# Patient Record
Sex: FEMALE | Race: WHITE | NOT HISPANIC OR LATINO
[De-identification: names, ages, dates, MRNs, and addresses within clinical notes are randomized per-mention and may not be internally consistent; named-entity substitution may affect disease eponyms.]

---

## 2014-02-26 RX ORDER — ATORVASTATIN CALCIUM 80 MG/1
1 TABLET, FILM COATED ORAL
Qty: 0 | Refills: 0 | DISCHARGE
Start: 2014-02-26

## 2014-03-14 RX ORDER — DIGOXIN 250 MCG
1 TABLET ORAL
Qty: 0 | Refills: 0 | DISCHARGE
Start: 2014-03-14

## 2017-06-16 ENCOUNTER — APPOINTMENT (OUTPATIENT)
Dept: HOME HEALTH SERVICES | Facility: HOME HEALTH | Age: 82
End: 2017-06-16

## 2017-06-16 VITALS
TEMPERATURE: 98.5 F | RESPIRATION RATE: 12 BRPM | HEART RATE: 59 BPM | WEIGHT: 123.6 LBS | OXYGEN SATURATION: 97 % | BODY MASS INDEX: 23.33 KG/M2 | HEIGHT: 61 IN | DIASTOLIC BLOOD PRESSURE: 60 MMHG | SYSTOLIC BLOOD PRESSURE: 145 MMHG

## 2017-06-16 DIAGNOSIS — Z80.42 FAMILY HISTORY OF MALIGNANT NEOPLASM OF PROSTATE: ICD-10-CM

## 2017-06-16 DIAGNOSIS — Z63.4 DISAPPEARANCE AND DEATH OF FAMILY MEMBER: ICD-10-CM

## 2017-06-16 SDOH — SOCIAL STABILITY - SOCIAL INSECURITY: DISSAPEARANCE AND DEATH OF FAMILY MEMBER: Z63.4

## 2017-06-19 ENCOUNTER — LABORATORY RESULT (OUTPATIENT)
Age: 82
End: 2017-06-19

## 2017-06-26 ENCOUNTER — LABORATORY RESULT (OUTPATIENT)
Age: 82
End: 2017-06-26

## 2017-06-28 LAB
APPEARANCE: ABNORMAL
BACTERIA UR CULT: NORMAL
BILIRUBIN URINE: NEGATIVE
BLOOD URINE: ABNORMAL
COLOR: YELLOW
GLUCOSE QUALITATIVE U: 500 MG/DL
KETONES URINE: NEGATIVE
LEUKOCYTE ESTERASE URINE: ABNORMAL
NITRITE URINE: NEGATIVE
PH URINE: 5
PROTEIN URINE: ABNORMAL MG/DL
SPECIFIC GRAVITY URINE: 1.01
UROBILINOGEN URINE: NORMAL MG/DL

## 2017-08-16 ENCOUNTER — INPATIENT (INPATIENT)
Facility: HOSPITAL | Age: 82
LOS: 4 days | Discharge: HOME | DRG: 280 | End: 2017-08-21
Attending: INTERNAL MEDICINE | Admitting: EMERGENCY MEDICINE
Payer: MEDICARE

## 2017-08-16 VITALS
TEMPERATURE: 99 F | RESPIRATION RATE: 16 BRPM | HEART RATE: 66 BPM | SYSTOLIC BLOOD PRESSURE: 139 MMHG | DIASTOLIC BLOOD PRESSURE: 78 MMHG | OXYGEN SATURATION: 99 %

## 2017-08-16 DIAGNOSIS — Z90.49 ACQUIRED ABSENCE OF OTHER SPECIFIED PARTS OF DIGESTIVE TRACT: Chronic | ICD-10-CM

## 2017-08-16 DIAGNOSIS — Z98.89 OTHER SPECIFIED POSTPROCEDURAL STATES: Chronic | ICD-10-CM

## 2017-08-16 DIAGNOSIS — R07.9 CHEST PAIN, UNSPECIFIED: ICD-10-CM

## 2017-08-16 LAB
ALBUMIN SERPL ELPH-MCNC: 3.8 G/DL — SIGNIFICANT CHANGE UP (ref 3.3–5)
ALP SERPL-CCNC: 136 U/L — HIGH (ref 40–120)
ALT FLD-CCNC: 23 U/L RC — SIGNIFICANT CHANGE UP (ref 10–45)
ANION GAP SERPL CALC-SCNC: 12 MMOL/L — SIGNIFICANT CHANGE UP (ref 5–17)
APPEARANCE UR: ABNORMAL
APTT BLD: 30.6 SEC — SIGNIFICANT CHANGE UP (ref 27.5–37.4)
AST SERPL-CCNC: 19 U/L — SIGNIFICANT CHANGE UP (ref 10–40)
BACTERIA # UR AUTO: ABNORMAL /HPF
BASOPHILS # BLD AUTO: 0 K/UL — SIGNIFICANT CHANGE UP (ref 0–0.2)
BASOPHILS NFR BLD AUTO: 0.5 % — SIGNIFICANT CHANGE UP (ref 0–2)
BILIRUB SERPL-MCNC: 0.5 MG/DL — SIGNIFICANT CHANGE UP (ref 0.2–1.2)
BILIRUB UR-MCNC: NEGATIVE — SIGNIFICANT CHANGE UP
BUN SERPL-MCNC: 16 MG/DL — SIGNIFICANT CHANGE UP (ref 7–23)
CALCIUM SERPL-MCNC: 8.8 MG/DL — SIGNIFICANT CHANGE UP (ref 8.4–10.5)
CHLORIDE SERPL-SCNC: 103 MMOL/L — SIGNIFICANT CHANGE UP (ref 96–108)
CK MB BLD-MCNC: 5.4 % — HIGH (ref 0–3.5)
CK MB CFR SERPL CALC: 3.3 NG/ML — SIGNIFICANT CHANGE UP (ref 0–3.8)
CK MB CFR SERPL CALC: 3.9 NG/ML — HIGH (ref 0–3.8)
CK MB CFR SERPL CALC: 4.3 NG/ML — HIGH (ref 0–3.8)
CK MB CFR SERPL CALC: 5 NG/ML — HIGH (ref 0–3.8)
CK SERPL-CCNC: 79 U/L — SIGNIFICANT CHANGE UP (ref 25–170)
CK SERPL-CCNC: 82 U/L — SIGNIFICANT CHANGE UP (ref 25–170)
CK SERPL-CCNC: 94 U/L — SIGNIFICANT CHANGE UP (ref 25–170)
CO2 SERPL-SCNC: 23 MMOL/L — SIGNIFICANT CHANGE UP (ref 22–31)
COLOR SPEC: SIGNIFICANT CHANGE UP
CREAT SERPL-MCNC: 0.88 MG/DL — SIGNIFICANT CHANGE UP (ref 0.5–1.3)
DIFF PNL FLD: NEGATIVE — SIGNIFICANT CHANGE UP
DIGOXIN SERPL-MCNC: 0.8 NG/ML — SIGNIFICANT CHANGE UP (ref 0.8–2)
EOSINOPHIL # BLD AUTO: 0.2 K/UL — SIGNIFICANT CHANGE UP (ref 0–0.5)
EOSINOPHIL NFR BLD AUTO: 3 % — SIGNIFICANT CHANGE UP (ref 0–6)
EPI CELLS # UR: SIGNIFICANT CHANGE UP /HPF
GLUCOSE SERPL-MCNC: 353 MG/DL — HIGH (ref 70–99)
GLUCOSE UR QL: 50
HCT VFR BLD CALC: 32.7 % — LOW (ref 34.5–45)
HGB BLD-MCNC: 11.3 G/DL — LOW (ref 11.5–15.5)
HYALINE CASTS # UR AUTO: ABNORMAL
INR BLD: 1.19 RATIO — HIGH (ref 0.88–1.16)
KETONES UR-MCNC: NEGATIVE — SIGNIFICANT CHANGE UP
LEUKOCYTE ESTERASE UR-ACNC: ABNORMAL
LYMPHOCYTES # BLD AUTO: 1.3 K/UL — SIGNIFICANT CHANGE UP (ref 1–3.3)
LYMPHOCYTES # BLD AUTO: 15.9 % — SIGNIFICANT CHANGE UP (ref 13–44)
MCHC RBC-ENTMCNC: 32.6 PG — SIGNIFICANT CHANGE UP (ref 27–34)
MCHC RBC-ENTMCNC: 34.7 GM/DL — SIGNIFICANT CHANGE UP (ref 32–36)
MCV RBC AUTO: 94 FL — SIGNIFICANT CHANGE UP (ref 80–100)
MONOCYTES # BLD AUTO: 0.5 K/UL — SIGNIFICANT CHANGE UP (ref 0–0.9)
MONOCYTES NFR BLD AUTO: 6.6 % — SIGNIFICANT CHANGE UP (ref 2–14)
NEUTROPHILS # BLD AUTO: 5.9 K/UL — SIGNIFICANT CHANGE UP (ref 1.8–7.4)
NEUTROPHILS NFR BLD AUTO: 73.9 % — SIGNIFICANT CHANGE UP (ref 43–77)
NITRITE UR-MCNC: NEGATIVE — SIGNIFICANT CHANGE UP
NT-PROBNP SERPL-SCNC: 1050 PG/ML — HIGH (ref 0–300)
PH UR: 5.5 — SIGNIFICANT CHANGE UP (ref 5–8)
PLATELET # BLD AUTO: 152 K/UL — SIGNIFICANT CHANGE UP (ref 150–400)
POTASSIUM SERPL-MCNC: 4.1 MMOL/L — SIGNIFICANT CHANGE UP (ref 3.5–5.3)
POTASSIUM SERPL-SCNC: 4.1 MMOL/L — SIGNIFICANT CHANGE UP (ref 3.5–5.3)
PROT SERPL-MCNC: 6.1 G/DL — SIGNIFICANT CHANGE UP (ref 6–8.3)
PROT UR-MCNC: NEGATIVE — SIGNIFICANT CHANGE UP
PROTHROM AB SERPL-ACNC: 13 SEC — HIGH (ref 9.8–12.7)
RBC # BLD: 3.47 M/UL — LOW (ref 3.8–5.2)
RBC # FLD: 11.9 % — SIGNIFICANT CHANGE UP (ref 10.3–14.5)
RBC CASTS # UR COMP ASSIST: ABNORMAL /HPF (ref 0–2)
SODIUM SERPL-SCNC: 138 MMOL/L — SIGNIFICANT CHANGE UP (ref 135–145)
SP GR SPEC: 1.01 — SIGNIFICANT CHANGE UP (ref 1.01–1.02)
TROPONIN T SERPL-MCNC: 0.05 NG/ML — SIGNIFICANT CHANGE UP (ref 0–0.06)
TROPONIN T SERPL-MCNC: 0.07 NG/ML — HIGH (ref 0–0.06)
TROPONIN T SERPL-MCNC: 0.08 NG/ML — HIGH (ref 0–0.06)
TROPONIN T SERPL-MCNC: <0.01 NG/ML — SIGNIFICANT CHANGE UP (ref 0–0.06)
UROBILINOGEN FLD QL: NEGATIVE — SIGNIFICANT CHANGE UP
WBC # BLD: 8 K/UL — SIGNIFICANT CHANGE UP (ref 3.8–10.5)
WBC # FLD AUTO: 8 K/UL — SIGNIFICANT CHANGE UP (ref 3.8–10.5)
WBC UR QL: SIGNIFICANT CHANGE UP /HPF (ref 0–5)

## 2017-08-16 PROCEDURE — 99285 EMERGENCY DEPT VISIT HI MDM: CPT | Mod: 25

## 2017-08-16 PROCEDURE — 93308 TTE F-UP OR LMTD: CPT | Mod: 26

## 2017-08-16 PROCEDURE — 71010: CPT | Mod: 26

## 2017-08-16 RX ORDER — DEXTROSE 50 % IN WATER 50 %
1 SYRINGE (ML) INTRAVENOUS ONCE
Qty: 0 | Refills: 0 | Status: DISCONTINUED | OUTPATIENT
Start: 2017-08-16 | End: 2017-08-21

## 2017-08-16 RX ORDER — HEPARIN SODIUM 5000 [USP'U]/ML
5000 INJECTION INTRAVENOUS; SUBCUTANEOUS EVERY 8 HOURS
Qty: 0 | Refills: 0 | Status: DISCONTINUED | OUTPATIENT
Start: 2017-08-16 | End: 2017-08-17

## 2017-08-16 RX ORDER — FUROSEMIDE 40 MG
20 TABLET ORAL DAILY
Qty: 0 | Refills: 0 | Status: DISCONTINUED | OUTPATIENT
Start: 2017-08-16 | End: 2017-08-21

## 2017-08-16 RX ORDER — FUROSEMIDE 40 MG
40 TABLET ORAL ONCE
Qty: 0 | Refills: 0 | Status: COMPLETED | OUTPATIENT
Start: 2017-08-16 | End: 2017-08-16

## 2017-08-16 RX ORDER — DEXTROSE 50 % IN WATER 50 %
12.5 SYRINGE (ML) INTRAVENOUS ONCE
Qty: 0 | Refills: 0 | Status: DISCONTINUED | OUTPATIENT
Start: 2017-08-16 | End: 2017-08-21

## 2017-08-16 RX ORDER — DEXTROSE 50 % IN WATER 50 %
25 SYRINGE (ML) INTRAVENOUS ONCE
Qty: 0 | Refills: 0 | Status: DISCONTINUED | OUTPATIENT
Start: 2017-08-16 | End: 2017-08-21

## 2017-08-16 RX ORDER — ATORVASTATIN CALCIUM 80 MG/1
80 TABLET, FILM COATED ORAL AT BEDTIME
Qty: 0 | Refills: 0 | Status: DISCONTINUED | OUTPATIENT
Start: 2017-08-16 | End: 2017-08-21

## 2017-08-16 RX ORDER — FUROSEMIDE 40 MG
1 TABLET ORAL
Qty: 7 | Refills: 0 | OUTPATIENT
Start: 2017-08-16 | End: 2017-08-23

## 2017-08-16 RX ORDER — INSULIN LISPRO 100/ML
VIAL (ML) SUBCUTANEOUS AT BEDTIME
Qty: 0 | Refills: 0 | Status: DISCONTINUED | OUTPATIENT
Start: 2017-08-16 | End: 2017-08-21

## 2017-08-16 RX ORDER — DIGOXIN 250 MCG
0.12 TABLET ORAL DAILY
Qty: 0 | Refills: 0 | Status: DISCONTINUED | OUTPATIENT
Start: 2017-08-16 | End: 2017-08-21

## 2017-08-16 RX ORDER — POTASSIUM CHLORIDE 20 MEQ
20 PACKET (EA) ORAL ONCE
Qty: 0 | Refills: 0 | Status: COMPLETED | OUTPATIENT
Start: 2017-08-16 | End: 2017-08-16

## 2017-08-16 RX ORDER — HEPARIN SODIUM 5000 [USP'U]/ML
3500 INJECTION INTRAVENOUS; SUBCUTANEOUS ONCE
Qty: 0 | Refills: 0 | Status: COMPLETED | OUTPATIENT
Start: 2017-08-16 | End: 2017-08-16

## 2017-08-16 RX ORDER — LISINOPRIL 2.5 MG/1
10 TABLET ORAL DAILY
Qty: 0 | Refills: 0 | Status: DISCONTINUED | OUTPATIENT
Start: 2017-08-16 | End: 2017-08-21

## 2017-08-16 RX ORDER — SPIRONOLACTONE 25 MG/1
25 TABLET, FILM COATED ORAL DAILY
Qty: 0 | Refills: 0 | Status: DISCONTINUED | OUTPATIENT
Start: 2017-08-16 | End: 2017-08-21

## 2017-08-16 RX ORDER — CHOLECALCIFEROL (VITAMIN D3) 125 MCG
1000 CAPSULE ORAL DAILY
Qty: 0 | Refills: 0 | Status: DISCONTINUED | OUTPATIENT
Start: 2017-08-16 | End: 2017-08-21

## 2017-08-16 RX ORDER — ASPIRIN/CALCIUM CARB/MAGNESIUM 324 MG
81 TABLET ORAL DAILY
Qty: 0 | Refills: 0 | Status: DISCONTINUED | OUTPATIENT
Start: 2017-08-16 | End: 2017-08-21

## 2017-08-16 RX ORDER — INSULIN GLARGINE 100 [IU]/ML
14 INJECTION, SOLUTION SUBCUTANEOUS AT BEDTIME
Qty: 0 | Refills: 0 | Status: DISCONTINUED | OUTPATIENT
Start: 2017-08-16 | End: 2017-08-16

## 2017-08-16 RX ORDER — INSULIN LISPRO 100/ML
5 VIAL (ML) SUBCUTANEOUS ONCE
Qty: 0 | Refills: 0 | Status: COMPLETED | OUTPATIENT
Start: 2017-08-16 | End: 2017-08-16

## 2017-08-16 RX ORDER — HEPARIN SODIUM 5000 [USP'U]/ML
INJECTION INTRAVENOUS; SUBCUTANEOUS
Qty: 25000 | Refills: 0 | Status: DISCONTINUED | OUTPATIENT
Start: 2017-08-16 | End: 2017-08-18

## 2017-08-16 RX ORDER — METOPROLOL TARTRATE 50 MG
25 TABLET ORAL
Qty: 0 | Refills: 0 | Status: DISCONTINUED | OUTPATIENT
Start: 2017-08-16 | End: 2017-08-21

## 2017-08-16 RX ORDER — ACETAMINOPHEN 500 MG
650 TABLET ORAL EVERY 6 HOURS
Qty: 0 | Refills: 0 | Status: DISCONTINUED | OUTPATIENT
Start: 2017-08-16 | End: 2017-08-21

## 2017-08-16 RX ORDER — INSULIN GLARGINE 100 [IU]/ML
17 INJECTION, SOLUTION SUBCUTANEOUS AT BEDTIME
Qty: 0 | Refills: 0 | Status: DISCONTINUED | OUTPATIENT
Start: 2017-08-16 | End: 2017-08-18

## 2017-08-16 RX ORDER — HEPARIN SODIUM 5000 [USP'U]/ML
3500 INJECTION INTRAVENOUS; SUBCUTANEOUS EVERY 6 HOURS
Qty: 0 | Refills: 0 | Status: DISCONTINUED | OUTPATIENT
Start: 2017-08-16 | End: 2017-08-20

## 2017-08-16 RX ORDER — SODIUM CHLORIDE 9 MG/ML
1000 INJECTION, SOLUTION INTRAVENOUS
Qty: 0 | Refills: 0 | Status: DISCONTINUED | OUTPATIENT
Start: 2017-08-16 | End: 2017-08-21

## 2017-08-16 RX ORDER — GLUCAGON INJECTION, SOLUTION 0.5 MG/.1ML
1 INJECTION, SOLUTION SUBCUTANEOUS ONCE
Qty: 0 | Refills: 0 | Status: DISCONTINUED | OUTPATIENT
Start: 2017-08-16 | End: 2017-08-21

## 2017-08-16 RX ORDER — PANTOPRAZOLE SODIUM 20 MG/1
40 TABLET, DELAYED RELEASE ORAL
Qty: 0 | Refills: 0 | Status: DISCONTINUED | OUTPATIENT
Start: 2017-08-16 | End: 2017-08-21

## 2017-08-16 RX ORDER — CLOPIDOGREL BISULFATE 75 MG/1
75 TABLET, FILM COATED ORAL DAILY
Qty: 0 | Refills: 0 | Status: DISCONTINUED | OUTPATIENT
Start: 2017-08-16 | End: 2017-08-21

## 2017-08-16 RX ORDER — INSULIN LISPRO 100/ML
VIAL (ML) SUBCUTANEOUS
Qty: 0 | Refills: 0 | Status: DISCONTINUED | OUTPATIENT
Start: 2017-08-16 | End: 2017-08-21

## 2017-08-16 RX ADMIN — Medication 2: at 22:07

## 2017-08-16 RX ADMIN — Medication 5 UNIT(S): at 04:45

## 2017-08-16 RX ADMIN — HEPARIN SODIUM 700 UNIT(S)/HR: 5000 INJECTION INTRAVENOUS; SUBCUTANEOUS at 18:21

## 2017-08-16 RX ADMIN — Medication 81 MILLIGRAM(S): at 16:15

## 2017-08-16 RX ADMIN — ATORVASTATIN CALCIUM 80 MILLIGRAM(S): 80 TABLET, FILM COATED ORAL at 22:07

## 2017-08-16 RX ADMIN — INSULIN GLARGINE 17 UNIT(S): 100 INJECTION, SOLUTION SUBCUTANEOUS at 22:08

## 2017-08-16 RX ADMIN — Medication 40 MILLIGRAM(S): at 04:44

## 2017-08-16 RX ADMIN — Medication 1000 UNIT(S): at 16:15

## 2017-08-16 RX ADMIN — Medication 2: at 14:45

## 2017-08-16 RX ADMIN — HEPARIN SODIUM 3500 UNIT(S): 5000 INJECTION INTRAVENOUS; SUBCUTANEOUS at 18:23

## 2017-08-16 RX ADMIN — Medication 0.12 MILLIGRAM(S): at 16:17

## 2017-08-16 RX ADMIN — Medication 2: at 18:24

## 2017-08-16 RX ADMIN — CLOPIDOGREL BISULFATE 75 MILLIGRAM(S): 75 TABLET, FILM COATED ORAL at 16:15

## 2017-08-16 RX ADMIN — Medication 1 TABLET(S): at 16:15

## 2017-08-16 RX ADMIN — Medication 20 MILLIEQUIVALENT(S): at 04:45

## 2017-08-16 NOTE — ED PROVIDER NOTE - PROGRESS NOTE DETAILS
Attending Christian: pt feeling better, offered admission for further diuresis but able to ambulate without difficulty or evidence of hypoxia. d/w pt's doctors will start lasix and have pt follow up Attending Gonzales: repeat troponin slightly elevated from previous. pt feeling better however with increasing troponin will admit. pcp is dr diallo and cards is dr chavarria

## 2017-08-16 NOTE — ED PROVIDER NOTE - OBJECTIVE STATEMENT
84 year old F with PMH of IDDM, HTN, HLD, hx of STEMI w/ stents (2014) presents with CP. Started in evening when sitting on couch. Midsternal CP similar to when had MI. Associated with SOB. Lasted 2 hrs and resolved. No N/V, diaphoresis. Patient otherwise in usual state of health other than feeling fatigued. On aspirin/plavix now. Has LE edema at baseline with recent exacerbation. Denies orthopnea.

## 2017-08-16 NOTE — ED ADULT NURSE NOTE - PMH
CHF (congestive heart failure)    Diabetes mellitus    Dyslipidemia    HTN (hypertension)    Palpitations    STEMI (ST elevation myocardial infarction)

## 2017-08-16 NOTE — ED ADULT NURSE REASSESSMENT NOTE - NS ED NURSE REASSESS COMMENT FT1
Vss. Pt is aaox4. Gross neuro intact. Lungs clear throughout bilat. S1 and S2 heard. CM: NSR. Pt denies of chest pain,sob, n,v, dizziness, weakness, fever, chills. Pt awaiting lab results. Safety and comfort measures maintained. Family at bedside.

## 2017-08-16 NOTE — H&P ADULT - HISTORY OF PRESENT ILLNESS
84 year old F with PMH of IDDM, HTN, HLD, hx of STEMI w/ stents (2014) presents with CP. Started in evening when sitting on couch. Midsternal CP similar to when had MI. Associated with SOB. Lasted 2 hrs and resolved. No N/V, diaphoresis. Patient otherwise in usual state of health other than feeling fatigued. On aspirin/plavix now. Has LE edema at baseline with recent exacerbation. Denies orthopnea, but sleeps on a recliner.

## 2017-08-16 NOTE — CONSULT NOTE ADULT - SUBJECTIVE AND OBJECTIVE BOX
CHIEF COMPLAINT:Patient is a 84y old  Female who presents with a chief complaint of chest pain (16 Aug 2017 15:05)      HISTORY OF PRESENT ILLNESS:HPI:  84 year old F with PMH of sHF EF 30%, IDDM, HTN, HLD, hx of STEMI w/ stents (2014) presents with CP. Started in evening when sitting on couch. Midsternal CP similar to when had MI. Associated with SOB. Lasted 2 hrs and resolved. No N/V, diaphoresis. Patient otherwise in usual state of health other than feeling fatigued. On aspirin/plavix now. Has LE edema at baseline with recent exacerbation. Denies orthopnea, but sleeps on a recliner. (16 Aug 2017 15:05). No recent changes in siet      PAST MEDICAL & SURGICAL HISTORY:  CHF (congestive heart failure)  STEMI (ST elevation myocardial infarction)  Palpitations  Diabetes mellitus  Dyslipidemia  HTN (hypertension)  S/P cardiac cath  S/P tonsillectomy  S/P lumpectomy, left breast: premalignant  S/P appendectomy  S/P cholecystectomy          MEDICATIONS:  aspirin enteric coated 81 milliGRAM(s) Oral daily  lisinopril 10 milliGRAM(s) Oral daily  digoxin     Tablet 0.125 milliGRAM(s) Oral daily  metoprolol 25 milliGRAM(s) Oral two times a day  clopidogrel Tablet 75 milliGRAM(s) Oral daily  heparin  Injectable 5000 Unit(s) SubCutaneous every 8 hours  furosemide   Injectable 20 milliGRAM(s) IV Push daily  heparin  Infusion.  Unit(s)/Hr IV Continuous <Continuous>  heparin  Injectable 3500 Unit(s) IV Push every 6 hours PRN  spironolactone 25 milliGRAM(s) Oral daily        acetaminophen   Tablet 650 milliGRAM(s) Oral every 6 hours PRN    pantoprazole    Tablet 40 milliGRAM(s) Oral before breakfast    insulin lispro (HumaLOG) corrective regimen sliding scale   SubCutaneous three times a day before meals  insulin lispro (HumaLOG) corrective regimen sliding scale   SubCutaneous at bedtime  dextrose Gel 1 Dose(s) Oral once PRN  dextrose 50% Injectable 12.5 Gram(s) IV Push once  dextrose 50% Injectable 25 Gram(s) IV Push once  dextrose 50% Injectable 25 Gram(s) IV Push once  glucagon  Injectable 1 milliGRAM(s) IntraMuscular once PRN  atorvastatin 80 milliGRAM(s) Oral at bedtime  insulin glargine Injectable (LANTUS) 17 Unit(s) SubCutaneous at bedtime    dextrose 5%. 1000 milliLiter(s) IV Continuous <Continuous>  multivitamin 1 Tablet(s) Oral daily  cholecalciferol 1000 Unit(s) Oral daily      FAMILY HISTORY:  No pertinent family history in first degree relatives      Non-contributory    SOCIAL HISTORY:    Not an active smoker    Allergies    codeine (Unknown)  Kiwi (Anaphylaxis)  penicillins (Anaphylaxis)    Intolerances    	    REVIEW OF SYSTEMS:  CONSTITUTIONAL: No fever  EYES: No eye pain, visual disturbances, or discharge  ENMT:  No difficulty hearing, tinnitus  NECK: No pain or stiffness  RESPIRATORY: No cough, wheezing,  CARDIOVASCULAR: +chest pain, palpitations, (-) passing out, dizziness, (+) leg swelling  GASTROINTESTINAL:  No nausea, vomiting, diarrhea or constipation. No melena.  GENITOURINARY: No dysuria, hematuria  NEUROLOGICAL: No stroke like symptoms  SKIN: No burning or lesions   LYMPH Nodes: No enlarged glands  ENDOCRINE: No heat or cold intolerance  MUSCULOSKELETAL: No joint pain or swelling  PSYCHIATRIC: No  anxiety, mood swings  HEME/LYMPH: No bleeding gums  ALLERY AND IMMUNOLOGIC: No hives or eczema	    All other ROS negative    PHYSICAL EXAM:  T(C): 36.8 (08-16-17 @ 20:07), Max: 37.1 (08-16-17 @ 02:13)  HR: 69 (08-16-17 @ 20:07) (49 - 70)  BP: 144/72 (08-16-17 @ 20:07) (132/75 - 153/93)  RR: 18 (08-16-17 @ 20:07) (16 - 18)  SpO2: 96% (08-16-17 @ 20:07) (95% - 99%)  Wt(kg): --  I&O's Summary    16 Aug 2017 07:01  -  16 Aug 2017 20:25  --------------------------------------------------------  IN: 240 mL / OUT: 100 mL / NET: 140 mL        Appearance: Normal	  HEENT:   Normal oral mucosa, EOMI	  Lymphatic: No lymphadenopathy  Cardiovascular: Normal S1 S2, No JVD, No murmurs, No edema  Respiratory: Lungs clear to auscultation	  Psychiatry: Alert, Mood & affect appropriate  Gastrointestinal:  Soft, Non-tender, + BS	  Skin: No rashes, No ecchymoses, No cyanosis	  Neurologic: Non-focal  Extremities:  No clubbing, cyanosis or edema  Vascular: Peripheral pulses palpable 2+ bilaterally  	    ECG:  sr lbbb	  RADIOLOGY:  	  	  CARDIAC MARKERS:  Troponin T, Serum: 0.08 ng/mL (08-16 @ 15:48)  Troponin T, Serum: 0.05 ng/mL (08-16 @ 08:04)  Troponin T, Serum: <0.01 ng/mL (08-16 @ 03:08)                                  11.3   8.0   )-----------( 152      ( 16 Aug 2017 03:08 )             32.7     08-16    138  |  103  |  16  ----------------------------<  353<H>  4.1   |  23  |  0.88    Ca    8.8      16 Aug 2017 03:08    TPro  6.1  /  Alb  3.8  /  TBili  0.5  /  DBili  x   /  AST  19  /  ALT  23  /  AlkPhos  136<H>  08-16    proBNP: Serum Pro-Brain Natriuretic Peptide: 1050 pg/mL (08-16 @ 03:08)    Lipid Profile:   HgA1c:   TSH:       Assesment/Plan:   84 year old F with PMH of sHF EF 30%, IDDM, HTN, HLD, hx of STEMI w/ stents (2014) presents with chest pain  1. Chest pain/CAD/NSTEMI - EKG with old LBBB nondiagnostic for ischemia. Trop elevated. Pt with known CAD with recurrent symptoms similar to the pain prior to her MI  - I recommend cath given high risk ACS. Pt is reluctant and wishes to think about it.   - Continue with ASA, Plavix, and statin. Add heparin gtt for ACS    2. sHF - euvolemic at this time. Continue with OMT. Continue with ACE, bb and aldactone    3. HTN - well controlled on above regimen.     4. HLD - continue with statin    5. DM - per primary team          Srini Velasco DO [facility]  [phone]

## 2017-08-16 NOTE — ED PROVIDER NOTE - MEDICAL DECISION MAKING DETAILS
84 year old F with PMH of IDDM, HTN, HLD, hx of STEMI w/ stents (2014) presents with CP. DIMITRI. Labs, CE, pro-BNP, chest xray. 84 year old F with PMH of IDDM, HTN, HLD, hx of STEMI w/ stents (2014) presents with CP. DIMITRI. Labs, CE, pro-BNP, chest xray.  Attending Gonzales: 85 y/o female h/o CHF with last echo showing severe hypokinesis not on diuretic presenting with sob. upon arriva symptoms improving. denies chest pain. pocus shows b/l b lines, trace pleural effusions. pt given lasix and felt improved. ambulating in the ed without hypoxia. offered admission for diuresis and furthe rmonitoring however as pt feeling better will d/c with close follow up. d/w her pcp will start lasix at home with close return precautions. check 2 sets of enzymes. less likely pe based on history

## 2017-08-16 NOTE — H&P ADULT - ASSESSMENT
84 year old F with PMH of IDDM, HTN, HLD, hx of STEMI w/ stents (2014) presents with CP.     acute on chronic chf- dyspnea and le edema improved after iv Lasix in ed.  start lasix 20 iv daily, strict is and os. low salt diet, 2 d echo.  chest pain- r/o acs, admit to tele. r/o mi , cie trop q 8 x 3, may need stress test, cardiology consult, cw metoprolol and plavix.  diabetes- fs qid, check hgb a1c, insulin as per protocol  hld- check fasting lipids, cw lipitor  dvt px.

## 2017-08-16 NOTE — H&P ADULT - NSHPLABSRESULTS_GEN_ALL_CORE
LABS:                        11.3   8.0   )-----------( 152      ( 16 Aug 2017 03:08 )             32.7     08-16    138  |  103  |  16  ----------------------------<  353<H>  4.1   |  23  |  0.88    Ca    8.8      16 Aug 2017 03:08    TPro  6.1  /  Alb  3.8  /  TBili  0.5  /  DBili  x   /  AST  19  /  ALT  23  /  AlkPhos  136<H>  08-16    PT/INR - ( 16 Aug 2017 03:08 )   PT: 13.0 sec;   INR: 1.19 ratio         PTT - ( 16 Aug 2017 03:08 )  PTT:30.6 sec      Troponin T, Serum: 0.05 ng/mL (08-16-17 @ 08:04)  Troponin T, Serum: <0.01 ng/mL (08-16-17 @ 03:08)      RADIOLOGY & ADDITIONAL TESTS:

## 2017-08-16 NOTE — ED PROVIDER NOTE - CHPI ED SYMPTOMS NEG
no vomiting/no diaphoresis/no chills/no cough/no back pain/no fever/no nausea/no dizziness/no syncope

## 2017-08-16 NOTE — ED ADULT NURSE NOTE - PSH
S/P appendectomy    S/P cardiac cath    S/P cholecystectomy    S/P lumpectomy, left breast  premalignant  S/P tonsillectomy

## 2017-08-17 ENCOUNTER — APPOINTMENT (OUTPATIENT)
Dept: HOME HEALTH SERVICES | Facility: HOME HEALTH | Age: 82
End: 2017-08-17

## 2017-08-17 DIAGNOSIS — I10 ESSENTIAL (PRIMARY) HYPERTENSION: ICD-10-CM

## 2017-08-17 DIAGNOSIS — I50.23 ACUTE ON CHRONIC SYSTOLIC (CONGESTIVE) HEART FAILURE: ICD-10-CM

## 2017-08-17 DIAGNOSIS — I21.4 NON-ST ELEVATION (NSTEMI) MYOCARDIAL INFARCTION: ICD-10-CM

## 2017-08-17 DIAGNOSIS — E78.5 HYPERLIPIDEMIA, UNSPECIFIED: ICD-10-CM

## 2017-08-17 DIAGNOSIS — E11.9 TYPE 2 DIABETES MELLITUS WITHOUT COMPLICATIONS: ICD-10-CM

## 2017-08-17 LAB
ANION GAP SERPL CALC-SCNC: 14 MMOL/L — SIGNIFICANT CHANGE UP (ref 5–17)
APTT BLD: 47.2 SEC — HIGH (ref 27.5–37.4)
APTT BLD: 53.9 SEC — HIGH (ref 27.5–37.4)
APTT BLD: 60.4 SEC — HIGH (ref 27.5–37.4)
APTT BLD: 76.1 SEC — HIGH (ref 27.5–37.4)
BUN SERPL-MCNC: 14 MG/DL — SIGNIFICANT CHANGE UP (ref 7–23)
CALCIUM SERPL-MCNC: 9.2 MG/DL — SIGNIFICANT CHANGE UP (ref 8.4–10.5)
CHLORIDE SERPL-SCNC: 100 MMOL/L — SIGNIFICANT CHANGE UP (ref 96–108)
CHOLEST SERPL-MCNC: 96 MG/DL — SIGNIFICANT CHANGE UP (ref 10–199)
CK MB CFR SERPL CALC: 2.9 NG/ML — SIGNIFICANT CHANGE UP (ref 0–3.8)
CK SERPL-CCNC: 53 U/L — SIGNIFICANT CHANGE UP (ref 25–170)
CK SERPL-CCNC: 78 U/L — SIGNIFICANT CHANGE UP (ref 25–170)
CO2 SERPL-SCNC: 26 MMOL/L — SIGNIFICANT CHANGE UP (ref 22–31)
CREAT SERPL-MCNC: 0.9 MG/DL — SIGNIFICANT CHANGE UP (ref 0.5–1.3)
FOLATE SERPL-MCNC: >20 NG/ML — SIGNIFICANT CHANGE UP (ref 4.8–24.2)
GLUCOSE SERPL-MCNC: 288 MG/DL — HIGH (ref 70–99)
HBA1C BLD-MCNC: 11.6 % — HIGH (ref 4–5.6)
HCT VFR BLD CALC: 32.7 % — LOW (ref 34.5–45)
HCT VFR BLD CALC: 35.9 % — SIGNIFICANT CHANGE UP (ref 34.5–45)
HDLC SERPL-MCNC: 27 MG/DL — LOW (ref 40–125)
HGB BLD-MCNC: 11.6 G/DL — SIGNIFICANT CHANGE UP (ref 11.5–15.5)
HGB BLD-MCNC: 12 G/DL — SIGNIFICANT CHANGE UP (ref 11.5–15.5)
LIPID PNL WITH DIRECT LDL SERPL: 40 MG/DL — SIGNIFICANT CHANGE UP
MAGNESIUM SERPL-MCNC: 1.4 MG/DL — LOW (ref 1.6–2.6)
MCHC RBC-ENTMCNC: 29.8 PG — SIGNIFICANT CHANGE UP (ref 27–34)
MCHC RBC-ENTMCNC: 33.2 PG — SIGNIFICANT CHANGE UP (ref 27–34)
MCHC RBC-ENTMCNC: 33.4 GM/DL — SIGNIFICANT CHANGE UP (ref 32–36)
MCHC RBC-ENTMCNC: 35.5 GM/DL — SIGNIFICANT CHANGE UP (ref 32–36)
MCV RBC AUTO: 89.1 FL — SIGNIFICANT CHANGE UP (ref 80–100)
MCV RBC AUTO: 93.5 FL — SIGNIFICANT CHANGE UP (ref 80–100)
PHOSPHATE SERPL-MCNC: 3.3 MG/DL — SIGNIFICANT CHANGE UP (ref 2.5–4.5)
PLATELET # BLD AUTO: 134 K/UL — LOW (ref 150–400)
PLATELET # BLD AUTO: 164 K/UL — SIGNIFICANT CHANGE UP (ref 150–400)
POTASSIUM SERPL-MCNC: 4.4 MMOL/L — SIGNIFICANT CHANGE UP (ref 3.5–5.3)
POTASSIUM SERPL-SCNC: 4.4 MMOL/L — SIGNIFICANT CHANGE UP (ref 3.5–5.3)
RBC # BLD: 3.5 M/UL — LOW (ref 3.8–5.2)
RBC # BLD: 4.03 M/UL — SIGNIFICANT CHANGE UP (ref 3.8–5.2)
RBC # FLD: 11.7 % — SIGNIFICANT CHANGE UP (ref 10.3–14.5)
RBC # FLD: 13 % — SIGNIFICANT CHANGE UP (ref 10.3–14.5)
SODIUM SERPL-SCNC: 140 MMOL/L — SIGNIFICANT CHANGE UP (ref 135–145)
TOTAL CHOLESTEROL/HDL RATIO MEASUREMENT: 3.6 RATIO — SIGNIFICANT CHANGE UP (ref 3.3–7.1)
TRIGL SERPL-MCNC: 145 MG/DL — SIGNIFICANT CHANGE UP (ref 10–149)
TROPONIN T SERPL-MCNC: 0.05 NG/ML — SIGNIFICANT CHANGE UP (ref 0–0.06)
TSH SERPL-MCNC: 8.18 UIU/ML — HIGH (ref 0.27–4.2)
VIT B12 SERPL-MCNC: 477 PG/ML — SIGNIFICANT CHANGE UP (ref 243–894)
WBC # BLD: 7.24 K/UL — SIGNIFICANT CHANGE UP (ref 3.8–10.5)
WBC # BLD: 8.2 K/UL — SIGNIFICANT CHANGE UP (ref 3.8–10.5)
WBC # FLD AUTO: 7.24 K/UL — SIGNIFICANT CHANGE UP (ref 3.8–10.5)
WBC # FLD AUTO: 8.2 K/UL — SIGNIFICANT CHANGE UP (ref 3.8–10.5)

## 2017-08-17 PROCEDURE — 93306 TTE W/DOPPLER COMPLETE: CPT | Mod: 26

## 2017-08-17 RX ADMIN — Medication 25 MILLIGRAM(S): at 17:00

## 2017-08-17 RX ADMIN — Medication 0.12 MILLIGRAM(S): at 07:03

## 2017-08-17 RX ADMIN — ATORVASTATIN CALCIUM 80 MILLIGRAM(S): 80 TABLET, FILM COATED ORAL at 21:26

## 2017-08-17 RX ADMIN — HEPARIN SODIUM 750 UNIT(S)/HR: 5000 INJECTION INTRAVENOUS; SUBCUTANEOUS at 22:36

## 2017-08-17 RX ADMIN — Medication 5: at 12:11

## 2017-08-17 RX ADMIN — HEPARIN SODIUM 750 UNIT(S)/HR: 5000 INJECTION INTRAVENOUS; SUBCUTANEOUS at 15:36

## 2017-08-17 RX ADMIN — PANTOPRAZOLE SODIUM 40 MILLIGRAM(S): 20 TABLET, DELAYED RELEASE ORAL at 07:03

## 2017-08-17 RX ADMIN — Medication 4: at 21:26

## 2017-08-17 RX ADMIN — CLOPIDOGREL BISULFATE 75 MILLIGRAM(S): 75 TABLET, FILM COATED ORAL at 12:12

## 2017-08-17 RX ADMIN — Medication 81 MILLIGRAM(S): at 12:11

## 2017-08-17 RX ADMIN — SPIRONOLACTONE 25 MILLIGRAM(S): 25 TABLET, FILM COATED ORAL at 07:03

## 2017-08-17 RX ADMIN — Medication 3: at 08:02

## 2017-08-17 RX ADMIN — Medication 1000 UNIT(S): at 12:11

## 2017-08-17 RX ADMIN — Medication 20 MILLIGRAM(S): at 07:03

## 2017-08-17 RX ADMIN — HEPARIN SODIUM 650 UNIT(S)/HR: 5000 INJECTION INTRAVENOUS; SUBCUTANEOUS at 00:59

## 2017-08-17 RX ADMIN — Medication 25 MILLIGRAM(S): at 07:03

## 2017-08-17 RX ADMIN — HEPARIN SODIUM 650 UNIT(S)/HR: 5000 INJECTION INTRAVENOUS; SUBCUTANEOUS at 08:03

## 2017-08-17 RX ADMIN — Medication 1 TABLET(S): at 12:11

## 2017-08-17 RX ADMIN — INSULIN GLARGINE 17 UNIT(S): 100 INJECTION, SOLUTION SUBCUTANEOUS at 21:26

## 2017-08-17 RX ADMIN — Medication 3: at 17:00

## 2017-08-17 RX ADMIN — LISINOPRIL 10 MILLIGRAM(S): 2.5 TABLET ORAL at 07:03

## 2017-08-17 NOTE — PROVIDER CONTACT NOTE (OTHER) - ASSESSMENT
Pt A&Ox4. VS except heart rate is in the 50's. temp 97.4, hr 55, bp 125/74, respirations 18 and oxygen saturation 96% on room air. Pt was asymptomatic and was sleeping. Pt had no c/o chest pain, SOB, palpitations, and discomfort. Pt is now sinus bradycardia 50's on tele.

## 2017-08-17 NOTE — PROVIDER CONTACT NOTE (OTHER) - ACTION/TREATMENT ORDERED:
No orders were made at this present time. PA ordered to recheck the heart rate before the beta blocker is given.

## 2017-08-17 NOTE — PROGRESS NOTE ADULT - SUBJECTIVE AND OBJECTIVE BOX
Patient is a 84y old  Female who presents with a chief complaint of chest pain (16 Aug 2017 15:05)  Patient seen and examined, denies any complaints.    INTERVAL HPI/OVERNIGHT EVENTS: None    T(C): 37 (17 @ 20:10), Max: 37 (17 @ 20:10)  HR: 63 (17 @ 20:10) (50 - 63)  BP: 131/65 (17 @ 20:10) (125/67 - 134/75)  RR: 18 (17 @ 20:10) (18 - 18)  SpO2: 97% (17 @ 20:10) (96% - 99%)  Wt(kg): --  I&O's Summary    16 Aug 2017 07:  -  17 Aug 2017 07:00  --------------------------------------------------------  IN: 327.5 mL / OUT: 1150 mL / NET: -822.5 mL    17 Aug 2017 07:01  -  17 Aug 2017 23:03  --------------------------------------------------------  IN: 1045 mL / OUT: 3000 mL / NET: -1955 mL        PAST MEDICAL & SURGICAL HISTORY:  CHF (congestive heart failure)  STEMI (ST elevation myocardial infarction)  Palpitations  Diabetes mellitus  Dyslipidemia  HTN (hypertension)  S/P cardiac cath  S/P tonsillectomy  S/P lumpectomy, left breast: premalignant  S/P appendectomy  S/P cholecystectomy      REVIEW OF SYSTEMS:  CONSTITUTIONAL: No fever, weight loss, or fatigue  EYES: No eye pain, visual disturbances, or discharge  ENMT:  No difficulty hearing, tinnitus, vertigo; No sinus or throat pain  NECK: No pain or stiffness  RESPIRATORY: No cough, wheezing, chills or hemoptysis; No shortness of breath  CARDIOVASCULAR: No chest pain, palpitations, dizziness, or leg swelling  GASTROINTESTINAL: No abdominal or epigastric pain. No nausea, vomiting, or hematemesis; No diarrhea or constipation. No melena or hematochezia.  GENITOURINARY: No dysuria, frequency, hematuria, or incontinence  NEUROLOGICAL: No headaches, memory loss, loss of strength, numbness, or tremors  SKIN: No itching, burning, rashes, or lesions   LYMPH NODES: No enlarged glands  ENDOCRINE: No heat or cold intolerance; No hair loss  MUSCULOSKELETAL: No joint pain or swelling; No muscle, back, or extremity pain  PSYCHIATRIC: No depression, anxiety, mood swings, or difficulty sleeping  HEME/LYMPH: No easy bruising, or bleeding gums  ALLERY AND IMMUNOLOGIC: No hives or eczema    RADIOLOGY & ADDITIONAL TESTS:    Imaging Personally Reviewed:  [ ] YES  [ ] NO    Consultant(s) Notes Reviewed:  [+ ] YES  [ ] NO    PHYSICAL EXAM:  GENERAL: NAD, well-groomed, well-developed  HEAD:  Atraumatic, Normocephalic  EYES: EOMI, PERRLA, conjunctiva and sclera clear  ENMT: No tonsillar erythema, exudates, or enlargement; Moist mucous membranes, Good dentition, No lesions  NECK: Supple, No JVD, Normal thyroid  NERVOUS SYSTEM:  Alert & Oriented X3, Good concentration; Motor Strength 5/5 B/L upper and lower extremities; DTRs 2+ intact and symmetric  CHEST/LUNG: Clear to percussion bilaterally; No rales, rhonchi, wheezing, or rubs  HEART: Regular rate and rhythm; No murmurs, rubs, or gallops  ABDOMEN: Soft, Nontender, Nondistended; Bowel sounds present  EXTREMITIES:  2+ Peripheral Pulses, No clubbing, cyanosis, or edema  LYMPH: No lymphadenopathy noted  SKIN: No rashes or lesions    LABS:                        12.0   7.24  )-----------( 164      ( 17 Aug 2017 07:46 )             35.9     -    140  |  100  |  14  ----------------------------<  288<H>  4.4   |  26  |  0.90    Ca    9.2      17 Aug 2017 08:34  Phos  3.3     -  Mg     1.4     -    TPro  6.1  /  Alb  3.8  /  TBili  0.5  /  DBili  x   /  AST  19  /  ALT  23  /  AlkPhos  136<H>  08-16    PT/INR - ( 16 Aug 2017 03:08 )   PT: 13.0 sec;   INR: 1.19 ratio         PTT - ( 17 Aug 2017 21:41 )  PTT:60.4 sec  Urinalysis Basic - ( 16 Aug 2017 22:58 )    Color: x / Appearance: x / S.010 / pH: x  Gluc: x / Ketone: Negative  / Bili: Negative / Urobili: Negative   Blood: x / Protein: Negative / Nitrite: Negative   Leuk Esterase: Small / RBC: 2-5 /HPF / WBC 3-5 /HPF   Sq Epi: x / Non Sq Epi: x / Bacteria: Many /HPF      CAPILLARY BLOOD GLUCOSE  305 (17 Aug 2017 21:15)  298 (17 Aug 2017 16:06)  385 (17 Aug 2017 11:51)  253 (17 Aug 2017 07:52)            Urinalysis Basic - ( 16 Aug 2017 22:58 )    Color: x / Appearance: x / S.010 / pH: x  Gluc: x / Ketone: Negative  / Bili: Negative / Urobili: Negative   Blood: x / Protein: Negative / Nitrite: Negative   Leuk Esterase: Small / RBC: 2-5 /HPF / WBC 3-5 /HPF   Sq Epi: x / Non Sq Epi: x / Bacteria: Many /HPF        MEDICATIONS  (STANDING):  insulin lispro (HumaLOG) corrective regimen sliding scale   SubCutaneous three times a day before meals  insulin lispro (HumaLOG) corrective regimen sliding scale   SubCutaneous at bedtime  dextrose 5%. 1000 milliLiter(s) (50 mL/Hr) IV Continuous <Continuous>  dextrose 50% Injectable 12.5 Gram(s) IV Push once  dextrose 50% Injectable 25 Gram(s) IV Push once  dextrose 50% Injectable 25 Gram(s) IV Push once  aspirin enteric coated 81 milliGRAM(s) Oral daily  lisinopril 10 milliGRAM(s) Oral daily  digoxin     Tablet 0.125 milliGRAM(s) Oral daily  pantoprazole    Tablet 40 milliGRAM(s) Oral before breakfast  multivitamin 1 Tablet(s) Oral daily  cholecalciferol 1000 Unit(s) Oral daily  metoprolol 25 milliGRAM(s) Oral two times a day  clopidogrel Tablet 75 milliGRAM(s) Oral daily  atorvastatin 80 milliGRAM(s) Oral at bedtime  furosemide   Injectable 20 milliGRAM(s) IV Push daily  heparin  Infusion.  Unit(s)/Hr (7 mL/Hr) IV Continuous <Continuous>  insulin glargine Injectable (LANTUS) 17 Unit(s) SubCutaneous at bedtime  spironolactone 25 milliGRAM(s) Oral daily    MEDICATIONS  (PRN):  dextrose Gel 1 Dose(s) Oral once PRN Blood Glucose LESS THAN 70 milliGRAM(s)/deciliter  glucagon  Injectable 1 milliGRAM(s) IntraMuscular once PRN Glucose LESS THAN 70 milligrams/deciliter  acetaminophen   Tablet 650 milliGRAM(s) Oral every 6 hours PRN For Temp greater than 38 C (100.4 F)  heparin  Injectable 3500 Unit(s) IV Push every 6 hours PRN For aPTT less than 40      Care Discussed with Consultants/Other Providers [ ] YES  [ ] NO.

## 2017-08-17 NOTE — PROGRESS NOTE ADULT - SUBJECTIVE AND OBJECTIVE BOX
INTERVAL HISTORY: feels well with no chest pain    TELEMETRY: brief carlo  	  MEDICATIONS:  lisinopril 10 milliGRAM(s) Oral daily  digoxin     Tablet 0.125 milliGRAM(s) Oral daily  metoprolol 25 milliGRAM(s) Oral two times a day  furosemide   Injectable 20 milliGRAM(s) IV Push daily  spironolactone 25 milliGRAM(s) Oral daily        PHYSICAL EXAM:  T(C): 36.6 (08-17-17 @ 11:51), Max: 36.9 (08-16-17 @ 17:36)  HR: 52 (08-17-17 @ 11:51) (50 - 69)  BP: 125/67 (08-17-17 @ 11:51) (125/67 - 144/72)  RR: 18 (08-17-17 @ 11:51) (17 - 18)  SpO2: 99% (08-17-17 @ 11:51) (95% - 99%)  Wt(kg): --  I&O's Summary    16 Aug 2017 07:01  -  17 Aug 2017 07:00  --------------------------------------------------------  IN: 327.5 mL / OUT: 1150 mL / NET: -822.5 mL    17 Aug 2017 07:01  -  17 Aug 2017 15:32  --------------------------------------------------------  IN: 565 mL / OUT: 1700 mL / NET: -1135 mL      Height (cm): 154.94 (08-16 @ 17:36)  Weight (kg): 56.2 (08-16 @ 17:36)  BMI (kg/m2): 23.4 (08-16 @ 17:36)  BSA (m2): 1.54 (08-16 @ 17:36)    Appearance: Normal	  HEENT:    PERRL, EOMI	  Lymphatic: No lymphadenopathy  Cardiovascular: Normal S1 S2, No JVD  Respiratory: Lungs clear to auscultation	  Psychiatry: Alert, Mood & affect appropriate  Gastrointestinal:  Soft, Non-tender, + BS	  Skin: No rashes, No cyanosis  Neurologic: Non-focal  Extremities:  No  cyanosis or edema  Vascular: Peripheral pulses palpable  bilaterally      CARDIAC MARKERS:                                  12.0   7.24  )-----------( 164      ( 17 Aug 2017 07:46 )             35.9     08-17    140  |  100  |  14  ----------------------------<  288<H>  4.4   |  26  |  0.90    Ca    9.2      17 Aug 2017 08:34  Phos  3.3     08-17  Mg     1.4     08-17    TPro  6.1  /  Alb  3.8  /  TBili  0.5  /  DBili  x   /  AST  19  /  ALT  23  /  AlkPhos  136<H>  08-16    proBNP:   Lipid Profile:   HgA1c: Hemoglobin A1C, Whole Blood: 11.6 % (08-17 @ 07:46)    TSH: Thyroid Stimulating Hormone, Serum: 8.18 uIU/mL (08-17 @ 08:34)    < from: Transthoracic Echocardiogram (08.17.17 @ 15:25) >  Conclusions:  1. Mild mitral annular calcification. Tethered mitral valve  leaflets with normal opening. Mild mitral regurgitation.  2. Aortic valve not well visualized; appears to be a  calcified trileaflet valve with normal opening. No aortic  valve regurgitation seen.  3. Moderate left ventricular enlargement. Eccentric left  ventricular hypertrophy (dilated left ventricle with normal  relative wall thickness).  4. Severe segmental left ventricular systolic dysfunction.  The mid to distal anteiror, mid inferior, septal, distal  lateral and the apical cap are akinetic. 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 (The  patient did not agree to echo contrast at the time of the  study.)  5. Mild diastolic dysfunction (Stage I).  6. The right ventricle is not well visualized; grossly  normal right ventricular size and systolic function.  7. A color doppler signal is seen along the atrial septum  consistent with left-to-right flow across a patent foramen  ovale or small atrial septal defect.  *** Compared with echocardiogram of 2/13/2014, severe  segmental LV dysfunction is again seen on today's study.    < end of copied text >      ASSESSMENT/PLAN: 	  84 year old F with PMH of sHF EF 30%, IDDM, HTN, HLD, hx of STEMI w/ stents (2014) presents with chest pain  1. Chest pain/CAD/NSTEMI - EKG with old LBBB nondiagnostic for ischemia. Trop elevated. Pt with known CAD with recurrent symptoms similar to the pain prior to her MI  - I recommend cath or NM stress test given high risk ACS. Pt refuses any further work up. She understands risks including but not limited to recurrent MI, arrythmia and death   - Continue with ASA, Plavix, and statin. Heparin gtt for ACS x 48 hours    2. sHF - euvolemic at this time. Continue with OMT. Continue with ACE, bb and aldactone    3. HTN - well controlled on above regimen.     4. HLD - continue with statin    5. DM - per primary team        Srini Velasco DO [facility]  [facility]  [phone]

## 2017-08-17 NOTE — PROGRESS NOTE ADULT - PROBLEM SELECTOR PLAN 1
Heparin drip, c/w ASA, plavix.  D/w her again regarding cardiac cath, states want to think about it tonight.

## 2017-08-18 LAB
APTT BLD: 61.2 SEC — HIGH (ref 27.5–37.4)
APTT BLD: 64.2 SEC — HIGH (ref 27.5–37.4)
APTT BLD: 64.7 SEC — HIGH (ref 27.5–37.4)
APTT BLD: 76.4 SEC — HIGH (ref 27.5–37.4)
HCT VFR BLD CALC: 36.9 % — SIGNIFICANT CHANGE UP (ref 34.5–45)
HGB BLD-MCNC: 12.2 G/DL — SIGNIFICANT CHANGE UP (ref 11.5–15.5)
MCHC RBC-ENTMCNC: 30.5 PG — SIGNIFICANT CHANGE UP (ref 27–34)
MCHC RBC-ENTMCNC: 33.1 GM/DL — SIGNIFICANT CHANGE UP (ref 32–36)
MCV RBC AUTO: 92.3 FL — SIGNIFICANT CHANGE UP (ref 80–100)
PLATELET # BLD AUTO: 176 K/UL — SIGNIFICANT CHANGE UP (ref 150–400)
RBC # BLD: 4 M/UL — SIGNIFICANT CHANGE UP (ref 3.8–5.2)
RBC # FLD: 13.4 % — SIGNIFICANT CHANGE UP (ref 10.3–14.5)
WBC # BLD: 8.27 K/UL — SIGNIFICANT CHANGE UP (ref 3.8–10.5)
WBC # FLD AUTO: 8.27 K/UL — SIGNIFICANT CHANGE UP (ref 3.8–10.5)

## 2017-08-18 RX ORDER — INSULIN GLARGINE 100 [IU]/ML
25 INJECTION, SOLUTION SUBCUTANEOUS AT BEDTIME
Qty: 0 | Refills: 0 | Status: DISCONTINUED | OUTPATIENT
Start: 2017-08-18 | End: 2017-08-19

## 2017-08-18 RX ORDER — INSULIN LISPRO 100/ML
7 VIAL (ML) SUBCUTANEOUS
Qty: 0 | Refills: 0 | Status: DISCONTINUED | OUTPATIENT
Start: 2017-08-18 | End: 2017-08-21

## 2017-08-18 RX ADMIN — Medication 7 UNIT(S): at 16:57

## 2017-08-18 RX ADMIN — Medication 4: at 12:30

## 2017-08-18 RX ADMIN — INSULIN GLARGINE 25 UNIT(S): 100 INJECTION, SOLUTION SUBCUTANEOUS at 21:22

## 2017-08-18 RX ADMIN — PANTOPRAZOLE SODIUM 40 MILLIGRAM(S): 20 TABLET, DELAYED RELEASE ORAL at 05:05

## 2017-08-18 RX ADMIN — Medication 20 MILLIGRAM(S): at 05:04

## 2017-08-18 RX ADMIN — Medication 25 MILLIGRAM(S): at 17:00

## 2017-08-18 RX ADMIN — HEPARIN SODIUM 700 UNIT(S)/HR: 5000 INJECTION INTRAVENOUS; SUBCUTANEOUS at 05:09

## 2017-08-18 RX ADMIN — HEPARIN SODIUM 700 UNIT(S)/HR: 5000 INJECTION INTRAVENOUS; SUBCUTANEOUS at 11:20

## 2017-08-18 RX ADMIN — Medication 1 TABLET(S): at 11:07

## 2017-08-18 RX ADMIN — Medication 2: at 16:58

## 2017-08-18 RX ADMIN — Medication 81 MILLIGRAM(S): at 11:07

## 2017-08-18 RX ADMIN — Medication 25 MILLIGRAM(S): at 05:04

## 2017-08-18 RX ADMIN — Medication 0.12 MILLIGRAM(S): at 05:04

## 2017-08-18 RX ADMIN — Medication 3: at 08:10

## 2017-08-18 RX ADMIN — HEPARIN SODIUM 700 UNIT(S)/HR: 5000 INJECTION INTRAVENOUS; SUBCUTANEOUS at 17:41

## 2017-08-18 RX ADMIN — Medication 1000 UNIT(S): at 11:07

## 2017-08-18 RX ADMIN — CLOPIDOGREL BISULFATE 75 MILLIGRAM(S): 75 TABLET, FILM COATED ORAL at 11:07

## 2017-08-18 RX ADMIN — ATORVASTATIN CALCIUM 80 MILLIGRAM(S): 80 TABLET, FILM COATED ORAL at 21:22

## 2017-08-18 RX ADMIN — SPIRONOLACTONE 25 MILLIGRAM(S): 25 TABLET, FILM COATED ORAL at 05:04

## 2017-08-18 RX ADMIN — LISINOPRIL 10 MILLIGRAM(S): 2.5 TABLET ORAL at 05:04

## 2017-08-18 NOTE — CONSULT NOTE ADULT - PROBLEM SELECTOR RECOMMENDATION 9
Will increase Lantus to 25 units at bed time.  Will increase Humalog to 7 units before each meal in addition to Humalog correction scale coverage.  Patient counseled for compliance with consistent low carb diet.

## 2017-08-18 NOTE — CONSULT NOTE ADULT - SUBJECTIVE AND OBJECTIVE BOX
HPI:  84 year old F with PMH of IDDM, HTN, HLD, hx of STEMI w/ stents (2014) presents with CP. Started in evening when sitting on couch. Midsternal CP similar to when had MI. Associated with SOB. Lasted 2 hrs and resolved. No N/V, diaphoresis. Patient otherwise in usual state of health other than feeling fatigued. On aspirin/plavix now. Has LE edema at baseline with recent exacerbation. Denies orthopnea, but sleeps on a recliner. (16 Aug 2017 15:05)    Patient has history of diabetes, on oral meds and  on insulin at home, no recent hypoglycemic episodes, no polyuria polydipsia. Patient follows up with Endo for diabetes management.    PAST MEDICAL & SURGICAL HISTORY:  CHF (congestive heart failure)  STEMI (ST elevation myocardial infarction)  Palpitations  Diabetes mellitus  Dyslipidemia  HTN (hypertension)  S/P cardiac cath  S/P tonsillectomy  S/P lumpectomy, left breast: premalignant  S/P appendectomy  S/P cholecystectomy      FAMILY HISTORY:  No pertinent family history in first degree relatives      Social History:    Outpatient Medications:    MEDICATIONS  (STANDING):  insulin lispro (HumaLOG) corrective regimen sliding scale   SubCutaneous three times a day before meals  insulin lispro (HumaLOG) corrective regimen sliding scale   SubCutaneous at bedtime  dextrose 5%. 1000 milliLiter(s) (50 mL/Hr) IV Continuous <Continuous>  dextrose 50% Injectable 12.5 Gram(s) IV Push once  dextrose 50% Injectable 25 Gram(s) IV Push once  dextrose 50% Injectable 25 Gram(s) IV Push once  aspirin enteric coated 81 milliGRAM(s) Oral daily  lisinopril 10 milliGRAM(s) Oral daily  digoxin     Tablet 0.125 milliGRAM(s) Oral daily  pantoprazole    Tablet 40 milliGRAM(s) Oral before breakfast  multivitamin 1 Tablet(s) Oral daily  cholecalciferol 1000 Unit(s) Oral daily  metoprolol 25 milliGRAM(s) Oral two times a day  clopidogrel Tablet 75 milliGRAM(s) Oral daily  atorvastatin 80 milliGRAM(s) Oral at bedtime  furosemide   Injectable 20 milliGRAM(s) IV Push daily  heparin  Infusion.  Unit(s)/Hr (7 mL/Hr) IV Continuous <Continuous>  spironolactone 25 milliGRAM(s) Oral daily  insulin glargine Injectable (LANTUS) 25 Unit(s) SubCutaneous at bedtime  insulin lispro Injectable (HumaLOG) 7 Unit(s) SubCutaneous three times a day before meals    MEDICATIONS  (PRN):  dextrose Gel 1 Dose(s) Oral once PRN Blood Glucose LESS THAN 70 milliGRAM(s)/deciliter  glucagon  Injectable 1 milliGRAM(s) IntraMuscular once PRN Glucose LESS THAN 70 milligrams/deciliter  acetaminophen   Tablet 650 milliGRAM(s) Oral every 6 hours PRN For Temp greater than 38 C (100.4 F)  heparin  Injectable 3500 Unit(s) IV Push every 6 hours PRN For aPTT less than 40      Allergies    codeine (Unknown)  Kiwi (Anaphylaxis)  penicillins (Anaphylaxis)    Intolerances      Review of Systems:  Constitutional: No fever, no chills  Eyes: No blurry vision  Neuro: No tremors  HEENT: No pain, no neck swelling  Cardiovascular: No chest pain, no palpitations  Respiratory: Has SOB, no cough  GI: No nausea, vomiting, abdominal pain  : No dysuria  Skin: no rash  MSK: Has leg swelling, no foot ulcers.  Psych: no depression  Endocrine: no polyuria, polydipsia    ALL OTHER SYSTEMS REVIEWED AND NEGATIVE    UNABLE TO OBTAIN    PHYSICAL EXAM:  VITALS: T(C): 36.7 (08-18-17 @ 11:51)  T(F): 98.1 (08-18-17 @ 11:51), Max: 98.6 (08-17-17 @ 20:10)  HR: 56 (08-18-17 @ 11:51) (56 - 64)  BP: 123/74 (08-18-17 @ 11:51) (123/74 - 148/73)  RR:  (18 - 19)  SpO2:  (96% - 97%)  Wt(kg): --  GENERAL: NAD, well-groomed, well-developed  EYES: No proptosis, no lid lag  HEENT:  Atraumatic, Normocephalic  THYROID: Normal size, no palpable nodules  RESPIRATORY: Clear to auscultation bilaterally; No rales, rhonchi, wheezing  CARDIOVASCULAR: Si S2, No murmurs;  GI: Soft, non distended, normal bowel sounds  SKIN: Dry, intact, No rashes or lesions  MUSCULOSKELETAL: Has BL lower extremity edema.  NEURO:  no tremor, sensation decreased in feet BL,    CAPILLARY BLOOD GLUCOSE  314 (08-18 @ 12:28)  279 (08-18 @ 08:28)  305 (08-17 @ 21:15)  298 (08-17 @ 16:06)  385 (08-17 @ 11:51)  253 (08-17 @ 07:52)  297 (08-16 @ 21:01)  226 (08-16 @ 17:30)  227 (08-16 @ 13:11)  242 (08-16 @ 11:12)  327 (08-16 @ 06:29)  303 (08-16 @ 04:42)                            12.2   8.27  )-----------( 176      ( 18 Aug 2017 08:50 )             36.9       08-17    140  |  100  |  14  ----------------------------<  288<H>  4.4   |  26  |  0.90    EGFR if : 68  EGFR if non : 59<L>    Ca    9.2      08-17  Mg     1.4     08-17  Phos  3.3     08-17    TPro  6.1  /  Alb  3.8  /  TBili  0.5  /  DBili  x   /  AST  19  /  ALT  23  /  AlkPhos  136<H>  08-16      Thyroid Function Tests:  08-17 @ 08:34 TSH 8.18 FreeT4 -- T3 -- Anti TPO -- Anti Thyroglobulin Ab -- TSI --      Hemoglobin A1C, Whole Blood: 11.6 % <H> [4.0 - 5.6] (08-17-17 @ 07:46)      08-17 Chol 96 LDL 40 HDL 27<L> Trig 145    Radiology:

## 2017-08-18 NOTE — PROGRESS NOTE ADULT - SUBJECTIVE AND OBJECTIVE BOX
INTERVAL HISTORY: none    TELEMETRY: no events  	  MEDICATIONS:  lisinopril 10 milliGRAM(s) Oral daily  digoxin     Tablet 0.125 milliGRAM(s) Oral daily  metoprolol 25 milliGRAM(s) Oral two times a day  furosemide   Injectable 20 milliGRAM(s) IV Push daily  spironolactone 25 milliGRAM(s) Oral daily        PHYSICAL EXAM:  T(C): 36.7 (08-18-17 @ 11:51), Max: 37 (08-17-17 @ 20:10)  HR: 56 (08-18-17 @ 11:51) (56 - 64)  BP: 123/74 (08-18-17 @ 11:51) (123/74 - 148/73)  RR: 19 (08-18-17 @ 11:51) (18 - 19)  SpO2: 97% (08-18-17 @ 11:51) (96% - 97%)  Wt(kg): --  I&O's Summary    17 Aug 2017 07:01  -  18 Aug 2017 07:00  --------------------------------------------------------  IN: 1374 mL / OUT: 3000 mL / NET: -1626 mL    18 Aug 2017 07:01  -  18 Aug 2017 16:02  --------------------------------------------------------  IN: 500 mL / OUT: 1200 mL / NET: -700 mL          Appearance: Normal	  HEENT:    PERRL, EOMI	  Lymphatic: No lymphadenopathy  Cardiovascular: Normal S1 S2, No JVD  Respiratory: Lungs clear to auscultation	  Psychiatry: Alert, Mood & affect appropriate  Gastrointestinal:  Soft, Non-tender, + BS	  Skin: No rashes, No cyanosis  Neurologic: Non-focal  Extremities:  No  cyanosis or edema  Vascular: Peripheral pulses palpable  bilaterally      CARDIAC MARKERS:                                  12.2   8.27  )-----------( 176      ( 18 Aug 2017 08:50 )             36.9     08-17    140  |  100  |  14  ----------------------------<  288<H>  4.4   |  26  |  0.90    Ca    9.2      17 Aug 2017 08:34  Phos  3.3     08-17  Mg     1.4     08-17      proBNP:   Lipid Profile:   HgA1c:   TSH:     ASSESSMENT/PLAN: 	  84 year old F with PMH of sHF EF 30%, IDDM, HTN, HLD, hx of STEMI w/ stents (2014) presents with chest pain  1. Chest pain/CAD/NSTEMI - EKG with old LBBB nondiagnostic for ischemia. Trop elevated. Pt with known CAD with recurrent symptoms similar to the pain prior to her MI  - I recommend cath or NM stress test given high risk ACS. Pt agrees to pharmacological NM stress test.   - Continue with ASA, Plavix, and statin. Heparin gtt for ACS x 48 hours - d/c hep gtt tonight    2. sHF - euvolemic at this time. Continue with OMT. Continue with ACE, bb and aldactone    3. HTN - well controlled on above regimen.     4. HLD - continue with statin    5. DM - per primary team          Srini Velasco DO [facility]  Cardiovascular Medicine  [phone]

## 2017-08-18 NOTE — PROGRESS NOTE ADULT - SUBJECTIVE AND OBJECTIVE BOX
Patient is a 84y old  Female who presents with a chief complaint of chest pain (16 Aug 2017 15:05)  Patient seen and examined, denies any complaints.    INTERVAL HPI/OVERNIGHT EVENTS: None    Vital Signs Last 24 Hrs  T(C): 36.9 (18 Aug 2017 20:15), Max: 36.9 (18 Aug 2017 20:15)  T(F): 98.5 (18 Aug 2017 20:15), Max: 98.5 (18 Aug 2017 20:15)  HR: 65 (18 Aug 2017 20:15) (56 - 65)  BP: 144/74 (18 Aug 2017 20:15) (123/74 - 148/73)  BP(mean): --  RR: 18 (18 Aug 2017 20:15) (18 - 19)  SpO2: 96% (18 Aug 2017 20:15) (96% - 97%)        PAST MEDICAL & SURGICAL HISTORY:  CHF (congestive heart failure)  STEMI (ST elevation myocardial infarction)  Palpitations  Diabetes mellitus  Dyslipidemia  HTN (hypertension)  S/P cardiac cath  S/P tonsillectomy  S/P lumpectomy, left breast: premalignant  S/P appendectomy  S/P cholecystectomy      REVIEW OF SYSTEMS:  CONSTITUTIONAL: No fever, weight loss, or fatigue  EYES: No eye pain, visual disturbances, or discharge  ENMT:  No difficulty hearing, tinnitus, vertigo; No sinus or throat pain  NECK: No pain or stiffness  RESPIRATORY: No cough, wheezing, chills or hemoptysis; No shortness of breath  CARDIOVASCULAR: No chest pain, palpitations, dizziness, or leg swelling  GASTROINTESTINAL: No abdominal or epigastric pain. No nausea, vomiting, or hematemesis; No diarrhea or constipation. No melena or hematochezia.  GENITOURINARY: No dysuria, frequency, hematuria, or incontinence  NEUROLOGICAL: No headaches, memory loss, loss of strength, numbness, or tremors  SKIN: No itching, burning, rashes, or lesions   LYMPH NODES: No enlarged glands  ENDOCRINE: No heat or cold intolerance; No hair loss  MUSCULOSKELETAL: No joint pain or swelling; No muscle, back, or extremity pain  PSYCHIATRIC: No depression, anxiety, mood swings, or difficulty sleeping  HEME/LYMPH: No easy bruising, or bleeding gums  ALLERY AND IMMUNOLOGIC: No hives or eczema    RADIOLOGY & ADDITIONAL TESTS:    Imaging Personally Reviewed:  [ ] YES  [ ] NO    Consultant(s) Notes Reviewed:  [+ ] YES  [ ] NO    PHYSICAL EXAM:  GENERAL: NAD, well-groomed, well-developed  HEAD:  Atraumatic, Normocephalic  EYES: EOMI, PERRLA, conjunctiva and sclera clear  ENMT: No tonsillar erythema, exudates, or enlargement; Moist mucous membranes, Good dentition, No lesions  NECK: Supple, No JVD, Normal thyroid  NERVOUS SYSTEM:  Alert & Oriented X3, Good concentration; Motor Strength 5/5 B/L upper and lower extremities; DTRs 2+ intact and symmetric  CHEST/LUNG: Clear to percussion bilaterally; No rales, rhonchi, wheezing, or rubs  HEART: Regular rate and rhythm; No murmurs, rubs, or gallops  ABDOMEN: Soft, Nontender, Nondistended; Bowel sounds present  EXTREMITIES:  2+ Peripheral Pulses, No clubbing, cyanosis, or edema  LYMPH: No lymphadenopathy noted  SKIN: No rashes or lesions    LABS:                                   12.2   8.27  )-----------( 176      ( 18 Aug 2017 08:50 )             36.9   08-17    140  |  100  |  14  ----------------------------<  288<H>  4.4   |  26  |  0.90    Ca    9.2      17 Aug 2017 08:34  Phos  3.3     08-17  Mg     1.4     08-17          MEDICATIONS  (STANDING):  insulin lispro (HumaLOG) corrective regimen sliding scale   SubCutaneous three times a day before meals  insulin lispro (HumaLOG) corrective regimen sliding scale   SubCutaneous at bedtime  dextrose 5%. 1000 milliLiter(s) (50 mL/Hr) IV Continuous <Continuous>  dextrose 50% Injectable 12.5 Gram(s) IV Push once  dextrose 50% Injectable 25 Gram(s) IV Push once  dextrose 50% Injectable 25 Gram(s) IV Push once  aspirin enteric coated 81 milliGRAM(s) Oral daily  lisinopril 10 milliGRAM(s) Oral daily  digoxin     Tablet 0.125 milliGRAM(s) Oral daily  pantoprazole    Tablet 40 milliGRAM(s) Oral before breakfast  multivitamin 1 Tablet(s) Oral daily  cholecalciferol 1000 Unit(s) Oral daily  metoprolol 25 milliGRAM(s) Oral two times a day  clopidogrel Tablet 75 milliGRAM(s) Oral daily  atorvastatin 80 milliGRAM(s) Oral at bedtime  furosemide   Injectable 20 milliGRAM(s) IV Push daily  heparin  Infusion.  Unit(s)/Hr (7 mL/Hr) IV Continuous <Continuous>  insulin glargine Injectable (LANTUS) 17 Unit(s) SubCutaneous at bedtime  spironolactone 25 milliGRAM(s) Oral daily    MEDICATIONS  (PRN):  dextrose Gel 1 Dose(s) Oral once PRN Blood Glucose LESS THAN 70 milliGRAM(s)/deciliter  glucagon  Injectable 1 milliGRAM(s) IntraMuscular once PRN Glucose LESS THAN 70 milligrams/deciliter  acetaminophen   Tablet 650 milliGRAM(s) Oral every 6 hours PRN For Temp greater than 38 C (100.4 F)  heparin  Injectable 3500 Unit(s) IV Push every 6 hours PRN For aPTT less than 40      Care Discussed with Consultants/Other Providers [ ] YES  [ ] NO.

## 2017-08-19 LAB
ANION GAP SERPL CALC-SCNC: 16 MMOL/L — SIGNIFICANT CHANGE UP (ref 5–17)
APTT BLD: 29 SEC — SIGNIFICANT CHANGE UP (ref 27.5–37.4)
BUN SERPL-MCNC: 26 MG/DL — HIGH (ref 7–23)
CALCIUM SERPL-MCNC: 9.9 MG/DL — SIGNIFICANT CHANGE UP (ref 8.4–10.5)
CHLORIDE SERPL-SCNC: 96 MMOL/L — SIGNIFICANT CHANGE UP (ref 96–108)
CO2 SERPL-SCNC: 26 MMOL/L — SIGNIFICANT CHANGE UP (ref 22–31)
CREAT SERPL-MCNC: 1.06 MG/DL — SIGNIFICANT CHANGE UP (ref 0.5–1.3)
GLUCOSE SERPL-MCNC: 193 MG/DL — HIGH (ref 70–99)
HCT VFR BLD CALC: 37.5 % — SIGNIFICANT CHANGE UP (ref 34.5–45)
HGB BLD-MCNC: 12.6 G/DL — SIGNIFICANT CHANGE UP (ref 11.5–15.5)
MCHC RBC-ENTMCNC: 29.9 PG — SIGNIFICANT CHANGE UP (ref 27–34)
MCHC RBC-ENTMCNC: 33.6 GM/DL — SIGNIFICANT CHANGE UP (ref 32–36)
MCV RBC AUTO: 88.9 FL — SIGNIFICANT CHANGE UP (ref 80–100)
PLATELET # BLD AUTO: 194 K/UL — SIGNIFICANT CHANGE UP (ref 150–400)
POTASSIUM SERPL-MCNC: 4.3 MMOL/L — SIGNIFICANT CHANGE UP (ref 3.5–5.3)
POTASSIUM SERPL-SCNC: 4.3 MMOL/L — SIGNIFICANT CHANGE UP (ref 3.5–5.3)
RBC # BLD: 4.22 M/UL — SIGNIFICANT CHANGE UP (ref 3.8–5.2)
RBC # FLD: 12.9 % — SIGNIFICANT CHANGE UP (ref 10.3–14.5)
SODIUM SERPL-SCNC: 138 MMOL/L — SIGNIFICANT CHANGE UP (ref 135–145)
WBC # BLD: 8.36 K/UL — SIGNIFICANT CHANGE UP (ref 3.8–10.5)
WBC # FLD AUTO: 8.36 K/UL — SIGNIFICANT CHANGE UP (ref 3.8–10.5)

## 2017-08-19 RX ORDER — INSULIN GLARGINE 100 [IU]/ML
28 INJECTION, SOLUTION SUBCUTANEOUS AT BEDTIME
Qty: 0 | Refills: 0 | Status: DISCONTINUED | OUTPATIENT
Start: 2017-08-19 | End: 2017-08-20

## 2017-08-19 RX ADMIN — Medication 7 UNIT(S): at 07:54

## 2017-08-19 RX ADMIN — Medication 1 TABLET(S): at 11:08

## 2017-08-19 RX ADMIN — Medication 2: at 07:55

## 2017-08-19 RX ADMIN — Medication 7 UNIT(S): at 12:33

## 2017-08-19 RX ADMIN — Medication 4: at 12:33

## 2017-08-19 RX ADMIN — PANTOPRAZOLE SODIUM 40 MILLIGRAM(S): 20 TABLET, DELAYED RELEASE ORAL at 05:00

## 2017-08-19 RX ADMIN — SPIRONOLACTONE 25 MILLIGRAM(S): 25 TABLET, FILM COATED ORAL at 05:00

## 2017-08-19 RX ADMIN — Medication 2: at 17:06

## 2017-08-19 RX ADMIN — Medication 7 UNIT(S): at 17:06

## 2017-08-19 RX ADMIN — Medication 25 MILLIGRAM(S): at 17:06

## 2017-08-19 RX ADMIN — INSULIN GLARGINE 28 UNIT(S): 100 INJECTION, SOLUTION SUBCUTANEOUS at 21:14

## 2017-08-19 RX ADMIN — ATORVASTATIN CALCIUM 80 MILLIGRAM(S): 80 TABLET, FILM COATED ORAL at 21:14

## 2017-08-19 RX ADMIN — Medication 25 MILLIGRAM(S): at 05:00

## 2017-08-19 RX ADMIN — LISINOPRIL 10 MILLIGRAM(S): 2.5 TABLET ORAL at 05:00

## 2017-08-19 RX ADMIN — Medication 81 MILLIGRAM(S): at 11:08

## 2017-08-19 RX ADMIN — Medication 20 MILLIGRAM(S): at 05:00

## 2017-08-19 RX ADMIN — Medication 1000 UNIT(S): at 11:08

## 2017-08-19 RX ADMIN — Medication 0.12 MILLIGRAM(S): at 05:00

## 2017-08-19 RX ADMIN — CLOPIDOGREL BISULFATE 75 MILLIGRAM(S): 75 TABLET, FILM COATED ORAL at 11:08

## 2017-08-19 NOTE — PROVIDER CONTACT NOTE (OTHER) - ACTION/TREATMENT ORDERED:
Nursing education (Ira) consulted, advised to not scan the ptt as it is therapeutic and there is no ptt scheduled for this time.  PA updated.  Will await 1040 ptt and draw and check Heparin gtt then.

## 2017-08-19 NOTE — PROGRESS NOTE ADULT - ASSESSMENT
Assessment  DMT2: 84y Female with DM T2 with hyperglycemia on insulin, blood sugars running high, no hypoglycemia,  eating meals,  non compliant with low carb diet.  HTN: On meds, controlled  HLD:  on statin, tolerating.  CHF: On treatment, stable

## 2017-08-19 NOTE — PROVIDER CONTACT NOTE (OTHER) - ASSESSMENT
Upon assessment, patient is resting comfortably in bed, NAD, with no complaints at this time.  No active signs of bleeding noted.

## 2017-08-19 NOTE — PROGRESS NOTE ADULT - PROBLEM SELECTOR PLAN 1
Will increase Lantus to 28 units at bed time.  Will increase Humalog to 7 units before each meal in addition to Humalog correction scale coverage.  Patient counseled for compliance with consistent low carb diet.

## 2017-08-19 NOTE — PROGRESS NOTE ADULT - SUBJECTIVE AND OBJECTIVE BOX
Chief complaint  Patient is a 84y old  Female who presents with a chief complaint of chest pain (16 Aug 2017 15:05)   Review of systems  Patient in bed, comfortable, no fever, no hypoglycemia.    Labs and Fingersticks    CAPILLARY BLOOD GLUCOSE  221 (19 Aug 2017 07:40)  132 (18 Aug 2017 21:11)  239 (18 Aug 2017 16:18)  314 (18 Aug 2017 12:28)  279 (18 Aug 2017 08:28)  305 (17 Aug 2017 21:15)  298 (17 Aug 2017 16:06)  385 (17 Aug 2017 11:51)  253 (17 Aug 2017 07:52)  297 (16 Aug 2017 21:01)  226 (16 Aug 2017 17:30)  227 (16 Aug 2017 13:11)  242 (16 Aug 2017 11:12)    Anion Gap, Serum: 16 (08-19 @ 07:17)      Calcium, Total Serum: 9.9 (08-19 @ 07:17)          08-19    138  |  96  |  26<H>  ----------------------------<  193<H>  4.3   |  26  |  1.06    Ca    9.9      19 Aug 2017 07:17                          12.6   8.36  )-----------( 194      ( 19 Aug 2017 07:17 )             37.5     Medications  MEDICATIONS  (STANDING):  insulin lispro (HumaLOG) corrective regimen sliding scale   SubCutaneous three times a day before meals  insulin lispro (HumaLOG) corrective regimen sliding scale   SubCutaneous at bedtime  dextrose 5%. 1000 milliLiter(s) (50 mL/Hr) IV Continuous <Continuous>  dextrose 50% Injectable 12.5 Gram(s) IV Push once  dextrose 50% Injectable 25 Gram(s) IV Push once  dextrose 50% Injectable 25 Gram(s) IV Push once  aspirin enteric coated 81 milliGRAM(s) Oral daily  lisinopril 10 milliGRAM(s) Oral daily  digoxin     Tablet 0.125 milliGRAM(s) Oral daily  pantoprazole    Tablet 40 milliGRAM(s) Oral before breakfast  multivitamin 1 Tablet(s) Oral daily  cholecalciferol 1000 Unit(s) Oral daily  metoprolol 25 milliGRAM(s) Oral two times a day  clopidogrel Tablet 75 milliGRAM(s) Oral daily  atorvastatin 80 milliGRAM(s) Oral at bedtime  furosemide   Injectable 20 milliGRAM(s) IV Push daily  spironolactone 25 milliGRAM(s) Oral daily  insulin lispro Injectable (HumaLOG) 7 Unit(s) SubCutaneous three times a day before meals  insulin glargine Injectable (LANTUS) 28 Unit(s) SubCutaneous at bedtime      Physical Exam  General: Patient comfortable in bed  Vital Signs Last 12 Hrs  T(F): 98.2 (08-19-17 @ 04:11), Max: 98.2 (08-19-17 @ 04:11)  HR: 77 (08-19-17 @ 04:11) (77 - 77)  BP: 123/74 (08-19-17 @ 04:11) (123/74 - 123/74)  BP(mean): --  RR: 18 (08-19-17 @ 04:11) (18 - 18)  SpO2: 95% (08-19-17 @ 04:11) (95% - 95%)  Neck: No palpable thyroid nodules.  CVS: S1S2, No murmurs  Respiratory: No wheezing, no crepitations  GI: Abdomen soft, bowel sounds positive  Musculoskeletal: Positive edema lower extremities bilaterally  Skin: No skin rashes, no ecchimosis    Diagnostics

## 2017-08-19 NOTE — PROGRESS NOTE ADULT - SUBJECTIVE AND OBJECTIVE BOX
Patient is a 84y old  Female who presents with a chief complaint of chest pain (16 Aug 2017 15:05)  Patient seen and examined, denies any complaints.    INTERVAL HPI/OVERNIGHT EVENTS: None    Vital Signs Last 24 Hrs  T(C): 37.1 (19 Aug 2017 12:13), Max: 37.1 (19 Aug 2017 12:13)  T(F): 98.7 (19 Aug 2017 12:13), Max: 98.7 (19 Aug 2017 12:13)  HR: 67 (19 Aug 2017 12:13) (65 - 77)  BP: 128/71 (19 Aug 2017 12:13) (123/74 - 144/74)  BP(mean): --  RR: 18 (19 Aug 2017 12:13) (18 - 18)  SpO2: 96% (19 Aug 2017 12:13) (95% - 96%)        PAST MEDICAL & SURGICAL HISTORY:  CHF (congestive heart failure)  STEMI (ST elevation myocardial infarction)  Palpitations  Diabetes mellitus  Dyslipidemia  HTN (hypertension)  S/P cardiac cath  S/P tonsillectomy  S/P lumpectomy, left breast: premalignant  S/P appendectomy  S/P cholecystectomy      REVIEW OF SYSTEMS:  CONSTITUTIONAL: No fever, weight loss, or fatigue  EYES: No eye pain, visual disturbances, or discharge  ENMT:  No difficulty hearing, tinnitus, vertigo; No sinus or throat pain  NECK: No pain or stiffness  RESPIRATORY: No cough, wheezing, chills or hemoptysis; No shortness of breath  CARDIOVASCULAR: No chest pain, palpitations, dizziness, or leg swelling  GASTROINTESTINAL: No abdominal or epigastric pain. No nausea, vomiting, or hematemesis; No diarrhea or constipation. No melena or hematochezia.  GENITOURINARY: No dysuria, frequency, hematuria, or incontinence  NEUROLOGICAL: No headaches, memory loss, loss of strength, numbness, or tremors  SKIN: No itching, burning, rashes, or lesions   LYMPH NODES: No enlarged glands  ENDOCRINE: No heat or cold intolerance; No hair loss  MUSCULOSKELETAL: No joint pain or swelling; No muscle, back, or extremity pain  PSYCHIATRIC: No depression, anxiety, mood swings, or difficulty sleeping  HEME/LYMPH: No easy bruising, or bleeding gums  ALLERY AND IMMUNOLOGIC: No hives or eczema    RADIOLOGY & ADDITIONAL TESTS:    Imaging Personally Reviewed:  [ ] YES  [ ] NO    Consultant(s) Notes Reviewed:  [+ ] YES  [ ] NO    PHYSICAL EXAM:  GENERAL: NAD, well-groomed, well-developed  HEAD:  Atraumatic, Normocephalic  EYES: EOMI, PERRLA, conjunctiva and sclera clear  ENMT: No tonsillar erythema, exudates, or enlargement; Moist mucous membranes, Good dentition, No lesions  NECK: Supple, No JVD, Normal thyroid  NERVOUS SYSTEM:  Alert & Oriented X3, Good concentration; Motor Strength 5/5 B/L upper and lower extremities; DTRs 2+ intact and symmetric  CHEST/LUNG: Clear to percussion bilaterally; No rales, rhonchi, wheezing, or rubs  HEART: Regular rate and rhythm; No murmurs, rubs, or gallops  ABDOMEN: Soft, Nontender, Nondistended; Bowel sounds present  EXTREMITIES:  2+ Peripheral Pulses, No clubbing, cyanosis, or edema  LYMPH: No lymphadenopathy noted  SKIN: No rashes or lesions    LABS:                                   12.6   8.36  )-----------( 194      ( 19 Aug 2017 07:17 )             37.5   08-19    138  |  96  |  26<H>  ----------------------------<  193<H>  4.3   |  26  |  1.06    Ca    9.9      19 Aug 2017 07:17                   MEDICATIONS  (STANDING):  insulin lispro (HumaLOG) corrective regimen sliding scale   SubCutaneous three times a day before meals  insulin lispro (HumaLOG) corrective regimen sliding scale   SubCutaneous at bedtime  dextrose 5%. 1000 milliLiter(s) (50 mL/Hr) IV Continuous <Continuous>  dextrose 50% Injectable 12.5 Gram(s) IV Push once  dextrose 50% Injectable 25 Gram(s) IV Push once  dextrose 50% Injectable 25 Gram(s) IV Push once  aspirin enteric coated 81 milliGRAM(s) Oral daily  lisinopril 10 milliGRAM(s) Oral daily  digoxin     Tablet 0.125 milliGRAM(s) Oral daily  pantoprazole    Tablet 40 milliGRAM(s) Oral before breakfast  multivitamin 1 Tablet(s) Oral daily  cholecalciferol 1000 Unit(s) Oral daily  metoprolol 25 milliGRAM(s) Oral two times a day  clopidogrel Tablet 75 milliGRAM(s) Oral daily  atorvastatin 80 milliGRAM(s) Oral at bedtime  furosemide   Injectable 20 milliGRAM(s) IV Push daily  heparin  Infusion.  Unit(s)/Hr (7 mL/Hr) IV Continuous <Continuous>  insulin glargine Injectable (LANTUS) 17 Unit(s) SubCutaneous at bedtime  spironolactone 25 milliGRAM(s) Oral daily    MEDICATIONS  (PRN):  dextrose Gel 1 Dose(s) Oral once PRN Blood Glucose LESS THAN 70 milliGRAM(s)/deciliter  glucagon  Injectable 1 milliGRAM(s) IntraMuscular once PRN Glucose LESS THAN 70 milligrams/deciliter  acetaminophen   Tablet 650 milliGRAM(s) Oral every 6 hours PRN For Temp greater than 38 C (100.4 F)  heparin  Injectable 3500 Unit(s) IV Push every 6 hours PRN For aPTT less than 40      Care Discussed with Consultants/Other Providers [ ] YES  [ ] NO.

## 2017-08-19 NOTE — PROVIDER CONTACT NOTE (OTHER) - BACKGROUND
Patient admitted for chest pain.  PMH of CHF, STEMI, palpitations, diabetes mellitus, dyslipidemia, HTN.

## 2017-08-20 LAB
ANION GAP SERPL CALC-SCNC: 17 MMOL/L — SIGNIFICANT CHANGE UP (ref 5–17)
BUN SERPL-MCNC: 35 MG/DL — HIGH (ref 7–23)
CALCIUM SERPL-MCNC: 9.5 MG/DL — SIGNIFICANT CHANGE UP (ref 8.4–10.5)
CHLORIDE SERPL-SCNC: 94 MMOL/L — LOW (ref 96–108)
CO2 SERPL-SCNC: 26 MMOL/L — SIGNIFICANT CHANGE UP (ref 22–31)
CREAT SERPL-MCNC: 1.14 MG/DL — SIGNIFICANT CHANGE UP (ref 0.5–1.3)
GLUCOSE SERPL-MCNC: 328 MG/DL — HIGH (ref 70–99)
HCT VFR BLD CALC: 35.7 % — SIGNIFICANT CHANGE UP (ref 34.5–45)
HGB BLD-MCNC: 12.1 G/DL — SIGNIFICANT CHANGE UP (ref 11.5–15.5)
MCHC RBC-ENTMCNC: 30.5 PG — SIGNIFICANT CHANGE UP (ref 27–34)
MCHC RBC-ENTMCNC: 33.9 GM/DL — SIGNIFICANT CHANGE UP (ref 32–36)
MCV RBC AUTO: 89.9 FL — SIGNIFICANT CHANGE UP (ref 80–100)
PLATELET # BLD AUTO: 172 K/UL — SIGNIFICANT CHANGE UP (ref 150–400)
POTASSIUM SERPL-MCNC: 4.6 MMOL/L — SIGNIFICANT CHANGE UP (ref 3.5–5.3)
POTASSIUM SERPL-SCNC: 4.6 MMOL/L — SIGNIFICANT CHANGE UP (ref 3.5–5.3)
RBC # BLD: 3.97 M/UL — SIGNIFICANT CHANGE UP (ref 3.8–5.2)
RBC # FLD: 13 % — SIGNIFICANT CHANGE UP (ref 10.3–14.5)
SODIUM SERPL-SCNC: 137 MMOL/L — SIGNIFICANT CHANGE UP (ref 135–145)
WBC # BLD: 8.55 K/UL — SIGNIFICANT CHANGE UP (ref 3.8–10.5)
WBC # FLD AUTO: 8.55 K/UL — SIGNIFICANT CHANGE UP (ref 3.8–10.5)

## 2017-08-20 PROCEDURE — 93016 CV STRESS TEST SUPVJ ONLY: CPT

## 2017-08-20 PROCEDURE — 93018 CV STRESS TEST I&R ONLY: CPT

## 2017-08-20 PROCEDURE — 78452 HT MUSCLE IMAGE SPECT MULT: CPT | Mod: 26

## 2017-08-20 RX ORDER — INSULIN GLARGINE 100 [IU]/ML
30 INJECTION, SOLUTION SUBCUTANEOUS AT BEDTIME
Qty: 0 | Refills: 0 | Status: DISCONTINUED | OUTPATIENT
Start: 2017-08-20 | End: 2017-08-21

## 2017-08-20 RX ADMIN — Medication 4: at 16:44

## 2017-08-20 RX ADMIN — PANTOPRAZOLE SODIUM 40 MILLIGRAM(S): 20 TABLET, DELAYED RELEASE ORAL at 05:11

## 2017-08-20 RX ADMIN — Medication 4: at 09:34

## 2017-08-20 RX ADMIN — Medication 0.12 MILLIGRAM(S): at 05:11

## 2017-08-20 RX ADMIN — Medication 1000 UNIT(S): at 11:51

## 2017-08-20 RX ADMIN — Medication 7 UNIT(S): at 11:51

## 2017-08-20 RX ADMIN — Medication 1 TABLET(S): at 11:51

## 2017-08-20 RX ADMIN — Medication 25 MILLIGRAM(S): at 19:13

## 2017-08-20 RX ADMIN — ATORVASTATIN CALCIUM 80 MILLIGRAM(S): 80 TABLET, FILM COATED ORAL at 21:49

## 2017-08-20 RX ADMIN — SPIRONOLACTONE 25 MILLIGRAM(S): 25 TABLET, FILM COATED ORAL at 05:11

## 2017-08-20 RX ADMIN — Medication 20 MILLIGRAM(S): at 05:11

## 2017-08-20 RX ADMIN — LISINOPRIL 10 MILLIGRAM(S): 2.5 TABLET ORAL at 05:11

## 2017-08-20 RX ADMIN — Medication 7 UNIT(S): at 09:34

## 2017-08-20 RX ADMIN — Medication 81 MILLIGRAM(S): at 11:51

## 2017-08-20 RX ADMIN — Medication 25 MILLIGRAM(S): at 05:11

## 2017-08-20 RX ADMIN — Medication 7 UNIT(S): at 16:44

## 2017-08-20 RX ADMIN — CLOPIDOGREL BISULFATE 75 MILLIGRAM(S): 75 TABLET, FILM COATED ORAL at 11:51

## 2017-08-20 RX ADMIN — Medication 6: at 11:52

## 2017-08-20 NOTE — PROGRESS NOTE ADULT - ASSESSMENT
84y Female with type 2 DM wide fluctuation in FS, non compliant with low carb diet.  HTN: On meds, controlled  HLD:  on statin, tolerating.  CHF: On treatment, stable

## 2017-08-20 NOTE — PROGRESS NOTE ADULT - PROBLEM SELECTOR PROBLEM 1
NSTEMI (non-ST elevated myocardial infarction)
Diabetes mellitus
Diabetes mellitus
NSTEMI (non-ST elevated myocardial infarction)

## 2017-08-20 NOTE — PROVIDER CONTACT NOTE (OTHER) - SITUATION
Pt's heart rate went down to 43 briefly
pt's pre lunch finger stick was 441
ptt value resulted for 0850 = 64.7.  Heparin gtt was last scanned at 0510, was not therapeutic.  Next ptt was scheduled to be drawn at 1040.  No ptt due now.

## 2017-08-20 NOTE — PROGRESS NOTE ADULT - PROBLEM SELECTOR PLAN 1
will increase Lantus to 28 units at bed time  Continue Humalog to 7 units before each meal in addition to Humalog correction scale coverage.  Patient counseled for compliance with consistent low carb diet. will increase Lantus to 30 units at bed time  Continue Humalog to 7 units before each meal in addition to Humalog correction scale coverage.  Patient counseled for compliance with consistent low carb diet.

## 2017-08-20 NOTE — PROGRESS NOTE ADULT - PROBLEM SELECTOR PROBLEM 2
Acute on chronic systolic congestive heart failure

## 2017-08-20 NOTE — PROGRESS NOTE ADULT - SUBJECTIVE AND OBJECTIVE BOX
INTERVAL HISTORY: s/p NM stress    TELEMETRY: no events  	  MEDICATIONS:  lisinopril 10 milliGRAM(s) Oral daily  digoxin     Tablet 0.125 milliGRAM(s) Oral daily  metoprolol 25 milliGRAM(s) Oral two times a day  furosemide   Injectable 20 milliGRAM(s) IV Push daily  spironolactone 25 milliGRAM(s) Oral daily        PHYSICAL EXAM:  T(C): 36.9 (08-20-17 @ 20:10), Max: 37.1 (08-20-17 @ 11:16)  HR: 61 (08-20-17 @ 20:10) (61 - 70)  BP: 101/62 (08-20-17 @ 20:10) (101/62 - 122/72)  RR: 18 (08-20-17 @ 20:10) (18 - 18)  SpO2: 96% (08-20-17 @ 20:10) (96% - 98%)  Wt(kg): --  I&O's Summary    19 Aug 2017 07:01  -  20 Aug 2017 07:00  --------------------------------------------------------  IN: 240 mL / OUT: 800 mL / NET: -560 mL    20 Aug 2017 07:01  -  20 Aug 2017 23:48  --------------------------------------------------------  IN: 240 mL / OUT: 1600 mL / NET: -1360 mL          Appearance: Normal	  HEENT:    PERRL, EOMI	  Lymphatic: No lymphadenopathy  Cardiovascular: Normal S1 S2, No JVD  Respiratory: Lungs clear to auscultation	  Psychiatry: Alert, Mood & affect appropriate  Gastrointestinal:  Soft, Non-tender, + BS	  Skin: No rashes, No cyanosis  Neurologic: Non-focal  Extremities:  No  cyanosis or edema  Vascular: Peripheral pulses palpable  bilaterally      CARDIAC MARKERS:                                  12.1   8.55  )-----------( 172      ( 20 Aug 2017 09:06 )             35.7     08-20    137  |  94<L>  |  35<H>  ----------------------------<  328<H>  4.6   |  26  |  1.14    Ca    9.5      20 Aug 2017 09:18        ASSESSMENT/PLAN: 	  84 year old F with PMH of sHF EF 30%, IDDM, HTN, HLD, hx of STEMI w/ stents (2014) presents with chest pain  1. Chest pain/CAD/NSTEMI - EKG with old LBBB nondiagnostic for ischemia. Trop elevated. Pt with known CAD with recurrent symptoms similar to the pain prior to her MI  - Pt agreed to pharmacological NM stress test - large territory infarct with no significant ischemia.   - Continue with ASA, Plavix, and statin.     2. sHF - euvolemic at this time. Continue with OMT. Continue with ACE, bb and aldactone    3. HTN - well controlled on above regimen.     4. HLD - continue with statin    5. DM - per primary team - variable levels    6. Discharge home in AM with outpt follow up    Srini Velasco DO [facility]  Cardiovascular Medicine  [phone]

## 2017-08-20 NOTE — PROGRESS NOTE ADULT - PROBLEM SELECTOR PLAN 4
Statins
continue statin, primary team FU
Will continue statin, primary team FU

## 2017-08-20 NOTE — PROGRESS NOTE ADULT - SUBJECTIVE AND OBJECTIVE BOX
Patient is a 84y old  Female who presents with a chief complaint of chest pain (16 Aug 2017 15:05)  Patient seen and examined, denies any complaints.    INTERVAL HPI/OVERNIGHT EVENTS: None    Vital Signs Last 24 Hrs  T(C): 36.9 (20 Aug 2017 20:10), Max: 37.1 (20 Aug 2017 11:16)  T(F): 98.5 (20 Aug 2017 20:10), Max: 98.8 (20 Aug 2017 11:16)  HR: 61 (20 Aug 2017 20:10) (61 - 70)  BP: 101/62 (20 Aug 2017 20:10) (101/62 - 122/72)  BP(mean): --  RR: 18 (20 Aug 2017 20:10) (18 - 18)  SpO2: 96% (20 Aug 2017 20:10) (96% - 98%)    PAST MEDICAL & SURGICAL HISTORY:  CHF (congestive heart failure)  STEMI (ST elevation myocardial infarction)  Palpitations  Diabetes mellitus  Dyslipidemia  HTN (hypertension)  S/P cardiac cath  S/P tonsillectomy  S/P lumpectomy, left breast: premalignant  S/P appendectomy  S/P cholecystectomy      REVIEW OF SYSTEMS:  CONSTITUTIONAL: No fever, weight loss, or fatigue  EYES: No eye pain, visual disturbances, or discharge  ENMT:  No difficulty hearing, tinnitus, vertigo; No sinus or throat pain  NECK: No pain or stiffness  RESPIRATORY: No cough, wheezing, chills or hemoptysis; No shortness of breath  CARDIOVASCULAR: No chest pain, palpitations, dizziness, or leg swelling  GASTROINTESTINAL: No abdominal or epigastric pain. No nausea, vomiting, or hematemesis; No diarrhea or constipation. No melena or hematochezia.  GENITOURINARY: No dysuria, frequency, hematuria, or incontinence  NEUROLOGICAL: No headaches, memory loss, loss of strength, numbness, or tremors  SKIN: No itching, burning, rashes, or lesions   LYMPH NODES: No enlarged glands  ENDOCRINE: No heat or cold intolerance; No hair loss  MUSCULOSKELETAL: No joint pain or swelling; No muscle, back, or extremity pain  PSYCHIATRIC: No depression, anxiety, mood swings, or difficulty sleeping  HEME/LYMPH: No easy bruising, or bleeding gums  ALLERY AND IMMUNOLOGIC: No hives or eczema    RADIOLOGY & ADDITIONAL TESTS:    Imaging Personally Reviewed:  [ ] YES  [ ] NO    Consultant(s) Notes Reviewed:  [+ ] YES  [ ] NO    PHYSICAL EXAM:  GENERAL: NAD, well-groomed, well-developed  HEAD:  Atraumatic, Normocephalic  EYES: EOMI, PERRLA, conjunctiva and sclera clear  ENMT: No tonsillar erythema, exudates, or enlargement; Moist mucous membranes, Good dentition, No lesions  NECK: Supple, No JVD, Normal thyroid  NERVOUS SYSTEM:  Alert & Oriented X3, Good concentration; Motor Strength 5/5 B/L upper and lower extremities; DTRs 2+ intact and symmetric  CHEST/LUNG: Clear to percussion bilaterally; No rales, rhonchi, wheezing, or rubs  HEART: Regular rate and rhythm; No murmurs, rubs, or gallops  ABDOMEN: Soft, Nontender, Nondistended; Bowel sounds present  EXTREMITIES:  2+ Peripheral Pulses, No clubbing, cyanosis, or edema  LYMPH: No lymphadenopathy noted  SKIN: No rashes or lesions    LABS:                                   12.1   8.55  )-----------( 172      ( 20 Aug 2017 09:06 )             35.7   08-20    137  |  94<L>  |  35<H>  ----------------------------<  328<H>  4.6   |  26  |  1.14    Ca    9.5      20 Aug 2017 09:18          MEDICATIONS  (STANDING):  insulin lispro (HumaLOG) corrective regimen sliding scale   SubCutaneous three times a day before meals  insulin lispro (HumaLOG) corrective regimen sliding scale   SubCutaneous at bedtime  dextrose 5%. 1000 milliLiter(s) (50 mL/Hr) IV Continuous <Continuous>  dextrose 50% Injectable 12.5 Gram(s) IV Push once  dextrose 50% Injectable 25 Gram(s) IV Push once  dextrose 50% Injectable 25 Gram(s) IV Push once  aspirin enteric coated 81 milliGRAM(s) Oral daily  lisinopril 10 milliGRAM(s) Oral daily  digoxin     Tablet 0.125 milliGRAM(s) Oral daily  pantoprazole    Tablet 40 milliGRAM(s) Oral before breakfast  multivitamin 1 Tablet(s) Oral daily  cholecalciferol 1000 Unit(s) Oral daily  metoprolol 25 milliGRAM(s) Oral two times a day  clopidogrel Tablet 75 milliGRAM(s) Oral daily  atorvastatin 80 milliGRAM(s) Oral at bedtime  furosemide   Injectable 20 milliGRAM(s) IV Push daily  heparin  Infusion.  Unit(s)/Hr (7 mL/Hr) IV Continuous <Continuous>  insulin glargine Injectable (LANTUS) 17 Unit(s) SubCutaneous at bedtime  spironolactone 25 milliGRAM(s) Oral daily    MEDICATIONS  (PRN):  dextrose Gel 1 Dose(s) Oral once PRN Blood Glucose LESS THAN 70 milliGRAM(s)/deciliter  glucagon  Injectable 1 milliGRAM(s) IntraMuscular once PRN Glucose LESS THAN 70 milligrams/deciliter  acetaminophen   Tablet 650 milliGRAM(s) Oral every 6 hours PRN For Temp greater than 38 C (100.4 F)  heparin  Injectable 3500 Unit(s) IV Push every 6 hours PRN For aPTT less than 40      Care Discussed with Consultants/Other Providers [ ] YES  [ ] NO.

## 2017-08-20 NOTE — PROGRESS NOTE ADULT - PROBLEM SELECTOR PROBLEM 3
HTN (hypertension)
Diabetes mellitus
HTN (hypertension)
Diabetes mellitus

## 2017-08-20 NOTE — PROGRESS NOTE ADULT - SUBJECTIVE AND OBJECTIVE BOX
Patient in bed, comfortable, s/p Stress test this AM. Pt states "does not have great appetite" had hypoglycemia last evening (FS 66 mg/dl 7 pm), hyperglycemic today   Labs and Fingersticks    CAPILLARY BLOOD GLUCOSE  221 (19 Aug 2017 07:40)  132 (18 Aug 2017 21:11)  239 (18 Aug 2017 16:18)  314 (18 Aug 2017 12:28)  279 (18 Aug 2017 08:28)  305 (17 Aug 2017 21:15)  298 (17 Aug 2017 16:06)  385 (17 Aug 2017 11:51)  253 (17 Aug 2017 07:52)  297 (16 Aug 2017 21:01)  226 (16 Aug 2017 17:30)  227 (16 Aug 2017 13:11)  242 (16 Aug 2017 11:12)    Anion Gap, Serum: 16 (08-19 @ 07:17)      Calcium, Total Serum: 9.9 (08-19 @ 07:17)          08-19    138  |  96  |  26<H>  ----------------------------<  193<H>  4.3   |  26  |  1.06    Ca    9.9      19 Aug 2017 07:17                          12.6   8.36  )-----------( 194      ( 19 Aug 2017 07:17 )             37.5     Medications  MEDICATIONS  (STANDING):  insulin lispro (HumaLOG) corrective regimen sliding scale   SubCutaneous three times a day before meals  insulin lispro (HumaLOG) corrective regimen sliding scale   SubCutaneous at bedtime  dextrose 5%. 1000 milliLiter(s) (50 mL/Hr) IV Continuous <Continuous>  dextrose 50% Injectable 12.5 Gram(s) IV Push once  dextrose 50% Injectable 25 Gram(s) IV Push once  dextrose 50% Injectable 25 Gram(s) IV Push once  aspirin enteric coated 81 milliGRAM(s) Oral daily  lisinopril 10 milliGRAM(s) Oral daily  digoxin     Tablet 0.125 milliGRAM(s) Oral daily  pantoprazole    Tablet 40 milliGRAM(s) Oral before breakfast  multivitamin 1 Tablet(s) Oral daily  cholecalciferol 1000 Unit(s) Oral daily  metoprolol 25 milliGRAM(s) Oral two times a day  clopidogrel Tablet 75 milliGRAM(s) Oral daily  atorvastatin 80 milliGRAM(s) Oral at bedtime  furosemide   Injectable 20 milliGRAM(s) IV Push daily  spironolactone 25 milliGRAM(s) Oral daily  insulin lispro Injectable (HumaLOG) 7 Unit(s) SubCutaneous three times a day before meals  insulin glargine Injectable (LANTUS) 28 Unit(s) SubCutaneous at bedtime      Physical Exam  General: Patient comfortable in bed  Vital Signs Last 12 Hrs  T(F): 98.2 (08-19-17 @ 04:11), Max: 98.2 (08-19-17 @ 04:11)  HR: 77 (08-19-17 @ 04:11) (77 - 77)  BP: 123/74 (08-19-17 @ 04:11) (123/74 - 123/74)  BP(mean): --  RR: 18 (08-19-17 @ 04:11) (18 - 18)  SpO2: 95% (08-19-17 @ 04:11) (95% - 95%)  Neck: No palpable thyroid nodules.  CVS: S1S2, No murmurs  Respiratory: No wheezing, no crepitations  GI: Abdomen soft, bowel sounds positive  Musculoskeletal: Positive edema lower extremities bilaterally  Skin: No skin rashes, no ecchymosis Patient in bed, comfortable, s/p Stress test this AM. Pt states "does not have great appetite" had hypoglycemia last evening (FS 66 mg/dl 7 pm), hyperglycemic today   Labs and Fingersticks    CAPILLARY BLOOD GLUCOSE  221 (19 Aug 2017 07:40)  132 (18 Aug 2017 21:11)  239 (18 Aug 2017 16:18)  314 (18 Aug 2017 12:28)  279 (18 Aug 2017 08:28)  305 (17 Aug 2017 21:15)  298 (17 Aug 2017 16:06)  385 (17 Aug 2017 11:51)  253 (17 Aug 2017 07:52)  297 (16 Aug 2017 21:01)  226 (16 Aug 2017 17:30)  227 (16 Aug 2017 13:11)  242 (16 Aug 2017 11:12)    Anion Gap, Serum: 16 (08-19 @ 07:17)      Calcium, Total Serum: 9.9 (08-19 @ 07:17)          08-19    138  |  96  |  26<H>  ----------------------------<  193<H>  4.3   |  26  |  1.06    Ca    9.9      19 Aug 2017 07:17                          12.6   8.36  )-----------( 194      ( 19 Aug 2017 07:17 )             37.5     Medications  MEDICATIONS  (STANDING):  insulin lispro (HumaLOG) corrective regimen sliding scale   SubCutaneous three times a day before meals  insulin lispro (HumaLOG) corrective regimen sliding scale   SubCutaneous at bedtime  dextrose 5%. 1000 milliLiter(s) (50 mL/Hr) IV Continuous <Continuous>  dextrose 50% Injectable 12.5 Gram(s) IV Push once  dextrose 50% Injectable 25 Gram(s) IV Push once  dextrose 50% Injectable 25 Gram(s) IV Push once  aspirin enteric coated 81 milliGRAM(s) Oral daily  lisinopril 10 milliGRAM(s) Oral daily  digoxin     Tablet 0.125 milliGRAM(s) Oral daily  pantoprazole    Tablet 40 milliGRAM(s) Oral before breakfast  multivitamin 1 Tablet(s) Oral daily  cholecalciferol 1000 Unit(s) Oral daily  metoprolol 25 milliGRAM(s) Oral two times a day  clopidogrel Tablet 75 milliGRAM(s) Oral daily  atorvastatin 80 milliGRAM(s) Oral at bedtime  furosemide   Injectable 20 milliGRAM(s) IV Push daily  spironolactone 25 milliGRAM(s) Oral daily  insulin lispro Injectable (HumaLOG) 7 Unit(s) SubCutaneous three times a day before meals  insulin glargine Injectable (LANTUS) 28 Unit(s) SubCutaneous at bedtime      Physical Exam  General: Patient comfortable in bed  Vital Signs Last 12 Hrs  T(F): 98.2 (08-19-17 @ 04:11), Max: 98.2 (08-19-17 @ 04:11)  HR: 77 (08-19-17 @ 04:11) (77 - 77)  BP: 123/74 (08-19-17 @ 04:11) (123/74 - 123/74)  BP(mean): --  RR: 18 (08-19-17 @ 04:11) (18 - 18)  SpO2: 95% (08-19-17 @ 04:11) (95% - 95%)  Neck: No palpable thyroid nodules.  CVS: S1S2, No murmurs  Respiratory: No wheezing, no crepitations  GI: Abdomen soft, bowel sounds positive  Musculoskeletal: no Lower extremity edema   Skin: No skin rashes, no ecchymosis

## 2017-08-21 ENCOUNTER — TRANSCRIPTION ENCOUNTER (OUTPATIENT)
Age: 82
End: 2017-08-21

## 2017-08-21 VITALS
DIASTOLIC BLOOD PRESSURE: 74 MMHG | OXYGEN SATURATION: 96 % | TEMPERATURE: 98 F | SYSTOLIC BLOOD PRESSURE: 116 MMHG | RESPIRATION RATE: 18 BRPM | HEART RATE: 70 BPM

## 2017-08-21 LAB
ANION GAP SERPL CALC-SCNC: 17 MMOL/L — SIGNIFICANT CHANGE UP (ref 5–17)
BUN SERPL-MCNC: 41 MG/DL — HIGH (ref 7–23)
CALCIUM SERPL-MCNC: 9.4 MG/DL — SIGNIFICANT CHANGE UP (ref 8.4–10.5)
CHLORIDE SERPL-SCNC: 95 MMOL/L — LOW (ref 96–108)
CO2 SERPL-SCNC: 26 MMOL/L — SIGNIFICANT CHANGE UP (ref 22–31)
CREAT SERPL-MCNC: 1.16 MG/DL — SIGNIFICANT CHANGE UP (ref 0.5–1.3)
GLUCOSE SERPL-MCNC: 278 MG/DL — HIGH (ref 70–99)
HCT VFR BLD CALC: 36.2 % — SIGNIFICANT CHANGE UP (ref 34.5–45)
HGB BLD-MCNC: 12.3 G/DL — SIGNIFICANT CHANGE UP (ref 11.5–15.5)
MCHC RBC-ENTMCNC: 30.8 PG — SIGNIFICANT CHANGE UP (ref 27–34)
MCHC RBC-ENTMCNC: 34 GM/DL — SIGNIFICANT CHANGE UP (ref 32–36)
MCV RBC AUTO: 90.7 FL — SIGNIFICANT CHANGE UP (ref 80–100)
PLATELET # BLD AUTO: 164 K/UL — SIGNIFICANT CHANGE UP (ref 150–400)
POTASSIUM SERPL-MCNC: 4.2 MMOL/L — SIGNIFICANT CHANGE UP (ref 3.5–5.3)
POTASSIUM SERPL-SCNC: 4.2 MMOL/L — SIGNIFICANT CHANGE UP (ref 3.5–5.3)
RBC # BLD: 3.99 M/UL — SIGNIFICANT CHANGE UP (ref 3.8–5.2)
RBC # FLD: 13.1 % — SIGNIFICANT CHANGE UP (ref 10.3–14.5)
SODIUM SERPL-SCNC: 138 MMOL/L — SIGNIFICANT CHANGE UP (ref 135–145)
WBC # BLD: 9 K/UL — SIGNIFICANT CHANGE UP (ref 3.8–10.5)
WBC # FLD AUTO: 9 K/UL — SIGNIFICANT CHANGE UP (ref 3.8–10.5)

## 2017-08-21 PROCEDURE — 96374 THER/PROPH/DIAG INJ IV PUSH: CPT

## 2017-08-21 PROCEDURE — A9505: CPT

## 2017-08-21 PROCEDURE — A9500: CPT

## 2017-08-21 PROCEDURE — 80162 ASSAY OF DIGOXIN TOTAL: CPT

## 2017-08-21 PROCEDURE — 99285 EMERGENCY DEPT VISIT HI MDM: CPT | Mod: 25

## 2017-08-21 PROCEDURE — 82553 CREATINE MB FRACTION: CPT

## 2017-08-21 PROCEDURE — 71045 X-RAY EXAM CHEST 1 VIEW: CPT

## 2017-08-21 PROCEDURE — 83036 HEMOGLOBIN GLYCOSYLATED A1C: CPT

## 2017-08-21 PROCEDURE — 85730 THROMBOPLASTIN TIME PARTIAL: CPT

## 2017-08-21 PROCEDURE — 84443 ASSAY THYROID STIM HORMONE: CPT

## 2017-08-21 PROCEDURE — 78452 HT MUSCLE IMAGE SPECT MULT: CPT

## 2017-08-21 PROCEDURE — 84484 ASSAY OF TROPONIN QUANT: CPT

## 2017-08-21 PROCEDURE — 80061 LIPID PANEL: CPT

## 2017-08-21 PROCEDURE — 83735 ASSAY OF MAGNESIUM: CPT

## 2017-08-21 PROCEDURE — 96372 THER/PROPH/DIAG INJ SC/IM: CPT | Mod: XU

## 2017-08-21 PROCEDURE — 93017 CV STRESS TEST TRACING ONLY: CPT

## 2017-08-21 PROCEDURE — 80053 COMPREHEN METABOLIC PANEL: CPT

## 2017-08-21 PROCEDURE — 93005 ELECTROCARDIOGRAM TRACING: CPT

## 2017-08-21 PROCEDURE — 82607 VITAMIN B-12: CPT

## 2017-08-21 PROCEDURE — 85027 COMPLETE CBC AUTOMATED: CPT

## 2017-08-21 PROCEDURE — 93306 TTE W/DOPPLER COMPLETE: CPT

## 2017-08-21 PROCEDURE — 82550 ASSAY OF CK (CPK): CPT

## 2017-08-21 PROCEDURE — 85610 PROTHROMBIN TIME: CPT

## 2017-08-21 PROCEDURE — 80048 BASIC METABOLIC PNL TOTAL CA: CPT

## 2017-08-21 PROCEDURE — 81001 URINALYSIS AUTO W/SCOPE: CPT

## 2017-08-21 PROCEDURE — 84100 ASSAY OF PHOSPHORUS: CPT

## 2017-08-21 PROCEDURE — 83880 ASSAY OF NATRIURETIC PEPTIDE: CPT

## 2017-08-21 PROCEDURE — 93308 TTE F-UP OR LMTD: CPT

## 2017-08-21 PROCEDURE — 82746 ASSAY OF FOLIC ACID SERUM: CPT

## 2017-08-21 RX ORDER — FUROSEMIDE 40 MG
1 TABLET ORAL
Qty: 30 | Refills: 0
Start: 2017-08-21 | End: 2017-09-20

## 2017-08-21 RX ORDER — INSULIN GLARGINE 100 [IU]/ML
30 INJECTION, SOLUTION SUBCUTANEOUS
Qty: 1 | Refills: 0
Start: 2017-08-21

## 2017-08-21 RX ORDER — ENOXAPARIN SODIUM 100 MG/ML
17 INJECTION SUBCUTANEOUS
Qty: 0 | Refills: 0 | COMMUNITY

## 2017-08-21 RX ADMIN — Medication 2: at 13:08

## 2017-08-21 RX ADMIN — Medication 81 MILLIGRAM(S): at 13:09

## 2017-08-21 RX ADMIN — Medication 20 MILLIGRAM(S): at 05:31

## 2017-08-21 RX ADMIN — Medication 3: at 08:22

## 2017-08-21 RX ADMIN — CLOPIDOGREL BISULFATE 75 MILLIGRAM(S): 75 TABLET, FILM COATED ORAL at 13:09

## 2017-08-21 RX ADMIN — PANTOPRAZOLE SODIUM 40 MILLIGRAM(S): 20 TABLET, DELAYED RELEASE ORAL at 05:31

## 2017-08-21 RX ADMIN — Medication 7 UNIT(S): at 17:13

## 2017-08-21 RX ADMIN — Medication 7 UNIT(S): at 13:08

## 2017-08-21 RX ADMIN — LISINOPRIL 10 MILLIGRAM(S): 2.5 TABLET ORAL at 05:31

## 2017-08-21 RX ADMIN — Medication 0.12 MILLIGRAM(S): at 05:31

## 2017-08-21 RX ADMIN — SPIRONOLACTONE 25 MILLIGRAM(S): 25 TABLET, FILM COATED ORAL at 05:31

## 2017-08-21 RX ADMIN — Medication 7 UNIT(S): at 08:22

## 2017-08-21 RX ADMIN — Medication 4: at 17:13

## 2017-08-21 RX ADMIN — Medication 1000 UNIT(S): at 13:09

## 2017-08-21 RX ADMIN — Medication 1 TABLET(S): at 13:09

## 2017-08-21 RX ADMIN — Medication 25 MILLIGRAM(S): at 05:31

## 2017-08-21 RX ADMIN — Medication 25 MILLIGRAM(S): at 17:13

## 2017-08-21 NOTE — PROGRESS NOTE ADULT - SUBJECTIVE AND OBJECTIVE BOX
INTERVAL HISTORY: No chest pain, SOB, palps, LE edema     TELEMETRY: no events  	  MEDICATIONS:  lisinopril 10 milliGRAM(s) Oral daily  digoxin     Tablet 0.125 milliGRAM(s) Oral daily  metoprolol 25 milliGRAM(s) Oral two times a day  furosemide   Injectable 20 milliGRAM(s) IV Push daily  spironolactone 25 milliGRAM(s) Oral daily        PHYSICAL EXAM:  T(C): 36.4 (08-21-17 @ 10:32), Max: 36.9 (08-20-17 @ 20:10)  HR: 59 (08-21-17 @ 10:32) (59 - 72)  BP: 117/61 (08-21-17 @ 10:32) (101/62 - 119/64)  RR: 18 (08-21-17 @ 10:32) (18 - 18)  SpO2: 95% (08-21-17 @ 10:32) (95% - 96%)  Wt(kg): --  I&O's Summary    20 Aug 2017 07:01  -  21 Aug 2017 07:00  --------------------------------------------------------  IN: 240 mL / OUT: 1600 mL / NET: -1360 mL          Appearance: Normal	  HEENT:    PERRL, EOMI	  Lymphatic: No lymphadenopathy  Cardiovascular: Normal S1 S2, No JVD  Respiratory: Lungs clear to auscultation	  Psychiatry: Alert, Mood & affect appropriate  Gastrointestinal:  Soft, Non-tender, + BS	  Skin: No rashes, No cyanosis  Neurologic: Non-focal  Extremities:  No  cyanosis or edema  Vascular: Peripheral pulses palpable  bilaterally      CARDIAC MARKERS:                          12.3   9.00  )-----------( 164      ( 21 Aug 2017 07:20 )             36.2     08-21    138  |  95<L>  |  41<H>  ----------------------------<  278<H>  4.2   |  26  |  1.16    Ca    9.4      21 Aug 2017 07:19          ASSESSMENT/PLAN: 	  84 year old F with PMH of sHF EF 30%, IDDM, HTN, HLD, hx of STEMI w/ stents (2014) presents with chest pain  1. Chest pain/CAD/NSTEMI - EKG with old LBBB nondiagnostic for ischemia. Trop elevated. Pt with known CAD with recurrent symptoms similar to the pain prior to her MI  - Pt agreed to pharmacological NM stress test - large territory infarct with no significant ischemia.   - Continue with ASA, Plavix, and statin.     2. sHF - euvolemic at this time. Continue with OMT. Continue with ACE, bb and aldactone    3. HTN - well controlled on above regimen.     4. HLD - continue with statin    5. DM - per primary team - variable levels    6. Discharge home with outpt follow up within 2 weeks to switch to Entresto from Ramipril        Srini Velasco DO [facility]  Cardiovascular Medicine  [phone]

## 2017-08-21 NOTE — DISCHARGE NOTE ADULT - CARE PLAN
Principal Discharge DX:	Chest pain  Goal:	Remain without chest pain  Instructions for follow-up, activity and diet:	HOME CARE INSTRUCTIONS  For the next few days, avoid physical activities that bring on chest pain. Continue physical activities as directed.  Do not smoke.  Avoid drinking alcohol.   Only take over-the-counter or prescription medicine for pain, discomfort, or fever as directed by your caregiver.  Follow your caregiver's suggestions for further testing if your chest pain does not go away.  Keep any follow-up appointments you made. If you do not go to an appointment, you could develop lasting (chronic) problems with pain. If there is any problem keeping an appointment, you must call to reschedule.   SEEK MEDICAL CARE IF:  You think you are having problems from the medicine you are taking. Read your medicine instructions carefully.  Your chest pain does not go away, even after treatment.  You develop a rash with blisters on your chest.  SEEK IMMEDIATE MEDICAL CARE IF:  You have increased chest pain or pain that spreads to your arm, neck, jaw, back, or abdomen.   You develop shortness of breath, an increasing cough, or you are coughing up blood.  You have severe back or abdominal pain, feel nauseous, or vomit.  You develop severe weakness, fainting, or chills.  You have a fever.  THIS IS AN EMERGENCY. Do not wait to see if the pain will go away. Get medical help at once. Call your local emergency services. Do not drive yourself to the hospital.  Secondary Diagnosis:	CHF (congestive heart failure)  Instructions for follow-up, activity and diet:	Weigh yourself daily.  If you gain 3lbs in 3 days, or 5lbs in a week call your Health Care Provider.  Do not eat or drink foods containing more than 2000mg of salt (sodium) in your diet every day.  Call your Health Care Provider if you have any swelling or increased swelling in your feet, ankles, and/or stomach.  The Pt was provided with CHF diet instruction (low sodium diet, daily weights, label reading, Heart Healthy Cooking Tips & Heart Healthy shopping Tips).  Take all of your medication as directed.  If you become dizzy call your Health Care Provider.  Secondary Diagnosis:	Diabetes mellitus  Instructions for follow-up, activity and diet:	HgA1C this admission.  Make sure you get your HgA1c checked every three months.  If you take oral diabetes medications, check your blood glucose two times a day.  If you take insulin, check your blood glucose before meals and at bedtime.  It's important not to skip any meals.  Keep a log of your blood glucose results and always take it with you to your doctor appointments.  Keep a list of your current medications including injectables and over the counter medications and bring this medication list with you to all your doctor appointments.  If you have not seen your ophthalmologist this year call for appointment.  Check your feet daily for redness, sores, or openings. Do not self treat. If no improvement in two days call your primary care physician for an appointment.  Low blood sugar (hypoglycemia) is a blood sugar below 70mg/dl. Check your blood sugar if you feel signs/symptoms of hypoglycemia. If your blood sugar is below 70 take 15 grams of carbohydrates (ex 4 oz of apple juice, 3-4 glucose tablets, or 4-6 oz of regular soda) wait 15 minutes and repeat blood sugar to make sure it comes up above 70.  If your blood sugar is above 70 and you are due for a meal, have a meal.  If you are not due for a meal have a snack.  This snack helps keeps your blood sugar at a safe range.

## 2017-08-21 NOTE — DISCHARGE NOTE ADULT - PATIENT PORTAL LINK FT
“You can access the Follow[employer] Patient Portal, offered by [facility], by registering with the following website: [email or web address]”

## 2017-08-21 NOTE — DISCHARGE NOTE ADULT - MEDICATION SUMMARY - MEDICATIONS TO TAKE
I will START or STAY ON the medications listed below when I get home from the hospital:    spironolactone 25 mg oral tablet  -- 1 tab(s) by mouth once a day  -- Indication: For Acute on chronic systolic congestive heart failure    Tylenol 325 mg oral tablet  -- 2 tab(s) by mouth every 4 hours, As Needed   -- Indication: For pain    Ecotrin Adult Low Strength 81 mg oral delayed release tablet  -- 1 tab(s) by mouth once a day  -- Indication: For Dyslipidemia    ramipril 2.5 mg oral tablet  -- 1 tab(s) by mouth once a day  -- Indication: For CHF (congestive heart failure)    digoxin 125 mcg (0.125 mg) oral tablet  -- 1 tab(s) by mouth once a day  -- Indication: For Acute on chronic systolic congestive heart failure    HumaLOG KwikPen 100 units/mL subcutaneous solution  -- 11-12 units  subcutaneous 3 times a day before meals  -- Indication: For Diabetes mellitus    insulin glargine 100 units/mL subcutaneous solution  -- 30 unit(s) subcutaneous once a day (at bedtime)  -- Do not drink alcoholic beverages when taking this medication.  It is very important that you take or use this exactly as directed.  Do not skip doses or discontinue unless directed by your doctor.  Keep in refrigerator.  Do not freeze.    -- Indication: For Diabetes mellitus    atorvastatin 80 mg oral tablet  -- 1 tab(s) by mouth once a day (at bedtime)  -- Indication: For Dyslipidemia    clopidogrel 75 mg oral tablet  -- 1 tab(s) by mouth once a day  -- Indication: For Dyslipidemia    metoprolol tartrate 25 mg oral tablet  -- 1 tab(s) by mouth 2 times a day  -- Indication: For Acute on chronic systolic congestive heart failure    furosemide 20 mg oral tablet  -- 1 tab(s) by mouth once a day  -- Avoid prolonged or excessive exposure to direct and/or artificial sunlight while taking this medication.  It is very important that you take or use this exactly as directed.  Do not skip doses or discontinue unless directed by your doctor.  It may be advisable to drink a full glass orange juice or eat a banana daily while taking this medication.    -- Indication: For Acute on chronic systolic congestive heart failure    pantoprazole 40 mg oral delayed release tablet  -- 1 tab(s) by mouth once a day (in the morning before a meal)  -- Indication: For Dyspepsia    Daily Coy oral tablet  -- 1 tab(s) by mouth once a day  -- Indication: For supplement    Vitamin D3 1000 intl units oral tablet  -- 1 tab(s) by mouth once a day  -- Indication: For supplement

## 2017-08-21 NOTE — DISCHARGE NOTE ADULT - CARE PROVIDER_API CALL
Michelle Hines), Cardiovascular Disease; Internal Medicine; Nuclear Cardiology  [address]  [place] [address]  Phone: [phone]  Fax: [phone]    [place],   Phone: (   )    -  Fax: (   )    -

## 2017-08-21 NOTE — DISCHARGE NOTE ADULT - ADDITIONAL INSTRUCTIONS
Make appointments to follow up with your out patient physicians.  Bring all discharge paperwork including discharge medication list to your follow up appointments.   Follow up with Dr. Hines, cardiology, in 1 week.   Follow up with your endocrinologist as scheduled in Sept.

## 2017-08-21 NOTE — DISCHARGE NOTE ADULT - PLAN OF CARE
Remain without chest pain HOME CARE INSTRUCTIONS  For the next few days, avoid physical activities that bring on chest pain. Continue physical activities as directed.  Do not smoke.  Avoid drinking alcohol.   Only take over-the-counter or prescription medicine for pain, discomfort, or fever as directed by your caregiver.  Follow your caregiver's suggestions for further testing if your chest pain does not go away.  Keep any follow-up appointments you made. If you do not go to an appointment, you could develop lasting (chronic) problems with pain. If there is any problem keeping an appointment, you must call to reschedule.   SEEK MEDICAL CARE IF:  You think you are having problems from the medicine you are taking. Read your medicine instructions carefully.  Your chest pain does not go away, even after treatment.  You develop a rash with blisters on your chest.  SEEK IMMEDIATE MEDICAL CARE IF:  You have increased chest pain or pain that spreads to your arm, neck, jaw, back, or abdomen.   You develop shortness of breath, an increasing cough, or you are coughing up blood.  You have severe back or abdominal pain, feel nauseous, or vomit.  You develop severe weakness, fainting, or chills.  You have a fever.  THIS IS AN EMERGENCY. Do not wait to see if the pain will go away. Get medical help at once. Call your local emergency services. Do not drive yourself to the hospital. Weigh yourself daily.  If you gain 3lbs in 3 days, or 5lbs in a week call your Health Care Provider.  Do not eat or drink foods containing more than 2000mg of salt (sodium) in your diet every day.  Call your Health Care Provider if you have any swelling or increased swelling in your feet, ankles, and/or stomach.  The Pt was provided with CHF diet instruction (low sodium diet, daily weights, label reading, Heart Healthy Cooking Tips & Heart Healthy shopping Tips).  Take all of your medication as directed.  If you become dizzy call your Health Care Provider. HgA1C this admission.  Make sure you get your HgA1c checked every three months.  If you take oral diabetes medications, check your blood glucose two times a day.  If you take insulin, check your blood glucose before meals and at bedtime.  It's important not to skip any meals.  Keep a log of your blood glucose results and always take it with you to your doctor appointments.  Keep a list of your current medications including injectables and over the counter medications and bring this medication list with you to all your doctor appointments.  If you have not seen your ophthalmologist this year call for appointment.  Check your feet daily for redness, sores, or openings. Do not self treat. If no improvement in two days call your primary care physician for an appointment.  Low blood sugar (hypoglycemia) is a blood sugar below 70mg/dl. Check your blood sugar if you feel signs/symptoms of hypoglycemia. If your blood sugar is below 70 take 15 grams of carbohydrates (ex 4 oz of apple juice, 3-4 glucose tablets, or 4-6 oz of regular soda) wait 15 minutes and repeat blood sugar to make sure it comes up above 70.  If your blood sugar is above 70 and you are due for a meal, have a meal.  If you are not due for a meal have a snack.  This snack helps keeps your blood sugar at a safe range.

## 2017-08-21 NOTE — DISCHARGE NOTE ADULT - MEDICATION SUMMARY - MEDICATIONS TO CHANGE
I will SWITCH the dose or number of times a day I take the medications listed below when I get home from the hospital:    Lantus Solostar Pen 100 units/mL subcutaneous solution  -- 17 unit(s) subcutaneous 2 times a day

## 2017-08-23 ENCOUNTER — APPOINTMENT (OUTPATIENT)
Dept: HOME HEALTH SERVICES | Facility: HOME HEALTH | Age: 82
End: 2017-08-23

## 2017-08-23 VITALS
TEMPERATURE: 97.8 F | OXYGEN SATURATION: 98 % | DIASTOLIC BLOOD PRESSURE: 64 MMHG | HEART RATE: 64 BPM | RESPIRATION RATE: 16 BRPM | SYSTOLIC BLOOD PRESSURE: 126 MMHG

## 2017-08-25 ENCOUNTER — APPOINTMENT (OUTPATIENT)
Dept: HOME HEALTH SERVICES | Facility: HOME HEALTH | Age: 82
End: 2017-08-25
Payer: MEDICARE

## 2017-08-25 VITALS
HEART RATE: 67 BPM | RESPIRATION RATE: 12 BRPM | SYSTOLIC BLOOD PRESSURE: 110 MMHG | OXYGEN SATURATION: 98 % | DIASTOLIC BLOOD PRESSURE: 55 MMHG | TEMPERATURE: 97.7 F

## 2017-08-25 PROCEDURE — 99496 TRANSJ CARE MGMT HIGH F2F 7D: CPT

## 2017-09-01 LAB
ALBUMIN SERPL ELPH-MCNC: 4.3 G/DL
ALP BLD-CCNC: 163 U/L
ALT SERPL-CCNC: 33 U/L
ANION GAP SERPL CALC-SCNC: 20 MMOL/L
AST SERPL-CCNC: 27 U/L
BASOPHILS # BLD AUTO: 0.03 K/UL
BASOPHILS NFR BLD AUTO: 0.2 %
BILIRUB SERPL-MCNC: 0.8 MG/DL
BUN SERPL-MCNC: 27 MG/DL
CALCIUM SERPL-MCNC: 9 MG/DL
CHLORIDE SERPL-SCNC: 93 MMOL/L
CO2 SERPL-SCNC: 24 MMOL/L
CREAT SERPL-MCNC: 1.02 MG/DL
EOSINOPHIL # BLD AUTO: 0.36 K/UL
EOSINOPHIL NFR BLD AUTO: 2.8 %
HBA1C MFR BLD HPLC: 11.4 %
HCT VFR BLD CALC: 37.2 %
HGB BLD-MCNC: 12.3 G/DL
IMM GRANULOCYTES NFR BLD AUTO: 0.4 %
LYMPHOCYTES # BLD AUTO: 2.35 K/UL
LYMPHOCYTES NFR BLD AUTO: 18.5 %
MAN DIFF?: NORMAL
MCHC RBC-ENTMCNC: 30.7 PG
MCHC RBC-ENTMCNC: 33.1 GM/DL
MCV RBC AUTO: 92.8 FL
MONOCYTES # BLD AUTO: 0.77 K/UL
MONOCYTES NFR BLD AUTO: 6.1 %
NEUTROPHILS # BLD AUTO: 9.11 K/UL
NEUTROPHILS NFR BLD AUTO: 72 %
PLATELET # BLD AUTO: 244 K/UL
POTASSIUM SERPL-SCNC: 4.2 MMOL/L
PROT SERPL-MCNC: 7 G/DL
RBC # BLD: 4.01 M/UL
RBC # FLD: 13.5 %
SODIUM SERPL-SCNC: 137 MMOL/L
WBC # FLD AUTO: 12.67 K/UL

## 2017-09-12 ENCOUNTER — APPOINTMENT (OUTPATIENT)
Dept: ORTHOPEDIC SURGERY | Facility: CLINIC | Age: 82
End: 2017-09-12
Payer: MEDICARE

## 2017-09-12 VITALS
BODY MASS INDEX: 22.95 KG/M2 | HEIGHT: 60.5 IN | SYSTOLIC BLOOD PRESSURE: 148 MMHG | DIASTOLIC BLOOD PRESSURE: 75 MMHG | HEART RATE: 71 BPM | WEIGHT: 120 LBS

## 2017-09-12 DIAGNOSIS — I25.2 OLD MYOCARDIAL INFARCTION: ICD-10-CM

## 2017-09-12 DIAGNOSIS — Z86.79 PERSONAL HISTORY OF OTHER DISEASES OF THE CIRCULATORY SYSTEM: ICD-10-CM

## 2017-09-12 DIAGNOSIS — Z87.19 PERSONAL HISTORY OF OTHER DISEASES OF THE DIGESTIVE SYSTEM: ICD-10-CM

## 2017-09-12 DIAGNOSIS — Z86.39 PERSONAL HISTORY OF OTHER ENDOCRINE, NUTRITIONAL AND METABOLIC DISEASE: ICD-10-CM

## 2017-09-12 DIAGNOSIS — M25.562 PAIN IN LEFT KNEE: ICD-10-CM

## 2017-09-12 PROCEDURE — 99203 OFFICE O/P NEW LOW 30 MIN: CPT

## 2017-09-12 PROCEDURE — 73564 X-RAY EXAM KNEE 4 OR MORE: CPT | Mod: LT

## 2017-11-15 ENCOUNTER — APPOINTMENT (OUTPATIENT)
Dept: HOME HEALTH SERVICES | Facility: HOME HEALTH | Age: 82
End: 2017-11-15
Payer: MEDICARE

## 2017-11-15 VITALS
RESPIRATION RATE: 14 BRPM | HEART RATE: 73 BPM | SYSTOLIC BLOOD PRESSURE: 130 MMHG | TEMPERATURE: 98.9 F | DIASTOLIC BLOOD PRESSURE: 65 MMHG | OXYGEN SATURATION: 98 %

## 2017-11-15 PROCEDURE — 99349 HOME/RES VST EST MOD MDM 40: CPT

## 2017-11-17 LAB
BASOPHILS # BLD AUTO: 0.04 K/UL
BASOPHILS NFR BLD AUTO: 0.4 %
EOSINOPHIL # BLD AUTO: 0.38 K/UL
EOSINOPHIL NFR BLD AUTO: 3.6
HCT VFR BLD CALC: 35.4 %
HGB BLD-MCNC: 12 G/DL
IMM GRANULOCYTES NFR BLD AUTO: 0.3 %
LYMPHOCYTES # BLD AUTO: 2.35 K/UL
LYMPHOCYTES NFR BLD AUTO: 22.5 %
MAN DIFF?: NORMAL
MCHC RBC-ENTMCNC: 30.7 PG
MCHC RBC-ENTMCNC: 33.9 GM/DL
MCV RBC AUTO: 90.5 FL
MONOCYTES # BLD AUTO: 0.79 K/UL
MONOCYTES NFR BLD AUTO: 7.6 %
NEUTROPHILS # BLD AUTO: 6.85 K/UL
NEUTROPHILS NFR BLD AUTO: 65.6 %
PLATELET # BLD AUTO: 201 K/UL
RBC # BLD: 3.91 M/UL
RBC # FLD: 13.2 %
WBC # FLD AUTO: 10.44 K/UL

## 2017-11-20 LAB
ALBUMIN SERPL ELPH-MCNC: 4.3 G/DL
ALP BLD-CCNC: 166 U/L
ALT SERPL-CCNC: 28 U/L
ANION GAP SERPL CALC-SCNC: 16 MMOL/L
AST SERPL-CCNC: 36 U/L
BACTERIA STL CULT: NORMAL
BILIRUB SERPL-MCNC: 0.9 MG/DL
BUN SERPL-MCNC: 30 MG/DL
C DIFF TOX GENS STL QL NAA+PROBE: NORMAL
CALCIUM SERPL-MCNC: 9.8 MG/DL
CDIFF BY PCR: NOT DETECTED
CHLORIDE SERPL-SCNC: 104 MMOL/L
CO2 SERPL-SCNC: 26 MMOL/L
CREAT SERPL-MCNC: 1.16 MG/DL
POTASSIUM SERPL-SCNC: 3.8 MMOL/L
PROT SERPL-MCNC: 7.1 G/DL
SODIUM SERPL-SCNC: 146 MMOL/L

## 2017-12-26 LAB
ANION GAP SERPL CALC-SCNC: 16 MMOL/L
BUN SERPL-MCNC: 29 MG/DL
CALCIUM SERPL-MCNC: 9.7 MG/DL
CHLORIDE SERPL-SCNC: 100 MMOL/L
CO2 SERPL-SCNC: 24 MMOL/L
CREAT SERPL-MCNC: 1.18 MG/DL
GLUCOSE SERPL-MCNC: 315 MG/DL
HBA1C MFR BLD HPLC: 9.8 %
POTASSIUM SERPL-SCNC: 4.4 MMOL/L
SODIUM SERPL-SCNC: 140 MMOL/L

## 2018-03-09 ENCOUNTER — APPOINTMENT (OUTPATIENT)
Dept: HOME HEALTH SERVICES | Facility: HOME HEALTH | Age: 83
End: 2018-03-09

## 2018-03-09 VITALS
SYSTOLIC BLOOD PRESSURE: 126 MMHG | DIASTOLIC BLOOD PRESSURE: 72 MMHG | TEMPERATURE: 97.8 F | OXYGEN SATURATION: 98 % | HEART RATE: 78 BPM | RESPIRATION RATE: 16 BRPM

## 2018-04-09 ENCOUNTER — CLINICAL ADVICE (OUTPATIENT)
Age: 83
End: 2018-04-09

## 2018-04-10 ENCOUNTER — CLINICAL ADVICE (OUTPATIENT)
Age: 83
End: 2018-04-10

## 2018-04-19 ENCOUNTER — APPOINTMENT (OUTPATIENT)
Dept: HOME HEALTH SERVICES | Facility: HOME HEALTH | Age: 83
End: 2018-04-19

## 2018-04-27 ENCOUNTER — APPOINTMENT (OUTPATIENT)
Dept: HOME HEALTH SERVICES | Facility: HOME HEALTH | Age: 83
End: 2018-04-27
Payer: MEDICARE

## 2018-04-27 VITALS
BODY MASS INDEX: 25.16 KG/M2 | WEIGHT: 131 LBS | DIASTOLIC BLOOD PRESSURE: 70 MMHG | OXYGEN SATURATION: 99 % | RESPIRATION RATE: 16 BRPM | HEART RATE: 57 BPM | SYSTOLIC BLOOD PRESSURE: 105 MMHG | TEMPERATURE: 97.1 F

## 2018-04-27 DIAGNOSIS — R19.7 DIARRHEA, UNSPECIFIED: ICD-10-CM

## 2018-04-27 DIAGNOSIS — I25.10 ATHEROSCLEROTIC HEART DISEASE OF NATIVE CORONARY ARTERY W/OUT ANGINA PECTORIS: ICD-10-CM

## 2018-04-27 DIAGNOSIS — B30.9 VIRAL CONJUNCTIVITIS, UNSPECIFIED: ICD-10-CM

## 2018-04-27 DIAGNOSIS — Z87.898 PERSONAL HISTORY OF OTHER SPECIFIED CONDITIONS: ICD-10-CM

## 2018-04-27 DIAGNOSIS — J01.01 ACUTE RECURRENT MAXILLARY SINUSITIS: ICD-10-CM

## 2018-04-27 PROCEDURE — 99349 HOME/RES VST EST MOD MDM 40: CPT | Mod: 25

## 2018-04-27 PROCEDURE — 99497 ADVNCD CARE PLAN 30 MIN: CPT

## 2018-04-27 RX ORDER — NEOMYCIN AND POLYMYXIN B SULFATES AND DEXAMETHASONE 3.5; 10000; 1 MG/G; [IU]/G; MG/G
3.5-10000-0.1 OINTMENT OPHTHALMIC 3 TIMES DAILY
Qty: 1 | Refills: 0 | Status: COMPLETED | COMMUNITY
Start: 2018-04-10 | End: 2018-04-27

## 2018-04-27 RX ORDER — DIGOXIN 125 UG/1
125 TABLET ORAL DAILY
Qty: 90 | Refills: 3 | Status: COMPLETED | COMMUNITY
Start: 2017-06-16 | End: 2018-04-27

## 2018-04-27 RX ORDER — DOXYCYCLINE HYCLATE 100 MG/1
100 TABLET ORAL
Qty: 10 | Refills: 1 | Status: COMPLETED | COMMUNITY
Start: 2018-03-01 | End: 2018-04-27

## 2018-05-03 LAB
25(OH)D3 SERPL-MCNC: 31.6 NG/ML
ALBUMIN SERPL ELPH-MCNC: 4.3 G/DL
ALP BLD-CCNC: 180 U/L
ALT SERPL-CCNC: 18 U/L
ANION GAP SERPL CALC-SCNC: 13 MMOL/L
AST SERPL-CCNC: 19 U/L
BASOPHILS # BLD AUTO: 0.03 K/UL
BASOPHILS NFR BLD AUTO: 0.4 %
BILIRUB SERPL-MCNC: 0.5 MG/DL
BUN SERPL-MCNC: 56 MG/DL
CALCIUM SERPL-MCNC: 9 MG/DL
CHLORIDE SERPL-SCNC: 97 MMOL/L
CHOLEST SERPL-MCNC: 98 MG/DL
CHOLEST/HDLC SERPL: 2.7 RATIO
CO2 SERPL-SCNC: 23 MMOL/L
CREAT SERPL-MCNC: 1.34 MG/DL
EOSINOPHIL # BLD AUTO: 0.29 K/UL
EOSINOPHIL NFR BLD AUTO: 3.6 %
FOLATE SERPL-MCNC: >20 NG/ML
HBA1C MFR BLD HPLC: 10 %
HCT VFR BLD CALC: 33.2 %
HDLC SERPL-MCNC: 36 MG/DL
HGB BLD-MCNC: 11.2 G/DL
IMM GRANULOCYTES NFR BLD AUTO: 0.1 %
LDLC SERPL CALC-MCNC: 34 MG/DL
LYMPHOCYTES # BLD AUTO: 2.1 K/UL
LYMPHOCYTES NFR BLD AUTO: 26.3 %
MAN DIFF?: NORMAL
MCHC RBC-ENTMCNC: 30.3 PG
MCHC RBC-ENTMCNC: 33.7 GM/DL
MCV RBC AUTO: 89.7 FL
MONOCYTES # BLD AUTO: 0.67 K/UL
MONOCYTES NFR BLD AUTO: 8.4 %
NEUTROPHILS # BLD AUTO: 4.88 K/UL
NEUTROPHILS NFR BLD AUTO: 61.2 %
PLATELET # BLD AUTO: 191 K/UL
POTASSIUM SERPL-SCNC: 4.4 MMOL/L
PROT SERPL-MCNC: 6.8 G/DL
RBC # BLD: 3.7 M/UL
RBC # FLD: 13.2 %
SODIUM SERPL-SCNC: 133 MMOL/L
TRIGL SERPL-MCNC: 138 MG/DL
TSH SERPL-ACNC: 3.18 UIU/ML
VIT B12 SERPL-MCNC: 626 PG/ML
WBC # FLD AUTO: 7.98 K/UL

## 2018-05-04 ENCOUNTER — MOBILE ON CALL (OUTPATIENT)
Age: 83
End: 2018-05-04

## 2018-05-30 ENCOUNTER — APPOINTMENT (OUTPATIENT)
Age: 83
End: 2018-05-30

## 2018-05-30 VITALS
SYSTOLIC BLOOD PRESSURE: 110 MMHG | BODY MASS INDEX: 25.93 KG/M2 | HEART RATE: 71 BPM | OXYGEN SATURATION: 97 % | RESPIRATION RATE: 15 BRPM | WEIGHT: 135 LBS | TEMPERATURE: 96.2 F | DIASTOLIC BLOOD PRESSURE: 60 MMHG

## 2018-06-24 ENCOUNTER — APPOINTMENT (OUTPATIENT)
Dept: HOME HEALTH SERVICES | Facility: HOME HEALTH | Age: 83
End: 2018-06-24
Payer: MEDICARE

## 2018-06-24 VITALS
DIASTOLIC BLOOD PRESSURE: 60 MMHG | SYSTOLIC BLOOD PRESSURE: 125 MMHG | RESPIRATION RATE: 14 BRPM | OXYGEN SATURATION: 96 % | HEART RATE: 59 BPM | TEMPERATURE: 98.2 F

## 2018-06-24 PROCEDURE — 99350 HOME/RES VST EST HIGH MDM 60: CPT

## 2018-07-11 LAB
ALBUMIN SERPL ELPH-MCNC: 4.2 G/DL
ALP BLD-CCNC: 166 U/L
ALT SERPL-CCNC: 20 U/L
ANION GAP SERPL CALC-SCNC: 21 MMOL/L
AST SERPL-CCNC: 23 U/L
BILIRUB SERPL-MCNC: 0.4 MG/DL
BUN SERPL-MCNC: 28 MG/DL
CALCIUM SERPL-MCNC: 9.2 MG/DL
CHLORIDE SERPL-SCNC: 99 MMOL/L
CO2 SERPL-SCNC: 20 MMOL/L
CREAT SERPL-MCNC: 1.12 MG/DL
POTASSIUM SERPL-SCNC: 4.4 MMOL/L
PROT SERPL-MCNC: 6.8 G/DL
SODIUM SERPL-SCNC: 140 MMOL/L
TSH SERPL-ACNC: 5.8 UIU/ML

## 2018-07-23 ENCOUNTER — OTHER (OUTPATIENT)
Age: 83
End: 2018-07-23

## 2018-10-02 ENCOUNTER — RX RENEWAL (OUTPATIENT)
Age: 83
End: 2018-10-02

## 2018-10-05 ENCOUNTER — APPOINTMENT (OUTPATIENT)
Dept: HOME HEALTH SERVICES | Facility: HOME HEALTH | Age: 83
End: 2018-10-05
Payer: MEDICARE

## 2018-10-05 VITALS
HEART RATE: 66 BPM | BODY MASS INDEX: 25.82 KG/M2 | RESPIRATION RATE: 16 BRPM | WEIGHT: 134.4 LBS | DIASTOLIC BLOOD PRESSURE: 72 MMHG | SYSTOLIC BLOOD PRESSURE: 118 MMHG | OXYGEN SATURATION: 97 % | TEMPERATURE: 99.3 F

## 2018-10-05 DIAGNOSIS — Z86.79 PERSONAL HISTORY OF OTHER DISEASES OF THE CIRCULATORY SYSTEM: ICD-10-CM

## 2018-10-05 DIAGNOSIS — Z86.39 PERSONAL HISTORY OF OTHER ENDOCRINE, NUTRITIONAL AND METABOLIC DISEASE: ICD-10-CM

## 2018-10-05 PROCEDURE — 99349 HOME/RES VST EST MOD MDM 40: CPT

## 2018-10-08 LAB
ALBUMIN SERPL ELPH-MCNC: 4.6 G/DL
ALP BLD-CCNC: 188 U/L
ALT SERPL-CCNC: 20 U/L
ANION GAP SERPL CALC-SCNC: 17 MMOL/L
AST SERPL-CCNC: 22 U/L
BASOPHILS # BLD AUTO: 0.02 K/UL
BASOPHILS NFR BLD AUTO: 0.2 %
BILIRUB SERPL-MCNC: 0.5 MG/DL
BUN SERPL-MCNC: 33 MG/DL
CALCIUM SERPL-MCNC: 9.7 MG/DL
CHLORIDE SERPL-SCNC: 103 MMOL/L
CO2 SERPL-SCNC: 21 MMOL/L
CREAT SERPL-MCNC: 1.28 MG/DL
EOSINOPHIL # BLD AUTO: 0.26 K/UL
EOSINOPHIL NFR BLD AUTO: 3 %
FOLATE SERPL-MCNC: >20 NG/ML
HBA1C MFR BLD HPLC: 10 %
HCT VFR BLD CALC: 34.9 %
HGB BLD-MCNC: 11.1 G/DL
IMM GRANULOCYTES NFR BLD AUTO: 0.2 %
LYMPHOCYTES # BLD AUTO: 2.29 K/UL
LYMPHOCYTES NFR BLD AUTO: 26.5 %
MAN DIFF?: NORMAL
MCHC RBC-ENTMCNC: 29.2 PG
MCHC RBC-ENTMCNC: 31.8 GM/DL
MCV RBC AUTO: 91.8 FL
MONOCYTES # BLD AUTO: 0.62 K/UL
MONOCYTES NFR BLD AUTO: 7.2 %
NEUTROPHILS # BLD AUTO: 5.44 K/UL
NEUTROPHILS NFR BLD AUTO: 62.9 %
PLATELET # BLD AUTO: 228 K/UL
POTASSIUM SERPL-SCNC: 4.1 MMOL/L
PROT SERPL-MCNC: 7.1 G/DL
RBC # BLD: 3.8 M/UL
RBC # FLD: 13.7 %
SODIUM SERPL-SCNC: 141 MMOL/L
TSH SERPL-ACNC: 4.86 UIU/ML
VIT B12 SERPL-MCNC: 584 PG/ML
WBC # FLD AUTO: 8.65 K/UL

## 2018-10-09 LAB — 25(OH)D3 SERPL-MCNC: 32.3 NG/ML

## 2018-11-03 ENCOUNTER — TRANSCRIPTION ENCOUNTER (OUTPATIENT)
Age: 83
End: 2018-11-03

## 2018-11-05 ENCOUNTER — APPOINTMENT (OUTPATIENT)
Dept: HOME HEALTH SERVICES | Facility: HOME HEALTH | Age: 83
End: 2018-11-05
Payer: MEDICARE

## 2018-11-05 VITALS
DIASTOLIC BLOOD PRESSURE: 70 MMHG | OXYGEN SATURATION: 97 % | TEMPERATURE: 97.4 F | RESPIRATION RATE: 15 BRPM | HEART RATE: 61 BPM | SYSTOLIC BLOOD PRESSURE: 115 MMHG

## 2018-11-05 DIAGNOSIS — H92.01 OTALGIA, RIGHT EAR: ICD-10-CM

## 2018-11-05 DIAGNOSIS — Z86.69 PERSONAL HISTORY OF OTHER DISEASES OF THE NERVOUS SYSTEM AND SENSE ORGANS: ICD-10-CM

## 2018-11-05 PROCEDURE — 99350 HOME/RES VST EST HIGH MDM 60: CPT

## 2018-11-05 RX ORDER — CIPROFLOXACIN AND DEXAMETHASONE 3; 1 MG/ML; MG/ML
0.3-0.1 SUSPENSION/ DROPS AURICULAR (OTIC) TWICE DAILY
Qty: 1 | Refills: 0 | Status: COMPLETED | COMMUNITY
Start: 2018-11-03 | End: 2018-11-05

## 2018-12-10 ENCOUNTER — APPOINTMENT (OUTPATIENT)
Dept: HOME HEALTH SERVICES | Facility: HOME HEALTH | Age: 83
End: 2018-12-10

## 2018-12-10 ENCOUNTER — APPOINTMENT (OUTPATIENT)
Dept: HOME HEALTH SERVICES | Facility: HOME HEALTH | Age: 83
End: 2018-12-10
Payer: MEDICARE

## 2018-12-10 VITALS
OXYGEN SATURATION: 98 % | TEMPERATURE: 97.2 F | BODY MASS INDEX: 25.74 KG/M2 | DIASTOLIC BLOOD PRESSURE: 70 MMHG | RESPIRATION RATE: 16 BRPM | SYSTOLIC BLOOD PRESSURE: 120 MMHG | HEART RATE: 60 BPM | WEIGHT: 134 LBS

## 2018-12-10 DIAGNOSIS — M17.12 UNILATERAL PRIMARY OSTEOARTHRITIS, LEFT KNEE: ICD-10-CM

## 2018-12-10 PROCEDURE — 99497 ADVNCD CARE PLAN 30 MIN: CPT

## 2018-12-10 PROCEDURE — 99350 HOME/RES VST EST HIGH MDM 60: CPT | Mod: 25

## 2018-12-18 ENCOUNTER — TRANSCRIPTION ENCOUNTER (OUTPATIENT)
Age: 83
End: 2018-12-18

## 2019-01-07 ENCOUNTER — TRANSCRIPTION ENCOUNTER (OUTPATIENT)
Age: 84
End: 2019-01-07

## 2019-01-08 ENCOUNTER — APPOINTMENT (OUTPATIENT)
Age: 84
End: 2019-01-08

## 2019-01-08 VITALS
OXYGEN SATURATION: 95 % | HEART RATE: 60 BPM | DIASTOLIC BLOOD PRESSURE: 60 MMHG | RESPIRATION RATE: 16 BRPM | SYSTOLIC BLOOD PRESSURE: 110 MMHG | TEMPERATURE: 97.3 F

## 2019-01-16 ENCOUNTER — APPOINTMENT (OUTPATIENT)
Dept: HOME HEALTH SERVICES | Facility: HOME HEALTH | Age: 84
End: 2019-01-16

## 2019-02-11 ENCOUNTER — APPOINTMENT (OUTPATIENT)
Dept: HOME HEALTH SERVICES | Facility: HOME HEALTH | Age: 84
End: 2019-02-11
Payer: MEDICARE

## 2019-02-11 VITALS
HEART RATE: 65 BPM | OXYGEN SATURATION: 95 % | TEMPERATURE: 97.2 F | RESPIRATION RATE: 16 BRPM | DIASTOLIC BLOOD PRESSURE: 70 MMHG | SYSTOLIC BLOOD PRESSURE: 115 MMHG

## 2019-02-11 DIAGNOSIS — Z86.79 PERSONAL HISTORY OF OTHER DISEASES OF THE CIRCULATORY SYSTEM: ICD-10-CM

## 2019-02-11 DIAGNOSIS — Z87.898 PERSONAL HISTORY OF OTHER SPECIFIED CONDITIONS: ICD-10-CM

## 2019-02-11 PROCEDURE — 99350 HOME/RES VST EST HIGH MDM 60: CPT

## 2019-02-11 NOTE — PHYSICAL EXAM
[No Acute Distress] : no acute distress [Well Nourished] : well nourished [Well Developed] : well developed [PERRL] : pupils equal, round and reactive to light [EOMI] : extra ocular movement intact [No JVD] : no jugular venous distention [Supple] : the neck was supple [No LAD] : no lymphadenopathy [No Respiratory Distress] : no respiratory distress [Clear to Auscultation] : lungs were clear to auscultation bilaterally [No Accessory Muscle Use] : no accessory muscle use [Normal Rate] : heart rate was normal  [Regular Rhythm] : with a regular rhythm [Normal S1, S2] : normal S1 and S2 [No Edema] : there was no peripheral edema [Normal Bowel Sounds] : normal bowel sounds [Non Tender] : non-tender [Soft] : abdomen soft [Not Distended] : not distended [No Masses] : no abdominal mass palpated [Normal Post Cervical Nodes] : no posterior cervical lymphadenopathy [No CVA Tenderness] : no ~M costovertebral angle tenderness [No Spinal Tenderness] : no spinal tenderness [Normal Gait] : normal gait [No Joint Swelling] : no joint swelling seen [No Clubbing, Cyanosis] : no clubbing  or cyanosis of the fingernails [No Rash] : no rash [No Skin Lesions] : no skin lesions [Cranial Nerves Intact] : cranial nerves 2-12 were intact [No Motor Deficits] : the motor exam was normal [Oriented x3] : oriented to person, place, and time [Normal Affect] : the affect was normal [Normal Mood] : the mood was normal [Normal Insight/Judgement] : insight and judgment were intact [de-identified] : skin color good, awake, interactive, pleasant [de-identified] : wears glasses [de-identified] : systolic murmur 3/6,  [de-identified] : neuropathy B/L LE

## 2019-02-11 NOTE — COUNSELING
[Normal Weight (BMI <25)] : normal weight - BMI <25 [DASH diet given] : DASH diet given [Non - Smoker] : non-smoker [Medical alert] : medical alert [Use assistive device to avoid falls] : use assistive device to avoid falls [Remove clutter and unsafe carpeting to avoid falls] : remove clutter and unsafe carpeting to avoid falls [Use grab bars] : use grab bars [Smoke/CO Detectors] : smoke/CO detectors [Not Indicated] : not indicated [Improve exercise tolerance] : improve exercise tolerance [Improve mobility] : improve mobility [Improve weight] : improve weight [Decrease stress] : decrease stress [Decrease hospital use] : decrease hospital use [Minimize unnecessary interventions] : minimize unnecessary interventions [Comfort Care] : comfort care [Maintain functional ability] : maintain functional ability [Discussed disease trajectory with patient/caregiver] : discussed disease trajectory with patient/caregiver [Likely to achieve goals/desired outcomes] : likely to achieve goals/desired outcomes [Patient/Caregiver has ___ understanding of disease process] : patient/caregiver has [unfilled] understanding of disease process [Advanced Directives discussed: ____] : Advanced directives discussed: [unfilled] [DNR] : Code Status: DNR [Limited] : Treatment Guidelines: Limited [DNI] : Intubation: DNI [[facility] Date: _____] [facility] Com[facility]/last verification date: [unfilled] [FreeTextEntry5] : Mammogram 5/2017

## 2019-02-11 NOTE — HISTORY OF PRESENT ILLNESS
[Patient] : patient [Family Member] : family member [FreeTextEntry1] : Gait instability [FreeTextEntry2] : 86 yo female with h/o diabetes type 2 x 30 years (uncontrolled) c/b neuropathy and retinopathy, CAD s/p MI w PCI/MARTA stent 2014, systolic CHF (EF ~23% 8/2017), Angina pectoris, HTN, HL, recurrent UTIs, gait instability, lung nodule, recurrent UTIs being seen for follow- up visit.\par \par interval events- none.\par \par Patient has been feeling weak with very high FS as per herself. 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 FS last month were in 400's, now range 140-380. last endo visit before new years and HBa1c was 9.0 with no changes in insulin. also c/o shooting pain in legs b/l associated with cramping at night. no fever, chills, nausea, vomiting, SOB. \par appetite well, eats regular food no swallowing problems\par BM regular- sometimes have diarrhoea alternating with constipation, been like this for years. last BM today\par sleeps well except when wakes up to pee 2-3 times/night\par no skin problems, no memory problems\par mood stable\par no recent falls, last fall 2 years ago, was recommended to use cane or walker due to neuropathy however does not like to use it.\par \par DM type 2 w/ complications- neuropathy/retinopathy/nephropathy- x 30 years, not controlled, patient states her baseline FS is around 200 range. patient does not have regular eating schedule. last hba1c was 9.0. follows ophthalmology and podiatry. cannot feel legs due to neuropathy.\par \par CAD s/p stent, CHF, Angina- EF -23%, recent echo by cardiologist. baseline weight is 134lbs. gets occasional chest pains yang when stressed or when walks outside or with cold air. uses nitro when needed.\par \par recurrent UTIs- last episode last year.\par ----------\par lives with daughter and grand son, independent with ADLs

## 2019-03-27 ENCOUNTER — APPOINTMENT (OUTPATIENT)
Dept: HOME HEALTH SERVICES | Facility: HOME HEALTH | Age: 84
End: 2019-03-27

## 2019-04-05 NOTE — DISCHARGE NOTE ADULT - NS AS DC FU [facility] LV FUNCTION ASSESSMENT
Phone Number Called: [phone]    Message: Called her son Say and pt is scheduled today with jono at 4 pm.     Left Message for patient to call back: N\A         yes

## 2019-04-06 ENCOUNTER — EMERGENCY (EMERGENCY)
Facility: HOSPITAL | Age: 84
LOS: 1 days | Discharge: HOME | End: 2019-04-06
Attending: EMERGENCY MEDICINE
Payer: MEDICARE

## 2019-04-06 VITALS
SYSTOLIC BLOOD PRESSURE: 156 MMHG | WEIGHT: 138.01 LBS | RESPIRATION RATE: 16 BRPM | HEART RATE: 68 BPM | DIASTOLIC BLOOD PRESSURE: 82 MMHG | HEIGHT: 61 IN | OXYGEN SATURATION: 98 % | TEMPERATURE: 98 F

## 2019-04-06 VITALS
HEART RATE: 58 BPM | SYSTOLIC BLOOD PRESSURE: 112 MMHG | RESPIRATION RATE: 18 BRPM | TEMPERATURE: 98 F | DIASTOLIC BLOOD PRESSURE: 63 MMHG | OXYGEN SATURATION: 96 %

## 2019-04-06 DIAGNOSIS — Z98.89 OTHER SPECIFIED POSTPROCEDURAL STATES: Chronic | ICD-10-CM

## 2019-04-06 DIAGNOSIS — Z90.49 ACQUIRED ABSENCE OF OTHER SPECIFIED PARTS OF DIGESTIVE TRACT: Chronic | ICD-10-CM

## 2019-04-06 LAB
ALBUMIN SERPL ELPH-MCNC: 4.2 G/DL — SIGNIFICANT CHANGE UP (ref 3.3–5)
ALP SERPL-CCNC: 121 U/L — HIGH (ref 40–120)
ALT FLD-CCNC: 16 U/L — SIGNIFICANT CHANGE UP (ref 10–45)
ANION GAP SERPL CALC-SCNC: 13 MMOL/L — SIGNIFICANT CHANGE UP (ref 5–17)
AST SERPL-CCNC: 13 U/L — SIGNIFICANT CHANGE UP (ref 10–40)
BASOPHILS # BLD AUTO: 0 K/UL — SIGNIFICANT CHANGE UP (ref 0–0.2)
BASOPHILS NFR BLD AUTO: 0.3 % — SIGNIFICANT CHANGE UP (ref 0–2)
BILIRUB SERPL-MCNC: 0.8 MG/DL — SIGNIFICANT CHANGE UP (ref 0.2–1.2)
BUN SERPL-MCNC: 41 MG/DL — HIGH (ref 7–23)
CALCIUM SERPL-MCNC: 10 MG/DL — SIGNIFICANT CHANGE UP (ref 8.4–10.5)
CHLORIDE SERPL-SCNC: 102 MMOL/L — SIGNIFICANT CHANGE UP (ref 96–108)
CO2 SERPL-SCNC: 23 MMOL/L — SIGNIFICANT CHANGE UP (ref 22–31)
CREAT SERPL-MCNC: 1.27 MG/DL — SIGNIFICANT CHANGE UP (ref 0.5–1.3)
EOSINOPHIL # BLD AUTO: 0.1 K/UL — SIGNIFICANT CHANGE UP (ref 0–0.5)
EOSINOPHIL NFR BLD AUTO: 2.3 % — SIGNIFICANT CHANGE UP (ref 0–6)
GLUCOSE SERPL-MCNC: 381 MG/DL — HIGH (ref 70–99)
HCT VFR BLD CALC: 35.6 % — SIGNIFICANT CHANGE UP (ref 34.5–45)
HGB BLD-MCNC: 11.8 G/DL — SIGNIFICANT CHANGE UP (ref 11.5–15.5)
LYMPHOCYTES # BLD AUTO: 1.3 K/UL — SIGNIFICANT CHANGE UP (ref 1–3.3)
LYMPHOCYTES # BLD AUTO: 21.1 % — SIGNIFICANT CHANGE UP (ref 13–44)
MCHC RBC-ENTMCNC: 30.7 PG — SIGNIFICANT CHANGE UP (ref 27–34)
MCHC RBC-ENTMCNC: 33.1 GM/DL — SIGNIFICANT CHANGE UP (ref 32–36)
MCV RBC AUTO: 93 FL — SIGNIFICANT CHANGE UP (ref 80–100)
MONOCYTES # BLD AUTO: 0.4 K/UL — SIGNIFICANT CHANGE UP (ref 0–0.9)
MONOCYTES NFR BLD AUTO: 7.2 % — SIGNIFICANT CHANGE UP (ref 2–14)
NEUTROPHILS # BLD AUTO: 4.3 K/UL — SIGNIFICANT CHANGE UP (ref 1.8–7.4)
NEUTROPHILS NFR BLD AUTO: 69.1 % — SIGNIFICANT CHANGE UP (ref 43–77)
PLATELET # BLD AUTO: 146 K/UL — LOW (ref 150–400)
POTASSIUM SERPL-MCNC: 5.3 MMOL/L — SIGNIFICANT CHANGE UP (ref 3.5–5.3)
POTASSIUM SERPL-SCNC: 5.3 MMOL/L — SIGNIFICANT CHANGE UP (ref 3.5–5.3)
PROT SERPL-MCNC: 6.9 G/DL — SIGNIFICANT CHANGE UP (ref 6–8.3)
RBC # BLD: 3.83 M/UL — SIGNIFICANT CHANGE UP (ref 3.8–5.2)
RBC # FLD: 12.5 % — SIGNIFICANT CHANGE UP (ref 10.3–14.5)
SODIUM SERPL-SCNC: 138 MMOL/L — SIGNIFICANT CHANGE UP (ref 135–145)
WBC # BLD: 6.2 K/UL — SIGNIFICANT CHANGE UP (ref 3.8–10.5)
WBC # FLD AUTO: 6.2 K/UL — SIGNIFICANT CHANGE UP (ref 3.8–10.5)

## 2019-04-06 PROCEDURE — 99284 EMERGENCY DEPT VISIT MOD MDM: CPT | Mod: 25

## 2019-04-06 PROCEDURE — 80053 COMPREHEN METABOLIC PANEL: CPT

## 2019-04-06 PROCEDURE — 85027 COMPLETE CBC AUTOMATED: CPT

## 2019-04-06 PROCEDURE — 93005 ELECTROCARDIOGRAM TRACING: CPT

## 2019-04-06 PROCEDURE — 82962 GLUCOSE BLOOD TEST: CPT

## 2019-04-06 PROCEDURE — 93010 ELECTROCARDIOGRAM REPORT: CPT

## 2019-04-06 RX ORDER — INSULIN LISPRO 100/ML
10 VIAL (ML) SUBCUTANEOUS ONCE
Qty: 0 | Refills: 0 | Status: COMPLETED | OUTPATIENT
Start: 2019-04-06 | End: 2019-04-06

## 2019-04-06 NOTE — ED PROVIDER NOTE - PHYSICAL EXAMINATION
Gen: AAOx3, non-toxic  Head: NCAT  HEENT: EOMI, PERRLA, oral mucosa moist, normal conjunctiva  Lung: CTAB, no respiratory distress, no wheezes/rhonchi/rales B/L, speaking in full sentences  CV: RRR, no murmurs, rubs or gallops  Abd: soft, NTND, no guarding, no CVA tenderness, no rebound tenderness  MSK: no visible deformities, full range of motion of all 4 exts  Neuro: No focal sensory or motor deficits  Skin: Warm, well perfused, no rash  Psych: normal affect.   ~Nic Ibarra MD (PGY1)

## 2019-04-06 NOTE — ED PROVIDER NOTE - CLINICAL SUMMARY MEDICAL DECISION MAKING FREE TEXT BOX
Nic Ibarra MD PGY1: DM2, MI 2014 with stents, sent in by her PMD after routine check up for elevated K of 6.1.glucose finger stick, vbg, cbc, cmp, ekg, If K+ elevated albuterol, D5 ns with insulin, calcium glutanate

## 2019-04-06 NOTE — ED PROVIDER NOTE - PROGRESS NOTE DETAILS
prob lab error, pt did not take her humalog for breakfast or lunch today, will give 10 (usually 9 tid plus lantus 14 bid)

## 2019-04-06 NOTE — ED ADULT NURSE NOTE - OBJECTIVE STATEMENT
87 y/o Female presenting to the ED ambulatory, A&Ox3, complaining of a high potassium result. Pt reports going to her cardiologist yesterday for a check up, today he called her and told her to report to the emergency department due to a potassium of 6.1. Pt denies chest pain, shortness of breath, numbness, tingling, weakness, fever, chills, N/V/D. Pt reports no pain at this time. 85 y/o Female presenting to the ED ambulatory, A&Ox3, complaining of a high potassium result. Pt reports going to her cardiologist yesterday for a check up, today he called her and told her to report to the emergency department due to a potassium of 6.1. Pt denies chest pain, palpitations, shortness of breath, abdominal pain, back pain, numbness, tingling, weakness, fever, chills, nausea, vomiting, cramping, calf pain. Pt reports no pain at this time. Pt reports diarrhea for the past few weeks. Lung sounds clear. Heart sounds WNL. Pt placed on cardiac monitor showing NSR to the 70's. EKG preformed and given to MD. Abdomen soft, nontender, non-distended. Pt aware of plan of care. Safety and comfort measures provided. Awaiting blood results. Pt hx of diabetes, blood glucose testing preformed and documented in [facility]rise. Family at bedside.

## 2019-04-06 NOTE — ED PROVIDER NOTE - OBJECTIVE STATEMENT
Nic Ibarra MD PGY1: DM2, MI 2014 with stents, sent in by her PMD after routine check up for elevated K of 6.1. Dr. Powers is cardiologist schedule for echo may 11.  Reports chronic episodical watery diarrhea x 6 months since being on lasix.  Denies fever, chill, cp, sob, palpitation, abd pain, no blood in stool or urine, dysuria

## 2019-04-06 NOTE — ED PROVIDER NOTE - CARE PLAN
Principal Discharge DX:	Abnormal laboratory test result  Goal:	due to lab error  Assessment and plan of treatment:	HYPERGLYCEMIA

## 2019-04-08 ENCOUNTER — APPOINTMENT (OUTPATIENT)
Age: 84
End: 2019-04-08

## 2019-04-08 VITALS
OXYGEN SATURATION: 98 % | RESPIRATION RATE: 16 BRPM | SYSTOLIC BLOOD PRESSURE: 120 MMHG | TEMPERATURE: 98 F | HEART RATE: 64 BPM | DIASTOLIC BLOOD PRESSURE: 70 MMHG

## 2019-04-08 RX ORDER — BENZONATATE 100 MG/1
100 CAPSULE ORAL EVERY 8 HOURS
Qty: 90 | Refills: 0 | Status: DISCONTINUED | COMMUNITY
Start: 2018-11-05 | End: 2019-04-08

## 2019-04-08 RX ORDER — GUAIFENESIN 200 MG/10ML
100 SOLUTION ORAL
Qty: 1 | Refills: 3 | Status: DISCONTINUED | COMMUNITY
Start: 2018-12-18 | End: 2019-04-08

## 2019-04-12 ENCOUNTER — RX RENEWAL (OUTPATIENT)
Age: 84
End: 2019-04-12

## 2019-04-30 ENCOUNTER — APPOINTMENT (OUTPATIENT)
Dept: HOME HEALTH SERVICES | Facility: HOME HEALTH | Age: 84
End: 2019-04-30
Payer: MEDICARE

## 2019-04-30 VITALS
HEART RATE: 56 BPM | TEMPERATURE: 97.2 F | DIASTOLIC BLOOD PRESSURE: 70 MMHG | OXYGEN SATURATION: 97 % | RESPIRATION RATE: 16 BRPM | SYSTOLIC BLOOD PRESSURE: 115 MMHG

## 2019-04-30 PROCEDURE — 99350 HOME/RES VST EST HIGH MDM 60: CPT

## 2019-04-30 RX ORDER — ASPIRIN ENTERIC COATED TABLETS 81 MG 81 MG/1
81 TABLET, DELAYED RELEASE ORAL DAILY
Qty: 90 | Refills: 0 | Status: COMPLETED | COMMUNITY
Start: 2017-06-16 | End: 2019-04-30

## 2019-04-30 NOTE — HISTORY OF PRESENT ILLNESS
[Patient] : patient [Family Member] : family member [FreeTextEntry1] : Gait instability [FreeTextEntry2] : 87 yo female with h/o diabetes type 2 x 30 years (uncontrolled) c/b neuropathy and retinopathy, CAD s/p MI w PCI/MARTA stent 2014, systolic CHF (EF ~23% 8/2017), Angina pectoris, HTN, HL, recurrent UTIs, gait instability, lung nodule, recurrent UTIs being seen for follow- up visit.\par \par interval events- went to see cardiologist, who reduced lasix pill to 20 mg daily\par \par Patient has been doing well with no new complaints. she has chronic persistent weakness and neuropathic leg pain yang ai night, both are at baseline with no new changes.\par no fever, chills, nausea, vomiting, SOB. \par appetite well, eats regular food no swallowing problems\par BM regular- sometimes have diarrhoea alternating with constipation, been like this for years. last BM today\par continent with bowels/bladder\par sleeps well except when wakes up to pee 2-3 times/night\par no skin problems, no memory problems\par mood stable\par no recent falls, last fall 2 years ago, was recommended to use cane or walker due to neuropathy however does not like to use it.\par \par DM type 2 w/ complications- neuropathy/retinopathy/nephropathy- x 30 years, not controlled, patient states her baseline FS is around 63- 200 range. patient does not have regular eating schedule. last hba1c was 11.0 when lantus was made twice daily by endocrinologist. follows ophthalmology and podiatry. cannot feel legs due to neuropathy. due with endo appt next month.\par \par CAD s/p stent, CHF, Angina- EF -23%, recent echo by cardiologist. baseline weight is 137lbs. gets occasional chest pains yang when stressed or when walks outside or with cold air. uses nitro when needed.\par \par recurrent UTIs- last episode last year.\par ----------\par lives with daughter and grand son, independent with ADLs

## 2019-04-30 NOTE — COUNSELING
[Normal Weight (BMI <25)] : normal weight - BMI <25 [DASH diet given] : DASH diet given [Non - Smoker] : non-smoker [Medical alert] : medical alert [Use assistive device to avoid falls] : use assistive device to avoid falls [Remove clutter and unsafe carpeting to avoid falls] : remove clutter and unsafe carpeting to avoid falls [Use grab bars] : use grab bars [Smoke/CO Detectors] : smoke/CO detectors [Not Indicated] : not indicated [Improve exercise tolerance] : improve exercise tolerance [Improve mobility] : improve mobility [Improve weight] : improve weight [Decrease stress] : decrease stress [Decrease hospital use] : decrease hospital use [Minimize unnecessary interventions] : minimize unnecessary interventions [Comfort Care] : comfort care [Maintain functional ability] : maintain functional ability [Discussed disease trajectory with patient/caregiver] : discussed disease trajectory with patient/caregiver [Likely to achieve goals/desired outcomes] : likely to achieve goals/desired outcomes [Patient/Caregiver has ___ understanding of disease process] : patient/caregiver has [unfilled] understanding of disease process [Advanced Directives discussed: ____] : Advanced directives discussed: [unfilled] [DNR] : Code Status: DNR [Limited] : Treatment Guidelines: Limited [DNI] : Intubation: DNI [Last Ver[facility] Date: _____] : [facility]/last verification date: [unfilled] [FreeTextEntry5] : Mammogram 5/2017

## 2019-04-30 NOTE — PHYSICAL EXAM
[No Acute Distress] : no acute distress [Well Nourished] : well nourished [Well Developed] : well developed [PERRL] : pupils equal, round and reactive to light [EOMI] : extra ocular movement intact [No JVD] : no jugular venous distention [Supple] : the neck was supple [No LAD] : no lymphadenopathy [No Respiratory Distress] : no respiratory distress [Clear to Auscultation] : lungs were clear to auscultation bilaterally [No Accessory Muscle Use] : no accessory muscle use [Normal Rate] : heart rate was normal  [Regular Rhythm] : with a regular rhythm [Normal S1, S2] : normal S1 and S2 [No Edema] : there was no peripheral edema [Normal Bowel Sounds] : normal bowel sounds [Non Tender] : non-tender [Soft] : abdomen soft [Not Distended] : not distended [No Masses] : no abdominal mass palpated [Normal Post Cervical Nodes] : no posterior cervical lymphadenopathy [No CVA Tenderness] : no ~M costovertebral angle tenderness [No Spinal Tenderness] : no spinal tenderness [Normal Gait] : normal gait [No Joint Swelling] : no joint swelling seen [No Clubbing, Cyanosis] : no clubbing  or cyanosis of the fingernails [No Rash] : no rash [No Skin Lesions] : no skin lesions [Cranial Nerves Intact] : cranial nerves 2-12 were intact [No Motor Deficits] : the motor exam was normal [Oriented x3] : oriented to person, place, and time [Normal Affect] : the affect was normal [Normal Mood] : the mood was normal [Normal Insight/Judgement] : insight and judgment were intact [de-identified] : skin color good, awake, interactive, pleasant [de-identified] : wears glasses [de-identified] : systolic murmur 3/6,  [de-identified] : neuropathy B/L LE

## 2019-06-17 ENCOUNTER — APPOINTMENT (OUTPATIENT)
Dept: HOME HEALTH SERVICES | Facility: HOME HEALTH | Age: 84
End: 2019-06-17

## 2019-07-24 ENCOUNTER — APPOINTMENT (OUTPATIENT)
Dept: HOME HEALTH SERVICES | Facility: HOME HEALTH | Age: 84
End: 2019-07-24
Payer: MEDICARE

## 2019-07-24 VITALS
SYSTOLIC BLOOD PRESSURE: 102 MMHG | DIASTOLIC BLOOD PRESSURE: 60 MMHG | HEART RATE: 57 BPM | RESPIRATION RATE: 17 BRPM | OXYGEN SATURATION: 97 % | TEMPERATURE: 97.6 F

## 2019-07-24 PROCEDURE — 99350 HOME/RES VST EST HIGH MDM 60: CPT

## 2019-07-24 NOTE — HISTORY OF PRESENT ILLNESS
[Patient] : patient [FreeTextEntry2] : 85 yo female with h/o diabetes type 2 x 30 years (uncontrolled) c/b neuropathy and retinopathy, CAD s/p MI w PCI/MARTA stent 2014, systolic CHF (EF ~23% 8/2017), Angina pectoris, HTN, HL, recurrent UTIs, gait instability, lung nodule, recurrent UTIs being seen for follow- up visit.\par \par interval events- went to see cardiologist, and endocrinologist with insulin dose adjustments due to hypoglycemia.\par \par Patient has been feeling weak as her blood sugars has been running low, was 126 this morning when she took her usual dose of lantus and humulog, then she felt dizzy an hr ago, sugar was 90, just now its 70 despite drinking orange juice. it has been happening to her more often, with sugar as low as 40 last week, when endocrinologist adjusted her insulin. her last hba1c was 10.0. no other complaints.\par no fever, chills, nausea, vomiting, SOB or urinary problems\par appetite well, eats regular food no swallowing problems\par BM regular- sometimes have diarrhoea alternating with constipation, been like this for years. last BM today\par continent with bowels/bladder\par sleeps well except when wakes up to pee 2-3 times/night\par no skin problems, no memory problems\par mood stable\par no recent falls, last fall 2 years ago, was recommended to use cane or walker due to neuropathy however does not like to use it.\par \par DM type 2 w/ complications- neuropathy/retinopathy/nephropathy- x 30 years, not controlled, patient states her baseline FS is around 63- 200 range. patient does not have regular eating schedule. last hba1c was 10.0, lantus was increased that led to frequent episodes of hypoglycemia. follows ophthalmology and podiatry. cannot feel legs due to neuropathy. follows endocrinology.\par \par CAD s/p stent, CHF, Angina- EF -23%, recent echo by cardiologist. baseline weight is 135-137lbs. gets occasional chest pains yang when stressed or when walks outside or with cold air. uses nitro when needed.\par \par recurrent UTIs- last episode last year.\par ----------\par lives with daughter and grand son, independent with ADLs [FreeTextEntry1] : Gait instability

## 2019-07-24 NOTE — PHYSICAL EXAM
[No Acute Distress] : no acute distress [Well Nourished] : well nourished [Well Developed] : well developed [PERRL] : pupils equal, round and reactive to light [EOMI] : extra ocular movement intact [No JVD] : no jugular venous distention [Supple] : the neck was supple [No LAD] : no lymphadenopathy [No Respiratory Distress] : no respiratory distress [Clear to Auscultation] : lungs were clear to auscultation bilaterally [No Accessory Muscle Use] : no accessory muscle use [Normal Rate] : heart rate was normal  [Regular Rhythm] : with a regular rhythm [Normal S1, S2] : normal S1 and S2 [No Edema] : there was no peripheral edema [Normal Bowel Sounds] : normal bowel sounds [Soft] : abdomen soft [Non Tender] : non-tender [Not Distended] : not distended [No Masses] : no abdominal mass palpated [Normal Post Cervical Nodes] : no posterior cervical lymphadenopathy [No Spinal Tenderness] : no spinal tenderness [No CVA Tenderness] : no ~M costovertebral angle tenderness [Normal Gait] : normal gait [No Joint Swelling] : no joint swelling seen [No Clubbing, Cyanosis] : no clubbing  or cyanosis of the fingernails [No Rash] : no rash [No Skin Lesions] : no skin lesions [Cranial Nerves Intact] : cranial nerves 2-12 were intact [No Motor Deficits] : the motor exam was normal [Oriented x3] : oriented to person, place, and time [Normal Affect] : the affect was normal [Normal Mood] : the mood was normal [Normal Insight/Judgement] : insight and judgment were intact [de-identified] : skin color good, awake, interactive, pleasant [de-identified] : systolic murmur 3/6,  [de-identified] : wears glasses [de-identified] : neuropathy B/L LE

## 2019-07-24 NOTE — COUNSELING
[Improve mobility] : improve mobility [Improve exercise tolerance] : improve exercise tolerance [Improve weight] : improve weight [Decrease stress] : decrease stress [Decrease hospital use] : decrease hospital use [Minimize unnecessary interventions] : minimize unnecessary interventions [Comfort Care] : comfort care [Maintain functional ability] : maintain functional ability [Discussed disease trajectory with patient/caregiver] : discussed disease trajectory with patient/caregiver [Likely to achieve goals/desired outcomes] : likely to achieve goals/desired outcomes [Patient/Caregiver has ___ understanding of disease process] : patient/caregiver has [unfilled] understanding of disease process [Advanced Directives discussed: ____] : Advanced directives discussed: [unfilled] [DNR] : Code Status: DNR [Limited] : Treatment Guidelines: Limited [Last Verification Date: _____] : [facility] Completion/last verification date: [unfilled] [DNI] : Intubation: DNI [Normal Weight (BMI <25)] : normal weight - BMI <25 [DASH diet given] : DASH diet given [Non - Smoker] : non-smoker [Use assistive device to avoid falls] : use assistive device to avoid falls [Medical alert] : medical alert [Remove clutter and unsafe carpeting to avoid falls] : remove clutter and unsafe carpeting to avoid falls [Use grab bars] : use grab bars [Smoke/CO Detectors] : smoke/CO detectors [Not Indicated] : not indicated [FreeTextEntry5] : Mammogram 5/2017

## 2019-08-05 ENCOUNTER — APPOINTMENT (OUTPATIENT)
Dept: HOME HEALTH SERVICES | Facility: HOME HEALTH | Age: 84
End: 2019-08-05

## 2019-09-04 ENCOUNTER — INPATIENT (INPATIENT)
Facility: HOSPITAL | Age: 84
LOS: 29 days | Discharge: HOME | DRG: 853 | End: 2019-10-04
Attending: INTERNAL MEDICINE | Admitting: INTERNAL MEDICINE
Payer: MEDICARE

## 2019-09-04 ENCOUNTER — TRANSCRIPTION ENCOUNTER (OUTPATIENT)
Age: 84
End: 2019-09-04

## 2019-09-04 VITALS
HEART RATE: 75 BPM | HEIGHT: 61 IN | WEIGHT: 125 LBS | OXYGEN SATURATION: 94 % | RESPIRATION RATE: 94 BRPM | SYSTOLIC BLOOD PRESSURE: 128 MMHG | DIASTOLIC BLOOD PRESSURE: 77 MMHG | TEMPERATURE: 102 F

## 2019-09-04 DIAGNOSIS — I10 ESSENTIAL (PRIMARY) HYPERTENSION: ICD-10-CM

## 2019-09-04 DIAGNOSIS — R50.9 FEVER, UNSPECIFIED: ICD-10-CM

## 2019-09-04 DIAGNOSIS — E78.5 HYPERLIPIDEMIA, UNSPECIFIED: ICD-10-CM

## 2019-09-04 DIAGNOSIS — N39.0 URINARY TRACT INFECTION, SITE NOT SPECIFIED: ICD-10-CM

## 2019-09-04 DIAGNOSIS — L02.91 CUTANEOUS ABSCESS, UNSPECIFIED: ICD-10-CM

## 2019-09-04 DIAGNOSIS — I50.9 HEART FAILURE, UNSPECIFIED: ICD-10-CM

## 2019-09-04 DIAGNOSIS — N17.9 ACUTE KIDNEY FAILURE, UNSPECIFIED: ICD-10-CM

## 2019-09-04 DIAGNOSIS — I21.3 ST ELEVATION (STEMI) MYOCARDIAL INFARCTION OF UNSPECIFIED SITE: ICD-10-CM

## 2019-09-04 DIAGNOSIS — Z90.49 ACQUIRED ABSENCE OF OTHER SPECIFIED PARTS OF DIGESTIVE TRACT: Chronic | ICD-10-CM

## 2019-09-04 DIAGNOSIS — Z98.89 OTHER SPECIFIED POSTPROCEDURAL STATES: Chronic | ICD-10-CM

## 2019-09-04 DIAGNOSIS — Z29.9 ENCOUNTER FOR PROPHYLACTIC MEASURES, UNSPECIFIED: ICD-10-CM

## 2019-09-04 DIAGNOSIS — E11.9 TYPE 2 DIABETES MELLITUS WITHOUT COMPLICATIONS: ICD-10-CM

## 2019-09-04 LAB
ALBUMIN SERPL ELPH-MCNC: 3.6 G/DL — SIGNIFICANT CHANGE UP (ref 3.3–5)
ALP SERPL-CCNC: 112 U/L — SIGNIFICANT CHANGE UP (ref 40–120)
ALT FLD-CCNC: 13 U/L — SIGNIFICANT CHANGE UP (ref 10–45)
ANION GAP SERPL CALC-SCNC: 12 MMOL/L — SIGNIFICANT CHANGE UP (ref 5–17)
APPEARANCE UR: ABNORMAL
APTT BLD: 28.4 SEC — SIGNIFICANT CHANGE UP (ref 27.5–36.3)
AST SERPL-CCNC: 12 U/L — SIGNIFICANT CHANGE UP (ref 10–40)
BACTERIA # UR AUTO: ABNORMAL
BASOPHILS # BLD AUTO: 0 K/UL — SIGNIFICANT CHANGE UP (ref 0–0.2)
BASOPHILS NFR BLD AUTO: 0.2 % — SIGNIFICANT CHANGE UP (ref 0–2)
BILIRUB SERPL-MCNC: 1.4 MG/DL — HIGH (ref 0.2–1.2)
BILIRUB UR-MCNC: NEGATIVE — SIGNIFICANT CHANGE UP
BUN SERPL-MCNC: 34 MG/DL — HIGH (ref 7–23)
CALCIUM SERPL-MCNC: 9.5 MG/DL — SIGNIFICANT CHANGE UP (ref 8.4–10.5)
CHLORIDE SERPL-SCNC: 95 MMOL/L — LOW (ref 96–108)
CO2 SERPL-SCNC: 23 MMOL/L — SIGNIFICANT CHANGE UP (ref 22–31)
COLOR SPEC: YELLOW — SIGNIFICANT CHANGE UP
CREAT SERPL-MCNC: 1.39 MG/DL — HIGH (ref 0.5–1.3)
DIFF PNL FLD: ABNORMAL
EOSINOPHIL # BLD AUTO: 0 K/UL — SIGNIFICANT CHANGE UP (ref 0–0.5)
EOSINOPHIL NFR BLD AUTO: 0.2 % — SIGNIFICANT CHANGE UP (ref 0–6)
EPI CELLS # UR: 8 /HPF — HIGH
GAS PNL BLDV: SIGNIFICANT CHANGE UP
GLUCOSE SERPL-MCNC: 338 MG/DL — HIGH (ref 70–99)
GLUCOSE UR QL: NEGATIVE — SIGNIFICANT CHANGE UP
HCT VFR BLD CALC: 30.6 % — LOW (ref 34.5–45)
HGB BLD-MCNC: 10.3 G/DL — LOW (ref 11.5–15.5)
HYALINE CASTS # UR AUTO: 7 /LPF — HIGH (ref 0–2)
INR BLD: 1.29 RATIO — HIGH (ref 0.88–1.16)
KETONES UR-MCNC: NEGATIVE — SIGNIFICANT CHANGE UP
LEUKOCYTE ESTERASE UR-ACNC: ABNORMAL
LYMPHOCYTES # BLD AUTO: 0.8 K/UL — LOW (ref 1–3.3)
LYMPHOCYTES # BLD AUTO: 5.5 % — LOW (ref 13–44)
MCHC RBC-ENTMCNC: 31.6 PG — SIGNIFICANT CHANGE UP (ref 27–34)
MCHC RBC-ENTMCNC: 33.9 GM/DL — SIGNIFICANT CHANGE UP (ref 32–36)
MCV RBC AUTO: 93.4 FL — SIGNIFICANT CHANGE UP (ref 80–100)
MONOCYTES # BLD AUTO: 1.2 K/UL — HIGH (ref 0–0.9)
MONOCYTES NFR BLD AUTO: 8.1 % — SIGNIFICANT CHANGE UP (ref 2–14)
NEUTROPHILS # BLD AUTO: 12.7 K/UL — HIGH (ref 1.8–7.4)
NEUTROPHILS NFR BLD AUTO: 86.1 % — HIGH (ref 43–77)
NITRITE UR-MCNC: NEGATIVE — SIGNIFICANT CHANGE UP
PH UR: 5.5 — SIGNIFICANT CHANGE UP (ref 5–8)
PLATELET # BLD AUTO: 128 K/UL — LOW (ref 150–400)
POTASSIUM SERPL-MCNC: 4.1 MMOL/L — SIGNIFICANT CHANGE UP (ref 3.5–5.3)
POTASSIUM SERPL-SCNC: 4.1 MMOL/L — SIGNIFICANT CHANGE UP (ref 3.5–5.3)
PROT SERPL-MCNC: 6.5 G/DL — SIGNIFICANT CHANGE UP (ref 6–8.3)
PROT UR-MCNC: ABNORMAL
PROTHROM AB SERPL-ACNC: 14.9 SEC — HIGH (ref 10–12.9)
RAPID RVP RESULT: SIGNIFICANT CHANGE UP
RBC # BLD: 3.27 M/UL — LOW (ref 3.8–5.2)
RBC # FLD: 11.4 % — SIGNIFICANT CHANGE UP (ref 10.3–14.5)
RBC CASTS # UR COMP ASSIST: 8 /HPF — HIGH (ref 0–4)
SODIUM SERPL-SCNC: 130 MMOL/L — LOW (ref 135–145)
SP GR SPEC: 1.01 — SIGNIFICANT CHANGE UP (ref 1.01–1.02)
UROBILINOGEN FLD QL: NEGATIVE — SIGNIFICANT CHANGE UP
WBC # BLD: 14.8 K/UL — HIGH (ref 3.8–10.5)
WBC # FLD AUTO: 14.8 K/UL — HIGH (ref 3.8–10.5)
WBC UR QL: 614 /HPF — HIGH (ref 0–5)

## 2019-09-04 PROCEDURE — 93010 ELECTROCARDIOGRAM REPORT: CPT

## 2019-09-04 PROCEDURE — 71045 X-RAY EXAM CHEST 1 VIEW: CPT | Mod: 26

## 2019-09-04 PROCEDURE — 99285 EMERGENCY DEPT VISIT HI MDM: CPT

## 2019-09-04 RX ORDER — DEXTROSE 50 % IN WATER 50 %
25 SYRINGE (ML) INTRAVENOUS ONCE
Refills: 0 | Status: DISCONTINUED | OUTPATIENT
Start: 2019-09-04 | End: 2019-09-15

## 2019-09-04 RX ORDER — DEXTROSE 50 % IN WATER 50 %
12.5 SYRINGE (ML) INTRAVENOUS ONCE
Refills: 0 | Status: DISCONTINUED | OUTPATIENT
Start: 2019-09-04 | End: 2019-09-15

## 2019-09-04 RX ORDER — ASPIRIN/CALCIUM CARB/MAGNESIUM 324 MG
81 TABLET ORAL DAILY
Refills: 0 | Status: DISCONTINUED | OUTPATIENT
Start: 2019-09-04 | End: 2019-09-15

## 2019-09-04 RX ORDER — SODIUM CHLORIDE 9 MG/ML
1000 INJECTION, SOLUTION INTRAVENOUS
Refills: 0 | Status: DISCONTINUED | OUTPATIENT
Start: 2019-09-04 | End: 2019-09-15

## 2019-09-04 RX ORDER — ATORVASTATIN CALCIUM 80 MG/1
80 TABLET, FILM COATED ORAL AT BEDTIME
Refills: 0 | Status: DISCONTINUED | OUTPATIENT
Start: 2019-09-04 | End: 2019-09-15

## 2019-09-04 RX ORDER — METOPROLOL TARTRATE 50 MG
25 TABLET ORAL
Refills: 0 | Status: DISCONTINUED | OUTPATIENT
Start: 2019-09-04 | End: 2019-09-12

## 2019-09-04 RX ORDER — HEPARIN SODIUM 5000 [USP'U]/ML
5000 INJECTION INTRAVENOUS; SUBCUTANEOUS EVERY 8 HOURS
Refills: 0 | Status: DISCONTINUED | OUTPATIENT
Start: 2019-09-04 | End: 2019-09-15

## 2019-09-04 RX ORDER — CIPROFLOXACIN LACTATE 400MG/40ML
400 VIAL (ML) INTRAVENOUS ONCE
Refills: 0 | Status: COMPLETED | OUTPATIENT
Start: 2019-09-04 | End: 2019-09-04

## 2019-09-04 RX ORDER — INSULIN LISPRO 100/ML
VIAL (ML) SUBCUTANEOUS
Refills: 0 | Status: DISCONTINUED | OUTPATIENT
Start: 2019-09-04 | End: 2019-09-15

## 2019-09-04 RX ORDER — CIPROFLOXACIN LACTATE 400MG/40ML
400 VIAL (ML) INTRAVENOUS EVERY 24 HOURS
Refills: 0 | Status: DISCONTINUED | OUTPATIENT
Start: 2019-09-04 | End: 2019-09-07

## 2019-09-04 RX ORDER — PANTOPRAZOLE SODIUM 20 MG/1
40 TABLET, DELAYED RELEASE ORAL
Refills: 0 | Status: DISCONTINUED | OUTPATIENT
Start: 2019-09-04 | End: 2019-09-15

## 2019-09-04 RX ORDER — SODIUM CHLORIDE 9 MG/ML
500 INJECTION INTRAMUSCULAR; INTRAVENOUS; SUBCUTANEOUS ONCE
Refills: 0 | Status: COMPLETED | OUTPATIENT
Start: 2019-09-04 | End: 2019-09-04

## 2019-09-04 RX ORDER — INSULIN GLARGINE 100 [IU]/ML
10 INJECTION, SOLUTION SUBCUTANEOUS
Refills: 0 | Status: DISCONTINUED | OUTPATIENT
Start: 2019-09-04 | End: 2019-09-05

## 2019-09-04 RX ORDER — CLOPIDOGREL BISULFATE 75 MG/1
75 TABLET, FILM COATED ORAL DAILY
Refills: 0 | Status: DISCONTINUED | OUTPATIENT
Start: 2019-09-05 | End: 2019-09-15

## 2019-09-04 RX ORDER — SPIRONOLACTONE 25 MG/1
25 TABLET, FILM COATED ORAL DAILY
Refills: 0 | Status: DISCONTINUED | OUTPATIENT
Start: 2019-09-04 | End: 2019-09-06

## 2019-09-04 RX ORDER — GLUCAGON INJECTION, SOLUTION 0.5 MG/.1ML
1 INJECTION, SOLUTION SUBCUTANEOUS ONCE
Refills: 0 | Status: DISCONTINUED | OUTPATIENT
Start: 2019-09-04 | End: 2019-09-15

## 2019-09-04 RX ORDER — ACETAMINOPHEN 500 MG
650 TABLET ORAL ONCE
Refills: 0 | Status: COMPLETED | OUTPATIENT
Start: 2019-09-04 | End: 2019-09-04

## 2019-09-04 RX ORDER — DEXTROSE 50 % IN WATER 50 %
15 SYRINGE (ML) INTRAVENOUS ONCE
Refills: 0 | Status: DISCONTINUED | OUTPATIENT
Start: 2019-09-04 | End: 2019-09-15

## 2019-09-04 RX ADMIN — Medication 200 MILLIGRAM(S): at 21:50

## 2019-09-04 RX ADMIN — Medication 650 MILLIGRAM(S): at 18:32

## 2019-09-04 RX ADMIN — SODIUM CHLORIDE 500 MILLILITER(S): 9 INJECTION INTRAMUSCULAR; INTRAVENOUS; SUBCUTANEOUS at 21:51

## 2019-09-04 RX ADMIN — SODIUM CHLORIDE 500 MILLILITER(S): 9 INJECTION INTRAMUSCULAR; INTRAVENOUS; SUBCUTANEOUS at 18:33

## 2019-09-04 NOTE — H&P ADULT - NSHPREVIEWOFSYSTEMS_GEN_ALL_CORE
REVIEW OF SYSTEMS:    CONSTITUTIONAL: + weakness, fatigued, chills   EYES/ENT: No visual changes;  No vertigo or throat pain   NECK: No pain or stiffness  RESPIRATORY: chronic cough   CARDIOVASCULAR: No chest pain or palpitations  GASTROINTESTINAL: No abdominal or epigastric pain. No nausea, vomiting, or hematemesis; No diarrhea or constipation. No melena or hematochezia.  GENITOURINARY: No dysuria, frequency or hematuria  NEUROLOGICAL: No numbness or weakness  SKIN: No itching, burning, rashes, or lesions   All other review of systems is negative unless indicated above.

## 2019-09-04 NOTE — ED ADULT NURSE REASSESSMENT NOTE - NS ED NURSE REASSESS COMMENT FT1
Received report from Claudine LEMON. Pt. A&Ox3, sitting up in stretcher, skin pink, warm and dry. Pt. does not appear to be in any acute distress at this time - nonlabored breathing noted and pt. is speaking in full coherent sentences. Pt. BP 90s/60s - MD made aware, fluids infusing at this time as per MD order. Pt. does not have any complaints at this time. Denies pain, dizziness, HA, n/v/d, urinary symptoms, and CP at this time. Safety and comfort provided. Family at bedside.

## 2019-09-04 NOTE — H&P ADULT - PROBLEM SELECTOR PLAN 3
S/p IV hydration  hold lasix and ACEi for now, can be restarted after hydration and imroivement of BUN/Cr  cont spironolactone  avoid nephrotoxic medications

## 2019-09-04 NOTE — ED PROVIDER NOTE - ATTENDING CONTRIBUTION TO CARE
87y/o F with h/o CHF, DM, HLD, HTN, CAD prior MI, c/o fatigue, "feeling off" and unable to get out of bed; Tmax 104F toay; part of [facility] program [facility].  Rash/redness on buttocks noted by home health aid.  +chronic cough, nonproductive.  No abdominal pain, no n/v/d. No chest pain.  States she has been feeling hot and cold over past few days, and took her temperature today, found fever of 104, and came to ED for evaluation.  No dysuria, no diarrhea. +constipation (chronic).  Increased diffuse leg swelling for past few days.    On Physical Exam:  General: elderly, in NAD  HEENT: PERRL, MMM  Neck: no neck tenderness, no nuchal rigidity  Cardiac: normal s1, s2; RRR; no MGR  Lungs: CTABL  Abdomen: soft nontender/nondistended  Back: no CVA tenderness  : no bladder tenderness or distension  Skin: intact, no rash  Extremities: no peripheral edema, no gross deformities    AP 87y/o F with multiple medical comorbidities presenting with generalized weakness/fatigue and found to be febrile, but no clinically appearing septic; lungs CTABL; no nuchal rigidity or significant clinical encephalopathy on exam; no rash; no abdominal tenderness; favor likely viral infection vs UTI; will check labs, send blood/urine cultures and obtain CXR.  Given age, comorbidities, likely to require admission.    ED Course:  patient well appearing, but febrile, initially; WBC 15; possible sepsis considered initially however with the h/o CHF, gentle hydration initiated (no rapid fluid bolus) as is not hypotensive or tachycardic and concerned for possible pulm edema if too rapid hydration initiated.  Given well appearing at this point, overall presentation is not c/w with sepsis.  Cipro given for UTI, and patient to be admitted to medicine service for further management; case d/w Dr Kern.

## 2019-09-04 NOTE — ED PROVIDER NOTE - SKIN WOUND TYPE
+small 1cm x 1 cm area of induration/erythema noted to cleft of R buttock with minimal overlying ttp. No significant surrounding erythema/extension. No fluctuance, no discharge. No involvement with perirectal/rectal area.

## 2019-09-04 NOTE — H&P ADULT - NSHPPHYSICALEXAM_GEN_ALL_CORE
Vital Signs Last 24 Hrs  T(C): 37.2 (04 Sep 2019 20:45), Max: 39 (04 Sep 2019 17:49)  T(F): 98.9 (04 Sep 2019 20:45), Max: 102.2 (04 Sep 2019 17:49)  HR: 62 (04 Sep 2019 21:55) (62 - 75)  BP: 101/50 (04 Sep 2019 21:55) (95/60 - 144/66)  BP(mean): --  RR: 13 (04 Sep 2019 21:55) (13 - 94)  SpO2: 97% (04 Sep 2019 21:55) (94% - 97%)    Appearance: Normal	  HEENT:   Normal oral mucosa, PERRL, EOMI	  Lymphatic: No lymphadenopathy , no edema  Cardiovascular: Normal S1 S2, No JVD, No murmurs , Peripheral pulses palpable 2+ bilaterally  Respiratory: Lungs clear to auscultation, normal effort 	  Gastrointestinal:  Soft, Non-tender, + BS	  Skin: R inner buttock swelling, abscess, tenderness on palpation  Musculoskeletal: Normal range of motion, normal strength  Psychiatry:  Mood & affect appropriate  Ext: No edema

## 2019-09-04 NOTE — ED PROVIDER NOTE - OBJECTIVE STATEMENT
Attending note (Lisandro): 87y/o F with h/o CHF, DM, HLD, HTN, CAD prior MI, c/o fatigue, "feeling off" and unable to get out of bed; Tmax 104F toay; part of [facility] program through [facility].  Rash/redness on buttocks noted by home health aid.  +chronic cough, nonproductive.  No abdominal pain, no n/v/d. No chest pain.  States she has been feeling hot and cold over past few days, and took her temperature today, found fever of 104, and came to ED for evaluation.  No dysuria, no diarrhea. +constipation (chronic).  Increased diffuse leg swelling for past few days.

## 2019-09-04 NOTE — H&P ADULT - NSICDXPASTSURGICALHX_GEN_ALL_CORE_FT
PAST SURGICAL HISTORY:  S/P appendectomy     S/P cardiac cath     S/P cholecystectomy     S/P lumpectomy, left breast premalignant    S/P tonsillectomy

## 2019-09-04 NOTE — ED PROVIDER NOTE - PROGRESS NOTE DETAILS
Attending note (Lisandro): patient well appearing, but febrile, initially; WBC 15; possible sepsis however h/o CHF, gentle hydration initiated (no rapid fluid bolus) as is not hypotensive or tachycardic and concerned for possible pulm edema if too rapid hydration initiated.  Cipro given for UTI, and patientto be admited to medicine service for further management; case d/w Dr Kern. Attending note (Lisandro): patient remains well appearing, not consistent clinically with sepsis.

## 2019-09-04 NOTE — H&P ADULT - ASSESSMENT
Pt is a 87 y/o Female with PMHx of CHF, DM, HLD, HTN, CAD prior MI, c/o fatigue, "feeling off" and unable to get out of bed; Tmax 104F . found t have UTI

## 2019-09-04 NOTE — H&P ADULT - PROBLEM SELECTOR PLAN 1
T max 104  Pan Cx  Tylenol for fever  S/P IV fluid in the ER   UA positive, pending Cx   CXR noted  check procalcitonin  check RVP

## 2019-09-04 NOTE — ED CLERICAL - NS ED CLERK NOTE PRE-ARRIVAL INFORMATION; ADDITIONAL PRE-ARRIVAL INFORMATION

## 2019-09-04 NOTE — H&P ADULT - NSHPLABSRESULTS_GEN_ALL_CORE
10.3   14.8  )-----------( 128      ( 04 Sep 2019 18:43 )             30.6                   130<L>  |  95<L>  |  34<H>  ----------------------------<  338<H>  4.1   |  23  |  1.39<H>    Ca    9.5      04 Sep 2019 18:43    TPro  6.5  /  Alb  3.6  /  TBili  1.4<H>  /  DBili  x   /  AST  12  /  ALT  13  /  AlkPhos  112      PT/INR - ( 04 Sep 2019 18:43 )   PT: 14.9 sec;   INR: 1.29 ratio         PTT - ( 04 Sep 2019 18:43 )  PTT:28.4 sec                   Urinalysis Basic - ( 04 Sep 2019 20:50 )    Color: Yellow / Appearance: Slightly Turbid / S.014 / pH: x  Gluc: x / Ketone: Negative  / Bili: Negative / Urobili: Negative   Blood: x / Protein: 30 mg/dL / Nitrite: Negative   Leuk Esterase: Large / RBC: 8 /hpf /  /HPF   Sq Epi: x / Non Sq Epi: 8 /hpf / Bacteria: Many    < from: Xray Chest 1 View AP/PA (19 @ 18:27) >    INTERPRETATION:  Clear lungs.

## 2019-09-04 NOTE — H&P ADULT - NSICDXPASTMEDICALHX_GEN_ALL_CORE_FT
PAST MEDICAL HISTORY:  CHF (congestive heart failure)     Diabetes mellitus     Dyslipidemia     HTN (hypertension)     Palpitations     STEMI (ST elevation myocardial infarction)

## 2019-09-04 NOTE — H&P ADULT - HISTORY OF PRESENT ILLNESS
Pt is a 85 y/o Female with PMHx of CHF, DM, HLD, HTN, CAD prior MI, c/o fatigue, "feeling off" and unable to get out of bed; Tmax 104F toay. Rash/redness on buttocks noted by home health aid. patient also complaining of +chronic cough, nonproductive.  No abdominal pain, no n/v/d. No chest pain.  States she has been feeling hot and cold over past few days, and took her temperature today, found fever of 104, and came to ED for evaluation.  No dysuria, no diarrhea. +constipation (chronic).  Increased diffuse leg swelling for past few days. patient lives at home with family. walks by herself with no use of walker. denies of any MEAD< chest pain on exertion. compliant with all her home medications

## 2019-09-04 NOTE — ED ADULT NURSE NOTE - OBJECTIVE STATEMENT
85 y/o F, reported to ED from home via EMS. A&ox3, c/o fever and lethargy. Pt reports that for the past 2-3 days she has been feeling more weak. Pt reports that she has also been having decreased PO intake. Pt reports feeling "hot and cold" today. Pt took her temperature and had a fever of 104. Pt reports that she then called her visiting MD and had a house call. Pt reports that she took her long term insulin this morning but nothing since because she hasn't eaten. Pt denies any abd pain. Abd soft, non-tender to palpation. Pt denies N/V/D. Pt also reports that she is having pain on her right buttocks. Pt has a red indurated mass on her buttocks. Pt reports pain in her buttocks with palpation. No drainage noted at the site. Pt denies urinary changes or dysuria. Pt denies LOC, SOB, C/P. Pt placed on cardiac monitor. Family at pt's bedside. Will continue to monitor pt. 87 y/o F, reported to ED from home via EMS. A&ox3, c/o fever and lethargy. Pt reports that for the past 2-3 days she has been feeling more weak. Pt reports that she has also been having decreased PO intake. Pt reports feeling "hot and cold" today. Pt took her temperature and had a fever of 104. Pt reports that she then called her visiting MD and had a house call. Pt reports that she took her long term insulin this morning but nothing since because she hasn't eaten. Pt denies any abd pain. Abd soft, non-tender to palpation. Pt denies N/V/D. Pt also reports that she is having pain on her right buttocks. Pt has a red indurated mass on her buttocks. Pt reports pain in her buttocks with palpation. No drainage noted at the site. Pt denies urinary changes or dysuria. Pt denies LOC, SOB, C/P. Pt placed on cardiac monitor. Pt has a hx of DM, HTN, HLD, and MI in 2014. Family at pt's bedside. Will continue to monitor pt.

## 2019-09-05 ENCOUNTER — APPOINTMENT (OUTPATIENT)
Dept: HOME HEALTH SERVICES | Facility: HOME HEALTH | Age: 84
End: 2019-09-05

## 2019-09-05 LAB
ALBUMIN SERPL ELPH-MCNC: 3 G/DL — LOW (ref 3.3–5)
ALP SERPL-CCNC: 92 U/L — SIGNIFICANT CHANGE UP (ref 40–120)
ALT FLD-CCNC: 11 U/L — SIGNIFICANT CHANGE UP (ref 10–45)
ANION GAP SERPL CALC-SCNC: 10 MMOL/L — SIGNIFICANT CHANGE UP (ref 5–17)
AST SERPL-CCNC: 14 U/L — SIGNIFICANT CHANGE UP (ref 10–40)
BILIRUB SERPL-MCNC: 1.1 MG/DL — SIGNIFICANT CHANGE UP (ref 0.2–1.2)
BUN SERPL-MCNC: 33 MG/DL — HIGH (ref 7–23)
CALCIUM SERPL-MCNC: 8.7 MG/DL — SIGNIFICANT CHANGE UP (ref 8.4–10.5)
CHLORIDE SERPL-SCNC: 101 MMOL/L — SIGNIFICANT CHANGE UP (ref 96–108)
CHOLEST SERPL-MCNC: 66 MG/DL — SIGNIFICANT CHANGE UP (ref 10–199)
CO2 SERPL-SCNC: 21 MMOL/L — LOW (ref 22–31)
CREAT SERPL-MCNC: 1.39 MG/DL — HIGH (ref 0.5–1.3)
FERRITIN SERPL-MCNC: 408 NG/ML — HIGH (ref 15–150)
FOLATE SERPL-MCNC: >20 NG/ML — SIGNIFICANT CHANGE UP
GLUCOSE SERPL-MCNC: 259 MG/DL — HIGH (ref 70–99)
HBA1C BLD-MCNC: 10.2 % — HIGH (ref 4–5.6)
HCT VFR BLD CALC: 27.7 % — LOW (ref 34.5–45)
HDLC SERPL-MCNC: 28 MG/DL — LOW
HGB BLD-MCNC: 9 G/DL — LOW (ref 11.5–15.5)
IRON SATN MFR SERPL: 13 UG/DL — LOW (ref 30–160)
IRON SATN MFR SERPL: 6 % — LOW (ref 14–50)
LIPID PNL WITH DIRECT LDL SERPL: 24 MG/DL — SIGNIFICANT CHANGE UP
MAGNESIUM SERPL-MCNC: 1.1 MG/DL — LOW (ref 1.6–2.6)
MCHC RBC-ENTMCNC: 30.2 PG — SIGNIFICANT CHANGE UP (ref 27–34)
MCHC RBC-ENTMCNC: 32.5 GM/DL — SIGNIFICANT CHANGE UP (ref 32–36)
MCV RBC AUTO: 93 FL — SIGNIFICANT CHANGE UP (ref 80–100)
PLATELET # BLD AUTO: 118 K/UL — LOW (ref 150–400)
POTASSIUM SERPL-MCNC: 3.8 MMOL/L — SIGNIFICANT CHANGE UP (ref 3.5–5.3)
POTASSIUM SERPL-SCNC: 3.8 MMOL/L — SIGNIFICANT CHANGE UP (ref 3.5–5.3)
PROCALCITONIN SERPL-MCNC: 0.49 NG/ML — HIGH (ref 0.02–0.1)
PROT SERPL-MCNC: 5.8 G/DL — LOW (ref 6–8.3)
RBC # BLD: 2.98 M/UL — LOW (ref 3.8–5.2)
RBC # FLD: 12.1 % — SIGNIFICANT CHANGE UP (ref 10.3–14.5)
SODIUM SERPL-SCNC: 132 MMOL/L — LOW (ref 135–145)
TIBC SERPL-MCNC: 203 UG/DL — LOW (ref 220–430)
TOTAL CHOLESTEROL/HDL RATIO MEASUREMENT: 2.4 RATIO — LOW (ref 3.3–7.1)
TRIGL SERPL-MCNC: 69 MG/DL — SIGNIFICANT CHANGE UP (ref 10–149)
UIBC SERPL-MCNC: 190 UG/DL — SIGNIFICANT CHANGE UP (ref 110–370)
VIT B12 SERPL-MCNC: 420 PG/ML — SIGNIFICANT CHANGE UP (ref 232–1245)
WBC # BLD: 12.83 K/UL — HIGH (ref 3.8–10.5)
WBC # FLD AUTO: 12.83 K/UL — HIGH (ref 3.8–10.5)

## 2019-09-05 PROCEDURE — 71250 CT THORAX DX C-: CPT | Mod: 26

## 2019-09-05 RX ORDER — MAGNESIUM SULFATE 500 MG/ML
2 VIAL (ML) INJECTION ONCE
Refills: 0 | Status: DISCONTINUED | OUTPATIENT
Start: 2019-09-05 | End: 2019-09-05

## 2019-09-05 RX ORDER — MAGNESIUM SULFATE 500 MG/ML
2 VIAL (ML) INJECTION
Refills: 0 | Status: COMPLETED | OUTPATIENT
Start: 2019-09-05 | End: 2019-09-05

## 2019-09-05 RX ORDER — INFLUENZA VIRUS VACCINE 15; 15; 15; 15 UG/.5ML; UG/.5ML; UG/.5ML; UG/.5ML
0.5 SUSPENSION INTRAMUSCULAR ONCE
Refills: 0 | Status: DISCONTINUED | OUTPATIENT
Start: 2019-09-05 | End: 2019-10-04

## 2019-09-05 RX ORDER — ONDANSETRON 8 MG/1
4 TABLET, FILM COATED ORAL EVERY 8 HOURS
Refills: 0 | Status: DISCONTINUED | OUTPATIENT
Start: 2019-09-05 | End: 2019-09-15

## 2019-09-05 RX ORDER — INSULIN GLARGINE 100 [IU]/ML
15 INJECTION, SOLUTION SUBCUTANEOUS
Refills: 0 | Status: DISCONTINUED | OUTPATIENT
Start: 2019-09-05 | End: 2019-09-15

## 2019-09-05 RX ADMIN — CLOPIDOGREL BISULFATE 75 MILLIGRAM(S): 75 TABLET, FILM COATED ORAL at 13:07

## 2019-09-05 RX ADMIN — INSULIN GLARGINE 10 UNIT(S): 100 INJECTION, SOLUTION SUBCUTANEOUS at 00:10

## 2019-09-05 RX ADMIN — ONDANSETRON 4 MILLIGRAM(S): 8 TABLET, FILM COATED ORAL at 13:06

## 2019-09-05 RX ADMIN — Medication 200 MILLIGRAM(S): at 22:02

## 2019-09-05 RX ADMIN — Medication 25 MILLIGRAM(S): at 06:57

## 2019-09-05 RX ADMIN — Medication 81 MILLIGRAM(S): at 13:10

## 2019-09-05 RX ADMIN — HEPARIN SODIUM 5000 UNIT(S): 5000 INJECTION INTRAVENOUS; SUBCUTANEOUS at 00:13

## 2019-09-05 RX ADMIN — Medication 4: at 13:10

## 2019-09-05 RX ADMIN — SPIRONOLACTONE 25 MILLIGRAM(S): 25 TABLET, FILM COATED ORAL at 06:57

## 2019-09-05 RX ADMIN — Medication 25 MILLIGRAM(S): at 18:08

## 2019-09-05 RX ADMIN — PANTOPRAZOLE SODIUM 40 MILLIGRAM(S): 20 TABLET, DELAYED RELEASE ORAL at 06:57

## 2019-09-05 RX ADMIN — ATORVASTATIN CALCIUM 80 MILLIGRAM(S): 80 TABLET, FILM COATED ORAL at 22:02

## 2019-09-05 RX ADMIN — Medication 4: at 08:43

## 2019-09-05 RX ADMIN — HEPARIN SODIUM 5000 UNIT(S): 5000 INJECTION INTRAVENOUS; SUBCUTANEOUS at 06:56

## 2019-09-05 RX ADMIN — Medication 50 GRAM(S): at 10:24

## 2019-09-05 RX ADMIN — INSULIN GLARGINE 10 UNIT(S): 100 INJECTION, SOLUTION SUBCUTANEOUS at 08:43

## 2019-09-05 RX ADMIN — Medication 4: at 18:08

## 2019-09-05 RX ADMIN — Medication 50 GRAM(S): at 11:17

## 2019-09-05 RX ADMIN — Medication 1 TABLET(S): at 13:07

## 2019-09-05 RX ADMIN — HEPARIN SODIUM 5000 UNIT(S): 5000 INJECTION INTRAVENOUS; SUBCUTANEOUS at 13:07

## 2019-09-05 RX ADMIN — INSULIN GLARGINE 15 UNIT(S): 100 INJECTION, SOLUTION SUBCUTANEOUS at 22:19

## 2019-09-05 RX ADMIN — HEPARIN SODIUM 5000 UNIT(S): 5000 INJECTION INTRAVENOUS; SUBCUTANEOUS at 22:03

## 2019-09-05 NOTE — PHYSICAL THERAPY INITIAL EVALUATION ADULT - GAIT DEVIATIONS NOTED, PT EVAL
shuffling, crouch/decreased step length/decreased kevin/decreased weight-shifting ability/decreased stride length

## 2019-09-05 NOTE — PHYSICAL THERAPY INITIAL EVALUATION ADULT - PERTINENT HX OF CURRENT PROBLEM, REHAB EVAL
Pt is an 86F PMHx of CHF, DM, HLD, HTN, CAD prior MI, c/o fatigue, "feeling off" and unable to get out of bed; Tmax 104F toay. Rash/redness on buttocks noted by home health aid. patient also complaining of +chronic cough, nonproductive. +UA, started on IV Cipro. Cxray neg, pending CT chest.

## 2019-09-05 NOTE — PROGRESS NOTE ADULT - PROBLEM SELECTOR PLAN 1
T max 104 prior to ER visit   currently afebrile   Pan Cx, pending   Tylenol for fever  S/P IV fluid in the ER   UA positive, pending Cx   CXR noted  elevated procalcitonin  CT chest noted, negative for PNA   RVP ordered, negative

## 2019-09-05 NOTE — CONSULT NOTE ADULT - SUBJECTIVE AND OBJECTIVE BOX
GENERAL SURGERY CONSULT NOTE    HPI:    PMHx: CHF (congestive heart failure)  STEMI (ST elevation myocardial infarction)  Palpitations  Diabetes mellitus  Dyslipidemia  HTN (hypertension)    PSHx: S/P cardiac cath  S/P tonsillectomy  S/P lumpectomy, left breast  S/P appendectomy  S/P cholecystectomy      Allergies: codeine (Unknown)  Kiwi (Anaphylaxis)  penicillins (Anaphylaxis)  (Intolerances: )  Social Hx:   Family Hx: No pertinent family history in first degree relatives      Physical Exam  T(C): 36.7  HR: 77 (62 - 77)  BP: 103/49 (95/60 - 144/66)  RR: 18 (13 - 94)  SpO2: 94% (94% - 98%)  Tmax: T(C): , Max: 39 (19 @ 17:49)    19  -  19  --------------------------------------------------------  IN:    Oral Fluid: 240 mL  Total IN: 240 mL    OUT:  Total OUT: 0 mL    Total NET: 240 mL    General: well developed, well nourished, NAD  Neuro: alert and oriented x3  Respiratory: nonlabored on RA  CVS: regular rate and rhythm  Abdomen: soft, nontender, nondistended  Perianal area: 2-3cm area of perianal induration and erythema, no pocket of fluctuance appreciated, no pus appreciated, skin breakdown, soft stool in rectal vault  Extremities: no edema, sensation and movement grossly intact    Labs:                        9.0    12.83 )-----------( 118      ( 05 Sep 2019 09:39 )             27.7     PT/INR - ( 04 Sep 2019 18:43 )   PT: 14.9 sec;   INR: 1.29 ratio         PTT - ( 04 Sep 2019 18:43 )  PTT:28.4 sec      132<L>  |  101  |  33<H>  ----------------------------<  259<H>  3.8   |  21<L>  |  1.39<H>    Ca    8.7      05 Sep 2019 07:29  Mg     1.1         TPro  5.8<L>  /  Alb  3.0<L>  /  TBili  1.1  /  DBili  x   /  AST  14  /  ALT  11  /  AlkPhos  92  -    Urinalysis Basic - ( 04 Sep 2019 20:50 )    Color: Yellow / Appearance: Slightly Turbid / S.014 / pH: x  Gluc: x / Ketone: Negative  / Bili: Negative / Urobili: Negative   Blood: x / Protein: 30 mg/dL / Nitrite: Negative   Leuk Esterase: Large / RBC: 8 /hpf /  /HPF   Sq Epi: x / Non Sq Epi: 8 /hpf / Bacteria: Many            Imaging and other studies: GENERAL SURGERY CONSULT NOTE    HPI:  86F PMHx of CHF, DM, HLD, HTN, CAD prior MI, who presented to the ED with fevers and a tender area on her buttocks. The pain started 3 days ago but did not hinder her ability to pass bowel movements or gas. She had not had fevers until yesterday when she presented to the ED. She was febrile in the ED to 102 but has not had any more fevers since. Denies denies ever having such a pain before. No abdominal pain, no n/v/d. Last bowel movement was today. She denies dysuria but was found to have a a positive UA on lab work in the ED. Labs also significant for leukocytosis. Urine culture showing GNR. Blood culture pending.    PMHx: CHF (congestive heart failure)  STEMI (ST elevation myocardial infarction)  Palpitations  Diabetes mellitus  Dyslipidemia  HTN (hypertension)    PSHx: S/P cardiac cath  S/P tonsillectomy  S/P lumpectomy, left breast  S/P appendectomy  S/P cholecystectomy      Allergies: codeine (Unknown)  Kiwi (Anaphylaxis)  penicillins (Anaphylaxis)  (Intolerances: )  Social Hx: lives at home with family. walks by herself   Family Hx: No pertinent family history in first degree relatives      Physical Exam  T(C): 36.7  HR: 77 (62 - 77)  BP: 103/49 (95/60 - 144/66)  RR: 18 (13 - 94)  SpO2: 94% (94% - 98%)  Tmax: T(C): , Max: 39 (19 @ 17:49)    19  -  19  --------------------------------------------------------  IN:    Oral Fluid: 240 mL  Total IN: 240 mL    OUT:  Total OUT: 0 mL    Total NET: 240 mL    General: well developed, well nourished, NAD  Neuro: alert and oriented x3  Respiratory: nonlabored on RA  CVS: regular rate and rhythm  Abdomen: soft, nontender, nondistended  Perianal area: 2-3cm area of perianal induration and erythema, no pocket of fluctuance appreciated, no pus appreciated, skin breakdown, soft stool in rectal vault  Extremities: no edema, sensation and movement grossly intact    Labs:                        9.0    12.83 )-----------( 118      ( 05 Sep 2019 09:39 )             27.7     PT/INR - ( 04 Sep 2019 18:43 )   PT: 14.9 sec;   INR: 1.29 ratio         PTT - ( 04 Sep 2019 18:43 )  PTT:28.4 sec      132<L>  |  101  |  33<H>  ----------------------------<  259<H>  3.8   |  21<L>  |  1.39<H>    Ca    8.7      05 Sep 2019 07:29  Mg     1.1         TPro  5.8<L>  /  Alb  3.0<L>  /  TBili  1.1  /  DBili  x   /  AST  14  /  ALT  11  /  AlkPhos  92  -    Urinalysis Basic - ( 04 Sep 2019 20:50 )    Color: Yellow / Appearance: Slightly Turbid / S.014 / pH: x  Gluc: x / Ketone: Negative  / Bili: Negative / Urobili: Negative   Blood: x / Protein: 30 mg/dL / Nitrite: Negative   Leuk Esterase: Large / RBC: 8 /hpf /  /HPF   Sq Epi: x / Non Sq Epi: 8 /hpf / Bacteria: Many            Imaging and other studies:

## 2019-09-05 NOTE — PHYSICAL THERAPY INITIAL EVALUATION ADULT - CRITERIA FOR SKILLED THERAPEUTIC INTERVENTIONS
impairments found/functional limitations in following categories/risk reduction/prevention/therapy frequency/anticipated discharge recommendation/predicted duration of therapy intervention/rehab potential

## 2019-09-05 NOTE — PHYSICAL THERAPY INITIAL EVALUATION ADULT - PLANNED THERAPY INTERVENTIONS, PT EVAL
bed mobility training/transfer training/gait training/Stair Training: Goal: Improve to 10 steps supervision with single rail, step to pattern by 4 weeks.

## 2019-09-05 NOTE — PATIENT PROFILE ADULT - VISION (WITH CORRECTIVE LENSES IF THE PATIENT USUALLY WEARS THEM):
Partially impaired: cannot see medication labels or newsprint, but can see obstacles in path, and the surrounding layout; can count fingers at arm's length/Pt wears glasses

## 2019-09-05 NOTE — PHYSICAL THERAPY INITIAL EVALUATION ADULT - ADDITIONAL COMMENTS
PTA pt lived in pvt home with daughter, 5 steps to enter +HR, flight to bedroom, independent with ADLs, sometimes ambulates with SC, owns RW and W/C. PTA pt lived in pvt home with daughter (works during the day), 5 steps to enter +HR, flight to bedroom, independent with ADLs, sometimes ambulates with SC, owns RW and W/C.

## 2019-09-05 NOTE — CONSULT NOTE ADULT - ASSESSMENT
ASSESSMENT  - Discussed with Dr. Please follow-up with Dr. Wan in the office in two week after surgery. Please call the office at [phone] to make an appointment. CHF, DM, HLD, HTN, CAD prior MI, c/o fatigue and fever of 104 at home, found to have UTI, also complaints of perianal pain, localized induration of soft tissue of the left perianal area    PLAN  - Symptoms improving with antibiotics  - Cannot appreciate any area of fluctuance that would be amenable to percutaneous drainage, would benefit from further imaging  - Recommend ultrasound or pelvic CT of the local area to assess for fluid collection or soft tissue edema  - Care per medical team  - Discussed with Dr. Wan    Red Surgery  [id number] ASSESSMENT  - Discussed with Dr. Please follow-up with Dr. Wan in the office in two week after surgery. Please call the office at [phone] to make an appointment. CHF, DM, HLD, HTN, CAD prior MI, c/o fatigue and fever of 104 at home, found to have UTI, also complaints of perianal pain, localized induration of soft tissue of the left perianal area    PLAN  - Symptoms improving with antibiotics  - Cannot appreciate any area of fluctuance that would be amenable to percutaneous drainage, would benefit from further imaging  - Recommend ultrasound or pelvic CT of the local area to assess for fluid collection or soft tissue edema  - F/u urine and blood cultures  - Care per medical team  - Discussed with Dr. Wan    Red Surgery  [id number] ASSESSMENT    86F CHF, DM, HLD, HTN, CAD prior MI, c/o fatigue and fever of 104 at home, found to have UTI, also complaints of perianal pain, localized induration of soft tissue of the left perianal area    PLAN  - Symptoms improving with antibiotics  - Cannot appreciate any area of fluctuance that would be amenable to percutaneous drainage, would benefit from further imaging  - Recommend ultrasound or pelvic CT of the local area to assess for fluid collection or soft tissue edema  - F/u urine and blood cultures  - Care per medical team  - Discussed with Dr. Wan    Red Surgery  [id number]

## 2019-09-05 NOTE — CONSULT NOTE ADULT - SUBJECTIVE AND OBJECTIVE BOX
CHIEF COMPLAINT:Patient is a 86y old  Female who presents with a chief complaint of Fever, UTI (04 Sep 2019 22:22)      HISTORY OF PRESENT ILLNESS:HPI:  Pt is a 85 y/o Female with PMHx of CHF, DM, HLD, HTN, CAD prior MI, c/o fatigue, "feeling off" and unable to get out of bed; Tmax 104F toay. Rash/redness on buttocks noted by home health aid. patient also complaining of +chronic cough, nonproductive.  No abdominal pain, no n/v/d. No chest pain.  States she has been feeling hot and cold over past few days, and took her temperature today, found fever of 104, and came to ED for evaluation.  No dysuria, no diarrhea. +constipation (chronic).  Increased diffuse leg swelling for past few days. patient lives at home with family. walks by herself with no use of walker. denies of any MEAD< chest pain on exertion. compliant with all her home medications (04 Sep 2019 22:22)      PAST MEDICAL & SURGICAL HISTORY:  CHF (congestive heart failure)  STEMI (ST elevation myocardial infarction)  Palpitations  Diabetes mellitus  Dyslipidemia  HTN (hypertension)  S/P cardiac cath  S/P tonsillectomy  S/P lumpectomy, left breast: premalignant  S/P appendectomy  S/P cholecystectomy          MEDICATIONS:  aspirin enteric coated 81 milliGRAM(s) Oral daily  clopidogrel Tablet 75 milliGRAM(s) Oral daily  heparin  Injectable 5000 Unit(s) SubCutaneous every 8 hours  metoprolol tartrate 25 milliGRAM(s) Oral two times a day  spironolactone 25 milliGRAM(s) Oral daily    ciprofloxacin   IVPB 400 milliGRAM(s) IV Intermittent every 24 hours      ondansetron Injectable 4 milliGRAM(s) IV Push every 8 hours PRN    pantoprazole    Tablet 40 milliGRAM(s) Oral before breakfast    atorvastatin 80 milliGRAM(s) Oral at bedtime  dextrose 40% Gel 15 Gram(s) Oral once PRN  dextrose 50% Injectable 12.5 Gram(s) IV Push once  dextrose 50% Injectable 25 Gram(s) IV Push once  dextrose 50% Injectable 25 Gram(s) IV Push once  glucagon  Injectable 1 milliGRAM(s) IntraMuscular once PRN  insulin glargine Injectable (LANTUS) 10 Unit(s) SubCutaneous two times a day  insulin lispro (HumaLOG) corrective regimen sliding scale   SubCutaneous three times a day before meals    dextrose 5%. 1000 milliLiter(s) IV Continuous <Continuous>  influenza   Vaccine 0.5 milliLiter(s) IntraMuscular once  multivitamin 1 Tablet(s) Oral daily      FAMILY HISTORY:  No pertinent family history in first degree relatives      Non-contributory    SOCIAL HISTORY:    not a smoker    Allergies    codeine (Unknown)  Kiwi (Anaphylaxis)  penicillins (Anaphylaxis)    Intolerances    	    REVIEW OF SYSTEMS:  CONSTITUTIONAL: + fever  EYES: No eye pain, visual disturbances, or discharge  ENMT:  No difficulty hearing, tinnitus  NECK: No pain or stiffness  RESPIRATORY: No cough, +SOB  CARDIOVASCULAR: No chest pain, palpitations, passing out, dizziness, or leg swelling  GASTROINTESTINAL:  No nausea, vomiting, diarrhea or constipation. No melena.  GENITOURINARY: No dysuria, hematuria  NEUROLOGICAL: No stroke like symptoms  SKIN: No burning or lesions   ENDOCRINE: No heat or cold intolerance  MUSCULOSKELETAL: No joint pain or swelling  PSYCHIATRIC: No  anxiety, mood swings  HEME/LYMPH: No bleeding gums  ALLERY AND IMMUNOLOGIC: No hives or eczema	    All other ROS negative    PHYSICAL EXAM:  T(C): 36.7 (09-05-19 @ 12:20), Max: 39 (09-04-19 @ 17:49)  HR: 77 (09-05-19 @ 12:20) (62 - 77)  BP: 103/49 (09-05-19 @ 12:20) (95/60 - 144/66)  RR: 18 (09-05-19 @ 12:20) (13 - 94)  SpO2: 94% (09-05-19 @ 12:20) (94% - 98%)  Wt(kg): --  I&O's Summary    05 Sep 2019 07:01  -  05 Sep 2019 14:52  --------------------------------------------------------  IN: 240 mL / OUT: 0 mL / NET: 240 mL        Appearance: Normal	  HEENT:   Normal oral mucosa, EOMI	  Cardiovascular: Normal S1 S2, No JVD, No murmurs  Respiratory: Lungs clear to auscultation	  Psychiatry: Alert  Gastrointestinal:  Soft, Non-tender, + BS	  Skin: No rashes   Neurologic: Non-focal  Extremities:  No edema  Vascular: Peripheral pulses palpable    	    	  	  CARDIAC MARKERS:  Labs personally reviewed by me                                  9.0    12.83 )-----------( 118      ( 05 Sep 2019 09:39 )             27.7     09-05    132<L>  |  101  |  33<H>  ----------------------------<  259<H>  3.8   |  21<L>  |  1.39<H>    Ca    8.7      05 Sep 2019 07:29  Mg     1.1     09-05    TPro  5.8<L>  /  Alb  3.0<L>  /  TBili  1.1  /  DBili  x   /  AST  14  /  ALT  11  /  AlkPhos  92  09-05          EKG: Personally reviewed by me - SR LBBB  Radiology: Personally reviewed by me - cxr clear lungs      ASSESSMENT/PLAN: 	  84 year old F with PMH of sHF EF 30%, IDDM, HTN, HLD, hx of STEMI w/ stents (2014) presents with fevers with sepsus  1. CAD-  EKG with old LBBB  asymptomatic- Continue with ASA, Plavix, and statin.     2. Chronic sHF - euvolemic at this time. Hold Lasix in setting of sepsis. Continue with OMT- Metoprolol, Aldactone, statin,     3. HTN - well controlled on above regimen.     4. HLD - continue with statin    5. DM - per primary team           Srini Velasco DO [facility]  Cardiovascular Medicine  [phone] CHIEF COMPLAINT:Patient is a 86y old  Female who presents with a chief complaint of Fever, UTI (04 Sep 2019 22:22)      HISTORY OF PRESENT ILLNESS:HPI:  Pt is a 87 y/o Female with PMHx of CHF, DM, HLD, HTN, CAD prior MI, c/o fatigue, "feeling off" and unable to get out of bed; Tmax 104F toay. Rash/redness on buttocks noted by home health aid. patient also complaining of +chronic cough, nonproductive.  No abdominal pain, no n/v/d. No chest pain.  States she has been feeling hot and cold over past few days, and took her temperature today, found fever of 104, and came to ED for evaluation.  No dysuria, no diarrhea. +constipation (chronic).  Increased diffuse leg swelling for past few days. patient lives at home with family. walks by herself with no use of walker. denies of any MEAD< chest pain on exertion. compliant with all her home medications (04 Sep 2019 22:22)      PAST MEDICAL & SURGICAL HISTORY:  CHF (congestive heart failure)  STEMI (ST elevation myocardial infarction)  Palpitations  Diabetes mellitus  Dyslipidemia  HTN (hypertension)  S/P cardiac cath  S/P tonsillectomy  S/P lumpectomy, left breast: premalignant  S/P appendectomy  S/P cholecystectomy          MEDICATIONS:  aspirin enteric coated 81 milliGRAM(s) Oral daily  clopidogrel Tablet 75 milliGRAM(s) Oral daily  heparin  Injectable 5000 Unit(s) SubCutaneous every 8 hours  metoprolol tartrate 25 milliGRAM(s) Oral two times a day  spironolactone 25 milliGRAM(s) Oral daily    ciprofloxacin   IVPB 400 milliGRAM(s) IV Intermittent every 24 hours      ondansetron Injectable 4 milliGRAM(s) IV Push every 8 hours PRN    pantoprazole    Tablet 40 milliGRAM(s) Oral before breakfast    atorvastatin 80 milliGRAM(s) Oral at bedtime  dextrose 40% Gel 15 Gram(s) Oral once PRN  dextrose 50% Injectable 12.5 Gram(s) IV Push once  dextrose 50% Injectable 25 Gram(s) IV Push once  dextrose 50% Injectable 25 Gram(s) IV Push once  glucagon  Injectable 1 milliGRAM(s) IntraMuscular once PRN  insulin glargine Injectable (LANTUS) 10 Unit(s) SubCutaneous two times a day  insulin lispro (HumaLOG) corrective regimen sliding scale   SubCutaneous three times a day before meals    dextrose 5%. 1000 milliLiter(s) IV Continuous <Continuous>  influenza   Vaccine 0.5 milliLiter(s) IntraMuscular once  multivitamin 1 Tablet(s) Oral daily      FAMILY HISTORY:  No pertinent family history in first degree relatives      Non-contributory    SOCIAL HISTORY:    not a smoker    Allergies    codeine (Unknown)  Kiwi (Anaphylaxis)  penicillins (Anaphylaxis)    Intolerances    	    REVIEW OF SYSTEMS:  CONSTITUTIONAL: + fever  EYES: No eye pain, visual disturbances, or discharge  ENMT:  No difficulty hearing, tinnitus  NECK: No pain or stiffness  RESPIRATORY: No cough, +SOB  CARDIOVASCULAR: No chest pain, palpitations, passing out, dizziness, or leg swelling  GASTROINTESTINAL:  No nausea, vomiting, diarrhea or constipation. No melena.  GENITOURINARY: No dysuria, hematuria  NEUROLOGICAL: No stroke like symptoms  SKIN: No burning or lesions   ENDOCRINE: No heat or cold intolerance  MUSCULOSKELETAL: No joint pain or swelling  PSYCHIATRIC: No  anxiety, mood swings  HEME/LYMPH: No bleeding gums  ALLERY AND IMMUNOLOGIC: No hives or eczema	    All other ROS negative    PHYSICAL EXAM:  T(C): 36.7 (09-05-19 @ 12:20), Max: 39 (09-04-19 @ 17:49)  HR: 77 (09-05-19 @ 12:20) (62 - 77)  BP: 103/49 (09-05-19 @ 12:20) (95/60 - 144/66)  RR: 18 (09-05-19 @ 12:20) (13 - 94)  SpO2: 94% (09-05-19 @ 12:20) (94% - 98%)  Wt(kg): --  I&O's Summary    05 Sep 2019 07:01  -  05 Sep 2019 14:52  --------------------------------------------------------  IN: 240 mL / OUT: 0 mL / NET: 240 mL        Appearance: Normal	  HEENT:   Normal oral mucosa, EOMI	  Cardiovascular: Normal S1 S2, No JVD, No murmurs  Respiratory: Lungs clear to auscultation	  Psychiatry: Alert  Gastrointestinal:  Soft, Non-tender, + BS	  Skin: No rashes   Neurologic: Non-focal  Extremities:  No edema  Vascular: Peripheral pulses palpable    	    	  	  CARDIAC MARKERS:  Labs personally reviewed by me                                  9.0    12.83 )-----------( 118      ( 05 Sep 2019 09:39 )             27.7     09-05    132<L>  |  101  |  33<H>  ----------------------------<  259<H>  3.8   |  21<L>  |  1.39<H>    Ca    8.7      05 Sep 2019 07:29  Mg     1.1     09-05    TPro  5.8<L>  /  Alb  3.0<L>  /  TBili  1.1  /  DBili  x   /  AST  14  /  ALT  11  /  AlkPhos  92  09-05          EKG: Personally reviewed by me - SR LBBB  Radiology: Personally reviewed by me - cxr clear lungs      ASSESSMENT/PLAN: 	  84 year old F with PMH of sHF EF 30%, IDDM, HTN, HLD, hx of STEMI w/ stents (2014) presents with fevers with sepsus  1. CAD-  EKG with old LBBB  asymptomatic- Continue with ASA, Plavix, and statin.     2. Chronic sHF - euvolemic at this time. Hold Lasix in setting of sepsis. Continue with OMT- Metoprolol, Aldactone, statin. Resume ACE as ROB resolves    3. HTN - well controlled on above regimen.  Resume ACE as ROB resolves    4. HLD - continue with statin    5. DM - per primary team     6. Sepsis 2/2 UTI - abx per primary team          Srini Velasco DO [facility]  Cardiovascular Medicine  [phone]

## 2019-09-05 NOTE — PROGRESS NOTE ADULT - SUBJECTIVE AND OBJECTIVE BOX
Subjective: Patient seen and examined. No new events except as noted.  had an episode of nausea and vomiting in the morning which resolved   afebrile     REVIEW OF SYSTEMS:    CONSTITUTIONAL: No weakness, fevers or chills  EYES/ENT: No visual changes;  No vertigo or throat pain   NECK: No pain or stiffness  RESPIRATORY: No cough, wheezing, hemoptysis; No shortness of breath  CARDIOVASCULAR: No chest pain or palpitations  GASTROINTESTINAL: + nausea and vomiting   GENITOURINARY: No dysuria, frequency or hematuria  NEUROLOGICAL: No numbness or weakness  SKIN: No itching, burning, rashes, or lesions   All other review of systems is negative unless indicated above.    MEDICATIONS:  MEDICATIONS  (STANDING):  aspirin enteric coated 81 milliGRAM(s) Oral daily  atorvastatin 80 milliGRAM(s) Oral at bedtime  ciprofloxacin   IVPB 400 milliGRAM(s) IV Intermittent every 24 hours  clopidogrel Tablet 75 milliGRAM(s) Oral daily  dextrose 5%. 1000 milliLiter(s) (50 mL/Hr) IV Continuous <Continuous>  dextrose 50% Injectable 12.5 Gram(s) IV Push once  dextrose 50% Injectable 25 Gram(s) IV Push once  dextrose 50% Injectable 25 Gram(s) IV Push once  heparin  Injectable 5000 Unit(s) SubCutaneous every 8 hours  influenza   Vaccine 0.5 milliLiter(s) IntraMuscular once  insulin glargine Injectable (LANTUS) 10 Unit(s) SubCutaneous two times a day  insulin lispro (HumaLOG) corrective regimen sliding scale   SubCutaneous three times a day before meals  metoprolol tartrate 25 milliGRAM(s) Oral two times a day  multivitamin 1 Tablet(s) Oral daily  pantoprazole    Tablet 40 milliGRAM(s) Oral before breakfast  spironolactone 25 milliGRAM(s) Oral daily      PHYSICAL EXAM:  T(C): 36.7 (19 @ 12:20), Max: 39 (19 @ 17:49)  HR: 77 (19 @ 12:20) (62 - 77)  BP: 103/49 (19 @ 12:20) (95/60 - 144/66)  RR: 18 (19 @ 12:20) (13 - 94)  SpO2: 94% (19 @ 12:20) (94% - 98%)  Wt(kg): --  I&O's Summary    05 Sep 2019 07:01  -  05 Sep 2019 17:24  --------------------------------------------------------  IN: 240 mL / OUT: 0 mL / NET: 240 mL      Height (cm): 154.94 ( @ 17:49)  Weight (kg): 56.7 ( @ 17:49)  BMI (kg/m2): 23.6 ( @ 17:49)  BSA (m2): 1.55 ( @ 17:49)    Appearance: Normal	  HEENT:   Normal oral mucosa, PERRL, EOMI	  Lymphatic: No lymphadenopathy , no edema  Cardiovascular: Normal S1 S2, No JVD, No murmurs , Peripheral pulses palpable 2+ bilaterally  Respiratory: Lungs clear to auscultation, normal effort 	  Gastrointestinal:  Soft, Non-tender, + BS	  Skin: No rashes, No ecchymoses, No cyanosis, warm to touch  Musculoskeletal: Normal range of motion, normal strength  Psychiatry:  Mood & affect appropriate  Ext: No edema      All labs, Imaging and EKGs personally reviewed                           9.0    12.83 )-----------( 118      ( 05 Sep 2019 09:39 )             27.7                   132<L>  |  101  |  33<H>  ----------------------------<  259<H>  3.8   |  21<L>  |  1.39<H>    Ca    8.7      05 Sep 2019 07:29  Mg     1.1         TPro  5.8<L>  /  Alb  3.0<L>  /  TBili  1.1  /  DBili  x   /  AST  14  /  ALT  11  /  AlkPhos  92      PT/INR - ( 04 Sep 2019 18:43 )   PT: 14.9 sec;   INR: 1.29 ratio         PTT - ( 04 Sep 2019 18:43 )  PTT:28.4 sec                   Urinalysis Basic - ( 04 Sep 2019 20:50 )    Color: Yellow / Appearance: Slightly Turbid / S.014 / pH: x  Gluc: x / Ketone: Negative  / Bili: Negative / Urobili: Negative   Blood: x / Protein: 30 mg/dL / Nitrite: Negative   Leuk Esterase: Large / RBC: 8 /hpf /  /HPF   Sq Epi: x / Non Sq Epi: 8 /hpf / Bacteria: Many    < from: CT Chest No Cont (19 @ 14:41) >  FINDINGS:    LUNGS AND AIRWAYS: Patent central airways.  Lungs are clear. Minimal   atelectasis in the bilateral lower lobes.    PLEURA: Trace bilateral pleural effusions.    MEDIASTINUM AND DIONISIO: Pretracheal and subcarinal lymph nodes.    VESSELS: Coronary artery atherosclerosis.    HEART: Heart size is normal. No pericardial effusion.    CHEST WALL AND LOWER NECK: Within normal limits.    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    BONES: Degenerative spinal changes.    IMPRESSION:     No pneumonia.    < from: Xray Chest 1 View AP/PA (19 @ 18:27) >    IMPRESSION:     Clear lungs.

## 2019-09-05 NOTE — PATIENT PROFILE ADULT - INFORMATION PROVIDED TO:
patient V-Y Plasty Text: The defect edges were debeveled with a #15 scalpel blade.  Given the location of the defect, shape of the defect and the proximity to free margins an V-Y advancement flap was deemed most appropriate.  Using a sterile surgical marker, an appropriate advancement flap was drawn incorporating the defect and placing the expected incisions within the relaxed skin tension lines where possible.    The area thus outlined was incised deep to adipose tissue with a #15 scalpel blade.  The skin margins were undermined to an appropriate distance in all directions utilizing iris scissors.

## 2019-09-06 DIAGNOSIS — E87.1 HYPO-OSMOLALITY AND HYPONATREMIA: ICD-10-CM

## 2019-09-06 DIAGNOSIS — N17.9 ACUTE KIDNEY FAILURE, UNSPECIFIED: ICD-10-CM

## 2019-09-06 DIAGNOSIS — N18.3 CHRONIC KIDNEY DISEASE, STAGE 3 (MODERATE): ICD-10-CM

## 2019-09-06 LAB
-  AMIKACIN: SIGNIFICANT CHANGE UP
-  AMPICILLIN/SULBACTAM: SIGNIFICANT CHANGE UP
-  AMPICILLIN: SIGNIFICANT CHANGE UP
-  AZTREONAM: SIGNIFICANT CHANGE UP
-  CEFAZOLIN: SIGNIFICANT CHANGE UP
-  CEFEPIME: SIGNIFICANT CHANGE UP
-  CEFOXITIN: SIGNIFICANT CHANGE UP
-  CEFTRIAXONE: SIGNIFICANT CHANGE UP
-  CIPROFLOXACIN: SIGNIFICANT CHANGE UP
-  GENTAMICIN: SIGNIFICANT CHANGE UP
-  IMIPENEM: SIGNIFICANT CHANGE UP
-  LEVOFLOXACIN: SIGNIFICANT CHANGE UP
-  MEROPENEM: SIGNIFICANT CHANGE UP
-  NITROFURANTOIN: SIGNIFICANT CHANGE UP
-  PIPERACILLIN/TAZOBACTAM: SIGNIFICANT CHANGE UP
-  TIGECYCLINE: SIGNIFICANT CHANGE UP
-  TOBRAMYCIN: SIGNIFICANT CHANGE UP
-  TRIMETHOPRIM/SULFAMETHOXAZOLE: SIGNIFICANT CHANGE UP
ANION GAP SERPL CALC-SCNC: 13 MMOL/L — SIGNIFICANT CHANGE UP (ref 5–17)
BUN SERPL-MCNC: 41 MG/DL — HIGH (ref 7–23)
CALCIUM SERPL-MCNC: 9.1 MG/DL — SIGNIFICANT CHANGE UP (ref 8.4–10.5)
CHLORIDE SERPL-SCNC: 97 MMOL/L — SIGNIFICANT CHANGE UP (ref 96–108)
CHLORIDE UR-SCNC: <35 MMOL/L — SIGNIFICANT CHANGE UP
CO2 SERPL-SCNC: 19 MMOL/L — LOW (ref 22–31)
CREAT ?TM UR-MCNC: 126 MG/DL — SIGNIFICANT CHANGE UP
CREAT SERPL-MCNC: 1.93 MG/DL — HIGH (ref 0.5–1.3)
CULTURE RESULTS: SIGNIFICANT CHANGE UP
GLUCOSE SERPL-MCNC: 198 MG/DL — HIGH (ref 70–99)
HCT VFR BLD CALC: 27.6 % — LOW (ref 34.5–45)
HGB BLD-MCNC: 8.9 G/DL — LOW (ref 11.5–15.5)
MAGNESIUM SERPL-MCNC: 2.4 MG/DL — SIGNIFICANT CHANGE UP (ref 1.6–2.6)
MCHC RBC-ENTMCNC: 31.2 PG — SIGNIFICANT CHANGE UP (ref 27–34)
MCHC RBC-ENTMCNC: 32.2 GM/DL — SIGNIFICANT CHANGE UP (ref 32–36)
MCV RBC AUTO: 96.8 FL — SIGNIFICANT CHANGE UP (ref 80–100)
METHOD TYPE: SIGNIFICANT CHANGE UP
ORGANISM # SPEC MICROSCOPIC CNT: SIGNIFICANT CHANGE UP
ORGANISM # SPEC MICROSCOPIC CNT: SIGNIFICANT CHANGE UP
PHOSPHATE SERPL-MCNC: 2.7 MG/DL — SIGNIFICANT CHANGE UP (ref 2.5–4.5)
PLATELET # BLD AUTO: 129 K/UL — LOW (ref 150–400)
POTASSIUM SERPL-MCNC: 4.2 MMOL/L — SIGNIFICANT CHANGE UP (ref 3.5–5.3)
POTASSIUM SERPL-SCNC: 4.2 MMOL/L — SIGNIFICANT CHANGE UP (ref 3.5–5.3)
POTASSIUM UR-SCNC: 36 MMOL/L — SIGNIFICANT CHANGE UP
PROT ?TM UR-MCNC: 20 MG/DL — HIGH (ref 0–12)
PROT/CREAT UR-RTO: 0.2 RATIO — SIGNIFICANT CHANGE UP (ref 0–0.2)
RBC # BLD: 2.85 M/UL — LOW (ref 3.8–5.2)
RBC # FLD: 12.2 % — SIGNIFICANT CHANGE UP (ref 10.3–14.5)
SODIUM SERPL-SCNC: 129 MMOL/L — LOW (ref 135–145)
SODIUM UR-SCNC: 30 MMOL/L — SIGNIFICANT CHANGE UP
SPECIMEN SOURCE: SIGNIFICANT CHANGE UP
WBC # BLD: 12.2 K/UL — HIGH (ref 3.8–10.5)
WBC # FLD AUTO: 12.2 K/UL — HIGH (ref 3.8–10.5)

## 2019-09-06 RX ORDER — SODIUM CHLORIDE 9 MG/ML
1000 INJECTION INTRAMUSCULAR; INTRAVENOUS; SUBCUTANEOUS
Refills: 0 | Status: DISCONTINUED | OUTPATIENT
Start: 2019-09-06 | End: 2019-09-07

## 2019-09-06 RX ORDER — ACETAMINOPHEN 500 MG
650 TABLET ORAL EVERY 6 HOURS
Refills: 0 | Status: DISCONTINUED | OUTPATIENT
Start: 2019-09-06 | End: 2019-09-07

## 2019-09-06 RX ADMIN — HEPARIN SODIUM 5000 UNIT(S): 5000 INJECTION INTRAVENOUS; SUBCUTANEOUS at 21:57

## 2019-09-06 RX ADMIN — SODIUM CHLORIDE 50 MILLILITER(S): 9 INJECTION INTRAMUSCULAR; INTRAVENOUS; SUBCUTANEOUS at 12:34

## 2019-09-06 RX ADMIN — PANTOPRAZOLE SODIUM 40 MILLIGRAM(S): 20 TABLET, DELAYED RELEASE ORAL at 06:12

## 2019-09-06 RX ADMIN — Medication 1 TABLET(S): at 12:28

## 2019-09-06 RX ADMIN — CLOPIDOGREL BISULFATE 75 MILLIGRAM(S): 75 TABLET, FILM COATED ORAL at 12:28

## 2019-09-06 RX ADMIN — SPIRONOLACTONE 25 MILLIGRAM(S): 25 TABLET, FILM COATED ORAL at 06:12

## 2019-09-06 RX ADMIN — INSULIN GLARGINE 15 UNIT(S): 100 INJECTION, SOLUTION SUBCUTANEOUS at 23:21

## 2019-09-06 RX ADMIN — Medication 650 MILLIGRAM(S): at 12:28

## 2019-09-06 RX ADMIN — HEPARIN SODIUM 5000 UNIT(S): 5000 INJECTION INTRAVENOUS; SUBCUTANEOUS at 06:11

## 2019-09-06 RX ADMIN — Medication 200 MILLIGRAM(S): at 21:54

## 2019-09-06 RX ADMIN — Medication 2: at 08:47

## 2019-09-06 RX ADMIN — INSULIN GLARGINE 15 UNIT(S): 100 INJECTION, SOLUTION SUBCUTANEOUS at 09:24

## 2019-09-06 RX ADMIN — HEPARIN SODIUM 5000 UNIT(S): 5000 INJECTION INTRAVENOUS; SUBCUTANEOUS at 15:08

## 2019-09-06 RX ADMIN — Medication 2: at 16:57

## 2019-09-06 RX ADMIN — Medication 650 MILLIGRAM(S): at 13:10

## 2019-09-06 RX ADMIN — Medication 4: at 12:34

## 2019-09-06 RX ADMIN — ATORVASTATIN CALCIUM 80 MILLIGRAM(S): 80 TABLET, FILM COATED ORAL at 21:57

## 2019-09-06 RX ADMIN — Medication 81 MILLIGRAM(S): at 12:28

## 2019-09-06 NOTE — DIETITIAN INITIAL EVALUATION ADULT. - FACTORS AFF FOOD INTAKE
Denies chewing/swallowing issues, nausea/vomiting, diarrhea/constipation or other signs of acute GI distress at this time. Per report, last BM PTA ~2 days ago./none

## 2019-09-06 NOTE — CONSULT NOTE ADULT - SUBJECTIVE AND OBJECTIVE BOX
[facility] Consult Note Nephrology - CONSULTATION NOTE    86F PMHx of CHF, DM, HLD, HTN, CAD prior MI, who presented to the ED with fevers and a tender area on her buttocks. The pain started 3 days ago but did not hinder her ability to pass bowel movements or gas. She had not had fevers until yesterday when she presented to the ED. She was febrile in the ED to 102 but has not had any more fevers since. Denies denies ever having such a pain before. No abdominal pain, no n/v/d. Last bowel movement was today. She denies dysuria but was found to have a a positive UA on lab work in the ED. Labs also significant for leukocytosis. Urine culture showing GNR. Blood culture pending.    renal consult called for hasrhil on ckd3- cr has risen from 1.3 to 1.9 in seting of low grade fevers, hypotension and dec po intake; Pt currently is not on ace/or arb however she is on spirinolactone- does not have much appetite as well; pt is unaware of kidney disease in past and has not seen a nephrologist  currently denies any /f/c/n/d/c/sob/cp      PAST MEDICAL & SURGICAL HISTORY:  CHF (congestive heart failure)  STEMI (ST elevation myocardial infarction)  Palpitations  Diabetes mellitus  Dyslipidemia  HTN (hypertension)  S/P cardiac cath  S/P tonsillectomy  S/P lumpectomy, left breast: premalignant  S/P appendectomy  S/P cholecystectomy    codeine (Unknown)  Kiwi (Anaphylaxis)  penicillins (Anaphylaxis)    Home Medications Reviewed  Hospital Medications:   MEDICATIONS  (STANDING):  aspirin enteric coated 81 milliGRAM(s) Oral daily  atorvastatin 80 milliGRAM(s) Oral at bedtime  ciprofloxacin   IVPB 400 milliGRAM(s) IV Intermittent every 24 hours  clopidogrel Tablet 75 milliGRAM(s) Oral daily  dextrose 5%. 1000 milliLiter(s) (50 mL/Hr) IV Continuous <Continuous>  dextrose 50% Injectable 12.5 Gram(s) IV Push once  dextrose 50% Injectable 25 Gram(s) IV Push once  dextrose 50% Injectable 25 Gram(s) IV Push once  heparin  Injectable 5000 Unit(s) SubCutaneous every 8 hours  influenza   Vaccine 0.5 milliLiter(s) IntraMuscular once  insulin glargine Injectable (LANTUS) 15 Unit(s) SubCutaneous two times a day  insulin lispro (HumaLOG) corrective regimen sliding scale   SubCutaneous three times a day before meals  metoprolol tartrate 25 milliGRAM(s) Oral two times a day  multivitamin 1 Tablet(s) Oral daily  pantoprazole    Tablet 40 milliGRAM(s) Oral before breakfast  spironolactone 25 milliGRAM(s) Oral daily    SOCIAL HISTORY:  Denies ETOh,Smoking,   FAMILY HISTORY:  No pertinent family history in first degree relatives    REVIEW OF SYSTEMS:  CONSTITUTIONAL: No weakness, fevers or chills  EYES/ENT: No visual changes;  No vertigo or throat pain   NECK: No pain or stiffness  RESPIRATORY: No cough, wheezing, hemoptysis; No shortness of breath  CARDIOVASCULAR: No chest pain or palpitations.  GASTROINTESTINAL: No abdominal or epigastric pain. No nausea, vomiting, or hematemesis; No diarrhea or constipation. No melena or hematochezia.  GENITOURINARY: No dysuria, frequency, foamy urine, urinary urgency, incontinence or hematuria  NEUROLOGICAL: No numbness or weakness  SKIN: No itching, burning, rashes, or lesions   VASCULAR: No bilateral lower extremity edema.   All other review of systems is negative unless indicated above.    VITALS:  T(F): 97.9 (19 @ 09:23), Max: 100.1 (19 @ 00:15)  HR: 73 (19 @ 09:23)  BP: 99/55 (19 @ 09:23)  RR: 18 (19 @ 09:23)  SpO2: 94% (19 @ 09:23)  Wt(kg): --     @ 07:  -   @ 07:00  --------------------------------------------------------  IN: 440 mL / OUT: 0 mL / NET: 440 mL     @ 07:01  -   @ 12:10  --------------------------------------------------------  IN: 240 mL / OUT: 0 mL / NET: 240 mL        PHYSICAL EXAM:  Constitutional: NAD  HEENT: anicteric sclera, oropharynx clear, MMM  Neck: No JVD  Respiratory: CTAB, no wheezes, rales or rhonchi  Cardiovascular: S1, S2, RRR  Gastrointestinal: BS+, soft, NT/ND  Extremities: No cyanosis or clubbing. No peripheral edema  Neurological: A/O x 3, no focal deficits  Psychiatric: Normal mood, normal affect      LABS:      129<L>  |  97  |  41<H>  ----------------------------<  198<H>  4.2   |  19<L>  |  1.93<H>    Ca    9.1      06 Sep 2019 07:26  Phos  2.7       Mg     2.4         TPro  5.8<L>  /  Alb  3.0<L>  /  TBili  1.1  /  DBili      /  AST  14  /  ALT  11  /  AlkPhos  92      Creatinine Trend: 1.93 <--, 1.39 <--, 1.39 <--                        8.9    12.20 )-----------( 129      ( 06 Sep 2019 10:21 )             27.6     Urine Studies:  Urinalysis Basic - ( 04 Sep 2019 20:50 )    Color: Yellow / Appearance: Slightly Turbid / S.014 / pH:   Gluc:  / Ketone: Negative  / Bili: Negative / Urobili: Negative   Blood:  / Protein: 30 mg/dL / Nitrite: Negative   Leuk Esterase: Large / RBC: 8 /hpf /  /HPF   Sq Epi:  / Non Sq Epi: 8 /hpf / Bacteria: Many        RADIOLOGY & ADDITIONAL STUDIES:

## 2019-09-06 NOTE — PROGRESS NOTE ADULT - SUBJECTIVE AND OBJECTIVE BOX
GENERAL SURGERY DAILY PROGRESS NOTE:     Subjective:  Pt seen and examined. No acute events overnight. Tmax 100.1. Pt reports shes feeling well.     Objective:    MEDICATIONS  (STANDING):  aspirin enteric coated 81 milliGRAM(s) Oral daily  atorvastatin 80 milliGRAM(s) Oral at bedtime  ciprofloxacin   IVPB 400 milliGRAM(s) IV Intermittent every 24 hours  clopidogrel Tablet 75 milliGRAM(s) Oral daily  dextrose 5%. 1000 milliLiter(s) (50 mL/Hr) IV Continuous <Continuous>  dextrose 50% Injectable 12.5 Gram(s) IV Push once  dextrose 50% Injectable 25 Gram(s) IV Push once  dextrose 50% Injectable 25 Gram(s) IV Push once  heparin  Injectable 5000 Unit(s) SubCutaneous every 8 hours  influenza   Vaccine 0.5 milliLiter(s) IntraMuscular once  insulin glargine Injectable (LANTUS) 15 Unit(s) SubCutaneous two times a day  insulin lispro (HumaLOG) corrective regimen sliding scale   SubCutaneous three times a day before meals  metoprolol tartrate 25 milliGRAM(s) Oral two times a day  multivitamin 1 Tablet(s) Oral daily  pantoprazole    Tablet 40 milliGRAM(s) Oral before breakfast  spironolactone 25 milliGRAM(s) Oral daily    MEDICATIONS  (PRN):  dextrose 40% Gel 15 Gram(s) Oral once PRN Blood Glucose LESS THAN 70 milliGRAM(s)/deciliter  glucagon  Injectable 1 milliGRAM(s) IntraMuscular once PRN Glucose LESS THAN 70 milligrams/deciliter  ondansetron Injectable 4 milliGRAM(s) IV Push every 8 hours PRN Nausea and/or Vomiting      Vital Signs Last 24 Hrs  T(C): 37.7 (06 Sep 2019 04:13), Max: 37.8 (06 Sep 2019 00:15)  T(F): 99.9 (06 Sep 2019 04:13), Max: 100.1 (06 Sep 2019 00:15)  HR: 78 (06 Sep 2019 04:13) (70 - 78)  BP: 98/61 (06 Sep 2019 04:13) (97/61 - 107/61)  BP(mean): --  RR: 18 (06 Sep 2019 04:13) (18 - 18)  SpO2: 95% (06 Sep 2019 04:13) (94% - 95%)    I&O's Detail    05 Sep 2019 07:01  -  06 Sep 2019 07:00  --------------------------------------------------------  IN:    IV PiggyBack: 200 mL    Oral Fluid: 240 mL  Total IN: 440 mL    OUT:  Total OUT: 0 mL    Total NET: 440 mL    PHYSICAL EXAM  General: well developed, well nourished, NAD  Respiratory: nonlabored on RA  CVS: regular rate and rhythm  Abdomen: soft, nontender, nondistended  Perianal area: 2-3cm area of perianal induration and erythema, no pocket of fluctuance appreciated, no pus appreciated, skin breakdown, soft stool in rectal vault      Daily     Daily Weight in k.4 (06 Sep 2019 04:13)    LABS:                        9.0    12.83 )-----------( 118      ( 05 Sep 2019 09:39 )             27.7     -    132<L>  |  101  |  33<H>  ----------------------------<  259<H>  3.8   |  21<L>  |  1.39<H>    Ca    8.7      05 Sep 2019 07:29  Mg     1.1         TPro  5.8<L>  /  Alb  3.0<L>  /  TBili  1.1  /  DBili  x   /  AST  14  /  ALT  11  /  AlkPhos  92  09-05    PT/INR - ( 04 Sep 2019 18:43 )   PT: 14.9 sec;   INR: 1.29 ratio         PTT - ( 04 Sep 2019 18:43 )  PTT:28.4 sec  Urinalysis Basic - ( 04 Sep 2019 20:50 )    Color: Yellow / Appearance: Slightly Turbid / S.014 / pH: x  Gluc: x / Ketone: Negative  / Bili: Negative / Urobili: Negative   Blood: x / Protein: 30 mg/dL / Nitrite: Negative   Leuk Esterase: Large / RBC: 8 /hpf /  /HPF   Sq Epi: x / Non Sq Epi: 8 /hpf / Bacteria: Many        RADIOLOGY & ADDITIONAL STUDIES:

## 2019-09-06 NOTE — DIETITIAN INITIAL EVALUATION ADULT. - REASON INDICATOR FOR ASSESSMENT
Nutrition consult received for "HgbA1c 10.2". Information obtained from patient, EMR.     Per chart, pt is a 86 year old female with PMH of CHF, T2DM (on insulin), HLD, HTN, CAD prior MI who presents with fatigue and fever. Found to have UTI, started on cipro. Also with ROB; pt being hydrated.

## 2019-09-06 NOTE — PROGRESS NOTE ADULT - ASSESSMENT
86F CHF, DM, HLD, HTN, CAD prior MI, c/o fatigue and fever of 104 at home, found to have UTI, also complaints of perianal pain, localized induration of soft tissue of the left perianal area    PLAN  - Symptoms improving with antibiotics  - Cannot appreciate any area of fluctuance that would be amenable to percutaneous drainage, would benefit from further imaging  - Recommend ultrasound of soft tissue  - F/u urine and blood cultures  - Care per medical team  - Discussed with Dr. Wan    [facility]  [id number] 86F CHF, DM, HLD, HTN, CAD prior MI, c/o fatigue and fever of 104 at home, found to have UTI, also complaints of perianal pain, localized induration of soft tissue of the right perianal area    PLAN  - Symptoms improving with antibiotics  - Cannot appreciate any area of fluctuance that would be amenable to percutaneous drainage, would benefit from further imaging  - Recommend ultrasound of soft tissue  - F/u urine and blood cultures  - Care per medical team  - Discussed with Dr. Wan    [facility]  [id number]

## 2019-09-06 NOTE — DIETITIAN INITIAL EVALUATION ADULT. - OTHER INFO
DIET HISTORY PTA: Pt reports she resides with her daughter who assists with food shopping and cooking. Reports good appetite and intake PTA. Typically eats 2-3 meals per day. Pt noted with PMH of CHF and T2DM. Reports diet is very high in vegetables (fresh green beans, cauliflower), hearty soups, fruit, sometimes fish and chicken. Pt reports she does not consume bread, croissants, juices or soda, or fruits; however admits to cooking with salt often. Reports PTA checks finger sticks 7-8 times per day, typically range 194 to >400 mg/dL. Takes Humalog and Lantus PTA; reports she takes 5 standing dose injections per day. Recent HgbA1c of 10.2% (9/5) suggests poor glycemic maintenance PTA. Pt reports she has received nutrition education in the past, however is amendable to review/reinforcement. Pt notes allergy to kiwi (reaction: "throat closes"). PTA micronutrient coverage includes multivitamin and vitamin D3.      WEIGHT HISTORY: Pt reports UBW of 135 pounds, denying recent weight changes. Reported UBW is consistent with current weight of 137.5 pounds (9/6). Of note, dosing weight noted as 125 pounds, however question accuracy of this weight as pt denies recent weight changes and difference may be related to scale discrepancy.     INTERVENTION: Discussed low sodium and type 2 diabetes nutrition therapy. Reviewed sources of carbohydrates (emphasizing that vegetables are a source as well), effect of carbohydrates on blood sugar, pairing carbohydrates with protein for better glycemic maintenance, continuing to avoid concentrated sweets. Discussed sources of sodium (processed, canned foods), effect of sodium on heart health, avoiding use of salt shaker while cooking/eating, and alternatives to seasoning foods. Also reviewed importance of daily weights and weight gain parameters on when to contact MD. Reinforced necessity for fluid restriction. RD provided and reviewed the following written handouts: Heart Healthy Consistent Carbohydrate Nutrition Therapy, MyPlate Planner.

## 2019-09-06 NOTE — DIETITIAN INITIAL EVALUATION ADULT. - PHYSICAL APPEARANCE
well nourished/Pt visually appears well developed with no overt signs of muscle wasting or fat loss. Pt visually appears well developed with no overt signs of muscle wasting or fat loss./other (specify)/well nourished Ht: 61 inches, Wt: 137.5 pounds (9/6), BMI: 25.9 kg/m2, IBW: 105 pounds (+/-10%), %IBW: 130%  Skin per nursing documentation: intact  Edema per nursing documentation: none noted at this time

## 2019-09-06 NOTE — PROGRESS NOTE ADULT - SUBJECTIVE AND OBJECTIVE BOX
Subjective: Patient seen and examined. No new events except as noted.   no further nausea or vomiting  no cough     REVIEW OF SYSTEMS:    CONSTITUTIONAL: No weakness, fevers or chills  EYES/ENT: No visual changes;  No vertigo or throat pain   NECK: No pain or stiffness  RESPIRATORY: No cough, wheezing, hemoptysis; No shortness of breath  CARDIOVASCULAR: No chest pain or palpitations  GASTROINTESTINAL: No abdominal or epigastric pain. No nausea, vomiting, or hematemesis; No diarrhea or constipation. No melena or hematochezia.  GENITOURINARY: No dysuria, frequency or hematuria  NEUROLOGICAL: No numbness or weakness  SKIN: No itching, burning, rashes, or lesions   All other review of systems is negative unless indicated above.    MEDICATIONS:  MEDICATIONS  (STANDING):  aspirin enteric coated 81 milliGRAM(s) Oral daily  atorvastatin 80 milliGRAM(s) Oral at bedtime  ciprofloxacin   IVPB 400 milliGRAM(s) IV Intermittent every 24 hours  clopidogrel Tablet 75 milliGRAM(s) Oral daily  dextrose 5%. 1000 milliLiter(s) (50 mL/Hr) IV Continuous <Continuous>  dextrose 50% Injectable 12.5 Gram(s) IV Push once  dextrose 50% Injectable 25 Gram(s) IV Push once  dextrose 50% Injectable 25 Gram(s) IV Push once  heparin  Injectable 5000 Unit(s) SubCutaneous every 8 hours  influenza   Vaccine 0.5 milliLiter(s) IntraMuscular once  insulin glargine Injectable (LANTUS) 15 Unit(s) SubCutaneous two times a day  insulin lispro (HumaLOG) corrective regimen sliding scale   SubCutaneous three times a day before meals  metoprolol tartrate 25 milliGRAM(s) Oral two times a day  multivitamin 1 Tablet(s) Oral daily  pantoprazole    Tablet 40 milliGRAM(s) Oral before breakfast  sodium chloride 0.9%. 1000 milliLiter(s) (50 mL/Hr) IV Continuous <Continuous>      PHYSICAL EXAM:  T(C): 36.6 (19 @ 09:23), Max: 37.8 (19 @ 00:15)  HR: 73 (19 @ 09:23) (70 - 78)  BP: 99/55 (19 @ 09:23) (97/61 - 107/61)  RR: 18 (19 @ 09:23) (18 - 18)  SpO2: 94% (19 @ 09:23) (94% - 95%)  Wt(kg): --  I&O's Summary    05 Sep 2019 07:01  -  06 Sep 2019 07:00  --------------------------------------------------------  IN: 440 mL / OUT: 0 mL / NET: 440 mL    06 Sep 2019 07:  -  06 Sep 2019 13:12  --------------------------------------------------------  IN: 240 mL / OUT: 0 mL / NET: 240 mL          Appearance: Normal	  HEENT:   Normal oral mucosa, PERRL, EOMI	  Lymphatic: No lymphadenopathy , no edema  Cardiovascular: Normal S1 S2, No JVD, No murmurs , Peripheral pulses palpable 2+ bilaterally  Respiratory: Lungs clear to auscultation, normal effort 	  Gastrointestinal:  Soft, Non-tender, + BS	  Skin: R Buttock swelling, soft, pain on palpation   Musculoskeletal: Normal range of motion, normal strength  Psychiatry:  Mood & affect appropriate  Ext: No edema      All labs, Imaging and EKGs personally reviewed                           8 )-----------( 129      ( 06 Sep 2019 10:21 )             27.6                   129<L>  |  97  |  41<H>  ----------------------------<  198<H>  4.2   |  19<L>  |  1.93<H>    Ca    9.1      06 Sep 2019 07:26  Phos  2.7       Mg     2.4         TPro  5.8<L>  /  Alb  3.0<L>  /  TBili  1.1  /  DBili  x   /  AST  14  /  ALT  11  /  AlkPhos  92      PT/INR - ( 04 Sep 2019 18:43 )   PT: 14.9 sec;   INR: 1.29 ratio         PTT - ( 04 Sep 2019 18:43 )  PTT:28.4 sec                   Urinalysis Basic - ( 04 Sep 2019 20:50 )    Color: Yellow / Appearance: Slightly Turbid / S.014 / pH: x  Gluc: x / Ketone: Negative  / Bili: Negative / Urobili: Negative   Blood: x / Protein: 30 mg/dL / Nitrite: Negative   Leuk Esterase: Large / RBC: 8 /hpf /  /HPF   Sq Epi: x / Non Sq Epi: 8 /hpf / Bacteria: Many      < from: CT Chest No Cont (19 @ 14:41) >  IMPRESSION:     No pneumonia.

## 2019-09-06 NOTE — PROGRESS NOTE ADULT - ASSESSMENT
Pt is a 85 y/o Female with PMHx of CHF, DM, HLD, HTN, CAD prior MI, c/o fatigue, "feeling off" and unable to get out of bed; Tmax 104F . found t have UTI

## 2019-09-06 NOTE — CONSULT NOTE ADULT - PROBLEM SELECTOR RECOMMENDATION 9
ddx includes - pre renal vs atn vs rule out obstruction  check ua  check urine na/cr/cl/osm/k  check urine pro/cr  check renal us  would d/c spirinolactone  keep off ace/arb/nsaids for now  will provide gentle hydrattion- iv nacl @ 50cc x 24 hours given low bp, and dec po intake  avoid nephrotoxins  trend bmp

## 2019-09-06 NOTE — CONSULT NOTE ADULT - ASSESSMENT
86F PMHx of CHF, DM, HLD, HTN, CAD prior MI, who presented to the ED with fevers and a tender area on her buttocks being tx for uti now with harshil on ckd 3 and hyponatremia

## 2019-09-06 NOTE — PROGRESS NOTE ADULT - SUBJECTIVE AND OBJECTIVE BOX
Patient is a 86y old  Female who presents with a chief complaint of Fever, UTI (06 Sep 2019 07:49)      INTERVAL HISTORY: feels well     	  MEDICATIONS:  metoprolol tartrate 25 milliGRAM(s) Oral two times a day  spironolactone 25 milliGRAM(s) Oral daily        PHYSICAL EXAM:  T(C): 36.6 (09-06-19 @ 09:23), Max: 37.8 (09-06-19 @ 00:15)  HR: 73 (09-06-19 @ 09:23) (70 - 78)  BP: 99/55 (09-06-19 @ 09:23) (97/61 - 107/61)  RR: 18 (09-06-19 @ 09:23) (18 - 18)  SpO2: 94% (09-06-19 @ 09:23) (94% - 95%)  Wt(kg): --  I&O's Summary    05 Sep 2019 07:01  -  06 Sep 2019 07:00  --------------------------------------------------------  IN: 440 mL / OUT: 0 mL / NET: 440 mL          Appearance: In no distress	  HEENT:    PERRL, EOMI	  Cardiovascular:  S1 S2, No JVD  Respiratory: Lungs clear to auscultation	  Gastrointestinal:  Soft, Non-tender, + BS	  Extremities:  No edema of LE                                9.0    12.83 )-----------( 118      ( 05 Sep 2019 09:39 )             27.7     09-06    129<L>  |  97  |  41<H>  ----------------------------<  198<H>  4.2   |  19<L>  |  1.93<H>    Ca    9.1      06 Sep 2019 07:26  Phos  2.7     09-06  Mg     2.4     09-06    TPro  5.8<L>  /  Alb  3.0<L>  /  TBili  1.1  /  DBili  x   /  AST  14  /  ALT  11  /  AlkPhos  92  09-05        Labs personally reviewed      ASSESSMENT/PLAN: 	  84 year old F with PMH of sHF EF 30%, IDDM, HTN, HLD, hx of STEMI w/ stents (2014) presents with fevers with sepsus  1. CAD-  EKG with old LBBB  asymptomatic- Continue with ASA, Plavix, and statin.     2. Chronic sHF - euvolemic at this time. Hold Lasix in setting of sepsis. Continue with OMT- Metoprolol, Aldactone, statin. Resume ACE as ROB resolves    3. HTN - well controlled on above regimen.  Resume ACE as ROB resolves    4. HLD - continue with statin    5. ROB/CKD - nephrology consulted     6. Sepsis 2/2 UTI - abx per primary team      Srini Velasco DO [facility]  Cardiovascular Medicine  [phone]

## 2019-09-06 NOTE — DIETITIAN INITIAL EVALUATION ADULT. - PERTINENT LABORATORY DATA
(9/6) POCT blood glucose 194, Na 129, BUN 41, Cr 1.93, Glucose 198, GFR if Non-African American 23, HDL 28, Total Cholesterol/HDL ratio 2.4, (9/5) Albumin 3.0, Protein total 5.8, HgbA1c 10.2%

## 2019-09-06 NOTE — PROGRESS NOTE ADULT - PROBLEM SELECTOR PLAN 1
T max 104 prior to ER visit   mild fever today   Pan Cx, pending   Tylenol for fever  S/P IV fluid in the ER   UA positive, pending Cx   CXR noted  elevated procalcitonin  CT chest noted, negative for PNA   RVP ordered, negative  ID eval called for further recs

## 2019-09-07 LAB
ANION GAP SERPL CALC-SCNC: 13 MMOL/L — SIGNIFICANT CHANGE UP (ref 5–17)
APPEARANCE UR: ABNORMAL
BACTERIA # UR AUTO: ABNORMAL
BILIRUB UR-MCNC: NEGATIVE — SIGNIFICANT CHANGE UP
BUN SERPL-MCNC: 48 MG/DL — HIGH (ref 7–23)
CALCIUM SERPL-MCNC: 8.5 MG/DL — SIGNIFICANT CHANGE UP (ref 8.4–10.5)
CHLORIDE SERPL-SCNC: 99 MMOL/L — SIGNIFICANT CHANGE UP (ref 96–108)
CO2 SERPL-SCNC: 20 MMOL/L — LOW (ref 22–31)
COLOR SPEC: YELLOW — SIGNIFICANT CHANGE UP
CREAT SERPL-MCNC: 2.09 MG/DL — HIGH (ref 0.5–1.3)
DIFF PNL FLD: SIGNIFICANT CHANGE UP
EPI CELLS # UR: 2 /HPF — SIGNIFICANT CHANGE UP (ref 0–5)
GLUCOSE SERPL-MCNC: 196 MG/DL — HIGH (ref 70–99)
GLUCOSE UR QL: NEGATIVE — SIGNIFICANT CHANGE UP
HCT VFR BLD CALC: 25.5 % — LOW (ref 34.5–45)
HGB BLD-MCNC: 8.7 G/DL — LOW (ref 11.5–15.5)
HYALINE CASTS # UR AUTO: 0 /LPF — SIGNIFICANT CHANGE UP (ref 0–7)
KETONES UR-MCNC: NEGATIVE — SIGNIFICANT CHANGE UP
LEUKOCYTE ESTERASE UR-ACNC: ABNORMAL
MCHC RBC-ENTMCNC: 31.4 PG — SIGNIFICANT CHANGE UP (ref 27–34)
MCHC RBC-ENTMCNC: 34.1 GM/DL — SIGNIFICANT CHANGE UP (ref 32–36)
MCV RBC AUTO: 92.1 FL — SIGNIFICANT CHANGE UP (ref 80–100)
NITRITE UR-MCNC: NEGATIVE — SIGNIFICANT CHANGE UP
OSMOLALITY UR: 380 MOSM/KG — SIGNIFICANT CHANGE UP (ref 50–1200)
PH UR: 5.5 — SIGNIFICANT CHANGE UP (ref 5–8)
PLATELET # BLD AUTO: 136 K/UL — LOW (ref 150–400)
POTASSIUM SERPL-MCNC: 3.8 MMOL/L — SIGNIFICANT CHANGE UP (ref 3.5–5.3)
POTASSIUM SERPL-SCNC: 3.8 MMOL/L — SIGNIFICANT CHANGE UP (ref 3.5–5.3)
PROT UR-MCNC: SIGNIFICANT CHANGE UP
RBC # BLD: 2.77 M/UL — LOW (ref 3.8–5.2)
RBC # FLD: 12.4 % — SIGNIFICANT CHANGE UP (ref 10.3–14.5)
RBC CASTS # UR COMP ASSIST: 4 /HPF — SIGNIFICANT CHANGE UP (ref 0–4)
SODIUM SERPL-SCNC: 132 MMOL/L — LOW (ref 135–145)
SP GR SPEC: 1.02 — SIGNIFICANT CHANGE UP (ref 1.01–1.02)
UROBILINOGEN FLD QL: SIGNIFICANT CHANGE UP
UUN UR-MCNC: 565 MG/DL — SIGNIFICANT CHANGE UP
WBC # BLD: 12.29 K/UL — HIGH (ref 3.8–10.5)
WBC # FLD AUTO: 12.29 K/UL — HIGH (ref 3.8–10.5)
WBC UR QL: 244 /HPF — HIGH (ref 0–5)

## 2019-09-07 PROCEDURE — 74176 CT ABD & PELVIS W/O CONTRAST: CPT | Mod: 26

## 2019-09-07 PROCEDURE — 71250 CT THORAX DX C-: CPT | Mod: 26

## 2019-09-07 RX ORDER — VANCOMYCIN HCL 1 G
VIAL (EA) INTRAVENOUS
Refills: 0 | Status: DISCONTINUED | OUTPATIENT
Start: 2019-09-07 | End: 2019-09-08

## 2019-09-07 RX ORDER — AZTREONAM 2 G
500 VIAL (EA) INJECTION ONCE
Refills: 0 | Status: COMPLETED | OUTPATIENT
Start: 2019-09-07 | End: 2019-09-07

## 2019-09-07 RX ORDER — LIDOCAINE 4 G/100G
1 CREAM TOPICAL EVERY 8 HOURS
Refills: 0 | Status: DISCONTINUED | OUTPATIENT
Start: 2019-09-07 | End: 2019-09-08

## 2019-09-07 RX ORDER — ACETAMINOPHEN 500 MG
1000 TABLET ORAL ONCE
Refills: 0 | Status: COMPLETED | OUTPATIENT
Start: 2019-09-07 | End: 2019-09-07

## 2019-09-07 RX ORDER — VANCOMYCIN HCL 1 G
750 VIAL (EA) INTRAVENOUS ONCE
Refills: 0 | Status: COMPLETED | OUTPATIENT
Start: 2019-09-07 | End: 2019-09-07

## 2019-09-07 RX ORDER — AZTREONAM 2 G
500 VIAL (EA) INJECTION EVERY 8 HOURS
Refills: 0 | Status: DISCONTINUED | OUTPATIENT
Start: 2019-09-08 | End: 2019-09-15

## 2019-09-07 RX ORDER — SODIUM CHLORIDE 9 MG/ML
1000 INJECTION INTRAMUSCULAR; INTRAVENOUS; SUBCUTANEOUS
Refills: 0 | Status: DISCONTINUED | OUTPATIENT
Start: 2019-09-07 | End: 2019-09-09

## 2019-09-07 RX ORDER — AZTREONAM 2 G
VIAL (EA) INJECTION
Refills: 0 | Status: DISCONTINUED | OUTPATIENT
Start: 2019-09-07 | End: 2019-09-15

## 2019-09-07 RX ORDER — ACETAMINOPHEN 500 MG
750 TABLET ORAL EVERY 6 HOURS
Refills: 0 | Status: DISCONTINUED | OUTPATIENT
Start: 2019-09-07 | End: 2019-09-07

## 2019-09-07 RX ORDER — VANCOMYCIN HCL 1 G
750 VIAL (EA) INTRAVENOUS EVERY 24 HOURS
Refills: 0 | Status: DISCONTINUED | OUTPATIENT
Start: 2019-09-08 | End: 2019-09-08

## 2019-09-07 RX ADMIN — Medication 2: at 08:21

## 2019-09-07 RX ADMIN — Medication 25 MILLIGRAM(S): at 08:17

## 2019-09-07 RX ADMIN — Medication 650 MILLIGRAM(S): at 11:09

## 2019-09-07 RX ADMIN — HEPARIN SODIUM 5000 UNIT(S): 5000 INJECTION INTRAVENOUS; SUBCUTANEOUS at 05:51

## 2019-09-07 RX ADMIN — Medication 50 MILLIGRAM(S): at 19:58

## 2019-09-07 RX ADMIN — Medication 81 MILLIGRAM(S): at 11:58

## 2019-09-07 RX ADMIN — PANTOPRAZOLE SODIUM 40 MILLIGRAM(S): 20 TABLET, DELAYED RELEASE ORAL at 05:52

## 2019-09-07 RX ADMIN — Medication 4: at 12:39

## 2019-09-07 RX ADMIN — HEPARIN SODIUM 5000 UNIT(S): 5000 INJECTION INTRAVENOUS; SUBCUTANEOUS at 21:41

## 2019-09-07 RX ADMIN — Medication 650 MILLIGRAM(S): at 03:30

## 2019-09-07 RX ADMIN — Medication 650 MILLIGRAM(S): at 17:37

## 2019-09-07 RX ADMIN — CLOPIDOGREL BISULFATE 75 MILLIGRAM(S): 75 TABLET, FILM COATED ORAL at 11:58

## 2019-09-07 RX ADMIN — INSULIN GLARGINE 15 UNIT(S): 100 INJECTION, SOLUTION SUBCUTANEOUS at 08:21

## 2019-09-07 RX ADMIN — LIDOCAINE 1 APPLICATION(S): 4 CREAM TOPICAL at 17:48

## 2019-09-07 RX ADMIN — Medication 1 TABLET(S): at 11:58

## 2019-09-07 RX ADMIN — Medication 650 MILLIGRAM(S): at 10:39

## 2019-09-07 RX ADMIN — Medication 400 MILLIGRAM(S): at 19:57

## 2019-09-07 RX ADMIN — Medication 250 MILLIGRAM(S): at 21:38

## 2019-09-07 RX ADMIN — Medication 650 MILLIGRAM(S): at 17:07

## 2019-09-07 RX ADMIN — HEPARIN SODIUM 5000 UNIT(S): 5000 INJECTION INTRAVENOUS; SUBCUTANEOUS at 14:18

## 2019-09-07 RX ADMIN — ATORVASTATIN CALCIUM 80 MILLIGRAM(S): 80 TABLET, FILM COATED ORAL at 21:41

## 2019-09-07 RX ADMIN — SODIUM CHLORIDE 50 MILLILITER(S): 9 INJECTION INTRAMUSCULAR; INTRAVENOUS; SUBCUTANEOUS at 05:53

## 2019-09-07 RX ADMIN — LIDOCAINE 1 APPLICATION(S): 4 CREAM TOPICAL at 21:41

## 2019-09-07 RX ADMIN — Medication 650 MILLIGRAM(S): at 02:51

## 2019-09-07 RX ADMIN — SODIUM CHLORIDE 50 MILLILITER(S): 9 INJECTION INTRAMUSCULAR; INTRAVENOUS; SUBCUTANEOUS at 20:24

## 2019-09-07 RX ADMIN — Medication 1000 MILLIGRAM(S): at 20:27

## 2019-09-07 NOTE — PROGRESS NOTE ADULT - PROBLEM SELECTOR PLAN 1
Serum Creatinine higher,  Volume Status : stable  on iv fluid- hold after this evening, Fena 0.43- less than 1%  basic metabolic panel followup  renal us pending  monitor urine output

## 2019-09-07 NOTE — PROGRESS NOTE ADULT - SUBJECTIVE AND OBJECTIVE BOX
[facility] : Ans Serv [phone], Office [phone]  Dr Barrett/Dr Lagos / Dr Arthur SANTIAGO /Dr George arizmendi /Dr JACK Chin/Dr Kirill Kumari/Dr Dimas Bone /Dr LAURA Geronimo  _______________________________________________________________________________________________    seen and examined today for acute kidney injury   Interval : Serum Creatinine higher, serum na improved  VITALS:  T(F): 98.1 (09-07-19 @ 08:12), Max: 98.9 (09-07-19 @ 05:42)  HR: 73 (09-07-19 @ 08:12)  BP: 101/58 (09-07-19 @ 08:12)  RR: 18 (09-07-19 @ 08:12)  SpO2: 95% (09-07-19 @ 08:12)    09-06 @ 07:01  -  09-07 @ 07:00  --------------------------------------------------------  IN: 1750 mL / OUT: 0 mL / NET: 1750 mL    Physical Exam :-  Constitutional: NAD  Neck: Supple.  Respiratory: Bilateral equal breath sounds, few basal Crackles present.  Cardiovascular: S1, S2 normal, positive Murmur  Gastrointestinal: Bowel Sounds present, soft, non tender.  Extremities: no Edema Feet  Neurological: Alert and Oriented x 3  Psychiatric: Normal mood, normal affect  Data:-  Allergies :   codeine (Unknown)  Kiwi (Anaphylaxis)  penicillins (Anaphylaxis)    Hospital Medications:   MEDICATIONS  (STANDING):  aspirin enteric coated 81 milliGRAM(s) Oral daily  atorvastatin 80 milliGRAM(s) Oral at bedtime  ciprofloxacin   IVPB 400 milliGRAM(s) IV Intermittent every 24 hours  clopidogrel Tablet 75 milliGRAM(s) Oral daily  dextrose 5%. 1000 milliLiter(s) (50 mL/Hr) IV Continuous <Continuous>  dextrose 50% Injectable 12.5 Gram(s) IV Push once  dextrose 50% Injectable 25 Gram(s) IV Push once  dextrose 50% Injectable 25 Gram(s) IV Push once  heparin  Injectable 5000 Unit(s) SubCutaneous every 8 hours  influenza   Vaccine 0.5 milliLiter(s) IntraMuscular once  insulin glargine Injectable (LANTUS) 15 Unit(s) SubCutaneous two times a day  insulin lispro (HumaLOG) corrective regimen sliding scale   SubCutaneous three times a day before meals  metoprolol tartrate 25 milliGRAM(s) Oral two times a day  multivitamin 1 Tablet(s) Oral daily  pantoprazole    Tablet 40 milliGRAM(s) Oral before breakfast  sodium chloride 0.9%. 1000 milliLiter(s) (50 mL/Hr) IV Continuous <Continuous>    09-07    132<L>  |  99  |  48<H>  ----------------------------<  196<H>  3.8   |  20<L>  |  2.09<H>    Ca    8.5      07 Sep 2019 07:49  Phos  2.7     09-06  Mg     2.4     09-06      Creatinine Trend: 2.09 <--, 1.93 <--, 1.39 <--, 1.39 <--                        8.9    12.20 )-----------( 129      ( 06 Sep 2019 10:21 )             27.6

## 2019-09-07 NOTE — PROGRESS NOTE ADULT - SUBJECTIVE AND OBJECTIVE BOX
COLORECTAL SURGERY PROGRESS NOTE    86yFemale    SUBJECTIVE:  Patient seen and examined at bedside. No acute events overnight. States the pain she has in her right buttocks has improved somewhat.. Denies any drainage in the area Denies fevers, chills, nausea, vomiting, chest pain, and shortness of breath.     --------------------------------------------------------------------------------------------------  OBJECTIVE:     Physical Exam:  General: AAOx3, NAD, resting comfortably   HEENT: NC/AT  Respiratory: no increased work of breathing   Abdomen: soft, nontender, nondistended, right buttock with locolized area of erianal erythema and induration, but no drainage and no obvious area of fluctuance appreciable   Extremities: warm and well perfused  --------------------------------------------------------------------------------------------------  Vital Signs:  Vital Signs Last 24 Hrs  T(C): 36.7 (07 Sep 2019 08:12), Max: 37.2 (07 Sep 2019 05:42)  T(F): 98.1 (07 Sep 2019 08:12), Max: 98.9 (07 Sep 2019 05:42)  HR: 73 (07 Sep 2019 08:12) (62 - 76)  BP: 101/58 (07 Sep 2019 08:12) (94/51 - 119/62)  BP(mean): --  RR: 18 (07 Sep 2019 08:12) (18 - 18)  SpO2: 95% (07 Sep 2019 08:12) (94% - 96%)    --------------------------------------------------------------------------------------------------  Inputs/Outputs:    06 Sep 2019 07:01  -  07 Sep 2019 07:00  --------------------------------------------------------  IN:    IV PiggyBack: 200 mL    Oral Fluid: 600 mL    sodium chloride 0.9%.: 950 mL  Total IN: 1750 mL    OUT:  Total OUT: 0 mL    Total NET: 1750 mL    --------------------------------------------------------------------------------------------------  Laboratories:                        8.7    12.29 )-----------( 136      ( 07 Sep 2019 10:34 )             25.5     Culture - Blood (collected 04 Sep 2019 22:05)  Source: .Blood  Preliminary Report (05 Sep 2019 23:01):    No growth to date.    Culture - Urine (collected 04 Sep 2019 22:03)  Source: .Urine  Final Report (06 Sep 2019 17:24):    >100,000 CFU/ml Escherichia coli  Organism: Escherichia coli (06 Sep 2019 17:24)  Organism: Escherichia coli (06 Sep 2019 17:24)    Culture - Blood (collected 04 Sep 2019 21:57)  Source: .Blood  Preliminary Report (05 Sep 2019 22:01):    No growth to date.      07 Sep 2019 07:49    132    |  99     |  48     ----------------------------<  196    3.8     |  20     |  2.09     Ca    8.5        07 Sep 2019 07:49  Phos  2.7       06 Sep 2019 07:26  Mg     2.4       06 Sep 2019 07:26    CAPILLARY BLOOD GLUCOSE  POCT Blood Glucose.: 188 mg/dL (07 Sep 2019 08:18)  POCT Blood Glucose.: 227 mg/dL (06 Sep 2019 22:27)  POCT Blood Glucose.: 198 mg/dL (06 Sep 2019 16:44)  POCT Blood Glucose.: 211 mg/dL (06 Sep 2019 12:20)    Urinalysis Basic - ( 06 Sep 2019 23:28 )    Color: Yellow / Appearance: Slightly Turbid / S.016 / pH: x  Gluc: x / Ketone: Negative  / Bili: Negative / Urobili: <2 mg/dL   Blood: x / Protein: Trace / Nitrite: Negative   Leuk Esterase: Large / RBC: 4 /HPF /  /HPF   Sq Epi: x / Non Sq Epi: 2 /HPF / Bacteria: Few    --------------------------------------------------------------------------------------------------  Medications:  MEDICATIONS  (STANDING):  aspirin enteric coated 81 milliGRAM(s) Oral daily  atorvastatin 80 milliGRAM(s) Oral at bedtime  ciprofloxacin   IVPB 400 milliGRAM(s) IV Intermittent every 24 hours  clopidogrel Tablet 75 milliGRAM(s) Oral daily  dextrose 5%. 1000 milliLiter(s) (50 mL/Hr) IV Continuous <Continuous>  dextrose 50% Injectable 12.5 Gram(s) IV Push once  dextrose 50% Injectable 25 Gram(s) IV Push once  dextrose 50% Injectable 25 Gram(s) IV Push once  heparin  Injectable 5000 Unit(s) SubCutaneous every 8 hours  influenza   Vaccine 0.5 milliLiter(s) IntraMuscular once  insulin glargine Injectable (LANTUS) 15 Unit(s) SubCutaneous two times a day  insulin lispro (HumaLOG) corrective regimen sliding scale   SubCutaneous three times a day before meals  metoprolol tartrate 25 milliGRAM(s) Oral two times a day  multivitamin 1 Tablet(s) Oral daily  pantoprazole    Tablet 40 milliGRAM(s) Oral before breakfast  sodium chloride 0.9%. 1000 milliLiter(s) (50 mL/Hr) IV Continuous <Continuous>    MEDICATIONS  (PRN):  acetaminophen    Suspension .. 650 milliGRAM(s) Oral every 6 hours PRN Moderate Pain (4 - 6)  dextrose 40% Gel 15 Gram(s) Oral once PRN Blood Glucose LESS THAN 70 milliGRAM(s)/deciliter  glucagon  Injectable 1 milliGRAM(s) IntraMuscular once PRN Glucose LESS THAN 70 milligrams/deciliter  ondansetron Injectable 4 milliGRAM(s) IV Push every 8 hours PRN Nausea and/or Vomiting

## 2019-09-07 NOTE — CONSULT NOTE ADULT - ASSESSMENT
Pt is a 85 y/o Female with PMHx of CHF, DM, HLD, HTN, CAD prior MI, c/o fatigue, "feeling off" and unable to get out of bed; Tmax 104F toay. Rash/redness on buttocks noted by home health aid. patient also complaining of +chronic cough, nonproductive.  No abdominal pain, no n/v/d. No chest pain.  States she has been feeling hot and cold over past few days, and took her temperature today, found fever of 104, and came to ED for evaluation.  No dysuria, no diarrhea. +constipation (chronic).  Increased diffuse leg swelling for past few days. patient lives at home with family. walks by herself with no use of walker. denies of any MEAD< chest pain on exertion. compliant with all her home medications (04 Sep 2019 22:22)    ER vitals: Tm 102.2, P 75, /77.  WBC 14.8 --> 12.2.  Cr 2.0 <-- 1.3.  PCT 0.49.  Ucx >100K E.coli.  Pt given dose of ceftriaxone, now on cipro for UTI.  ID consult called for further abx managment.      Sepsis:    - fever, leukocytosis.  Source UTI.  Cont cipro.    - Check lactate.  Pct elevated 0.49.  Monitor WBC and fever curve.    - Monitor hemodynamic status and BPs.  s/p IV fluid bolus, cont maintanence fluid.      - blood cu, urine cx.  RVP (-).  No pna on cxr      UTI:    - UA (+).  Ucx E.coli, sensitive to cipro.      - Cr elevated.  Check bladder scan/PVR, renal/bladder US      Will follow,    Sandrita Zaman  [phone] Pt is a 87 y/o Female with PMHx of CHF, DM, HLD, HTN, CAD prior MI, c/o fatigue, "feeling off" and unable to get out of bed; Tmax 104F toay. Rash/redness on buttocks noted by home health aid. patient also complaining of +chronic cough, nonproductive.  No abdominal pain, no n/v/d. No chest pain.  States she has been feeling hot and cold over past few days, and took her temperature today, found fever of 104, and came to ED for evaluation.  No dysuria, no diarrhea. +constipation (chronic).  Increased diffuse leg swelling for past few days. patient lives at home with family. walks by herself with no use of walker. denies of any MEAD< chest pain on exertion. compliant with all her home medications (04 Sep 2019 22:22)    ER vitals: Tm 102.2, P 75, /77.  WBC 14.8 --> 12.2.  Cr 2.0 <-- 1.3.  PCT 0.49.  Ucx >100K E.coli.  Pt given dose of ceftriaxone, now on cipro for UTI.  ID consult called for further abx managment.      Sepsis:    - fever, leukocytosis.  Source UTI.  Cont cipro for now.      - Check lactate.  Pct elevated 0.49.  Monitor WBC and fever curve.    - Monitor hemodynamic status and BPs.  s/p IV fluid bolus, cont maintanence fluid.      - blood cx, urine cx.  RVP (-).  No pna on cxr      UTI:    - UA (+).  Ucx E.coli, sensitive to cipro.      - Cr elevated.  Check bladder scan/PVR, renal/bladder US    - Renally dose abx.    - Renal eval called by primary team      r/o Rt buttock abscess:    - Pending US.  Seen by Surgery, area not amenable to I&D.  r/o abscess on ultrasound    - Warm soaks.  Monitor for development of abscess.  If pt with new fever or worsening symptoms will consider broadening abx.  Pt currently afebrile.            Will follow,    Sandrita Zaman  [phone]

## 2019-09-07 NOTE — PROGRESS NOTE ADULT - SUBJECTIVE AND OBJECTIVE BOX
Subjective: Patient seen and examined. No new events except as noted.   febrile today  Cont to have R Buttock pain       REVIEW OF SYSTEMS:    CONSTITUTIONAL: + fever   EYES/ENT: No visual changes;  No vertigo or throat pain   NECK: No pain or stiffness  RESPIRATORY: No cough, wheezing, hemoptysis; No shortness of breath  CARDIOVASCULAR: No chest pain or palpitations  GASTROINTESTINAL: No abdominal or epigastric pain. No nausea, vomiting, or hematemesis; No diarrhea or constipation. No melena or hematochezia.  GENITOURINARY: No dysuria, frequency or hematuria  NEUROLOGICAL: No numbness or weakness  SKIN: buttock pain   All other review of systems is negative unless indicated above.    MEDICATIONS:  MEDICATIONS  (STANDING):  acetaminophen  IVPB .. 1000 milliGRAM(s) IV Intermittent once  aspirin enteric coated 81 milliGRAM(s) Oral daily  atorvastatin 80 milliGRAM(s) Oral at bedtime  aztreonam  IVPB 500 milliGRAM(s) IV Intermittent once  aztreonam  IVPB      clopidogrel Tablet 75 milliGRAM(s) Oral daily  dextrose 5%. 1000 milliLiter(s) (50 mL/Hr) IV Continuous <Continuous>  dextrose 50% Injectable 12.5 Gram(s) IV Push once  dextrose 50% Injectable 25 Gram(s) IV Push once  dextrose 50% Injectable 25 Gram(s) IV Push once  heparin  Injectable 5000 Unit(s) SubCutaneous every 8 hours  influenza   Vaccine 0.5 milliLiter(s) IntraMuscular once  insulin glargine Injectable (LANTUS) 15 Unit(s) SubCutaneous two times a day  insulin lispro (HumaLOG) corrective regimen sliding scale   SubCutaneous three times a day before meals  lidocaine 5% Ointment 1 Application(s) Topical every 8 hours  metoprolol tartrate 25 milliGRAM(s) Oral two times a day  multivitamin 1 Tablet(s) Oral daily  pantoprazole    Tablet 40 milliGRAM(s) Oral before breakfast  sodium chloride 0.9%. 1000 milliLiter(s) (50 mL/Hr) IV Continuous <Continuous>  vancomycin  IVPB 750 milliGRAM(s) IV Intermittent once  vancomycin  IVPB          PHYSICAL EXAM:  T(C): 38.6 (19 @ 18:35), Max: 39.1 (19 @ 17:45)  HR: 90 (19 @ 17:45) (68 - 90)  BP: 114/63 (19 @ 17:45) (94/51 - 114/63)  RR: 18 (19 @ 17:45) (18 - 18)  SpO2: 93% (19 @ 17:45) (93% - 96%)  Wt(kg): --  I&O's Summary    06 Sep 2019 07:  -  07 Sep 2019 07:00  --------------------------------------------------------  IN: 1750 mL / OUT: 0 mL / NET: 1750 mL    07 Sep 2019 07:  -  07 Sep 2019 18:53  --------------------------------------------------------  IN: 465 mL / OUT: 800 mL / NET: -335 mL          Appearance: awake, in pain and discomfort for buttock pain   HEENT:   Normal oral mucosa, PERRL, EOMI	  Lymphatic: No lymphadenopathy , no edema  Cardiovascular: Normal S1 S2, No JVD, No murmurs , Peripheral pulses palpable 2+ bilaterally  Respiratory: Lungs clear to auscultation, normal effort 	  Gastrointestinal:  Soft, Non-tender, + BS	  Skin: R buttock pain with skin induration   Musculoskeletal: Normal range of motion, normal strength  Psychiatry:  Mood & affect appropriate  Ext: No edema      All labs, Imaging and EKGs personally reviewed                           8.7    12 )-----------( 136      ( 07 Sep 2019 10:34 )             25.5                   132<L>  |  99  |  48<H>  ----------------------------<  196<H>  3.8   |  20<L>  |  2.09<H>    Ca    8.5      07 Sep 2019 07:49  Phos  2.7     09-  Mg     2.4     09-06                         Urinalysis Basic - ( 06 Sep 2019 23:28 )    Color: Yellow / Appearance: Slightly Turbid / S.016 / pH: x  Gluc: x / Ketone: Negative  / Bili: Negative / Urobili: <2 mg/dL   Blood: x / Protein: Trace / Nitrite: Negative   Leuk Esterase: Large / RBC: 4 /HPF /  /HPF   Sq Epi: x / Non Sq Epi: 2 /HPF / Bacteria: Few

## 2019-09-07 NOTE — PROGRESS NOTE ADULT - ASSESSMENT
86 year old woman with fevers, UTI, and area of erythema over right buttock  1. Continue supportive care per primary team  2. Appreciate ID recommendations  3. No obvious area for incision and drainage at this time, follow up ultrasound

## 2019-09-07 NOTE — PROGRESS NOTE ADULT - PROBLEM SELECTOR PLAN 1
fever today   Urine Cx noted  change ABx to vanc and aztreonam for broader coverage   Tylenol for fever and pain, can give IV morphin PRN   gentle hydration   CXR noted  elevated procalcitonin  CT chest noted, negative for PNA   RVP ordered, negative  ID eval called for further recs appreciated, discussed in detail with team   CT C/A/P for further eval  Buttock soft tissue US

## 2019-09-07 NOTE — PROGRESS NOTE ADULT - SUBJECTIVE AND OBJECTIVE BOX
Ms. Merlini is comfortable in bed  She denies any nausea or vomiting  She denies any fever or chills  She complains of some pain near her right buttock    Vital Signs Last 24 Hrs  T(C): 36.7 (07 Sep 2019 08:12), Max: 37.2 (07 Sep 2019 05:42)  T(F): 98.1 (07 Sep 2019 08:12), Max: 98.9 (07 Sep 2019 05:42)  HR: 73 (07 Sep 2019 08:12) (62 - 76)  BP: 101/58 (07 Sep 2019 08:12) (94/51 - 119/62)  BP(mean): --  RR: 18 (07 Sep 2019 08:12) (18 - 18)  SpO2: 95% (07 Sep 2019 08:12) (94% - 96%)      On exam: she is awake, alert and oriented  She is breathing comfortably  She is moving all extremities  There is no scleral icterus  Her abdomen is soft, not tender, not distended  Over the right buttock, there is some erythema, and induration, but no drainage and no obvious area of fluctuance    CBC Full  -  ( 06 Sep 2019 10:21 )  WBC Count : 12.20 K/uL  RBC Count : 2.85 M/uL  Hemoglobin : 8.9 g/dL  Hematocrit : 27.6 %  Platelet Count - Automated : 129 K/uL  Mean Cell Volume : 96.8 fl  Mean Cell Hemoglobin : 31.2 pg  Mean Cell Hemoglobin Concentration : 32.2 gm/dL  Auto Neutrophil # : x  Auto Lymphocyte # : x  Auto Monocyte # : x  Auto Eosinophil # : x  Auto Basophil # : x  Auto Neutrophil % : x  Auto Lymphocyte % : x  Auto Monocyte % : x  Auto Eosinophil % : x  Auto Basophil % : x

## 2019-09-07 NOTE — CHART NOTE - NSCHARTNOTEFT_GEN_A_CORE
Pt with Temp 101.9 at 5:30 pm, and 102.3 at 6 pm. Urine C/S with E-Coli. D/w Dr Boo.  Blood C/S x 2 and CT Chest/Abd/Pelvis ordered. Cipro d/cd. Vancomycin and Azactam started.  Pt for US Rt buttock - to evaluate for abscess, and kidneys - ALI on CKD. Discussed with US Tech and Radiologist Dr Delarosa. Dr Delarosa stated that the  CT Scan C/A/P will yield more information than an US, so the CT will be done first, and if more information is needed, then US will be done.   Pt voided 200 cc urine today and c/o feeling of bladder fullness. Bladder Scan done and pt straight catheterized for 660 cc urine.   Po Tylenol given for fever, and IV Tylenol later given for buttock pain with good effect.  Discussed all above events with Dr Boo who spoke with pt's daughter Bonnie. I also spoke with   Bonnie and gave  her detailed explanation of pt's status and Plan. She verbalized understanding. Endorsed to Night NP for follow up and further care. p and .

## 2019-09-07 NOTE — PROGRESS NOTE ADULT - ASSESSMENT
ASSESSMENT:   86 year old female with fevers, UTI, and area of erythema over right buttock    PLAN:  - No obvious area for incision and drainage at this time,   - Would recommend ultrasound to further evaluate   - Appreciate ID recommendations  - Continue care per primary team     Red Surgery   [phone]

## 2019-09-07 NOTE — CONSULT NOTE ADULT - SUBJECTIVE AND OBJECTIVE BOX
Patient is a 86y old  Female who presents with a chief complaint of Fever, UTI (06 Sep 2019 13:11)      HPI:  Pt is a 87 y/o Female with PMHx of CHF, DM, HLD, HTN, CAD prior MI, c/o fatigue, "feeling off" and unable to get out of bed; Tmax 104F toay. Rash/redness on buttocks noted by home health aid. patient also complaining of +chronic cough, nonproductive.  No abdominal pain, no n/v/d. No chest pain.  States she has been feeling hot and cold over past few days, and took her temperature today, found fever of 104, and came to ED for evaluation.  No dysuria, no diarrhea. +constipation (chronic).  Increased diffuse leg swelling for past few days. patient lives at home with family. walks by herself with no use of walker. denies of any MEAD< chest pain on exertion. compliant with all her home medications (04 Sep 2019 22:22)    ER vitals: Tm 102.2, P 75, /77.  WBC 14.8 --> 12.2.  Cr 2.0 <-- 1.3.  PCT 0.49.  Ucx >100K E.coli.  Pt given dose of ceftriaxone, now on cipro for UTI.  ID consult called for further abx managment.      REVIEW OF SYSTEMS:    CONSTITUTIONAL: No fever, weight loss, or fatigue  EYES: No eye pain, visual disturbances, or discharge  ENMT:  No sore throat  NECK: No pain or stiffness  RESPIRATORY: No cough, wheezing, chills or hemoptysis; No shortness of breath  CARDIOVASCULAR: No chest pain, palpitations, dizziness, or leg swelling  GASTROINTESTINAL: No abdominal or epigastric pain. No nausea, vomiting, or hematemesis; No diarrhea or constipation. No melena or hematochezia.  GENITOURINARY: No dysuria, frequency, hematuria, or incontinence  NEUROLOGICAL: No headaches, memory loss, loss of strength, numbness, or tremors  SKIN: No itching, burning, rashes, or lesions   LYMPH NODES: No enlarged glands  MUSCULOSKELETAL: No joint pain or swelling; No muscle, back, or extremity pain      PAST MEDICAL & SURGICAL HISTORY:  CHF (congestive heart failure)  STEMI (ST elevation myocardial infarction)  Palpitations  Diabetes mellitus  Dyslipidemia  HTN (hypertension)  S/P cardiac cath  S/P tonsillectomy  S/P lumpectomy, left breast: premalignant  S/P appendectomy  S/P cholecystectomy      Allergies    codeine (Unknown)  Kiwi (Anaphylaxis)  penicillins (Anaphylaxis)    Intolerances        FAMILY HISTORY:  No pertinent family history in first degree relatives      SOCIAL HISTORY:        MEDICATIONS  (STANDING):  aspirin enteric coated 81 milliGRAM(s) Oral daily  atorvastatin 80 milliGRAM(s) Oral at bedtime  ciprofloxacin   IVPB 400 milliGRAM(s) IV Intermittent every 24 hours  clopidogrel Tablet 75 milliGRAM(s) Oral daily  dextrose 5%. 1000 milliLiter(s) (50 mL/Hr) IV Continuous <Continuous>  dextrose 50% Injectable 12.5 Gram(s) IV Push once  dextrose 50% Injectable 25 Gram(s) IV Push once  dextrose 50% Injectable 25 Gram(s) IV Push once  heparin  Injectable 5000 Unit(s) SubCutaneous every 8 hours  influenza   Vaccine 0.5 milliLiter(s) IntraMuscular once  insulin glargine Injectable (LANTUS) 15 Unit(s) SubCutaneous two times a day  insulin lispro (HumaLOG) corrective regimen sliding scale   SubCutaneous three times a day before meals  metoprolol tartrate 25 milliGRAM(s) Oral two times a day  multivitamin 1 Tablet(s) Oral daily  pantoprazole    Tablet 40 milliGRAM(s) Oral before breakfast  sodium chloride 0.9%. 1000 milliLiter(s) (50 mL/Hr) IV Continuous <Continuous>    MEDICATIONS  (PRN):  acetaminophen    Suspension .. 650 milliGRAM(s) Oral every 6 hours PRN Moderate Pain (4 - 6)  dextrose 40% Gel 15 Gram(s) Oral once PRN Blood Glucose LESS THAN 70 milliGRAM(s)/deciliter  glucagon  Injectable 1 milliGRAM(s) IntraMuscular once PRN Glucose LESS THAN 70 milligrams/deciliter  ondansetron Injectable 4 milliGRAM(s) IV Push every 8 hours PRN Nausea and/or Vomiting      Vital Signs Last 24 Hrs  T(C): 36.7 (07 Sep 2019 08:12), Max: 37.2 (07 Sep 2019 05:42)  T(F): 98.1 (07 Sep 2019 08:12), Max: 98.9 (07 Sep 2019 05:42)  HR: 73 (07 Sep 2019 08:12) (62 - 76)  BP: 101/58 (07 Sep 2019 08:12) (94/51 - 119/62)  BP(mean): --  RR: 18 (07 Sep 2019 08:12) (18 - 18)  SpO2: 95% (07 Sep 2019 08:12) (94% - 96%)    PHYSICAL EXAM:    GENERAL: NAD, well-groomed  HEAD:  Atraumatic, Normocephalic  EYES: EOMI, PERRLA, conjunctiva and sclera clear  ENMT: No tonsillar erythema, exudates, or enlargement; Moist mucous membranes  NECK: Supple, No JVD  CHEST/LUNG: Clear to percussion bilaterally; No rales, rhonchi, wheezing, or rubs  HEART: Regular rate and rhythm; No murmurs, rubs, or gallops  ABDOMEN: Soft, Nontender, Nondistended; Bowel sounds present  EXTREMITIES:  2+ Peripheral Pulses, No clubbing, cyanosis, or edema  LYMPH: No lymphadenopathy noted  SKIN: No rashes or lesions    LABS:  CBC Full  -  ( 06 Sep 2019 10:21 )  WBC Count : 12.20 K/uL  RBC Count : 2.85 M/uL  Hemoglobin : 8.9 g/dL  Hematocrit : 27.6 %  Platelet Count - Automated : 129 K/uL  Mean Cell Volume : 96.8 fl  Mean Cell Hemoglobin : 31.2 pg  Mean Cell Hemoglobin Concentration : 32.2 gm/dL  Auto Neutrophil # : x  Auto Lymphocyte # : x  Auto Monocyte # : x  Auto Eosinophil # : x  Auto Basophil # : x  Auto Neutrophil % : x  Auto Lymphocyte % : x  Auto Monocyte % : x  Auto Eosinophil % : x  Auto Basophil % : x          132<L>  |  99  |  48<H>  ----------------------------<  196<H>  3.8   |  20<L>  |  2.09<H>    Ca    8.5      07 Sep 2019 07:49  Phos  2.7     -  Mg     2.4     -                MICROBIOLOGY:        Urinalysis Basic - ( 06 Sep 2019 23:28 )    Color: Yellow / Appearance: Slightly Turbid / S.016 / pH: x  Gluc: x / Ketone: Negative  / Bili: Negative / Urobili: <2 mg/dL   Blood: x / Protein: Trace / Nitrite: Negative   Leuk Esterase: Large / RBC: 4 /HPF /  /HPF   Sq Epi: x / Non Sq Epi: 2 /HPF / Bacteria: Few      Culture - Blood (19 @ 22:05)    Specimen Source: .Blood    Culture Results:   No growth to date.    Culture - Urine (19 @ 22:03)    -  Amikacin: S <=16    -  Ampicillin: S <=8 These ampicillin results predict results for amoxicillin    -  Ampicillin/Sulbactam: S <=8/4 Enterobacter, Citrobacter, and Serratia may develop resistance during prolonged therapy (3-4 days)    -  Aztreonam: S <=4    -  Cefazolin: S <=8 (MIC_CL_COM_ENTERIC_CEFAZU) For uncomplicated UTI with K. pneumoniae, E. coli, or P. mirablis: KAREN <=16 is sensitive and KAREN >=32 is resistant. This also predicts results for oral agents cefaclor, cefdinir, cefpodoxime, cefprozil, cefuroxime axetil, cephalexin and locarbef for uncomplicated UTI. Note that some isolates may be susceptible to these agents while testing resistant to cefazolin.    -  Cefepime: S <=4    -  Cefoxitin: S <=8    -  Ceftriaxone: S <=1 Enterobacter, Citrobacter, and Serratia may develop resistance during prolonged therapy    -  Ciprofloxacin: S <=1    -  Gentamicin: S <=4    -  Imipenem: S <=1    -  Levofloxacin: S <=2    -  Meropenem: S <=1    -  Nitrofurantoin: S <=32 Should not be used to treat pyelonephritis    -  Piperacillin/Tazobactam: S <=16    -  Tigecycline: S <=2    -  Tobramycin: S <=4    -  Trimethoprim/Sulfamethoxazole: S <=2/38    Specimen Source: .Urine    Culture Results:   >100,000 CFU/ml Escherichia coli    Organism Identification: Escherichia coli    Organism: Escherichia coli    Method Type: KAREN    Culture - Blood (19 @ 21:57)    Specimen Source: .Blood    Culture Results:   No growth to date.              RADIOLOGY:    < from: CT Chest No Cont (19 @ 14:41) >  FINDINGS:    LUNGS AND AIRWAYS: Patent central airways.  Lungs are clear. Minimal   atelectasis in the bilateral lower lobes.    PLEURA: Trace bilateral pleural effusions.    MEDIASTINUM AND DIONISIO: Pretracheal and subcarinal lymph nodes.    VESSELS: Coronary artery atherosclerosis.    HEART: Heart size is normal. No pericardial effusion.    CHEST WALL AND LOWER NECK: Within normal limits.    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    BONES: Degenerative spinal changes.    IMPRESSION:     No pneumonia.    < end of copied text >        < from: Xray Chest 1 View AP/PA (19 @ 18:27) >  FINDINGS:    LUNGS: The lungs are clear.    PLEURA: No pleural effusion or pneumothorax.    HEART AND MEDIASTINUM: Heart size is within normal limits accounting for   projection.    SKELETON: Unremarkable skeletal structures.      IMPRESSION:     Clear lungs.    < end of copied text > Patient is a 86y old  Female who presents with a chief complaint of Fever, UTI (06 Sep 2019 13:11)      HPI:    Pt is a 87 y/o Female with PMHx of CHF, DM, HLD, HTN, CAD prior MI, c/o fatigue, "feeling off" and unable to get out of bed; Tmax 104F today. Rash/redness on buttocks noted by home health aid. patient also complaining of +chronic cough, nonproductive.  No abdominal pain, no n/v/d. No chest pain.  States she has been feeling hot and cold over past few days, and took her temperature today, found fever of 104, and came to ED for evaluation.  No dysuria, no diarrhea. +constipation (chronic).  Increased diffuse leg swelling for past few days. patient lives at home with family. walks by herself with no use of walker. denies of any MEAD< chest pain on exertion. compliant with all her home medications (04 Sep 2019 22:22)    ER vitals: Tm 102.2, P 75, /77.  WBC 14.8 --> 12.2.  Cr 2.0 <-- 1.3.  PCT 0.49.  UA large LE and pyuria.  Ucx >100K E.coli.  Pt given dose of ceftriaxone, now on cipro for UTI.  Denies dysuria, urinary retention, flank pain.  c/o pain in rt buttocks, swelling/hardness along inner fold.  Pending US, pt seen by surgery.  No fluctuance or drainage, area not amenable to I&D.      ID consult called for further abx managment.      REVIEW OF SYSTEMS:    CONSTITUTIONAL: No fever, weight loss, or fatigue  EYES: No eye pain, visual disturbances, or discharge  ENMT:  No sore throat  NECK: No pain or stiffness  RESPIRATORY: No cough, wheezing, chills or hemoptysis; No shortness of breath  CARDIOVASCULAR: No chest pain, palpitations, dizziness, or leg swelling  GASTROINTESTINAL: No abdominal or epigastric pain. No nausea, vomiting, or hematemesis; No diarrhea or constipation. No melena or hematochezia.  GENITOURINARY: No dysuria, frequency, hematuria, or incontinence  NEUROLOGICAL: No headaches, memory loss, loss of strength, numbness, or tremors  SKIN: No itching, burning, rashes, or lesions   LYMPH NODES: No enlarged glands  MUSCULOSKELETAL: No joint pain or swelling; No muscle, back, or extremity pain      PAST MEDICAL & SURGICAL HISTORY:  CHF (congestive heart failure)  STEMI (ST elevation myocardial infarction)  Palpitations  Diabetes mellitus  Dyslipidemia  HTN (hypertension)  S/P cardiac cath  S/P tonsillectomy  S/P lumpectomy, left breast: premalignant  S/P appendectomy  S/P cholecystectomy      Allergies    codeine (Unknown)  Kiwi (Anaphylaxis)  penicillins (Anaphylaxis)    Intolerances        FAMILY HISTORY:  No pertinent family history in first degree relatives      SOCIAL HISTORY:  No smoking, ivdu, etoh      MEDICATIONS  (STANDING):  aspirin enteric coated 81 milliGRAM(s) Oral daily  atorvastatin 80 milliGRAM(s) Oral at bedtime  ciprofloxacin   IVPB 400 milliGRAM(s) IV Intermittent every 24 hours  clopidogrel Tablet 75 milliGRAM(s) Oral daily  dextrose 5%. 1000 milliLiter(s) (50 mL/Hr) IV Continuous <Continuous>  dextrose 50% Injectable 12.5 Gram(s) IV Push once  dextrose 50% Injectable 25 Gram(s) IV Push once  dextrose 50% Injectable 25 Gram(s) IV Push once  heparin  Injectable 5000 Unit(s) SubCutaneous every 8 hours  influenza   Vaccine 0.5 milliLiter(s) IntraMuscular once  insulin glargine Injectable (LANTUS) 15 Unit(s) SubCutaneous two times a day  insulin lispro (HumaLOG) corrective regimen sliding scale   SubCutaneous three times a day before meals  metoprolol tartrate 25 milliGRAM(s) Oral two times a day  multivitamin 1 Tablet(s) Oral daily  pantoprazole    Tablet 40 milliGRAM(s) Oral before breakfast  sodium chloride 0.9%. 1000 milliLiter(s) (50 mL/Hr) IV Continuous <Continuous>    MEDICATIONS  (PRN):  acetaminophen    Suspension .. 650 milliGRAM(s) Oral every 6 hours PRN Moderate Pain (4 - 6)  dextrose 40% Gel 15 Gram(s) Oral once PRN Blood Glucose LESS THAN 70 milliGRAM(s)/deciliter  glucagon  Injectable 1 milliGRAM(s) IntraMuscular once PRN Glucose LESS THAN 70 milligrams/deciliter  ondansetron Injectable 4 milliGRAM(s) IV Push every 8 hours PRN Nausea and/or Vomiting      Vital Signs Last 24 Hrs  T(C): 36.7 (07 Sep 2019 08:12), Max: 37.2 (07 Sep 2019 05:42)  T(F): 98.1 (07 Sep 2019 08:12), Max: 98.9 (07 Sep 2019 05:42)  HR: 73 (07 Sep 2019 08:12) (62 - 76)  BP: 101/58 (07 Sep 2019 08:12) (94/51 - 119/62)  BP(mean): --  RR: 18 (07 Sep 2019 08:12) (18 - 18)  SpO2: 95% (07 Sep 2019 08:12) (94% - 96%)    PHYSICAL EXAM:    GENERAL: NAD, well-groomed  HEAD:  Atraumatic, Normocephalic  EYES: EOMI, PERRLA, conjunctiva and sclera clear  ENMT: No tonsillar erythema, exudates, or enlargement; Moist mucous membranes  NECK: Supple, No JVD  CHEST/LUNG: Clear to percussion bilaterally; No rales, rhonchi, wheezing, or rubs  HEART: Regular rate and rhythm; No murmurs, rubs, or gallops  ABDOMEN: Soft, Nontender, Nondistended; Bowel sounds present  EXTREMITIES:  2+ Peripheral Pulses, No clubbing, cyanosis, or edema  LYMPH: No lymphadenopathy noted  SKIN: rt inner buttock area indurated,hard, with mild erythema.  No flucutance, no drainage.      LABS:  CBC Full  -  ( 06 Sep 2019 10:21 )  WBC Count : 12.20 K/uL  RBC Count : 2.85 M/uL  Hemoglobin : 8.9 g/dL  Hematocrit : 27.6 %  Platelet Count - Automated : 129 K/uL  Mean Cell Volume : 96.8 fl  Mean Cell Hemoglobin : 31.2 pg  Mean Cell Hemoglobin Concentration : 32.2 gm/dL  Auto Neutrophil # : x  Auto Lymphocyte # : x  Auto Monocyte # : x  Auto Eosinophil # : x  Auto Basophil # : x  Auto Neutrophil % : x  Auto Lymphocyte % : x  Auto Monocyte % : x  Auto Eosinophil % : x  Auto Basophil % : x          132<L>  |  99  |  48<H>  ----------------------------<  196<H>  3.8   |  20<L>  |  2.09<H>    Ca    8.5      07 Sep 2019 07:49  Phos  2.7       Mg     2.4                     MICROBIOLOGY:        Urinalysis Basic - ( 06 Sep 2019 23:28 )    Color: Yellow / Appearance: Slightly Turbid / S.016 / pH: x  Gluc: x / Ketone: Negative  / Bili: Negative / Urobili: <2 mg/dL   Blood: x / Protein: Trace / Nitrite: Negative   Leuk Esterase: Large / RBC: 4 /HPF /  /HPF   Sq Epi: x / Non Sq Epi: 2 /HPF / Bacteria: Few      Culture - Blood (19 @ 22:05)    Specimen Source: .Blood    Culture Results:   No growth to date.    Culture - Urine (19 @ 22:03)    -  Amikacin: S <=16    -  Ampicillin: S <=8 These ampicillin results predict results for amoxicillin    -  Ampicillin/Sulbactam: S <=8/4 Enterobacter, Citrobacter, and Serratia may develop resistance during prolonged therapy (3-4 days)    -  Aztreonam: S <=4    -  Cefazolin: S <=8 (MIC_CL_COM_ENTERIC_CEFAZU) For uncomplicated UTI with K. pneumoniae, E. coli, or P. mirablis: KAREN <=16 is sensitive and KAREN >=32 is resistant. This also predicts results for oral agents cefaclor, cefdinir, cefpodoxime, cefprozil, cefuroxime axetil, cephalexin and locarbef for uncomplicated UTI. Note that some isolates may be susceptible to these agents while testing resistant to cefazolin.    -  Cefepime: S <=4    -  Cefoxitin: S <=8    -  Ceftriaxone: S <=1 Enterobacter, Citrobacter, and Serratia may develop resistance during prolonged therapy    -  Ciprofloxacin: S <=1    -  Gentamicin: S <=4    -  Imipenem: S <=1    -  Levofloxacin: S <=2    -  Meropenem: S <=1    -  Nitrofurantoin: S <=32 Should not be used to treat pyelonephritis    -  Piperacillin/Tazobactam: S <=16    -  Tigecycline: S <=2    -  Tobramycin: S <=4    -  Trimethoprim/Sulfamethoxazole: S <=2/38    Specimen Source: .Urine    Culture Results:   >100,000 CFU/ml Escherichia coli    Organism Identification: Escherichia coli    Organism: Escherichia coli    Method Type: KAREN    Culture - Blood (19 @ 21:57)    Specimen Source: .Blood    Culture Results:   No growth to date.              RADIOLOGY:    < from: CT Chest No Cont (19 @ 14:41) >  FINDINGS:    LUNGS AND AIRWAYS: Patent central airways.  Lungs are clear. Minimal   atelectasis in the bilateral lower lobes.    PLEURA: Trace bilateral pleural effusions.    MEDIASTINUM AND DIONISIO: Pretracheal and subcarinal lymph nodes.    VESSELS: Coronary artery atherosclerosis.    HEART: Heart size is normal. No pericardial effusion.    CHEST WALL AND LOWER NECK: Within normal limits.    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    BONES: Degenerative spinal changes.    IMPRESSION:     No pneumonia.    < end of copied text >        < from: Xray Chest 1 View AP/PA (19 @ 18:27) >  FINDINGS:    LUNGS: The lungs are clear.    PLEURA: No pleural effusion or pneumothorax.    HEART AND MEDIASTINUM: Heart size is within normal limits accounting for   projection.    SKELETON: Unremarkable skeletal structures.      IMPRESSION:     Clear lungs.    < end of copied text >

## 2019-09-08 LAB
ANION GAP SERPL CALC-SCNC: 15 MMOL/L — SIGNIFICANT CHANGE UP (ref 5–17)
BUN SERPL-MCNC: 48 MG/DL — HIGH (ref 7–23)
CALCIUM SERPL-MCNC: 8.9 MG/DL — SIGNIFICANT CHANGE UP (ref 8.4–10.5)
CHLORIDE SERPL-SCNC: 97 MMOL/L — SIGNIFICANT CHANGE UP (ref 96–108)
CO2 SERPL-SCNC: 17 MMOL/L — LOW (ref 22–31)
CREAT SERPL-MCNC: 1.98 MG/DL — HIGH (ref 0.5–1.3)
GLUCOSE SERPL-MCNC: 162 MG/DL — HIGH (ref 70–99)
HCT VFR BLD CALC: 25 % — LOW (ref 34.5–45)
HGB BLD-MCNC: 8.7 G/DL — LOW (ref 11.5–15.5)
MCHC RBC-ENTMCNC: 31.3 PG — SIGNIFICANT CHANGE UP (ref 27–34)
MCHC RBC-ENTMCNC: 34.8 GM/DL — SIGNIFICANT CHANGE UP (ref 32–36)
MCV RBC AUTO: 89.9 FL — SIGNIFICANT CHANGE UP (ref 80–100)
PLATELET # BLD AUTO: 178 K/UL — SIGNIFICANT CHANGE UP (ref 150–400)
POTASSIUM SERPL-MCNC: 4.1 MMOL/L — SIGNIFICANT CHANGE UP (ref 3.5–5.3)
POTASSIUM SERPL-SCNC: 4.1 MMOL/L — SIGNIFICANT CHANGE UP (ref 3.5–5.3)
RBC # BLD: 2.78 M/UL — LOW (ref 3.8–5.2)
RBC # FLD: 12.8 % — SIGNIFICANT CHANGE UP (ref 10.3–14.5)
SODIUM SERPL-SCNC: 129 MMOL/L — LOW (ref 135–145)
WBC # BLD: 18.72 K/UL — HIGH (ref 3.8–10.5)
WBC # FLD AUTO: 18.72 K/UL — HIGH (ref 3.8–10.5)

## 2019-09-08 PROCEDURE — 93010 ELECTROCARDIOGRAM REPORT: CPT

## 2019-09-08 RX ORDER — ACETAMINOPHEN 500 MG
650 TABLET ORAL EVERY 6 HOURS
Refills: 0 | Status: DISCONTINUED | OUTPATIENT
Start: 2019-09-08 | End: 2019-09-11

## 2019-09-08 RX ORDER — LIDOCAINE 4 G/100G
1 CREAM TOPICAL EVERY 6 HOURS
Refills: 0 | Status: DISCONTINUED | OUTPATIENT
Start: 2019-09-08 | End: 2019-09-09

## 2019-09-08 RX ADMIN — Medication 650 MILLIGRAM(S): at 22:28

## 2019-09-08 RX ADMIN — ONDANSETRON 4 MILLIGRAM(S): 8 TABLET, FILM COATED ORAL at 08:51

## 2019-09-08 RX ADMIN — CLOPIDOGREL BISULFATE 75 MILLIGRAM(S): 75 TABLET, FILM COATED ORAL at 13:19

## 2019-09-08 RX ADMIN — LIDOCAINE 1 APPLICATION(S): 4 CREAM TOPICAL at 13:21

## 2019-09-08 RX ADMIN — PANTOPRAZOLE SODIUM 40 MILLIGRAM(S): 20 TABLET, DELAYED RELEASE ORAL at 05:21

## 2019-09-08 RX ADMIN — HEPARIN SODIUM 5000 UNIT(S): 5000 INJECTION INTRAVENOUS; SUBCUTANEOUS at 05:21

## 2019-09-08 RX ADMIN — Medication 50 MILLIGRAM(S): at 13:20

## 2019-09-08 RX ADMIN — INSULIN GLARGINE 15 UNIT(S): 100 INJECTION, SOLUTION SUBCUTANEOUS at 22:29

## 2019-09-08 RX ADMIN — SODIUM CHLORIDE 50 MILLILITER(S): 9 INJECTION INTRAMUSCULAR; INTRAVENOUS; SUBCUTANEOUS at 17:39

## 2019-09-08 RX ADMIN — Medication 6: at 18:23

## 2019-09-08 RX ADMIN — Medication 1 TABLET(S): at 13:20

## 2019-09-08 RX ADMIN — INSULIN GLARGINE 15 UNIT(S): 100 INJECTION, SOLUTION SUBCUTANEOUS at 08:49

## 2019-09-08 RX ADMIN — ONDANSETRON 4 MILLIGRAM(S): 8 TABLET, FILM COATED ORAL at 20:41

## 2019-09-08 RX ADMIN — LIDOCAINE 1 APPLICATION(S): 4 CREAM TOPICAL at 05:22

## 2019-09-08 RX ADMIN — Medication 81 MILLIGRAM(S): at 13:19

## 2019-09-08 RX ADMIN — HEPARIN SODIUM 5000 UNIT(S): 5000 INJECTION INTRAVENOUS; SUBCUTANEOUS at 13:21

## 2019-09-08 RX ADMIN — HEPARIN SODIUM 5000 UNIT(S): 5000 INJECTION INTRAVENOUS; SUBCUTANEOUS at 22:29

## 2019-09-08 RX ADMIN — Medication 2: at 08:51

## 2019-09-08 RX ADMIN — Medication 50 MILLIGRAM(S): at 05:20

## 2019-09-08 RX ADMIN — Medication 110 MILLIGRAM(S): at 17:27

## 2019-09-08 RX ADMIN — Medication 25 MILLIGRAM(S): at 05:22

## 2019-09-08 RX ADMIN — ATORVASTATIN CALCIUM 80 MILLIGRAM(S): 80 TABLET, FILM COATED ORAL at 22:29

## 2019-09-08 RX ADMIN — Medication 25 MILLIGRAM(S): at 17:35

## 2019-09-08 NOTE — PROGRESS NOTE ADULT - ASSESSMENT
ASSESSMENT:   86 year old female with fevers, UTI, and area of erythema over right buttock    PLAN:  - Recommending ultrasound to further evaluate   - Appreciate ID recommendations  - Continue care per primary team     Red Surgery   [id number] ASSESSMENT:   86 year old female with fevers, UTI, and area of erythema over right buttock    PLAN:  - f/u CT, if no collection likely will sign off  - Appreciate ID recommendations  - Continue care per primary team     Red Surgery   [id number] ASSESSMENT:   86 year old female with fevers, UTI, and area of erythema over right buttock    PLAN:  - Final CT w/o collection, no surgical intervention at this time. Will benefit from abx  - Appreciate ID recommendations  - Continue care per primary team     Red Surgery   [id number]

## 2019-09-08 NOTE — PROGRESS NOTE ADULT - SUBJECTIVE AND OBJECTIVE BOX
Subjective: Patient seen and examined. No new events except as noted.   feeling better  afebrile since last night   had episode of ? chest discomfort SOB and feeling "funny" after receiving vanco   resolved this morning     REVIEW OF SYSTEMS:    CONSTITUTIONAL: No weakness, fevers or chills  EYES/ENT: No visual changes;  No vertigo or throat pain   NECK: No pain or stiffness  RESPIRATORY: No cough, wheezing, hemoptysis; No shortness of breath  CARDIOVASCULAR: No chest pain or palpitations  GASTROINTESTINAL: No abdominal or epigastric pain. No nausea, vomiting, or hematemesis; No diarrhea or constipation. No melena or hematochezia.  GENITOURINARY: No dysuria, frequency or hematuria  NEUROLOGICAL: No numbness or weakness  SKIN: buttock pain improved   All other review of systems is negative unless indicated above.    MEDICATIONS:  MEDICATIONS  (STANDING):  aspirin enteric coated 81 milliGRAM(s) Oral daily  atorvastatin 80 milliGRAM(s) Oral at bedtime  aztreonam  IVPB 500 milliGRAM(s) IV Intermittent every 8 hours  aztreonam  IVPB      clopidogrel Tablet 75 milliGRAM(s) Oral daily  dextrose 5%. 1000 milliLiter(s) (50 mL/Hr) IV Continuous <Continuous>  dextrose 50% Injectable 12.5 Gram(s) IV Push once  dextrose 50% Injectable 25 Gram(s) IV Push once  dextrose 50% Injectable 25 Gram(s) IV Push once  doxycycline IVPB      heparin  Injectable 5000 Unit(s) SubCutaneous every 8 hours  influenza   Vaccine 0.5 milliLiter(s) IntraMuscular once  insulin glargine Injectable (LANTUS) 15 Unit(s) SubCutaneous two times a day  insulin lispro (HumaLOG) corrective regimen sliding scale   SubCutaneous three times a day before meals  lidocaine 5% Ointment 1 Application(s) Topical every 8 hours  metoprolol tartrate 25 milliGRAM(s) Oral two times a day  multivitamin 1 Tablet(s) Oral daily  pantoprazole    Tablet 40 milliGRAM(s) Oral before breakfast  sodium chloride 0.9%. 1000 milliLiter(s) (50 mL/Hr) IV Continuous <Continuous>      PHYSICAL EXAM:  T(C): 37.7 (09-08-19 @ 10:32), Max: 39.1 (09-07-19 @ 17:45)  HR: 79 (09-08-19 @ 10:32) (68 - 90)  BP: 92/62 (09-08-19 @ 10:32) (84/50 - 123/62)  RR: 18 (09-08-19 @ 10:32) (18 - 18)  SpO2: 92% (09-08-19 @ 10:32) (92% - 96%)  Wt(kg): --  I&O's Summary    07 Sep 2019 07:01  -  08 Sep 2019 07:00  --------------------------------------------------------  IN: 1425 mL / OUT: 1200 mL / NET: 225 mL    08 Sep 2019 07:01  -  08 Sep 2019 13:47  --------------------------------------------------------  IN: 240 mL / OUT: 0 mL / NET: 240 mL          Appearance: Normal	  HEENT:   Normal oral mucosa, PERRL, EOMI	  Lymphatic: No lymphadenopathy , no edema  Cardiovascular: Normal S1 S2, No JVD  Respiratory: Lungs clear to auscultation, normal effort 	  Gastrointestinal:  Soft, Non-tender, + BS	  Skin: R buttock induration and tenderness on palaption   Musculoskeletal: Normal range of motion, normal strength  Psychiatry:  Mood & affect appropriate  Ext: No edema      All labs, Imaging and EKGs personally reviewed     < from: CT Abdomen and Pelvis No Cont (09.07.19 @ 22:29) >    IMPRESSION:     Infiltration of the posterior perineal soft tissues, right greater than   left. Correlation for cellulitis and tenderness recommended.    Small bilateral pleural effusions.

## 2019-09-08 NOTE — PROGRESS NOTE ADULT - PROBLEM SELECTOR PLAN 1
afebrile today however leukocytosis   Urine Cx noted  reaction to vanco?  cont aztreonam and added doxy for MRSA coverage   Tylenol for fever and pain, can give IV morphin PRN   gentle hydration   CXR noted  elevated procalcitonin  CT chest noted, negative for PNA   RVP ordered, negative  ID eval called for further recs appreciated, discussed in detail with team   CT C/A/P noted. R Buttock infiltrate   Buttock soft tissue US pending

## 2019-09-08 NOTE — CHART NOTE - NSCHARTNOTEFT_GEN_A_CORE
MEDICINE PA    Notified by RN patient with temperature 101.4. Patient seen and examined patient at bedside, she reports she was feeling hot which prompted her to call the nurse to check her temperature. Patient is alert, NAD. Denies HA, CP, SOB, cough, N/V, or abd pain. As per RN patient has not yet received dose of PM Doxycycline as patient screaming with pain after flushing of IV, IV nurse called who placed IV yesterday evening.     VITAL SIGNS:  T(C): 37.7 (09-08-19 @ 10:32), Max: 37.7 (09-08-19 @ 10:32)  HR: 79 (09-08-19 @ 10:32) (79 - 85)  BP: 92/62 (09-08-19 @ 10:32) (92/62 - 123/62)  RR: 18 (09-08-19 @ 10:32) (18 - 18)  SpO2: 92% (09-08-19 @ 10:32) (92% - 95%)      LABORATORY:                          8.7    18.72 )-----------( 178      ( 08 Sep 2019 13:17 )             25.0       09-08    129<L>  |  97  |  48<H>  ----------------------------<  162<H>  4.1   |  17<L>  |  1.98<H>    Ca    8.9      08 Sep 2019 10:53          PHYSICAL EXAM:    Constitutional: AOx3. NAD.    Respiratory: clear lungs bilaterally. No wheezing, rhonchi, or crackles.    Cardiovascular: S1 S2. No murmurs.    Gastrointestinal: BS X4 active. soft. nontender.    Extremities/Vascular: +2 pulses bilaterally. No BLE edema.  Right hand IV appears inplace, surrounding bruising noted. No evidence of edema or erythema. Nurse unable to draw blood back.     ASSESSMENT/PLAN:   HPI:  Pt is a 85 y/o Female with PMHx of CHF, DM, HLD, HTN, CAD prior MI, c/o fatigue, "feeling off" and unable to get out of bed. Patient admitted with UTI, b/l buttock rash.       1) Fever  -tylenol and cooling measures prn for pyrexia  -BC x2 sent last night when febrile   - c/w current antibiotics: doxycycline and aztreonam. IV nurse called to asses IV and place another one.   - f/u Urine culture   - ID evaluation called by day team, pending, will f/u in AM   -F/U primary team in AM    Omaira Duron PA-C  [id number]

## 2019-09-08 NOTE — PROGRESS NOTE ADULT - SUBJECTIVE AND OBJECTIVE BOX
COLORECTAL SURGERY PROGRESS NOTE    86yFemale    SUBJECTIVE:  Patient seen and examined at bedside. No acute events overnight. States the pain she has in her right buttocks has improved from yesterday and she states she is feeling better. Denies any drainage in the area. Denies fevers, chills, nausea, vomiting, chest pain, and shortness of breath.   --------------------------------------------------------------------------------------------------  OBJECTIVE:     Physical Exam:  General: AAOx3, NAD, resting comfortably   HEENT: NC/AT  Respiratory: no increased work of breathing   Abdomen: soft, nontender, nondistended, right buttock with localized area of perianal erythema and induration, but no drainage and no obvious area of fluctuance appreciable   Extremities: warm and well perfused    --------------------------------------------------------------------------------------------------  Vital Signs:  Vital Signs Last 24 Hrs  T(C): 37.3 (08 Sep 2019 04:30), Max: 39.1 (07 Sep 2019 17:45)  T(F): 99.1 (08 Sep 2019 04:30), Max: 102.3 (07 Sep 2019 17:45)  HR: 85 (08 Sep 2019 04:30) (68 - 90)  BP: 123/62 (08 Sep 2019 04:30) (84/50 - 123/62)  BP(mean): --  RR: 18 (08 Sep 2019 04:30) (18 - 18)  SpO2: 95% (08 Sep 2019 04:30) (93% - 96%)    --------------------------------------------------------------------------------------------------  Inputs/Outputs:    06 Sep 2019 07:  -  07 Sep 2019 07:00  --------------------------------------------------------  IN:    IV PiggyBack: 200 mL    Oral Fluid: 600 mL    sodium chloride 0.9%: 950 mL  Total IN: 1750 mL    OUT:  Total OUT: 0 mL    Total NET: 1750 mL      07 Sep 2019 07:  -  08 Sep 2019 06:48  --------------------------------------------------------  IN:    IV PiggyBack: 150 mL    Oral Fluid: 600 mL    sodium chloride 0.9%: 225 mL    sodium chloride 0.9%.: 450 mL  Total IN: 1425 mL    OUT:    Post-Void Residual per Intermittent Catheterization: 1000 mL    Voided: 200 mL  Total OUT: 1200 mL    Total NET: 225 mL        --------------------------------------------------------------------------------------------------  Laboratories:                        8.7    12.29 )-----------( 136      ( 07 Sep 2019 10:34 )             25.5         07 Sep 2019 07:49    132    |  99     |  48     ----------------------------<  196    3.8     |  20     |  2.09     Ca    8.5        07 Sep 2019 07:49  Phos  2.7       06 Sep 2019 07:26  Mg     2.4       06 Sep 2019 07:26        CAPILLARY BLOOD GLUCOSE      POCT Blood Glucose.: 131 mg/dL (07 Sep 2019 23:33)  POCT Blood Glucose.: 137 mg/dL (07 Sep 2019 22:22)  POCT Blood Glucose.: 138 mg/dL (07 Sep 2019 17:01)  POCT Blood Glucose.: 212 mg/dL (07 Sep 2019 12:32)  POCT Blood Glucose.: 188 mg/dL (07 Sep 2019 08:18)        Urinalysis Basic - ( 06 Sep 2019 23:28 )    Color: Yellow / Appearance: Slightly Turbid / S.016 / pH: x  Gluc: x / Ketone: Negative  / Bili: Negative / Urobili: <2 mg/dL   Blood: x / Protein: Trace / Nitrite: Negative   Leuk Esterase: Large / RBC: 4 /HPF /  /HPF   Sq Epi: x / Non Sq Epi: 2 /HPF / Bacteria: Few      --------------------------------------------------------------------------------------------------  Medications:  MEDICATIONS  (STANDING):  aspirin enteric coated 81 milliGRAM(s) Oral daily  atorvastatin 80 milliGRAM(s) Oral at bedtime  aztreonam  IVPB 500 milliGRAM(s) IV Intermittent every 8 hours  aztreonam  IVPB      clopidogrel Tablet 75 milliGRAM(s) Oral daily  dextrose 5%. 1000 milliLiter(s) (50 mL/Hr) IV Continuous <Continuous>  dextrose 50% Injectable 12.5 Gram(s) IV Push once  dextrose 50% Injectable 25 Gram(s) IV Push once  dextrose 50% Injectable 25 Gram(s) IV Push once  heparin  Injectable 5000 Unit(s) SubCutaneous every 8 hours  influenza   Vaccine 0.5 milliLiter(s) IntraMuscular once  insulin glargine Injectable (LANTUS) 15 Unit(s) SubCutaneous two times a day  insulin lispro (HumaLOG) corrective regimen sliding scale   SubCutaneous three times a day before meals  lidocaine 5% Ointment 1 Application(s) Topical every 8 hours  metoprolol tartrate 25 milliGRAM(s) Oral two times a day  multivitamin 1 Tablet(s) Oral daily  pantoprazole    Tablet 40 milliGRAM(s) Oral before breakfast  sodium chloride 0.9%. 1000 milliLiter(s) (50 mL/Hr) IV Continuous <Continuous>  vancomycin  IVPB 750 milliGRAM(s) IV Intermittent every 24 hours  vancomycin  IVPB        MEDICATIONS  (PRN):  dextrose 40% Gel 15 Gram(s) Oral once PRN Blood Glucose LESS THAN 70 milliGRAM(s)/deciliter  glucagon  Injectable 1 milliGRAM(s) IntraMuscular once PRN Glucose LESS THAN 70 milligrams/deciliter  ondansetron Injectable 4 milliGRAM(s) IV Push every 8 hours PRN Nausea and/or Vomiting

## 2019-09-08 NOTE — PROGRESS NOTE ADULT - PROBLEM SELECTOR PLAN 1
Serum Creatinine higher,  Volume Status : stable  on iv fluid- hold after this evening, Fena 0.43- less than 1%  basic metabolic panel followup  renal us pending- ct chest - neg for hydro - prelim report  monitor urine output

## 2019-09-08 NOTE — PROGRESS NOTE ADULT - SUBJECTIVE AND OBJECTIVE BOX
Patient is a 86y old  Female who presents with a chief complaint of Fever, UTI (08 Sep 2019 13:47)      INTERVAL HISTORY:     TELEMETRY:  	  MEDICATIONS:  metoprolol tartrate 25 milliGRAM(s) Oral two times a day        PHYSICAL EXAM:  T(C): 37.7 (09-08-19 @ 10:32), Max: 38.6 (09-07-19 @ 18:35)  HR: 79 (09-08-19 @ 10:32) (68 - 85)  BP: 92/62 (09-08-19 @ 10:32) (84/50 - 123/62)  RR: 18 (09-08-19 @ 10:32) (18 - 18)  SpO2: 92% (09-08-19 @ 10:32) (92% - 96%)  Wt(kg): --  I&O's Summary    07 Sep 2019 07:01  -  08 Sep 2019 07:00  --------------------------------------------------------  IN: 1425 mL / OUT: 1200 mL / NET: 225 mL    08 Sep 2019 07:01  -  08 Sep 2019 17:55  --------------------------------------------------------  IN: 240 mL / OUT: 0 mL / NET: 240 mL          Appearance: In no distress	  HEENT:    PERRL, EOMI	  Cardiovascular:  S1 S2, No JVD  Respiratory: Lungs clear to auscultation	  Gastrointestinal:  Soft, Non-tender, + BS	  Extremities:  No edema of LE                                8.7    18.72 )-----------( 178      ( 08 Sep 2019 13:17 )             25.0     09-08    129<L>  |  97  |  48<H>  ----------------------------<  162<H>  4.1   |  17<L>  |  1.98<H>    Ca    8.9      08 Sep 2019 10:53          Labs personally reviewed      ASSESSMENT/PLAN: 	  84 year old F with PMH of sHF EF 30%, IDDM, HTN, HLD, hx of STEMI w/ stents (2014) presents with fevers with sepsus  1. CAD-  EKG with old LBBB  asymptomatic- Continue with ASA, Plavix, and statin.     2. Chronic sHF - euvolemic at this time. Hold Lasix in setting of sepsis. Continue with OMT- Metoprolol, Aldactone, statin. Resume ACE as ROB resolves    3. HTN - well controlled on above regimen.  Resume ACE as ROB resolves    4. HLD - continue with statin    5. ROB/CKD - nephrology consult appreciated     6. Sepsis 2/2 UTI - abx per primary team        Srini Velasco DO [facility]  Cardiovascular Medicine  [phone]

## 2019-09-08 NOTE — PROVIDER CONTACT NOTE (MEDICATION) - SITUATION
Pt stated" I feel funny with the antibiotics im receiving now". refusing to continue with the vancomycin infusion

## 2019-09-08 NOTE — PROVIDER CONTACT NOTE (MEDICATION) - ACTION/TREATMENT ORDERED:
NP was at pt's bedside. EKG done. NP aware of  incomplete administration of iv vanco due to patient's refusal to continue

## 2019-09-08 NOTE — PROGRESS NOTE ADULT - SUBJECTIVE AND OBJECTIVE BOX
[facility] : Ans Serv [phone], Office [phone]  Dr Barrett/Dr Lagos / Dr Arthur SANTIAGO /Dr George arizmendi /Dr JACK Chin/Dr Kirill Kumari/Dr Dimas Bone /Dr LAURA Geronimo  _______________________________________________________________________________________________    seen and examined today for acute kidney injury   Interval : Serum Creatinine stable, not avilable today Serum Creatinine   VITALS:  T(F): 99.9 (09-08-19 @ 10:32), Max: 102.3 (09-07-19 @ 17:45)  HR: 79 (09-08-19 @ 10:32)  BP: 92/62 (09-08-19 @ 10:32)  RR: 18 (09-08-19 @ 10:32)  SpO2: 92% (09-08-19 @ 10:32)      09-07 @ 07:01  -  09-08 @ 07:00  --------------------------------------------------------  IN: 1425 mL / OUT: 1200 mL / NET: 225 mL    Physical Exam :-  Constitutional: NAD  Neck: Supple.  Respiratory: Bilateral equal breath sounds, few Crackles present.  Cardiovascular: S1, S2 normal, positive Murmur  Gastrointestinal: Bowel Sounds present, soft, non tender.  Extremities: +1 Edema Feet  Neurological: Alert and Oriented x 3,   Psychiatric: Normal mood, normal affect  Data:-  Allergies :   codeine (Unknown)  Kiwi (Anaphylaxis)  penicillins (Anaphylaxis)    Hospital Medications:   MEDICATIONS  (STANDING):  aspirin enteric coated 81 milliGRAM(s) Oral daily  atorvastatin 80 milliGRAM(s) Oral at bedtime  aztreonam  IVPB 500 milliGRAM(s) IV Intermittent every 8 hours  aztreonam  IVPB      clopidogrel Tablet 75 milliGRAM(s) Oral daily  dextrose 5%. 1000 milliLiter(s) (50 mL/Hr) IV Continuous <Continuous>  dextrose 50% Injectable 12.5 Gram(s) IV Push once  dextrose 50% Injectable 25 Gram(s) IV Push once  dextrose 50% Injectable 25 Gram(s) IV Push once  heparin  Injectable 5000 Unit(s) SubCutaneous every 8 hours  influenza   Vaccine 0.5 milliLiter(s) IntraMuscular once  insulin glargine Injectable (LANTUS) 15 Unit(s) SubCutaneous two times a day  insulin lispro (HumaLOG) corrective regimen sliding scale   SubCutaneous three times a day before meals  lidocaine 5% Ointment 1 Application(s) Topical every 8 hours  metoprolol tartrate 25 milliGRAM(s) Oral two times a day  multivitamin 1 Tablet(s) Oral daily  pantoprazole    Tablet 40 milliGRAM(s) Oral before breakfast  sodium chloride 0.9%. 1000 milliLiter(s) (50 mL/Hr) IV Continuous <Continuous>  vancomycin  IVPB 750 milliGRAM(s) IV Intermittent every 24 hours  vancomycin  IVPB        09-07    132<L>  |  99  |  48<H>  ----------------------------<  196<H>  3.8   |  20<L>  |  2.09<H>    Ca    8.5      07 Sep 2019 07:49      Creatinine Trend: 2.09 <--, 1.93 <--, 1.39 <--, 1.39 <--                        8.7    12.29 )-----------( 136      ( 07 Sep 2019 10:34 )             25.5

## 2019-09-08 NOTE — CHART NOTE - NSCHARTNOTEFT_GEN_A_CORE
Notified by RN about pt feeling "funny". Pt seen at the bedside, alert oriented x 3. Reports feeling tired, weak, sleepy and nauseas. The symptoms started just a few minutes after Vancomycin started to infuse. Vancomycin was stopped and pt refused to continue the medication. Pt said that she had similar feeling previous night just when she was getting "the antibiotic", but she didn't say anything about her symptoms to anybody, "I felt better later ". It was Cipro,  which was discontinued and pt Azactam and vancomycin was ordered. Pt denied feeling difficulty with swallowing,  palpitations, diaphoresis, chest pain, dyspnea, pruritus, rash, abdominal or back pain. Symptoms resolved, but pt requested not to give her vancomycin, also refused lantus insulin ().   BP 99/63, HR 72, T 97.7, pulse ox 96%,   Gen: A&oX4  CVS: S1, S2  Pulm: lungs CTA b/l, no wheezing, no rhonchi  Abd: NT, ND, positive BS [id number]  Extr: +2 b/l peripheral pulses, no edema  Neuro: speech clear, facial symmetry, b/l upper and lower extremities strength equal, no drift    A/P: 85 y/o Female with PMHx of CHF, DM, HLD, HTN, CAD prior MI,  admitted with UTI, fever,  on Vancomycin and Azactam, Feeling 'off", sleepy, tired, weak, with no fever,     Weakness  ? antibiotic adverse reaction vs lower then base blood glucose  Vital signs within pt's norm in hospital  c/w IVF and oral hydration  monitor vital signs Notified by RN about pt feeling "funny". Pt seen at the bedside, alert oriented x 3. Reports feeling tired, weak, sleepy and nauseas. The symptoms started just a few minutes after Vancomycin started to infuse. Vancomycin was stopped and pt refused to continue the medication. Pt said that she had similar feeling previous night just when she was getting "the antibiotic", but she didn't say anything about her symptoms to anybody, "I felt better later ". It was Cipro,  which was discontinued and pt Azactam and vancomycin was ordered. Pt denied feeling difficulty with swallowing,  palpitations, diaphoresis, chest pain, dyspnea, pruritus, rash, abdominal or back pain. Symptoms resolved, but pt requested not to give her vancomycin, also refused lantus insulin ().   BP 99/63, HR 72, T 97.7, pulse ox 96%,   Gen: A&oX4  CVS: S1, S2  Pulm: lungs CTA b/l, no wheezing, no rhonchi  Abd: NT, ND, positive BS [id number]  Extr: +2 b/l peripheral pulses, no edema  Neuro: speech clear, facial symmetry, b/l upper and lower extremities strength equal, no drift    A/P: 85 y/o Female with PMHx of CHF, DM, HLD, HTN, CAD prior MI,  admitted with UTI, fever,  on Vancomycin and Azactam, Feeling 'off", sleepy, tired, weak, with no fever,     Weakness  ? antibiotic adverse reaction vs lower then baseline  blood glucose (usual blood glocose trends in 200's, it was 131 when pt felt weak).   Vital signs within pt's norm in hospital  c/w IVF and oral hydration  closely monitor vital signs    will d/w with day team      Elisha Muro NP, # [id number]

## 2019-09-09 LAB
ANION GAP SERPL CALC-SCNC: 14 MMOL/L — SIGNIFICANT CHANGE UP (ref 5–17)
APPEARANCE UR: ABNORMAL
BACTERIA # UR AUTO: ABNORMAL
BILIRUB UR-MCNC: NEGATIVE — SIGNIFICANT CHANGE UP
BUN SERPL-MCNC: 60 MG/DL — HIGH (ref 7–23)
CALCIUM SERPL-MCNC: 8.8 MG/DL — SIGNIFICANT CHANGE UP (ref 8.4–10.5)
CHLORIDE SERPL-SCNC: 96 MMOL/L — SIGNIFICANT CHANGE UP (ref 96–108)
CHLORIDE UR-SCNC: <35 MMOL/L — SIGNIFICANT CHANGE UP
CO2 SERPL-SCNC: 17 MMOL/L — LOW (ref 22–31)
COLOR SPEC: YELLOW — SIGNIFICANT CHANGE UP
COMMENT - URINE: SIGNIFICANT CHANGE UP
COMMENT - URINE: SIGNIFICANT CHANGE UP
CREAT ?TM UR-MCNC: 132 MG/DL — SIGNIFICANT CHANGE UP
CREAT SERPL-MCNC: 2.67 MG/DL — HIGH (ref 0.5–1.3)
CULTURE RESULTS: SIGNIFICANT CHANGE UP
CULTURE RESULTS: SIGNIFICANT CHANGE UP
DIFF PNL FLD: NEGATIVE — SIGNIFICANT CHANGE UP
EPI CELLS # UR: NEGATIVE — SIGNIFICANT CHANGE UP
GLUCOSE SERPL-MCNC: 223 MG/DL — HIGH (ref 70–99)
GLUCOSE UR QL: NEGATIVE — SIGNIFICANT CHANGE UP
GRAN CASTS # UR COMP ASSIST: 2 /LPF — HIGH
HCT VFR BLD CALC: 25.5 % — LOW (ref 34.5–45)
HGB BLD-MCNC: 8.3 G/DL — LOW (ref 11.5–15.5)
HYALINE CASTS # UR AUTO: 0 /LPF — SIGNIFICANT CHANGE UP (ref 0–7)
KETONES UR-MCNC: NEGATIVE — SIGNIFICANT CHANGE UP
LEUKOCYTE ESTERASE UR-ACNC: NEGATIVE — SIGNIFICANT CHANGE UP
MCHC RBC-ENTMCNC: 29.7 PG — SIGNIFICANT CHANGE UP (ref 27–34)
MCHC RBC-ENTMCNC: 32.5 GM/DL — SIGNIFICANT CHANGE UP (ref 32–36)
MCV RBC AUTO: 91.4 FL — SIGNIFICANT CHANGE UP (ref 80–100)
NITRITE UR-MCNC: NEGATIVE — SIGNIFICANT CHANGE UP
OSMOLALITY UR: 423 MOSM/KG — SIGNIFICANT CHANGE UP (ref 50–1200)
PH UR: 5.5 — SIGNIFICANT CHANGE UP (ref 5–8)
PLATELET # BLD AUTO: 162 K/UL — SIGNIFICANT CHANGE UP (ref 150–400)
POTASSIUM SERPL-MCNC: 4.2 MMOL/L — SIGNIFICANT CHANGE UP (ref 3.5–5.3)
POTASSIUM SERPL-SCNC: 4.2 MMOL/L — SIGNIFICANT CHANGE UP (ref 3.5–5.3)
POTASSIUM UR-SCNC: 40 MMOL/L — SIGNIFICANT CHANGE UP
PROT ?TM UR-MCNC: 112 MG/DL — HIGH (ref 0–12)
PROT UR-MCNC: ABNORMAL
PROT/CREAT UR-RTO: 0.8 RATIO — HIGH (ref 0–0.2)
RBC # BLD: 2.79 M/UL — LOW (ref 3.8–5.2)
RBC # FLD: 12.8 % — SIGNIFICANT CHANGE UP (ref 10.3–14.5)
RBC CASTS # UR COMP ASSIST: 2 /HPF — SIGNIFICANT CHANGE UP (ref 0–4)
SODIUM SERPL-SCNC: 127 MMOL/L — LOW (ref 135–145)
SODIUM UR-SCNC: <20 MMOL/L — SIGNIFICANT CHANGE UP
SP GR SPEC: 1.02 — SIGNIFICANT CHANGE UP (ref 1.01–1.02)
SPECIMEN SOURCE: SIGNIFICANT CHANGE UP
SPECIMEN SOURCE: SIGNIFICANT CHANGE UP
UROBILINOGEN FLD QL: SIGNIFICANT CHANGE UP
UUN UR-MCNC: 583 MG/DL — SIGNIFICANT CHANGE UP
WBC # BLD: 13.79 K/UL — HIGH (ref 3.8–10.5)
WBC # FLD AUTO: 13.79 K/UL — HIGH (ref 3.8–10.5)
WBC UR QL: 2 /HPF — SIGNIFICANT CHANGE UP (ref 0–5)

## 2019-09-09 RX ORDER — SODIUM CHLORIDE 9 MG/ML
1000 INJECTION INTRAMUSCULAR; INTRAVENOUS; SUBCUTANEOUS
Refills: 0 | Status: DISCONTINUED | OUTPATIENT
Start: 2019-09-09 | End: 2019-09-10

## 2019-09-09 RX ORDER — LIDOCAINE 4 G/100G
1 CREAM TOPICAL EVERY 4 HOURS
Refills: 0 | Status: COMPLETED | OUTPATIENT
Start: 2019-09-09 | End: 2019-09-14

## 2019-09-09 RX ADMIN — ATORVASTATIN CALCIUM 80 MILLIGRAM(S): 80 TABLET, FILM COATED ORAL at 22:12

## 2019-09-09 RX ADMIN — Medication 2: at 17:44

## 2019-09-09 RX ADMIN — CLOPIDOGREL BISULFATE 75 MILLIGRAM(S): 75 TABLET, FILM COATED ORAL at 13:08

## 2019-09-09 RX ADMIN — HEPARIN SODIUM 5000 UNIT(S): 5000 INJECTION INTRAVENOUS; SUBCUTANEOUS at 06:16

## 2019-09-09 RX ADMIN — Medication 650 MILLIGRAM(S): at 09:13

## 2019-09-09 RX ADMIN — INSULIN GLARGINE 15 UNIT(S): 100 INJECTION, SOLUTION SUBCUTANEOUS at 08:42

## 2019-09-09 RX ADMIN — Medication 110 MILLIGRAM(S): at 18:53

## 2019-09-09 RX ADMIN — Medication 650 MILLIGRAM(S): at 18:37

## 2019-09-09 RX ADMIN — Medication 650 MILLIGRAM(S): at 00:19

## 2019-09-09 RX ADMIN — Medication 81 MILLIGRAM(S): at 13:21

## 2019-09-09 RX ADMIN — Medication 25 MILLIGRAM(S): at 06:16

## 2019-09-09 RX ADMIN — LIDOCAINE 1 APPLICATION(S): 4 CREAM TOPICAL at 22:13

## 2019-09-09 RX ADMIN — PANTOPRAZOLE SODIUM 40 MILLIGRAM(S): 20 TABLET, DELAYED RELEASE ORAL at 06:16

## 2019-09-09 RX ADMIN — Medication 110 MILLIGRAM(S): at 06:16

## 2019-09-09 RX ADMIN — HEPARIN SODIUM 5000 UNIT(S): 5000 INJECTION INTRAVENOUS; SUBCUTANEOUS at 13:08

## 2019-09-09 RX ADMIN — ONDANSETRON 4 MILLIGRAM(S): 8 TABLET, FILM COATED ORAL at 22:12

## 2019-09-09 RX ADMIN — SODIUM CHLORIDE 75 MILLILITER(S): 9 INJECTION INTRAMUSCULAR; INTRAVENOUS; SUBCUTANEOUS at 13:23

## 2019-09-09 RX ADMIN — Medication 50 MILLIGRAM(S): at 00:07

## 2019-09-09 RX ADMIN — LIDOCAINE 1 APPLICATION(S): 4 CREAM TOPICAL at 00:18

## 2019-09-09 RX ADMIN — Medication 50 MILLIGRAM(S): at 09:22

## 2019-09-09 RX ADMIN — LIDOCAINE 1 APPLICATION(S): 4 CREAM TOPICAL at 06:23

## 2019-09-09 RX ADMIN — LIDOCAINE 1 APPLICATION(S): 4 CREAM TOPICAL at 13:08

## 2019-09-09 RX ADMIN — Medication 1 TABLET(S): at 13:08

## 2019-09-09 RX ADMIN — SODIUM CHLORIDE 75 MILLILITER(S): 9 INJECTION INTRAMUSCULAR; INTRAVENOUS; SUBCUTANEOUS at 21:08

## 2019-09-09 RX ADMIN — Medication 25 MILLIGRAM(S): at 17:54

## 2019-09-09 RX ADMIN — Medication 4: at 08:42

## 2019-09-09 RX ADMIN — INSULIN GLARGINE 15 UNIT(S): 100 INJECTION, SOLUTION SUBCUTANEOUS at 22:13

## 2019-09-09 RX ADMIN — Medication 50 MILLIGRAM(S): at 17:45

## 2019-09-09 RX ADMIN — HEPARIN SODIUM 5000 UNIT(S): 5000 INJECTION INTRAVENOUS; SUBCUTANEOUS at 22:13

## 2019-09-09 RX ADMIN — LIDOCAINE 1 APPLICATION(S): 4 CREAM TOPICAL at 17:54

## 2019-09-09 RX ADMIN — Medication 6: at 13:07

## 2019-09-09 RX ADMIN — Medication 650 MILLIGRAM(S): at 17:53

## 2019-09-09 NOTE — PROGRESS NOTE ADULT - SUBJECTIVE AND OBJECTIVE BOX
Patient seen and examined  feels tired    codeine (Unknown)  Kiwi (Anaphylaxis)  penicillins (Anaphylaxis)    Hospital Medications:   MEDICATIONS  (STANDING):  aspirin enteric coated 81 milliGRAM(s) Oral daily  atorvastatin 80 milliGRAM(s) Oral at bedtime  aztreonam  IVPB 500 milliGRAM(s) IV Intermittent every 8 hours  aztreonam  IVPB      clopidogrel Tablet 75 milliGRAM(s) Oral daily  dextrose 5%. 1000 milliLiter(s) (50 mL/Hr) IV Continuous <Continuous>  dextrose 50% Injectable 12.5 Gram(s) IV Push once  dextrose 50% Injectable 25 Gram(s) IV Push once  dextrose 50% Injectable 25 Gram(s) IV Push once  doxycycline IVPB      doxycycline IVPB 100 milliGRAM(s) IV Intermittent every 12 hours  heparin  Injectable 5000 Unit(s) SubCutaneous every 8 hours  influenza   Vaccine 0.5 milliLiter(s) IntraMuscular once  insulin glargine Injectable (LANTUS) 15 Unit(s) SubCutaneous two times a day  insulin lispro (HumaLOG) corrective regimen sliding scale   SubCutaneous three times a day before meals  lidocaine 5% Ointment 1 Application(s) Topical every 4 hours  metoprolol tartrate 25 milliGRAM(s) Oral two times a day  multivitamin 1 Tablet(s) Oral daily  pantoprazole    Tablet 40 milliGRAM(s) Oral before breakfast  sodium chloride 0.9%. 1000 milliLiter(s) (50 mL/Hr) IV Continuous <Continuous>    VITALS:  T(F): 97.7 (19 @ 12:31), Max: 101.5 (19 @ 22:28)  HR: 68 (19 @ 12:31)  BP: 102/61 (19 @ 08:10)  RR: 18 (19 @ 12:31)  SpO2: 94% (19 @ 12:31)  Wt(kg): --     @ 07:01  -   @ 07:00  --------------------------------------------------------  IN: 1070 mL / OUT: 650 mL / NET: 420 mL      Physical Exam :-  Constitutional: NAD  Neck: Supple.  Respiratory: Bilateral equal breath sounds, few Crackles present.  Cardiovascular: S1, S2 normal, positive Murmur  Gastrointestinal: Bowel Sounds present, soft, non tender.  Extremities: +1 Edema Feet  Neurological: Alert and Oriented x 3,   Psychiatric: Normal mood, normal affect  + indwelling infante  LABS:      127<L>  |  96  |  60<H>  ----------------------------<  223<H>  4.2   |  17<L>  |  2.67<H>    Ca    8.8      09 Sep 2019 06:57      Creatinine Trend: 2.67 <--, 1.98 <--, 2.09 <--, 1.93 <--, 1.39 <--, 1.39 <--                        8.3    13.79 )-----------( 162      ( 09 Sep 2019 10:13 )             25.5     Urine Studies:  Urinalysis Basic - ( 06 Sep 2019 23:28 )    Color: Yellow / Appearance: Slightly Turbid / S.016 / pH:   Gluc:  / Ketone: Negative  / Bili: Negative / Urobili: <2 mg/dL   Blood:  / Protein: Trace / Nitrite: Negative   Leuk Esterase: Large / RBC: 4 /HPF /  /HPF   Sq Epi:  / Non Sq Epi: 2 /HPF / Bacteria: Few      Osmolality, Random Urine: 380 mosm/Kg ( @ 23:28)  Potassium, Random Urine: 36 mmol/L ( @ 17:24)  Sodium, Random Urine: 30 mmol/L ( @ 17:24)  Chloride, Random Urine: <35 mmol/L ( @ 17:24)  Creatinine, Random Urine: 126 mg/dL ( @ 17:24)  Protein/Creatinine Ratio Calculation: 0.2 Ratio ( @ 17:24)    RADIOLOGY & ADDITIONAL STUDIES:

## 2019-09-09 NOTE — PROGRESS NOTE ADULT - SUBJECTIVE AND OBJECTIVE BOX
SURGERY PROGRESS NOTE    86yFemale    SUBJECTIVE:  Patient seen and examined at bedside. No acute events overnight. States the pain she has in her right buttocks has improved from yesterday and she states she is feeling better. CT scan showed no abscess.   --------------------------------------------------------------------------------------------------  OBJECTIVE:     Physical Exam:  General: AAOx3, NAD, resting comfortably   HEENT: NC/AT  Respiratory: no increased work of breathing   Abdomen: soft, nontender, nondistended, right buttock with localized area of perianal erythema and induration, but no drainage and no obvious area of fluctuance appreciable   Extremities: warm and well perfused    --------------------------------------------------------------------------------------------------  Vital Signs Last 24 Hrs  T(C): 37.8 (09 Sep 2019 00:20), Max: 38.6 (08 Sep 2019 22:28)  T(F): 100 (09 Sep 2019 00:20), Max: 101.5 (08 Sep 2019 22:28)  HR: 70 (09 Sep 2019 00:20) (70 - 79)  BP: 92/62 (09 Sep 2019 00:29) (88/52 - 92/62)  BP(mean): --  RR: 18 (09 Sep 2019 00:20) (18 - 18)  SpO2: 94% (09 Sep 2019 00:20) (92% - 94%)    --------------------------------------------------------------------------------------------------    08 Sep 2019 07:01  -  09 Sep 2019 07:00  --------------------------------------------------------  IN:    IV PiggyBack: 50 mL    Oral Fluid: 240 mL    sodium chloride 0.9%.: 600 mL  Total IN: 890 mL    OUT:    Indwelling Catheter - Urethral: 250 mL  Total OUT: 250 mL    Total NET: 640 mL      --------------------------------------------------------------------------------------------------  Laboratories:               LABS:      CBC Full  -  ( 08 Sep 2019 13:17 )  WBC Count : 18.72 K/uL  RBC Count : 2.78 M/uL  Hemoglobin : 8.7 g/dL  Hematocrit : 25.0 %  Platelet Count - Automated : 178 K/uL  Mean Cell Volume : 89.9 fl  Mean Cell Hemoglobin : 31.3 pg  Mean Cell Hemoglobin Concentration : 34.8 gm/dL  Auto Neutrophil # : x  Auto Lymphocyte # : x  Auto Monocyte # : x  Auto Eosinophil # : x  Auto Basophil # : x  Auto Neutrophil % : x  Auto Lymphocyte % : x  Auto Monocyte % : x  Auto Eosinophil % : x  Auto Basophil % : x    09-08    129<L>  |  97  |  48<H>  ----------------------------<  162<H>  4.1   |  17<L>  |  1.98<H>    Ca    8.9      08 Sep 2019 10:53        Medications:  MEDICATIONS  (STANDING):  aspirin enteric coated 81 milliGRAM(s) Oral daily  atorvastatin 80 milliGRAM(s) Oral at bedtime  aztreonam  IVPB 500 milliGRAM(s) IV Intermittent every 8 hours  aztreonam  IVPB      clopidogrel Tablet 75 milliGRAM(s) Oral daily  dextrose 5%. 1000 milliLiter(s) (50 mL/Hr) IV Continuous <Continuous>  dextrose 50% Injectable 12.5 Gram(s) IV Push once  dextrose 50% Injectable 25 Gram(s) IV Push once  dextrose 50% Injectable 25 Gram(s) IV Push once  doxycycline IVPB      doxycycline IVPB 100 milliGRAM(s) IV Intermittent every 12 hours  heparin  Injectable 5000 Unit(s) SubCutaneous every 8 hours  influenza   Vaccine 0.5 milliLiter(s) IntraMuscular once  insulin glargine Injectable (LANTUS) 15 Unit(s) SubCutaneous two times a day  insulin lispro (HumaLOG) corrective regimen sliding scale   SubCutaneous three times a day before meals  lidocaine 5% Ointment 1 Application(s) Topical every 6 hours  metoprolol tartrate 25 milliGRAM(s) Oral two times a day  multivitamin 1 Tablet(s) Oral daily  pantoprazole    Tablet 40 milliGRAM(s) Oral before breakfast  sodium chloride 0.9%. 1000 milliLiter(s) (50 mL/Hr) IV Continuous <Continuous>

## 2019-09-09 NOTE — PROGRESS NOTE ADULT - ASSESSMENT
Pt is a 85 y/o Female with PMHx of CHF, DM, HLD, HTN, CAD prior MI, c/o fatigue, "feeling off" and unable to get out of bed; Tmax 104F toay. Rash/redness on buttocks noted by home health aid. patient also complaining of +chronic cough, nonproductive.  No abdominal pain, no n/v/d. No chest pain.  States she has been feeling hot and cold over past few days, and took her temperature today, found fever of 104, and came to ED for evaluation.  No dysuria, no diarrhea. +constipation (chronic).  Increased diffuse leg swelling for past few days. patient lives at home with family. walks by herself with no use of walker. denies of any MEAD< chest pain on exertion. compliant with all her home medications (04 Sep 2019 22:22)    ER vitals: Tm 102.2, P 75, /77.  WBC 14.8 --> 12.2.  Cr 2.0 <-- 1.3.  PCT 0.49.  Ucx >100K E.coli.  Pt given dose of ceftriaxone, now on cipro for UTI.  ID consult called for further abx managment.      Sepsis:    - fever, leukocytosis. Source less likely UTI given persistent fevers, pt has infiltration of perirectal tissues, likely cellulitis.  Abx broadened     - Check lactate.  Pct elevated 0.49.  Monitor WBC and fever curve.    - Monitor hemodynamic status and BPs.  s/p IV fluid bolus, cont maintanence fluid.      - blood cx, urine cx.  RVP (-).  No pna on cxr      UTI:    - UA (+).  Ucx E.coli, sensitive to cipro, now on aztreonam    - Cr elevated.  Check bladder scan/PVR, renal/bladder US,  Ctap no hydro or obstruction    - Renally dose abx.          Perirectal cellulitis, r/o abscess:    - Seen by Surgery, area not amenable to I&D.  Perirectal infiltration seen on CTap, no abscess noted.  Agree with broadening abx to include MRSA and gram negative/anerobic coverage.  Cont doxy/aztreonam.  Pt with h/o PCN allergy.  Possibly drug reaction to vanco.       - Warm soaks.  Monitor for development of abscess.  f/u repeat cbc/wbc, monitor temp curve.             Will follow,    Sandrita Zaman  [phone] Pt is a 85 y/o Female with PMHx of CHF, DM, HLD, HTN, CAD prior MI, c/o fatigue, "feeling off" and unable to get out of bed; Tmax 104F toay. Rash/redness on buttocks noted by home health aid. patient also complaining of +chronic cough, nonproductive.  No abdominal pain, no n/v/d. No chest pain.  States she has been feeling hot and cold over past few days, and took her temperature today, found fever of 104, and came to ED for evaluation.  No dysuria, no diarrhea. +constipation (chronic).  Increased diffuse leg swelling for past few days. patient lives at home with family. walks by herself with no use of walker. denies of any MEAD< chest pain on exertion. compliant with all her home medications (04 Sep 2019 22:22)    ER vitals: Tm 102.2, P 75, /77.  WBC 14.8 --> 12.2.  Cr 2.0 <-- 1.3.  PCT 0.49.  Ucx >100K E.coli.  Pt given dose of ceftriaxone, now on cipro for UTI.  ID consult called for further abx managment.      Sepsis:    - fever, leukocytosis. Source less likely UTI given persistent fevers, pt has infiltration of perirectal tissues, likely cellulitis.  Abx broadened     - Check lactate.  Pct elevated 0.49.  Monitor WBC and fever curve.    - Monitor hemodynamic status and BPs.  s/p IV fluid bolus, cont maintanence fluid.      - blood cx, urine cx.  RVP (-).  No pna on cxr      UTI:    - UA (+).  Ucx E.coli, sensitive to cipro, now on aztreonam    - Cr elevated.  Check bladder scan/PVR, renal/bladder US,  Ctap no hydro or obstruction    - Renally dose abx.          Perirectal cellulitis, r/o abscess:    - Seen by Surgery, area not amenable to I&D.  Perirectal infiltration seen on CTap, no abscess noted.  Agree with broadening abx to include MRSA and gram negative/anerobic coverage.  Cont doxy/aztreonam.  Pt with h/o PCN allergy.  Possibly drug reaction to vanco ?, no rash or hives reported.       - Warm soaks.  Monitor for development of abscess.  f/u repeat cbc/wbc, monitor temp curve.     - keep area clean from stool/urine    - f/u ultrasound            Will follow,    Sandrita Zaman  [phone]

## 2019-09-09 NOTE — PROGRESS NOTE ADULT - PROBLEM SELECTOR PLAN 1
afebrile today however leukocytosis   Urine Cx noted  reaction to vanco?  cont aztreonam and doxy for MRSA coverage   Tylenol for fever and pain, can give IV morphin PRN   gentle hydration   CXR noted  elevated procalcitonin  CT chest noted, negative for PNA   RVP ordered, negative  ID eval called for further recs appreciated, discussed in detail with team   CT C/A/P noted. R Buttock infiltrate   Buttock soft tissue US pending

## 2019-09-09 NOTE — PROGRESS NOTE ADULT - SUBJECTIVE AND OBJECTIVE BOX
Subjective: Patient seen and examined. No new events except as noted.   febrile overnight   however feeling better today     REVIEW OF SYSTEMS:    CONSTITUTIONAL: No weakness, fevers or chills  EYES/ENT: No visual changes;  No vertigo or throat pain   NECK: No pain or stiffness  RESPIRATORY: No cough, wheezing, hemoptysis; No shortness of breath  CARDIOVASCULAR: No chest pain or palpitations  GASTROINTESTINAL: No abdominal or epigastric pain.   GENITOURINARY: No dysuria, frequency or hematuria  NEUROLOGICAL: No numbness or weakness  SKIN: R buttock pain   All other review of systems is negative unless indicated above.    MEDICATIONS:  MEDICATIONS  (STANDING):  aspirin enteric coated 81 milliGRAM(s) Oral daily  atorvastatin 80 milliGRAM(s) Oral at bedtime  aztreonam  IVPB 500 milliGRAM(s) IV Intermittent every 8 hours  aztreonam  IVPB      clopidogrel Tablet 75 milliGRAM(s) Oral daily  dextrose 5%. 1000 milliLiter(s) (50 mL/Hr) IV Continuous <Continuous>  dextrose 50% Injectable 12.5 Gram(s) IV Push once  dextrose 50% Injectable 25 Gram(s) IV Push once  dextrose 50% Injectable 25 Gram(s) IV Push once  doxycycline IVPB      doxycycline IVPB 100 milliGRAM(s) IV Intermittent every 12 hours  heparin  Injectable 5000 Unit(s) SubCutaneous every 8 hours  influenza   Vaccine 0.5 milliLiter(s) IntraMuscular once  insulin glargine Injectable (LANTUS) 15 Unit(s) SubCutaneous two times a day  insulin lispro (HumaLOG) corrective regimen sliding scale   SubCutaneous three times a day before meals  lidocaine 5% Ointment 1 Application(s) Topical every 4 hours  metoprolol tartrate 25 milliGRAM(s) Oral two times a day  multivitamin 1 Tablet(s) Oral daily  pantoprazole    Tablet 40 milliGRAM(s) Oral before breakfast  sodium chloride 0.9%. 1000 milliLiter(s) (75 mL/Hr) IV Continuous <Continuous>      PHYSICAL EXAM:  T(C): 36.5 (09-09-19 @ 12:31), Max: 38.6 (09-08-19 @ 22:28)  HR: 68 (09-09-19 @ 12:31) (62 - 74)  BP: 102/61 (09-09-19 @ 08:10) (88/52 - 110/64)  RR: 18 (09-09-19 @ 12:31) (18 - 18)  SpO2: 94% (09-09-19 @ 12:31) (94% - 99%)  Wt(kg): --  I&O's Summary    08 Sep 2019 07:01  -  09 Sep 2019 07:00  --------------------------------------------------------  IN: 1070 mL / OUT: 650 mL / NET: 420 mL    09 Sep 2019 07:01  -  09 Sep 2019 15:37  --------------------------------------------------------  IN: 480 mL / OUT: 100 mL / NET: 380 mL          Appearance: Normal	  HEENT:   Normal oral mucosa, PERRL, EOMI	  Lymphatic: No lymphadenopathy , no edema  Cardiovascular: Normal S1 S2, No JVD, No murmurs , Peripheral pulses palpable 2+ bilaterally  Respiratory: Lungs clear to auscultation, normal effort 	  Gastrointestinal:  Soft, Non-tender, + BS	  Skin: R buttock skin redness and erythema, pain on palpation   Musculoskeletal: Normal range of motion, normal strength  Psychiatry:  Mood & affect appropriate  Ext: No edema      All labs, Imaging and EKGs personally reviewed                           8.3    13.79 )-----------( 162      ( 09 Sep 2019 10:13 )             25.5               09-09    127<L>  |  96  |  60<H>  ----------------------------<  223<H>  4.2   |  17<L>  |  2.67<H>    Ca    8.8      09 Sep 2019 06:57    < from: CT Abdomen and Pelvis No Cont (09.07.19 @ 22:29) >    IMPRESSION:     Infiltration of the posterior perineal soft tissues, right greater than   left. Correlation for cellulitis and tenderness recommended.    Small bilateral pleural effusions.

## 2019-09-09 NOTE — PROGRESS NOTE ADULT - ASSESSMENT
ASSESSMENT:   86 year old female with fevers, UTI, and area of erythema over right buttock, no abscess on CT scan.     PLAN:  - Final CT w/o collection, no surgical intervention at this time. continue antibiotics   - Appreciate ID recommendations  - Continue care per primary team     [facility] Surgery   [phone]

## 2019-09-09 NOTE — PROGRESS NOTE ADULT - PROBLEM SELECTOR PLAN 1
cr rising in setting of fevers, rising wbc and hypotension  etiology pre renal vs atn  currently on ivf nacl @ 50cc- would inc to 75cc  check ua  check urine na/cr/cl/osm/k  monitor urineoutput  dec metoprolol to 12.5mg po bid from 25mg po bid ( hold for sbp < 100mmhg)  monitor closely  IV abx per ID ( renal adjusty to egfr < 30) cr rising in setting of fevers, high wbc and hypotension  etiology pre renal vs atn  currently on ivf nacl @ 50cc- would inc to 75cc  check ua  check urine na/cr/cl/osm/k  monitor urineoutput  dec metoprolol to 12.5mg po bid from 25mg po bid ( hold for sbp < 100mmhg)  monitor closely  IV abx per ID ( renal adjusty to egfr < 30)

## 2019-09-09 NOTE — PROGRESS NOTE ADULT - SUBJECTIVE AND OBJECTIVE BOX
Infectious Diseases progress note:    Subjective:  Events noted.  Pt s/p CTap with infiltration of posterior perinanal tissues.  Abx broadened to vanco/aztreonam and switched to doxy/aztreonam due to possible allergic reaction to vanco.  Tmax 101.5 (9/8).  WBC 18.      ROS:  CONSTITUTIONAL:  No fever, chills, rigors  CARDIOVASCULAR:  No chest pain or palpitations  RESPIRATORY:   No SOB, cough, dyspnea on exertion.  No wheezing  GASTROINTESTINAL:  No abd pain, N/V, diarrhea/constipation  EXTREMITIES:  No swelling or joint pain  GENITOURINARY:  No burning on urination, increased frequency or urgency.  No flank pain  NEUROLOGIC:  No HA, visual disturbances  SKIN: No rashes    Allergies    codeine (Unknown)  Kiwi (Anaphylaxis)  penicillins (Anaphylaxis)    Intolerances        ANTIBIOTICS/RELEVANT:  antimicrobials  aztreonam  IVPB 500 milliGRAM(s) IV Intermittent every 8 hours  aztreonam  IVPB      doxycycline IVPB      doxycycline IVPB 100 milliGRAM(s) IV Intermittent every 12 hours    immunologic:  influenza   Vaccine 0.5 milliLiter(s) IntraMuscular once    OTHER:  acetaminophen   Tablet .. 650 milliGRAM(s) Oral every 6 hours PRN  aspirin enteric coated 81 milliGRAM(s) Oral daily  atorvastatin 80 milliGRAM(s) Oral at bedtime  clopidogrel Tablet 75 milliGRAM(s) Oral daily  dextrose 40% Gel 15 Gram(s) Oral once PRN  dextrose 5%. 1000 milliLiter(s) IV Continuous <Continuous>  dextrose 50% Injectable 12.5 Gram(s) IV Push once  dextrose 50% Injectable 25 Gram(s) IV Push once  dextrose 50% Injectable 25 Gram(s) IV Push once  glucagon  Injectable 1 milliGRAM(s) IntraMuscular once PRN  heparin  Injectable 5000 Unit(s) SubCutaneous every 8 hours  insulin glargine Injectable (LANTUS) 15 Unit(s) SubCutaneous two times a day  insulin lispro (HumaLOG) corrective regimen sliding scale   SubCutaneous three times a day before meals  lidocaine 5% Ointment 1 Application(s) Topical every 6 hours  metoprolol tartrate 25 milliGRAM(s) Oral two times a day  multivitamin 1 Tablet(s) Oral daily  ondansetron Injectable 4 milliGRAM(s) IV Push every 8 hours PRN  pantoprazole    Tablet 40 milliGRAM(s) Oral before breakfast  sodium chloride 0.9%. 1000 milliLiter(s) IV Continuous <Continuous>      Objective:  Vital Signs Last 24 Hrs  T(C): 36.3 (09 Sep 2019 08:10), Max: 38.6 (08 Sep 2019 22:28)  T(F): 97.4 (09 Sep 2019 08:10), Max: 101.5 (08 Sep 2019 22:28)  HR: 62 (09 Sep 2019 08:10) (62 - 79)  BP: 102/61 (09 Sep 2019 08:10) (88/52 - 110/64)  BP(mean): --  RR: 18 (09 Sep 2019 08:10) (18 - 18)  SpO2: 99% (09 Sep 2019 08:10) (92% - 99%)    PHYSICAL EXAM:  Constitutional:NAD  Eyes:NIDIA, EOMI  Ear/Nose/Throat: no thrush, mucositis.  Moist mucous membranes	  Neck:no JVD, no lymphadenopathy, supple  Respiratory: CTA lucian  Cardiovascular: S1S2 RRR, no murmurs  Gastrointestinal:soft, nontender,  nondistended (+) BS  Extremities:no e/e/c  Skin:  no rashes, open wounds or ulcerations        LABS:                        8.7    18.72 )-----------( 178      ( 08 Sep 2019 13:17 )             25.0                 Procalcitonin, Serum: 0.49 (09-05 @ 07:29)            Rapid RVP Result: [facility]          MICROBIOLOGY:    Culture - Blood (09.08.19 @ 01:32)    Specimen Source: .Blood    Culture Results:   No growth to date.      Culture - Blood (09.08.19 @ 01:32)    Specimen Source: .Blood    Culture Results:   No growth to date.    Culture - Blood (09.04.19 @ 22:05)    Specimen Source: .Blood    Culture Results:   No growth to date.          RADIOLOGY & ADDITIONAL STUDIES:    < from: CT Abdomen and Pelvis No Cont (09.07.19 @ 22:29) >    EXAM:  CT CHEST                            PROCEDURE DATE:  09/07/2019            INTERPRETATION:  CLINICAL INFORMATION: Fever. Evaluate for source of   infection.     COMPARISON: Chest CT from 9/5/2019. CT abdomen pelvis from 7/27/2016.    PROCEDURE:   CT of the Chest, Abdomen and Pelvis was performed without intravenous   contrast.   Intravenous contrast: None.  Oral contrast: None.  Sagittal and coronal reformats were performed.    FINDINGS:    CHEST:     LUNGS AND LARGE AIRWAYS: Patent central airways. Dependent passive   atelectasis.  PLEURA: Small bilateral pleural effusions.  VESSELS: Coronary atherosclerotic calcifications. A small amount of   venous air is noted.  HEART: Heart size is enlarged. No pericardial effusion.  MEDIASTINUM AND DIONISIO: Mild mediastinal and hilar lymphadenopathy. A   reference right lower paratracheal node measures 1.7 x 1.2 cm. A   reference left hilar lymph node measures 1.8 x 1.7 cm.  CHEST WALL AND LOWER NECK: Within normal limits.    ABDOMEN AND PELVIS:    Limited evaluation of the solid organs in the absence of intravenous   contrast.    LIVER: Within normal limits.  BILE DUCTS: Normal caliber.  GALLBLADDER: Within normal limits.  SPLEEN: Within normal limits.  PANCREAS: Within normal limits.  ADRENALS: Within normal limits.  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    BLADDER: Within normal limits.  REPRODUCTIVE ORGANS: Calcified uterine leiomyomas. No adnexal masses    BOWEL: Tiny hiatal hernia. No bowel obstruction. Mild colonic   diverticulosis without diverticulitis. Appendix is not well visualized.  PERITONEUM: No ascites.  VESSELS: Atherosclerotic calcifications.  RETROPERITONEUM/LYMPH NODES: No lymphadenopathy.    ABDOMINAL WALL: Infiltration of the posterior perineal soft tissues,   right greater than left. Correlation for cellulitis and tenderness   recommended.  BONES: Degenerative changes. Mild degenerative anterolisthesis of L5 on   S1. Multiple vertebral body hemangiomas.    IMPRESSION:     Infiltration of the posterior perineal soft tissues, right greater than   left. Correlation for cellulitis and tenderness recommended.    Small bilateral pleural effusions.    < end of copied text > Infectious Diseases progress note:    Subjective:  Events noted.  Pt s/p CTap with infiltration of posterior perinanal tissues.  Abx broadened to vanco/aztreonam and switched to doxy/aztreonam due to possible allergic reaction to vanco.  Tmax 101.5 (9/8).  WBC 18.  Pt still c/o rectal pain    ROS:  CONSTITUTIONAL:  No fever, chills, rigors  CARDIOVASCULAR:  No chest pain or palpitations  RESPIRATORY:   No SOB, cough, dyspnea on exertion.  No wheezing  GASTROINTESTINAL:  No abd pain, N/V, diarrhea/constipation  EXTREMITIES:  No swelling or joint pain  GENITOURINARY:  No burning on urination, increased frequency or urgency.  No flank pain  NEUROLOGIC:  No HA, visual disturbances  SKIN: No rashes    Allergies    codeine (Unknown)  Kiwi (Anaphylaxis)  penicillins (Anaphylaxis)    Intolerances        ANTIBIOTICS/RELEVANT:  antimicrobials  aztreonam  IVPB 500 milliGRAM(s) IV Intermittent every 8 hours  aztreonam  IVPB      doxycycline IVPB      doxycycline IVPB 100 milliGRAM(s) IV Intermittent every 12 hours    immunologic:  influenza   Vaccine 0.5 milliLiter(s) IntraMuscular once    OTHER:  acetaminophen   Tablet .. 650 milliGRAM(s) Oral every 6 hours PRN  aspirin enteric coated 81 milliGRAM(s) Oral daily  atorvastatin 80 milliGRAM(s) Oral at bedtime  clopidogrel Tablet 75 milliGRAM(s) Oral daily  dextrose 40% Gel 15 Gram(s) Oral once PRN  dextrose 5%. 1000 milliLiter(s) IV Continuous <Continuous>  dextrose 50% Injectable 12.5 Gram(s) IV Push once  dextrose 50% Injectable 25 Gram(s) IV Push once  dextrose 50% Injectable 25 Gram(s) IV Push once  glucagon  Injectable 1 milliGRAM(s) IntraMuscular once PRN  heparin  Injectable 5000 Unit(s) SubCutaneous every 8 hours  insulin glargine Injectable (LANTUS) 15 Unit(s) SubCutaneous two times a day  insulin lispro (HumaLOG) corrective regimen sliding scale   SubCutaneous three times a day before meals  lidocaine 5% Ointment 1 Application(s) Topical every 6 hours  metoprolol tartrate 25 milliGRAM(s) Oral two times a day  multivitamin 1 Tablet(s) Oral daily  ondansetron Injectable 4 milliGRAM(s) IV Push every 8 hours PRN  pantoprazole    Tablet 40 milliGRAM(s) Oral before breakfast  sodium chloride 0.9%. 1000 milliLiter(s) IV Continuous <Continuous>      Objective:  Vital Signs Last 24 Hrs  T(C): 36.3 (09 Sep 2019 08:10), Max: 38.6 (08 Sep 2019 22:28)  T(F): 97.4 (09 Sep 2019 08:10), Max: 101.5 (08 Sep 2019 22:28)  HR: 62 (09 Sep 2019 08:10) (62 - 79)  BP: 102/61 (09 Sep 2019 08:10) (88/52 - 110/64)  BP(mean): --  RR: 18 (09 Sep 2019 08:10) (18 - 18)  SpO2: 99% (09 Sep 2019 08:10) (92% - 99%)    PHYSICAL EXAM:  Constitutional:NAD  Eyes:NIDIA, EOMI  Ear/Nose/Throat: no thrush, mucositis.  Moist mucous membranes	  Neck:no JVD, no lymphadenopathy, supple  Respiratory: CTA lucian  Cardiovascular: S1S2 RRR, no murmurs  Gastrointestinal:soft, nontender,  nondistended (+) BS  Extremities:no e/e/c  Skin:  perirectal erythema and localized swelling        LABS:                        8.7    18.72 )-----------( 178      ( 08 Sep 2019 13:17 )             25.0                 Procalcitonin, Serum: 0.49 (09-05 @ 07:29)            Rapid RVP Result: [facility]          MICROBIOLOGY:    Culture - Blood (09.08.19 @ 01:32)    Specimen Source: .Blood    Culture Results:   No growth to date.      Culture - Blood (09.08.19 @ 01:32)    Specimen Source: .Blood    Culture Results:   No growth to date.    Culture - Blood (09.04.19 @ 22:05)    Specimen Source: .Blood    Culture Results:   No growth to date.          RADIOLOGY & ADDITIONAL STUDIES:    < from: CT Abdomen and Pelvis No Cont (09.07.19 @ 22:29) >    EXAM:  CT CHEST                            PROCEDURE DATE:  09/07/2019            INTERPRETATION:  CLINICAL INFORMATION: Fever. Evaluate for source of   infection.     COMPARISON: Chest CT from 9/5/2019. CT abdomen pelvis from 7/27/2016.    PROCEDURE:   CT of the Chest, Abdomen and Pelvis was performed without intravenous   contrast.   Intravenous contrast: None.  Oral contrast: None.  Sagittal and coronal reformats were performed.    FINDINGS:    CHEST:     LUNGS AND LARGE AIRWAYS: Patent central airways. Dependent passive   atelectasis.  PLEURA: Small bilateral pleural effusions.  VESSELS: Coronary atherosclerotic calcifications. A small amount of   venous air is noted.  HEART: Heart size is enlarged. No pericardial effusion.  MEDIASTINUM AND DIONISIO: Mild mediastinal and hilar lymphadenopathy. A   reference right lower paratracheal node measures 1.7 x 1.2 cm. A   reference left hilar lymph node measures 1.8 x 1.7 cm.  CHEST WALL AND LOWER NECK: Within normal limits.    ABDOMEN AND PELVIS:    Limited evaluation of the solid organs in the absence of intravenous   contrast.    LIVER: Within normal limits.  BILE DUCTS: Normal caliber.  GALLBLADDER: Within normal limits.  SPLEEN: Within normal limits.  PANCREAS: Within normal limits.  ADRENALS: Within normal limits.  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    BLADDER: Within normal limits.  REPRODUCTIVE ORGANS: Calcified uterine leiomyomas. No adnexal masses    BOWEL: Tiny hiatal hernia. No bowel obstruction. Mild colonic   diverticulosis without diverticulitis. Appendix is not well visualized.  PERITONEUM: No ascites.  VESSELS: Atherosclerotic calcifications.  RETROPERITONEUM/LYMPH NODES: No lymphadenopathy.    ABDOMINAL WALL: Infiltration of the posterior perineal soft tissues,   right greater than left. Correlation for cellulitis and tenderness   recommended.  BONES: Degenerative changes. Mild degenerative anterolisthesis of L5 on   S1. Multiple vertebral body hemangiomas.    IMPRESSION:     Infiltration of the posterior perineal soft tissues, right greater than   left. Correlation for cellulitis and tenderness recommended.    Small bilateral pleural effusions.    < end of copied text >

## 2019-09-10 LAB
ANION GAP SERPL CALC-SCNC: 12 MMOL/L — SIGNIFICANT CHANGE UP (ref 5–17)
BUN SERPL-MCNC: 69 MG/DL — HIGH (ref 7–23)
CALCIUM SERPL-MCNC: 8.9 MG/DL — SIGNIFICANT CHANGE UP (ref 8.4–10.5)
CHLORIDE SERPL-SCNC: 98 MMOL/L — SIGNIFICANT CHANGE UP (ref 96–108)
CO2 SERPL-SCNC: 16 MMOL/L — LOW (ref 22–31)
CREAT SERPL-MCNC: 2.97 MG/DL — HIGH (ref 0.5–1.3)
GLUCOSE SERPL-MCNC: 151 MG/DL — HIGH (ref 70–99)
HCT VFR BLD CALC: 25.8 % — LOW (ref 34.5–45)
HGB BLD-MCNC: 8.6 G/DL — LOW (ref 11.5–15.5)
MCHC RBC-ENTMCNC: 30.5 PG — SIGNIFICANT CHANGE UP (ref 27–34)
MCHC RBC-ENTMCNC: 33.3 GM/DL — SIGNIFICANT CHANGE UP (ref 32–36)
MCV RBC AUTO: 91.5 FL — SIGNIFICANT CHANGE UP (ref 80–100)
PLATELET # BLD AUTO: 175 K/UL — SIGNIFICANT CHANGE UP (ref 150–400)
POTASSIUM SERPL-MCNC: 3.8 MMOL/L — SIGNIFICANT CHANGE UP (ref 3.5–5.3)
POTASSIUM SERPL-SCNC: 3.8 MMOL/L — SIGNIFICANT CHANGE UP (ref 3.5–5.3)
RBC # BLD: 2.82 M/UL — LOW (ref 3.8–5.2)
RBC # FLD: 13 % — SIGNIFICANT CHANGE UP (ref 10.3–14.5)
SODIUM SERPL-SCNC: 126 MMOL/L — LOW (ref 135–145)
WBC # BLD: 15.44 K/UL — HIGH (ref 3.8–10.5)
WBC # FLD AUTO: 15.44 K/UL — HIGH (ref 3.8–10.5)

## 2019-09-10 PROCEDURE — 76770 US EXAM ABDO BACK WALL COMP: CPT | Mod: 26

## 2019-09-10 RX ORDER — LIDOCAINE 4 G/100G
2 CREAM TOPICAL DAILY
Refills: 0 | Status: DISCONTINUED | OUTPATIENT
Start: 2019-09-10 | End: 2019-09-15

## 2019-09-10 RX ORDER — SODIUM CHLORIDE 9 MG/ML
1000 INJECTION INTRAMUSCULAR; INTRAVENOUS; SUBCUTANEOUS
Refills: 0 | Status: DISCONTINUED | OUTPATIENT
Start: 2019-09-10 | End: 2019-09-11

## 2019-09-10 RX ADMIN — HEPARIN SODIUM 5000 UNIT(S): 5000 INJECTION INTRAVENOUS; SUBCUTANEOUS at 13:46

## 2019-09-10 RX ADMIN — ATORVASTATIN CALCIUM 80 MILLIGRAM(S): 80 TABLET, FILM COATED ORAL at 22:30

## 2019-09-10 RX ADMIN — Medication 1 TABLET(S): at 13:49

## 2019-09-10 RX ADMIN — Medication 110 MILLIGRAM(S): at 18:31

## 2019-09-10 RX ADMIN — LIDOCAINE 1 APPLICATION(S): 4 CREAM TOPICAL at 22:31

## 2019-09-10 RX ADMIN — PANTOPRAZOLE SODIUM 40 MILLIGRAM(S): 20 TABLET, DELAYED RELEASE ORAL at 05:49

## 2019-09-10 RX ADMIN — HEPARIN SODIUM 5000 UNIT(S): 5000 INJECTION INTRAVENOUS; SUBCUTANEOUS at 22:31

## 2019-09-10 RX ADMIN — LIDOCAINE 1 APPLICATION(S): 4 CREAM TOPICAL at 10:17

## 2019-09-10 RX ADMIN — Medication 25 MILLIGRAM(S): at 05:49

## 2019-09-10 RX ADMIN — Medication 650 MILLIGRAM(S): at 18:29

## 2019-09-10 RX ADMIN — Medication 2: at 08:49

## 2019-09-10 RX ADMIN — INSULIN GLARGINE 15 UNIT(S): 100 INJECTION, SOLUTION SUBCUTANEOUS at 08:53

## 2019-09-10 RX ADMIN — INSULIN GLARGINE 15 UNIT(S): 100 INJECTION, SOLUTION SUBCUTANEOUS at 22:31

## 2019-09-10 RX ADMIN — Medication 650 MILLIGRAM(S): at 03:14

## 2019-09-10 RX ADMIN — HEPARIN SODIUM 5000 UNIT(S): 5000 INJECTION INTRAVENOUS; SUBCUTANEOUS at 05:45

## 2019-09-10 RX ADMIN — Medication 25 MILLIGRAM(S): at 18:31

## 2019-09-10 RX ADMIN — Medication 650 MILLIGRAM(S): at 19:00

## 2019-09-10 RX ADMIN — LIDOCAINE 1 APPLICATION(S): 4 CREAM TOPICAL at 05:45

## 2019-09-10 RX ADMIN — LIDOCAINE 1 APPLICATION(S): 4 CREAM TOPICAL at 13:46

## 2019-09-10 RX ADMIN — LIDOCAINE 2 PATCH: 4 CREAM TOPICAL at 13:47

## 2019-09-10 RX ADMIN — LIDOCAINE 2 PATCH: 4 CREAM TOPICAL at 20:00

## 2019-09-10 RX ADMIN — Medication 650 MILLIGRAM(S): at 03:45

## 2019-09-10 RX ADMIN — Medication 50 MILLIGRAM(S): at 17:00

## 2019-09-10 RX ADMIN — CLOPIDOGREL BISULFATE 75 MILLIGRAM(S): 75 TABLET, FILM COATED ORAL at 13:50

## 2019-09-10 RX ADMIN — Medication 110 MILLIGRAM(S): at 05:44

## 2019-09-10 RX ADMIN — Medication 50 MILLIGRAM(S): at 00:21

## 2019-09-10 RX ADMIN — Medication 50 MILLIGRAM(S): at 08:53

## 2019-09-10 RX ADMIN — Medication 81 MILLIGRAM(S): at 13:49

## 2019-09-10 RX ADMIN — LIDOCAINE 1 APPLICATION(S): 4 CREAM TOPICAL at 02:00

## 2019-09-10 RX ADMIN — LIDOCAINE 1 APPLICATION(S): 4 CREAM TOPICAL at 18:29

## 2019-09-10 NOTE — PROGRESS NOTE ADULT - SUBJECTIVE AND OBJECTIVE BOX
Patient seen and examined  no complaints    codeine (Unknown)  Kiwi (Anaphylaxis)  penicillins (Anaphylaxis)    Hospital Medications:   MEDICATIONS  (STANDING):  aspirin enteric coated 81 milliGRAM(s) Oral daily  atorvastatin 80 milliGRAM(s) Oral at bedtime  aztreonam  IVPB 500 milliGRAM(s) IV Intermittent every 8 hours  aztreonam  IVPB      clopidogrel Tablet 75 milliGRAM(s) Oral daily  dextrose 5%. 1000 milliLiter(s) (50 mL/Hr) IV Continuous <Continuous>  dextrose 50% Injectable 12.5 Gram(s) IV Push once  dextrose 50% Injectable 25 Gram(s) IV Push once  dextrose 50% Injectable 25 Gram(s) IV Push once  doxycycline IVPB      doxycycline IVPB 100 milliGRAM(s) IV Intermittent every 12 hours  heparin  Injectable 5000 Unit(s) SubCutaneous every 8 hours  influenza   Vaccine 0.5 milliLiter(s) IntraMuscular once  insulin glargine Injectable (LANTUS) 15 Unit(s) SubCutaneous two times a day  insulin lispro (HumaLOG) corrective regimen sliding scale   SubCutaneous three times a day before meals  lidocaine   Patch 2 Patch Transdermal daily  lidocaine 5% Ointment 1 Application(s) Topical every 4 hours  metoprolol tartrate 25 milliGRAM(s) Oral two times a day  multivitamin 1 Tablet(s) Oral daily  pantoprazole    Tablet 40 milliGRAM(s) Oral before breakfast  sodium chloride 0.9%. 1000 milliLiter(s) (100 mL/Hr) IV Continuous <Continuous>        VITALS:  T(F): 97.4 (09-10-19 @ 06:03), Max: 98.7 (19 @ 19:43)  HR: 80 (09-10-19 @ 06:03)  BP: 104/68 (09-10-19 @ 06:03)  RR: 18 (09-10-19 @ 06:03)  SpO2: 96% (09-10-19 @ 06:03)  Wt(kg): --     @ 07:01  -  09-10 @ 07:00  --------------------------------------------------------  IN: 2445 mL / OUT: 545 mL / NET: 1900 mL      Physical Exam :-  Constitutional: NAD  Neck: Supple.  Respiratory: Bilateral equal breath sounds, few Crackles present.  Cardiovascular: S1, S2 normal, positive Murmur  Gastrointestinal: Bowel Sounds present, soft, non tender.  Extremities: +1 Edema Feet  Neurological: Alert and Oriented x 3,   Psychiatric: Normal mood, normal affect  + indwelling infante    LABS:  09-10    126<L>  |  98  |  69<H>  ----------------------------<  151<H>  3.8   |  16<L>  |  2.97<H>    Ca    8.9      10 Sep 2019 07:14      Creatinine Trend: 2.97 <--, 2.67 <--, 1.98 <--, 2.09 <--, 1.93 <--, 1.39 <--, 1.39 <--                        8.6    15.44 )-----------( 175      ( 10 Sep 2019 09:33 )             25.8     Urine Studies:  Urinalysis Basic - ( 09 Sep 2019 15:48 )    Color: Yellow / Appearance: Slightly Turbid / S.025 / pH:   Gluc:  / Ketone: Negative  / Bili: Negative / Urobili: <2 mg/dL   Blood:  / Protein: 100 mg/dL / Nitrite: Negative   Leuk Esterase: Negative / RBC: 2 /HPF / WBC 2 /HPF   Sq Epi:  / Non Sq Epi: Negative / Bacteria: Few      Osmolality, Random Urine: 423 mosm/Kg ( @ 15:48)  Potassium, Random Urine: 40 mmol/L ( @ 13:18)  Sodium, Random Urine: <20 mmol/L ( @ 13:18)  Chloride, Random Urine: <35 mmol/L ( @ 13:18)  Creatinine, Random Urine: 132 mg/dL ( @ 13:18)  Protein/Creatinine Ratio Calculation: 0.8 Ratio (:18)  Osmolality, Random Urine: 380 mosm/Kg ( @ 23:28)  Potassium, Random Urine: 36 mmol/L ( @ 17:24)  Sodium, Random Urine: 30 mmol/L ( @ 17:24)  Chloride, Random Urine: <35 mmol/L ( @ 17:24)  Creatinine, Random Urine: 126 mg/dL ( @ :24)  Protein/Creatinine Ratio Calculation: 0.2 Ratio ( @ :)    RADIOLOGY & ADDITIONAL STUDIES:

## 2019-09-10 NOTE — PROGRESS NOTE ADULT - PROBLEM SELECTOR PLAN 1
Cont to be afebrile  Urine Cx noted  reaction to vanco?  cont aztreonam and doxy for MRSA coverage   Tylenol for fever and pain, can give IV morphin PRN   gentle hydration   CXR noted  elevated procalcitonin  CT chest noted, negative for PNA   RVP ordered, negative  ID eval called for further recs appreciated, discussed in detail with team   CT C/A/P noted. R Buttock infiltrate   Buttock soft tissue US pending

## 2019-09-10 NOTE — PROGRESS NOTE ADULT - ASSESSMENT
ASSESSMENT:   86 year old female with fevers, UTI, and area of erythema over right buttock, no abscess on CT scan.     PLAN:  - Final CT w/o collection, no surgical intervention at this time. continue antibiotics   - Appreciate ID recommendations  - Continue care per primary team     Red Surgery   [phone]

## 2019-09-10 NOTE — PROGRESS NOTE ADULT - ASSESSMENT
Pt is a 85 y/o Female with PMHx of CHF, DM, HLD, HTN, CAD prior MI, c/o fatigue, "feeling off" and unable to get out of bed; Tmax 104F toay. Rash/redness on buttocks noted by home health aid. patient also complaining of +chronic cough, nonproductive.  No abdominal pain, no n/v/d. No chest pain.  States she has been feeling hot and cold over past few days, and took her temperature today, found fever of 104, and came to ED for evaluation.  No dysuria, no diarrhea. +constipation (chronic).  Increased diffuse leg swelling for past few days. patient lives at home with family. walks by herself with no use of walker. denies of any MEAD< chest pain on exertion. compliant with all her home medications (04 Sep 2019 22:22)    ER vitals: Tm 102.2, P 75, /77.  WBC 14.8 --> 12.2.  Cr 2.0 <-- 1.3.  PCT 0.49.  Ucx >100K E.coli.  Pt given dose of ceftriaxone, now on cipro for UTI.  ID consult called for further abx managment.      Sepsis:    - fever, leukocytosis. Source less likely UTI given persistent fevers, pt has infiltration of perirectal tissues, likely cellulitis.  Abx broadened     - Check lactate.  Pct elevated 0.49.  Monitor WBC and fever curve.    - Monitor hemodynamic status and BPs.  s/p IV fluid bolus, cont maintanence fluid.      - blood cx, urine cx.  RVP (-).  No pna on cxr      UTI:    - UA (+).  Ucx E.coli, sensitive to cipro, now on aztreonam    - Cr rising.  Check bladder scan/PVR, renal/bladder US,  Ctap no hydro or obstruction.  Renal US no obstruction - medical renal disease.     - Renally dose abx.          Perirectal cellulitis, r/o abscess:    - Seen by Surgery, area not amenable to I&D.  Perirectal infiltration seen on CTap, no abscess noted.  Agree with broadening abx to include MRSA and gram negative/anerobic coverage.  Cont doxy/aztreonam.  Pt with h/o PCN allergy.  Possibly drug reaction to vanco ?, no rash or hives reported.       - Warm soaks.  Monitor for development of abscess.  f/u repeat cbc/wbc, monitor temp curve.     - keep area clean from stool/urine    - Cont IV abx for now.              Will follow,    Sandrita Zaman  [phone]

## 2019-09-10 NOTE — PROGRESS NOTE ADULT - PROBLEM SELECTOR PLAN 1
No protocol for med or med protocol failed  Fwrd to PCP    Last office visit: 07/26/18   Next appt: NONE   Last refilled: fenofibrate, venlafaxine     Dr Pal please advise      cr rising in setting of fevers, high wbc and hypotension- hopefully plateuing  u lytes showing intravascular depletion  inc ivf from 75cc to 100cc hour ( nacl)  monitor urineoutput  dec metoprolol to 12.5mg po bid from 25mg po bid ( hold for sbp < 100mmhg)  monitor closely  IV abx per ID ( renal adjusty to egfr < 30) cr rising in setting of fevers, high wbc and hypotension- hopefully plateuing  u lytes showing intravascular depletion  inc ivf from 75cc to 100cc hour ( nacl)  monitor urineoutput  dec metoprolol to 12.5mg po bid from 25mg po bid ( hold for sbp < 100mmhg)  monitor closely  check cbc with diff to trend peripheral eosinophilia if present  IV abx per ID ( renal adjusty to egfr < 30)

## 2019-09-10 NOTE — PROGRESS NOTE ADULT - SUBJECTIVE AND OBJECTIVE BOX
Infectious Diseases progress note:    Subjective: State pain is slightly better today.  No new fevers.  WBC 15.  Pt's aide at bedside.     ROS:  CONSTITUTIONAL:  No fever, chills, rigors  CARDIOVASCULAR:  No chest pain or palpitations  RESPIRATORY:   No SOB, cough, dyspnea on exertion.  No wheezing  GASTROINTESTINAL:  No abd pain, N/V, diarrhea/constipation  EXTREMITIES:  No swelling or joint pain  GENITOURINARY:  No burning on urination, increased frequency or urgency.  No flank pain  NEUROLOGIC:  No HA, visual disturbances  SKIN: No rashes    Allergies    codeine (Unknown)  Kiwi (Anaphylaxis)  penicillins (Anaphylaxis)    Intolerances        ANTIBIOTICS/RELEVANT:  antimicrobials  aztreonam  IVPB 500 milliGRAM(s) IV Intermittent every 8 hours  aztreonam  IVPB      doxycycline IVPB      doxycycline IVPB 100 milliGRAM(s) IV Intermittent every 12 hours    immunologic:  influenza   Vaccine 0.5 milliLiter(s) IntraMuscular once    OTHER:  acetaminophen   Tablet .. 650 milliGRAM(s) Oral every 6 hours PRN  aspirin enteric coated 81 milliGRAM(s) Oral daily  atorvastatin 80 milliGRAM(s) Oral at bedtime  clopidogrel Tablet 75 milliGRAM(s) Oral daily  dextrose 40% Gel 15 Gram(s) Oral once PRN  dextrose 5%. 1000 milliLiter(s) IV Continuous <Continuous>  dextrose 50% Injectable 12.5 Gram(s) IV Push once  dextrose 50% Injectable 25 Gram(s) IV Push once  dextrose 50% Injectable 25 Gram(s) IV Push once  glucagon  Injectable 1 milliGRAM(s) IntraMuscular once PRN  heparin  Injectable 5000 Unit(s) SubCutaneous every 8 hours  insulin glargine Injectable (LANTUS) 15 Unit(s) SubCutaneous two times a day  insulin lispro (HumaLOG) corrective regimen sliding scale   SubCutaneous three times a day before meals  lidocaine   Patch 2 Patch Transdermal daily  lidocaine 5% Ointment 1 Application(s) Topical every 4 hours  metoprolol tartrate 25 milliGRAM(s) Oral two times a day  multivitamin 1 Tablet(s) Oral daily  ondansetron Injectable 4 milliGRAM(s) IV Push every 8 hours PRN  pantoprazole    Tablet 40 milliGRAM(s) Oral before breakfast  sodium chloride 0.9%. 1000 milliLiter(s) IV Continuous <Continuous>      Objective:  Vital Signs Last 24 Hrs  T(C): 36.3 (10 Sep 2019 06:03), Max: 37.1 (09 Sep 2019 19:43)  T(F): 97.4 (10 Sep 2019 06:03), Max: 98.7 (09 Sep 2019 19:43)  HR: 80 (10 Sep 2019 06:03) (80 - 92)  BP: 104/68 (10 Sep 2019 06:03) (91/64 - 117/74)  BP(mean): --  RR: 18 (10 Sep 2019 06:03) (18 - 18)  SpO2: 96% (10 Sep 2019 06:03) (94% - 96%)    PHYSICAL EXAM:  Constitutional:NAD  Eyes:NIDIA, EOMI  Ear/Nose/Throat: no thrush, mucositis.  Moist mucous membranes	  Neck:no JVD, no lymphadenopathy, supple  Respiratory: CTA lucian  Cardiovascular: S1S2 RRR, no murmurs  Gastrointestinal:soft, nontender,  nondistended (+) BS  Extremities:no e/e/c  Skin:  Perirectal soft tissue swelling/erythema, somewhat improved from yesterday.  No open wounds or discharge.         LABS:                        8.6    15.44 )-----------( 175      ( 10 Sep 2019 09:33 )             25.8     0910    126<L>  |  98  |  69<H>  ----------------------------<  151<H>  3.8   |  16<L>  |  2.97<H>    Ca    8.9      10 Sep 2019 07:14        Urinalysis Basic - ( 09 Sep 2019 15:48 )    Color: Yellow / Appearance: Slightly Turbid / S.025 / pH: x  Gluc: x / Ketone: Negative  / Bili: Negative / Urobili: <2 mg/dL   Blood: x / Protein: 100 mg/dL / Nitrite: Negative   Leuk Esterase: Negative / RBC: 2 /HPF / WBC 2 /HPF   Sq Epi: x / Non Sq Epi: Negative / Bacteria: Few        Procalcitonin, Serum: 0.49 ( @ 07:29)            Rapid RVP Result: [facility]          MICROBIOLOGY:      Culture - Blood (19 @ 01:32)    Specimen Source: .Blood    Culture Results:   No growth to date.    Culture - Blood (19 @ 01:32)    Specimen Source: .Blood    Culture Results:   No growth to date.    Culture - Blood (19 @ 22:05)    Specimen Source: .Blood    Culture Results:   No growth at 5 days.    Culture - Urine (19 @ 22:03)    -  Gentamicin: S <=4    -  Imipenem: S <=1    -  Levofloxacin: S <=2    -  Meropenem: S <=1    -  Nitrofurantoin: S <=32 Should not be used to treat pyelonephritis    -  Piperacillin/Tazobactam: S <=16    -  Tigecycline: S <=2    -  Tobramycin: S <=4    -  Trimethoprim/Sulfamethoxazole: S <=2/38    -  Amikacin: S <=16    -  Ampicillin: S <=8 These ampicillin results predict results for amoxicillin    -  Ampicillin/Sulbactam: S <=8/4 Enterobacter, Citrobacter, and Serratia may develop resistance during prolonged therapy (3-4 days)    -  Aztreonam: S <=4    -  Cefazolin: S <=8 (MIC_CL_[id number]) For uncomplicated UTI with K. pneumoniae, E. coli, or P. mirablis: KAREN <=16 is sensitive and KAREN >=32 is resistant. This also predicts results for oral agents cefaclor, cefdinir, cefpodoxime, cefprozil, cefuroxime axetil, cephalexin and locarbef for uncomplicated UTI. Note that some isolates may be susceptible to these agents while testing resistant to cefazolin.    -  Cefepime: S <=4    -  Cefoxitin: S <=8    -  Ceftriaxone: S <=1 Enterobacter, Citrobacter, and Serratia may develop resistance during prolonged therapy    -  Ciprofloxacin: S <=1    Specimen Source: .Urine    Culture Results:   >100,000 CFU/ml Escherichia coli    Organism Identification: Escherichia coli    Organism: Escherichia coli    Method Type: KAREN        RADIOLOGY & ADDITIONAL STUDIES:    < from: US Kidney and Bladder (09.10.19 @ 13:35) >  IMPRESSION:     No hydronephrosis.    Bilateral echogenic kidneys, suggesting medical renal disease.    Small left pleural effusion.    < end of copied text >

## 2019-09-10 NOTE — PROGRESS NOTE ADULT - SUBJECTIVE AND OBJECTIVE BOX
Subjective: Patient seen and examined. No new events except as noted.   feeling better  pain improved     REVIEW OF SYSTEMS:    CONSTITUTIONAL: No weakness, fevers or chills  EYES/ENT: No visual changes;  No vertigo or throat pain   NECK: No pain or stiffness  RESPIRATORY: No cough, wheezing, hemoptysis; No shortness of breath  CARDIOVASCULAR: No chest pain or palpitations  GASTROINTESTINAL: No abdominal or epigastric pain. No nausea, vomiting, or hematemesis; No diarrhea or constipation. No melena or hematochezia.  GENITOURINARY: No dysuria, frequency or hematuria  NEUROLOGICAL: No numbness or weakness  SKIN: R buttock pain   All other review of systems is negative unless indicated above.    MEDICATIONS:  MEDICATIONS  (STANDING):  aspirin enteric coated 81 milliGRAM(s) Oral daily  atorvastatin 80 milliGRAM(s) Oral at bedtime  aztreonam  IVPB 500 milliGRAM(s) IV Intermittent every 8 hours  aztreonam  IVPB      clopidogrel Tablet 75 milliGRAM(s) Oral daily  dextrose 5%. 1000 milliLiter(s) (50 mL/Hr) IV Continuous <Continuous>  dextrose 50% Injectable 12.5 Gram(s) IV Push once  dextrose 50% Injectable 25 Gram(s) IV Push once  dextrose 50% Injectable 25 Gram(s) IV Push once  doxycycline IVPB      doxycycline IVPB 100 milliGRAM(s) IV Intermittent every 12 hours  heparin  Injectable 5000 Unit(s) SubCutaneous every 8 hours  influenza   Vaccine 0.5 milliLiter(s) IntraMuscular once  insulin glargine Injectable (LANTUS) 15 Unit(s) SubCutaneous two times a day  insulin lispro (HumaLOG) corrective regimen sliding scale   SubCutaneous three times a day before meals  lidocaine   Patch 2 Patch Transdermal daily  lidocaine 5% Ointment 1 Application(s) Topical every 4 hours  metoprolol tartrate 25 milliGRAM(s) Oral two times a day  multivitamin 1 Tablet(s) Oral daily  pantoprazole    Tablet 40 milliGRAM(s) Oral before breakfast  sodium chloride 0.9%. 1000 milliLiter(s) (100 mL/Hr) IV Continuous <Continuous>      PHYSICAL EXAM:  T(C): 36.3 (09-10-19 @ 06:03), Max: 37.1 (19 @ 19:43)  HR: 80 (09-10-19 @ 06:03) (80 - 92)  BP: 104/68 (09-10-19 @ 06:03) (91/64 - 117/74)  RR: 18 (09-10-19 @ 06:03) (18 - 18)  SpO2: 96% (09-10-19 @ 06:03) (94% - 96%)  Wt(kg): --  I&O's Summary    09 Sep 2019 07:01  -  10 Sep 2019 07:00  --------------------------------------------------------  IN: 2445 mL / OUT: 545 mL / NET: 1900 mL          Appearance: Normal	  HEENT:   Normal oral mucosa, PERRL, EOMI	  Lymphatic: No lymphadenopathy , no edema  Cardiovascular: Normal S1 S2,  Respiratory: Lungs clear to auscultation, normal effort 	  Gastrointestinal:  Soft, Non-tender, + BS	  Skin: No rashes, No ecchymoses, No cyanosis, warm to touch  Musculoskeletal: Normal range of motion, normal strength  Psychiatry:  Mood & affect appropriate  Ext: No edema      All labs, Imaging and EKGs personally reviewed                           8.6    15.44 )-----------( 175      ( 10 Sep 2019 09:33 )             25.8               09-10    126<L>  |  98  |  69<H>  ----------------------------<  151<H>  3.8   |  16<L>  |  2.97<H>    Ca    8.9      10 Sep 2019 07:14                         Urinalysis Basic - ( 09 Sep 2019 15:48 )    Color: Yellow / Appearance: Slightly Turbid / S.025 / pH: x  Gluc: x / Ketone: Negative  / Bili: Negative / Urobili: <2 mg/dL   Blood: x / Protein: 100 mg/dL / Nitrite: Negative   Leuk Esterase: Negative / RBC: 2 /HPF / WBC 2 /HPF   Sq Epi: x / Non Sq Epi: Negative / Bacteria: Few        < from: US Kidney and Bladder (09.10.19 @ 13:35) >  IMPRESSION:     No hydronephrosis.    Bilateral echogenic kidneys, suggesting medical renal disease.    Small left pleural effusion.

## 2019-09-11 LAB
ANION GAP SERPL CALC-SCNC: 15 MMOL/L — SIGNIFICANT CHANGE UP (ref 5–17)
BUN SERPL-MCNC: 73 MG/DL — HIGH (ref 7–23)
CALCIUM SERPL-MCNC: 8.4 MG/DL — SIGNIFICANT CHANGE UP (ref 8.4–10.5)
CHLORIDE SERPL-SCNC: 100 MMOL/L — SIGNIFICANT CHANGE UP (ref 96–108)
CO2 SERPL-SCNC: 15 MMOL/L — LOW (ref 22–31)
CREAT SERPL-MCNC: 2.45 MG/DL — HIGH (ref 0.5–1.3)
GLUCOSE SERPL-MCNC: 109 MG/DL — HIGH (ref 70–99)
HCT VFR BLD CALC: 26.6 % — LOW (ref 34.5–45)
HGB BLD-MCNC: 9.1 G/DL — LOW (ref 11.5–15.5)
MCHC RBC-ENTMCNC: 30.8 PG — SIGNIFICANT CHANGE UP (ref 27–34)
MCHC RBC-ENTMCNC: 34.2 GM/DL — SIGNIFICANT CHANGE UP (ref 32–36)
MCV RBC AUTO: 90.2 FL — SIGNIFICANT CHANGE UP (ref 80–100)
PLATELET # BLD AUTO: 201 K/UL — SIGNIFICANT CHANGE UP (ref 150–400)
POTASSIUM SERPL-MCNC: 3.9 MMOL/L — SIGNIFICANT CHANGE UP (ref 3.5–5.3)
POTASSIUM SERPL-SCNC: 3.9 MMOL/L — SIGNIFICANT CHANGE UP (ref 3.5–5.3)
RBC # BLD: 2.95 M/UL — LOW (ref 3.8–5.2)
RBC # FLD: 13.2 % — SIGNIFICANT CHANGE UP (ref 10.3–14.5)
SODIUM SERPL-SCNC: 130 MMOL/L — LOW (ref 135–145)
WBC # BLD: 24.39 K/UL — HIGH (ref 3.8–10.5)
WBC # FLD AUTO: 24.39 K/UL — HIGH (ref 3.8–10.5)

## 2019-09-11 PROCEDURE — 99232 SBSQ HOSP IP/OBS MODERATE 35: CPT

## 2019-09-11 RX ORDER — METRONIDAZOLE 500 MG
TABLET ORAL
Refills: 0 | Status: DISCONTINUED | OUTPATIENT
Start: 2019-09-11 | End: 2019-09-14

## 2019-09-11 RX ORDER — MORPHINE SULFATE 50 MG/1
15 CAPSULE, EXTENDED RELEASE ORAL EVERY 12 HOURS
Refills: 0 | Status: DISCONTINUED | OUTPATIENT
Start: 2019-09-11 | End: 2019-09-12

## 2019-09-11 RX ORDER — SODIUM CHLORIDE 9 MG/ML
1000 INJECTION, SOLUTION INTRAVENOUS
Refills: 0 | Status: DISCONTINUED | OUTPATIENT
Start: 2019-09-11 | End: 2019-09-12

## 2019-09-11 RX ORDER — METRONIDAZOLE 500 MG
500 TABLET ORAL ONCE
Refills: 0 | Status: COMPLETED | OUTPATIENT
Start: 2019-09-11 | End: 2019-09-11

## 2019-09-11 RX ORDER — MORPHINE SULFATE 50 MG/1
7.5 CAPSULE, EXTENDED RELEASE ORAL EVERY 6 HOURS
Refills: 0 | Status: DISCONTINUED | OUTPATIENT
Start: 2019-09-11 | End: 2019-09-15

## 2019-09-11 RX ORDER — MORPHINE SULFATE 50 MG/1
10 CAPSULE, EXTENDED RELEASE ORAL EVERY 12 HOURS
Refills: 0 | Status: DISCONTINUED | OUTPATIENT
Start: 2019-09-11 | End: 2019-09-11

## 2019-09-11 RX ORDER — METRONIDAZOLE 500 MG
500 TABLET ORAL EVERY 8 HOURS
Refills: 0 | Status: DISCONTINUED | OUTPATIENT
Start: 2019-09-11 | End: 2019-09-14

## 2019-09-11 RX ORDER — DAPTOMYCIN 500 MG/10ML
350 INJECTION, POWDER, LYOPHILIZED, FOR SOLUTION INTRAVENOUS
Refills: 0 | Status: DISCONTINUED | OUTPATIENT
Start: 2019-09-13 | End: 2019-09-15

## 2019-09-11 RX ORDER — DAPTOMYCIN 500 MG/10ML
INJECTION, POWDER, LYOPHILIZED, FOR SOLUTION INTRAVENOUS
Refills: 0 | Status: DISCONTINUED | OUTPATIENT
Start: 2019-09-11 | End: 2019-09-15

## 2019-09-11 RX ORDER — DAPTOMYCIN 500 MG/10ML
350 INJECTION, POWDER, LYOPHILIZED, FOR SOLUTION INTRAVENOUS ONCE
Refills: 0 | Status: COMPLETED | OUTPATIENT
Start: 2019-09-11 | End: 2019-09-11

## 2019-09-11 RX ORDER — MORPHINE SULFATE 50 MG/1
5 CAPSULE, EXTENDED RELEASE ORAL EVERY 6 HOURS
Refills: 0 | Status: DISCONTINUED | OUTPATIENT
Start: 2019-09-11 | End: 2019-09-11

## 2019-09-11 RX ADMIN — LIDOCAINE 1 APPLICATION(S): 4 CREAM TOPICAL at 13:26

## 2019-09-11 RX ADMIN — Medication 110 MILLIGRAM(S): at 05:03

## 2019-09-11 RX ADMIN — MORPHINE SULFATE 15 MILLIGRAM(S): 50 CAPSULE, EXTENDED RELEASE ORAL at 17:37

## 2019-09-11 RX ADMIN — LIDOCAINE 1 APPLICATION(S): 4 CREAM TOPICAL at 11:20

## 2019-09-11 RX ADMIN — PANTOPRAZOLE SODIUM 40 MILLIGRAM(S): 20 TABLET, DELAYED RELEASE ORAL at 05:00

## 2019-09-11 RX ADMIN — LIDOCAINE 1 APPLICATION(S): 4 CREAM TOPICAL at 05:01

## 2019-09-11 RX ADMIN — Medication 2: at 12:48

## 2019-09-11 RX ADMIN — LIDOCAINE 2 PATCH: 4 CREAM TOPICAL at 01:59

## 2019-09-11 RX ADMIN — SODIUM CHLORIDE 100 MILLILITER(S): 9 INJECTION INTRAMUSCULAR; INTRAVENOUS; SUBCUTANEOUS at 00:35

## 2019-09-11 RX ADMIN — ONDANSETRON 4 MILLIGRAM(S): 8 TABLET, FILM COATED ORAL at 17:48

## 2019-09-11 RX ADMIN — LIDOCAINE 1 APPLICATION(S): 4 CREAM TOPICAL at 22:46

## 2019-09-11 RX ADMIN — INSULIN GLARGINE 15 UNIT(S): 100 INJECTION, SOLUTION SUBCUTANEOUS at 22:41

## 2019-09-11 RX ADMIN — MORPHINE SULFATE 7.5 MILLIGRAM(S): 50 CAPSULE, EXTENDED RELEASE ORAL at 15:04

## 2019-09-11 RX ADMIN — CLOPIDOGREL BISULFATE 75 MILLIGRAM(S): 75 TABLET, FILM COATED ORAL at 12:42

## 2019-09-11 RX ADMIN — HEPARIN SODIUM 5000 UNIT(S): 5000 INJECTION INTRAVENOUS; SUBCUTANEOUS at 05:01

## 2019-09-11 RX ADMIN — Medication 100 MILLIGRAM(S): at 12:40

## 2019-09-11 RX ADMIN — Medication 50 MILLIGRAM(S): at 00:31

## 2019-09-11 RX ADMIN — Medication 50 MILLIGRAM(S): at 17:35

## 2019-09-11 RX ADMIN — MORPHINE SULFATE 7.5 MILLIGRAM(S): 50 CAPSULE, EXTENDED RELEASE ORAL at 16:00

## 2019-09-11 RX ADMIN — Medication 81 MILLIGRAM(S): at 12:42

## 2019-09-11 RX ADMIN — Medication 100 MILLIGRAM(S): at 22:46

## 2019-09-11 RX ADMIN — Medication 2: at 17:36

## 2019-09-11 RX ADMIN — Medication 25 MILLIGRAM(S): at 05:00

## 2019-09-11 RX ADMIN — HEPARIN SODIUM 5000 UNIT(S): 5000 INJECTION INTRAVENOUS; SUBCUTANEOUS at 13:26

## 2019-09-11 RX ADMIN — SODIUM CHLORIDE 75 MILLILITER(S): 9 INJECTION, SOLUTION INTRAVENOUS at 14:18

## 2019-09-11 RX ADMIN — DAPTOMYCIN 114 MILLIGRAM(S): 500 INJECTION, POWDER, LYOPHILIZED, FOR SOLUTION INTRAVENOUS at 13:23

## 2019-09-11 RX ADMIN — Medication 1 TABLET(S): at 12:42

## 2019-09-11 RX ADMIN — LIDOCAINE 1 APPLICATION(S): 4 CREAM TOPICAL at 01:59

## 2019-09-11 RX ADMIN — Medication 650 MILLIGRAM(S): at 06:27

## 2019-09-11 RX ADMIN — Medication 50 MILLIGRAM(S): at 08:37

## 2019-09-11 RX ADMIN — Medication 650 MILLIGRAM(S): at 00:01

## 2019-09-11 RX ADMIN — Medication 650 MILLIGRAM(S): at 06:57

## 2019-09-11 RX ADMIN — HEPARIN SODIUM 5000 UNIT(S): 5000 INJECTION INTRAVENOUS; SUBCUTANEOUS at 22:47

## 2019-09-11 RX ADMIN — Medication 650 MILLIGRAM(S): at 00:32

## 2019-09-11 RX ADMIN — MORPHINE SULFATE 15 MILLIGRAM(S): 50 CAPSULE, EXTENDED RELEASE ORAL at 18:30

## 2019-09-11 RX ADMIN — ATORVASTATIN CALCIUM 80 MILLIGRAM(S): 80 TABLET, FILM COATED ORAL at 22:47

## 2019-09-11 RX ADMIN — INSULIN GLARGINE 15 UNIT(S): 100 INJECTION, SOLUTION SUBCUTANEOUS at 08:37

## 2019-09-11 RX ADMIN — LIDOCAINE 1 APPLICATION(S): 4 CREAM TOPICAL at 17:35

## 2019-09-11 NOTE — CONSULT NOTE ADULT - ASSESSMENT
Impression:    buttocks cellulitis without wound    Recommend:  1.) Abx per Medicine/ ID    Care as per medicine. Will not follow. Please recall for new issues  Upon [facility] [address] [place] [address] [phone]  Seen with Dr. Greene and discussed with clinical nurse  Thank you for this consult  Lacey Sparrow, NP-C, CWOCN [id number]

## 2019-09-11 NOTE — PROGRESS NOTE ADULT - PROBLEM SELECTOR PLAN 1
cr appears to be plateauing and improving   u lytes showing intravascular depletion  change ivf to 1/2nacl +75meq hco3 @ 75cc x 24  then will observe off ivf if cr continues to improve  monitor urineoutput  dec metoprolol to 12.5mg po bid from 25mg po bid ( hold for sbp < 100mmhg)  monitor closely  check cbc with diff to trend peripheral eosinophilia if present  IV abx per ID ( renal adjusty to egfr < 30)

## 2019-09-11 NOTE — PROGRESS NOTE ADULT - SUBJECTIVE AND OBJECTIVE BOX
Subjective: Patient seen and examined. No new events except as noted.   cont to have pain in R buttock   daughter at the bedside     REVIEW OF SYSTEMS:    CONSTITUTIONAL: No weakness, fevers or chills  EYES/ENT: No visual changes;  No vertigo or throat pain   NECK: No pain or stiffness  RESPIRATORY: No cough, wheezing, hemoptysis; No shortness of breath  CARDIOVASCULAR: No chest pain or palpitations  GASTROINTESTINAL: No abdominal or epigastric pain. No nausea, vomiting, or hematemesis; No diarrhea or constipation. No melena or hematochezia.  GENITOURINARY: No dysuria, frequency or hematuria  NEUROLOGICAL: No numbness or weakness  SKIN: R Buttock pain   All other review of systems is negative unless indicated above.    MEDICATIONS:  MEDICATIONS  (STANDING):  aspirin enteric coated 81 milliGRAM(s) Oral daily  atorvastatin 80 milliGRAM(s) Oral at bedtime  aztreonam  IVPB 500 milliGRAM(s) IV Intermittent every 8 hours  aztreonam  IVPB      clopidogrel Tablet 75 milliGRAM(s) Oral daily  DAPTOmycin IVPB      dextrose 5%. 1000 milliLiter(s) (50 mL/Hr) IV Continuous <Continuous>  dextrose 50% Injectable 12.5 Gram(s) IV Push once  dextrose 50% Injectable 25 Gram(s) IV Push once  dextrose 50% Injectable 25 Gram(s) IV Push once  heparin  Injectable 5000 Unit(s) SubCutaneous every 8 hours  influenza   Vaccine 0.5 milliLiter(s) IntraMuscular once  insulin glargine Injectable (LANTUS) 15 Unit(s) SubCutaneous two times a day  insulin lispro (HumaLOG) corrective regimen sliding scale   SubCutaneous three times a day before meals  lidocaine   Patch 2 Patch Transdermal daily  lidocaine 5% Ointment 1 Application(s) Topical every 4 hours  metoprolol tartrate 25 milliGRAM(s) Oral two times a day  metroNIDAZOLE  IVPB      metroNIDAZOLE  IVPB 500 milliGRAM(s) IV Intermittent every 8 hours  morphine ER Tablet 15 milliGRAM(s) Oral every 12 hours  multivitamin 1 Tablet(s) Oral daily  pantoprazole    Tablet 40 milliGRAM(s) Oral before breakfast  sodium chloride 0.45% 1000 milliLiter(s) (75 mL/Hr) IV Continuous <Continuous>      PHYSICAL EXAM:  T(C): 36.8 (19 @ 14:05), Max: 36.8 (19 @ 14:05)  HR: 76 (19 @ 14:05) (57 - 76)  BP: 107/67 (19 @ 14:05) (90/57 - 138/71)  RR: 18 (19 @ 14:05) (18 - 18)  SpO2: 98% (19 @ 14:05) (95% - 98%)  Wt(kg): --  I&O's Summary    10 Sep 2019 07:01  -  11 Sep 2019 07:00  --------------------------------------------------------  IN: 1320 mL / OUT: 750 mL / NET: 570 mL          Appearance: Normal	  HEENT:   Normal oral mucosa, PERRL, EOMI	  Lymphatic: No lymphadenopathy , no edema  Cardiovascular: Normal S1 S2, No JVD, No murmurs , Peripheral pulses palpable 2+ bilaterally  Respiratory: Lungs clear to auscultation, normal effort 	  Gastrointestinal:  Soft, Non-tender, + BS	  Skin: R inner buttock erythema worsening with palpable swelling, no discharge   Musculoskeletal: Normal range of motion, normal strength  Psychiatry:  Mood & affect appropriate  Ext: No edema      All labs, Imaging and EKGs personally reviewed                           9.1    24.39 )-----------( 201      ( 11 Sep 2019 08:39 )             26.6                   130<L>  |  100  |  73<H>  ----------------------------<  109<H>  3.9   |  15<L>  |  2.45<H>    Ca    8.4      11 Sep 2019 06:18                         Urinalysis Basic - ( 09 Sep 2019 15:48 )    Color: Yellow / Appearance: Slightly Turbid / S.025 / pH: x  Gluc: x / Ketone: Negative  / Bili: Negative / Urobili: <2 mg/dL   Blood: x / Protein: 100 mg/dL / Nitrite: Negative   Leuk Esterase: Negative / RBC: 2 /HPF / WBC 2 /HPF   Sq Epi: x / Non Sq Epi: Negative / Bacteria: Few

## 2019-09-11 NOTE — PROGRESS NOTE ADULT - ASSESSMENT
Pt is a 87 y/o Female with PMHx of CHF, DM, HLD, HTN, CAD prior MI, c/o fatigue, "feeling off" and unable to get out of bed; Tmax 104F toay. Rash/redness on buttocks noted by home health aid. patient also complaining of +chronic cough, nonproductive.  No abdominal pain, no n/v/d. No chest pain.  States she has been feeling hot and cold over past few days, and took her temperature today, found fever of 104, and came to ED for evaluation.  No dysuria, no diarrhea. +constipation (chronic).  Increased diffuse leg swelling for past few days. patient lives at home with family. walks by herself with no use of walker. denies of any MEAD< chest pain on exertion. compliant with all her home medications (04 Sep 2019 22:22)    ER vitals: Tm 102.2, P 75, /77.  WBC 14.8 --> 12.2.  Cr 2.0 <-- 1.3.  PCT 0.49.  Ucx >100K E.coli.  Pt given dose of ceftriaxone, now on cipro for UTI.  ID consult called for further abx managment.      Sepsis:    - fever, leukocytosis. Source less likely UTI given persistent fevers, pt has infiltration of perirectal tissues, likely cellulitis.  Abx broadened     - Check lactate.  Pct elevated 0.49.  Pt with rising WBC, abx broadened on 9/11 (dapto/aztreanam/flagyl).    - Monitor hemodynamic status and BPs.  s/p IV fluid bolus, cont maintanence fluid.      - blood cx, urine cx.  RVP (-).  No pna on cxr      UTI:    - UA (+).  Ucx E.coli, sensitive to cipro, now on aztreonam    - Cr rising.  Check bladder scan/PVR, renal/bladder US,  Ctap no hydro or obstruction.  Renal US no obstruction - medical renal disease.     - Renally dose abx.          Perirectal cellulitis, r/o abscess:    - Seen by Surgery, area not amenable to I&D.  Perirectal infiltration seen on CTap, no abscess noted.  Agree with broadening abx to include MRSA and gram negative/anerobic coverage.  Broadened again on 9/11 due to rising wbc and persistent inflammation. (Dapto/azactam/flagyl) Pt with h/o PCN allergy.  Possibly drug reaction to vanco ?, no rash or hives reported.       - Warm soaks.  Monitor for development of abscess.  f/u repeat cbc/wbc, monitor temp curve.     - keep area clean from stool/urine    - Cont IV abx for now.              Will follow,    Sandrita Zaman  [phone]

## 2019-09-11 NOTE — PROGRESS NOTE ADULT - SUBJECTIVE AND OBJECTIVE BOX
Patient seen and examined  no complaints    codeine (Unknown)  Kiwi (Anaphylaxis)  penicillins (Anaphylaxis)    Hospital Medications:   MEDICATIONS  (STANDING):  aspirin enteric coated 81 milliGRAM(s) Oral daily  atorvastatin 80 milliGRAM(s) Oral at bedtime  aztreonam  IVPB 500 milliGRAM(s) IV Intermittent every 8 hours  aztreonam  IVPB      clopidogrel Tablet 75 milliGRAM(s) Oral daily  dextrose 5%. 1000 milliLiter(s) (50 mL/Hr) IV Continuous <Continuous>  dextrose 50% Injectable 12.5 Gram(s) IV Push once  dextrose 50% Injectable 25 Gram(s) IV Push once  dextrose 50% Injectable 25 Gram(s) IV Push once  doxycycline IVPB      doxycycline IVPB 100 milliGRAM(s) IV Intermittent every 12 hours  heparin  Injectable 5000 Unit(s) SubCutaneous every 8 hours  influenza   Vaccine 0.5 milliLiter(s) IntraMuscular once  insulin glargine Injectable (LANTUS) 15 Unit(s) SubCutaneous two times a day  insulin lispro (HumaLOG) corrective regimen sliding scale   SubCutaneous three times a day before meals  lidocaine   Patch 2 Patch Transdermal daily  lidocaine 5% Ointment 1 Application(s) Topical every 4 hours  metoprolol tartrate 25 milliGRAM(s) Oral two times a day  multivitamin 1 Tablet(s) Oral daily  pantoprazole    Tablet 40 milliGRAM(s) Oral before breakfast  sodium chloride 0.9%. 1000 milliLiter(s) (100 mL/Hr) IV Continuous <Continuous>      VITALS:  T(F): 97.9 (19 @ 04:53), Max: 97.9 (19 @ 04:53)  HR: 64 (19 @ 04:53)  BP: 138/71 (19 @ 04:53)  RR: 18 (19 @ 04:53)  SpO2: 95% (19 @ 04:53)  Wt(kg): --    09-10 @ 07:01  -   @ 07:00  --------------------------------------------------------  IN: 1320 mL / OUT: 750 mL / NET: 570 mL        Physical Exam :-  Constitutional: NAD  Neck: Supple.  Respiratory: Bilateral equal breath sounds, few Crackles present.  Cardiovascular: S1, S2 normal, positive Murmur  Gastrointestinal: Bowel Sounds present, soft, non tender.  Extremities: +1 Edema Feet  Neurological: Alert and Oriented x 3,   Psychiatric: Normal mood, normal affect  + indwelling infante    LABS:      130<L>  |  100  |  73<H>  ----------------------------<  109<H>  3.9   |  15<L>  |  2.45<H>    Ca    8.4      11 Sep 2019 06:18      Creatinine Trend: 2.45 <--, 2.97 <--, 2.67 <--, 1.98 <--, 2.09 <--, 1.93 <--, 1.39 <--, 1.39 <--                        9.1    24.39 )-----------( 201      ( 11 Sep 2019 08:39 )             26.6     Urine Studies:  Urinalysis Basic - ( 09 Sep 2019 15:48 )    Color: Yellow / Appearance: Slightly Turbid / S.025 / pH:   Gluc:  / Ketone: Negative  / Bili: Negative / Urobili: <2 mg/dL   Blood:  / Protein: 100 mg/dL / Nitrite: Negative   Leuk Esterase: Negative / RBC: 2 /HPF / WBC 2 /HPF   Sq Epi:  / Non Sq Epi: Negative / Bacteria: Few      Osmolality, Random Urine: 423 mosm/Kg ( @ 15:48)  Potassium, Random Urine: 40 mmol/L ( @ 13:18)  Sodium, Random Urine: <20 mmol/L ( @ 13:18)  Chloride, Random Urine: <35 mmol/L ( @ 13:18)  Creatinine, Random Urine: 132 mg/dL ( @ 13:18)  Protein/Creatinine Ratio Calculation: 0.8 Ratio ( @ :18)  Osmolality, Random Urine: 380 mosm/Kg ( @ 23:28)  Potassium, Random Urine: 36 mmol/L ( @ 17:24)  Sodium, Random Urine: 30 mmol/L ( @ 17:24)  Chloride, Random Urine: <35 mmol/L ( @ 17:24)  Creatinine, Random Urine: 126 mg/dL ( @ 17:24)  Protein/Creatinine Ratio Calculation: 0.2 Ratio ( @ :24)    RADIOLOGY & ADDITIONAL STUDIES:

## 2019-09-11 NOTE — PROGRESS NOTE ADULT - SUBJECTIVE AND OBJECTIVE BOX
SURGERY PROGRESS NOTE    86yFemale    SUBJECTIVE:  Patient seen and examined at bedside. No acute events overnight. States the pain she has in her right buttocks has improved from yesterday and she states she is feeling better. CT scan showed no abscess.   --------------------------------------------------------------------------------------------------  OBJECTIVE:     Physical Exam:  General: AAOx3, NAD, resting comfortably   HEENT: NC/AT  Respiratory: no increased work of breathing   Abdomen: soft, nontender, nondistended, right buttock with localized area of perianal erythema and induration, but no drainage and no obvious area of fluctuance appreciable   Extremities: warm and well perfused    --------------------------------------------------------------------------------------------------  Vital Signs Last 24 Hrs  T(C): 36.6 (11 Sep 2019 04:53), Max: 36.6 (11 Sep 2019 00:00)  T(F): 97.9 (11 Sep 2019 04:53), Max: 97.9 (11 Sep 2019 04:53)  HR: 64 (11 Sep 2019 04:53) (57 - 67)  BP: 138/71 (11 Sep 2019 04:53) (90/57 - 138/71)  BP(mean): --  RR: 18 (11 Sep 2019 04:53) (18 - 18)  SpO2: 95% (11 Sep 2019 04:53) (95% - 97%)  --------------------------------------------------------------------------------------------------      10 Sep 2019 07:01  -  11 Sep 2019 07:00  --------------------------------------------------------  IN:    IV PiggyBack: 150 mL    Oral Fluid: 720 mL    sodium chloride 0.9%.: 450 mL  Total IN: 1320 mL    OUT:    Indwelling Catheter - Urethral: 750 mL  Total OUT: 750 mL    Total NET: 570 mL          --------------------------------------------------------------------------------------------------  Laboratories:              LABS:      CBC Full  -  ( 10 Sep 2019 09:33 )  WBC Count : 15.44 K/uL  RBC Count : 2.82 M/uL  Hemoglobin : 8.6 g/dL  Hematocrit : 25.8 %  Platelet Count - Automated : 175 K/uL  Mean Cell Volume : 91.5 fl  Mean Cell Hemoglobin : 30.5 pg  Mean Cell Hemoglobin Concentration : 33.3 gm/dL  Auto Neutrophil # : x  Auto Lymphocyte # : x  Auto Monocyte # : x  Auto Eosinophil # : x  Auto Basophil # : x  Auto Neutrophil % : x  Auto Lymphocyte % : x  Auto Monocyte % : x  Auto Eosinophil % : x  Auto Basophil % : x        130<L>  |  100  |  73<H>  ----------------------------<  109<H>  3.9   |  15<L>  |  2.45<H>    Ca    8.4      11 Sep 2019 06:18            Urinalysis Basic - ( 09 Sep 2019 15:48 )    Color: Yellow / Appearance: Slightly Turbid / S.025 / pH: x  Gluc: x / Ketone: Negative  / Bili: Negative / Urobili: <2 mg/dL   Blood: x / Protein: 100 mg/dL / Nitrite: Negative   Leuk Esterase: Negative / RBC: 2 /HPF / WBC 2 /HPF   Sq Epi: x / Non Sq Epi: Negative / Bacteria: Few          Medications:  MEDICATIONS  (STANDING):  aspirin enteric coated 81 milliGRAM(s) Oral daily  atorvastatin 80 milliGRAM(s) Oral at bedtime  aztreonam  IVPB 500 milliGRAM(s) IV Intermittent every 8 hours  aztreonam  IVPB      clopidogrel Tablet 75 milliGRAM(s) Oral daily  dextrose 5%. 1000 milliLiter(s) (50 mL/Hr) IV Continuous <Continuous>  dextrose 50% Injectable 12.5 Gram(s) IV Push once  dextrose 50% Injectable 25 Gram(s) IV Push once  dextrose 50% Injectable 25 Gram(s) IV Push once  doxycycline IVPB      doxycycline IVPB 100 milliGRAM(s) IV Intermittent every 12 hours  heparin  Injectable 5000 Unit(s) SubCutaneous every 8 hours  influenza   Vaccine 0.5 milliLiter(s) IntraMuscular once  insulin glargine Injectable (LANTUS) 15 Unit(s) SubCutaneous two times a day  insulin lispro (HumaLOG) corrective regimen sliding scale   SubCutaneous three times a day before meals  lidocaine 5% Ointment 1 Application(s) Topical every 6 hours  metoprolol tartrate 25 milliGRAM(s) Oral two times a day  multivitamin 1 Tablet(s) Oral daily  pantoprazole    Tablet 40 milliGRAM(s) Oral before breakfast  sodium chloride 0.9%. 1000 milliLiter(s) (50 mL/Hr) IV Continuous <Continuous> SURGERY PROGRESS NOTE    86yFemale    SUBJECTIVE:  Patient seen and examined at bedside. No acute events overnight. States the pain she has in her right buttocks has improved from yesterday and she states she is feeling better. CT scan showed no abscess.   --------------------------------------------------------------------------------------------------  OBJECTIVE:     Physical Exam:  General: AAOx3, NAD, resting comfortably   HEENT: NC/AT  Respiratory: no increased work of breathing   Abdomen: soft, nontender, nondistended, right buttock with localized area of perianal erythema and induration, but no drainage and no obvious area of fluctuance appreciable   Extremities: warm and well perfused    --------------------------------------------------------------------------------------------------  Vital Signs Last 24 Hrs  T(C): 36.6 (11 Sep 2019 04:53), Max: 36.6 (11 Sep 2019 00:00)  T(F): 97.9 (11 Sep 2019 04:53), Max: 97.9 (11 Sep 2019 04:53)  HR: 64 (11 Sep 2019 04:53) (57 - 67)  BP: 138/71 (11 Sep 2019 04:53) (90/57 - 138/71)  BP(mean): --  RR: 18 (11 Sep 2019 04:53) (18 - 18)  SpO2: 95% (11 Sep 2019 04:53) (95% - 97%)  --------------------------------------------------------------------------------------------------      10 Sep 2019 07:01  -  11 Sep 2019 07:00  --------------------------------------------------------  IN:    IV PiggyBack: 150 mL    Oral Fluid: 720 mL    sodium chloride 0.9%.: 450 mL  Total IN: 1320 mL    OUT:    Indwelling Catheter - Urethral: 750 mL  Total OUT: 750 mL    Total NET: 570 mL          --------------------------------------------------------------------------------------------------  Laboratories:              LABS:      CBC Full  -  ( 10 Sep 2019 09:33 )  WBC Count : 15.44 K/uL  RBC Count : 2.82 M/uL  Hemoglobin : 8.6 g/dL  Hematocrit : 25.8 %  Platelet Count - Automated : 175 K/uL  Mean Cell Volume : 91.5 fl  Mean Cell Hemoglobin : 30.5 pg  Mean Cell Hemoglobin Concentration : 33.3 gm/dL  Auto Neutrophil # : x  Auto Lymphocyte # : x  Auto Monocyte # : x  Auto Eosinophil # : x  Auto Basophil # : x  Auto Neutrophil % : x  Auto Lymphocyte % : x  Auto Monocyte % : x  Auto Eosinophil % : x  Auto Basophil % : x        130<L>  |  100  |  73<H>  ----------------------------<  109<H>  3.9   |  15<L>  |  2.45<H>    Ca    8.4      11 Sep 2019 06:18                          9.1    24.39 )-----------( 201      ( 11 Sep 2019 08:39 )             26.6           Urinalysis Basic - ( 09 Sep 2019 15:48 )    Color: Yellow / Appearance: Slightly Turbid / S.025 / pH: x  Gluc: x / Ketone: Negative  / Bili: Negative / Urobili: <2 mg/dL   Blood: x / Protein: 100 mg/dL / Nitrite: Negative   Leuk Esterase: Negative / RBC: 2 /HPF / WBC 2 /HPF   Sq Epi: x / Non Sq Epi: Negative / Bacteria: Few          Medications:  MEDICATIONS  (STANDING):  aspirin enteric coated 81 milliGRAM(s) Oral daily  atorvastatin 80 milliGRAM(s) Oral at bedtime  aztreonam  IVPB 500 milliGRAM(s) IV Intermittent every 8 hours  aztreonam  IVPB      clopidogrel Tablet 75 milliGRAM(s) Oral daily  dextrose 5%. 1000 milliLiter(s) (50 mL/Hr) IV Continuous <Continuous>  dextrose 50% Injectable 12.5 Gram(s) IV Push once  dextrose 50% Injectable 25 Gram(s) IV Push once  dextrose 50% Injectable 25 Gram(s) IV Push once  doxycycline IVPB      doxycycline IVPB 100 milliGRAM(s) IV Intermittent every 12 hours  heparin  Injectable 5000 Unit(s) SubCutaneous every 8 hours  influenza   Vaccine 0.5 milliLiter(s) IntraMuscular once  insulin glargine Injectable (LANTUS) 15 Unit(s) SubCutaneous two times a day  insulin lispro (HumaLOG) corrective regimen sliding scale   SubCutaneous three times a day before meals  lidocaine 5% Ointment 1 Application(s) Topical every 6 hours  metoprolol tartrate 25 milliGRAM(s) Oral two times a day  multivitamin 1 Tablet(s) Oral daily  pantoprazole    Tablet 40 milliGRAM(s) Oral before breakfast  sodium chloride 0.9%. 1000 milliLiter(s) (50 mL/Hr) IV Continuous <Continuous>

## 2019-09-11 NOTE — CONSULT NOTE ADULT - ATTENDING COMMENTS
87 yo Diabetic female a/w temp to 104 on 9/4/19  Unclear findings bilat buttocks  Interviewed in [place] and English  Leukocytosis persists despite Abx  patient is mildly symptomatic , re - buttock discomfort  'Denies loose stool  + UTI with Lieberman in place  Non descript induration present bilat medial buttocks , with no wounds or fluctuance  No obvious chetna anal findings  Introitus is without lesions  CT A&P findings noted  Need for OP f/u discussed and info provided 85 yo Diabetic female a/w temp to 104 on 9/4/19  Unclear findings bilat buttocks  Interviewed in [place] and English  Leukocytosis persists despite Abx  Glucose is controlled  patient is mildly symptomatic , re - buttock discomfort  'Denies loose stool  + UTI with Lieberman in place  Non descript induration present bilat medial buttocks , with no wounds or fluctuance  No obvious chetna anal findings  Introitus is without lesions  CT A&P findings noted  Need for OP f/u discussed and info provided

## 2019-09-11 NOTE — PROGRESS NOTE ADULT - PROBLEM SELECTOR PLAN 1
Cont to be afebrile however patient has been on round the clock tylenol for pain which has been effective   worsening leukocytosis   Urine Cx noted  reaction to vanco?  cont aztreonam   changed doxy to dapto and added flagyl for anaerobic coverage   D/C tylenol, will give oral morphine for pain control. avoid NSAIDs as well. will mask fever   gentle hydration   CXR noted  elevated procalcitonin  CT chest noted, negative for PNA   RVP ordered, negative  ID eval called for further recs appreciated, discussed in detail with team   CT C/A/P noted. R Buttock infiltrate   Buttock soft tissue US pending  Surg F/U for worsening erythema and buttock lesion

## 2019-09-11 NOTE — PROGRESS NOTE ADULT - SUBJECTIVE AND OBJECTIVE BOX
Infectious Diseases progress note:    Subjective:  Pt with worsening leukocytosis, persistent perirectal inflammation.  Abx broadened to dapto/azactam/flagyl    ROS:  CONSTITUTIONAL:  No fever, chills, rigors  CARDIOVASCULAR:  No chest pain or palpitations  RESPIRATORY:   No SOB, cough, dyspnea on exertion.  No wheezing  GASTROINTESTINAL:  No abd pain, N/V, diarrhea/constipation  EXTREMITIES:  No swelling or joint pain  GENITOURINARY:  No burning on urination, increased frequency or urgency.  No flank pain  NEUROLOGIC:  No HA, visual disturbances  SKIN: No rashes    Allergies    codeine (Unknown)  Kiwi (Anaphylaxis)  penicillins (Anaphylaxis)    Intolerances        ANTIBIOTICS/RELEVANT:  antimicrobials  aztreonam  IVPB 500 milliGRAM(s) IV Intermittent every 8 hours  aztreonam  IVPB      DAPTOmycin IVPB      metroNIDAZOLE  IVPB      metroNIDAZOLE  IVPB 500 milliGRAM(s) IV Intermittent every 8 hours    immunologic:  influenza   Vaccine 0.5 milliLiter(s) IntraMuscular once    OTHER:  aspirin enteric coated 81 milliGRAM(s) Oral daily  atorvastatin 80 milliGRAM(s) Oral at bedtime  clopidogrel Tablet 75 milliGRAM(s) Oral daily  dextrose 40% Gel 15 Gram(s) Oral once PRN  dextrose 5%. 1000 milliLiter(s) IV Continuous <Continuous>  dextrose 50% Injectable 12.5 Gram(s) IV Push once  dextrose 50% Injectable 25 Gram(s) IV Push once  dextrose 50% Injectable 25 Gram(s) IV Push once  glucagon  Injectable 1 milliGRAM(s) IntraMuscular once PRN  heparin  Injectable 5000 Unit(s) SubCutaneous every 8 hours  insulin glargine Injectable (LANTUS) 15 Unit(s) SubCutaneous two times a day  insulin lispro (HumaLOG) corrective regimen sliding scale   SubCutaneous three times a day before meals  lidocaine   Patch 2 Patch Transdermal daily  lidocaine 5% Ointment 1 Application(s) Topical every 4 hours  metoprolol tartrate 25 milliGRAM(s) Oral two times a day  morphine  IR 7.5 milliGRAM(s) Oral every 6 hours PRN  morphine ER Tablet 15 milliGRAM(s) Oral every 12 hours  multivitamin 1 Tablet(s) Oral daily  ondansetron Injectable 4 milliGRAM(s) IV Push every 8 hours PRN  pantoprazole    Tablet 40 milliGRAM(s) Oral before breakfast  sodium chloride 0.45% 1000 milliLiter(s) IV Continuous <Continuous>      Objective:  Vital Signs Last 24 Hrs  T(C): 36.8 (12 Sep 2019 05:49), Max: 37 (12 Sep 2019 00:26)  T(F): 98.2 (12 Sep 2019 05:49), Max: 98.6 (12 Sep 2019 00:26)  HR: 73 (12 Sep 2019 05:49) (73 - 81)  BP: 105/64 (12 Sep 2019 05:49) (101/57 - 108/62)  BP(mean): --  RR: 18 (12 Sep 2019 05:49) (18 - 18)  SpO2: 95% (12 Sep 2019 05:49) (95% - 98%)    PHYSICAL EXAM:  Constitutional:NAD  Eyes:NIDIA, EOMI  Ear/Nose/Throat: no thrush, mucositis.  Moist mucous membranes	  Neck:no JVD, no lymphadenopathy, supple  Respiratory: CTA lucian  Cardiovascular: S1S2 RRR, no murmurs  Gastrointestinal:soft, nontender,  nondistended (+) BS  Extremities:no e/e/c  Skin:  perirectal soft tissue inflammation/erythema        LABS:                        9.1    24.39 )-----------( 201      ( 11 Sep 2019 08:39 )             26.6     09-11    130<L>  |  100  |  73<H>  ----------------------------<  109<H>  3.9   |  15<L>  |  2.45<H>    Ca    8.4      11 Sep 2019 06:18            Procalcitonin, Serum: 0.49 (09-05 @ 07:29)            Rapid RVP Result: [facility]          MICROBIOLOGY:    Culture - Blood (09.08.19 @ 01:32)    Specimen Source: .Blood    Culture Results:   No growth to date.    Culture - Blood (09.08.19 @ 01:32)    Specimen Source: .Blood    Culture Results:   No growth to date.    Culture - Blood (09.04.19 @ 22:05)    Specimen Source: .Blood    Culture Results:   No growth at 5 days.    Culture - Urine (09.04.19 @ 22:03)    -  Amikacin: S <=16    -  Ampicillin: S <=8 These ampicillin results predict results for amoxicillin    -  Ampicillin/Sulbactam: S <=8/4 Enterobacter, Citrobacter, and Serratia may develop resistance during prolonged therapy (3-4 days)    -  Aztreonam: S <=4    -  Cefazolin: S <=8 ([id number]) For uncomplicated UTI with K. pneumoniae, E. coli, or P. mirablis: KAREN <=16 is sensitive and KAREN >=32 is resistant. This also predicts results for oral agents cefaclor, cefdinir, cefpodoxime, cefprozil, cefuroxime axetil, cephalexin and locarbef for uncomplicated UTI. Note that some isolates may be susceptible to these agents while testing resistant to cefazolin.    -  Cefepime: S <=4    -  Cefoxitin: S <=8    -  Ceftriaxone: S <=1 Enterobacter, Citrobacter, and Serratia may develop resistance during prolonged therapy    -  Ciprofloxacin: S <=1    -  Gentamicin: S <=4    -  Imipenem: S <=1    -  Levofloxacin: S <=2    -  Meropenem: S <=1    -  Nitrofurantoin: S <=32 Should not be used to treat pyelonephritis    -  Piperacillin/Tazobactam: S <=16    -  Tigecycline: S <=2    -  Tobramycin: S <=4    -  Trimethoprim/Sulfamethoxazole: S <=2/38    Specimen Source: .Urine    Culture Results:   >100,000 CFU/ml Escherichia coli    Organism Identification: Escherichia coli    Organism: Escherichia coli    Method Type: KAREN      Culture - Blood (09.04.19 @ 21:57)    Specimen Source: .Blood    Culture Results:   No growth at 5 days.          RADIOLOGY & ADDITIONAL STUDIES:    < from: US Kidney and Bladder (09.10.19 @ 13:35) >  FINDINGS:    Right kidney:  9.0 cm. Increased renal cortical echogenicity. No   hydronephrosis or calculi. A 1.5 cm lower pole simple cyst.    Left kidney:  9.9 cm. Increased renal cortical echogenicity. No   hydronephrosis or calculi. 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    Urinary bladder: Collapsed with a Lieberman catheter.    Miscellaneous: Small left pleural effusion.     IMPRESSION:     No hydronephrosis.    Bilateral echogenic kidneys, suggesting medical renal disease.    Small left pleural effusion.    < end of copied text >        < from: CT Abdomen and Pelvis No Cont (09.07.19 @ 22:29) >  IMPRESSION:     Infiltration of the posterior perineal soft tissues, right greater than   left. Correlation for cellulitis and tenderness recommended.    Small bilateral pleural effusions.    < end of copied text >

## 2019-09-11 NOTE — CONSULT NOTE ADULT - SUBJECTIVE AND OBJECTIVE BOX
Wound SURGERY CONSULT NOTE    HPI:  Pt is a 85 y/o Female with PMHx of CHF, DM, HLD, HTN, CAD prior MI, c/o fatigue, "feeling off" and unable to get out of bed; Tmax 104F toay. Rash/redness on buttocks noted by home health aid. patient also complaining of +chronic cough, nonproductive.  No abdominal pain, no n/v/d. No chest pain.  States she has been feeling hot and cold over past few days, and took her temperature today, found fever of 104, and came to ED for evaluation.  No dysuria, no diarrhea. +constipation (chronic).  Increased diffuse leg swelling for past few days. patient lives at home with family. walks by herself with no use of walker. denies of any MEAD< chest pain on exertion. compliant with all her home medications       PAST MEDICAL & SURGICAL HISTORY:  CHF (congestive heart failure)  STEMI (ST elevation myocardial infarction)  Palpitations  Diabetes mellitus  Dyslipidemia  HTN (hypertension)  S/P cardiac cath  S/P tonsillectomy  S/P lumpectomy, left breast: premalignant  S/P appendectomy  S/P cholecystectomy    REVIEW OF SYSTEMS  General: fever on admission  Respiratory and Thorax: no SOB, chronic non productive cough  Cardiovascular:	no CP  Gastrointestinal:	no n/v, chronic constipation  Genitourinary:	no dysuria  Musculoskeletal:	 ambulates independently  Neurological: no LOC	  Endocrine: DM  Skin; no wounds	    MEDICATIONS  (STANDING):  aspirin enteric coated 81 milliGRAM(s) Oral daily  atorvastatin 80 milliGRAM(s) Oral at bedtime  aztreonam  IVPB 500 milliGRAM(s) IV Intermittent every 8 hours  aztreonam  IVPB      clopidogrel Tablet 75 milliGRAM(s) Oral daily  DAPTOmycin IVPB 350 milliGRAM(s) IV Intermittent once  DAPTOmycin IVPB      dextrose 5%. 1000 milliLiter(s) (50 mL/Hr) IV Continuous <Continuous>  dextrose 50% Injectable 12.5 Gram(s) IV Push once  dextrose 50% Injectable 25 Gram(s) IV Push once  dextrose 50% Injectable 25 Gram(s) IV Push once  heparin  Injectable 5000 Unit(s) SubCutaneous every 8 hours  influenza   Vaccine 0.5 milliLiter(s) IntraMuscular once  insulin glargine Injectable (LANTUS) 15 Unit(s) SubCutaneous two times a day  insulin lispro (HumaLOG) corrective regimen sliding scale   SubCutaneous three times a day before meals  lidocaine   Patch 2 Patch Transdermal daily  lidocaine 5% Ointment 1 Application(s) Topical every 4 hours  metoprolol tartrate 25 milliGRAM(s) Oral two times a day  metroNIDAZOLE  IVPB      metroNIDAZOLE  IVPB 500 milliGRAM(s) IV Intermittent every 8 hours  metroNIDAZOLE  IVPB 500 milliGRAM(s) IV Intermittent once  morphine ER Tablet 10 milliGRAM(s) Oral every 12 hours  multivitamin 1 Tablet(s) Oral daily  pantoprazole    Tablet 40 milliGRAM(s) Oral before breakfast  sodium chloride 0.45% 1000 milliLiter(s) (75 mL/Hr) IV Continuous <Continuous>    MEDICATIONS  (PRN):  dextrose 40% Gel 15 Gram(s) Oral once PRN Blood Glucose LESS THAN 70 milliGRAM(s)/deciliter  glucagon  Injectable 1 milliGRAM(s) IntraMuscular once PRN Glucose LESS THAN 70 milligrams/deciliter  morphine  IR 5 milliGRAM(s) Oral every 6 hours PRN Severe Pain (7 - 10)  ondansetron Injectable 4 milliGRAM(s) IV Push every 8 hours PRN Nausea and/or Vomiting    Allergies    codeine (Unknown)  Kiwi (Anaphylaxis)  penicillins (Anaphylaxis)    Intolerances    SOCIAL HISTORY:  lives with adult children Denies smoking, ETOH, drugs    FAMILY HISTORY:  No pertinent family history in first degree relatives    Vital Signs Last 24 Hrs  T(C): 36.6 (11 Sep 2019 04:53), Max: 36.6 (11 Sep 2019 00:00)  T(F): 97.9 (11 Sep 2019 04:53), Max: 97.9 (11 Sep 2019 04:53)  HR: 64 (11 Sep 2019 04:53) (57 - 67)  BP: 138/71 (11 Sep 2019 04:53) (90/57 - 138/71)  BP(mean): --  RR: 18 (11 Sep 2019 04:53) (18 - 18)  SpO2: 95% (11 Sep 2019 04:53) (95% - 97%)    Physical Exam:  General: NAD  Respiratory: no SOB on room air  Gastrointestinal: soft NT/ND  Neurology: sensation grossly intact  Musculoskeletal: no contractures or deformities  Vascular: no BLE edema, no acute ischemia noted  Skin:  bilateral buttocks with medial induration and erythema, no drainage, fluctuance   odor, increased warmth, tenderness, fluctuance    LABS:  09-11    130<L>  |  100  |  73<H>  ----------------------------<  109<H>  3.9   |  15<L>  |  2.45<H>    Ca    8.4      11 Sep 2019 06:18                            9.1    24.39 )-----------( 201      ( 11 Sep 2019 08:39 )             26.6       Urinalysis Basic - ( 09 Sep 2019 15:48 )    Color: Yellow / Appearance: Slightly Turbid / S.025 / pH: x  Gluc: x / Ketone: Negative  / Bili: Negative / Urobili: <2 mg/dL   Blood: x / Protein: 100 mg/dL / Nitrite: Negative   Leuk Esterase: Negative / RBC: 2 /HPF / WBC 2 /HPF   Sq Epi: x / Non Sq Epi: Negative / Bacteria: Few        RADIOLOGY & ADDITIONAL STUDIES:  EXAM:  CT ABDOMEN AND PELVIS                          EXAM:  CT CHEST                            PROCEDURE DATE:  2019            INTERPRETATION:  CLINICAL INFORMATION: Fever. Evaluate for source of   infection.     COMPARISON: Chest CT from 2019. CT abdomen pelvis from 2016.    PROCEDURE:   CT of the Chest, Abdomen and Pelvis was performed without intravenous   contrast.   Intravenous contrast: None.  Oral contrast: None.  Sagittal and coronal reformats were performed.    FINDINGS:    CHEST:     LUNGS AND LARGE AIRWAYS: Patent central airways. Dependent passive   atelectasis.  PLEURA: Small bilateral pleural effusions.  VESSELS: Coronary atherosclerotic calcifications. A small amount of   venous air is noted.  HEART: Heart size is enlarged. No pericardial effusion.  MEDIASTINUM AND DIONISIO: Mild mediastinal and hilar lymphadenopathy. A   reference right lower paratracheal node measures 1.7 x 1.2 cm. A   reference left hilar lymph node measures 1.8 x 1.7 cm.  CHEST WALL AND LOWER NECK: Within normal limits.    ABDOMEN AND PELVIS:    Limited evaluation of the solid organs in the absence of intravenous   contrast.    LIVER: Within normal limits.  BILE DUCTS: Normal caliber.  GALLBLADDER: Within normal limits.  SPLEEN: Within normal limits.  PANCREAS: Within normal limits.  ADRENALS: Within normal limits.  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    BLADDER: Within normal limits.  REPRODUCTIVE ORGANS: Calcified uterine leiomyomas. No adnexal masses    BOWEL: Tiny hiatal hernia. No bowel obstruction. Mild colonic   diverticulosis without diverticulitis. Appendix is not well visualized.  PERITONEUM: No ascites.  VESSELS: Atherosclerotic calcifications.  RETROPERITONEUM/LYMPH NODES: No lymphadenopathy.    ABDOMINAL WALL: Infiltration of the posterior perineal soft tissues,   right greater than left. Correlation for cellulitis and tenderness   recommended.  BONES: Degenerative changes. Mild degenerative anterolisthesis of L5 on   S1. Multiple vertebral body hemangiomas.    IMPRESSION:     Infiltration of the posterior perineal soft tissues, right greater than   left. Correlation for cellulitis and tenderness recommended.    Small bilateral pleural effusions.      Cultures:  2019 blood cx x2, no growth to date

## 2019-09-12 LAB
ANION GAP SERPL CALC-SCNC: 14 MMOL/L — SIGNIFICANT CHANGE UP (ref 5–17)
BASOPHILS # BLD AUTO: 0.06 K/UL — SIGNIFICANT CHANGE UP (ref 0–0.2)
BASOPHILS NFR BLD AUTO: 0.2 % — SIGNIFICANT CHANGE UP (ref 0–2)
BUN SERPL-MCNC: 68 MG/DL — HIGH (ref 7–23)
CALCIUM SERPL-MCNC: 8.5 MG/DL — SIGNIFICANT CHANGE UP (ref 8.4–10.5)
CHLORIDE SERPL-SCNC: 99 MMOL/L — SIGNIFICANT CHANGE UP (ref 96–108)
CO2 SERPL-SCNC: 17 MMOL/L — LOW (ref 22–31)
CREAT SERPL-MCNC: 1.88 MG/DL — HIGH (ref 0.5–1.3)
EOSINOPHIL # BLD AUTO: 0.13 K/UL — SIGNIFICANT CHANGE UP (ref 0–0.5)
EOSINOPHIL NFR BLD AUTO: 0.5 % — SIGNIFICANT CHANGE UP (ref 0–6)
GLUCOSE SERPL-MCNC: 169 MG/DL — HIGH (ref 70–99)
HCT VFR BLD CALC: 26.8 % — LOW (ref 34.5–45)
HGB BLD-MCNC: 9 G/DL — LOW (ref 11.5–15.5)
IMM GRANULOCYTES NFR BLD AUTO: 1.9 % — HIGH (ref 0–1.5)
LYMPHOCYTES # BLD AUTO: 0.91 K/UL — LOW (ref 1–3.3)
LYMPHOCYTES # BLD AUTO: 3.5 % — LOW (ref 13–44)
MAGNESIUM SERPL-MCNC: 2 MG/DL — SIGNIFICANT CHANGE UP (ref 1.6–2.6)
MCHC RBC-ENTMCNC: 30.1 PG — SIGNIFICANT CHANGE UP (ref 27–34)
MCHC RBC-ENTMCNC: 33.6 GM/DL — SIGNIFICANT CHANGE UP (ref 32–36)
MCV RBC AUTO: 89.6 FL — SIGNIFICANT CHANGE UP (ref 80–100)
MONOCYTES # BLD AUTO: 1.51 K/UL — HIGH (ref 0–0.9)
MONOCYTES NFR BLD AUTO: 5.8 % — SIGNIFICANT CHANGE UP (ref 2–14)
NEUTROPHILS # BLD AUTO: 22.93 K/UL — HIGH (ref 1.8–7.4)
NEUTROPHILS NFR BLD AUTO: 88.1 % — HIGH (ref 43–77)
PHOSPHATE SERPL-MCNC: 3.9 MG/DL — SIGNIFICANT CHANGE UP (ref 2.5–4.5)
PLATELET # BLD AUTO: 191 K/UL — SIGNIFICANT CHANGE UP (ref 150–400)
POTASSIUM SERPL-MCNC: 4.2 MMOL/L — SIGNIFICANT CHANGE UP (ref 3.5–5.3)
POTASSIUM SERPL-SCNC: 4.2 MMOL/L — SIGNIFICANT CHANGE UP (ref 3.5–5.3)
RBC # BLD: 2.99 M/UL — LOW (ref 3.8–5.2)
RBC # FLD: 13.1 % — SIGNIFICANT CHANGE UP (ref 10.3–14.5)
SODIUM SERPL-SCNC: 130 MMOL/L — LOW (ref 135–145)
WBC # BLD: 26.04 K/UL — HIGH (ref 3.8–10.5)
WBC # FLD AUTO: 26.04 K/UL — HIGH (ref 3.8–10.5)

## 2019-09-12 PROCEDURE — 76882 US LMTD JT/FCL EVL NVASC XTR: CPT | Mod: 26,RT

## 2019-09-12 RX ORDER — METOPROLOL TARTRATE 50 MG
12.5 TABLET ORAL
Refills: 0 | Status: DISCONTINUED | OUTPATIENT
Start: 2019-09-12 | End: 2019-09-15

## 2019-09-12 RX ORDER — SODIUM CHLORIDE 9 MG/ML
1000 INJECTION, SOLUTION INTRAVENOUS
Refills: 0 | Status: DISCONTINUED | OUTPATIENT
Start: 2019-09-12 | End: 2019-09-12

## 2019-09-12 RX ORDER — ONDANSETRON 8 MG/1
4 TABLET, FILM COATED ORAL EVERY 8 HOURS
Refills: 0 | Status: DISCONTINUED | OUTPATIENT
Start: 2019-09-12 | End: 2019-09-12

## 2019-09-12 RX ORDER — ACETAMINOPHEN 500 MG
650 TABLET ORAL EVERY 6 HOURS
Refills: 0 | Status: DISCONTINUED | OUTPATIENT
Start: 2019-09-12 | End: 2019-09-15

## 2019-09-12 RX ADMIN — Medication 100 MILLIGRAM(S): at 06:01

## 2019-09-12 RX ADMIN — Medication 25 MILLIGRAM(S): at 06:01

## 2019-09-12 RX ADMIN — HEPARIN SODIUM 5000 UNIT(S): 5000 INJECTION INTRAVENOUS; SUBCUTANEOUS at 14:42

## 2019-09-12 RX ADMIN — Medication 100 MILLIGRAM(S): at 14:41

## 2019-09-12 RX ADMIN — ONDANSETRON 4 MILLIGRAM(S): 8 TABLET, FILM COATED ORAL at 19:45

## 2019-09-12 RX ADMIN — Medication 2: at 09:10

## 2019-09-12 RX ADMIN — HEPARIN SODIUM 5000 UNIT(S): 5000 INJECTION INTRAVENOUS; SUBCUTANEOUS at 22:43

## 2019-09-12 RX ADMIN — Medication 2: at 17:24

## 2019-09-12 RX ADMIN — ONDANSETRON 4 MILLIGRAM(S): 8 TABLET, FILM COATED ORAL at 10:43

## 2019-09-12 RX ADMIN — PANTOPRAZOLE SODIUM 40 MILLIGRAM(S): 20 TABLET, DELAYED RELEASE ORAL at 06:01

## 2019-09-12 RX ADMIN — LIDOCAINE 1 APPLICATION(S): 4 CREAM TOPICAL at 22:42

## 2019-09-12 RX ADMIN — Medication 50 MILLIGRAM(S): at 17:09

## 2019-09-12 RX ADMIN — ATORVASTATIN CALCIUM 80 MILLIGRAM(S): 80 TABLET, FILM COATED ORAL at 22:43

## 2019-09-12 RX ADMIN — CLOPIDOGREL BISULFATE 75 MILLIGRAM(S): 75 TABLET, FILM COATED ORAL at 17:09

## 2019-09-12 RX ADMIN — LIDOCAINE 1 APPLICATION(S): 4 CREAM TOPICAL at 06:01

## 2019-09-12 RX ADMIN — INSULIN GLARGINE 15 UNIT(S): 100 INJECTION, SOLUTION SUBCUTANEOUS at 22:41

## 2019-09-12 RX ADMIN — LIDOCAINE 1 APPLICATION(S): 4 CREAM TOPICAL at 09:11

## 2019-09-12 RX ADMIN — MORPHINE SULFATE 15 MILLIGRAM(S): 50 CAPSULE, EXTENDED RELEASE ORAL at 06:01

## 2019-09-12 RX ADMIN — Medication 100 MILLIGRAM(S): at 22:42

## 2019-09-12 RX ADMIN — SODIUM CHLORIDE 75 MILLILITER(S): 9 INJECTION, SOLUTION INTRAVENOUS at 09:10

## 2019-09-12 RX ADMIN — LIDOCAINE 1 APPLICATION(S): 4 CREAM TOPICAL at 17:09

## 2019-09-12 RX ADMIN — INSULIN GLARGINE 15 UNIT(S): 100 INJECTION, SOLUTION SUBCUTANEOUS at 09:09

## 2019-09-12 RX ADMIN — HEPARIN SODIUM 5000 UNIT(S): 5000 INJECTION INTRAVENOUS; SUBCUTANEOUS at 06:01

## 2019-09-12 RX ADMIN — Medication 81 MILLIGRAM(S): at 17:11

## 2019-09-12 RX ADMIN — Medication 4: at 13:03

## 2019-09-12 RX ADMIN — Medication 50 MILLIGRAM(S): at 00:09

## 2019-09-12 RX ADMIN — Medication 50 MILLIGRAM(S): at 09:11

## 2019-09-12 RX ADMIN — LIDOCAINE 1 APPLICATION(S): 4 CREAM TOPICAL at 14:42

## 2019-09-12 RX ADMIN — LIDOCAINE 1 APPLICATION(S): 4 CREAM TOPICAL at 02:00

## 2019-09-12 NOTE — PROGRESS NOTE ADULT - SUBJECTIVE AND OBJECTIVE BOX
Infectious Diseases progress note:    Subjective: Pt for US of perirectum.  WBC rising.  Afebrile.      ROS:  CONSTITUTIONAL:  No fever, chills, rigors  CARDIOVASCULAR:  No chest pain or palpitations  RESPIRATORY:   No SOB, cough, dyspnea on exertion.  No wheezing  GASTROINTESTINAL:  No abd pain, N/V, diarrhea/constipation  EXTREMITIES:  No swelling or joint pain  GENITOURINARY:  No burning on urination, increased frequency or urgency.  No flank pain  NEUROLOGIC:  No HA, visual disturbances  SKIN: No rashes    Allergies    codeine (Unknown)  Kiwi (Anaphylaxis)  penicillins (Anaphylaxis)    Intolerances        ANTIBIOTICS/RELEVANT:  antimicrobials  aztreonam  IVPB 500 milliGRAM(s) IV Intermittent every 8 hours  aztreonam  IVPB      DAPTOmycin IVPB      metroNIDAZOLE  IVPB 500 milliGRAM(s) IV Intermittent every 8 hours  metroNIDAZOLE  IVPB        immunologic:  influenza   Vaccine 0.5 milliLiter(s) IntraMuscular once    OTHER:  acetaminophen    Suspension .. 650 milliGRAM(s) Oral every 6 hours PRN  aspirin enteric coated 81 milliGRAM(s) Oral daily  atorvastatin 80 milliGRAM(s) Oral at bedtime  clopidogrel Tablet 75 milliGRAM(s) Oral daily  dextrose 40% Gel 15 Gram(s) Oral once PRN  dextrose 5%. 1000 milliLiter(s) IV Continuous <Continuous>  dextrose 50% Injectable 12.5 Gram(s) IV Push once  dextrose 50% Injectable 25 Gram(s) IV Push once  dextrose 50% Injectable 25 Gram(s) IV Push once  glucagon  Injectable 1 milliGRAM(s) IntraMuscular once PRN  heparin  Injectable 5000 Unit(s) SubCutaneous every 8 hours  insulin glargine Injectable (LANTUS) 15 Unit(s) SubCutaneous two times a day  insulin lispro (HumaLOG) corrective regimen sliding scale   SubCutaneous three times a day before meals  lidocaine   Patch 2 Patch Transdermal daily  lidocaine 5% Ointment 1 Application(s) Topical every 4 hours  metoprolol tartrate 25 milliGRAM(s) Oral two times a day  morphine  IR 7.5 milliGRAM(s) Oral every 6 hours PRN  multivitamin 1 Tablet(s) Oral daily  ondansetron Injectable 4 milliGRAM(s) IV Push every 8 hours PRN  pantoprazole    Tablet 40 milliGRAM(s) Oral before breakfast  sodium chloride 0.45% 1000 milliLiter(s) IV Continuous <Continuous>      Objective:  Vital Signs Last 24 Hrs  T(C): 36.6 (12 Sep 2019 10:00), Max: 37 (12 Sep 2019 00:26)  T(F): 97.8 (12 Sep 2019 10:00), Max: 98.6 (12 Sep 2019 00:26)  HR: 68 (12 Sep 2019 10:00) (68 - 81)  BP: 99/55 (12 Sep 2019 10:00) (99/55 - 108/62)  BP(mean): --  RR: 18 (12 Sep 2019 10:00) (18 - 18)  SpO2: 95% (12 Sep 2019 10:00) (95% - 98%)    PHYSICAL EXAM:  At         LABS:                        9.0    26.04 )-----------( 191      ( 12 Sep 2019 10:07 )             26.8     09-12    130<L>  |  99  |  68<H>  ----------------------------<  169<H>  4.2   |  17<L>  |  1.88<H>    Ca    8.5      12 Sep 2019 07:37  Phos  3.9     09-12  Mg     2.0     09-12            Procalcitonin, Serum: 0.49 (09-05 @ 07:29)            Rapid RVP Result: [facility]          MICROBIOLOGY:    Culture - Blood (09.08.19 @ 01:32)    Specimen Source: .Blood    Culture Results:   No growth to date.    Culture - Blood (09.08.19 @ 01:32)    Specimen Source: .Blood    Culture Results:   No growth to date.    Culture - Blood (09.04.19 @ 22:05)    Specimen Source: .Blood    Culture Results:   No growth at 5 days.    Culture - Urine (09.04.19 @ 22:03)    -  Amikacin: S <=16    -  Ampicillin: S <=8 These ampicillin results predict results for amoxicillin    -  Ampicillin/Sulbactam: S <=8/4 Enterobacter, Citrobacter, and Serratia may develop resistance during prolonged therapy (3-4 days)    -  Aztreonam: S <=4    -  Cefazolin: S <=8 ([id number]_[id number]) For uncomplicated UTI with K. pneumoniae, E. coli, or P. mirablis: KAREN <=16 is sensitive and KAREN >=32 is resistant. This also predicts results for oral agents cefaclor, cefdinir, cefpodoxime, cefprozil, cefuroxime axetil, cephalexin and locarbef for uncomplicated UTI. Note that some isolates may be susceptible to these agents while testing resistant to cefazolin.    -  Cefepime: S <=4    -  Cefoxitin: S <=8    -  Ceftriaxone: S <=1 Enterobacter, Citrobacter, and Serratia may develop resistance during prolonged therapy    -  Ciprofloxacin: S <=1    -  Gentamicin: S <=4    -  Imipenem: S <=1    -  Levofloxacin: S <=2    -  Meropenem: S <=1    -  Nitrofurantoin: S <=32 Should not be used to treat pyelonephritis    -  Piperacillin/Tazobactam: S <=16    -  Tigecycline: S <=2    -  Tobramycin: S <=4    -  Trimethoprim/Sulfamethoxazole: S <=2/38    Specimen Source: .Urine    Culture Results:   >100,000 CFU/ml Escherichia coli    Organism Identification: Escherichia coli    Organism: Escherichia coli    Method Type: KAREN          RADIOLOGY & ADDITIONAL STUDIES:    < from: US Kidney and Bladder (09.10.19 @ 13:35) >  FINDINGS:    Right kidney:  9.0 cm. Increased renal cortical echogenicity. No   hydronephrosis or calculi. A 1.5 cm lower pole simple cyst.    Left kidney:  9.9 cm. Increased renal cortical echogenicity. No   hydronephrosis or calculi. 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    Urinary bladder: Collapsed with a Lieberman catheter.    Miscellaneous: Small left pleural effusion.     IMPRESSION:     No hydronephrosis.    Bilateral echogenic kidneys, suggesting medical renal disease.    Small left pleural effusion.    < end of copied text >        < from: CT Abdomen and Pelvis No Cont (09.07.19 @ 22:29) >  IMPRESSION:     Infiltration of the posterior perineal soft tissues, right greater than   left. Correlation for cellulitis and tenderness recommended.    Small bilateral pleural effusions.    < end of copied text >

## 2019-09-12 NOTE — PROGRESS NOTE ADULT - PROBLEM SELECTOR PLAN 1
cr improving- likely pre renal  u lytes showing intravascular depletion  d/c ivf to 1/2nacl +75meq hco3 @ 75cc  d/c infante- tov  monitor urineoutput  dec metoprolol to 12.5mg po bid from 25mg po bid ( hold for sbp < 100mmhg)  monitor closely

## 2019-09-12 NOTE — PROGRESS NOTE ADULT - SUBJECTIVE AND OBJECTIVE BOX
Patient is a 86y old  Female who presents with a chief complaint of Fever, UTI (12 Sep 2019 16:17)      INTERVAL HISTORY: feels ok    	  MEDICATIONS:  metoprolol tartrate 25 milliGRAM(s) Oral two times a day        PHYSICAL EXAM:  T(C): 36.3 (09-12-19 @ 17:15), Max: 37 (09-12-19 @ 00:26)  HR: 73 (09-12-19 @ 17:15) (68 - 81)  BP: 116/66 (09-12-19 @ 17:15) (99/55 - 116/66)  RR: 18 (09-12-19 @ 17:15) (18 - 18)  SpO2: 98% (09-12-19 @ 17:15) (95% - 98%)  Wt(kg): --  I&O's Summary    11 Sep 2019 07:01  -  12 Sep 2019 07:00  --------------------------------------------------------  IN: 2380 mL / OUT: 900 mL / NET: 1480 mL    12 Sep 2019 07:01  -  12 Sep 2019 17:37  --------------------------------------------------------  IN: 0 mL / OUT: 670 mL / NET: -670 mL          Appearance: In no distress	  HEENT:    PERRL, EOMI	  Cardiovascular:  S1 S2, No JVD  Respiratory: Lungs clear to auscultation	  Gastrointestinal:  Soft, Non-tender, + BS	  Extremities:  No edema of LE                                9.0    26.04 )-----------( 191      ( 12 Sep 2019 10:07 )             26.8     09-12    130<L>  |  99  |  68<H>  ----------------------------<  169<H>  4.2   |  17<L>  |  1.88<H>    Ca    8.5      12 Sep 2019 07:37  Phos  3.9     09-12  Mg     2.0     09-12          Labs personally reviewed      ASSESSMENT/PLAN: 	  84 year old F with PMH of sHF EF 30%, IDDM, HTN, HLD, hx of STEMI w/ stents (2014) presents with fevers with sepsus  1. CAD-  EKG with old LBBB  asymptomatic- Continue with ASA, Plavix, and statin.     2. Chronic sHF - euvolemic at this time. Hold Lasix in setting of sepsis. Continue with OMT- Metoprolol, Aldactone, statin. Resume ACE as ROB resolves    3. HTN - well controlled on above regimen.  Resume ACE as ROB resolves    4. HLD - continue with statin    5. ROB/CKD - nephrology consult appreciated     6. Sepsis 2/2 UTI - abx per primary team        Srini Velasco DO [facility]  Cardiovascular Medicine  [phone]

## 2019-09-12 NOTE — PROGRESS NOTE ADULT - ASSESSMENT
ASSESSMENT:   86 year old female with fevers, UTI, and area of erythema over right buttock, no abscess on CT scan.     PLAN:  - US showing bilateral gluteal cleft cellulitis. No abscess  - Appreciate ID recommendations  - Continue care per primary team, call back with any questions [phone]    [facility]   [phone] ASSESSMENT:   86 year old female with fevers, UTI, and area of erythema over right buttock, no abscess on CT scan.     PLAN:  - US showing bilateral gluteal cleft cellulitis. No abscess  - f/u wbc in am  - Appreciate ID recommendations re abx  - consider rpt CT to assess for source of leukocytosis  - Continue care per primary team, call back with any questions [phone]  - d/w med attg in detail    [facility] Surgery   [phone]

## 2019-09-12 NOTE — PROGRESS NOTE ADULT - SUBJECTIVE AND OBJECTIVE BOX
Subjective: Patient seen and examined. No new events except as noted.   afebrile overnigth however sleepy die to morphin   improved back pain     REVIEW OF SYSTEMS:    CONSTITUTIONAL: No weakness, fevers or chills  EYES/ENT: No visual changes;  No vertigo or throat pain   NECK: No pain or stiffness  RESPIRATORY: No cough, wheezing, hemoptysis; No shortness of breath  CARDIOVASCULAR: No chest pain or palpitations  GASTROINTESTINAL: No abdominal or epigastric pain. No nausea, vomiting, or hematemesis; No diarrhea or constipation. No melena or hematochezia.  GENITOURINARY: No dysuria, frequency or hematuria  NEUROLOGICAL: No numbness or weakness  SKIN: No itching, burning, rashes, or lesions   All other review of systems is negative unless indicated above.    MEDICATIONS:  MEDICATIONS  (STANDING):  aspirin enteric coated 81 milliGRAM(s) Oral daily  atorvastatin 80 milliGRAM(s) Oral at bedtime  aztreonam  IVPB 500 milliGRAM(s) IV Intermittent every 8 hours  aztreonam  IVPB      clopidogrel Tablet 75 milliGRAM(s) Oral daily  DAPTOmycin IVPB      dextrose 5%. 1000 milliLiter(s) (50 mL/Hr) IV Continuous <Continuous>  dextrose 50% Injectable 12.5 Gram(s) IV Push once  dextrose 50% Injectable 25 Gram(s) IV Push once  dextrose 50% Injectable 25 Gram(s) IV Push once  heparin  Injectable 5000 Unit(s) SubCutaneous every 8 hours  influenza   Vaccine 0.5 milliLiter(s) IntraMuscular once  insulin glargine Injectable (LANTUS) 15 Unit(s) SubCutaneous two times a day  insulin lispro (HumaLOG) corrective regimen sliding scale   SubCutaneous three times a day before meals  lidocaine   Patch 2 Patch Transdermal daily  lidocaine 5% Ointment 1 Application(s) Topical every 4 hours  metoprolol tartrate 25 milliGRAM(s) Oral two times a day  metroNIDAZOLE  IVPB 500 milliGRAM(s) IV Intermittent every 8 hours  metroNIDAZOLE  IVPB      multivitamin 1 Tablet(s) Oral daily  pantoprazole    Tablet 40 milliGRAM(s) Oral before breakfast  sodium chloride 0.45% 1000 milliLiter(s) (75 mL/Hr) IV Continuous <Continuous>      PHYSICAL EXAM:  T(C): 36.6 (09-12-19 @ 10:00), Max: 37 (09-12-19 @ 00:26)  HR: 68 (09-12-19 @ 10:00) (68 - 81)  BP: 99/55 (09-12-19 @ 10:00) (99/55 - 108/62)  RR: 18 (09-12-19 @ 10:00) (18 - 18)  SpO2: 95% (09-12-19 @ 10:00) (95% - 98%)  Wt(kg): --  I&O's Summary    11 Sep 2019 07:01  -  12 Sep 2019 07:00  --------------------------------------------------------  IN: 2380 mL / OUT: 900 mL / NET: 1480 mL          Appearance: calm, sleepy and drowsy however responds to all questions 	  HEENT:   Normal oral mucosa, PERRL, EOMI	  Lymphatic: No lymphadenopathy , no edema  Cardiovascular: Normal S1 S2, No JVD, No murmurs , Peripheral pulses palpable 2+ bilaterally  Respiratory: Lungs clear to auscultation, normal effort 	  Gastrointestinal:  Soft, Non-tender, + BS	  Skin: R inner buttock swelling and erythema  Musculoskeletal: Normal range of motion, normal strength  Psychiatry:  Mood & affect appropriate  Ext: No edema      All labs, Imaging and EKGs personally reviewed                           9.0    26.04 )-----------( 191      ( 12 Sep 2019 10:07 )             26.8               09-12    130<L>  |  99  |  68<H>  ----------------------------<  169<H>  4.2   |  17<L>  |  1.88<H>    Ca    8.5      12 Sep 2019 07:37  Phos  3.9     09-12  Mg     2.0     09-12

## 2019-09-12 NOTE — PROGRESS NOTE ADULT - SUBJECTIVE AND OBJECTIVE BOX
Patient seen and examined   no complaints    codeine (Unknown)  Kiwi (Anaphylaxis)  penicillins (Anaphylaxis)    Hospital Medications:   MEDICATIONS  (STANDING):  aspirin enteric coated 81 milliGRAM(s) Oral daily  atorvastatin 80 milliGRAM(s) Oral at bedtime  aztreonam  IVPB 500 milliGRAM(s) IV Intermittent every 8 hours  aztreonam  IVPB      clopidogrel Tablet 75 milliGRAM(s) Oral daily  DAPTOmycin IVPB      dextrose 5%. 1000 milliLiter(s) (50 mL/Hr) IV Continuous <Continuous>  dextrose 50% Injectable 12.5 Gram(s) IV Push once  dextrose 50% Injectable 25 Gram(s) IV Push once  dextrose 50% Injectable 25 Gram(s) IV Push once  heparin  Injectable 5000 Unit(s) SubCutaneous every 8 hours  influenza   Vaccine 0.5 milliLiter(s) IntraMuscular once  insulin glargine Injectable (LANTUS) 15 Unit(s) SubCutaneous two times a day  insulin lispro (HumaLOG) corrective regimen sliding scale   SubCutaneous three times a day before meals  lidocaine   Patch 2 Patch Transdermal daily  lidocaine 5% Ointment 1 Application(s) Topical every 4 hours  metoprolol tartrate 25 milliGRAM(s) Oral two times a day  metroNIDAZOLE  IVPB 500 milliGRAM(s) IV Intermittent every 8 hours  metroNIDAZOLE  IVPB      multivitamin 1 Tablet(s) Oral daily  pantoprazole    Tablet 40 milliGRAM(s) Oral before breakfast  sodium chloride 0.45% 1000 milliLiter(s) (75 mL/Hr) IV Continuous <Continuous>      VITALS:  T(F): 97.8 (19 @ 10:00), Max: 98.6 (19 @ 00:26)  HR: 68 (19 @ 10:00)  BP: 99/55 (19 @ 10:00)  RR: 18 (19 @ 10:00)  SpO2: 95% (19 @ 10:00)  Wt(kg): --     @ 07:01  -   @ 07:00  --------------------------------------------------------  IN: 2380 mL / OUT: 900 mL / NET: 1480 mL     @ 07:01  -   @ 15:28  --------------------------------------------------------  IN: 0 mL / OUT: 500 mL / NET: -500 mL        Physical Exam :-  Constitutional: NAD  Neck: Supple.  Respiratory: Bilateral equal breath sounds, few Crackles present.  Cardiovascular: S1, S2 normal, positive Murmur  Gastrointestinal: Bowel Sounds present, soft, non tender.  Extremities: +1 Edema Feet  Neurological: Alert and Oriented x 3,   Psychiatric: Normal mood, normal affect  + indwelling infante    LABS:      130<L>  |  99  |  68<H>  ----------------------------<  169<H>  4.2   |  17<L>  |  1.88<H>    Ca    8.5      12 Sep 2019 07:37  Phos  3.9       Mg     2.0           Creatinine Trend: 1.88 <--, 2.45 <--, 2.97 <--, 2.67 <--, 1.98 <--, 2.09 <--, 1.93 <--                        9.0    26.04 )-----------( 191      ( 12 Sep 2019 10:07 )             26.8     Urine Studies:  Urinalysis Basic - ( 09 Sep 2019 15:48 )    Color: Yellow / Appearance: Slightly Turbid / S.025 / pH:   Gluc:  / Ketone: Negative  / Bili: Negative / Urobili: <2 mg/dL   Blood:  / Protein: 100 mg/dL / Nitrite: Negative   Leuk Esterase: Negative / RBC: 2 /HPF / WBC 2 /HPF   Sq Epi:  / Non Sq Epi: Negative / Bacteria: Few      Osmolality, Random Urine: 423 mosm/Kg ( @ 15:48)  Potassium, Random Urine: 40 mmol/L ( @ 13:18)  Sodium, Random Urine: <20 mmol/L ( @ 13:18)  Chloride, Random Urine: <35 mmol/L ( @ 13:18)  Creatinine, Random Urine: 132 mg/dL ( @ 13:18)  Protein/Creatinine Ratio Calculation: 0.8 Ratio ( @ :18)  Osmolality, Random Urine: 380 mosm/Kg ( @ 23:28)  Potassium, Random Urine: 36 mmol/L ( @ 17:24)  Sodium, Random Urine: 30 mmol/L ( @ 17:24)  Chloride, Random Urine: <35 mmol/L ( @ 17:24)  Creatinine, Random Urine: 126 mg/dL ( @ 17:24)  Protein/Creatinine Ratio Calculation: 0.2 Ratio ( @ 17:24)    RADIOLOGY & ADDITIONAL STUDIES:

## 2019-09-12 NOTE — PROGRESS NOTE ADULT - SUBJECTIVE AND OBJECTIVE BOX
SURGERY PROGRESS NOTE    86yFemale    SUBJECTIVE:  Patient seen and examined at bedside. WBC elevated to 26. Has been afebrile however has been on tylenol. US showed bilateral gluteal cleft cellulitis. No abscess.     OBJECTIVE:     Physical Exam:  General: AAOx3, NAD, resting comfortably   HEENT: NC/AT  Respiratory: no increased work of breathing   Abdomen: soft, nontender, nondistended, right buttock with localized area of perianal erythema and induration, but no drainage and no obvious area of fluctuance appreciable   Extremities: warm and well perfused    --------------------------------------------------------------------------------------------------  Vital Signs Last 24 Hrs  T(C): 36.6 (12 Sep 2019 10:00), Max: 37 (12 Sep 2019 00:26)  T(F): 97.8 (12 Sep 2019 10:00), Max: 98.6 (12 Sep 2019 00:26)  HR: 68 (12 Sep 2019 10:00) (68 - 81)  BP: 99/55 (12 Sep 2019 10:00) (99/55 - 108/62)  BP(mean): --  RR: 18 (12 Sep 2019 10:00) (18 - 18)  SpO2: 95% (12 Sep 2019 10:00) (95% - 96%)  --------------------------------------------------------------------------------------------------      I&O's Detail    11 Sep 2019 07:01  -  12 Sep 2019 07:00  --------------------------------------------------------  IN:    IV PiggyBack: 400 mL    Oral Fluid: 480 mL    sodium chloride 0.45%: 1500 mL  Total IN: 2380 mL    OUT:    Indwelling Catheter - Urethral: 900 mL  Total OUT: 900 mL    Total NET: 1480 mL      12 Sep 2019 07:01  -  12 Sep 2019 16:20  --------------------------------------------------------  IN:  Total IN: 0 mL    OUT:    Indwelling Catheter - Urethral: 500 mL  Total OUT: 500 mL    Total NET: -500 mL            --------------------------------------------------------------------------------------------------  Laboratories:              LABS:      09-12    130<L>  |  99  |  68<H>  ----------------------------<  169<H>  4.2   |  17<L>  |  1.88<H>    Ca    8.5      12 Sep 2019 07:37  Phos  3.9     09-12  Mg     2.0     09-12                          9.0    26.04 )-----------( 191      ( 12 Sep 2019 10:07 )             26.8             Medications:  MEDICATIONS  (STANDING):  aspirin enteric coated 81 milliGRAM(s) Oral daily  atorvastatin 80 milliGRAM(s) Oral at bedtime  aztreonam  IVPB 500 milliGRAM(s) IV Intermittent every 8 hours  aztreonam  IVPB      clopidogrel Tablet 75 milliGRAM(s) Oral daily  DAPTOmycin IVPB      dextrose 5%. 1000 milliLiter(s) (50 mL/Hr) IV Continuous <Continuous>  dextrose 50% Injectable 12.5 Gram(s) IV Push once  dextrose 50% Injectable 25 Gram(s) IV Push once  dextrose 50% Injectable 25 Gram(s) IV Push once  heparin  Injectable 5000 Unit(s) SubCutaneous every 8 hours  influenza   Vaccine 0.5 milliLiter(s) IntraMuscular once  insulin glargine Injectable (LANTUS) 15 Unit(s) SubCutaneous two times a day  insulin lispro (HumaLOG) corrective regimen sliding scale   SubCutaneous three times a day before meals  lidocaine   Patch 2 Patch Transdermal daily  lidocaine 5% Ointment 1 Application(s) Topical every 4 hours  metoprolol tartrate 25 milliGRAM(s) Oral two times a day  metroNIDAZOLE  IVPB 500 milliGRAM(s) IV Intermittent every 8 hours  metroNIDAZOLE  IVPB      multivitamin 1 Tablet(s) Oral daily  pantoprazole    Tablet 40 milliGRAM(s) Oral before breakfast    MEDICATIONS  (PRN):  acetaminophen    Suspension .. 650 milliGRAM(s) Oral every 6 hours PRN Moderate Pain (4 - 6)  dextrose 40% Gel 15 Gram(s) Oral once PRN Blood Glucose LESS THAN 70 milliGRAM(s)/deciliter  glucagon  Injectable 1 milliGRAM(s) IntraMuscular once PRN Glucose LESS THAN 70 milligrams/deciliter  morphine  IR 7.5 milliGRAM(s) Oral every 6 hours PRN Severe Pain (7 - 10)  ondansetron Injectable 4 milliGRAM(s) IV Push every 8 hours PRN Nausea and/or Vomiting    US Extremity Nonvasc Limited, Bilateral (09.12.19 @ 13:53) >  Bilateral gluteal cleft cellulitis. No abscess.

## 2019-09-12 NOTE — PROGRESS NOTE ADULT - PROBLEM SELECTOR PLAN 1
Cont to be afebrile however patient has been on round the clock tylenol for pain which has been effective. will restart tylenol for now PRN, can not give to much morphin due to side affect  and drowsiness   worsening leukocytosis   Urine Cx noted  reaction to vanco?  cont aztreonam   Cont dapto and flagyl for anaerobic coverage   Cont gentle hydration   CXR noted  elevated procalcitonin  CT chest noted, negative for PNA   RVP ordered, negative  ID eval called for further recs appreciated, discussed in detail with team   CT C/A/P noted. R Buttock infiltrate   Buttock soft tissue US pending, today   Surg F/U for worsening erythema and buttock lesion

## 2019-09-12 NOTE — PROGRESS NOTE ADULT - ASSESSMENT
Pt is a 85 y/o Female with PMHx of CHF, DM, HLD, HTN, CAD prior MI, c/o fatigue, "feeling off" and unable to get out of bed; Tmax 104F toay. Rash/redness on buttocks noted by home health aid. patient also complaining of +chronic cough, nonproductive.  No abdominal pain, no n/v/d. No chest pain.  States she has been feeling hot and cold over past few days, and took her temperature today, found fever of 104, and came to ED for evaluation.  No dysuria, no diarrhea. +constipation (chronic).  Increased diffuse leg swelling for past few days. patient lives at home with family. walks by herself with no use of walker. denies of any MEAD< chest pain on exertion. compliant with all her home medications (04 Sep 2019 22:22)    ER vitals: Tm 102.2, P 75, /77.  WBC 14.8 --> 12.2.  Cr 2.0 <-- 1.3.  PCT 0.49.  Ucx >100K E.coli.  Pt given dose of ceftriaxone, now on cipro for UTI.  ID consult called for further abx managment.      Sepsis:    - fever, leukocytosis. Source less likely UTI given persistent fevers, pt has infiltration of perirectal tissues, likely cellulitis.  Abx broadened     - Check lactate.  Pct elevated 0.49.  Pt with rising WBC, abx broadened on 9/11 (dapto/aztreanam/flagyl).    - Monitor hemodynamic status and BPs.  s/p IV fluid bolus, cont maintanence fluid.      - blood cx, urine cx.  RVP (-).  No pna on cxr      UTI:    - UA (+).  Ucx E.coli, sensitive to cipro, now on aztreonam    - Cr rising.  Check bladder scan/PVR, renal/bladder US,  Ctap no hydro or obstruction.  Renal US no obstruction - medical renal disease.     - Renally dose abx.          Perirectal cellulitis, r/o abscess:    - Seen by Surgery, area not amenable to I&D.  Perirectal infiltration seen on CTap, no abscess noted.  Abx broadened again on 9/11 due to rising wbc and persistent inflammation. (Dapto/azactam/flagyl) Pt with h/o PCN allergy.  Possibly drug reaction to vanco ?, no rash or hives reported.       - Dapto renally dosed at 6mg/kg q48 hrs.     - f/u Ultrasound of perirectal tissues today, evaluate for fluid collection, abscess.  Recommend surgery f/u.                 Will follow,    Sandrita Zaman  [phone]

## 2019-09-13 ENCOUNTER — APPOINTMENT (OUTPATIENT)
Dept: HOME HEALTH SERVICES | Facility: HOME HEALTH | Age: 84
End: 2019-09-13

## 2019-09-13 LAB
ANION GAP SERPL CALC-SCNC: 18 MMOL/L — HIGH (ref 5–17)
BASOPHILS # BLD AUTO: 0.09 K/UL — SIGNIFICANT CHANGE UP (ref 0–0.2)
BASOPHILS NFR BLD AUTO: 0.3 % — SIGNIFICANT CHANGE UP (ref 0–2)
BUN SERPL-MCNC: 64 MG/DL — HIGH (ref 7–23)
CALCIUM SERPL-MCNC: 9.1 MG/DL — SIGNIFICANT CHANGE UP (ref 8.4–10.5)
CHLORIDE SERPL-SCNC: 98 MMOL/L — SIGNIFICANT CHANGE UP (ref 96–108)
CO2 SERPL-SCNC: 16 MMOL/L — LOW (ref 22–31)
CREAT SERPL-MCNC: 1.42 MG/DL — HIGH (ref 0.5–1.3)
CULTURE RESULTS: SIGNIFICANT CHANGE UP
CULTURE RESULTS: SIGNIFICANT CHANGE UP
EOSINOPHIL # BLD AUTO: 0.05 K/UL — SIGNIFICANT CHANGE UP (ref 0–0.5)
EOSINOPHIL NFR BLD AUTO: 0.2 % — SIGNIFICANT CHANGE UP (ref 0–6)
GLUCOSE SERPL-MCNC: 156 MG/DL — HIGH (ref 70–99)
HCT VFR BLD CALC: 29.9 % — LOW (ref 34.5–45)
HGB BLD-MCNC: 9.8 G/DL — LOW (ref 11.5–15.5)
IMM GRANULOCYTES NFR BLD AUTO: 1.5 % — SIGNIFICANT CHANGE UP (ref 0–1.5)
LYMPHOCYTES # BLD AUTO: 0.87 K/UL — LOW (ref 1–3.3)
LYMPHOCYTES # BLD AUTO: 3.1 % — LOW (ref 13–44)
MCHC RBC-ENTMCNC: 30 PG — SIGNIFICANT CHANGE UP (ref 27–34)
MCHC RBC-ENTMCNC: 32.8 GM/DL — SIGNIFICANT CHANGE UP (ref 32–36)
MCV RBC AUTO: 91.4 FL — SIGNIFICANT CHANGE UP (ref 80–100)
MONOCYTES # BLD AUTO: 1.3 K/UL — HIGH (ref 0–0.9)
MONOCYTES NFR BLD AUTO: 4.6 % — SIGNIFICANT CHANGE UP (ref 2–14)
NEUTROPHILS # BLD AUTO: 25.45 K/UL — HIGH (ref 1.8–7.4)
NEUTROPHILS NFR BLD AUTO: 90.3 % — HIGH (ref 43–77)
PLATELET # BLD AUTO: 241 K/UL — SIGNIFICANT CHANGE UP (ref 150–400)
POTASSIUM SERPL-MCNC: 4.3 MMOL/L — SIGNIFICANT CHANGE UP (ref 3.5–5.3)
POTASSIUM SERPL-SCNC: 4.3 MMOL/L — SIGNIFICANT CHANGE UP (ref 3.5–5.3)
RBC # BLD: 3.27 M/UL — LOW (ref 3.8–5.2)
RBC # FLD: 13.6 % — SIGNIFICANT CHANGE UP (ref 10.3–14.5)
SODIUM SERPL-SCNC: 132 MMOL/L — LOW (ref 135–145)
SPECIMEN SOURCE: SIGNIFICANT CHANGE UP
SPECIMEN SOURCE: SIGNIFICANT CHANGE UP
WBC # BLD: 28.18 K/UL — HIGH (ref 3.8–10.5)
WBC # FLD AUTO: 28.18 K/UL — HIGH (ref 3.8–10.5)

## 2019-09-13 PROCEDURE — 74176 CT ABD & PELVIS W/O CONTRAST: CPT | Mod: 26

## 2019-09-13 PROCEDURE — 71250 CT THORAX DX C-: CPT | Mod: 26

## 2019-09-13 RX ORDER — SENNA PLUS 8.6 MG/1
2 TABLET ORAL AT BEDTIME
Refills: 0 | Status: DISCONTINUED | OUTPATIENT
Start: 2019-09-13 | End: 2019-09-15

## 2019-09-13 RX ORDER — DOCUSATE SODIUM 100 MG
100 CAPSULE ORAL THREE TIMES A DAY
Refills: 0 | Status: DISCONTINUED | OUTPATIENT
Start: 2019-09-13 | End: 2019-09-15

## 2019-09-13 RX ORDER — SODIUM CHLORIDE 9 MG/ML
1000 INJECTION, SOLUTION INTRAVENOUS
Refills: 0 | Status: DISCONTINUED | OUTPATIENT
Start: 2019-09-13 | End: 2019-09-15

## 2019-09-13 RX ADMIN — Medication 650 MILLIGRAM(S): at 21:22

## 2019-09-13 RX ADMIN — Medication 100 MILLIGRAM(S): at 14:32

## 2019-09-13 RX ADMIN — Medication 2: at 08:42

## 2019-09-13 RX ADMIN — Medication 12.5 MILLIGRAM(S): at 18:44

## 2019-09-13 RX ADMIN — Medication 2: at 13:05

## 2019-09-13 RX ADMIN — PANTOPRAZOLE SODIUM 40 MILLIGRAM(S): 20 TABLET, DELAYED RELEASE ORAL at 06:34

## 2019-09-13 RX ADMIN — HEPARIN SODIUM 5000 UNIT(S): 5000 INJECTION INTRAVENOUS; SUBCUTANEOUS at 06:35

## 2019-09-13 RX ADMIN — INSULIN GLARGINE 15 UNIT(S): 100 INJECTION, SOLUTION SUBCUTANEOUS at 22:06

## 2019-09-13 RX ADMIN — CLOPIDOGREL BISULFATE 75 MILLIGRAM(S): 75 TABLET, FILM COATED ORAL at 11:42

## 2019-09-13 RX ADMIN — INSULIN GLARGINE 15 UNIT(S): 100 INJECTION, SOLUTION SUBCUTANEOUS at 08:43

## 2019-09-13 RX ADMIN — LIDOCAINE 1 APPLICATION(S): 4 CREAM TOPICAL at 01:50

## 2019-09-13 RX ADMIN — Medication 1 TABLET(S): at 11:42

## 2019-09-13 RX ADMIN — HEPARIN SODIUM 5000 UNIT(S): 5000 INJECTION INTRAVENOUS; SUBCUTANEOUS at 22:06

## 2019-09-13 RX ADMIN — Medication 12.5 MILLIGRAM(S): at 06:34

## 2019-09-13 RX ADMIN — LIDOCAINE 2 PATCH: 4 CREAM TOPICAL at 11:45

## 2019-09-13 RX ADMIN — LIDOCAINE 1 APPLICATION(S): 4 CREAM TOPICAL at 18:45

## 2019-09-13 RX ADMIN — Medication 100 MILLIGRAM(S): at 06:33

## 2019-09-13 RX ADMIN — SODIUM CHLORIDE 75 MILLILITER(S): 9 INJECTION, SOLUTION INTRAVENOUS at 15:51

## 2019-09-13 RX ADMIN — Medication 650 MILLIGRAM(S): at 20:22

## 2019-09-13 RX ADMIN — ATORVASTATIN CALCIUM 80 MILLIGRAM(S): 80 TABLET, FILM COATED ORAL at 22:06

## 2019-09-13 RX ADMIN — Medication 50 MILLIGRAM(S): at 18:44

## 2019-09-13 RX ADMIN — Medication 100 MILLIGRAM(S): at 22:07

## 2019-09-13 RX ADMIN — Medication 100 MILLIGRAM(S): at 22:06

## 2019-09-13 RX ADMIN — DAPTOMYCIN 114 MILLIGRAM(S): 500 INJECTION, POWDER, LYOPHILIZED, FOR SOLUTION INTRAVENOUS at 12:41

## 2019-09-13 RX ADMIN — Medication 10 MILLIGRAM(S): at 14:49

## 2019-09-13 RX ADMIN — Medication 50 MILLIGRAM(S): at 11:00

## 2019-09-13 RX ADMIN — LIDOCAINE 1 APPLICATION(S): 4 CREAM TOPICAL at 14:49

## 2019-09-13 RX ADMIN — HEPARIN SODIUM 5000 UNIT(S): 5000 INJECTION INTRAVENOUS; SUBCUTANEOUS at 14:32

## 2019-09-13 RX ADMIN — Medication 81 MILLIGRAM(S): at 11:42

## 2019-09-13 RX ADMIN — SENNA PLUS 2 TABLET(S): 8.6 TABLET ORAL at 22:06

## 2019-09-13 RX ADMIN — Medication 100 MILLIGRAM(S): at 14:36

## 2019-09-13 RX ADMIN — LIDOCAINE 1 APPLICATION(S): 4 CREAM TOPICAL at 22:07

## 2019-09-13 RX ADMIN — Medication 50 MILLIGRAM(S): at 01:50

## 2019-09-13 RX ADMIN — LIDOCAINE 1 APPLICATION(S): 4 CREAM TOPICAL at 06:35

## 2019-09-13 RX ADMIN — LIDOCAINE 1 APPLICATION(S): 4 CREAM TOPICAL at 11:07

## 2019-09-13 NOTE — PROGRESS NOTE ADULT - PROBLEM SELECTOR PLAN 1
cr improving- likely pre renal  u lytes showing intravascular depletion  restart ivf to 1/2nacl +75meq hco3 @ 75cc  s/p infante- however had >400cc urine in straight cath- would recheck bladder scan- if >200 then would place infante back and consider urology consult  monitor urineoutput  monitor closely

## 2019-09-13 NOTE — PROGRESS NOTE ADULT - SUBJECTIVE AND OBJECTIVE BOX
Infectious Diseases progress note:    Subjective: WBC continues to rise.  Afebrile.  Pt with constipation x 2 days.      ROS:  CONSTITUTIONAL:  No fever, chills, rigors  CARDIOVASCULAR:  No chest pain or palpitations  RESPIRATORY:   No SOB, cough, dyspnea on exertion.  No wheezing  GASTROINTESTINAL:  No abd pain, N/V, diarrhea/constipation  EXTREMITIES:  No swelling or joint pain  GENITOURINARY:  No burning on urination, increased frequency or urgency.  No flank pain  NEUROLOGIC:  No HA, visual disturbances  SKIN: No rashes    Allergies    codeine (Unknown)  Kiwi (Anaphylaxis)  penicillins (Anaphylaxis)    Intolerances        ANTIBIOTICS/RELEVANT:  antimicrobials  aztreonam  IVPB 500 milliGRAM(s) IV Intermittent every 8 hours  aztreonam  IVPB      DAPTOmycin IVPB      DAPTOmycin IVPB 350 milliGRAM(s) IV Intermittent every 48 hours  metroNIDAZOLE  IVPB      metroNIDAZOLE  IVPB 500 milliGRAM(s) IV Intermittent every 8 hours    immunologic:  influenza   Vaccine 0.5 milliLiter(s) IntraMuscular once    OTHER:  acetaminophen    Suspension .. 650 milliGRAM(s) Oral every 6 hours PRN  aspirin enteric coated 81 milliGRAM(s) Oral daily  atorvastatin 80 milliGRAM(s) Oral at bedtime  bisacodyl Suppository 10 milliGRAM(s) Rectal daily PRN  clopidogrel Tablet 75 milliGRAM(s) Oral daily  dextrose 40% Gel 15 Gram(s) Oral once PRN  dextrose 5%. 1000 milliLiter(s) IV Continuous <Continuous>  dextrose 50% Injectable 12.5 Gram(s) IV Push once  dextrose 50% Injectable 25 Gram(s) IV Push once  dextrose 50% Injectable 25 Gram(s) IV Push once  docusate sodium 100 milliGRAM(s) Oral three times a day  glucagon  Injectable 1 milliGRAM(s) IntraMuscular once PRN  heparin  Injectable 5000 Unit(s) SubCutaneous every 8 hours  insulin glargine Injectable (LANTUS) 15 Unit(s) SubCutaneous two times a day  insulin lispro (HumaLOG) corrective regimen sliding scale   SubCutaneous three times a day before meals  lidocaine   Patch 2 Patch Transdermal daily  lidocaine 5% Ointment 1 Application(s) Topical every 4 hours  metoprolol tartrate 12.5 milliGRAM(s) Oral two times a day  morphine  IR 7.5 milliGRAM(s) Oral every 6 hours PRN  multivitamin 1 Tablet(s) Oral daily  ondansetron Injectable 4 milliGRAM(s) IV Push every 8 hours PRN  pantoprazole    Tablet 40 milliGRAM(s) Oral before breakfast  senna 2 Tablet(s) Oral at bedtime  sodium chloride 0.45% 1000 milliLiter(s) IV Continuous <Continuous>      Objective:  Vital Signs Last 24 Hrs  T(C): 36.9 (13 Sep 2019 15:55), Max: 36.9 (13 Sep 2019 15:55)  T(F): 98.4 (13 Sep 2019 15:55), Max: 98.4 (13 Sep 2019 15:55)  HR: 73 (13 Sep 2019 15:55) (66 - 79)  BP: 117/69 (13 Sep 2019 15:55) (100/63 - 131/85)  BP(mean): --  RR: 18 (13 Sep 2019 15:55) (18 - 18)  SpO2: 95% (13 Sep 2019 15:55) (95% - 99%)    PHYSICAL EXAM:  Constitutional:NAD  Eyes:NIDIA, EOMI  Ear/Nose/Throat: no thrush, mucositis.  Moist mucous membranes	  Neck:no JVD, no lymphadenopathy, supple  Respiratory: CTA lucian  Cardiovascular: S1S2 RRR, no murmurs  Gastrointestinal:soft, nontender,  nondistended (+) BS  Extremities:no e/e/c  Skin:  perirectal tissues inflamed, erythematous.          LABS:                        9.8    28.18 )-----------( 241      ( 13 Sep 2019 10:12 )             29.9     09-13    132<L>  |  98  |  64<H>  ----------------------------<  156<H>  4.3   |  16<L>  |  1.42<H>    Ca    9.1      13 Sep 2019 07:07  Phos  3.9     09-12  Mg     2.0     09-12            Procalcitonin, Serum: 0.49 (09-05 @ 07:29)            Rapid RVP Result: [facility]          MICROBIOLOGY:    Culture - Blood (09.08.19 @ 01:32)    Specimen Source: .Blood    Culture Results:   No growth at 5 days.    Culture - Blood (09.08.19 @ 01:32)    Specimen Source: .Blood    Culture Results:   No growth at 5 days.          RADIOLOGY & ADDITIONAL STUDIES:    < from: US Extremity Nonvasc Limited, Bilateral (09.12.19 @ 13:53) >    FINDINGS: Within the area of concern in the soft tissues of the gluteal   cleft bilaterally, there is "cobble stoning" reflecting edema without   discrete underlying collection to suggest abscess. On physical   evaluation, the left buttock is more indurated than the right.    IMPRESSION: Bilateral gluteal cleft cellulitis. No abscess.    < end of copied text >

## 2019-09-13 NOTE — PROGRESS NOTE ADULT - SUBJECTIVE AND OBJECTIVE BOX
Subjective: Patient seen and examined. No new events except as noted.   improved mental stat   pain buttock     REVIEW OF SYSTEMS:    CONSTITUTIONAL: No weakness, fevers or chills  EYES/ENT: No visual changes;  No vertigo or throat pain   NECK: No pain or stiffness  RESPIRATORY: No cough, wheezing, hemoptysis; No shortness of breath  CARDIOVASCULAR: No chest pain or palpitations  GASTROINTESTINAL: No abdominal or epigastric pain  GENITOURINARY: No dysuria, frequency or hematuria  NEUROLOGICAL: No numbness or weakness  SKIN: back pain   All other review of systems is negative unless indicated above.    MEDICATIONS:  MEDICATIONS  (STANDING):  aspirin enteric coated 81 milliGRAM(s) Oral daily  atorvastatin 80 milliGRAM(s) Oral at bedtime  aztreonam  IVPB 500 milliGRAM(s) IV Intermittent every 8 hours  aztreonam  IVPB      clopidogrel Tablet 75 milliGRAM(s) Oral daily  DAPTOmycin IVPB      DAPTOmycin IVPB 350 milliGRAM(s) IV Intermittent every 48 hours  dextrose 5%. 1000 milliLiter(s) (50 mL/Hr) IV Continuous <Continuous>  dextrose 50% Injectable 12.5 Gram(s) IV Push once  dextrose 50% Injectable 25 Gram(s) IV Push once  dextrose 50% Injectable 25 Gram(s) IV Push once  docusate sodium 100 milliGRAM(s) Oral three times a day  heparin  Injectable 5000 Unit(s) SubCutaneous every 8 hours  influenza   Vaccine 0.5 milliLiter(s) IntraMuscular once  insulin glargine Injectable (LANTUS) 15 Unit(s) SubCutaneous two times a day  insulin lispro (HumaLOG) corrective regimen sliding scale   SubCutaneous three times a day before meals  lidocaine   Patch 2 Patch Transdermal daily  lidocaine 5% Ointment 1 Application(s) Topical every 4 hours  metoprolol tartrate 12.5 milliGRAM(s) Oral two times a day  metroNIDAZOLE  IVPB      metroNIDAZOLE  IVPB 500 milliGRAM(s) IV Intermittent every 8 hours  multivitamin 1 Tablet(s) Oral daily  pantoprazole    Tablet 40 milliGRAM(s) Oral before breakfast  senna 2 Tablet(s) Oral at bedtime  sodium chloride 0.45% 1000 milliLiter(s) (75 mL/Hr) IV Continuous <Continuous>      PHYSICAL EXAM:  T(C): 36.9 (09-13-19 @ 15:55), Max: 36.9 (09-13-19 @ 15:55)  HR: 73 (09-13-19 @ 15:55) (66 - 79)  BP: 117/69 (09-13-19 @ 15:55) (100/63 - 131/85)  RR: 18 (09-13-19 @ 15:55) (18 - 18)  SpO2: 95% (09-13-19 @ 15:55) (95% - 99%)  Wt(kg): --  I&O's Summary    12 Sep 2019 07:01  -  13 Sep 2019 07:00  --------------------------------------------------------  IN: 1190 mL / OUT: 1070 mL / NET: 120 mL    13 Sep 2019 07:01  -  13 Sep 2019 16:56  --------------------------------------------------------  IN: 360 mL / OUT: 0 mL / NET: 360 mL          Appearance: Normal	  HEENT:   Normal oral mucosa, PERRL, EOMI	  Lymphatic: No lymphadenopathy , no edema  Cardiovascular: Normal S1 S2, No JVD, No murmurs , Peripheral pulses palpable 2+ bilaterally  Respiratory: Lungs clear to auscultation, normal effort 	  Gastrointestinal:  Soft, Non-tender, + BS	  Skin: redness inner buttock   Musculoskeletal: Normal range of motion, normal strength  Psychiatry:  Mood & affect appropriate  Ext: No edema      All labs, Imaging and EKGs personally reviewed                           9.8    28.18 )-----------( 241      ( 13 Sep 2019 10:12 )             29.9               09-13    132<L>  |  98  |  64<H>  ----------------------------<  156<H>  4.3   |  16<L>  |  1.42<H>    Ca    9.1      13 Sep 2019 07:07  Phos  3.9     09-12  Mg     2.0     09-12    < from: US Extremity Nonvasc Limited, Bilateral (09.12.19 @ 13:53) >  IMPRESSION: Bilateral gluteal cleft cellulitis. No abscess.

## 2019-09-13 NOTE — CHART NOTE - NSCHARTNOTEFT_GEN_A_CORE
86 year old female with fevers, UTI, and area of erythema over left and right buttock, no abscess on CT scan last week.     PLAN:  - US showing bilateral gluteal cleft cellulitis. No abscess  - wbc 28K   - Appreciate ID recommendations re abx  - consider rpt CT to assess for source of leukocytosis  - Continue care per primary team, call back with any questions [phone]  - d/w med attg and pt family in detail    Red Surgery   [phone]

## 2019-09-13 NOTE — PROGRESS NOTE ADULT - SUBJECTIVE AND OBJECTIVE BOX
Patient seen and examined  no complaints    codeine (Unknown)  Kiwi (Anaphylaxis)  penicillins (Anaphylaxis)    Hospital Medications:   MEDICATIONS  (STANDING):  aspirin enteric coated 81 milliGRAM(s) Oral daily  atorvastatin 80 milliGRAM(s) Oral at bedtime  aztreonam  IVPB 500 milliGRAM(s) IV Intermittent every 8 hours  aztreonam  IVPB      clopidogrel Tablet 75 milliGRAM(s) Oral daily  DAPTOmycin IVPB 350 milliGRAM(s) IV Intermittent every 48 hours  DAPTOmycin IVPB      dextrose 5%. 1000 milliLiter(s) (50 mL/Hr) IV Continuous <Continuous>  dextrose 50% Injectable 12.5 Gram(s) IV Push once  dextrose 50% Injectable 25 Gram(s) IV Push once  dextrose 50% Injectable 25 Gram(s) IV Push once  heparin  Injectable 5000 Unit(s) SubCutaneous every 8 hours  influenza   Vaccine 0.5 milliLiter(s) IntraMuscular once  insulin glargine Injectable (LANTUS) 15 Unit(s) SubCutaneous two times a day  insulin lispro (HumaLOG) corrective regimen sliding scale   SubCutaneous three times a day before meals  lidocaine   Patch 2 Patch Transdermal daily  lidocaine 5% Ointment 1 Application(s) Topical every 4 hours  metoprolol tartrate 12.5 milliGRAM(s) Oral two times a day  metroNIDAZOLE  IVPB      metroNIDAZOLE  IVPB 500 milliGRAM(s) IV Intermittent every 8 hours  multivitamin 1 Tablet(s) Oral daily  pantoprazole    Tablet 40 milliGRAM(s) Oral before breakfast        VITALS:  T(F): 98 (19 @ 06:28), Max: 98 (19 @ 06:28)  HR: 74 (19 @ 06:28)  BP: 111/73 (19 @ 06:28)  RR: 18 (19 @ 06:28)  SpO2: 97% (19 @ 06:28)  Wt(kg): --     @ 07:01  -   @ 07:00  --------------------------------------------------------  IN: 1190 mL / OUT: 1070 mL / NET: 120 mL      Physical Exam :-  Constitutional: NAD  Neck: Supple.  Respiratory: Bilateral equal breath sounds, few Crackles present.  Cardiovascular: S1, S2 normal, positive Murmur  Gastrointestinal: Bowel Sounds present, soft, non tender.  Extremities: +1 Edema Feet  Neurological: Alert and Oriented x 3,   Psychiatric: Normal mood, normal affect    LABS:      132<L>  |  98  |  64<H>  ----------------------------<  156<H>  4.3   |  16<L>  |  1.42<H>    Ca    9.1      13 Sep 2019 07:07  Phos  3.9       Mg     2.0           Creatinine Trend: 1.42 <--, 1.88 <--, 2.45 <--, 2.97 <--, 2.67 <--, 1.98 <--, 2.09 <--                        9.8    28.18 )-----------( 241      ( 13 Sep 2019 10:12 )             29.9     Urine Studies:  Urinalysis Basic - ( 09 Sep 2019 15:48 )    Color: Yellow / Appearance: Slightly Turbid / S.025 / pH:   Gluc:  / Ketone: Negative  / Bili: Negative / Urobili: <2 mg/dL   Blood:  / Protein: 100 mg/dL / Nitrite: Negative   Leuk Esterase: Negative / RBC: 2 /HPF / WBC 2 /HPF   Sq Epi:  / Non Sq Epi: Negative / Bacteria: Few      Osmolality, Random Urine: 423 mosm/Kg ( @ 15:48)  Potassium, Random Urine: 40 mmol/L ( @ 13:18)  Sodium, Random Urine: <20 mmol/L ( @ 13:18)  Chloride, Random Urine: <35 mmol/L ( @ 13:18)  Creatinine, Random Urine: 132 mg/dL ( @ 13:18)  Protein/Creatinine Ratio Calculation: 0.8 Ratio ( @ :18)  Osmolality, Random Urine: 380 mosm/Kg ( @ 23:28)  Potassium, Random Urine: 36 mmol/L ( @ 17:24)  Sodium, Random Urine: 30 mmol/L ( @ 17:24)  Chloride, Random Urine: <35 mmol/L ( @ 17:24)  Creatinine, Random Urine: 126 mg/dL ( @ 17:24)  Protein/Creatinine Ratio Calculation: 0.2 Ratio ( @ :24)    RADIOLOGY & ADDITIONAL STUDIES:

## 2019-09-13 NOTE — PROGRESS NOTE ADULT - PROBLEM SELECTOR PLAN 1
Cont to be afebrile however patient has been on round the clock tylenol for pain which has been effective. will restart tylenol for now PRN, can not give to much morphin due to side affect  and drowsiness   worsening leukocytosis   Urine Cx noted  reaction to vanco?  cont aztreonam   Cont dapto and flagyl for anaerobic coverage   Cont gentle hydration   CXR noted  elevated procalcitonin  CT chest noted, negative for PNA   RVP ordered, negative  ID eval called for further recs appreciated, discussed in detail with team   CT C/A/P noted. R Buttock infiltrate   Buttock soft tissue US noted   Surg F/U for worsening erythema and buttock lesion  will do WBC scan   Check C diff, on flagyl for now for coverage

## 2019-09-14 ENCOUNTER — TRANSCRIPTION ENCOUNTER (OUTPATIENT)
Age: 84
End: 2019-09-14

## 2019-09-14 LAB
ANION GAP SERPL CALC-SCNC: 11 MMOL/L — SIGNIFICANT CHANGE UP (ref 5–17)
BASOPHILS # BLD AUTO: 0.07 K/UL — SIGNIFICANT CHANGE UP (ref 0–0.2)
BASOPHILS NFR BLD AUTO: 0.3 % — SIGNIFICANT CHANGE UP (ref 0–2)
BUN SERPL-MCNC: 53 MG/DL — HIGH (ref 7–23)
CALCIUM SERPL-MCNC: 8.3 MG/DL — LOW (ref 8.4–10.5)
CHLORIDE SERPL-SCNC: 102 MMOL/L — SIGNIFICANT CHANGE UP (ref 96–108)
CK SERPL-CCNC: 31 U/L — SIGNIFICANT CHANGE UP (ref 25–170)
CO2 SERPL-SCNC: 20 MMOL/L — LOW (ref 22–31)
CREAT SERPL-MCNC: 1.22 MG/DL — SIGNIFICANT CHANGE UP (ref 0.5–1.3)
EOSINOPHIL # BLD AUTO: 0.1 K/UL — SIGNIFICANT CHANGE UP (ref 0–0.5)
EOSINOPHIL NFR BLD AUTO: 0.4 % — SIGNIFICANT CHANGE UP (ref 0–6)
GLUCOSE SERPL-MCNC: 95 MG/DL — SIGNIFICANT CHANGE UP (ref 70–99)
HCT VFR BLD CALC: 24.4 % — LOW (ref 34.5–45)
HGB BLD-MCNC: 8.2 G/DL — LOW (ref 11.5–15.5)
IMM GRANULOCYTES NFR BLD AUTO: 1.3 % — SIGNIFICANT CHANGE UP (ref 0–1.5)
LYMPHOCYTES # BLD AUTO: 0.86 K/UL — LOW (ref 1–3.3)
LYMPHOCYTES # BLD AUTO: 3.3 % — LOW (ref 13–44)
MAGNESIUM SERPL-MCNC: 2 MG/DL — SIGNIFICANT CHANGE UP (ref 1.6–2.6)
MCHC RBC-ENTMCNC: 30.1 PG — SIGNIFICANT CHANGE UP (ref 27–34)
MCHC RBC-ENTMCNC: 33.6 GM/DL — SIGNIFICANT CHANGE UP (ref 32–36)
MCV RBC AUTO: 89.7 FL — SIGNIFICANT CHANGE UP (ref 80–100)
MONOCYTES # BLD AUTO: 1.49 K/UL — HIGH (ref 0–0.9)
MONOCYTES NFR BLD AUTO: 5.7 % — SIGNIFICANT CHANGE UP (ref 2–14)
NEUTROPHILS # BLD AUTO: 23.38 K/UL — HIGH (ref 1.8–7.4)
NEUTROPHILS NFR BLD AUTO: 89 % — HIGH (ref 43–77)
PHOSPHATE SERPL-MCNC: 3.2 MG/DL — SIGNIFICANT CHANGE UP (ref 2.5–4.5)
PLATELET # BLD AUTO: 190 K/UL — SIGNIFICANT CHANGE UP (ref 150–400)
POTASSIUM SERPL-MCNC: 4.1 MMOL/L — SIGNIFICANT CHANGE UP (ref 3.5–5.3)
POTASSIUM SERPL-SCNC: 4.1 MMOL/L — SIGNIFICANT CHANGE UP (ref 3.5–5.3)
RBC # BLD: 2.72 M/UL — LOW (ref 3.8–5.2)
RBC # FLD: 13.5 % — SIGNIFICANT CHANGE UP (ref 10.3–14.5)
SODIUM SERPL-SCNC: 133 MMOL/L — LOW (ref 135–145)
WBC # BLD: 26.24 K/UL — HIGH (ref 3.8–10.5)
WBC # FLD AUTO: 26.24 K/UL — HIGH (ref 3.8–10.5)

## 2019-09-14 RX ADMIN — Medication 12.5 MILLIGRAM(S): at 18:13

## 2019-09-14 RX ADMIN — Medication 12.5 MILLIGRAM(S): at 06:40

## 2019-09-14 RX ADMIN — Medication 50 MILLIGRAM(S): at 02:00

## 2019-09-14 RX ADMIN — ATORVASTATIN CALCIUM 80 MILLIGRAM(S): 80 TABLET, FILM COATED ORAL at 22:03

## 2019-09-14 RX ADMIN — Medication 81 MILLIGRAM(S): at 11:30

## 2019-09-14 RX ADMIN — LIDOCAINE 1 APPLICATION(S): 4 CREAM TOPICAL at 06:39

## 2019-09-14 RX ADMIN — Medication 100 MILLIGRAM(S): at 06:39

## 2019-09-14 RX ADMIN — SENNA PLUS 2 TABLET(S): 8.6 TABLET ORAL at 22:03

## 2019-09-14 RX ADMIN — INSULIN GLARGINE 15 UNIT(S): 100 INJECTION, SOLUTION SUBCUTANEOUS at 09:06

## 2019-09-14 RX ADMIN — Medication 1 TABLET(S): at 11:30

## 2019-09-14 RX ADMIN — MORPHINE SULFATE 7.5 MILLIGRAM(S): 50 CAPSULE, EXTENDED RELEASE ORAL at 11:29

## 2019-09-14 RX ADMIN — LIDOCAINE 1 APPLICATION(S): 4 CREAM TOPICAL at 09:08

## 2019-09-14 RX ADMIN — Medication 50 MILLIGRAM(S): at 09:07

## 2019-09-14 RX ADMIN — ONDANSETRON 4 MILLIGRAM(S): 8 TABLET, FILM COATED ORAL at 04:07

## 2019-09-14 RX ADMIN — Medication 100 MILLIGRAM(S): at 20:42

## 2019-09-14 RX ADMIN — LIDOCAINE 1 APPLICATION(S): 4 CREAM TOPICAL at 02:11

## 2019-09-14 RX ADMIN — INSULIN GLARGINE 15 UNIT(S): 100 INJECTION, SOLUTION SUBCUTANEOUS at 22:58

## 2019-09-14 RX ADMIN — HEPARIN SODIUM 5000 UNIT(S): 5000 INJECTION INTRAVENOUS; SUBCUTANEOUS at 15:27

## 2019-09-14 RX ADMIN — HEPARIN SODIUM 5000 UNIT(S): 5000 INJECTION INTRAVENOUS; SUBCUTANEOUS at 06:40

## 2019-09-14 RX ADMIN — Medication 100 MILLIGRAM(S): at 15:27

## 2019-09-14 RX ADMIN — Medication 100 MILLIGRAM(S): at 11:30

## 2019-09-14 RX ADMIN — Medication 100 MILLIGRAM(S): at 22:03

## 2019-09-14 RX ADMIN — Medication 50 MILLIGRAM(S): at 18:13

## 2019-09-14 RX ADMIN — CLOPIDOGREL BISULFATE 75 MILLIGRAM(S): 75 TABLET, FILM COATED ORAL at 11:32

## 2019-09-14 RX ADMIN — Medication 100 MILLIGRAM(S): at 06:40

## 2019-09-14 RX ADMIN — PANTOPRAZOLE SODIUM 40 MILLIGRAM(S): 20 TABLET, DELAYED RELEASE ORAL at 06:40

## 2019-09-14 RX ADMIN — MORPHINE SULFATE 7.5 MILLIGRAM(S): 50 CAPSULE, EXTENDED RELEASE ORAL at 12:01

## 2019-09-14 RX ADMIN — HEPARIN SODIUM 5000 UNIT(S): 5000 INJECTION INTRAVENOUS; SUBCUTANEOUS at 22:03

## 2019-09-14 NOTE — PROGRESS NOTE ADULT - SUBJECTIVE AND OBJECTIVE BOX
SURGERY PROGRESS NOTE    86yFemale    SUBJECTIVE:  Patient seen and examined at bedside. WBC elevated to 28. repeat CT scan noted, showed increased inflammatory changes of the posterior perineal   soft tissues compared to 9/7/19 but no defined collection is visualized. US two days ago showed bilateral gluteal cleft cellulitis. No abscess. She has diarrhea.      OBJECTIVE:     Physical Exam:  General: AAOx3, NAD, resting comfortably   HEENT: NC/AT  Respiratory: no increased work of breathing   Abdomen: soft, nontender, nondistended, right buttock with localized area of perianal erythema and induration, but no drainage and no obvious area of fluctuance appreciable   Extremities: warm and well perfused    --------------------------------------------------------------------------------------------------  Vital Signs Last 24 Hrs  T(C): 36.8 (14 Sep 2019 04:38), Max: 36.9 (13 Sep 2019 15:55)  T(F): 98.2 (14 Sep 2019 04:38), Max: 98.4 (13 Sep 2019 15:55)  HR: 75 (14 Sep 2019 04:38) (66 - 75)  BP: 115/69 (14 Sep 2019 04:38) (100/63 - 117/69)  BP(mean): --  RR: 18 (14 Sep 2019 04:38) (18 - 18)  SpO2: 95% (14 Sep 2019 04:38) (95% - 96%)  --------------------------------------------------------------------------------------------------        13 Sep 2019 07:01  -  14 Sep 2019 07:00  --------------------------------------------------------  IN:    IV PiggyBack: 300 mL    Oral Fluid: 600 mL    sodium chloride 0.45%: 425 mL  Total IN: 1325 mL    OUT:    Indwelling Catheter - Urethral: 1025 mL  Total OUT: 1025 mL    Total NET: 300 mL              --------------------------------------------------------------------------------------------------  Laboratories:              LABS:      LABS:      CBC Full  -  ( 13 Sep 2019 10:12 )  WBC Count : 28.18 K/uL  RBC Count : 3.27 M/uL  Hemoglobin : 9.8 g/dL  Hematocrit : 29.9 %  Platelet Count - Automated : 241 K/uL  Mean Cell Volume : 91.4 fl  Mean Cell Hemoglobin : 30.0 pg  Mean Cell Hemoglobin Concentration : 32.8 gm/dL  Auto Neutrophil # : 25.45 K/uL  Auto Lymphocyte # : 0.87 K/uL  Auto Monocyte # : 1.30 K/uL  Auto Eosinophil # : 0.05 K/uL  Auto Basophil # : 0.09 K/uL  Auto Neutrophil % : 90.3 %  Auto Lymphocyte % : 3.1 %  Auto Monocyte % : 4.6 %  Auto Eosinophil % : 0.2 %  Auto Basophil % : 0.3 %    09-14    133<L>  |  102  |  53<H>  ----------------------------<  95  4.1   |  20<L>  |  1.22    Ca    8.3<L>      14 Sep 2019 07:19  Phos  3.2     09-14  Mg     2.0     09-14                        RADIOLOGY & ADDITIONAL STUDIES (The following images were personally reviewed):          CT abd/pelvis 09/13: Increased inflammatory changes of the posterior perineal   soft tissues compared to 9/7/19. No defined collection is visualized. A   few foci of air are noted within the region of inflammatory changes. This   may represent gas-forming bacteria, an abscess, or post-procedural.   Correlate clinically.      ASSESSMENT:   86 year old female with fevers, UTI, and area of erythema over right buttock, no abscess on CT scan on 9/7, the CT from yesterday showed increase inflammatory changes of soft tissue but no defined collection.     PLAN:  - f/u wbc in am, please check stool c.diff as pt has diarrhea and has been on ABX.   - Appreciate ID recommendations re abx  - will continue to follow     Red Surgery   [phone] SURGERY PROGRESS NOTE    86yFemale    SUBJECTIVE:  Patient seen and examined at bedside. WBC elevated to 28. prelim repeat CT scan noted, showed increased inflammatory changes of the posterior perineal   soft tissues compared to 9/7/19 but no defined collection is visualized. US two days ago showed bilateral gluteal cleft cellulitis. No abscess. She has diarrhea.      OBJECTIVE:     Physical Exam:  General: AAOx3, NAD, resting comfortably   HEENT: NC/AT  Respiratory: no increased work of breathing   Abdomen: soft, nontender, nondistended, right buttock with localized area of perianal erythema and induration, but no drainage and no obvious area of fluctuance appreciable   Extremities: warm and well perfused    --------------------------------------------------------------------------------------------------  Vital Signs Last 24 Hrs  T(C): 36.8 (14 Sep 2019 04:38), Max: 36.9 (13 Sep 2019 15:55)  T(F): 98.2 (14 Sep 2019 04:38), Max: 98.4 (13 Sep 2019 15:55)  HR: 75 (14 Sep 2019 04:38) (66 - 75)  BP: 115/69 (14 Sep 2019 04:38) (100/63 - 117/69)  BP(mean): --  RR: 18 (14 Sep 2019 04:38) (18 - 18)  SpO2: 95% (14 Sep 2019 04:38) (95% - 96%)  --------------------------------------------------------------------------------------------------        13 Sep 2019 07:01  -  14 Sep 2019 07:00  --------------------------------------------------------  IN:    IV PiggyBack: 300 mL    Oral Fluid: 600 mL    sodium chloride 0.45%: 425 mL  Total IN: 1325 mL    OUT:    Indwelling Catheter - Urethral: 1025 mL  Total OUT: 1025 mL    Total NET: 300 mL              --------------------------------------------------------------------------------------------------  Laboratories:              LABS:      LABS:      CBC Full  -  ( 13 Sep 2019 10:12 )  WBC Count : 28.18 K/uL  RBC Count : 3.27 M/uL  Hemoglobin : 9.8 g/dL  Hematocrit : 29.9 %  Platelet Count - Automated : 241 K/uL  Mean Cell Volume : 91.4 fl  Mean Cell Hemoglobin : 30.0 pg  Mean Cell Hemoglobin Concentration : 32.8 gm/dL  Auto Neutrophil # : 25.45 K/uL  Auto Lymphocyte # : 0.87 K/uL  Auto Monocyte # : 1.30 K/uL  Auto Eosinophil # : 0.05 K/uL  Auto Basophil # : 0.09 K/uL  Auto Neutrophil % : 90.3 %  Auto Lymphocyte % : 3.1 %  Auto Monocyte % : 4.6 %  Auto Eosinophil % : 0.2 %  Auto Basophil % : 0.3 %    09-14    133<L>  |  102  |  53<H>  ----------------------------<  95  4.1   |  20<L>  |  1.22    Ca    8.3<L>      14 Sep 2019 07:19  Phos  3.2     09-14  Mg     2.0     09-14                        RADIOLOGY & ADDITIONAL STUDIES (The following images were personally reviewed):          CT abd/pelvis 09/13: Increased inflammatory changes of the posterior perineal   soft tissues compared to 9/7/19. No defined collection is visualized. A   few foci of air are noted within the region of inflammatory changes. This   may represent gas-forming bacteria, an abscess, or post-procedural.   Correlate clinically.      ASSESSMENT:   86 year old female with fevers, UTI, and area of erythema over right buttock, no abscess on CT scan on 9/7, the prelim CT from yesterday showed increase inflammatory changes of soft tissue but no defined collection.     PLAN:  - f/u wbc in am, please check stool c.diff as pt has diarrhea and has been on ABX.   - f/u official CT  - Appreciate ID recommendations re abx  - will continue to follow     Red Surgery   [phone]

## 2019-09-14 NOTE — PROGRESS NOTE ADULT - ASSESSMENT
Pt is a 85 y/o Female with PMHx of CHF, DM, HLD, HTN, CAD prior MI, c/o fatigue, "feeling off" and unable to get out of bed; Tmax 104F . found t have UTI, case was copliicated with buttock lesion with cellulitis.

## 2019-09-14 NOTE — PROGRESS NOTE ADULT - SUBJECTIVE AND OBJECTIVE BOX
Subjective: Patient seen and examined in the morning with house staff No new events except as noted.   state sthat she cont ot have back buttock pain and weakness and sleepiness     REVIEW OF SYSTEMS:    CONSTITUTIONAL: + weakness   EYES/ENT: No visual changes;  No vertigo or throat pain   NECK: No pain or stiffness  RESPIRATORY: No cough, wheezing, hemoptysis; No shortness of breath  CARDIOVASCULAR: No chest pain or palpitations  GASTROINTESTINAL: No abdominal or epigastric pain. No nausea, vomiting, or hematemesis; No diarrhea or constipation. No melena or hematochezia.  GENITOURINARY: No dysuria, frequency or hematuria  NEUROLOGICAL: No numbness or weakness  SKIN: buttock pain    All other review of systems is negative unless indicated above.    MEDICATIONS:  MEDICATIONS  (STANDING):  aspirin enteric coated 81 milliGRAM(s) Oral daily  atorvastatin 80 milliGRAM(s) Oral at bedtime  aztreonam  IVPB 500 milliGRAM(s) IV Intermittent every 8 hours  aztreonam  IVPB      clindamycin IVPB 900 milliGRAM(s) IV Intermittent every 8 hours  clindamycin IVPB      clopidogrel Tablet 75 milliGRAM(s) Oral daily  DAPTOmycin IVPB      DAPTOmycin IVPB 350 milliGRAM(s) IV Intermittent every 48 hours  dextrose 5%. 1000 milliLiter(s) (50 mL/Hr) IV Continuous <Continuous>  dextrose 50% Injectable 12.5 Gram(s) IV Push once  dextrose 50% Injectable 25 Gram(s) IV Push once  dextrose 50% Injectable 25 Gram(s) IV Push once  docusate sodium 100 milliGRAM(s) Oral three times a day  heparin  Injectable 5000 Unit(s) SubCutaneous every 8 hours  influenza   Vaccine 0.5 milliLiter(s) IntraMuscular once  insulin glargine Injectable (LANTUS) 15 Unit(s) SubCutaneous two times a day  insulin lispro (HumaLOG) corrective regimen sliding scale   SubCutaneous three times a day before meals  lidocaine   Patch 2 Patch Transdermal daily  metoprolol tartrate 12.5 milliGRAM(s) Oral two times a day  multivitamin 1 Tablet(s) Oral daily  pantoprazole    Tablet 40 milliGRAM(s) Oral before breakfast  senna 2 Tablet(s) Oral at bedtime  sodium chloride 0.45% 1000 milliLiter(s) (75 mL/Hr) IV Continuous <Continuous>      PHYSICAL EXAM:  T(C): 36.9 (09-14-19 @ 14:06), Max: 36.9 (09-14-19 @ 14:06)  HR: 73 (09-14-19 @ 14:06) (70 - 75)  BP: 100/60 (09-14-19 @ 14:06) (100/60 - 115/69)  RR: 18 (09-14-19 @ 14:06) (18 - 18)  SpO2: 96% (09-14-19 @ 14:06) (95% - 96%)  Wt(kg): --  I&O's Summary    13 Sep 2019 07:01  -  14 Sep 2019 07:00  --------------------------------------------------------  IN: 1325 mL / OUT: 1025 mL / NET: 300 mL          Appearance: awake, weak looking , following all commands 	  HEENT:   Normal oral mucosa, PERRL, EOMI	  Lymphatic: No lymphadenopathy , no edema  Cardiovascular: Normal S1 S2, No JVD, No murmurs , Peripheral pulses palpable 2+ bilaterally  Respiratory: Lungs clear to auscultation, normal effort 	  Gastrointestinal:  Soft, Non-tender, + BS	  Skin: buttock swelling and redness and tenderness on palaption   Musculoskeletal: Normal range of motion, normal strength  Psychiatry:  Mood & affect appropriate  Ext: No edema      All labs, Imaging and EKGs personally reviewed                             8.2    26.24 )-----------( 190      ( 14 Sep 2019 10:38 )             24.4               09-14    133<L>  |  102  |  53<H>  ----------------------------<  95  4.1   |  20<L>  |  1.22    Ca    8.3<L>      14 Sep 2019 07:19  Phos  3.2     09-14  Mg     2.0     09-14        < from: CT Abdomen and Pelvis No Cont (09.13.19 @ 21:56) >  IMPRESSION:     Partially imaged subcutaneous soft tissue inflammatory changes, fluid and   incompletely imaged small foci of gas along the medial aspect of the   right buttock, with significantly increased infiltration of the soft   tissues in the perineum.. The  differential diagnosis for the gas   includes infection such as necrotizing fasciitis, as well as recent   instrumentation..     Small bilateral pleural effusions.

## 2019-09-14 NOTE — PROGRESS NOTE ADULT - PROBLEM SELECTOR PLAN 1
cr improving- likely pre renal  on 1/2nacl +75meq hco3 @ 75cc  with  infante- after found retianing urine  monitor urineoutput  cont to hold diuretics and ACE-I  on rx for UTI

## 2019-09-14 NOTE — PROGRESS NOTE ADULT - SUBJECTIVE AND OBJECTIVE BOX
[facility] : Progress Note :: [phone], (office [phone]),   Dr Geronimo / Dr Bone / Dr Kumari / Dr Gibson / Dr Elsy SANTIAGO / Dr Barrett / Dr Lagos / Dr Joseph arizmendi  _____________________________________________________________________________________________  complains of generalized  body aches.    codeine (Unknown)  Kiwi (Anaphylaxis)  penicillins (Anaphylaxis)    Hospital Medications:   MEDICATIONS  (STANDING):  aspirin enteric coated 81 milliGRAM(s) Oral daily  atorvastatin 80 milliGRAM(s) Oral at bedtime  aztreonam  IVPB 500 milliGRAM(s) IV Intermittent every 8 hours  aztreonam  IVPB      clindamycin IVPB 900 milliGRAM(s) IV Intermittent every 8 hours  clindamycin IVPB      clopidogrel Tablet 75 milliGRAM(s) Oral daily  DAPTOmycin IVPB      DAPTOmycin IVPB 350 milliGRAM(s) IV Intermittent every 48 hours  dextrose 5%. 1000 milliLiter(s) (50 mL/Hr) IV Continuous <Continuous>  dextrose 50% Injectable 12.5 Gram(s) IV Push once  dextrose 50% Injectable 25 Gram(s) IV Push once  dextrose 50% Injectable 25 Gram(s) IV Push once  docusate sodium 100 milliGRAM(s) Oral three times a day  heparin  Injectable 5000 Unit(s) SubCutaneous every 8 hours  influenza   Vaccine 0.5 milliLiter(s) IntraMuscular once  insulin glargine Injectable (LANTUS) 15 Unit(s) SubCutaneous two times a day  insulin lispro (HumaLOG) corrective regimen sliding scale   SubCutaneous three times a day before meals  lidocaine   Patch 2 Patch Transdermal daily  metoprolol tartrate 12.5 milliGRAM(s) Oral two times a day  multivitamin 1 Tablet(s) Oral daily  pantoprazole    Tablet 40 milliGRAM(s) Oral before breakfast  senna 2 Tablet(s) Oral at bedtime  sodium chloride 0.45% 1000 milliLiter(s) (75 mL/Hr) IV Continuous <Continuous>        VITALS:  T(F): 98.5 (19 @ 14:06), Max: 98.5 (19 @ 14:06)  HR: 73 (19 @ 14:06)  BP: 100/60 (19 @ 14:06)  RR: 18 (19 @ 14:06)  SpO2: 96% (19 @ 14:06)  Wt(kg): --     @ 07:01  -   @ 07:00  --------------------------------------------------------  IN: 1325 mL / OUT: 1025 mL / NET: 300 mL        PHYSICAL EXAM:  Constitutional: NAD  HEENT: anicteric sclera, oropharynx clear.  Neck: No JVD  Respiratory: CTAB, no wheezes, rales or rhonchi  Cardiovascular: S1, S2, RRR  Gastrointestinal: BS+, soft, NT/ND  Extremities: peripheral edema+  : No CVA tenderness. No infante.       LABS:      133<L>  |  102  |  53<H>  ----------------------------<  95  4.1   |  20<L>  |  1.22    Ca    8.3<L>      14 Sep 2019 07:19  Phos  3.2       Mg     2.0           Creatinine Trend: 1.22 <--, 1.42 <--, 1.88 <--, 2.45 <--, 2.97 <--, 2.67 <--, 1.98 <--                        8.2    26.24 )-----------( 190      ( 14 Sep 2019 10:38 )             24.4     Urine Studies:  Urinalysis Basic - ( 09 Sep 2019 15:48 )    Color: Yellow / Appearance: Slightly Turbid / S.025 / pH:   Gluc:  / Ketone: Negative  / Bili: Negative / Urobili: <2 mg/dL   Blood:  / Protein: 100 mg/dL / Nitrite: Negative   Leuk Esterase: Negative / RBC: 2 /HPF / WBC 2 /HPF   Sq Epi:  / Non Sq Epi: Negative / Bacteria: Few      Osmolality, Random Urine: 423 mosm/Kg ( @ 15:48)  Potassium, Random Urine: 40 mmol/L ( @ 13:18)  Sodium, Random Urine: <20 mmol/L ( @ 13:18)  Chloride, Random Urine: <35 mmol/L ( @ 13:18)  Creatinine, Random Urine: 132 mg/dL ( @ 13:18)  Protein/Creatinine Ratio Calculation: 0.8 Ratio ( @ 13:18)    RADIOLOGY & ADDITIONAL STUDIES:

## 2019-09-14 NOTE — PROGRESS NOTE ADULT - ASSESSMENT
Pt is a 87 y/o Female with PMHx of CHF, DM, HLD, HTN, CAD prior MI, c/o fatigue, "feeling off" and unable to get out of bed; Tmax 104F toay. Rash/redness on buttocks noted by home health aid. patient also complaining of +chronic cough, nonproductive.  No abdominal pain, no n/v/d. No chest pain.  States she has been feeling hot and cold over past few days, and took her temperature today, found fever of 104, and came to ED for evaluation.  No dysuria, no diarrhea. +constipation (chronic).  Increased diffuse leg swelling for past few days. patient lives at home with family. walks by herself with no use of walker. denies of any MEAD< chest pain on exertion. compliant with all her home medications (04 Sep 2019 22:22)    ER vitals: Tm 102.2, P 75, /77.  WBC 14.8 --> 12.2.  Cr 2.0 <-- 1.3.  PCT 0.49.  Ucx >100K E.coli.  Pt given dose of ceftriaxone, now on cipro for UTI.  ID consult called for further abx managment.      Sepsis:    - fever, leukocytosis. Source less likely UTI given persistent fevers, pt has infiltration of perirectal tissues, likely cellulitis.  Abx broadened     - Check lactate.  Pct elevated 0.49.  Pt with rising WBC, abx broadened on 9/11 (dapto/aztreanam/flagyl).    - Monitor hemodynamic status and BPs.  s/p IV fluid bolus, cont maintanence fluid.      - blood cx, urine cx.  RVP (-).  No pna on cxr      Perirectal cellulitis, r/o abscess:    - Repeat CTap on 9/13 shows worsening inflammation with fluid and foci of air.  Pt seen by surgery, planned for OR in AM for I&D and/or debridement.  Pt not responding to antibiotics.  d/w surgery resident, will go over consent with patient.       - Cont dapto/azactam.  Changed from flagyl to clindamycin for continued anerobic coverage and anti-toxin effect.  Cover for gas forming bacteria.        - WBC elevated.  Monitor stool output.  Had loose stools today, hold stool softeners.          d/w pt and family at bedside.           Will follow,    Sandrita Zaman  [phone]

## 2019-09-14 NOTE — PROGRESS NOTE ADULT - PROBLEM SELECTOR PLAN 1
Cont to be afebrile however patient has been on round the clock tylenol for pain which has been effective. will restart tylenol for now PRN, can not give to much morphin due to side affect  and drowsiness   worsening leukocytosis   Urine Cx noted  reaction to vanco?  cont aztreonam   Cont dapto and flagyl for anaerobic coverage   Cont gentle hydration   CXR noted  elevated procalcitonin  CT chest noted, negative for PNA   RVP ordered, negative  ID eval called for further recs appreciated, discussed in detail with team   CT C/A/P noted. R Buttock infiltrate   Buttock soft tissue US noted   CT noted   flagyl changed to clinda 900  Surg F/U for possible necrotizing fascitis  OR possible tonight or tomorrow   cont monitor closely

## 2019-09-14 NOTE — PROGRESS NOTE ADULT - SUBJECTIVE AND OBJECTIVE BOX
Infectious Diseases progress note:    Subjective:  Events noted.  Pt seen by surgery, s/p CT ap showing inflammatory changes, fluid and small foci of gas.  Abx changed from flagyl to clindamycin.  (continued on dapto/aztreonam) Pt planned for OR tomorrow.  Had loose stools, on stool softeners.  Family at bedside.     ROS:  CONSTITUTIONAL:  No fever, chills, rigors  CARDIOVASCULAR:  No chest pain or palpitations  RESPIRATORY:   No SOB, cough, dyspnea on exertion.  No wheezing  GASTROINTESTINAL:  No abd pain, N/V, diarrhea/constipation  EXTREMITIES:  No swelling or joint pain  GENITOURINARY:  No burning on urination, increased frequency or urgency.  No flank pain  NEUROLOGIC:  No HA, visual disturbances  SKIN: No rashes    Allergies    codeine (Unknown)  Kiwi (Anaphylaxis)  penicillins (Anaphylaxis)    Intolerances        ANTIBIOTICS/RELEVANT:  antimicrobials  aztreonam  IVPB 500 milliGRAM(s) IV Intermittent every 8 hours  aztreonam  IVPB      clindamycin IVPB 900 milliGRAM(s) IV Intermittent every 8 hours  clindamycin IVPB      DAPTOmycin IVPB      DAPTOmycin IVPB 350 milliGRAM(s) IV Intermittent every 48 hours    immunologic:  influenza   Vaccine 0.5 milliLiter(s) IntraMuscular once    OTHER:  acetaminophen    Suspension .. 650 milliGRAM(s) Oral every 6 hours PRN  aspirin enteric coated 81 milliGRAM(s) Oral daily  atorvastatin 80 milliGRAM(s) Oral at bedtime  bisacodyl Suppository 10 milliGRAM(s) Rectal daily PRN  clopidogrel Tablet 75 milliGRAM(s) Oral daily  dextrose 40% Gel 15 Gram(s) Oral once PRN  dextrose 5%. 1000 milliLiter(s) IV Continuous <Continuous>  dextrose 50% Injectable 12.5 Gram(s) IV Push once  dextrose 50% Injectable 25 Gram(s) IV Push once  dextrose 50% Injectable 25 Gram(s) IV Push once  docusate sodium 100 milliGRAM(s) Oral three times a day  glucagon  Injectable 1 milliGRAM(s) IntraMuscular once PRN  heparin  Injectable 5000 Unit(s) SubCutaneous every 8 hours  insulin glargine Injectable (LANTUS) 15 Unit(s) SubCutaneous two times a day  insulin lispro (HumaLOG) corrective regimen sliding scale   SubCutaneous three times a day before meals  lidocaine   Patch 2 Patch Transdermal daily  metoprolol tartrate 12.5 milliGRAM(s) Oral two times a day  morphine  IR 7.5 milliGRAM(s) Oral every 6 hours PRN  multivitamin 1 Tablet(s) Oral daily  ondansetron Injectable 4 milliGRAM(s) IV Push every 8 hours PRN  pantoprazole    Tablet 40 milliGRAM(s) Oral before breakfast  senna 2 Tablet(s) Oral at bedtime  sodium chloride 0.45% 1000 milliLiter(s) IV Continuous <Continuous>      Objective:  Vital Signs Last 24 Hrs  T(C): 36.5 (14 Sep 2019 21:03), Max: 36.9 (14 Sep 2019 14:06)  T(F): 97.7 (14 Sep 2019 21:03), Max: 98.5 (14 Sep 2019 14:06)  HR: 78 (14 Sep 2019 21:03) (70 - 78)  BP: 104/62 (14 Sep 2019 21:03) (100/60 - 115/69)  BP(mean): --  RR: 18 (14 Sep 2019 21:03) (18 - 18)  SpO2: 96% (14 Sep 2019 21:03) (95% - 96%)    PHYSICAL EXAM:  Constitutional:NAD  Eyes:NIDIA, EOMI  Ear/Nose/Throat: no thrush, mucositis.  Moist mucous membranes	  Neck:no JVD, no lymphadenopathy, supple  Respiratory: CTA lucian  Cardiovascular: S1S2 RRR, no murmurs  Gastrointestinal:soft, nontender,  nondistended (+) BS  Extremities:no e/e/c  Skin:  perirectal tissues swollen, worsening induration/hardness of b/l perianal tissues.  No purulent drainage.         LABS:                        8.2    26.24 )-----------( 190      ( 14 Sep 2019 10:38 )             24.4     09-14    133<L>  |  102  |  53<H>  ----------------------------<  95  4.1   |  20<L>  |  1.22    Ca    8.3<L>      14 Sep 2019 07:19  Phos  3.2     09-14  Mg     2.0     09-14            Procalcitonin, Serum: 0.49 (09-05 @ 07:29)            Rapid RVP Result: [facility]          MICROBIOLOGY:      Culture - Blood (09.08.19 @ 01:32)    Specimen Source: .Blood    Culture Results:   No growth at 5 days.      Culture - Blood (09.08.19 @ 01:32)    Specimen Source: .Blood    Culture Results:   No growth at 5 days.    Culture - Blood (09.04.19 @ 22:05)    Specimen Source: .Blood    Culture Results:   No growth at 5 days.      RADIOLOGY & ADDITIONAL STUDIES:    < from: CT Abdomen and Pelvis No Cont (09.13.19 @ 21:56) >  IMPRESSION:     Partially imaged subcutaneous soft tissue inflammatory changes, fluid and   incompletely imaged small foci of gas along the medial aspect of the   right buttock, with significantly increased infiltration of the soft   tissues in the perineum.. The  differential diagnosis for the gas   includes infection such as necrotizing fasciitis, as well as recent   instrumentation..     Small bilateral pleural effusions.    < end of copied text >        < from: CT Chest No Cont (09.13.19 @ 21:32) >    IMPRESSION:     Partially imaged subcutaneous soft tissue inflammatory changes, fluid and   incompletely imaged small foci of gas along the medial aspect of the   right buttock, with significantly increased infiltration of the soft   tissues in the perineum.. The  differential diagnosis for the gas   includes infection such as necrotizing fasciitis, as well as recent   instrumentation..     Small bilateral pleural effusions.    < end of copied text >

## 2019-09-15 LAB
ANION GAP SERPL CALC-SCNC: 11 MMOL/L — SIGNIFICANT CHANGE UP (ref 5–17)
ANION GAP SERPL CALC-SCNC: 11 MMOL/L — SIGNIFICANT CHANGE UP (ref 5–17)
APTT BLD: 41.1 SEC — HIGH (ref 27.5–36.3)
BLD GP AB SCN SERPL QL: NEGATIVE — SIGNIFICANT CHANGE UP
BUN SERPL-MCNC: 41 MG/DL — HIGH (ref 7–23)
BUN SERPL-MCNC: 44 MG/DL — HIGH (ref 7–23)
CALCIUM SERPL-MCNC: 8.5 MG/DL — SIGNIFICANT CHANGE UP (ref 8.4–10.5)
CALCIUM SERPL-MCNC: 8.8 MG/DL — SIGNIFICANT CHANGE UP (ref 8.4–10.5)
CHLORIDE SERPL-SCNC: 101 MMOL/L — SIGNIFICANT CHANGE UP (ref 96–108)
CHLORIDE SERPL-SCNC: 99 MMOL/L — SIGNIFICANT CHANGE UP (ref 96–108)
CO2 SERPL-SCNC: 21 MMOL/L — LOW (ref 22–31)
CO2 SERPL-SCNC: 22 MMOL/L — SIGNIFICANT CHANGE UP (ref 22–31)
CREAT SERPL-MCNC: 1.25 MG/DL — SIGNIFICANT CHANGE UP (ref 0.5–1.3)
CREAT SERPL-MCNC: 1.27 MG/DL — SIGNIFICANT CHANGE UP (ref 0.5–1.3)
GLUCOSE SERPL-MCNC: 63 MG/DL — LOW (ref 70–99)
GLUCOSE SERPL-MCNC: 83 MG/DL — SIGNIFICANT CHANGE UP (ref 70–99)
HCT VFR BLD CALC: 26.5 % — LOW (ref 34.5–45)
HCT VFR BLD CALC: 27 % — LOW (ref 34.5–45)
HGB BLD-MCNC: 8.8 G/DL — LOW (ref 11.5–15.5)
HGB BLD-MCNC: 8.9 G/DL — LOW (ref 11.5–15.5)
INR BLD: 1.46 RATIO — HIGH (ref 0.88–1.16)
MAGNESIUM SERPL-MCNC: 2.1 MG/DL — SIGNIFICANT CHANGE UP (ref 1.6–2.6)
MCHC RBC-ENTMCNC: 29.1 PG — SIGNIFICANT CHANGE UP (ref 27–34)
MCHC RBC-ENTMCNC: 31.5 PG — SIGNIFICANT CHANGE UP (ref 27–34)
MCHC RBC-ENTMCNC: 32.6 GM/DL — SIGNIFICANT CHANGE UP (ref 32–36)
MCHC RBC-ENTMCNC: 33.7 GM/DL — SIGNIFICANT CHANGE UP (ref 32–36)
MCV RBC AUTO: 89.4 FL — SIGNIFICANT CHANGE UP (ref 80–100)
MCV RBC AUTO: 93.3 FL — SIGNIFICANT CHANGE UP (ref 80–100)
PHOSPHATE SERPL-MCNC: 3.6 MG/DL — SIGNIFICANT CHANGE UP (ref 2.5–4.5)
PLATELET # BLD AUTO: 182 K/UL — SIGNIFICANT CHANGE UP (ref 150–400)
PLATELET # BLD AUTO: 223 K/UL — SIGNIFICANT CHANGE UP (ref 150–400)
POTASSIUM SERPL-MCNC: 4.1 MMOL/L — SIGNIFICANT CHANGE UP (ref 3.5–5.3)
POTASSIUM SERPL-MCNC: 4.7 MMOL/L — SIGNIFICANT CHANGE UP (ref 3.5–5.3)
POTASSIUM SERPL-SCNC: 4.1 MMOL/L — SIGNIFICANT CHANGE UP (ref 3.5–5.3)
POTASSIUM SERPL-SCNC: 4.7 MMOL/L — SIGNIFICANT CHANGE UP (ref 3.5–5.3)
PROTHROM AB SERPL-ACNC: 17 SEC — HIGH (ref 10–13.1)
RBC # BLD: 2.84 M/UL — LOW (ref 3.8–5.2)
RBC # BLD: 3.02 M/UL — LOW (ref 3.8–5.2)
RBC # FLD: 13 % — SIGNIFICANT CHANGE UP (ref 10.3–14.5)
RBC # FLD: 13.7 % — SIGNIFICANT CHANGE UP (ref 10.3–14.5)
RH IG SCN BLD-IMP: POSITIVE — SIGNIFICANT CHANGE UP
SODIUM SERPL-SCNC: 131 MMOL/L — LOW (ref 135–145)
SODIUM SERPL-SCNC: 134 MMOL/L — LOW (ref 135–145)
WBC # BLD: 30.6 K/UL — HIGH (ref 3.8–10.5)
WBC # BLD: 31.3 K/UL — HIGH (ref 3.8–10.5)
WBC # FLD AUTO: 30.6 K/UL — HIGH (ref 3.8–10.5)
WBC # FLD AUTO: 31.3 K/UL — HIGH (ref 3.8–10.5)

## 2019-09-15 RX ORDER — ASPIRIN/CALCIUM CARB/MAGNESIUM 324 MG
81 TABLET ORAL DAILY
Refills: 0 | Status: DISCONTINUED | OUTPATIENT
Start: 2019-09-15 | End: 2019-09-27

## 2019-09-15 RX ORDER — DEXTROSE 50 % IN WATER 50 %
25 SYRINGE (ML) INTRAVENOUS ONCE
Refills: 0 | Status: DISCONTINUED | OUTPATIENT
Start: 2019-09-15 | End: 2019-09-27

## 2019-09-15 RX ORDER — FENTANYL CITRATE 50 UG/ML
25 INJECTION INTRAVENOUS
Refills: 0 | Status: DISCONTINUED | OUTPATIENT
Start: 2019-09-15 | End: 2019-09-15

## 2019-09-15 RX ORDER — SODIUM CHLORIDE 9 MG/ML
1000 INJECTION, SOLUTION INTRAVENOUS
Refills: 0 | Status: DISCONTINUED | OUTPATIENT
Start: 2019-09-15 | End: 2019-09-17

## 2019-09-15 RX ORDER — PANTOPRAZOLE SODIUM 20 MG/1
40 TABLET, DELAYED RELEASE ORAL
Refills: 0 | Status: DISCONTINUED | OUTPATIENT
Start: 2019-09-15 | End: 2019-09-27

## 2019-09-15 RX ORDER — SODIUM CHLORIDE 9 MG/ML
1000 INJECTION, SOLUTION INTRAVENOUS
Refills: 0 | Status: DISCONTINUED | OUTPATIENT
Start: 2019-09-15 | End: 2019-09-15

## 2019-09-15 RX ORDER — HYDROMORPHONE HYDROCHLORIDE 2 MG/ML
0.2 INJECTION INTRAMUSCULAR; INTRAVENOUS; SUBCUTANEOUS
Refills: 0 | Status: DISCONTINUED | OUTPATIENT
Start: 2019-09-15 | End: 2019-09-15

## 2019-09-15 RX ORDER — SENNA PLUS 8.6 MG/1
2 TABLET ORAL AT BEDTIME
Refills: 0 | Status: DISCONTINUED | OUTPATIENT
Start: 2019-09-15 | End: 2019-09-27

## 2019-09-15 RX ORDER — DEXTROSE 50 % IN WATER 50 %
12.5 SYRINGE (ML) INTRAVENOUS ONCE
Refills: 0 | Status: DISCONTINUED | OUTPATIENT
Start: 2019-09-15 | End: 2019-09-27

## 2019-09-15 RX ORDER — OXYCODONE HYDROCHLORIDE 5 MG/1
10 TABLET ORAL EVERY 4 HOURS
Refills: 0 | Status: DISCONTINUED | OUTPATIENT
Start: 2019-09-15 | End: 2019-09-22

## 2019-09-15 RX ORDER — METOPROLOL TARTRATE 50 MG
12.5 TABLET ORAL
Refills: 0 | Status: DISCONTINUED | OUTPATIENT
Start: 2019-09-15 | End: 2019-09-27

## 2019-09-15 RX ORDER — AZTREONAM 2 G
500 VIAL (EA) INJECTION EVERY 8 HOURS
Refills: 0 | Status: DISCONTINUED | OUTPATIENT
Start: 2019-09-15 | End: 2019-09-17

## 2019-09-15 RX ORDER — ONDANSETRON 8 MG/1
4 TABLET, FILM COATED ORAL EVERY 8 HOURS
Refills: 0 | Status: DISCONTINUED | OUTPATIENT
Start: 2019-09-15 | End: 2019-09-27

## 2019-09-15 RX ORDER — DAPTOMYCIN 500 MG/10ML
350 INJECTION, POWDER, LYOPHILIZED, FOR SOLUTION INTRAVENOUS
Refills: 0 | Status: DISCONTINUED | OUTPATIENT
Start: 2019-09-15 | End: 2019-09-17

## 2019-09-15 RX ORDER — OXYCODONE HYDROCHLORIDE 5 MG/1
5 TABLET ORAL EVERY 4 HOURS
Refills: 0 | Status: DISCONTINUED | OUTPATIENT
Start: 2019-09-15 | End: 2019-09-18

## 2019-09-15 RX ORDER — DEXTROSE 50 % IN WATER 50 %
15 SYRINGE (ML) INTRAVENOUS ONCE
Refills: 0 | Status: COMPLETED | OUTPATIENT
Start: 2019-09-15 | End: 2019-09-15

## 2019-09-15 RX ORDER — ACETAMINOPHEN 500 MG
650 TABLET ORAL EVERY 6 HOURS
Refills: 0 | Status: DISCONTINUED | OUTPATIENT
Start: 2019-09-15 | End: 2019-09-27

## 2019-09-15 RX ORDER — CLOPIDOGREL BISULFATE 75 MG/1
75 TABLET, FILM COATED ORAL DAILY
Refills: 0 | Status: DISCONTINUED | OUTPATIENT
Start: 2019-09-15 | End: 2019-09-16

## 2019-09-15 RX ORDER — DOCUSATE SODIUM 100 MG
100 CAPSULE ORAL THREE TIMES A DAY
Refills: 0 | Status: DISCONTINUED | OUTPATIENT
Start: 2019-09-15 | End: 2019-09-27

## 2019-09-15 RX ORDER — DEXTROSE 50 % IN WATER 50 %
15 SYRINGE (ML) INTRAVENOUS ONCE
Refills: 0 | Status: DISCONTINUED | OUTPATIENT
Start: 2019-09-15 | End: 2019-09-27

## 2019-09-15 RX ORDER — INSULIN GLARGINE 100 [IU]/ML
15 INJECTION, SOLUTION SUBCUTANEOUS
Refills: 0 | Status: DISCONTINUED | OUTPATIENT
Start: 2019-09-15 | End: 2019-09-19

## 2019-09-15 RX ORDER — HEPARIN SODIUM 5000 [USP'U]/ML
5000 INJECTION INTRAVENOUS; SUBCUTANEOUS EVERY 8 HOURS
Refills: 0 | Status: DISCONTINUED | OUTPATIENT
Start: 2019-09-15 | End: 2019-09-16

## 2019-09-15 RX ORDER — ONDANSETRON 8 MG/1
4 TABLET, FILM COATED ORAL ONCE
Refills: 0 | Status: DISCONTINUED | OUTPATIENT
Start: 2019-09-15 | End: 2019-09-15

## 2019-09-15 RX ORDER — GLUCAGON INJECTION, SOLUTION 0.5 MG/.1ML
1 INJECTION, SOLUTION SUBCUTANEOUS ONCE
Refills: 0 | Status: DISCONTINUED | OUTPATIENT
Start: 2019-09-15 | End: 2019-09-27

## 2019-09-15 RX ORDER — INSULIN LISPRO 100/ML
VIAL (ML) SUBCUTANEOUS
Refills: 0 | Status: DISCONTINUED | OUTPATIENT
Start: 2019-09-15 | End: 2019-09-27

## 2019-09-15 RX ORDER — ATORVASTATIN CALCIUM 80 MG/1
80 TABLET, FILM COATED ORAL AT BEDTIME
Refills: 0 | Status: DISCONTINUED | OUTPATIENT
Start: 2019-09-15 | End: 2019-09-27

## 2019-09-15 RX ORDER — LIDOCAINE 4 G/100G
2 CREAM TOPICAL DAILY
Refills: 0 | Status: DISCONTINUED | OUTPATIENT
Start: 2019-09-15 | End: 2019-09-27

## 2019-09-15 RX ADMIN — MORPHINE SULFATE 7.5 MILLIGRAM(S): 50 CAPSULE, EXTENDED RELEASE ORAL at 00:12

## 2019-09-15 RX ADMIN — HEPARIN SODIUM 5000 UNIT(S): 5000 INJECTION INTRAVENOUS; SUBCUTANEOUS at 21:21

## 2019-09-15 RX ADMIN — Medication 50 MILLIGRAM(S): at 22:01

## 2019-09-15 RX ADMIN — Medication 100 MILLIGRAM(S): at 21:21

## 2019-09-15 RX ADMIN — DAPTOMYCIN 114 MILLIGRAM(S): 500 INJECTION, POWDER, LYOPHILIZED, FOR SOLUTION INTRAVENOUS at 19:24

## 2019-09-15 RX ADMIN — Medication 12.5 MILLIGRAM(S): at 06:37

## 2019-09-15 RX ADMIN — Medication 100 MILLIGRAM(S): at 16:28

## 2019-09-15 RX ADMIN — Medication 100 MILLIGRAM(S): at 06:37

## 2019-09-15 RX ADMIN — Medication 12.5 MILLIGRAM(S): at 16:28

## 2019-09-15 RX ADMIN — SENNA PLUS 2 TABLET(S): 8.6 TABLET ORAL at 21:21

## 2019-09-15 RX ADMIN — HEPARIN SODIUM 5000 UNIT(S): 5000 INJECTION INTRAVENOUS; SUBCUTANEOUS at 06:37

## 2019-09-15 RX ADMIN — Medication 15 GRAM(S): at 15:12

## 2019-09-15 RX ADMIN — MORPHINE SULFATE 7.5 MILLIGRAM(S): 50 CAPSULE, EXTENDED RELEASE ORAL at 10:10

## 2019-09-15 RX ADMIN — PANTOPRAZOLE SODIUM 40 MILLIGRAM(S): 20 TABLET, DELAYED RELEASE ORAL at 06:37

## 2019-09-15 RX ADMIN — MORPHINE SULFATE 7.5 MILLIGRAM(S): 50 CAPSULE, EXTENDED RELEASE ORAL at 09:33

## 2019-09-15 RX ADMIN — Medication 50 MILLIGRAM(S): at 00:44

## 2019-09-15 RX ADMIN — Medication 81 MILLIGRAM(S): at 16:28

## 2019-09-15 RX ADMIN — INSULIN GLARGINE 15 UNIT(S): 100 INJECTION, SOLUTION SUBCUTANEOUS at 22:31

## 2019-09-15 RX ADMIN — MORPHINE SULFATE 7.5 MILLIGRAM(S): 50 CAPSULE, EXTENDED RELEASE ORAL at 00:55

## 2019-09-15 RX ADMIN — Medication 50 MILLIGRAM(S): at 09:21

## 2019-09-15 RX ADMIN — Medication 1 TABLET(S): at 16:28

## 2019-09-15 RX ADMIN — CLOPIDOGREL BISULFATE 75 MILLIGRAM(S): 75 TABLET, FILM COATED ORAL at 16:28

## 2019-09-15 RX ADMIN — Medication 50 MILLIGRAM(S): at 18:21

## 2019-09-15 RX ADMIN — ONDANSETRON 4 MILLIGRAM(S): 8 TABLET, FILM COATED ORAL at 00:44

## 2019-09-15 RX ADMIN — ATORVASTATIN CALCIUM 80 MILLIGRAM(S): 80 TABLET, FILM COATED ORAL at 21:21

## 2019-09-15 NOTE — PROGRESS NOTE ADULT - SUBJECTIVE AND OBJECTIVE BOX
Patient is a 86y old  Female who presents with a chief complaint of Fever, UTI (15 Sep 2019 15:50)      INTERVAL HISTORY:     TELEMETRY:  	  MEDICATIONS:  metoprolol tartrate 12.5 milliGRAM(s) Oral two times a day        PHYSICAL EXAM:  T(C): 37.1 (09-15-19 @ 20:25), Max: 37.1 (09-15-19 @ 06:00)  HR: 78 (09-15-19 @ 20:25) (64 - 78)  BP: 112/69 (09-15-19 @ 20:25) (100/51 - 134/61)  RR: 18 (09-15-19 @ 20:25) (16 - 18)  SpO2: 96% (09-15-19 @ 20:25) (94% - 100%)  Wt(kg): --  I&O's Summary    14 Sep 2019 07:01  -  15 Sep 2019 07:00  --------------------------------------------------------  IN: 1480 mL / OUT: 300 mL / NET: 1180 mL    15 Sep 2019 07:01  -  15 Sep 2019 23:39  --------------------------------------------------------  IN: 870 mL / OUT: 700 mL / NET: 170 mL          Appearance: In no distress	  HEENT:    PERRL, EOMI	  Cardiovascular:  S1 S2, No JVD  Respiratory: Lungs clear to auscultation	  Gastrointestinal:  Soft, Non-tender, + BS	  Extremities:  No edema of LE                                8.9    30.6  )-----------( 182      ( 15 Sep 2019 14:22 )             26.5     09-15    131<L>  |  99  |  41<H>  ----------------------------<  63<L>  4.1   |  21<L>  |  1.25    Ca    8.5      15 Sep 2019 14:22  Phos  3.6     09-15  Mg     2.1     09-15          Labs personally reviewed      ASSESSMENT/PLAN: 	  tolerated surgery well  BP well controlled         Srini Velasco DO [facility]  Cardiovascular Medicine  [phone]

## 2019-09-15 NOTE — PROGRESS NOTE ADULT - SUBJECTIVE AND OBJECTIVE BOX
Subjective: Patient seen and examined. No new events except as noted.   OR today     REVIEW OF SYSTEMS:    CONSTITUTIONAL: + weakness   EYES/ENT: No visual changes;  No vertigo or throat pain   NECK: No pain or stiffness  RESPIRATORY: No cough, wheezing, hemoptysis; No shortness of breath  CARDIOVASCULAR: No chest pain or palpitations  GASTROINTESTINAL: No abdominal or epigastric pain. No nausea, vomiting, or hematemesis; No diarrhea or constipation. No melena or hematochezia.  GENITOURINARY: No dysuria, frequency or hematuria  NEUROLOGICAL: No numbness or weakness  SKIN: Lower abck pain   All other review of systems is negative unless indicated above.    MEDICATIONS:  MEDICATIONS  (STANDING):  influenza   Vaccine 0.5 milliLiter(s) IntraMuscular once      PHYSICAL EXAM:  T(C): 37.1 (09-15-19 @ 06:00), Max: 37.1 (09-15-19 @ 06:00)  HR: 72 (09-15-19 @ 06:00) (72 - 78)  BP: 110/67 (09-15-19 @ 06:00) (100/60 - 110/67)  RR: 18 (09-15-19 @ 06:00) (18 - 18)  SpO2: 95% (09-15-19 @ 06:00) (95% - 96%)  Wt(kg): --  I&O's Summary    14 Sep 2019 07:01  -  15 Sep 2019 07:00  --------------------------------------------------------  IN: 1480 mL / OUT: 300 mL / NET: 1180 mL          Appearance: Normal	  HEENT:   Normal oral mucosa, PERRL, EOMI	  Lymphatic: No lymphadenopathy , no edema  Cardiovascular: Normal S1 S2, No JVD, No murmurs , Peripheral pulses palpable 2+ bilaterally  Respiratory: Lungs clear to auscultation, normal effort 	  Gastrointestinal:  Soft, Non-tender, + BS	  Skin: Buttock erythema and swellign and tenderness on palpation   Musculoskeletal: Normal range of motion, normal strength  Psychiatry:  Mood & affect appropriate  Ext: No edema      All labs, Imaging and EKGs personally reviewed                           8.2 26.24 )-----------( 190      ( 14 Sep 2019 10:38 )             24.4               09-15    134<L>  |  101  |  44<H>  ----------------------------<  83  4.7   |  22  |  1.27    Ca    8.8      15 Sep 2019 10:51  Phos  3.2     09-14  Mg     2.0     09-14             CARDIAC MARKERS ( 14 Sep 2019 20:21 )  x     / x     / 31 U/L / x     / x

## 2019-09-15 NOTE — BRIEF OPERATIVE NOTE - NSICDXBRIEFPOSTOP_GEN_ALL_CORE_FT
POST-OP DIAGNOSIS:  Necrotizing soft tissue infection 15-Sep-2019 13:36:01  Emili Bower  Abscess, gluteal, right 15-Sep-2019 13:35:50  Emili Bower

## 2019-09-15 NOTE — PROVIDER CONTACT NOTE (CHANGE IN STATUS NOTIFICATION) - ACTION/TREATMENT ORDERED:
NP to speak to daughter, blood cultures x2 to be drawn and CT of abd ordered. Pt had tylenol half hour earlier for pain.
Follow protocol

## 2019-09-15 NOTE — CHART NOTE - NSCHARTNOTEFT_GEN_A_CORE
POST-OPERATIVE NOTE    Subjective:  Patient is s/p Debridement and I&D of gluteal skin and tissue . Recovering appropriately. Pain well controlled. Denies n/v. Dressing saturated but intact.     Vital Signs Last 24 Hrs  T(C): 36.4 (15 Sep 2019 16:18), Max: 37.1 (15 Sep 2019 06:00)  T(F): 97.6 (15 Sep 2019 16:18), Max: 98.8 (15 Sep 2019 06:00)  HR: 76 (15 Sep 2019 16:18) (64 - 78)  BP: 118/77 (15 Sep 2019 16:18) (100/51 - 134/61)  BP(mean): 76 (15 Sep 2019 16:00) (72 - 88)  RR: 18 (15 Sep 2019 16:18) (16 - 18)  SpO2: 95% (15 Sep 2019 16:18) (94% - 100%)  I&O's Detail    14 Sep 2019 07:01  -  15 Sep 2019 07:00  --------------------------------------------------------  IN:    IV PiggyBack: 100 mL    Oral Fluid: 480 mL    sodium chloride 0.45%: 900 mL  Total IN: 1480 mL    OUT:    Indwelling Catheter - Urethral: 300 mL  Total OUT: 300 mL    Total NET: 1180 mL      15 Sep 2019 07:01  -  15 Sep 2019 17:24  --------------------------------------------------------  IN:    Oral Fluid: 240 mL  Total IN: 240 mL    OUT:    Indwelling Catheter - Urethral: 100 mL  Total OUT: 100 mL    Total NET: 140 mL        aztreonam  IVPB 500  clindamycin IVPB 900  DAPTOmycin IVPB 350  aspirin enteric coated 81  aztreonam  IVPB 500  clindamycin IVPB 900  clopidogrel Tablet 75  DAPTOmycin IVPB 350  heparin  Injectable 5000  metoprolol tartrate 12.5    PAST MEDICAL & SURGICAL HISTORY:  CHF (congestive heart failure)  STEMI (ST elevation myocardial infarction)  Palpitations  Diabetes mellitus  Dyslipidemia  HTN (hypertension)  S/P cardiac cath  S/P tonsillectomy  S/P lumpectomy, left breast: premalignant  S/P appendectomy  S/P cholecystectomy        Physical Exam:  General: NAD, resting comfortably in bed  Pulmonary: Nonlabored breathing, no respiratory distress  Cardiovascular: NSR  Abdominal: soft, NT/ND  Extremities: WWP      LABS:                        8.9    30.6  )-----------( 182      ( 15 Sep 2019 14:22 )             26.5     09-15    131<L>  |  99  |  41<H>  ----------------------------<  63<L>  4.1   |  21<L>  |  1.25    Ca    8.5      15 Sep 2019 14:22  Phos  3.6     09-15  Mg     2.1     09-15      PT/INR - ( 15 Sep 2019 13:36 )   PT: 17.0 sec;   INR: 1.46 ratio         PTT - ( 15 Sep 2019 13:36 )  PTT:41.1 sec  CAPILLARY BLOOD GLUCOSE      POCT Blood Glucose.: 128 mg/dL (15 Sep 2019 16:43)  POCT Blood Glucose.: 86 mg/dL (15 Sep 2019 16:19)  POCT Blood Glucose.: 82 mg/dL (15 Sep 2019 15:58)  POCT Blood Glucose.: 74 mg/dL (15 Sep 2019 15:44)  POCT Blood Glucose.: 81 mg/dL (15 Sep 2019 15:28)  POCT Blood Glucose.: 56 mg/dL (15 Sep 2019 15:08)  POCT Blood Glucose.: 53 mg/dL (15 Sep 2019 14:55)  POCT Blood Glucose.: 88 mg/dL (15 Sep 2019 08:11)  POCT Blood Glucose.: 178 mg/dL (14 Sep 2019 22:12)  POCT Blood Glucose.: 136 mg/dL (14 Sep 2019 19:03)      Radiology and Additional Studies:    Assessment:  The patient is a 86y Female who is now several hours post-op from a Debridement and I&D of gluteal skin and tissue for a right gluteal cleft abscess with necrotic tissue.    Plan:  - Pain control as needed  - Reg diet  - DVT ppx  - OOB and ambulating as tolerated  - F/u AM labs  - Care per primary team

## 2019-09-15 NOTE — BRIEF OPERATIVE NOTE - NSICDXBRIEFPROCEDURE_GEN_ALL_CORE_FT
PROCEDURES:  Debridement of necrotic or infected skin and tissue 15-Sep-2019 13:36:38  Emili Bower  Incision and drainage of fluid collection 15-Sep-2019 13:36:17  Emili Bower

## 2019-09-15 NOTE — PROGRESS NOTE ADULT - SUBJECTIVE AND OBJECTIVE BOX
[facility] : Progress Note :: [phone], (office [phone]),   Dr Geronimo / Dr Bone / Dr Kumari / Dr Gibson / Dr Elsy SANTIAGO / Dr Barrett / Dr Lagos / Dr Joseph arizmendi  _____________________________________________________________________________________________    possible buttock necrotizing fascitis. worsening leucocytosis.    codeine (Unknown)  Kiwi (Anaphylaxis)  penicillins (Anaphylaxis)    Hospital Medications:   MEDICATIONS  (STANDING):  aspirin enteric coated 81 milliGRAM(s) Oral daily  atorvastatin 80 milliGRAM(s) Oral at bedtime  aztreonam  IVPB 500 milliGRAM(s) IV Intermittent every 8 hours  clindamycin IVPB 900 milliGRAM(s) IV Intermittent every 8 hours  clopidogrel Tablet 75 milliGRAM(s) Oral daily  DAPTOmycin IVPB 350 milliGRAM(s) IV Intermittent every 48 hours  dextrose 50% Injectable 12.5 Gram(s) IV Push once  dextrose 50% Injectable 25 Gram(s) IV Push once  dextrose 50% Injectable 25 Gram(s) IV Push once  docusate sodium 100 milliGRAM(s) Oral three times a day  heparin  Injectable 5000 Unit(s) SubCutaneous every 8 hours  influenza   Vaccine 0.5 milliLiter(s) IntraMuscular once  insulin glargine Injectable (LANTUS) 15 Unit(s) SubCutaneous two times a day  insulin lispro (HumaLOG) corrective regimen sliding scale   SubCutaneous three times a day before meals  lactated ringers. 1000 milliLiter(s) (75 mL/Hr) IV Continuous <Continuous>  lidocaine   Patch 2 Patch Transdermal daily  metoprolol tartrate 12.5 milliGRAM(s) Oral two times a day  multivitamin 1 Tablet(s) Oral daily  pantoprazole    Tablet 40 milliGRAM(s) Oral before breakfast  senna 2 Tablet(s) Oral at bedtime        VITALS:  T(F): 97.5 (09-15-19 @ 14:30), Max: 98.8 (09-15-19 @ 06:00)  HR: 75 (09-15-19 @ 15:45)  BP: 111/59 (09-15-19 @ 15:45)  RR: 18 (09-15-19 @ 15:45)  SpO2: 98% (09-15-19 @ 15:45)  Wt(kg): --     @ 07:01  -  09-15 @ 07:00  --------------------------------------------------------  IN: 1480 mL / OUT: 300 mL / NET: 1180 mL    09-15 @ 07:01  -  09-15 @ 15:51  --------------------------------------------------------  IN: 240 mL / OUT: 100 mL / NET: 140 mL        PHYSICAL EXAM:  Constitutional: NAD  HEENT: anicteric sclera, oropharynx clear.  Neck: No JVD  Respiratory: CTAB, no wheezes, rales or rhonchi  Cardiovascular: S1, S2, RRR  Gastrointestinal: BS+, soft, NT/ND  Extremities:  peripheral edema +  Neurological: A/O x 3, no focal deficits  Psychiatric: Normal mood, normal affect  :  infante+       LABS:  09-15    131<L>  |  99  |  41<H>  ----------------------------<  63<L>  4.1   |  21<L>  |  1.25    Ca    8.5      15 Sep 2019 14:22  Phos  3.6     09-15  Mg     2.1     09-15      Creatinine Trend: 1.25 <--, 1.27 <--, 1.22 <--, 1.42 <--, 1.88 <--, 2.45 <--, 2.97 <--, 2.67 <--                        8.9    30.6  )-----------( 182      ( 15 Sep 2019 14:22 )             26.5     Urine Studies:  Urinalysis Basic - ( 09 Sep 2019 15:48 )    Color: Yellow / Appearance: Slightly Turbid / S.025 / pH:   Gluc:  / Ketone: Negative  / Bili: Negative / Urobili: <2 mg/dL   Blood:  / Protein: 100 mg/dL / Nitrite: Negative   Leuk Esterase: Negative / RBC: 2 /HPF / WBC 2 /HPF   Sq Epi:  / Non Sq Epi: Negative / Bacteria: Few      Osmolality, Random Urine: 423 mosm/Kg ( @ 15:48)  Potassium, Random Urine: 40 mmol/L ( @ 13:18)  Sodium, Random Urine: <20 mmol/L ( @ 13:18)  Chloride, Random Urine: <35 mmol/L ( @ 13:18)  Creatinine, Random Urine: 132 mg/dL ( @ 13:18)  Protein/Creatinine Ratio Calculation: 0.8 Ratio ( @ 13:18)    RADIOLOGY & ADDITIONAL STUDIES:

## 2019-09-15 NOTE — PROGRESS NOTE ADULT - PROBLEM SELECTOR PLAN 1
Cont ot have leukocytosis   Urine Cx noted  reaction to vanco?  cont aztreonam   Cont dapto and flagyl for anaerobic coverage   Cont gentle hydration   CXR noted  elevated procalcitonin  CT chest noted, negative for PNA   RVP ordered, negative  ID eval called for further recs appreciated, discussed in detail with team   CT C/A/P noted. R Buttock infiltrate   Buttock soft tissue US noted   CT noted   flagyl changed to clinda 900  Surg F/U for possible necrotizing fascitis  OR today for I and D  cont monitor closely

## 2019-09-15 NOTE — PROGRESS NOTE ADULT - PROBLEM SELECTOR PLAN 1
pre-renal ROB . scr improving  cont Lactate ringers.  cont infante  monitor urine output  cont to hold diuretics and ACE-I  on rx for UTI  plans for OR for buttock nectrozing fascitis.

## 2019-09-16 ENCOUNTER — RX RENEWAL (OUTPATIENT)
Age: 84
End: 2019-09-16

## 2019-09-16 DIAGNOSIS — D64.9 ANEMIA, UNSPECIFIED: ICD-10-CM

## 2019-09-16 LAB
ANION GAP SERPL CALC-SCNC: 12 MMOL/L — SIGNIFICANT CHANGE UP (ref 5–17)
BUN SERPL-MCNC: 41 MG/DL — HIGH (ref 7–23)
CALCIUM SERPL-MCNC: 8.5 MG/DL — SIGNIFICANT CHANGE UP (ref 8.4–10.5)
CHLORIDE SERPL-SCNC: 100 MMOL/L — SIGNIFICANT CHANGE UP (ref 96–108)
CO2 SERPL-SCNC: 21 MMOL/L — LOW (ref 22–31)
CREAT SERPL-MCNC: 1.2 MG/DL — SIGNIFICANT CHANGE UP (ref 0.5–1.3)
GLUCOSE SERPL-MCNC: 164 MG/DL — HIGH (ref 70–99)
HCT VFR BLD CALC: 23.1 % — LOW (ref 34.5–45)
HCT VFR BLD CALC: 25.3 % — LOW (ref 34.5–45)
HGB BLD-MCNC: 8 G/DL — LOW (ref 11.5–15.5)
HGB BLD-MCNC: 8.5 G/DL — LOW (ref 11.5–15.5)
MAGNESIUM SERPL-MCNC: 2 MG/DL — SIGNIFICANT CHANGE UP (ref 1.6–2.6)
MCHC RBC-ENTMCNC: 30.4 PG — SIGNIFICANT CHANGE UP (ref 27–34)
MCHC RBC-ENTMCNC: 32.3 PG — SIGNIFICANT CHANGE UP (ref 27–34)
MCHC RBC-ENTMCNC: 33.6 GM/DL — SIGNIFICANT CHANGE UP (ref 32–36)
MCHC RBC-ENTMCNC: 34.5 GM/DL — SIGNIFICANT CHANGE UP (ref 32–36)
MCV RBC AUTO: 90.4 FL — SIGNIFICANT CHANGE UP (ref 80–100)
MCV RBC AUTO: 93.5 FL — SIGNIFICANT CHANGE UP (ref 80–100)
PHOSPHATE SERPL-MCNC: 4.5 MG/DL — SIGNIFICANT CHANGE UP (ref 2.5–4.5)
PLATELET # BLD AUTO: 196 K/UL — SIGNIFICANT CHANGE UP (ref 150–400)
PLATELET # BLD AUTO: 205 K/UL — SIGNIFICANT CHANGE UP (ref 150–400)
POTASSIUM SERPL-MCNC: 4.4 MMOL/L — SIGNIFICANT CHANGE UP (ref 3.5–5.3)
POTASSIUM SERPL-SCNC: 4.4 MMOL/L — SIGNIFICANT CHANGE UP (ref 3.5–5.3)
RBC # BLD: 2.47 M/UL — LOW (ref 3.8–5.2)
RBC # BLD: 2.8 M/UL — LOW (ref 3.8–5.2)
RBC # FLD: 13.5 % — SIGNIFICANT CHANGE UP (ref 10.3–14.5)
RBC # FLD: 14 % — SIGNIFICANT CHANGE UP (ref 10.3–14.5)
SODIUM SERPL-SCNC: 133 MMOL/L — LOW (ref 135–145)
WBC # BLD: 25.3 K/UL — HIGH (ref 3.8–10.5)
WBC # BLD: 28.23 K/UL — HIGH (ref 3.8–10.5)
WBC # FLD AUTO: 25.3 K/UL — HIGH (ref 3.8–10.5)
WBC # FLD AUTO: 28.23 K/UL — HIGH (ref 3.8–10.5)

## 2019-09-16 RX ADMIN — Medication 50 MILLIGRAM(S): at 21:24

## 2019-09-16 RX ADMIN — Medication 2: at 12:37

## 2019-09-16 RX ADMIN — SODIUM CHLORIDE 50 MILLILITER(S): 9 INJECTION, SOLUTION INTRAVENOUS at 02:31

## 2019-09-16 RX ADMIN — Medication 50 MILLIGRAM(S): at 13:24

## 2019-09-16 RX ADMIN — LIDOCAINE 2 PATCH: 4 CREAM TOPICAL at 21:22

## 2019-09-16 RX ADMIN — OXYCODONE HYDROCHLORIDE 5 MILLIGRAM(S): 5 TABLET ORAL at 13:20

## 2019-09-16 RX ADMIN — ATORVASTATIN CALCIUM 80 MILLIGRAM(S): 80 TABLET, FILM COATED ORAL at 21:23

## 2019-09-16 RX ADMIN — PANTOPRAZOLE SODIUM 40 MILLIGRAM(S): 20 TABLET, DELAYED RELEASE ORAL at 05:36

## 2019-09-16 RX ADMIN — OXYCODONE HYDROCHLORIDE 5 MILLIGRAM(S): 5 TABLET ORAL at 17:43

## 2019-09-16 RX ADMIN — CLOPIDOGREL BISULFATE 75 MILLIGRAM(S): 75 TABLET, FILM COATED ORAL at 10:59

## 2019-09-16 RX ADMIN — SENNA PLUS 2 TABLET(S): 8.6 TABLET ORAL at 21:23

## 2019-09-16 RX ADMIN — Medication 12.5 MILLIGRAM(S): at 05:35

## 2019-09-16 RX ADMIN — Medication 50 MILLIGRAM(S): at 06:18

## 2019-09-16 RX ADMIN — HEPARIN SODIUM 5000 UNIT(S): 5000 INJECTION INTRAVENOUS; SUBCUTANEOUS at 05:35

## 2019-09-16 RX ADMIN — Medication 100 MILLIGRAM(S): at 22:56

## 2019-09-16 RX ADMIN — INSULIN GLARGINE 15 UNIT(S): 100 INJECTION, SOLUTION SUBCUTANEOUS at 23:00

## 2019-09-16 RX ADMIN — HEPARIN SODIUM 5000 UNIT(S): 5000 INJECTION INTRAVENOUS; SUBCUTANEOUS at 13:25

## 2019-09-16 RX ADMIN — LIDOCAINE 2 PATCH: 4 CREAM TOPICAL at 12:48

## 2019-09-16 RX ADMIN — OXYCODONE HYDROCHLORIDE 5 MILLIGRAM(S): 5 TABLET ORAL at 15:53

## 2019-09-16 RX ADMIN — Medication 4: at 17:42

## 2019-09-16 RX ADMIN — OXYCODONE HYDROCHLORIDE 5 MILLIGRAM(S): 5 TABLET ORAL at 06:05

## 2019-09-16 RX ADMIN — Medication 81 MILLIGRAM(S): at 11:00

## 2019-09-16 RX ADMIN — Medication 100 MILLIGRAM(S): at 05:34

## 2019-09-16 RX ADMIN — OXYCODONE HYDROCHLORIDE 5 MILLIGRAM(S): 5 TABLET ORAL at 10:57

## 2019-09-16 RX ADMIN — Medication 100 MILLIGRAM(S): at 21:23

## 2019-09-16 RX ADMIN — Medication 100 MILLIGRAM(S): at 12:47

## 2019-09-16 RX ADMIN — Medication 1 TABLET(S): at 11:00

## 2019-09-16 RX ADMIN — OXYCODONE HYDROCHLORIDE 10 MILLIGRAM(S): 5 TABLET ORAL at 20:44

## 2019-09-16 RX ADMIN — SODIUM CHLORIDE 50 MILLILITER(S): 9 INJECTION, SOLUTION INTRAVENOUS at 22:56

## 2019-09-16 RX ADMIN — OXYCODONE HYDROCHLORIDE 5 MILLIGRAM(S): 5 TABLET ORAL at 05:28

## 2019-09-16 RX ADMIN — Medication 12.5 MILLIGRAM(S): at 17:42

## 2019-09-16 RX ADMIN — INSULIN GLARGINE 15 UNIT(S): 100 INJECTION, SOLUTION SUBCUTANEOUS at 08:15

## 2019-09-16 RX ADMIN — OXYCODONE HYDROCHLORIDE 10 MILLIGRAM(S): 5 TABLET ORAL at 20:14

## 2019-09-16 NOTE — PROGRESS NOTE ADULT - SUBJECTIVE AND OBJECTIVE BOX
Infectious Diseases progress note:    Subjective:  s/p OR debridement of R gluteal wound.  Surg cx testing.  Pt with improving WBC.  No fevers.  Family members at  bedside.     ROS:  CONSTITUTIONAL:  No fever, chills, rigors  CARDIOVASCULAR:  No chest pain or palpitations  RESPIRATORY:   No SOB, cough, dyspnea on exertion.  No wheezing  GASTROINTESTINAL:  No abd pain, N/V, diarrhea/constipation  EXTREMITIES:  No swelling or joint pain  GENITOURINARY:  No burning on urination, increased frequency or urgency.  No flank pain  NEUROLOGIC:  No HA, visual disturbances  SKIN: No rashes    Allergies    codeine (Unknown)  Kiwi (Anaphylaxis)  penicillins (Anaphylaxis)    Intolerances        ANTIBIOTICS/RELEVANT:  antimicrobials  aztreonam  IVPB 500 milliGRAM(s) IV Intermittent every 8 hours  clindamycin IVPB 900 milliGRAM(s) IV Intermittent every 8 hours  DAPTOmycin IVPB 350 milliGRAM(s) IV Intermittent every 48 hours    immunologic:  influenza   Vaccine 0.5 milliLiter(s) IntraMuscular once    OTHER:  acetaminophen   Tablet .. 650 milliGRAM(s) Oral every 6 hours PRN  aspirin enteric coated 81 milliGRAM(s) Oral daily  atorvastatin 80 milliGRAM(s) Oral at bedtime  bisacodyl Suppository 10 milliGRAM(s) Rectal daily PRN  dextrose 40% Gel 15 Gram(s) Oral once PRN  dextrose 50% Injectable 12.5 Gram(s) IV Push once  dextrose 50% Injectable 25 Gram(s) IV Push once  dextrose 50% Injectable 25 Gram(s) IV Push once  docusate sodium 100 milliGRAM(s) Oral three times a day  glucagon  Injectable 1 milliGRAM(s) IntraMuscular once PRN  insulin glargine Injectable (LANTUS) 15 Unit(s) SubCutaneous two times a day  insulin lispro (HumaLOG) corrective regimen sliding scale   SubCutaneous three times a day before meals  lactated ringers. 1000 milliLiter(s) IV Continuous <Continuous>  lidocaine   Patch 2 Patch Transdermal daily  metoprolol tartrate 12.5 milliGRAM(s) Oral two times a day  multivitamin 1 Tablet(s) Oral daily  ondansetron Injectable 4 milliGRAM(s) IV Push every 8 hours PRN  oxyCODONE    IR 5 milliGRAM(s) Oral every 4 hours PRN  oxyCODONE    IR 10 milliGRAM(s) Oral every 4 hours PRN  pantoprazole    Tablet 40 milliGRAM(s) Oral before breakfast  senna 2 Tablet(s) Oral at bedtime      Objective:  Vital Signs Last 24 Hrs  T(C): 36.6 (16 Sep 2019 20:00), Max: 36.6 (16 Sep 2019 20:00)  T(F): 97.8 (16 Sep 2019 20:00), Max: 97.8 (16 Sep 2019 20:00)  HR: 89 (16 Sep 2019 20:00) (62 - 100)  BP: 114/75 (16 Sep 2019 20:00) (105/64 - 133/82)  BP(mean): --  RR: 18 (16 Sep 2019 20:00) (16 - 18)  SpO2: 95% (16 Sep 2019 20:00) (95% - 98%)    PHYSICAL EXAM:  Constitutional:NAD  Eyes:NIDIA, EOMI  Ear/Nose/Throat: no thrush, mucositis.  Moist mucous membranes	  Neck:no JVD, no lymphadenopathy, supple  Respiratory: CTA lucian  Cardiovascular: S1S2 RRR, no murmurs  Gastrointestinal:soft, nontender,  nondistended (+) BS  Extremities:no e/e/c  Skin:  Rt gluteal abd pad dsg intact, (+) bleeding through dsg.         LABS:                        8.0    25.3  )-----------( 196      ( 16 Sep 2019 16:15 )             23.1     09-16    133<L>  |  100  |  41<H>  ----------------------------<  164<H>  4.4   |  21<L>  |  1.20    Ca    8.5      16 Sep 2019 07:10  Phos  4.5     09-16  Mg     2.0     09-16      PT/INR - ( 15 Sep 2019 13:36 )   PT: 17.0 sec;   INR: 1.46 ratio         PTT - ( 15 Sep 2019 13:36 )  PTT:41.1 sec      Procalcitonin, Serum: 0.49 (09-05 @ 07:29)            Rapid RVP Result: [facility]          MICROBIOLOGY:    Culture - Surgical Swab (09.15.19 @ 18:03)    Specimen Source: .Surgical Swab    Culture Results:   Rare Enterococcus species "Susceptibilities not performed"          RADIOLOGY & ADDITIONAL STUDIES:    < from: CT Abdomen and Pelvis No Cont (09.13.19 @ 21:56) >    IMPRESSION:     Partially imaged subcutaneous soft tissue inflammatory changes, fluid and   incompletely imaged small foci of gas along the medial aspect of the   right buttock, with significantly increased infiltration of the soft   tissues in the perineum.. The  differential diagnosis for the gas   includes infection such as necrotizing fasciitis, as well as recent   instrumentation..     Small bilateral pleural effusions.    < end of copied text >

## 2019-09-16 NOTE — PROGRESS NOTE ADULT - SUBJECTIVE AND OBJECTIVE BOX
Patient seen and examined  no complaints    codeine (Unknown)  Kiwi (Anaphylaxis)  penicillins (Anaphylaxis)    Hospital Medications:   MEDICATIONS  (STANDING):  aspirin enteric coated 81 milliGRAM(s) Oral daily  atorvastatin 80 milliGRAM(s) Oral at bedtime  aztreonam  IVPB 500 milliGRAM(s) IV Intermittent every 8 hours  clindamycin IVPB 900 milliGRAM(s) IV Intermittent every 8 hours  clopidogrel Tablet 75 milliGRAM(s) Oral daily  DAPTOmycin IVPB 350 milliGRAM(s) IV Intermittent every 48 hours  dextrose 50% Injectable 12.5 Gram(s) IV Push once  dextrose 50% Injectable 25 Gram(s) IV Push once  dextrose 50% Injectable 25 Gram(s) IV Push once  docusate sodium 100 milliGRAM(s) Oral three times a day  heparin  Injectable 5000 Unit(s) SubCutaneous every 8 hours  influenza   Vaccine 0.5 milliLiter(s) IntraMuscular once  insulin glargine Injectable (LANTUS) 15 Unit(s) SubCutaneous two times a day  insulin lispro (HumaLOG) corrective regimen sliding scale   SubCutaneous three times a day before meals  lactated ringers. 1000 milliLiter(s) (50 mL/Hr) IV Continuous <Continuous>  lidocaine   Patch 2 Patch Transdermal daily  metoprolol tartrate 12.5 milliGRAM(s) Oral two times a day  multivitamin 1 Tablet(s) Oral daily  pantoprazole    Tablet 40 milliGRAM(s) Oral before breakfast  senna 2 Tablet(s) Oral at bedtime      VITALS:  T(F): 97.7 (19 @ 09:30), Max: 98.7 (09-15-19 @ 20:25)  HR: 62 (19 @ 09:30)  BP: 105/64 (19 @ 09:30)  RR: 16 (19 @ 09:30)  SpO2: 96% (19 @ 09:30)  Wt(kg): --    09-15 @ 07:01  -   @ 07:00  --------------------------------------------------------  IN: 2090 mL / OUT: 1050 mL / NET: 1040 mL      PHYSICAL EXAM:  Constitutional: NAD  HEENT: anicteric sclera, oropharynx clear.  Neck: No JVD  Respiratory: CTAB, no wheezes, rales or rhonchi  Cardiovascular: S1, S2, RRR  Gastrointestinal: BS+, soft, NT/ND  Extremities:  peripheral edema +  Neurological: A/O x 3, no focal deficits  Psychiatric: Normal mood, normal affect  :  infante+     LABS:      133<L>  |  100  |  41<H>  ----------------------------<  164<H>  4.4   |  21<L>  |  1.20    Ca    8.5      16 Sep 2019 07:10  Phos  4.5       Mg     2.0           Creatinine Trend: 1.20 <--, 1.25 <--, 1.27 <--, 1.22 <--, 1.42 <--, 1.88 <--, 2.45 <--, 2.97 <--                        8.5    28.23 )-----------( 205      ( 16 Sep 2019 09:39 )             25.3     Urine Studies:  Urinalysis Basic - ( 09 Sep 2019 15:48 )    Color: Yellow / Appearance: Slightly Turbid / S.025 / pH:   Gluc:  / Ketone: Negative  / Bili: Negative / Urobili: <2 mg/dL   Blood:  / Protein: 100 mg/dL / Nitrite: Negative   Leuk Esterase: Negative / RBC: 2 /HPF / WBC 2 /HPF   Sq Epi:  / Non Sq Epi: Negative / Bacteria: Few      Osmolality, Random Urine: 423 mosm/Kg ( @ 15:48)  Potassium, Random Urine: 40 mmol/L ( @ 13:18)  Sodium, Random Urine: <20 mmol/L ( @ 13:18)  Chloride, Random Urine: <35 mmol/L ( @ 13:18)  Creatinine, Random Urine: 132 mg/dL ( @ 13:18)  Protein/Creatinine Ratio Calculation: 0.8 Ratio ( @ 13:18)    RADIOLOGY & ADDITIONAL STUDIES:

## 2019-09-16 NOTE — CHART NOTE - NSCHARTNOTEFT_GEN_A_CORE
Pt repeat CBC H/H 8/23.1 from 8.5/25.3. Unlikely that pt is actively bleeding from gluteal wound. We recommend that a rectal tube be placed to keep the area and wound clean.        Vital Signs:  Vital Signs Last 24 Hrs  T(C): 36.5 (16 Sep 2019 09:30), Max: 37.1 (15 Sep 2019 20:25)  T(F): 97.7 (16 Sep 2019 09:30), Max: 98.7 (15 Sep 2019 20:25)  HR: 74 (16 Sep 2019 17:41) (62 - 100)  BP: 122/80 (16 Sep 2019 17:41) (105/64 - 133/82)  BP(mean): --  RR: 16 (16 Sep 2019 09:30) (16 - 18)  SpO2: 97% (16 Sep 2019 17:41) (95% - 98%)    CAPILLARY BLOOD GLUCOSE      POCT Blood Glucose.: 218 mg/dL (16 Sep 2019 17:20)  POCT Blood Glucose.: 190 mg/dL (16 Sep 2019 12:06)  POCT Blood Glucose.: 149 mg/dL (16 Sep 2019 08:14)  POCT Blood Glucose.: 202 mg/dL (15 Sep 2019 22:03)      I&O's Detail    15 Sep 2019 07:01  -  16 Sep 2019 07:00  --------------------------------------------------------  IN:    IV PiggyBack: 350 mL    lactated ringers.: 300 mL    Oral Fluid: 1440 mL  Total IN: 2090 mL    OUT:    Indwelling Catheter - Urethral: 1050 mL  Total OUT: 1050 mL    Total NET: 1040 mL      16 Sep 2019 07:01  -  16 Sep 2019 18:42  --------------------------------------------------------  IN:    IV PiggyBack: 50 mL  Total IN: 50 mL    OUT:  Total OUT: 0 mL    Total NET: 50 mL          MEDICATIONS  (STANDING):  aspirin enteric coated 81 milliGRAM(s) Oral daily  atorvastatin 80 milliGRAM(s) Oral at bedtime  aztreonam  IVPB 500 milliGRAM(s) IV Intermittent every 8 hours  clindamycin IVPB 900 milliGRAM(s) IV Intermittent every 8 hours  DAPTOmycin IVPB 350 milliGRAM(s) IV Intermittent every 48 hours  dextrose 50% Injectable 12.5 Gram(s) IV Push once  dextrose 50% Injectable 25 Gram(s) IV Push once  dextrose 50% Injectable 25 Gram(s) IV Push once  docusate sodium 100 milliGRAM(s) Oral three times a day  heparin  Injectable 5000 Unit(s) SubCutaneous every 8 hours  influenza   Vaccine 0.5 milliLiter(s) IntraMuscular once  insulin glargine Injectable (LANTUS) 15 Unit(s) SubCutaneous two times a day  insulin lispro (HumaLOG) corrective regimen sliding scale   SubCutaneous three times a day before meals  lactated ringers. 1000 milliLiter(s) (50 mL/Hr) IV Continuous <Continuous>  lidocaine   Patch 2 Patch Transdermal daily  metoprolol tartrate 12.5 milliGRAM(s) Oral two times a day  multivitamin 1 Tablet(s) Oral daily  pantoprazole    Tablet 40 milliGRAM(s) Oral before breakfast  senna 2 Tablet(s) Oral at bedtime    MEDICATIONS  (PRN):  acetaminophen   Tablet .. 650 milliGRAM(s) Oral every 6 hours PRN Mild Pain (1 - 3)  bisacodyl Suppository 10 milliGRAM(s) Rectal daily PRN Constipation  dextrose 40% Gel 15 Gram(s) Oral once PRN Blood Glucose LESS THAN 70 milliGRAM(s)/deciliter  glucagon  Injectable 1 milliGRAM(s) IntraMuscular once PRN Glucose LESS THAN 70 milligrams/deciliter  ondansetron Injectable 4 milliGRAM(s) IV Push every 8 hours PRN Nausea and/or Vomiting  oxyCODONE    IR 5 milliGRAM(s) Oral every 4 hours PRN Moderate Pain (4 - 6)  oxyCODONE    IR 10 milliGRAM(s) Oral every 4 hours PRN Severe Pain (7 - 10)          Labs:    09-16    133<L>  |  100  |  41<H>  ----------------------------<  164<H>  4.4   |  21<L>  |  1.20    Ca    8.5      16 Sep 2019 07:10  Phos  4.5     09-16  Mg     2.0     09-16                                            8.0    25.3  )-----------( 196      ( 16 Sep 2019 16:15 )             23.1       A/P:   - Surgical site with no active bleed  - Recommend rectal tube  - Possible dc Plavix per hospitalist   - Daily packing changes  - Will continue to follow     Red Surgery   [id number].

## 2019-09-16 NOTE — PROGRESS NOTE ADULT - SUBJECTIVE AND OBJECTIVE BOX
COLORECTAL SURGERY PROGRESS NOTE    86yFemale    SUBJECTIVE:  Patient seen and examined at bedside.  Denies fevers, chills, nausea, vomiting, chest pain, and shortness of breath.     --------------------------------------------------------------------------------------------------  OBJECTIVE:     Physical Exam:  General: AAOx3, NAD, resting comfortably   HEENT: NC/AT  Respiratory: no increased work of breathing   Abdomen: soft, nontender, nondistended  Extremities: warm and well perfused  --------------------------------------------------------------------------------------------------  Vital Signs:  Vital Signs Last 24 Hrs  T(C): 36.3 (16 Sep 2019 05:33), Max: 37.1 (15 Sep 2019 20:25)  T(F): 97.4 (16 Sep 2019 05:33), Max: 98.7 (15 Sep 2019 20:25)  HR: 75 (16 Sep 2019 05:33) (64 - 100)  BP: 110/72 (16 Sep 2019 05:33) (100/51 - 134/61)  BP(mean): 76 (15 Sep 2019 16:00) (72 - 88)  RR: 18 (16 Sep 2019 05:33) (16 - 18)  SpO2: 95% (16 Sep 2019 05:33) (94% - 100%)    --------------------------------------------------------------------------------------------------  Inputs/Outputs:    15 Sep 2019 07:01  -  16 Sep 2019 07:00  --------------------------------------------------------  IN:    IV PiggyBack: 350 mL    lactated ringers.: 300 mL    Oral Fluid: 1440 mL  Total IN: 2090 mL    OUT:    Indwelling Catheter - Urethral: 1050 mL  Total OUT: 1050 mL    Total NET: 1040 mL        --------------------------------------------------------------------------------------------------  Laboratories:                        8.9    30.6  )-----------( 182      ( 15 Sep 2019 14:22 )             26.5         16 Sep 2019 07:10    133    |  100    |  41     ----------------------------<  164    4.4     |  21     |  1.20     Ca    8.5        16 Sep 2019 07:10  Phos  4.5       16 Sep 2019 07:10  Mg     2.0       16 Sep 2019 07:10      PT/INR - ( 15 Sep 2019 13:36 )   PT: 17.0 sec;   INR: 1.46 ratio         PTT - ( 15 Sep 2019 13:36 )  PTT:41.1 sec  CAPILLARY BLOOD GLUCOSE      POCT Blood Glucose.: 202 mg/dL (15 Sep 2019 22:03)  POCT Blood Glucose.: 128 mg/dL (15 Sep 2019 16:43)  POCT Blood Glucose.: 86 mg/dL (15 Sep 2019 16:19)  POCT Blood Glucose.: 82 mg/dL (15 Sep 2019 15:58)  POCT Blood Glucose.: 74 mg/dL (15 Sep 2019 15:44)  POCT Blood Glucose.: 81 mg/dL (15 Sep 2019 15:28)  POCT Blood Glucose.: 56 mg/dL (15 Sep 2019 15:08)  POCT Blood Glucose.: 53 mg/dL (15 Sep 2019 14:55)  POCT Blood Glucose.: 88 mg/dL (15 Sep 2019 08:11)    CARDIAC MARKERS ( 14 Sep 2019 20:21 )  x     / x     / 31 U/L / x     / x            --------------------------------------------------------------------------------------------------  Medications:  MEDICATIONS  (STANDING):  aspirin enteric coated 81 milliGRAM(s) Oral daily  atorvastatin 80 milliGRAM(s) Oral at bedtime  aztreonam  IVPB 500 milliGRAM(s) IV Intermittent every 8 hours  clindamycin IVPB 900 milliGRAM(s) IV Intermittent every 8 hours  clopidogrel Tablet 75 milliGRAM(s) Oral daily  DAPTOmycin IVPB 350 milliGRAM(s) IV Intermittent every 48 hours  dextrose 50% Injectable 12.5 Gram(s) IV Push once  dextrose 50% Injectable 25 Gram(s) IV Push once  dextrose 50% Injectable 25 Gram(s) IV Push once  docusate sodium 100 milliGRAM(s) Oral three times a day  heparin  Injectable 5000 Unit(s) SubCutaneous every 8 hours  influenza   Vaccine 0.5 milliLiter(s) IntraMuscular once  insulin glargine Injectable (LANTUS) 15 Unit(s) SubCutaneous two times a day  insulin lispro (HumaLOG) corrective regimen sliding scale   SubCutaneous three times a day before meals  lactated ringers. 1000 milliLiter(s) (50 mL/Hr) IV Continuous <Continuous>  lidocaine   Patch 2 Patch Transdermal daily  metoprolol tartrate 12.5 milliGRAM(s) Oral two times a day  multivitamin 1 Tablet(s) Oral daily  pantoprazole    Tablet 40 milliGRAM(s) Oral before breakfast  senna 2 Tablet(s) Oral at bedtime    MEDICATIONS  (PRN):  acetaminophen   Tablet .. 650 milliGRAM(s) Oral every 6 hours PRN Mild Pain (1 - 3)  bisacodyl Suppository 10 milliGRAM(s) Rectal daily PRN Constipation  dextrose 40% Gel 15 Gram(s) Oral once PRN Blood Glucose LESS THAN 70 milliGRAM(s)/deciliter  glucagon  Injectable 1 milliGRAM(s) IntraMuscular once PRN Glucose LESS THAN 70 milligrams/deciliter  ondansetron Injectable 4 milliGRAM(s) IV Push every 8 hours PRN Nausea and/or Vomiting  oxyCODONE    IR 5 milliGRAM(s) Oral every 4 hours PRN Moderate Pain (4 - 6)  oxyCODONE    IR 10 milliGRAM(s) Oral every 4 hours PRN Severe Pain (7 - 10) COLORECTAL SURGERY PROGRESS NOTE    86yFemale    SUBJECTIVE:  Patient seen and examined at bedside. Complaining of pain in right buttock. Denies fevers, chills, nausea, vomiting, chest pain, and shortness of breath.     --------------------------------------------------------------------------------------------------  OBJECTIVE:     Physical Exam:  General: AAOx3, NAD, resting comfortably   HEENT: NC/AT  Respiratory: no increased work of breathing   Abdomen: soft, nontender, nondistended; right perianal surgical dressings taken down and packings removed, re-packed with wet-to-dry kerlix and abd.   Extremities: warm and well perfused  --------------------------------------------------------------------------------------------------  Vital Signs:  Vital Signs Last 24 Hrs  T(C): 36.3 (16 Sep 2019 05:33), Max: 37.1 (15 Sep 2019 20:25)  T(F): 97.4 (16 Sep 2019 05:33), Max: 98.7 (15 Sep 2019 20:25)  HR: 75 (16 Sep 2019 05:33) (64 - 100)  BP: 110/72 (16 Sep 2019 05:33) (100/51 - 134/61)  BP(mean): 76 (15 Sep 2019 16:00) (72 - 88)  RR: 18 (16 Sep 2019 05:33) (16 - 18)  SpO2: 95% (16 Sep 2019 05:33) (94% - 100%)    --------------------------------------------------------------------------------------------------  Inputs/Outputs:    15 Sep 2019 07:01  -  16 Sep 2019 07:00  --------------------------------------------------------  IN:    IV PiggyBack: 350 mL    lactated ringers.: 300 mL    Oral Fluid: 1440 mL  Total IN: 2090 mL    OUT:    Indwelling Catheter - Urethral: 1050 mL  Total OUT: 1050 mL    Total NET: 1040 mL        --------------------------------------------------------------------------------------------------  Laboratories:                        8.9    30.6  )-----------( 182      ( 15 Sep 2019 14:22 )             26.5         16 Sep 2019 07:10    133    |  100    |  41     ----------------------------<  164    4.4     |  21     |  1.20     Ca    8.5        16 Sep 2019 07:10  Phos  4.5       16 Sep 2019 07:10  Mg     2.0       16 Sep 2019 07:10      PT/INR - ( 15 Sep 2019 13:36 )   PT: 17.0 sec;   INR: 1.46 ratio         PTT - ( 15 Sep 2019 13:36 )  PTT:41.1 sec  CAPILLARY BLOOD GLUCOSE      POCT Blood Glucose.: 202 mg/dL (15 Sep 2019 22:03)  POCT Blood Glucose.: 128 mg/dL (15 Sep 2019 16:43)  POCT Blood Glucose.: 86 mg/dL (15 Sep 2019 16:19)  POCT Blood Glucose.: 82 mg/dL (15 Sep 2019 15:58)  POCT Blood Glucose.: 74 mg/dL (15 Sep 2019 15:44)  POCT Blood Glucose.: 81 mg/dL (15 Sep 2019 15:28)  POCT Blood Glucose.: 56 mg/dL (15 Sep 2019 15:08)  POCT Blood Glucose.: 53 mg/dL (15 Sep 2019 14:55)  POCT Blood Glucose.: 88 mg/dL (15 Sep 2019 08:11)    CARDIAC MARKERS ( 14 Sep 2019 20:21 )  x     / x     / 31 U/L / x     / x            --------------------------------------------------------------------------------------------------  Medications:  MEDICATIONS  (STANDING):  aspirin enteric coated 81 milliGRAM(s) Oral daily  atorvastatin 80 milliGRAM(s) Oral at bedtime  aztreonam  IVPB 500 milliGRAM(s) IV Intermittent every 8 hours  clindamycin IVPB 900 milliGRAM(s) IV Intermittent every 8 hours  clopidogrel Tablet 75 milliGRAM(s) Oral daily  DAPTOmycin IVPB 350 milliGRAM(s) IV Intermittent every 48 hours  dextrose 50% Injectable 12.5 Gram(s) IV Push once  dextrose 50% Injectable 25 Gram(s) IV Push once  dextrose 50% Injectable 25 Gram(s) IV Push once  docusate sodium 100 milliGRAM(s) Oral three times a day  heparin  Injectable 5000 Unit(s) SubCutaneous every 8 hours  influenza   Vaccine 0.5 milliLiter(s) IntraMuscular once  insulin glargine Injectable (LANTUS) 15 Unit(s) SubCutaneous two times a day  insulin lispro (HumaLOG) corrective regimen sliding scale   SubCutaneous three times a day before meals  lactated ringers. 1000 milliLiter(s) (50 mL/Hr) IV Continuous <Continuous>  lidocaine   Patch 2 Patch Transdermal daily  metoprolol tartrate 12.5 milliGRAM(s) Oral two times a day  multivitamin 1 Tablet(s) Oral daily  pantoprazole    Tablet 40 milliGRAM(s) Oral before breakfast  senna 2 Tablet(s) Oral at bedtime    MEDICATIONS  (PRN):  acetaminophen   Tablet .. 650 milliGRAM(s) Oral every 6 hours PRN Mild Pain (1 - 3)  bisacodyl Suppository 10 milliGRAM(s) Rectal daily PRN Constipation  dextrose 40% Gel 15 Gram(s) Oral once PRN Blood Glucose LESS THAN 70 milliGRAM(s)/deciliter  glucagon  Injectable 1 milliGRAM(s) IntraMuscular once PRN Glucose LESS THAN 70 milligrams/deciliter  ondansetron Injectable 4 milliGRAM(s) IV Push every 8 hours PRN Nausea and/or Vomiting  oxyCODONE    IR 5 milliGRAM(s) Oral every 4 hours PRN Moderate Pain (4 - 6)  oxyCODONE    IR 10 milliGRAM(s) Oral every 4 hours PRN Severe Pain (7 - 10)

## 2019-09-16 NOTE — PROGRESS NOTE ADULT - ASSESSMENT
Pt is a 87 y/o Female with PMHx of CHF, DM, HLD, HTN, CAD prior MI, c/o fatigue, "feeling off" and unable to get out of bed; Tmax 104F . found t have UTI, case was copliicated with buttock lesion with cellulitis.

## 2019-09-16 NOTE — CHART NOTE - NSCHARTNOTEFT_GEN_A_CORE
Responded to page that patient was bleeding through surgical dressing.     S: Patient seen and examined at bedside. In no acute distress. She has been resting comfortably in bed. Nurse at bedside stated she had been changing the right buttock dressing 1x per hour.     O: Vitals   Vital Signs Last 24 Hrs  T(C): 36.5 (16 Sep 2019 09:30)  T(F): 97.7 (16 Sep 2019 09:30)  HR: 72   BP: 133/82   RR: 16 (16 Sep 2019 09:30)   SpO2: 96% (16 Sep 2019 12:51) (    Exam:  Examined right buttock dressing which was intact with minimal strikethrough. Dressing taken down and packing noted to be in place. Packing was saturated with clotted blood. No active bleed from wound around packing. Packing moistened with sterile saline and removed. Abscess cavity examined. No active bleed noted. Surgicel placed in lining of cavity for improved hemostasis. Cavity re-packed with moist-to-dry dressing, and covered with sterile 4x4 and abd pad.     A/P:   - Surgical site with no active bleed  - CBC stat, trend H/H   - Possible dc Plavix per hospitalist   - Daily packing changes  - Will continue to follow     Red Surgery   [id number]

## 2019-09-16 NOTE — PROGRESS NOTE ADULT - PROBLEM SELECTOR PLAN 1
R buttock infiltrate with necrotic tissue  SUrg eval appreciated   SOft Tis US noted   pain control  CT noted  Abx as per ID

## 2019-09-16 NOTE — PROGRESS NOTE ADULT - ASSESSMENT
ASSESSMENT:  86F PMH CHF, HTN, DM, CAD prior MI, admitted for UTI and ROB on CKD3, now POD1 from debridement and I&D of gluteal skin and tissue for a right gluteal cleft abscess with necrotic tissue.     PLAN:  - Dressing/packing changes daily   - Recommend rectal tube  - Pain control as needed  - Regular diet  - DVT ppx  - OOB and ambulating as tolerated  - F/u AM labs  - Care per primary team    Red Surgery   [id number]

## 2019-09-16 NOTE — PROGRESS NOTE ADULT - ASSESSMENT
Pt is a 87 y/o Female with PMHx of CHF, DM, HLD, HTN, CAD prior MI, c/o fatigue, "feeling off" and unable to get out of bed; Tmax 104F toay. Rash/redness on buttocks noted by home health aid. patient also complaining of +chronic cough, nonproductive.  No abdominal pain, no n/v/d. No chest pain.  States she has been feeling hot and cold over past few days, and took her temperature today, found fever of 104, and came to ED for evaluation.  No dysuria, no diarrhea. +constipation (chronic).  Increased diffuse leg swelling for past few days. patient lives at home with family. walks by herself with no use of walker. denies of any MEAD< chest pain on exertion. compliant with all her home medications (04 Sep 2019 22:22)    ER vitals: Tm 102.2, P 75, /77.  WBC 14.8 --> 12.2.  Cr 2.0 <-- 1.3.  PCT 0.49.  Ucx >100K E.coli.  Pt given dose of ceftriaxone, now on cipro for UTI.  ID consult called for further abx managment.      Sepsis:    - fever, leukocytosis. Source less likely UTI given persistent fevers, pt has infiltration of perirectal tissues, likely cellulitis.  Abx broadened     - Check lactate.  Pct elevated 0.49.  Pt with rising WBC, abx broadened on 9/11 (dapto/aztreanam/flagyl).    - Monitor hemodynamic status and BPs.  s/p IV fluid bolus, cont maintanence fluid.      - blood cx, urine cx.  RVP (-).  No pna on cxr      Perirectal cellulitis, r/o abscess:    - Repeat CTap on 9/13 shows worsening inflammation with fluid and foci of air.   s/p OR on 9/15 for  debridement.  Pt not responding to antibiotics.  f/u surgical wound cultures.    - Cont dapto/azactam/clindamycin.   Changed from flagyl to clindamycin for continued anerobic coverage and anti-toxin effect.  Cover for gas forming bacteria.   f/u cx data      - WBC improving.    - Surgical f/u appreciated.  Rectal device recommended to keep area free from soilage.  Monitor bleeding.   Trend serial Cbc's, cont local wound care.         d/w pt and family at bedside.           Will follow,    Sandrita Zaman  [phone]

## 2019-09-16 NOTE — PROGRESS NOTE ADULT - SUBJECTIVE AND OBJECTIVE BOX
Subjective: Patient seen and examined. No new events except as noted.   feeling better   afebrile   POD#1    REVIEW OF SYSTEMS:    CONSTITUTIONAL: No weakness, fevers or chills  EYES/ENT: No visual changes;  No vertigo or throat pain   NECK: No pain or stiffness  RESPIRATORY: No cough, wheezing, hemoptysis; No shortness of breath  CARDIOVASCULAR: No chest pain or palpitations  GASTROINTESTINAL: No abdominal or epigastric pain. No nausea, vomiting, or hematemesis; No diarrhea or constipation. No melena or hematochezia.  GENITOURINARY: No dysuria, frequency or hematuria  NEUROLOGICAL: No numbness or weakness  SKIN:buttock site packed  All other review of systems is negative unless indicated above.    MEDICATIONS:  MEDICATIONS  (STANDING):  aspirin enteric coated 81 milliGRAM(s) Oral daily  atorvastatin 80 milliGRAM(s) Oral at bedtime  aztreonam  IVPB 500 milliGRAM(s) IV Intermittent every 8 hours  clindamycin IVPB 900 milliGRAM(s) IV Intermittent every 8 hours  DAPTOmycin IVPB 350 milliGRAM(s) IV Intermittent every 48 hours  dextrose 50% Injectable 12.5 Gram(s) IV Push once  dextrose 50% Injectable 25 Gram(s) IV Push once  dextrose 50% Injectable 25 Gram(s) IV Push once  docusate sodium 100 milliGRAM(s) Oral three times a day  influenza   Vaccine 0.5 milliLiter(s) IntraMuscular once  insulin glargine Injectable (LANTUS) 15 Unit(s) SubCutaneous two times a day  insulin lispro (HumaLOG) corrective regimen sliding scale   SubCutaneous three times a day before meals  lactated ringers. 1000 milliLiter(s) (50 mL/Hr) IV Continuous <Continuous>  lidocaine   Patch 2 Patch Transdermal daily  metoprolol tartrate 12.5 milliGRAM(s) Oral two times a day  multivitamin 1 Tablet(s) Oral daily  pantoprazole    Tablet 40 milliGRAM(s) Oral before breakfast  senna 2 Tablet(s) Oral at bedtime      PHYSICAL EXAM:  T(C): 36.5 (09-16-19 @ 09:30), Max: 37.1 (09-15-19 @ 20:25)  HR: 74 (09-16-19 @ 17:41) (62 - 100)  BP: 122/80 (09-16-19 @ 17:41) (105/64 - 133/82)  RR: 16 (09-16-19 @ 09:30) (16 - 18)  SpO2: 97% (09-16-19 @ 17:41) (95% - 98%)  Wt(kg): --  I&O's Summary    15 Sep 2019 07:01  -  16 Sep 2019 07:00  --------------------------------------------------------  IN: 2090 mL / OUT: 1050 mL / NET: 1040 mL    16 Sep 2019 07:01  -  16 Sep 2019 19:37  --------------------------------------------------------  IN: 850 mL / OUT: 0 mL / NET: 850 mL          Appearance: Normal	  HEENT:   Normal oral mucosa, PERRL, EOMI	  Lymphatic: No lymphadenopathy , no edema  Cardiovascular: Normal S1 S2, No JVD, No murmurs , Peripheral pulses palpable 2+ bilaterally  Respiratory: Lungs clear to auscultation, normal effort 	  Gastrointestinal:  Soft, Non-tender, + BS	  Skin: R buttock dressing intact, mild bleeding  Musculoskeletal: Normal range of motion, normal strength  Psychiatry:  Mood & affect appropriate  Ext: No edema      All labs, Imaging and EKGs personally reviewed                           8.0    25.3  )-----------( 196      ( 16 Sep 2019 16:15 )             23.1               09-16    133<L>  |  100  |  41<H>  ----------------------------<  164<H>  4.4   |  21<L>  |  1.20    Ca    8.5      16 Sep 2019 07:10  Phos  4.5     09-16  Mg     2.0     09-16      PT/INR - ( 15 Sep 2019 13:36 )   PT: 17.0 sec;   INR: 1.46 ratio         PTT - ( 15 Sep 2019 13:36 )  PTT:41.1 sec       CARDIAC MARKERS ( 14 Sep 2019 20:21 )  x     / x     / 31 U/L / x     / x

## 2019-09-16 NOTE — PROGRESS NOTE ADULT - PROBLEM SELECTOR PLAN 1
pre-renal ROB . scr improving  cont Lactate ringers.  cont infante  monitor urine output  cont to hold diuretics and ACE-I  trend bmp

## 2019-09-17 LAB
ALBUMIN SERPL ELPH-MCNC: 1.9 G/DL — LOW (ref 3.3–5)
ALP SERPL-CCNC: 215 U/L — HIGH (ref 40–120)
ALT FLD-CCNC: 20 U/L — SIGNIFICANT CHANGE UP (ref 10–45)
ANION GAP SERPL CALC-SCNC: 11 MMOL/L — SIGNIFICANT CHANGE UP (ref 5–17)
AST SERPL-CCNC: 21 U/L — SIGNIFICANT CHANGE UP (ref 10–40)
BILIRUB SERPL-MCNC: 0.5 MG/DL — SIGNIFICANT CHANGE UP (ref 0.2–1.2)
BLD GP AB SCN SERPL QL: NEGATIVE — SIGNIFICANT CHANGE UP
BUN SERPL-MCNC: 41 MG/DL — HIGH (ref 7–23)
CALCIUM SERPL-MCNC: 8.2 MG/DL — LOW (ref 8.4–10.5)
CHLORIDE SERPL-SCNC: 98 MMOL/L — SIGNIFICANT CHANGE UP (ref 96–108)
CO2 SERPL-SCNC: 21 MMOL/L — LOW (ref 22–31)
CREAT SERPL-MCNC: 1.16 MG/DL — SIGNIFICANT CHANGE UP (ref 0.5–1.3)
CULTURE RESULTS: SIGNIFICANT CHANGE UP
GLUCOSE SERPL-MCNC: 206 MG/DL — HIGH (ref 70–99)
HCT VFR BLD CALC: 20.3 % — CRITICAL LOW (ref 34.5–45)
HCT VFR BLD CALC: 20.7 % — CRITICAL LOW (ref 34.5–45)
HCT VFR BLD CALC: 26.4 % — LOW (ref 34.5–45)
HGB BLD-MCNC: 6.7 G/DL — CRITICAL LOW (ref 11.5–15.5)
HGB BLD-MCNC: 6.9 G/DL — CRITICAL LOW (ref 11.5–15.5)
HGB BLD-MCNC: 8.8 G/DL — LOW (ref 11.5–15.5)
MCHC RBC-ENTMCNC: 30.2 PG — SIGNIFICANT CHANGE UP (ref 27–34)
MCHC RBC-ENTMCNC: 30.4 PG — SIGNIFICANT CHANGE UP (ref 27–34)
MCHC RBC-ENTMCNC: 31.9 PG — SIGNIFICANT CHANGE UP (ref 27–34)
MCHC RBC-ENTMCNC: 32.4 GM/DL — SIGNIFICANT CHANGE UP (ref 32–36)
MCHC RBC-ENTMCNC: 33.4 GM/DL — SIGNIFICANT CHANGE UP (ref 32–36)
MCHC RBC-ENTMCNC: 34.1 GM/DL — SIGNIFICANT CHANGE UP (ref 32–36)
MCV RBC AUTO: 91 FL — SIGNIFICANT CHANGE UP (ref 80–100)
MCV RBC AUTO: 93.2 FL — SIGNIFICANT CHANGE UP (ref 80–100)
MCV RBC AUTO: 93.5 FL — SIGNIFICANT CHANGE UP (ref 80–100)
PLATELET # BLD AUTO: 211 K/UL — SIGNIFICANT CHANGE UP (ref 150–400)
PLATELET # BLD AUTO: 235 K/UL — SIGNIFICANT CHANGE UP (ref 150–400)
PLATELET # BLD AUTO: 239 K/UL — SIGNIFICANT CHANGE UP (ref 150–400)
POTASSIUM SERPL-MCNC: 4.8 MMOL/L — SIGNIFICANT CHANGE UP (ref 3.5–5.3)
POTASSIUM SERPL-SCNC: 4.8 MMOL/L — SIGNIFICANT CHANGE UP (ref 3.5–5.3)
PROT SERPL-MCNC: 5 G/DL — LOW (ref 6–8.3)
RBC # BLD: 2.17 M/UL — LOW (ref 3.8–5.2)
RBC # BLD: 2.22 M/UL — LOW (ref 3.8–5.2)
RBC # BLD: 2.9 M/UL — LOW (ref 3.8–5.2)
RBC # FLD: 13.8 % — SIGNIFICANT CHANGE UP (ref 10.3–14.5)
RBC # FLD: 14 % — SIGNIFICANT CHANGE UP (ref 10.3–14.5)
RBC # FLD: 14 % — SIGNIFICANT CHANGE UP (ref 10.3–14.5)
RH IG SCN BLD-IMP: POSITIVE — SIGNIFICANT CHANGE UP
SODIUM SERPL-SCNC: 130 MMOL/L — LOW (ref 135–145)
SPECIMEN SOURCE: SIGNIFICANT CHANGE UP
WBC # BLD: 21.4 K/UL — HIGH (ref 3.8–10.5)
WBC # BLD: 25.33 K/UL — HIGH (ref 3.8–10.5)
WBC # BLD: 27 K/UL — HIGH (ref 3.8–10.5)
WBC # FLD AUTO: 21.4 K/UL — HIGH (ref 3.8–10.5)
WBC # FLD AUTO: 25.33 K/UL — HIGH (ref 3.8–10.5)
WBC # FLD AUTO: 27 K/UL — HIGH (ref 3.8–10.5)

## 2019-09-17 RX ORDER — HYDROMORPHONE HYDROCHLORIDE 2 MG/ML
0.5 INJECTION INTRAMUSCULAR; INTRAVENOUS; SUBCUTANEOUS ONCE
Refills: 0 | Status: DISCONTINUED | OUTPATIENT
Start: 2019-09-17 | End: 2019-09-17

## 2019-09-17 RX ORDER — LACTOBACILLUS ACIDOPHILUS 100MM CELL
1 CAPSULE ORAL DAILY
Refills: 0 | Status: DISCONTINUED | OUTPATIENT
Start: 2019-09-17 | End: 2019-09-27

## 2019-09-17 RX ORDER — DAPTOMYCIN 500 MG/10ML
350 INJECTION, POWDER, LYOPHILIZED, FOR SOLUTION INTRAVENOUS EVERY 24 HOURS
Refills: 0 | Status: COMPLETED | OUTPATIENT
Start: 2019-09-17 | End: 2019-09-23

## 2019-09-17 RX ORDER — AZTREONAM 2 G
1000 VIAL (EA) INJECTION EVERY 8 HOURS
Refills: 0 | Status: COMPLETED | OUTPATIENT
Start: 2019-09-17 | End: 2019-09-24

## 2019-09-17 RX ADMIN — PANTOPRAZOLE SODIUM 40 MILLIGRAM(S): 20 TABLET, DELAYED RELEASE ORAL at 06:26

## 2019-09-17 RX ADMIN — Medication 100 MILLIGRAM(S): at 21:21

## 2019-09-17 RX ADMIN — INSULIN GLARGINE 15 UNIT(S): 100 INJECTION, SOLUTION SUBCUTANEOUS at 22:25

## 2019-09-17 RX ADMIN — Medication 50 MILLIGRAM(S): at 05:07

## 2019-09-17 RX ADMIN — SENNA PLUS 2 TABLET(S): 8.6 TABLET ORAL at 21:05

## 2019-09-17 RX ADMIN — Medication 2: at 08:32

## 2019-09-17 RX ADMIN — Medication 1 TABLET(S): at 12:40

## 2019-09-17 RX ADMIN — LIDOCAINE 2 PATCH: 4 CREAM TOPICAL at 00:29

## 2019-09-17 RX ADMIN — Medication 100 MILLIGRAM(S): at 12:41

## 2019-09-17 RX ADMIN — Medication 81 MILLIGRAM(S): at 17:18

## 2019-09-17 RX ADMIN — Medication 100 MILLIGRAM(S): at 21:05

## 2019-09-17 RX ADMIN — Medication 50 MILLIGRAM(S): at 14:28

## 2019-09-17 RX ADMIN — Medication 4: at 17:28

## 2019-09-17 RX ADMIN — HYDROMORPHONE HYDROCHLORIDE 0.5 MILLIGRAM(S): 2 INJECTION INTRAMUSCULAR; INTRAVENOUS; SUBCUTANEOUS at 06:44

## 2019-09-17 RX ADMIN — INSULIN GLARGINE 15 UNIT(S): 100 INJECTION, SOLUTION SUBCUTANEOUS at 08:31

## 2019-09-17 RX ADMIN — Medication 100 MILLIGRAM(S): at 12:42

## 2019-09-17 RX ADMIN — ONDANSETRON 4 MILLIGRAM(S): 8 TABLET, FILM COATED ORAL at 04:25

## 2019-09-17 RX ADMIN — Medication 12.5 MILLIGRAM(S): at 17:18

## 2019-09-17 RX ADMIN — Medication 100 MILLIGRAM(S): at 06:25

## 2019-09-17 RX ADMIN — DAPTOMYCIN 114 MILLIGRAM(S): 500 INJECTION, POWDER, LYOPHILIZED, FOR SOLUTION INTRAVENOUS at 16:08

## 2019-09-17 RX ADMIN — Medication 50 MILLIGRAM(S): at 22:29

## 2019-09-17 RX ADMIN — Medication 12.5 MILLIGRAM(S): at 06:26

## 2019-09-17 RX ADMIN — Medication 2: at 12:40

## 2019-09-17 RX ADMIN — ATORVASTATIN CALCIUM 80 MILLIGRAM(S): 80 TABLET, FILM COATED ORAL at 21:05

## 2019-09-17 RX ADMIN — HYDROMORPHONE HYDROCHLORIDE 0.5 MILLIGRAM(S): 2 INJECTION INTRAMUSCULAR; INTRAVENOUS; SUBCUTANEOUS at 06:14

## 2019-09-17 RX ADMIN — OXYCODONE HYDROCHLORIDE 5 MILLIGRAM(S): 5 TABLET ORAL at 17:27

## 2019-09-17 RX ADMIN — LIDOCAINE 2 PATCH: 4 CREAM TOPICAL at 12:40

## 2019-09-17 RX ADMIN — Medication 100 MILLIGRAM(S): at 06:13

## 2019-09-17 RX ADMIN — OXYCODONE HYDROCHLORIDE 5 MILLIGRAM(S): 5 TABLET ORAL at 23:35

## 2019-09-17 RX ADMIN — OXYCODONE HYDROCHLORIDE 5 MILLIGRAM(S): 5 TABLET ORAL at 17:19

## 2019-09-17 NOTE — PROGRESS NOTE ADULT - ASSESSMENT
ASSESSMENT:  86F PMH CHF, HTN, DM, CAD prior MI, admitted for UTI and ROB on CKD3, now POD2 from debridement and I&D of gluteal skin and tissue for a right gluteal cleft abscess with necrotic tissue.     PLAN:  - Dressing/packing changes daily   - Recommend rectal tube to prevent wound contamination   - Continue to trend H/H   - Pain control as needed  - Regular diet  - DVT ppx  - OOB and ambulating as tolerated  - Care per primary team    [facility] Surgery   [id number] ASSESSMENT:  86F PMH CHF, HTN, DM, CAD prior MI, admitted for UTI and ROB on CKD3, now POD2 from debridement and I&D of gluteal skin and tissue for a right gluteal cleft abscess with necrotic tissue.     PLAN:  - Dressing/packing changes daily   - Recommend rectal tube to prevent wound contamination   - Continue to trend H/H, tx prn  - Pain control as needed  - Regular diet  - DVT ppx  - OOB and ambulating as tolerated  - Care per primary team    Red Surgery   [phone]

## 2019-09-17 NOTE — PROGRESS NOTE ADULT - SUBJECTIVE AND OBJECTIVE BOX
Patient seen and examined  recieving prbc -  denies any sob or cp      codeine (Unknown)  Kiwi (Anaphylaxis)  penicillins (Anaphylaxis)    Hospital Medications:   MEDICATIONS  (STANDING):  aspirin enteric coated 81 milliGRAM(s) Oral daily  atorvastatin 80 milliGRAM(s) Oral at bedtime  aztreonam  IVPB 1000 milliGRAM(s) IV Intermittent every 8 hours  clindamycin IVPB 900 milliGRAM(s) IV Intermittent every 8 hours  DAPTOmycin IVPB 350 milliGRAM(s) IV Intermittent every 24 hours  dextrose 50% Injectable 12.5 Gram(s) IV Push once  dextrose 50% Injectable 25 Gram(s) IV Push once  dextrose 50% Injectable 25 Gram(s) IV Push once  docusate sodium 100 milliGRAM(s) Oral three times a day  influenza   Vaccine 0.5 milliLiter(s) IntraMuscular once  insulin glargine Injectable (LANTUS) 15 Unit(s) SubCutaneous two times a day  insulin lispro (HumaLOG) corrective regimen sliding scale   SubCutaneous three times a day before meals  lactated ringers. 1000 milliLiter(s) (50 mL/Hr) IV Continuous <Continuous>  lidocaine   Patch 2 Patch Transdermal daily  metoprolol tartrate 12.5 milliGRAM(s) Oral two times a day  multivitamin 1 Tablet(s) Oral daily  pantoprazole    Tablet 40 milliGRAM(s) Oral before breakfast  senna 2 Tablet(s) Oral at bedtime        VITALS:  T(F): 97.4 (09-17-19 @ 04:41), Max: 97.8 (09-16-19 @ 20:00)  HR: 71 (09-17-19 @ 08:20)  BP: 109/64 (09-17-19 @ 08:20)  RR: 16 (09-17-19 @ 08:20)  SpO2: 98% (09-17-19 @ 08:20)  Wt(kg): --    09-16 @ 07:01  -  09-17 @ 07:00  --------------------------------------------------------  IN: 2130 mL / OUT: 550 mL / NET: 1580 mL    09-17 @ 07:01  -  09-17 @ 15:05  --------------------------------------------------------  IN: 100 mL / OUT: 0 mL / NET: 100 mL        PHYSICAL EXAM:  Constitutional: NAD  HEENT: anicteric sclera, oropharynx clear.  Neck: No JVD  Respiratory: CTAB, no wheezes, rales or rhonchi  Cardiovascular: S1, S2, RRR  Gastrointestinal: BS+, soft, NT/ND  Extremities:  peripheral edema +  Neurological: A/O x 3, no focal deficits  Psychiatric: Normal mood, normal affect  :  infante+   LABS:  09-17    130<L>  |  98  |  41<H>  ----------------------------<  206<H>  4.8   |  21<L>  |  1.16    Ca    8.2<L>      17 Sep 2019 07:13  Phos  4.5     09-16  Mg     2.0     09-16    TPro  5.0<L>  /  Alb  1.9<L>  /  TBili  0.5  /  DBili      /  AST  21  /  ALT  20  /  AlkPhos  215<H>  09-17    Creatinine Trend: 1.16 <--, 1.20 <--, 1.25 <--, 1.27 <--, 1.22 <--, 1.42 <--, 1.88 <--, 2.45 <--                        6.9    27.0  )-----------( 235      ( 17 Sep 2019 10:50 )             20.3     Urine Studies:      RADIOLOGY & ADDITIONAL STUDIES:

## 2019-09-17 NOTE — PROGRESS NOTE ADULT - ASSESSMENT
Pt is a 85 y/o Female with PMHx of CHF, DM, HLD, HTN, CAD prior MI, c/o fatigue, "feeling off" and unable to get out of bed; Tmax 104F toay. Rash/redness on buttocks noted by home health aid. patient also complaining of +chronic cough, nonproductive.  No abdominal pain, no n/v/d. No chest pain.  States she has been feeling hot and cold over past few days, and took her temperature today, found fever of 104, and came to ED for evaluation.  No dysuria, no diarrhea. +constipation (chronic).  Increased diffuse leg swelling for past few days. patient lives at home with family. walks by herself with no use of walker. denies of any MEAD< chest pain on exertion. compliant with all her home medications (04 Sep 2019 22:22)    ER vitals: Tm 102.2, P 75, /77.  WBC 14.8 --> 12.2.  Cr 2.0 <-- 1.3.  PCT 0.49.  Ucx >100K E.coli.  Pt given dose of ceftriaxone, now on cipro for UTI.  ID consult called for further abx managment.      Sepsis:    - fever, leukocytosis. Source less likely UTI given persistent fevers, pt has infiltration of perirectal tissues, likely cellulitis.  Abx broadened     - Monitor.  lactate.  Pct elevated 0.49.  Pt with rising WBC, abx broadened on 9/11 (dapto/aztreanam/flagyl).    - Monitor hemodynamic status and BPs.  s/p IV fluid bolus, cont maintanence fluid.      - blood cx, urine cx.  RVP (-).  No pna on cxr      Perirectal cellulitis, r/o abscess:    - Repeat CTap on 9/13 shows worsening inflammation with fluid and foci of air.   s/p OR on 9/15 for  debridement.  Pt not responding to antibiotics.  f/u surgical wound cultures - Enterococcus and Bacteroides    - Cont dapto/azactam/clindamycin.   Changed from flagyl to clindamycin for continued anerobic coverage and anti-toxin effect.  Cover for gas forming bacteria.         - WBC improving.    - Surgical f/u appreciated.    Monitor bleeding.   Trend serial Cbc's, s/p pRBC transfusion, cont local wound care.  f/u repeat CT scan        d/w pt and family at bedside.           Will follow,    Sandrita Zaman  [phone]

## 2019-09-17 NOTE — CHART NOTE - NSCHARTNOTEFT_GEN_A_CORE
Informed of critical value by RN as follow:                        6.7    25.33 )-----------( 211      ( 17 Sep 2019 08:33 )             20.7     Admit with dx UTI & right buttock cellulitis; Rpt CT right buttock 9/13 showed gas in the soft tissues indicating an abscess.    9/15 S/p I&D Right gluteal abscess & necrotizing soft tissue infection    Post-op bleeding resulting in above H/H today    O/E:  Remains AA&O, fully conversant & appropriate. Generalized weakness  No dyspnea      A/P:  Right gluteal cellulitis with abscess  S/p I&D  C/w Abx per ID  Anemia  1u prbc ordered    Above d/w Dr Boo (Med Attdg)      NP Colin [id number]

## 2019-09-17 NOTE — PROGRESS NOTE ADULT - SUBJECTIVE AND OBJECTIVE BOX
COLORECTAL SURGERY PROGRESS NOTE    86yFemale    SUBJECTIVE:  Patient seen and examined at bedside. Having some pain in right buttock. Denies fevers, chills, nausea, vomiting, chest pain, and shortness of breath.     --------------------------------------------------------------------------------------------------  OBJECTIVE:     Physical Exam:  General: AAOx3, NAD, resting comfortably   HEENT: NC/AT  Respiratory: no increased work of breathing   Abdomen: soft, nontender, nondistended; right perianal surgical dressings taken down and packings removed. Abscess cavity with some granulation tissue, no active bleed visualized. Re-packed with wet-to-dry kerlix and abd.   Extremities: warm and well perfused    --------------------------------------------------------------------------------------------------  Vital Signs:  Vital Signs Last 24 Hrs  T(C): 36.3 (17 Sep 2019 04:41), Max: 36.6 (16 Sep 2019 20:00)  T(F): 97.4 (17 Sep 2019 04:41), Max: 97.8 (16 Sep 2019 20:00)  HR: 71 (17 Sep 2019 08:20) (71 - 96)  BP: 109/64 (17 Sep 2019 08:20) (108/73 - 122/80)  BP(mean): --  RR: 16 (17 Sep 2019 08:20) (16 - 18)  SpO2: 98% (17 Sep 2019 08:20) (95% - 99%)    --------------------------------------------------------------------------------------------------  Inputs/Outputs:    16 Sep 2019 07:01  -  17 Sep 2019 07:00  --------------------------------------------------------  IN:    IV PiggyBack: 250 mL    lactated ringers.: 1160 mL    Oral Fluid: 720 mL  Total IN: 2130 mL    OUT:    Indwelling Catheter - Urethral: 550 mL  Total OUT: 550 mL    Total NET: 1580 mL        --------------------------------------------------------------------------------------------------  Laboratories:                        6.9    27.0  )-----------( 235      ( 17 Sep 2019 10:50 )             20.3     LIVER FUNCTIONS - ( 17 Sep 2019 07:13 )  Alb: 1.9 g/dL / Pro: 5.0 g/dL / ALK PHOS: 215 U/L / ALT: 20 U/L / AST: 21 U/L / GGT: x             Culture - Surgical Swab (collected 15 Sep 2019 18:03)  Source: .Surgical Swab  Preliminary Report (16 Sep 2019 18:00):    Rare Enterococcus species "Susceptibilities not performed"      17 Sep 2019 07:13    130    |  98     |  41     ----------------------------<  206    4.8     |  21     |  1.16     Ca    8.2        17 Sep 2019 07:13  Phos  4.5       16 Sep 2019 07:10  Mg     2.0       16 Sep 2019 07:10    TPro  5.0    /  Alb  1.9    /  TBili  0.5    /  DBili  x      /  AST  21     /  ALT  20     /  AlkPhos  215    17 Sep 2019 07:13      CAPILLARY BLOOD GLUCOSE      POCT Blood Glucose.: 192 mg/dL (17 Sep 2019 12:23)  POCT Blood Glucose.: 199 mg/dL (17 Sep 2019 08:25)  POCT Blood Glucose.: 230 mg/dL (16 Sep 2019 22:40)  POCT Blood Glucose.: 218 mg/dL (16 Sep 2019 17:20)          --------------------------------------------------------------------------------------------------  Medications:  MEDICATIONS  (STANDING):  aspirin enteric coated 81 milliGRAM(s) Oral daily  atorvastatin 80 milliGRAM(s) Oral at bedtime  aztreonam  IVPB 1000 milliGRAM(s) IV Intermittent every 8 hours  clindamycin IVPB 900 milliGRAM(s) IV Intermittent every 8 hours  DAPTOmycin IVPB 350 milliGRAM(s) IV Intermittent every 24 hours  dextrose 50% Injectable 12.5 Gram(s) IV Push once  dextrose 50% Injectable 25 Gram(s) IV Push once  dextrose 50% Injectable 25 Gram(s) IV Push once  docusate sodium 100 milliGRAM(s) Oral three times a day  influenza   Vaccine 0.5 milliLiter(s) IntraMuscular once  insulin glargine Injectable (LANTUS) 15 Unit(s) SubCutaneous two times a day  insulin lispro (HumaLOG) corrective regimen sliding scale   SubCutaneous three times a day before meals  lactated ringers. 1000 milliLiter(s) (50 mL/Hr) IV Continuous <Continuous>  lidocaine   Patch 2 Patch Transdermal daily  metoprolol tartrate 12.5 milliGRAM(s) Oral two times a day  multivitamin 1 Tablet(s) Oral daily  pantoprazole    Tablet 40 milliGRAM(s) Oral before breakfast  senna 2 Tablet(s) Oral at bedtime    MEDICATIONS  (PRN):  acetaminophen   Tablet .. 650 milliGRAM(s) Oral every 6 hours PRN Mild Pain (1 - 3)  bisacodyl Suppository 10 milliGRAM(s) Rectal daily PRN Constipation  dextrose 40% Gel 15 Gram(s) Oral once PRN Blood Glucose LESS THAN 70 milliGRAM(s)/deciliter  glucagon  Injectable 1 milliGRAM(s) IntraMuscular once PRN Glucose LESS THAN 70 milligrams/deciliter  ondansetron Injectable 4 milliGRAM(s) IV Push every 8 hours PRN Nausea and/or Vomiting  oxyCODONE    IR 5 milliGRAM(s) Oral every 4 hours PRN Moderate Pain (4 - 6)  oxyCODONE    IR 10 milliGRAM(s) Oral every 4 hours PRN Severe Pain (7 - 10)

## 2019-09-17 NOTE — PHARMACOTHERAPY INTERVENTION NOTE - COMMENTS
Patient currently being treated for perirectal cellulitis currently being treated with aztreonam 500 mg IV every 8 hours, clindamycin 900 mg IV every 8 hours and daptomycin 350 mg IV q48 hours. Patient previously had an ROB on CKD, but patient's renal function is now improving. (Serum creatinine from 09/15 - 1.27 => 09/17 = 1.16; eGFR from 09/12 - 24 ml/min => eGFR 09/17 = 43 ml/min). Suggest dose adjusting aztreonam to 1000 mg IV every 8 hours and daptomycin to 350 mg IV q24 hours.    Mike Zee, PharmD  Nazario Alex, PharmD Patient currently being treated for perirectal cellulitis currently being treated with aztreonam 500 mg IV every 8 hours, clindamycin 900 mg IV every 8 hours and daptomycin 350 mg IV q48 hours. Patient previously had an ROB on CKD, but patient's renal function is now improving. (Serum creatinine from 09/15 - 1.27 => 09/17 = 1.16; eGFR from 09/12 - 24 ml/min => eGFR 09/17 = 43 ml/min). Suggest dose adjusting aztreonam to 1000 mg IV every 8 hours and daptomycin to 350 mg IV q24 hours.    Mike Zee, PharmD

## 2019-09-17 NOTE — PROGRESS NOTE ADULT - SUBJECTIVE AND OBJECTIVE BOX
Subjective: Patient seen and examined. No new events except as noted.   feeling so much better   pain controlled     REVIEW OF SYSTEMS:    CONSTITUTIONAL: No weakness, fevers or chills  EYES/ENT: No visual changes;  No vertigo or throat pain   NECK: No pain or stiffness  RESPIRATORY: No cough, wheezing, hemoptysis; No shortness of breath  CARDIOVASCULAR: No chest pain or palpitations  GASTROINTESTINAL: No abdominal or epigastric pain. No nausea, vomiting, or hematemesis; No diarrhea or constipation. No melena or hematochezia.  GENITOURINARY: No dysuria, frequency or hematuria  NEUROLOGICAL: No numbness or weakness  SKIN: No itching, burning, rashes, or lesions   All other review of systems is negative unless indicated above.    MEDICATIONS:  MEDICATIONS  (STANDING):  aspirin enteric coated 81 milliGRAM(s) Oral daily  atorvastatin 80 milliGRAM(s) Oral at bedtime  aztreonam  IVPB 1000 milliGRAM(s) IV Intermittent every 8 hours  clindamycin IVPB 900 milliGRAM(s) IV Intermittent every 8 hours  DAPTOmycin IVPB 350 milliGRAM(s) IV Intermittent every 24 hours  dextrose 50% Injectable 12.5 Gram(s) IV Push once  dextrose 50% Injectable 25 Gram(s) IV Push once  dextrose 50% Injectable 25 Gram(s) IV Push once  docusate sodium 100 milliGRAM(s) Oral three times a day  influenza   Vaccine 0.5 milliLiter(s) IntraMuscular once  insulin glargine Injectable (LANTUS) 15 Unit(s) SubCutaneous two times a day  insulin lispro (HumaLOG) corrective regimen sliding scale   SubCutaneous three times a day before meals  lactobacillus acidophilus 1 Tablet(s) Oral daily  lidocaine   Patch 2 Patch Transdermal daily  metoprolol tartrate 12.5 milliGRAM(s) Oral two times a day  multivitamin 1 Tablet(s) Oral daily  pantoprazole    Tablet 40 milliGRAM(s) Oral before breakfast  senna 2 Tablet(s) Oral at bedtime      PHYSICAL EXAM:  T(C): 36.6 (09-17-19 @ 16:27), Max: 36.6 (09-16-19 @ 20:00)  HR: 80 (09-17-19 @ 16:27) (71 - 96)  BP: 117/75 (09-17-19 @ 16:27) (108/73 - 121/83)  RR: 18 (09-17-19 @ 16:27) (16 - 18)  SpO2: 99% (09-17-19 @ 16:27) (95% - 99%)  Wt(kg): --  I&O's Summary    16 Sep 2019 07:01  -  17 Sep 2019 07:00  --------------------------------------------------------  IN: 2130 mL / OUT: 550 mL / NET: 1580 mL    17 Sep 2019 07:01  -  17 Sep 2019 19:53  --------------------------------------------------------  IN: 1051 mL / OUT: 450 mL / NET: 601 mL          Appearance: Normal	  HEENT:   Normal oral mucosa, PERRL, EOMI	  Lymphatic: No lymphadenopathy , no edema  Cardiovascular: Normal S1 S2  Respiratory: Lungs clear to auscultation, normal effort 	  Gastrointestinal:  Soft, Non-tender, + BS	  Skin: buttock dressing intact  Musculoskeletal: Normal range of motion, normal strength  Psychiatry:  Mood & affect appropriate  Ext: No edema      All labs, Imaging and EKGs personally reviewed                           6.9    27.0  )-----------( 235      ( 17 Sep 2019 10:50 )             20.3               09-17    130<L>  |  98  |  41<H>  ----------------------------<  206<H>  4.8   |  21<L>  |  1.16    Ca    8.2<L>      17 Sep 2019 07:13  Phos  4.5     09-16  Mg     2.0     09-16    TPro  5.0<L>  /  Alb  1.9<L>  /  TBili  0.5  /  DBili  x   /  AST  21  /  ALT  20  /  AlkPhos  215<H>  09-17

## 2019-09-17 NOTE — PROGRESS NOTE ADULT - PROBLEM SELECTOR PLAN 1
Discussed early cataracts with patient. No treatment recommended at this time. Reassured patient that other than cataracts, eyes look very healthy; there is no evidence of glaucoma and only minor macular changes in the left eye. likely pre renal harshil  cr continues to improve- currently stable  c/w infante  d/c LR as pt is receiving prbc  will trend bmp

## 2019-09-17 NOTE — PROGRESS NOTE ADULT - SUBJECTIVE AND OBJECTIVE BOX
Infectious Diseases progress note:    Subjective:  Feels better.  No new fevers,  WBC trending down.  Pt with decreased H/H, getting pRBC transfusion.      ROS:  CONSTITUTIONAL:  No fever, chills, rigors  CARDIOVASCULAR:  No chest pain or palpitations  RESPIRATORY:   No SOB, cough, dyspnea on exertion.  No wheezing  GASTROINTESTINAL:  No abd pain, N/V, diarrhea/constipation  EXTREMITIES:  No swelling or joint pain  GENITOURINARY:  No burning on urination, increased frequency or urgency.  No flank pain  NEUROLOGIC:  No HA, visual disturbances  SKIN: No rashes    Allergies    codeine (Unknown)  Kiwi (Anaphylaxis)  penicillins (Anaphylaxis)    Intolerances        ANTIBIOTICS/RELEVANT:  antimicrobials  aztreonam  IVPB 1000 milliGRAM(s) IV Intermittent every 8 hours  clindamycin IVPB 900 milliGRAM(s) IV Intermittent every 8 hours  DAPTOmycin IVPB 350 milliGRAM(s) IV Intermittent every 24 hours    immunologic:  influenza   Vaccine 0.5 milliLiter(s) IntraMuscular once    OTHER:  acetaminophen   Tablet .. 650 milliGRAM(s) Oral every 6 hours PRN  aspirin enteric coated 81 milliGRAM(s) Oral daily  atorvastatin 80 milliGRAM(s) Oral at bedtime  bisacodyl Suppository 10 milliGRAM(s) Rectal daily PRN  dextrose 40% Gel 15 Gram(s) Oral once PRN  dextrose 50% Injectable 12.5 Gram(s) IV Push once  dextrose 50% Injectable 25 Gram(s) IV Push once  dextrose 50% Injectable 25 Gram(s) IV Push once  docusate sodium 100 milliGRAM(s) Oral three times a day  glucagon  Injectable 1 milliGRAM(s) IntraMuscular once PRN  insulin glargine Injectable (LANTUS) 15 Unit(s) SubCutaneous two times a day  insulin lispro (HumaLOG) corrective regimen sliding scale   SubCutaneous three times a day before meals  lactobacillus acidophilus 1 Tablet(s) Oral daily  lidocaine   Patch 2 Patch Transdermal daily  metoprolol tartrate 12.5 milliGRAM(s) Oral two times a day  multivitamin 1 Tablet(s) Oral daily  ondansetron Injectable 4 milliGRAM(s) IV Push every 8 hours PRN  oxyCODONE    IR 5 milliGRAM(s) Oral every 4 hours PRN  oxyCODONE    IR 10 milliGRAM(s) Oral every 4 hours PRN  pantoprazole    Tablet 40 milliGRAM(s) Oral before breakfast  senna 2 Tablet(s) Oral at bedtime      Objective:  Vital Signs Last 24 Hrs  T(C): 36.6 (18 Sep 2019 09:30), Max: 37 (17 Sep 2019 19:56)  T(F): 97.8 (18 Sep 2019 09:30), Max: 98.6 (17 Sep 2019 19:56)  HR: 84 (18 Sep 2019 09:30) (75 - 89)  BP: 134/90 (18 Sep 2019 09:30) (103/52 - 134/90)  BP(mean): --  RR: 17 (18 Sep 2019 09:30) (17 - 18)  SpO2: 98% (18 Sep 2019 09:30) (96% - 99%)    PHYSICAL EXAM:  Constitutional:NAD  Eyes:NIDIA, EOMI  Ear/Nose/Throat: no thrush, mucositis.  Moist mucous membranes	  Neck:no JVD, no lymphadenopathy, supple  Respiratory: CTA lucian  Cardiovascular: S1S2 RRR, no murmurs  Gastrointestinal:soft, nontender,  nondistended (+) BS  Extremities:no e/e/c  Skin:  dsg over perirectum in place        LABS:               Complete Blood Count (09.17.19 @ 20:31)    WBC Count: 21.4 K/uL    RBC Count: 2.90 M/uL    Hemoglobin: 8.8 g/dL    Hematocrit: 26.4 %    Mean Cell Volume: 91.0 fl    Mean Cell Hemoglobin: 30.4 pg    Mean Cell Hemoglobin Conc: 33.4 gm/dL    Red Cell Distrib Width: 14.0 %    Platelet Count - Automated: 239 K/uL                  Procalcitonin, Serum: 0.49 (09-05 @ 07:29)            Rapid RVP Result: [facility][1]          MICROBIOLOGY:      Culture - Surgical Swab (09.15.19 @ 18:03)    Specimen Source: .Surgical Swab    Culture Results:   Rare Enterococcus species "Susceptibilities not performed"  Few Bacteroides thetaiotamcron group "Susceptibilities not performed"        RADIOLOGY & ADDITIONAL STUDIES:    < from: CT Abdomen and Pelvis No Cont (09.13.19 @ 21:56) >  IMPRESSION:     Partially imaged subcutaneous soft tissue inflammatory changes, fluid and   incompletely imaged small foci of gas along the medial aspect of the   right buttock, with significantly increased infiltration of the soft   tissues in the perineum.. The  differential diagnosis for the gas   includes infection such as necrotizing fasciitis, as well as recent   instrumentation..     Small bilateral pleural effusions.    < end of copied text >

## 2019-09-18 LAB
ALP SERPL-CCNC: 214 U/L — HIGH (ref 40–120)
ANION GAP SERPL CALC-SCNC: 14 MMOL/L — SIGNIFICANT CHANGE UP (ref 5–17)
BUN SERPL-MCNC: 38 MG/DL — HIGH (ref 7–23)
CALCIUM SERPL-MCNC: 7.8 MG/DL — LOW (ref 8.4–10.5)
CHLORIDE SERPL-SCNC: 98 MMOL/L — SIGNIFICANT CHANGE UP (ref 96–108)
CO2 SERPL-SCNC: 18 MMOL/L — LOW (ref 22–31)
CREAT SERPL-MCNC: 1.1 MG/DL — SIGNIFICANT CHANGE UP (ref 0.5–1.3)
GLUCOSE SERPL-MCNC: 195 MG/DL — HIGH (ref 70–99)
HCT VFR BLD CALC: 23.3 % — LOW (ref 34.5–45)
HCT VFR BLD CALC: 26.7 % — LOW (ref 34.5–45)
HGB BLD-MCNC: 7.8 G/DL — LOW (ref 11.5–15.5)
HGB BLD-MCNC: 8.9 G/DL — LOW (ref 11.5–15.5)
MCHC RBC-ENTMCNC: 30 PG — SIGNIFICANT CHANGE UP (ref 27–34)
MCHC RBC-ENTMCNC: 31.3 PG — SIGNIFICANT CHANGE UP (ref 27–34)
MCHC RBC-ENTMCNC: 33.4 GM/DL — SIGNIFICANT CHANGE UP (ref 32–36)
MCHC RBC-ENTMCNC: 33.5 GM/DL — SIGNIFICANT CHANGE UP (ref 32–36)
MCV RBC AUTO: 89.6 FL — SIGNIFICANT CHANGE UP (ref 80–100)
MCV RBC AUTO: 93.8 FL — SIGNIFICANT CHANGE UP (ref 80–100)
OSMOLALITY UR: 524 MOSM/KG — SIGNIFICANT CHANGE UP (ref 50–1200)
PLATELET # BLD AUTO: 230 K/UL — SIGNIFICANT CHANGE UP (ref 150–400)
PLATELET # BLD AUTO: 233 K/UL — SIGNIFICANT CHANGE UP (ref 150–400)
POTASSIUM SERPL-MCNC: 5 MMOL/L — SIGNIFICANT CHANGE UP (ref 3.5–5.3)
POTASSIUM SERPL-SCNC: 5 MMOL/L — SIGNIFICANT CHANGE UP (ref 3.5–5.3)
RBC # BLD: 2.6 M/UL — LOW (ref 3.8–5.2)
RBC # BLD: 2.85 M/UL — LOW (ref 3.8–5.2)
RBC # FLD: 14.3 % — SIGNIFICANT CHANGE UP (ref 10.3–14.5)
RBC # FLD: 15.1 % — HIGH (ref 10.3–14.5)
SODIUM SERPL-SCNC: 130 MMOL/L — LOW (ref 135–145)
WBC # BLD: 18.31 K/UL — HIGH (ref 3.8–10.5)
WBC # BLD: 22.7 K/UL — HIGH (ref 3.8–10.5)
WBC # FLD AUTO: 18.31 K/UL — HIGH (ref 3.8–10.5)
WBC # FLD AUTO: 22.7 K/UL — HIGH (ref 3.8–10.5)

## 2019-09-18 RX ORDER — HYDROMORPHONE HYDROCHLORIDE 2 MG/ML
0.5 INJECTION INTRAMUSCULAR; INTRAVENOUS; SUBCUTANEOUS ONCE
Refills: 0 | Status: DISCONTINUED | OUTPATIENT
Start: 2019-09-18 | End: 2019-09-18

## 2019-09-18 RX ADMIN — Medication 100 MILLIGRAM(S): at 14:28

## 2019-09-18 RX ADMIN — OXYCODONE HYDROCHLORIDE 10 MILLIGRAM(S): 5 TABLET ORAL at 04:26

## 2019-09-18 RX ADMIN — OXYCODONE HYDROCHLORIDE 10 MILLIGRAM(S): 5 TABLET ORAL at 20:42

## 2019-09-18 RX ADMIN — OXYCODONE HYDROCHLORIDE 10 MILLIGRAM(S): 5 TABLET ORAL at 05:26

## 2019-09-18 RX ADMIN — OXYCODONE HYDROCHLORIDE 5 MILLIGRAM(S): 5 TABLET ORAL at 10:52

## 2019-09-18 RX ADMIN — Medication 6: at 17:49

## 2019-09-18 RX ADMIN — OXYCODONE HYDROCHLORIDE 10 MILLIGRAM(S): 5 TABLET ORAL at 21:42

## 2019-09-18 RX ADMIN — Medication 50 MILLIGRAM(S): at 14:26

## 2019-09-18 RX ADMIN — Medication 12.5 MILLIGRAM(S): at 17:50

## 2019-09-18 RX ADMIN — Medication 1 TABLET(S): at 12:42

## 2019-09-18 RX ADMIN — Medication 50 MILLIGRAM(S): at 22:07

## 2019-09-18 RX ADMIN — Medication 1 TABLET(S): at 12:44

## 2019-09-18 RX ADMIN — Medication 81 MILLIGRAM(S): at 12:44

## 2019-09-18 RX ADMIN — OXYCODONE HYDROCHLORIDE 5 MILLIGRAM(S): 5 TABLET ORAL at 11:30

## 2019-09-18 RX ADMIN — SENNA PLUS 2 TABLET(S): 8.6 TABLET ORAL at 21:19

## 2019-09-18 RX ADMIN — PANTOPRAZOLE SODIUM 40 MILLIGRAM(S): 20 TABLET, DELAYED RELEASE ORAL at 05:34

## 2019-09-18 RX ADMIN — Medication 100 MILLIGRAM(S): at 21:19

## 2019-09-18 RX ADMIN — LIDOCAINE 2 PATCH: 4 CREAM TOPICAL at 12:43

## 2019-09-18 RX ADMIN — Medication 50 MILLIGRAM(S): at 06:25

## 2019-09-18 RX ADMIN — LIDOCAINE 2 PATCH: 4 CREAM TOPICAL at 20:38

## 2019-09-18 RX ADMIN — DAPTOMYCIN 114 MILLIGRAM(S): 500 INJECTION, POWDER, LYOPHILIZED, FOR SOLUTION INTRAVENOUS at 17:18

## 2019-09-18 RX ADMIN — Medication 12.5 MILLIGRAM(S): at 05:34

## 2019-09-18 RX ADMIN — Medication 6: at 12:42

## 2019-09-18 RX ADMIN — Medication 4: at 08:51

## 2019-09-18 RX ADMIN — Medication 100 MILLIGRAM(S): at 05:34

## 2019-09-18 RX ADMIN — HYDROMORPHONE HYDROCHLORIDE 0.5 MILLIGRAM(S): 2 INJECTION INTRAMUSCULAR; INTRAVENOUS; SUBCUTANEOUS at 07:15

## 2019-09-18 RX ADMIN — OXYCODONE HYDROCHLORIDE 10 MILLIGRAM(S): 5 TABLET ORAL at 15:05

## 2019-09-18 RX ADMIN — OXYCODONE HYDROCHLORIDE 5 MILLIGRAM(S): 5 TABLET ORAL at 00:35

## 2019-09-18 RX ADMIN — INSULIN GLARGINE 15 UNIT(S): 100 INJECTION, SOLUTION SUBCUTANEOUS at 08:55

## 2019-09-18 RX ADMIN — INSULIN GLARGINE 15 UNIT(S): 100 INJECTION, SOLUTION SUBCUTANEOUS at 22:35

## 2019-09-18 RX ADMIN — ATORVASTATIN CALCIUM 80 MILLIGRAM(S): 80 TABLET, FILM COATED ORAL at 21:19

## 2019-09-18 RX ADMIN — HYDROMORPHONE HYDROCHLORIDE 0.5 MILLIGRAM(S): 2 INJECTION INTRAMUSCULAR; INTRAVENOUS; SUBCUTANEOUS at 06:50

## 2019-09-18 RX ADMIN — Medication 100 MILLIGRAM(S): at 05:35

## 2019-09-18 RX ADMIN — OXYCODONE HYDROCHLORIDE 10 MILLIGRAM(S): 5 TABLET ORAL at 14:27

## 2019-09-18 NOTE — PROGRESS NOTE ADULT - SUBJECTIVE AND OBJECTIVE BOX
Subjective: Patient seen and examined. No new events except as noted.   son at the bedside   patient feeling better   dressing change as per surgical team     REVIEW OF SYSTEMS:    CONSTITUTIONAL: + weakness   EYES/ENT: No visual changes;  No vertigo or throat pain   NECK: No pain or stiffness  RESPIRATORY: No cough, wheezing, hemoptysis; No shortness of breath  CARDIOVASCULAR: No chest pain or palpitations  GASTROINTESTINAL: No abdominal or epigastric pain.  GENITOURINARY: No dysuria, frequency or hematuria  NEUROLOGICAL: No numbness or weakness  SKIN: buttock pain   All other review of systems is negative unless indicated above.    MEDICATIONS:  MEDICATIONS  (STANDING):  aspirin enteric coated 81 milliGRAM(s) Oral daily  atorvastatin 80 milliGRAM(s) Oral at bedtime  aztreonam  IVPB 1000 milliGRAM(s) IV Intermittent every 8 hours  clindamycin IVPB 900 milliGRAM(s) IV Intermittent every 8 hours  DAPTOmycin IVPB 350 milliGRAM(s) IV Intermittent every 24 hours  dextrose 50% Injectable 12.5 Gram(s) IV Push once  dextrose 50% Injectable 25 Gram(s) IV Push once  dextrose 50% Injectable 25 Gram(s) IV Push once  docusate sodium 100 milliGRAM(s) Oral three times a day  influenza   Vaccine 0.5 milliLiter(s) IntraMuscular once  insulin glargine Injectable (LANTUS) 15 Unit(s) SubCutaneous two times a day  insulin lispro (HumaLOG) corrective regimen sliding scale   SubCutaneous three times a day before meals  lactobacillus acidophilus 1 Tablet(s) Oral daily  lidocaine   Patch 2 Patch Transdermal daily  metoprolol tartrate 12.5 milliGRAM(s) Oral two times a day  multivitamin 1 Tablet(s) Oral daily  pantoprazole    Tablet 40 milliGRAM(s) Oral before breakfast  senna 2 Tablet(s) Oral at bedtime      PHYSICAL EXAM:  T(C): 36.6 (09-18-19 @ 16:39), Max: 37 (09-17-19 @ 19:56)  HR: 85 (09-18-19 @ 16:39) (75 - 90)  BP: 132/79 (09-18-19 @ 16:39) (103/52 - 134/90)  RR: 18 (09-18-19 @ 16:39) (17 - 18)  SpO2: 98% (09-18-19 @ 16:39) (96% - 99%)  Wt(kg): --  I&O's Summary    17 Sep 2019 07:01  -  18 Sep 2019 07:00  --------------------------------------------------------  IN: 1491 mL / OUT: 750 mL / NET: 741 mL    18 Sep 2019 07:01  -  18 Sep 2019 17:11  --------------------------------------------------------  IN: 580 mL / OUT: 250 mL / NET: 330 mL          Appearance: Normal	  HEENT:   Normal oral mucosa, PERRL, EOMI	  Lymphatic: No lymphadenopathy , no edema  Cardiovascular: Normal S1 S2, No JVD  Respiratory: Lungs clear to auscultation, normal effort 	  Gastrointestinal:  Soft, Non-tender, + BS	  Skin: R Buttock and L buttock erythema surgical site open with dark necrotic tissue, no bleeding   Musculoskeletal: Normal range of motion, normal strength  Psychiatry:  Mood & affect appropriate  Ext: No edema      All labs, Imaging and EKGs personally reviewed                           7.8    18.31 )-----------( 230      ( 18 Sep 2019 09:41 )             23.3               09-18    130<L>  |  98  |  38<H>  ----------------------------<  195<H>  5.0   |  18<L>  |  1.10    Ca    7.8<L>      18 Sep 2019 06:59    TPro  x   /  Alb  x   /  TBili  x   /  DBili  x   /  AST  x   /  ALT  x   /  AlkPhos  214<H>  09-18

## 2019-09-18 NOTE — PROGRESS NOTE ADULT - PROBLEM SELECTOR PLAN 1
likely pre renal harshil  cr continues to improve- currently stable  c/w infante  s/p prbc and off ivf  will trend bmp

## 2019-09-18 NOTE — PROGRESS NOTE ADULT - SUBJECTIVE AND OBJECTIVE BOX
Patient is a 86y old  Female who presents with a chief complaint of Fever, UTI (18 Sep 2019 17:11)      INTERVAL HISTORY: feels ok  	  MEDICATIONS:  metoprolol tartrate 12.5 milliGRAM(s) Oral two times a day        PHYSICAL EXAM:  T(C): 36.6 (09-18-19 @ 16:39), Max: 37 (09-17-19 @ 19:56)  HR: 85 (09-18-19 @ 16:39) (75 - 90)  BP: 132/79 (09-18-19 @ 16:39) (103/52 - 134/90)  RR: 18 (09-18-19 @ 16:39) (17 - 18)  SpO2: 98% (09-18-19 @ 16:39) (96% - 99%)  Wt(kg): --  I&O's Summary    17 Sep 2019 07:01  -  18 Sep 2019 07:00  --------------------------------------------------------  IN: 1491 mL / OUT: 750 mL / NET: 741 mL    18 Sep 2019 07:01  -  18 Sep 2019 17:44  --------------------------------------------------------  IN: 580 mL / OUT: 250 mL / NET: 330 mL          Appearance: In no distress	  HEENT:    PERRL, EOMI	  Cardiovascular:  S1 S2, No JVD  Respiratory: Lungs clear to auscultation	  Gastrointestinal:  Soft, Non-tender, + BS	  Extremities:  No edema of LE                                7.8    18.31 )-----------( 230      ( 18 Sep 2019 09:41 )             23.3     09-18    130<L>  |  98  |  38<H>  ----------------------------<  195<H>  5.0   |  18<L>  |  1.10    Ca    7.8<L>      18 Sep 2019 06:59    TPro  x   /  Alb  x   /  TBili  x   /  DBili  x   /  AST  x   /  ALT  x   /  AlkPhos  214<H>  09-18        Labs personally reviewed      ASSESSMENT/PLAN: 	  tolerated surgery well  afebrile, improving  BP well controlled       Srini Velasco DO [facility]  Cardiovascular Medicine  [phone]

## 2019-09-18 NOTE — PROGRESS NOTE ADULT - SUBJECTIVE AND OBJECTIVE BOX
Patient seen and examined  no complaints    codeine (Unknown)  Kiwi (Anaphylaxis)  penicillins (Anaphylaxis)    Hospital Medications:   MEDICATIONS  (STANDING):  aspirin enteric coated 81 milliGRAM(s) Oral daily  atorvastatin 80 milliGRAM(s) Oral at bedtime  aztreonam  IVPB 1000 milliGRAM(s) IV Intermittent every 8 hours  clindamycin IVPB 900 milliGRAM(s) IV Intermittent every 8 hours  DAPTOmycin IVPB 350 milliGRAM(s) IV Intermittent every 24 hours  dextrose 50% Injectable 12.5 Gram(s) IV Push once  dextrose 50% Injectable 25 Gram(s) IV Push once  dextrose 50% Injectable 25 Gram(s) IV Push once  docusate sodium 100 milliGRAM(s) Oral three times a day  influenza   Vaccine 0.5 milliLiter(s) IntraMuscular once  insulin glargine Injectable (LANTUS) 15 Unit(s) SubCutaneous two times a day  insulin lispro (HumaLOG) corrective regimen sliding scale   SubCutaneous three times a day before meals  lactobacillus acidophilus 1 Tablet(s) Oral daily  lidocaine   Patch 2 Patch Transdermal daily  metoprolol tartrate 12.5 milliGRAM(s) Oral two times a day  multivitamin 1 Tablet(s) Oral daily  pantoprazole    Tablet 40 milliGRAM(s) Oral before breakfast  senna 2 Tablet(s) Oral at bedtime        VITALS:  T(F): 97.8 (09-18-19 @ 09:30), Max: 98.6 (09-17-19 @ 19:56)  HR: 84 (09-18-19 @ 09:30)  BP: 134/90 (09-18-19 @ 09:30)  RR: 17 (09-18-19 @ 09:30)  SpO2: 98% (09-18-19 @ 09:30)  Wt(kg): --    09-17 @ 07:01  -  09-18 @ 07:00  --------------------------------------------------------  IN: 1491 mL / OUT: 750 mL / NET: 741 mL    09-18 @ 07:01  -  09-18 @ 13:45  --------------------------------------------------------  IN: 240 mL / OUT: 0 mL / NET: 240 mL      PHYSICAL EXAM:  Constitutional: NAD  HEENT: anicteric sclera, oropharynx clear.  Neck: No JVD  Respiratory: CTAB, no wheezes, rales or rhonchi  Cardiovascular: S1, S2, RRR  Gastrointestinal: BS+, soft, NT/ND  Extremities:  peripheral edema +  Neurological: A/O x 3, no focal deficits  Psychiatric: Normal mood, normal affect  :  infante+    LABS:  09-18    130<L>  |  98  |  38<H>  ----------------------------<  195<H>  5.0   |  18<L>  |  1.10    Ca    7.8<L>      18 Sep 2019 06:59    TPro      /  Alb      /  TBili      /  DBili      /  AST      /  ALT      /  AlkPhos  214<H>  09-18    Creatinine Trend: 1.10 <--, 1.16 <--, 1.20 <--, 1.25 <--, 1.27 <--, 1.22 <--, 1.42 <--, 1.88 <--                        7.8    18.31 )-----------( 230      ( 18 Sep 2019 09:41 )             23.3     Urine Studies:    Osmolality, Random Urine: 524 mosm/Kg (09-18 @ 01:19)    RADIOLOGY & ADDITIONAL STUDIES:

## 2019-09-18 NOTE — PROGRESS NOTE ADULT - ASSESSMENT
Pt is a 87 y/o Female with PMHx of CHF, DM, HLD, HTN, CAD prior MI, c/o fatigue, "feeling off" and unable to get out of bed; Tmax 104F toay. Rash/redness on buttocks noted by home health aid. patient also complaining of +chronic cough, nonproductive.  No abdominal pain, no n/v/d. No chest pain.  States she has been feeling hot and cold over past few days, and took her temperature today, found fever of 104, and came to ED for evaluation.  No dysuria, no diarrhea. +constipation (chronic).  Increased diffuse leg swelling for past few days. patient lives at home with family. walks by herself with no use of walker. denies of any MEAD< chest pain on exertion. compliant with all her home medications (04 Sep 2019 22:22)    ER vitals: Tm 102.2, P 75, /77.  WBC 14.8 --> 12.2.  Cr 2.0 <-- 1.3.  PCT 0.49.  Ucx >100K E.coli.  Pt given dose of ceftriaxone, now on cipro for UTI.  ID consult called for further abx managment.      Sepsis:    - fever, leukocytosis. Source less likely UTI given persistent fevers, pt has infiltration of perirectal tissues, likely cellulitis.  Abx broadened     - Monitor.  lactate.  Pct elevated 0.49.  Pt with rising WBC, abx broadened on 9/11 (dapto/aztreanam/flagyl).    - Monitor hemodynamic status and BPs.  s/p IV fluid bolus, cont maintanence fluid.      - blood cx, urine cx.  RVP (-).  No pna on cxr      Perirectal cellulitis, r/o abscess:    - Repeat CTap on 9/13 shows worsening inflammation with fluid and foci of air.   s/p OR on 9/15 for  debridement.  Pt not responding to antibiotics.  f/u surgical wound cultures - Enterococcus and Bacteroides    - Cont dapto/azactam/clindamycin.   Changed from flagyl to clindamycin for continued anerobic coverage and anti-toxin effect.  Cover for gas forming bacteria.         - WBC improving.    - Surgical f/u appreciated.    Monitor bleeding.   Trend serial Cbc's, s/p pRBC transfusion now with stable H/H, cont local wound care.    f/u surgical recs.          d/w pt and family at bedside.           Will follow,    Sandrita Zaman  [phone]

## 2019-09-18 NOTE — PROGRESS NOTE ADULT - SUBJECTIVE AND OBJECTIVE BOX
Infectious Diseases progress note:    Subjective: Awake, alert, comfortable.  No acute o/n events.  Afebrile.  WBC improving.  Dressing/packing changed today.  Family members at bedside.      ROS:  CONSTITUTIONAL:  No fever, chills, rigors  CARDIOVASCULAR:  No chest pain or palpitations  RESPIRATORY:   No SOB, cough, dyspnea on exertion.  No wheezing  GASTROINTESTINAL:  No abd pain, N/V, diarrhea/constipation  EXTREMITIES:  No swelling or joint pain  GENITOURINARY:  No burning on urination, increased frequency or urgency.  No flank pain  NEUROLOGIC:  No HA, visual disturbances  SKIN: No rashes    Allergies    codeine (Unknown)  Kiwi (Anaphylaxis)  penicillins (Anaphylaxis)    Intolerances        ANTIBIOTICS/RELEVANT:  antimicrobials  aztreonam  IVPB 1000 milliGRAM(s) IV Intermittent every 8 hours  clindamycin IVPB 900 milliGRAM(s) IV Intermittent every 8 hours  DAPTOmycin IVPB 350 milliGRAM(s) IV Intermittent every 24 hours    immunologic:  influenza   Vaccine 0.5 milliLiter(s) IntraMuscular once    OTHER:  acetaminophen   Tablet .. 650 milliGRAM(s) Oral every 6 hours PRN  aspirin enteric coated 81 milliGRAM(s) Oral daily  atorvastatin 80 milliGRAM(s) Oral at bedtime  bisacodyl Suppository 10 milliGRAM(s) Rectal daily PRN  dextrose 40% Gel 15 Gram(s) Oral once PRN  dextrose 50% Injectable 12.5 Gram(s) IV Push once  dextrose 50% Injectable 25 Gram(s) IV Push once  dextrose 50% Injectable 25 Gram(s) IV Push once  docusate sodium 100 milliGRAM(s) Oral three times a day  glucagon  Injectable 1 milliGRAM(s) IntraMuscular once PRN  insulin glargine Injectable (LANTUS) 15 Unit(s) SubCutaneous two times a day  insulin lispro (HumaLOG) corrective regimen sliding scale   SubCutaneous three times a day before meals  lactobacillus acidophilus 1 Tablet(s) Oral daily  lidocaine   Patch 2 Patch Transdermal daily  metoprolol tartrate 12.5 milliGRAM(s) Oral two times a day  multivitamin 1 Tablet(s) Oral daily  ondansetron Injectable 4 milliGRAM(s) IV Push every 8 hours PRN  oxyCODONE    IR 5 milliGRAM(s) Oral every 4 hours PRN  oxyCODONE    IR 10 milliGRAM(s) Oral every 4 hours PRN  pantoprazole    Tablet 40 milliGRAM(s) Oral before breakfast  senna 2 Tablet(s) Oral at bedtime      Objective:  Vital Signs Last 24 Hrs  T(C): 36.8 (18 Sep 2019 13:30), Max: 37 (17 Sep 2019 19:56)  T(F): 98.3 (18 Sep 2019 13:30), Max: 98.6 (17 Sep 2019 19:56)  HR: 90 (18 Sep 2019 13:30) (75 - 90)  BP: 122/77 (18 Sep 2019 13:30) (103/52 - 134/90)  BP(mean): --  RR: 18 (18 Sep 2019 13:30) (17 - 18)  SpO2: 98% (18 Sep 2019 13:30) (96% - 99%)    PHYSICAL EXAM:  Constitutional:NAD  Eyes:NIDIA, EOMI  Ear/Nose/Throat: no thrush, mucositis.  Moist mucous membranes	  Neck:no JVD, no lymphadenopathy, supple  Respiratory: CTA lucian  Cardiovascular: S1S2 RRR, no murmurs  Gastrointestinal:soft, nontender,  nondistended (+) BS  Extremities:no e/e/c  Skin:  dsg c/d/i over perirectum        LABS:                        7.8    18.31 )-----------( 230      ( 18 Sep 2019 09:41 )             23.3     09-18    130<L>  |  98  |  38<H>  ----------------------------<  195<H>  5.0   |  18<L>  |  1.10    Ca    7.8<L>      18 Sep 2019 06:59    TPro  x   /  Alb  x   /  TBili  x   /  DBili  x   /  AST  x   /  ALT  x   /  AlkPhos  214<H>  09-18          Procalcitonin, Serum: 0.49 (09-05 @ 07:29)            Rapid RVP Result: [facility]          MICROBIOLOGY:    Culture - Surgical Swab (09.15.19 @ 18:03)    Specimen Source: .Surgical Swab    Culture Results:   Rare Enterococcus species "Susceptibilities not performed"  Few Bacteroides thetaiotamcron group "Susceptibilities not performed"    Culture - Blood (09.08.19 @ 01:32)    Specimen Source: .Blood    Culture Results:   No growth at 5 days.        RADIOLOGY & ADDITIONAL STUDIES:

## 2019-09-18 NOTE — PROGRESS NOTE ADULT - PROBLEM SELECTOR PLAN 1
INJECTION ADMINISTRATION DETAILS:    Date & Time:  10/30/17 2:25 PM  Site & Comments: left index finger  Administered by Dr Beob Chun    Betamethasone (30mg/mL)  #of CC:1  NDC #: 2673-9633-00  Lot #: [id number]  EXP: 3/2019    1% Lidocaine ( 10mg/mL)  # of CC : 1  NDC #: 8294-9531-44  LOT# :-[id number]  EXP :1/2019 R buttock infiltrate with necrotic tissue  SUrg eval appreciated   SOft Tis US noted   pain control  CT noted  Abx as per ID

## 2019-09-18 NOTE — PROGRESS NOTE ADULT - SUBJECTIVE AND OBJECTIVE BOX
COLORECTAL SURGERY PROGRESS NOTE    86yFemale    SUBJECTIVE:  Patient seen and examined at bedside. Having some pain in right buttock. Denies fevers, chills, nausea, vomiting, chest pain, and shortness of breath.     --------------------------------------------------------------------------------------------------  OBJECTIVE:     Physical Exam:  General: AAOx3, NAD, resting comfortably   HEENT: NC/AT  Respiratory: no increased work of breathing   Abdomen: soft, nontender, nondistended; right perianal surgical dressings taken down and packings removed. Abscess cavity with some granulation tissue, no active bleed visualized. Re-packed with wet-to-dry kerlix and abd.   Extremities: warm and well perfused    --------------------------------------------------------------------------------------------------  Vital Signs:  Vital Signs Last 24 Hrs  T(C): 36.3 (18 Sep 2019 04:47), Max: 37 (17 Sep 2019 19:56)  T(F): 97.3 (18 Sep 2019 04:47), Max: 98.6 (17 Sep 2019 19:56)  HR: 89 (18 Sep 2019 06:47) (71 - 89)  BP: 106/54 (18 Sep 2019 06:47) (103/52 - 121/83)  BP(mean): --  RR: 18 (18 Sep 2019 06:47) (16 - 18)  SpO2: 99% (18 Sep 2019 06:47) (96% - 99%)  --------------------------------------------------------------------------------------------------  Inputs/Outputs:    I&O's Detail    17 Sep 2019 07:01  -  18 Sep 2019 07:00  --------------------------------------------------------  IN:    IV PiggyBack: 350 mL    lactated ringers.: 156 mL    Oral Fluid: 660 mL    Packed Red Blood Cells: 325 mL  Total IN: 1491 mL    OUT:    Indwelling Catheter - Urethral: 750 mL  Total OUT: 750 mL    Total NET: 741 mL              --------------------------------------------------------------------------------------------------  Laboratories:  09-17    130<L>  |  98  |  41<H>  ----------------------------<  206<H>  4.8   |  21<L>  |  1.16    Ca    8.2<L>      17 Sep 2019 07:13    TPro  5.0<L>  /  Alb  1.9<L>  /  TBili  0.5  /  DBili  x   /  AST  21  /  ALT  20  /  AlkPhos  215<H>  09-17                        8.8    21.4  )-----------( 239      ( 17 Sep 2019 20:31 )             26.4                           6.9    27.0  )-----------( 235      ( 17 Sep 2019 10:50 )             20.3     LIVER FUNCTIONS - ( 17 Sep 2019 07:13 )  Alb: 1.9 g/dL / Pro: 5.0 g/dL / ALK PHOS: 215 U/L / ALT: 20 U/L / AST: 21 U/L / GGT: x           CAPILLARY BLOOD GLUCOSE      POCT Blood Glucose.: 245 mg/dL (17 Sep 2019 22:07)        --------------------------------------------------------------------------------------------------  Medications:  MEDICATIONS  (STANDING):  aspirin enteric coated 81 milliGRAM(s) Oral daily  atorvastatin 80 milliGRAM(s) Oral at bedtime  aztreonam  IVPB 1000 milliGRAM(s) IV Intermittent every 8 hours  clindamycin IVPB 900 milliGRAM(s) IV Intermittent every 8 hours  DAPTOmycin IVPB 350 milliGRAM(s) IV Intermittent every 24 hours  dextrose 50% Injectable 12.5 Gram(s) IV Push once  dextrose 50% Injectable 25 Gram(s) IV Push once  dextrose 50% Injectable 25 Gram(s) IV Push once  docusate sodium 100 milliGRAM(s) Oral three times a day  influenza   Vaccine 0.5 milliLiter(s) IntraMuscular once  insulin glargine Injectable (LANTUS) 15 Unit(s) SubCutaneous two times a day  insulin lispro (HumaLOG) corrective regimen sliding scale   SubCutaneous three times a day before meals  lactobacillus acidophilus 1 Tablet(s) Oral daily  lidocaine   Patch 2 Patch Transdermal daily  metoprolol tartrate 12.5 milliGRAM(s) Oral two times a day  multivitamin 1 Tablet(s) Oral daily  pantoprazole    Tablet 40 milliGRAM(s) Oral before breakfast  senna 2 Tablet(s) Oral at bedtime    MEDICATIONS  (PRN):  acetaminophen   Tablet .. 650 milliGRAM(s) Oral every 6 hours PRN Mild Pain (1 - 3)  bisacodyl Suppository 10 milliGRAM(s) Rectal daily PRN Constipation  dextrose 40% Gel 15 Gram(s) Oral once PRN Blood Glucose LESS THAN 70 milliGRAM(s)/deciliter  glucagon  Injectable 1 milliGRAM(s) IntraMuscular once PRN Glucose LESS THAN 70 milligrams/deciliter  ondansetron Injectable 4 milliGRAM(s) IV Push every 8 hours PRN Nausea and/or Vomiting  oxyCODONE    IR 5 milliGRAM(s) Oral every 4 hours PRN Moderate Pain (4 - 6)  oxyCODONE    IR 10 milliGRAM(s) Oral every 4 hours PRN Severe Pain (7 - 10)

## 2019-09-18 NOTE — PROGRESS NOTE ADULT - ASSESSMENT
ASSESSMENT:  86F PMH CHF, HTN, DM, CAD prior MI, admitted for UTI and ROB on CKD3, now POD3 from debridement and I&D of gluteal skin and tissue for a right gluteal cleft abscess with necrotic tissue.     PLAN:  - Dressing/packing changes daily   - Recommend rectal tube to prevent wound contamination   - Continue to trend H/H, and WBC  - Pain control as needed  - Regular diet  - DVT ppx  - OOB and ambulating as tolerated  - Care per primary team    Red Surgery   [id number] ASSESSMENT:  86F PMH CHF, HTN, DM, CAD prior MI, admitted for UTI and ROB on CKD3, now POD3 from debridement and I&D of gluteal skin and tissue for a right gluteal cleft abscess with necrotic tissue.     PLAN:  - Dressing/packing changes daily   - Continue to trend H/H, and WBC  - Pain control as needed  - Regular diet  - DVT ppx  - OOB and ambulating as tolerated  - Care per primary team    Red Surgery   [phone]

## 2019-09-19 LAB
ANION GAP SERPL CALC-SCNC: 11 MMOL/L — SIGNIFICANT CHANGE UP (ref 5–17)
ANION GAP SERPL CALC-SCNC: 9 MMOL/L — SIGNIFICANT CHANGE UP (ref 5–17)
BUN SERPL-MCNC: 31 MG/DL — HIGH (ref 7–23)
BUN SERPL-MCNC: 33 MG/DL — HIGH (ref 7–23)
CALCIUM SERPL-MCNC: 8.1 MG/DL — LOW (ref 8.4–10.5)
CALCIUM SERPL-MCNC: 8.2 MG/DL — LOW (ref 8.4–10.5)
CHLORIDE SERPL-SCNC: 100 MMOL/L — SIGNIFICANT CHANGE UP (ref 96–108)
CHLORIDE SERPL-SCNC: 101 MMOL/L — SIGNIFICANT CHANGE UP (ref 96–108)
CO2 SERPL-SCNC: 18 MMOL/L — LOW (ref 22–31)
CO2 SERPL-SCNC: 21 MMOL/L — LOW (ref 22–31)
CREAT SERPL-MCNC: 0.98 MG/DL — SIGNIFICANT CHANGE UP (ref 0.5–1.3)
CREAT SERPL-MCNC: 1 MG/DL — SIGNIFICANT CHANGE UP (ref 0.5–1.3)
GLUCOSE SERPL-MCNC: 236 MG/DL — HIGH (ref 70–99)
GLUCOSE SERPL-MCNC: 293 MG/DL — HIGH (ref 70–99)
HCT VFR BLD CALC: 26.3 % — LOW (ref 34.5–45)
HGB BLD-MCNC: 8.4 G/DL — LOW (ref 11.5–15.5)
MCHC RBC-ENTMCNC: 29.5 PG — SIGNIFICANT CHANGE UP (ref 27–34)
MCHC RBC-ENTMCNC: 32.1 GM/DL — SIGNIFICANT CHANGE UP (ref 32–36)
MCV RBC AUTO: 91.7 FL — SIGNIFICANT CHANGE UP (ref 80–100)
PLATELET # BLD AUTO: 132 K/UL — LOW (ref 150–400)
POTASSIUM SERPL-MCNC: 5.1 MMOL/L — SIGNIFICANT CHANGE UP (ref 3.5–5.3)
POTASSIUM SERPL-MCNC: 5.8 MMOL/L — HIGH (ref 3.5–5.3)
POTASSIUM SERPL-SCNC: 5.1 MMOL/L — SIGNIFICANT CHANGE UP (ref 3.5–5.3)
POTASSIUM SERPL-SCNC: 5.8 MMOL/L — HIGH (ref 3.5–5.3)
RBC # BLD: 2.87 M/UL — LOW (ref 3.8–5.2)
RBC # FLD: 14.5 % — SIGNIFICANT CHANGE UP (ref 10.3–14.5)
SODIUM SERPL-SCNC: 129 MMOL/L — LOW (ref 135–145)
SODIUM SERPL-SCNC: 131 MMOL/L — LOW (ref 135–145)
WBC # BLD: 20.4 K/UL — HIGH (ref 3.8–10.5)
WBC # FLD AUTO: 20.4 K/UL — HIGH (ref 3.8–10.5)

## 2019-09-19 RX ORDER — HYDROMORPHONE HYDROCHLORIDE 2 MG/ML
0.5 INJECTION INTRAMUSCULAR; INTRAVENOUS; SUBCUTANEOUS ONCE
Refills: 0 | Status: DISCONTINUED | OUTPATIENT
Start: 2019-09-19 | End: 2019-09-19

## 2019-09-19 RX ORDER — INSULIN GLARGINE 100 [IU]/ML
18 INJECTION, SOLUTION SUBCUTANEOUS
Refills: 0 | Status: DISCONTINUED | OUTPATIENT
Start: 2019-09-19 | End: 2019-09-27

## 2019-09-19 RX ORDER — SODIUM CHLORIDE 9 MG/ML
1000 INJECTION INTRAMUSCULAR; INTRAVENOUS; SUBCUTANEOUS
Refills: 0 | Status: DISCONTINUED | OUTPATIENT
Start: 2019-09-19 | End: 2019-09-20

## 2019-09-19 RX ORDER — DEXTROSE 50 % IN WATER 50 %
50 SYRINGE (ML) INTRAVENOUS ONCE
Refills: 0 | Status: COMPLETED | OUTPATIENT
Start: 2019-09-19 | End: 2019-09-19

## 2019-09-19 RX ORDER — INSULIN LISPRO 100/ML
10 VIAL (ML) SUBCUTANEOUS ONCE
Refills: 0 | Status: DISCONTINUED | OUTPATIENT
Start: 2019-09-19 | End: 2019-09-19

## 2019-09-19 RX ORDER — INSULIN HUMAN 100 [IU]/ML
10 INJECTION, SOLUTION SUBCUTANEOUS ONCE
Refills: 0 | Status: COMPLETED | OUTPATIENT
Start: 2019-09-19 | End: 2019-09-19

## 2019-09-19 RX ADMIN — HYDROMORPHONE HYDROCHLORIDE 0.5 MILLIGRAM(S): 2 INJECTION INTRAMUSCULAR; INTRAVENOUS; SUBCUTANEOUS at 07:09

## 2019-09-19 RX ADMIN — Medication 12.5 MILLIGRAM(S): at 17:45

## 2019-09-19 RX ADMIN — Medication 100 MILLIGRAM(S): at 05:51

## 2019-09-19 RX ADMIN — Medication 1 TABLET(S): at 11:47

## 2019-09-19 RX ADMIN — Medication 100 MILLIGRAM(S): at 23:15

## 2019-09-19 RX ADMIN — Medication 100 MILLIGRAM(S): at 13:17

## 2019-09-19 RX ADMIN — Medication 50 MILLIGRAM(S): at 23:54

## 2019-09-19 RX ADMIN — OXYCODONE HYDROCHLORIDE 10 MILLIGRAM(S): 5 TABLET ORAL at 22:14

## 2019-09-19 RX ADMIN — Medication 81 MILLIGRAM(S): at 11:47

## 2019-09-19 RX ADMIN — LIDOCAINE 2 PATCH: 4 CREAM TOPICAL at 01:56

## 2019-09-19 RX ADMIN — DAPTOMYCIN 114 MILLIGRAM(S): 500 INJECTION, POWDER, LYOPHILIZED, FOR SOLUTION INTRAVENOUS at 16:39

## 2019-09-19 RX ADMIN — Medication 6: at 13:17

## 2019-09-19 RX ADMIN — Medication 100 MILLIGRAM(S): at 05:50

## 2019-09-19 RX ADMIN — Medication 8: at 17:45

## 2019-09-19 RX ADMIN — LIDOCAINE 2 PATCH: 4 CREAM TOPICAL at 19:00

## 2019-09-19 RX ADMIN — Medication 12.5 MILLIGRAM(S): at 05:51

## 2019-09-19 RX ADMIN — OXYCODONE HYDROCHLORIDE 10 MILLIGRAM(S): 5 TABLET ORAL at 11:51

## 2019-09-19 RX ADMIN — SODIUM CHLORIDE 75 MILLILITER(S): 9 INJECTION INTRAMUSCULAR; INTRAVENOUS; SUBCUTANEOUS at 15:15

## 2019-09-19 RX ADMIN — OXYCODONE HYDROCHLORIDE 10 MILLIGRAM(S): 5 TABLET ORAL at 12:30

## 2019-09-19 RX ADMIN — PANTOPRAZOLE SODIUM 40 MILLIGRAM(S): 20 TABLET, DELAYED RELEASE ORAL at 05:51

## 2019-09-19 RX ADMIN — Medication 4: at 08:57

## 2019-09-19 RX ADMIN — ATORVASTATIN CALCIUM 80 MILLIGRAM(S): 80 TABLET, FILM COATED ORAL at 22:13

## 2019-09-19 RX ADMIN — Medication 50 MILLIGRAM(S): at 13:26

## 2019-09-19 RX ADMIN — INSULIN GLARGINE 15 UNIT(S): 100 INJECTION, SOLUTION SUBCUTANEOUS at 08:57

## 2019-09-19 RX ADMIN — LIDOCAINE 2 PATCH: 4 CREAM TOPICAL at 11:47

## 2019-09-19 RX ADMIN — OXYCODONE HYDROCHLORIDE 10 MILLIGRAM(S): 5 TABLET ORAL at 02:59

## 2019-09-19 RX ADMIN — OXYCODONE HYDROCHLORIDE 10 MILLIGRAM(S): 5 TABLET ORAL at 22:44

## 2019-09-19 RX ADMIN — INSULIN HUMAN 10 UNIT(S): 100 INJECTION, SOLUTION SUBCUTANEOUS at 19:32

## 2019-09-19 RX ADMIN — OXYCODONE HYDROCHLORIDE 10 MILLIGRAM(S): 5 TABLET ORAL at 01:59

## 2019-09-19 RX ADMIN — INSULIN GLARGINE 18 UNIT(S): 100 INJECTION, SOLUTION SUBCUTANEOUS at 22:41

## 2019-09-19 RX ADMIN — Medication 50 MILLILITER(S): at 19:34

## 2019-09-19 RX ADMIN — LIDOCAINE 2 PATCH: 4 CREAM TOPICAL at 23:23

## 2019-09-19 RX ADMIN — Medication 50 MILLIGRAM(S): at 06:29

## 2019-09-19 NOTE — PROGRESS NOTE ADULT - ASSESSMENT
Pt is a 85 y/o Female with PMHx of CHF, DM, HLD, HTN, CAD prior MI, c/o fatigue, "feeling off" and unable to get out of bed; Tmax 104F toay. Rash/redness on buttocks noted by home health aid. patient also complaining of +chronic cough, nonproductive.  No abdominal pain, no n/v/d. No chest pain.  States she has been feeling hot and cold over past few days, and took her temperature today, found fever of 104, and came to ED for evaluation.  No dysuria, no diarrhea. +constipation (chronic).  Increased diffuse leg swelling for past few days. patient lives at home with family. walks by herself with no use of walker. denies of any MEAD< chest pain on exertion. compliant with all her home medications (04 Sep 2019 22:22)    ER vitals: Tm 102.2, P 75, /77.  WBC 14.8 --> 12.2.  Cr 2.0 <-- 1.3.  PCT 0.49.  Ucx >100K E.coli.  Pt given dose of ceftriaxone, now on cipro for UTI.  ID consult called for further abx managment.      Sepsis:    - fever, leukocytosis. Source less likely UTI given persistent fevers, pt has infiltration of perirectal tissues, likely cellulitis.  Abx broadened     - Monitor.  lactate.  Pct elevated 0.49.  Pt with rising WBC, abx broadened on 9/11 (dapto/aztreanam/flagyl).    - Monitor hemodynamic status and BPs.  s/p IV fluid bolus, cont maintanence fluid.      - blood cx, urine cx.  RVP (-).  No pna on cxr      Perirectal cellulitis, r/o abscess:    - Repeat CTap on 9/13 shows worsening inflammation with fluid and foci of air.   s/p OR on 9/15 for  debridement.  Pt not responding to antibiotics prior to debridement.  f/u surgical wound cultures - Enterococcus and Bacteroides    - Cont dapto/azactam/clindamycin.   Changed from flagyl to clindamycin for continued anerobic coverage and anti-toxin effect.  Cover for gas forming bacteria.         - Surgical f/u appreciated.    Monitor bleeding.   Trend serial Cbc's, s/p pRBC transfusion now with stable H/H, cont local wound care.    Today pt with increasing WBC.  Cont to monitor, if worsens, recommend repeat CTap.   Monitor platelets                Will follow,    Sandrita Zaman  [phone]

## 2019-09-19 NOTE — PROGRESS NOTE ADULT - PROBLEM SELECTOR PLAN 1
R buttock infiltrate with necrotic tissue  SUrg eval appreciated   SOft Tis US noted   pain control  CT noted  Abx as per ID  will repeat CT Pelv tomorrow for evaluation of any improvement in infiltrate on the back

## 2019-09-19 NOTE — PROGRESS NOTE ADULT - SUBJECTIVE AND OBJECTIVE BOX
Patient is a 86y old  Female who presents with a chief complaint of Fever, UTI (19 Sep 2019 15:01)      INTERVAL HISTORY: feels ok     MEDICATIONS:  metoprolol tartrate 12.5 milliGRAM(s) Oral two times a day        PHYSICAL EXAM:  T(C): 36.7 (09-19-19 @ 12:10), Max: 36.8 (09-19-19 @ 05:40)  HR: 79 (09-19-19 @ 12:10) (69 - 85)  BP: 119/75 (09-19-19 @ 12:10) (105/67 - 132/79)  RR: 18 (09-19-19 @ 12:10) (18 - 18)  SpO2: 99% (09-19-19 @ 12:10) (97% - 99%)  Wt(kg): --  I&O's Summary    18 Sep 2019 07:01  -  19 Sep 2019 07:00  --------------------------------------------------------  IN: 1140 mL / OUT: 675 mL / NET: 465 mL    19 Sep 2019 07:01  -  19 Sep 2019 15:13  --------------------------------------------------------  IN: 360 mL / OUT: 150 mL / NET: 210 mL          Appearance: In no distress	  HEENT:    PERRL, EOMI	  Cardiovascular:  S1 S2, No JVD  Respiratory: Lungs clear to auscultation	  Gastrointestinal:  Soft, Non-tender, + BS	  Extremities:  No edema of LE                                8.4    20.4  )-----------( 132      ( 19 Sep 2019 11:48 )             26.3     09-19    129<L>  |  100  |  33<H>  ----------------------------<  236<H>  5.8<H>   |  18<L>  |  0.98    Ca    8.2<L>      19 Sep 2019 11:48    TPro  x   /  Alb  x   /  TBili  x   /  DBili  x   /  AST  x   /  ALT  x   /  AlkPhos  214<H>  09-18        Labs personally reviewed      ASSESSMENT/PLAN: 	  ASSESSMENT/PLAN: 	  tolerated surgery well  afebrile, improving  BP well controlled           Srini Velasco DO [facility]  Cardiovascular Medicine  [phone]

## 2019-09-19 NOTE — PROVIDER CONTACT NOTE (MEDICATION) - BACKGROUND
pt admitted with UTI and perirectal infiltration cellulitis. s/p I&D of R buttock abcess. today with K of 5.8.

## 2019-09-19 NOTE — PROGRESS NOTE ADULT - SUBJECTIVE AND OBJECTIVE BOX
Subjective: Patient seen and examined. No new events except as noted.   feeling better  afebrile  HgB stable for now     REVIEW OF SYSTEMS:    CONSTITUTIONAL: + weakness   EYES/ENT: No visual changes;  No vertigo or throat pain   NECK: No pain or stiffness  RESPIRATORY: No cough, wheezing, hemoptysis; No shortness of breath  CARDIOVASCULAR: No chest pain or palpitations  GASTROINTESTINAL: No abdominal or epigastric pain. No nausea, vomiting, or hematemesis; No diarrhea or constipation. No melena or hematochezia.  GENITOURINARY: No dysuria, frequency or hematuria  NEUROLOGICAL: No numbness or weakness  SKIN: Lower buttock pain   All other review of systems is negative unless indicated above.    MEDICATIONS:  MEDICATIONS  (STANDING):  aspirin enteric coated 81 milliGRAM(s) Oral daily  atorvastatin 80 milliGRAM(s) Oral at bedtime  aztreonam  IVPB 1000 milliGRAM(s) IV Intermittent every 8 hours  clindamycin IVPB 900 milliGRAM(s) IV Intermittent every 8 hours  DAPTOmycin IVPB 350 milliGRAM(s) IV Intermittent every 24 hours  dextrose 50% Injectable 12.5 Gram(s) IV Push once  dextrose 50% Injectable 25 Gram(s) IV Push once  dextrose 50% Injectable 25 Gram(s) IV Push once  dextrose 50% Injectable 50 milliLiter(s) IV Push once  docusate sodium 100 milliGRAM(s) Oral three times a day  influenza   Vaccine 0.5 milliLiter(s) IntraMuscular once  insulin glargine Injectable (LANTUS) 18 Unit(s) SubCutaneous two times a day  insulin lispro (HumaLOG) corrective regimen sliding scale   SubCutaneous three times a day before meals  insulin lispro Injectable (HumaLOG). 10 Unit(s) SubCutaneous once  lactobacillus acidophilus 1 Tablet(s) Oral daily  lidocaine   Patch 2 Patch Transdermal daily  metoprolol tartrate 12.5 milliGRAM(s) Oral two times a day  multivitamin 1 Tablet(s) Oral daily  pantoprazole    Tablet 40 milliGRAM(s) Oral before breakfast  senna 2 Tablet(s) Oral at bedtime  sodium chloride 0.9%. 1000 milliLiter(s) (75 mL/Hr) IV Continuous <Continuous>      PHYSICAL EXAM:  T(C): 36.8 (09-19-19 @ 16:35), Max: 36.8 (09-19-19 @ 05:40)  HR: 77 (09-19-19 @ 16:35) (69 - 80)  BP: 111/64 (09-19-19 @ 16:35) (105/67 - 124/73)  RR: 18 (09-19-19 @ 16:35) (18 - 18)  SpO2: 98% (09-19-19 @ 16:35) (97% - 99%)  Wt(kg): --  I&O's Summary    18 Sep 2019 07:01  -  19 Sep 2019 07:00  --------------------------------------------------------  IN: 1140 mL / OUT: 675 mL / NET: 465 mL    19 Sep 2019 07:01  -  19 Sep 2019 18:03  --------------------------------------------------------  IN: 585 mL / OUT: 150 mL / NET: 435 mL          Appearance: Normal	  HEENT:   Normal oral mucosa, PERRL, EOMI	  Lymphatic: No lymphadenopathy , no edema  Cardiovascular: Normal S1 S2  Respiratory: Lungs clear to auscultation, normal effort 	  Gastrointestinal:  Soft, Non-tender, + BS	  Skin: Buttock R side dressing intact, non bleeding   Musculoskeletal: Normal range of motion, normal strength  Psychiatry:  Mood & affect appropriate  Ext: No edema      All labs, Imaging and EKGs personally reviewed                             8.4    20.4  )-----------( 132      ( 19 Sep 2019 11:48 )             26.3               09-19    129<L>  |  100  |  33<H>  ----------------------------<  236<H>  5.8<H>   |  18<L>  |  0.98    Ca    8.2<L>      19 Sep 2019 11:48    TPro  x   /  Alb  x   /  TBili  x   /  DBili  x   /  AST  x   /  ALT  x   /  AlkPhos  214<H>  09-18

## 2019-09-19 NOTE — PROVIDER CONTACT NOTE (MEDICATION) - SITUATION
pt potassium was 5.8.  10units humalin ordered SUBQ as opposed to IV. pt potassium was 5.8.  10units humulin ordered SUBQ as opposed to IV. previous RN administered insulin subQ.

## 2019-09-19 NOTE — PROGRESS NOTE ADULT - SUBJECTIVE AND OBJECTIVE BOX
Infectious Diseases progress note:    Subjective:  WBC elevated.  Pt without new complaints.  Formed BM today.  Afebrile.  Seen by surgery this AM, s/p dressing change.      ROS:  CONSTITUTIONAL:  No fever, chills, rigors  CARDIOVASCULAR:  No chest pain or palpitations  RESPIRATORY:   No SOB, cough, dyspnea on exertion.  No wheezing  GASTROINTESTINAL:  No abd pain, N/V, diarrhea/constipation  EXTREMITIES:  No swelling or joint pain  GENITOURINARY:  No burning on urination, increased frequency or urgency.  No flank pain  NEUROLOGIC:  No HA, visual disturbances  SKIN: No rashes    Allergies    codeine (Unknown)  Kiwi (Anaphylaxis)  penicillins (Anaphylaxis)    Intolerances        ANTIBIOTICS/RELEVANT:  antimicrobials  aztreonam  IVPB 1000 milliGRAM(s) IV Intermittent every 8 hours  clindamycin IVPB 900 milliGRAM(s) IV Intermittent every 8 hours  DAPTOmycin IVPB 350 milliGRAM(s) IV Intermittent every 24 hours    immunologic:  influenza   Vaccine 0.5 milliLiter(s) IntraMuscular once    OTHER:  acetaminophen   Tablet .. 650 milliGRAM(s) Oral every 6 hours PRN  aspirin enteric coated 81 milliGRAM(s) Oral daily  atorvastatin 80 milliGRAM(s) Oral at bedtime  bisacodyl Suppository 10 milliGRAM(s) Rectal daily PRN  dextrose 40% Gel 15 Gram(s) Oral once PRN  dextrose 50% Injectable 12.5 Gram(s) IV Push once  dextrose 50% Injectable 25 Gram(s) IV Push once  dextrose 50% Injectable 25 Gram(s) IV Push once  docusate sodium 100 milliGRAM(s) Oral three times a day  glucagon  Injectable 1 milliGRAM(s) IntraMuscular once PRN  insulin glargine Injectable (LANTUS) 15 Unit(s) SubCutaneous two times a day  insulin lispro (HumaLOG) corrective regimen sliding scale   SubCutaneous three times a day before meals  lactobacillus acidophilus 1 Tablet(s) Oral daily  lidocaine   Patch 2 Patch Transdermal daily  metoprolol tartrate 12.5 milliGRAM(s) Oral two times a day  multivitamin 1 Tablet(s) Oral daily  ondansetron Injectable 4 milliGRAM(s) IV Push every 8 hours PRN  oxyCODONE    IR 5 milliGRAM(s) Oral every 4 hours PRN  oxyCODONE    IR 10 milliGRAM(s) Oral every 4 hours PRN  pantoprazole    Tablet 40 milliGRAM(s) Oral before breakfast  senna 2 Tablet(s) Oral at bedtime  sodium chloride 0.9%. 1000 milliLiter(s) IV Continuous <Continuous>      Objective:  Vital Signs Last 24 Hrs  T(C): 36.7 (19 Sep 2019 12:10), Max: 36.8 (19 Sep 2019 05:40)  T(F): 98 (19 Sep 2019 12:10), Max: 98.2 (19 Sep 2019 05:40)  HR: 79 (19 Sep 2019 12:10) (69 - 85)  BP: 119/75 (19 Sep 2019 12:10) (105/67 - 132/79)  BP(mean): --  RR: 18 (19 Sep 2019 12:10) (18 - 18)  SpO2: 99% (19 Sep 2019 12:10) (97% - 99%)    PHYSICAL EXAM:  Constitutional:NAD  Eyes:NIDIA, EOMI  Ear/Nose/Throat: no thrush, mucositis.  Moist mucous membranes	  Neck:no JVD, no lymphadenopathy, supple  Respiratory: CTA lucian  Cardiovascular: S1S2 RRR, no murmurs  Gastrointestinal:soft, nontender,  nondistended (+) BS  Extremities:no e/e/c  Skin:  dsg c/d/i in perirectum        LABS:                        8.4    20.4  )-----------( 132      ( 19 Sep 2019 11:48 )             26.3     09-19    129<L>  |  100  |  33<H>  ----------------------------<  236<H>  5.8<H>   |  18<L>  |  0.98    Ca    8.2<L>      19 Sep 2019 11:48    TPro  x   /  Alb  x   /  TBili  x   /  DBili  x   /  AST  x   /  ALT  x   /  AlkPhos  214<H>  09-18          Procalcitonin, Serum: 0.49 (09-05 @ 07:29)            Rapid RVP Result: [facility]          MICROBIOLOGY:    Culture - Surgical Swab (09.15.19 @ 18:03)    Specimen Source: .Surgical Swab    Culture Results:   Rare Enterococcus species "Susceptibilities not performed"  Few Bacteroides thetaiotamcron group "Susceptibilities not performed"          RADIOLOGY & ADDITIONAL STUDIES:

## 2019-09-19 NOTE — PROGRESS NOTE ADULT - SUBJECTIVE AND OBJECTIVE BOX
COLORECTAL SURGERY PROGRESS NOTE    86yFemale    SUBJECTIVE:  Patient seen and examined at bedside. Having pain in gluteal areas. Denies fevers, chills, nausea, vomiting, chest pain, and shortness of breath.     --------------------------------------------------------------------------------------------------  OBJECTIVE:     Physical Exam:  General: AAOx3, NAD, resting comfortably   HEENT: NC/AT  Respiratory: no increased work of breathing   Abdomen: soft, nontender, nondistended; right perianal surgical dressings taken down and packings removed. Abscess cavity with some granulation tissue, no active bleed visualized. Re-packed with wet-to-dry kerlix and abd.   Extremities: warm and well perfused    --------------------------------------------------------------------------------------------------  Vital Signs:  Vital Signs Last 24 Hrs  T(C): 36.8 (19 Sep 2019 05:40), Max: 36.8 (18 Sep 2019 13:30)  T(F): 98.2 (19 Sep 2019 05:40), Max: 98.3 (18 Sep 2019 13:30)  HR: 69 (19 Sep 2019 05:40) (69 - 90)  BP: 109/66 (19 Sep 2019 05:40) (105/67 - 132/79)  BP(mean): --  RR: 18 (19 Sep 2019 05:40) (18 - 18)  SpO2: 97% (19 Sep 2019 05:40) (97% - 99%)  --------------------------------------------------------------------------------------------------  Inputs/Outputs:    I&O's Detail    18 Sep 2019 07:01  -  19 Sep 2019 07:00  --------------------------------------------------------  IN:    IV PiggyBack: 300 mL    Oral Fluid: 840 mL  Total IN: 1140 mL    OUT:    Indwelling Catheter - Urethral: 675 mL  Total OUT: 675 mL    Total NET: 465 mL      19 Sep 2019 07:01  -  19 Sep 2019 12:00  --------------------------------------------------------  IN:    Oral Fluid: 120 mL  Total IN: 120 mL    OUT:  Total OUT: 0 mL    Total NET: 120 mL                  --------------------------------------------------------------------------------------------------  Laboratories:  09-18    130<L>  |  98  |  38<H>  ----------------------------<  195<H>  5.0   |  18<L>  |  1.10    Ca    7.8<L>      18 Sep 2019 06:59    TPro  x   /  Alb  x   /  TBili  x   /  DBili  x   /  AST  x   /  ALT  x   /  AlkPhos  214<H>  09-18                        8.9    22.7  )-----------( 233      ( 18 Sep 2019 18:22 )             26.7     09-17    130<L>  |  98  |  41<H>  ----------------------------<  206<H>  4.8   |  21<L>  |  1.16    Ca    8.2<L>      17 Sep 2019 07:13    TPro  5.0<L>  /  Alb  1.9<L>  /  TBili  0.5  /  DBili  x   /  AST  21  /  ALT  20  /  AlkPhos  215<H>  09-17                        8.8    21.4  )-----------( 239      ( 17 Sep 2019 20:31 )             26.4                           6.9    27.0  )-----------( 235      ( 17 Sep 2019 10:50 )             20.3     LIVER FUNCTIONS - ( 17 Sep 2019 07:13 )  Alb: 1.9 g/dL / Pro: 5.0 g/dL / ALK PHOS: 215 U/L / ALT: 20 U/L / AST: 21 U/L / GGT: x           CAPILLARY BLOOD GLUCOSE      POCT Blood Glucose.: 245 mg/dL (17 Sep 2019 22:07)        --------------------------------------------------------------------------------------------------  Medications:  MEDICATIONS  (STANDING):  aspirin enteric coated 81 milliGRAM(s) Oral daily  atorvastatin 80 milliGRAM(s) Oral at bedtime  aztreonam  IVPB 1000 milliGRAM(s) IV Intermittent every 8 hours  clindamycin IVPB 900 milliGRAM(s) IV Intermittent every 8 hours  DAPTOmycin IVPB 350 milliGRAM(s) IV Intermittent every 24 hours  dextrose 50% Injectable 12.5 Gram(s) IV Push once  dextrose 50% Injectable 25 Gram(s) IV Push once  dextrose 50% Injectable 25 Gram(s) IV Push once  docusate sodium 100 milliGRAM(s) Oral three times a day  influenza   Vaccine 0.5 milliLiter(s) IntraMuscular once  insulin glargine Injectable (LANTUS) 15 Unit(s) SubCutaneous two times a day  insulin lispro (HumaLOG) corrective regimen sliding scale   SubCutaneous three times a day before meals  lactobacillus acidophilus 1 Tablet(s) Oral daily  lidocaine   Patch 2 Patch Transdermal daily  metoprolol tartrate 12.5 milliGRAM(s) Oral two times a day  multivitamin 1 Tablet(s) Oral daily  pantoprazole    Tablet 40 milliGRAM(s) Oral before breakfast  senna 2 Tablet(s) Oral at bedtime    MEDICATIONS  (PRN):  acetaminophen   Tablet .. 650 milliGRAM(s) Oral every 6 hours PRN Mild Pain (1 - 3)  bisacodyl Suppository 10 milliGRAM(s) Rectal daily PRN Constipation  dextrose 40% Gel 15 Gram(s) Oral once PRN Blood Glucose LESS THAN 70 milliGRAM(s)/deciliter  glucagon  Injectable 1 milliGRAM(s) IntraMuscular once PRN Glucose LESS THAN 70 milligrams/deciliter  ondansetron Injectable 4 milliGRAM(s) IV Push every 8 hours PRN Nausea and/or Vomiting  oxyCODONE    IR 5 milliGRAM(s) Oral every 4 hours PRN Moderate Pain (4 - 6)  oxyCODONE    IR 10 milliGRAM(s) Oral every 4 hours PRN Severe Pain (7 - 10)

## 2019-09-19 NOTE — PROGRESS NOTE ADULT - SUBJECTIVE AND OBJECTIVE BOX
Patient seen and examined   no complaints    codeine (Unknown)  Kiwi (Anaphylaxis)  penicillins (Anaphylaxis)    Hospital Medications:   MEDICATIONS  (STANDING):  aspirin enteric coated 81 milliGRAM(s) Oral daily  atorvastatin 80 milliGRAM(s) Oral at bedtime  aztreonam  IVPB 1000 milliGRAM(s) IV Intermittent every 8 hours  clindamycin IVPB 900 milliGRAM(s) IV Intermittent every 8 hours  DAPTOmycin IVPB 350 milliGRAM(s) IV Intermittent every 24 hours  dextrose 50% Injectable 12.5 Gram(s) IV Push once  dextrose 50% Injectable 25 Gram(s) IV Push once  dextrose 50% Injectable 25 Gram(s) IV Push once  docusate sodium 100 milliGRAM(s) Oral three times a day  influenza   Vaccine 0.5 milliLiter(s) IntraMuscular once  insulin glargine Injectable (LANTUS) 15 Unit(s) SubCutaneous two times a day  insulin lispro (HumaLOG) corrective regimen sliding scale   SubCutaneous three times a day before meals  lactobacillus acidophilus 1 Tablet(s) Oral daily  lidocaine   Patch 2 Patch Transdermal daily  metoprolol tartrate 12.5 milliGRAM(s) Oral two times a day  multivitamin 1 Tablet(s) Oral daily  pantoprazole    Tablet 40 milliGRAM(s) Oral before breakfast  senna 2 Tablet(s) Oral at bedtime        VITALS:  T(F): 98 (09-19-19 @ 12:10), Max: 98.2 (09-19-19 @ 05:40)  HR: 79 (09-19-19 @ 12:10)  BP: 119/75 (09-19-19 @ 12:10)  RR: 18 (09-19-19 @ 12:10)  SpO2: 99% (09-19-19 @ 12:10)  Wt(kg): --    09-18 @ 07:01  -  09-19 @ 07:00  --------------------------------------------------------  IN: 1140 mL / OUT: 675 mL / NET: 465 mL    09-19 @ 07:01  -  09-19 @ 15:01  --------------------------------------------------------  IN: 360 mL / OUT: 150 mL / NET: 210 mL      PHYSICAL EXAM:  Constitutional: NAD  HEENT: anicteric sclera, oropharynx clear.  Neck: No JVD  Respiratory: CTAB, no wheezes, rales or rhonchi  Cardiovascular: S1, S2, RRR  Gastrointestinal: BS+, soft, NT/ND  Extremities:  peripheral edema +  Neurological: A/O x 3, no focal deficits  Psychiatric: Normal mood, normal affect  :  infante+    LABS:  09-19    129<L>  |  100  |  33<H>  ----------------------------<  236<H>  5.8<H>   |  18<L>  |  0.98    Ca    8.2<L>      19 Sep 2019 11:48    TPro      /  Alb      /  TBili      /  DBili      /  AST      /  ALT      /  AlkPhos  214<H>  09-18    Creatinine Trend: 0.98 <--, 1.10 <--, 1.16 <--, 1.20 <--, 1.25 <--, 1.27 <--, 1.22 <--, 1.42 <--                        8.4    20.4  )-----------( 132      ( 19 Sep 2019 11:48 )             26.3     Urine Studies:    Osmolality, Random Urine: 524 mosm/Kg (09-18 @ 01:19)    RADIOLOGY & ADDITIONAL STUDIES:

## 2019-09-19 NOTE — PROGRESS NOTE ADULT - ASSESSMENT
ASSESSMENT:  86F PMH CHF, HTN, DM, CAD prior MI, admitted for UTI and ROB on CKD3, now POD3 from debridement and I&D of gluteal skin and tissue for a right gluteal cleft abscess with necrotic tissue.     PLAN:  - Dressing/packing changes daily   - Continue to trend H/H, and WBC  - Pain control as needed  - Regular diet  - DVT ppx  - OOB and ambulating as tolerated  - Care per primary team    Red Surgery   [phone]

## 2019-09-19 NOTE — PROVIDER CONTACT NOTE (MEDICATION) - RECOMMENDATIONS
EKG, monitor vital signs. Will stop vanco as per patients request.
Educate pt the risks and benefits of medication.
repeat BMP and if K+ still elevated give insulin and dextrose IV?

## 2019-09-19 NOTE — PROGRESS NOTE ADULT - PROBLEM SELECTOR PLAN 1
likely pre renal harshil  cr continues to improve- currently stable  c/w infante  s/p prbc and   dec po intake- restart ivf nacl @ 75cc x 24 hours  will trend bmp  high k- hemolyzed  monitor

## 2019-09-19 NOTE — PROVIDER CONTACT NOTE (MEDICATION) - ACTION/TREATMENT ORDERED:
PA aware. noted potassium of 5.8 was hemolyzed. repeat ordered- 5.1 and BG remains in 200s. continue to monitor BG closely and will monitor pt for s/s of hyper/hypo glycemia. PA aware. noted potassium of 5.8 was hemolyzed. repeat ordered- 5.1 and BG remains in 200s. continue to monitor BG closely and will monitor pt for s/s of hyper/hypo glycemia. unit Nessa ZIMMERMAN aware as well.

## 2019-09-20 DIAGNOSIS — E11.9 TYPE 2 DIABETES MELLITUS WITHOUT COMPLICATIONS: ICD-10-CM

## 2019-09-20 LAB
ANION GAP SERPL CALC-SCNC: 10 MMOL/L — SIGNIFICANT CHANGE UP (ref 5–17)
BUN SERPL-MCNC: 28 MG/DL — HIGH (ref 7–23)
CALCIUM SERPL-MCNC: 7.9 MG/DL — LOW (ref 8.4–10.5)
CHLORIDE SERPL-SCNC: 100 MMOL/L — SIGNIFICANT CHANGE UP (ref 96–108)
CO2 SERPL-SCNC: 21 MMOL/L — LOW (ref 22–31)
CREAT SERPL-MCNC: 0.93 MG/DL — SIGNIFICANT CHANGE UP (ref 0.5–1.3)
GLUCOSE SERPL-MCNC: 149 MG/DL — HIGH (ref 70–99)
HCT VFR BLD CALC: 22.8 % — LOW (ref 34.5–45)
HGB BLD-MCNC: 7.5 G/DL — LOW (ref 11.5–15.5)
MCHC RBC-ENTMCNC: 30.4 PG — SIGNIFICANT CHANGE UP (ref 27–34)
MCHC RBC-ENTMCNC: 32.9 GM/DL — SIGNIFICANT CHANGE UP (ref 32–36)
MCV RBC AUTO: 92.3 FL — SIGNIFICANT CHANGE UP (ref 80–100)
PLATELET # BLD AUTO: 244 K/UL — SIGNIFICANT CHANGE UP (ref 150–400)
POTASSIUM SERPL-MCNC: 5.2 MMOL/L — SIGNIFICANT CHANGE UP (ref 3.5–5.3)
POTASSIUM SERPL-SCNC: 5.2 MMOL/L — SIGNIFICANT CHANGE UP (ref 3.5–5.3)
RBC # BLD: 2.47 M/UL — LOW (ref 3.8–5.2)
RBC # FLD: 15.9 % — HIGH (ref 10.3–14.5)
SODIUM SERPL-SCNC: 131 MMOL/L — LOW (ref 135–145)
WBC # BLD: 17.34 K/UL — HIGH (ref 3.8–10.5)
WBC # FLD AUTO: 17.34 K/UL — HIGH (ref 3.8–10.5)

## 2019-09-20 PROCEDURE — 74176 CT ABD & PELVIS W/O CONTRAST: CPT | Mod: 26

## 2019-09-20 RX ORDER — HYDROMORPHONE HYDROCHLORIDE 2 MG/ML
0.5 INJECTION INTRAMUSCULAR; INTRAVENOUS; SUBCUTANEOUS ONCE
Refills: 0 | Status: DISCONTINUED | OUTPATIENT
Start: 2019-09-20 | End: 2019-09-20

## 2019-09-20 RX ORDER — INSULIN GLARGINE 100 [IU]/ML
9 INJECTION, SOLUTION SUBCUTANEOUS ONCE
Refills: 0 | Status: COMPLETED | OUTPATIENT
Start: 2019-09-20 | End: 2019-09-20

## 2019-09-20 RX ADMIN — Medication 650 MILLIGRAM(S): at 12:25

## 2019-09-20 RX ADMIN — OXYCODONE HYDROCHLORIDE 10 MILLIGRAM(S): 5 TABLET ORAL at 04:12

## 2019-09-20 RX ADMIN — OXYCODONE HYDROCHLORIDE 10 MILLIGRAM(S): 5 TABLET ORAL at 23:25

## 2019-09-20 RX ADMIN — Medication 1 TABLET(S): at 12:23

## 2019-09-20 RX ADMIN — OXYCODONE HYDROCHLORIDE 10 MILLIGRAM(S): 5 TABLET ORAL at 10:06

## 2019-09-20 RX ADMIN — Medication 2: at 09:28

## 2019-09-20 RX ADMIN — OXYCODONE HYDROCHLORIDE 10 MILLIGRAM(S): 5 TABLET ORAL at 10:40

## 2019-09-20 RX ADMIN — PANTOPRAZOLE SODIUM 40 MILLIGRAM(S): 20 TABLET, DELAYED RELEASE ORAL at 06:14

## 2019-09-20 RX ADMIN — ATORVASTATIN CALCIUM 80 MILLIGRAM(S): 80 TABLET, FILM COATED ORAL at 22:11

## 2019-09-20 RX ADMIN — DAPTOMYCIN 114 MILLIGRAM(S): 500 INJECTION, POWDER, LYOPHILIZED, FOR SOLUTION INTRAVENOUS at 19:58

## 2019-09-20 RX ADMIN — Medication 100 MILLIGRAM(S): at 14:00

## 2019-09-20 RX ADMIN — Medication 12.5 MILLIGRAM(S): at 17:43

## 2019-09-20 RX ADMIN — Medication 100 MILLIGRAM(S): at 13:59

## 2019-09-20 RX ADMIN — Medication 2: at 12:23

## 2019-09-20 RX ADMIN — OXYCODONE HYDROCHLORIDE 10 MILLIGRAM(S): 5 TABLET ORAL at 03:42

## 2019-09-20 RX ADMIN — INSULIN GLARGINE 18 UNIT(S): 100 INJECTION, SOLUTION SUBCUTANEOUS at 09:29

## 2019-09-20 RX ADMIN — OXYCODONE HYDROCHLORIDE 10 MILLIGRAM(S): 5 TABLET ORAL at 13:58

## 2019-09-20 RX ADMIN — Medication 12.5 MILLIGRAM(S): at 06:14

## 2019-09-20 RX ADMIN — Medication 81 MILLIGRAM(S): at 12:23

## 2019-09-20 RX ADMIN — OXYCODONE HYDROCHLORIDE 10 MILLIGRAM(S): 5 TABLET ORAL at 14:40

## 2019-09-20 RX ADMIN — Medication 50 MILLIGRAM(S): at 21:04

## 2019-09-20 RX ADMIN — HYDROMORPHONE HYDROCHLORIDE 0.5 MILLIGRAM(S): 2 INJECTION INTRAMUSCULAR; INTRAVENOUS; SUBCUTANEOUS at 06:30

## 2019-09-20 RX ADMIN — Medication 100 MILLIGRAM(S): at 06:14

## 2019-09-20 RX ADMIN — Medication 100 MILLIGRAM(S): at 22:10

## 2019-09-20 RX ADMIN — INSULIN GLARGINE 9 UNIT(S): 100 INJECTION, SOLUTION SUBCUTANEOUS at 23:23

## 2019-09-20 RX ADMIN — Medication 50 MILLIGRAM(S): at 07:03

## 2019-09-20 RX ADMIN — Medication 650 MILLIGRAM(S): at 13:30

## 2019-09-20 RX ADMIN — OXYCODONE HYDROCHLORIDE 10 MILLIGRAM(S): 5 TABLET ORAL at 22:39

## 2019-09-20 NOTE — PROGRESS NOTE ADULT - ASSESSMENT
Pt is a 85 y/o Female with PMHx of CHF, DM, HLD, HTN, CAD prior MI, c/o fatigue, "feeling off" and unable to get out of bed; Tmax 104F toay. Rash/redness on buttocks noted by home health aid. patient also complaining of +chronic cough, nonproductive.  No abdominal pain, no n/v/d. No chest pain.  States she has been feeling hot and cold over past few days, and took her temperature today, found fever of 104, and came to ED for evaluation.  No dysuria, no diarrhea. +constipation (chronic).  Increased diffuse leg swelling for past few days. patient lives at home with family. walks by herself with no use of walker. denies of any MEAD< chest pain on exertion. compliant with all her home medications (04 Sep 2019 22:22)    ER vitals: Tm 102.2, P 75, /77.  WBC 14.8 --> 12.2.  Cr 2.0 <-- 1.3.  PCT 0.49.  Ucx >100K E.coli.  Pt given dose of ceftriaxone, now on cipro for UTI.  ID consult called for further abx managment.      Sepsis:    - fever, leukocytosis. Source less likely UTI given persistent fevers, pt has infiltration of perirectal tissues, likely cellulitis.  Abx broadened     - Monitor.  lactate.  Pct elevated 0.49.  Pt with rising WBC, abx broadened on 9/11 (dapto/aztreanam/flagyl).    - Monitor hemodynamic status and BPs.  s/p IV fluid bolus, cont maintanence fluid.      - blood cx, urine cx.  RVP (-).  No pna on cxr      Perirectal cellulitis, r/o abscess:    - Repeat CTap on 9/13 shows worsening inflammation with fluid and foci of air.   s/p OR on 9/15 for  debridement.  Pt not responding to antibiotics prior to debridement.  f/u surgical wound cultures - Enterococcus and Bacteroides    - Cont dapto/azactam/clindamycin.   Changed from flagyl to clindamycin for continued anerobic coverage and anti-toxin effect.  Cover for gas forming bacteria.       - On 9/20 repeat ct scan performed, pt with new ulcerous lesion with eschar over L perineum.  f/u with surgery for debridement.  Send repeat culture data.               Will follow,    Sandrita Zaman  [phone]

## 2019-09-20 NOTE — PROGRESS NOTE ADULT - SUBJECTIVE AND OBJECTIVE BOX
Patient seen and examined  no complaints    codeine (Unknown)  Kiwi (Anaphylaxis)  penicillins (Anaphylaxis)    Hospital Medications:   MEDICATIONS  (STANDING):  aspirin enteric coated 81 milliGRAM(s) Oral daily  atorvastatin 80 milliGRAM(s) Oral at bedtime  aztreonam  IVPB 1000 milliGRAM(s) IV Intermittent every 8 hours  clindamycin IVPB 900 milliGRAM(s) IV Intermittent every 8 hours  DAPTOmycin IVPB 350 milliGRAM(s) IV Intermittent every 24 hours  dextrose 50% Injectable 12.5 Gram(s) IV Push once  dextrose 50% Injectable 25 Gram(s) IV Push once  dextrose 50% Injectable 25 Gram(s) IV Push once  docusate sodium 100 milliGRAM(s) Oral three times a day  influenza   Vaccine 0.5 milliLiter(s) IntraMuscular once  insulin glargine Injectable (LANTUS) 18 Unit(s) SubCutaneous two times a day  insulin lispro (HumaLOG) corrective regimen sliding scale   SubCutaneous three times a day before meals  lactobacillus acidophilus 1 Tablet(s) Oral daily  lidocaine   Patch 2 Patch Transdermal daily  metoprolol tartrate 12.5 milliGRAM(s) Oral two times a day  multivitamin 1 Tablet(s) Oral daily  pantoprazole    Tablet 40 milliGRAM(s) Oral before breakfast  senna 2 Tablet(s) Oral at bedtime    REVIEW OF SYSTEMS:  CONSTITUTIONAL: No weakness, fevers or chills  EYES/ENT: No visual changes;  No vertigo or throat pain   NECK: No pain or stiffness  RESPIRATORY: No cough, wheezing, hemoptysis; No shortness of breath  CARDIOVASCULAR: No chest pain or palpitations.  GASTROINTESTINAL: No abdominal or epigastric pain. No nausea, vomiting, or hematemesis; No diarrhea or constipation. No melena or hematochezia.  GENITOURINARY: No dysuria, frequency, foamy urine, urinary urgency, incontinence or hematuria  NEUROLOGICAL: No numbness or weakness  SKIN: No itching, burning, rashes, or lesions   VASCULAR: No bilateral lower extremity edema.   All other review of systems is negative unless indicated above.    VITALS:  T(F): 97.7 (09-20-19 @ 08:00), Max: 98.9 (09-20-19 @ 00:30)  HR: 62 (09-20-19 @ 08:00)  BP: 126/76 (09-20-19 @ 08:00)  RR: 18 (09-20-19 @ 08:00)  SpO2: 96% (09-20-19 @ 08:00)  Wt(kg): --    09-19 @ 07:01  -  09-20 @ 07:00  --------------------------------------------------------  IN: 2630 mL / OUT: 600 mL / NET: 2030 mL    09-20 @ 07:01  -  09-20 @ 11:32  --------------------------------------------------------  IN: 60 mL / OUT: 0 mL / NET: 60 mL        PHYSICAL EXAM:  Constitutional: NAD  HEENT: anicteric sclera, oropharynx clear, MMM  Neck: No JVD  Respiratory: CTAB, no wheezes, rales or rhonchi  Cardiovascular: S1, S2, RRR  Gastrointestinal: BS+, soft, NT/ND  Extremities: 2+ edema  Psychiatric: Normal mood, normal affect  gu + infante     LABS:  09-20    131<L>  |  100  |  28<H>  ----------------------------<  149<H>  5.2   |  21<L>  |  0.93    Ca    7.9<L>      20 Sep 2019 07:21      Creatinine Trend: 0.93 <--, 1.00 <--, 0.98 <--, 1.10 <--, 1.16 <--, 1.20 <--, 1.25 <--, 1.27 <--, 1.22 <--                        7.5    17.34 )-----------( 244      ( 20 Sep 2019 10:04 )             22.8     Urine Studies:    Osmolality, Random Urine: 524 mosm/Kg (09-18 @ 01:19)    RADIOLOGY & ADDITIONAL STUDIES:

## 2019-09-20 NOTE — CONSULT NOTE ADULT - PROBLEM SELECTOR RECOMMENDATION 9
Suggest to continue current insulin regimen. Will continue monitoring FS, log, and FU.  Patient counseled for compliance with consistent low carb diet.

## 2019-09-20 NOTE — CONSULT NOTE ADULT - ASSESSMENT
Assessment:  DMT2: 86y Female with DM T2 with hyperglycemia, A1C 10.2%, was on insulin at home, now on insulin, blood sugars now trending within acceptable range, had no hypoglycemic episode, comfortable, daughter by the bedside states she is hardly eating.  Abscess: s/p debridement of necrotic tissue, on ABx, monitored and FU primary team/surgery  Anemia: s/p transfusion, stable, monitored.  HTN: Controlled,  on antihypertensive medications.          Radha Chin MD  Cell: [phone]  Office: [phone] Assessment:  DMT2: 86y Female with DM T2 with hyperglycemia, A1C 10.2%, was on insulin at home, now on insulin, blood sugars now trending within acceptable range, no hypoglycemic episode, comfortable, daughter by the bedside states she is hardly eating.  Abscess: s/p debridement of necrotic tissue, on ABx, monitored and FU primary team/surgery  Anemia: s/p transfusion, stable, monitored.  HTN: Controlled,  on antihypertensive medications.          Radha Chin MD  Cell: [phone]  Office: [phone]

## 2019-09-20 NOTE — PROGRESS NOTE ADULT - SUBJECTIVE AND OBJECTIVE BOX
Infectious Diseases progress note:    Subjective:  Events noted.  Pt with area of new eschar developing over left perineum.  S/p repeat CT scan.  Pt followed by surgery, recommended for repeat debridement.  Pt's daughter at bedside.     ROS:  CONSTITUTIONAL:  No fever, chills, rigors  CARDIOVASCULAR:  No chest pain or palpitations  RESPIRATORY:   No SOB, cough, dyspnea on exertion.  No wheezing  GASTROINTESTINAL:  No abd pain, N/V, diarrhea/constipation  EXTREMITIES:  No swelling or joint pain  GENITOURINARY:  No burning on urination, increased frequency or urgency.  No flank pain  NEUROLOGIC:  No HA, visual disturbances  SKIN: No rashes    Allergies    codeine (Unknown)  Kiwi (Anaphylaxis)  penicillins (Anaphylaxis)    Intolerances        ANTIBIOTICS/RELEVANT:  antimicrobials  aztreonam  IVPB 1000 milliGRAM(s) IV Intermittent every 8 hours  clindamycin IVPB 900 milliGRAM(s) IV Intermittent every 8 hours  DAPTOmycin IVPB 350 milliGRAM(s) IV Intermittent every 24 hours    immunologic:  influenza   Vaccine 0.5 milliLiter(s) IntraMuscular once    OTHER:  acetaminophen   Tablet .. 650 milliGRAM(s) Oral every 6 hours PRN  aspirin enteric coated 81 milliGRAM(s) Oral daily  atorvastatin 80 milliGRAM(s) Oral at bedtime  bisacodyl Suppository 10 milliGRAM(s) Rectal daily PRN  dextrose 40% Gel 15 Gram(s) Oral once PRN  dextrose 50% Injectable 12.5 Gram(s) IV Push once  dextrose 50% Injectable 25 Gram(s) IV Push once  dextrose 50% Injectable 25 Gram(s) IV Push once  docusate sodium 100 milliGRAM(s) Oral three times a day  glucagon  Injectable 1 milliGRAM(s) IntraMuscular once PRN  insulin glargine Injectable (LANTUS) 9 Unit(s) SubCutaneous once  insulin glargine Injectable (LANTUS) 18 Unit(s) SubCutaneous two times a day  insulin lispro (HumaLOG) corrective regimen sliding scale   SubCutaneous three times a day before meals  lactobacillus acidophilus 1 Tablet(s) Oral daily  lidocaine   Patch 2 Patch Transdermal daily  metoprolol tartrate 12.5 milliGRAM(s) Oral two times a day  multivitamin 1 Tablet(s) Oral daily  ondansetron Injectable 4 milliGRAM(s) IV Push every 8 hours PRN  oxyCODONE    IR 5 milliGRAM(s) Oral every 4 hours PRN  oxyCODONE    IR 10 milliGRAM(s) Oral every 4 hours PRN  pantoprazole    Tablet 40 milliGRAM(s) Oral before breakfast  senna 2 Tablet(s) Oral at bedtime      Objective:  Vital Signs Last 24 Hrs  T(C): 36.7 (20 Sep 2019 20:25), Max: 37.2 (20 Sep 2019 00:30)  T(F): 98.1 (20 Sep 2019 20:25), Max: 98.9 (20 Sep 2019 00:30)  HR: 76 (20 Sep 2019 20:25) (62 - 84)  BP: 105/65 (20 Sep 2019 20:25) (105/65 - 130/77)  BP(mean): --  RR: 18 (20 Sep 2019 20:25) (18 - 18)  SpO2: 95% (20 Sep 2019 20:25) (95% - 100%)    PHYSICAL EXAM:  Constitutional:NAD  Eyes:NIDIA, EOMI  Ear/Nose/Throat: no thrush, mucositis.  Moist mucous membranes	  Neck:no JVD, no lymphadenopathy, supple  Respiratory: CTA lucian  Cardiovascular: S1S2 RRR, no murmurs  Gastrointestinal:soft, nontender,  nondistended (+) BS  Extremities:no e/e/c  Skin:  rt perineal wound packing/dsg in place.  Left perineum with eschar/ulcerous lesion, area with darkish discoloration        LABS:                        7.5    17.34 )-----------( 244      ( 20 Sep 2019 10:04 )             22.8     09-20    131<L>  |  100  |  28<H>  ----------------------------<  149<H>  5.2   |  21<L>  |  0.93    Ca    7.9<L>      20 Sep 2019 07:21            Procalcitonin, Serum: 0.49 (09-05 @ 07:29)            Rapid RVP Result: [facility]          MICROBIOLOGY:    Culture - Surgical Swab (09.15.19 @ 18:03)    Specimen Source: .Surgical Swab    Culture Results:   Rare Enterococcus species "Susceptibilities not performed"  Few Bacteroides thetaiotamcron group "Susceptibilities not performed"          RADIOLOGY & ADDITIONAL STUDIES:    < from: CT Abdomen and Pelvis No Cont (09.20.19 @ 08:30) >  EXAM:  CT ABDOMEN AND PELVIS                            PROCEDURE DATE:  09/20/2019            INTERPRETATION:  CLINICAL INFORMATION: UTI. Right buttock abscess status   post incision and drainage. Follow-up.    COMPARISON: CT abdomen pelvis 9/13/2019    PROCEDURE:   CT of the Abdomen and Pelvis was performed without intravenous contrast.   Intravenous contrast: None.  Oral contrast: None.  Sagittal and coronal reformats were performed.    FINDINGS:    LOWER CHEST: Small moderate bilateral pleural effusions with associated   compressive atelectasis.    LIVER: Within normal limits.  BILE DUCTS: Normal caliber.  GALLBLADDER: Within normal limits.  SPLEEN: Within normal limits.  PANCREAS: Within normal limits.  ADRENALS: Within normal limits.  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 Small right renal   cyst.    BLADDER: Lieberman catheter.  REPRODUCTIVE ORGANS: Fibroid uterus.    BOWEL: No bowel obstruction.   PERITONEUM: Small ascites.  VESSELS: Atherosclerotic changes.  RETROPERITONEUM/LYMPH NODES: No lymphadenopathy.    ABDOMINAL WALL: Anasarca. Open wound overlying the coccyx extending   toward the left ischiorectal fossa with trace fluid component and air. No   large/drainable collection.   BONES: Degenerative changes.    IMPRESSION:     Open wound overlying the coccyx extending toward the left ischiorectal   fossa with trace fluid component and air. No large/drainable collection.     < end of copied text >

## 2019-09-20 NOTE — PROVIDER CONTACT NOTE (OTHER) - BACKGROUND
pt  labs drawn  results  noted
Patient admitted with UTI
Pt admitted for UTI
Pt admitted for UTI OF 9/4
admitted for fever, UTI
pt s/p I&D 9/15 to RIGHT buttock necrotic abcess. when changing and turning patient noted an area of hard, red, tight buldge in the LEFT perirectal area. pt c/o 9/10 pain in this area.

## 2019-09-20 NOTE — PROGRESS NOTE ADULT - ASSESSMENT
ASSESSMENT:  86F PMH CHF, HTN, DM, CAD prior MI, admitted for UTI and ROB on CKD3, now s/p from debridement and I&D of gluteal skin and tissue for a right gluteal cleft abscess with necrotic tissue 9/15.     PLAN:  - Dressing/packing changes daily, new ulcer visualized on right gluteal wound  - Continue to trend H/H, and WBC  - Pain control as needed  - Regular diet  - DVT ppx  - OOB and ambulating as tolerated  - Care per primary team    Red Surgery   [phone]

## 2019-09-20 NOTE — CHART NOTE - NSCHARTNOTEFT_GEN_A_CORE
Wound Care Team Note:    Request by RN for a wound care consult for incontinence associated dermatitis and referred to the unit manager for management by unit based skin champion based on protocol.    Lacey Sparrow, ARIELLE-C, CWOCN [id number]

## 2019-09-20 NOTE — PROGRESS NOTE ADULT - PROBLEM SELECTOR PLAN 1
likely pre renal harshil  cr continues to improve- currently stable  c/w infante  s/p prbc and   d/c ivf nacl @ 75cc x 24 hours  will trend bmp  high k- low k diet  monitor

## 2019-09-20 NOTE — CONSULT NOTE ADULT - SUBJECTIVE AND OBJECTIVE BOX
HPI:  Pt is a 87 y/o Female with PMHx of CHF, DM, HLD, HTN, CAD prior MI, c/o fatigue, "feeling off" and unable to get out of bed; Tmax 104F toay. Rash/redness on buttocks noted by home health aid. patient also complaining of +chronic cough, nonproductive.  No abdominal pain, no n/v/d. No chest pain.  States she has been feeling hot and cold over past few days, and took her temperature today, found fever of 104, and came to ED for evaluation.  No dysuria, no diarrhea. +constipation (chronic).  Increased diffuse leg swelling for past few days. patient lives at home with family. walks by herself with no use of walker. denies of any MEAD< chest pain on exertion. compliant with all her home medications (04 Sep 2019 22:22)    Patient has history of diabetes, A1C 10.2%, on insulin at home (Lantus BID, Humalog 12u before each meal), no recent hypoglycemic episodes, no polyuria polydipsia. Patient follows up with PCP for diabetes management.    PAST MEDICAL & SURGICAL HISTORY:  CHF (congestive heart failure)  STEMI (ST elevation myocardial infarction)  Palpitations  Diabetes mellitus  Dyslipidemia  HTN (hypertension)  S/P cardiac cath  S/P tonsillectomy  S/P lumpectomy, left breast: premalignant  S/P appendectomy  S/P cholecystectomy      FAMILY HISTORY:  No pertinent family history in first degree relatives      Social History:    Outpatient Medications:    MEDICATIONS  (STANDING):  aspirin enteric coated 81 milliGRAM(s) Oral daily  atorvastatin 80 milliGRAM(s) Oral at bedtime  aztreonam  IVPB 1000 milliGRAM(s) IV Intermittent every 8 hours  clindamycin IVPB 900 milliGRAM(s) IV Intermittent every 8 hours  DAPTOmycin IVPB 350 milliGRAM(s) IV Intermittent every 24 hours  dextrose 50% Injectable 12.5 Gram(s) IV Push once  dextrose 50% Injectable 25 Gram(s) IV Push once  dextrose 50% Injectable 25 Gram(s) IV Push once  docusate sodium 100 milliGRAM(s) Oral three times a day  influenza   Vaccine 0.5 milliLiter(s) IntraMuscular once  insulin glargine Injectable (LANTUS) 18 Unit(s) SubCutaneous two times a day  insulin lispro (HumaLOG) corrective regimen sliding scale   SubCutaneous three times a day before meals  lactobacillus acidophilus 1 Tablet(s) Oral daily  lidocaine   Patch 2 Patch Transdermal daily  metoprolol tartrate 12.5 milliGRAM(s) Oral two times a day  multivitamin 1 Tablet(s) Oral daily  pantoprazole    Tablet 40 milliGRAM(s) Oral before breakfast  senna 2 Tablet(s) Oral at bedtime    MEDICATIONS  (PRN):  acetaminophen   Tablet .. 650 milliGRAM(s) Oral every 6 hours PRN Mild Pain (1 - 3)  bisacodyl Suppository 10 milliGRAM(s) Rectal daily PRN Constipation  dextrose 40% Gel 15 Gram(s) Oral once PRN Blood Glucose LESS THAN 70 milliGRAM(s)/deciliter  glucagon  Injectable 1 milliGRAM(s) IntraMuscular once PRN Glucose LESS THAN 70 milligrams/deciliter  ondansetron Injectable 4 milliGRAM(s) IV Push every 8 hours PRN Nausea and/or Vomiting  oxyCODONE    IR 5 milliGRAM(s) Oral every 4 hours PRN Moderate Pain (4 - 6)  oxyCODONE    IR 10 milliGRAM(s) Oral every 4 hours PRN Severe Pain (7 - 10)      Allergies    codeine (Unknown)  Kiwi (Anaphylaxis)  penicillins (Anaphylaxis)    Intolerances      Review of Systems:  Constitutional: No fever, no chills  Eyes: No blurry vision  Neuro: No tremors  HEENT: No pain, no neck swelling  Cardiovascular: No chest pain, no palpitations  Respiratory: Has SOB, no cough  GI: No nausea, vomiting, abdominal pain  : No dysuria  Skin: no rash  MSK: Has leg swelling.  Psych: no depression  Endocrine: no polyuria, polydipsia    ALL OTHER SYSTEMS REVIEWED AND NEGATIVE    UNABLE TO OBTAIN    PHYSICAL EXAM:  VITALS: T(C): 36.9 (09-20-19 @ 13:44)  T(F): 98.4 (09-20-19 @ 13:44), Max: 98.9 (09-20-19 @ 00:30)  HR: 70 (09-20-19 @ 13:44) (62 - 77)  BP: 121/68 (09-20-19 @ 13:44) (107/69 - 126/76)  RR:  (18 - 18)  SpO2:  (96% - 100%)  Wt(kg): --  GENERAL: NAD, well-groomed, well-developed  EYES: No proptosis, no lid lag  HEENT:  Atraumatic, Normocephalic  THYROID: Normal size, no palpable nodules  RESPIRATORY: Clear to auscultation bilaterally; No rales, rhonchi, wheezing  CARDIOVASCULAR: Si S2, No murmurs;  GI: Soft, non distended, normal bowel sounds  SKIN: Dry, intact, No rashes or lesions  MUSCULOSKELETAL: Has BL lower extremity edema.  NEURO:  no tremor, sensation decreased in feet BL,    POCT Blood Glucose.: 157 mg/dL (09-20-19 @ 12:07)  POCT Blood Glucose.: 152 mg/dL (09-20-19 @ 08:48)  POCT Blood Glucose.: 277 mg/dL (09-20-19 @ 00:00)  POCT Blood Glucose.: 278 mg/dL (09-19-19 @ 22:05)  POCT Blood Glucose.: 324 mg/dL (09-19-19 @ 17:21)  POCT Blood Glucose.: 255 mg/dL (09-19-19 @ 12:24)  POCT Blood Glucose.: 208 mg/dL (09-19-19 @ 08:28)  POCT Blood Glucose.: 226 mg/dL (09-18-19 @ 22:20)  POCT Blood Glucose.: 287 mg/dL (09-18-19 @ 17:19)  POCT Blood Glucose.: 257 mg/dL (09-18-19 @ 12:11)  POCT Blood Glucose.: 204 mg/dL (09-18-19 @ 08:36)  POCT Blood Glucose.: 245 mg/dL (09-17-19 @ 22:07)  POCT Blood Glucose.: 232 mg/dL (09-17-19 @ 17:27)                            7.5    17.34 )-----------( 244      ( 20 Sep 2019 10:04 )             22.8       09-20    131<L>  |  100  |  28<H>  ----------------------------<  149<H>  5.2   |  21<L>  |  0.93    EGFR if : 64  EGFR if non : 56<L>    Ca    7.9<L>      09-20    TPro  x   /  Alb  x   /  TBili  x   /  DBili  x   /  AST  x   /  ALT  x   /  AlkPhos  214<H>  09-18      Thyroid Function Tests:      Hemoglobin A1C, Whole Blood: 10.2 % <H> [4.0 - 5.6] (09-05-19 @ 09:39)      09-05 Chol 66 LDL 24 HDL 28<L> Trig 69    Radiology: HPI:  Pt is a 87 y/o Female with PMHx of CHF, DM, HLD, HTN, CAD prior MI, c/o fatigue, "feeling off" and unable to get out of bed; Tmax 104F toay. Rash/redness on buttocks noted by home health aid. patient also complaining of +chronic cough, nonproductive.  No abdominal pain, no n/v/d. No chest pain.  States she has been feeling hot and cold over past few days, and took her temperature today, found fever of 104, and came to ED for evaluation.  No dysuria, no diarrhea. +constipation (chronic).  Increased diffuse leg swelling for past few days. patient lives at home with family. walks by herself with no use of walker. denies of any MEAD< chest pain on exertion. compliant with all her home medications (04 Sep 2019 22:22)    Patient has history of diabetes,  A1C 10.2%, on insulin at home (Lantus BID, Humalog 12u before each meal), no recent hypoglycemic episodes, no polyuria polydipsia. Patient follows up with PCP for diabetes management.    PAST MEDICAL & SURGICAL HISTORY:  CHF (congestive heart failure)  STEMI (ST elevation myocardial infarction)  Palpitations  Diabetes mellitus  Dyslipidemia  HTN (hypertension)  S/P cardiac cath  S/P tonsillectomy  S/P lumpectomy, left breast: premalignant  S/P appendectomy  S/P cholecystectomy      FAMILY HISTORY:  No pertinent family history in first degree relatives      Social History:    Outpatient Medications:    MEDICATIONS  (STANDING):  aspirin enteric coated 81 milliGRAM(s) Oral daily  atorvastatin 80 milliGRAM(s) Oral at bedtime  aztreonam  IVPB 1000 milliGRAM(s) IV Intermittent every 8 hours  clindamycin IVPB 900 milliGRAM(s) IV Intermittent every 8 hours  DAPTOmycin IVPB 350 milliGRAM(s) IV Intermittent every 24 hours  dextrose 50% Injectable 12.5 Gram(s) IV Push once  dextrose 50% Injectable 25 Gram(s) IV Push once  dextrose 50% Injectable 25 Gram(s) IV Push once  docusate sodium 100 milliGRAM(s) Oral three times a day  influenza   Vaccine 0.5 milliLiter(s) IntraMuscular once  insulin glargine Injectable (LANTUS) 18 Unit(s) SubCutaneous two times a day  insulin lispro (HumaLOG) corrective regimen sliding scale   SubCutaneous three times a day before meals  lactobacillus acidophilus 1 Tablet(s) Oral daily  lidocaine   Patch 2 Patch Transdermal daily  metoprolol tartrate 12.5 milliGRAM(s) Oral two times a day  multivitamin 1 Tablet(s) Oral daily  pantoprazole    Tablet 40 milliGRAM(s) Oral before breakfast  senna 2 Tablet(s) Oral at bedtime    MEDICATIONS  (PRN):  acetaminophen   Tablet .. 650 milliGRAM(s) Oral every 6 hours PRN Mild Pain (1 - 3)  bisacodyl Suppository 10 milliGRAM(s) Rectal daily PRN Constipation  dextrose 40% Gel 15 Gram(s) Oral once PRN Blood Glucose LESS THAN 70 milliGRAM(s)/deciliter  glucagon  Injectable 1 milliGRAM(s) IntraMuscular once PRN Glucose LESS THAN 70 milligrams/deciliter  ondansetron Injectable 4 milliGRAM(s) IV Push every 8 hours PRN Nausea and/or Vomiting  oxyCODONE    IR 5 milliGRAM(s) Oral every 4 hours PRN Moderate Pain (4 - 6)  oxyCODONE    IR 10 milliGRAM(s) Oral every 4 hours PRN Severe Pain (7 - 10)      Allergies    codeine (Unknown)  Kiwi (Anaphylaxis)  penicillins (Anaphylaxis)    Intolerances      Review of Systems:  Constitutional: No fever, no chills  Eyes: No blurry vision  Neuro: No tremors  HEENT: No pain, no neck swelling  Cardiovascular: No chest pain, no palpitations  Respiratory: Has SOB, no cough  GI: No nausea, vomiting, abdominal pain  : No dysuria  Skin: no rash  MSK: Has leg swelling.  Psych: no depression  Endocrine: no polyuria, polydipsia    ALL OTHER SYSTEMS REVIEWED AND NEGATIVE    UNABLE TO OBTAIN    PHYSICAL EXAM:  VITALS: T(C): 36.9 (09-20-19 @ 13:44)  T(F): 98.4 (09-20-19 @ 13:44), Max: 98.9 (09-20-19 @ 00:30)  HR: 70 (09-20-19 @ 13:44) (62 - 77)  BP: 121/68 (09-20-19 @ 13:44) (107/69 - 126/76)  RR:  (18 - 18)  SpO2:  (96% - 100%)  Wt(kg): --  GENERAL: NAD, well-groomed, well-developed  EYES: No proptosis, no lid lag  HEENT:  Atraumatic, Normocephalic  THYROID: Normal size, no palpable nodules  RESPIRATORY: Clear to auscultation bilaterally; No rales, rhonchi, wheezing  CARDIOVASCULAR: Si S2, No murmurs;  GI: Soft, non distended, normal bowel sounds  SKIN: Dry, intact, No rashes or lesions  MUSCULOSKELETAL: Has BL lower extremity edema.  NEURO:  no tremor, sensation decreased in feet BL,    POCT Blood Glucose.: 157 mg/dL (09-20-19 @ 12:07)  POCT Blood Glucose.: 152 mg/dL (09-20-19 @ 08:48)  POCT Blood Glucose.: 277 mg/dL (09-20-19 @ 00:00)  POCT Blood Glucose.: 278 mg/dL (09-19-19 @ 22:05)  POCT Blood Glucose.: 324 mg/dL (09-19-19 @ 17:21)  POCT Blood Glucose.: 255 mg/dL (09-19-19 @ 12:24)  POCT Blood Glucose.: 208 mg/dL (09-19-19 @ 08:28)  POCT Blood Glucose.: 226 mg/dL (09-18-19 @ 22:20)  POCT Blood Glucose.: 287 mg/dL (09-18-19 @ 17:19)  POCT Blood Glucose.: 257 mg/dL (09-18-19 @ 12:11)  POCT Blood Glucose.: 204 mg/dL (09-18-19 @ 08:36)  POCT Blood Glucose.: 245 mg/dL (09-17-19 @ 22:07)  POCT Blood Glucose.: 232 mg/dL (09-17-19 @ 17:27)                            7.5    17.34 )-----------( 244      ( 20 Sep 2019 10:04 )             22.8       09-20    131<L>  |  100  |  28<H>  ----------------------------<  149<H>  5.2   |  21<L>  |  0.93    EGFR if : 64  EGFR if non : 56<L>    Ca    7.9<L>      09-20    TPro  x   /  Alb  x   /  TBili  x   /  DBili  x   /  AST  x   /  ALT  x   /  AlkPhos  214<H>  09-18      Thyroid Function Tests:      Hemoglobin A1C, Whole Blood: 10.2 % <H> [4.0 - 5.6] (09-05-19 @ 09:39)      09-05 Chol 66 LDL 24 HDL 28<L> Trig 69    Radiology:

## 2019-09-20 NOTE — PROGRESS NOTE ADULT - SUBJECTIVE AND OBJECTIVE BOX
Patient is a 86y old  Female who presents with a chief complaint of Fever, UTI (20 Sep 2019 14:50)      INTERVAL HISTORY: feels ok    	  MEDICATIONS:  metoprolol tartrate 12.5 milliGRAM(s) Oral two times a day        PHYSICAL EXAM:  T(C): 36.9 (09-20-19 @ 13:44), Max: 37.2 (09-20-19 @ 00:30)  HR: 70 (09-20-19 @ 13:44) (62 - 77)  BP: 121/68 (09-20-19 @ 13:44) (107/69 - 126/76)  RR: 18 (09-20-19 @ 13:44) (18 - 18)  SpO2: 99% (09-20-19 @ 13:44) (96% - 100%)  Wt(kg): --  I&O's Summary    19 Sep 2019 07:01  -  20 Sep 2019 07:00  --------------------------------------------------------  IN: 2630 mL / OUT: 600 mL / NET: 2030 mL    20 Sep 2019 07:01  -  20 Sep 2019 15:59  --------------------------------------------------------  IN: 60 mL / OUT: 250 mL / NET: -190 mL          Appearance: In no distress	  HEENT:    PERRL, EOMI	  Cardiovascular:  S1 S2, No JVD  Respiratory: Lungs clear to auscultation	  Gastrointestinal:  Soft, Non-tender, + BS	  Extremities:  No edema of LE                                7.5    17.34 )-----------( 244      ( 20 Sep 2019 10:04 )             22.8     09-20    131<L>  |  100  |  28<H>  ----------------------------<  149<H>  5.2   |  21<L>  |  0.93    Ca    7.9<L>      20 Sep 2019 07:21          Labs personally reviewed      ASSESSMENT/PLAN: 	  tolerated surgery well  afebrile, improving  BP well controlled   Repeat CT noted      Srini Velasco DO [facility]  Cardiovascular Medicine  [phone]

## 2019-09-20 NOTE — CHART NOTE - NSCHARTNOTEFT_GEN_A_CORE
CT scan performed and images reviewed. Concern for continued inflammation/infection on L buttock.    Discussed with patient and daughter that she may benefit from further operative debridement. Patient reluctant to return for more intervention, as she is feeling better and reports improvement in her pain except during dressing changes and when being cleaned. At this time, she has no fevers and her energy is improving.    Agree with wound care consult and recommendation of offloading by sitting in chair  Request transfusion, as Hct continues to drift with no obvious source of bleeding, may benefit from additional transfusion in setting of cardiac disease and possible return to OR  Will re-examine in the afternoon to decide regarding debridement; patient and daughter aware  NPO at this time for possible OR, discussed with NP Zeta from medicine team    --TERESA Aiken, PGY-5

## 2019-09-20 NOTE — PROVIDER CONTACT NOTE (OTHER) - SITUATION
Patient refusing second half unit of blood stating the first one made her nauseas and that she will reconsider having it administered in the morning
Pt has ivl that is patent but complaining of pain during iv antibiotic administration
Pt with BP 85/49 manual
see above
while turning patient noted an area of hard, red, tight buldge in the LEFT perirectal area.
pt  dx   uti   abcess surgical removed

## 2019-09-20 NOTE — PROGRESS NOTE ADULT - PROBLEM SELECTOR PLAN 1
R buttock infiltrate with necrotic tissue  SUrg eval appreciated   SOft Tis US noted   pain control  CT noted  Abx as per ID  CT Pelv noted  F/U surgical recs regarding further W/U

## 2019-09-20 NOTE — PROGRESS NOTE ADULT - SUBJECTIVE AND OBJECTIVE BOX
COLORECTAL SURGERY PROGRESS NOTE    86yFemale    SUBJECTIVE:  Patient seen and examined at bedside. Having pain in gluteal areas, right buttock appears to have a new ulcer at distal aspect.  Denies fevers, chills, nausea, vomiting, chest pain, and shortness of breath.     --------------------------------------------------------------------------------------------------  OBJECTIVE:     Physical Exam:  General: AAOx3, NAD, resting comfortably   HEENT: NC/AT  Respiratory: no increased work of breathing   Abdomen: soft, nontender, nondistended; right perianal surgical dressings taken down and packings removed. Abscess cavity with some granulation tissue, no active bleed visualized. Re-packed with wet-to-dry kerlix and abd. New ulcer visualized at distal aspect of right gluteal wound.  Extremities: warm and well perfused    --------------------------------------------------------------------------------------------------  Vital Signs:  Vital Signs Last 24 Hrs  T(C): 36.4 (20 Sep 2019 06:12), Max: 37.2 (20 Sep 2019 00:30)  T(F): 97.5 (20 Sep 2019 06:12), Max: 98.9 (20 Sep 2019 00:30)  HR: 75 (20 Sep 2019 06:12) (72 - 79)  BP: 121/73 (20 Sep 2019 06:12) (107/69 - 121/73)  BP(mean): --  RR: 18 (20 Sep 2019 06:12) (18 - 18)  SpO2: 97% (20 Sep 2019 06:12) (97% - 100%)  ----------------------------------------  Inputs/Outputs:    I&O's Detail    19 Sep 2019 07:01  -  20 Sep 2019 07:00  --------------------------------------------------------  IN:    IV PiggyBack: 350 mL    Oral Fluid: 1080 mL    sodium chloride 0.9%.: 1200 mL  Total IN: 2630 mL    OUT:    Indwelling Catheter - Urethral: 150 mL    Voided: 450 mL  Total OUT: 600 mL    Total NET: 2030 mL                      --------------------------------------------------------------------------------------------------  Laboratories:  09-20    131<L>  |  100  |  28<H>  ----------------------------<  149<H>  5.2   |  21<L>  |  0.93    Ca    7.9<L>      20 Sep 2019 07:21                          8.4    20.4  )-----------( 132      ( 19 Sep 2019 11:48 )             26.3     09-18    130<L>  |  98  |  38<H>  ----------------------------<  195<H>  5.0   |  18<L>  |  1.10    Ca    7.8<L>      18 Sep 2019 06:59    TPro  x   /  Alb  x   /  TBili  x   /  DBili  x   /  AST  x   /  ALT  x   /  AlkPhos  214<H>  09-18                        8.9    22.7  )-----------( 233      ( 18 Sep 2019 18:22 )             26.7           --------------------------------------------------------------------------------------------------  Medications:  MEDICATIONS  (STANDING):  aspirin enteric coated 81 milliGRAM(s) Oral daily  atorvastatin 80 milliGRAM(s) Oral at bedtime  aztreonam  IVPB 1000 milliGRAM(s) IV Intermittent every 8 hours  clindamycin IVPB 900 milliGRAM(s) IV Intermittent every 8 hours  DAPTOmycin IVPB 350 milliGRAM(s) IV Intermittent every 24 hours  dextrose 50% Injectable 12.5 Gram(s) IV Push once  dextrose 50% Injectable 25 Gram(s) IV Push once  dextrose 50% Injectable 25 Gram(s) IV Push once  docusate sodium 100 milliGRAM(s) Oral three times a day  influenza   Vaccine 0.5 milliLiter(s) IntraMuscular once  insulin glargine Injectable (LANTUS) 18 Unit(s) SubCutaneous two times a day  insulin lispro (HumaLOG) corrective regimen sliding scale   SubCutaneous three times a day before meals  lactobacillus acidophilus 1 Tablet(s) Oral daily  lidocaine   Patch 2 Patch Transdermal daily  metoprolol tartrate 12.5 milliGRAM(s) Oral two times a day  multivitamin 1 Tablet(s) Oral daily  pantoprazole    Tablet 40 milliGRAM(s) Oral before breakfast  senna 2 Tablet(s) Oral at bedtime  sodium chloride 0.9%. 1000 milliLiter(s) (75 mL/Hr) IV Continuous <Continuous>    MEDICATIONS  (PRN):  acetaminophen   Tablet .. 650 milliGRAM(s) Oral every 6 hours PRN Mild Pain (1 - 3)  bisacodyl Suppository 10 milliGRAM(s) Rectal daily PRN Constipation  dextrose 40% Gel 15 Gram(s) Oral once PRN Blood Glucose LESS THAN 70 milliGRAM(s)/deciliter  glucagon  Injectable 1 milliGRAM(s) IntraMuscular once PRN Glucose LESS THAN 70 milligrams/deciliter  ondansetron Injectable 4 milliGRAM(s) IV Push every 8 hours PRN Nausea and/or Vomiting  oxyCODONE    IR 5 milliGRAM(s) Oral every 4 hours PRN Moderate Pain (4 - 6)  oxyCODONE    IR 10 milliGRAM(s) Oral every 4 hours PRN Severe Pain (7 - 10)

## 2019-09-20 NOTE — PROGRESS NOTE ADULT - SUBJECTIVE AND OBJECTIVE BOX
Subjective: Patient seen and examined. No new events except as noted.   leukocytosis trending down  pain is better controlled  cT pelvic done   afebrile     REVIEW OF SYSTEMS:    CONSTITUTIONAL: No weakness, fevers or chills  EYES/ENT: No visual changes;  No vertigo or throat pain   NECK: No pain or stiffness  RESPIRATORY: No cough, wheezing, hemoptysis; No shortness of breath  CARDIOVASCULAR: No chest pain or palpitations  GASTROINTESTINAL: No abdominal or epigastric pain.   GENITOURINARY: No dysuria, frequency or hematuria  NEUROLOGICAL: No numbness or weakness  SKIN: No itching, burning, rashes, or lesions   All other review of systems is negative unless indicated above.    MEDICATIONS:  MEDICATIONS  (STANDING):  aspirin enteric coated 81 milliGRAM(s) Oral daily  atorvastatin 80 milliGRAM(s) Oral at bedtime  aztreonam  IVPB 1000 milliGRAM(s) IV Intermittent every 8 hours  clindamycin IVPB 900 milliGRAM(s) IV Intermittent every 8 hours  DAPTOmycin IVPB 350 milliGRAM(s) IV Intermittent every 24 hours  dextrose 50% Injectable 12.5 Gram(s) IV Push once  dextrose 50% Injectable 25 Gram(s) IV Push once  dextrose 50% Injectable 25 Gram(s) IV Push once  docusate sodium 100 milliGRAM(s) Oral three times a day  influenza   Vaccine 0.5 milliLiter(s) IntraMuscular once  insulin glargine Injectable (LANTUS) 18 Unit(s) SubCutaneous two times a day  insulin lispro (HumaLOG) corrective regimen sliding scale   SubCutaneous three times a day before meals  lactobacillus acidophilus 1 Tablet(s) Oral daily  lidocaine   Patch 2 Patch Transdermal daily  metoprolol tartrate 12.5 milliGRAM(s) Oral two times a day  multivitamin 1 Tablet(s) Oral daily  pantoprazole    Tablet 40 milliGRAM(s) Oral before breakfast  senna 2 Tablet(s) Oral at bedtime      PHYSICAL EXAM:  T(C): 36.3 (09-20-19 @ 17:30), Max: 37.2 (09-20-19 @ 00:30)  HR: 84 (09-20-19 @ 17:30) (62 - 84)  BP: 122/70 (09-20-19 @ 17:30) (107/69 - 130/77)  RR: 18 (09-20-19 @ 17:30) (18 - 18)  SpO2: 95% (09-20-19 @ 17:30) (95% - 100%)  Wt(kg): --  I&O's Summary    19 Sep 2019 07:01  -  20 Sep 2019 07:00  --------------------------------------------------------  IN: 2630 mL / OUT: 600 mL / NET: 2030 mL    20 Sep 2019 07:01  -  20 Sep 2019 18:05  --------------------------------------------------------  IN: 60 mL / OUT: 250 mL / NET: -190 mL          Appearance: Normal, AOx3  HEENT:   Normal oral mucosa, PERRL, EOMI	  Lymphatic: No lymphadenopathy , no edema  Cardiovascular: Normal S1 S2, No JVD  Respiratory: Lungs clear to auscultation, normal effort 	  Gastrointestinal:  Soft, Non-tender, + BS	  Skin: buttock dressing intact , R inner buttock swelling fluid filled   Musculoskeletal: Normal range of motion, normal strength  Psychiatry:  Mood & affect appropriate  Ext: No edema      All labs, Imaging and EKGs personally reviewed                           7.5    17.34 )-----------( 244      ( 20 Sep 2019 10:04 )             22.8               09-20    131<L>  |  100  |  28<H>  ----------------------------<  149<H>  5.2   |  21<L>  |  0.93    Ca    7.9<L>      20 Sep 2019 07:21         < from: CT Abdomen and Pelvis No Cont (09.20.19 @ 08:30) >  IMPRESSION:     Open wound overlying the coccyx extending toward the left ischiorectal   fossa with trace fluid component and air. No large/drainable collection.

## 2019-09-21 LAB
ANION GAP SERPL CALC-SCNC: 9 MMOL/L — SIGNIFICANT CHANGE UP (ref 5–17)
BASOPHILS # BLD AUTO: 0.03 K/UL — SIGNIFICANT CHANGE UP (ref 0–0.2)
BASOPHILS NFR BLD AUTO: 0.3 % — SIGNIFICANT CHANGE UP (ref 0–2)
BLD GP AB SCN SERPL QL: NEGATIVE — SIGNIFICANT CHANGE UP
BUN SERPL-MCNC: 24 MG/DL — HIGH (ref 7–23)
CALCIUM SERPL-MCNC: 8.2 MG/DL — LOW (ref 8.4–10.5)
CHLORIDE SERPL-SCNC: 102 MMOL/L — SIGNIFICANT CHANGE UP (ref 96–108)
CK SERPL-CCNC: 50 U/L — SIGNIFICANT CHANGE UP (ref 25–170)
CO2 SERPL-SCNC: 22 MMOL/L — SIGNIFICANT CHANGE UP (ref 22–31)
CREAT SERPL-MCNC: 0.9 MG/DL — SIGNIFICANT CHANGE UP (ref 0.5–1.3)
EOSINOPHIL # BLD AUTO: 0.12 K/UL — SIGNIFICANT CHANGE UP (ref 0–0.5)
EOSINOPHIL NFR BLD AUTO: 1.1 % — SIGNIFICANT CHANGE UP (ref 0–6)
GLUCOSE SERPL-MCNC: 101 MG/DL — HIGH (ref 70–99)
HCT VFR BLD CALC: 26.2 % — LOW (ref 34.5–45)
HGB BLD-MCNC: 8.5 G/DL — LOW (ref 11.5–15.5)
IMM GRANULOCYTES NFR BLD AUTO: 0.7 % — SIGNIFICANT CHANGE UP (ref 0–1.5)
LYMPHOCYTES # BLD AUTO: 0.99 K/UL — LOW (ref 1–3.3)
LYMPHOCYTES # BLD AUTO: 8.8 % — LOW (ref 13–44)
MAGNESIUM SERPL-MCNC: 2 MG/DL — SIGNIFICANT CHANGE UP (ref 1.6–2.6)
MCHC RBC-ENTMCNC: 29.8 PG — SIGNIFICANT CHANGE UP (ref 27–34)
MCHC RBC-ENTMCNC: 32.4 GM/DL — SIGNIFICANT CHANGE UP (ref 32–36)
MCV RBC AUTO: 91.9 FL — SIGNIFICANT CHANGE UP (ref 80–100)
MONOCYTES # BLD AUTO: 0.81 K/UL — SIGNIFICANT CHANGE UP (ref 0–0.9)
MONOCYTES NFR BLD AUTO: 7.2 % — SIGNIFICANT CHANGE UP (ref 2–14)
NEUTROPHILS # BLD AUTO: 9.25 K/UL — HIGH (ref 1.8–7.4)
NEUTROPHILS NFR BLD AUTO: 81.9 % — HIGH (ref 43–77)
PHOSPHATE SERPL-MCNC: 3.6 MG/DL — SIGNIFICANT CHANGE UP (ref 2.5–4.5)
PLATELET # BLD AUTO: 252 K/UL — SIGNIFICANT CHANGE UP (ref 150–400)
POTASSIUM SERPL-MCNC: 5.1 MMOL/L — SIGNIFICANT CHANGE UP (ref 3.5–5.3)
POTASSIUM SERPL-SCNC: 5.1 MMOL/L — SIGNIFICANT CHANGE UP (ref 3.5–5.3)
RBC # BLD: 2.85 M/UL — LOW (ref 3.8–5.2)
RBC # FLD: 16.1 % — HIGH (ref 10.3–14.5)
RH IG SCN BLD-IMP: POSITIVE — SIGNIFICANT CHANGE UP
SODIUM SERPL-SCNC: 133 MMOL/L — LOW (ref 135–145)
WBC # BLD: 11.28 K/UL — HIGH (ref 3.8–10.5)
WBC # FLD AUTO: 11.28 K/UL — HIGH (ref 3.8–10.5)

## 2019-09-21 RX ORDER — FUROSEMIDE 40 MG
20 TABLET ORAL DAILY
Refills: 0 | Status: DISCONTINUED | OUTPATIENT
Start: 2019-09-21 | End: 2019-09-22

## 2019-09-21 RX ADMIN — Medication 12.5 MILLIGRAM(S): at 18:31

## 2019-09-21 RX ADMIN — OXYCODONE HYDROCHLORIDE 10 MILLIGRAM(S): 5 TABLET ORAL at 22:29

## 2019-09-21 RX ADMIN — OXYCODONE HYDROCHLORIDE 10 MILLIGRAM(S): 5 TABLET ORAL at 13:40

## 2019-09-21 RX ADMIN — Medication 20 MILLIGRAM(S): at 18:31

## 2019-09-21 RX ADMIN — Medication 1 TABLET(S): at 12:56

## 2019-09-21 RX ADMIN — Medication 50 MILLIGRAM(S): at 19:28

## 2019-09-21 RX ADMIN — PANTOPRAZOLE SODIUM 40 MILLIGRAM(S): 20 TABLET, DELAYED RELEASE ORAL at 06:02

## 2019-09-21 RX ADMIN — Medication 100 MILLIGRAM(S): at 12:57

## 2019-09-21 RX ADMIN — Medication 100 MILLIGRAM(S): at 06:02

## 2019-09-21 RX ADMIN — ATORVASTATIN CALCIUM 80 MILLIGRAM(S): 80 TABLET, FILM COATED ORAL at 21:28

## 2019-09-21 RX ADMIN — INSULIN GLARGINE 18 UNIT(S): 100 INJECTION, SOLUTION SUBCUTANEOUS at 22:24

## 2019-09-21 RX ADMIN — Medication 100 MILLIGRAM(S): at 21:28

## 2019-09-21 RX ADMIN — Medication 100 MILLIGRAM(S): at 22:06

## 2019-09-21 RX ADMIN — SENNA PLUS 2 TABLET(S): 8.6 TABLET ORAL at 21:28

## 2019-09-21 RX ADMIN — Medication 12.5 MILLIGRAM(S): at 06:02

## 2019-09-21 RX ADMIN — Medication 50 MILLIGRAM(S): at 06:46

## 2019-09-21 RX ADMIN — OXYCODONE HYDROCHLORIDE 10 MILLIGRAM(S): 5 TABLET ORAL at 03:10

## 2019-09-21 RX ADMIN — OXYCODONE HYDROCHLORIDE 10 MILLIGRAM(S): 5 TABLET ORAL at 08:40

## 2019-09-21 RX ADMIN — OXYCODONE HYDROCHLORIDE 10 MILLIGRAM(S): 5 TABLET ORAL at 12:55

## 2019-09-21 RX ADMIN — OXYCODONE HYDROCHLORIDE 10 MILLIGRAM(S): 5 TABLET ORAL at 04:10

## 2019-09-21 RX ADMIN — OXYCODONE HYDROCHLORIDE 10 MILLIGRAM(S): 5 TABLET ORAL at 08:16

## 2019-09-21 RX ADMIN — Medication 81 MILLIGRAM(S): at 12:56

## 2019-09-21 RX ADMIN — DAPTOMYCIN 114 MILLIGRAM(S): 500 INJECTION, POWDER, LYOPHILIZED, FOR SOLUTION INTRAVENOUS at 20:49

## 2019-09-21 RX ADMIN — OXYCODONE HYDROCHLORIDE 10 MILLIGRAM(S): 5 TABLET ORAL at 21:29

## 2019-09-21 NOTE — PROGRESS NOTE ADULT - SUBJECTIVE AND OBJECTIVE BOX
Nephrology Followup Note - [phone] - Dr Gibson / Dr Lagos / Dr Kumari / Dr Barrett / Dr Chin / Dr Kaur / Dr Bone / Dr Geronimo  Pt seen and examined at bedside  No acute events overnight. c/o LE swelling.     Allergies:  codeine (Unknown)  Kiwi (Anaphylaxis)  penicillins (Anaphylaxis)    Hospital Medications:   MEDICATIONS  (STANDING):  aspirin enteric coated 81 milliGRAM(s) Oral daily  atorvastatin 80 milliGRAM(s) Oral at bedtime  aztreonam  IVPB 1000 milliGRAM(s) IV Intermittent every 8 hours  clindamycin IVPB 900 milliGRAM(s) IV Intermittent every 8 hours  DAPTOmycin IVPB 350 milliGRAM(s) IV Intermittent every 24 hours  dextrose 50% Injectable 12.5 Gram(s) IV Push once  dextrose 50% Injectable 25 Gram(s) IV Push once  dextrose 50% Injectable 25 Gram(s) IV Push once  docusate sodium 100 milliGRAM(s) Oral three times a day  furosemide    Tablet 20 milliGRAM(s) Oral daily  influenza   Vaccine 0.5 milliLiter(s) IntraMuscular once  insulin glargine Injectable (LANTUS) 18 Unit(s) SubCutaneous two times a day  insulin lispro (HumaLOG) corrective regimen sliding scale   SubCutaneous three times a day before meals  lactobacillus acidophilus 1 Tablet(s) Oral daily  lidocaine   Patch 2 Patch Transdermal daily  metoprolol tartrate 12.5 milliGRAM(s) Oral two times a day  multivitamin 1 Tablet(s) Oral daily  pantoprazole    Tablet 40 milliGRAM(s) Oral before breakfast  senna 2 Tablet(s) Oral at bedtime    VITALS:  T(F): 97.6 (09-21-19 @ 08:34), Max: 98.2 (09-21-19 @ 01:02)  HR: 62 (09-21-19 @ 08:34)  BP: 111/71 (09-21-19 @ 08:34)  RR: 18 (09-21-19 @ 08:34)  SpO2: 98% (09-21-19 @ 08:34)  Wt(kg): --    09-20 @ 07:01  -  09-21 @ 07:00  --------------------------------------------------------  IN: 600 mL / OUT: 800 mL / NET: -200 mL        PHYSICAL EXAM:  Constitutional: NAD  HEENT: anicteric sclera, oropharynx clear, MMM  Neck: No JVD  Respiratory: CTAB, no wheezes, rales or rhonchi  Cardiovascular: S1, S2, RRR  Gastrointestinal: BS+, soft, NT/ND  Extremities: No cyanosis or clubbing. Trace peripheral edema  Neurological: A/O x 3, no focal deficits  Psychiatric: Normal mood, normal affect  : No CVA tenderness. No infante.   Skin: No rashes    LABS:  09-21    133<L>  |  102  |  24<H>  ----------------------------<  101<H>  5.1   |  22  |  0.90    Ca    8.2<L>      21 Sep 2019 07:21  Phos  3.6     09-21  Mg     2.0     09-21      Creatinine Trend: 0.90 <--, 0.93 <--, 1.00 <--, 0.98 <--, 1.10 <--, 1.16 <--, 1.20 <--, 1.25 <--, 1.27 <--                        8.5    11.28 )-----------( 252      ( 21 Sep 2019 10:42 )             26.2     Urine Studies:    Osmolality, Random Urine: 524 mosm/Kg (09-18 @ 01:19)    RADIOLOGY & ADDITIONAL STUDIES:

## 2019-09-21 NOTE — PROGRESS NOTE ADULT - ASSESSMENT
Assessment:  DMT2: 86y Female with DM T2 with hyperglycemia, A1C 10.2%, on insulin, blood sugarsimproving, no hypoglycemic episode, comfortable.  Abscess: s/p debridement of necrotic tissue, on ABx, monitored and FU primary team/surgery  Anemia: s/p transfusion, stable, monitored.  HTN: Controlled, on antihypertensive medications.          Radha Chin MD  Cell: [phone]  Office: [phone]

## 2019-09-21 NOTE — PROGRESS NOTE ADULT - ASSESSMENT
Pt is a 85 y/o Female with PMHx of CHF, DM, HLD, HTN, CAD prior MI, c/o fatigue, "feeling off" and unable to get out of bed; Tmax 104F toay. Rash/redness on buttocks noted by home health aid. patient also complaining of +chronic cough, nonproductive.  No abdominal pain, no n/v/d. No chest pain.  States she has been feeling hot and cold over past few days, and took her temperature today, found fever of 104, and came to ED for evaluation.  No dysuria, no diarrhea. +constipation (chronic).  Increased diffuse leg swelling for past few days. patient lives at home with family. walks by herself with no use of walker. denies of any MEAD< chest pain on exertion. compliant with all her home medications (04 Sep 2019 22:22)    ER vitals: Tm 102.2, P 75, /77.  WBC 14.8 --> 12.2.  Cr 2.0 <-- 1.3.  PCT 0.49.  Ucx >100K E.coli.  Pt given dose of ceftriaxone, now on cipro for UTI.  ID consult called for further abx managment.      Sepsis:    - fever, leukocytosis. Source less likely UTI given persistent fevers, pt has infiltration of perirectal tissues, likely cellulitis.  Abx broadened     - Monitor.  lactate.  Pct elevated 0.49.  Pt with rising WBC, abx broadened on 9/11 (dapto/aztreanam/flagyl).    - Monitor hemodynamic status and BPs.  s/p IV fluid bolus, cont maintanence fluid.      - blood cx, urine cx.  RVP (-).  No pna on cxr      Perirectal cellulitis, r/o abscess:    - Repeat CTap on 9/13 shows worsening inflammation with fluid and foci of air.   s/p OR on 9/15 for  debridement.  Pt not responding to antibiotics prior to debridement.  f/u surgical wound cultures - Enterococcus and Bacteroides    - Cont dapto/azactam/clindamycin.   Changed from flagyl to clindamycin for continued anerobic coverage and anti-toxin effect.  Cover for gas forming bacteria.  Renal function improving, can increase dapto to 6mg/kg qdaily, d/w pharmacy    - On 9/20 repeat ct scan performed, pt with new ulcerous lesion with eschar over L perineum.  s/p bedside debridement on 9/20.  Possible repeat OR on Monday        d/w pt and daughter at bedside.               Will follow,    Sandrita Zaman  [phone]

## 2019-09-21 NOTE — PROGRESS NOTE ADULT - ASSESSMENT
ASSESSMENT:  86F PMH CHF, HTN, DM, CAD prior MI, admitted for UTI and ROB on CKD3, now s/p from debridement and I&D of gluteal skin and tissue for a right gluteal cleft abscess with necrotic tissue 9/15, and s/p bedside debriedement of left gluteal ulcer.     PLAN:  - Again discussed with the patient need for further debriedement in the OR of left gluteal ulcer, patient still refusing.   - Dressing/packing changes daily of left and right buttock wounds   - Continue to trend H/H, and WBC  - Pain control as needed  - Regular diet  - DVT ppx  - OOB and ambulating as tolerated  - Care per primary team    [facility]   [phone]

## 2019-09-21 NOTE — PROVIDER CONTACT NOTE (MEDICATION) - BACKGROUND
pt admitted for UTI and abscess to buttock. pt admitted for UTI and abscess to buttock. Night dose of lantus decreased by half.

## 2019-09-21 NOTE — PROGRESS NOTE ADULT - PROBLEM SELECTOR PLAN 1
R buttock infiltrate with necrotic tissue  SUrg eval appreciated   SOft Tis US noted   pain control  CT noted  Abx as per ID  CT Pelv noted  F/U surgical recs regarding further W/U  patient refusing surgical intervention, will decide to see if  wants to do on monday

## 2019-09-21 NOTE — PROGRESS NOTE ADULT - SUBJECTIVE AND OBJECTIVE BOX
Chief complaint  Patient is a 86y old  Female who presents with a chief complaint of Fever, UTI (21 Sep 2019 14:02)   Review of systems  Patient in bed, looks comfortable, no fever, no hypoglycemia.    Labs and Fingersticks  CAPILLARY BLOOD GLUCOSE      POCT Blood Glucose.: 87 mg/dL (21 Sep 2019 12:13)  POCT Blood Glucose.: 118 mg/dL (21 Sep 2019 08:08)  POCT Blood Glucose.: 187 mg/dL (20 Sep 2019 22:30)  POCT Blood Glucose.: 128 mg/dL (20 Sep 2019 17:36)      Anion Gap, Serum: 9 (09-21 @ 07:21)  Anion Gap, Serum: 10 (09-20 @ 07:21)  Anion Gap, Serum: 9 (09-19 @ 21:25)      Calcium, Total Serum: 8.2 <L> (09-21 @ 07:21)  Calcium, Total Serum: 7.9 <L> (09-20 @ 07:21)  Calcium, Total Serum: 8.1 <L>  (09-19 @ 21:25)          09-21    133<L>  |  102  |  24<H>  ----------------------------<  101<H>  5.1   |  22  |  0.90    Ca    8.2<L>      21 Sep 2019 07:21  Phos  3.6     09-21  Mg     2.0     09-21                          8.5    11.28 )-----------( 252      ( 21 Sep 2019 10:42 )             26.2     Medications  MEDICATIONS  (STANDING):  aspirin enteric coated 81 milliGRAM(s) Oral daily  atorvastatin 80 milliGRAM(s) Oral at bedtime  aztreonam  IVPB 1000 milliGRAM(s) IV Intermittent every 8 hours  clindamycin IVPB 900 milliGRAM(s) IV Intermittent every 8 hours  DAPTOmycin IVPB 350 milliGRAM(s) IV Intermittent every 24 hours  dextrose 50% Injectable 12.5 Gram(s) IV Push once  dextrose 50% Injectable 25 Gram(s) IV Push once  dextrose 50% Injectable 25 Gram(s) IV Push once  docusate sodium 100 milliGRAM(s) Oral three times a day  furosemide    Tablet 20 milliGRAM(s) Oral daily  influenza   Vaccine 0.5 milliLiter(s) IntraMuscular once  insulin glargine Injectable (LANTUS) 18 Unit(s) SubCutaneous two times a day  insulin lispro (HumaLOG) corrective regimen sliding scale   SubCutaneous three times a day before meals  lactobacillus acidophilus 1 Tablet(s) Oral daily  lidocaine   Patch 2 Patch Transdermal daily  metoprolol tartrate 12.5 milliGRAM(s) Oral two times a day  multivitamin 1 Tablet(s) Oral daily  pantoprazole    Tablet 40 milliGRAM(s) Oral before breakfast  senna 2 Tablet(s) Oral at bedtime      Physical Exam  General: Patient comfortable in bed  Vital Signs Last 12 Hrs  T(F): 97.6 (09-21-19 @ 08:34), Max: 97.6 (09-21-19 @ 08:34)  HR: 62 (09-21-19 @ 08:34) (62 - 76)  BP: 111/71 (09-21-19 @ 08:34) (111/71 - 122/76)  BP(mean): --  RR: 18 (09-21-19 @ 08:34) (18 - 18)  SpO2: 98% (09-21-19 @ 08:34) (98% - 99%)  Neck: No palpable thyroid nodules.  CVS: S1S2, No murmurs  Respiratory: No wheezing, no crepitations  GI: Abdomen soft, bowel sounds positive  Musculoskeletal:  edema lower extremities.   Skin: No skin rashes, no ecchymosis    Diagnostics

## 2019-09-21 NOTE — PROGRESS NOTE ADULT - SUBJECTIVE AND OBJECTIVE BOX
COLORECTAL SURGERY PROGRESS NOTE    86yFemale    SUBJECTIVE:  Patient seen and examined at bedside. Complaining of pain in buttocks. Denies fevers, chills, nausea, vomiting, chest pain, and shortness of breath.     --------------------------------------------------------------------------------------------------  OBJECTIVE:     Physical Exam:  General: AAOx3, NAD, resting comfortably   HEENT: NC/AT  Respiratory: no increased work of breathing   Abdomen: soft, nontender, nondistended; right perianal surgical dressings taken down and packings removed. Abscess cavity with some granulation tissue, no active bleed visualized. Re-packed with wet-to-dry kerlix and abd. Left gluteal ulcer with purulence, bedside debrieded and dressed with wet-to-dry 4x4.   Extremities: warm and well perfused  --------------------------------------------------------------------------------------------------  Vital Signs:  Vital Signs Last 24 Hrs  T(C): 36.2 (21 Sep 2019 05:53), Max: 36.9 (20 Sep 2019 13:44)  T(F): 97.2 (21 Sep 2019 05:53), Max: 98.4 (20 Sep 2019 13:44)  HR: 76 (21 Sep 2019 05:53) (70 - 84)  BP: 122/76 (21 Sep 2019 05:53) (105/65 - 130/77)  BP(mean): --  RR: 18 (21 Sep 2019 05:53) (18 - 18)  SpO2: 99% (21 Sep 2019 05:53) (95% - 99%)    --------------------------------------------------------------------------------------------------  Inputs/Outputs:    20 Sep 2019 07:01  -  21 Sep 2019 07:00  --------------------------------------------------------  IN:    IV PiggyBack: 300 mL    Oral Fluid: 300 mL  Total IN: 600 mL    OUT:    Indwelling Catheter - Urethral: 800 mL  Total OUT: 800 mL    Total NET: -200 mL        --------------------------------------------------------------------------------------------------  Laboratories:                        7.5    17.34 )-----------( 244      ( 20 Sep 2019 10:04 )             22.8         21 Sep 2019 07:21    133    |  102    |  24     ----------------------------<  101    5.1     |  22     |  0.90     Ca    8.2        21 Sep 2019 07:21  Phos  3.6       21 Sep 2019 07:21  Mg     2.0       21 Sep 2019 07:21        CAPILLARY BLOOD GLUCOSE      POCT Blood Glucose.: 118 mg/dL (21 Sep 2019 08:08)  POCT Blood Glucose.: 187 mg/dL (20 Sep 2019 22:30)  POCT Blood Glucose.: 128 mg/dL (20 Sep 2019 17:36)  POCT Blood Glucose.: 157 mg/dL (20 Sep 2019 12:07)    CARDIAC MARKERS ( 21 Sep 2019 07:21 )  x     / x     / 50 U/L / x     / x            --------------------------------------------------------------------------------------------------  Medications:  MEDICATIONS  (STANDING):  aspirin enteric coated 81 milliGRAM(s) Oral daily  atorvastatin 80 milliGRAM(s) Oral at bedtime  aztreonam  IVPB 1000 milliGRAM(s) IV Intermittent every 8 hours  clindamycin IVPB 900 milliGRAM(s) IV Intermittent every 8 hours  DAPTOmycin IVPB 350 milliGRAM(s) IV Intermittent every 24 hours  dextrose 50% Injectable 12.5 Gram(s) IV Push once  dextrose 50% Injectable 25 Gram(s) IV Push once  dextrose 50% Injectable 25 Gram(s) IV Push once  docusate sodium 100 milliGRAM(s) Oral three times a day  influenza   Vaccine 0.5 milliLiter(s) IntraMuscular once  insulin glargine Injectable (LANTUS) 18 Unit(s) SubCutaneous two times a day  insulin lispro (HumaLOG) corrective regimen sliding scale   SubCutaneous three times a day before meals  lactobacillus acidophilus 1 Tablet(s) Oral daily  lidocaine   Patch 2 Patch Transdermal daily  metoprolol tartrate 12.5 milliGRAM(s) Oral two times a day  multivitamin 1 Tablet(s) Oral daily  pantoprazole    Tablet 40 milliGRAM(s) Oral before breakfast  senna 2 Tablet(s) Oral at bedtime    MEDICATIONS  (PRN):  acetaminophen   Tablet .. 650 milliGRAM(s) Oral every 6 hours PRN Mild Pain (1 - 3)  bisacodyl Suppository 10 milliGRAM(s) Rectal daily PRN Constipation  dextrose 40% Gel 15 Gram(s) Oral once PRN Blood Glucose LESS THAN 70 milliGRAM(s)/deciliter  glucagon  Injectable 1 milliGRAM(s) IntraMuscular once PRN Glucose LESS THAN 70 milligrams/deciliter  ondansetron Injectable 4 milliGRAM(s) IV Push every 8 hours PRN Nausea and/or Vomiting  oxyCODONE    IR 5 milliGRAM(s) Oral every 4 hours PRN Moderate Pain (4 - 6)  oxyCODONE    IR 10 milliGRAM(s) Oral every 4 hours PRN Severe Pain (7 - 10)

## 2019-09-21 NOTE — PROGRESS NOTE ADULT - SUBJECTIVE AND OBJECTIVE BOX
Subjective: Patient seen and examined. No new events except as noted.   doing well  leukocytosis improving   afebrile   pain control     REVIEW OF SYSTEMS:    CONSTITUTIONAL: No weakness, fevers or chills  EYES/ENT: No visual changes;  No vertigo or throat pain   NECK: No pain or stiffness  RESPIRATORY: No cough, wheezing, hemoptysis; No shortness of breath  CARDIOVASCULAR: No chest pain or palpitations  GASTROINTESTINAL: No abdominal or epigastric pain.   GENITOURINARY: No dysuria, frequency or hematuria  NEUROLOGICAL: No numbness or weakness  SKIN: pain lower back   All other review of systems is negative unless indicated above.    MEDICATIONS:  MEDICATIONS  (STANDING):  aspirin enteric coated 81 milliGRAM(s) Oral daily  atorvastatin 80 milliGRAM(s) Oral at bedtime  aztreonam  IVPB 1000 milliGRAM(s) IV Intermittent every 8 hours  clindamycin IVPB 900 milliGRAM(s) IV Intermittent every 8 hours  DAPTOmycin IVPB 350 milliGRAM(s) IV Intermittent every 24 hours  dextrose 50% Injectable 12.5 Gram(s) IV Push once  dextrose 50% Injectable 25 Gram(s) IV Push once  dextrose 50% Injectable 25 Gram(s) IV Push once  docusate sodium 100 milliGRAM(s) Oral three times a day  furosemide    Tablet 20 milliGRAM(s) Oral daily  influenza   Vaccine 0.5 milliLiter(s) IntraMuscular once  insulin glargine Injectable (LANTUS) 18 Unit(s) SubCutaneous two times a day  insulin lispro (HumaLOG) corrective regimen sliding scale   SubCutaneous three times a day before meals  lactobacillus acidophilus 1 Tablet(s) Oral daily  lidocaine   Patch 2 Patch Transdermal daily  metoprolol tartrate 12.5 milliGRAM(s) Oral two times a day  multivitamin 1 Tablet(s) Oral daily  pantoprazole    Tablet 40 milliGRAM(s) Oral before breakfast  senna 2 Tablet(s) Oral at bedtime      PHYSICAL EXAM:  T(C): 36.4 (09-21-19 @ 08:34), Max: 36.8 (09-21-19 @ 01:02)  HR: 62 (09-21-19 @ 08:34) (62 - 76)  BP: 111/71 (09-21-19 @ 08:34) (105/65 - 122/76)  RR: 18 (09-21-19 @ 08:34) (18 - 18)  SpO2: 98% (09-21-19 @ 08:34) (95% - 99%)  Wt(kg): --  I&O's Summary    20 Sep 2019 07:01  -  21 Sep 2019 07:00  --------------------------------------------------------  IN: 600 mL / OUT: 800 mL / NET: -200 mL    21 Sep 2019 07:01  -  21 Sep 2019 18:07  --------------------------------------------------------  IN: 0 mL / OUT: 275 mL / NET: -275 mL          Appearance: Normal	  HEENT:   Normal oral mucosa, PERRL, EOMI	  Lymphatic: No lymphadenopathy , no edema  Cardiovascular: Normal S1 S2, No JVD, No murmurs , Peripheral pulses palpable 2+ bilaterally  Respiratory: Lungs clear to auscultation, normal effort 	  Gastrointestinal:  Soft, Non-tender, + BS	  Skin: No rashes, No ecchymoses, No cyanosis, warm to touch  Musculoskeletal: buttock skin lesion, surgical site intact   Psychiatry:  Mood & affect appropriate  Ext: No edema      All labs, Imaging and EKGs personally reviewed                             8.5    11.28 )-----------( 252      ( 21 Sep 2019 10:42 )             26.2               09-21    133<L>  |  102  |  24<H>  ----------------------------<  101<H>  5.1   |  22  |  0.90    Ca    8.2<L>      21 Sep 2019 07:21  Phos  3.6     09-21  Mg     2.0     09-21             CARDIAC MARKERS ( 21 Sep 2019 07:21 )  x     / x     / 50 U/L / x     / x

## 2019-09-21 NOTE — PROGRESS NOTE ADULT - SUBJECTIVE AND OBJECTIVE BOX
Infectious Diseases progress note:    Subjective: NAD, afebrile.  WBC trending down.  s/p bedside debridement of left gluteal wound.  Packing changed today.  Pt feels tired/sleepy.  Daughter at bedside.      ROS:  CONSTITUTIONAL:  No fever, chills, rigors  CARDIOVASCULAR:  No chest pain or palpitations  RESPIRATORY:   No SOB, cough, dyspnea on exertion.  No wheezing  GASTROINTESTINAL:  No abd pain, N/V, diarrhea/constipation  EXTREMITIES:  No swelling or joint pain  GENITOURINARY:  No burning on urination, increased frequency or urgency.  No flank pain  NEUROLOGIC:  No HA, visual disturbances  SKIN: No rashes    Allergies    codeine (Unknown)  Kiwi (Anaphylaxis)  penicillins (Anaphylaxis)    Intolerances        ANTIBIOTICS/RELEVANT:  antimicrobials  aztreonam  IVPB 1000 milliGRAM(s) IV Intermittent every 8 hours  clindamycin IVPB 900 milliGRAM(s) IV Intermittent every 8 hours  DAPTOmycin IVPB 350 milliGRAM(s) IV Intermittent every 24 hours    immunologic:  influenza   Vaccine 0.5 milliLiter(s) IntraMuscular once    OTHER:  acetaminophen   Tablet .. 650 milliGRAM(s) Oral every 6 hours PRN  aspirin enteric coated 81 milliGRAM(s) Oral daily  atorvastatin 80 milliGRAM(s) Oral at bedtime  bisacodyl Suppository 10 milliGRAM(s) Rectal daily PRN  dextrose 40% Gel 15 Gram(s) Oral once PRN  dextrose 50% Injectable 12.5 Gram(s) IV Push once  dextrose 50% Injectable 25 Gram(s) IV Push once  dextrose 50% Injectable 25 Gram(s) IV Push once  docusate sodium 100 milliGRAM(s) Oral three times a day  furosemide    Tablet 20 milliGRAM(s) Oral daily  glucagon  Injectable 1 milliGRAM(s) IntraMuscular once PRN  insulin glargine Injectable (LANTUS) 18 Unit(s) SubCutaneous two times a day  insulin lispro (HumaLOG) corrective regimen sliding scale   SubCutaneous three times a day before meals  lactobacillus acidophilus 1 Tablet(s) Oral daily  lidocaine   Patch 2 Patch Transdermal daily  metoprolol tartrate 12.5 milliGRAM(s) Oral two times a day  multivitamin 1 Tablet(s) Oral daily  ondansetron Injectable 4 milliGRAM(s) IV Push every 8 hours PRN  oxyCODONE    IR 5 milliGRAM(s) Oral every 4 hours PRN  oxyCODONE    IR 10 milliGRAM(s) Oral every 4 hours PRN  pantoprazole    Tablet 40 milliGRAM(s) Oral before breakfast  senna 2 Tablet(s) Oral at bedtime      Objective:      PHYSICAL EXAM:  Constitutional:NAD  Eyes:NIDIA, EOMI  Ear/Nose/Throat: no thrush, mucositis.  Moist mucous membranes	  Neck:no JVD, no lymphadenopathy, supple  Respiratory: CTA lucian  Cardiovascular: S1S2 RRR, no murmurs  Gastrointestinal:soft, nontender,  nondistended (+) BS  Extremities:no e/e/c  Skin: gluteal wound dsg c/d/i        LABS:                        8.5    11.28 )-----------( 252      ( 21 Sep 2019 10:42 )             26.2     09-21    133<L>  |  102  |  24<H>  ----------------------------<  101<H>  5.1   |  22  |  0.90    Ca    8.2<L>      21 Sep 2019 07:21  Phos  3.6     09-21  Mg     2.0     09-21            Procalcitonin, Serum: 0.49 (09-05 @ 07:29)            Rapid RVP Result: [facility]          MICROBIOLOGY:    Culture - Surgical Swab (09.15.19 @ 18:03)    Specimen Source: .Surgical Swab    Culture Results:   Rare Enterococcus species "Susceptibilities not performed"  Few Bacteroides thetaiotamcron group "Susceptibilities not performed"    Culture - Blood (09.08.19 @ 01:32)    Specimen Source: .Blood    Culture Results:   No growth at 5 days.          RADIOLOGY & ADDITIONAL STUDIES:    < from: CT Abdomen and Pelvis No Cont (09.20.19 @ 08:30) >    IMPRESSION:     Open wound overlying the coccyx extending toward the left ischiorectal   fossa with trace fluid component and air. No large/drainable collection.     < end of copied text >

## 2019-09-22 LAB
ANION GAP SERPL CALC-SCNC: 10 MMOL/L — SIGNIFICANT CHANGE UP (ref 5–17)
BUN SERPL-MCNC: 21 MG/DL — SIGNIFICANT CHANGE UP (ref 7–23)
CALCIUM SERPL-MCNC: 7.9 MG/DL — LOW (ref 8.4–10.5)
CHLORIDE SERPL-SCNC: 103 MMOL/L — SIGNIFICANT CHANGE UP (ref 96–108)
CO2 SERPL-SCNC: 21 MMOL/L — LOW (ref 22–31)
CREAT SERPL-MCNC: 0.92 MG/DL — SIGNIFICANT CHANGE UP (ref 0.5–1.3)
GLUCOSE SERPL-MCNC: 160 MG/DL — HIGH (ref 70–99)
HCT VFR BLD CALC: 24.2 % — LOW (ref 34.5–45)
HGB BLD-MCNC: 7.8 G/DL — LOW (ref 11.5–15.5)
MCHC RBC-ENTMCNC: 29.8 PG — SIGNIFICANT CHANGE UP (ref 27–34)
MCHC RBC-ENTMCNC: 32.2 GM/DL — SIGNIFICANT CHANGE UP (ref 32–36)
MCV RBC AUTO: 92.4 FL — SIGNIFICANT CHANGE UP (ref 80–100)
PLATELET # BLD AUTO: 242 K/UL — SIGNIFICANT CHANGE UP (ref 150–400)
POTASSIUM SERPL-MCNC: 5.2 MMOL/L — SIGNIFICANT CHANGE UP (ref 3.5–5.3)
POTASSIUM SERPL-SCNC: 5.2 MMOL/L — SIGNIFICANT CHANGE UP (ref 3.5–5.3)
RBC # BLD: 2.62 M/UL — LOW (ref 3.8–5.2)
RBC # FLD: 16.8 % — HIGH (ref 10.3–14.5)
SODIUM SERPL-SCNC: 134 MMOL/L — LOW (ref 135–145)
WBC # BLD: 10.48 K/UL — SIGNIFICANT CHANGE UP (ref 3.8–10.5)
WBC # FLD AUTO: 10.48 K/UL — SIGNIFICANT CHANGE UP (ref 3.8–10.5)

## 2019-09-22 RX ORDER — OXYCODONE HYDROCHLORIDE 5 MG/1
5 TABLET ORAL EVERY 4 HOURS
Refills: 0 | Status: DISCONTINUED | OUTPATIENT
Start: 2019-09-23 | End: 2019-09-27

## 2019-09-22 RX ORDER — FUROSEMIDE 40 MG
20 TABLET ORAL DAILY
Refills: 0 | Status: DISCONTINUED | OUTPATIENT
Start: 2019-09-22 | End: 2019-09-27

## 2019-09-22 RX ORDER — OXYCODONE HYDROCHLORIDE 5 MG/1
10 TABLET ORAL EVERY 4 HOURS
Refills: 0 | Status: DISCONTINUED | OUTPATIENT
Start: 2019-09-22 | End: 2019-09-27

## 2019-09-22 RX ADMIN — Medication 12.5 MILLIGRAM(S): at 06:04

## 2019-09-22 RX ADMIN — Medication 100 MILLIGRAM(S): at 14:30

## 2019-09-22 RX ADMIN — OXYCODONE HYDROCHLORIDE 10 MILLIGRAM(S): 5 TABLET ORAL at 23:53

## 2019-09-22 RX ADMIN — DAPTOMYCIN 114 MILLIGRAM(S): 500 INJECTION, POWDER, LYOPHILIZED, FOR SOLUTION INTRAVENOUS at 21:00

## 2019-09-22 RX ADMIN — Medication 100 MILLIGRAM(S): at 21:58

## 2019-09-22 RX ADMIN — Medication 2: at 08:28

## 2019-09-22 RX ADMIN — Medication 4: at 17:15

## 2019-09-22 RX ADMIN — Medication 100 MILLIGRAM(S): at 06:05

## 2019-09-22 RX ADMIN — Medication 100 MILLIGRAM(S): at 11:59

## 2019-09-22 RX ADMIN — OXYCODONE HYDROCHLORIDE 10 MILLIGRAM(S): 5 TABLET ORAL at 08:40

## 2019-09-22 RX ADMIN — OXYCODONE HYDROCHLORIDE 10 MILLIGRAM(S): 5 TABLET ORAL at 12:10

## 2019-09-22 RX ADMIN — INSULIN GLARGINE 18 UNIT(S): 100 INJECTION, SOLUTION SUBCUTANEOUS at 08:27

## 2019-09-22 RX ADMIN — ATORVASTATIN CALCIUM 80 MILLIGRAM(S): 80 TABLET, FILM COATED ORAL at 21:12

## 2019-09-22 RX ADMIN — OXYCODONE HYDROCHLORIDE 10 MILLIGRAM(S): 5 TABLET ORAL at 16:17

## 2019-09-22 RX ADMIN — Medication 12.5 MILLIGRAM(S): at 17:15

## 2019-09-22 RX ADMIN — SENNA PLUS 2 TABLET(S): 8.6 TABLET ORAL at 21:59

## 2019-09-22 RX ADMIN — Medication 100 MILLIGRAM(S): at 21:12

## 2019-09-22 RX ADMIN — Medication 81 MILLIGRAM(S): at 11:58

## 2019-09-22 RX ADMIN — OXYCODONE HYDROCHLORIDE 10 MILLIGRAM(S): 5 TABLET ORAL at 07:50

## 2019-09-22 RX ADMIN — Medication 20 MILLIGRAM(S): at 06:05

## 2019-09-22 RX ADMIN — INSULIN GLARGINE 18 UNIT(S): 100 INJECTION, SOLUTION SUBCUTANEOUS at 22:48

## 2019-09-22 RX ADMIN — OXYCODONE HYDROCHLORIDE 10 MILLIGRAM(S): 5 TABLET ORAL at 12:50

## 2019-09-22 RX ADMIN — Medication 50 MILLIGRAM(S): at 03:58

## 2019-09-22 RX ADMIN — Medication 1 TABLET(S): at 11:58

## 2019-09-22 RX ADMIN — OXYCODONE HYDROCHLORIDE 10 MILLIGRAM(S): 5 TABLET ORAL at 22:53

## 2019-09-22 RX ADMIN — Medication 4: at 11:58

## 2019-09-22 RX ADMIN — PANTOPRAZOLE SODIUM 40 MILLIGRAM(S): 20 TABLET, DELAYED RELEASE ORAL at 06:05

## 2019-09-22 RX ADMIN — Medication 50 MILLIGRAM(S): at 18:27

## 2019-09-22 RX ADMIN — OXYCODONE HYDROCHLORIDE 10 MILLIGRAM(S): 5 TABLET ORAL at 04:58

## 2019-09-22 RX ADMIN — OXYCODONE HYDROCHLORIDE 10 MILLIGRAM(S): 5 TABLET ORAL at 17:00

## 2019-09-22 RX ADMIN — OXYCODONE HYDROCHLORIDE 10 MILLIGRAM(S): 5 TABLET ORAL at 03:58

## 2019-09-22 RX ADMIN — Medication 50 MILLIGRAM(S): at 11:57

## 2019-09-22 RX ADMIN — ONDANSETRON 4 MILLIGRAM(S): 8 TABLET, FILM COATED ORAL at 11:57

## 2019-09-22 NOTE — PROGRESS NOTE ADULT - ASSESSMENT
Assessment:  DMT2: 86y Female with DM T2 with hyperglycemia, A1C 10.2%, on insulin, FS trending down,, no hypoglycemic episode, comfortable.  Abscess: s/p debridement of necrotic tissue, on ABx, monitored and FU primary team/surgery  Anemia: s/p transfusion, stable, monitored.  HTN: Controlled, on antihypertensive medications.          Radha Chin MD  Cell: [phone]  Office: [phone]

## 2019-09-22 NOTE — PROGRESS NOTE ADULT - SUBJECTIVE AND OBJECTIVE BOX
COLORECTAL SURGERY PROGRESS NOTE    86yFemale    SUBJECTIVE:  Patient seen and examined at bedside. Complaining of pain in buttocks and nausea. Denies fevers, chills, vomiting, chest pain, and shortness of breath.     --------------------------------------------------------------------------------------------------  OBJECTIVE:     Physical Exam:  General: AAOx3, NAD, resting comfortably   HEENT: NC/AT  Respiratory: no increased work of breathing   Abdomen: obese, soft, nontender, nondistended; right perianal surgical dressings taken down and packings removed. Abscess cavity with some granulation tissue, no active bleed visualized. Re-packed with wet-to-dry kerlix and 4x4. Left gluteal ulcer with purulence, bedside debrieded and dressed with wet-to-dry 4x4.   Extremities: warm and well perfused  --------------------------------------------------------------------------------------------------  Vital Signs:  Vital Signs Last 24 Hrs  T(C): 36.6 (22 Sep 2019 08:13), Max: 36.6 (21 Sep 2019 18:26)  T(F): 97.9 (22 Sep 2019 08:13), Max: 97.9 (21 Sep 2019 18:26)  HR: 72 (22 Sep 2019 08:13) (67 - 86)  BP: 131/80 (22 Sep 2019 08:13) (109/63 - 134/80)  BP(mean): --  RR: 18 (22 Sep 2019 08:13) (18 - 18)  SpO2: 99% (22 Sep 2019 08:13) (97% - 99%)    --------------------------------------------------------------------------------------------------  Inputs/Outputs:    21 Sep 2019 07:01  -  22 Sep 2019 07:00  --------------------------------------------------------  IN:    IV PiggyBack: 250 mL    Oral Fluid: 480 mL  Total IN: 730 mL    OUT:    Indwelling Catheter - Urethral: 775 mL  Total OUT: 775 mL    Total NET: -45 mL        --------------------------------------------------------------------------------------------------  Laboratories:                        8.5    11.28 )-----------( 252      ( 21 Sep 2019 10:42 )             26.2         22 Sep 2019 07:10    134    |  103    |  21     ----------------------------<  160    5.2     |  21     |  0.92     Ca    7.9        22 Sep 2019 07:10  Phos  3.6       21 Sep 2019 07:21  Mg     2.0       21 Sep 2019 07:21        CAPILLARY BLOOD GLUCOSE      POCT Blood Glucose.: 158 mg/dL (22 Sep 2019 08:07)  POCT Blood Glucose.: 147 mg/dL (21 Sep 2019 22:10)  POCT Blood Glucose.: 128 mg/dL (21 Sep 2019 17:18)  POCT Blood Glucose.: 87 mg/dL (21 Sep 2019 12:13)    CARDIAC MARKERS ( 21 Sep 2019 07:21 )  x     / x     / 50 U/L / x     / x            --------------------------------------------------------------------------------------------------  Medications:  MEDICATIONS  (STANDING):  aspirin enteric coated 81 milliGRAM(s) Oral daily  atorvastatin 80 milliGRAM(s) Oral at bedtime  aztreonam  IVPB 1000 milliGRAM(s) IV Intermittent every 8 hours  clindamycin IVPB 900 milliGRAM(s) IV Intermittent every 8 hours  DAPTOmycin IVPB 350 milliGRAM(s) IV Intermittent every 24 hours  dextrose 50% Injectable 12.5 Gram(s) IV Push once  dextrose 50% Injectable 25 Gram(s) IV Push once  dextrose 50% Injectable 25 Gram(s) IV Push once  docusate sodium 100 milliGRAM(s) Oral three times a day  furosemide    Tablet 20 milliGRAM(s) Oral daily  influenza   Vaccine 0.5 milliLiter(s) IntraMuscular once  insulin glargine Injectable (LANTUS) 18 Unit(s) SubCutaneous two times a day  insulin lispro (HumaLOG) corrective regimen sliding scale   SubCutaneous three times a day before meals  lactobacillus acidophilus 1 Tablet(s) Oral daily  lidocaine   Patch 2 Patch Transdermal daily  metoprolol tartrate 12.5 milliGRAM(s) Oral two times a day  multivitamin 1 Tablet(s) Oral daily  pantoprazole    Tablet 40 milliGRAM(s) Oral before breakfast  senna 2 Tablet(s) Oral at bedtime    MEDICATIONS  (PRN):  acetaminophen   Tablet .. 650 milliGRAM(s) Oral every 6 hours PRN Mild Pain (1 - 3)  bisacodyl Suppository 10 milliGRAM(s) Rectal daily PRN Constipation  dextrose 40% Gel 15 Gram(s) Oral once PRN Blood Glucose LESS THAN 70 milliGRAM(s)/deciliter  glucagon  Injectable 1 milliGRAM(s) IntraMuscular once PRN Glucose LESS THAN 70 milligrams/deciliter  ondansetron Injectable 4 milliGRAM(s) IV Push every 8 hours PRN Nausea and/or Vomiting  oxyCODONE    IR 5 milliGRAM(s) Oral every 4 hours PRN Moderate Pain (4 - 6)  oxyCODONE    IR 10 milliGRAM(s) Oral every 4 hours PRN Severe Pain (7 - 10)

## 2019-09-22 NOTE — PROGRESS NOTE ADULT - ASSESSMENT
86F with left gluteal abscess, leukocytosis downtrending    Pt d/w Dr. Claire  - Reg diet  - c/w IV Abx  - daily dressing changes  - would benefit from I&D and debridement but refusing surgery  - tentatively scheduled for OR tomorrow - pt will let us know her decision

## 2019-09-22 NOTE — PROGRESS NOTE ADULT - SUBJECTIVE AND OBJECTIVE BOX
Subjective: Patient seen and examined. No new events except as noted.   feeling better  leukocytosis improving   afebrile     REVIEW OF SYSTEMS:    CONSTITUTIONAL: No weakness, fevers or chills  EYES/ENT: No visual changes;  No vertigo or throat pain   NECK: No pain or stiffness  RESPIRATORY: No cough, wheezing, hemoptysis; No shortness of breath  CARDIOVASCULAR: No chest pain or palpitations  GASTROINTESTINAL: No abdominal or epigastric pain. No nausea, vomiting, or hematemesis; No diarrhea or constipation. No melena or hematochezia.  GENITOURINARY: No dysuria, frequency or hematuria  NEUROLOGICAL: No numbness or weakness  SKIN: buttock pain   All other review of systems is negative unless indicated above.    MEDICATIONS:  MEDICATIONS  (STANDING):  aspirin enteric coated 81 milliGRAM(s) Oral daily  atorvastatin 80 milliGRAM(s) Oral at bedtime  aztreonam  IVPB 1000 milliGRAM(s) IV Intermittent every 8 hours  clindamycin IVPB 900 milliGRAM(s) IV Intermittent every 8 hours  DAPTOmycin IVPB 350 milliGRAM(s) IV Intermittent every 24 hours  dextrose 50% Injectable 12.5 Gram(s) IV Push once  dextrose 50% Injectable 25 Gram(s) IV Push once  dextrose 50% Injectable 25 Gram(s) IV Push once  docusate sodium 100 milliGRAM(s) Oral three times a day  furosemide    Tablet 20 milliGRAM(s) Oral daily  influenza   Vaccine 0.5 milliLiter(s) IntraMuscular once  insulin glargine Injectable (LANTUS) 18 Unit(s) SubCutaneous two times a day  insulin lispro (HumaLOG) corrective regimen sliding scale   SubCutaneous three times a day before meals  lactobacillus acidophilus 1 Tablet(s) Oral daily  lidocaine   Patch 2 Patch Transdermal daily  metoprolol tartrate 12.5 milliGRAM(s) Oral two times a day  multivitamin 1 Tablet(s) Oral daily  pantoprazole    Tablet 40 milliGRAM(s) Oral before breakfast  senna 2 Tablet(s) Oral at bedtime      PHYSICAL EXAM:  T(C): 36.6 (09-22-19 @ 16:54), Max: 36.6 (09-21-19 @ 18:26)  HR: 85 (09-22-19 @ 16:54) (67 - 86)  BP: 130/77 (09-22-19 @ 16:54) (109/63 - 134/80)  RR: 18 (09-22-19 @ 16:54) (18 - 18)  SpO2: 98% (09-22-19 @ 16:54) (97% - 99%)  Wt(kg): --  I&O's Summary    21 Sep 2019 07:01  -  22 Sep 2019 07:00  --------------------------------------------------------  IN: 730 mL / OUT: 775 mL / NET: -45 mL    22 Sep 2019 07:01  -  22 Sep 2019 18:01  --------------------------------------------------------  IN: 240 mL / OUT: 0 mL / NET: 240 mL          Appearance: Normal	  HEENT:   Normal oral mucosa, PERRL, EOMI	  Lymphatic: No lymphadenopathy , no edema  Cardiovascular: Normal S1 S2  Respiratory: Lungs clear to auscultation, normal effort 	  Gastrointestinal:  Soft, Non-tender, + BS	  Skin: drssing intact   Musculoskeletal: Normal range of motion, normal strength  Psychiatry:  Mood & affect appropriate  Ext: LE edema       All labs, Imaging and EKGs personally reviewed                             7.8    10.48 )-----------( 242      ( 22 Sep 2019 09:38 )             24.2               09-22    134<L>  |  103  |  21  ----------------------------<  160<H>  5.2   |  21<L>  |  0.92    Ca    7.9<L>      22 Sep 2019 07:10  Phos  3.6     09-21  Mg     2.0     09-21             CARDIAC MARKERS ( 21 Sep 2019 07:21 )  x     / x     / 50 U/L / x     / x

## 2019-09-22 NOTE — PROGRESS NOTE ADULT - SUBJECTIVE AND OBJECTIVE BOX
Patient is a 86y old  Female who presents with a chief complaint of Fever, UTI (22 Sep 2019 18:01)      INTERVAL HISTORY: feels ok  	  MEDICATIONS:  furosemide    Tablet 20 milliGRAM(s) Oral daily  metoprolol tartrate 12.5 milliGRAM(s) Oral two times a day        PHYSICAL EXAM:  T(C): 36.8 (09-22-19 @ 19:47), Max: 36.8 (09-22-19 @ 19:47)  HR: 82 (09-22-19 @ 19:47) (67 - 85)  BP: 131/76 (09-22-19 @ 19:47) (109/63 - 131/80)  RR: 18 (09-22-19 @ 19:47) (18 - 18)  SpO2: 97% (09-22-19 @ 19:47) (97% - 99%)  Wt(kg): --  I&O's Summary    21 Sep 2019 07:01  -  22 Sep 2019 07:00  --------------------------------------------------------  IN: 730 mL / OUT: 775 mL / NET: -45 mL    22 Sep 2019 07:01  -  22 Sep 2019 22:51  --------------------------------------------------------  IN: 730 mL / OUT: 950 mL / NET: -220 mL          Appearance: In no distress	  HEENT:    PERRL, EOMI	  Cardiovascular:  S1 S2, No JVD  Respiratory: Lungs clear to auscultation	  Gastrointestinal:  Soft, Non-tender, + BS	  Extremities:  + edema of LE                                7.8    10.48 )-----------( 242      ( 22 Sep 2019 09:38 )             24.2     09-22    134<L>  |  103  |  21  ----------------------------<  160<H>  5.2   |  21<L>  |  0.92    Ca    7.9<L>      22 Sep 2019 07:10  Phos  3.6     09-21  Mg     2.0     09-21          Labs personally reviewed      ASSESSMENT/PLAN: 	  tolerated surgery well  afebrile, improving  BP well controlled   +edema of LE with SOB -- Lasix 20mg IV daily        Srini Velasco DO [facility]  Cardiovascular Medicine  [phone]

## 2019-09-22 NOTE — PROGRESS NOTE ADULT - SUBJECTIVE AND OBJECTIVE BOX
Chief complaint  Patient is a 86y old  Female who presents with a chief complaint of Fever, UTI (22 Sep 2019 11:16)   Review of systems  Patient in bed, looks comfortable, no fever, no hypoglycemia.    Labs and Fingersticks  CAPILLARY BLOOD GLUCOSE      POCT Blood Glucose.: 217 mg/dL (22 Sep 2019 17:09)  POCT Blood Glucose.: 203 mg/dL (22 Sep 2019 11:57)  POCT Blood Glucose.: 158 mg/dL (22 Sep 2019 08:07)  POCT Blood Glucose.: 147 mg/dL (21 Sep 2019 22:10)      Anion Gap, Serum: 10 (09-22 @ 07:10)  Anion Gap, Serum: 9 (09-21 @ 07:21)      Calcium, Total Serum: 7.9 <L> (09-22 @ 07:10)  Calcium, Total Serum: 8.2 <L> (09-21 @ 07:21)          09-22    134<L>  |  103  |  21  ----------------------------<  160<H>  5.2   |  21<L>  |  0.92    Ca    7.9<L>      22 Sep 2019 07:10  Phos  3.6     09-21  Mg     2.0     09-21                          7.8    10.48 )-----------( 242      ( 22 Sep 2019 09:38 )             24.2     Medications  MEDICATIONS  (STANDING):  aspirin enteric coated 81 milliGRAM(s) Oral daily  atorvastatin 80 milliGRAM(s) Oral at bedtime  aztreonam  IVPB 1000 milliGRAM(s) IV Intermittent every 8 hours  clindamycin IVPB 900 milliGRAM(s) IV Intermittent every 8 hours  DAPTOmycin IVPB 350 milliGRAM(s) IV Intermittent every 24 hours  dextrose 50% Injectable 12.5 Gram(s) IV Push once  dextrose 50% Injectable 25 Gram(s) IV Push once  dextrose 50% Injectable 25 Gram(s) IV Push once  docusate sodium 100 milliGRAM(s) Oral three times a day  furosemide    Tablet 20 milliGRAM(s) Oral daily  influenza   Vaccine 0.5 milliLiter(s) IntraMuscular once  insulin glargine Injectable (LANTUS) 18 Unit(s) SubCutaneous two times a day  insulin lispro (HumaLOG) corrective regimen sliding scale   SubCutaneous three times a day before meals  lactobacillus acidophilus 1 Tablet(s) Oral daily  lidocaine   Patch 2 Patch Transdermal daily  metoprolol tartrate 12.5 milliGRAM(s) Oral two times a day  multivitamin 1 Tablet(s) Oral daily  pantoprazole    Tablet 40 milliGRAM(s) Oral before breakfast  senna 2 Tablet(s) Oral at bedtime      Physical Exam  General: Patient comfortable in bed  Vital Signs Last 12 Hrs  T(F): 97.9 (09-22-19 @ 16:54), Max: 97.9 (09-22-19 @ 08:13)  HR: 85 (09-22-19 @ 16:54) (67 - 85)  BP: 130/77 (09-22-19 @ 16:54) (114/69 - 131/80)  BP(mean): --  RR: 18 (09-22-19 @ 16:54) (18 - 18)  SpO2: 98% (09-22-19 @ 16:54) (98% - 99%)  Neck: No palpable thyroid nodules.  CVS: S1S2, No murmurs  Respiratory: No wheezing, no crepitations  GI: Abdomen soft, bowel sounds positive  Musculoskeletal:  edema lower extremities.   Skin: No skin rashes, no ecchymosis    Diagnostics

## 2019-09-22 NOTE — PROGRESS NOTE ADULT - SUBJECTIVE AND OBJECTIVE BOX
INTERVAL HPI/OVERNIGHT EVENTS: Pt c/o weakness. Denies pain. Debrided at bedside by resident, dressing changed. Afebrile.    MEDICATIONS  (STANDING):  aspirin enteric coated 81 milliGRAM(s) Oral daily  atorvastatin 80 milliGRAM(s) Oral at bedtime  aztreonam  IVPB 1000 milliGRAM(s) IV Intermittent every 8 hours  clindamycin IVPB 900 milliGRAM(s) IV Intermittent every 8 hours  DAPTOmycin IVPB 350 milliGRAM(s) IV Intermittent every 24 hours  dextrose 50% Injectable 12.5 Gram(s) IV Push once  dextrose 50% Injectable 25 Gram(s) IV Push once  dextrose 50% Injectable 25 Gram(s) IV Push once  docusate sodium 100 milliGRAM(s) Oral three times a day  furosemide    Tablet 20 milliGRAM(s) Oral daily  influenza   Vaccine 0.5 milliLiter(s) IntraMuscular once  insulin glargine Injectable (LANTUS) 18 Unit(s) SubCutaneous two times a day  insulin lispro (HumaLOG) corrective regimen sliding scale   SubCutaneous three times a day before meals  lactobacillus acidophilus 1 Tablet(s) Oral daily  lidocaine   Patch 2 Patch Transdermal daily  metoprolol tartrate 12.5 milliGRAM(s) Oral two times a day  multivitamin 1 Tablet(s) Oral daily  pantoprazole    Tablet 40 milliGRAM(s) Oral before breakfast  senna 2 Tablet(s) Oral at bedtime    MEDICATIONS  (PRN):  acetaminophen   Tablet .. 650 milliGRAM(s) Oral every 6 hours PRN Mild Pain (1 - 3)  bisacodyl Suppository 10 milliGRAM(s) Rectal daily PRN Constipation  dextrose 40% Gel 15 Gram(s) Oral once PRN Blood Glucose LESS THAN 70 milliGRAM(s)/deciliter  glucagon  Injectable 1 milliGRAM(s) IntraMuscular once PRN Glucose LESS THAN 70 milligrams/deciliter  ondansetron Injectable 4 milliGRAM(s) IV Push every 8 hours PRN Nausea and/or Vomiting  oxyCODONE    IR 5 milliGRAM(s) Oral every 4 hours PRN Moderate Pain (4 - 6)  oxyCODONE    IR 10 milliGRAM(s) Oral every 4 hours PRN Severe Pain (7 - 10)      Vital Signs Last 24 Hrs  T(C): 36.6 (22 Sep 2019 08:13), Max: 36.6 (21 Sep 2019 18:26)  T(F): 97.9 (22 Sep 2019 08:13), Max: 97.9 (21 Sep 2019 18:26)  HR: 72 (22 Sep 2019 08:13) (67 - 86)  BP: 131/80 (22 Sep 2019 08:13) (109/63 - 134/80)  BP(mean): --  RR: 18 (22 Sep 2019 08:13) (18 - 18)  SpO2: 99% (22 Sep 2019 08:13) (97% - 99%)    PHYSICAL EXAM:      Constitutional: NAD, resting comfortably in hospital bed        I&O's Detail    21 Sep 2019 07:01  -  22 Sep 2019 07:00  --------------------------------------------------------  IN:    IV PiggyBack: 250 mL    Oral Fluid: 480 mL  Total IN: 730 mL    OUT:    Indwelling Catheter - Urethral: 775 mL  Total OUT: 775 mL    Total NET: -45 mL      22 Sep 2019 07:01  -  22 Sep 2019 11:16  --------------------------------------------------------  IN:    Oral Fluid: 120 mL  Total IN: 120 mL    OUT:  Total OUT: 0 mL    Total NET: 120 mL          LABS:                        7.8    10.48 )-----------( 242      ( 22 Sep 2019 09:38 )             24.2     09-22    134<L>  |  103  |  21  ----------------------------<  160<H>  5.2   |  21<L>  |  0.92    Ca    7.9<L>      22 Sep 2019 07:10  Phos  3.6     09-21  Mg     2.0     09-21            RADIOLOGY & ADDITIONAL STUDIES:

## 2019-09-22 NOTE — PROGRESS NOTE ADULT - ASSESSMENT
ASSESSMENT:  86F PMH CHF, HTN, DM, CAD prior MI, admitted for UTI and ROB on CKD3, now s/p from debridement and I&D of gluteal skin and tissue for a right gluteal cleft abscess with necrotic tissue 9/15, and s/p bedside debriedement of left gluteal ulcer.     PLAN:  - Discussed with the patient need for further debriedement in the OR of left gluteal ulcer, patient still refusing to consent, will reconsider tomorrow.    - Dressing/packing changes daily of left and right buttock wounds   - Continue to trend H/H, and WBC  - Pain control as needed  - Regular diet  - DVT ppx  - OOB and ambulating as tolerated  - Care per primary team    Red Surgery   [phone]

## 2019-09-22 NOTE — PROGRESS NOTE ADULT - PROBLEM SELECTOR PLAN 1
R buttock infiltrate with necrotic tissue  SUrg eval appreciated   SOft Tis US noted   pain control  CT noted  Abx as per ID  CT Pelv noted  F/U surgical recs regarding further W/U  patient refusing surgical intervention, will decide to see if  wants to do on monday, pending decision

## 2019-09-23 LAB
ANION GAP SERPL CALC-SCNC: 10 MMOL/L — SIGNIFICANT CHANGE UP (ref 5–17)
BUN SERPL-MCNC: 18 MG/DL — SIGNIFICANT CHANGE UP (ref 7–23)
CALCIUM SERPL-MCNC: 8.7 MG/DL — SIGNIFICANT CHANGE UP (ref 8.4–10.5)
CHLORIDE SERPL-SCNC: 101 MMOL/L — SIGNIFICANT CHANGE UP (ref 96–108)
CO2 SERPL-SCNC: 21 MMOL/L — LOW (ref 22–31)
CREAT SERPL-MCNC: 0.9 MG/DL — SIGNIFICANT CHANGE UP (ref 0.5–1.3)
GLUCOSE SERPL-MCNC: 118 MG/DL — HIGH (ref 70–99)
HCT VFR BLD CALC: 27.6 % — LOW (ref 34.5–45)
HGB BLD-MCNC: 8.9 G/DL — LOW (ref 11.5–15.5)
MCHC RBC-ENTMCNC: 29.7 PG — SIGNIFICANT CHANGE UP (ref 27–34)
MCHC RBC-ENTMCNC: 32.2 GM/DL — SIGNIFICANT CHANGE UP (ref 32–36)
MCV RBC AUTO: 92 FL — SIGNIFICANT CHANGE UP (ref 80–100)
PLATELET # BLD AUTO: 288 K/UL — SIGNIFICANT CHANGE UP (ref 150–400)
POTASSIUM SERPL-MCNC: 4.8 MMOL/L — SIGNIFICANT CHANGE UP (ref 3.5–5.3)
POTASSIUM SERPL-SCNC: 4.8 MMOL/L — SIGNIFICANT CHANGE UP (ref 3.5–5.3)
RBC # BLD: 3 M/UL — LOW (ref 3.8–5.2)
RBC # FLD: 17 % — HIGH (ref 10.3–14.5)
SODIUM SERPL-SCNC: 132 MMOL/L — LOW (ref 135–145)
WBC # BLD: 9.39 K/UL — SIGNIFICANT CHANGE UP (ref 3.8–10.5)
WBC # FLD AUTO: 9.39 K/UL — SIGNIFICANT CHANGE UP (ref 3.8–10.5)

## 2019-09-23 RX ORDER — SIMETHICONE 80 MG/1
80 TABLET, CHEWABLE ORAL EVERY 8 HOURS
Refills: 0 | Status: DISCONTINUED | OUTPATIENT
Start: 2019-09-23 | End: 2019-09-27

## 2019-09-23 RX ADMIN — OXYCODONE HYDROCHLORIDE 10 MILLIGRAM(S): 5 TABLET ORAL at 09:41

## 2019-09-23 RX ADMIN — Medication 2: at 18:10

## 2019-09-23 RX ADMIN — Medication 20 MILLIGRAM(S): at 05:59

## 2019-09-23 RX ADMIN — Medication 12.5 MILLIGRAM(S): at 05:55

## 2019-09-23 RX ADMIN — ATORVASTATIN CALCIUM 80 MILLIGRAM(S): 80 TABLET, FILM COATED ORAL at 22:26

## 2019-09-23 RX ADMIN — Medication 100 MILLIGRAM(S): at 15:16

## 2019-09-23 RX ADMIN — Medication 100 MILLIGRAM(S): at 22:25

## 2019-09-23 RX ADMIN — Medication 650 MILLIGRAM(S): at 22:46

## 2019-09-23 RX ADMIN — Medication 50 MILLIGRAM(S): at 12:46

## 2019-09-23 RX ADMIN — OXYCODONE HYDROCHLORIDE 10 MILLIGRAM(S): 5 TABLET ORAL at 10:15

## 2019-09-23 RX ADMIN — DAPTOMYCIN 114 MILLIGRAM(S): 500 INJECTION, POWDER, LYOPHILIZED, FOR SOLUTION INTRAVENOUS at 22:00

## 2019-09-23 RX ADMIN — Medication 650 MILLIGRAM(S): at 06:55

## 2019-09-23 RX ADMIN — Medication 100 MILLIGRAM(S): at 05:53

## 2019-09-23 RX ADMIN — INSULIN GLARGINE 18 UNIT(S): 100 INJECTION, SOLUTION SUBCUTANEOUS at 22:25

## 2019-09-23 RX ADMIN — Medication 650 MILLIGRAM(S): at 05:55

## 2019-09-23 RX ADMIN — OXYCODONE HYDROCHLORIDE 10 MILLIGRAM(S): 5 TABLET ORAL at 03:14

## 2019-09-23 RX ADMIN — OXYCODONE HYDROCHLORIDE 10 MILLIGRAM(S): 5 TABLET ORAL at 04:14

## 2019-09-23 RX ADMIN — Medication 1 TABLET(S): at 12:46

## 2019-09-23 RX ADMIN — Medication 1 TABLET(S): at 12:45

## 2019-09-23 RX ADMIN — PANTOPRAZOLE SODIUM 40 MILLIGRAM(S): 20 TABLET, DELAYED RELEASE ORAL at 06:00

## 2019-09-23 RX ADMIN — Medication 12.5 MILLIGRAM(S): at 18:48

## 2019-09-23 RX ADMIN — Medication 81 MILLIGRAM(S): at 12:45

## 2019-09-23 RX ADMIN — Medication 50 MILLIGRAM(S): at 18:48

## 2019-09-23 RX ADMIN — ONDANSETRON 4 MILLIGRAM(S): 8 TABLET, FILM COATED ORAL at 13:56

## 2019-09-23 RX ADMIN — Medication 100 MILLIGRAM(S): at 12:47

## 2019-09-23 RX ADMIN — Medication 650 MILLIGRAM(S): at 23:46

## 2019-09-23 RX ADMIN — Medication 50 MILLIGRAM(S): at 03:03

## 2019-09-23 NOTE — PROGRESS NOTE ADULT - SUBJECTIVE AND OBJECTIVE BOX
HOSPITAL DAY: 20  POD #: 8 - Debridement right buttock abscess    SUBJECTIVE:  No acute overnight events. Patient states she is not willing to undergo planned debridement today. She denies worsening pain, fevers, chills, N/V.      OBJECTIVE:  Vital Signs Last 24 Hrs  T(C): 36.6 (23 Sep 2019 04:00), Max: 36.8 (22 Sep 2019 19:47)  T(F): 97.8 (23 Sep 2019 04:00), Max: 98.2 (22 Sep 2019 19:47)  HR: 81 (23 Sep 2019 04:00) (72 - 85)  BP: 116/75 (23 Sep 2019 04:00) (116/75 - 131/80)  BP(mean): --  RR: 17 (23 Sep 2019 04:00) (17 - 18)  SpO2: 96% (23 Sep 2019 04:00) (96% - 99%)    I&O's Detail    21 Sep 2019 07:01  -  22 Sep 2019 07:00  --------------------------------------------------------  IN:    IV PiggyBack: 250 mL    Oral Fluid: 480 mL  Total IN: 730 mL    OUT:    Indwelling Catheter - Urethral: 775 mL  Total OUT: 775 mL    Total NET: -45 mL      22 Sep 2019 07:01  -  23 Sep 2019 06:42  --------------------------------------------------------  IN:    IV PiggyBack: 350 mL    Oral Fluid: 480 mL  Total IN: 830 mL    OUT:    Indwelling Catheter - Urethral: 1425 mL  Total OUT: 1425 mL    Total NET: -595 mL          Inpatient Medications:  MEDICATIONS  (STANDING):  aspirin enteric coated 81 milliGRAM(s) Oral daily  atorvastatin 80 milliGRAM(s) Oral at bedtime  aztreonam  IVPB 1000 milliGRAM(s) IV Intermittent every 8 hours  clindamycin IVPB 900 milliGRAM(s) IV Intermittent every 8 hours  DAPTOmycin IVPB 350 milliGRAM(s) IV Intermittent every 24 hours  dextrose 50% Injectable 12.5 Gram(s) IV Push once  dextrose 50% Injectable 25 Gram(s) IV Push once  dextrose 50% Injectable 25 Gram(s) IV Push once  docusate sodium 100 milliGRAM(s) Oral three times a day  furosemide   Injectable 20 milliGRAM(s) IV Push daily  influenza   Vaccine 0.5 milliLiter(s) IntraMuscular once  insulin glargine Injectable (LANTUS) 18 Unit(s) SubCutaneous two times a day  insulin lispro (HumaLOG) corrective regimen sliding scale   SubCutaneous three times a day before meals  lactobacillus acidophilus 1 Tablet(s) Oral daily  lidocaine   Patch 2 Patch Transdermal daily  metoprolol tartrate 12.5 milliGRAM(s) Oral two times a day  multivitamin 1 Tablet(s) Oral daily  pantoprazole    Tablet 40 milliGRAM(s) Oral before breakfast  senna 2 Tablet(s) Oral at bedtime    MEDICATIONS  (PRN):  acetaminophen   Tablet .. 650 milliGRAM(s) Oral every 6 hours PRN Mild Pain (1 - 3)  bisacodyl Suppository 10 milliGRAM(s) Rectal daily PRN Constipation  dextrose 40% Gel 15 Gram(s) Oral once PRN Blood Glucose LESS THAN 70 milliGRAM(s)/deciliter  glucagon  Injectable 1 milliGRAM(s) IntraMuscular once PRN Glucose LESS THAN 70 milligrams/deciliter  ondansetron Injectable 4 milliGRAM(s) IV Push every 8 hours PRN Nausea and/or Vomiting  oxyCODONE    IR 5 milliGRAM(s) Oral every 4 hours PRN Moderate Pain (4 - 6)  oxyCODONE    IR 10 milliGRAM(s) Oral every 4 hours PRN Severe Pain (7 - 10)      Physical Examination:  GEN: NAD, resting quietly  NEURO: AAOx3, CN II-XII grossly intact, no focal deficits  PULM: symmetric chest rise bilaterally, no increased WOB  CV: regular rate, peripheral pulses intact  ABD: soft, NTND; right buttock with dressing c/d/i, no strikethrough  EXTR: no cyanosis or edema, moving all extremities      LABS:                        7.8    10.48 )-----------( 242      ( 22 Sep 2019 09:38 )             24.2       09-22    134<L>  |  103  |  21  ----------------------------<  160<H>  5.2   |  21<L>  |  0.92    Ca    7.9<L>      22 Sep 2019 07:10  Phos  3.6     09-21  Mg     2.0     09-21        CULTURES:  .Surgical Swab  09-15 @ 18:03   Rare Enterococcus species "Susceptibilities not performed"  Few Bacteroides thetaiotamcron group "Susceptibilities not performed"  --  --      .Blood  09-08 @ 01:32   No growth at 5 days.  --  --      .Blood  09-04 @ 22:05   No growth at 5 days.  --  --      .Urine  09-04 @ 22:03   >100,000 CFU/ml Escherichia coli  --  Escherichia coli      .Blood  09-04 @ 21:57   No growth at 5 days.  --  --          IMAGING:  no new imaging

## 2019-09-23 NOTE — PROGRESS NOTE ADULT - SUBJECTIVE AND OBJECTIVE BOX
Patient seen and examined  no complaints    codeine (Unknown)  Kiwi (Anaphylaxis)  penicillins (Anaphylaxis)    Hospital Medications:   MEDICATIONS  (STANDING):  aspirin enteric coated 81 milliGRAM(s) Oral daily  atorvastatin 80 milliGRAM(s) Oral at bedtime  aztreonam  IVPB 1000 milliGRAM(s) IV Intermittent every 8 hours  clindamycin IVPB 900 milliGRAM(s) IV Intermittent every 8 hours  DAPTOmycin IVPB 350 milliGRAM(s) IV Intermittent every 24 hours  dextrose 50% Injectable 12.5 Gram(s) IV Push once  dextrose 50% Injectable 25 Gram(s) IV Push once  dextrose 50% Injectable 25 Gram(s) IV Push once  docusate sodium 100 milliGRAM(s) Oral three times a day  furosemide   Injectable 20 milliGRAM(s) IV Push daily  influenza   Vaccine 0.5 milliLiter(s) IntraMuscular once  insulin glargine Injectable (LANTUS) 18 Unit(s) SubCutaneous two times a day  insulin lispro (HumaLOG) corrective regimen sliding scale   SubCutaneous three times a day before meals  lactobacillus acidophilus 1 Tablet(s) Oral daily  lidocaine   Patch 2 Patch Transdermal daily  metoprolol tartrate 12.5 milliGRAM(s) Oral two times a day  multivitamin 1 Tablet(s) Oral daily  pantoprazole    Tablet 40 milliGRAM(s) Oral before breakfast  senna 2 Tablet(s) Oral at bedtime        VITALS:  T(F): 97.9 (09-23-19 @ 12:03), Max: 98.2 (09-22-19 @ 19:47)  HR: 84 (09-23-19 @ 12:03)  BP: 116/78 (09-23-19 @ 12:03)  RR: 18 (09-23-19 @ 12:03)  SpO2: 93% (09-23-19 @ 12:03)  Wt(kg): --    09-22 @ 07:01  -  09-23 @ 07:00  --------------------------------------------------------  IN: 830 mL / OUT: 1425 mL / NET: -595 mL    09-23 @ 07:01  -  09-23 @ 13:01  --------------------------------------------------------  IN: 300 mL / OUT: 0 mL / NET: 300 mL    PHYSICAL EXAM:  Constitutional: NAD  HEENT: anicteric sclera, oropharynx clear, MMM  Neck: No JVD  Respiratory: CTAB, no wheezes, rales or rhonchi  Cardiovascular: S1, S2, RRR  Gastrointestinal: BS+, soft, NT/ND  Extremities: No cyanosis or clubbing. Trace peripheral edema  Neurological: A/O x 3, no focal deficits  Psychiatric: Normal mood, normal affect  : + infante    LABS:  09-23    132<L>  |  101  |  18  ----------------------------<  118<H>  4.8   |  21<L>  |  0.90    Ca    8.7      23 Sep 2019 07:10      Creatinine Trend: 0.90 <--, 0.92 <--, 0.90 <--, 0.93 <--, 1.00 <--, 0.98 <--, 1.10 <--, 1.16 <--                        8.9    9.39  )-----------( 288      ( 23 Sep 2019 09:45 )             27.6     Urine Studies:    Osmolality, Random Urine: 524 mosm/Kg (09-18 @ 01:19)    RADIOLOGY & ADDITIONAL STUDIES:

## 2019-09-23 NOTE — PROGRESS NOTE ADULT - ASSESSMENT
86F PMH CHF, HTN, DM, CAD prior MI, admitted for UTI and ROB on CKD3, now s/p from debridement and I&D of gluteal skin and tissue for a right gluteal cleft abscess with necrotic tissue 9/15, and s/p bedside debriedement of left gluteal ulcer.     PLAN:  - patient refusing operative debridement of left gluteal ulcer  - cont local wound care, collagenase  - cont abx  - care per primary team    Red Surgery   [id number]

## 2019-09-23 NOTE — PROGRESS NOTE ADULT - SUBJECTIVE AND OBJECTIVE BOX
Subjective: Patient seen and examined. No new events except as noted.     REVIEW OF SYSTEMS:    CONSTITUTIONAL: No weakness, fevers or chills  EYES/ENT: No visual changes;  No vertigo or throat pain   NECK: No pain or stiffness  RESPIRATORY: No cough, wheezing, hemoptysis; No shortness of breath  CARDIOVASCULAR: No chest pain or palpitations  GASTROINTESTINAL: No abdominal or epigastric pain. No nausea, vomiting, or hematemesis; No diarrhea or constipation. No melena or hematochezia.  GENITOURINARY: No dysuria, frequency or hematuria  NEUROLOGICAL: No numbness or weakness  SKIN: No itching, burning, rashes, or lesions   All other review of systems is negative unless indicated above.    MEDICATIONS:  MEDICATIONS  (STANDING):  aspirin enteric coated 81 milliGRAM(s) Oral daily  atorvastatin 80 milliGRAM(s) Oral at bedtime  aztreonam  IVPB 1000 milliGRAM(s) IV Intermittent every 8 hours  clindamycin IVPB 900 milliGRAM(s) IV Intermittent every 8 hours  DAPTOmycin IVPB 350 milliGRAM(s) IV Intermittent every 24 hours  dextrose 50% Injectable 12.5 Gram(s) IV Push once  dextrose 50% Injectable 25 Gram(s) IV Push once  dextrose 50% Injectable 25 Gram(s) IV Push once  docusate sodium 100 milliGRAM(s) Oral three times a day  furosemide   Injectable 20 milliGRAM(s) IV Push daily  influenza   Vaccine 0.5 milliLiter(s) IntraMuscular once  insulin glargine Injectable (LANTUS) 18 Unit(s) SubCutaneous two times a day  insulin lispro (HumaLOG) corrective regimen sliding scale   SubCutaneous three times a day before meals  lactobacillus acidophilus 1 Tablet(s) Oral daily  lidocaine   Patch 2 Patch Transdermal daily  metoprolol tartrate 12.5 milliGRAM(s) Oral two times a day  multivitamin 1 Tablet(s) Oral daily  pantoprazole    Tablet 40 milliGRAM(s) Oral before breakfast  senna 2 Tablet(s) Oral at bedtime      PHYSICAL EXAM:  T(C): 36.9 (09-23-19 @ 16:14), Max: 36.9 (09-23-19 @ 16:14)  HR: 86 (09-23-19 @ 16:14) (71 - 86)  BP: 106/67 (09-23-19 @ 16:14) (106/67 - 131/76)  RR: 18 (09-23-19 @ 16:14) (17 - 18)  SpO2: 96% (09-23-19 @ 16:14) (93% - 97%)  Wt(kg): --  I&O's Summary    22 Sep 2019 07:01  -  23 Sep 2019 07:00  --------------------------------------------------------  IN: 830 mL / OUT: 1425 mL / NET: -595 mL    23 Sep 2019 07:01  -  23 Sep 2019 17:22  --------------------------------------------------------  IN: 420 mL / OUT: 700 mL / NET: -280 mL          Appearance: Normal	  HEENT:   Normal oral mucosa, PERRL, EOMI	  Lymphatic: No lymphadenopathy , no edema  Cardiovascular: Normal S1 S2, No JVD  Respiratory: Lungs clear to auscultation, normal effort 	  Gastrointestinal:  Soft, Non-tender, + BS	  Skin: No rashes, No ecchymoses, No cyanosis, warm to touch  Musculoskeletal: Normal range of motion, normal strength  Psychiatry:  Mood & affect appropriate  Ext: No edema      All labs, Imaging and EKGs personally reviewed                             8.9    9.39  )-----------( 288      ( 23 Sep 2019 09:45 )             27.6               09-23    132<L>  |  101  |  18  ----------------------------<  118<H>  4.8   |  21<L>  |  0.90    Ca    8.7      23 Sep 2019 07:10

## 2019-09-23 NOTE — PROGRESS NOTE ADULT - PROBLEM SELECTOR PLAN 1
R buttock infiltrate with necrotic tissue  SUrg eval appreciated   SOft Tis US noted   pain control  CT noted  Abx as per ID  CT Pelv noted  F/U surgical recs regarding further W/U  patient refusing surgical intervention, refusing for now

## 2019-09-23 NOTE — PROGRESS NOTE ADULT - ASSESSMENT
Assessment:  DMT2: 86y Female with DM T2 with hyperglycemia, A1C 10.2%, on insulin, FS trending down, now acceptable range, no hypoglycemic episode, comfortable.  Abscess: s/p debridement of necrotic tissue on the R buttock, refusing surgical mgmt on the L, monitored and FU primary team/surgery  Anemia: s/p transfusion, stable, monitored.  HTN: Controlled, on antihypertensive medications.          Radha Chin MD  Cell: [phone]  Office: [phone] Assessment:  DMT2: 86y Female with DM T2 with hyperglycemia, A1C 10.2%, on insulin, FS trending down, now acceptable range, no hypoglycemic episode, comfortable.  Abscess: s/p debridement of necrotic tissue on the R buttock, refusing surgical mgmt on the L,  monitored and FU primary team/surgery  Anemia: s/p transfusion, stable, monitored.  HTN: Controlled, on antihypertensive medications.          Radha Chin MD  Cell: [phone]  Office: [phone]

## 2019-09-23 NOTE — PROGRESS NOTE ADULT - SUBJECTIVE AND OBJECTIVE BOX
Patient is a 86y old  Female who presents with a chief complaint of Fever, UTI (23 Sep 2019 17:20)      INTERVAL HISTORY: feels ok  	  MEDICATIONS:  furosemide   Injectable 20 milliGRAM(s) IV Push daily  metoprolol tartrate 12.5 milliGRAM(s) Oral two times a day        PHYSICAL EXAM:  T(C): 36.9 (09-23-19 @ 16:14), Max: 36.9 (09-23-19 @ 16:14)  HR: 88 (09-23-19 @ 18:46) (71 - 88)  BP: 119/78 (09-23-19 @ 18:46) (106/67 - 119/78)  RR: 18 (09-23-19 @ 16:14) (17 - 18)  SpO2: 96% (09-23-19 @ 16:14) (93% - 96%)  Wt(kg): --  I&O's Summary    22 Sep 2019 07:01  -  23 Sep 2019 07:00  --------------------------------------------------------  IN: 830 mL / OUT: 1425 mL / NET: -595 mL    23 Sep 2019 07:01  -  23 Sep 2019 23:41  --------------------------------------------------------  IN: 1170 mL / OUT: 900 mL / NET: 270 mL          Appearance: In no distress	  HEENT:    PERRL, EOMI	  Cardiovascular:  S1 S2, No JVD  Respiratory: Lungs clear to auscultation	  Gastrointestinal:  Soft, Non-tender, + BS	  Extremities:  No edema of LE                                8.9    9.39  )-----------( 288      ( 23 Sep 2019 09:45 )             27.6     09-23    132<L>  |  101  |  18  ----------------------------<  118<H>  4.8   |  21<L>  |  0.90    Ca    8.7      23 Sep 2019 07:10          Labs personally reviewed      ASSESSMENT/PLAN: 	  tolerated surgery well  afebrile, improving  BP well controlled   +edema of LE with SOB -- Lasix 20mg IV daily      Srini Velasco DO [facility]  Cardiovascular Medicine  [phone]

## 2019-09-23 NOTE — PROGRESS NOTE ADULT - SUBJECTIVE AND OBJECTIVE BOX
Chief complaint  Patient is a 86y old  Female who presents with a chief complaint of Fever, UTI (23 Sep 2019 13:01)   Review of systems  Patient in bed, looks comfortable, no fever, no hypoglycemia.    Labs and Fingersticks  CAPILLARY BLOOD GLUCOSE      POCT Blood Glucose.: 123 mg/dL (23 Sep 2019 12:21)  POCT Blood Glucose.: 116 mg/dL (23 Sep 2019 08:16)  POCT Blood Glucose.: 180 mg/dL (22 Sep 2019 22:35)  POCT Blood Glucose.: 217 mg/dL (22 Sep 2019 17:09)      Anion Gap, Serum: 10 (09-23 @ 07:10)  Anion Gap, Serum: 10 (09-22 @ 07:10)      Calcium, Total Serum: 8.7 (09-23 @ 07:10)  Calcium, Total Serum: 7.9 <L> (09-22 @ 07:10)          09-23    132<L>  |  101  |  18  ----------------------------<  118<H>  4.8   |  21<L>  |  0.90    Ca    8.7      23 Sep 2019 07:10                          8.9    9.39  )-----------( 288      ( 23 Sep 2019 09:45 )             27.6     Medications  MEDICATIONS  (STANDING):  aspirin enteric coated 81 milliGRAM(s) Oral daily  atorvastatin 80 milliGRAM(s) Oral at bedtime  aztreonam  IVPB 1000 milliGRAM(s) IV Intermittent every 8 hours  clindamycin IVPB 900 milliGRAM(s) IV Intermittent every 8 hours  DAPTOmycin IVPB 350 milliGRAM(s) IV Intermittent every 24 hours  dextrose 50% Injectable 12.5 Gram(s) IV Push once  dextrose 50% Injectable 25 Gram(s) IV Push once  dextrose 50% Injectable 25 Gram(s) IV Push once  docusate sodium 100 milliGRAM(s) Oral three times a day  furosemide   Injectable 20 milliGRAM(s) IV Push daily  influenza   Vaccine 0.5 milliLiter(s) IntraMuscular once  insulin glargine Injectable (LANTUS) 18 Unit(s) SubCutaneous two times a day  insulin lispro (HumaLOG) corrective regimen sliding scale   SubCutaneous three times a day before meals  lactobacillus acidophilus 1 Tablet(s) Oral daily  lidocaine   Patch 2 Patch Transdermal daily  metoprolol tartrate 12.5 milliGRAM(s) Oral two times a day  multivitamin 1 Tablet(s) Oral daily  pantoprazole    Tablet 40 milliGRAM(s) Oral before breakfast  senna 2 Tablet(s) Oral at bedtime      Physical Exam  General: Patient comfortable in bed  Vital Signs Last 12 Hrs  T(F): 97.9 (09-23-19 @ 12:03), Max: 97.9 (09-23-19 @ 12:03)  HR: 84 (09-23-19 @ 12:03) (71 - 84)  BP: 116/78 (09-23-19 @ 12:03) (114/73 - 116/78)  BP(mean): --  RR: 18 (09-23-19 @ 12:03) (17 - 18)  SpO2: 93% (09-23-19 @ 12:03) (93% - 96%)  Neck: No palpable thyroid nodules.  CVS: S1S2, No murmurs  Respiratory: No wheezing, no crepitations  GI: Abdomen soft, bowel sounds positive  Musculoskeletal:  edema lower extremities.   Skin: No skin rashes, no ecchymosis    Diagnostics Chief complaint  Patient is a 86y old  Female who presents with a chief complaint of Fever, UTI (23 Sep 2019 13:01)   Review of systems  Patient in bed, looks comfortable, no fever,  no hypoglycemia.    Labs and Fingersticks  CAPILLARY BLOOD GLUCOSE      POCT Blood Glucose.: 123 mg/dL (23 Sep 2019 12:21)  POCT Blood Glucose.: 116 mg/dL (23 Sep 2019 08:16)  POCT Blood Glucose.: 180 mg/dL (22 Sep 2019 22:35)  POCT Blood Glucose.: 217 mg/dL (22 Sep 2019 17:09)      Anion Gap, Serum: 10 (09-23 @ 07:10)  Anion Gap, Serum: 10 (09-22 @ 07:10)      Calcium, Total Serum: 8.7 (09-23 @ 07:10)  Calcium, Total Serum: 7.9 <L> (09-22 @ 07:10)          09-23    132<L>  |  101  |  18  ----------------------------<  118<H>  4.8   |  21<L>  |  0.90    Ca    8.7      23 Sep 2019 07:10                          8.9    9.39  )-----------( 288      ( 23 Sep 2019 09:45 )             27.6     Medications  MEDICATIONS  (STANDING):  aspirin enteric coated 81 milliGRAM(s) Oral daily  atorvastatin 80 milliGRAM(s) Oral at bedtime  aztreonam  IVPB 1000 milliGRAM(s) IV Intermittent every 8 hours  clindamycin IVPB 900 milliGRAM(s) IV Intermittent every 8 hours  DAPTOmycin IVPB 350 milliGRAM(s) IV Intermittent every 24 hours  dextrose 50% Injectable 12.5 Gram(s) IV Push once  dextrose 50% Injectable 25 Gram(s) IV Push once  dextrose 50% Injectable 25 Gram(s) IV Push once  docusate sodium 100 milliGRAM(s) Oral three times a day  furosemide   Injectable 20 milliGRAM(s) IV Push daily  influenza   Vaccine 0.5 milliLiter(s) IntraMuscular once  insulin glargine Injectable (LANTUS) 18 Unit(s) SubCutaneous two times a day  insulin lispro (HumaLOG) corrective regimen sliding scale   SubCutaneous three times a day before meals  lactobacillus acidophilus 1 Tablet(s) Oral daily  lidocaine   Patch 2 Patch Transdermal daily  metoprolol tartrate 12.5 milliGRAM(s) Oral two times a day  multivitamin 1 Tablet(s) Oral daily  pantoprazole    Tablet 40 milliGRAM(s) Oral before breakfast  senna 2 Tablet(s) Oral at bedtime      Physical Exam  General: Patient comfortable in bed  Vital Signs Last 12 Hrs  T(F): 97.9 (09-23-19 @ 12:03), Max: 97.9 (09-23-19 @ 12:03)  HR: 84 (09-23-19 @ 12:03) (71 - 84)  BP: 116/78 (09-23-19 @ 12:03) (114/73 - 116/78)  BP(mean): --  RR: 18 (09-23-19 @ 12:03) (17 - 18)  SpO2: 93% (09-23-19 @ 12:03) (93% - 96%)  Neck: No palpable thyroid nodules.  CVS: S1S2, No murmurs  Respiratory: No wheezing, no crepitations  GI: Abdomen soft, bowel sounds positive  Musculoskeletal:  edema lower extremities.   Skin: No skin rashes, no ecchymosis    Diagnostics

## 2019-09-24 RX ORDER — AZTREONAM 2 G
1000 VIAL (EA) INJECTION EVERY 8 HOURS
Refills: 0 | Status: DISCONTINUED | OUTPATIENT
Start: 2019-09-24 | End: 2019-09-27

## 2019-09-24 RX ORDER — HEPARIN SODIUM 5000 [USP'U]/ML
5000 INJECTION INTRAVENOUS; SUBCUTANEOUS EVERY 8 HOURS
Refills: 0 | Status: DISCONTINUED | OUTPATIENT
Start: 2019-09-24 | End: 2019-09-27

## 2019-09-24 RX ORDER — DAPTOMYCIN 500 MG/10ML
350 INJECTION, POWDER, LYOPHILIZED, FOR SOLUTION INTRAVENOUS EVERY 24 HOURS
Refills: 0 | Status: DISCONTINUED | OUTPATIENT
Start: 2019-09-24 | End: 2019-09-27

## 2019-09-24 RX ADMIN — Medication 81 MILLIGRAM(S): at 12:23

## 2019-09-24 RX ADMIN — Medication 100 MILLIGRAM(S): at 06:36

## 2019-09-24 RX ADMIN — OXYCODONE HYDROCHLORIDE 10 MILLIGRAM(S): 5 TABLET ORAL at 13:00

## 2019-09-24 RX ADMIN — OXYCODONE HYDROCHLORIDE 10 MILLIGRAM(S): 5 TABLET ORAL at 12:24

## 2019-09-24 RX ADMIN — OXYCODONE HYDROCHLORIDE 10 MILLIGRAM(S): 5 TABLET ORAL at 04:20

## 2019-09-24 RX ADMIN — LIDOCAINE 2 PATCH: 4 CREAM TOPICAL at 23:41

## 2019-09-24 RX ADMIN — Medication 12.5 MILLIGRAM(S): at 06:36

## 2019-09-24 RX ADMIN — Medication 20 MILLIGRAM(S): at 06:37

## 2019-09-24 RX ADMIN — Medication 650 MILLIGRAM(S): at 23:38

## 2019-09-24 RX ADMIN — Medication 50 MILLIGRAM(S): at 22:24

## 2019-09-24 RX ADMIN — INSULIN GLARGINE 18 UNIT(S): 100 INJECTION, SOLUTION SUBCUTANEOUS at 08:45

## 2019-09-24 RX ADMIN — Medication 650 MILLIGRAM(S): at 15:30

## 2019-09-24 RX ADMIN — Medication 12.5 MILLIGRAM(S): at 17:13

## 2019-09-24 RX ADMIN — Medication 100 MILLIGRAM(S): at 21:33

## 2019-09-24 RX ADMIN — DAPTOMYCIN 114 MILLIGRAM(S): 500 INJECTION, POWDER, LYOPHILIZED, FOR SOLUTION INTRAVENOUS at 23:08

## 2019-09-24 RX ADMIN — Medication 650 MILLIGRAM(S): at 14:43

## 2019-09-24 RX ADMIN — PANTOPRAZOLE SODIUM 40 MILLIGRAM(S): 20 TABLET, DELAYED RELEASE ORAL at 06:36

## 2019-09-24 RX ADMIN — HEPARIN SODIUM 5000 UNIT(S): 5000 INJECTION INTRAVENOUS; SUBCUTANEOUS at 21:35

## 2019-09-24 RX ADMIN — Medication 2: at 18:00

## 2019-09-24 RX ADMIN — INSULIN GLARGINE 18 UNIT(S): 100 INJECTION, SOLUTION SUBCUTANEOUS at 22:29

## 2019-09-24 RX ADMIN — ATORVASTATIN CALCIUM 80 MILLIGRAM(S): 80 TABLET, FILM COATED ORAL at 21:34

## 2019-09-24 RX ADMIN — LIDOCAINE 2 PATCH: 4 CREAM TOPICAL at 19:45

## 2019-09-24 RX ADMIN — LIDOCAINE 2 PATCH: 4 CREAM TOPICAL at 12:24

## 2019-09-24 RX ADMIN — Medication 50 MILLIGRAM(S): at 15:24

## 2019-09-24 RX ADMIN — Medication 2: at 12:29

## 2019-09-24 RX ADMIN — Medication 100 MILLIGRAM(S): at 14:43

## 2019-09-24 RX ADMIN — Medication 50 MILLIGRAM(S): at 03:21

## 2019-09-24 RX ADMIN — Medication 1 TABLET(S): at 12:23

## 2019-09-24 RX ADMIN — OXYCODONE HYDROCHLORIDE 10 MILLIGRAM(S): 5 TABLET ORAL at 03:20

## 2019-09-24 NOTE — PROGRESS NOTE ADULT - ASSESSMENT
86F PMH CHF, HTN, DM, CAD prior MI, admitted for UTI and ROB on CKD3, now s/p from debridement and I&D of gluteal skin and tissue for a right gluteal cleft abscess with necrotic tissue 9/15, and s/p bedside debridement of left gluteal ulcer.     PLAN:  - patient refusing operative debridement of left gluteal ulcer  - cont local wound care, collagenase  - will cont dressing changes, and continue to discuss benefit of operative debridement for long term wound healing and transition off antibiotics with patient and family.  - cont abx  - care per primary team    Red Surgery   [id number] 86F PMH CHF, HTN, DM, CAD prior MI, admitted for UTI and ROB on CKD3, now s/p from debridement and I&D of gluteal skin and tissue for a right gluteal cleft abscess with necrotic tissue 9/15, and s/p bedside debridement of left gluteal ulcer.     PLAN:  - patient refusing operative debridement of left gluteal ulcer  - cont local wound care  - will cont dressing changes  - cont abx  - care per primary team    Red Surgery   [id number] 86F PMH CHF, HTN, DM, CAD prior MI, admitted for UTI and ROB on CKD3, now s/p from debridement and I&D of gluteal skin and tissue for a right gluteal abscess with necrotic tissue 9/15, and s/p bedside debridement of left gluteal ulcer.     PLAN:  - patient refusing operative debridement of left gluteal ulcer  - cont local wound care  - will cont dressing changes  - cont abx per ID  - care per primary team    Red Surgery   [id number]

## 2019-09-24 NOTE — PROGRESS NOTE ADULT - SUBJECTIVE AND OBJECTIVE BOX
Patient seen and examined  no complaints    codeine (Unknown)  Kiwi (Anaphylaxis)  penicillins (Anaphylaxis)    Hospital Medications:   MEDICATIONS  (STANDING):  aspirin enteric coated 81 milliGRAM(s) Oral daily  atorvastatin 80 milliGRAM(s) Oral at bedtime  aztreonam  IVPB 1000 milliGRAM(s) IV Intermittent every 8 hours  clindamycin IVPB 900 milliGRAM(s) IV Intermittent every 8 hours  DAPTOmycin IVPB 350 milliGRAM(s) IV Intermittent every 24 hours  dextrose 50% Injectable 12.5 Gram(s) IV Push once  dextrose 50% Injectable 25 Gram(s) IV Push once  dextrose 50% Injectable 25 Gram(s) IV Push once  docusate sodium 100 milliGRAM(s) Oral three times a day  furosemide   Injectable 20 milliGRAM(s) IV Push daily  influenza   Vaccine 0.5 milliLiter(s) IntraMuscular once  insulin glargine Injectable (LANTUS) 18 Unit(s) SubCutaneous two times a day  insulin lispro (HumaLOG) corrective regimen sliding scale   SubCutaneous three times a day before meals  lactobacillus acidophilus 1 Tablet(s) Oral daily  lidocaine   Patch 2 Patch Transdermal daily  metoprolol tartrate 12.5 milliGRAM(s) Oral two times a day  multivitamin 1 Tablet(s) Oral daily  pantoprazole    Tablet 40 milliGRAM(s) Oral before breakfast  senna 2 Tablet(s) Oral at bedtime        VITALS:  T(F): 97.7 (09-24-19 @ 09:00), Max: 98.4 (09-23-19 @ 16:14)  HR: 67 (09-24-19 @ 09:00)  BP: 106/71 (09-24-19 @ 09:00)  RR: 18 (09-24-19 @ 09:00)  SpO2: 96% (09-24-19 @ 09:00)  Wt(kg): --    09-23 @ 07:01  -  09-24 @ 07:00  --------------------------------------------------------  IN: 1370 mL / OUT: 1000 mL / NET: 370 mL    09-24 @ 07:01  -  09-24 @ 13:06  --------------------------------------------------------  IN: 240 mL / OUT: 1100 mL / NET: -860 mL        PHYSICAL EXAM:  Constitutional: NAD  HEENT: anicteric sclera, oropharynx clear, MMM  Neck: No JVD  Respiratory: CTAB, no wheezes, rales or rhonchi  Cardiovascular: S1, S2, RRR  Gastrointestinal: BS+, soft, NT/ND  Extremities: No cyanosis or clubbing. Trace peripheral edema  Neurological: A/O x 3, no focal deficits  Psychiatric: Normal mood, normal affect  : + infante  LABS:  09-23    132<L>  |  101  |  18  ----------------------------<  118<H>  4.8   |  21<L>  |  0.90    Ca    8.7      23 Sep 2019 07:10      Creatinine Trend: 0.90 <--, 0.92 <--, 0.90 <--, 0.93 <--, 1.00 <--, 0.98 <--, 1.10 <--                        8.9    9.39  )-----------( 288      ( 23 Sep 2019 09:45 )             27.6     Urine Studies:    Osmolality, Random Urine: 524 mosm/Kg (09-18 @ 01:19)    RADIOLOGY & ADDITIONAL STUDIES:

## 2019-09-24 NOTE — PROGRESS NOTE ADULT - ASSESSMENT
Pt is a 87 y/o Female with PMHx of CHF, DM, HLD, HTN, CAD prior MI, c/o fatigue, "feeling off" and unable to get out of bed; Tmax 104F toay. Rash/redness on buttocks noted by home health aid. patient also complaining of +chronic cough, nonproductive.  No abdominal pain, no n/v/d. No chest pain.  States she has been feeling hot and cold over past few days, and took her temperature today, found fever of 104, and came to ED for evaluation.  No dysuria, no diarrhea. +constipation (chronic).  Increased diffuse leg swelling for past few days. patient lives at home with family. walks by herself with no use of walker. denies of any MEAD< chest pain on exertion. compliant with all her home medications (04 Sep 2019 22:22)    ER vitals: Tm 102.2, P 75, /77.  WBC 14.8 --> 12.2.  Cr 2.0 <-- 1.3.  PCT 0.49.  Ucx >100K E.coli.  Pt given dose of ceftriaxone, now on cipro for UTI.  ID consult called for further abx managment.      Sepsis:    - fever, leukocytosis. Source less likely UTI given persistent fevers, pt has infiltration of perirectal tissues, likely cellulitis.  Abx broadened     - Monitor.  lactate.  Pct elevated 0.49.  Pt with rising WBC, abx broadened on 9/11 (dapto/aztreanam/flagyl).    - Monitor hemodynamic status and BPs.  s/p IV fluid bolus, cont maintanence fluid.      - blood cx, urine cx.  RVP (-).  No pna on cxr      Perirectal cellulitis, r/o abscess:    - Repeat CTap on 9/13 shows worsening inflammation with fluid and foci of air.   s/p OR on 9/15 for  debridement.  Pt not responding to antibiotics prior to debridement.  f/u surgical wound cultures - Enterococcus and Bacteroides    - Cont dapto/azactam/clindamycin.   Changed from flagyl to clindamycin for continued anerobic coverage and anti-toxin effect.  Cover for gas forming bacteria.       - On 9/20 repeat ct scan performed, pt with new ulcerous lesion with eschar over L perineum.     * Pt with persistent necrotic wound in left gluteal area, pt refusing further OR debridement.  Pt maintained on daily dressing changes and collagenase.  Will continue broad spectrum abx for now.  f/u surgery              Will follow,    Sandrita Zaman  [phone]

## 2019-09-24 NOTE — CHART NOTE - NSCHARTNOTEFT_GEN_A_CORE
Nutrition Follow Up Note    Chart reviewed events noted.  History and course: Pt is a 85 y/o Female with PMHx of CHF, DM, HLD, HTN, CAD prior MI, c/o fatigue, "feeling off" and unable to get out of bed; Tmax 104F . found t have UTI, case was complicated with buttock lesion with cellulitis.    Followed by [facility] for ROB now resolved, continued on 1 liter fluid restriction    Diet :  Consistent carbohydrate with snack, 1 liter fluid restriction, glucerna  supplement 2x daily    Patient reports: feeling better, she is happy her glucose has improved from home.      PO intake : 50%, 50%, 75% past three meals, plus glucerna 2x daily     Source for PO intake: patient, staff           Daily Weight in k.4 (), Weight in k.2 (), Weight in k.6 (), Weight in k.3 (), Weight in k (), Weight in k.3 (), Weight in k.2 ()     Pertinent Medications: MEDICATIONS  (STANDING):  aspirin enteric coated 81 milliGRAM(s) Oral daily  atorvastatin 80 milliGRAM(s) Oral at bedtime  aztreonam  IVPB 1000 milliGRAM(s) IV Intermittent every 8 hours  clindamycin IVPB 900 milliGRAM(s) IV Intermittent every 8 hours  DAPTOmycin IVPB 350 milliGRAM(s) IV Intermittent every 24 hours  dextrose 50% Injectable 12.5 Gram(s) IV Push once  dextrose 50% Injectable 25 Gram(s) IV Push once  dextrose 50% Injectable 25 Gram(s) IV Push once  docusate sodium 100 milliGRAM(s) Oral three times a day  furosemide   Injectable 20 milliGRAM(s) IV Push daily  heparin  Injectable 5000 Unit(s) SubCutaneous every 8 hours  influenza   Vaccine 0.5 milliLiter(s) IntraMuscular once  insulin glargine Injectable (LANTUS) 18 Unit(s) SubCutaneous two times a day  insulin lispro (HumaLOG) corrective regimen sliding scale   SubCutaneous three times a day before meals  lactobacillus acidophilus 1 Tablet(s) Oral daily  lidocaine   Patch 2 Patch Transdermal daily  metoprolol tartrate 12.5 milliGRAM(s) Oral two times a day  multivitamin 1 Tablet(s) Oral daily  pantoprazole    Tablet 40 milliGRAM(s) Oral before breakfast  senna 2 Tablet(s) Oral at bedtime    MEDICATIONS  (PRN):  acetaminophen   Tablet .. 650 milliGRAM(s) Oral every 6 hours PRN Mild Pain (1 - 3)  bisacodyl Suppository 10 milliGRAM(s) Rectal daily PRN Constipation  dextrose 40% Gel 15 Gram(s) Oral once PRN Blood Glucose LESS THAN 70 milliGRAM(s)/deciliter  glucagon  Injectable 1 milliGRAM(s) IntraMuscular once PRN Glucose LESS THAN 70 milligrams/deciliter  ondansetron Injectable 4 milliGRAM(s) IV Push every 8 hours PRN Nausea and/or Vomiting  oxyCODONE    IR 5 milliGRAM(s) Oral every 4 hours PRN Moderate Pain (4 - 6)  oxyCODONE    IR 10 milliGRAM(s) Oral every 4 hours PRN Severe Pain (7 - 10)  simethicone 80 milliGRAM(s) Chew every 8 hours PRN Heartburn    Pertinent Labs:   Finger Sticks:  POCT Blood Glucose.: 157 mg/dL ( @ 17:28)  POCT Blood Glucose.: 160 mg/dL ( @ 12:10)  POCT Blood Glucose.: 114 mg/dL ( @ 08:14)  POCT Blood Glucose.: 223 mg/dL ( @ 22:11)      Skin per nursing documentation: L butt abscess  Edema: noted 2+ lucian arms;  3+ lucian legs    Estimated Needs:   [ X] no change since previous assessment  [ ] recalculated:     Previous Nutrition knowledge deficit  Nutrition Diagnosis is: addressed with diet education        Recommend  1)  Reinforce diet adherence/diet education including fluid restriction  2)continue glucerna 1.2 to supplement intake (to provide 30gm protein, 570 calories, 384ml water)    Monitoring and Evaluation:   .   Continue to monitor Nutritional intake, Tolerance to diet prescription, weights, labs, skin integrity    RD remains available upon request and will follow up per protocol

## 2019-09-24 NOTE — PROGRESS NOTE ADULT - SUBJECTIVE AND OBJECTIVE BOX
Infectious Diseases progress note:    Subjective: Events noted.  Pt refused OR debridement yesterday.  WBC 9.3 yesterday. No new fever.  L gluteal wound still with necrosis/eschar    ROS:  CONSTITUTIONAL:  No fever, chills, rigors  CARDIOVASCULAR:  No chest pain or palpitations  RESPIRATORY:   No SOB, cough, dyspnea on exertion.  No wheezing  GASTROINTESTINAL:  No abd pain, N/V, diarrhea/constipation  EXTREMITIES:  No swelling or joint pain  GENITOURINARY:  No burning on urination, increased frequency or urgency.  No flank pain  NEUROLOGIC:  No HA, visual disturbances  SKIN: No rashes    Allergies    codeine (Unknown)  Kiwi (Anaphylaxis)  penicillins (Anaphylaxis)    Intolerances        ANTIBIOTICS/RELEVANT:  antimicrobials  clindamycin IVPB 900 milliGRAM(s) IV Intermittent every 8 hours    immunologic:  influenza   Vaccine 0.5 milliLiter(s) IntraMuscular once    OTHER:  acetaminophen   Tablet .. 650 milliGRAM(s) Oral every 6 hours PRN  aspirin enteric coated 81 milliGRAM(s) Oral daily  atorvastatin 80 milliGRAM(s) Oral at bedtime  bisacodyl Suppository 10 milliGRAM(s) Rectal daily PRN  dextrose 40% Gel 15 Gram(s) Oral once PRN  dextrose 50% Injectable 12.5 Gram(s) IV Push once  dextrose 50% Injectable 25 Gram(s) IV Push once  dextrose 50% Injectable 25 Gram(s) IV Push once  docusate sodium 100 milliGRAM(s) Oral three times a day  furosemide   Injectable 20 milliGRAM(s) IV Push daily  glucagon  Injectable 1 milliGRAM(s) IntraMuscular once PRN  insulin glargine Injectable (LANTUS) 18 Unit(s) SubCutaneous two times a day  insulin lispro (HumaLOG) corrective regimen sliding scale   SubCutaneous three times a day before meals  lactobacillus acidophilus 1 Tablet(s) Oral daily  lidocaine   Patch 2 Patch Transdermal daily  metoprolol tartrate 12.5 milliGRAM(s) Oral two times a day  multivitamin 1 Tablet(s) Oral daily  ondansetron Injectable 4 milliGRAM(s) IV Push every 8 hours PRN  oxyCODONE    IR 5 milliGRAM(s) Oral every 4 hours PRN  oxyCODONE    IR 10 milliGRAM(s) Oral every 4 hours PRN  pantoprazole    Tablet 40 milliGRAM(s) Oral before breakfast  senna 2 Tablet(s) Oral at bedtime  simethicone 80 milliGRAM(s) Chew every 8 hours PRN      Objective:  Vital Signs Last 24 Hrs  T(C): 36.5 (24 Sep 2019 09:00), Max: 36.9 (23 Sep 2019 16:14)  T(F): 97.7 (24 Sep 2019 09:00), Max: 98.4 (23 Sep 2019 16:14)  HR: 67 (24 Sep 2019 09:00) (67 - 88)  BP: 106/71 (24 Sep 2019 09:00) (106/67 - 128/82)  BP(mean): --  RR: 18 (24 Sep 2019 09:00) (18 - 18)  SpO2: 96% (24 Sep 2019 09:00) (93% - 100%)    PHYSICAL EXAM:  Constitutional:NAD  Eyes:NIDIA, EOMI  Ear/Nose/Throat: no thrush, mucositis.  Moist mucous membranes	  Neck:no JVD, no lymphadenopathy, supple  Respiratory: CTA lucian  Cardiovascular: S1S2 RRR, no murmurs  Gastrointestinal:soft, nontender,  nondistended (+) BS  Extremities:no e/e/c  Skin: Lt buttock wound with eschar/necrotic wound.  Rt buttock wound increased granulation tissue        LABS:                        8.9    9.39  )-----------( 288      ( 23 Sep 2019 09:45 )             27.6     09-23    132<L>  |  101  |  18  ----------------------------<  118<H>  4.8   |  21<L>  |  0.90    Ca    8.7      23 Sep 2019 07:10            Procalcitonin, Serum: 0.49 (09-05 @ 07:29)            Rapid RVP Result: [facility]          MICROBIOLOGY:    Culture - Surgical Swab (09.15.19 @ 18:03)    Specimen Source: .Surgical Swab    Culture Results:   Rare Enterococcus species "Susceptibilities not performed"  Few Bacteroides thetaiotamcron group "Susceptibilities not performed"          RADIOLOGY & ADDITIONAL STUDIES:

## 2019-09-24 NOTE — PROGRESS NOTE ADULT - SUBJECTIVE AND OBJECTIVE BOX
Subjective: Patient seen and examined. No new events except as noted.   doing better  pain improved     REVIEW OF SYSTEMS:    CONSTITUTIONAL: No weakness, fevers or chills  EYES/ENT: No visual changes;  No vertigo or throat pain   NECK: No pain or stiffness  RESPIRATORY: No cough, wheezing, hemoptysis; No shortness of breath  CARDIOVASCULAR: No chest pain or palpitations  GASTROINTESTINAL: No abdominal or epigastric pain. No nausea, vomiting, or hematemesis; No diarrhea or constipation. No melena or hematochezia.  GENITOURINARY: No dysuria, frequency or hematuria  NEUROLOGICAL: No numbness or weakness  SKIN: buttock burning on movement and bowel movement   All other review of systems is negative unless indicated above.    MEDICATIONS:  MEDICATIONS  (STANDING):  aspirin enteric coated 81 milliGRAM(s) Oral daily  atorvastatin 80 milliGRAM(s) Oral at bedtime  aztreonam  IVPB 1000 milliGRAM(s) IV Intermittent every 8 hours  clindamycin IVPB 900 milliGRAM(s) IV Intermittent every 8 hours  DAPTOmycin IVPB 350 milliGRAM(s) IV Intermittent every 24 hours  dextrose 50% Injectable 12.5 Gram(s) IV Push once  dextrose 50% Injectable 25 Gram(s) IV Push once  dextrose 50% Injectable 25 Gram(s) IV Push once  docusate sodium 100 milliGRAM(s) Oral three times a day  furosemide   Injectable 20 milliGRAM(s) IV Push daily  heparin  Injectable 5000 Unit(s) SubCutaneous every 8 hours  influenza   Vaccine 0.5 milliLiter(s) IntraMuscular once  insulin glargine Injectable (LANTUS) 18 Unit(s) SubCutaneous two times a day  insulin lispro (HumaLOG) corrective regimen sliding scale   SubCutaneous three times a day before meals  lactobacillus acidophilus 1 Tablet(s) Oral daily  lidocaine   Patch 2 Patch Transdermal daily  metoprolol tartrate 12.5 milliGRAM(s) Oral two times a day  multivitamin 1 Tablet(s) Oral daily  pantoprazole    Tablet 40 milliGRAM(s) Oral before breakfast  senna 2 Tablet(s) Oral at bedtime      PHYSICAL EXAM:  T(C): 36.6 (09-24-19 @ 16:32), Max: 36.7 (09-23-19 @ 23:50)  HR: 89 (09-24-19 @ 16:32) (67 - 89)  BP: 118/80 (09-24-19 @ 16:32) (97/60 - 128/82)  RR: 118 (09-24-19 @ 16:32) (18 - 118)  SpO2: 99% (09-24-19 @ 16:32) (95% - 100%)  Wt(kg): --  I&O's Summary    23 Sep 2019 07:01  -  24 Sep 2019 07:00  --------------------------------------------------------  IN: 1370 mL / OUT: 1000 mL / NET: 370 mL    24 Sep 2019 07:01  -  24 Sep 2019 17:12  --------------------------------------------------------  IN: 480 mL / OUT: 1100 mL / NET: -620 mL          Appearance: Normal	  HEENT:   Normal oral mucosa, PERRL, EOMI	  Lymphatic: No lymphadenopathy , no edema  Cardiovascular: Normal S1 S2, No JVD  Respiratory: Lungs clear to auscultation, normal effort 	  Gastrointestinal:  Soft, Non-tender, + BS	  Skin: buttock dressing intact, swelling mild improvement   Musculoskeletal: Normal range of motion, normal strength  Psychiatry:  Mood & affect appropriate  Ext: No edema      All labs, Imaging and EKGs personally reviewed                             8.9    9.39  )-----------( 288      ( 23 Sep 2019 09:45 )             27.6               09-23    132<L>  |  101  |  18  ----------------------------<  118<H>  4.8   |  21<L>  |  0.90    Ca    8.7      23 Sep 2019 07:10

## 2019-09-24 NOTE — PROGRESS NOTE ADULT - SUBJECTIVE AND OBJECTIVE BOX
Patient is a 86y old  Female who presents with a chief complaint of Fever, UTI (24 Sep 2019 17:12)      MEDICATIONS:  furosemide   Injectable 20 milliGRAM(s) IV Push daily  metoprolol tartrate 12.5 milliGRAM(s) Oral two times a day        PHYSICAL EXAM:  T(C): 36.8 (09-24-19 @ 19:45), Max: 36.8 (09-24-19 @ 19:45)  HR: 69 (09-24-19 @ 19:45) (67 - 89)  BP: 126/76 (09-24-19 @ 19:45) (97/60 - 128/82)  RR: 18 (09-24-19 @ 19:45) (18 - 18)  SpO2: 98% (09-24-19 @ 19:45) (95% - 100%)  Wt(kg): --  I&O's Summary    23 Sep 2019 07:01  -  24 Sep 2019 07:00  --------------------------------------------------------  IN: 1370 mL / OUT: 1000 mL / NET: 370 mL    24 Sep 2019 07:01  -  24 Sep 2019 21:30  --------------------------------------------------------  IN: 1080 mL / OUT: 1100 mL / NET: -20 mL          Appearance: In no distress	  HEENT:    PERRL, EOMI	  Cardiovascular:  S1 S2, No JVD  Respiratory: Lungs clear to auscultation	  Gastrointestinal:  Soft, Non-tender, + BS	  Extremities:  No edema of LE                                8.9    9.39  )-----------( 288      ( 23 Sep 2019 09:45 )             27.6     09-23    132<L>  |  101  |  18  ----------------------------<  118<H>  4.8   |  21<L>  |  0.90    Ca    8.7      23 Sep 2019 07:10          Labs personally reviewed      ASSESSMENT/PLAN: 	  tolerated surgery well  afebrile, improving  BP well controlled   +edema of LE with SOB -- Lasix 20mg IV daily        Srini Velasco DO [facility]  Cardiovascular Medicine  [phone]

## 2019-09-24 NOTE — PROGRESS NOTE ADULT - SUBJECTIVE AND OBJECTIVE BOX
Chief complaint  Patient is a 86y old  Female who presents with a chief complaint of Fever, UTI (24 Sep 2019 13:06)   Review of systems  Patient in bed, looks comfortable, no fever, no hypoglycemia.    Labs and Fingersticks  CAPILLARY BLOOD GLUCOSE      POCT Blood Glucose.: 160 mg/dL (24 Sep 2019 12:10)  POCT Blood Glucose.: 114 mg/dL (24 Sep 2019 08:14)  POCT Blood Glucose.: 223 mg/dL (23 Sep 2019 22:11)  POCT Blood Glucose.: 189 mg/dL (23 Sep 2019 17:11)      Anion Gap, Serum: 10 (09-23 @ 07:10)      Calcium, Total Serum: 8.7 (09-23 @ 07:10)          09-23    132<L>  |  101  |  18  ----------------------------<  118<H>  4.8   |  21<L>  |  0.90    Ca    8.7      23 Sep 2019 07:10                          8.9    9.39  )-----------( 288      ( 23 Sep 2019 09:45 )             27.6     Medications  MEDICATIONS  (STANDING):  aspirin enteric coated 81 milliGRAM(s) Oral daily  atorvastatin 80 milliGRAM(s) Oral at bedtime  aztreonam  IVPB 1000 milliGRAM(s) IV Intermittent every 8 hours  clindamycin IVPB 900 milliGRAM(s) IV Intermittent every 8 hours  DAPTOmycin IVPB 350 milliGRAM(s) IV Intermittent every 24 hours  dextrose 50% Injectable 12.5 Gram(s) IV Push once  dextrose 50% Injectable 25 Gram(s) IV Push once  dextrose 50% Injectable 25 Gram(s) IV Push once  docusate sodium 100 milliGRAM(s) Oral three times a day  furosemide   Injectable 20 milliGRAM(s) IV Push daily  heparin  Injectable 5000 Unit(s) SubCutaneous every 8 hours  influenza   Vaccine 0.5 milliLiter(s) IntraMuscular once  insulin glargine Injectable (LANTUS) 18 Unit(s) SubCutaneous two times a day  insulin lispro (HumaLOG) corrective regimen sliding scale   SubCutaneous three times a day before meals  lactobacillus acidophilus 1 Tablet(s) Oral daily  lidocaine   Patch 2 Patch Transdermal daily  metoprolol tartrate 12.5 milliGRAM(s) Oral two times a day  multivitamin 1 Tablet(s) Oral daily  pantoprazole    Tablet 40 milliGRAM(s) Oral before breakfast  senna 2 Tablet(s) Oral at bedtime      Physical Exam  General: Patient comfortable in bed  Vital Signs Last 12 Hrs  T(F): 97.9 (09-24-19 @ 13:25), Max: 97.9 (09-24-19 @ 13:25)  HR: 84 (09-24-19 @ 13:25) (67 - 86)  BP: 97/60 (09-24-19 @ 13:25) (97/60 - 128/82)  BP(mean): --  RR: 18 (09-24-19 @ 13:25) (18 - 18)  SpO2: 97% (09-24-19 @ 13:25) (95% - 97%)  Neck: No palpable thyroid nodules.  CVS: S1S2, No murmurs  Respiratory: No wheezing, no crepitations  GI: Abdomen soft, bowel sounds positive  Musculoskeletal:  edema lower extremities.   Skin: No skin rashes, no ecchymosis    Diagnostics Chief complaint  Patient is a 86y old  Female who presents with a chief complaint of Fever, UTI (24 Sep 2019 13:06)   Review of systems  Patient in bed, looks comfortable, no fever,  no hypoglycemia.    Labs and Fingersticks  CAPILLARY BLOOD GLUCOSE      POCT Blood Glucose.: 160 mg/dL (24 Sep 2019 12:10)  POCT Blood Glucose.: 114 mg/dL (24 Sep 2019 08:14)  POCT Blood Glucose.: 223 mg/dL (23 Sep 2019 22:11)  POCT Blood Glucose.: 189 mg/dL (23 Sep 2019 17:11)      Anion Gap, Serum: 10 (09-23 @ 07:10)      Calcium, Total Serum: 8.7 (09-23 @ 07:10)          09-23    132<L>  |  101  |  18  ----------------------------<  118<H>  4.8   |  21<L>  |  0.90    Ca    8.7      23 Sep 2019 07:10                          8.9    9.39  )-----------( 288      ( 23 Sep 2019 09:45 )             27.6     Medications  MEDICATIONS  (STANDING):  aspirin enteric coated 81 milliGRAM(s) Oral daily  atorvastatin 80 milliGRAM(s) Oral at bedtime  aztreonam  IVPB 1000 milliGRAM(s) IV Intermittent every 8 hours  clindamycin IVPB 900 milliGRAM(s) IV Intermittent every 8 hours  DAPTOmycin IVPB 350 milliGRAM(s) IV Intermittent every 24 hours  dextrose 50% Injectable 12.5 Gram(s) IV Push once  dextrose 50% Injectable 25 Gram(s) IV Push once  dextrose 50% Injectable 25 Gram(s) IV Push once  docusate sodium 100 milliGRAM(s) Oral three times a day  furosemide   Injectable 20 milliGRAM(s) IV Push daily  heparin  Injectable 5000 Unit(s) SubCutaneous every 8 hours  influenza   Vaccine 0.5 milliLiter(s) IntraMuscular once  insulin glargine Injectable (LANTUS) 18 Unit(s) SubCutaneous two times a day  insulin lispro (HumaLOG) corrective regimen sliding scale   SubCutaneous three times a day before meals  lactobacillus acidophilus 1 Tablet(s) Oral daily  lidocaine   Patch 2 Patch Transdermal daily  metoprolol tartrate 12.5 milliGRAM(s) Oral two times a day  multivitamin 1 Tablet(s) Oral daily  pantoprazole    Tablet 40 milliGRAM(s) Oral before breakfast  senna 2 Tablet(s) Oral at bedtime      Physical Exam  General: Patient comfortable in bed  Vital Signs Last 12 Hrs  T(F): 97.9 (09-24-19 @ 13:25), Max: 97.9 (09-24-19 @ 13:25)  HR: 84 (09-24-19 @ 13:25) (67 - 86)  BP: 97/60 (09-24-19 @ 13:25) (97/60 - 128/82)  BP(mean): --  RR: 18 (09-24-19 @ 13:25) (18 - 18)  SpO2: 97% (09-24-19 @ 13:25) (95% - 97%)  Neck: No palpable thyroid nodules.  CVS: S1S2, No murmurs  Respiratory: No wheezing, no crepitations  GI: Abdomen soft, bowel sounds positive  Musculoskeletal:  edema lower extremities.   Skin: No skin rashes, no ecchymosis    Diagnostics

## 2019-09-24 NOTE — PROGRESS NOTE ADULT - PROBLEM SELECTOR PLAN 1
R buttock infiltrate with necrotic tissue  SUrg eval appreciated   SOft Tis US noted   pain control  CT noted  Abx as per ID  CT Pelv noted  F/U surgical recs regarding further W/U  patient refusing surgical intervention, refusing for now  again had a long conversation with daughter over the phone and patient. patient refusing further surgical intervention. would like to try full ABx treatment. understands that if no surgical debridement, she may have to cont IV ABX for at least 3-4 weeks. with that said, it may not be sufficient if no debridement.   agreed for PIC line for possible discharge after clearance from surgical team

## 2019-09-24 NOTE — PROGRESS NOTE ADULT - ASSESSMENT
Assessment:  DMT2: 86y Female with DM T2 with hyperglycemia, A1C 10.2%, on insulin, FS trending within acceptable range, no hypoglycemic episode, comfortable, states her pain is improved today.  Abscess: s/p debridement of necrotic tissue on the R buttock, refusing surgical mgmt on the L, monitored and FU primary team/surgery  Anemia: s/p transfusion, stable, monitored.  HTN: Controlled, on antihypertensive medications.          Radha Chin MD  Cell: [phone]  Office: [phone] Assessment:  DMT2: 86y Female with DM T2 with hyperglycemia, A1C 10.2%, on insulin, FS trending within acceptable range, no hypoglycemic episode, comfortable, states her pain is improved today.  Abscess: s/p debridement of necrotic tissue on the R buttock, refusing surgical mgmt on the L,  monitored and FU primary team/surgery  Anemia: s/p transfusion, stable, monitored.  HTN: Controlled, on antihypertensive medications.          Radha Chin MD  Cell: [phone]  Office: [phone]

## 2019-09-24 NOTE — PROGRESS NOTE ADULT - SUBJECTIVE AND OBJECTIVE BOX
GENERAL SURGERY DAILY PROGRESS NOTE:    Interval:  No acute events overnight.    Subjective:  Patient states that she feels better and continues to refuse operative management of her left buttock abscess. Reports pain is well controlled. Denies N/V. Passing flatus, +BM.    Vital Signs Last 24 Hrs  T(C): 36.5 (24 Sep 2019 09:00), Max: 36.9 (23 Sep 2019 16:14)  T(F): 97.7 (24 Sep 2019 09:00), Max: 98.4 (23 Sep 2019 16:14)  HR: 67 (24 Sep 2019 09:00) (67 - 88)  BP: 106/71 (24 Sep 2019 09:00) (106/67 - 128/82)  BP(mean): --  RR: 18 (24 Sep 2019 09:00) (18 - 18)  SpO2: 96% (24 Sep 2019 09:00) (93% - 100%)    Exam:  Gen: NAD, resting in bed, alert and responding appropriately  Resp: Airway patent, non-labored respirations  : Improvement in erythema over R gluteus. Left gluteal wound with persistent eschar, with drainage, and with persistent erythema. Dressing changed at bedside  Ext: No edema, WWP  Neuro: AAOx3, no focal deficits

## 2019-09-25 LAB
ANION GAP SERPL CALC-SCNC: 9 MMOL/L — SIGNIFICANT CHANGE UP (ref 5–17)
BUN SERPL-MCNC: 18 MG/DL — SIGNIFICANT CHANGE UP (ref 7–23)
CALCIUM SERPL-MCNC: 8.1 MG/DL — LOW (ref 8.4–10.5)
CHLORIDE SERPL-SCNC: 104 MMOL/L — SIGNIFICANT CHANGE UP (ref 96–108)
CO2 SERPL-SCNC: 25 MMOL/L — SIGNIFICANT CHANGE UP (ref 22–31)
CREAT SERPL-MCNC: 0.92 MG/DL — SIGNIFICANT CHANGE UP (ref 0.5–1.3)
GLUCOSE SERPL-MCNC: 81 MG/DL — SIGNIFICANT CHANGE UP (ref 70–99)
HCT VFR BLD CALC: 24.6 % — LOW (ref 34.5–45)
HCT VFR BLD CALC: 26.5 % — LOW (ref 34.5–45)
HGB BLD-MCNC: 7.5 G/DL — LOW (ref 11.5–15.5)
HGB BLD-MCNC: 9.2 G/DL — LOW (ref 11.5–15.5)
MCHC RBC-ENTMCNC: 28.7 PG — SIGNIFICANT CHANGE UP (ref 27–34)
MCHC RBC-ENTMCNC: 30.5 GM/DL — LOW (ref 32–36)
MCHC RBC-ENTMCNC: 32.4 PG — SIGNIFICANT CHANGE UP (ref 27–34)
MCHC RBC-ENTMCNC: 34.5 GM/DL — SIGNIFICANT CHANGE UP (ref 32–36)
MCV RBC AUTO: 93.8 FL — SIGNIFICANT CHANGE UP (ref 80–100)
MCV RBC AUTO: 94.3 FL — SIGNIFICANT CHANGE UP (ref 80–100)
PLATELET # BLD AUTO: 251 K/UL — SIGNIFICANT CHANGE UP (ref 150–400)
PLATELET # BLD AUTO: 290 K/UL — SIGNIFICANT CHANGE UP (ref 150–400)
POTASSIUM SERPL-MCNC: 5 MMOL/L — SIGNIFICANT CHANGE UP (ref 3.5–5.3)
POTASSIUM SERPL-SCNC: 5 MMOL/L — SIGNIFICANT CHANGE UP (ref 3.5–5.3)
RBC # BLD: 2.61 M/UL — LOW (ref 3.8–5.2)
RBC # BLD: 2.83 M/UL — LOW (ref 3.8–5.2)
RBC # FLD: 16.1 % — HIGH (ref 10.3–14.5)
RBC # FLD: 18 % — HIGH (ref 10.3–14.5)
SODIUM SERPL-SCNC: 138 MMOL/L — SIGNIFICANT CHANGE UP (ref 135–145)
WBC # BLD: 5.84 K/UL — SIGNIFICANT CHANGE UP (ref 3.8–10.5)
WBC # BLD: 9 K/UL — SIGNIFICANT CHANGE UP (ref 3.8–10.5)
WBC # FLD AUTO: 5.84 K/UL — SIGNIFICANT CHANGE UP (ref 3.8–10.5)
WBC # FLD AUTO: 9 K/UL — SIGNIFICANT CHANGE UP (ref 3.8–10.5)

## 2019-09-25 RX ORDER — COLLAGENASE CLOSTRIDIUM HIST. 250 UNIT/G
1 OINTMENT (GRAM) TOPICAL EVERY 12 HOURS
Refills: 0 | Status: DISCONTINUED | OUTPATIENT
Start: 2019-09-25 | End: 2019-09-27

## 2019-09-25 RX ADMIN — OXYCODONE HYDROCHLORIDE 10 MILLIGRAM(S): 5 TABLET ORAL at 09:20

## 2019-09-25 RX ADMIN — HEPARIN SODIUM 5000 UNIT(S): 5000 INJECTION INTRAVENOUS; SUBCUTANEOUS at 13:00

## 2019-09-25 RX ADMIN — Medication 50 MILLIGRAM(S): at 13:00

## 2019-09-25 RX ADMIN — Medication 1 APPLICATION(S): at 23:38

## 2019-09-25 RX ADMIN — Medication 4: at 18:01

## 2019-09-25 RX ADMIN — OXYCODONE HYDROCHLORIDE 10 MILLIGRAM(S): 5 TABLET ORAL at 15:45

## 2019-09-25 RX ADMIN — ATORVASTATIN CALCIUM 80 MILLIGRAM(S): 80 TABLET, FILM COATED ORAL at 23:39

## 2019-09-25 RX ADMIN — Medication 100 MILLIGRAM(S): at 15:08

## 2019-09-25 RX ADMIN — Medication 1 TABLET(S): at 13:00

## 2019-09-25 RX ADMIN — PANTOPRAZOLE SODIUM 40 MILLIGRAM(S): 20 TABLET, DELAYED RELEASE ORAL at 05:57

## 2019-09-25 RX ADMIN — Medication 650 MILLIGRAM(S): at 18:01

## 2019-09-25 RX ADMIN — OXYCODONE HYDROCHLORIDE 10 MILLIGRAM(S): 5 TABLET ORAL at 15:05

## 2019-09-25 RX ADMIN — Medication 650 MILLIGRAM(S): at 00:10

## 2019-09-25 RX ADMIN — INSULIN GLARGINE 18 UNIT(S): 100 INJECTION, SOLUTION SUBCUTANEOUS at 23:37

## 2019-09-25 RX ADMIN — Medication 100 MILLIGRAM(S): at 06:06

## 2019-09-25 RX ADMIN — Medication 1 TABLET(S): at 12:22

## 2019-09-25 RX ADMIN — Medication 650 MILLIGRAM(S): at 06:28

## 2019-09-25 RX ADMIN — Medication 81 MILLIGRAM(S): at 12:22

## 2019-09-25 RX ADMIN — Medication 12.5 MILLIGRAM(S): at 18:01

## 2019-09-25 RX ADMIN — Medication 4: at 13:01

## 2019-09-25 RX ADMIN — OXYCODONE HYDROCHLORIDE 10 MILLIGRAM(S): 5 TABLET ORAL at 08:58

## 2019-09-25 RX ADMIN — INSULIN GLARGINE 18 UNIT(S): 100 INJECTION, SOLUTION SUBCUTANEOUS at 08:58

## 2019-09-25 RX ADMIN — Medication 50 MILLIGRAM(S): at 06:06

## 2019-09-25 RX ADMIN — HEPARIN SODIUM 5000 UNIT(S): 5000 INJECTION INTRAVENOUS; SUBCUTANEOUS at 23:37

## 2019-09-25 RX ADMIN — Medication 12.5 MILLIGRAM(S): at 05:57

## 2019-09-25 RX ADMIN — Medication 20 MILLIGRAM(S): at 05:58

## 2019-09-25 RX ADMIN — Medication 650 MILLIGRAM(S): at 06:00

## 2019-09-25 RX ADMIN — HEPARIN SODIUM 5000 UNIT(S): 5000 INJECTION INTRAVENOUS; SUBCUTANEOUS at 05:58

## 2019-09-25 NOTE — PROGRESS NOTE ADULT - ASSESSMENT
Pt is a 85 y/o Female with PMHx of CHF, DM, HLD, HTN, CAD prior MI, c/o fatigue, "feeling off" and unable to get out of bed; Tmax 104F toay. Rash/redness on buttocks noted by home health aid. patient also complaining of +chronic cough, nonproductive.  No abdominal pain, no n/v/d. No chest pain.  States she has been feeling hot and cold over past few days, and took her temperature today, found fever of 104, and came to ED for evaluation.  No dysuria, no diarrhea. +constipation (chronic).  Increased diffuse leg swelling for past few days. patient lives at home with family. walks by herself with no use of walker. denies of any MEAD< chest pain on exertion. compliant with all her home medications (04 Sep 2019 22:22)    ER vitals: Tm 102.2, P 75, /77.  WBC 14.8 --> 12.2.  Cr 2.0 <-- 1.3.  PCT 0.49.  Ucx >100K E.coli.  Pt given dose of ceftriaxone, now on cipro for UTI.  ID consult called for further abx managment.      Sepsis:    - fever, leukocytosis. Source less likely UTI given persistent fevers, pt has infiltration of perirectal tissues, likely cellulitis.  Abx broadened     - Monitor.  lactate.  Pct elevated 0.49.  Pt with rising WBC, abx broadened on 9/11 (dapto/aztreanam/flagyl).    - Monitor hemodynamic status and BPs.  s/p IV fluid bolus, cont maintanence fluid.      - blood cx, urine cx.  RVP (-).  No pna on cxr      Perirectal cellulitis, r/o abscess:    - Repeat CTap on 9/13 shows worsening inflammation with fluid and foci of air.   s/p OR on 9/15 for  debridement.  Pt not responding to antibiotics prior to debridement.  f/u surgical wound cultures - Enterococcus and Bacteroides    - Cont dapto/azactam/clindamycin.   Changed from flagyl to clindamycin for continued anerobic coverage and anti-toxin effect.  Cover for gas forming bacteria.       - On 9/20 repeat ct scan performed, pt with new ulcerous lesion with eschar over L perineum.     * Pt with persistent necrotic wound in left gluteal area, pt refusing further OR debridement.   d/w pt again today.     Pt maintained on daily dressing changes and collagenase.  Will continue broad spectrum abx for now.  Had lengthy conversation with daughter over the phone.  She will be here on Friday.  If no further surgery planned and pt refusing, recommend PICC line and long term abx (2-4 weeks) with continued wound care and close monitoring.               Will follow,    Sandrita Zaman  [phone]

## 2019-09-25 NOTE — PROGRESS NOTE ADULT - SUBJECTIVE AND OBJECTIVE BOX
Patient is a 86y old  Female who presents with a chief complaint of Fever, UTI (25 Sep 2019 16:47)      INTERVAL HISTORY: refusing surgery      MEDICATIONS:  furosemide   Injectable 20 milliGRAM(s) IV Push daily  metoprolol tartrate 12.5 milliGRAM(s) Oral two times a day        PHYSICAL EXAM:  T(C): 37 (09-25-19 @ 20:05), Max: 37 (09-25-19 @ 20:05)  HR: 76 (09-25-19 @ 20:05) (67 - 80)  BP: 119/76 (09-25-19 @ 20:05) (114/64 - 119/76)  RR: 18 (09-25-19 @ 20:05) (17 - 18)  SpO2: 96% (09-25-19 @ 20:05) (95% - 96%)  Wt(kg): --  I&O's Summary    24 Sep 2019 07:01  -  25 Sep 2019 07:00  --------------------------------------------------------  IN: 1405 mL / OUT: 1650 mL / NET: -245 mL    25 Sep 2019 07:01  -  25 Sep 2019 22:31  --------------------------------------------------------  IN: 820 mL / OUT: 1401 mL / NET: -581 mL          Appearance: In no distress	  HEENT:    PERRL, EOMI	  Cardiovascular:  S1 S2, No JVD  Respiratory: Lungs clear to auscultation	  Gastrointestinal:  Soft, Non-tender, + BS	  Extremities:  improving                                9.2    9.0   )-----------( 290      ( 25 Sep 2019 18:30 )             26.5     09-25    138  |  104  |  18  ----------------------------<  81  5.0   |  25  |  0.92    Ca    8.1<L>      25 Sep 2019 06:17          Labs personally reviewed      ASSESSMENT/PLAN: 	  tolerated surgery well  afebrile, improving  BP well controlled   +edema of LE  - Lasix 20mg IV daily        Srini Velasco DO [facility]  Cardiovascular Medicine  [phone]

## 2019-09-25 NOTE — PROGRESS NOTE ADULT - SUBJECTIVE AND OBJECTIVE BOX
SURGERY DAILY PROGRESS NOTE: RED      SUBJECTIVE/ROS: Patient seen and evaluated at the bedside this morning. Patient states that she feels better and continues to refuse operative management on left buttock abscess. Reports pain is well controlled. Denies nausea and vomiting. Endorses flatus and bowel movements.          OBJECTIVE:    Vital Signs Last 24 Hrs  T(C): 36.4 (25 Sep 2019 05:55), Max: 36.8 (24 Sep 2019 19:45)  T(F): 97.5 (25 Sep 2019 05:55), Max: 98.2 (24 Sep 2019 19:45)  HR: 80 (25 Sep 2019 05:55) (69 - 89)  BP: 114/64 (25 Sep 2019 05:55) (97/60 - 126/76)  BP(mean): --  RR: 17 (25 Sep 2019 05:55) (17 - 18)  SpO2: 95% (25 Sep 2019 05:55) (95% - 99%)  I&O's Detail    24 Sep 2019 07:01  -  25 Sep 2019 07:00  --------------------------------------------------------  IN:    IV PiggyBack: 250 mL    Oral Fluid: 1155 mL  Total IN: 1405 mL    OUT:    Indwelling Catheter - Urethral: 1650 mL  Total OUT: 1650 mL    Total NET: -245 mL        Daily     Daily Weight in k.6 (24 Sep 2019 19:45)  MEDICATIONS  (STANDING):  aspirin enteric coated 81 milliGRAM(s) Oral daily  atorvastatin 80 milliGRAM(s) Oral at bedtime  aztreonam  IVPB 1000 milliGRAM(s) IV Intermittent every 8 hours  clindamycin IVPB 900 milliGRAM(s) IV Intermittent every 8 hours  DAPTOmycin IVPB 350 milliGRAM(s) IV Intermittent every 24 hours  dextrose 50% Injectable 12.5 Gram(s) IV Push once  dextrose 50% Injectable 25 Gram(s) IV Push once  dextrose 50% Injectable 25 Gram(s) IV Push once  docusate sodium 100 milliGRAM(s) Oral three times a day  furosemide   Injectable 20 milliGRAM(s) IV Push daily  heparin  Injectable 5000 Unit(s) SubCutaneous every 8 hours  influenza   Vaccine 0.5 milliLiter(s) IntraMuscular once  insulin glargine Injectable (LANTUS) 18 Unit(s) SubCutaneous two times a day  insulin lispro (HumaLOG) corrective regimen sliding scale   SubCutaneous three times a day before meals  lactobacillus acidophilus 1 Tablet(s) Oral daily  lidocaine   Patch 2 Patch Transdermal daily  metoprolol tartrate 12.5 milliGRAM(s) Oral two times a day  multivitamin 1 Tablet(s) Oral daily  pantoprazole    Tablet 40 milliGRAM(s) Oral before breakfast  senna 2 Tablet(s) Oral at bedtime    MEDICATIONS  (PRN):  acetaminophen   Tablet .. 650 milliGRAM(s) Oral every 6 hours PRN Mild Pain (1 - 3)  bisacodyl Suppository 10 milliGRAM(s) Rectal daily PRN Constipation  dextrose 40% Gel 15 Gram(s) Oral once PRN Blood Glucose LESS THAN 70 milliGRAM(s)/deciliter  glucagon  Injectable 1 milliGRAM(s) IntraMuscular once PRN Glucose LESS THAN 70 milligrams/deciliter  ondansetron Injectable 4 milliGRAM(s) IV Push every 8 hours PRN Nausea and/or Vomiting  oxyCODONE    IR 5 milliGRAM(s) Oral every 4 hours PRN Moderate Pain (4 - 6)  oxyCODONE    IR 10 milliGRAM(s) Oral every 4 hours PRN Severe Pain (7 - 10)  simethicone 80 milliGRAM(s) Chew every 8 hours PRN Heartburn      LABS:                        8.9    9.39  )-----------( 288      ( 23 Sep 2019 09:45 )             27.6     09-25    138  |  104  |  18  ----------------------------<  81  5.0   |  25  |  0.92    Ca    8.1<L>      25 Sep 2019 06:17        PHYSICAL EXAM:  Gen: NAD, resting in bed, alert and responding appropriately  Resp: Airway patent, non-labored respirations  : Improvement in erythema over R gluteus. Left gluteal wound with persistent eschar, with drainage, and with persistent erythema. Dressing changed at bedside  Ext: No edema, WWP  Neuro: AAOx3, no focal deficits

## 2019-09-25 NOTE — PROGRESS NOTE ADULT - SUBJECTIVE AND OBJECTIVE BOX
Infectious Diseases progress note:    Subjective:  Pt sitting in recliner, nad,  Afebrile, no leukocytosis    ROS:  CONSTITUTIONAL:  No fever, chills, rigors  CARDIOVASCULAR:  No chest pain or palpitations  RESPIRATORY:   No SOB, cough, dyspnea on exertion.  No wheezing  GASTROINTESTINAL:  No abd pain, N/V, diarrhea/constipation  EXTREMITIES:  No swelling or joint pain  GENITOURINARY:  No burning on urination, increased frequency or urgency.  No flank pain  NEUROLOGIC:  No HA, visual disturbances  SKIN: No rashes    Allergies    codeine (Unknown)  Kiwi (Anaphylaxis)  penicillins (Anaphylaxis)    Intolerances        ANTIBIOTICS/RELEVANT:  antimicrobials  aztreonam  IVPB 1000 milliGRAM(s) IV Intermittent every 8 hours  clindamycin IVPB 900 milliGRAM(s) IV Intermittent every 8 hours  DAPTOmycin IVPB 350 milliGRAM(s) IV Intermittent every 24 hours    immunologic:  influenza   Vaccine 0.5 milliLiter(s) IntraMuscular once    OTHER:  acetaminophen   Tablet .. 650 milliGRAM(s) Oral every 6 hours PRN  aspirin enteric coated 81 milliGRAM(s) Oral daily  atorvastatin 80 milliGRAM(s) Oral at bedtime  bisacodyl Suppository 10 milliGRAM(s) Rectal daily PRN  collagenase Ointment 1 Application(s) Topical every 12 hours  dextrose 40% Gel 15 Gram(s) Oral once PRN  dextrose 50% Injectable 12.5 Gram(s) IV Push once  dextrose 50% Injectable 25 Gram(s) IV Push once  dextrose 50% Injectable 25 Gram(s) IV Push once  docusate sodium 100 milliGRAM(s) Oral three times a day  furosemide   Injectable 20 milliGRAM(s) IV Push daily  glucagon  Injectable 1 milliGRAM(s) IntraMuscular once PRN  heparin  Injectable 5000 Unit(s) SubCutaneous every 8 hours  insulin glargine Injectable (LANTUS) 18 Unit(s) SubCutaneous two times a day  insulin lispro (HumaLOG) corrective regimen sliding scale   SubCutaneous three times a day before meals  lactobacillus acidophilus 1 Tablet(s) Oral daily  lidocaine   Patch 2 Patch Transdermal daily  metoprolol tartrate 12.5 milliGRAM(s) Oral two times a day  multivitamin 1 Tablet(s) Oral daily  ondansetron Injectable 4 milliGRAM(s) IV Push every 8 hours PRN  oxyCODONE    IR 5 milliGRAM(s) Oral every 4 hours PRN  oxyCODONE    IR 10 milliGRAM(s) Oral every 4 hours PRN  pantoprazole    Tablet 40 milliGRAM(s) Oral before breakfast  senna 2 Tablet(s) Oral at bedtime  simethicone 80 milliGRAM(s) Chew every 8 hours PRN      Objective:  Vital Signs Last 24 Hrs  T(C): 36.3 (25 Sep 2019 09:00), Max: 36.8 (24 Sep 2019 19:45)  T(F): 97.4 (25 Sep 2019 09:00), Max: 98.2 (24 Sep 2019 19:45)  HR: 72 (25 Sep 2019 11:52) (67 - 89)  BP: 115/78 (25 Sep 2019 11:52) (114/64 - 126/76)  BP(mean): --  RR: 18 (25 Sep 2019 09:00) (17 - 18)  SpO2: 95% (25 Sep 2019 11:52) (95% - 99%)    PHYSICAL EXAM:  Constitutional:NAD  Eyes:NIDIA, EOMI  Ear/Nose/Throat: no thrush, mucositis.  Moist mucous membranes	  Neck:no JVD, no lymphadenopathy, supple  Respiratory: CTA lucian  Cardiovascular: S1S2 RRR, no murmurs  Gastrointestinal:soft, nontender,  nondistended (+) BS  Extremities:no e/e/c  Skin:  no rashes, open wounds or ulcerations        LABS:                        7.5    5.84  )-----------( 251      ( 25 Sep 2019 09:13 )             24.6     09-25    138  |  104  |  18  ----------------------------<  81  5.0   |  25  |  0.92    Ca    8.1<L>      25 Sep 2019 06:17            Procalcitonin, Serum: 0.49 (09-05 @ 07:29)            Rapid RVP Result: [facility]          MICROBIOLOGY:    Culture - Surgical Swab (09.15.19 @ 18:03)    Specimen Source: .Surgical Swab    Culture Results:   Rare Enterococcus species "Susceptibilities not performed"  Few Bacteroides thetaiotamcron group "Susceptibilities not performed"          RADIOLOGY & ADDITIONAL STUDIES:

## 2019-09-25 NOTE — PROGRESS NOTE ADULT - PROBLEM SELECTOR PLAN 1
R buttock infiltrate with necrotic tissue  SUrg eval appreciated   SOft Tis US noted   pain control  CT noted  Abx as per ID  CT Pelv noted  F/U surgical recs regarding further W/U  patient refusing surgical intervention, refusing for now  again had a long conversation with daughter over the phone and patient. patient refusing further surgical intervention. would like to try full ABx treatment. understands that if no surgical debridement, she may have to cont IV ABX for at least 3-4 weeks. with that said, it may not be sufficient if no debridement.   agreed for PIC line for possible discharge after clearance from surgical team.    again refuse surgical intervention today

## 2019-09-25 NOTE — PROGRESS NOTE ADULT - SUBJECTIVE AND OBJECTIVE BOX
Subjective: Patient seen and examined. No new events except as noted.   cont to refuse surgical intervention  pain resolved  sitting at the bedside     REVIEW OF SYSTEMS:    CONSTITUTIONAL: No weakness, fevers or chills  EYES/ENT: No visual changes;  No vertigo or throat pain   NECK: No pain or stiffness  RESPIRATORY: No cough, wheezing, hemoptysis; No shortness of breath  CARDIOVASCULAR: No chest pain or palpitations  GASTROINTESTINAL: No abdominal or epigastric pain.  GENITOURINARY: No dysuria, frequency or hematuria  NEUROLOGICAL: No numbness or weakness  SKIN: buttock discomfrot on movement   All other review of systems is negative unless indicated above.    MEDICATIONS:  MEDICATIONS  (STANDING):  aspirin enteric coated 81 milliGRAM(s) Oral daily  atorvastatin 80 milliGRAM(s) Oral at bedtime  aztreonam  IVPB 1000 milliGRAM(s) IV Intermittent every 8 hours  clindamycin IVPB 900 milliGRAM(s) IV Intermittent every 8 hours  collagenase Ointment 1 Application(s) Topical every 12 hours  DAPTOmycin IVPB 350 milliGRAM(s) IV Intermittent every 24 hours  dextrose 50% Injectable 12.5 Gram(s) IV Push once  dextrose 50% Injectable 25 Gram(s) IV Push once  dextrose 50% Injectable 25 Gram(s) IV Push once  docusate sodium 100 milliGRAM(s) Oral three times a day  furosemide   Injectable 20 milliGRAM(s) IV Push daily  heparin  Injectable 5000 Unit(s) SubCutaneous every 8 hours  influenza   Vaccine 0.5 milliLiter(s) IntraMuscular once  insulin glargine Injectable (LANTUS) 18 Unit(s) SubCutaneous two times a day  insulin lispro (HumaLOG) corrective regimen sliding scale   SubCutaneous three times a day before meals  lactobacillus acidophilus 1 Tablet(s) Oral daily  lidocaine   Patch 2 Patch Transdermal daily  metoprolol tartrate 12.5 milliGRAM(s) Oral two times a day  multivitamin 1 Tablet(s) Oral daily  pantoprazole    Tablet 40 milliGRAM(s) Oral before breakfast  senna 2 Tablet(s) Oral at bedtime      PHYSICAL EXAM:  T(C): 36.3 (09-25-19 @ 09:00), Max: 36.8 (09-24-19 @ 19:45)  HR: 72 (09-25-19 @ 11:52) (67 - 80)  BP: 115/78 (09-25-19 @ 11:52) (114/64 - 126/76)  RR: 18 (09-25-19 @ 09:00) (17 - 18)  SpO2: 95% (09-25-19 @ 11:52) (95% - 98%)  Wt(kg): --  I&O's Summary    24 Sep 2019 07:01  -  25 Sep 2019 07:00  --------------------------------------------------------  IN: 1405 mL / OUT: 1650 mL / NET: -245 mL    25 Sep 2019 07:01  -  25 Sep 2019 16:47  --------------------------------------------------------  IN: 120 mL / OUT: 500 mL / NET: -380 mL          Appearance: Normal	  HEENT:   Normal oral mucosa, PERRL, EOMI	  Lymphatic: No lymphadenopathy , no edema  Cardiovascular: Normal S1 S2, No JVD  Respiratory: Lungs clear to auscultation, normal effort 	  Gastrointestinal:  Soft, Non-tender, + BS	  Skin: buttock dressing intact, worsening L side lesion with discharge lesions   Musculoskeletal: Normal range of motion, normal strength  Psychiatry:  Mood & affect appropriate  Ext: No edema      All labs, Imaging and EKGs personally reviewed                           7.5    5.84  )-----------( 251      ( 25 Sep 2019 09:13 )             24.6               09-25    138  |  104  |  18  ----------------------------<  81  5.0   |  25  |  0.92    Ca    8.1<L>      25 Sep 2019 06:17

## 2019-09-25 NOTE — PROGRESS NOTE ADULT - ASSESSMENT
86F PMH CHF, HTN, DM, CAD prior MI, admitted for UTI and ROB on CKD3, now s/p from debridement and I&D of gluteal skin and tissue for a right gluteal abscess with necrotic tissue 9/15, and s/p bedside debridement of left gluteal ulcer.     PLAN:  - patient refusing operative debridement of left gluteal ulcer  - cont local wound care  - will cont dressing changes  - cont abx per ID  - surgery team to change dressing in AM, nursing instructions for PM dressing change  - care per primary team    Melanie Betts PA-C   [facility]   [phone]

## 2019-09-25 NOTE — PROGRESS NOTE ADULT - SUBJECTIVE AND OBJECTIVE BOX
Chief complaint  Patient is a 86y old  Female who presents with a chief complaint of Fever, UTI (25 Sep 2019 09:03)   Review of systems  Patient in bed, looks comfortable, no fever, no hypoglycemia.    Labs and Fingersticks  CAPILLARY BLOOD GLUCOSE      POCT Blood Glucose.: 117 mg/dL (25 Sep 2019 08:35)  POCT Blood Glucose.: 159 mg/dL (24 Sep 2019 22:25)  POCT Blood Glucose.: 157 mg/dL (24 Sep 2019 17:28)      Anion Gap, Serum: 9 (09-25 @ 06:17)      Calcium, Total Serum: 8.1 <L> (09-25 @ 06:17)          09-25    138  |  104  |  18  ----------------------------<  81  5.0   |  25  |  0.92    Ca    8.1<L>      25 Sep 2019 06:17                          7.5    5.84  )-----------( 251      ( 25 Sep 2019 09:13 )             24.6     Medications  MEDICATIONS  (STANDING):  aspirin enteric coated 81 milliGRAM(s) Oral daily  atorvastatin 80 milliGRAM(s) Oral at bedtime  aztreonam  IVPB 1000 milliGRAM(s) IV Intermittent every 8 hours  clindamycin IVPB 900 milliGRAM(s) IV Intermittent every 8 hours  DAPTOmycin IVPB 350 milliGRAM(s) IV Intermittent every 24 hours  dextrose 50% Injectable 12.5 Gram(s) IV Push once  dextrose 50% Injectable 25 Gram(s) IV Push once  dextrose 50% Injectable 25 Gram(s) IV Push once  docusate sodium 100 milliGRAM(s) Oral three times a day  furosemide   Injectable 20 milliGRAM(s) IV Push daily  heparin  Injectable 5000 Unit(s) SubCutaneous every 8 hours  influenza   Vaccine 0.5 milliLiter(s) IntraMuscular once  insulin glargine Injectable (LANTUS) 18 Unit(s) SubCutaneous two times a day  insulin lispro (HumaLOG) corrective regimen sliding scale   SubCutaneous three times a day before meals  lactobacillus acidophilus 1 Tablet(s) Oral daily  lidocaine   Patch 2 Patch Transdermal daily  metoprolol tartrate 12.5 milliGRAM(s) Oral two times a day  multivitamin 1 Tablet(s) Oral daily  pantoprazole    Tablet 40 milliGRAM(s) Oral before breakfast  senna 2 Tablet(s) Oral at bedtime      Physical Exam  General: Patient comfortable in bed  Vital Signs Last 12 Hrs  T(F): 97.4 (09-25-19 @ 09:00), Max: 97.5 (09-25-19 @ 05:55)  HR: 72 (09-25-19 @ 11:52) (67 - 80)  BP: 115/78 (09-25-19 @ 11:52) (114/64 - 116/76)  BP(mean): --  RR: 18 (09-25-19 @ 09:00) (17 - 18)  SpO2: 95% (09-25-19 @ 11:52) (95% - 96%)  Neck: No palpable thyroid nodules.  CVS: S1S2, No murmurs  Respiratory: No wheezing, no crepitations  GI: Abdomen soft, bowel sounds positive  Musculoskeletal:  edema lower extremities.   Skin: No skin rashes, no ecchymosis    Diagnostics Chief complaint  Patient is a 86y old  Female who presents with a chief complaint of Fever, UTI (25 Sep 2019 09:03)   Review of systems  Patient in bed, looks comfortable,  no fever, no hypoglycemia.    Labs and Fingersticks  CAPILLARY BLOOD GLUCOSE      POCT Blood Glucose.: 117 mg/dL (25 Sep 2019 08:35)  POCT Blood Glucose.: 159 mg/dL (24 Sep 2019 22:25)  POCT Blood Glucose.: 157 mg/dL (24 Sep 2019 17:28)      Anion Gap, Serum: 9 (09-25 @ 06:17)      Calcium, Total Serum: 8.1 <L> (09-25 @ 06:17)          09-25    138  |  104  |  18  ----------------------------<  81  5.0   |  25  |  0.92    Ca    8.1<L>      25 Sep 2019 06:17                          7.5    5.84  )-----------( 251      ( 25 Sep 2019 09:13 )             24.6     Medications  MEDICATIONS  (STANDING):  aspirin enteric coated 81 milliGRAM(s) Oral daily  atorvastatin 80 milliGRAM(s) Oral at bedtime  aztreonam  IVPB 1000 milliGRAM(s) IV Intermittent every 8 hours  clindamycin IVPB 900 milliGRAM(s) IV Intermittent every 8 hours  DAPTOmycin IVPB 350 milliGRAM(s) IV Intermittent every 24 hours  dextrose 50% Injectable 12.5 Gram(s) IV Push once  dextrose 50% Injectable 25 Gram(s) IV Push once  dextrose 50% Injectable 25 Gram(s) IV Push once  docusate sodium 100 milliGRAM(s) Oral three times a day  furosemide   Injectable 20 milliGRAM(s) IV Push daily  heparin  Injectable 5000 Unit(s) SubCutaneous every 8 hours  influenza   Vaccine 0.5 milliLiter(s) IntraMuscular once  insulin glargine Injectable (LANTUS) 18 Unit(s) SubCutaneous two times a day  insulin lispro (HumaLOG) corrective regimen sliding scale   SubCutaneous three times a day before meals  lactobacillus acidophilus 1 Tablet(s) Oral daily  lidocaine   Patch 2 Patch Transdermal daily  metoprolol tartrate 12.5 milliGRAM(s) Oral two times a day  multivitamin 1 Tablet(s) Oral daily  pantoprazole    Tablet 40 milliGRAM(s) Oral before breakfast  senna 2 Tablet(s) Oral at bedtime      Physical Exam  General: Patient comfortable in bed  Vital Signs Last 12 Hrs  T(F): 97.4 (09-25-19 @ 09:00), Max: 97.5 (09-25-19 @ 05:55)  HR: 72 (09-25-19 @ 11:52) (67 - 80)  BP: 115/78 (09-25-19 @ 11:52) (114/64 - 116/76)  BP(mean): --  RR: 18 (09-25-19 @ 09:00) (17 - 18)  SpO2: 95% (09-25-19 @ 11:52) (95% - 96%)  Neck: No palpable thyroid nodules.  CVS: S1S2, No murmurs  Respiratory: No wheezing, no crepitations  GI: Abdomen soft, bowel sounds positive  Musculoskeletal:  edema lower extremities.   Skin: No skin rashes, no ecchymosis    Diagnostics

## 2019-09-25 NOTE — PROGRESS NOTE ADULT - ASSESSMENT
Assessment:  DMT2: 86y Female with DM T2 with hyperglycemia, A1C 10.2%, on insulin, FS trending within acceptable range, no hypoglycemic episode, comfortable, states her pain is improved today.  Abscess: s/p debridement of necrotic tissue on the R buttock, refusing surgical mgmt on the L,  monitored and FU primary team/surgery  Anemia: s/p transfusion, stable, monitored.  HTN: Controlled, on antihypertensive medications.          Radha Chin MD  Cell: [phone]  Office: [phone]

## 2019-09-26 ENCOUNTER — TRANSCRIPTION ENCOUNTER (OUTPATIENT)
Age: 84
End: 2019-09-26

## 2019-09-26 LAB
HCT VFR BLD CALC: 27.9 % — LOW (ref 34.5–45)
HGB BLD-MCNC: 8.9 G/DL — LOW (ref 11.5–15.5)
MCHC RBC-ENTMCNC: 30.1 PG — SIGNIFICANT CHANGE UP (ref 27–34)
MCHC RBC-ENTMCNC: 31.9 GM/DL — LOW (ref 32–36)
MCV RBC AUTO: 94.3 FL — SIGNIFICANT CHANGE UP (ref 80–100)
PLATELET # BLD AUTO: 280 K/UL — SIGNIFICANT CHANGE UP (ref 150–400)
RBC # BLD: 2.96 M/UL — LOW (ref 3.8–5.2)
RBC # FLD: 18.5 % — HIGH (ref 10.3–14.5)
WBC # BLD: 8.12 K/UL — SIGNIFICANT CHANGE UP (ref 3.8–10.5)
WBC # FLD AUTO: 8.12 K/UL — SIGNIFICANT CHANGE UP (ref 3.8–10.5)

## 2019-09-26 RX ORDER — INSULIN GLARGINE 100 [IU]/ML
9 INJECTION, SOLUTION SUBCUTANEOUS ONCE
Refills: 0 | Status: COMPLETED | OUTPATIENT
Start: 2019-09-26 | End: 2019-09-26

## 2019-09-26 RX ADMIN — OXYCODONE HYDROCHLORIDE 5 MILLIGRAM(S): 5 TABLET ORAL at 22:09

## 2019-09-26 RX ADMIN — Medication 1 APPLICATION(S): at 22:57

## 2019-09-26 RX ADMIN — Medication 100 MILLIGRAM(S): at 20:38

## 2019-09-26 RX ADMIN — Medication 12.5 MILLIGRAM(S): at 06:08

## 2019-09-26 RX ADMIN — Medication 12.5 MILLIGRAM(S): at 17:20

## 2019-09-26 RX ADMIN — Medication 20 MILLIGRAM(S): at 06:08

## 2019-09-26 RX ADMIN — INSULIN GLARGINE 9 UNIT(S): 100 INJECTION, SOLUTION SUBCUTANEOUS at 22:57

## 2019-09-26 RX ADMIN — Medication 1 TABLET(S): at 12:42

## 2019-09-26 RX ADMIN — Medication 81 MILLIGRAM(S): at 12:43

## 2019-09-26 RX ADMIN — Medication 6: at 12:43

## 2019-09-26 RX ADMIN — Medication 1 APPLICATION(S): at 08:53

## 2019-09-26 RX ADMIN — Medication 650 MILLIGRAM(S): at 09:23

## 2019-09-26 RX ADMIN — PANTOPRAZOLE SODIUM 40 MILLIGRAM(S): 20 TABLET, DELAYED RELEASE ORAL at 06:08

## 2019-09-26 RX ADMIN — Medication 100 MILLIGRAM(S): at 02:11

## 2019-09-26 RX ADMIN — INSULIN GLARGINE 18 UNIT(S): 100 INJECTION, SOLUTION SUBCUTANEOUS at 09:22

## 2019-09-26 RX ADMIN — HEPARIN SODIUM 5000 UNIT(S): 5000 INJECTION INTRAVENOUS; SUBCUTANEOUS at 22:58

## 2019-09-26 RX ADMIN — Medication 50 MILLIGRAM(S): at 01:03

## 2019-09-26 RX ADMIN — Medication 1 TABLET(S): at 12:43

## 2019-09-26 RX ADMIN — HEPARIN SODIUM 5000 UNIT(S): 5000 INJECTION INTRAVENOUS; SUBCUTANEOUS at 06:09

## 2019-09-26 RX ADMIN — Medication 100 MILLIGRAM(S): at 11:00

## 2019-09-26 RX ADMIN — ATORVASTATIN CALCIUM 80 MILLIGRAM(S): 80 TABLET, FILM COATED ORAL at 23:00

## 2019-09-26 RX ADMIN — Medication 650 MILLIGRAM(S): at 10:20

## 2019-09-26 RX ADMIN — Medication 50 MILLIGRAM(S): at 17:10

## 2019-09-26 RX ADMIN — Medication 4: at 17:58

## 2019-09-26 RX ADMIN — HEPARIN SODIUM 5000 UNIT(S): 5000 INJECTION INTRAVENOUS; SUBCUTANEOUS at 13:38

## 2019-09-26 RX ADMIN — DAPTOMYCIN 114 MILLIGRAM(S): 500 INJECTION, POWDER, LYOPHILIZED, FOR SOLUTION INTRAVENOUS at 02:38

## 2019-09-26 RX ADMIN — OXYCODONE HYDROCHLORIDE 5 MILLIGRAM(S): 5 TABLET ORAL at 20:38

## 2019-09-26 RX ADMIN — Medication 50 MILLIGRAM(S): at 09:22

## 2019-09-26 NOTE — CONSULT NOTE ADULT - SUBJECTIVE AND OBJECTIVE BOX
Chart reviewed and patient seen by undersigned    Asked to consult for assessing the pt's capacity to make medical decisions regarding PICC line and undergoing surgery for abscess    Historians include chart, the pt., the pt's NP Gisela    History of Present Illness  The patient is a 86 year old Vietnamese American . She was admitted here with fever, UTI on 19. Found to have perirectal abscess which was drained and now possible a second perirectal abscess on the other buttock. She needs a PICC line for continued antibiotics. She has refused a PICC line and claims she is "old" and also she feels she can return home without antibiotcs and she will be fine as she will "relax" and would know if infection is becoming severe. I explained to her the risks of leaving the hospital without treatment of IV antibiotics including septicemia and death. She continued to repeat that she will be fine at home and all she needs is "to relax." She also refused an I and D of the left side abscess on her buttock feeling she is old and then said she will be fine at home. Her only neurovegetative sx. of depression is decreased appetite.     Past Psychiatric History  She saw a psychiatrist when her   and took a medication whose name she cannot recall. She denies ever being suicidal/manic/psychotic. Never psychiatrically hospitalized.    Psychosocial History  . Lives with her daughter. Son lives in [place]. The pt. originally is from [facility] and living in the  for over 60 years. No cigarettes/illicit drug use. Social drinker.     PAST MEDICAL & SURGICAL HISTORY:  CHF (congestive heart failure)  STEMI (ST elevation myocardial infarction)  Palpitations  Diabetes mellitus  Dyslipidemia  HTN (hypertension)  S/P cardiac cath  S/P tonsillectomy  S/P lumpectomy, left breast: premalignant  S/P appendectomy  S/P cholecystectomy      FAMILY HISTORY:  No pertinent family history in first degree relatives      Vital Signs Last 24 Hrs  T(C): 36.7 (26 Sep 2019 06:07), Max: 37 (25 Sep 2019 20:05)  T(F): 98 (26 Sep 2019 06:07), Max: 98.6 (25 Sep 2019 20:05)  HR: 80 (26 Sep 2019 06:07) (76 - 80)  BP: 118/75 (26 Sep 2019 06:07) (118/75 - 119/76)  BP(mean): --  RR: 18 (26 Sep 2019 06:07) (18 - 18)  SpO2: 97% (26 Sep 2019 06:07) (96% - 97%)                          8.9    8.12  )-----------( 280      ( 26 Sep 2019 08:49 )             27.9       09-    138  |  104  |  18  ----------------------------<  81  5.0   |  25  |  0.92    Ca    8.1<L>      25 Sep 2019 06:17              MEDICATIONS  (STANDING):  aspirin enteric coated 81 milliGRAM(s) Oral daily  atorvastatin 80 milliGRAM(s) Oral at bedtime  aztreonam  IVPB 1000 milliGRAM(s) IV Intermittent every 8 hours  clindamycin IVPB 900 milliGRAM(s) IV Intermittent every 8 hours  collagenase Ointment 1 Application(s) Topical every 12 hours  DAPTOmycin IVPB 350 milliGRAM(s) IV Intermittent every 24 hours  dextrose 50% Injectable 12.5 Gram(s) IV Push once  dextrose 50% Injectable 25 Gram(s) IV Push once  dextrose 50% Injectable 25 Gram(s) IV Push once  docusate sodium 100 milliGRAM(s) Oral three times a day  furosemide   Injectable 20 milliGRAM(s) IV Push daily  heparin  Injectable 5000 Unit(s) SubCutaneous every 8 hours  influenza   Vaccine 0.5 milliLiter(s) IntraMuscular once  insulin glargine Injectable (LANTUS) 18 Unit(s) SubCutaneous two times a day  insulin lispro (HumaLOG) corrective regimen sliding scale   SubCutaneous three times a day before meals  lactobacillus acidophilus 1 Tablet(s) Oral daily  lidocaine   Patch 2 Patch Transdermal daily  metoprolol tartrate 12.5 milliGRAM(s) Oral two times a day  multivitamin 1 Tablet(s) Oral daily  pantoprazole    Tablet 40 milliGRAM(s) Oral before breakfast  senna 2 Tablet(s) Oral at bedtime    MEDICATIONS  (PRN):  acetaminophen   Tablet .. 650 milliGRAM(s) Oral every 6 hours PRN Mild Pain (1 - 3)  bisacodyl Suppository 10 milliGRAM(s) Rectal daily PRN Constipation  dextrose 40% Gel 15 Gram(s) Oral once PRN Blood Glucose LESS THAN 70 milliGRAM(s)/deciliter  glucagon  Injectable 1 milliGRAM(s) IntraMuscular once PRN Glucose LESS THAN 70 milligrams/deciliter  ondansetron Injectable 4 milliGRAM(s) IV Push every 8 hours PRN Nausea and/or Vomiting  oxyCODONE    IR 5 milliGRAM(s) Oral every 4 hours PRN Moderate Pain (4 - 6)  oxyCODONE    IR 10 milliGRAM(s) Oral every 4 hours PRN Severe Pain (7 - 10)  simethicone 80 milliGRAM(s) Chew every 8 hours PRN Heartburn      Elderly WF in bed, calm, cooperative, alert and oriented x 2-3 (she cannot name the hospital).  No psychomotor abnormalities. Insight and judgment are impaired. After I told her the reasons she needs antibiotics, a PICC line she was in denial and says she can return home now without a PICC line and will be fine. She also feels she would know when to return to a doctor when she returns home off antibiotics. She fails to appreciate the risks of not having another incision and drainage saying she is "too old" for that. Speech is circumstantial but coherent with normal rate and volume. No hallucinations nor delusions. The patient denied suicidal and homicidal ideation and plan. Mood is "ok" and affect full range and appropriate. Attention and concentration fair but short term memory is impaired (she could not recall the risks of refusing medical treatment a few minutes after I told her. She could not recall the name of this hospital a few minutes after I told her). Her  long term memory is within normal limits. She can name the President of the [place], past historical events, and past childhood events.     Suicidal risk assessment  Risk factors include cognitive impairment, chronic medical illnesses  Protective factors include no plan/past attempts/guns/psychosis/substance abuse

## 2019-09-26 NOTE — PROVIDER CONTACT NOTE (OTHER) - RECOMMENDATIONS
repeat  cbc  sent   stat
per provider
Continue plan of care
Encourage pt to increase fluid intake, continue to IVF ordered. Continue to monitor pt's vital signs and s/s
Salomón mcmillan MD made aware. come to unit to assess inflammation?
have iv rn place new line

## 2019-09-26 NOTE — PROVIDER CONTACT NOTE (OTHER) - DATE AND TIME:
06-Sep-2019 00:19
07-Sep-2019 20:25
08-Sep-2019 21:45
20-Sep-2019 04:47
20-Sep-2019 21:12
26-Sep-2019 22:05
17-Sep-2019 08:45

## 2019-09-26 NOTE — PROVIDER CONTACT NOTE (OTHER) - NAME OF MD/NP/PA/DO NOTIFIED:
AZRA Covarrubias
AZRA Fernández
Elisha Briseno
Hever Drake
Oliverio OQUENDO
Yesenia Washburn (red sx [phone])
demi ventura

## 2019-09-26 NOTE — PROVIDER CONTACT NOTE (OTHER) - ACTION/TREATMENT ORDERED:
monitor   safety checks  cont plan care  cbc  sent
PA notified & to look into it. orders to follow.
PA aware. continue to monitor.
Continue to monitor patients vital signs. Continue on IV Fluids
Hold IV antibiotics until new IV is placed by IV nurse
MD aware. will alert day team of changes and team will assess with dressing change this AM. will continue to monitor pt for changes and control pain with current PRNs.
Provider to speak with patient

## 2019-09-26 NOTE — PROGRESS NOTE ADULT - SUBJECTIVE AND OBJECTIVE BOX
Infectious Diseases progress note:    Subjective:  NAD, resting in recliner.  Pt seen by Psychiatriy - pt lacks capacity to make medical decisions.  For possible OR debridement, awaiting consent.  Pt afebrile, no leukocytosis.     ROS:  CONSTITUTIONAL:  No fever, chills, rigors  CARDIOVASCULAR:  No chest pain or palpitations  RESPIRATORY:   No SOB, cough, dyspnea on exertion.  No wheezing  GASTROINTESTINAL:  No abd pain, N/V, diarrhea/constipation  EXTREMITIES:  No swelling or joint pain  GENITOURINARY:  No burning on urination, increased frequency or urgency.  No flank pain  NEUROLOGIC:  No HA, visual disturbances  SKIN: No rashes    Allergies    codeine (Unknown)  Kiwi (Anaphylaxis)  penicillins (Anaphylaxis)    Intolerances        ANTIBIOTICS/RELEVANT:  antimicrobials  aztreonam  IVPB 1000 milliGRAM(s) IV Intermittent every 8 hours  clindamycin IVPB 900 milliGRAM(s) IV Intermittent every 8 hours  DAPTOmycin IVPB 350 milliGRAM(s) IV Intermittent every 24 hours    immunologic:  influenza   Vaccine 0.5 milliLiter(s) IntraMuscular once    OTHER:  acetaminophen   Tablet .. 650 milliGRAM(s) Oral every 6 hours PRN  aspirin enteric coated 81 milliGRAM(s) Oral daily  atorvastatin 80 milliGRAM(s) Oral at bedtime  bisacodyl Suppository 10 milliGRAM(s) Rectal daily PRN  collagenase Ointment 1 Application(s) Topical every 12 hours  dextrose 40% Gel 15 Gram(s) Oral once PRN  dextrose 50% Injectable 12.5 Gram(s) IV Push once  dextrose 50% Injectable 25 Gram(s) IV Push once  dextrose 50% Injectable 25 Gram(s) IV Push once  docusate sodium 100 milliGRAM(s) Oral three times a day  furosemide   Injectable 20 milliGRAM(s) IV Push daily  glucagon  Injectable 1 milliGRAM(s) IntraMuscular once PRN  heparin  Injectable 5000 Unit(s) SubCutaneous every 8 hours  insulin glargine Injectable (LANTUS) 18 Unit(s) SubCutaneous two times a day  insulin lispro (HumaLOG) corrective regimen sliding scale   SubCutaneous three times a day before meals  lactobacillus acidophilus 1 Tablet(s) Oral daily  lidocaine   Patch 2 Patch Transdermal daily  metoprolol tartrate 12.5 milliGRAM(s) Oral two times a day  multivitamin 1 Tablet(s) Oral daily  ondansetron Injectable 4 milliGRAM(s) IV Push every 8 hours PRN  oxyCODONE    IR 5 milliGRAM(s) Oral every 4 hours PRN  oxyCODONE    IR 10 milliGRAM(s) Oral every 4 hours PRN  pantoprazole    Tablet 40 milliGRAM(s) Oral before breakfast  senna 2 Tablet(s) Oral at bedtime  simethicone 80 milliGRAM(s) Chew every 8 hours PRN      Objective:  Vital Signs Last 24 Hrs  T(C): 36.6 (26 Sep 2019 20:00), Max: 36.7 (26 Sep 2019 06:07)  T(F): 97.8 (26 Sep 2019 20:00), Max: 98 (26 Sep 2019 06:07)  HR: 80 (26 Sep 2019 20:00) (70 - 80)  BP: 118/74 (26 Sep 2019 20:00) (118/74 - 121/75)  BP(mean): --  RR: 18 (26 Sep 2019 20:00) (18 - 18)  SpO2: 98% (26 Sep 2019 20:00) (97% - 98%)    PHYSICAL EXAM:  Constitutional:NAD  Eyes:NIDIA, EOMI  Ear/Nose/Throat: no thrush, mucositis.  Moist mucous membranes	  Neck:no JVD, no lymphadenopathy, supple  Respiratory: CTA lucian  Cardiovascular: S1S2 RRR, no murmurs  Gastrointestinal:soft, nontender,  nondistended (+) BS  Extremities:no e/e/c  Skin:  no rashes, open wounds or ulcerations        LABS:                        8.9    8.12  )-----------( 280      ( 26 Sep 2019 08:49 )             27.9     09-25    138  |  104  |  18  ----------------------------<  81  5.0   |  25  |  0.92    Ca    8.1<L>      25 Sep 2019 06:17            Procalcitonin, Serum: 0.49 (09-05 @ 07:29)            Rapid RVP Result: [facility]          MICROBIOLOGY:    Culture - Surgical Swab (09.15.19 @ 18:03)    Specimen Source: .Surgical Swab    Culture Results:   Rare Enterococcus species "Susceptibilities not performed"  Few Bacteroides thetaiotamcron group "Susceptibilities not performed"          RADIOLOGY & ADDITIONAL STUDIES:

## 2019-09-26 NOTE — PROVIDER CONTACT NOTE (OTHER) - REASON
pt ordered for 18 units of lantus tonight. pt going to be npo after midnight. clarifying whether to give this dose or if provider wants to change dose. pt ordered for 18 units of lantus tonight. notified blood glucose level. pt going to be npo after midnight. clarifying whether to give this dose or if provider wants to change dose.

## 2019-09-26 NOTE — PROGRESS NOTE ADULT - SUBJECTIVE AND OBJECTIVE BOX
Patient is a 86y old  Female who presents with a chief complaint of Fever, UTI (26 Sep 2019 15:59)      INTERVAL HISTORY: feels ok    MEDICATIONS:  furosemide   Injectable 20 milliGRAM(s) IV Push daily  metoprolol tartrate 12.5 milliGRAM(s) Oral two times a day        PHYSICAL EXAM:  T(C): 36.6 (09-26-19 @ 17:17), Max: 37 (09-25-19 @ 20:05)  HR: 70 (09-26-19 @ 17:17) (70 - 80)  BP: 121/75 (09-26-19 @ 17:17) (118/75 - 121/75)  RR: 18 (09-26-19 @ 17:17) (18 - 18)  SpO2: 97% (09-26-19 @ 06:07) (96% - 97%)  Wt(kg): --  I&O's Summary    25 Sep 2019 07:01  -  26 Sep 2019 07:00  --------------------------------------------------------  IN: 1020 mL / OUT: 2101 mL / NET: -1081 mL    26 Sep 2019 07:01  -  26 Sep 2019 18:09  --------------------------------------------------------  IN: 640 mL / OUT: 1000 mL / NET: -360 mL          Appearance: In no distress	  HEENT:    PERRL, EOMI	  Cardiovascular:  S1 S2, No JVD  Respiratory: Lungs clear to auscultation	  Gastrointestinal:  Soft, Non-tender, + BS	  Extremities:  No edema of LE                                8.9    8.12  )-----------( 280      ( 26 Sep 2019 08:49 )             27.9     09-25    138  |  104  |  18  ----------------------------<  81  5.0   |  25  |  0.92    Ca    8.1<L>      25 Sep 2019 06:17          Labs personally reviewed      ASSESSMENT/PLAN: 	  tolerated surgery well  afebrile, improving  BP well controlled   +edema of LE  - Lasix 20mg IV daily        Srini Velasco DO [facility]  Cardiovascular Medicine  [phone]

## 2019-09-26 NOTE — PROVIDER CONTACT NOTE (OTHER) - ASSESSMENT
alert  sleepy  arousable
pt A&Ox4 resting in bed
Patient aox4, not actively bleeding
Pt A&oX4. VSS- afebrile. pt c/o 9/10 pain to LEFT buttocks/perirectal area. area red, hard and tight feeling. area of excoriation noted on buldge as well. no prior documentation of LEFT sided inflammation noted in electronic medical record.
Pt alert and oriented x 4. Denies SOB, dizziness and headache. Pt asymptomatic
Pt stable
Pt stable. IV site not red, no signs infiltration

## 2019-09-26 NOTE — PROGRESS NOTE ADULT - SUBJECTIVE AND OBJECTIVE BOX
[facility] - WALLACE Lagos / WALLACE Gibson / ANN Barrett/ WALLACE Chin/ WALLACE Kumari/ ESPINOZA Bone / GABRIEL Geronimo / ALEXUS Kaur  ---------------------------------------------------------------------------------------------------------------  seen and examined today for ROB on CKD  Interval : serum creatinine improved  VITALS:  T(F): 98 (09-26-19 @ 06:07), Max: 98.6 (09-25-19 @ 20:05)  HR: 80 (09-26-19 @ 06:07)  BP: 118/75 (09-26-19 @ 06:07)  RR: 18 (09-26-19 @ 06:07)  SpO2: 97% (09-26-19 @ 06:07)  Wt(kg): --    09-25 @ 07:01  -  09-26 @ 07:00  --------------------------------------------------------  IN: 1020 mL / OUT: 2101 mL / NET: -1081 mL    09-26 @ 07:01  -  09-26 @ 13:18  --------------------------------------------------------  IN: 100 mL / OUT: 0 mL / NET: 100 mL      Physical Exam :-  Constitutional: NAD  Neck: Supple.  Respiratory: Bilateral equal breath sounds,  Cardiovascular: S1, S2 normal,  Gastrointestinal: Bowel Sounds present, soft, non tender.  Extremities: No edema  Neurological: Alert and Oriented x 3, no focal deficits  Psychiatric: Normal mood, normal affect  Data:-  Allergies :   codeine (Unknown)  Kiwi (Anaphylaxis)  penicillins (Anaphylaxis)    Hospital Medications:   MEDICATIONS  (STANDING):  aspirin enteric coated 81 milliGRAM(s) Oral daily  atorvastatin 80 milliGRAM(s) Oral at bedtime  aztreonam  IVPB 1000 milliGRAM(s) IV Intermittent every 8 hours  clindamycin IVPB 900 milliGRAM(s) IV Intermittent every 8 hours  collagenase Ointment 1 Application(s) Topical every 12 hours  DAPTOmycin IVPB 350 milliGRAM(s) IV Intermittent every 24 hours  dextrose 50% Injectable 12.5 Gram(s) IV Push once  dextrose 50% Injectable 25 Gram(s) IV Push once  dextrose 50% Injectable 25 Gram(s) IV Push once  docusate sodium 100 milliGRAM(s) Oral three times a day  furosemide   Injectable 20 milliGRAM(s) IV Push daily  heparin  Injectable 5000 Unit(s) SubCutaneous every 8 hours  influenza   Vaccine 0.5 milliLiter(s) IntraMuscular once  insulin glargine Injectable (LANTUS) 18 Unit(s) SubCutaneous two times a day  insulin lispro (HumaLOG) corrective regimen sliding scale   SubCutaneous three times a day before meals  lactobacillus acidophilus 1 Tablet(s) Oral daily  lidocaine   Patch 2 Patch Transdermal daily  metoprolol tartrate 12.5 milliGRAM(s) Oral two times a day  multivitamin 1 Tablet(s) Oral daily  pantoprazole    Tablet 40 milliGRAM(s) Oral before breakfast  senna 2 Tablet(s) Oral at bedtime    09-25    138  |  104  |  18  ----------------------------<  81  5.0   |  25  |  0.92    Ca    8.1<L>      25 Sep 2019 06:17      Creatinine Trend: 0.92 <--, 0.90 <--, 0.92 <--, 0.90 <--, 0.93 <--, 1.00 <--                        8.9    8.12  )-----------( 280      ( 26 Sep 2019 08:49 )             27.9

## 2019-09-26 NOTE — PROGRESS NOTE ADULT - SUBJECTIVE AND OBJECTIVE BOX
Subjective: Patient seen and examined. No new events except as noted.   cont to refuse any surgical intervention  refuse PICC line placement   has intermitted discomfort in the back     REVIEW OF SYSTEMS:    CONSTITUTIONAL: + weakness   EYES/ENT: No visual changes;  No vertigo or throat pain   NECK: No pain or stiffness  RESPIRATORY: No cough, wheezing, hemoptysis; No shortness of breath  CARDIOVASCULAR: No chest pain or palpitations  GASTROINTESTINAL: No abdominal or epigastric pain.  GENITOURINARY: No dysuria, frequency or hematuria  NEUROLOGICAL: No numbness or weakness  SKIN: buttock pain    All other review of systems is negative unless indicated above.    MEDICATIONS:  MEDICATIONS  (STANDING):  aspirin enteric coated 81 milliGRAM(s) Oral daily  atorvastatin 80 milliGRAM(s) Oral at bedtime  aztreonam  IVPB 1000 milliGRAM(s) IV Intermittent every 8 hours  clindamycin IVPB 900 milliGRAM(s) IV Intermittent every 8 hours  collagenase Ointment 1 Application(s) Topical every 12 hours  DAPTOmycin IVPB 350 milliGRAM(s) IV Intermittent every 24 hours  dextrose 50% Injectable 12.5 Gram(s) IV Push once  dextrose 50% Injectable 25 Gram(s) IV Push once  dextrose 50% Injectable 25 Gram(s) IV Push once  docusate sodium 100 milliGRAM(s) Oral three times a day  furosemide   Injectable 20 milliGRAM(s) IV Push daily  heparin  Injectable 5000 Unit(s) SubCutaneous every 8 hours  influenza   Vaccine 0.5 milliLiter(s) IntraMuscular once  insulin glargine Injectable (LANTUS) 18 Unit(s) SubCutaneous two times a day  insulin lispro (HumaLOG) corrective regimen sliding scale   SubCutaneous three times a day before meals  lactobacillus acidophilus 1 Tablet(s) Oral daily  lidocaine   Patch 2 Patch Transdermal daily  metoprolol tartrate 12.5 milliGRAM(s) Oral two times a day  multivitamin 1 Tablet(s) Oral daily  pantoprazole    Tablet 40 milliGRAM(s) Oral before breakfast  senna 2 Tablet(s) Oral at bedtime      PHYSICAL EXAM:  T(C): 36.7 (09-26-19 @ 06:07), Max: 37 (09-25-19 @ 20:05)  HR: 80 (09-26-19 @ 06:07) (76 - 80)  BP: 118/75 (09-26-19 @ 06:07) (118/75 - 119/76)  RR: 18 (09-26-19 @ 06:07) (18 - 18)  SpO2: 97% (09-26-19 @ 06:07) (96% - 97%)  Wt(kg): --  I&O's Summary    25 Sep 2019 07:01  -  26 Sep 2019 07:00  --------------------------------------------------------  IN: 1020 mL / OUT: 2101 mL / NET: -1081 mL    26 Sep 2019 07:01  -  26 Sep 2019 16:00  --------------------------------------------------------  IN: 100 mL / OUT: 1000 mL / NET: -900 mL          Appearance: Normal	  HEENT:   Normal oral mucosa, PERRL, EOMI	  Lymphatic: No lymphadenopathy , no edema  Cardiovascular: Normal S1 S2\  Respiratory: Lungs clear to auscultation, normal effort 	  Gastrointestinal:  Soft, Non-tender, + BS	  Skin: + buttock pain, dressing intact    Musculoskeletal: Normal range of motion, normal strength  Psychiatry:  Mood & affect appropriate  Ext: No edema      All labs, Imaging and EKGs personally reviewed                           8.9    8.12  )-----------( 280      ( 26 Sep 2019 08:49 )             27.9               09-25    138  |  104  |  18  ----------------------------<  81  5.0   |  25  |  0.92    Ca    8.1<L>      25 Sep 2019 06:17

## 2019-09-26 NOTE — PROGRESS NOTE ADULT - ASSESSMENT
Pt is a 87 y/o Female with PMHx of CHF, DM, HLD, HTN, CAD prior MI, c/o fatigue, "feeling off" and unable to get out of bed; Tmax 104F toay. Rash/redness on buttocks noted by home health aid. patient also complaining of +chronic cough, nonproductive.  No abdominal pain, no n/v/d. No chest pain.  States she has been feeling hot and cold over past few days, and took her temperature today, found fever of 104, and came to ED for evaluation.  No dysuria, no diarrhea. +constipation (chronic).  Increased diffuse leg swelling for past few days. patient lives at home with family. walks by herself with no use of walker. denies of any MEAD< chest pain on exertion. compliant with all her home medications (04 Sep 2019 22:22)    ER vitals: Tm 102.2, P 75, /77.  WBC 14.8 --> 12.2.  Cr 2.0 <-- 1.3.  PCT 0.49.  Ucx >100K E.coli.  Pt given dose of ceftriaxone, now on cipro for UTI.  ID consult called for further abx managment.      Sepsis:    - fever, leukocytosis. Source less likely UTI given persistent fevers, pt has infiltration of perirectal tissues, likely cellulitis.  Abx broadened     - Monitor.  lactate.  Pct elevated 0.49.  Pt with rising WBC, abx broadened on 9/11 (dapto/aztreanam/flagyl).    - Monitor hemodynamic status and BPs.  s/p IV fluid bolus, cont maintanence fluid.      - blood cx, urine cx.  RVP (-).  No pna on cxr      Perirectal cellulitis, r/o abscess:    - Repeat CTap on 9/13 shows worsening inflammation with fluid and foci of air.   s/p OR on 9/15 for  debridement.  Pt not responding to antibiotics prior to debridement.  f/u surgical wound cultures - Enterococcus and Bacteroides    - Cont dapto/azactam/clindamycin.   Changed from flagyl to clindamycin for continued anerobic coverage and anti-toxin effect.  Cover for gas forming bacteria.       - On 9/20 repeat ct scan performed, pt with new ulcerous lesion with eschar over L perineum.     * Pt with persistent necrotic wound in left gluteal area, pt refusing further OR debridement.   Seen by psychiatry, pt lacks capacity.   Pt is booked for OR, awaiting consent.     If no further surgery planned, recommend PICC line and long term abx (2-4 weeks) with continued wound care and close monitoring.               Will follow,    Sandrita Zaman  [phone]

## 2019-09-26 NOTE — CHART NOTE - NSCHARTNOTEFT_GEN_A_CORE
Pre-operative Note    - Pre-operative Diagnosis: Abscess of left buttock     - Procedure: Debridement of left buttock     - Labs:                        8.9    8.12  )-----------( 280      ( 26 Sep 2019 08:49 )             27.9     09-25    138  |  104  |  18  ----------------------------<  81  5.0   |  25  |  0.92    Ca    8.1<L>      25 Sep 2019 06:17        Type & Screen #1: date   Type & Screen #2: date    - CXR:    - EKG:    - AICD/Pacemaker Interrogation Date:     - Blood: Not needed.     - Pregnancy Test:     - Orders:  > NPO at midnight  > IVF at midnight  > Perioperative antibiotics not needed.  > Morning Labs: CBC, BMP, coags, type & screen, night and morning chlorhexidine bath  > Diabetic orders adjusted for NPO period.    - Permits:  > Consent in chart.  > Case scheduled with OR. Preop Dx: Buttock soft tissue infection  Surgeon: Dr. Tadeo Wan  Procedure: Debridement of left buttock    T(C): 36.6 (09-26-19 @ 17:17), Max: 37 (09-25-19 @ 20:05)  HR: 70 (09-26-19 @ 17:17) (70 - 80)  BP: 121/75 (09-26-19 @ 17:17) (118/75 - 121/75)  RR: 18 (09-26-19 @ 17:17) (18 - 18)  SpO2: 97% (09-26-19 @ 06:07) (96% - 97%)    CBC (09-26 @ 08:49)                              8.9<L>                         8.12    )----------------(  280        --    % Neutrophils, --    % Lymphocytes, ANC: --                                  27.9<L>              CBC (09-25 @ 18:30)                              9.2<L>                         9.0     )----------------(  290        --    % Neutrophils, --    % Lymphocytes, ANC: --                                  26.5<L>                BMP (09-25 @ 06:17)             138     |  104     |  18    		Ca++ --      Ca 8.1<L>             ---------------------------------( 81    		Mg --                 5.0     |  25      |  0.92  			Ph --          EKG: repeat ordered    < from: 12 Lead ECG (09.08.19 @ 11:50) >    Ventricular Rate 74 BPM    Atrial Rate 74 BPM    P-R Interval 200 ms    QRS Duration 172 ms    Q-T Interval 460 ms    QTC Calculation(Bezet) 510 ms    P Axis 26 degrees    R Axis -13 degrees    T Axis 176 degrees    Diagnosis Line *** POOR DATA QUALITY, INTERPRETATION MAY BE ADVERSELY AFFECTED  NORMAL SINUS RHYTHM  LEFT BUNDLE BRANCH BLOCK    < end of copied text >      CXR:  < from: Xray Chest 1 View AP/PA (09.04.19 @ 18:27) >    LUNGS: The lungs are clear.    PLEURA: No pleural effusion or pneumothorax.    HEART AND MEDIASTINUM: Heart size is within normal limits accounting for   projection.    SKELETON: Unremarkable skeletal structures.      IMPRESSION:     Clear lungs.    < end of copied text >      Type and Screen #1: Ordered  Type and Screen #2: Ordered    Blood:  not required    Pregnancy Test:  not required    PLAN:  - OR 9/27 for debridement of left buttock with Dr. Wan  -NPO after midnight except meds  -IVF while NPO  -Perioperative abx not required  -cont VTE ppx perioperatively  -AM labs: CBC, BMP, coags, T&S    Permits:  - Consent not yet obtained  - Case added on for OR Preop Dx: Buttock soft tissue infection  Surgeon: Dr. Tadeo Wan  Procedure: Debridement of left buttock    T(C): 36.6 (09-26-19 @ 17:17), Max: 37 (09-25-19 @ 20:05)  HR: 70 (09-26-19 @ 17:17) (70 - 80)  BP: 121/75 (09-26-19 @ 17:17) (118/75 - 121/75)  RR: 18 (09-26-19 @ 17:17) (18 - 18)  SpO2: 97% (09-26-19 @ 06:07) (96% - 97%)    CBC (09-26 @ 08:49)                              8.9<L>                         8.12    )----------------(  280        --    % Neutrophils, --    % Lymphocytes, ANC: --                                  27.9<L>              CBC (09-25 @ 18:30)                              9.2<L>                         9.0     )----------------(  290        --    % Neutrophils, --    % Lymphocytes, ANC: --                                  26.5<L>                BMP (09-25 @ 06:17)             138     |  104     |  18    		Ca++ --      Ca 8.1<L>             ---------------------------------( 81    		Mg --                 5.0     |  25      |  0.92  			Ph --          EKG: repeat ordered    < from: 12 Lead ECG (09.08.19 @ 11:50) >    Ventricular Rate 74 BPM    Atrial Rate 74 BPM    P-R Interval 200 ms    QRS Duration 172 ms    Q-T Interval 460 ms    QTC Calculation(Bezet) 510 ms    P Axis 26 degrees    R Axis -13 degrees    T Axis 176 degrees    Diagnosis Line *** POOR DATA QUALITY, INTERPRETATION MAY BE ADVERSELY AFFECTED  NORMAL SINUS RHYTHM  LEFT BUNDLE BRANCH BLOCK    < end of copied text >      CXR:  < from: Xray Chest 1 View AP/PA (09.04.19 @ 18:27) >    LUNGS: The lungs are clear.    PLEURA: No pleural effusion or pneumothorax.    HEART AND MEDIASTINUM: Heart size is within normal limits accounting for   projection.    SKELETON: Unremarkable skeletal structures.      IMPRESSION:     Clear lungs.    < end of copied text >      Type and Screen #1: Ordered  Type and Screen #2: Ordered    Blood:  not required    Pregnancy Test:  not required    PLAN:  - OR 9/27 for debridement of left buttock with Dr. Wan  -NPO after midnight except meds  -IVF while NPO  -Perioperative abx not required  -cont VTE ppx perioperatively  -AM labs: CBC, BMP, coags, T&S    Permits:  - Consent not yet obtained - awaiting further discussion with family, since pt deemed not to have capacity by psychiatry  - Case added on for OR tomorrow

## 2019-09-26 NOTE — PROGRESS NOTE ADULT - ASSESSMENT
Assessment:  DMT2: 86y Female with DM T2 with hyperglycemia, A1C 10.2%, on insulin, FS trending within acceptable range, no hypoglycemic episode, comfortable, pain improved.  Abscess: s/p debridement of necrotic tissue on the R buttock, still refusing surgical mgmt on the L,  monitored and FU primary team/surgery/ID  Anemia: s/p transfusion, stable, monitored.  HTN: Controlled, on antihypertensive medications.          Radha Chin MD  Cell: [phone]  Office: [phone] Assessment:  DMT2: 86y Female with DM T2 with hyperglycemia, A1C 10.2%, on insulin, FS trending within acceptable range, no hypoglycemic episode, comfortable, pain improved.  Abscess: s/p debridement of necrotic tissue on the R buttock, still refusing surgical mgmt on the L, monitored and FU primary team/surgery/ID  Anemia: s/p transfusion, stable, monitored.  HTN: Controlled, on antihypertensive medications.          Radha Chin MD  Cell: [phone]  Office: [phone]

## 2019-09-26 NOTE — CONSULT NOTE ADULT - ASSESSMENT
The pt. is suffering from an unspecified Cognitive Disorder (from UTI or from a dementia). This is impacting on her memory, reasoning, and judgment. She is in denial of risks of refusing medical treatments, cannot recall information told to her, and cannot reason well.   Based on a reasonable degree of medical certainty, the pt. lacks the capacity to make medical decisions.     Recommendations  If mental status not improved in the next few days, consider carrying out a reversible dementia workup by checking TFTs, head CT  Check QTc and if under 500ms, rx. Haldol 1mg IV q6h prn agitation.  Will follow with you here. Thank you.    Harry Sanabria M.D.  Psychiatry  [phone]

## 2019-09-26 NOTE — PROGRESS NOTE ADULT - PROBLEM SELECTOR PLAN 1
R buttock infiltrate with necrotic tissue  SUrg eval appreciated   SOft Tis US noted   pain control  CT noted  Abx as per ID  CT Pelv noted  F/U surgical recs regarding further W/U  patient refusing surgical intervention, refusing for now  again had a long conversation with daughter over the phone and patient. patient refusing further surgical intervention. would like to try full ABx treatment. understands that if no surgical debridement, she may have to cont IV ABX for at least 3-4 weeks. with that said, it may not be sufficient if no debridement.   today she reused PICC line placement  unknown if patient understands all plan including risks  cont to refuse surgical intervention   ? capacity, called Psych for evaluation, patient has no capacity regarding decision making for her medica care   will discuss with daughter regarding further management  plan for sugical intervention if daughter agrees

## 2019-09-26 NOTE — CONSULT NOTE ADULT - CONSULT REASON
Assess the pt's capacity to make medical decisions
CHF
High Blood Sugars/DMT2
Perianal pain
UTI
harshil on ckd 3, hyponatremia
Buttocks rash

## 2019-09-27 LAB
ANION GAP SERPL CALC-SCNC: 8 MMOL/L — SIGNIFICANT CHANGE UP (ref 5–17)
APTT BLD: 35.2 SEC — SIGNIFICANT CHANGE UP (ref 27.5–36.3)
BLD GP AB SCN SERPL QL: NEGATIVE — SIGNIFICANT CHANGE UP
BUN SERPL-MCNC: 18 MG/DL — SIGNIFICANT CHANGE UP (ref 7–23)
CALCIUM SERPL-MCNC: 8.7 MG/DL — SIGNIFICANT CHANGE UP (ref 8.4–10.5)
CHLORIDE SERPL-SCNC: 101 MMOL/L — SIGNIFICANT CHANGE UP (ref 96–108)
CO2 SERPL-SCNC: 26 MMOL/L — SIGNIFICANT CHANGE UP (ref 22–31)
CREAT SERPL-MCNC: 0.89 MG/DL — SIGNIFICANT CHANGE UP (ref 0.5–1.3)
GLUCOSE SERPL-MCNC: 154 MG/DL — HIGH (ref 70–99)
HCT VFR BLD CALC: 28.2 % — LOW (ref 34.5–45)
HGB BLD-MCNC: 8.8 G/DL — LOW (ref 11.5–15.5)
INR BLD: 1.16 RATIO — SIGNIFICANT CHANGE UP (ref 0.88–1.16)
MAGNESIUM SERPL-MCNC: 1.8 MG/DL — SIGNIFICANT CHANGE UP (ref 1.6–2.6)
MCHC RBC-ENTMCNC: 29.7 PG — SIGNIFICANT CHANGE UP (ref 27–34)
MCHC RBC-ENTMCNC: 31.2 GM/DL — LOW (ref 32–36)
MCV RBC AUTO: 95.3 FL — SIGNIFICANT CHANGE UP (ref 80–100)
PHOSPHATE SERPL-MCNC: 3.7 MG/DL — SIGNIFICANT CHANGE UP (ref 2.5–4.5)
PLATELET # BLD AUTO: 302 K/UL — SIGNIFICANT CHANGE UP (ref 150–400)
POTASSIUM SERPL-MCNC: 5.1 MMOL/L — SIGNIFICANT CHANGE UP (ref 3.5–5.3)
POTASSIUM SERPL-SCNC: 5.1 MMOL/L — SIGNIFICANT CHANGE UP (ref 3.5–5.3)
PROTHROM AB SERPL-ACNC: 13.4 SEC — HIGH (ref 10–13.1)
RBC # BLD: 2.96 M/UL — LOW (ref 3.8–5.2)
RBC # FLD: 18.6 % — HIGH (ref 10.3–14.5)
RH IG SCN BLD-IMP: POSITIVE — SIGNIFICANT CHANGE UP
SODIUM SERPL-SCNC: 135 MMOL/L — SIGNIFICANT CHANGE UP (ref 135–145)
WBC # BLD: 6.69 K/UL — SIGNIFICANT CHANGE UP (ref 3.8–10.5)
WBC # FLD AUTO: 6.69 K/UL — SIGNIFICANT CHANGE UP (ref 3.8–10.5)

## 2019-09-27 RX ORDER — MORPHINE SULFATE 50 MG/1
2 CAPSULE, EXTENDED RELEASE ORAL ONCE
Refills: 0 | Status: DISCONTINUED | OUTPATIENT
Start: 2019-09-27 | End: 2019-09-27

## 2019-09-27 RX ORDER — PANTOPRAZOLE SODIUM 20 MG/1
40 TABLET, DELAYED RELEASE ORAL ONCE
Refills: 0 | Status: COMPLETED | OUTPATIENT
Start: 2019-09-27 | End: 2019-09-27

## 2019-09-27 RX ORDER — INSULIN GLARGINE 100 [IU]/ML
9 INJECTION, SOLUTION SUBCUTANEOUS ONCE
Refills: 0 | Status: COMPLETED | OUTPATIENT
Start: 2019-09-27 | End: 2019-09-27

## 2019-09-27 RX ADMIN — Medication 50 MILLIGRAM(S): at 00:42

## 2019-09-27 RX ADMIN — Medication 12.5 MILLIGRAM(S): at 04:35

## 2019-09-27 RX ADMIN — Medication 650 MILLIGRAM(S): at 02:15

## 2019-09-27 RX ADMIN — Medication 1 APPLICATION(S): at 21:00

## 2019-09-27 RX ADMIN — Medication 50 MILLIGRAM(S): at 08:51

## 2019-09-27 RX ADMIN — OXYCODONE HYDROCHLORIDE 10 MILLIGRAM(S): 5 TABLET ORAL at 05:44

## 2019-09-27 RX ADMIN — Medication 20 MILLIGRAM(S): at 04:37

## 2019-09-27 RX ADMIN — Medication 1 TABLET(S): at 11:24

## 2019-09-27 RX ADMIN — Medication 100 MILLIGRAM(S): at 20:22

## 2019-09-27 RX ADMIN — HEPARIN SODIUM 5000 UNIT(S): 5000 INJECTION INTRAVENOUS; SUBCUTANEOUS at 13:20

## 2019-09-27 RX ADMIN — ONDANSETRON 4 MILLIGRAM(S): 8 TABLET, FILM COATED ORAL at 21:04

## 2019-09-27 RX ADMIN — Medication 1 TABLET(S): at 13:20

## 2019-09-27 RX ADMIN — Medication 100 MILLIGRAM(S): at 04:03

## 2019-09-27 RX ADMIN — OXYCODONE HYDROCHLORIDE 10 MILLIGRAM(S): 5 TABLET ORAL at 04:18

## 2019-09-27 RX ADMIN — Medication 81 MILLIGRAM(S): at 11:24

## 2019-09-27 RX ADMIN — Medication 650 MILLIGRAM(S): at 01:02

## 2019-09-27 RX ADMIN — Medication 1 APPLICATION(S): at 08:52

## 2019-09-27 RX ADMIN — Medication 100 MILLIGRAM(S): at 11:25

## 2019-09-27 RX ADMIN — INSULIN GLARGINE 9 UNIT(S): 100 INJECTION, SOLUTION SUBCUTANEOUS at 10:43

## 2019-09-27 RX ADMIN — MORPHINE SULFATE 2 MILLIGRAM(S): 50 CAPSULE, EXTENDED RELEASE ORAL at 22:16

## 2019-09-27 RX ADMIN — Medication 50 MILLIGRAM(S): at 17:51

## 2019-09-27 RX ADMIN — Medication 12.5 MILLIGRAM(S): at 17:52

## 2019-09-27 RX ADMIN — DAPTOMYCIN 114 MILLIGRAM(S): 500 INJECTION, POWDER, LYOPHILIZED, FOR SOLUTION INTRAVENOUS at 02:15

## 2019-09-27 RX ADMIN — PANTOPRAZOLE SODIUM 40 MILLIGRAM(S): 20 TABLET, DELAYED RELEASE ORAL at 04:36

## 2019-09-27 NOTE — PROGRESS NOTE ADULT - SUBJECTIVE AND OBJECTIVE BOX
[facility] - WALLACE Lagos / WALLACE Gibson / ANN Barrett/ WALLACE Chin/ WALLACE Kumari/ ESPINOZA Bone / GABRIEL Geronimo / ALEXUS Kaur  ---------------------------------------------------------------------------------------------------------------  seen and examined today for ROB  Interval : serum creatinine at baseline  VITALS:  T(F): 97.5 (09-27-19 @ 08:30), Max: 97.9 (09-26-19 @ 17:17)  HR: 78 (09-27-19 @ 08:30)  BP: 131/75 (09-27-19 @ 08:30)  RR: 18 (09-27-19 @ 08:30)  SpO2: 98% (09-27-19 @ 08:30)  Wt(kg): --    09-26 @ 07:01  -  09-27 @ 07:00  --------------------------------------------------------  IN: 1040 mL / OUT: 1800 mL / NET: -760 mL    09-27 @ 07:01  -  09-27 @ 15:23  --------------------------------------------------------  IN: 140 mL / OUT: 0 mL / NET: 140 mL      Physical Exam :-  Constitutional: NAD  Neck: Supple.  Respiratory: Bilateral equal breath sounds,  Cardiovascular: S1, S2 normal,  Gastrointestinal: Bowel Sounds present, soft, non tender.  Extremities: No edema  Neurological: Alert and Oriented x 3, no focal deficits  Psychiatric: Normal mood, normal affect  Data:-  Allergies :   codeine (Unknown)  Kiwi (Anaphylaxis)  penicillins (Anaphylaxis)    Hospital Medications:   MEDICATIONS  (STANDING):  aspirin enteric coated 81 milliGRAM(s) Oral daily  atorvastatin 80 milliGRAM(s) Oral at bedtime  aztreonam  IVPB 1000 milliGRAM(s) IV Intermittent every 8 hours  clindamycin IVPB 900 milliGRAM(s) IV Intermittent every 8 hours  collagenase Ointment 1 Application(s) Topical every 12 hours  DAPTOmycin IVPB 350 milliGRAM(s) IV Intermittent every 24 hours  dextrose 50% Injectable 12.5 Gram(s) IV Push once  dextrose 50% Injectable 25 Gram(s) IV Push once  dextrose 50% Injectable 25 Gram(s) IV Push once  docusate sodium 100 milliGRAM(s) Oral three times a day  furosemide   Injectable 20 milliGRAM(s) IV Push daily  heparin  Injectable 5000 Unit(s) SubCutaneous every 8 hours  influenza   Vaccine 0.5 milliLiter(s) IntraMuscular once  insulin glargine Injectable (LANTUS) 18 Unit(s) SubCutaneous two times a day  insulin lispro (HumaLOG) corrective regimen sliding scale   SubCutaneous three times a day before meals  lactobacillus acidophilus 1 Tablet(s) Oral daily  lidocaine   Patch 2 Patch Transdermal daily  metoprolol tartrate 12.5 milliGRAM(s) Oral two times a day  multivitamin 1 Tablet(s) Oral daily  pantoprazole    Tablet 40 milliGRAM(s) Oral before breakfast  senna 2 Tablet(s) Oral at bedtime    09-27    135  |  101  |  18  ----------------------------<  154<H>  5.1   |  26  |  0.89    Ca    8.7      27 Sep 2019 07:06  Phos  3.7     09-27  Mg     1.8     09-27      Creatinine Trend: 0.89 <--, 0.92 <--, 0.90 <--, 0.92 <--, 0.90 <--                        8.8    6.69  )-----------( 302      ( 27 Sep 2019 09:17 )             28.2

## 2019-09-27 NOTE — PRE-ANESTHESIA EVALUATION ADULT - NSRADCARDRESULTSFT_GEN_ALL_CORE
TTE 8/2017: EF 25-30%; 1. Mild mitral annular calcification. Tethered mitral valve  leaflets with normal opening. Mild mitral regurgitation.  2. Aortic valve not well visualized; appears to be a  calcified trileaflet valve with normal opening. No aortic  valve regurgitation seen.  3. Moderate left ventricular enlargement. Eccentric left  ventricular hypertrophy (dilated left ventricle with normal  relative wall thickness).  4. Severe segmental left ventricular systolic dysfunction.  The mid to distal anteiror, mid inferior, septal, distal  lateral and the apical cap are akinetic. 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 Consider repeat imaging with intravenous  echo contrast to better visualize the LV and apex. (The  patient did not agree to echo contrast at the time of the  study.)  5. Mild diastolic dysfunction (Stage I).  6. The right ventricle is not well visualized; grossly  normal right ventricular size and systolic function.  7. A color doppler signal is seen along the atrial septum  consistent with left-to-right flow across a patent foramen  ovale or small atrial septal defect.

## 2019-09-27 NOTE — PRE-ANESTHESIA EVALUATION ADULT - NSANTHPMHFT_GEN_ALL_CORE
87 y/o female, CAD, hyponatremia, acute on cheonic CKD, UTI/fever, s/p preirecta; absecc drianage and now recurrent, A1C 10.2 %nemia, s/p transfusion

## 2019-09-27 NOTE — PROGRESS NOTE ADULT - SUBJECTIVE AND OBJECTIVE BOX
Infectious Diseases progress note:    Subjective:  NAD, pt awaiting OR.  Daughter at bedside.  No fever.     ROS:  CONSTITUTIONAL:  No fever, chills, rigors  CARDIOVASCULAR:  No chest pain or palpitations  RESPIRATORY:   No SOB, cough, dyspnea on exertion.  No wheezing  GASTROINTESTINAL:  No abd pain, N/V, diarrhea/constipation  EXTREMITIES:  No swelling or joint pain  GENITOURINARY:  No burning on urination, increased frequency or urgency.  No flank pain  NEUROLOGIC:  No HA, visual disturbances  SKIN: No rashes    Allergies    codeine (Unknown)  Kiwi (Anaphylaxis)  penicillins (Anaphylaxis)    Intolerances        ANTIBIOTICS/RELEVANT:  antimicrobials  aztreonam  IVPB 1000 milliGRAM(s) IV Intermittent every 8 hours  clindamycin IVPB 900 milliGRAM(s) IV Intermittent every 8 hours  DAPTOmycin IVPB 350 milliGRAM(s) IV Intermittent every 24 hours    immunologic:  influenza   Vaccine 0.5 milliLiter(s) IntraMuscular once    OTHER:  acetaminophen   Tablet .. 650 milliGRAM(s) Oral every 6 hours PRN  aspirin enteric coated 81 milliGRAM(s) Oral daily  atorvastatin 80 milliGRAM(s) Oral at bedtime  bisacodyl Suppository 10 milliGRAM(s) Rectal daily PRN  collagenase Ointment 1 Application(s) Topical every 12 hours  dextrose 40% Gel 15 Gram(s) Oral once PRN  dextrose 50% Injectable 12.5 Gram(s) IV Push once  dextrose 50% Injectable 25 Gram(s) IV Push once  dextrose 50% Injectable 25 Gram(s) IV Push once  docusate sodium 100 milliGRAM(s) Oral three times a day  glucagon  Injectable 1 milliGRAM(s) IntraMuscular once PRN  heparin  Injectable 5000 Unit(s) SubCutaneous every 8 hours  insulin glargine Injectable (LANTUS) 18 Unit(s) SubCutaneous two times a day  insulin lispro (HumaLOG) corrective regimen sliding scale   SubCutaneous three times a day before meals  lactobacillus acidophilus 1 Tablet(s) Oral daily  lidocaine   Patch 2 Patch Transdermal daily  metoprolol tartrate 12.5 milliGRAM(s) Oral two times a day  morphine  - Injectable 2 milliGRAM(s) IV Push once  multivitamin 1 Tablet(s) Oral daily  ondansetron Injectable 4 milliGRAM(s) IV Push every 8 hours PRN  oxyCODONE    IR 5 milliGRAM(s) Oral every 4 hours PRN  oxyCODONE    IR 10 milliGRAM(s) Oral every 4 hours PRN  pantoprazole    Tablet 40 milliGRAM(s) Oral before breakfast  senna 2 Tablet(s) Oral at bedtime  simethicone 80 milliGRAM(s) Chew every 8 hours PRN      Objective:  Vital Signs Last 24 Hrs  T(C): 36.8 (27 Sep 2019 21:04), Max: 36.8 (27 Sep 2019 21:04)  T(F): 98.2 (27 Sep 2019 21:04), Max: 98.2 (27 Sep 2019 21:04)  HR: 75 (27 Sep 2019 21:04) (75 - 90)  BP: 98/64 (27 Sep 2019 21:04) (98/64 - 131/75)  BP(mean): --  RR: 18 (27 Sep 2019 21:04) (18 - 18)  SpO2: 97% (27 Sep 2019 21:04) (97% - 98%)    PHYSICAL EXAM:  Constitutional:NAD  Eyes:NIDIA, EOMI  Ear/Nose/Throat: no thrush, mucositis.  Moist mucous membranes	  Neck:no JVD, no lymphadenopathy, supple  Respiratory: CTA lucian  Cardiovascular: S1S2 RRR, no murmurs  Gastrointestinal:soft, nontender,  nondistended (+) BS  Extremities:no e/e/c  Skin:  perirectum dsg c/d/i        LABS:                        8.8    6.69  )-----------( 302      ( 27 Sep 2019 09:17 )             28.2     09-27    135  |  101  |  18  ----------------------------<  154<H>  5.1   |  26  |  0.89    Ca    8.7      27 Sep 2019 07:06  Phos  3.7     09-27  Mg     1.8     09-27      PT/INR - ( 27 Sep 2019 08:51 )   PT: 13.4 sec;   INR: 1.16 ratio         PTT - ( 27 Sep 2019 08:51 )  PTT:35.2 sec      Procalcitonin, Serum: 0.49 (09-05 @ 07:29)            Rapid RVP Result: [facility]          MICROBIOLOGY:    Culture - Surgical Swab (09.15.19 @ 18:03)    Specimen Source: .Surgical Swab    Culture Results:   Rare Enterococcus species "Susceptibilities not performed"  Few Bacteroides thetaiotamcron group "Susceptibilities not performed"          RADIOLOGY & ADDITIONAL STUDIES:    < from: CT Abdomen and Pelvis No Cont (09.20.19 @ 08:30) >    IMPRESSION:     Open wound overlying the coccyx extending toward the left ischiorectal   fossa with trace fluid component and air. No large/drainable collection.     < end of copied text >

## 2019-09-27 NOTE — PROGRESS NOTE ADULT - ASSESSMENT
Assessment:  DMT2: 86y Female with DM T2 with hyperglycemia, A1C 10.2%, on insulin, FS trending within acceptable range, no hypoglycemic episode, states she is "feeling good", now NPO.  Abscess: s/p debridement of necrotic tissue on the R buttock. As per psych patient does not have capacity to refuse procedure on the L, now NPO pending op, monitored and FU primary team/surgery/ID.  Anemia: s/p transfusion, stable, monitored.  HTN: Controlled, on antihypertensive medications.          Radha Chin MD  Cell: [phone]  Office: [phone] Assessment:  DMT2: 86y Female with DM T2 with hyperglycemia, A1C 10.2%, on insulin, FS trending within acceptable range, no hypoglycemic episode, states she is "feeling good", now NPO.  Abscess: s/p debridement of necrotic tissue on the R buttock. As per psych patient does not have capacity to refuse procedure on the L, now NPO pending op,  monitored and FU primary team/surgery/ID.  Anemia: s/p transfusion, stable, monitored.  HTN: Controlled, on antihypertensive medications.          Radha Chin MD  Cell: [phone]  Office: [phone]

## 2019-09-27 NOTE — PROGRESS NOTE ADULT - ASSESSMENT
Pt is a 87 y/o Female with PMHx of CHF, DM, HLD, HTN, CAD prior MI, c/o fatigue, "feeling off" and unable to get out of bed; Tmax 104F toay. Rash/redness on buttocks noted by home health aid. patient also complaining of +chronic cough, nonproductive.  No abdominal pain, no n/v/d. No chest pain.  States she has been feeling hot and cold over past few days, and took her temperature today, found fever of 104, and came to ED for evaluation.  No dysuria, no diarrhea. +constipation (chronic).  Increased diffuse leg swelling for past few days. patient lives at home with family. walks by herself with no use of walker. denies of any MEAD< chest pain on exertion. compliant with all her home medications (04 Sep 2019 22:22)    ER vitals: Tm 102.2, P 75, /77.  WBC 14.8 --> 12.2.  Cr 2.0 <-- 1.3.  PCT 0.49.  Ucx >100K E.coli.  Pt given dose of ceftriaxone, now on cipro for UTI.  ID consult called for further abx managment.      Sepsis:    - fever, leukocytosis. Source less likely UTI given persistent fevers, pt has infiltration of perirectal tissues, likely cellulitis.  Abx broadened     - Monitor.  lactate.  Pct elevated 0.49.  Pt with rising WBC, abx broadened on 9/11 (dapto/aztreanam/flagyl).    - Monitor hemodynamic status and BPs.  s/p IV fluid bolus, cont maintanence fluid.      - blood cx, urine cx.  RVP (-).  No pna on cxr      Perirectal cellulitis, r/o abscess:    - Repeat CTap on 9/13 shows worsening inflammation with fluid and foci of air.   s/p OR on 9/15 for  debridement.  Pt not responding to antibiotics prior to debridement.  f/u surgical wound cultures - Enterococcus and Bacteroides    - Cont dapto/azactam/clindamycin.   Changed from flagyl to clindamycin for continued anerobic coverage and anti-toxin effect.  Cover for gas forming bacteria.       - On 9/20 repeat ct scan performed, pt with new ulcerous lesion with eschar over L perineum.     * Pt with persistent necrotic wound in left gluteal area, pt refusing further OR debridement.   Seen by psychiatry, pt lacks capacity.   Pt is booked for OR today.  f/u repeat cultures.  Cont abx until no further debridement is indicated                  Will follow,    Sandrita Zaman  [phone]

## 2019-09-27 NOTE — PROGRESS NOTE ADULT - SUBJECTIVE AND OBJECTIVE BOX
Subjective: Patient seen and examined. No new events except as noted.   doing okay , states that no pain   however on changing dressing as per surgery the lesions has not improved     REVIEW OF SYSTEMS:    CONSTITUTIONAL: No weakness, fevers or chills  EYES/ENT: No visual changes;  No vertigo or throat pain   NECK: No pain or stiffness  RESPIRATORY: No cough, wheezing, hemoptysis; No shortness of breath  CARDIOVASCULAR: No chest pain or palpitations  GASTROINTESTINAL: No abdominal or epigastric pain  GENITOURINARY: No dysuria, frequency or hematuria  NEUROLOGICAL: No numbness or weakness  SKIN: dressing on theback buttock   All other review of systems is negative unless indicated above.    MEDICATIONS:  MEDICATIONS  (STANDING):  aspirin enteric coated 81 milliGRAM(s) Oral daily  atorvastatin 80 milliGRAM(s) Oral at bedtime  aztreonam  IVPB 1000 milliGRAM(s) IV Intermittent every 8 hours  clindamycin IVPB 900 milliGRAM(s) IV Intermittent every 8 hours  collagenase Ointment 1 Application(s) Topical every 12 hours  DAPTOmycin IVPB 350 milliGRAM(s) IV Intermittent every 24 hours  dextrose 50% Injectable 12.5 Gram(s) IV Push once  dextrose 50% Injectable 25 Gram(s) IV Push once  dextrose 50% Injectable 25 Gram(s) IV Push once  docusate sodium 100 milliGRAM(s) Oral three times a day  furosemide   Injectable 20 milliGRAM(s) IV Push daily  heparin  Injectable 5000 Unit(s) SubCutaneous every 8 hours  influenza   Vaccine 0.5 milliLiter(s) IntraMuscular once  insulin glargine Injectable (LANTUS) 18 Unit(s) SubCutaneous two times a day  insulin lispro (HumaLOG) corrective regimen sliding scale   SubCutaneous three times a day before meals  lactobacillus acidophilus 1 Tablet(s) Oral daily  lidocaine   Patch 2 Patch Transdermal daily  metoprolol tartrate 12.5 milliGRAM(s) Oral two times a day  multivitamin 1 Tablet(s) Oral daily  pantoprazole    Tablet 40 milliGRAM(s) Oral before breakfast  senna 2 Tablet(s) Oral at bedtime      PHYSICAL EXAM:  T(C): 36.4 (09-27-19 @ 08:30), Max: 36.6 (09-26-19 @ 17:17)  HR: 78 (09-27-19 @ 08:30) (70 - 90)  BP: 131/75 (09-27-19 @ 08:30) (118/74 - 131/75)  RR: 18 (09-27-19 @ 08:30) (18 - 18)  SpO2: 98% (09-27-19 @ 08:30) (98% - 98%)  Wt(kg): --  I&O's Summary    26 Sep 2019 07:01  -  27 Sep 2019 07:00  --------------------------------------------------------  IN: 1040 mL / OUT: 1800 mL / NET: -760 mL    27 Sep 2019 07:01  -  27 Sep 2019 17:00  --------------------------------------------------------  IN: 140 mL / OUT: 0 mL / NET: 140 mL          Appearance: Normal	  HEENT:   Normal oral mucosa, PERRL, EOMI	  Lymphatic: No lymphadenopathy , no edema  Cardiovascular: Normal S1 S2  Respiratory: Lungs clear to auscultation, normal effort 	  Gastrointestinal:  Soft, Non-tender, + BS	  Skin: buttock dressing intact   Musculoskeletal: Normal range of motion, normal strength  Psychiatry:  Mood & affect appropriate  Ext: No edema      All labs, Imaging and EKGs personally reviewed                           8.8    6.69  )-----------( 302      ( 27 Sep 2019 09:17 )             28.2               09-27    135  |  101  |  18  ----------------------------<  154<H>  5.1   |  26  |  0.89    Ca    8.7      27 Sep 2019 07:06  Phos  3.7     09-27  Mg     1.8     09-27      PT/INR - ( 27 Sep 2019 08:51 )   PT: 13.4 sec;   INR: 1.16 ratio         PTT - ( 27 Sep 2019 08:51 )  PTT:35.2 sec

## 2019-09-27 NOTE — PROGRESS NOTE ADULT - SUBJECTIVE AND OBJECTIVE BOX
Chief complaint  Patient is a 86y old  Female who presents with a chief complaint of Fever, UTI (26 Sep 2019 18:09)   Review of systems  Patient in bed, looks comfortable, no fever, no hypoglycemia.    Labs and Fingersticks  CAPILLARY BLOOD GLUCOSE      POCT Blood Glucose.: 135 mg/dL (27 Sep 2019 08:50)  POCT Blood Glucose.: 157 mg/dL (27 Sep 2019 06:16)  POCT Blood Glucose.: 282 mg/dL (26 Sep 2019 22:02)  POCT Blood Glucose.: 246 mg/dL (26 Sep 2019 17:23)  POCT Blood Glucose.: 262 mg/dL (26 Sep 2019 12:39)      Anion Gap, Serum: 8 (09-27 @ 07:06)      Calcium, Total Serum: 8.7 (09-27 @ 07:06)          09-27    135  |  101  |  18  ----------------------------<  154<H>  5.1   |  26  |  0.89    Ca    8.7      27 Sep 2019 07:06  Phos  3.7     09-27  Mg     1.8     09-27                          8.8    6.69  )-----------( 302      ( 27 Sep 2019 09:17 )             28.2     Medications  MEDICATIONS  (STANDING):  aspirin enteric coated 81 milliGRAM(s) Oral daily  atorvastatin 80 milliGRAM(s) Oral at bedtime  aztreonam  IVPB 1000 milliGRAM(s) IV Intermittent every 8 hours  clindamycin IVPB 900 milliGRAM(s) IV Intermittent every 8 hours  collagenase Ointment 1 Application(s) Topical every 12 hours  DAPTOmycin IVPB 350 milliGRAM(s) IV Intermittent every 24 hours  dextrose 50% Injectable 12.5 Gram(s) IV Push once  dextrose 50% Injectable 25 Gram(s) IV Push once  dextrose 50% Injectable 25 Gram(s) IV Push once  docusate sodium 100 milliGRAM(s) Oral three times a day  furosemide   Injectable 20 milliGRAM(s) IV Push daily  heparin  Injectable 5000 Unit(s) SubCutaneous every 8 hours  influenza   Vaccine 0.5 milliLiter(s) IntraMuscular once  insulin glargine Injectable (LANTUS) 18 Unit(s) SubCutaneous two times a day  insulin lispro (HumaLOG) corrective regimen sliding scale   SubCutaneous three times a day before meals  lactobacillus acidophilus 1 Tablet(s) Oral daily  lidocaine   Patch 2 Patch Transdermal daily  metoprolol tartrate 12.5 milliGRAM(s) Oral two times a day  multivitamin 1 Tablet(s) Oral daily  pantoprazole    Tablet 40 milliGRAM(s) Oral before breakfast  senna 2 Tablet(s) Oral at bedtime      Physical Exam  General: Patient comfortable in bed  Vital Signs Last 12 Hrs  T(F): 97.5 (09-27-19 @ 08:30), Max: 97.7 (09-27-19 @ 04:25)  HR: 78 (09-27-19 @ 08:30) (78 - 90)  BP: 131/75 (09-27-19 @ 08:30) (120/84 - 131/75)  BP(mean): --  RR: 18 (09-27-19 @ 08:30) (18 - 18)  SpO2: 98% (09-27-19 @ 08:30) (98% - 98%)  Neck: No palpable thyroid nodules.  CVS: S1S2, No murmurs  Respiratory: No wheezing, no crepitations  GI: Abdomen soft, bowel sounds positive  Musculoskeletal:  edema lower extremities.   Skin: No skin rashes, no ecchymosis    Diagnostics Chief complaint  Patient is a 86y old  Female who presents with a chief complaint of Fever, UTI (26 Sep 2019 18:09)   Review of systems  Patient in bed, looks comfortable, no fever,  no hypoglycemia.    Labs and Fingersticks  CAPILLARY BLOOD GLUCOSE      POCT Blood Glucose.: 135 mg/dL (27 Sep 2019 08:50)  POCT Blood Glucose.: 157 mg/dL (27 Sep 2019 06:16)  POCT Blood Glucose.: 282 mg/dL (26 Sep 2019 22:02)  POCT Blood Glucose.: 246 mg/dL (26 Sep 2019 17:23)  POCT Blood Glucose.: 262 mg/dL (26 Sep 2019 12:39)      Anion Gap, Serum: 8 (09-27 @ 07:06)      Calcium, Total Serum: 8.7 (09-27 @ 07:06)          09-27    135  |  101  |  18  ----------------------------<  154<H>  5.1   |  26  |  0.89    Ca    8.7      27 Sep 2019 07:06  Phos  3.7     09-27  Mg     1.8     09-27                          8.8    6.69  )-----------( 302      ( 27 Sep 2019 09:17 )             28.2     Medications  MEDICATIONS  (STANDING):  aspirin enteric coated 81 milliGRAM(s) Oral daily  atorvastatin 80 milliGRAM(s) Oral at bedtime  aztreonam  IVPB 1000 milliGRAM(s) IV Intermittent every 8 hours  clindamycin IVPB 900 milliGRAM(s) IV Intermittent every 8 hours  collagenase Ointment 1 Application(s) Topical every 12 hours  DAPTOmycin IVPB 350 milliGRAM(s) IV Intermittent every 24 hours  dextrose 50% Injectable 12.5 Gram(s) IV Push once  dextrose 50% Injectable 25 Gram(s) IV Push once  dextrose 50% Injectable 25 Gram(s) IV Push once  docusate sodium 100 milliGRAM(s) Oral three times a day  furosemide   Injectable 20 milliGRAM(s) IV Push daily  heparin  Injectable 5000 Unit(s) SubCutaneous every 8 hours  influenza   Vaccine 0.5 milliLiter(s) IntraMuscular once  insulin glargine Injectable (LANTUS) 18 Unit(s) SubCutaneous two times a day  insulin lispro (HumaLOG) corrective regimen sliding scale   SubCutaneous three times a day before meals  lactobacillus acidophilus 1 Tablet(s) Oral daily  lidocaine   Patch 2 Patch Transdermal daily  metoprolol tartrate 12.5 milliGRAM(s) Oral two times a day  multivitamin 1 Tablet(s) Oral daily  pantoprazole    Tablet 40 milliGRAM(s) Oral before breakfast  senna 2 Tablet(s) Oral at bedtime      Physical Exam  General: Patient comfortable in bed  Vital Signs Last 12 Hrs  T(F): 97.5 (09-27-19 @ 08:30), Max: 97.7 (09-27-19 @ 04:25)  HR: 78 (09-27-19 @ 08:30) (78 - 90)  BP: 131/75 (09-27-19 @ 08:30) (120/84 - 131/75)  BP(mean): --  RR: 18 (09-27-19 @ 08:30) (18 - 18)  SpO2: 98% (09-27-19 @ 08:30) (98% - 98%)  Neck: No palpable thyroid nodules.  CVS: S1S2, No murmurs  Respiratory: No wheezing, no crepitations  GI: Abdomen soft, bowel sounds positive  Musculoskeletal:  edema lower extremities.   Skin: No skin rashes, no ecchymosis    Diagnostics

## 2019-09-27 NOTE — PROGRESS NOTE ADULT - PROBLEM SELECTOR PLAN 1
R buttock infiltrate with necrotic tissue  SUrg eval appreciated   SOft Tis US noted   pain control  CT noted  Abx as per ID  CT Pelv noted  F/U surgical recs regarding further W/U  patient refusing surgical intervention, refusing for now  again had a long conversation with daughter over the phone and patient. patient refusing further surgical intervention. would like to try full ABx treatment. understands that if no surgical debridement, she may have to cont IV ABX for at least 3-4 weeks. with that said, it may not be sufficient if no debridement.   today she reused PICC line placement  unknown if patient understands all plan including risks  cont to refuse surgical intervention   ? capacity, called Psych for evaluation, patient has no capacity regarding decision making for her medica care   plan for sugical intervention   discussed in detail with daughter in person and her son lynsey over the phone with surgical team present

## 2019-09-27 NOTE — PROGRESS NOTE ADULT - SUBJECTIVE AND OBJECTIVE BOX
Patient is a 86y old  Female who presents with a chief complaint of Fever, UTI (27 Sep 2019 17:00)      INTERVAL HISTORY:     TELEMETRY:  	  MEDICATIONS:  furosemide   Injectable 20 milliGRAM(s) IV Push daily  metoprolol tartrate 12.5 milliGRAM(s) Oral two times a day        PHYSICAL EXAM:  T(C): 36.4 (09-27-19 @ 08:30), Max: 36.6 (09-26-19 @ 20:00)  HR: 78 (09-27-19 @ 08:30) (78 - 90)  BP: 131/75 (09-27-19 @ 08:30) (118/74 - 131/75)  RR: 18 (09-27-19 @ 08:30) (18 - 18)  SpO2: 98% (09-27-19 @ 08:30) (98% - 98%)  Wt(kg): --  I&O's Summary    26 Sep 2019 07:01  -  27 Sep 2019 07:00  --------------------------------------------------------  IN: 1040 mL / OUT: 1800 mL / NET: -760 mL    27 Sep 2019 07:01  -  27 Sep 2019 19:29  --------------------------------------------------------  IN: 140 mL / OUT: 1600 mL / NET: -1460 mL          Appearance: In no distress	  HEENT:    PERRL, EOMI	  Cardiovascular:  S1 S2, No JVD  Respiratory: Lungs clear to auscultation	  Gastrointestinal:  Soft, Non-tender, + BS	  Extremities:  No edema of LE                                8.8    6.69  )-----------( 302      ( 27 Sep 2019 09:17 )             28.2     09-27    135  |  101  |  18  ----------------------------<  154<H>  5.1   |  26  |  0.89    Ca    8.7      27 Sep 2019 07:06  Phos  3.7     09-27  Mg     1.8     09-27          Labs personally reviewed      ASSESSMENT/PLAN: 	  tolerated surgery well  afebrile, improving  BP well controlled   edema of LE resolved  -d/c lasix for now        Srini Velasco DO [facility]  Cardiovascular Medicine  [phone]

## 2019-09-28 LAB
ALBUMIN SERPL ELPH-MCNC: 2.7 G/DL — LOW (ref 3.3–5)
ALP SERPL-CCNC: 193 U/L — HIGH (ref 40–120)
ALT FLD-CCNC: 22 U/L — SIGNIFICANT CHANGE UP (ref 10–45)
ANION GAP SERPL CALC-SCNC: 7 MMOL/L — SIGNIFICANT CHANGE UP (ref 5–17)
AST SERPL-CCNC: 30 U/L — SIGNIFICANT CHANGE UP (ref 10–40)
BASOPHILS # BLD AUTO: 0.05 K/UL — SIGNIFICANT CHANGE UP (ref 0–0.2)
BASOPHILS NFR BLD AUTO: 0.8 % — SIGNIFICANT CHANGE UP (ref 0–2)
BILIRUB SERPL-MCNC: 0.3 MG/DL — SIGNIFICANT CHANGE UP (ref 0.2–1.2)
BUN SERPL-MCNC: 14 MG/DL — SIGNIFICANT CHANGE UP (ref 7–23)
CALCIUM SERPL-MCNC: 8.8 MG/DL — SIGNIFICANT CHANGE UP (ref 8.4–10.5)
CHLORIDE SERPL-SCNC: 102 MMOL/L — SIGNIFICANT CHANGE UP (ref 96–108)
CK SERPL-CCNC: 28 U/L — SIGNIFICANT CHANGE UP (ref 25–170)
CO2 SERPL-SCNC: 27 MMOL/L — SIGNIFICANT CHANGE UP (ref 22–31)
CREAT SERPL-MCNC: 0.96 MG/DL — SIGNIFICANT CHANGE UP (ref 0.5–1.3)
EOSINOPHIL # BLD AUTO: 0.12 K/UL — SIGNIFICANT CHANGE UP (ref 0–0.5)
EOSINOPHIL NFR BLD AUTO: 1.8 % — SIGNIFICANT CHANGE UP (ref 0–6)
GLUCOSE SERPL-MCNC: 105 MG/DL — HIGH (ref 70–99)
HCT VFR BLD CALC: 29.3 % — LOW (ref 34.5–45)
HGB BLD-MCNC: 8.9 G/DL — LOW (ref 11.5–15.5)
IMM GRANULOCYTES NFR BLD AUTO: 0.3 % — SIGNIFICANT CHANGE UP (ref 0–1.5)
LYMPHOCYTES # BLD AUTO: 1.14 K/UL — SIGNIFICANT CHANGE UP (ref 1–3.3)
LYMPHOCYTES # BLD AUTO: 17.5 % — SIGNIFICANT CHANGE UP (ref 13–44)
MAGNESIUM SERPL-MCNC: 1.8 MG/DL — SIGNIFICANT CHANGE UP (ref 1.6–2.6)
MCHC RBC-ENTMCNC: 29.9 PG — SIGNIFICANT CHANGE UP (ref 27–34)
MCHC RBC-ENTMCNC: 30.4 GM/DL — LOW (ref 32–36)
MCV RBC AUTO: 98.3 FL — SIGNIFICANT CHANGE UP (ref 80–100)
MONOCYTES # BLD AUTO: 0.67 K/UL — SIGNIFICANT CHANGE UP (ref 0–0.9)
MONOCYTES NFR BLD AUTO: 10.3 % — SIGNIFICANT CHANGE UP (ref 2–14)
NEUTROPHILS # BLD AUTO: 4.52 K/UL — SIGNIFICANT CHANGE UP (ref 1.8–7.4)
NEUTROPHILS NFR BLD AUTO: 69.3 % — SIGNIFICANT CHANGE UP (ref 43–77)
PHOSPHATE SERPL-MCNC: 4.3 MG/DL — SIGNIFICANT CHANGE UP (ref 2.5–4.5)
PLATELET # BLD AUTO: 302 K/UL — SIGNIFICANT CHANGE UP (ref 150–400)
POTASSIUM SERPL-MCNC: 5.3 MMOL/L — SIGNIFICANT CHANGE UP (ref 3.5–5.3)
POTASSIUM SERPL-SCNC: 5.3 MMOL/L — SIGNIFICANT CHANGE UP (ref 3.5–5.3)
PROT SERPL-MCNC: 5.6 G/DL — LOW (ref 6–8.3)
RBC # BLD: 2.98 M/UL — LOW (ref 3.8–5.2)
RBC # FLD: 19.1 % — HIGH (ref 10.3–14.5)
SODIUM SERPL-SCNC: 136 MMOL/L — SIGNIFICANT CHANGE UP (ref 135–145)
WBC # BLD: 6.52 K/UL — SIGNIFICANT CHANGE UP (ref 3.8–10.5)
WBC # FLD AUTO: 6.52 K/UL — SIGNIFICANT CHANGE UP (ref 3.8–10.5)

## 2019-09-28 RX ORDER — INSULIN GLARGINE 100 [IU]/ML
18 INJECTION, SOLUTION SUBCUTANEOUS
Refills: 0 | Status: DISCONTINUED | OUTPATIENT
Start: 2019-09-28 | End: 2019-09-30

## 2019-09-28 RX ORDER — SIMETHICONE 80 MG/1
80 TABLET, CHEWABLE ORAL EVERY 8 HOURS
Refills: 0 | Status: DISCONTINUED | OUTPATIENT
Start: 2019-09-28 | End: 2019-10-04

## 2019-09-28 RX ORDER — INSULIN LISPRO 100/ML
VIAL (ML) SUBCUTANEOUS
Refills: 0 | Status: DISCONTINUED | OUTPATIENT
Start: 2019-09-28 | End: 2019-09-30

## 2019-09-28 RX ORDER — DOCUSATE SODIUM 100 MG
100 CAPSULE ORAL THREE TIMES A DAY
Refills: 0 | Status: DISCONTINUED | OUTPATIENT
Start: 2019-09-28 | End: 2019-10-04

## 2019-09-28 RX ORDER — GLUCAGON INJECTION, SOLUTION 0.5 MG/.1ML
1 INJECTION, SOLUTION SUBCUTANEOUS ONCE
Refills: 0 | Status: DISCONTINUED | OUTPATIENT
Start: 2019-09-28 | End: 2019-10-04

## 2019-09-28 RX ORDER — ACETAMINOPHEN 500 MG
650 TABLET ORAL EVERY 6 HOURS
Refills: 0 | Status: DISCONTINUED | OUTPATIENT
Start: 2019-09-28 | End: 2019-10-04

## 2019-09-28 RX ORDER — OXYCODONE HYDROCHLORIDE 5 MG/1
5 TABLET ORAL EVERY 4 HOURS
Refills: 0 | Status: DISCONTINUED | OUTPATIENT
Start: 2019-09-28 | End: 2019-10-04

## 2019-09-28 RX ORDER — COLLAGENASE CLOSTRIDIUM HIST. 250 UNIT/G
1 OINTMENT (GRAM) TOPICAL EVERY 12 HOURS
Refills: 0 | Status: DISCONTINUED | OUTPATIENT
Start: 2019-09-28 | End: 2019-09-30

## 2019-09-28 RX ORDER — OXYCODONE HYDROCHLORIDE 5 MG/1
10 TABLET ORAL EVERY 4 HOURS
Refills: 0 | Status: DISCONTINUED | OUTPATIENT
Start: 2019-09-28 | End: 2019-10-04

## 2019-09-28 RX ORDER — ATORVASTATIN CALCIUM 80 MG/1
80 TABLET, FILM COATED ORAL AT BEDTIME
Refills: 0 | Status: DISCONTINUED | OUTPATIENT
Start: 2019-09-28 | End: 2019-10-04

## 2019-09-28 RX ORDER — DEXTROSE 50 % IN WATER 50 %
12.5 SYRINGE (ML) INTRAVENOUS ONCE
Refills: 0 | Status: DISCONTINUED | OUTPATIENT
Start: 2019-09-28 | End: 2019-10-04

## 2019-09-28 RX ORDER — SENNA PLUS 8.6 MG/1
2 TABLET ORAL AT BEDTIME
Refills: 0 | Status: DISCONTINUED | OUTPATIENT
Start: 2019-09-28 | End: 2019-10-04

## 2019-09-28 RX ORDER — ONDANSETRON 8 MG/1
4 TABLET, FILM COATED ORAL EVERY 8 HOURS
Refills: 0 | Status: DISCONTINUED | OUTPATIENT
Start: 2019-09-28 | End: 2019-10-04

## 2019-09-28 RX ORDER — PANTOPRAZOLE SODIUM 20 MG/1
40 TABLET, DELAYED RELEASE ORAL
Refills: 0 | Status: DISCONTINUED | OUTPATIENT
Start: 2019-09-28 | End: 2019-10-04

## 2019-09-28 RX ORDER — LIDOCAINE 4 G/100G
2 CREAM TOPICAL DAILY
Refills: 0 | Status: DISCONTINUED | OUTPATIENT
Start: 2019-09-28 | End: 2019-10-04

## 2019-09-28 RX ORDER — ACETAMINOPHEN 500 MG
1000 TABLET ORAL ONCE
Refills: 0 | Status: COMPLETED | OUTPATIENT
Start: 2019-09-28 | End: 2019-09-28

## 2019-09-28 RX ORDER — HYDROMORPHONE HYDROCHLORIDE 2 MG/ML
0.25 INJECTION INTRAMUSCULAR; INTRAVENOUS; SUBCUTANEOUS
Refills: 0 | Status: DISCONTINUED | OUTPATIENT
Start: 2019-09-28 | End: 2019-09-28

## 2019-09-28 RX ORDER — HALOPERIDOL DECANOATE 100 MG/ML
0.5 INJECTION INTRAMUSCULAR EVERY 8 HOURS
Refills: 0 | Status: DISCONTINUED | OUTPATIENT
Start: 2019-09-28 | End: 2019-10-04

## 2019-09-28 RX ORDER — AZTREONAM 2 G
1000 VIAL (EA) INJECTION EVERY 8 HOURS
Refills: 0 | Status: DISCONTINUED | OUTPATIENT
Start: 2019-09-28 | End: 2019-09-30

## 2019-09-28 RX ORDER — HEPARIN SODIUM 5000 [USP'U]/ML
5000 INJECTION INTRAVENOUS; SUBCUTANEOUS EVERY 8 HOURS
Refills: 0 | Status: DISCONTINUED | OUTPATIENT
Start: 2019-09-28 | End: 2019-10-04

## 2019-09-28 RX ORDER — ONDANSETRON 8 MG/1
4 TABLET, FILM COATED ORAL ONCE
Refills: 0 | Status: DISCONTINUED | OUTPATIENT
Start: 2019-09-28 | End: 2019-09-28

## 2019-09-28 RX ORDER — DAPTOMYCIN 500 MG/10ML
350 INJECTION, POWDER, LYOPHILIZED, FOR SOLUTION INTRAVENOUS EVERY 24 HOURS
Refills: 0 | Status: DISCONTINUED | OUTPATIENT
Start: 2019-09-28 | End: 2019-09-30

## 2019-09-28 RX ORDER — METOPROLOL TARTRATE 50 MG
12.5 TABLET ORAL
Refills: 0 | Status: DISCONTINUED | OUTPATIENT
Start: 2019-09-28 | End: 2019-10-04

## 2019-09-28 RX ORDER — DEXTROSE 50 % IN WATER 50 %
25 SYRINGE (ML) INTRAVENOUS ONCE
Refills: 0 | Status: DISCONTINUED | OUTPATIENT
Start: 2019-09-28 | End: 2019-10-04

## 2019-09-28 RX ORDER — DEXTROSE 50 % IN WATER 50 %
15 SYRINGE (ML) INTRAVENOUS ONCE
Refills: 0 | Status: DISCONTINUED | OUTPATIENT
Start: 2019-09-28 | End: 2019-10-04

## 2019-09-28 RX ORDER — ASPIRIN/CALCIUM CARB/MAGNESIUM 324 MG
81 TABLET ORAL DAILY
Refills: 0 | Status: DISCONTINUED | OUTPATIENT
Start: 2019-09-28 | End: 2019-10-04

## 2019-09-28 RX ORDER — LACTOBACILLUS ACIDOPHILUS 100MM CELL
1 CAPSULE ORAL DAILY
Refills: 0 | Status: DISCONTINUED | OUTPATIENT
Start: 2019-09-28 | End: 2019-10-04

## 2019-09-28 RX ADMIN — OXYCODONE HYDROCHLORIDE 5 MILLIGRAM(S): 5 TABLET ORAL at 21:28

## 2019-09-28 RX ADMIN — SENNA PLUS 2 TABLET(S): 8.6 TABLET ORAL at 21:30

## 2019-09-28 RX ADMIN — Medication 4: at 12:27

## 2019-09-28 RX ADMIN — Medication 50 MILLIGRAM(S): at 09:12

## 2019-09-28 RX ADMIN — OXYCODONE HYDROCHLORIDE 5 MILLIGRAM(S): 5 TABLET ORAL at 22:00

## 2019-09-28 RX ADMIN — Medication 100 MILLIGRAM(S): at 04:02

## 2019-09-28 RX ADMIN — Medication 1000 MILLIGRAM(S): at 00:15

## 2019-09-28 RX ADMIN — OXYCODONE HYDROCHLORIDE 5 MILLIGRAM(S): 5 TABLET ORAL at 06:30

## 2019-09-28 RX ADMIN — Medication 1 TABLET(S): at 11:35

## 2019-09-28 RX ADMIN — Medication 100 MILLIGRAM(S): at 13:09

## 2019-09-28 RX ADMIN — Medication 100 MILLIGRAM(S): at 21:27

## 2019-09-28 RX ADMIN — Medication 50 MILLIGRAM(S): at 17:18

## 2019-09-28 RX ADMIN — Medication 100 MILLIGRAM(S): at 19:58

## 2019-09-28 RX ADMIN — HEPARIN SODIUM 5000 UNIT(S): 5000 INJECTION INTRAVENOUS; SUBCUTANEOUS at 13:09

## 2019-09-28 RX ADMIN — ONDANSETRON 4 MILLIGRAM(S): 8 TABLET, FILM COATED ORAL at 23:01

## 2019-09-28 RX ADMIN — Medication 100 MILLIGRAM(S): at 11:43

## 2019-09-28 RX ADMIN — INSULIN GLARGINE 18 UNIT(S): 100 INJECTION, SOLUTION SUBCUTANEOUS at 08:42

## 2019-09-28 RX ADMIN — OXYCODONE HYDROCHLORIDE 5 MILLIGRAM(S): 5 TABLET ORAL at 13:14

## 2019-09-28 RX ADMIN — OXYCODONE HYDROCHLORIDE 5 MILLIGRAM(S): 5 TABLET ORAL at 02:31

## 2019-09-28 RX ADMIN — PANTOPRAZOLE SODIUM 40 MILLIGRAM(S): 20 TABLET, DELAYED RELEASE ORAL at 05:25

## 2019-09-28 RX ADMIN — Medication 1 APPLICATION(S): at 21:27

## 2019-09-28 RX ADMIN — Medication 50 MILLIGRAM(S): at 03:03

## 2019-09-28 RX ADMIN — OXYCODONE HYDROCHLORIDE 5 MILLIGRAM(S): 5 TABLET ORAL at 17:17

## 2019-09-28 RX ADMIN — ONDANSETRON 4 MILLIGRAM(S): 8 TABLET, FILM COATED ORAL at 10:08

## 2019-09-28 RX ADMIN — OXYCODONE HYDROCHLORIDE 5 MILLIGRAM(S): 5 TABLET ORAL at 03:00

## 2019-09-28 RX ADMIN — Medication 12.5 MILLIGRAM(S): at 05:25

## 2019-09-28 RX ADMIN — HEPARIN SODIUM 5000 UNIT(S): 5000 INJECTION INTRAVENOUS; SUBCUTANEOUS at 05:25

## 2019-09-28 RX ADMIN — HEPARIN SODIUM 5000 UNIT(S): 5000 INJECTION INTRAVENOUS; SUBCUTANEOUS at 21:27

## 2019-09-28 RX ADMIN — OXYCODONE HYDROCHLORIDE 5 MILLIGRAM(S): 5 TABLET ORAL at 18:17

## 2019-09-28 RX ADMIN — Medication 12.5 MILLIGRAM(S): at 17:17

## 2019-09-28 RX ADMIN — Medication 400 MILLIGRAM(S): at 00:45

## 2019-09-28 RX ADMIN — OXYCODONE HYDROCHLORIDE 5 MILLIGRAM(S): 5 TABLET ORAL at 14:14

## 2019-09-28 RX ADMIN — Medication 100 MILLIGRAM(S): at 05:25

## 2019-09-28 RX ADMIN — INSULIN GLARGINE 18 UNIT(S): 100 INJECTION, SOLUTION SUBCUTANEOUS at 22:58

## 2019-09-28 RX ADMIN — DAPTOMYCIN 114 MILLIGRAM(S): 500 INJECTION, POWDER, LYOPHILIZED, FOR SOLUTION INTRAVENOUS at 02:27

## 2019-09-28 RX ADMIN — ATORVASTATIN CALCIUM 80 MILLIGRAM(S): 80 TABLET, FILM COATED ORAL at 21:27

## 2019-09-28 RX ADMIN — Medication 81 MILLIGRAM(S): at 11:35

## 2019-09-28 RX ADMIN — OXYCODONE HYDROCHLORIDE 5 MILLIGRAM(S): 5 TABLET ORAL at 07:00

## 2019-09-28 NOTE — PROGRESS NOTE ADULT - ASSESSMENT
Status post surgery. Stable. Mental status improved.     Recommend  Since she is at risk for a postop delirium, would check QTc and if under 500ms, rx Haldol 0.50mg IV q8h prn.  Following.    Harry Sanabria M.D.  Psychiatry  [phone]

## 2019-09-28 NOTE — BRIEF OPERATIVE NOTE - OPERATION/FINDINGS
Necrotic, infected wounds of bilateral buttocks debrided sharply. A tract between the two wounds was found posteriorly, winding around the anus. The anal canal was palpable from within this tract. Purulent and necrotic material was debrided from this tract, as well as from the two separate wound beds. A culture of necrotic tissue was sent. After irrigating the tissue and ensuring hemostasis with electrocautery, a 1/2 inch Penrose drain was placed through the defect connecting the two wounds and was sutured to the skin bilaterally. The wound cavities were then packed with moistened cling wrap and overdressed with multiple ABD pads.

## 2019-09-28 NOTE — CHART NOTE - NSCHARTNOTEFT_GEN_A_CORE
POST-OPERATIVE NOTE    Subjective:  Patient is s/p debridement of gluteal wound infection . Recovering appropriately. Pain well controlled. Denies n/v.     Vital Signs Last 24 Hrs  T(C): 36.5 (28 Sep 2019 05:23), Max: 36.8 (27 Sep 2019 21:04)  T(F): 97.7 (28 Sep 2019 05:23), Max: 98.2 (27 Sep 2019 21:04)  HR: 73 (28 Sep 2019 05:23) (71 - 79)  BP: 107/73 (28 Sep 2019 05:23) (98/64 - 141/80)  BP(mean): 108 (28 Sep 2019 01:30) (96 - 108)  RR: 18 (28 Sep 2019 05:23) (15 - 18)  SpO2: 100% (28 Sep 2019 05:23) (93% - 100%)  I&O's Detail    26 Sep 2019 07:01  -  27 Sep 2019 07:00  --------------------------------------------------------  IN:    IV PiggyBack: 100 mL    Oral Fluid: 940 mL  Total IN: 1040 mL    OUT:    Indwelling Catheter - Urethral: 1800 mL  Total OUT: 1800 mL    Total NET: -760 mL      27 Sep 2019 07:01  -  28 Sep 2019 06:38  --------------------------------------------------------  IN:    IV PiggyBack: 290 mL    Oral Fluid: 180 mL  Total IN: 470 mL    OUT:    Indwelling Catheter - Urethral: 1630 mL  Total OUT: 1630 mL    Total NET: -1160 mL        aztreonam  IVPB 1000  clindamycin IVPB 900  DAPTOmycin IVPB 350  aspirin enteric coated 81  aztreonam  IVPB 1000  clindamycin IVPB 900  DAPTOmycin IVPB 350  heparin  Injectable 5000  metoprolol tartrate 12.5    PAST MEDICAL & SURGICAL HISTORY:  CHF (congestive heart failure)  STEMI (ST elevation myocardial infarction)  Palpitations  Diabetes mellitus  Dyslipidemia  HTN (hypertension)  S/P cardiac cath  S/P tonsillectomy  S/P lumpectomy, left breast: premalignant  S/P appendectomy  S/P cholecystectomy        Physical Exam:  General: NAD, resting comfortably in bed  Pulmonary: Nonlabored breathing, no respiratory distress  Cardiovascular: NSR  Buttocks: Incisions are c/d/i   Abdominal: soft, NT/ND  Extremities: WWP      LABS:                        8.8    6.69  )-----------( 302      ( 27 Sep 2019 09:17 )             28.2     09-27    135  |  101  |  18  ----------------------------<  154<H>  5.1   |  26  |  0.89    Ca    8.7      27 Sep 2019 07:06  Phos  3.7     09-27  Mg     1.8     09-27      PT/INR - ( 27 Sep 2019 08:51 )   PT: 13.4 sec;   INR: 1.16 ratio         PTT - ( 27 Sep 2019 08:51 )  PTT:35.2 sec  CAPILLARY BLOOD GLUCOSE      POCT Blood Glucose.: 78 mg/dL (28 Sep 2019 01:04)  POCT Blood Glucose.: 86 mg/dL (27 Sep 2019 20:53)  POCT Blood Glucose.: 99 mg/dL (27 Sep 2019 17:18)  POCT Blood Glucose.: 133 mg/dL (27 Sep 2019 12:53)  POCT Blood Glucose.: 135 mg/dL (27 Sep 2019 08:50)      Radiology and Additional Studies:    Assessment:  The patient is a 86y Female who is now several hours post-op from a debridement of gluteal wound infection    Plan:  - Pain control as needed  - DVT ppx  - Reg Diet  - OOB and ambulating as tolerated  - F/u AM labs

## 2019-09-28 NOTE — PROGRESS NOTE ADULT - SUBJECTIVE AND OBJECTIVE BOX
Subjective: Patient seen and examined. No new events except as noted.   patient underwent OR last night with debridement both lesions    REVIEW OF SYSTEMS:    CONSTITUTIONAL: No weakness, fevers or chills  EYES/ENT: No visual changes;  No vertigo or throat pain   NECK: No pain or stiffness  RESPIRATORY: No cough, wheezing, hemoptysis; No shortness of breath  CARDIOVASCULAR: No chest pain or palpitations  GASTROINTESTINAL: No abdominal or epigastric pain. No nausea, vomiting, or hematemesis; No diarrhea or constipation. No melena or hematochezia.  GENITOURINARY: No dysuria, frequency or hematuria  NEUROLOGICAL: No numbness or weakness  SKIN: back pain   All other review of systems is negative unless indicated above.    MEDICATIONS:  MEDICATIONS  (STANDING):  aspirin enteric coated 81 milliGRAM(s) Oral daily  atorvastatin 80 milliGRAM(s) Oral at bedtime  aztreonam  IVPB 1000 milliGRAM(s) IV Intermittent every 8 hours  clindamycin IVPB 900 milliGRAM(s) IV Intermittent every 8 hours  collagenase Ointment 1 Application(s) Topical every 12 hours  DAPTOmycin IVPB 350 milliGRAM(s) IV Intermittent every 24 hours  dextrose 50% Injectable 12.5 Gram(s) IV Push once  dextrose 50% Injectable 25 Gram(s) IV Push once  dextrose 50% Injectable 25 Gram(s) IV Push once  docusate sodium 100 milliGRAM(s) Oral three times a day  heparin  Injectable 5000 Unit(s) SubCutaneous every 8 hours  influenza   Vaccine 0.5 milliLiter(s) IntraMuscular once  insulin glargine Injectable (LANTUS) 18 Unit(s) SubCutaneous two times a day  insulin lispro (HumaLOG) corrective regimen sliding scale   SubCutaneous three times a day before meals  lactobacillus acidophilus 1 Tablet(s) Oral daily  lidocaine   Patch 2 Patch Transdermal daily  metoprolol tartrate 12.5 milliGRAM(s) Oral two times a day  multivitamin 1 Tablet(s) Oral daily  pantoprazole    Tablet 40 milliGRAM(s) Oral before breakfast  senna 2 Tablet(s) Oral at bedtime      PHYSICAL EXAM:  T(C): 36.7 (09-28-19 @ 14:02), Max: 36.8 (09-27-19 @ 21:04)  HR: 71 (09-28-19 @ 14:02) (71 - 82)  BP: 120/70 (09-28-19 @ 14:02) (98/64 - 141/80)  RR: 18 (09-28-19 @ 14:02) (15 - 18)  SpO2: 98% (09-28-19 @ 14:02) (93% - 100%)  Wt(kg): --  I&O's Summary    27 Sep 2019 07:01  -  28 Sep 2019 07:00  --------------------------------------------------------  IN: 590 mL / OUT: 1780 mL / NET: -1190 mL    28 Sep 2019 07:01  -  28 Sep 2019 15:27  --------------------------------------------------------  IN: 220 mL / OUT: 500 mL / NET: -280 mL      Height (cm): 154.94 (09-27 @ 21:52)  Weight (kg): 56.7 (09-27 @ 21:52)  BMI (kg/m2): 23.6 (09-27 @ 21:52)  BSA (m2): 1.55 (09-27 @ 21:52)    Appearance: Normal	  HEENT:   Normal oral mucosa, PERRL, EOMI	  Lymphatic: No lymphadenopathy , no edema  Cardiovascular: Normal S1 S2  Respiratory: Lungs clear to auscultation, normal effort 	  Gastrointestinal:  Soft, Non-tender, + BS	  Skin: buttock dressing intact, non bleeding   Musculoskeletal: Normal range of motion, normal strength  Psychiatry:  Mood & affect appropriate  Ext: No edema      All labs, Imaging and EKGs personally reviewed                           8.9    6.52  )-----------( 302      ( 28 Sep 2019 11:30 )             29.3               09-28    136  |  102  |  14  ----------------------------<  105<H>  5.3   |  27  |  0.96    Ca    8.8      28 Sep 2019 07:16  Phos  4.3     09-28  Mg     1.8     09-28    TPro  5.6<L>  /  Alb  2.7<L>  /  TBili  0.3  /  DBili  x   /  AST  30  /  ALT  22  /  AlkPhos  193<H>  09-28    PT/INR - ( 27 Sep 2019 08:51 )   PT: 13.4 sec;   INR: 1.16 ratio         PTT - ( 27 Sep 2019 08:51 )  PTT:35.2 sec       CARDIAC MARKERS ( 28 Sep 2019 07:17 )  x     / x     / 28 U/L / x     / x

## 2019-09-28 NOTE — PROGRESS NOTE ADULT - ASSESSMENT
Assessment:  DMT2: 86y Female with DM T2 with hyperglycemia, A1C 10.2%, on insulin,  FS trending within acceptable range, no hypoglycemic episode, eating meals.  Abscess: s/p debridement of necrotic tissue on the R buttock. As per psych patient does not have capacity to refuse procedure on the L, now NPO pending op,  monitored and FU primary team/surgery/ID.  Anemia: s/p transfusion, stable, monitored.  HTN: Controlled, on antihypertensive medications.          Radha Chin MD  Cell: [phone]  Office: [phone]

## 2019-09-28 NOTE — PROVIDER CONTACT NOTE (MEDICATION) - ACTION/TREATMENT ORDERED:
Hold heparin, pt going to OR. Hold lantus for now and will check FS when patient return to the floor

## 2019-09-28 NOTE — PROGRESS NOTE ADULT - PROBLEM SELECTOR PLAN 1
R buttock infiltrate with necrotic tissue  SUrg eval appreciated   SOft Tis US noted   pain control  CT noted  Abx as per ID  CT Pelv noted  S/P debridement of both sides on 08/27  F/U surgical team recs  dressing change  monitor vitals and CBC

## 2019-09-28 NOTE — PROVIDER CONTACT NOTE (MEDICATION) - ASSESSMENT
Pt alert and oriented  x 4. Lungs CTA,  positive ROM, neuro check done. facial symmetrical, tongue midline, no slurred speech. Denies chest pain, and sob. C/O of being weak and sleepy.
Pt alert and oriented x 3 forgetful at times, c/o nausea at earlier time " I feel my sugar is going down, I'm sweating and no food since yesterday." FS 86. Pt highly anxious
Pt alert and oriented x 4. Pt stated " 131 is low for me tonight"
fs 118
fs 88, pt with poor PO intake for past day. pt currently NPO
Pt A&oX4. VSS. BG in 200s 2hrs s/p 10units given subQ.

## 2019-09-28 NOTE — PROGRESS NOTE ADULT - SUBJECTIVE AND OBJECTIVE BOX
Infectious Diseases progress note:    Subjective:  NAD, afebrile.  Resting comfortably.  POD #1 from repeat OR debridement.  Found to have tract connecting right and left wounds.      ROS:  CONSTITUTIONAL:  No fever, chills, rigors  CARDIOVASCULAR:  No chest pain or palpitations  RESPIRATORY:   No SOB, cough, dyspnea on exertion.  No wheezing  GASTROINTESTINAL:  No abd pain, N/V, diarrhea/constipation  EXTREMITIES:  No swelling or joint pain  GENITOURINARY:  No burning on urination, increased frequency or urgency.  No flank pain  NEUROLOGIC:  No HA, visual disturbances  SKIN: No rashes    Allergies    codeine (Unknown)  Kiwi (Anaphylaxis)  penicillins (Anaphylaxis)    Intolerances        ANTIBIOTICS/RELEVANT:  antimicrobials  aztreonam  IVPB 1000 milliGRAM(s) IV Intermittent every 8 hours  clindamycin IVPB 900 milliGRAM(s) IV Intermittent every 8 hours  DAPTOmycin IVPB 350 milliGRAM(s) IV Intermittent every 24 hours    immunologic:  influenza   Vaccine 0.5 milliLiter(s) IntraMuscular once    OTHER:  acetaminophen   Tablet .. 650 milliGRAM(s) Oral every 6 hours PRN  aspirin enteric coated 81 milliGRAM(s) Oral daily  atorvastatin 80 milliGRAM(s) Oral at bedtime  bisacodyl Suppository 10 milliGRAM(s) Rectal daily PRN  collagenase Ointment 1 Application(s) Topical every 12 hours  dextrose 40% Gel 15 Gram(s) Oral once PRN  dextrose 50% Injectable 12.5 Gram(s) IV Push once  dextrose 50% Injectable 25 Gram(s) IV Push once  dextrose 50% Injectable 25 Gram(s) IV Push once  docusate sodium 100 milliGRAM(s) Oral three times a day  glucagon  Injectable 1 milliGRAM(s) IntraMuscular once PRN  heparin  Injectable 5000 Unit(s) SubCutaneous every 8 hours  insulin glargine Injectable (LANTUS) 18 Unit(s) SubCutaneous two times a day  insulin lispro (HumaLOG) corrective regimen sliding scale   SubCutaneous three times a day before meals  lactobacillus acidophilus 1 Tablet(s) Oral daily  lidocaine   Patch 2 Patch Transdermal daily  metoprolol tartrate 12.5 milliGRAM(s) Oral two times a day  multivitamin 1 Tablet(s) Oral daily  ondansetron Injectable 4 milliGRAM(s) IV Push every 8 hours PRN  oxyCODONE    IR 10 milliGRAM(s) Oral every 4 hours PRN  oxyCODONE    IR 5 milliGRAM(s) Oral every 4 hours PRN  pantoprazole    Tablet 40 milliGRAM(s) Oral before breakfast  senna 2 Tablet(s) Oral at bedtime  simethicone 80 milliGRAM(s) Chew every 8 hours PRN      Objective:  Vital Signs Last 24 Hrs  T(C): 36.7 (28 Sep 2019 14:02), Max: 36.8 (27 Sep 2019 21:04)  T(F): 98.1 (28 Sep 2019 14:02), Max: 98.2 (27 Sep 2019 21:04)  HR: 71 (28 Sep 2019 14:02) (71 - 82)  BP: 120/70 (28 Sep 2019 14:02) (98/64 - 141/80)  BP(mean): 108 (28 Sep 2019 01:30) (96 - 108)  RR: 18 (28 Sep 2019 14:02) (15 - 18)  SpO2: 98% (28 Sep 2019 14:02) (93% - 100%)    PHYSICAL EXAM:  Constitutional:NAD  Eyes:NIDIA, EOMI  Ear/Nose/Throat: no thrush, mucositis.  Moist mucous membranes	  Neck:no JVD, no lymphadenopathy, supple  Respiratory: CTA lucian  Cardiovascular: S1S2 RRR, no murmurs  Gastrointestinal:soft, nontender,  nondistended (+) BS  Extremities:no e/e/c  Skin:  Dsg/packing in place in rt/left buttock        LABS:                        8.9    6.52  )-----------( 302      ( 28 Sep 2019 11:30 )             29.3     09-28    136  |  102  |  14  ----------------------------<  105<H>  5.3   |  27  |  0.96    Ca    8.8      28 Sep 2019 07:16  Phos  4.3     09-28  Mg     1.8     09-28    TPro  5.6<L>  /  Alb  2.7<L>  /  TBili  0.3  /  DBili  x   /  AST  30  /  ALT  22  /  AlkPhos  193<H>  09-28    PT/INR - ( 27 Sep 2019 08:51 )   PT: 13.4 sec;   INR: 1.16 ratio         PTT - ( 27 Sep 2019 08:51 )  PTT:35.2 sec      Procalcitonin, Serum: 0.49 (09-05 @ 07:29)            Rapid RVP Result: [facility]          MICROBIOLOGY:    Culture - Surgical Swab (09.15.19 @ 18:03)    Specimen Source: .Surgical Swab    Culture Results:   Rare Enterococcus species "Susceptibilities not performed"  Few Bacteroides thetaiotamcron group "Susceptibilities not performed"          RADIOLOGY & ADDITIONAL STUDIES:    < from: CT Abdomen and Pelvis No Cont (09.20.19 @ 08:30) >  FINDINGS:    LOWER CHEST: Small moderate bilateral pleural effusions with associated   compressive atelectasis.    LIVER: Within normal limits.  BILE DUCTS: Normal caliber.  GALLBLADDER: Within normal limits.  SPLEEN: Within normal limits.  PANCREAS: Within normal limits.  ADRENALS: Within normal limits.  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 Small right renal   cyst.    BLADDER: Lieberman catheter.  REPRODUCTIVE ORGANS: Fibroid uterus.    BOWEL: No bowel obstruction.   PERITONEUM: Small ascites.  VESSELS: Atherosclerotic changes.  RETROPERITONEUM/LYMPH NODES: No lymphadenopathy.    ABDOMINAL WALL: Anasarca. Open wound overlying the coccyx extending   toward the left ischiorectal fossa with trace fluid component and air. No   large/drainable collection.   BONES: Degenerative changes.    IMPRESSION:     Open wound overlying the coccyx extending toward the left ischiorectal   fossa with trace fluid component and air. No large/drainable collection.     < end of copied text >

## 2019-09-28 NOTE — PROGRESS NOTE ADULT - SUBJECTIVE AND OBJECTIVE BOX
Chief complaint  Patient is a 86y old  Female who presents with a chief complaint of Fever, UTI (28 Sep 2019 15:53)   Review of systems  Patient in bed, looks comfortable, no fever, no hypoglycemia.    Labs and Fingersticks  CAPILLARY BLOOD GLUCOSE      POCT Blood Glucose.: 135 mg/dL (28 Sep 2019 17:27)  POCT Blood Glucose.: 205 mg/dL (28 Sep 2019 12:24)  POCT Blood Glucose.: 94 mg/dL (28 Sep 2019 08:30)  POCT Blood Glucose.: 78 mg/dL (28 Sep 2019 01:04)  POCT Blood Glucose.: 86 mg/dL (27 Sep 2019 20:53)      Anion Gap, Serum: 7 (09-28 @ 07:16)  Anion Gap, Serum: 8 (09-27 @ 07:06)      Calcium, Total Serum: 8.8 (09-28 @ 07:16)  Calcium, Total Serum: 8.7 (09-27 @ 07:06)  Albumin, Serum: 2.7 <L> (09-28 @ 07:16)    Alanine Aminotransferase (ALT/SGPT): 22 (09-28 @ 07:16)  Alkaline Phosphatase, Serum: 193 <H> (09-28 @ 07:16)  Aspartate Aminotransferase (AST/SGOT): 30 (09-28 @ 07:16)        09-28    136  |  102  |  14  ----------------------------<  105<H>  5.3   |  27  |  0.96    Ca    8.8      28 Sep 2019 07:16  Phos  4.3     09-28  Mg     1.8     09-28    TPro  5.6<L>  /  Alb  2.7<L>  /  TBili  0.3  /  DBili  x   /  AST  30  /  ALT  22  /  AlkPhos  193<H>  09-28                        8.9    6.52  )-----------( 302      ( 28 Sep 2019 11:30 )             29.3     Medications  MEDICATIONS  (STANDING):  aspirin enteric coated 81 milliGRAM(s) Oral daily  atorvastatin 80 milliGRAM(s) Oral at bedtime  aztreonam  IVPB 1000 milliGRAM(s) IV Intermittent every 8 hours  clindamycin IVPB 900 milliGRAM(s) IV Intermittent every 8 hours  collagenase Ointment 1 Application(s) Topical every 12 hours  DAPTOmycin IVPB 350 milliGRAM(s) IV Intermittent every 24 hours  dextrose 50% Injectable 12.5 Gram(s) IV Push once  dextrose 50% Injectable 25 Gram(s) IV Push once  dextrose 50% Injectable 25 Gram(s) IV Push once  docusate sodium 100 milliGRAM(s) Oral three times a day  heparin  Injectable 5000 Unit(s) SubCutaneous every 8 hours  influenza   Vaccine 0.5 milliLiter(s) IntraMuscular once  insulin glargine Injectable (LANTUS) 18 Unit(s) SubCutaneous two times a day  insulin lispro (HumaLOG) corrective regimen sliding scale   SubCutaneous three times a day before meals  lactobacillus acidophilus 1 Tablet(s) Oral daily  lidocaine   Patch 2 Patch Transdermal daily  metoprolol tartrate 12.5 milliGRAM(s) Oral two times a day  multivitamin 1 Tablet(s) Oral daily  pantoprazole    Tablet 40 milliGRAM(s) Oral before breakfast  senna 2 Tablet(s) Oral at bedtime      Physical Exam  General: Patient comfortable in bed  Vital Signs Last 12 Hrs  T(F): 98.8 (09-28-19 @ 17:11), Max: 98.8 (09-28-19 @ 17:11)  HR: 93 (09-28-19 @ 17:11) (71 - 93)  BP: 103/65 (09-28-19 @ 17:11) (103/65 - 124/71)  BP(mean): --  RR: 18 (09-28-19 @ 17:11) (18 - 18)  SpO2: 96% (09-28-19 @ 17:11) (96% - 99%)  Neck: No palpable thyroid nodules.  CVS: S1S2, No murmurs  Respiratory: No wheezing, no crepitations  GI: Abdomen soft, bowel sounds positive  Musculoskeletal:  edema lower extremities.   Skin: No skin rashes, no ecchymosis    Diagnostics

## 2019-09-28 NOTE — PROGRESS NOTE ADULT - ASSESSMENT
Pt is a 87 y/o Female with PMHx of CHF, DM, HLD, HTN, CAD prior MI, c/o fatigue, "feeling off" and unable to get out of bed; Tmax 104F toay. Rash/redness on buttocks noted by home health aid. patient also complaining of +chronic cough, nonproductive.  No abdominal pain, no n/v/d. No chest pain.  States she has been feeling hot and cold over past few days, and took her temperature today, found fever of 104, and came to ED for evaluation.  No dysuria, no diarrhea. +constipation (chronic).  Increased diffuse leg swelling for past few days. patient lives at home with family. walks by herself with no use of walker. denies of any MEAD< chest pain on exertion. compliant with all her home medications (04 Sep 2019 22:22)    ER vitals: Tm 102.2, P 75, /77.  WBC 14.8 --> 12.2.  Cr 2.0 <-- 1.3.  PCT 0.49.  Ucx >100K E.coli.  Pt given dose of ceftriaxone, now on cipro for UTI.  ID consult called for further abx managment.      Sepsis:    - fever, leukocytosis. Source less likely UTI given persistent fevers, pt has infiltration of perirectal tissues, likely cellulitis.  Abx broadened     - Monitor.  lactate.  Pct elevated 0.49.  Pt with rising WBC, abx broadened on 9/11 (dapto/aztreanam/flagyl).    - Monitor hemodynamic status and BPs.  s/p IV fluid bolus, cont maintanence fluid.      - blood cx, urine cx.  RVP (-).  No pna on cxr      Perirectal cellulitis, r/o abscess:    - Repeat CTap on 9/13 shows worsening inflammation with fluid and foci of air.   s/p OR on 9/15 for  debridement.  Pt not responding to antibiotics prior to debridement.  f/u surgical wound cultures - Enterococcus and Bacteroides    - Cont dapto/azactam/clindamycin.   Changed from flagyl to clindamycin for continued anerobic coverage and anti-toxin effect.  Cover for gas forming bacteria.       - On 9/20 repeat ct scan performed, pt with new ulcerous lesion with eschar over L perineum.     * Pt with persistent necrotic wound in left gluteal area, pt went for OR debridement on 9/27, found to have necrotic/purulent material, and sinus tract connecting right and left rectal wounds, s/p debridement.     * f/u wound cultures.      * Cont IV abx for now.  Monitor wound healing closely.  Cont IV abx until no further debridement is deemed necessary.  Surgery f/u.           Will follow,    Sandrita Zaman  [phone]

## 2019-09-28 NOTE — PROGRESS NOTE ADULT - SUBJECTIVE AND OBJECTIVE BOX
Events noted. The pt. is s/p debridement of wound last night. Calm now. Some nausea. No agitation.     Vital Signs Last 24 Hrs  T(C): 36.6 (28 Sep 2019 10:38), Max: 36.8 (27 Sep 2019 21:04)  T(F): 97.9 (28 Sep 2019 10:38), Max: 98.2 (27 Sep 2019 21:04)  HR: 82 (28 Sep 2019 10:38) (71 - 82)  BP: 124/71 (28 Sep 2019 10:38) (98/64 - 141/80)  BP(mean): 108 (28 Sep 2019 01:30) (96 - 108)  RR: 18 (28 Sep 2019 10:38) (15 - 18)  SpO2: 99% (28 Sep 2019 10:38) (93% - 100%)                          8.9    6.52  )-----------( 302      ( 28 Sep 2019 11:30 )             29.3       09-28    136  |  102  |  14  ----------------------------<  105<H>  5.3   |  27  |  0.96    Ca    8.8      28 Sep 2019 07:16  Phos  4.3     09-28  Mg     1.8     09-28    TPro  5.6<L>  /  Alb  2.7<L>  /  TBili  0.3  /  DBili  x   /  AST  30  /  ALT  22  /  AlkPhos  193<H>  09-28              MEDICATIONS  (STANDING):  aspirin enteric coated 81 milliGRAM(s) Oral daily  atorvastatin 80 milliGRAM(s) Oral at bedtime  aztreonam  IVPB 1000 milliGRAM(s) IV Intermittent every 8 hours  clindamycin IVPB 900 milliGRAM(s) IV Intermittent every 8 hours  collagenase Ointment 1 Application(s) Topical every 12 hours  DAPTOmycin IVPB 350 milliGRAM(s) IV Intermittent every 24 hours  dextrose 50% Injectable 12.5 Gram(s) IV Push once  dextrose 50% Injectable 25 Gram(s) IV Push once  dextrose 50% Injectable 25 Gram(s) IV Push once  docusate sodium 100 milliGRAM(s) Oral three times a day  heparin  Injectable 5000 Unit(s) SubCutaneous every 8 hours  influenza   Vaccine 0.5 milliLiter(s) IntraMuscular once  insulin glargine Injectable (LANTUS) 18 Unit(s) SubCutaneous two times a day  insulin lispro (HumaLOG) corrective regimen sliding scale   SubCutaneous three times a day before meals  lactobacillus acidophilus 1 Tablet(s) Oral daily  lidocaine   Patch 2 Patch Transdermal daily  metoprolol tartrate 12.5 milliGRAM(s) Oral two times a day  multivitamin 1 Tablet(s) Oral daily  pantoprazole    Tablet 40 milliGRAM(s) Oral before breakfast  senna 2 Tablet(s) Oral at bedtime    MEDICATIONS  (PRN):  acetaminophen   Tablet .. 650 milliGRAM(s) Oral every 6 hours PRN Mild Pain (1 - 3)  bisacodyl Suppository 10 milliGRAM(s) Rectal daily PRN Constipation  dextrose 40% Gel 15 Gram(s) Oral once PRN Blood Glucose LESS THAN 70 milliGRAM(s)/deciliter  glucagon  Injectable 1 milliGRAM(s) IntraMuscular once PRN Glucose LESS THAN 70 milligrams/deciliter  ondansetron Injectable 4 milliGRAM(s) IV Push every 8 hours PRN Nausea and/or Vomiting  oxyCODONE    IR 10 milliGRAM(s) Oral every 4 hours PRN Severe Pain (7 - 10)  oxyCODONE    IR 5 milliGRAM(s) Oral every 4 hours PRN Moderate Pain (4 - 6)  simethicone 80 milliGRAM(s) Chew every 8 hours PRN Heartburn      Elderly WF in bed, calm, cooperative, alert and oriented x 3 .  No psychomotor abnormalities. Insight and judgment are fair. Speech is coherent with normal rate and volume. No hallucinations nor delusions. The patient denied suicidal and homicidal ideation and plan. Mood is "ok" and affect constricted. Attention and concentration, short term memory, and long term memory within normal limits.

## 2019-09-29 LAB
ALP SERPL-CCNC: 204 U/L — HIGH (ref 40–120)
ANION GAP SERPL CALC-SCNC: 9 MMOL/L — SIGNIFICANT CHANGE UP (ref 5–17)
BUN SERPL-MCNC: 17 MG/DL — SIGNIFICANT CHANGE UP (ref 7–23)
CALCIUM SERPL-MCNC: 8.8 MG/DL — SIGNIFICANT CHANGE UP (ref 8.4–10.5)
CHLORIDE SERPL-SCNC: 102 MMOL/L — SIGNIFICANT CHANGE UP (ref 96–108)
CO2 SERPL-SCNC: 26 MMOL/L — SIGNIFICANT CHANGE UP (ref 22–31)
CREAT SERPL-MCNC: 1.13 MG/DL — SIGNIFICANT CHANGE UP (ref 0.5–1.3)
GLUCOSE SERPL-MCNC: 88 MG/DL — SIGNIFICANT CHANGE UP (ref 70–99)
HCT VFR BLD CALC: 27.1 % — LOW (ref 34.5–45)
HGB BLD-MCNC: 8.3 G/DL — LOW (ref 11.5–15.5)
MCHC RBC-ENTMCNC: 29.9 PG — SIGNIFICANT CHANGE UP (ref 27–34)
MCHC RBC-ENTMCNC: 30.6 GM/DL — LOW (ref 32–36)
MCV RBC AUTO: 97.5 FL — SIGNIFICANT CHANGE UP (ref 80–100)
PLATELET # BLD AUTO: 272 K/UL — SIGNIFICANT CHANGE UP (ref 150–400)
POTASSIUM SERPL-MCNC: 4.7 MMOL/L — SIGNIFICANT CHANGE UP (ref 3.5–5.3)
POTASSIUM SERPL-SCNC: 4.7 MMOL/L — SIGNIFICANT CHANGE UP (ref 3.5–5.3)
RBC # BLD: 2.78 M/UL — LOW (ref 3.8–5.2)
RBC # FLD: 19.5 % — HIGH (ref 10.3–14.5)
SODIUM SERPL-SCNC: 137 MMOL/L — SIGNIFICANT CHANGE UP (ref 135–145)
WBC # BLD: 5.97 K/UL — SIGNIFICANT CHANGE UP (ref 3.8–10.5)
WBC # FLD AUTO: 5.97 K/UL — SIGNIFICANT CHANGE UP (ref 3.8–10.5)

## 2019-09-29 RX ADMIN — HEPARIN SODIUM 5000 UNIT(S): 5000 INJECTION INTRAVENOUS; SUBCUTANEOUS at 21:09

## 2019-09-29 RX ADMIN — HEPARIN SODIUM 5000 UNIT(S): 5000 INJECTION INTRAVENOUS; SUBCUTANEOUS at 05:09

## 2019-09-29 RX ADMIN — Medication 12.5 MILLIGRAM(S): at 17:12

## 2019-09-29 RX ADMIN — OXYCODONE HYDROCHLORIDE 5 MILLIGRAM(S): 5 TABLET ORAL at 21:07

## 2019-09-29 RX ADMIN — Medication 100 MILLIGRAM(S): at 04:51

## 2019-09-29 RX ADMIN — SENNA PLUS 2 TABLET(S): 8.6 TABLET ORAL at 21:08

## 2019-09-29 RX ADMIN — OXYCODONE HYDROCHLORIDE 5 MILLIGRAM(S): 5 TABLET ORAL at 05:10

## 2019-09-29 RX ADMIN — OXYCODONE HYDROCHLORIDE 5 MILLIGRAM(S): 5 TABLET ORAL at 05:44

## 2019-09-29 RX ADMIN — Medication 100 MILLIGRAM(S): at 21:09

## 2019-09-29 RX ADMIN — Medication 100 MILLIGRAM(S): at 12:00

## 2019-09-29 RX ADMIN — OXYCODONE HYDROCHLORIDE 5 MILLIGRAM(S): 5 TABLET ORAL at 02:00

## 2019-09-29 RX ADMIN — PANTOPRAZOLE SODIUM 40 MILLIGRAM(S): 20 TABLET, DELAYED RELEASE ORAL at 07:11

## 2019-09-29 RX ADMIN — OXYCODONE HYDROCHLORIDE 5 MILLIGRAM(S): 5 TABLET ORAL at 10:29

## 2019-09-29 RX ADMIN — OXYCODONE HYDROCHLORIDE 5 MILLIGRAM(S): 5 TABLET ORAL at 01:18

## 2019-09-29 RX ADMIN — OXYCODONE HYDROCHLORIDE 5 MILLIGRAM(S): 5 TABLET ORAL at 21:37

## 2019-09-29 RX ADMIN — Medication 100 MILLIGRAM(S): at 14:05

## 2019-09-29 RX ADMIN — ONDANSETRON 4 MILLIGRAM(S): 8 TABLET, FILM COATED ORAL at 19:41

## 2019-09-29 RX ADMIN — Medication 1 TABLET(S): at 12:00

## 2019-09-29 RX ADMIN — Medication 50 MILLIGRAM(S): at 09:29

## 2019-09-29 RX ADMIN — ATORVASTATIN CALCIUM 80 MILLIGRAM(S): 80 TABLET, FILM COATED ORAL at 21:09

## 2019-09-29 RX ADMIN — Medication 100 MILLIGRAM(S): at 05:13

## 2019-09-29 RX ADMIN — HEPARIN SODIUM 5000 UNIT(S): 5000 INJECTION INTRAVENOUS; SUBCUTANEOUS at 14:05

## 2019-09-29 RX ADMIN — Medication 50 MILLIGRAM(S): at 17:12

## 2019-09-29 RX ADMIN — OXYCODONE HYDROCHLORIDE 5 MILLIGRAM(S): 5 TABLET ORAL at 09:29

## 2019-09-29 RX ADMIN — Medication 81 MILLIGRAM(S): at 12:00

## 2019-09-29 RX ADMIN — Medication 1 APPLICATION(S): at 21:09

## 2019-09-29 RX ADMIN — Medication 12.5 MILLIGRAM(S): at 05:10

## 2019-09-29 RX ADMIN — DAPTOMYCIN 114 MILLIGRAM(S): 500 INJECTION, POWDER, LYOPHILIZED, FOR SOLUTION INTRAVENOUS at 02:06

## 2019-09-29 RX ADMIN — OXYCODONE HYDROCHLORIDE 5 MILLIGRAM(S): 5 TABLET ORAL at 16:28

## 2019-09-29 RX ADMIN — Medication 50 MILLIGRAM(S): at 01:19

## 2019-09-29 RX ADMIN — Medication 1 TABLET(S): at 14:05

## 2019-09-29 RX ADMIN — Medication 2: at 12:32

## 2019-09-29 RX ADMIN — INSULIN GLARGINE 18 UNIT(S): 100 INJECTION, SOLUTION SUBCUTANEOUS at 08:23

## 2019-09-29 RX ADMIN — INSULIN GLARGINE 18 UNIT(S): 100 INJECTION, SOLUTION SUBCUTANEOUS at 22:12

## 2019-09-29 RX ADMIN — OXYCODONE HYDROCHLORIDE 5 MILLIGRAM(S): 5 TABLET ORAL at 15:28

## 2019-09-29 NOTE — PROGRESS NOTE ADULT - SUBJECTIVE AND OBJECTIVE BOX
Patient is a 86y old  Female who presents with a chief complaint of Fever, UTI (29 Sep 2019 11:21)      INTERVAL HISTORY: feels ok       MEDICATIONS:  metoprolol tartrate 12.5 milliGRAM(s) Oral two times a day        PHYSICAL EXAM:  T(C): 37.1 (09-29-19 @ 13:40), Max: 37.1 (09-28-19 @ 17:11)  HR: 82 (09-29-19 @ 13:40) (82 - 93)  BP: 112/73 (09-29-19 @ 13:40) (102/59 - 123/80)  RR: 18 (09-29-19 @ 13:40) (18 - 18)  SpO2: 97% (09-29-19 @ 13:40) (93% - 97%)  Wt(kg): --  I&O's Summary    28 Sep 2019 07:01  -  29 Sep 2019 07:00  --------------------------------------------------------  IN: 1020 mL / OUT: 1000 mL / NET: 20 mL    29 Sep 2019 07:01  -  29 Sep 2019 14:12  --------------------------------------------------------  IN: 720 mL / OUT: 250 mL / NET: 470 mL          Appearance: In no distress	  HEENT:    PERRL, EOMI	  Cardiovascular:  S1 S2, No JVD  Respiratory: Lungs clear to auscultation	  Gastrointestinal:  Soft, Non-tender, + BS	  Extremities:  No edema of LE                                8.3    5.97  )-----------( 272      ( 29 Sep 2019 09:38 )             27.1     09-29    137  |  102  |  17  ----------------------------<  88  4.7   |  26  |  1.13    Ca    8.8      29 Sep 2019 07:13  Phos  4.3     09-28  Mg     1.8     09-28    TPro  x   /  Alb  x   /  TBili  x   /  DBili  x   /  AST  x   /  ALT  x   /  AlkPhos  204<H>  09-29        Labs personally reviewed      ASSESSMENT/PLAN: 	  tolerated surgery well  afebrile, improving  BP well controlled   edema of LE resolved  -off lasix for now        Srini Velasco DO [facility]  Cardiovascular Medicine  [phone]

## 2019-09-29 NOTE — PROGRESS NOTE ADULT - ASSESSMENT
Assessment:  DMT2: 86y Female with DM T2 with hyperglycemia, A1C 10.2%, on insulin, FS trending within acceptable range, no hypoglycemic episode, eating meals, states she feels better today,  Abscess: s/p B/L debridement of necrotic tissue on the buttock 9/28. Monitored and FU primary team/surgery/ID.  Anemia: s/p transfusion, stable, monitored.  HTN: Controlled, on antihypertensive medications.          Radha Chin MD  Cell: [phone]  Office: [phone] Assessment:  DMT2: 86y Female with DM T2 with hyperglycemia, A1C 10.2%, on insulin, FS trending within acceptable range, no hypoglycemic episode, eating meals, states she feels better today,  Abscess: s/p B/L debridement of necrotic tissue on the buttock 9/28. Monitored  and FU primary team/surgery/ID.  Anemia: s/p transfusion, stable, monitored.  HTN: Controlled, on antihypertensive medications.          Radha Chin MD  Cell: [phone]  Office: [phone]

## 2019-09-29 NOTE — PROGRESS NOTE ADULT - SUBJECTIVE AND OBJECTIVE BOX
Subjective: Patient seen and examined. No new events except as noted.  doing well  no complaints      REVIEW OF SYSTEMS:    CONSTITUTIONAL: No weakness, fevers or chills  EYES/ENT: No visual changes;  No vertigo or throat pain   NECK: No pain or stiffness  RESPIRATORY: No cough, wheezing, hemoptysis; No shortness of breath  CARDIOVASCULAR: No chest pain or palpitations  GASTROINTESTINAL: No abdominal or epigastric pain. No nausea, vomiting, or hematemesis; No diarrhea or constipation. No melena or hematochezia.  GENITOURINARY: No dysuria, frequency or hematuria  NEUROLOGICAL: No numbness or weakness  SKIN: No itching, burning, rashes, or lesions   All other review of systems is negative unless indicated above.    MEDICATIONS:  MEDICATIONS  (STANDING):  aspirin enteric coated 81 milliGRAM(s) Oral daily  atorvastatin 80 milliGRAM(s) Oral at bedtime  aztreonam  IVPB 1000 milliGRAM(s) IV Intermittent every 8 hours  clindamycin IVPB 900 milliGRAM(s) IV Intermittent every 8 hours  collagenase Ointment 1 Application(s) Topical every 12 hours  DAPTOmycin IVPB 350 milliGRAM(s) IV Intermittent every 24 hours  dextrose 50% Injectable 12.5 Gram(s) IV Push once  dextrose 50% Injectable 25 Gram(s) IV Push once  dextrose 50% Injectable 25 Gram(s) IV Push once  docusate sodium 100 milliGRAM(s) Oral three times a day  heparin  Injectable 5000 Unit(s) SubCutaneous every 8 hours  influenza   Vaccine 0.5 milliLiter(s) IntraMuscular once  insulin glargine Injectable (LANTUS) 18 Unit(s) SubCutaneous two times a day  insulin lispro (HumaLOG) corrective regimen sliding scale   SubCutaneous three times a day before meals  lactobacillus acidophilus 1 Tablet(s) Oral daily  lidocaine   Patch 2 Patch Transdermal daily  metoprolol tartrate 12.5 milliGRAM(s) Oral two times a day  multivitamin 1 Tablet(s) Oral daily  pantoprazole    Tablet 40 milliGRAM(s) Oral before breakfast  senna 2 Tablet(s) Oral at bedtime      PHYSICAL EXAM:  T(C): 37.1 (09-29-19 @ 13:40), Max: 37.1 (09-28-19 @ 17:11)  HR: 82 (09-29-19 @ 13:40) (82 - 93)  BP: 112/73 (09-29-19 @ 13:40) (102/59 - 123/80)  RR: 18 (09-29-19 @ 13:40) (18 - 18)  SpO2: 97% (09-29-19 @ 13:40) (93% - 97%)  Wt(kg): --  I&O's Summary    28 Sep 2019 07:01  -  29 Sep 2019 07:00  --------------------------------------------------------  IN: 1020 mL / OUT: 1000 mL / NET: 20 mL    29 Sep 2019 07:01  -  29 Sep 2019 14:59  --------------------------------------------------------  IN: 820 mL / OUT: 250 mL / NET: 570 mL          Appearance: Normal	  HEENT:   Normal oral mucosa, PERRL, EOMI	  Lymphatic: No lymphadenopathy , no edema  Cardiovascular: Normal S1 S2, No JVD, No murmurs , Peripheral pulses palpable 2+ bilaterally  Respiratory: Lungs clear to auscultation, normal effort 	  Gastrointestinal:  Soft, Non-tender, + BS	  Skin:dressign intact lower back   Musculoskeletal: Normal range of motion, normal strength  Psychiatry:  Mood & affect appropriate  Ext: No edema      All labs, Imaging and EKGs personally reviewed                           8.3    5.97  )-----------( 272      ( 29 Sep 2019 09:38 )             27.1               09-29    137  |  102  |  17  ----------------------------<  88  4.7   |  26  |  1.13    Ca    8.8      29 Sep 2019 07:13  Phos  4.3     09-28  Mg     1.8     09-28    TPro  x   /  Alb  x   /  TBili  x   /  DBili  x   /  AST  x   /  ALT  x   /  AlkPhos  204<H>  09-29           CARDIAC MARKERS ( 28 Sep 2019 07:17 )  x     / x     / 28 U/L / x     / x

## 2019-09-29 NOTE — PROGRESS NOTE ADULT - SUBJECTIVE AND OBJECTIVE BOX
Chief complaint  Patient is a 86y old  Female who presents with a chief complaint of Fever, UTI (29 Sep 2019 11:17)   Review of systems  Patient in bed, looks comfortable, no fever, no hypoglycemia.    Labs and Fingersticks  CAPILLARY BLOOD GLUCOSE      POCT Blood Glucose.: 88 mg/dL (29 Sep 2019 08:11)  POCT Blood Glucose.: 134 mg/dL (28 Sep 2019 22:35)  POCT Blood Glucose.: 135 mg/dL (28 Sep 2019 17:27)  POCT Blood Glucose.: 205 mg/dL (28 Sep 2019 12:24)      Anion Gap, Serum: 9 (09-29 @ 07:13)  Anion Gap, Serum: 7 (09-28 @ 07:16)      Calcium, Total Serum: 8.8 (09-29 @ 07:13)  Calcium, Total Serum: 8.8 (09-28 @ 07:16)  Albumin, Serum: 2.7 <L> (09-28 @ 07:16)    Alanine Aminotransferase (ALT/SGPT): 22 (09-28 @ 07:16)  Alkaline Phosphatase, Serum: 204 <H> (09-29 @ 07:13)  Alkaline Phosphatase, Serum: 193 <H> (09-28 @ 07:16)  Aspartate Aminotransferase (AST/SGOT): 30 (09-28 @ 07:16)        09-29    137  |  102  |  17  ----------------------------<  88  4.7   |  26  |  1.13    Ca    8.8      29 Sep 2019 07:13  Phos  4.3     09-28  Mg     1.8     09-28    TPro  x   /  Alb  x   /  TBili  x   /  DBili  x   /  AST  x   /  ALT  x   /  AlkPhos  204<H>  09-29                        8.3    5.97  )-----------( 272      ( 29 Sep 2019 09:38 )             27.1     Medications  MEDICATIONS  (STANDING):  aspirin enteric coated 81 milliGRAM(s) Oral daily  atorvastatin 80 milliGRAM(s) Oral at bedtime  aztreonam  IVPB 1000 milliGRAM(s) IV Intermittent every 8 hours  clindamycin IVPB 900 milliGRAM(s) IV Intermittent every 8 hours  collagenase Ointment 1 Application(s) Topical every 12 hours  DAPTOmycin IVPB 350 milliGRAM(s) IV Intermittent every 24 hours  dextrose 50% Injectable 12.5 Gram(s) IV Push once  dextrose 50% Injectable 25 Gram(s) IV Push once  dextrose 50% Injectable 25 Gram(s) IV Push once  docusate sodium 100 milliGRAM(s) Oral three times a day  heparin  Injectable 5000 Unit(s) SubCutaneous every 8 hours  influenza   Vaccine 0.5 milliLiter(s) IntraMuscular once  insulin glargine Injectable (LANTUS) 18 Unit(s) SubCutaneous two times a day  insulin lispro (HumaLOG) corrective regimen sliding scale   SubCutaneous three times a day before meals  lactobacillus acidophilus 1 Tablet(s) Oral daily  lidocaine   Patch 2 Patch Transdermal daily  metoprolol tartrate 12.5 milliGRAM(s) Oral two times a day  multivitamin 1 Tablet(s) Oral daily  pantoprazole    Tablet 40 milliGRAM(s) Oral before breakfast  senna 2 Tablet(s) Oral at bedtime      Physical Exam  General: Patient comfortable in bed  Vital Signs Last 12 Hrs  T(F): 98.6 (09-29-19 @ 04:49), Max: 98.6 (09-29-19 @ 04:49)  HR: 85 (09-29-19 @ 04:49) (83 - 85)  BP: 123/80 (09-29-19 @ 04:49) (118/76 - 123/80)  BP(mean): --  RR: 18 (09-29-19 @ 04:49) (18 - 18)  SpO2: 97% (09-29-19 @ 04:49) (95% - 97%)  Neck: No palpable thyroid nodules.  CVS: S1S2, No murmurs  Respiratory: No wheezing, no crepitations  GI: Abdomen soft, bowel sounds positive  Musculoskeletal:  edema lower extremities.   Skin: No skin rashes, no ecchymosis    Diagnostics Chief complaint  Patient is a 86y old  Female who presents with a chief complaint of Fever, UTI (29 Sep 2019 11:17)   Review of systems  Patient in bed, looks comfortable, no fever,  no hypoglycemia.    Labs and Fingersticks  CAPILLARY BLOOD GLUCOSE      POCT Blood Glucose.: 88 mg/dL (29 Sep 2019 08:11)  POCT Blood Glucose.: 134 mg/dL (28 Sep 2019 22:35)  POCT Blood Glucose.: 135 mg/dL (28 Sep 2019 17:27)  POCT Blood Glucose.: 205 mg/dL (28 Sep 2019 12:24)      Anion Gap, Serum: 9 (09-29 @ 07:13)  Anion Gap, Serum: 7 (09-28 @ 07:16)      Calcium, Total Serum: 8.8 (09-29 @ 07:13)  Calcium, Total Serum: 8.8 (09-28 @ 07:16)  Albumin, Serum: 2.7 <L> (09-28 @ 07:16)    Alanine Aminotransferase (ALT/SGPT): 22 (09-28 @ 07:16)  Alkaline Phosphatase, Serum: 204 <H> (09-29 @ 07:13)  Alkaline Phosphatase, Serum: 193 <H> (09-28 @ 07:16)  Aspartate Aminotransferase (AST/SGOT): 30 (09-28 @ 07:16)        09-29    137  |  102  |  17  ----------------------------<  88  4.7   |  26  |  1.13    Ca    8.8      29 Sep 2019 07:13  Phos  4.3     09-28  Mg     1.8     09-28    TPro  x   /  Alb  x   /  TBili  x   /  DBili  x   /  AST  x   /  ALT  x   /  AlkPhos  204<H>  09-29                        8.3    5.97  )-----------( 272      ( 29 Sep 2019 09:38 )             27.1     Medications  MEDICATIONS  (STANDING):  aspirin enteric coated 81 milliGRAM(s) Oral daily  atorvastatin 80 milliGRAM(s) Oral at bedtime  aztreonam  IVPB 1000 milliGRAM(s) IV Intermittent every 8 hours  clindamycin IVPB 900 milliGRAM(s) IV Intermittent every 8 hours  collagenase Ointment 1 Application(s) Topical every 12 hours  DAPTOmycin IVPB 350 milliGRAM(s) IV Intermittent every 24 hours  dextrose 50% Injectable 12.5 Gram(s) IV Push once  dextrose 50% Injectable 25 Gram(s) IV Push once  dextrose 50% Injectable 25 Gram(s) IV Push once  docusate sodium 100 milliGRAM(s) Oral three times a day  heparin  Injectable 5000 Unit(s) SubCutaneous every 8 hours  influenza   Vaccine 0.5 milliLiter(s) IntraMuscular once  insulin glargine Injectable (LANTUS) 18 Unit(s) SubCutaneous two times a day  insulin lispro (HumaLOG) corrective regimen sliding scale   SubCutaneous three times a day before meals  lactobacillus acidophilus 1 Tablet(s) Oral daily  lidocaine   Patch 2 Patch Transdermal daily  metoprolol tartrate 12.5 milliGRAM(s) Oral two times a day  multivitamin 1 Tablet(s) Oral daily  pantoprazole    Tablet 40 milliGRAM(s) Oral before breakfast  senna 2 Tablet(s) Oral at bedtime      Physical Exam  General: Patient comfortable in bed  Vital Signs Last 12 Hrs  T(F): 98.6 (09-29-19 @ 04:49), Max: 98.6 (09-29-19 @ 04:49)  HR: 85 (09-29-19 @ 04:49) (83 - 85)  BP: 123/80 (09-29-19 @ 04:49) (118/76 - 123/80)  BP(mean): --  RR: 18 (09-29-19 @ 04:49) (18 - 18)  SpO2: 97% (09-29-19 @ 04:49) (95% - 97%)  Neck: No palpable thyroid nodules.  CVS: S1S2, No murmurs  Respiratory: No wheezing, no crepitations  GI: Abdomen soft, bowel sounds positive  Musculoskeletal:  edema lower extremities.   Skin: No skin rashes, no ecchymosis    Diagnostics

## 2019-09-29 NOTE — PROGRESS NOTE ADULT - ASSESSMENT
86F POD1 from debridement of left gluteal ulcer. PMH CHF, HTN, DM, CAD prior MI, admitted for UTI and ROB on CKD3, now s/p from debridement and I&D of gluteal skin and tissue for a right gluteal abscess with necrotic tissue 9/15. Patient recovering appropriately on the floor.    PLAN:  - cont local wound care  - will cont dressing/packing changes  - cont abx per ID  - care per primary team    Red Surgery   [phone]

## 2019-09-29 NOTE — PROGRESS NOTE ADULT - PROBLEM SELECTOR PLAN 1
R buttock infiltrate with necrotic tissue  SUrg eval appreciated   SOft Tis US noted   pain control  CT noted  Abx as per ID  CT Pelv noted  S/P debridement of both sides on 08/27  F/U surgical team recs  dressing change as per surgical team   monitor vitals and CBC

## 2019-09-29 NOTE — PROGRESS NOTE ADULT - SUBJECTIVE AND OBJECTIVE BOX
GENERAL SURGERY DAILY PROGRESS NOTE:    Interval:  No acute events overnight.    Subjective:  Patient reports pain is well controlled. Denies N/V. Tolerating diet. Passing flatus, +BM.    Vital Signs Last 24 Hrs  T(C): 37 (29 Sep 2019 04:49), Max: 37.1 (28 Sep 2019 17:11)  T(F): 98.6 (29 Sep 2019 04:49), Max: 98.8 (28 Sep 2019 17:11)  HR: 85 (29 Sep 2019 04:49) (71 - 93)  BP: 123/80 (29 Sep 2019 04:49) (103/65 - 123/80)  BP(mean): --  RR: 18 (29 Sep 2019 04:49) (18 - 18)  SpO2: 97% (29 Sep 2019 04:49) (94% - 98%)    Exam:  Gen: NAD, resting in bed, alert and responding appropriately  Resp: Airway patent, non-labored respirations  Abd: Soft, ND, NT, no rebound or guarding.  : left and right buttock wound improving. packing changed this AM. Penrose in place and draining.   Ext: No edema, WWP  Neuro: AAOx3, no focal deficits    I&O's Detail    28 Sep 2019 07:01  -  29 Sep 2019 07:00  --------------------------------------------------------  IN:    IV PiggyBack: 200 mL    Oral Fluid: 820 mL  Total IN: 1020 mL    OUT:    Indwelling Catheter - Urethral: 1000 mL  Total OUT: 1000 mL    Total NET: 20 mL      29 Sep 2019 07:01  -  29 Sep 2019 11:18  --------------------------------------------------------  IN:    Oral Fluid: 360 mL  Total IN: 360 mL    OUT:  Total OUT: 0 mL    Total NET: 360 mL          LABS:                        8.3    5.97  )-----------( 272      ( 29 Sep 2019 09:38 )             27.1     09-29    137  |  102  |  17  ----------------------------<  88  4.7   |  26  |  1.13    Ca    8.8      29 Sep 2019 07:13  Phos  4.3     09-28  Mg     1.8     09-28    TPro  x   /  Alb  x   /  TBili  x   /  DBili  x   /  AST  x   /  ALT  x   /  AlkPhos  204<H>  09-29

## 2019-09-30 LAB
ANION GAP SERPL CALC-SCNC: 10 MMOL/L — SIGNIFICANT CHANGE UP (ref 5–17)
BUN SERPL-MCNC: 17 MG/DL — SIGNIFICANT CHANGE UP (ref 7–23)
CALCIUM SERPL-MCNC: 9 MG/DL — SIGNIFICANT CHANGE UP (ref 8.4–10.5)
CHLORIDE SERPL-SCNC: 101 MMOL/L — SIGNIFICANT CHANGE UP (ref 96–108)
CO2 SERPL-SCNC: 26 MMOL/L — SIGNIFICANT CHANGE UP (ref 22–31)
CREAT SERPL-MCNC: 1.16 MG/DL — SIGNIFICANT CHANGE UP (ref 0.5–1.3)
GLUCOSE SERPL-MCNC: 69 MG/DL — LOW (ref 70–99)
HCT VFR BLD CALC: 30.1 % — LOW (ref 34.5–45)
HGB BLD-MCNC: 9 G/DL — LOW (ref 11.5–15.5)
MCHC RBC-ENTMCNC: 29.5 PG — SIGNIFICANT CHANGE UP (ref 27–34)
MCHC RBC-ENTMCNC: 29.9 GM/DL — LOW (ref 32–36)
MCV RBC AUTO: 98.7 FL — SIGNIFICANT CHANGE UP (ref 80–100)
PLATELET # BLD AUTO: 287 K/UL — SIGNIFICANT CHANGE UP (ref 150–400)
POTASSIUM SERPL-MCNC: 4.7 MMOL/L — SIGNIFICANT CHANGE UP (ref 3.5–5.3)
POTASSIUM SERPL-SCNC: 4.7 MMOL/L — SIGNIFICANT CHANGE UP (ref 3.5–5.3)
RBC # BLD: 3.05 M/UL — LOW (ref 3.8–5.2)
RBC # FLD: 19.8 % — HIGH (ref 10.3–14.5)
SODIUM SERPL-SCNC: 137 MMOL/L — SIGNIFICANT CHANGE UP (ref 135–145)
WBC # BLD: 6.49 K/UL — SIGNIFICANT CHANGE UP (ref 3.8–10.5)
WBC # FLD AUTO: 6.49 K/UL — SIGNIFICANT CHANGE UP (ref 3.8–10.5)

## 2019-09-30 RX ORDER — INSULIN GLARGINE 100 [IU]/ML
10 INJECTION, SOLUTION SUBCUTANEOUS
Refills: 0 | Status: DISCONTINUED | OUTPATIENT
Start: 2019-09-30 | End: 2019-10-01

## 2019-09-30 RX ORDER — INSULIN LISPRO 100/ML
VIAL (ML) SUBCUTANEOUS
Refills: 0 | Status: DISCONTINUED | OUTPATIENT
Start: 2019-09-30 | End: 2019-10-04

## 2019-09-30 RX ADMIN — ATORVASTATIN CALCIUM 80 MILLIGRAM(S): 80 TABLET, FILM COATED ORAL at 21:54

## 2019-09-30 RX ADMIN — Medication 4: at 17:20

## 2019-09-30 RX ADMIN — DAPTOMYCIN 114 MILLIGRAM(S): 500 INJECTION, POWDER, LYOPHILIZED, FOR SOLUTION INTRAVENOUS at 03:00

## 2019-09-30 RX ADMIN — Medication 1 TABLET(S): at 13:45

## 2019-09-30 RX ADMIN — Medication 12.5 MILLIGRAM(S): at 08:39

## 2019-09-30 RX ADMIN — Medication 50 MILLIGRAM(S): at 17:20

## 2019-09-30 RX ADMIN — INSULIN GLARGINE 10 UNIT(S): 100 INJECTION, SOLUTION SUBCUTANEOUS at 22:08

## 2019-09-30 RX ADMIN — Medication 650 MILLIGRAM(S): at 15:15

## 2019-09-30 RX ADMIN — Medication 650 MILLIGRAM(S): at 16:15

## 2019-09-30 RX ADMIN — HEPARIN SODIUM 5000 UNIT(S): 5000 INJECTION INTRAVENOUS; SUBCUTANEOUS at 21:53

## 2019-09-30 RX ADMIN — HEPARIN SODIUM 5000 UNIT(S): 5000 INJECTION INTRAVENOUS; SUBCUTANEOUS at 08:40

## 2019-09-30 RX ADMIN — Medication 100 MILLIGRAM(S): at 05:25

## 2019-09-30 RX ADMIN — Medication 2: at 08:41

## 2019-09-30 RX ADMIN — INSULIN GLARGINE 18 UNIT(S): 100 INJECTION, SOLUTION SUBCUTANEOUS at 08:54

## 2019-09-30 RX ADMIN — Medication 100 MILLIGRAM(S): at 13:44

## 2019-09-30 RX ADMIN — Medication 100 MILLIGRAM(S): at 21:54

## 2019-09-30 RX ADMIN — PANTOPRAZOLE SODIUM 40 MILLIGRAM(S): 20 TABLET, DELAYED RELEASE ORAL at 08:39

## 2019-09-30 RX ADMIN — Medication 12.5 MILLIGRAM(S): at 17:20

## 2019-09-30 RX ADMIN — Medication 50 MILLIGRAM(S): at 10:50

## 2019-09-30 RX ADMIN — Medication 6: at 13:44

## 2019-09-30 RX ADMIN — Medication 81 MILLIGRAM(S): at 13:46

## 2019-09-30 RX ADMIN — SENNA PLUS 2 TABLET(S): 8.6 TABLET ORAL at 21:54

## 2019-09-30 RX ADMIN — Medication 50 MILLIGRAM(S): at 01:53

## 2019-09-30 RX ADMIN — HEPARIN SODIUM 5000 UNIT(S): 5000 INJECTION INTRAVENOUS; SUBCUTANEOUS at 13:46

## 2019-09-30 NOTE — PROGRESS NOTE ADULT - SUBJECTIVE AND OBJECTIVE BOX
Patient is a 86y old  Female who presents with a chief complaint of Fever, UTI (30 Sep 2019 18:15)      INTERVAL HPI/OVERNIGHT EVENTS:  T(C): 37 (09-30-19 @ 16:42), Max: 37 (09-30-19 @ 16:42)  HR: 78 (09-30-19 @ 16:42) (72 - 82)  BP: 105/61 (09-30-19 @ 16:42) (105/61 - 132/72)  RR: 18 (09-30-19 @ 16:42) (18 - 18)  SpO2: 92% (09-30-19 @ 16:42) (92% - 98%)  Wt(kg): --  I&O's Summary    29 Sep 2019 07:01  -  30 Sep 2019 07:00  --------------------------------------------------------  IN: 1640 mL / OUT: 850 mL / NET: 790 mL    30 Sep 2019 07:01  -  30 Sep 2019 19:15  --------------------------------------------------------  IN: 600 mL / OUT: 250 mL / NET: 350 mL        LABS:                        9.0    6.49  )-----------( 287      ( 30 Sep 2019 08:10 )             30.1     09-30    137  |  101  |  17  ----------------------------<  69<L>  4.7   |  26  |  1.16    Ca    9.0      30 Sep 2019 06:35    TPro  x   /  Alb  x   /  TBili  x   /  DBili  x   /  AST  x   /  ALT  x   /  AlkPhos  204<H>  09-29        CAPILLARY BLOOD GLUCOSE      POCT Blood Glucose.: 209 mg/dL (30 Sep 2019 17:04)  POCT Blood Glucose.: 256 mg/dL (30 Sep 2019 12:52)  POCT Blood Glucose.: 175 mg/dL (30 Sep 2019 08:06)  POCT Blood Glucose.: 116 mg/dL (30 Sep 2019 06:31)  POCT Blood Glucose.: 70 mg/dL (30 Sep 2019 05:22)  POCT Blood Glucose.: 70 mg/dL (30 Sep 2019 05:03)  POCT Blood Glucose.: 109 mg/dL (29 Sep 2019 22:02)            MEDICATIONS  (STANDING):  aspirin enteric coated 81 milliGRAM(s) Oral daily  atorvastatin 80 milliGRAM(s) Oral at bedtime  dextrose 50% Injectable 12.5 Gram(s) IV Push once  dextrose 50% Injectable 25 Gram(s) IV Push once  dextrose 50% Injectable 25 Gram(s) IV Push once  docusate sodium 100 milliGRAM(s) Oral three times a day  doxycycline hyclate Capsule 100 milliGRAM(s) Oral every 12 hours  heparin  Injectable 5000 Unit(s) SubCutaneous every 8 hours  influenza   Vaccine 0.5 milliLiter(s) IntraMuscular once  insulin glargine Injectable (LANTUS) 10 Unit(s) SubCutaneous two times a day  insulin lispro (HumaLOG) corrective regimen sliding scale   SubCutaneous three times a day before meals  lactobacillus acidophilus 1 Tablet(s) Oral daily  lidocaine   Patch 2 Patch Transdermal daily  metoprolol tartrate 12.5 milliGRAM(s) Oral two times a day  multivitamin 1 Tablet(s) Oral daily  pantoprazole    Tablet 40 milliGRAM(s) Oral before breakfast  senna 2 Tablet(s) Oral at bedtime    MEDICATIONS  (PRN):  acetaminophen   Tablet .. 650 milliGRAM(s) Oral every 6 hours PRN Mild Pain (1 - 3)  bisacodyl Suppository 10 milliGRAM(s) Rectal daily PRN Constipation  dextrose 40% Gel 15 Gram(s) Oral once PRN Blood Glucose LESS THAN 70 milliGRAM(s)/deciliter  glucagon  Injectable 1 milliGRAM(s) IntraMuscular once PRN Glucose LESS THAN 70 milligrams/deciliter  haloperidol    Injectable 0.5 milliGRAM(s) IV Push every 8 hours PRN Agitation/ Delirium  ondansetron Injectable 4 milliGRAM(s) IV Push every 8 hours PRN Nausea and/or Vomiting  oxyCODONE    IR 10 milliGRAM(s) Oral every 4 hours PRN Severe Pain (7 - 10)  oxyCODONE    IR 5 milliGRAM(s) Oral every 4 hours PRN Moderate Pain (4 - 6)  simethicone 80 milliGRAM(s) Chew every 8 hours PRN Heartburn          PHYSICAL EXAM:  GENERAL: NAD, well-groomed, well-developed  HEAD:  Atraumatic, Normocephalic  CHEST/LUNG: Clear to percussion bilaterally; No rales, rhonchi, wheezing, or rubs  HEART: Regular rate and rhythm; No murmurs, rubs, or gallops  ABDOMEN: Soft, Nontender, Nondistended; Bowel sounds present  EXTREMITIES:  2+ Peripheral Pulses, No clubbing, cyanosis, or edema  LYMPH: No lymphadenopathy noted  SKIN: No rashes or lesions    Care Discussed with Consultants/Other Providers [ ] YES  [ ] NO

## 2019-09-30 NOTE — PROGRESS NOTE ADULT - ASSESSMENT
Problem/Plan - 1:  ·  Problem: Abscess.  Plan: R buttock infiltrate with necrotic tissue  SUrg eval appreciated   SOft Tis US noted   pain control  CT noted  Abx as per ID  CT Pelv noted  S/P debridement of both sides on 08/27  F/U surgical team recs  dressing change as per surgical team   monitor vitals and CBC.     Problem/Plan - 2:  ·  Problem: Fever.  Plan: resolved  no further leukocytosis   on IV Abx.     Problem/Plan - 3:  ·  Problem: Anemia.  Plan: S/P pRBC   monitor HgB level   active T&S  SCDs.     Problem/Plan - 4:  ·  Problem: Urinary tract infection.  Plan: positive UA   ABx as per ID   F/U on Urine Cx   S/P IV hydration.   Problem/Plan - 5:  ·  Problem: ROB (acute kidney injury).  Plan: resolved   adjust oral meds per Cr clearance   restart lasix,   cont to hld spironolactone and ACEi for now, can be restarted after   hydration   avoid nephrotoxic medications  Nephrology eval called, appreciate recs   Renal US  Urine lytes and studies  monitor output  Renal US noted.     Problem/Plan - 6:  Problem: CHF (congestive heart failure). Plan: stable for now however repeat CT showed B/L effusion asymptomatic for now    avoid fluid overload  Card eval appreciated   daily weight  decrease lopressor.    Problem/Plan - 7:  ·  Problem: STEMI (ST elevation myocardial infarction).  Plan: DAPT and statin  monitor HR and vitals  Anemia W/U.     Problem/Plan - 8:  ·  Problem: Diabetes mellitus.  Plan: Lantus per endo   SS and Diab diet   A1C elevated, will adjust lantus  Endo eval appreciated.     Problem/Plan - 9:  ·  Problem: HTN (hypertension).  Plan: Cont home meds and monitor vitals.   Problem/Plan - 10:  Problem: Prophylactic measure. Plan; DVT and GI PPX.

## 2019-09-30 NOTE — PROGRESS NOTE ADULT - SUBJECTIVE AND OBJECTIVE BOX
Infectious Diseases progress note:    Subjective: NAD, afebrile.  Pt with loose BMs, requiring frequent wound dsg changes.     ROS:  CONSTITUTIONAL:  No fever, chills, rigors  CARDIOVASCULAR:  No chest pain or palpitations  RESPIRATORY:   No SOB, cough, dyspnea on exertion.  No wheezing  GASTROINTESTINAL:  No abd pain, N/V, diarrhea/constipation  EXTREMITIES:  No swelling or joint pain  GENITOURINARY:  No burning on urination, increased frequency or urgency.  No flank pain  NEUROLOGIC:  No HA, visual disturbances  SKIN: No rashes    Allergies    codeine (Unknown)  Kiwi (Anaphylaxis)  penicillins (Anaphylaxis)    Intolerances        ANTIBIOTICS/RELEVANT:  antimicrobials  aztreonam  IVPB 1000 milliGRAM(s) IV Intermittent every 8 hours  clindamycin IVPB 900 milliGRAM(s) IV Intermittent every 8 hours  DAPTOmycin IVPB 350 milliGRAM(s) IV Intermittent every 24 hours    immunologic:  influenza   Vaccine 0.5 milliLiter(s) IntraMuscular once    OTHER:  acetaminophen   Tablet .. 650 milliGRAM(s) Oral every 6 hours PRN  aspirin enteric coated 81 milliGRAM(s) Oral daily  atorvastatin 80 milliGRAM(s) Oral at bedtime  bisacodyl Suppository 10 milliGRAM(s) Rectal daily PRN  dextrose 40% Gel 15 Gram(s) Oral once PRN  dextrose 50% Injectable 12.5 Gram(s) IV Push once  dextrose 50% Injectable 25 Gram(s) IV Push once  dextrose 50% Injectable 25 Gram(s) IV Push once  docusate sodium 100 milliGRAM(s) Oral three times a day  glucagon  Injectable 1 milliGRAM(s) IntraMuscular once PRN  haloperidol    Injectable 0.5 milliGRAM(s) IV Push every 8 hours PRN  heparin  Injectable 5000 Unit(s) SubCutaneous every 8 hours  insulin glargine Injectable (LANTUS) 10 Unit(s) SubCutaneous two times a day  insulin lispro (HumaLOG) corrective regimen sliding scale   SubCutaneous three times a day before meals  lactobacillus acidophilus 1 Tablet(s) Oral daily  lidocaine   Patch 2 Patch Transdermal daily  metoprolol tartrate 12.5 milliGRAM(s) Oral two times a day  multivitamin 1 Tablet(s) Oral daily  ondansetron Injectable 4 milliGRAM(s) IV Push every 8 hours PRN  oxyCODONE    IR 10 milliGRAM(s) Oral every 4 hours PRN  oxyCODONE    IR 5 milliGRAM(s) Oral every 4 hours PRN  pantoprazole    Tablet 40 milliGRAM(s) Oral before breakfast  senna 2 Tablet(s) Oral at bedtime  simethicone 80 milliGRAM(s) Chew every 8 hours PRN      Objective:  Vital Signs Last 24 Hrs  T(C): 37 (30 Sep 2019 16:42), Max: 37 (30 Sep 2019 16:42)  T(F): 98.6 (30 Sep 2019 16:42), Max: 98.6 (30 Sep 2019 16:42)  HR: 78 (30 Sep 2019 16:42) (72 - 82)  BP: 105/61 (30 Sep 2019 16:42) (105/61 - 132/72)  BP(mean): --  RR: 18 (30 Sep 2019 16:42) (18 - 18)  SpO2: 92% (30 Sep 2019 16:42) (92% - 98%)    PHYSICAL EXAM:  Constitutional:NAD  Eyes:NIDIA, EOMI  Ear/Nose/Throat: no thrush, mucositis.  Moist mucous membranes	  Neck:no JVD, no lymphadenopathy, supple  Respiratory: CTA lucian  Cardiovascular: S1S2 RRR, no murmurs  Gastrointestinal:soft, nontender,  nondistended (+) BS  Extremities:no e/e/c  Skin:  no rashes, open wounds or ulcerations        LABS:                        9.0    6.49  )-----------( 287      ( 30 Sep 2019 08:10 )             30.1     09-30    137  |  101  |  17  ----------------------------<  69<L>  4.7   |  26  |  1.16    Ca    9.0      30 Sep 2019 06:35    TPro  x   /  Alb  x   /  TBili  x   /  DBili  x   /  AST  x   /  ALT  x   /  AlkPhos  204<H>  09-29          Procalcitonin, Serum: 0.49 (09-05 @ 07:29)            Rapid RVP Result: [facility]          MICROBIOLOGY:    Culture - Surgical Swab (09.15.19 @ 18:03)    Specimen Source: .Surgical Swab    Culture Results:   Rare Enterococcus species "Susceptibilities not performed"  Few Bacteroides thetaiotamcron group "Susceptibilities not performed"          RADIOLOGY & ADDITIONAL STUDIES:

## 2019-09-30 NOTE — PROGRESS NOTE ADULT - SUBJECTIVE AND OBJECTIVE BOX
Chief complaint  Patient is a 86y old  Female who presents with a chief complaint of Fever, UTI (30 Sep 2019 11:42)   Review of systems  Patient in bed, looks comfortable, no fever, no hypoglycemia.    Labs and Fingersticks  CAPILLARY BLOOD GLUCOSE      POCT Blood Glucose.: 209 mg/dL (30 Sep 2019 17:04)  POCT Blood Glucose.: 256 mg/dL (30 Sep 2019 12:52)  POCT Blood Glucose.: 175 mg/dL (30 Sep 2019 08:06)  POCT Blood Glucose.: 116 mg/dL (30 Sep 2019 06:31)  POCT Blood Glucose.: 70 mg/dL (30 Sep 2019 05:22)  POCT Blood Glucose.: 70 mg/dL (30 Sep 2019 05:03)  POCT Blood Glucose.: 109 mg/dL (29 Sep 2019 22:02)  POCT Blood Glucose.: 146 mg/dL (29 Sep 2019 17:25)      Anion Gap, Serum: 10 (09-30 @ 06:35)  Anion Gap, Serum: 9 (09-29 @ 07:13)      Calcium, Total Serum: 9.0 (09-30 @ 06:35)  Calcium, Total Serum: 8.8 (09-29 @ 07:13)    Alkaline Phosphatase, Serum: 204 <H> (09-29 @ 07:13)        09-30    137  |  101  |  17  ----------------------------<  69<L>  4.7   |  26  |  1.16    Ca    9.0      30 Sep 2019 06:35    TPro  x   /  Alb  x   /  TBili  x   /  DBili  x   /  AST  x   /  ALT  x   /  AlkPhos  204<H>  09-29                        9.0    6.49  )-----------( 287      ( 30 Sep 2019 08:10 )             30.1     Medications  MEDICATIONS  (STANDING):  aspirin enteric coated 81 milliGRAM(s) Oral daily  atorvastatin 80 milliGRAM(s) Oral at bedtime  aztreonam  IVPB 1000 milliGRAM(s) IV Intermittent every 8 hours  clindamycin IVPB 900 milliGRAM(s) IV Intermittent every 8 hours  DAPTOmycin IVPB 350 milliGRAM(s) IV Intermittent every 24 hours  dextrose 50% Injectable 12.5 Gram(s) IV Push once  dextrose 50% Injectable 25 Gram(s) IV Push once  dextrose 50% Injectable 25 Gram(s) IV Push once  docusate sodium 100 milliGRAM(s) Oral three times a day  heparin  Injectable 5000 Unit(s) SubCutaneous every 8 hours  influenza   Vaccine 0.5 milliLiter(s) IntraMuscular once  insulin glargine Injectable (LANTUS) 10 Unit(s) SubCutaneous two times a day  insulin lispro (HumaLOG) corrective regimen sliding scale   SubCutaneous three times a day before meals  lactobacillus acidophilus 1 Tablet(s) Oral daily  lidocaine   Patch 2 Patch Transdermal daily  metoprolol tartrate 12.5 milliGRAM(s) Oral two times a day  multivitamin 1 Tablet(s) Oral daily  pantoprazole    Tablet 40 milliGRAM(s) Oral before breakfast  senna 2 Tablet(s) Oral at bedtime      Physical Exam  General: Patient comfortable in bed  Vital Signs Last 12 Hrs  T(F): 98.6 (09-30-19 @ 16:42), Max: 98.6 (09-30-19 @ 16:42)  HR: 78 (09-30-19 @ 16:42) (78 - 79)  BP: 105/61 (09-30-19 @ 16:42) (105/61 - 115/72)  BP(mean): --  RR: 18 (09-30-19 @ 16:42) (18 - 18)  SpO2: 92% (09-30-19 @ 16:42) (92% - 96%)  Neck: No palpable thyroid nodules.  CVS: S1S2, No murmurs  Respiratory: No wheezing, no crepitations  GI: Abdomen soft, bowel sounds positive  Musculoskeletal:  edema lower extremities.   Skin: No skin rashes, no ecchymosis    Diagnostics

## 2019-09-30 NOTE — PROGRESS NOTE ADULT - SUBJECTIVE AND OBJECTIVE BOX
[facility] – [place], [place] [facility] - WALLACE Lagos / WALLACE Gibson / ANN Barrett/ WALLACE Chin/ WALLACE Kumari/ ESPINOZA Bone / GABRIEL Geronimo / ALEXUS Kaur  ---------------------------------------------------------------------------------------------------------------  seen and examined today for ROB  Interval : recovered  VITALS:  T(F): 98.1 (09-30-19 @ 04:25), Max: 98.8 (09-29-19 @ 13:40)  HR: 73 (09-30-19 @ 04:25)  BP: 115/75 (09-30-19 @ 04:25)  RR: 18 (09-30-19 @ 04:25)  SpO2: 98% (09-30-19 @ 04:25)  Wt(kg): --    09-29 @ 07:01  -  09-30 @ 07:00  --------------------------------------------------------  IN: 1640 mL / OUT: 850 mL / NET: 790 mL      Physical Exam :-  Constitutional: NAD  Neck: Supple.  Respiratory: Bilateral equal breath sounds,  Cardiovascular: S1, S2 normal,  Gastrointestinal: Bowel Sounds present, soft, non tender.  Extremities: No edema  Neurological: Alert and Oriented x 3, no focal deficits  Psychiatric: Normal mood, normal affect  Data:-  Allergies :   codeine (Unknown)  Kiwi (Anaphylaxis)  penicillins (Anaphylaxis)    Hospital Medications:   MEDICATIONS  (STANDING):  aspirin enteric coated 81 milliGRAM(s) Oral daily  atorvastatin 80 milliGRAM(s) Oral at bedtime  aztreonam  IVPB 1000 milliGRAM(s) IV Intermittent every 8 hours  clindamycin IVPB 900 milliGRAM(s) IV Intermittent every 8 hours  collagenase Ointment 1 Application(s) Topical every 12 hours  DAPTOmycin IVPB 350 milliGRAM(s) IV Intermittent every 24 hours  dextrose 50% Injectable 12.5 Gram(s) IV Push once  dextrose 50% Injectable 25 Gram(s) IV Push once  dextrose 50% Injectable 25 Gram(s) IV Push once  docusate sodium 100 milliGRAM(s) Oral three times a day  heparin  Injectable 5000 Unit(s) SubCutaneous every 8 hours  influenza   Vaccine 0.5 milliLiter(s) IntraMuscular once  insulin glargine Injectable (LANTUS) 10 Unit(s) SubCutaneous two times a day  insulin lispro (HumaLOG) corrective regimen sliding scale   SubCutaneous three times a day before meals  lactobacillus acidophilus 1 Tablet(s) Oral daily  lidocaine   Patch 2 Patch Transdermal daily  metoprolol tartrate 12.5 milliGRAM(s) Oral two times a day  multivitamin 1 Tablet(s) Oral daily  pantoprazole    Tablet 40 milliGRAM(s) Oral before breakfast  senna 2 Tablet(s) Oral at bedtime    09-30    137  |  101  |  17  ----------------------------<  69<L>  4.7   |  26  |  1.16    Ca    9.0      30 Sep 2019 06:35    TPro      /  Alb      /  TBili      /  DBili      /  AST      /  ALT      /  AlkPhos  204<H>  09-29    Creatinine Trend: 1.16 <--, 1.13 <--, 0.96 <--, 0.89 <--, 0.92 <--                        9.0    6.49  )-----------( 287      ( 30 Sep 2019 08:10 )             30.1

## 2019-09-30 NOTE — PROGRESS NOTE ADULT - ASSESSMENT
Pt is a 87 y/o Female with PMHx of CHF, DM, HLD, HTN, CAD prior MI, c/o fatigue, "feeling off" and unable to get out of bed; Tmax 104F toay. Rash/redness on buttocks noted by home health aid. patient also complaining of +chronic cough, nonproductive.  No abdominal pain, no n/v/d. No chest pain.  States she has been feeling hot and cold over past few days, and took her temperature today, found fever of 104, and came to ED for evaluation.  No dysuria, no diarrhea. +constipation (chronic).  Increased diffuse leg swelling for past few days. patient lives at home with family. walks by herself with no use of walker. denies of any MEAD< chest pain on exertion. compliant with all her home medications (04 Sep 2019 22:22)    ER vitals: Tm 102.2, P 75, /77.  WBC 14.8 --> 12.2.  Cr 2.0 <-- 1.3.  PCT 0.49.  Ucx >100K E.coli.  Pt given dose of ceftriaxone, now on cipro for UTI.  ID consult called for further abx managment.      Sepsis:    - fever, leukocytosis. Source less likely UTI given persistent fevers, pt has infiltration of perirectal tissues, likely cellulitis.  Abx broadened     - Monitor.  lactate.  Pct elevated 0.49.  Pt with rising WBC, abx broadened on 9/11 (dapto/aztreanam/flagyl).    - Monitor hemodynamic status and BPs.  s/p IV fluid bolus, cont maintanence fluid.      - blood cx, urine cx.  RVP (-).  No pna on cxr      Perirectal cellulitis, r/o abscess:    - Repeat CTap on 9/13 shows worsening inflammation with fluid and foci of air.   s/p OR on 9/15 for  debridement.  Pt not responding to antibiotics prior to debridement.  f/u surgical wound cultures - Enterococcus and Bacteroides    - Cont dapto/azactam/clindamycin.   Changed from flagyl to clindamycin for continued anerobic coverage and anti-toxin effect.  Cover for gas forming bacteria.       - On 9/20 repeat ct scan performed, pt with new ulcerous lesion with eschar over L perineum.     * Pt with persistent necrotic wound in left gluteal area, pt went for OR debridement on 9/27, found to have necrotic/purulent material, and sinus tract connecting right and left rectal wounds, s/p debridement.     * Wound is improving, and no further debridement is planned on speaking           Will follow,    Sandrita Zaman  [phone] Pt is a 85 y/o Female with PMHx of CHF, DM, HLD, HTN, CAD prior MI, c/o fatigue, "feeling off" and unable to get out of bed; Tmax 104F toay. Rash/redness on buttocks noted by home health aid. patient also complaining of +chronic cough, nonproductive.  No abdominal pain, no n/v/d. No chest pain.  States she has been feeling hot and cold over past few days, and took her temperature today, found fever of 104, and came to ED for evaluation.  No dysuria, no diarrhea. +constipation (chronic).  Increased diffuse leg swelling for past few days. patient lives at home with family. walks by herself with no use of walker. denies of any MEAD< chest pain on exertion. compliant with all her home medications (04 Sep 2019 22:22)    ER vitals: Tm 102.2, P 75, /77.  WBC 14.8 --> 12.2.  Cr 2.0 <-- 1.3.  PCT 0.49.  Ucx >100K E.coli.  Pt given dose of ceftriaxone, now on cipro for UTI.  ID consult called for further abx managment.      Sepsis:    - fever, leukocytosis. Source less likely UTI given persistent fevers, pt has infiltration of perirectal tissues, likely cellulitis.  Abx broadened     - Monitor.  lactate.  Pct elevated 0.49.  Pt with rising WBC, abx broadened on 9/11 (dapto/aztreanam/flagyl).    - Monitor hemodynamic status and BPs.  s/p IV fluid bolus, cont maintanence fluid.      - blood cx, urine cx.  RVP (-).  No pna on cxr      Perirectal cellulitis, r/o abscess:    - Repeat CTap on 9/13 shows worsening inflammation with fluid and foci of air.   s/p OR on 9/15 for  debridement.  Pt not responding to antibiotics prior to debridement.  f/u surgical wound cultures - Enterococcus and Bacteroides    - Cont dapto/azactam/clindamycin.   Changed from flagyl to clindamycin for continued anerobic coverage and anti-toxin effect.  Cover for gas forming bacteria.       - On 9/20 repeat ct scan performed, pt with new ulcerous lesion with eschar over L perineum.     * Pt with persistent necrotic wound in left gluteal area, pt went for OR debridement on 9/27, found to have necrotic/purulent material, and sinus tract connecting right and left rectal wounds, s/p debridement.     * Wound is improving, and no further debridement is planned upon d/w Surgery (Dr. Schreiber).  Will de-escalate to PO doxycycline 100mg bid.  Cont to observe wound healing closely.            Will follow,    Sandrita Zaman  [phone]

## 2019-09-30 NOTE — PROGRESS NOTE ADULT - SUBJECTIVE AND OBJECTIVE BOX
Ms. Merlini is comfortable in bed, denies any nausea, vomiting, fever or chills  She states she felt a little weak, but it is now improved    Vital Signs Last 24 Hrs  T(C): 36.7 (30 Sep 2019 04:25), Max: 37.1 (29 Sep 2019 13:40)  T(F): 98.1 (30 Sep 2019 04:25), Max: 98.8 (29 Sep 2019 13:40)  HR: 73 (30 Sep 2019 04:25) (72 - 89)  BP: 115/75 (30 Sep 2019 04:25) (103/58 - 132/72)  BP(mean): --  RR: 18 (30 Sep 2019 04:25) (18 - 18)  SpO2: 98% (30 Sep 2019 04:25) (96% - 98%)    Gen: comfortable, awake, alert  Her left and right gluteal incisions are clean, there is NO necrotic tissue. There is a penrose in place that crosses from the left to the right and sutured in place.     86 year old woman s/p incision, drainage and debridement of gluteal abscesses and wounds  1. Continue supportive care per primary team  2. Follow up cultures  3. Continue local wound care.

## 2019-09-30 NOTE — PROGRESS NOTE ADULT - ASSESSMENT
Assessment:  DMT2: 86y Female with DM T2 with hyperglycemia, A1C 10.2%, on insulin, blood sugars improving, no hypoglycemic episode, eating meals, states she feels better today,  Abscess: s/p B/L debridement of necrotic tissue on the buttock 9/28. Monitored  and FU primary team/surgery/ID.  Anemia: s/p transfusion, stable, monitored.  HTN: Controlled, on antihypertensive medications.          Radha Chin MD  Cell: [phone]  Office: [phone]

## 2019-10-01 LAB
ANION GAP SERPL CALC-SCNC: 9 MMOL/L — SIGNIFICANT CHANGE UP (ref 5–17)
ANISOCYTOSIS BLD QL: SLIGHT — SIGNIFICANT CHANGE UP
BASOPHILS # BLD AUTO: 0.05 K/UL — SIGNIFICANT CHANGE UP (ref 0–0.2)
BASOPHILS NFR BLD AUTO: 0.9 % — SIGNIFICANT CHANGE UP (ref 0–2)
BUN SERPL-MCNC: 18 MG/DL — SIGNIFICANT CHANGE UP (ref 7–23)
CALCIUM SERPL-MCNC: 8.8 MG/DL — SIGNIFICANT CHANGE UP (ref 8.4–10.5)
CHLORIDE SERPL-SCNC: 104 MMOL/L — SIGNIFICANT CHANGE UP (ref 96–108)
CO2 SERPL-SCNC: 26 MMOL/L — SIGNIFICANT CHANGE UP (ref 22–31)
CREAT SERPL-MCNC: 0.95 MG/DL — SIGNIFICANT CHANGE UP (ref 0.5–1.3)
EOSINOPHIL # BLD AUTO: 0.14 K/UL — SIGNIFICANT CHANGE UP (ref 0–0.5)
EOSINOPHIL NFR BLD AUTO: 2.6 % — SIGNIFICANT CHANGE UP (ref 0–6)
GLUCOSE SERPL-MCNC: 76 MG/DL — SIGNIFICANT CHANGE UP (ref 70–99)
HCT VFR BLD CALC: 27.4 % — LOW (ref 34.5–45)
HGB BLD-MCNC: 8.3 G/DL — LOW (ref 11.5–15.5)
HYPOCHROMIA BLD QL: SLIGHT — SIGNIFICANT CHANGE UP
LYMPHOCYTES # BLD AUTO: 0.75 K/UL — LOW (ref 1–3.3)
LYMPHOCYTES # BLD AUTO: 13.9 % — SIGNIFICANT CHANGE UP (ref 13–44)
MACROCYTES BLD QL: SLIGHT — SIGNIFICANT CHANGE UP
MANUAL SMEAR VERIFICATION: SIGNIFICANT CHANGE UP
MCHC RBC-ENTMCNC: 29.9 PG — SIGNIFICANT CHANGE UP (ref 27–34)
MCHC RBC-ENTMCNC: 30.3 GM/DL — LOW (ref 32–36)
MCV RBC AUTO: 98.6 FL — SIGNIFICANT CHANGE UP (ref 80–100)
MONOCYTES # BLD AUTO: 0.42 K/UL — SIGNIFICANT CHANGE UP (ref 0–0.9)
MONOCYTES NFR BLD AUTO: 7.8 % — SIGNIFICANT CHANGE UP (ref 2–14)
NEUTROPHILS # BLD AUTO: 4.03 K/UL — SIGNIFICANT CHANGE UP (ref 1.8–7.4)
NEUTROPHILS NFR BLD AUTO: 74.8 % — SIGNIFICANT CHANGE UP (ref 43–77)
PLAT MORPH BLD: NORMAL — SIGNIFICANT CHANGE UP
PLATELET # BLD AUTO: 255 K/UL — SIGNIFICANT CHANGE UP (ref 150–400)
POLYCHROMASIA BLD QL SMEAR: SLIGHT — SIGNIFICANT CHANGE UP
POTASSIUM SERPL-MCNC: 4.9 MMOL/L — SIGNIFICANT CHANGE UP (ref 3.5–5.3)
POTASSIUM SERPL-SCNC: 4.9 MMOL/L — SIGNIFICANT CHANGE UP (ref 3.5–5.3)
RBC # BLD: 2.78 M/UL — LOW (ref 3.8–5.2)
RBC # FLD: 20.2 % — HIGH (ref 10.3–14.5)
RBC BLD AUTO: ABNORMAL
SMUDGE CELLS # BLD: PRESENT — SIGNIFICANT CHANGE UP
SODIUM SERPL-SCNC: 139 MMOL/L — SIGNIFICANT CHANGE UP (ref 135–145)
WBC # BLD: 5.39 K/UL — SIGNIFICANT CHANGE UP (ref 3.8–10.5)
WBC # FLD AUTO: 5.39 K/UL — SIGNIFICANT CHANGE UP (ref 3.8–10.5)

## 2019-10-01 RX ORDER — CLOPIDOGREL BISULFATE 75 MG/1
75 TABLET, FILM COATED ORAL DAILY
Refills: 0 | Status: DISCONTINUED | OUTPATIENT
Start: 2019-10-01 | End: 2019-10-04

## 2019-10-01 RX ORDER — INSULIN LISPRO 100/ML
VIAL (ML) SUBCUTANEOUS AT BEDTIME
Refills: 0 | Status: DISCONTINUED | OUTPATIENT
Start: 2019-10-01 | End: 2019-10-04

## 2019-10-01 RX ORDER — INSULIN GLARGINE 100 [IU]/ML
6 INJECTION, SOLUTION SUBCUTANEOUS AT BEDTIME
Refills: 0 | Status: DISCONTINUED | OUTPATIENT
Start: 2019-10-01 | End: 2019-10-04

## 2019-10-01 RX ADMIN — HEPARIN SODIUM 5000 UNIT(S): 5000 INJECTION INTRAVENOUS; SUBCUTANEOUS at 13:47

## 2019-10-01 RX ADMIN — Medication 4: at 17:39

## 2019-10-01 RX ADMIN — Medication 100 MILLIGRAM(S): at 09:30

## 2019-10-01 RX ADMIN — Medication 12.5 MILLIGRAM(S): at 17:18

## 2019-10-01 RX ADMIN — HEPARIN SODIUM 5000 UNIT(S): 5000 INJECTION INTRAVENOUS; SUBCUTANEOUS at 21:51

## 2019-10-01 RX ADMIN — Medication 1 TABLET(S): at 12:46

## 2019-10-01 RX ADMIN — Medication 100 MILLIGRAM(S): at 17:19

## 2019-10-01 RX ADMIN — Medication 12.5 MILLIGRAM(S): at 05:16

## 2019-10-01 RX ADMIN — HEPARIN SODIUM 5000 UNIT(S): 5000 INJECTION INTRAVENOUS; SUBCUTANEOUS at 05:15

## 2019-10-01 RX ADMIN — CLOPIDOGREL BISULFATE 75 MILLIGRAM(S): 75 TABLET, FILM COATED ORAL at 13:47

## 2019-10-01 RX ADMIN — ATORVASTATIN CALCIUM 80 MILLIGRAM(S): 80 TABLET, FILM COATED ORAL at 21:51

## 2019-10-01 RX ADMIN — ONDANSETRON 4 MILLIGRAM(S): 8 TABLET, FILM COATED ORAL at 00:49

## 2019-10-01 RX ADMIN — Medication 81 MILLIGRAM(S): at 12:46

## 2019-10-01 RX ADMIN — Medication 650 MILLIGRAM(S): at 06:26

## 2019-10-01 RX ADMIN — Medication 2: at 12:46

## 2019-10-01 RX ADMIN — Medication 650 MILLIGRAM(S): at 06:59

## 2019-10-01 RX ADMIN — INSULIN GLARGINE 6 UNIT(S): 100 INJECTION, SOLUTION SUBCUTANEOUS at 22:47

## 2019-10-01 RX ADMIN — PANTOPRAZOLE SODIUM 40 MILLIGRAM(S): 20 TABLET, DELAYED RELEASE ORAL at 05:16

## 2019-10-01 RX ADMIN — Medication 1: at 23:09

## 2019-10-01 NOTE — PROGRESS NOTE ADULT - SUBJECTIVE AND OBJECTIVE BOX
Subjective: Patient seen and examined. No new events except as noted.   doing well  no pain nor discomfort     REVIEW OF SYSTEMS:    CONSTITUTIONAL: No weakness, fevers or chills  EYES/ENT: No visual changes;  No vertigo or throat pain   NECK: No pain or stiffness  RESPIRATORY: No cough, wheezing, hemoptysis; No shortness of breath  CARDIOVASCULAR: No chest pain or palpitations  GASTROINTESTINAL: No abdominal or epigastric pain. No nausea, vomiting, or hematemesis; No diarrhea or constipation. No melena or hematochezia.  GENITOURINARY: No dysuria, frequency or hematuria  NEUROLOGICAL: No numbness or weakness  SKIN: pain on lower back with movement   All other review of systems is negative unless indicated above.    MEDICATIONS:  MEDICATIONS  (STANDING):  aspirin enteric coated 81 milliGRAM(s) Oral daily  atorvastatin 80 milliGRAM(s) Oral at bedtime  clopidogrel Tablet 75 milliGRAM(s) Oral daily  dextrose 50% Injectable 12.5 Gram(s) IV Push once  dextrose 50% Injectable 25 Gram(s) IV Push once  dextrose 50% Injectable 25 Gram(s) IV Push once  docusate sodium 100 milliGRAM(s) Oral three times a day  doxycycline hyclate Capsule 100 milliGRAM(s) Oral every 12 hours  heparin  Injectable 5000 Unit(s) SubCutaneous every 8 hours  influenza   Vaccine 0.5 milliLiter(s) IntraMuscular once  insulin glargine Injectable (LANTUS) 6 Unit(s) SubCutaneous at bedtime  insulin lispro (HumaLOG) corrective regimen sliding scale   SubCutaneous three times a day before meals  lactobacillus acidophilus 1 Tablet(s) Oral daily  lidocaine   Patch 2 Patch Transdermal daily  metoprolol tartrate 12.5 milliGRAM(s) Oral two times a day  multivitamin 1 Tablet(s) Oral daily  pantoprazole    Tablet 40 milliGRAM(s) Oral before breakfast  senna 2 Tablet(s) Oral at bedtime      PHYSICAL EXAM:  T(C): 37 (10-01-19 @ 16:39), Max: 37 (10-01-19 @ 16:39)  HR: 79 (10-01-19 @ 16:39) (71 - 86)  BP: 124/78 (10-01-19 @ 16:39) (119/71 - 133/77)  RR: 18 (10-01-19 @ 16:39) (18 - 18)  SpO2: 96% (10-01-19 @ 16:39) (96% - 99%)  Wt(kg): --  I&O's Summary    30 Sep 2019 07:01  -  01 Oct 2019 07:00  --------------------------------------------------------  IN: 1460 mL / OUT: 650 mL / NET: 810 mL    01 Oct 2019 07:01  -  01 Oct 2019 16:59  --------------------------------------------------------  IN: 480 mL / OUT: 500 mL / NET: -20 mL          Appearance: Normal	  HEENT:   Normal oral mucosa, PERRL, EOMI	  Lymphatic: No lymphadenopathy , no edema  Cardiovascular: Normal S1 S2, No JVD, No murmurs , Peripheral pulses palpable 2+ bilaterally  Respiratory: Lungs clear to auscultation, normal effort 	  Gastrointestinal:  Soft, Non-tender, + BS	  Skin: dressing intact   Musculoskeletal: Normal range of motion, normal strength  Psychiatry:  Mood & affect appropriate  Ext: No edema      All labs, Imaging and EKGs personally reviewed                           8.3    5.39  )-----------( 255      ( 01 Oct 2019 09:10 )             27.4               10-01    139  |  104  |  18  ----------------------------<  76  4.9   |  26  |  0.95    Ca    8.8      01 Oct 2019 07:16

## 2019-10-01 NOTE — PROGRESS NOTE ADULT - PROBLEM SELECTOR PLAN 1
Will decrease Lantus 6u qhs. Will continue monitoring FS, log, will Follow up.  Patient counseled for compliance with consistent low carb diet.

## 2019-10-01 NOTE — PROGRESS NOTE ADULT - ASSESSMENT
Assessment:  DMT2: 86y Female with DM T2 with hyperglycemia, A1C 10.2%, on insulin, FS trending down, had hypoglycemic episode, eating meals, states she feels better today,  Abscess: s/p B/L debridement of necrotic tissue on the buttock 9/28. Monitored  and FU primary team/surgery/ID.  Anemia: s/p transfusion, stable, monitored.  HTN: Controlled, on antihypertensive medications.          Radha Chin MD  Cell: [phone]  Office: [phone]

## 2019-10-01 NOTE — PROGRESS NOTE ADULT - ASSESSMENT
Pt is a 85 y/o Female with PMHx of CHF, DM, HLD, HTN, CAD prior MI, c/o fatigue, "feeling off" and unable to get out of bed; Tmax 104F toay. Rash/redness on buttocks noted by home health aid. patient also complaining of +chronic cough, nonproductive.  No abdominal pain, no n/v/d. No chest pain.  States she has been feeling hot and cold over past few days, and took her temperature today, found fever of 104, and came to ED for evaluation.  No dysuria, no diarrhea. +constipation (chronic).  Increased diffuse leg swelling for past few days. patient lives at home with family. walks by herself with no use of walker. denies of any MEAD< chest pain on exertion. compliant with all her home medications (04 Sep 2019 22:22)    ER vitals: Tm 102.2, P 75, /77.  WBC 14.8 --> 12.2.  Cr 2.0 <-- 1.3.  PCT 0.49.  Ucx >100K E.coli.  Pt given dose of ceftriaxone, now on cipro for UTI.  ID consult called for further abx managment.      Sepsis:    - fever, leukocytosis. Source less likely UTI given persistent fevers, pt has infiltration of perirectal tissues, likely cellulitis.  Abx broadened     - Monitor.  lactate.  Pct elevated 0.49.  Pt with rising WBC, abx broadened on 9/11 (dapto/aztreanam/flagyl).    - Monitor hemodynamic status and BPs.  s/p IV fluid bolus, cont maintanence fluid.      - blood cx, urine cx.  RVP (-).  No pna on cxr      Perirectal cellulitis, r/o abscess:    - Repeat CTap on 9/13 shows worsening inflammation with fluid and foci of air.   s/p OR on 9/15 for  debridement.  Pt not responding to antibiotics prior to debridement.  f/u surgical wound cultures - Enterococcus and Bacteroides          - On 9/20 repeat ct scan performed, pt with new ulcerous lesion with eschar over L perineum.     * Pt with persistent necrotic wound in left gluteal area, pt went for OR debridement on 9/27, found to have necrotic/purulent material, and sinus tract connecting right and left rectal wounds, s/p debridement.     * Wound is improving, and no further debridement is planned upon d/w Surgery (Dr. Schreiber).  De-escalated to PO doxycycline 100mg bid (on 9/30).  Cont to observe wound healing closely.  Monitor for leukocytosis and fever.           Will follow,    Sandrita Zaman  [phone]

## 2019-10-01 NOTE — PROGRESS NOTE ADULT - SUBJECTIVE AND OBJECTIVE BOX
Infectious Diseases progress note:    Subjective: Sitting in recliner.  No new somatic complaints    ROS:  CONSTITUTIONAL:  No fever, chills, rigors  CARDIOVASCULAR:  No chest pain or palpitations  RESPIRATORY:   No SOB, cough, dyspnea on exertion.  No wheezing  GASTROINTESTINAL:  No abd pain, N/V, diarrhea/constipation  EXTREMITIES:  No swelling or joint pain  GENITOURINARY:  No burning on urination, increased frequency or urgency.  No flank pain  NEUROLOGIC:  No HA, visual disturbances  SKIN: No rashes    Allergies    codeine (Unknown)  Kiwi (Anaphylaxis)  penicillins (Anaphylaxis)    Intolerances        ANTIBIOTICS/RELEVANT:  antimicrobials  doxycycline hyclate Capsule 100 milliGRAM(s) Oral every 12 hours    immunologic:  influenza   Vaccine 0.5 milliLiter(s) IntraMuscular once    OTHER:  acetaminophen   Tablet .. 650 milliGRAM(s) Oral every 6 hours PRN  aspirin enteric coated 81 milliGRAM(s) Oral daily  atorvastatin 80 milliGRAM(s) Oral at bedtime  bisacodyl Suppository 10 milliGRAM(s) Rectal daily PRN  clopidogrel Tablet 75 milliGRAM(s) Oral daily  dextrose 40% Gel 15 Gram(s) Oral once PRN  dextrose 50% Injectable 12.5 Gram(s) IV Push once  dextrose 50% Injectable 25 Gram(s) IV Push once  dextrose 50% Injectable 25 Gram(s) IV Push once  docusate sodium 100 milliGRAM(s) Oral three times a day  glucagon  Injectable 1 milliGRAM(s) IntraMuscular once PRN  haloperidol    Injectable 0.5 milliGRAM(s) IV Push every 8 hours PRN  heparin  Injectable 5000 Unit(s) SubCutaneous every 8 hours  insulin glargine Injectable (LANTUS) 6 Unit(s) SubCutaneous at bedtime  insulin lispro (HumaLOG) corrective regimen sliding scale   SubCutaneous at bedtime  insulin lispro (HumaLOG) corrective regimen sliding scale   SubCutaneous three times a day before meals  lactobacillus acidophilus 1 Tablet(s) Oral daily  lidocaine   Patch 2 Patch Transdermal daily  metoprolol tartrate 12.5 milliGRAM(s) Oral two times a day  multivitamin 1 Tablet(s) Oral daily  ondansetron Injectable 4 milliGRAM(s) IV Push every 8 hours PRN  oxyCODONE    IR 10 milliGRAM(s) Oral every 4 hours PRN  oxyCODONE    IR 5 milliGRAM(s) Oral every 4 hours PRN  pantoprazole    Tablet 40 milliGRAM(s) Oral before breakfast  senna 2 Tablet(s) Oral at bedtime  simethicone 80 milliGRAM(s) Chew every 8 hours PRN      Objective:  Vital Signs Last 24 Hrs  T(C): 36.6 (01 Oct 2019 21:10), Max: 37 (01 Oct 2019 16:39)  T(F): 97.9 (01 Oct 2019 21:10), Max: 98.6 (01 Oct 2019 16:39)  HR: 78 (01 Oct 2019 21:10) (71 - 86)  BP: 125/76 (01 Oct 2019 21:10) (119/71 - 133/77)  BP(mean): --  RR: 18 (01 Oct 2019 21:10) (18 - 18)  SpO2: 96% (01 Oct 2019 21:10) (96% - 99%)    PHYSICAL EXAM:  Constitutional:NAD  Eyes:NIDIA, EOMI  Ear/Nose/Throat: no thrush, mucositis.  Moist mucous membranes	  Neck:no JVD, no lymphadenopathy, supple  Respiratory: CTA lucian  Cardiovascular: S1S2 RRR, no murmurs  Gastrointestinal:soft, nontender,  nondistended (+) BS  Extremities:no e/e/c  Skin:  no rashes, open wounds or ulcerations        LABS:                        8.3    5.39  )-----------( 255      ( 01 Oct 2019 09:10 )             27.4     10-01    139  |  104  |  18  ----------------------------<  76  4.9   |  26  |  0.95    Ca    8.8      01 Oct 2019 07:16            Procalcitonin, Serum: 0.49 (09-05 @ 07:29)            Rapid RVP Result: [facility]          MICROBIOLOGY:          RADIOLOGY & ADDITIONAL STUDIES:

## 2019-10-01 NOTE — PROGRESS NOTE ADULT - PROBLEM SELECTOR PLAN 1
R buttock infiltrate with necrotic tissue  SUrg eval appreciated   SOft Tis US noted   pain control  CT noted  Abx as per ID, changed to oral Doxy  CT Pelv noted  S/P debridement of both sides on 08/27  F/U surgical team recs  dressing change as per surgical team   monitor vitals and CBC

## 2019-10-01 NOTE — PROGRESS NOTE ADULT - ASSESSMENT
86 year old woman s/p incision, drainage and debridement of gluteal abscesses and wounds    Plan:   - Follow up cultures  - Wound care daily  - Will continue to follow  - Care per primary team.

## 2019-10-01 NOTE — PROGRESS NOTE ADULT - SUBJECTIVE AND OBJECTIVE BOX
GENERAL SURGERY DAILY PROGRESS NOTE:    Interval: No acute events overnight.    S: Patient seen and examined. Reports pain is well controlled. Denies fever, chills, SOB. Tolerating diet. - N/V.  +/+ F/BM. +OOB.    O:   Exam:  Gen: NAD. Well developed, alert and cooperative.   Resp: No additional work of breathing.   Card: RR. No peripheral edema or pallor.   Abd: Soft, ND, NT.   : Gluteal incisions clean. Penrose in place. No purulent drainage. No necrotic tissue.  Ext: WWP. Able to move all extremities equally.  Neuro: AA&Ox3. No focal defects.    Vital Signs Last 24 Hrs  T(C): 36.3 (01 Oct 2019 04:17), Max: 37 (30 Sep 2019 16:42)  T(F): 97.3 (01 Oct 2019 04:17), Max: 98.6 (30 Sep 2019 16:42)  HR: 71 (01 Oct 2019 04:17) (71 - 82)  BP: 124/77 (01 Oct 2019 04:17) (105/61 - 124/77)  BP(mean): --  RR: 18 (01 Oct 2019 04:17) (18 - 18)  SpO2: 99% (01 Oct 2019 04:17) (92% - 99%)    I&O's Detail    29 Sep 2019 07:01  -  30 Sep 2019 07:00  --------------------------------------------------------  IN:    IV PiggyBack: 300 mL    Oral Fluid: 1340 mL  Total IN: 1640 mL    OUT:    Indwelling Catheter - Urethral: 850 mL  Total OUT: 850 mL    Total NET: 790 mL      30 Sep 2019 07:01  -  01 Oct 2019 05:48  --------------------------------------------------------  IN:    Oral Fluid: 840 mL  Total IN: 840 mL    OUT:    Indwelling Catheter - Urethral: 650 mL  Total OUT: 650 mL    Total NET: 190 mL          MEDICATIONS  (STANDING):  aspirin enteric coated 81 milliGRAM(s) Oral daily  atorvastatin 80 milliGRAM(s) Oral at bedtime  dextrose 50% Injectable 12.5 Gram(s) IV Push once  dextrose 50% Injectable 25 Gram(s) IV Push once  dextrose 50% Injectable 25 Gram(s) IV Push once  docusate sodium 100 milliGRAM(s) Oral three times a day  doxycycline hyclate Capsule 100 milliGRAM(s) Oral every 12 hours  heparin  Injectable 5000 Unit(s) SubCutaneous every 8 hours  influenza   Vaccine 0.5 milliLiter(s) IntraMuscular once  insulin glargine Injectable (LANTUS) 10 Unit(s) SubCutaneous two times a day  insulin lispro (HumaLOG) corrective regimen sliding scale   SubCutaneous three times a day before meals  lactobacillus acidophilus 1 Tablet(s) Oral daily  lidocaine   Patch 2 Patch Transdermal daily  metoprolol tartrate 12.5 milliGRAM(s) Oral two times a day  multivitamin 1 Tablet(s) Oral daily  pantoprazole    Tablet 40 milliGRAM(s) Oral before breakfast  senna 2 Tablet(s) Oral at bedtime    MEDICATIONS  (PRN):  acetaminophen   Tablet .. 650 milliGRAM(s) Oral every 6 hours PRN Mild Pain (1 - 3)  bisacodyl Suppository 10 milliGRAM(s) Rectal daily PRN Constipation  dextrose 40% Gel 15 Gram(s) Oral once PRN Blood Glucose LESS THAN 70 milliGRAM(s)/deciliter  glucagon  Injectable 1 milliGRAM(s) IntraMuscular once PRN Glucose LESS THAN 70 milligrams/deciliter  haloperidol    Injectable 0.5 milliGRAM(s) IV Push every 8 hours PRN Agitation/ Delirium  ondansetron Injectable 4 milliGRAM(s) IV Push every 8 hours PRN Nausea and/or Vomiting  oxyCODONE    IR 10 milliGRAM(s) Oral every 4 hours PRN Severe Pain (7 - 10)  oxyCODONE    IR 5 milliGRAM(s) Oral every 4 hours PRN Moderate Pain (4 - 6)  simethicone 80 milliGRAM(s) Chew every 8 hours PRN Heartburn      LABS:                        9.0    6.49  )-----------( 287      ( 30 Sep 2019 08:10 )             30.1     09-30    137  |  101  |  17  ----------------------------<  69<L>  4.7   |  26  |  1.16    Ca    9.0      30 Sep 2019 06:35    TPro  x   /  Alb  x   /  TBili  x   /  DBili  x   /  AST  x   /  ALT  x   /  AlkPhos  204<H>  09-29

## 2019-10-02 LAB
ANION GAP SERPL CALC-SCNC: 8 MMOL/L — SIGNIFICANT CHANGE UP (ref 5–17)
BUN SERPL-MCNC: 18 MG/DL — SIGNIFICANT CHANGE UP (ref 7–23)
CALCIUM SERPL-MCNC: 9 MG/DL — SIGNIFICANT CHANGE UP (ref 8.4–10.5)
CHLORIDE SERPL-SCNC: 103 MMOL/L — SIGNIFICANT CHANGE UP (ref 96–108)
CO2 SERPL-SCNC: 26 MMOL/L — SIGNIFICANT CHANGE UP (ref 22–31)
CREAT SERPL-MCNC: 0.95 MG/DL — SIGNIFICANT CHANGE UP (ref 0.5–1.3)
GLUCOSE SERPL-MCNC: 203 MG/DL — HIGH (ref 70–99)
HCT VFR BLD CALC: 29.6 % — LOW (ref 34.5–45)
HGB BLD-MCNC: 9 G/DL — LOW (ref 11.5–15.5)
MCHC RBC-ENTMCNC: 30.4 GM/DL — LOW (ref 32–36)
MCHC RBC-ENTMCNC: 30.4 PG — SIGNIFICANT CHANGE UP (ref 27–34)
MCV RBC AUTO: 100 FL — SIGNIFICANT CHANGE UP (ref 80–100)
PLATELET # BLD AUTO: 269 K/UL — SIGNIFICANT CHANGE UP (ref 150–400)
POTASSIUM SERPL-MCNC: 4.9 MMOL/L — SIGNIFICANT CHANGE UP (ref 3.5–5.3)
POTASSIUM SERPL-SCNC: 4.9 MMOL/L — SIGNIFICANT CHANGE UP (ref 3.5–5.3)
RBC # BLD: 2.96 M/UL — LOW (ref 3.8–5.2)
RBC # FLD: 20.7 % — HIGH (ref 10.3–14.5)
SODIUM SERPL-SCNC: 137 MMOL/L — SIGNIFICANT CHANGE UP (ref 135–145)
WBC # BLD: 5.93 K/UL — SIGNIFICANT CHANGE UP (ref 3.8–10.5)
WBC # FLD AUTO: 5.93 K/UL — SIGNIFICANT CHANGE UP (ref 3.8–10.5)

## 2019-10-02 PROCEDURE — 93010 ELECTROCARDIOGRAM REPORT: CPT

## 2019-10-02 RX ADMIN — Medication 2: at 18:05

## 2019-10-02 RX ADMIN — Medication 2: at 08:40

## 2019-10-02 RX ADMIN — Medication 1 TABLET(S): at 12:38

## 2019-10-02 RX ADMIN — Medication 4: at 12:38

## 2019-10-02 RX ADMIN — HEPARIN SODIUM 5000 UNIT(S): 5000 INJECTION INTRAVENOUS; SUBCUTANEOUS at 05:06

## 2019-10-02 RX ADMIN — CLOPIDOGREL BISULFATE 75 MILLIGRAM(S): 75 TABLET, FILM COATED ORAL at 12:38

## 2019-10-02 RX ADMIN — Medication 12.5 MILLIGRAM(S): at 18:05

## 2019-10-02 RX ADMIN — INSULIN GLARGINE 6 UNIT(S): 100 INJECTION, SOLUTION SUBCUTANEOUS at 22:26

## 2019-10-02 RX ADMIN — HEPARIN SODIUM 5000 UNIT(S): 5000 INJECTION INTRAVENOUS; SUBCUTANEOUS at 20:38

## 2019-10-02 RX ADMIN — PANTOPRAZOLE SODIUM 40 MILLIGRAM(S): 20 TABLET, DELAYED RELEASE ORAL at 05:06

## 2019-10-02 RX ADMIN — Medication 81 MILLIGRAM(S): at 12:38

## 2019-10-02 RX ADMIN — ONDANSETRON 4 MILLIGRAM(S): 8 TABLET, FILM COATED ORAL at 20:39

## 2019-10-02 RX ADMIN — ATORVASTATIN CALCIUM 80 MILLIGRAM(S): 80 TABLET, FILM COATED ORAL at 20:38

## 2019-10-02 RX ADMIN — Medication 12.5 MILLIGRAM(S): at 05:06

## 2019-10-02 RX ADMIN — HEPARIN SODIUM 5000 UNIT(S): 5000 INJECTION INTRAVENOUS; SUBCUTANEOUS at 13:32

## 2019-10-02 NOTE — PROGRESS NOTE ADULT - SUBJECTIVE AND OBJECTIVE BOX
Subjective: Patient seen and examined. No new events except as noted.   oral doxy makes her feel sick   pain improved significantly     REVIEW OF SYSTEMS:    CONSTITUTIONAL: No weakness, fevers or chills  EYES/ENT: No visual changes;  No vertigo or throat pain   NECK: No pain or stiffness  RESPIRATORY: No cough, wheezing, hemoptysis; No shortness of breath  CARDIOVASCULAR: No chest pain or palpitations  GASTROINTESTINAL: No abdominal or epigastric pain. No nausea, vomiting, or hematemesis; No diarrhea or constipation. No melena or hematochezia.  GENITOURINARY: No dysuria, frequency or hematuria  NEUROLOGICAL: No numbness or weakness  SKIN: No itching, burning, rashes, or lesions   All other review of systems is negative unless indicated above.    MEDICATIONS:  MEDICATIONS  (STANDING):  aspirin enteric coated 81 milliGRAM(s) Oral daily  atorvastatin 80 milliGRAM(s) Oral at bedtime  clopidogrel Tablet 75 milliGRAM(s) Oral daily  dextrose 50% Injectable 12.5 Gram(s) IV Push once  dextrose 50% Injectable 25 Gram(s) IV Push once  dextrose 50% Injectable 25 Gram(s) IV Push once  docusate sodium 100 milliGRAM(s) Oral three times a day  heparin  Injectable 5000 Unit(s) SubCutaneous every 8 hours  influenza   Vaccine 0.5 milliLiter(s) IntraMuscular once  insulin glargine Injectable (LANTUS) 6 Unit(s) SubCutaneous at bedtime  insulin lispro (HumaLOG) corrective regimen sliding scale   SubCutaneous at bedtime  insulin lispro (HumaLOG) corrective regimen sliding scale   SubCutaneous three times a day before meals  lactobacillus acidophilus 1 Tablet(s) Oral daily  lidocaine   Patch 2 Patch Transdermal daily  metoprolol tartrate 12.5 milliGRAM(s) Oral two times a day  multivitamin 1 Tablet(s) Oral daily  pantoprazole    Tablet 40 milliGRAM(s) Oral before breakfast  senna 2 Tablet(s) Oral at bedtime      PHYSICAL EXAM:  T(C): 36.3 (10-02-19 @ 17:00), Max: 36.8 (10-01-19 @ 23:52)  HR: 82 (10-02-19 @ 17:00) (72 - 88)  BP: 119/75 (10-02-19 @ 17:00) (113/72 - 132/73)  RR: 18 (10-02-19 @ 17:00) (18 - 19)  SpO2: 97% (10-02-19 @ 17:00) (96% - 98%)  Wt(kg): --  I&O's Summary    01 Oct 2019 07:01  -  02 Oct 2019 07:00  --------------------------------------------------------  IN: 1320 mL / OUT: 975 mL / NET: 345 mL    02 Oct 2019 07:01  -  02 Oct 2019 18:29  --------------------------------------------------------  IN: 720 mL / OUT: 310 mL / NET: 410 mL          Appearance: Normal	  HEENT:   Normal oral mucosa, PERRL, EOMI	  Lymphatic: No lymphadenopathy , no edema  Cardiovascular: Normal S1 S2, No JVD, No murmurs , Peripheral pulses palpable 2+ bilaterally  Respiratory: Lungs clear to auscultation, normal effort 	  Gastrointestinal:  Soft, Non-tender, + BS	  Skin: buttock dressing intact   Musculoskeletal: Normal range of motion, normal strength  Psychiatry:  Mood & affect appropriate  Ext: No edema      All labs, Imaging and EKGs personally reviewed                           9.0    5.93  )-----------( 269      ( 02 Oct 2019 09:15 )             29.6               10-02    137  |  103  |  18  ----------------------------<  203<H>  4.9   |  26  |  0.95    Ca    9.0      02 Oct 2019 06:47

## 2019-10-02 NOTE — PROGRESS NOTE ADULT - SUBJECTIVE AND OBJECTIVE BOX
Chief complaint  Patient is a 86y old  Female who presents with a chief complaint of Fever, UTI (02 Oct 2019 13:55)   Review of systems  Patient in bed, looks comfortable, no fever, no hypoglycemia.    Labs and Fingersticks  CAPILLARY BLOOD GLUCOSE      POCT Blood Glucose.: 240 mg/dL (02 Oct 2019 12:10)  POCT Blood Glucose.: 195 mg/dL (02 Oct 2019 08:24)  POCT Blood Glucose.: 270 mg/dL (01 Oct 2019 22:19)  POCT Blood Glucose.: 218 mg/dL (01 Oct 2019 17:29)      Anion Gap, Serum: 8 (10-02 @ 06:47)  Anion Gap, Serum: 9 (10-01 @ 07:16)      Calcium, Total Serum: 9.0 (10-02 @ 06:47)  Calcium, Total Serum: 8.8 (10-01 @ 07:16)          10-02    137  |  103  |  18  ----------------------------<  203<H>  4.9   |  26  |  0.95    Ca    9.0      02 Oct 2019 06:47                          9.0    5.93  )-----------( 269      ( 02 Oct 2019 09:15 )             29.6     Medications  MEDICATIONS  (STANDING):  aspirin enteric coated 81 milliGRAM(s) Oral daily  atorvastatin 80 milliGRAM(s) Oral at bedtime  clopidogrel Tablet 75 milliGRAM(s) Oral daily  dextrose 50% Injectable 12.5 Gram(s) IV Push once  dextrose 50% Injectable 25 Gram(s) IV Push once  dextrose 50% Injectable 25 Gram(s) IV Push once  docusate sodium 100 milliGRAM(s) Oral three times a day  heparin  Injectable 5000 Unit(s) SubCutaneous every 8 hours  influenza   Vaccine 0.5 milliLiter(s) IntraMuscular once  insulin glargine Injectable (LANTUS) 6 Unit(s) SubCutaneous at bedtime  insulin lispro (HumaLOG) corrective regimen sliding scale   SubCutaneous at bedtime  insulin lispro (HumaLOG) corrective regimen sliding scale   SubCutaneous three times a day before meals  lactobacillus acidophilus 1 Tablet(s) Oral daily  lidocaine   Patch 2 Patch Transdermal daily  metoprolol tartrate 12.5 milliGRAM(s) Oral two times a day  multivitamin 1 Tablet(s) Oral daily  pantoprazole    Tablet 40 milliGRAM(s) Oral before breakfast  senna 2 Tablet(s) Oral at bedtime      Physical Exam  General: Patient comfortable in bed  Vital Signs Last 12 Hrs  T(F): 98.1 (10-02-19 @ 13:48), Max: 98.1 (10-02-19 @ 13:48)  HR: 84 (10-02-19 @ 13:48) (72 - 88)  BP: 124/81 (10-02-19 @ 13:48) (113/72 - 132/73)  BP(mean): --  RR: 18 (10-02-19 @ 13:48) (18 - 19)  SpO2: 98% (10-02-19 @ 13:48) (96% - 98%)  Neck: No palpable thyroid nodules.  CVS: S1S2, No murmurs  Respiratory: No wheezing, no crepitations  GI: Abdomen soft, bowel sounds positive  Musculoskeletal:  edema lower extremities.   Skin: No skin rashes, no ecchymosis    Diagnostics Chief complaint  Patient is a 86y old  Female who presents with a chief complaint of Fever, UTI (02 Oct 2019 13:55)   Review of systems  Patient in bed, looks  comfortable, no fever, no hypoglycemia.    Labs and Fingersticks  CAPILLARY BLOOD GLUCOSE      POCT Blood Glucose.: 240 mg/dL (02 Oct 2019 12:10)  POCT Blood Glucose.: 195 mg/dL (02 Oct 2019 08:24)  POCT Blood Glucose.: 270 mg/dL (01 Oct 2019 22:19)  POCT Blood Glucose.: 218 mg/dL (01 Oct 2019 17:29)      Anion Gap, Serum: 8 (10-02 @ 06:47)  Anion Gap, Serum: 9 (10-01 @ 07:16)      Calcium, Total Serum: 9.0 (10-02 @ 06:47)  Calcium, Total Serum: 8.8 (10-01 @ 07:16)          10-02    137  |  103  |  18  ----------------------------<  203<H>  4.9   |  26  |  0.95    Ca    9.0      02 Oct 2019 06:47                          9.0    5.93  )-----------( 269      ( 02 Oct 2019 09:15 )             29.6     Medications  MEDICATIONS  (STANDING):  aspirin enteric coated 81 milliGRAM(s) Oral daily  atorvastatin 80 milliGRAM(s) Oral at bedtime  clopidogrel Tablet 75 milliGRAM(s) Oral daily  dextrose 50% Injectable 12.5 Gram(s) IV Push once  dextrose 50% Injectable 25 Gram(s) IV Push once  dextrose 50% Injectable 25 Gram(s) IV Push once  docusate sodium 100 milliGRAM(s) Oral three times a day  heparin  Injectable 5000 Unit(s) SubCutaneous every 8 hours  influenza   Vaccine 0.5 milliLiter(s) IntraMuscular once  insulin glargine Injectable (LANTUS) 6 Unit(s) SubCutaneous at bedtime  insulin lispro (HumaLOG) corrective regimen sliding scale   SubCutaneous at bedtime  insulin lispro (HumaLOG) corrective regimen sliding scale   SubCutaneous three times a day before meals  lactobacillus acidophilus 1 Tablet(s) Oral daily  lidocaine   Patch 2 Patch Transdermal daily  metoprolol tartrate 12.5 milliGRAM(s) Oral two times a day  multivitamin 1 Tablet(s) Oral daily  pantoprazole    Tablet 40 milliGRAM(s) Oral before breakfast  senna 2 Tablet(s) Oral at bedtime      Physical Exam  General: Patient comfortable in bed  Vital Signs Last 12 Hrs  T(F): 98.1 (10-02-19 @ 13:48), Max: 98.1 (10-02-19 @ 13:48)  HR: 84 (10-02-19 @ 13:48) (72 - 88)  BP: 124/81 (10-02-19 @ 13:48) (113/72 - 132/73)  BP(mean): --  RR: 18 (10-02-19 @ 13:48) (18 - 19)  SpO2: 98% (10-02-19 @ 13:48) (96% - 98%)  Neck: No palpable thyroid nodules.  CVS: S1S2, No murmurs  Respiratory: No wheezing, no crepitations  GI: Abdomen soft, bowel sounds positive  Musculoskeletal:  edema lower extremities.   Skin: No skin rashes, no ecchymosis    Diagnostics

## 2019-10-02 NOTE — PROGRESS NOTE ADULT - SUBJECTIVE AND OBJECTIVE BOX
GENERAL SURGERY DAILY PROGRESS NOTE:    Interval: No acute events overnight.    S: Patient seen and examined. Reports pain is well controlled. Denies fever, chills, SOB. Tolerating diet. - N/V.  +/+ F/BM.     O:   Vital Signs Last 24 Hrs  T(C): 36.3 (02 Oct 2019 17:00), Max: 36.8 (01 Oct 2019 23:52)  T(F): 97.4 (02 Oct 2019 17:00), Max: 98.3 (01 Oct 2019 23:52)  HR: 82 (02 Oct 2019 17:00) (72 - 88)  BP: 119/75 (02 Oct 2019 17:00) (113/72 - 132/73)  BP(mean): --  RR: 18 (02 Oct 2019 17:00) (18 - 19)  SpO2: 97% (02 Oct 2019 17:00) (96% - 98%)    Gen: NAD. Well developed, alert and cooperative.   Resp: No additional work of breathing.   Card: RR. No peripheral edema or pallor.   Abd: Soft, ND, NT.   : Gluteal incisions clean. Penrose in place. No purulent drainage. No necrotic tissue.  Ext: WWP. Able to move all extremities equally.  Neuro: AA&Ox3. No focal defects.                          9.0    5.93  )-----------( 269      ( 02 Oct 2019 09:15 )             29.6   10-02    137  |  103  |  18  ----------------------------<  203<H>  4.9   |  26  |  0.95    Ca    9.0      02 Oct 2019 06:47

## 2019-10-02 NOTE — PROGRESS NOTE ADULT - ASSESSMENT
Assessment:  DMT2: 86y Female with DM T2 with hyperglycemia, A1C 10.2%, on low-dose insulin, FS fluctuating, now elevated, no new hypoglycemic episode, eating partial meals, states she feels "so so" today.  Abscess: s/p B/L debridement of necrotic tissue on the buttock 9/28. Monitored  and FU primary team/surgery/ID.  Anemia: s/p transfusion, stable, monitored.  HTN: Controlled, on antihypertensive medications.          Radha Chin MD  Cell: [phone]  Office: [phone] Assessment:  DMT2: 86y Female with DM T2 with hyperglycemia, A1C 10.2%, on low-dose insulin, FS fluctuating, now elevated, no new hypoglycemic episode, eating partial meals, states she feels "so so" today.  Abscess: s/p B/L debridement of necrotic tissue on the buttock 9/28. Monitored   and FU primary team/surgery/ID.  Anemia: s/p transfusion, stable, monitored.  HTN: Controlled, on antihypertensive medications.          Radha Chin MD  Cell: [phone]  Office: [phone]

## 2019-10-02 NOTE — PROGRESS NOTE ADULT - ASSESSMENT
Delirium resolved. Probable mild baseline dementia.    Recommend  Neurology evaluation as outpt. (dementia workup)  Haldol prn agitation. Follow QTc.  Following.    Harry Sanabria M.D.  Psychiatry  [phone]

## 2019-10-02 NOTE — PROGRESS NOTE ADULT - PROBLEM SELECTOR PLAN 1
R buttock infiltrate with necrotic tissue  SUrg eval appreciated   SOft Tis US noted   pain control  CT noted  HOLD ALL ABX AND MONITOR   CT Pelv noted  S/P debridement of both sides on 08/27  F/U surgical team recs  dressing change as per surgical team   monitor vitals and CBC

## 2019-10-02 NOTE — PROGRESS NOTE ADULT - ASSESSMENT
86 year old woman s/p incision, drainage and debridement of gluteal abscesses and wounds    Plan:   - Follow up cultures  - Wound care daily  - Will continue to follow  - Care per primary team.    Red team,  [id number] 86 year old woman s/p incision, drainage and debridement of gluteal abscesses and wounds    Plan:   - Follow up cultures  - Wound care daily  - Will continue to follow  - Care per primary team.  - dc planning per med/ID.  - d/w pt and family at bedside in detail.     Red team,  [id number]

## 2019-10-02 NOTE — PROGRESS NOTE ADULT - SUBJECTIVE AND OBJECTIVE BOX
Events noted. She c/o diarrhea but otherwise feels well. No prn Haldol needed over the past 24 hours.       Vital Signs Last 24 Hrs  T(C): 36.7 (02 Oct 2019 13:48), Max: 37 (01 Oct 2019 16:39)  T(F): 98.1 (02 Oct 2019 13:48), Max: 98.6 (01 Oct 2019 16:39)  HR: 84 (02 Oct 2019 13:48) (72 - 88)  BP: 124/81 (02 Oct 2019 13:48) (113/72 - 132/73)  BP(mean): --  RR: 18 (02 Oct 2019 13:48) (18 - 19)  SpO2: 98% (02 Oct 2019 13:48) (96% - 98%)                          9.0    5.93  )-----------( 269      ( 02 Oct 2019 09:15 )             29.6       10-02    137  |  103  |  18  ----------------------------<  203<H>  4.9   |  26  |  0.95    Ca    9.0      02 Oct 2019 06:47                MEDICATIONS  (STANDING):  aspirin enteric coated 81 milliGRAM(s) Oral daily  atorvastatin 80 milliGRAM(s) Oral at bedtime  clopidogrel Tablet 75 milliGRAM(s) Oral daily  dextrose 50% Injectable 12.5 Gram(s) IV Push once  dextrose 50% Injectable 25 Gram(s) IV Push once  dextrose 50% Injectable 25 Gram(s) IV Push once  docusate sodium 100 milliGRAM(s) Oral three times a day  heparin  Injectable 5000 Unit(s) SubCutaneous every 8 hours  influenza   Vaccine 0.5 milliLiter(s) IntraMuscular once  insulin glargine Injectable (LANTUS) 6 Unit(s) SubCutaneous at bedtime  insulin lispro (HumaLOG) corrective regimen sliding scale   SubCutaneous at bedtime  insulin lispro (HumaLOG) corrective regimen sliding scale   SubCutaneous three times a day before meals  lactobacillus acidophilus 1 Tablet(s) Oral daily  lidocaine   Patch 2 Patch Transdermal daily  metoprolol tartrate 12.5 milliGRAM(s) Oral two times a day  multivitamin 1 Tablet(s) Oral daily  pantoprazole    Tablet 40 milliGRAM(s) Oral before breakfast  senna 2 Tablet(s) Oral at bedtime    MEDICATIONS  (PRN):  acetaminophen   Tablet .. 650 milliGRAM(s) Oral every 6 hours PRN Mild Pain (1 - 3)  bisacodyl Suppository 10 milliGRAM(s) Rectal daily PRN Constipation  dextrose 40% Gel 15 Gram(s) Oral once PRN Blood Glucose LESS THAN 70 milliGRAM(s)/deciliter  glucagon  Injectable 1 milliGRAM(s) IntraMuscular once PRN Glucose LESS THAN 70 milligrams/deciliter  haloperidol    Injectable 0.5 milliGRAM(s) IV Push every 8 hours PRN Agitation/ Delirium  ondansetron Injectable 4 milliGRAM(s) IV Push every 8 hours PRN Nausea and/or Vomiting  oxyCODONE    IR 10 milliGRAM(s) Oral every 4 hours PRN Severe Pain (7 - 10)  oxyCODONE    IR 5 milliGRAM(s) Oral every 4 hours PRN Moderate Pain (4 - 6)  simethicone 80 milliGRAM(s) Chew every 8 hours PRN Heartburn      Elderly WF in bed, calm, cooperative, alert and oriented x 2 ("September") .  No psychomotor abnormalities. Insight and judgment are fair. Speech is coherent with normal rate and volume. No hallucinations nor delusions. The patient denied suicidal and homicidal ideation and plan. Mood is "ok" and affect full range and appropriate. Attention and concentration fair but impaired short term memory. Long term memory within normal limits.

## 2019-10-03 LAB
ANION GAP SERPL CALC-SCNC: 8 MMOL/L — SIGNIFICANT CHANGE UP (ref 5–17)
BASOPHILS # BLD AUTO: 0.04 K/UL — SIGNIFICANT CHANGE UP (ref 0–0.2)
BASOPHILS NFR BLD AUTO: 0.7 % — SIGNIFICANT CHANGE UP (ref 0–2)
BUN SERPL-MCNC: 15 MG/DL — SIGNIFICANT CHANGE UP (ref 7–23)
CALCIUM SERPL-MCNC: 9.1 MG/DL — SIGNIFICANT CHANGE UP (ref 8.4–10.5)
CHLORIDE SERPL-SCNC: 104 MMOL/L — SIGNIFICANT CHANGE UP (ref 96–108)
CO2 SERPL-SCNC: 25 MMOL/L — SIGNIFICANT CHANGE UP (ref 22–31)
CREAT SERPL-MCNC: 0.97 MG/DL — SIGNIFICANT CHANGE UP (ref 0.5–1.3)
EOSINOPHIL # BLD AUTO: 0.25 K/UL — SIGNIFICANT CHANGE UP (ref 0–0.5)
EOSINOPHIL NFR BLD AUTO: 4.6 % — SIGNIFICANT CHANGE UP (ref 0–6)
GLUCOSE SERPL-MCNC: 149 MG/DL — HIGH (ref 70–99)
HCT VFR BLD CALC: 28.8 % — LOW (ref 34.5–45)
HGB BLD-MCNC: 8.7 G/DL — LOW (ref 11.5–15.5)
IMM GRANULOCYTES NFR BLD AUTO: 0.2 % — SIGNIFICANT CHANGE UP (ref 0–1.5)
LYMPHOCYTES # BLD AUTO: 1.08 K/UL — SIGNIFICANT CHANGE UP (ref 1–3.3)
LYMPHOCYTES # BLD AUTO: 19.7 % — SIGNIFICANT CHANGE UP (ref 13–44)
MCHC RBC-ENTMCNC: 30.2 GM/DL — LOW (ref 32–36)
MCHC RBC-ENTMCNC: 30.2 PG — SIGNIFICANT CHANGE UP (ref 27–34)
MCV RBC AUTO: 100 FL — SIGNIFICANT CHANGE UP (ref 80–100)
MONOCYTES # BLD AUTO: 0.59 K/UL — SIGNIFICANT CHANGE UP (ref 0–0.9)
MONOCYTES NFR BLD AUTO: 10.8 % — SIGNIFICANT CHANGE UP (ref 2–14)
NEUTROPHILS # BLD AUTO: 3.51 K/UL — SIGNIFICANT CHANGE UP (ref 1.8–7.4)
NEUTROPHILS NFR BLD AUTO: 64 % — SIGNIFICANT CHANGE UP (ref 43–77)
PLATELET # BLD AUTO: 274 K/UL — SIGNIFICANT CHANGE UP (ref 150–400)
POTASSIUM SERPL-MCNC: 4.8 MMOL/L — SIGNIFICANT CHANGE UP (ref 3.5–5.3)
POTASSIUM SERPL-SCNC: 4.8 MMOL/L — SIGNIFICANT CHANGE UP (ref 3.5–5.3)
RBC # BLD: 2.88 M/UL — LOW (ref 3.8–5.2)
RBC # FLD: 20.7 % — HIGH (ref 10.3–14.5)
SODIUM SERPL-SCNC: 137 MMOL/L — SIGNIFICANT CHANGE UP (ref 135–145)
WBC # BLD: 5.48 K/UL — SIGNIFICANT CHANGE UP (ref 3.8–10.5)
WBC # FLD AUTO: 5.48 K/UL — SIGNIFICANT CHANGE UP (ref 3.8–10.5)

## 2019-10-03 RX ORDER — GABAPENTIN 400 MG/1
100 CAPSULE ORAL DAILY
Refills: 0 | Status: DISCONTINUED | OUTPATIENT
Start: 2019-10-03 | End: 2019-10-04

## 2019-10-03 RX ADMIN — HEPARIN SODIUM 5000 UNIT(S): 5000 INJECTION INTRAVENOUS; SUBCUTANEOUS at 21:35

## 2019-10-03 RX ADMIN — CLOPIDOGREL BISULFATE 75 MILLIGRAM(S): 75 TABLET, FILM COATED ORAL at 11:20

## 2019-10-03 RX ADMIN — Medication 100 MILLIGRAM(S): at 05:11

## 2019-10-03 RX ADMIN — Medication 100 MILLIGRAM(S): at 21:34

## 2019-10-03 RX ADMIN — Medication 650 MILLIGRAM(S): at 13:32

## 2019-10-03 RX ADMIN — Medication 1 TABLET(S): at 13:01

## 2019-10-03 RX ADMIN — PANTOPRAZOLE SODIUM 40 MILLIGRAM(S): 20 TABLET, DELAYED RELEASE ORAL at 05:10

## 2019-10-03 RX ADMIN — HEPARIN SODIUM 5000 UNIT(S): 5000 INJECTION INTRAVENOUS; SUBCUTANEOUS at 05:10

## 2019-10-03 RX ADMIN — Medication 81 MILLIGRAM(S): at 11:20

## 2019-10-03 RX ADMIN — INSULIN GLARGINE 6 UNIT(S): 100 INJECTION, SOLUTION SUBCUTANEOUS at 22:28

## 2019-10-03 RX ADMIN — Medication 12.5 MILLIGRAM(S): at 17:17

## 2019-10-03 RX ADMIN — Medication 650 MILLIGRAM(S): at 13:02

## 2019-10-03 RX ADMIN — Medication 1 TABLET(S): at 11:20

## 2019-10-03 RX ADMIN — Medication 2: at 17:54

## 2019-10-03 RX ADMIN — HEPARIN SODIUM 5000 UNIT(S): 5000 INJECTION INTRAVENOUS; SUBCUTANEOUS at 13:02

## 2019-10-03 RX ADMIN — Medication 12.5 MILLIGRAM(S): at 05:10

## 2019-10-03 RX ADMIN — Medication 2: at 08:56

## 2019-10-03 RX ADMIN — GABAPENTIN 100 MILLIGRAM(S): 400 CAPSULE ORAL at 17:13

## 2019-10-03 RX ADMIN — ATORVASTATIN CALCIUM 80 MILLIGRAM(S): 80 TABLET, FILM COATED ORAL at 21:35

## 2019-10-03 RX ADMIN — Medication 100 MILLIGRAM(S): at 13:01

## 2019-10-03 RX ADMIN — Medication 6: at 13:00

## 2019-10-03 NOTE — PROGRESS NOTE ADULT - PROBLEM SELECTOR PROBLEM 6
CHF (congestive heart failure)
CHF (congestive heart failure)
STEMI (ST elevation myocardial infarction)
STEMI (ST elevation myocardial infarction)
CHF (congestive heart failure)
STEMI (ST elevation myocardial infarction)
CHF (congestive heart failure)
STEMI (ST elevation myocardial infarction)
CHF (congestive heart failure)
STEMI (ST elevation myocardial infarction)
CHF (congestive heart failure)
STEMI (ST elevation myocardial infarction)
STEMI (ST elevation myocardial infarction)
CHF (congestive heart failure)
STEMI (ST elevation myocardial infarction)
CHF (congestive heart failure)
CHF (congestive heart failure)

## 2019-10-03 NOTE — PROGRESS NOTE ADULT - PROBLEM SELECTOR PLAN 5
resolved   adjust oral meds per Cr clearance   restart lasix,   cont to hld spironolactone and ACEi for now, can be restarted after   hydration   avoid nephrotoxic medications  Nephrology eval called, appreciate recs   Renal US  Urine lytes and studies  monitor output  Renal US noted
resolved   adjust oral meds per Cr clearance   restart lasix,   cont to hld spironolactone and ACEi for now, can be restarted after   hydration   avoid nephrotoxic medications  Nephrology eval called, appreciate recs   Renal US  Urine lytes and studies  monitor output  Renal US noted
stable for now however repeat CT showed B/L effusion asymptomatic for now   hold all diuresis   avoid fluid overload  Card eval appreciated   daily weight
stable for now however repeat CT showed B/L effusion asymptomatic for now   hold all diuresis   avoid fluid overload  Card eval appreciated   daily weight  decrease lopressor
resolved   adjust oral meds per Cr clearance   restart lasix,   cont to hld spironolactone and ACEi for now, can be restarted after   hydration   avoid nephrotoxic medications  Nephrology eval called, appreciate recs   Renal US  Urine lytes and studies  monitor output  Renal US noted
stable for now  cont spironolactone  avoid fluid overload  Card eval appreciated   daily weight
resolved   adjust oral meds per Cr clearance   COnt to hold lasix, spironolactone and ACEi for now, can be restarted after   Cont hydration   avoid nephrotoxic medications  Nephrology eval called, appreciate recs   Renal US  Urine lytes and studies  monitor output  Oral and gentle IV hydration  Renal US noted
resolved   adjust oral meds per Cr clearance   restart lasix,   cont to hld spironolactone and ACEi for now, can be restarted after   hydration   avoid nephrotoxic medications  Nephrology eval called, appreciate recs   Renal US  Urine lytes and studies  monitor output  Renal US noted
stable for now however repeat CT showed B/L effusion asymptomatic for now   hold all diuresis   avoid fluid overload  Card eval appreciated   daily weight  decrease lopressor
resolved   adjust oral meds per Cr clearance   COnt to hold lasix, spironolactone and ACEi for now, can be restarted after   Cont hydration   avoid nephrotoxic medications  Nephrology eval called, appreciate recs   Renal US  Urine lytes and studies  monitor output  Oral and gentle IV hydration  Renal US noted
stable for now however repeat CT showed B/L effusion asymptomatic for now   hold all diuresis   avoid fluid overload  Card eval appreciated   daily weight  decrease lopressor
resolved   adjust oral meds per Cr clearance   restart lasix,   cont to hld spironolactone and ACEi for now, can be restarted after   hydration   avoid nephrotoxic medications  Nephrology eval called, appreciate recs   Renal US  Urine lytes and studies  monitor output  Renal US noted
stable for now   hold all diuresis   avoid fluid overload  Card eval appreciated   daily weight
stable for now   hold all diuresis   avoid fluid overload  Card eval appreciated   daily weight
resolved   adjust oral meds per Cr clearance   COnt to hold lasix, spironolactone and ACEi for now, can be restarted after   Cont hydration   avoid nephrotoxic medications  Nephrology eval called, appreciate recs   Renal US  Urine lytes and studies  monitor output  Oral and gentle IV hydration  Renal US noted
stable for now however repeat CT showed B/L effusion asymptomatic for now   hold all diuresis   avoid fluid overload  Card eval appreciated   daily weight  decrease lopressor
resolved   adjust oral meds per Cr clearance   restart lasix,   cont to hld spironolactone and ACEi for now, can be restarted after   hydration   avoid nephrotoxic medications  Nephrology eval called, appreciate recs   Renal US  Urine lytes and studies  monitor output  Renal US noted
resolved   adjust oral meds per Cr clearance   restart lasix,   cont to hld spironolactone and ACEi for now, can be restarted after   hydration   avoid nephrotoxic medications  Nephrology eval called, appreciate recs   Renal US  Urine lytes and studies  monitor output  Renal US noted

## 2019-10-03 NOTE — PROGRESS NOTE ADULT - PROBLEM SELECTOR PLAN 6
stable for now however repeat CT showed B/L effusion asymptomatic for now    avoid fluid overload  Card eval appreciated   daily weight  decrease lopressor
DAPT and statin  monitor HR and vitals  Anemia W/U
DAPT and statin  monitor HR and vitals  Anemia W/U
stable for now however repeat CT showed B/L effusion asymptomatic for now    avoid fluid overload  Card eval appreciated   daily weight  decrease lopressor
stable for now however repeat CT showed B/L effusion asymptomatic for now    avoid fluid overload  Card eval appreciated   daily weight  decrease lopressor
DAPT and statin  monitor HR and vitals  Anemia W/U
stable for now however repeat CT showed B/L effusion asymptomatic for now    avoid fluid overload  Card eval appreciated   daily weight  decrease lopressor
stable for now however repeat CT showed B/L effusion asymptomatic for now   hold all diuresis   avoid fluid overload  Card eval appreciated   daily weight  decrease lopressor
DAPT and statin  monitor HR and vitals  Anemia W/U
stable for now however repeat CT showed B/L effusion asymptomatic for now   hold all diuresis   avoid fluid overload  Card eval appreciated   daily weight  decrease lopressor
stable for now however repeat CT showed B/L effusion asymptomatic for now    avoid fluid overload  Card eval appreciated   daily weight  decrease lopressor
DAPT and statin  monitor HR and vitals  Anemia W/U
DAPT and statin  monitor HR and vitals  Anemia W/U
stable for now however repeat CT showed B/L effusion asymptomatic for now   hold all diuresis   avoid fluid overload  Card eval appreciated   daily weight  decrease lopressor
DAPT and statin  monitor HR and vitals  Anemia W/U
stable for now however repeat CT showed B/L effusion asymptomatic for now    avoid fluid overload  Card eval appreciated   daily weight  decrease lopressor
stable for now however repeat CT showed B/L effusion asymptomatic for now    avoid fluid overload  Card eval appreciated   daily weight  decrease lopressor

## 2019-10-03 NOTE — PROGRESS NOTE ADULT - PROBLEM SELECTOR PLAN 9
Cont home meds and monitor vitals
lipid panel  statin
Cont home meds and monitor vitals
lipid panel  statin
lipid panel  statin
Cont home meds and monitor vitals
lipid panel  statin
Cont home meds and monitor vitals
lipid panel  statin
Cont home meds and monitor vitals
lipid panel  statin
Cont home meds and monitor vitals
lipid panel  statin
lipid panel  statin
Cont home meds and monitor vitals

## 2019-10-03 NOTE — PROGRESS NOTE ADULT - ASSESSMENT
Assessment:  DMT2: 86y Female with DM T2 with hyperglycemia, A1C 10.2%, on low-dose insulin, FS fluctuating, no new hypoglycemic episode, eating partial meals, sitting on chair, feeling better today.  Abscess: s/p B/L debridement of necrotic tissue on the buttock 9/28. Monitored and FU primary team/surgery/ID.  Anemia: s/p transfusion, stable, monitored.  HTN: Controlled, on antihypertensive medications.          Radha Chin MD  Cell: [phone]  Office: [phone] Assessment:  DMT2: 86y Female with DM T2 with hyperglycemia, A1C 10.2%, on low-dose insulin, FS fluctuating, no new hypoglycemic episode, eating partial meals,  sitting on chair, feeling better today.  Abscess: s/p B/L debridement of necrotic tissue on the buttock 9/28. Monitored and FU primary team/surgery/ID.  Anemia: s/p transfusion, stable, monitored.  HTN: Controlled, on antihypertensive medications.          Radha Chin MD  Cell: [phone]  Office: [phone]

## 2019-10-03 NOTE — PROGRESS NOTE ADULT - PROBLEM SELECTOR PROBLEM 8
Diabetes mellitus
HTN (hypertension)
HTN (hypertension)
Diabetes mellitus
HTN (hypertension)
Diabetes mellitus
HTN (hypertension)
Diabetes mellitus
HTN (hypertension)
Diabetes mellitus
HTN (hypertension)
HTN (hypertension)
Diabetes mellitus
HTN (hypertension)
Diabetes mellitus

## 2019-10-03 NOTE — PROGRESS NOTE ADULT - PROBLEM SELECTOR PROBLEM 7
STEMI (ST elevation myocardial infarction)
Diabetes mellitus
Diabetes mellitus
STEMI (ST elevation myocardial infarction)
STEMI (ST elevation myocardial infarction)
Diabetes mellitus
STEMI (ST elevation myocardial infarction)
Diabetes mellitus
STEMI (ST elevation myocardial infarction)
STEMI (ST elevation myocardial infarction)
Diabetes mellitus
Diabetes mellitus
STEMI (ST elevation myocardial infarction)
Diabetes mellitus
STEMI (ST elevation myocardial infarction)
STEMI (ST elevation myocardial infarction)

## 2019-10-03 NOTE — PROGRESS NOTE ADULT - PROBLEM SELECTOR PROBLEM 9
HTN (hypertension)
Dyslipidemia
Dyslipidemia
HTN (hypertension)
HTN (hypertension)
Dyslipidemia
HTN (hypertension)
Dyslipidemia
HTN (hypertension)
HTN (hypertension)
Dyslipidemia
Dyslipidemia
HTN (hypertension)
Dyslipidemia

## 2019-10-03 NOTE — PROGRESS NOTE ADULT - ASSESSMENT
86 year old female s/p debridement of bilateral buttocks, with good wound healing. Now requiring packing changes for wound care. Afebrile and hemodynamically stable.    - no further surgical intervention planned  - agree with cessation of antibiotics, no evidence of ongoing infection  - care and dispo per primary team  - request wound care continue as ordered, with plan to see Dr. Wan in the office in 7-10 days after discharge for evaluation of wound and possible removal of penrose drain    Please contact Red surgery team ([phone]) with further questions or concerns   --TERESA Aiken, PGY-5

## 2019-10-03 NOTE — PROGRESS NOTE ADULT - PROBLEM SELECTOR PLAN 1
R buttock infiltrate with necrotic tissue  SUrg eval appreciated   SOft Tis US noted   pain control  CT noted  HOLD ALL ABX AND MONITOR   CT Pelv noted  S/P debridement of both sides on 08/27  F/U surgical team recs  dressing change as per surgical team   monitor vitals and CBC  D/C planing for rehab placement tomorrow

## 2019-10-03 NOTE — PROGRESS NOTE ADULT - SUBJECTIVE AND OBJECTIVE BOX
Chief complaint  Patient is a 86y old  Female who presents with a chief complaint of Fever, UTI (02 Oct 2019 18:29)   Review of systems  Patient in bed, looks comfortable, no fever, no hypoglycemia.    Labs and Fingersticks  CAPILLARY BLOOD GLUCOSE      POCT Blood Glucose.: 265 mg/dL (03 Oct 2019 12:28)  POCT Blood Glucose.: 169 mg/dL (03 Oct 2019 08:44)  POCT Blood Glucose.: 204 mg/dL (02 Oct 2019 22:07)  POCT Blood Glucose.: 166 mg/dL (02 Oct 2019 17:27)      Anion Gap, Serum: 8 (10-03 @ 07:02)  Anion Gap, Serum: 8 (10-02 @ 06:47)      Calcium, Total Serum: 9.1 (10-03 @ 07:02)  Calcium, Total Serum: 9.0 (10-02 @ 06:47)          10-03    137  |  104  |  15  ----------------------------<  149<H>  4.8   |  25  |  0.97    Ca    9.1      03 Oct 2019 07:02                          8.7    5.48  )-----------( 274      ( 03 Oct 2019 08:55 )             28.8     Medications  MEDICATIONS  (STANDING):  aspirin enteric coated 81 milliGRAM(s) Oral daily  atorvastatin 80 milliGRAM(s) Oral at bedtime  clopidogrel Tablet 75 milliGRAM(s) Oral daily  dextrose 50% Injectable 12.5 Gram(s) IV Push once  dextrose 50% Injectable 25 Gram(s) IV Push once  dextrose 50% Injectable 25 Gram(s) IV Push once  docusate sodium 100 milliGRAM(s) Oral three times a day  heparin  Injectable 5000 Unit(s) SubCutaneous every 8 hours  influenza   Vaccine 0.5 milliLiter(s) IntraMuscular once  insulin glargine Injectable (LANTUS) 6 Unit(s) SubCutaneous at bedtime  insulin lispro (HumaLOG) corrective regimen sliding scale   SubCutaneous at bedtime  insulin lispro (HumaLOG) corrective regimen sliding scale   SubCutaneous three times a day before meals  lactobacillus acidophilus 1 Tablet(s) Oral daily  lidocaine   Patch 2 Patch Transdermal daily  metoprolol tartrate 12.5 milliGRAM(s) Oral two times a day  multivitamin 1 Tablet(s) Oral daily  pantoprazole    Tablet 40 milliGRAM(s) Oral before breakfast  senna 2 Tablet(s) Oral at bedtime      Physical Exam  General: Patient comfortable in bed  Vital Signs Last 12 Hrs  T(F): 97.9 (10-03-19 @ 08:18), Max: 97.9 (10-03-19 @ 08:18)  HR: 74 (10-03-19 @ 08:18) (74 - 78)  BP: 128/67 (10-03-19 @ 08:18) (101/64 - 128/67)  BP(mean): --  RR: 18 (10-03-19 @ 08:18) (18 - 18)  SpO2: 96% (10-03-19 @ 08:18) (95% - 96%)  Neck: No palpable thyroid nodules.  CVS: S1S2, No murmurs  Respiratory: No wheezing, no crepitations  GI: Abdomen soft, bowel sounds positive  Musculoskeletal:  edema lower extremities.   Skin: No skin rashes, no ecchymosis    Diagnostics Chief complaint  Patient is a 86y old  Female who presents with a chief complaint of Fever, UTI (02 Oct 2019 18:29)   Review of systems  Patient in bed, looks comfortable, no fever,  no hypoglycemia.    Labs and Fingersticks  CAPILLARY BLOOD GLUCOSE      POCT Blood Glucose.: 265 mg/dL (03 Oct 2019 12:28)  POCT Blood Glucose.: 169 mg/dL (03 Oct 2019 08:44)  POCT Blood Glucose.: 204 mg/dL (02 Oct 2019 22:07)  POCT Blood Glucose.: 166 mg/dL (02 Oct 2019 17:27)      Anion Gap, Serum: 8 (10-03 @ 07:02)  Anion Gap, Serum: 8 (10-02 @ 06:47)      Calcium, Total Serum: 9.1 (10-03 @ 07:02)  Calcium, Total Serum: 9.0 (10-02 @ 06:47)          10-03    137  |  104  |  15  ----------------------------<  149<H>  4.8   |  25  |  0.97    Ca    9.1      03 Oct 2019 07:02                          8.7    5.48  )-----------( 274      ( 03 Oct 2019 08:55 )             28.8     Medications  MEDICATIONS  (STANDING):  aspirin enteric coated 81 milliGRAM(s) Oral daily  atorvastatin 80 milliGRAM(s) Oral at bedtime  clopidogrel Tablet 75 milliGRAM(s) Oral daily  dextrose 50% Injectable 12.5 Gram(s) IV Push once  dextrose 50% Injectable 25 Gram(s) IV Push once  dextrose 50% Injectable 25 Gram(s) IV Push once  docusate sodium 100 milliGRAM(s) Oral three times a day  heparin  Injectable 5000 Unit(s) SubCutaneous every 8 hours  influenza   Vaccine 0.5 milliLiter(s) IntraMuscular once  insulin glargine Injectable (LANTUS) 6 Unit(s) SubCutaneous at bedtime  insulin lispro (HumaLOG) corrective regimen sliding scale   SubCutaneous at bedtime  insulin lispro (HumaLOG) corrective regimen sliding scale   SubCutaneous three times a day before meals  lactobacillus acidophilus 1 Tablet(s) Oral daily  lidocaine   Patch 2 Patch Transdermal daily  metoprolol tartrate 12.5 milliGRAM(s) Oral two times a day  multivitamin 1 Tablet(s) Oral daily  pantoprazole    Tablet 40 milliGRAM(s) Oral before breakfast  senna 2 Tablet(s) Oral at bedtime      Physical Exam  General: Patient comfortable in bed  Vital Signs Last 12 Hrs  T(F): 97.9 (10-03-19 @ 08:18), Max: 97.9 (10-03-19 @ 08:18)  HR: 74 (10-03-19 @ 08:18) (74 - 78)  BP: 128/67 (10-03-19 @ 08:18) (101/64 - 128/67)  BP(mean): --  RR: 18 (10-03-19 @ 08:18) (18 - 18)  SpO2: 96% (10-03-19 @ 08:18) (95% - 96%)  Neck: No palpable thyroid nodules.  CVS: S1S2, No murmurs  Respiratory: No wheezing, no crepitations  GI: Abdomen soft, bowel sounds positive  Musculoskeletal:  edema lower extremities.   Skin: No skin rashes, no ecchymosis    Diagnostics

## 2019-10-03 NOTE — PROGRESS NOTE ADULT - NSHPATTENDINGPLANDISCUSS_GEN_ALL_CORE
Dr. Boo, Medicine NP
Surgery, Medicine
Dr. Boo
Medicine
NP
house staff, cardiology, nephro and ID team
NP
house staff, cardiology, nephro and ID and surgical team
house staff, cardiology, nephro and ID team
house staff, cardiology, nephro and ID team, grandson at the bedside
house staff, cardiology, nephro and ID team. discussed in detail with daughter at the bedside
house staff, cardiology, nephro and ID and surgical team and daughter on the phone
house staff, cardiology, nephro and ID team, grandson at the bedside
house staff, cardiology, nephro and ID and surgical team and daughter at the bedside
house staff, cardiology, nephro and ID team. discussed in detail with daughter at the bedside
house staff, cardiology, nephro and ID team. discussed in detail with daughter at the bedside
house staff, cardiology, nephro and ID and surgical team
house staff, cardiology, nephro and ID and surgical team and daughter on the phone
house staff, cardiology, nephro and ID and surgical team. son at the bedside
house staff, cardiology, nephro and ID team
house staff, cardiology, nephro and ID team. discussed in detail with daughter at the bedside
house staff, cardiology, nephro and ID and surgical team
house staff, cardiology, nephro and ID and surgical team and daughter at the bedside
house staff, cardiology, nephro and ID and surgical team and daughter on the phone
house staff, cardiology, nephro and ID and surgical team and daughter on the phone
house staff, cardiology, nephro and ID team
house staff, cardiology, nephro and ID team
house staff, cardiology, nephro and ID team. discussed in detail with daughter at the bedside

## 2019-10-03 NOTE — PROGRESS NOTE ADULT - PROBLEM SELECTOR PROBLEM 10
Prophylactic measure

## 2019-10-03 NOTE — PROGRESS NOTE ADULT - PROBLEM SELECTOR PROBLEM 5
ROB (acute kidney injury)
CHF (congestive heart failure)
CHF (congestive heart failure)
ROB (acute kidney injury)
ROB (acute kidney injury)
CHF (congestive heart failure)
ROB (acute kidney injury)
CHF (congestive heart failure)
ROB (acute kidney injury)
ROB (acute kidney injury)
CHF (congestive heart failure)
CHF (congestive heart failure)
ROB (acute kidney injury)
CHF (congestive heart failure)
ROB (acute kidney injury)
ROB (acute kidney injury)

## 2019-10-03 NOTE — PROGRESS NOTE ADULT - PROBLEM SELECTOR PLAN 8
Lantus per endo   SS and Diab diet   A1C elevated, will adjust lantus  Endo eval appreciated
Lantus per endo   SS and Diab diet   A1C elevated, will adjust lantus  Endo eval appreciated
Cont home meds and monitor vitals
Cont home meds and monitor vitals
Lantus per endo   SS and Diab diet   A1C elevated, will adjust lantus  Endo eval appreciated
Lantus per endo   SS and Diab diet   A1C elevated, will adjust lantus  Endo eval appreciated
Cont home meds and monitor vitals
Lantus increased to 15 BID  SS and Diab diet   A1C elevated, will adjust lantus
Lantus per endo   SS and Diab diet   A1C elevated, will adjust lantus  Endo eval appreciated
Cont home meds and monitor vitals
Lantus per endo   SS and Diab diet   A1C elevated, will adjust lantus  Endo eval appreciated
Lantus per endo   SS and Diab diet   A1C elevated, will adjust lantus  Endo eval appreciated
Cont home meds and monitor vitals
Cont home meds and monitor vitals
Lantus increased to 18 BID  SS and Diab diet   A1C elevated, will adjust lantus  Endo eval called
Cont home meds and monitor vitals
Lantus per endo   SS and Diab diet   A1C elevated, will adjust lantus  Endo eval appreciated

## 2019-10-03 NOTE — PROGRESS NOTE ADULT - PROBLEM SELECTOR PLAN 7
DAPT and statin  monitor HR and vitals  Anemia W/U
DAPT and statin  monitor HR and vitals  Anemia W/U
Lantus increased to 15 BID  SS and Diab diet   A1C elevated, will adjust lantus
Lantus increased to 15 BID  SS and Diab diet   A1C elevated, will adjust lantus
DAPT and statin  monitor HR and vitals  Anemia W/U
Lantus 10 BID  SS and Diab diet   A1C elevated, will adjust lantus
DAPT and statin  monitor HR and vitals  Anemia W/U
DAPT and statin  restart ASA and plavix. monitor Hgb level   monitor HR and vitals  Anemia W/U
Lantus increased to 15 BID  SS and Diab diet   A1C elevated, will adjust lantus
DAPT and statin  monitor HR and vitals  Anemia W/U
Lantus increased to 15 BID  SS and Diab diet   A1C elevated, will adjust lantus
DAPT and statin  monitor HR and vitals  Anemia W/U
Lantus increased to 15 BID  SS and Diab diet   A1C elevated, will adjust lantus
Lantus increased to 15 BID  SS and Diab diet   A1C elevated, will adjust lantus
DAPT and statin  monitor HR and vitals  Anemia W/U
Lantus increased to 15 BID  SS and Diab diet   A1C elevated, will adjust lantus
DAPT and statin  monitor HR and vitals  Anemia W/U
DAPT and statin  monitor HR and vitals  Anemia W/U

## 2019-10-03 NOTE — PROGRESS NOTE ADULT - SUBJECTIVE AND OBJECTIVE BOX
Subjective: Patient seen and examined. No new events except as noted.   doing well   pain control     REVIEW OF SYSTEMS:    CONSTITUTIONAL: No weakness, fevers or chills  EYES/ENT: No visual changes;  No vertigo or throat pain   NECK: No pain or stiffness  RESPIRATORY: No cough, wheezing, hemoptysis; No shortness of breath  CARDIOVASCULAR: No chest pain or palpitations  GASTROINTESTINAL: No abdominal or epigastric pain. No nausea, vomiting, or hematemesis; No diarrhea or constipation. No melena or hematochezia.  GENITOURINARY: No dysuria, frequency or hematuria  NEUROLOGICAL: No numbness or weakness  SKIN: No itching, burning, rashes, or lesions   All other review of systems is negative unless indicated above.    MEDICATIONS:  MEDICATIONS  (STANDING):  aspirin enteric coated 81 milliGRAM(s) Oral daily  atorvastatin 80 milliGRAM(s) Oral at bedtime  clopidogrel Tablet 75 milliGRAM(s) Oral daily  dextrose 50% Injectable 12.5 Gram(s) IV Push once  dextrose 50% Injectable 25 Gram(s) IV Push once  dextrose 50% Injectable 25 Gram(s) IV Push once  docusate sodium 100 milliGRAM(s) Oral three times a day  gabapentin 100 milliGRAM(s) Oral daily  heparin  Injectable 5000 Unit(s) SubCutaneous every 8 hours  influenza   Vaccine 0.5 milliLiter(s) IntraMuscular once  insulin glargine Injectable (LANTUS) 6 Unit(s) SubCutaneous at bedtime  insulin lispro (HumaLOG) corrective regimen sliding scale   SubCutaneous at bedtime  insulin lispro (HumaLOG) corrective regimen sliding scale   SubCutaneous three times a day before meals  lactobacillus acidophilus 1 Tablet(s) Oral daily  lidocaine   Patch 2 Patch Transdermal daily  metoprolol tartrate 12.5 milliGRAM(s) Oral two times a day  multivitamin 1 Tablet(s) Oral daily  pantoprazole    Tablet 40 milliGRAM(s) Oral before breakfast  senna 2 Tablet(s) Oral at bedtime      PHYSICAL EXAM:  T(C): 36.9 (10-03-19 @ 17:18), Max: 36.9 (10-03-19 @ 17:18)  HR: 78 (10-03-19 @ 17:18) (74 - 85)  BP: 106/61 (10-03-19 @ 17:18) (101/64 - 137/79)  RR: 18 (10-03-19 @ 17:18) (18 - 18)  SpO2: 95% (10-03-19 @ 17:18) (95% - 96%)  Wt(kg): --  I&O's Summary    02 Oct 2019 07:01  -  03 Oct 2019 07:00  --------------------------------------------------------  IN: 1320 mL / OUT: 660 mL / NET: 660 mL    03 Oct 2019 07:01  -  03 Oct 2019 18:35  --------------------------------------------------------  IN: 0 mL / OUT: 500 mL / NET: -500 mL          Appearance: Normal	  HEENT:   Normal oral mucosa, PERRL, EOMI	  Lymphatic: No lymphadenopathy , no edema  Cardiovascular: Normal S1 S2  Respiratory: Lungs clear to auscultation, normal effort 	  Gastrointestinal:  Soft, Non-tender, + BS	  Skin: buttock dressing intact   Musculoskeletal: Normal range of motion, normal strength  Psychiatry:  Mood & affect appropriate  Ext: No edema      All labs, Imaging and EKGs personally reviewed                           8.7    5.48  )-----------( 274      ( 03 Oct 2019 08:55 )             28.8               10-03    137  |  104  |  15  ----------------------------<  149<H>  4.8   |  25  |  0.97    Ca    9.1      03 Oct 2019 07:02

## 2019-10-03 NOTE — PROGRESS NOTE ADULT - SUBJECTIVE AND OBJECTIVE BOX
S: Patient doing well, denies fevers, chills, pain. Notes some mild nausea, with no emesis.    O: Vital Signs  T(C): 36.8 (10-03 @ 14:03), Max: 36.8 (10-03 @ 14:03)  HR: 85 (10-03 @ 14:03) (74 - 85)  BP: 137/79 (10-03 @ 14:03) (101/64 - 137/79)  RR: 18 (10-03 @ 14:03) (18 - 18)  SpO2: 96% (10-03 @ 14:03) (95% - 97%)  10-02-19 @ 07:01  -  10-03-19 @ 07:00  --------------------------------------------------------  IN: 1320 mL / OUT: 660 mL / NET: 660 mL    10-03-19 @ 07:01  -  10-03-19 @ 15:44  --------------------------------------------------------  IN: 0 mL / OUT: 500 mL / NET: -500 mL      General: alert and oriented, NAD  Resp: airway patent, respirations unlabored  Abdomen: soft, nontender, nondistended  Wound: good pink granulation tissue, no purulence  Extremities: no edema  Skin: warm, dry, appropriate color                          8.7    5.48  )-----------( 274      ( 03 Oct 2019 08:55 )             28.8   10-03    137  |  104  |  15  ----------------------------<  149<H>  4.8   |  25  |  0.97    Ca    9.1      03 Oct 2019 07:02

## 2019-10-03 NOTE — PROGRESS NOTE ADULT - PROBLEM SELECTOR PLAN 10
DVT and GI PPX

## 2019-10-04 ENCOUNTER — TRANSCRIPTION ENCOUNTER (OUTPATIENT)
Age: 84
End: 2019-10-04

## 2019-10-04 VITALS
RESPIRATION RATE: 18 BRPM | TEMPERATURE: 99 F | HEART RATE: 66 BPM | SYSTOLIC BLOOD PRESSURE: 125 MMHG | OXYGEN SATURATION: 96 % | DIASTOLIC BLOOD PRESSURE: 79 MMHG

## 2019-10-04 LAB
HCT VFR BLD CALC: 28.7 % — LOW (ref 34.5–45)
HGB BLD-MCNC: 9 G/DL — LOW (ref 11.5–15.5)
MCHC RBC-ENTMCNC: 31.3 PG — SIGNIFICANT CHANGE UP (ref 27–34)
MCHC RBC-ENTMCNC: 31.4 GM/DL — LOW (ref 32–36)
MCV RBC AUTO: 99.7 FL — SIGNIFICANT CHANGE UP (ref 80–100)
PLATELET # BLD AUTO: 255 K/UL — SIGNIFICANT CHANGE UP (ref 150–400)
RBC # BLD: 2.88 M/UL — LOW (ref 3.8–5.2)
RBC # FLD: 20.5 % — HIGH (ref 10.3–14.5)
WBC # BLD: 5.1 K/UL — SIGNIFICANT CHANGE UP (ref 3.8–10.5)
WBC # FLD AUTO: 5.1 K/UL — SIGNIFICANT CHANGE UP (ref 3.8–10.5)

## 2019-10-04 RX ORDER — OXYCODONE HYDROCHLORIDE 5 MG/1
1 TABLET ORAL
Qty: 0 | Refills: 0 | DISCHARGE
Start: 2019-10-04

## 2019-10-04 RX ORDER — ACETAMINOPHEN 500 MG
2 TABLET ORAL
Qty: 0 | Refills: 0 | DISCHARGE
Start: 2019-10-04

## 2019-10-04 RX ORDER — RAMIPRIL 5 MG
1 CAPSULE ORAL
Qty: 0 | Refills: 0 | DISCHARGE

## 2019-10-04 RX ORDER — METOPROLOL TARTRATE 50 MG
1 TABLET ORAL
Qty: 0 | Refills: 0 | DISCHARGE

## 2019-10-04 RX ORDER — LACTOBACILLUS ACIDOPHILUS 100MM CELL
1 CAPSULE ORAL
Qty: 0 | Refills: 0 | DISCHARGE
Start: 2019-10-04

## 2019-10-04 RX ORDER — SPIRONOLACTONE 25 MG/1
1 TABLET, FILM COATED ORAL
Qty: 0 | Refills: 0 | DISCHARGE

## 2019-10-04 RX ORDER — SENNA PLUS 8.6 MG/1
2 TABLET ORAL
Qty: 0 | Refills: 0 | DISCHARGE
Start: 2019-10-04

## 2019-10-04 RX ORDER — GABAPENTIN 400 MG/1
1 CAPSULE ORAL
Qty: 0 | Refills: 0 | DISCHARGE
Start: 2019-10-04

## 2019-10-04 RX ORDER — LIDOCAINE 4 G/100G
2 CREAM TOPICAL
Qty: 0 | Refills: 0 | DISCHARGE
Start: 2019-10-04

## 2019-10-04 RX ORDER — INSULIN LISPRO 100/ML
12 VIAL (ML) SUBCUTANEOUS
Qty: 0 | Refills: 0 | DISCHARGE

## 2019-10-04 RX ORDER — DOCUSATE SODIUM 100 MG
1 CAPSULE ORAL
Qty: 0 | Refills: 0 | DISCHARGE
Start: 2019-10-04

## 2019-10-04 RX ADMIN — Medication 2: at 08:30

## 2019-10-04 RX ADMIN — GABAPENTIN 100 MILLIGRAM(S): 400 CAPSULE ORAL at 11:38

## 2019-10-04 RX ADMIN — CLOPIDOGREL BISULFATE 75 MILLIGRAM(S): 75 TABLET, FILM COATED ORAL at 11:38

## 2019-10-04 RX ADMIN — HEPARIN SODIUM 5000 UNIT(S): 5000 INJECTION INTRAVENOUS; SUBCUTANEOUS at 13:11

## 2019-10-04 RX ADMIN — PANTOPRAZOLE SODIUM 40 MILLIGRAM(S): 20 TABLET, DELAYED RELEASE ORAL at 06:50

## 2019-10-04 RX ADMIN — Medication 6: at 12:27

## 2019-10-04 RX ADMIN — Medication 1 TABLET(S): at 11:38

## 2019-10-04 RX ADMIN — Medication 81 MILLIGRAM(S): at 11:38

## 2019-10-04 RX ADMIN — HEPARIN SODIUM 5000 UNIT(S): 5000 INJECTION INTRAVENOUS; SUBCUTANEOUS at 06:50

## 2019-10-04 RX ADMIN — Medication 12.5 MILLIGRAM(S): at 06:50

## 2019-10-04 RX ADMIN — Medication 1 TABLET(S): at 12:27

## 2019-10-04 NOTE — DISCHARGE NOTE PROVIDER - CARE PROVIDER_API CALL
Renu Muñoz)  EndocrinologyMetabDiabetes; Internal Medicine  [address] [place] [address]  Phone: [phone]  Fax: [phone]  Follow Up Time: Renu Muñoz)  EndocrinologyMetabDiabetes; Internal Medicine  [address] [place] [address]  Phone: [phone]  Fax: [phone]  Follow Up Time:     Tadeo Wan)  Surgery  [address] [facility], [address]  [place] [address]  Phone: [phone]  Fax: [phone]  Follow Up Time:

## 2019-10-04 NOTE — DISCHARGE NOTE PROVIDER - HOSPITAL COURSE
Pt is a 87 y/o Female with PMHx of CHF, DM, HLD, HTN, CAD prior MI, c/o fatigue, "feeling off" and unable to get out of bed; Tmax 104F toay. Rash/redness on buttocks noted by home health aid. patient also complaining of +chronic cough, nonproductive.  No abdominal pain, no n/v/d. No chest pain.  States she has been feeling hot and cold over past few days, and took her temperature today, found fever of 104, and came to ED for evaluation.  No dysuria, no diarrhea. +constipation (chronic).  Increased diffuse leg swelling for past few days. patient lives at home with family. walks by herself with no use of walker. denies of any MEAD< chest pain on exertion. compliant with all her home medications             INCOMPLETE Pt is a 85 y/o Female with PMHx of CHF, DM, HLD, HTN, CAD prior MI, c/o fatigue, "feeling off" and unable to get out of bed; Tmax 104F toay. Rash/redness on buttocks noted by home health aid. patient also complaining of +chronic cough, nonproductive.  No abdominal pain, no n/v/d. No chest pain.  States she has been feeling hot and cold over past few days, and took her temperature today, found fever of 104, and came to ED for evaluation.  No dysuria, no diarrhea. +constipation (chronic).  Increased diffuse leg swelling for past few days. patient lives at home with family. walks by herself with no use of walker. denies of any MEAD< chest pain on exertion. compliant with all her home medications             Work-up revealed ECOLI UTI which was treated with IV antibiotics. CT chest showed small B/L pleural effusions. CT A/P initially showed Infiltration of the posterior perineal soft tissues, right greater than     left. Correlation for cellulitis and tenderness recommended. Due to patient's on-going complaints of severe buttock pain despite analgesia, without relief; a repeat CT A/P was done and found  subcutaneous soft tissue inflammatory changes, fluid and     incompletely imaged small foci of gas along the medial aspect of the right buttock, with significantly increased infiltration of the soft tissues in the perineum. Surgery, who was initially consulted after first CT and followed 2/2 to possible abscess, took patient to OR emergently and performed debridement of necrotic/ infected skin and tissue; with incision and drainage of fluid collection on 9/15. ID followed for entire hospitalization; adjusted IV antibiotics based on clinical findings; Eventually covered by 3ple antibiotics. Condition was slow to improve post-op and left side buttock began to look discolored with an area of bogginess that subsequently opened. Repeat CT A/P showed Open wound overlying the coccyx extending toward the left ischiorectal fossa with trace fluid component and air. Patient taken back to OR on 9/28 and underwent selective debridement of B/L necrotic wounds. During hospital course, Psychiatry called to consult on pt's capacity to make decisions regarding her care and proposed surgical intervention; she was deemed to have no capacity regarding these decisions and patient's dtr who is/ was HCP made decisions regarding care.  Patient improved significantly after 2nd procedure; ID scaled back on antibiotics. Wound care 2x/day continued. Medically cleared for discharge to Rehab by ID, Surgery, and Dr Boo (Med Attdg).

## 2019-10-04 NOTE — PROGRESS NOTE ADULT - PROVIDER SPECIALTY LIST ADULT
Cardiology
Colorectal Surgery
Colorectal Surgery
Endocrinology
Infectious Disease
Internal Medicine
Nephrology
Psychiatry
Surgery
Internal Medicine
Internal Medicine
Infectious Disease
Infectious Disease
Hospitalist
Surgery
Infectious Disease
Infectious Disease
Endocrinology
Internal Medicine
Nephrology
Psychiatry
Internal Medicine
Nephrology
Internal Medicine
Nephrology
Nephrology
Internal Medicine

## 2019-10-04 NOTE — PROGRESS NOTE ADULT - ASSESSMENT
Assessment:  DMT2: 86y Female with DM T2 with hyperglycemia, A1C 10.2%, on low-dose insulin, FS trending within acceptable range, no new hypoglycemic episode, eating meals, comfortable, being d/c to rehab.  Abscess: s/p B/L debridement of necrotic tissue on the buttock 9/28. Monitored and FU primary team/surgery/ID.  Anemia: s/p transfusion, stable, monitored.  HTN: Controlled, on antihypertensive medications.          Radha Chin MD  Cell: [phone]  Office: [phone] Assessment:  DMT2: 86y Female with DM T2 with hyperglycemia, A1C 10.2%, on low-dose insulin, FS trending within acceptable range,  no new hypoglycemic episode, eating meals, comfortable, being d/c to rehab.  Abscess: s/p B/L debridement of necrotic tissue on the buttock 9/28. Monitored and FU primary team/surgery/ID.  Anemia: s/p transfusion, stable, monitored.  HTN: Controlled, on antihypertensive medications.          Radha Chin MD  Cell: [phone]  Office: [phone]

## 2019-10-04 NOTE — PROGRESS NOTE ADULT - PROBLEM SELECTOR PROBLEM 1
Acute kidney injury superimposed on CKD
Abscess
Acute kidney injury superimposed on CKD
Fever
Type 2 diabetes mellitus without complication, with long-term current use of insulin
Abscess
Acute kidney injury superimposed on CKD
Fever
Type 2 diabetes mellitus without complication, with long-term current use of insulin
Abscess
Fever
Acute kidney injury superimposed on CKD
Acute kidney injury superimposed on CKD
Fever
Abscess
Acute kidney injury superimposed on CKD
Abscess
Fever
Fever
Abscess

## 2019-10-04 NOTE — PROGRESS NOTE ADULT - PROBLEM SELECTOR PLAN 3
mixed etiology  low bp likely contributing  ivf as above  check urine osm daily
resolved  free h20 restriction to 1 liter   trend na
stable, asymptomatic.   on lasix as above  monitor na  free h20 restriction to 1 liter   trend na
mixed etiology- na stable.  low bp likely contributing  ivf as above
S/P pRBC   monitor HgB level   active T&S  SCDs
S/P pRBC  monitor HgB level   active T&S  SCDs  transfuse if Hgb drop further tomorrow AM
S/p IV hydration  COnt to hold lasix and ACEi for now, can be restarted after hydration and imroivement of BUN/Cr  cont spironolactone  avoid nephrotoxic medications
mixed etiology- na improving  low bp likely contributing  ivf as above  check urine osm daily
mixed etiology- na improving  low bp likely contributing  ivf as above  check urine osm daily
Monitor H&H, primary team FU
Monitor H&H, primary team FU.
S/P pRBC   monitor HgB level   active T&S  SCDs
S/P pRBC  monitor HgB level   active T&S  SCDs  stable Hgb
S/P transfused 1 unit PRBC yesterday  monitor HgB level Q12  Today HgB dropped slowly, will repeat and transfuse if further drop since morning   hold plavix and heparin for now in view of surgical site bleeding  keep hg>8  active T&S  SCDs
adjust oral meds per Cr clearance   COnt to hold lasix, spironolactone and ACEi for now, can be restarted after   Cont hydration   avoid nephrotoxic medications  Nephrology eval called, appreciate recs   Renal US  Urine lytes and studies  monitor output  Oral and gentle IV hydration
adjust oral meds per Cr clearance   COnt to hold lasix, spironolactone and ACEi for now, can be restarted after   Cont hydration   avoid nephrotoxic medications  Nephrology eval called, appreciate recs   Renal US  Urine lytes and studies  monitor output  Oral and gentle IV hydration  Renal US noted
drop since yesterday  repeat CBC in the evening, transfuse in HgB<7.5  S/P pRBC   monitor HgB level   active T&S  SCDs
improving
improving
improving  d/c ivf  monitor  check urine osm
likely hypovolemic  ivf as above  monitor
mixed etiology  low bp likely contributing  ivf as above  check urine osm
mixed etiology- na improving  low bp likely contributing  ivf as above
mixed etiology- na improving  low bp likely contributing  ivf as above  check urine osm daily
mixed etiology- na low  likely hypovolemic  pt receiving prbc  check urine osm  trend na
mixed etiology- na low  likely hypovolemic  s/p  prbc  check urine osm  trend na
mixed etiology- na stable.  low bp likely contributing  ivf as above
monitor Hg level   hold plavix and heparin for now in view of surgical site bleeding  keep hg>8  active T&S  SCDs  rectal tube as per surgical recs to keep area clean
stable, asymptomatic.   d/c ivf, encourage increase PO intake.   may improve with lasix.
stable, asymptomatic.   on lasix as above  monitor na  free h20 restriction to 1 liter   trend na
transfused 1 unit PRBC today   monitor Hg level   hold plavix and heparin for now in view of surgical site bleeding  keep hg>8  active T&S  SCDs  rectal tube as per surgical recs to keep area clean
S/P transfused 1 unit PRBC   monitor HgB level Q12  repeat HgB stable   keep hg>8  active T&S  SCDs
resolved   adjust oral meds per Cr clearance   COnt to hold lasix, spironolactone and ACEi for now, can be restarted after   Cont hydration   avoid nephrotoxic medications  Nephrology eval called, appreciate recs   Renal US  Urine lytes and studies  monitor output  Oral and gentle IV hydration  Renal US noted
S/P pRBC   monitor HgB level   active T&S  SCDs
worsening BUN/Cr   COnt to hold lasix, spironolactone and ACEi for now, can be restarted after   Cont hydration   avoid nephrotoxic medications  Nephrology eval called, appreciate recs   Renal US  Urine lytes and studies  monitor output  Oral and gentle IV hydration
worsening BUN/Cr   COnt to hold lasix, spironolactone and ACEi for now, can be restarted after hydration and imroivement of BUN/Cr  avoid nephrotoxic medications  Nephrology eval called, appreciate recs   Renal US  Urine lytes and studies  monitor output  Oral and gentle IV hydration
S/P transfused 1 unit PRBC   monitor HgB level Q12  repeat HgB stable   keep hg>8  active T&S  SCDs
adjust oral meds per Cr clearance   COnt to hold lasix, spironolactone and ACEi for now, can be restarted after   Cont hydration   avoid nephrotoxic medications  Nephrology eval called, appreciate recs   Renal US  Urine lytes and studies  monitor output  Oral and gentle IV hydration  Renal US noted
S/P pRBC   monitor HgB level   active T&S  SCDs
S/P pRBC  monitor HgB level   active T&S  SCDs  stable Hgb
S/P transfused 1 unit PRBC   monitor HgB level Q12  repeat HgB stable   keep hg>8  active T&S  SCDs
adjust oral meds per Cr clearance   COnt to hold lasix, spironolactone and ACEi for now, can be restarted after   Cont hydration   avoid nephrotoxic medications  Nephrology eval called, appreciate recs   Renal US  Urine lytes and studies  monitor output  Oral and gentle IV hydration  Renal US noted
trending down slowly   COnt to hold lasix, spironolactone and ACEi for now, can be restarted after   Cont hydration   avoid nephrotoxic medications  Nephrology eval called, appreciate recs   Renal US  Urine lytes and studies  monitor output  Oral and gentle IV hydration
S/P pRBC   monitor HgB level   active T&S  SCDs

## 2019-10-04 NOTE — DISCHARGE NOTE PROVIDER - NSDCCPCAREPLAN_GEN_ALL_CORE_FT
PRINCIPAL DISCHARGE DIAGNOSIS  Diagnosis: Urinary tract infection  Assessment and Plan of Treatment:       SECONDARY DISCHARGE DIAGNOSES  Diagnosis: Abscess of multiple sites of buttock  Assessment and Plan of Treatment:     Diagnosis: Fever  Assessment and Plan of Treatment: Fever    Diagnosis: Type 2 diabetes mellitus without complication, with long-term current use of insulin  Assessment and Plan of Treatment: Type 2 diabetes mellitus without complication, with long-term current use of insulin    Diagnosis: Anemia  Assessment and Plan of Treatment: Anemia    Diagnosis: Hyponatremia  Assessment and Plan of Treatment: Hyponatremia    Diagnosis: Acute kidney injury superimposed on CKD  Assessment and Plan of Treatment: Acute kidney injury superimposed on CKD PRINCIPAL DISCHARGE DIAGNOSIS  Diagnosis: Urinary tract infection  Assessment and Plan of Treatment: You have completed antibiotic treatment..   Follow up with your healthcare provider one week after discharge from Rehab. Call for an appointment.   If you develop a fever, burning on urination, difficulty voiding, call your healthcare provider.      SECONDARY DISCHARGE DIAGNOSES  Diagnosis: Abscess of multiple sites of buttock  Assessment and Plan of Treatment: You've undergone an incision and drainage of B/L buttock abscess.  There is a drain that connects both wounds.  Wound care will be done 2x/ day.  You will need to follow up with the Surgeon in 7-10 days while in Rehab.  Please call the contact number listed for Dr Wan to make appointment.      Diagnosis: Fever  Assessment and Plan of Treatment: Condition resolved.    Diagnosis: Type 2 diabetes mellitus without complication, with long-term current use of insulin  Assessment and Plan of Treatment: HgA1C this admission 10.2  Make sure you get your HgA1c checked every three months.  If you take oral diabetes medications, check your blood glucose two times a day.  If you take insulin, check your blood glucose before meals and at bedtime.  It's important not to skip any meals.  Keep a log of your blood glucose results and always take it with you to your doctor appointments.  Keep a list of your current medications including injectables and over the counter medications and bring this medication list with you to all your doctor appointments.  If you have not seen your ophthalmologist this year call for appointment.  Check your feet daily for redness, sores, or openings. Do not self treat. If no improvement in two days call your primary care physician for an appointment.    Diagnosis: Anemia  Assessment and Plan of Treatment: You received blood transfusios while hopsitalized.  Your blood count has improved and ther are no further signs of bleeding from your buttock wounds.  Follow up with your primary healthcare provider 1 week after discharge from Rehab.    Diagnosis: Hyponatremia  Assessment and Plan of Treatment: Condition has resolved.    Diagnosis: Acute kidney injury superimposed on CKD  Assessment and Plan of Treatment: Condition improved.  Avoid taking (NSAIDs) - (ex: Ibuprofen, Advil, Celebrex, Naprosyn)  Avoid taking any nephrotoxic agents (can harm kidneys) - Intravenous contrast for diagnostic testing, combination cold medications.  Have all medications adjusted for your renal function by your Health Care Provider.  Blood pressure control is important.  Take all medication as prescribed.

## 2019-10-04 NOTE — DISCHARGE NOTE NURSING/CASE MANAGEMENT/SOCIAL WORK - PATIENT PORTAL LINK FT
You can access the FollowMyHealth Patient Portal offered by [facility] by registering at the following website: [email or web address]. By joining [email or web address]’s FollowMyHealth portal, you will also be able to view your health information using other applications (apps) compatible with our system.

## 2019-10-04 NOTE — PROGRESS NOTE ADULT - SUBJECTIVE AND OBJECTIVE BOX
Infectious Diseases progress note:    Subjective:  NAD, afebrile.  No pain in perirectal area.  No diarrhea.  Daughter at bedside.     ROS:  CONSTITUTIONAL:  No fever, chills, rigors  CARDIOVASCULAR:  No chest pain or palpitations  RESPIRATORY:   No SOB, cough, dyspnea on exertion.  No wheezing  GASTROINTESTINAL:  No abd pain, N/V, diarrhea/constipation  EXTREMITIES:  No swelling or joint pain  GENITOURINARY:  No burning on urination, increased frequency or urgency.  No flank pain  NEUROLOGIC:  No HA, visual disturbances  SKIN: No rashes    Allergies    codeine (Unknown)  Kiwi (Anaphylaxis)  penicillins (Anaphylaxis)    Intolerances        ANTIBIOTICS/RELEVANT:  antimicrobials    immunologic:  influenza   Vaccine 0.5 milliLiter(s) IntraMuscular once    OTHER:  acetaminophen   Tablet .. 650 milliGRAM(s) Oral every 6 hours PRN  aspirin enteric coated 81 milliGRAM(s) Oral daily  atorvastatin 80 milliGRAM(s) Oral at bedtime  bisacodyl Suppository 10 milliGRAM(s) Rectal daily PRN  clopidogrel Tablet 75 milliGRAM(s) Oral daily  dextrose 40% Gel 15 Gram(s) Oral once PRN  dextrose 50% Injectable 12.5 Gram(s) IV Push once  dextrose 50% Injectable 25 Gram(s) IV Push once  dextrose 50% Injectable 25 Gram(s) IV Push once  docusate sodium 100 milliGRAM(s) Oral three times a day  gabapentin 100 milliGRAM(s) Oral daily  glucagon  Injectable 1 milliGRAM(s) IntraMuscular once PRN  haloperidol    Injectable 0.5 milliGRAM(s) IV Push every 8 hours PRN  heparin  Injectable 5000 Unit(s) SubCutaneous every 8 hours  insulin glargine Injectable (LANTUS) 6 Unit(s) SubCutaneous at bedtime  insulin lispro (HumaLOG) corrective regimen sliding scale   SubCutaneous at bedtime  insulin lispro (HumaLOG) corrective regimen sliding scale   SubCutaneous three times a day before meals  lactobacillus acidophilus 1 Tablet(s) Oral daily  lidocaine   Patch 2 Patch Transdermal daily  metoprolol tartrate 12.5 milliGRAM(s) Oral two times a day  multivitamin 1 Tablet(s) Oral daily  ondansetron Injectable 4 milliGRAM(s) IV Push every 8 hours PRN  oxyCODONE    IR 10 milliGRAM(s) Oral every 4 hours PRN  oxyCODONE    IR 5 milliGRAM(s) Oral every 4 hours PRN  pantoprazole    Tablet 40 milliGRAM(s) Oral before breakfast  senna 2 Tablet(s) Oral at bedtime  simethicone 80 milliGRAM(s) Chew every 8 hours PRN      Objective:  Vital Signs Last 24 Hrs  T(C): 37.2 (04 Oct 2019 11:09), Max: 37.2 (04 Oct 2019 11:09)  T(F): 98.9 (04 Oct 2019 11:09), Max: 98.9 (04 Oct 2019 11:09)  HR: 66 (04 Oct 2019 11:09) (66 - 85)  BP: 125/79 (04 Oct 2019 11:09) (106/61 - 137/79)  BP(mean): --  RR: 18 (04 Oct 2019 11:09) (18 - 18)  SpO2: 96% (04 Oct 2019 11:09) (95% - 97%)    PHYSICAL EXAM:  Constitutional:NAD  Eyes:NIDIA, EOMI  Ear/Nose/Throat: no thrush, mucositis.  Moist mucous membranes	  Neck:no JVD, no lymphadenopathy, supple  Respiratory: CTA lucian  Cardiovascular: S1S2 RRR, no murmurs  Gastrointestinal:soft, nontender,  nondistended (+) BS  Extremities:no e/e/c  Skin:  perirectal drain in place extending from right to left buttock.          LABS:                        9.0    5.10  )-----------( 255      ( 04 Oct 2019 09:47 )             28.7     10-03    137  |  104  |  15  ----------------------------<  149<H>  4.8   |  25  |  0.97    Ca    9.1      03 Oct 2019 07:02            Procalcitonin, Serum: 0.49 (09-05 @ 07:29)            Rapid RVP Result: [facility]          MICROBIOLOGY:    Culture - Surgical Swab (09.15.19 @ 18:03)    Specimen Source: .Surgical Swab    Culture Results:   Rare Enterococcus species "Susceptibilities not performed"  Few Bacteroides thetaiotamcron group "Susceptibilities not performed"    Culture - Blood (09.08.19 @ 01:32)    Specimen Source: .Blood    Culture Results:   No growth at 5 days.          RADIOLOGY & ADDITIONAL STUDIES:

## 2019-10-04 NOTE — PROGRESS NOTE ADULT - SUBJECTIVE AND OBJECTIVE BOX
Chief complaint  Patient is a 86y old  Female who presents with a chief complaint of Fever, UTI (04 Oct 2019 11:52)   Review of systems  Patient in bed, looks comfortable, no fever, no hypoglycemia.    Labs and Fingersticks  CAPILLARY BLOOD GLUCOSE      POCT Blood Glucose.: 251 mg/dL (04 Oct 2019 12:17)  POCT Blood Glucose.: 188 mg/dL (04 Oct 2019 08:23)  POCT Blood Glucose.: 234 mg/dL (03 Oct 2019 22:09)  POCT Blood Glucose.: 190 mg/dL (03 Oct 2019 17:15)      Anion Gap, Serum: 8 (10-03 @ 07:02)      Calcium, Total Serum: 9.1 (10-03 @ 07:02)          10-03    137  |  104  |  15  ----------------------------<  149<H>  4.8   |  25  |  0.97    Ca    9.1      03 Oct 2019 07:02                          9.0    5.10  )-----------( 255      ( 04 Oct 2019 09:47 )             28.7     Medications  MEDICATIONS  (STANDING):  aspirin enteric coated 81 milliGRAM(s) Oral daily  atorvastatin 80 milliGRAM(s) Oral at bedtime  clopidogrel Tablet 75 milliGRAM(s) Oral daily  dextrose 50% Injectable 12.5 Gram(s) IV Push once  dextrose 50% Injectable 25 Gram(s) IV Push once  dextrose 50% Injectable 25 Gram(s) IV Push once  docusate sodium 100 milliGRAM(s) Oral three times a day  gabapentin 100 milliGRAM(s) Oral daily  heparin  Injectable 5000 Unit(s) SubCutaneous every 8 hours  influenza   Vaccine 0.5 milliLiter(s) IntraMuscular once  insulin glargine Injectable (LANTUS) 6 Unit(s) SubCutaneous at bedtime  insulin lispro (HumaLOG) corrective regimen sliding scale   SubCutaneous at bedtime  insulin lispro (HumaLOG) corrective regimen sliding scale   SubCutaneous three times a day before meals  lactobacillus acidophilus 1 Tablet(s) Oral daily  lidocaine   Patch 2 Patch Transdermal daily  metoprolol tartrate 12.5 milliGRAM(s) Oral two times a day  multivitamin 1 Tablet(s) Oral daily  pantoprazole    Tablet 40 milliGRAM(s) Oral before breakfast  senna 2 Tablet(s) Oral at bedtime      Physical Exam  General: Patient comfortable in bed  Vital Signs Last 12 Hrs  T(F): 98.9 (10-04-19 @ 11:09), Max: 98.9 (10-04-19 @ 11:09)  HR: 66 (10-04-19 @ 11:09) (66 - 66)  BP: 125/79 (10-04-19 @ 11:09) (125/79 - 125/79)  BP(mean): --  RR: 18 (10-04-19 @ 11:09) (18 - 18)  SpO2: 96% (10-04-19 @ 11:09) (96% - 96%)  Neck: No palpable thyroid nodules.  CVS: S1S2, No murmurs  Respiratory: No wheezing, no crepitations  GI: Abdomen soft, bowel sounds positive  Musculoskeletal:  edema lower extremities.   Skin: No skin rashes, no ecchymosis    Diagnostics Chief complaint  Patient is a 86y old  Female who presents with a chief complaint of Fever, UTI (04 Oct 2019 11:52)   Review of systems  Patient in bed, looks comfortable, no fever,  no hypoglycemia.    Labs and Fingersticks  CAPILLARY BLOOD GLUCOSE      POCT Blood Glucose.: 251 mg/dL (04 Oct 2019 12:17)  POCT Blood Glucose.: 188 mg/dL (04 Oct 2019 08:23)  POCT Blood Glucose.: 234 mg/dL (03 Oct 2019 22:09)  POCT Blood Glucose.: 190 mg/dL (03 Oct 2019 17:15)      Anion Gap, Serum: 8 (10-03 @ 07:02)      Calcium, Total Serum: 9.1 (10-03 @ 07:02)          10-03    137  |  104  |  15  ----------------------------<  149<H>  4.8   |  25  |  0.97    Ca    9.1      03 Oct 2019 07:02                          9.0    5.10  )-----------( 255      ( 04 Oct 2019 09:47 )             28.7     Medications  MEDICATIONS  (STANDING):  aspirin enteric coated 81 milliGRAM(s) Oral daily  atorvastatin 80 milliGRAM(s) Oral at bedtime  clopidogrel Tablet 75 milliGRAM(s) Oral daily  dextrose 50% Injectable 12.5 Gram(s) IV Push once  dextrose 50% Injectable 25 Gram(s) IV Push once  dextrose 50% Injectable 25 Gram(s) IV Push once  docusate sodium 100 milliGRAM(s) Oral three times a day  gabapentin 100 milliGRAM(s) Oral daily  heparin  Injectable 5000 Unit(s) SubCutaneous every 8 hours  influenza   Vaccine 0.5 milliLiter(s) IntraMuscular once  insulin glargine Injectable (LANTUS) 6 Unit(s) SubCutaneous at bedtime  insulin lispro (HumaLOG) corrective regimen sliding scale   SubCutaneous at bedtime  insulin lispro (HumaLOG) corrective regimen sliding scale   SubCutaneous three times a day before meals  lactobacillus acidophilus 1 Tablet(s) Oral daily  lidocaine   Patch 2 Patch Transdermal daily  metoprolol tartrate 12.5 milliGRAM(s) Oral two times a day  multivitamin 1 Tablet(s) Oral daily  pantoprazole    Tablet 40 milliGRAM(s) Oral before breakfast  senna 2 Tablet(s) Oral at bedtime      Physical Exam  General: Patient comfortable in bed  Vital Signs Last 12 Hrs  T(F): 98.9 (10-04-19 @ 11:09), Max: 98.9 (10-04-19 @ 11:09)  HR: 66 (10-04-19 @ 11:09) (66 - 66)  BP: 125/79 (10-04-19 @ 11:09) (125/79 - 125/79)  BP(mean): --  RR: 18 (10-04-19 @ 11:09) (18 - 18)  SpO2: 96% (10-04-19 @ 11:09) (96% - 96%)  Neck: No palpable thyroid nodules.  CVS: S1S2, No murmurs  Respiratory: No wheezing, no crepitations  GI: Abdomen soft, bowel sounds positive  Musculoskeletal:  edema lower extremities.   Skin: No skin rashes, no ecchymosis    Diagnostics

## 2019-10-04 NOTE — DISCHARGE NOTE PROVIDER - CARE PROVIDERS DIRECT ADDRESSES
,DirectAddress_Unknown ,DirectAddress_Unknown,[email or web address]

## 2019-10-04 NOTE — PROGRESS NOTE ADULT - REASON FOR ADMISSION
Fever, UTI

## 2019-10-04 NOTE — PROGRESS NOTE ADULT - PROBLEM SELECTOR PROBLEM 3
Hyponatremia
Anemia
Hyponatremia
Hyponatremia
ROB (acute kidney injury)
Anemia
Hyponatremia
ROB (acute kidney injury)
Anemia
ROB (acute kidney injury)
Anemia
ROB (acute kidney injury)
Anemia
ROB (acute kidney injury)
ROB (acute kidney injury)
Anemia

## 2019-10-04 NOTE — DISCHARGE NOTE PROVIDER - PROVIDER TOKENS
PROVIDER:[TOKEN:[[id number]]] PROVIDER:[TOKEN:[[id number]]],PROVIDER:[TOKEN:[[id number]]]

## 2019-10-04 NOTE — PROGRESS NOTE ADULT - ATTENDING COMMENTS
Advanced care planning was discussed with patient and family.  Advanced care planning forms were reviewed and discussed.
Advanced care planning was discussed with patient.  Advanced care planning forms were reviewed and discussed.
For any question, call:  Cell # [phone]  Pager # [phone]  Callback # [phone]
Patient seen and examined  Agree with above
Patient seen and examined  Incisions are clean, no necrotic tissue to debride, no purulent drainage.  Penrose in place.     No further surgical debridements planned  Penrose to removed in the office by Dr. Tadeo Wan  Continue local wound care  Continue supportive care per primary team
Patient seen and examined  She states she feels better now, but had a rough night due to pain.   She denies any nausea, vomiting, fever or chills  She responded to blood transfusions yesterday appropriately     On exam she is awake, alert  The incision is clean, dry, and not bleeding  The erythema over the contra-lateral glute is improving compared to yesterday    - Continue antibiotics per ID  - Continue local wound care  - Will continue to follow
Patient seen and examined  Wound is healing well  Erythema improved    - continue local wound care  - continue antibiotics per ID  - will continue to follow
Patient seen and examined.  CT imaging reviewed.    Over the left gluteal area there is persistent erythema correlating to the CT findings.   The right wound is healing well.    - I discussed with the patient that she would likely benefit from an incision, drainage, and debridement of the left gluteal area. However, the patient refuses. I discussed that it may get worse, and cause her to become septic, however, she states she feels better today and again refused.   - I will speak to the patient again tomorrow, but she states she does not want any surgery this weekend.  - Would make the patient NPO pMN in preparation for possible OR tomorrow pending patient consent.
Patient seen and examined.  The left gluteal area is erythematous with necrotic tissue. There is continued induration at the location of concern on CT.     She is refusing surgery at this time, states she wants to wait until Monday  We discussed the CT findings, and that she would likely benefit from debridement and drainage. We discussed the risks of delaying treatment. She adamantly refuses.     We have added her on for the OR Monday.
Patient seen and examined.  She had a CT scan done yesterday  She states the pain is improving    Vital Signs Last 24 Hrs  T(C): 37.7 (08 Sep 2019 10:32), Max: 39.1 (07 Sep 2019 17:45)  T(F): 99.9 (08 Sep 2019 10:32), Max: 102.3 (07 Sep 2019 17:45)  HR: 79 (08 Sep 2019 10:32) (68 - 90)  BP: 92/62 (08 Sep 2019 10:32) (84/50 - 123/62)  BP(mean): --  RR: 18 (08 Sep 2019 10:32) (18 - 18)  SpO2: 92% (08 Sep 2019 10:32) (92% - 96%)    On exam: she is awake, alert and oriented  She is breathing comfortably on room air  She is moving all extremities  Her abd is soft, not tender  Her right buttock has an area of erythema and induration, but without fluctuance, there is no Drainage. It remains tender.    CT pre-penn without collection    - Follow up offical CT read  - Continue antibiotics per ID  - No surgical intervention required at this time
pt seen and examined.  agree with note above.   dressing changed with RN/sx team at bedside this am  f/u post tx cbc  cont wound care  monitor wbc, cont abx per ID  if persistent elevated wbc would consider rpt CT thursday  d/w pt/Rn/NP/family  DR Schreiber covering me 9/18-9/22
pt seen and examined.  agree with note above.  wbc increased to 24K  area of erythema/induration relatively unchanged, no fluctuance palpated  cont abx per ID  if persistent elevated WBC consider rpt CT r/o abscess  oob with PT  d/w pt/ family at bedside in detail
Advanced care planning was discussed with patient and family.  Advanced care planning forms were reviewed and discussed.
Advanced care planning was discussed with patient.  Advanced care planning forms were reviewed and discussed.
Advanced care planning was discussed with patient.  Advanced care planning forms were reviewed and discussed.
Dr Derek Gibson  Nephrology  Office [phone]  Ans Serv [phone]  [facility] [phone]
For any question, call:  Cell # [phone]  Pager # [phone]  Callback # [phone]
For any question, call:  Cell # [phone]  Pager # [phone]  Callback # [phone]
contact with question   cell [phone]  [facility]- [phone]
contact with question   cell [phone]  [facility]- [phone]
Patient counseled for compliance with consistent low carb diet.
Patient counseled for compliance with consistent low carb diet.
DC to rehab, Continue current insulin regime.
Patient counseled for compliance with consistent low carb diet.
Will continue current insulin regimen for now. Will continue monitoring FS, log, will Follow up
Will continue current insulin regimen for now. Will continue monitoring FS, log, will Follow up.
Will continue monitoring FS, log, and FU.
Will continue monitoring FS, log, and FU.
Will continue to monitor FS, log, and FU.  Patient counseled for compliance with consistent low carb diet.
Advanced care planning was discussed with patient.  Advanced care planning forms were reviewed and discussed.
Advanced care planning was discussed with patient and family.  Advanced care planning forms were reviewed and discussed.
Advanced care planning was discussed with patient and family.  Advanced care planning forms were reviewed and discussed.
Advanced care planning was discussed with patient.  Advanced care planning forms were reviewed and discussed.
plan for OR today as per families wishes, all their questions addressed
Advanced care planning was discussed with patient and family.  Advanced care planning forms were reviewed and discussed.  Differential diagnosis and plan of care discussed with patient after the evaluation.   Pain assessed and judicious use of narcotics when appropriate was discussed.  Importance of Fall prevention discussed.  Counseling on Smoking and Alcohol cessation was offered when appropriate.  Counseling on Diet, exercise, and medication compliance was done.
Advanced care planning was discussed with patient and family.  Advanced care planning forms were reviewed and discussed.
Advanced care planning was discussed with patient.  Advanced care planning forms were reviewed and discussed.
D/C planing tomorrow to [facility]
plan for rehab placement if no further fever and leukocytosis
Advanced care planning was discussed with patient.  Advanced care planning forms were reviewed and discussed.
Advanced care planning was discussed with patient.  Advanced care planning forms were reviewed and discussed.
plan for OR today as per families wishes, all their questions addressed

## 2019-10-04 NOTE — PROGRESS NOTE ADULT - PROBLEM SELECTOR PROBLEM 2
CKD (chronic kidney disease), stage III
CKD (chronic kidney disease), stage III
Fever
CKD (chronic kidney disease), stage III
Fever
Fever
Urinary tract infection
Abscess
CKD (chronic kidney disease), stage III
Fever
Urinary tract infection
Fever
Urinary tract infection
CKD (chronic kidney disease), stage III
CKD (chronic kidney disease), stage III
Urinary tract infection
Urinary tract infection
Fever
Urinary tract infection
Fever
Urinary tract infection
Urinary tract infection
Fever

## 2019-10-04 NOTE — PROGRESS NOTE ADULT - ASSESSMENT
Pt is a 87 y/o Female with PMHx of CHF, DM, HLD, HTN, CAD prior MI, c/o fatigue, "feeling off" and unable to get out of bed; Tmax 104F toay. Rash/redness on buttocks noted by home health aid. patient also complaining of +chronic cough, nonproductive.  No abdominal pain, no n/v/d. No chest pain.  States she has been feeling hot and cold over past few days, and took her temperature today, found fever of 104, and came to ED for evaluation.  No dysuria, no diarrhea. +constipation (chronic).  Increased diffuse leg swelling for past few days. patient lives at home with family. walks by herself with no use of walker. denies of any MEAD< chest pain on exertion. compliant with all her home medications (04 Sep 2019 22:22)    ER vitals: Tm 102.2, P 75, /77.  WBC 14.8 --> 12.2.  Cr 2.0 <-- 1.3.  PCT 0.49.  Ucx >100K E.coli.  Pt given dose of ceftriaxone, now on cipro for UTI.  ID consult called for further abx managment.          Perirectal necrotizing fascitis:    - Repeat CTap on 9/13 shows worsening inflammation of right perirectal area with fluid and foci of air.   s/p OR on 9/15 for  debridement.  Pt not responding to antibiotics prior to debridement.  f/u surgical wound cultures - Enterococcus and Bacteroides from 9/15          - On 9/20 repeat ct scan performed, pt with new ulcerous lesion with eschar over L perineum.     * Pt with persistent necrotic wound in left gluteal area, pt went for OR debridement on 9/27, found to have necrotic/purulent material, and sinus tract connecting right and left rectal wounds, s/p debridement.     * Post 2nd debridement, wound is improving, and no further debridement is planned.  Pt was switched to PO doxycycline 100mg bid (on 9/30).   Pt remains clinically stable.  Abx d/c'd completely on 10/2.  Pt remains stable.  No new fever or leukocytosis.  Wound healing well.  Pt without diarrhea now.      * Cont local wound care.  f/u with surgery for eventual drain removal.        d/c planning.  d/w daughter and pt at bedside.     Sandrita Zaman  [phone]

## 2019-10-04 NOTE — PROGRESS NOTE ADULT - PROBLEM SELECTOR PLAN 1
Will continue current insulin regimen for now. Will continue monitoring FS, log, will Follow up.  Suggest to d/c on current hospital insulin regimen; Patient must discontinue all prior home DM meds and FU with endocrinologist in 4 weeks. Discussed with patient and family at the bedside.  Patient counseled for compliance with consistent low carb diet.

## 2019-10-04 NOTE — PROGRESS NOTE ADULT - PROBLEM SELECTOR PLAN 4
R buttock   SUrg eval appreciated   plan for soft tissue US and CT A/P  pain control
R buttock with cellulitis   SUrg eval appreciated   plan for soft tissue US  pain control
positive UA   ABx as per ID   F/U on Urine Cx   IV and oral hydration
positive UA   ABx as per ID   F/U on Urine Cx   IV and oral hydration
positive UA   ABx as per ID   F/U on Urine Cx   S/P IV hydration
R buttock   SUrg eval appreciated   plan for soft tissue US
R buttock with cellulitis   SUrg eval appreciated   SOft Tis US noted   pain control
R buttock with cellulitis   SUrg eval appreciated   plan for soft tissue US  pain control
Suggest to continue medications, monitoring, FU primary team recommendations.
Suggest to continue medications, monitoring, FU primary team recommendations. .
positive UA   ABx as per ID   F/U on Urine Cx   IV and oral hydration
positive UA, resolved   ABx as per ID   F/U on Urine Cx   S/P IV hydration
R buttock with cellulitis   SUrg eval appreciated   SOft Tis US noted   pain control  CT noted  Abx as per ID
positive UA   ABx as per ID   F/U on Urine Cx   IV and oral hydration
positive UA   ABx as per ID   F/U on Urine Cx   S/P IV hydration
R buttock   SUrg eval appreciated   plan for soft tissue US  pain control
R buttock   SUrg eval appreciated   plan for soft tissue US and CT A/P  pain control
positive UA   ABx as per ID   F/U on Urine Cx   IV and oral hydration
R buttock with cellulitis   SUrg eval appreciated   SOft Tis US noted   pain control  CT noted  Abx as per ID
positive UA   ABx as per ID   F/U on Urine Cx   IV and oral hydration
positive UA   ABx as per ID   F/U on Urine Cx   IV and oral hydration

## 2019-10-04 NOTE — DISCHARGE NOTE PROVIDER - NSDCFUADDINST_GEN_ALL_CORE_FT
Activity as tolerated Wound care 2x/day (and as needed if soiled by bowel movements) as follow:  1. Cleanse both wounds with normal saline solution  2. Pack with saline moistened sterile gauze  3. Cover with Dry gauze  4. Apply ABD pads    Activity as tolerated

## 2019-10-04 NOTE — PROGRESS NOTE ADULT - PROBLEM SELECTOR PROBLEM 4
Urinary tract infection
Urinary tract infection
Abscess
Urinary tract infection
Abscess
HTN (hypertension)
Urinary tract infection
Abscess
Urinary tract infection
Urinary tract infection
Abscess
Abscess
Urinary tract infection
Abscess
Urinary tract infection
Urinary tract infection
Abscess
Abscess
Urinary tract infection

## 2019-10-04 NOTE — PROGRESS NOTE ADULT - PROBLEM SELECTOR PLAN 2
cont monitor Serum Creatinine and urine output
resolved  no further leukocytosis   on IV Abx
Cont ot have leukocytosis, trending down  Urine Cx noted  reaction to vanco?  cont aztreonam   Cont dapto and flagyl for anaerobic coverage   Cont gentle hydration   CXR noted  elevated procalcitonin  CT chest noted, negative for PNA   RVP ordered, negative  ID eval called for further recs appreciated, discussed in detail with team   CT C/A/P noted. R Buttock infiltrate   Buttock soft tissue US noted   CT noted   flagyl changed to clinda 900  Surg F/U for possible necrotizing fascitis   resecting necrotic tissue   cont monitor closely  improving leukocytosis
cont monitor Serum Creatinine and urine output
cont monitor Serum Creatinine and urine output
positive UA   Cont cipro till urine Cx results   F/U on Urine Cx   IV and oral hydration
resolved  no further leukocytosis   on IV Abx
Cont ot have leukocytosis   Urine Cx noted  reaction to vanco?  cont aztreonam   Cont dapto and flagyl for anaerobic coverage   Cont gentle hydration   CXR noted  elevated procalcitonin  CT chest noted, negative for PNA   RVP ordered, negative  ID eval called for further recs appreciated, discussed in detail with team   CT C/A/P noted. R Buttock infiltrate   Buttock soft tissue US noted   CT noted   flagyl changed to clinda 900  Surg F/U for possible necrotizing fascitis  S/p OR POD1 resecting necrotic tissue   cont monitor closely
Cont ot have leukocytosis   Urine Cx noted  reaction to vanco?  cont aztreonam   Cont dapto and flagyl for anaerobic coverage   Cont gentle hydration   CXR noted  elevated procalcitonin  CT chest noted, negative for PNA   RVP ordered, negative  ID eval called for further recs appreciated, discussed in detail with team   CT C/A/P noted. R Buttock infiltrate   Buttock soft tissue US noted   CT noted   flagyl changed to clinda 900  Surg F/U for possible necrotizing fascitis  S/p OR POD2 resecting necrotic tissue   cont monitor closely  will repeat CT if worsening leukocytosis or fever
Cont ot have leukocytosis, trending down  Urine Cx noted  reaction to vanco?  cont aztreonam   Cont dapto and flagyl for anaerobic coverage   Cont gentle hydration   CXR noted  elevated procalcitonin  CT chest noted, negative for PNA   RVP ordered, negative  ID eval called for further recs appreciated, discussed in detail with team   CT C/A/P noted. R Buttock infiltrate   Buttock soft tissue US noted   CT noted   flagyl changed to clinda 900  Surg F/U for possible necrotizing fascitis   resecting necrotic tissue   cont monitor closely  leukocytosis resolved, afberile
Cont ot have leukocytosis, trending down  Urine Cx noted  reaction to vanco?  cont aztreonam   Cont dapto and flagyl for anaerobic coverage   Cont gentle hydration   CXR noted  elevated procalcitonin  CT chest noted, negative for PNA   RVP ordered, negative  ID eval called for further recs appreciated, discussed in detail with team   CT C/A/P noted. R Buttock infiltrate   Buttock soft tissue US noted   CT noted   flagyl changed to clinda 900  Surg F/U for possible necrotizing fascitis   resecting necrotic tissue   cont monitor closely  leukocytosis resolved, afberile
Cont ot have leukocytosis, trending down  Urine Cx noted  reaction to vanco?  cont aztreonam   Cont dapto and flagyl for anaerobic coverage   Cont gentle hydration   CXR noted  elevated procalcitonin  CT chest noted, negative for PNA   RVP ordered, negative  ID eval called for further recs appreciated, discussed in detail with team   CT C/A/P noted. R Buttock infiltrate   Buttock soft tissue US noted   CT noted   flagyl changed to clinda 900  Surg F/U for possible necrotizing fascitis  S/p OR POD2 resecting necrotic tissue   cont monitor closely  will repeat CT if worsening leukocytosis or fever
Continue antibiotics and mgmt, primary team/Surgery  FU
Continue antibiotics and mgmt, primary team/Surgery/ID  FU
Continue meds and mgmt, primary team/Surgery  FU
Continue meds, primary team FU
Pending op; Continue antibiotics and mgmt, primary team/Surgery/ID  FU
Pending op; Continue antibiotics and mgmt, primary team/Surgery/ID  FU
S/P debridement 9/28. Continue antibiotics and mgmt, primary team/Surgery/ID  FU
S/P debridement 9/28. Continue antibiotics and mgmt, primary team/Surgery/ID  FU.
cont monitor Serum Creatinine and urine output
positive UA   ABx as per ID   F/U on Urine Cx   IV and oral hydration
positive UA   ABx changed to doxy and aztreonam   F/U on Urine Cx   IV and oral hydration
resolved  no further leukocytosis   on IV Abx
Cont ot have leukocytosis, trending down  Urine Cx noted  reaction to vanco?  cont aztreonam   Cont dapto and flagyl for anaerobic coverage   Cont gentle hydration   CXR noted  elevated procalcitonin  CT chest noted, negative for PNA   RVP ordered, negative  ID eval called for further recs appreciated, discussed in detail with team   CT C/A/P noted. R Buttock infiltrate   Buttock soft tissue US noted   CT noted   flagyl changed to clinda 900  Surg F/U for possible necrotizing fascitis  S/p OR POD2 resecting necrotic tissue   cont monitor closely  improving leukocytosis
positive UA   ABx as per ID   F/U on Urine Cx   IV and oral hydration
cont monitor Serum Creatinine and urine output
positive UA   ABx changed to vanc and aztreonam   F/U on Urine Cx   IV and oral hydration
positive UA   Cont cipro till urine Cx results   F/U on Urine Cx   IV and oral hydration
Cont ot have leukocytosis, trending down  Urine Cx noted  reaction to vanco?  cont aztreonam   Cont dapto and flagyl for anaerobic coverage   Cont gentle hydration   CXR noted  elevated procalcitonin  CT chest noted, negative for PNA   RVP ordered, negative  ID eval called for further recs appreciated, discussed in detail with team   CT C/A/P noted. R Buttock infiltrate   Buttock soft tissue US noted   CT noted   flagyl changed to clinda 900  Surg F/U for possible necrotizing fascitis  S/p OR POD2 resecting necrotic tissue   cont monitor closely  Drop in plt level, monitor, not on heparin for now. monitor closely
positive UA   ABx as per ID   F/U on Urine Cx   IV and oral hydration
Cont ot have leukocytosis, trending down  Urine Cx noted  reaction to vanco?  cont aztreonam   Cont dapto and flagyl for anaerobic coverage   Cont gentle hydration   CXR noted  elevated procalcitonin  CT chest noted, negative for PNA   RVP ordered, negative  ID eval called for further recs appreciated, discussed in detail with team   CT C/A/P noted. R Buttock infiltrate   Buttock soft tissue US noted   CT noted   flagyl changed to clinda 900  Surg F/U for possible necrotizing fascitis   resecting necrotic tissue   cont monitor closely  leukocytosis resolved, afberile
Cont ot have leukocytosis, trending down  Urine Cx noted  reaction to vanco?  cont aztreonam   Cont dapto and flagyl for anaerobic coverage   Cont gentle hydration   CXR noted  elevated procalcitonin  CT chest noted, negative for PNA   RVP ordered, negative  ID eval called for further recs appreciated, discussed in detail with team   CT C/A/P noted. R Buttock infiltrate   Buttock soft tissue US noted   CT noted   flagyl changed to clinda 900  Surg F/U for possible necrotizing fascitis   resecting necrotic tissue   cont monitor closely  improving leukocytosis
Cont ot have leukocytosis, trending down  Urine Cx noted  reaction to vanco?  cont aztreonam   Cont dapto and flagyl for anaerobic coverage   Cont gentle hydration   CXR noted  elevated procalcitonin  CT chest noted, negative for PNA   RVP ordered, negative  ID eval called for further recs appreciated, discussed in detail with team   CT C/A/P noted. R Buttock infiltrate   Buttock soft tissue US noted   CT noted   flagyl changed to clinda 900  Surg F/U for possible necrotizing fascitis  S/p OR POD2 resecting necrotic tissue   cont monitor closely  improving leukocytosis
positive UA   ABx changed to doxy and aztreonam   F/U on Urine Cx   IV and oral hydration
positive UA   ABx changed to doxy and aztreonam   F/U on Urine Cx   IV and oral hydration
Cont ot have leukocytosis, trending down  Urine Cx noted  reaction to vanco?  cont aztreonam   Cont dapto and flagyl for anaerobic coverage   Cont gentle hydration   CXR noted  elevated procalcitonin  CT chest noted, negative for PNA   RVP ordered, negative  ID eval called for further recs appreciated, discussed in detail with team   CT C/A/P noted. R Buttock infiltrate   Buttock soft tissue US noted   CT noted   flagyl changed to clinda 900  Surg F/U for possible necrotizing fascitis   resecting necrotic tissue   cont monitor closely  leukocytosis resolved, afberile

## 2019-11-05 ENCOUNTER — TRANSCRIPTION ENCOUNTER (OUTPATIENT)
Age: 84
End: 2019-11-05

## 2019-11-08 VITALS
SYSTOLIC BLOOD PRESSURE: 108 MMHG | RESPIRATION RATE: 18 BRPM | TEMPERATURE: 98.2 F | HEART RATE: 68 BPM | DIASTOLIC BLOOD PRESSURE: 70 MMHG | OXYGEN SATURATION: 99 %

## 2019-11-08 RX ORDER — RAMIPRIL 2.5 MG/1
2.5 CAPSULE ORAL
Qty: 90 | Refills: 2 | Status: DISCONTINUED | COMMUNITY
Start: 2017-06-16 | End: 2019-11-08

## 2019-11-08 RX ORDER — LORATADINE 10 MG/1
10 TABLET ORAL DAILY
Qty: 7 | Refills: 0 | Status: DISCONTINUED | COMMUNITY
Start: 2018-11-05 | End: 2019-11-08

## 2019-11-12 ENCOUNTER — APPOINTMENT (OUTPATIENT)
Dept: HOME HEALTH SERVICES | Facility: HOME HEALTH | Age: 84
End: 2019-11-12
Payer: MEDICARE

## 2019-11-12 VITALS
SYSTOLIC BLOOD PRESSURE: 110 MMHG | RESPIRATION RATE: 18 BRPM | DIASTOLIC BLOOD PRESSURE: 70 MMHG | WEIGHT: 129 LBS | OXYGEN SATURATION: 98 % | HEART RATE: 89 BPM | BODY MASS INDEX: 24.78 KG/M2

## 2019-11-12 DIAGNOSIS — R11.2 NAUSEA WITH VOMITING, UNSPECIFIED: ICD-10-CM

## 2019-11-12 DIAGNOSIS — Z87.898 PERSONAL HISTORY OF OTHER SPECIFIED CONDITIONS: ICD-10-CM

## 2019-11-12 DIAGNOSIS — Z86.39 PERSONAL HISTORY OF OTHER ENDOCRINE, NUTRITIONAL AND METABOLIC DISEASE: ICD-10-CM

## 2019-11-12 PROCEDURE — 93005 ELECTROCARDIOGRAM TRACING: CPT

## 2019-11-12 PROCEDURE — 84156 ASSAY OF PROTEIN URINE: CPT

## 2019-11-12 PROCEDURE — 85027 COMPLETE CBC AUTOMATED: CPT

## 2019-11-12 PROCEDURE — 74176 CT ABD & PELVIS W/O CONTRAST: CPT

## 2019-11-12 PROCEDURE — 84540 ASSAY OF URINE/UREA-N: CPT

## 2019-11-12 PROCEDURE — 99496 TRANSJ CARE MGMT HIGH F2F 7D: CPT

## 2019-11-12 PROCEDURE — 82435 ASSAY OF BLOOD CHLORIDE: CPT

## 2019-11-12 PROCEDURE — 97530 THERAPEUTIC ACTIVITIES: CPT

## 2019-11-12 PROCEDURE — 82570 ASSAY OF URINE CREATININE: CPT

## 2019-11-12 PROCEDURE — 82962 GLUCOSE BLOOD TEST: CPT

## 2019-11-12 PROCEDURE — 84300 ASSAY OF URINE SODIUM: CPT

## 2019-11-12 PROCEDURE — 84145 PROCALCITONIN (PCT): CPT

## 2019-11-12 PROCEDURE — 87070 CULTURE OTHR SPECIMN AEROBIC: CPT

## 2019-11-12 PROCEDURE — 82330 ASSAY OF CALCIUM: CPT

## 2019-11-12 PROCEDURE — 76770 US EXAM ABDO BACK WALL COMP: CPT

## 2019-11-12 PROCEDURE — 85730 THROMBOPLASTIN TIME PARTIAL: CPT

## 2019-11-12 PROCEDURE — 83605 ASSAY OF LACTIC ACID: CPT

## 2019-11-12 PROCEDURE — 71045 X-RAY EXAM CHEST 1 VIEW: CPT

## 2019-11-12 PROCEDURE — 87040 BLOOD CULTURE FOR BACTERIA: CPT

## 2019-11-12 PROCEDURE — 96374 THER/PROPH/DIAG INJ IV PUSH: CPT

## 2019-11-12 PROCEDURE — 80053 COMPREHEN METABOLIC PANEL: CPT

## 2019-11-12 PROCEDURE — 82550 ASSAY OF CK (CPK): CPT

## 2019-11-12 PROCEDURE — 76882 US LMTD JT/FCL EVL NVASC XTR: CPT

## 2019-11-12 PROCEDURE — 85610 PROTHROMBIN TIME: CPT

## 2019-11-12 PROCEDURE — 97161 PT EVAL LOW COMPLEX 20 MIN: CPT

## 2019-11-12 PROCEDURE — 85014 HEMATOCRIT: CPT

## 2019-11-12 PROCEDURE — 86900 BLOOD TYPING SEROLOGIC ABO: CPT

## 2019-11-12 PROCEDURE — 84075 ASSAY ALKALINE PHOSPHATASE: CPT

## 2019-11-12 PROCEDURE — 87486 CHLMYD PNEUM DNA AMP PROBE: CPT

## 2019-11-12 PROCEDURE — 84295 ASSAY OF SERUM SODIUM: CPT

## 2019-11-12 PROCEDURE — 83550 IRON BINDING TEST: CPT

## 2019-11-12 PROCEDURE — 87086 URINE CULTURE/COLONY COUNT: CPT

## 2019-11-12 PROCEDURE — P9016: CPT

## 2019-11-12 PROCEDURE — 87186 SC STD MICRODIL/AGAR DIL: CPT

## 2019-11-12 PROCEDURE — 82728 ASSAY OF FERRITIN: CPT

## 2019-11-12 PROCEDURE — 82436 ASSAY OF URINE CHLORIDE: CPT

## 2019-11-12 PROCEDURE — 97116 GAIT TRAINING THERAPY: CPT

## 2019-11-12 PROCEDURE — 83540 ASSAY OF IRON: CPT

## 2019-11-12 PROCEDURE — P9011: CPT

## 2019-11-12 PROCEDURE — 99285 EMERGENCY DEPT VISIT HI MDM: CPT | Mod: 25

## 2019-11-12 PROCEDURE — 87798 DETECT AGENT NOS DNA AMP: CPT

## 2019-11-12 PROCEDURE — 71250 CT THORAX DX C-: CPT

## 2019-11-12 PROCEDURE — 80048 BASIC METABOLIC PNL TOTAL CA: CPT

## 2019-11-12 PROCEDURE — 97110 THERAPEUTIC EXERCISES: CPT

## 2019-11-12 PROCEDURE — 86850 RBC ANTIBODY SCREEN: CPT

## 2019-11-12 PROCEDURE — 87581 M.PNEUMON DNA AMP PROBE: CPT

## 2019-11-12 PROCEDURE — 82803 BLOOD GASES ANY COMBINATION: CPT

## 2019-11-12 PROCEDURE — 81001 URINALYSIS AUTO W/SCOPE: CPT

## 2019-11-12 PROCEDURE — 80061 LIPID PANEL: CPT

## 2019-11-12 PROCEDURE — 82746 ASSAY OF FOLIC ACID SERUM: CPT

## 2019-11-12 PROCEDURE — 82947 ASSAY GLUCOSE BLOOD QUANT: CPT

## 2019-11-12 PROCEDURE — 86901 BLOOD TYPING SEROLOGIC RH(D): CPT

## 2019-11-12 PROCEDURE — 86923 COMPATIBILITY TEST ELECTRIC: CPT

## 2019-11-12 PROCEDURE — 83935 ASSAY OF URINE OSMOLALITY: CPT

## 2019-11-12 PROCEDURE — 84132 ASSAY OF SERUM POTASSIUM: CPT

## 2019-11-12 PROCEDURE — 99350 HOME/RES VST EST HIGH MDM 60: CPT

## 2019-11-12 PROCEDURE — 84133 ASSAY OF URINE POTASSIUM: CPT

## 2019-11-12 PROCEDURE — 82607 VITAMIN B-12: CPT

## 2019-11-12 PROCEDURE — 83735 ASSAY OF MAGNESIUM: CPT

## 2019-11-12 PROCEDURE — 83036 HEMOGLOBIN GLYCOSYLATED A1C: CPT

## 2019-11-12 PROCEDURE — 87633 RESP VIRUS 12-25 TARGETS: CPT

## 2019-11-12 PROCEDURE — 36430 TRANSFUSION BLD/BLD COMPNT: CPT

## 2019-11-12 PROCEDURE — 84100 ASSAY OF PHOSPHORUS: CPT

## 2019-11-12 NOTE — REASON FOR VISIT
[Post Hospitalization] : a post hospitalization visit [Pre-Visit Preparation] : pre-visit preparation was done [Formal Caregiver] : formal caregiver [Intercurrent Specialty/Sub-specialty Visits] : the patient has intercurrent specialty/sub-specialty visits [FreeTextEntry3] : RN ,PT

## 2019-11-12 NOTE — COUNSELING
[Improve exercise tolerance] : improve exercise tolerance [Improve mobility] : improve mobility [Improve weight] : improve weight [Decrease stress] : decrease stress [Decrease hospital use] : decrease hospital use [Comfort Care] : comfort care [Minimize unnecessary interventions] : minimize unnecessary interventions [Maintain functional ability] : maintain functional ability [Discussed disease trajectory with patient/caregiver] : discussed disease trajectory with patient/caregiver [Patient/Caregiver has ___ understanding of disease process] : patient/caregiver has [unfilled] understanding of disease process [Likely to achieve goals/desired outcomes] : likely to achieve goals/desired outcomes [Advanced Directives discussed: ____] : Advanced directives discussed: [unfilled] [DNR] : Code Status: DNR [Limited] : Treatment Guidelines: Limited [Last Verification Date: _____] : [facility] Completion/last verification date: [unfilled] [DNI] : Intubation: DNI [Normal Weight (BMI <25)] : normal weight - BMI <25 [Medical alert] : medical alert [Non - Smoker] : non-smoker [DASH diet given] : DASH diet given [Use grab bars] : use grab bars [Use assistive device to avoid falls] : use assistive device to avoid falls [Remove clutter and unsafe carpeting to avoid falls] : remove clutter and unsafe carpeting to avoid falls [Smoke/CO Detectors] : smoke/CO detectors [Not Indicated] : not indicated [FreeTextEntry5] : Mammogram 5/2017

## 2019-11-12 NOTE — PHYSICAL EXAM
[Well Nourished] : well nourished [No Acute Distress] : no acute distress [Well Developed] : well developed [PERRL] : pupils equal, round and reactive to light [EOMI] : extra ocular movement intact [No JVD] : no jugular venous distention [Supple] : the neck was supple [No LAD] : no lymphadenopathy [Clear to Auscultation] : lungs were clear to auscultation bilaterally [No Respiratory Distress] : no respiratory distress [Regular Rhythm] : with a regular rhythm [No Accessory Muscle Use] : no accessory muscle use [Normal Rate] : heart rate was normal  [Normal S1, S2] : normal S1 and S2 [No Edema] : there was no peripheral edema [Non Tender] : non-tender [Normal Bowel Sounds] : normal bowel sounds [Soft] : abdomen soft [No Masses] : no abdominal mass palpated [Not Distended] : not distended [Normal Post Cervical Nodes] : no posterior cervical lymphadenopathy [No Spinal Tenderness] : no spinal tenderness [No CVA Tenderness] : no ~M costovertebral angle tenderness [No Clubbing, Cyanosis] : no clubbing  or cyanosis of the fingernails [No Rash] : no rash [No Joint Swelling] : no joint swelling seen [Cranial Nerves Intact] : cranial nerves 2-12 were intact [No Motor Deficits] : the motor exam was normal [No Skin Lesions] : no skin lesions [Normal Affect] : the affect was normal [Normal Mood] : the mood was normal [Oriented x3] : oriented to person, place, and time [Normal Insight/Judgement] : insight and judgment were intact [de-identified] : skin color good, awake, interactive, pleasant [de-identified] : wears glasses [de-identified] : systolic murmur 3/6,  [de-identified] : two dressings on each buttock, patent refused unpacking as instructed by home care RN [de-identified] : slow unstable gait, uses walker [de-identified] : neuropathy B/L LE

## 2019-11-12 NOTE — HISTORY OF PRESENT ILLNESS
[Patient] : patient [FreeTextEntry1] : Gait instability [FreeTextEntry2] : 85 yo female with h/o diabetes type 2 x 30 years (uncontrolled) c/b neuropathy and retinopathy, CAD s/p MI w PCI/MARTA stent 2014, systolic CHF (EF ~23% 8/2017), Angina pectoris, HTN, HL, recurrent UTIs, gait instability, lung nodule, recurrent UTIs being seen for posthospital visit after she was admitted at 9/4/19.for fever 2/2 UTI and ischiorectal abscess bilateral s/p debridement, hospital course was complicated by pneumonia, pateint was sent to rehab from where she was discharged home on 11/5/19. \par \par interval events- wound care RN visit today to change dressings, was told not to touch the dressings till tomorrow.\par \par Patient has been recovering slowly, she was bed bound initially. however started walkign with walker at rehab, now can walk upto 20 steps at home, receiving home PT. she feels urine is more frequent than before, worried about uti, also reports persistent cough after pneumonia, which results in rib pain at times front below breast Right side and upper right back. pain is intermittent, sharp, lasts for few mins. last episode was yesterday. no other complaints.\par no fever, chills, nausea, vomiting, SOB.\par appetite is ok, continues to tolerate food. eats regular food no swallowing problems. was given zofran at hospital for nausea\par BM regular- sometimes have diarrhoea alternating with constipation, been like this for years. last BM today\par continent with bowels/bladder, wears depends\par sleeps well except when wakes up to pee 2-3 times/night\par no memory problems\par mood stable\par no recent falls, last fall 2 years ago, using walker\par has two abscess opening one on each buttock- dressed daily\par \par DM type 2 w/ complications- neuropathy/retinopathy/nephropathy- x 30 years, not controlled, patient states her baseline FS is around 63- 200 range. patient does not have regular eating schedule. last hba1c was 10.0, lantus was increased that led to frequent episodes of hypoglycemia. follows ophthalmology and podiatry. cannot feel legs due to neuropathy. follows endocrinology.\par \par CAD s/p stent, CHF, Angina- EF -23%, recent echo by cardiologist. baseline weight is 135-137lbs. gets occasional chest pains yang when stressed or when walks outside or with cold air. uses nitro when needed.\par \par recurrent UTIs- last episode last year.\par ----------\par lives with daughter and grand son, independent with ADLs

## 2019-11-13 ENCOUNTER — LABORATORY RESULT (OUTPATIENT)
Age: 84
End: 2019-11-13

## 2019-11-13 LAB
ALBUMIN SERPL ELPH-MCNC: 3.9 G/DL
ALP BLD-CCNC: 130 U/L
ALT SERPL-CCNC: 14 U/L
ANION GAP SERPL CALC-SCNC: 14 MMOL/L
AST SERPL-CCNC: 16 U/L
BASOPHILS # BLD AUTO: 0.05 K/UL
BASOPHILS NFR BLD AUTO: 0.7 %
BILIRUB SERPL-MCNC: 0.3 MG/DL
BUN SERPL-MCNC: 33 MG/DL
CALCIUM SERPL-MCNC: 10.1 MG/DL
CHLORIDE SERPL-SCNC: 97 MMOL/L
CO2 SERPL-SCNC: 26 MMOL/L
CREAT SERPL-MCNC: 1.12 MG/DL
EOSINOPHIL # BLD AUTO: 0.34 K/UL
EOSINOPHIL NFR BLD AUTO: 4.5 %
ESTIMATED AVERAGE GLUCOSE: 189 MG/DL
FOLATE SERPL-MCNC: >20 NG/ML
HBA1C MFR BLD HPLC: 8.2 %
HCT VFR BLD CALC: 40.1 %
HGB BLD-MCNC: 12.7 G/DL
IMM GRANULOCYTES NFR BLD AUTO: 0.1 %
LYMPHOCYTES # BLD AUTO: 1.66 K/UL
LYMPHOCYTES NFR BLD AUTO: 21.8 %
MAN DIFF?: NORMAL
MCHC RBC-ENTMCNC: 29.1 PG
MCHC RBC-ENTMCNC: 31.7 GM/DL
MCV RBC AUTO: 91.8 FL
MONOCYTES # BLD AUTO: 0.69 K/UL
MONOCYTES NFR BLD AUTO: 9.1 %
NEUTROPHILS # BLD AUTO: 4.85 K/UL
NEUTROPHILS NFR BLD AUTO: 63.8 %
PLATELET # BLD AUTO: 272 K/UL
POTASSIUM SERPL-SCNC: 5.3 MMOL/L
PROT SERPL-MCNC: 6.9 G/DL
RBC # BLD: 4.37 M/UL
RBC # FLD: 15 %
SODIUM SERPL-SCNC: 137 MMOL/L
TSH SERPL-ACNC: 2.79 UIU/ML
VIT B12 SERPL-MCNC: 480 PG/ML
WBC # FLD AUTO: 7.6 K/UL

## 2019-11-14 LAB
APPEARANCE: ABNORMAL
BILIRUBIN URINE: NEGATIVE
BLOOD URINE: NEGATIVE
COLOR: NORMAL
GLUCOSE QUALITATIVE U: NEGATIVE
KETONES URINE: NEGATIVE
LEUKOCYTE ESTERASE URINE: ABNORMAL
NITRITE URINE: POSITIVE
PH URINE: 6
PROTEIN URINE: NEGATIVE
SPECIFIC GRAVITY URINE: 1.01
UROBILINOGEN URINE: NORMAL

## 2019-11-18 ENCOUNTER — APPOINTMENT (OUTPATIENT)
Dept: HOME HEALTH SERVICES | Facility: HOME HEALTH | Age: 84
End: 2019-11-18

## 2019-11-25 ENCOUNTER — APPOINTMENT (OUTPATIENT)
Dept: HOME HEALTH SERVICES | Facility: HOME HEALTH | Age: 84
End: 2019-11-25

## 2019-12-02 ENCOUNTER — TRANSCRIPTION ENCOUNTER (OUTPATIENT)
Age: 84
End: 2019-12-02

## 2019-12-04 ENCOUNTER — TRANSCRIPTION ENCOUNTER (OUTPATIENT)
Age: 84
End: 2019-12-04

## 2019-12-04 ENCOUNTER — INPATIENT (INPATIENT)
Facility: HOSPITAL | Age: 84
LOS: 18 days | Discharge: NURSING, REHAB OR LONG-TERM CARE | DRG: 871 | End: 2019-12-23
Attending: INTERNAL MEDICINE | Admitting: HOSPITALIST
Payer: MEDICARE

## 2019-12-04 VITALS
HEIGHT: 61 IN | HEART RATE: 77 BPM | WEIGHT: 130.07 LBS | RESPIRATION RATE: 18 BRPM | TEMPERATURE: 99 F | SYSTOLIC BLOOD PRESSURE: 129 MMHG | OXYGEN SATURATION: 99 % | DIASTOLIC BLOOD PRESSURE: 75 MMHG

## 2019-12-04 DIAGNOSIS — I10 ESSENTIAL (PRIMARY) HYPERTENSION: ICD-10-CM

## 2019-12-04 DIAGNOSIS — Z98.89 OTHER SPECIFIED POSTPROCEDURAL STATES: Chronic | ICD-10-CM

## 2019-12-04 DIAGNOSIS — Z90.49 ACQUIRED ABSENCE OF OTHER SPECIFIED PARTS OF DIGESTIVE TRACT: Chronic | ICD-10-CM

## 2019-12-04 DIAGNOSIS — I25.10 ATHEROSCLEROTIC HEART DISEASE OF NATIVE CORONARY ARTERY WITHOUT ANGINA PECTORIS: ICD-10-CM

## 2019-12-04 DIAGNOSIS — M79.18 MYALGIA, OTHER SITE: ICD-10-CM

## 2019-12-04 DIAGNOSIS — E11.9 TYPE 2 DIABETES MELLITUS WITHOUT COMPLICATIONS: ICD-10-CM

## 2019-12-04 DIAGNOSIS — Z02.9 ENCOUNTER FOR ADMINISTRATIVE EXAMINATIONS, UNSPECIFIED: ICD-10-CM

## 2019-12-04 DIAGNOSIS — N39.0 URINARY TRACT INFECTION, SITE NOT SPECIFIED: ICD-10-CM

## 2019-12-04 DIAGNOSIS — L03.317 CELLULITIS OF BUTTOCK: ICD-10-CM

## 2019-12-04 DIAGNOSIS — Z29.9 ENCOUNTER FOR PROPHYLACTIC MEASURES, UNSPECIFIED: ICD-10-CM

## 2019-12-04 DIAGNOSIS — E03.9 HYPOTHYROIDISM, UNSPECIFIED: ICD-10-CM

## 2019-12-04 DIAGNOSIS — I50.9 HEART FAILURE, UNSPECIFIED: ICD-10-CM

## 2019-12-04 LAB
ALBUMIN SERPL ELPH-MCNC: 3.7 G/DL — SIGNIFICANT CHANGE UP (ref 3.3–5)
ALP SERPL-CCNC: 109 U/L — SIGNIFICANT CHANGE UP (ref 40–120)
ALT FLD-CCNC: 8 U/L — LOW (ref 10–45)
ANION GAP SERPL CALC-SCNC: 12 MMOL/L — SIGNIFICANT CHANGE UP (ref 5–17)
APPEARANCE UR: CLEAR — SIGNIFICANT CHANGE UP
AST SERPL-CCNC: 10 U/L — SIGNIFICANT CHANGE UP (ref 10–40)
BACTERIA # UR AUTO: NEGATIVE — SIGNIFICANT CHANGE UP
BASOPHILS # BLD AUTO: 0.05 K/UL — SIGNIFICANT CHANGE UP (ref 0–0.2)
BASOPHILS NFR BLD AUTO: 0.4 % — SIGNIFICANT CHANGE UP (ref 0–2)
BILIRUB SERPL-MCNC: 0.5 MG/DL — SIGNIFICANT CHANGE UP (ref 0.2–1.2)
BILIRUB UR-MCNC: NEGATIVE — SIGNIFICANT CHANGE UP
BUN SERPL-MCNC: 40 MG/DL — HIGH (ref 7–23)
CALCIUM SERPL-MCNC: 10.2 MG/DL — SIGNIFICANT CHANGE UP (ref 8.4–10.5)
CHLORIDE SERPL-SCNC: 98 MMOL/L — SIGNIFICANT CHANGE UP (ref 96–108)
CO2 SERPL-SCNC: 23 MMOL/L — SIGNIFICANT CHANGE UP (ref 22–31)
COLOR SPEC: SIGNIFICANT CHANGE UP
CREAT SERPL-MCNC: 1.27 MG/DL — SIGNIFICANT CHANGE UP (ref 0.5–1.3)
CRP SERPL-MCNC: 10.68 MG/DL — HIGH (ref 0–0.4)
DIFF PNL FLD: NEGATIVE — SIGNIFICANT CHANGE UP
EOSINOPHIL # BLD AUTO: 0.15 K/UL — SIGNIFICANT CHANGE UP (ref 0–0.5)
EOSINOPHIL NFR BLD AUTO: 1.1 % — SIGNIFICANT CHANGE UP (ref 0–6)
EPI CELLS # UR: 0 /HPF — SIGNIFICANT CHANGE UP
ERYTHROCYTE [SEDIMENTATION RATE] IN BLOOD: 71 MM/HR — HIGH (ref 0–20)
GLUCOSE BLDC GLUCOMTR-MCNC: 316 MG/DL — HIGH (ref 70–99)
GLUCOSE BLDC GLUCOMTR-MCNC: 317 MG/DL — HIGH (ref 70–99)
GLUCOSE SERPL-MCNC: 302 MG/DL — HIGH (ref 70–99)
GLUCOSE UR QL: ABNORMAL
HCT VFR BLD CALC: 38.7 % — SIGNIFICANT CHANGE UP (ref 34.5–45)
HGB BLD-MCNC: 12.6 G/DL — SIGNIFICANT CHANGE UP (ref 11.5–15.5)
HYALINE CASTS # UR AUTO: 0 /LPF — SIGNIFICANT CHANGE UP (ref 0–2)
IMM GRANULOCYTES NFR BLD AUTO: 0.4 % — SIGNIFICANT CHANGE UP (ref 0–1.5)
KETONES UR-MCNC: NEGATIVE — SIGNIFICANT CHANGE UP
LACTATE BLDV-MCNC: 1.6 MMOL/L — SIGNIFICANT CHANGE UP (ref 0.7–2)
LEUKOCYTE ESTERASE UR-ACNC: ABNORMAL
LIDOCAIN IGE QN: 40 U/L — SIGNIFICANT CHANGE UP (ref 7–60)
LYMPHOCYTES # BLD AUTO: 1.32 K/UL — SIGNIFICANT CHANGE UP (ref 1–3.3)
LYMPHOCYTES # BLD AUTO: 9.8 % — LOW (ref 13–44)
MCHC RBC-ENTMCNC: 28.6 PG — SIGNIFICANT CHANGE UP (ref 27–34)
MCHC RBC-ENTMCNC: 32.6 GM/DL — SIGNIFICANT CHANGE UP (ref 32–36)
MCV RBC AUTO: 88 FL — SIGNIFICANT CHANGE UP (ref 80–100)
MONOCYTES # BLD AUTO: 1.06 K/UL — HIGH (ref 0–0.9)
MONOCYTES NFR BLD AUTO: 7.9 % — SIGNIFICANT CHANGE UP (ref 2–14)
NEUTROPHILS # BLD AUTO: 10.86 K/UL — HIGH (ref 1.8–7.4)
NEUTROPHILS NFR BLD AUTO: 80.4 % — HIGH (ref 43–77)
NITRITE UR-MCNC: NEGATIVE — SIGNIFICANT CHANGE UP
NRBC # BLD: 0 /100 WBCS — SIGNIFICANT CHANGE UP (ref 0–0)
PH UR: 5.5 — SIGNIFICANT CHANGE UP (ref 5–8)
PLATELET # BLD AUTO: 261 K/UL — SIGNIFICANT CHANGE UP (ref 150–400)
POTASSIUM SERPL-MCNC: 4.8 MMOL/L — SIGNIFICANT CHANGE UP (ref 3.5–5.3)
POTASSIUM SERPL-SCNC: 4.8 MMOL/L — SIGNIFICANT CHANGE UP (ref 3.5–5.3)
PROT SERPL-MCNC: 8 G/DL — SIGNIFICANT CHANGE UP (ref 6–8.3)
PROT UR-MCNC: NEGATIVE — SIGNIFICANT CHANGE UP
RBC # BLD: 4.4 M/UL — SIGNIFICANT CHANGE UP (ref 3.8–5.2)
RBC # FLD: 14.4 % — SIGNIFICANT CHANGE UP (ref 10.3–14.5)
RBC CASTS # UR COMP ASSIST: 2 /HPF — SIGNIFICANT CHANGE UP (ref 0–4)
SODIUM SERPL-SCNC: 133 MMOL/L — LOW (ref 135–145)
SP GR SPEC: 1.01 — SIGNIFICANT CHANGE UP (ref 1.01–1.02)
UROBILINOGEN FLD QL: NEGATIVE — SIGNIFICANT CHANGE UP
WBC # BLD: 13.5 K/UL — HIGH (ref 3.8–10.5)
WBC # FLD AUTO: 13.5 K/UL — HIGH (ref 3.8–10.5)
WBC UR QL: 10 /HPF — HIGH (ref 0–5)

## 2019-12-04 PROCEDURE — 74176 CT ABD & PELVIS W/O CONTRAST: CPT | Mod: 26

## 2019-12-04 PROCEDURE — 99223 1ST HOSP IP/OBS HIGH 75: CPT | Mod: GC

## 2019-12-04 PROCEDURE — 99285 EMERGENCY DEPT VISIT HI MDM: CPT

## 2019-12-04 RX ORDER — TRAMADOL HYDROCHLORIDE 50 MG/1
50 TABLET ORAL EVERY 6 HOURS
Refills: 0 | Status: DISCONTINUED | OUTPATIENT
Start: 2019-12-04 | End: 2019-12-10

## 2019-12-04 RX ORDER — DEXTROSE 50 % IN WATER 50 %
12.5 SYRINGE (ML) INTRAVENOUS ONCE
Refills: 0 | Status: DISCONTINUED | OUTPATIENT
Start: 2019-12-04 | End: 2019-12-23

## 2019-12-04 RX ORDER — ASPIRIN/CALCIUM CARB/MAGNESIUM 324 MG
1 TABLET ORAL
Qty: 0 | Refills: 0 | DISCHARGE

## 2019-12-04 RX ORDER — METOPROLOL TARTRATE 50 MG
12.5 TABLET ORAL
Refills: 0 | Status: DISCONTINUED | OUTPATIENT
Start: 2019-12-04 | End: 2019-12-06

## 2019-12-04 RX ORDER — ATORVASTATIN CALCIUM 80 MG/1
40 TABLET, FILM COATED ORAL AT BEDTIME
Refills: 0 | Status: DISCONTINUED | OUTPATIENT
Start: 2019-12-04 | End: 2019-12-23

## 2019-12-04 RX ORDER — TRAMADOL HYDROCHLORIDE 50 MG/1
25 TABLET ORAL EVERY 6 HOURS
Refills: 0 | Status: DISCONTINUED | OUTPATIENT
Start: 2019-12-04 | End: 2019-12-10

## 2019-12-04 RX ORDER — ASPIRIN/CALCIUM CARB/MAGNESIUM 324 MG
81 TABLET ORAL DAILY
Refills: 0 | Status: DISCONTINUED | OUTPATIENT
Start: 2019-12-04 | End: 2019-12-23

## 2019-12-04 RX ORDER — CLOPIDOGREL BISULFATE 75 MG/1
75 TABLET, FILM COATED ORAL DAILY
Refills: 0 | Status: DISCONTINUED | OUTPATIENT
Start: 2019-12-04 | End: 2019-12-23

## 2019-12-04 RX ORDER — DEXTROSE 50 % IN WATER 50 %
15 SYRINGE (ML) INTRAVENOUS ONCE
Refills: 0 | Status: DISCONTINUED | OUTPATIENT
Start: 2019-12-04 | End: 2019-12-23

## 2019-12-04 RX ORDER — INSULIN LISPRO 100/ML
VIAL (ML) SUBCUTANEOUS AT BEDTIME
Refills: 0 | Status: DISCONTINUED | OUTPATIENT
Start: 2019-12-04 | End: 2019-12-23

## 2019-12-04 RX ORDER — GABAPENTIN 400 MG/1
100 CAPSULE ORAL
Refills: 0 | Status: DISCONTINUED | OUTPATIENT
Start: 2019-12-04 | End: 2019-12-23

## 2019-12-04 RX ORDER — ACETAMINOPHEN 500 MG
650 TABLET ORAL ONCE
Refills: 0 | Status: COMPLETED | OUTPATIENT
Start: 2019-12-04 | End: 2019-12-04

## 2019-12-04 RX ORDER — INSULIN GLARGINE 100 [IU]/ML
6 INJECTION, SOLUTION SUBCUTANEOUS
Qty: 0 | Refills: 0 | DISCHARGE

## 2019-12-04 RX ORDER — GLUCAGON INJECTION, SOLUTION 0.5 MG/.1ML
1 INJECTION, SOLUTION SUBCUTANEOUS ONCE
Refills: 0 | Status: DISCONTINUED | OUTPATIENT
Start: 2019-12-04 | End: 2019-12-23

## 2019-12-04 RX ORDER — METOPROLOL TARTRATE 50 MG
0.5 TABLET ORAL
Qty: 0 | Refills: 0 | DISCHARGE

## 2019-12-04 RX ORDER — INSULIN LISPRO 100/ML
VIAL (ML) SUBCUTANEOUS
Refills: 0 | Status: DISCONTINUED | OUTPATIENT
Start: 2019-12-04 | End: 2019-12-23

## 2019-12-04 RX ORDER — CHOLECALCIFEROL (VITAMIN D3) 125 MCG
1 CAPSULE ORAL
Qty: 0 | Refills: 0 | DISCHARGE

## 2019-12-04 RX ORDER — FAMOTIDINE 10 MG/ML
1 INJECTION INTRAVENOUS
Qty: 0 | Refills: 0 | DISCHARGE

## 2019-12-04 RX ORDER — SODIUM CHLORIDE 9 MG/ML
1000 INJECTION, SOLUTION INTRAVENOUS
Refills: 0 | Status: DISCONTINUED | OUTPATIENT
Start: 2019-12-04 | End: 2019-12-23

## 2019-12-04 RX ORDER — FAMOTIDINE 10 MG/ML
20 INJECTION INTRAVENOUS DAILY
Refills: 0 | Status: DISCONTINUED | OUTPATIENT
Start: 2019-12-04 | End: 2019-12-23

## 2019-12-04 RX ORDER — DEXTROSE 50 % IN WATER 50 %
25 SYRINGE (ML) INTRAVENOUS ONCE
Refills: 0 | Status: DISCONTINUED | OUTPATIENT
Start: 2019-12-04 | End: 2019-12-23

## 2019-12-04 RX ORDER — INSULIN GLARGINE 100 [IU]/ML
10 INJECTION, SOLUTION SUBCUTANEOUS AT BEDTIME
Refills: 0 | Status: DISCONTINUED | OUTPATIENT
Start: 2019-12-04 | End: 2019-12-05

## 2019-12-04 RX ORDER — LEVOTHYROXINE SODIUM 125 MCG
1 TABLET ORAL
Qty: 0 | Refills: 0 | DISCHARGE

## 2019-12-04 RX ORDER — INFLUENZA VIRUS VACCINE 15; 15; 15; 15 UG/.5ML; UG/.5ML; UG/.5ML; UG/.5ML
0.5 SUSPENSION INTRAMUSCULAR ONCE
Refills: 0 | Status: DISCONTINUED | OUTPATIENT
Start: 2019-12-04 | End: 2019-12-23

## 2019-12-04 RX ORDER — LEVOTHYROXINE SODIUM 125 MCG
25 TABLET ORAL DAILY
Refills: 0 | Status: DISCONTINUED | OUTPATIENT
Start: 2019-12-04 | End: 2019-12-23

## 2019-12-04 RX ORDER — AZTREONAM 2 G
500 VIAL (EA) INJECTION EVERY 12 HOURS
Refills: 0 | Status: DISCONTINUED | OUTPATIENT
Start: 2019-12-04 | End: 2019-12-13

## 2019-12-04 RX ORDER — SODIUM CHLORIDE 9 MG/ML
500 INJECTION INTRAMUSCULAR; INTRAVENOUS; SUBCUTANEOUS ONCE
Refills: 0 | Status: COMPLETED | OUTPATIENT
Start: 2019-12-04 | End: 2019-12-04

## 2019-12-04 RX ORDER — FUROSEMIDE 40 MG
1 TABLET ORAL
Qty: 0 | Refills: 0 | DISCHARGE

## 2019-12-04 RX ORDER — INSULIN LISPRO 100/ML
3 VIAL (ML) SUBCUTANEOUS
Refills: 0 | Status: DISCONTINUED | OUTPATIENT
Start: 2019-12-04 | End: 2019-12-05

## 2019-12-04 RX ORDER — ACETAMINOPHEN 500 MG
1000 TABLET ORAL EVERY 8 HOURS
Refills: 0 | Status: DISCONTINUED | OUTPATIENT
Start: 2019-12-04 | End: 2019-12-05

## 2019-12-04 RX ORDER — METRONIDAZOLE 500 MG
500 TABLET ORAL EVERY 8 HOURS
Refills: 0 | Status: DISCONTINUED | OUTPATIENT
Start: 2019-12-04 | End: 2019-12-13

## 2019-12-04 RX ORDER — VANCOMYCIN HCL 1 G
1000 VIAL (EA) INTRAVENOUS EVERY 24 HOURS
Refills: 0 | Status: DISCONTINUED | OUTPATIENT
Start: 2019-12-04 | End: 2019-12-05

## 2019-12-04 RX ORDER — ENOXAPARIN SODIUM 100 MG/ML
40 INJECTION SUBCUTANEOUS DAILY
Refills: 0 | Status: DISCONTINUED | OUTPATIENT
Start: 2019-12-04 | End: 2019-12-09

## 2019-12-04 RX ADMIN — Medication 2: at 22:28

## 2019-12-04 RX ADMIN — Medication 250 MILLIGRAM(S): at 16:55

## 2019-12-04 RX ADMIN — Medication 100 MILLIGRAM(S): at 21:27

## 2019-12-04 RX ADMIN — Medication 110 MILLIGRAM(S): at 15:16

## 2019-12-04 RX ADMIN — Medication 3 UNIT(S): at 19:38

## 2019-12-04 RX ADMIN — Medication 12.5 MILLIGRAM(S): at 21:27

## 2019-12-04 RX ADMIN — Medication 50 MILLIGRAM(S): at 20:51

## 2019-12-04 RX ADMIN — ATORVASTATIN CALCIUM 40 MILLIGRAM(S): 80 TABLET, FILM COATED ORAL at 21:27

## 2019-12-04 RX ADMIN — Medication 4: at 19:38

## 2019-12-04 RX ADMIN — INSULIN GLARGINE 10 UNIT(S): 100 INJECTION, SOLUTION SUBCUTANEOUS at 22:28

## 2019-12-04 RX ADMIN — Medication 1000 MILLIGRAM(S): at 21:00

## 2019-12-04 RX ADMIN — Medication 650 MILLIGRAM(S): at 15:18

## 2019-12-04 RX ADMIN — Medication 1000 MILLIGRAM(S): at 19:57

## 2019-12-04 RX ADMIN — SODIUM CHLORIDE 500 MILLILITER(S): 9 INJECTION INTRAMUSCULAR; INTRAVENOUS; SUBCUTANEOUS at 11:15

## 2019-12-04 NOTE — H&P ADULT - NSHPLABSRESULTS_GEN_ALL_CORE
The Labs were reviewed by me   The Radiology was reviewed by me    EKG tracing reviewed by me        133<L>  |  98  |  40<H>  ----------------------------<  302<H>  4.8   |  23  |  1.27    Ca    10.2      04 Dec 2019 10:43    TPro  8.0  /  Alb  3.7  /  TBili  0.5  /  DBili  x   /  AST  10  /  ALT  8<L>  /  AlkPhos  109                          Urinalysis Basic - ( 04 Dec 2019 12:24 )    Color: Light Yellow / Appearance: Clear / S.015 / pH: x  Gluc: x / Ketone: Negative  / Bili: Negative / Urobili: Negative   Blood: x / Protein: Negative / Nitrite: Negative   Leuk Esterase: Moderate / RBC: 2 /hpf / WBC 10 /HPF   Sq Epi: x / Non Sq Epi: 0 /hpf / Bacteria: Negative                              12.6   13.50 )-----------( 261      ( 04 Dec 2019 10:43 )             38.7     CAPILLARY BLOOD GLUCOSE The Labs were reviewed by me   The Radiology was reviewed by me    EKG tracing reviewed by me                            12.6   13.50 )-----------( 261      ( 04 Dec 2019 10:43 )             38.7     12-04    133<L>  |  98  |  40<H>  ----------------------------<  302<H>  4.8   |  23  |  1.27    Ca    10.2      04 Dec 2019 10:43    TPro  8.0  /  Alb  3.7  /  TBili  0.5  /  DBili  x   /  AST  10  /  ALT  8<L>  /  AlkPhos  109  12-04                        Urinalysis Basic - ( 04 Dec 2019 12:24 )    Color: Light Yellow / Appearance: Clear / S.015 / pH: x  Gluc: x / Ketone: Negative  / Bili: Negative / Urobili: Negative   Blood: x / Protein: Negative / Nitrite: Negative   Leuk Esterase: Moderate / RBC: 2 /hpf / WBC 10 /HPF   Sq Epi: x / Non Sq Epi: 0 /hpf / Bacteria: Negative                              12.6   13.50 )-----------( 261      ( 04 Dec 2019 10:43 )             38.7     CAPILLARY BLOOD GLUCOSE    < from: CT Abdomen and Pelvis No Cont (19 @ 12:59) >    FINDINGS:    Evaluation of the solid visceral organs and vasculature is limited   without the administration of intravenous contrast.    Motion limited study.    LOWER CHEST: Coronary artery atherosclerotic calcifications.    LIVER: Within normal limits.  BILE DUCTS: Normal caliber.  GALLBLADDER: Cholecystectomy.  SPLEEN: Within normal limits.  PANCREAS: Within normal limits.  ADRENALS: Within normal limits.  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    BLADDER: Moderately distended.  REPRODUCTIVE ORGANS: Calcified fibroid uterus. No solid adnexal masses.    BOWEL: No bowel obstruction.   PERITONEUM: No ascites.  VESSELS: Atherosclerotic change.  RETROPERITONEUM/LYMPH NODES: No lymphadenopathy.    ABDOMINAL WALL: Marked interval improvement from the open wound apparent   on 2019. Current study demonstrates inflammatory change along the   left medial gluteal skin surface, compatible with cellulitis, with a   small air-filled tract extending between the skin surface and the left   anal verge (series 3, image and 100 and series 6, image 65). An   additional air-filled tract is identified coursing in craniocaudal   dimension in the soft tissues just distal to the coccygeal tip (series 6   image 69). No drainable fluid collection.  BONES: Degenerative changes. Osteopenia with grade 2 anterolisthesis of   L5 over S1    IMPRESSION:     Interval improvement from the large open wound on 2019. Currently,   left medial gluteal skin surface cellulitis with 2 underlying air filled   tracts; one between the skin surface and the anal verge and the other   extending in the soft tissues just distal to the coccyx. No drainable   abscess. Air-filled tract between the skin surface and the anal verge,   however, may be seen in the setting of perianal fistula. If clinically   warranted, moredefinite evaluation may be obtained with contrast   enhanced MR perianal fistula protocol.    Moderately distended urinary bladder. Chronic clinically for urinary   retention.    < end of copied text >

## 2019-12-04 NOTE — H&P ADULT - NSHPREVIEWOFSYSTEMS_GEN_ALL_CORE
REVIEW OF SYSTEMS:    CONSTITUTIONAL: + subjective fevers (never measured temperature at home) No weakness or chills  EYES/ENT: No visual changes;  No vertigo or throat pain   NECK: No pain or stiffness  RESPIRATORY: No cough, wheezing, hemoptysis; No shortness of breath  CARDIOVASCULAR: No chest pain or palpitations  GASTROINTESTINAL: No abdominal or epigastric pain. No nausea, vomiting, or hematemesis; No diarrhea or constipation. No melena or hematochezia.  GENITOURINARY: No dysuria, frequency or hematuria  NEUROLOGICAL: No numbness or weakness  SKIN: No itching, rashes REVIEW OF SYSTEMS:    CONSTITUTIONAL: + subjective fevers (never measured temperature at home) No weakness or chills  EYES/ENT: No visual changes;  No vertigo or throat pain   NECK: No pain or stiffness  RESPIRATORY: No cough, wheezing, hemoptysis; No shortness of breath  CARDIOVASCULAR: No chest pain or palpitations  GASTROINTESTINAL: No abdominal or epigastric pain. No nausea, vomiting, or hematemesis; No diarrhea or constipation. No melena or hematochezia.  GENITOURINARY: No dysuria, frequency or hematuria  NEUROLOGICAL: No numbness or weakness  SKIN: No itching, redness of the left buttock and tenderness

## 2019-12-04 NOTE — H&P ADULT - PROBLEM SELECTOR PLAN 6
- ASA  - Plavix  - Atorvastatin  - Metoprolol  - Lisinopril on hold given patient was intermittently hypotensive

## 2019-12-04 NOTE — ED PROVIDER NOTE - PROGRESS NOTE DETAILS
spoke w/ sx they will be down to eval s/p ct chriss w/ pt she doesn't want IV contrast for ct despite low risk for FAMILIA spoke w/ sx no intraabdominal infection, no drainable collection, will rx cellulitis w/ doxycyline, daughter informed.

## 2019-12-04 NOTE — H&P ADULT - NSHPPHYSICALEXAM_GEN_ALL_CORE
Vital Signs Last 24 Hrs  T(C): 37.2 (04 Dec 2019 16:58), Max: 38.6 (04 Dec 2019 14:36)  T(F): 98.9 (04 Dec 2019 16:58), Max: 101.4 (04 Dec 2019 14:36)  HR: 77 (04 Dec 2019 16:58) (77 - 85)  BP: 94/41 (04 Dec 2019 16:58) (94/41 - 145/95)  BP(mean): --  RR: 15 (04 Dec 2019 16:58) (15 - 18)  SpO2: 95% (04 Dec 2019 16:58) (95% - 100%)    PHYSICAL EXAM:  GENERAL: NAD, well-developed, resting in bed, unable to lay supine  HEAD:  Atraumatic, Normocephalic  EYES: EOMI, PERRLA, conjunctiva and sclera clear  NECK: Supple, No JVD  CHEST/LUNG: Clear to auscultation bilaterally; No wheeze  HEART: Regular rate and rhythm; No murmurs, rubs, or gallops  ABDOMEN: Soft, Nontender, Nondistended; Bowel sounds present  EXTREMITIES:  2+ Peripheral Pulses, No clubbing, cyanosis, or edema  PSYCH: AAOx2  NEUROLOGY: non-focal  SKIN: 2 large ulcers in the intragluteal fold without drainage Vital Signs Last 24 Hrs  T(C): 37.2 (04 Dec 2019 16:58), Max: 38.6 (04 Dec 2019 14:36)  T(F): 98.9 (04 Dec 2019 16:58), Max: 101.4 (04 Dec 2019 14:36)  HR: 77 (04 Dec 2019 16:58) (77 - 85)  BP: 94/41 (04 Dec 2019 16:58) (94/41 - 145/95)  BP(mean): --  RR: 15 (04 Dec 2019 16:58) (15 - 18)  SpO2: 95% (04 Dec 2019 16:58) (95% - 100%)    PHYSICAL EXAM:  GENERAL: NAD, well-developed, resting in bed, unable to lay supine  HEAD:  Atraumatic, Normocephalic  EYES: EOMI, PERRLA, conjunctiva and sclera clear  NECK: Supple, No JVD  CHEST/LUNG: Clear to auscultation bilaterally; No wheeze  HEART: Regular rate and rhythm; No murmurs, rubs, or gallops  ABDOMEN: Soft, Nontender, Nondistended; Bowel sounds present  EXTREMITIES:  2+ Peripheral Pulses, No clubbing, cyanosis, or edema  PSYCH: AAOx3  NEUROLOGY: non-focal  SKIN: 2 large ulcers in the intragluteal fold. the right gluteal ulcer had creamy discharge with surrounding erythema (non-foul smelling). the left gluteal ulcer was clean and  non tender and non-erythematous.

## 2019-12-04 NOTE — H&P ADULT - ATTENDING COMMENTS
pt seen and examined.  above plan discussed on rounds today.  In addition,  Sepsis 2/2 gluteal cellulitis surrounding right gluteal ulceration - abx as above(vanco/aztreonam/flagyl). also wound care, surgery eval and MRI to r/o perianal fistula

## 2019-12-04 NOTE — H&P ADULT - PROBLEM SELECTOR PLAN 5
Patient on Lantus 12 u qhs, Humalog 4 u TID, and SSI at home  - Carb consistent diet  - Lantus 10 u qhs and titrate as needed  - Humalog 3 u TID and titrate as needed  - Low dose SSI  - Gabapentin for peripheral neuropathy

## 2019-12-04 NOTE — PATIENT PROFILE ADULT - VISION (WITH CORRECTIVE LENSES IF THE PATIENT USUALLY WEARS THEM):
Pt wears glasses/Partially impaired: cannot see medication labels or newsprint, but can see obstacles in path, and the surrounding layout; can count fingers at arm's length

## 2019-12-04 NOTE — ED ADULT NURSE NOTE - OBJECTIVE STATEMENT
87 yo female presents to the ED via EMS from home complaining of buttocks pain. Patient's daughter is at bedside, per daughter patient was here in September of 2019 for ulcer in between buttocks and was admitted for 1 month. Patient needed debridement of wound because antibiotics were ineffective. Patient presents today with worsening pain of the area. Upon assessment, two wounds present: 1 above rectum and 1 below towards L buttock. No foul drainage noted, patient is unable to lay supine without pain. Per daughter, patient took tylenol at 08:00 due to pain. Denies headache, dizziness, vision changes, chest pain, shortness of breath, abdominal pain, nausea, vomiting, diarrhea, fevers, chills, dysuria, hematuria, recent illness travel or fall.

## 2019-12-04 NOTE — ED PROVIDER NOTE - SEVERE SEPSIS ALERT DETAILS
Attending MD Hurtado:  I have seen and evaluated this patient and do not believe the patient is septic at this time.  I will continue to evaluate.

## 2019-12-04 NOTE — CONSULT NOTE ADULT - SUBJECTIVE AND OBJECTIVE BOX
Consulting surgical team: Red [id number]  Consulting attending: Dr. Tadeo Wan    HPI:  85 yo woman with a hx of CHF, DM, HLD, HTN, CAD prior MI, sp debridement of bilateral buttocks (9/15, ) by Dr. Wan presenting with worsening buttock pain for the past week. She has been followed by wound care at home with good wound healing. She denies fevers, chills, drainage. She last saw Dr. Wan on , and is scheduled for repeat outpatient evaluation on .      PAST MEDICAL HISTORY:  CHF (congestive heart failure)  STEMI (ST elevation myocardial infarction)  Palpitations  Diabetes mellitus  Dyslipidemia  HTN (hypertension)      PAST SURGICAL HISTORY:  S/P cardiac cath  S/P tonsillectomy  S/P lumpectomy, left breast  S/P appendectomy  S/P cholecystectomy      MEDICATIONS:  doxycycline IVPB 100 milliGRAM(s) IV Intermittent once      ALLERGIES:  codeine (Unknown)  Kiwi (Anaphylaxis)  penicillins (Anaphylaxis)      VITALS & I/Os:  Vital Signs Last 24 Hrs  T(C): 37.3 (04 Dec 2019 10:10), Max: 37.3 (04 Dec 2019 10:10)  T(F): 99.2 (04 Dec 2019 10:10), Max: 99.2 (04 Dec 2019 10:10)  HR: 77 (04 Dec 2019 10:10) (77 - 77)  BP: 129/75 (04 Dec 2019 10:10) (129/75 - 129/75)  BP(mean): --  RR: 18 (04 Dec 2019 10:10) (18 - 18)  SpO2: 99% (04 Dec 2019 10:10) (99% - 99%)      PHYSICAL EXAM:  GEN: NAD, resting quietly  PULM: symmetric chest rise bilaterally, no increased WOB  CV: regular rate, peripheral pulses intact  ABD: soft, NTND  : right and left incision sites with healthy granulation tissue, overlying cellulitis  EXTR: no cyanosis or edema, moving all extremities      LABS:                        12.6   13.50 )-----------( 261      ( 04 Dec 2019 10:43 )             38.7     12-04    133<L>  |  98  |  40<H>  ----------------------------<  302<H>  4.8   |  23  |  1.27    Ca    10.2      04 Dec 2019 10:43    TPro  8.0  /  Alb  3.7  /  TBili  0.5  /  DBili  x   /  AST  10  /  ALT  8<L>  /  AlkPhos  109  12    Lactate:  12-04 @ 10:44  1.6    Urinalysis Basic - ( 04 Dec 2019 12:24 )    Color: Light Yellow / Appearance: Clear / S.015 / pH: x  Gluc: x / Ketone: Negative  / Bili: Negative / Urobili: Negative   Blood: x / Protein: Negative / Nitrite: Negative   Leuk Esterase: Moderate / RBC: 2 /hpf / WBC 10 /HPF   Sq Epi: x / Non Sq Epi: 0 /hpf / Bacteria: Negative        IMAGING:  < from: CT Abdomen and Pelvis No Cont (19 @ 12:59) >  IMPRESSION:     Interval improvement from the large open wound on 2019. Currently,   left medial gluteal skin surface cellulitis with 2 underlying air filled   tracts; one between the skin surface and the anal verge and the other   extending in the soft tissues just distal to the coccyx. No drainable   abscess. Air-filled tract between the skin surface and the anal verge,   however, may be seen in the setting of perianal fistula. If clinically   warranted, moredefinite evaluation may be obtained with contrast   enhanced MR perianal fistula protocol.    Moderately distended urinary bladder. Chronic clinically for urinary   retention.    < end of copied text >

## 2019-12-04 NOTE — CONSULT NOTE ADULT - ASSESSMENT
85 yo woman with a hx of CHF, DM, HLD, HTN, CAD prior MI, sp debridement of bilateral buttocks (9/15, 9/28) presenting with buttock cellulitis. CT consistent with cellulitis without underlying abscess.    - no urgent surgical intervention  - cont local wound care  - medicine evaluation for treatment of cellulitis    Red surgery  [id number] 87 yo woman with a hx of CHF, DM, HLD, HTN, CAD prior MI, sp debridement of bilateral buttocks (9/15, 9/28) presenting with buttock cellulitis. CT consistent with cellulitis without underlying abscess.    - no urgent surgical intervention  - antibiotics  - cont local wound care  - medicine evaluation for treatment of cellulitis    Red surgery  [id number]

## 2019-12-04 NOTE — ED ADULT NURSE NOTE - NSIMPLEMENTINTERV_GEN_ALL_ED
Implemented All Fall Risk Interventions:  [place] to call system. Call bell, personal items and telephone within reach. Instruct patient to call for assistance. Room bathroom lighting operational. Non-slip footwear when patient is off stretcher. Physically safe environment: no spills, clutter or unnecessary equipment. Stretcher in lowest position, wheels locked, appropriate side rails in place. Provide visual cue, wrist band, yellow gown, etc. Monitor gait and stability. Monitor for mental status changes and reorient to person, place, and time. Review medications for side effects contributing to fall risk. Reinforce activity limits and safety measures with patient and family.

## 2019-12-04 NOTE — H&P ADULT - HISTORY OF PRESENT ILLNESS
85 yo female with a hx of of CHF, DM, HLD, HTN, and an STEMI who presents to the ED for a several day history of buttocks pain. Patient states that the pain started about a week ago, but has suddenly gotten worse over the past few days. She describes it as a sharp pain and has tried taking OTC acetaminophen, which has not helped the pain at all. Patient endorses one episode of nasuea earlier this morning, but denies any vomitting. She has not noticed a rash in that area, but does endorse that the skin is difficult to visualize.  Patient has a wound care nurse that comes to the house a few times per week. She came yesterday for wound care dressing changes.    Of note, patient was hospitalized at [facility] from September 4 to October 4 at [facility] for an ulcer in between her buttocks. During that hospitalization, patient required debridement of the wound as well as IV antibiotics.     In the ED, her VS /75, HR 77, RR 18 with SpO2 of 99% and T of 99.2. Patient received a 500 cc bolus of fluids as well as Doxycycline 500 mg x1.

## 2019-12-04 NOTE — H&P ADULT - PROBLEM SELECTOR PLAN 2
UA positive for leukocyte esterase and WBC  - Abx as above for leukocytosis  - Follow up Urine culture

## 2019-12-04 NOTE — H&P ADULT - PROBLEM SELECTOR PLAN 1
Patient with hx of wounds and recurrent cellulitis, elevated ESR/CRP  - Will start patient on Vancomycin and Aztreonam (patient with hx of anaphylaxis to penicillin) to cover for MRSA, strep, and pseudomonas  - Will get an MRI of pelvis to assess for osteomyleitis Patient with hx of wounds and recurrent cellulitis, elevated ESR/CRP  - Will start patient on Vancomycin, Aztreonam, and Flagyl (patient with hx of anaphylaxis to penicillin) to cover for MRSA, strep, pseudomonas, anaerobes)  - Will get an MRI of pelvis to assess for osteomyleitis  - Wound care culture

## 2019-12-04 NOTE — ED PROVIDER NOTE - ATTENDING CONTRIBUTION TO CARE
pt presents to the ED w/ hx of a buttock infection/uti in september 2019  pt is accompanied w/ daughter (Nancy )  pt has been having worsening swelling on her buttock area (larger wound on the top) approx 3 cm in length on the R superior aspect and 1cm in length on the L inferior aspect,   debridement (Dr. Tadeo Briscoe) x2 times for buttock wound  Nov 18th last appointment w/ Dr. Wan   wound nurse came yesterday- no foul odor and wound heeling,   worsening buttock pain in the bone for the past 3-4 days,   no vomiting. no fevers (taking tylenol every 4-6 hours 500 mg -2 tabs)  PMH- DM, CAD s/p stenting x 1 time GERD  reports she took all medication    I have reviewed the triage vital signs. Const: Well-nourished, Well-developed, moderate distress Eyes: pale conjunctiva no scleral icterus ENMT: Moist mucus membranes, CVS: +S1/S2, radial/DP pulse 2+ bilaterally RESP: Unlabored respiratory effort, Clear to auscultation bilaterally GI: mildly distended abdomen soft, no CVA tenderness MSK: Extremities w/o deformity or ttp, Psych: Awake, Alert, & Orientedx3;  Appropriate mood and affect, cooperative    concern for possible infection in the buttock area, osteomylelitis vs deep space abscess, mild abdominal distention will obtain

## 2019-12-04 NOTE — ED ADULT NURSE REASSESSMENT NOTE - NS ED NURSE REASSESS COMMENT FT1
ARIELLE Collins called and was told to call [phone] - was told to call back because he had a page for [facility].

## 2019-12-04 NOTE — H&P ADULT - ASSESSMENT
85 yo female with a hx of of CHF, DM, HLD, HTN, and an STEMI who was admitted to medicine for celluitis 87 yo female with a hx of of CHF, DM, HLD, HTN, and an STEMI who was admitted for Sepsis 2/2 gluteal celluitis present on admission.

## 2019-12-04 NOTE — H&P ADULT - PROBLEM SELECTOR PLAN 9
Transitions of Care Status:  1.  Name of PCP: Alec Danielson  2.  PCP Contacted on Admission: [ ] Y    [ ] N    3.  PCP contacted at Discharge: [ ] Y    [ ] N    [ ] N/A  4.  Post-Discharge Appointment Date and Location:  5.  Summary of Handoff given to PCP: Transitions of Care Status:  1.  Name of PCP: [facility]  2.  PCP Contacted on Admission: [ ] Y    [ ] N    3.  PCP contacted at Discharge: [ ] Y    [ ] N    [ ] N/A  4.  Post-Discharge Appointment Date and Location:  5.  Summary of Handoff given to PCP:

## 2019-12-04 NOTE — ED ADULT NURSE REASSESSMENT NOTE - NS ED NURSE REASSESS COMMENT FT1
Called ARIELLE [phone] Karina for pt's BP - was told to call back in 5 minutes. Pt is in no current distress. Awaiting bed assignment.

## 2019-12-05 ENCOUNTER — TRANSCRIPTION ENCOUNTER (OUTPATIENT)
Age: 84
End: 2019-12-05

## 2019-12-05 LAB
-  STREPTOCOCCUS SP. (NOT GRP A, B OR S PNEUMONIAE): SIGNIFICANT CHANGE UP
-  STREPTOCOCCUS SP. (NOT GRP A, B OR S PNEUMONIAE): SIGNIFICANT CHANGE UP
ALBUMIN SERPL ELPH-MCNC: 3.1 G/DL — LOW (ref 3.3–5)
ALP SERPL-CCNC: 130 U/L — HIGH (ref 40–120)
ALT FLD-CCNC: 15 U/L — SIGNIFICANT CHANGE UP (ref 10–45)
ANION GAP SERPL CALC-SCNC: 15 MMOL/L — SIGNIFICANT CHANGE UP (ref 5–17)
AST SERPL-CCNC: 27 U/L — SIGNIFICANT CHANGE UP (ref 10–40)
BASOPHILS # BLD AUTO: 0.03 K/UL — SIGNIFICANT CHANGE UP (ref 0–0.2)
BASOPHILS NFR BLD AUTO: 0.4 % — SIGNIFICANT CHANGE UP (ref 0–2)
BILIRUB SERPL-MCNC: 0.5 MG/DL — SIGNIFICANT CHANGE UP (ref 0.2–1.2)
BUN SERPL-MCNC: 55 MG/DL — HIGH (ref 7–23)
CALCIUM SERPL-MCNC: 9.9 MG/DL — SIGNIFICANT CHANGE UP (ref 8.4–10.5)
CHLORIDE SERPL-SCNC: 100 MMOL/L — SIGNIFICANT CHANGE UP (ref 96–108)
CO2 SERPL-SCNC: 16 MMOL/L — LOW (ref 22–31)
CREAT SERPL-MCNC: 1.81 MG/DL — HIGH (ref 0.5–1.3)
CULTURE RESULTS: SIGNIFICANT CHANGE UP
EOSINOPHIL # BLD AUTO: 0.06 K/UL — SIGNIFICANT CHANGE UP (ref 0–0.5)
EOSINOPHIL NFR BLD AUTO: 0.9 % — SIGNIFICANT CHANGE UP (ref 0–6)
GLUCOSE BLDC GLUCOMTR-MCNC: 211 MG/DL — HIGH (ref 70–99)
GLUCOSE BLDC GLUCOMTR-MCNC: 243 MG/DL — HIGH (ref 70–99)
GLUCOSE BLDC GLUCOMTR-MCNC: 315 MG/DL — HIGH (ref 70–99)
GLUCOSE BLDC GLUCOMTR-MCNC: 317 MG/DL — HIGH (ref 70–99)
GLUCOSE BLDC GLUCOMTR-MCNC: 319 MG/DL — HIGH (ref 70–99)
GLUCOSE BLDC GLUCOMTR-MCNC: 333 MG/DL — HIGH (ref 70–99)
GLUCOSE BLDC GLUCOMTR-MCNC: 345 MG/DL — HIGH (ref 70–99)
GLUCOSE SERPL-MCNC: 372 MG/DL — HIGH (ref 70–99)
GRAM STN FLD: SIGNIFICANT CHANGE UP
HBA1C BLD-MCNC: 9.4 % — HIGH (ref 4–5.6)
HCT VFR BLD CALC: 36.1 % — SIGNIFICANT CHANGE UP (ref 34.5–45)
HGB BLD-MCNC: 11.5 G/DL — SIGNIFICANT CHANGE UP (ref 11.5–15.5)
IMM GRANULOCYTES NFR BLD AUTO: 0.3 % — SIGNIFICANT CHANGE UP (ref 0–1.5)
LYMPHOCYTES # BLD AUTO: 1.01 K/UL — SIGNIFICANT CHANGE UP (ref 1–3.3)
LYMPHOCYTES # BLD AUTO: 14.9 % — SIGNIFICANT CHANGE UP (ref 13–44)
MAGNESIUM SERPL-MCNC: 1.8 MG/DL — SIGNIFICANT CHANGE UP (ref 1.6–2.6)
MCHC RBC-ENTMCNC: 28.5 PG — SIGNIFICANT CHANGE UP (ref 27–34)
MCHC RBC-ENTMCNC: 31.9 GM/DL — LOW (ref 32–36)
MCV RBC AUTO: 89.4 FL — SIGNIFICANT CHANGE UP (ref 80–100)
METHOD TYPE: SIGNIFICANT CHANGE UP
METHOD TYPE: SIGNIFICANT CHANGE UP
MONOCYTES # BLD AUTO: 0.14 K/UL — SIGNIFICANT CHANGE UP (ref 0–0.9)
MONOCYTES NFR BLD AUTO: 2.1 % — SIGNIFICANT CHANGE UP (ref 2–14)
NEUTROPHILS # BLD AUTO: 5.5 K/UL — SIGNIFICANT CHANGE UP (ref 1.8–7.4)
NEUTROPHILS NFR BLD AUTO: 81.4 % — HIGH (ref 43–77)
NRBC # BLD: 0 /100 WBCS — SIGNIFICANT CHANGE UP (ref 0–0)
ORGANISM # SPEC MICROSCOPIC CNT: SIGNIFICANT CHANGE UP
ORGANISM # SPEC MICROSCOPIC CNT: SIGNIFICANT CHANGE UP
PHOSPHATE SERPL-MCNC: 4.1 MG/DL — SIGNIFICANT CHANGE UP (ref 2.5–4.5)
PLATELET # BLD AUTO: 185 K/UL — SIGNIFICANT CHANGE UP (ref 150–400)
POTASSIUM SERPL-MCNC: 5.8 MMOL/L — HIGH (ref 3.5–5.3)
POTASSIUM SERPL-SCNC: 5.8 MMOL/L — HIGH (ref 3.5–5.3)
PROT SERPL-MCNC: 7.4 G/DL — SIGNIFICANT CHANGE UP (ref 6–8.3)
RBC # BLD: 4.04 M/UL — SIGNIFICANT CHANGE UP (ref 3.8–5.2)
RBC # FLD: 14.6 % — HIGH (ref 10.3–14.5)
SODIUM SERPL-SCNC: 131 MMOL/L — LOW (ref 135–145)
SPECIMEN SOURCE: SIGNIFICANT CHANGE UP
WBC # BLD: 6.76 K/UL — SIGNIFICANT CHANGE UP (ref 3.8–10.5)
WBC # FLD AUTO: 6.76 K/UL — SIGNIFICANT CHANGE UP (ref 3.8–10.5)

## 2019-12-05 PROCEDURE — 72195 MRI PELVIS W/O DYE: CPT | Mod: 26

## 2019-12-05 PROCEDURE — 71045 X-RAY EXAM CHEST 1 VIEW: CPT | Mod: 26

## 2019-12-05 PROCEDURE — 93010 ELECTROCARDIOGRAM REPORT: CPT

## 2019-12-05 PROCEDURE — 99291 CRITICAL CARE FIRST HOUR: CPT

## 2019-12-05 PROCEDURE — 99231 SBSQ HOSP IP/OBS SF/LOW 25: CPT

## 2019-12-05 RX ORDER — LACTOBACILLUS ACIDOPHILUS 100MM CELL
1 CAPSULE ORAL THREE TIMES A DAY
Refills: 0 | Status: DISCONTINUED | OUTPATIENT
Start: 2019-12-05 | End: 2019-12-23

## 2019-12-05 RX ORDER — ONDANSETRON 8 MG/1
4 TABLET, FILM COATED ORAL ONCE
Refills: 0 | Status: COMPLETED | OUTPATIENT
Start: 2019-12-05 | End: 2019-12-05

## 2019-12-05 RX ORDER — INSULIN HUMAN 100 [IU]/ML
10 INJECTION, SOLUTION SUBCUTANEOUS ONCE
Refills: 0 | Status: DISCONTINUED | OUTPATIENT
Start: 2019-12-05 | End: 2019-12-05

## 2019-12-05 RX ORDER — INSULIN HUMAN 100 [IU]/ML
10 INJECTION, SOLUTION SUBCUTANEOUS ONCE
Refills: 0 | Status: COMPLETED | OUTPATIENT
Start: 2019-12-05 | End: 2019-12-05

## 2019-12-05 RX ORDER — DEXTROSE 50 % IN WATER 50 %
50 SYRINGE (ML) INTRAVENOUS ONCE
Refills: 0 | Status: COMPLETED | OUTPATIENT
Start: 2019-12-05 | End: 2019-12-05

## 2019-12-05 RX ORDER — DAPTOMYCIN 500 MG/10ML
240 INJECTION, POWDER, LYOPHILIZED, FOR SOLUTION INTRAVENOUS
Refills: 0 | Status: DISCONTINUED | OUTPATIENT
Start: 2019-12-07 | End: 2019-12-09

## 2019-12-05 RX ORDER — INSULIN GLARGINE 100 [IU]/ML
15 INJECTION, SOLUTION SUBCUTANEOUS AT BEDTIME
Refills: 0 | Status: DISCONTINUED | OUTPATIENT
Start: 2019-12-05 | End: 2019-12-23

## 2019-12-05 RX ORDER — ACETAMINOPHEN 500 MG
1000 TABLET ORAL EVERY 6 HOURS
Refills: 0 | Status: DISCONTINUED | OUTPATIENT
Start: 2019-12-05 | End: 2019-12-23

## 2019-12-05 RX ORDER — INSULIN GLARGINE 100 [IU]/ML
15 INJECTION, SOLUTION SUBCUTANEOUS AT BEDTIME
Refills: 0 | Status: DISCONTINUED | OUTPATIENT
Start: 2019-12-05 | End: 2019-12-05

## 2019-12-05 RX ORDER — ACETAMINOPHEN 500 MG
1000 TABLET ORAL ONCE
Refills: 0 | Status: COMPLETED | OUTPATIENT
Start: 2019-12-05 | End: 2019-12-05

## 2019-12-05 RX ORDER — SODIUM CHLORIDE 9 MG/ML
1000 INJECTION INTRAMUSCULAR; INTRAVENOUS; SUBCUTANEOUS
Refills: 0 | Status: DISCONTINUED | OUTPATIENT
Start: 2019-12-05 | End: 2019-12-11

## 2019-12-05 RX ORDER — INSULIN LISPRO 100/ML
8 VIAL (ML) SUBCUTANEOUS
Refills: 0 | Status: DISCONTINUED | OUTPATIENT
Start: 2019-12-05 | End: 2019-12-23

## 2019-12-05 RX ORDER — DAPTOMYCIN 500 MG/10ML
240 INJECTION, POWDER, LYOPHILIZED, FOR SOLUTION INTRAVENOUS ONCE
Refills: 0 | Status: COMPLETED | OUTPATIENT
Start: 2019-12-05 | End: 2019-12-05

## 2019-12-05 RX ORDER — DAPTOMYCIN 500 MG/10ML
INJECTION, POWDER, LYOPHILIZED, FOR SOLUTION INTRAVENOUS
Refills: 0 | Status: DISCONTINUED | OUTPATIENT
Start: 2019-12-05 | End: 2019-12-09

## 2019-12-05 RX ADMIN — Medication 4: at 19:02

## 2019-12-05 RX ADMIN — GABAPENTIN 100 MILLIGRAM(S): 400 CAPSULE ORAL at 04:04

## 2019-12-05 RX ADMIN — FAMOTIDINE 20 MILLIGRAM(S): 10 INJECTION INTRAVENOUS at 13:04

## 2019-12-05 RX ADMIN — Medication 50 MILLIGRAM(S): at 19:23

## 2019-12-05 RX ADMIN — Medication 50 MILLILITER(S): at 23:30

## 2019-12-05 RX ADMIN — Medication 4: at 13:02

## 2019-12-05 RX ADMIN — Medication 100 MILLIGRAM(S): at 05:35

## 2019-12-05 RX ADMIN — ONDANSETRON 4 MILLIGRAM(S): 8 TABLET, FILM COATED ORAL at 20:15

## 2019-12-05 RX ADMIN — TRAMADOL HYDROCHLORIDE 50 MILLIGRAM(S): 50 TABLET ORAL at 10:20

## 2019-12-05 RX ADMIN — Medication 1000 MILLIGRAM(S): at 05:00

## 2019-12-05 RX ADMIN — Medication 100 MILLIGRAM(S): at 13:04

## 2019-12-05 RX ADMIN — Medication 1000 MILLIGRAM(S): at 13:34

## 2019-12-05 RX ADMIN — TRAMADOL HYDROCHLORIDE 50 MILLIGRAM(S): 50 TABLET ORAL at 09:41

## 2019-12-05 RX ADMIN — Medication 12.5 MILLIGRAM(S): at 04:04

## 2019-12-05 RX ADMIN — Medication 3 UNIT(S): at 09:13

## 2019-12-05 RX ADMIN — Medication 3 UNIT(S): at 13:03

## 2019-12-05 RX ADMIN — CLOPIDOGREL BISULFATE 75 MILLIGRAM(S): 75 TABLET, FILM COATED ORAL at 13:04

## 2019-12-05 RX ADMIN — Medication 400 MILLIGRAM(S): at 20:16

## 2019-12-05 RX ADMIN — Medication 1000 MILLIGRAM(S): at 04:04

## 2019-12-05 RX ADMIN — Medication 81 MILLIGRAM(S): at 13:04

## 2019-12-05 RX ADMIN — Medication 25 MICROGRAM(S): at 04:04

## 2019-12-05 RX ADMIN — SODIUM CHLORIDE 50 MILLILITER(S): 9 INJECTION INTRAMUSCULAR; INTRAVENOUS; SUBCUTANEOUS at 23:53

## 2019-12-05 RX ADMIN — Medication 1000 MILLIGRAM(S): at 13:04

## 2019-12-05 RX ADMIN — Medication 50 MILLIGRAM(S): at 05:36

## 2019-12-05 RX ADMIN — INSULIN GLARGINE 15 UNIT(S): 100 INJECTION, SOLUTION SUBCUTANEOUS at 23:31

## 2019-12-05 RX ADMIN — INSULIN HUMAN 10 UNIT(S): 100 INJECTION, SOLUTION SUBCUTANEOUS at 23:30

## 2019-12-05 RX ADMIN — ENOXAPARIN SODIUM 40 MILLIGRAM(S): 100 INJECTION SUBCUTANEOUS at 13:05

## 2019-12-05 RX ADMIN — DAPTOMYCIN 109.6 MILLIGRAM(S): 500 INJECTION, POWDER, LYOPHILIZED, FOR SOLUTION INTRAVENOUS at 23:53

## 2019-12-05 RX ADMIN — Medication 1000 MILLIGRAM(S): at 23:54

## 2019-12-05 RX ADMIN — Medication 2: at 09:13

## 2019-12-05 RX ADMIN — Medication 3 UNIT(S): at 19:03

## 2019-12-05 RX ADMIN — GABAPENTIN 100 MILLIGRAM(S): 400 CAPSULE ORAL at 19:22

## 2019-12-05 NOTE — DISCHARGE NOTE PROVIDER - NSDCFUADDINST_GEN_ALL_CORE_FT
activity as tolerated; fall precaution;  physical therapy; wound care, picc line care  mid buttock-wound care- irrigate with normal saline and aquacel dressing daily and evaluate wound

## 2019-12-05 NOTE — DISCHARGE NOTE PROVIDER - CARE PROVIDER_API CALL
Tadeo Wan)  Surgery  [address] [facility], [address]  [place] [address]  Phone: [phone]  Fax: [phone]  Follow Up Time:     Sandrita Zaman)  Infectious Disease; Internal Medicine  [address] [place] [address] [address]  [place] [address]  Phone: [phone]  Fax: [phone]  Follow Up Time: Tadeo Wan)  Surgery  [address] [facility], [address]  [place] [address]  Phone: [phone]  Fax: [phone]  Follow Up Time: Routine    Sandrita Zaman)  Infectious Disease; Internal Medicine  [address] [facility] [address]  [place] [address]  Phone: [phone]  Fax: [phone]  Follow Up Time: 1 month    urology,   [phone]  Phone: (   )    -  Fax: (   )    -  Follow Up Time: Routine    eduarda Barron,   [phone]  Phone: (   )    -  Fax: (   )    -  Follow Up Time: Routine    Star Greene)  Surgery  [facility]  [address] [facility], [address]  [place] [address]  Phone: [phone]  Fax: [phone]  Follow Up Time: 1 month Tadeo Wan)  Surgery  [address] [facility], [address]  [place] [address]  Phone: [phone]  Fax: [phone]  Follow Up Time: Routine    Sandrita Zaman)  Infectious Disease; Internal Medicine  [address] [facility] [address]  [place] [address]  Phone: [phone]  Fax: [phone]  Follow Up Time: 1 month    Star Greene)  Surgery  [facility] [address], [address]  [place] [address]  Phone: [phone]  Fax: [phone]  Follow Up Time: 1 month    urology,   [phone]  Phone: (   )    -  Fax: (   )    -  Follow Up Time: Routine

## 2019-12-05 NOTE — PROGRESS NOTE ADULT - SUBJECTIVE AND OBJECTIVE BOX
[facility]    Subjective: Patient seen and examined. No new events except as noted.   feeling better  pain control   buttock pain     REVIEW OF SYSTEMS:    CONSTITUTIONAL: No weakness, fevers or chills  EYES/ENT: No visual changes;  No vertigo or throat pain   NECK: No pain or stiffness  RESPIRATORY: No cough, wheezing, hemoptysis; No shortness of breath  CARDIOVASCULAR: No chest pain or palpitations  GASTROINTESTINAL: No abdominal or epigastric pain. No nausea, vomiting, or hematemesis; No diarrhea or constipation. No melena or hematochezia.  GENITOURINARY: No dysuria, frequency or hematuria  NEUROLOGICAL: No numbness or weakness  SKIN: No itching, burning, rashes, or lesions   All other review of systems is negative unless indicated above.    MEDICATIONS:  MEDICATIONS  (STANDING):  aspirin enteric coated 81 milliGRAM(s) Oral daily  atorvastatin 40 milliGRAM(s) Oral at bedtime  aztreonam  IVPB 500 milliGRAM(s) IV Intermittent every 12 hours  clopidogrel Tablet 75 milliGRAM(s) Oral daily  dextrose 5%. 1000 milliLiter(s) (50 mL/Hr) IV Continuous <Continuous>  dextrose 50% Injectable 12.5 Gram(s) IV Push once  dextrose 50% Injectable 25 Gram(s) IV Push once  dextrose 50% Injectable 25 Gram(s) IV Push once  enoxaparin Injectable 40 milliGRAM(s) SubCutaneous daily  famotidine    Tablet 20 milliGRAM(s) Oral daily  gabapentin 100 milliGRAM(s) Oral two times a day  influenza   Vaccine 0.5 milliLiter(s) IntraMuscular once  insulin glargine Injectable (LANTUS) 15 Unit(s) SubCutaneous at bedtime  insulin lispro (HumaLOG) corrective regimen sliding scale   SubCutaneous three times a day before meals  insulin lispro (HumaLOG) corrective regimen sliding scale   SubCutaneous at bedtime  insulin lispro Injectable (HumaLOG) 8 Unit(s) SubCutaneous three times a day before meals  lactobacillus acidophilus 1 Tablet(s) Oral three times a day  levothyroxine 25 MICROGram(s) Oral daily  metoprolol tartrate 12.5 milliGRAM(s) Oral two times a day  metroNIDAZOLE  IVPB 500 milliGRAM(s) IV Intermittent every 8 hours  vancomycin  IVPB 1000 milliGRAM(s) IV Intermittent every 24 hours      PHYSICAL EXAM:  T(C): 37.3 (19 @ 14:34), Max: 37.3 (19 @ 14:34)  HR: 75 (19 @ 14:34) (75 - 92)  BP: 92/53 (19 @ 14:34) (92/53 - 103/51)  RR: 18 (19 @ 14:34) (18 - 19)  SpO2: 94% (19 @ 14:34) (94% - 97%)  Wt(kg): --  I&O's Summary    04 Dec 2019 07:  -  05 Dec 2019 07:00  --------------------------------------------------------  IN: 500 mL / OUT: 0 mL / NET: 500 mL    05 Dec 2019 07:  -  05 Dec 2019 19:31  --------------------------------------------------------  IN: 240 mL / OUT: 0 mL / NET: 240 mL          Appearance: Normal	  HEENT:   Normal oral mucosa, PERRL, EOMI	  Lymphatic: No lymphadenopathy , no edema  Cardiovascular: Normal S1 S2, No JVD, No murmurs , Peripheral pulses palpable 2+ bilaterally  Respiratory: Lungs clear to auscultation, normal effort 	  Gastrointestinal:  Soft, Non-tender, + BS	  Skin: buttock pain and redness   Musculoskeletal: Normal range of motion, normal strength  Psychiatry:  Mood & affect appropriate  Ext: No edema      All labs, Imaging and EKGs personally reviewed                             12.6   13.50 )-----------( 261      ( 04 Dec 2019 10:43 )             38.7               12-    133<L>  |  98  |  40<H>  ----------------------------<  302<H>  4.8   |  23  |  1.27    Ca    10.2      04 Dec 2019 10:43    TPro  8.0  /  Alb  3.7  /  TBili  0.5  /  DBili  x   /  AST  10  /  ALT  8<L>  /  AlkPhos  109  12-04                       Urinalysis Basic - ( 04 Dec 2019 12:24 )    Color: Light Yellow / Appearance: Clear / S.015 / pH: x  Gluc: x / Ketone: Negative  / Bili: Negative / Urobili: Negative   Blood: x / Protein: Negative / Nitrite: Negative   Leuk Esterase: Moderate / RBC: 2 /hpf / WBC 10 /HPF   Sq Epi: x / Non Sq Epi: 0 /hpf / Bacteria: Negative    < from: CT Abdomen and Pelvis No Cont (.19 @ 12:59) >  IMPRESSION:     Interval improvement from the large open wound on 2019. Currently,   left medial gluteal skin surface cellulitis with 2 underlying air filled   tracts; one between the skin surface and the anal verge and the other   extending in the soft tissues just distal to the coccyx. No drainable   abscess. Air-filled tract between the skin surface and the anal verge,   however, may be seen in the setting of perianal fistula. If clinically   warranted, moredefinite evaluation may be obtained with contrast   enhanced MR perianal fistula protocol.    Moderately distended urinary bladder. Chronic clinically for urinary   retention.

## 2019-12-05 NOTE — CHART NOTE - NSCHARTNOTEFT_GEN_A_CORE
Rapid Response PGY 2 Note  Patient is a 86y old  Female admitted for sepsis  Rapid response team called because rigors.    Patient was seen and examined at the bedside by the rapid response team.    Allergies    codeine (Unknown)  Kiwi (Anaphylaxis)  penicillins (Anaphylaxis)    Intolerances      PAST MEDICAL & SURGICAL HISTORY:  CHF (congestive heart failure)  STEMI (ST elevation myocardial infarction)  Palpitations  Diabetes mellitus  Dyslipidemia  HTN (hypertension)  S/P cardiac cath  S/P tonsillectomy  S/P lumpectomy, left breast: premalignant  S/P appendectomy  S/P cholecystectomy      Vital Signs Last 24 Hrs  T(C): 37.3 (05 Dec 2019 14:34), Max: 37.3 (05 Dec 2019 14:34)  T(F): 99.2 (05 Dec 2019 14:34), Max: 99.2 (05 Dec 2019 14:34)  HR: 75 (05 Dec 2019 14:34) (75 - 88)  BP: 92/53 (05 Dec 2019 14:34) (92/53 - 103/51)  BP(mean): --  RR: 18 (05 Dec 2019 14:34) (18 - 18)  SpO2: 94% (05 Dec 2019 14:34) (94% - 97%)        GENERAL: The patient is awake and alert, rigoring  HEENT: Head is normocephalic and atraumatic. Extraocular muscles are intact. Mucous membranes are moist. No throat erythema/exudates no lymphadenopathy, no JVD,   NECK: Supple.  LUNGS: Clear to auscultation BL without wheezing, rales or rhonchi; respirations unlabored  HEART: tachycardic, regular rhythm ,+S1/+S2, no murmurs, rubs, gallops  ABDOMEN: Soft, nontender, and nondistended, no rebound, guarding rigidity, bowel sounds in all 4 quadrants  EXTREMITIES: Without any cyanosis, clubbing, rash, lesions or edema.  MSK: strength equal BL  VASCULAR: Radial and Dorsal pedal pulses palpable BL  NEUROLOGIC: Grossly intact.  PSYCH: No new changes.     @ 07:01  -   @ 07:00  --------------------------------------------------------  IN: 500 mL / OUT: 0 mL / NET: 500 mL     @ 07:01  -   @ 21:46  --------------------------------------------------------  IN: 1080 mL / OUT: 0 mL / NET: 1080 mL                              11.5   6.76  )-----------( 185      ( 05 Dec 2019 20:33 )             36.1         131<L>  |  100  |  55<H>  ----------------------------<  372<H>  5.8<H>   |  16<L>  |  1.81<H>    Ca    9.9      05 Dec 2019 20:33  Phos  4.1       Mg     1.8         TPro  7.4  /  Alb  3.1<L>  /  TBili  0.5  /  DBili  x   /  AST  27  /  ALT  15  /  AlkPhos  130<H>           LIVER FUNCTIONS - ( 05 Dec 2019 20:33 )  Alb: 3.1 g/dL / Pro: 7.4 g/dL / ALK PHOS: 130 U/L / ALT: 15 U/L / AST: 27 U/L / GGT: x         Urinalysis Basic - ( 04 Dec 2019 12:24 )    Color: Light Yellow / Appearance: Clear / S.015 / pH: x  Gluc: x / Ketone: Negative  / Bili: Negative / Urobili: Negative   Blood: x / Protein: Negative / Nitrite: Negative   Leuk Esterase: Moderate / RBC: 2 /hpf / WBC 10 /HPF   Sq Epi: x / Non Sq Epi: 0 /hpf / Bacteria: Negative             Vital Signs Last 24 Hrs*       Assessment- Rapid Response called for 86y year old Female with a past medical history of CHF, DM, HLD, HTN, and an STEMI who was admitted for Sepsis 2/2 gluteal celluitis present on admission.    Plan-  Patient examined, appears to be rigoring in setting of bacteremia and anxious-appearing. Vitals notable for low-grade oral temp 100.2F, HTN to SBP 150s and tachycardic to 100s. Patient was already on vanc, aztreonam, and flagyl. IV tylenol was given and cultures and full set of labs drawn. Followed up on labs and noted patient hyperkalemic to 5.8. Will touch base with primary team about further management. EKG was done for tachycardia, which was similar to prior to EKG.    - Follow up labs and cultures from [facility]  - ID f/u  -Gen surg f/u for tracts in buttock abscess  - Correction of hyperkalemia.    Jennifer Quan MD  Internal Medicine PGY2

## 2019-12-05 NOTE — PHYSICAL THERAPY INITIAL EVALUATION ADULT - PERTINENT HX OF CURRENT PROBLEM, REHAB EVAL
87 yo woman with a hx of CHF, DM, HLD, HTN, CAD prior MI, sp debridement of bilateral buttocks (9/15, 9/28) presenting with buttock cellulitis. CT consistent with cellulitis without underlying abscess. As per H&P pt pending MRI Pelvis to assess for osteomyelitis.

## 2019-12-05 NOTE — PROGRESS NOTE ADULT - PROBLEM SELECTOR PLAN 5
increased lantus and humalog premeal   - Carb consistent diet  - Low dose SSI  - Gabapentin for peripheral neuropathy

## 2019-12-05 NOTE — CONSULT NOTE ADULT - SUBJECTIVE AND OBJECTIVE BOX
Patient is a 86y old  Female who presents with a chief complaint of Sepsis 2/2 Cellulitis (05 Dec 2019 15:18)      HPI:    85 yo female with a hx of of CHF, DM, HLD, HTN, and an STEMI who presents to the ED for a several day history of buttocks pain. Patient states that the pain started about a week ago, but has suddenly gotten worse over the past few days. She describes it as a sharp pain and has tried taking OTC acetaminophen, which has not helped the pain at all. Patient endorses one episode of nausea earlier this morning, but denies any vomiting. She has not noticed a rash in that area, but does endorse that the skin is difficult to visualize.  Patient has a wound care nurse that comes to the house a few times per week. She came yesterday for wound care dressing changes.    Of note, patient was hospitalized at [facility] from  to  at [facility] for an ulcer in between her buttocks. During that hospitalization, patient required debridement of the wound as well as IV antibiotics.     In the ED, her VS /75, HR 77, RR 18 with SpO2 of 99% and T of 99.2. Patient received a 500 cc bolus of fluids as well as Doxycycline 500 mg x1. (04 Dec 2019 17:13)  WBC 13.5.  ESR 71, CRP 10.6.  UA (-) nit/mod LE, wbc 10.  Bcx (+) GPC pairs/chains from 2 sets.  CTAP without contrast shows L medial gluteal skin cellulitis and air-filled fluid tract.  No drainable fluid collection.  Pt on vanco/aztreonam/flagyl.     ID consult called for further abx managment.           REVIEW OF SYSTEMS:    CONSTITUTIONAL: No fever, weight loss, or fatigue  EYES: No eye pain, visual disturbances, or discharge  ENMT:  No sore throat  NECK: No pain or stiffness  RESPIRATORY: No cough, wheezing, chills or hemoptysis; No shortness of breath  CARDIOVASCULAR: No chest pain, palpitations, dizziness, or leg swelling  GASTROINTESTINAL: No abdominal or epigastric pain. No nausea, vomiting, or hematemesis; No diarrhea or constipation. No melena or hematochezia.  GENITOURINARY: No dysuria, frequency, hematuria, or incontinence  NEUROLOGICAL: No headaches, memory loss, loss of strength, numbness, or tremors  SKIN: No itching, burning, rashes, or lesions   LYMPH NODES: No enlarged glands  MUSCULOSKELETAL: No joint pain or swelling; No muscle, back, or extremity pain      PAST MEDICAL & SURGICAL HISTORY:  CHF (congestive heart failure)  STEMI (ST elevation myocardial infarction)  Palpitations  Diabetes mellitus  Dyslipidemia  HTN (hypertension)  S/P cardiac cath  S/P tonsillectomy  S/P lumpectomy, left breast: premalignant  S/P appendectomy  S/P cholecystectomy      Allergies    codeine (Unknown)  Kiwi (Anaphylaxis)  penicillins (Anaphylaxis)    Intolerances        FAMILY HISTORY:  No pertinent fam hx in 1st degree relatives    SOCIAL HISTORY:    No smoking, ivdu, etoh    MEDICATIONS  (STANDING):  aspirin enteric coated 81 milliGRAM(s) Oral daily  atorvastatin 40 milliGRAM(s) Oral at bedtime  aztreonam  IVPB 500 milliGRAM(s) IV Intermittent every 12 hours  clopidogrel Tablet 75 milliGRAM(s) Oral daily  dextrose 5%. 1000 milliLiter(s) (50 mL/Hr) IV Continuous <Continuous>  dextrose 50% Injectable 12.5 Gram(s) IV Push once  dextrose 50% Injectable 25 Gram(s) IV Push once  dextrose 50% Injectable 25 Gram(s) IV Push once  enoxaparin Injectable 40 milliGRAM(s) SubCutaneous daily  famotidine    Tablet 20 milliGRAM(s) Oral daily  gabapentin 100 milliGRAM(s) Oral two times a day  influenza   Vaccine 0.5 milliLiter(s) IntraMuscular once  insulin glargine Injectable (LANTUS) 10 Unit(s) SubCutaneous at bedtime  insulin lispro (HumaLOG) corrective regimen sliding scale   SubCutaneous three times a day before meals  insulin lispro (HumaLOG) corrective regimen sliding scale   SubCutaneous at bedtime  insulin lispro Injectable (HumaLOG) 3 Unit(s) SubCutaneous three times a day before meals  lactobacillus acidophilus 1 Tablet(s) Oral three times a day  levothyroxine 25 MICROGram(s) Oral daily  metoprolol tartrate 12.5 milliGRAM(s) Oral two times a day  metroNIDAZOLE  IVPB 500 milliGRAM(s) IV Intermittent every 8 hours  vancomycin  IVPB 1000 milliGRAM(s) IV Intermittent every 24 hours    MEDICATIONS  (PRN):  acetaminophen   Tablet .. 1000 milliGRAM(s) Oral every 6 hours PRN Mild Pain (1 - 3)  dextrose 40% Gel 15 Gram(s) Oral once PRN Blood Glucose LESS THAN 70 milliGRAM(s)/deciliter  glucagon  Injectable 1 milliGRAM(s) IntraMuscular once PRN Glucose LESS THAN 70 milligrams/deciliter  traMADol 25 milliGRAM(s) Oral every 6 hours PRN Moderate Pain (4 - 6)  traMADol 50 milliGRAM(s) Oral every 6 hours PRN Severe Pain (7 - 10)      Vital Signs Last 24 Hrs  T(C): 37.3 (05 Dec 2019 14:34), Max: 37.4 (04 Dec 2019 17:25)  T(F): 99.2 (05 Dec 2019 14:34), Max: 99.4 (04 Dec 2019 17:25)  HR: 75 (05 Dec 2019 14:34) (75 - 93)  BP: 92/53 (05 Dec 2019 14:34) (92/53 - 103/51)  BP(mean): --  RR: 18 (05 Dec 2019 14:34) (15 - 19)  SpO2: 94% (05 Dec 2019 14:34) (94% - 97%)    PHYSICAL EXAM:    GENERAL: NAD, well-groomed  HEAD:  Atraumatic, Normocephalic  EYES: EOMI, PERRLA, conjunctiva and sclera clear  ENMT: No tonsillar erythema, exudates, or enlargement; Moist mucous membranes  NECK: Supple, No JVD  CHEST/LUNG: Clear to percussion bilaterally; No rales, rhonchi, wheezing, or rubs  HEART: Regular rate and rhythm; No murmurs, rubs, or gallops  ABDOMEN: Soft, Nontender, Nondistended; Bowel sounds present  EXTREMITIES:  2+ Peripheral Pulses, No clubbing, cyanosis, or edema  LYMPH: No lymphadenopathy noted  SKIN: No rashes or lesions    LABS:  CBC Full  -  ( 04 Dec 2019 10:43 )  WBC Count : 13.50 K/uL  RBC Count : 4.40 M/uL  Hemoglobin : 12.6 g/dL  Hematocrit : 38.7 %  Platelet Count - Automated : 261 K/uL  Mean Cell Volume : 88.0 fl  Mean Cell Hemoglobin : 28.6 pg  Mean Cell Hemoglobin Concentration : 32.6 gm/dL  Auto Neutrophil # : 10.86 K/uL  Auto Lymphocyte # : 1.32 K/uL  Auto Monocyte # : 1.06 K/uL  Auto Eosinophil # : 0.15 K/uL  Auto Basophil # : 0.05 K/uL  Auto Neutrophil % : 80.4 %  Auto Lymphocyte % : 9.8 %  Auto Monocyte % : 7.9 %  Auto Eosinophil % : 1.1 %  Auto Basophil % : 0.4 %          133<L>  |  98  |  40<H>  ----------------------------<  302<H>  4.8   |  23  |  1.27    Ca    10.2      04 Dec 2019 10:43    TPro  8.0  /  Alb  3.7  /  TBili  0.5  /  DBili  x   /  AST  10  /  ALT  8<L>  /  AlkPhos  109  12      LIVER FUNCTIONS - ( 04 Dec 2019 10:43 )  Alb: 3.7 g/dL / Pro: 8.0 g/dL / ALK PHOS: 109 U/L / ALT: 8 U/L / AST: 10 U/L / GGT: x                               MICROBIOLOGY:        Urinalysis Basic - ( 04 Dec 2019 12:24 )    Color: Light Yellow / Appearance: Clear / S.015 / pH: x  Gluc: x / Ketone: Negative  / Bili: Negative / Urobili: Negative   Blood: x / Protein: Negative / Nitrite: Negative   Leuk Esterase: Moderate / RBC: 2 /hpf / WBC 10 /HPF   Sq Epi: x / Non Sq Epi: 0 /hpf / Bacteria: Negative      Culture - Blood (19 @ 17:30)    Gram Stain:   Growth in aerobic bottle: Gram Positive Cocci in Pairs and Chains  Growth in anaerobic bottle: Gram positive cocci in pairs    Specimen Source: .Blood Blood-Peripheral    Culture Results:   Growth in aerobic bottle: Gram Positive Cocci in Pairs and Chains  Growth in anaerobic bottle: Gram positive cocci in pairs  "Due to technical problems, Proteus sp. will Not be reported as part of  the BCID panel until further notice"  ***Blood Panel PCR results on this specimen are available  approximately 3 hours after the Gram stain result.***  Gram stain, PCR, and/or culture results may not always  correspond due to difference in methodologies.  ************************************************************  This PCR assay was performed using [employer].  The following targets are tested for: Enterococcus,  vancomycin resistant enterococci, Listeria monocytogenes,  coagulase negative staphylococci, S. aureus,  methicillin resistantS. aureus, Streptococcus agalactiae  (Group B), S. pneumoniae, S. pyogenes (Group A),  Acinetobacter baumannii, Enterobacter cloacae, E. coli,  Klebsiella oxytoca, K. pneumoniae, Proteus sp.,  Serratia marcescens, Haemophilus influenzae,  Neisseria meningitidis, Pseudomonas aeruginosa, Candida  albicans, C. glabrata, C krusei, C parapsilosis,  C. tropicalis and the KPC resistance gene.      Culture - Blood (19 @ 12:05)    -  Streptococcus sp. (Not Grp A, B or S pneumoniae): Detec    Gram Stain:   Growth in aerobic and anaerobic bottles: Gram Positive Cocci in Pairs and  Chains    Specimen Source: .Blood Blood-Peripheral    Organism: Blood Culture PCR    Culture Results:   Growth in aerobic and anaerobic bottles: Gram Positive Cocci in Pairs and  Chains  "Due to technical problems, Proteus sp. will Not be reported as part of  the BCID panel until further notice"  ***Blood Panel PCR results on this specimen are available  approximately 3 hours after the Gram stain result.***  Gram stain, PCR, and/or culture results may not always  correspond due to difference in methodologies.  ************************************************************  This PCR assay was performed using [employer].  The following targets are tested for: Enterococcus,  vancomycin resistant enterococci, Listeria monocytogenes,  coagulase negative staphylococci, S. aureus,  methicillin resistant S. aureus, Streptococcus agalactiae  (Group B), S. pneumoniae, S. pyogenes (Group A),  Acinetobacter baumannii, Enterobacter cloacae, E. coli,  Klebsiella oxytoca, K. pneumoniae, Proteus sp.,  Serratia marcescens, Haemophilus influenzae,  Neisseria meningitidis, Pseudomonas aeruginosa, Candida  albicans, C. glabrata, C krusei, C parapsilosis,  C. tropicalis and the KPC resistance gene.    Organism Identification: Blood Culture PCR    Method Type: PCR              RADIOLOGY:    < from: CT Abdomen and Pelvis No Cont (19 @ 12:59) >    EXAM:  CT ABDOMEN AND PELVIS                            PROCEDURE DATE:  2019            INTERPRETATION:  CLINICAL INFORMATION: Abdominal distention. Follow-up   known buttock abscess.     COMPARISON: CT abdomen and pelvis 2019.    PROCEDURE:   CT of the Abdomen and Pelvis was performed without intravenous contrast.   Intravenous contrast: None.  Oral contrast: None.  Sagittal and coronal reformats were performed.    FINDINGS:    Evaluation of the solid visceral organs and vasculature is limited   without the administration of intravenous contrast.    Motion limited study.    LOWER CHEST: Coronary artery atherosclerotic calcifications.    LIVER: Within normal limits.  BILE DUCTS: Normal caliber.  GALLBLADDER: Cholecystectomy.  SPLEEN: Within normal limits.  PANCREAS: Within normal limits.  ADRENALS: Within normal limits.  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    BLADDER: Moderately distended.  REPRODUCTIVE ORGANS: Calcified fibroid uterus. No solid adnexal masses.    BOWEL: No bowel obstruction.   PERITONEUM: No ascites.  VESSELS: Atherosclerotic change.  RETROPERITONEUM/LYMPH NODES: No lymphadenopathy.    ABDOMINAL WALL: Marked interval improvement from the open wound apparent   on 2019. Current study demonstrates inflammatory change along the   left medial gluteal skin surface, compatible with cellulitis, with a   small air-filled tract extending between the skin surface and the left   anal verge (series 3, image and 100 and series 6, image 65). An   additional air-filled tract is identified coursing in craniocaudal   dimension in the soft tissues just distal to the coccygeal tip (series 6   image 69). No drainable fluid collection.  BONES: Degenerative changes. Osteopenia with grade 2 anterolisthesis of   L5 over S1    IMPRESSION:     Interval improvement from the large open wound on 2019. Currently,   left medial gluteal skin surface cellulitis with 2 underlying air filled   tracts; one between the skin surface and the anal verge and the other   extending in the soft tissues just distal to the coccyx. No drainable   abscess. Air-filled tract between the skin surface and the anal verge,   however, may be seen in the setting of perianal fistula. If clinically   warranted, moredefinite evaluation may be obtained with contrast   enhanced MR perianal fistula protocol.    Moderately distended urinary bladder. Chronic clinically for urinary   retention.    < end of copied text > Patient is a 86y old  Female who presents with a chief complaint of Sepsis 2/2 Cellulitis (05 Dec 2019 15:18)      HPI:    85 yo female with a hx of of CHF, DM, HLD, HTN, and an STEMI who presents to the ED for a several day history of buttocks pain. Patient states that the pain started about a week ago, but has suddenly gotten worse over the past few days. She describes it as a sharp pain and has tried taking OTC acetaminophen, which has not helped the pain at all. Patient endorses one episode of nausea earlier this morning, but denies any vomiting. She has not noticed a rash in that area, but does endorse that the skin is difficult to visualize.  Patient has a wound care nurse that comes to the house a few times per week. She came yesterday for wound care dressing changes.    Of note, patient was hospitalized at [facility] from  to  at [facility] for an ulcer in between her buttocks. During that hospitalization, patient required debridement of the wound as well as IV antibiotics.     In the ED, her VS /75, HR 77, RR 18 with SpO2 of 99% and T of 99.2. Patient received a 500 cc bolus of fluids as well as Doxycycline 500 mg x1. (04 Dec 2019 17:13)  WBC 13.5.  ESR 71, CRP 10.6.  UA (-) nit/mod LE, wbc 10.  Bcx (+) GPC pairs/chains from 2 sets.  CTAP without contrast shows L medial gluteal skin cellulitis and air-filled fluid tract.  No drainable fluid collection.  Pt on vanco/aztreonam/flagyl.     ID consult called for further abx managment.           REVIEW OF SYSTEMS:    CONSTITUTIONAL: No fever, weight loss, or fatigue  EYES: No eye pain, visual disturbances, or discharge  ENMT:  No sore throat  NECK: No pain or stiffness  RESPIRATORY: No cough, wheezing, chills or hemoptysis; No shortness of breath  CARDIOVASCULAR: No chest pain, palpitations, dizziness, or leg swelling  GASTROINTESTINAL: No abdominal or epigastric pain. No nausea, vomiting, or hematemesis; No diarrhea or constipation. No melena or hematochezia.  GENITOURINARY: No dysuria, frequency, hematuria, or incontinence  NEUROLOGICAL: No headaches, memory loss, loss of strength, numbness, or tremors  SKIN: No itching, burning, rashes, or lesions   LYMPH NODES: No enlarged glands  MUSCULOSKELETAL: No joint pain or swelling; No muscle, back, or extremity pain      PAST MEDICAL & SURGICAL HISTORY:  CHF (congestive heart failure)  STEMI (ST elevation myocardial infarction)  Palpitations  Diabetes mellitus  Dyslipidemia  HTN (hypertension)  S/P cardiac cath  S/P tonsillectomy  S/P lumpectomy, left breast: premalignant  S/P appendectomy  S/P cholecystectomy      Allergies    codeine (Unknown)  Kiwi (Anaphylaxis)  penicillins (Anaphylaxis)    Intolerances        FAMILY HISTORY:  No pertinent fam hx in 1st degree relatives    SOCIAL HISTORY:    No smoking, ivdu, etoh    MEDICATIONS  (STANDING):  aspirin enteric coated 81 milliGRAM(s) Oral daily  atorvastatin 40 milliGRAM(s) Oral at bedtime  aztreonam  IVPB 500 milliGRAM(s) IV Intermittent every 12 hours  clopidogrel Tablet 75 milliGRAM(s) Oral daily  dextrose 5%. 1000 milliLiter(s) (50 mL/Hr) IV Continuous <Continuous>  dextrose 50% Injectable 12.5 Gram(s) IV Push once  dextrose 50% Injectable 25 Gram(s) IV Push once  dextrose 50% Injectable 25 Gram(s) IV Push once  enoxaparin Injectable 40 milliGRAM(s) SubCutaneous daily  famotidine    Tablet 20 milliGRAM(s) Oral daily  gabapentin 100 milliGRAM(s) Oral two times a day  influenza   Vaccine 0.5 milliLiter(s) IntraMuscular once  insulin glargine Injectable (LANTUS) 10 Unit(s) SubCutaneous at bedtime  insulin lispro (HumaLOG) corrective regimen sliding scale   SubCutaneous three times a day before meals  insulin lispro (HumaLOG) corrective regimen sliding scale   SubCutaneous at bedtime  insulin lispro Injectable (HumaLOG) 3 Unit(s) SubCutaneous three times a day before meals  lactobacillus acidophilus 1 Tablet(s) Oral three times a day  levothyroxine 25 MICROGram(s) Oral daily  metoprolol tartrate 12.5 milliGRAM(s) Oral two times a day  metroNIDAZOLE  IVPB 500 milliGRAM(s) IV Intermittent every 8 hours  vancomycin  IVPB 1000 milliGRAM(s) IV Intermittent every 24 hours    MEDICATIONS  (PRN):  acetaminophen   Tablet .. 1000 milliGRAM(s) Oral every 6 hours PRN Mild Pain (1 - 3)  dextrose 40% Gel 15 Gram(s) Oral once PRN Blood Glucose LESS THAN 70 milliGRAM(s)/deciliter  glucagon  Injectable 1 milliGRAM(s) IntraMuscular once PRN Glucose LESS THAN 70 milligrams/deciliter  traMADol 25 milliGRAM(s) Oral every 6 hours PRN Moderate Pain (4 - 6)  traMADol 50 milliGRAM(s) Oral every 6 hours PRN Severe Pain (7 - 10)      Vital Signs Last 24 Hrs  T(C): 37.3 (05 Dec 2019 14:34), Max: 37.4 (04 Dec 2019 17:25)  T(F): 99.2 (05 Dec 2019 14:34), Max: 99.4 (04 Dec 2019 17:25)  HR: 75 (05 Dec 2019 14:34) (75 - 93)  BP: 92/53 (05 Dec 2019 14:34) (92/53 - 103/51)  BP(mean): --  RR: 18 (05 Dec 2019 14:34) (15 - 19)  SpO2: 94% (05 Dec 2019 14:34) (94% - 97%)    PHYSICAL EXAM:    GENERAL: NAD, well-groomed  HEAD:  Atraumatic, Normocephalic  EYES: EOMI, PERRLA, conjunctiva and sclera clear  ENMT: No tonsillar erythema, exudates, or enlargement; Moist mucous membranes  NECK: Supple, No JVD  CHEST/LUNG: Clear to percussion bilaterally; No rales, rhonchi, wheezing, or rubs  HEART: Regular rate and rhythm; No murmurs, rubs, or gallops  ABDOMEN: Soft, Nontender, Nondistended; Bowel sounds present  EXTREMITIES:  2+ Peripheral Pulses, No clubbing, cyanosis, or edema  LYMPH: No lymphadenopathy noted  SKIN: rt gluteal wound with clean base, no malodor or purulent drainage.  L gluteal fissure like lesion.  No surrounding erythema.      LABS:  CBC Full  -  ( 04 Dec 2019 10:43 )  WBC Count : 13.50 K/uL  RBC Count : 4.40 M/uL  Hemoglobin : 12.6 g/dL  Hematocrit : 38.7 %  Platelet Count - Automated : 261 K/uL  Mean Cell Volume : 88.0 fl  Mean Cell Hemoglobin : 28.6 pg  Mean Cell Hemoglobin Concentration : 32.6 gm/dL  Auto Neutrophil # : 10.86 K/uL  Auto Lymphocyte # : 1.32 K/uL  Auto Monocyte # : 1.06 K/uL  Auto Eosinophil # : 0.15 K/uL  Auto Basophil # : 0.05 K/uL  Auto Neutrophil % : 80.4 %  Auto Lymphocyte % : 9.8 %  Auto Monocyte % : 7.9 %  Auto Eosinophil % : 1.1 %  Auto Basophil % : 0.4 %          133<L>  |  98  |  40<H>  ----------------------------<  302<H>  4.8   |  23  |  1.27    Ca    10.2      04 Dec 2019 10:43    TPro  8.0  /  Alb  3.7  /  TBili  0.5  /  DBili  x   /  AST  10  /  ALT  8<L>  /  AlkPhos  109        LIVER FUNCTIONS - ( 04 Dec 2019 10:43 )  Alb: 3.7 g/dL / Pro: 8.0 g/dL / ALK PHOS: 109 U/L / ALT: 8 U/L / AST: 10 U/L / GGT: x                               MICROBIOLOGY:        Urinalysis Basic - ( 04 Dec 2019 12:24 )    Color: Light Yellow / Appearance: Clear / S.015 / pH: x  Gluc: x / Ketone: Negative  / Bili: Negative / Urobili: Negative   Blood: x / Protein: Negative / Nitrite: Negative   Leuk Esterase: Moderate / RBC: 2 /hpf / WBC 10 /HPF   Sq Epi: x / Non Sq Epi: 0 /hpf / Bacteria: Negative      Culture - Blood (19 @ 17:30)    Gram Stain:   Growth in aerobic bottle: Gram Positive Cocci in Pairs and Chains  Growth in anaerobic bottle: Gram positive cocci in pairs    Specimen Source: .Blood Blood-Peripheral    Culture Results:   Growth in aerobic bottle: Gram Positive Cocci in Pairs and Chains  Growth in anaerobic bottle: Gram positive cocci in pairs  "Due to technical problems, Proteus sp. will Not be reported as part of  the BCID panel until further notice"  ***Blood Panel PCR results on this specimen are available  approximately 3 hours after the Gram stain result.***  Gram stain, PCR, and/or culture results may not always  correspond due to difference in methodologies.  ************************************************************  This PCR assay was performed using [employer].  The following targets are tested for: Enterococcus,  vancomycin resistant enterococci, Listeria monocytogenes,  coagulase negative staphylococci, S. aureus,  methicillin resistantS. aureus, Streptococcus agalactiae  (Group B), S. pneumoniae, S. pyogenes (Group A),  Acinetobacter baumannii, Enterobacter cloacae, E. coli,  Klebsiella oxytoca, K. pneumoniae, Proteus sp.,  Serratia marcescens, Haemophilus influenzae,  Neisseria meningitidis, Pseudomonas aeruginosa, Candida  albicans, C. glabrata, C krusei, C parapsilosis,  C. tropicalis and the KPC resistance gene.      Culture - Blood (19 @ 12:05)    -  Streptococcus sp. (Not Grp A, B or S pneumoniae): Detec    Gram Stain:   Growth in aerobic and anaerobic bottles: Gram Positive Cocci in Pairs and  Chains    Specimen Source: .Blood Blood-Peripheral    Organism: Blood Culture PCR    Culture Results:   Growth in aerobic and anaerobic bottles: Gram Positive Cocci in Pairs and  Chains  "Due to technical problems, Proteus sp. will Not be reported as part of  the BCID panel until further notice"  ***Blood Panel PCR results on this specimen are available  approximately 3 hours after the Gram stain result.***  Gram stain, PCR, and/or culture results may not always  correspond due to difference in methodologies.  ************************************************************  This PCR assay was performed using [employer].  The following targets are tested for: Enterococcus,  vancomycin resistant enterococci, Listeria monocytogenes,  coagulase negative staphylococci, S. aureus,  methicillin resistant S. aureus, Streptococcus agalactiae  (Group B), S. pneumoniae, S. pyogenes (Group A),  Acinetobacter baumannii, Enterobacter cloacae, E. coli,  Klebsiella oxytoca, K. pneumoniae, Proteus sp.,  Serratia marcescens, Haemophilus influenzae,  Neisseria meningitidis, Pseudomonas aeruginosa, Candida  albicans, C. glabrata, C krusei, C parapsilosis,  C. tropicalis and the KPC resistance gene.    Organism Identification: Blood Culture PCR    Method Type: PCR              RADIOLOGY:    < from: CT Abdomen and Pelvis No Cont (19 @ 12:59) >    EXAM:  CT ABDOMEN AND PELVIS                            PROCEDURE DATE:  2019            INTERPRETATION:  CLINICAL INFORMATION: Abdominal distention. Follow-up   known buttock abscess.     COMPARISON: CT abdomen and pelvis 2019.    PROCEDURE:   CT of the Abdomen and Pelvis was performed without intravenous contrast.   Intravenous contrast: None.  Oral contrast: None.  Sagittal and coronal reformats were performed.    FINDINGS:    Evaluation of the solid visceral organs and vasculature is limited   without the administration of intravenous contrast.    Motion limited study.    LOWER CHEST: Coronary artery atherosclerotic calcifications.    LIVER: Within normal limits.  BILE DUCTS: Normal caliber.  GALLBLADDER: Cholecystectomy.  SPLEEN: Within normal limits.  PANCREAS: Within normal limits.  ADRENALS: Within normal limits.  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    BLADDER: Moderately distended.  REPRODUCTIVE ORGANS: Calcified fibroid uterus. No solid adnexal masses.    BOWEL: No bowel obstruction.   PERITONEUM: No ascites.  VESSELS: Atherosclerotic change.  RETROPERITONEUM/LYMPH NODES: No lymphadenopathy.    ABDOMINAL WALL: Marked interval improvement from the open wound apparent   on 2019. Current study demonstrates inflammatory change along the   left medial gluteal skin surface, compatible with cellulitis, with a   small air-filled tract extending between the skin surface and the left   anal verge (series 3, image and 100 and series 6, image 65). An   additional air-filled tract is identified coursing in craniocaudal   dimension in the soft tissues just distal to the coccygeal tip (series 6   image 69). No drainable fluid collection.  BONES: Degenerative changes. Osteopenia with grade 2 anterolisthesis of   L5 over S1    IMPRESSION:     Interval improvement from the large open wound on 2019. Currently,   left medial gluteal skin surface cellulitis with 2 underlying air filled   tracts; one between the skin surface and the anal verge and the other   extending in the soft tissues just distal to the coccyx. No drainable   abscess. Air-filled tract between the skin surface and the anal verge,   however, may be seen in the setting of perianal fistula. If clinically   warranted, moredefinite evaluation may be obtained with contrast   enhanced MR perianal fistula protocol.    Moderately distended urinary bladder. Chronic clinically for urinary   retention.    < end of copied text >

## 2019-12-05 NOTE — DISCHARGE NOTE PROVIDER - HOSPITAL COURSE
85 yo woman with a hx of CHF, DM, HLD, HTN, CAD prior MI, sp debridement of bilateral buttocks (9/15, 9/28) presenting with buttock cellulitis. CT consistent with cellulitis without underlying abscess.    seen by surgery        - no urgent surgical intervention at this time    - c/w IV antibiotics    - cont local wound care 87 yo woman with a hx of CHF, DM, HLD, HTN, CAD prior MI, sp debridement of bilateral buttocks (9/15, 9/28) presenting with buttock cellulitis. CT consistent with cellulitis without underlying abscess.    seen by surgery, no urgent surgical intervention at this time    Sepsis:        - Pt with fever, leukocytosis and bacteremia.  Source likely from gluteal cellulitis.  CTap shows L gluteal skin cellulitis with air-filled tract, without drainable fluid collection.   Agree with broad spectrum abx for now until all culture data finalized.          - MRI pelvis ordered to evaluate for perianal fistula- pt unable to tolerate exam.  Cannot r/o underlying OM, there is fluid collection near sacrum/coccyx.  Recommend bone scan vs. repeat MRI for further evaluation.          - Blood culture (+) GPC pairs/chains - identified as strep anginosus.          - TTE no vegetations reported, unable to r/o endocarditis.  Pt may need further ALONDRA to evaluate for endocarditits, although will  not change abx duration from ID standpoint.         - Urine cultures growing enterococcus faecalis.  Based on sensitivities of blood and urine cultures, can switch change from daptomycin to vancomycin.   Will d/w family regarding pt's prior penicillin allergy.  (pt had 'swelling' at the age of 10 or 15 towards pcn.  Does not recall taking other beta lactam abx in the past.)  d/w Pharmacy , will plan for ertapenem test dose on 12/13.   d/c azactam and flagyl.      d/c vancomycin, has completed > 7 day course for enterococcus UTI.  Erta will cover strep anginosus/polymicrobial infectious source and enable ease of dosing while pt on long term therapy.    repeat MRI pelvis with IV contrast demonstrates abscess collection overlying sacrum and coccyx.  High suspicion of OM. No further intervention as per surgery.           Plan for 6 week IV abx course with ertapenem.    pt has been refusing picc/midline and  Pt seen by Behavorial health, and deemed not to have capacity to refuse IV access for IV abx.  daughter signing consent for PICC line on behalf of pt.  picc line placed on 12/201/9 85 yo woman with a hx of CHF, DM, HLD, HTN, CAD prior MI, sp debridement of bilateral buttocks (9/15, 9/28) presenting with buttock cellulitis. CT consistent with cellulitis without underlying abscess.    seen by surgery, no urgent surgical intervention at this time    Sepsis:        - Pt with fever, leukocytosis and bacteremia.  Source likely from gluteal cellulitis.  CTap shows L gluteal skin cellulitis with air-filled tract, without drainable fluid collection.   Agree with broad spectrum abx for now until all culture data finalized.          - MRI pelvis ordered to evaluate for perianal fistula- pt unable to tolerate exam.  Cannot r/o underlying OM, there is fluid collection near sacrum/coccyx.  Recommend bone scan vs. repeat MRI for further evaluation.          - Blood culture (+) GPC pairs/chains - identified as strep anginosus.          - TTE no vegetations reported, unable to r/o endocarditis.  Pt may need further ALONDRA to evaluate for endocarditits, although will  not change abx duration from ID standpoint.         - Urine cultures growing enterococcus faecalis.  Based on sensitivities of blood and urine cultures, can switch change from daptomycin to vancomycin.   Will d/w family regarding pt's prior penicillin allergy.  (pt had 'swelling' at the age of 10 or 15 towards pcn.  Does not recall taking other beta lactam abx in the past.)  d/w Pharmacy , will plan for ertapenem test dose on 12/13.   d/c azactam and flagyl.      d/c vancomycin, has completed > 7 day course for enterococcus UTI.  Erta will cover strep anginosus/polymicrobial infectious source and enable ease of dosing while pt on long term therapy.    repeat MRI pelvis with IV contrast demonstrates abscess collection overlying sacrum and coccyx.  High suspicion of OM. No further intervention as per surgery.           Plan for 6 week IV abx course with ertapenem through 1/14/20    pt has been refusing picc/midline and  Pt seen by Behavorial health, and deemed not to have capacity to refuse IV access for IV abx.  daughter signing consent for PICC line on behalf of pt.  picc line placed on 12/20/19;    failed voiding trial and infante cath replaced for 12/20/19; follow up with urology as outpatient; trial of voiding when patient is more ambulatory.    pt is cleared by md for rehab.

## 2019-12-05 NOTE — CHART NOTE - NSCHARTNOTEFT_GEN_A_CORE
follow up- blood cx from 12/4-growth in aerobic bottle gram positive cocci in  pairs and chain and anaerobic bottle gram positive cocci in  pairs; on IV Aztreanum, flagyl and vanco;   ID on board; pending MRI pelvis for eval; repeat blood cx in AM  Patient is a 86y old  Female who presents with a chief complaint of Sepsis 2/2 Cellulitis (05 Dec 2019 15:50)      Vital Signs Last 24 Hrs  T(C): 37.3 (05 Dec 2019 14:34), Max: 37.4 (04 Dec 2019 17:25)  T(F): 99.2 (05 Dec 2019 14:34), Max: 99.4 (04 Dec 2019 17:25)  HR: 75 (05 Dec 2019 14:34) (75 - 93)  BP: 92/53 (05 Dec 2019 14:34) (92/53 - 103/51)  BP(mean): --  RR: 18 (05 Dec 2019 14:34) (15 - 19)  SpO2: 94% (05 Dec 2019 14:34) (94% - 97%)                        12.6   13.50 )-----------( 261      ( 04 Dec 2019 10:43 )             38.7     12-04    133<L>  |  98  |  40<H>  ----------------------------<  302<H>  4.8   |  23  |  1.27    Ca    10.2      04 Dec 2019 10:43    TPro  8.0  /  Alb  3.7  /  TBili  0.5  /  DBili  x   /  AST  10  /  ALT  8<L>  /  AlkPhos  109  12-04

## 2019-12-05 NOTE — PROGRESS NOTE ADULT - ASSESSMENT
87 yo woman with a hx of CHF, DM, HLD, HTN, CAD prior MI, sp debridement of bilateral buttocks (9/15, 9/28) presenting with buttock cellulitis. CT consistent with cellulitis without underlying abscess.    - no urgent surgical intervention at this time  - c/w IV antibiotics  - cont local wound care  - f/u repeat blood cultures  - Continue care as per medicine     Red surgery  [id number] 85 yo woman with a hx of CHF, DM, HLD, HTN, CAD prior MI, sp debridement of bilateral buttocks (9/15, 9/28) presenting with buttock cellulitis. CT consistent with cellulitis without underlying abscess.    - no urgent surgical intervention at this time  - c/w IV antibiotics  - cont local wound care  - f/u repeat blood cultures  - f/u MRI pelvis  - Continue care as per medicine     Red surgery  [id number]

## 2019-12-05 NOTE — CHART NOTE - NSCHARTNOTEFT_GEN_A_CORE
CC: positive blood cultures      HPI:  Notified by RN that blood cultures collected 12/4 with preliminary results positive for "Growth in aerobic and anaerobic bottles: Gram Positive Cocci in Pairs and Chains." Patient seen and assessed at bedside,         Vital Signs Last 24 Hrs  T(C): 36.7 (04 Dec 2019 21:15), Max: 38.6 (04 Dec 2019 14:36)  T(F): 98 (04 Dec 2019 21:15), Max: 101.4 (04 Dec 2019 14:36)  HR: 92 (04 Dec 2019 21:15) (77 - 93)  BP: 101/54 (04 Dec 2019 21:15) (94/41 - 145/95)  RR: 19 (04 Dec 2019 21:15) (15 - 19)  SpO2: 97% (04 Dec 2019 21:15) (95% - 100%)      Physical Exam:  General: WN/WD NAD, AOx3, nontoxic appearing  Head:  NC/AT  Skin: (+) warm, dry   Psych: Appropriate affect       Labs:                        12.6   13.50 )-----------( 261      ( 04 Dec 2019 10:43 )             38.7     12-04    133<L>  |  98  |  40<H>  ----------------------------<  302<H>  4.8   |  23  |  1.27    Ca    10.2      04 Dec 2019 10:43    TPro  8.0  /  Alb  3.7  /  TBili  0.5  /  DBili  x   /  AST  10  /  ALT  8<L>  /  AlkPhos  109  12-04      Urinalysis Basic - ( 12-04 @ 12:24 )  Color: Negative / Appearance: Negative / SG: -- / pH: 200 mg/dL  Gluc: Negative / Ketone: Light Yellow  / Bili: -- / Urobili: 0   Blood: 0 / Protein: -- / Nitrite: Negative   Leuk Esterase: Moderate / RBC: 1.015 / WBC --   Sq Epi: Negative / Non Sq Epi: 2 / Bacteria: 5.5        Culture - Blood (collected 12-04-19 @ 12:05)  Source: .Blood Blood-Peripheral  Gram Stain (12-05-19 @ 00:00):    Growth in aerobic and anaerobic bottles: Gram Positive Cocci in Pairs and    Chains  Preliminary Report (12-05-19 @ 00:00):    Growth in aerobic and anaerobic bottles: Gram Positive Cocci in Pairs and    Chains    "Due to technical problems, Proteus sp. will Not be reported as part of    the BCID panel until further notice"    ***Blood Panel PCR results on this specimen are available    approximately 3 hours after the Gram stain result.***    Gram stain, PCR, and/or culture results may not always    correspond due to difference in methodologies.    ************************************************************    This PCR assay was performed using [employer].    The following targets are tested for: Enterococcus,    vancomycin resistant enterococci, Listeria monocytogenes,    coagulase negative staphylococci, S. aureus,    methicillin resistant S. aureus, Streptococcus agalactiae    (Group B), S. pneumoniae, S. pyogenes (Group A),    Acinetobacter baumannii, Enterobacter cloacae, E. coli,    Klebsiella oxytoca, K. pneumoniae, Proteus sp.,    Serratia marcescens, Haemophilus influenzae,    Neisseria meningitidis, Pseudomonas aeruginosa, Candida    albicans, C. glabrata, C krusei, C parapsilosis,    C. tropicalis and the KPC resistance gene.          Radiology:  < from: CT Abdomen and Pelvis No Cont (12.04.19 @ 12:59) >  Interval improvement from the large open wound on 09/20/2019. Currently,   left medial gluteal skin surface cellulitis with 2 underlying air filled   tracts; one between the skin surface and the anal verge and the other   extending in the soft tissues just distal to the coccyx. No drainable   abscess. Air-filled tract between the skin surface and the anal verge,   however, may be seen in the setting of perianal fistula. If clinically   warranted, more definite evaluation may be obtained with contrast   enhanced MR perianal fistula protocol.  Moderately distended urinary bladder. Chronic clinically for urinary   retention.  < end of copied text >        Assessment & Plan:  87 yo female with a hx of of CHF, DM, HLD, HTN, and an STEMI who presents to the ED for a several day history of buttocks pain. Patient states that the pain started about a week ago, but has suddenly gotten worse over the past few days. She describes it as a sharp pain and has tried taking OTC acetaminophen, which has not helped the pain at all. Patient endorses one episode of nasuea earlier this morning, but denies any vomiting. She has not noticed a rash in that area, but does endorse that the skin is difficult to visualize.  Patient has a wound care nurse that comes to the house a few times per week. She came yesterday for wound care dressing changes.  Patient now presenting acutely found to have positive blood cultures.       #Positive blood cultures  -Labs with leukocytosis (13.5k); patient last febrile 12/4 in ED  -Await speciation  -Repeat blood cultures ordered for AM  -Continue with vancomycin, flagyl, and aztreonam  -CT A/P (12/4) with gluteal cellulitis   -MRI pelvis pending  -Consider ID evaluation  -Will continue to closely monitor patient/vitals  -Primary Team to follow up in AM, attending to follow       Bartolome Velazquez PA-C  Dept of Medicine  [id number] CC: positive blood cultures      HPI:  Notified by RN that blood cultures collected 12/4 with preliminary results positive for "Growth in aerobic and anaerobic bottles: Gram Positive Cocci in Pairs and Chains." Patient seen and assessed at bedside, NAD, alert and awake, resting comfortably in bed.        Vital Signs Last 24 Hrs  T(C): 36.7 (04 Dec 2019 21:15), Max: 38.6 (04 Dec 2019 14:36)  T(F): 98 (04 Dec 2019 21:15), Max: 101.4 (04 Dec 2019 14:36)  HR: 92 (04 Dec 2019 21:15) (77 - 93)  BP: 101/54 (04 Dec 2019 21:15) (94/41 - 145/95)  RR: 19 (04 Dec 2019 21:15) (15 - 19)  SpO2: 97% (04 Dec 2019 21:15) (95% - 100%)      Physical Exam:  General: Thin female laying in bed, NAD, AOx3, nontoxic appearing  Head:  NC/AT  Skin: (+) warm, dry         Labs:                        12.6   13.50 )-----------( 261      ( 04 Dec 2019 10:43 )             38.7     12-04    133<L>  |  98  |  40<H>  ----------------------------<  302<H>  4.8   |  23  |  1.27    Ca    10.2      04 Dec 2019 10:43    TPro  8.0  /  Alb  3.7  /  TBili  0.5  /  DBili  x   /  AST  10  /  ALT  8<L>  /  AlkPhos  109  12-04      Urinalysis Basic - ( 12-04 @ 12:24 )  Color: Negative / Appearance: Negative / SG: -- / pH: 200 mg/dL  Gluc: Negative / Ketone: Light Yellow  / Bili: -- / Urobili: 0   Blood: 0 / Protein: -- / Nitrite: Negative   Leuk Esterase: Moderate / RBC: 1.015 / WBC --   Sq Epi: Negative / Non Sq Epi: 2 / Bacteria: 5.5        Culture - Blood (collected 12-04-19 @ 12:05)  Source: .Blood Blood-Peripheral  Gram Stain (12-05-19 @ 00:00):    Growth in aerobic and anaerobic bottles: Gram Positive Cocci in Pairs and    Chains  Preliminary Report (12-05-19 @ 00:00):    Growth in aerobic and anaerobic bottles: Gram Positive Cocci in Pairs and    Chains    "Due to technical problems, Proteus sp. will Not be reported as part of    the BCID panel until further notice"    ***Blood Panel PCR results on this specimen are available    approximately 3 hours after the Gram stain result.***    Gram stain, PCR, and/or culture results may not always    correspond due to difference in methodologies.    ************************************************************    This PCR assay was performed using [employer].    The following targets are tested for: Enterococcus,    vancomycin resistant enterococci, Listeria monocytogenes,    coagulase negative staphylococci, S. aureus,    methicillin resistant S. aureus, Streptococcus agalactiae    (Group B), S. pneumoniae, S. pyogenes (Group A),    Acinetobacter baumannii, Enterobacter cloacae, E. coli,    Klebsiella oxytoca, K. pneumoniae, Proteus sp.,    Serratia marcescens, Haemophilus influenzae,    Neisseria meningitidis, Pseudomonas aeruginosa, Candida    albicans, C. glabrata, C krusei, C parapsilosis,    C. tropicalis and the KPC resistance gene.          Radiology:  < from: CT Abdomen and Pelvis No Cont (12.04.19 @ 12:59) >  Interval improvement from the large open wound on 09/20/2019. Currently,   left medial gluteal skin surface cellulitis with 2 underlying air filled   tracts; one between the skin surface and the anal verge and the other   extending in the soft tissues just distal to the coccyx. No drainable   abscess. Air-filled tract between the skin surface and the anal verge,   however, may be seen in the setting of perianal fistula. If clinically   warranted, more definite evaluation may be obtained with contrast   enhanced MR perianal fistula protocol.  Moderately distended urinary bladder. Chronic clinically for urinary   retention.  < end of copied text >        Assessment & Plan:  85 yo female with a hx of of CHF, DM, HLD, HTN, and an STEMI who presents to the ED for a several day history of buttocks pain. Patient states that the pain started about a week ago, but has suddenly gotten worse over the past few days. She describes it as a sharp pain and has tried taking OTC acetaminophen, which has not helped the pain at all. Patient endorses one episode of nasuea earlier this morning, but denies any vomiting. She has not noticed a rash in that area, but does endorse that the skin is difficult to visualize.  Patient has a wound care nurse that comes to the house a few times per week. She came yesterday for wound care dressing changes.  Patient now presenting acutely found to have positive blood cultures.       #Positive blood cultures  -Labs with leukocytosis (13.5k); patient last febrile 12/4 in ED  -Await speciation  -Repeat blood cultures ordered for AM  -Continue with vancomycin, flagyl, and aztreonam  -CT A/P (12/4) with gluteal cellulitis   -MRI pelvis pending  -Consider ID evaluation  -Will continue to closely monitor patient/vitals  -Primary Team to follow up in AM, attending to follow       Bartolome Velazquez PA-C  Dept of Medicine  [phone]

## 2019-12-05 NOTE — PHYSICAL THERAPY INITIAL EVALUATION ADULT - ADDITIONAL COMMENTS
Pt lives with dtr in  with 3 steps to enter, stays on first floor. Dtr is out of home 12hrs a day for work. As per pt and dtr, pt was indep with/without RW in home and indep with ADLs prior to hospitalization in September. After hospitalization and rehab, pt was amb short distance with RW, needed assist with ADLs. Pt owns RW. [12/11]
(0) understands/communicates without difficulty

## 2019-12-05 NOTE — PROGRESS NOTE ADULT - SUBJECTIVE AND OBJECTIVE BOX
RED SURGERY DAILY PROGRESS NOTE:       SUBJECTIVE/ROS: Patient seen and examined at bedside.  Patient still has some pain but is controlled.  BCx growing streptococcus.           MEDICATIONS  (STANDING):  acetaminophen   Tablet .. 1000 milliGRAM(s) Oral every 8 hours  aspirin enteric coated 81 milliGRAM(s) Oral daily  atorvastatin 40 milliGRAM(s) Oral at bedtime  aztreonam  IVPB 500 milliGRAM(s) IV Intermittent every 12 hours  clopidogrel Tablet 75 milliGRAM(s) Oral daily  dextrose 5%. 1000 milliLiter(s) (50 mL/Hr) IV Continuous <Continuous>  dextrose 50% Injectable 12.5 Gram(s) IV Push once  dextrose 50% Injectable 25 Gram(s) IV Push once  dextrose 50% Injectable 25 Gram(s) IV Push once  enoxaparin Injectable 40 milliGRAM(s) SubCutaneous daily  famotidine    Tablet 20 milliGRAM(s) Oral daily  gabapentin 100 milliGRAM(s) Oral two times a day  influenza   Vaccine 0.5 milliLiter(s) IntraMuscular once  insulin glargine Injectable (LANTUS) 10 Unit(s) SubCutaneous at bedtime  insulin lispro (HumaLOG) corrective regimen sliding scale   SubCutaneous three times a day before meals  insulin lispro (HumaLOG) corrective regimen sliding scale   SubCutaneous at bedtime  insulin lispro Injectable (HumaLOG) 3 Unit(s) SubCutaneous three times a day before meals  levothyroxine 25 MICROGram(s) Oral daily  metoprolol tartrate 12.5 milliGRAM(s) Oral two times a day  metroNIDAZOLE  IVPB 500 milliGRAM(s) IV Intermittent every 8 hours  vancomycin  IVPB 1000 milliGRAM(s) IV Intermittent every 24 hours    MEDICATIONS  (PRN):  dextrose 40% Gel 15 Gram(s) Oral once PRN Blood Glucose LESS THAN 70 milliGRAM(s)/deciliter  glucagon  Injectable 1 milliGRAM(s) IntraMuscular once PRN Glucose LESS THAN 70 milligrams/deciliter  traMADol 25 milliGRAM(s) Oral every 6 hours PRN Moderate Pain (4 - 6)  traMADol 50 milliGRAM(s) Oral every 6 hours PRN Severe Pain (7 - 10)      OBJECTIVE:    Vital Signs Last 24 Hrs  T(C): 36.7 (05 Dec 2019 04:00), Max: 38.6 (04 Dec 2019 14:36)  T(F): 98.1 (05 Dec 2019 04:00), Max: 101.4 (04 Dec 2019 14:36)  HR: 88 (05 Dec 2019 04:00) (77 - 93)  BP: 103/51 (05 Dec 2019 04:00) (94/41 - 145/95)  BP(mean): --  RR: 18 (05 Dec 2019 04:00) (15 - 19)  SpO2: 97% (05 Dec 2019 04:00) (95% - 100%)        I&O's Detail    04 Dec 2019 07:01  -  05 Dec 2019 07:00  --------------------------------------------------------  IN:    IV PiggyBack: 300 mL    Oral Fluid: 200 mL  Total IN: 500 mL    OUT:  Total OUT: 0 mL    Total NET: 500 mL          Daily Height in cm: 154.94 (04 Dec 2019 10:10)    Daily     LABS:                        12.6   13.50 )-----------( 261      ( 04 Dec 2019 10:43 )             38.7     12-04    133<L>  |  98  |  40<H>  ----------------------------<  302<H>  4.8   |  23  |  1.27    Ca    10.2      04 Dec 2019 10:43    TPro  8.0  /  Alb  3.7  /  TBili  0.5  /  DBili  x   /  AST  10  /  ALT  8<L>  /  AlkPhos  109  12-04      Urinalysis Basic - ( 04 Dec 2019 12:24 )    Color: Light Yellow / Appearance: Clear / S.015 / pH: x  Gluc: x / Ketone: Negative  / Bili: Negative / Urobili: Negative   Blood: x / Protein: Negative / Nitrite: Negative   Leuk Esterase: Moderate / RBC: 2 /hpf / WBC 10 /HPF   Sq Epi: x / Non Sq Epi: 0 /hpf / Bacteria: Negative                PHYSICAL EXAM:  GEN: NAD, resting quietly  PULM: symmetric chest rise bilaterally, no increased WOB  CV: regular rate, peripheral pulses intact  ABD: soft, NTND  : right and left incision sites with healthy granulation tissue, overlying cellulitis.  Small cavity at superior portion of gluteal cleft, no drainage noted.  EXTR: no cyanosis or edema, moving all extremities

## 2019-12-05 NOTE — CONSULT NOTE ADULT - SUBJECTIVE AND OBJECTIVE BOX
CHIEF COMPLAINT:Patient is a 86y old  Female who presents with a chief complaint of Sepsis 2/2 Cellulitis (05 Dec 2019 15:50)      HISTORY OF PRESENT ILLNESS:HPI:  85 yo female with a hx of of CHF, DM, HLD, HTN, and an STEMI who presents to the ED for a several day history of buttocks pain. Patient states that the pain started about a week ago, but has suddenly gotten worse over the past few days. She describes it as a sharp pain and has tried taking OTC acetaminophen, which has not helped the pain at all. Patient endorses one episode of nasuea earlier this morning, but denies any vomitting. She has not noticed a rash in that area, but does endorse that the skin is difficult to visualize.  Patient has a wound care nurse that comes to the house a few times per week. She came yesterday for wound care dressing changes.    Of note, patient was hospitalized at [facility] from September 4 to October 4 at [facility] for an ulcer in between her buttocks. During that hospitalization, patient required debridement of the wound as well as IV antibiotics.     In the ED, her VS /75, HR 77, RR 18 with SpO2 of 99% and T of 99.2. Patient received a 500 cc bolus of fluids as well as Doxycycline 500 mg x1. (04 Dec 2019 17:13)      PAST MEDICAL & SURGICAL HISTORY:  CHF (congestive heart failure)  STEMI (ST elevation myocardial infarction)  Palpitations  Diabetes mellitus  Dyslipidemia  HTN (hypertension)  S/P cardiac cath  S/P tonsillectomy  S/P lumpectomy, left breast: premalignant  S/P appendectomy  S/P cholecystectomy          MEDICATIONS:  aspirin enteric coated 81 milliGRAM(s) Oral daily  clopidogrel Tablet 75 milliGRAM(s) Oral daily  enoxaparin Injectable 40 milliGRAM(s) SubCutaneous daily  metoprolol tartrate 12.5 milliGRAM(s) Oral two times a day    aztreonam  IVPB 500 milliGRAM(s) IV Intermittent every 12 hours  DAPTOmycin IVPB 240 milliGRAM(s) IV Intermittent once  DAPTOmycin IVPB      metroNIDAZOLE  IVPB 500 milliGRAM(s) IV Intermittent every 8 hours      acetaminophen   Tablet .. 1000 milliGRAM(s) Oral every 6 hours PRN  gabapentin 100 milliGRAM(s) Oral two times a day  traMADol 25 milliGRAM(s) Oral every 6 hours PRN  traMADol 50 milliGRAM(s) Oral every 6 hours PRN    famotidine    Tablet 20 milliGRAM(s) Oral daily    atorvastatin 40 milliGRAM(s) Oral at bedtime  dextrose 40% Gel 15 Gram(s) Oral once PRN  dextrose 50% Injectable 12.5 Gram(s) IV Push once  dextrose 50% Injectable 25 Gram(s) IV Push once  dextrose 50% Injectable 25 Gram(s) IV Push once  dextrose 50% Injectable 50 milliLiter(s) IV Push once  glucagon  Injectable 1 milliGRAM(s) IntraMuscular once PRN  insulin glargine Injectable (LANTUS) 15 Unit(s) SubCutaneous at bedtime  insulin lispro (HumaLOG) corrective regimen sliding scale   SubCutaneous three times a day before meals  insulin lispro (HumaLOG) corrective regimen sliding scale   SubCutaneous at bedtime  insulin lispro Injectable (HumaLOG) 8 Unit(s) SubCutaneous three times a day before meals  insulin regular  human recombinant. 10 Unit(s) IV Push once  levothyroxine 25 MICROGram(s) Oral daily    dextrose 5%. 1000 milliLiter(s) IV Continuous <Continuous>  influenza   Vaccine 0.5 milliLiter(s) IntraMuscular once  sodium chloride 0.9%. 1000 milliLiter(s) IV Continuous <Continuous>      FAMILY HISTORY:      Non-contributory    SOCIAL HISTORY:    not a smoker    Allergies    codeine (Unknown)  Kiwi (Anaphylaxis)  penicillins (Anaphylaxis)    Intolerances    	    REVIEW OF SYSTEMS:  CONSTITUTIONAL: No fever  EYES: No eye pain, visual disturbances, or discharge  ENMT:  No difficulty hearing, tinnitus  NECK: No pain or stiffness  RESPIRATORY: No cough, wheezing,  CARDIOVASCULAR: No chest pain, palpitations, passing out, dizziness, or leg swelling  GASTROINTESTINAL:  No nausea, vomiting, diarrhea or constipation. No melena.  GENITOURINARY: No dysuria, hematuria  NEUROLOGICAL: No stroke like symptoms  SKIN: +buttock cellulitis   ENDOCRINE: No heat or cold intolerance  MUSCULOSKELETAL: No joint pain or swelling  PSYCHIATRIC: No  anxiety, mood swings  HEME/LYMPH: No bleeding gums  ALLERGY AND IMMUNOLOGIC: No hives or eczema	    All other ROS negative    PHYSICAL EXAM:  T(C): 37.3 (12-05-19 @ 14:34), Max: 37.3 (12-05-19 @ 14:34)  HR: 75 (12-05-19 @ 14:34) (75 - 88)  BP: 92/53 (12-05-19 @ 14:34) (92/53 - 103/51)  RR: 18 (12-05-19 @ 14:34) (18 - 18)  SpO2: 94% (12-05-19 @ 14:34) (94% - 97%)  Wt(kg): --  I&O's Summary    04 Dec 2019 07:01  -  05 Dec 2019 07:00  --------------------------------------------------------  IN: 500 mL / OUT: 0 mL / NET: 500 mL    05 Dec 2019 07:01  -  05 Dec 2019 22:38  --------------------------------------------------------  IN: 1080 mL / OUT: 0 mL / NET: 1080 mL        Appearance: Normal	  HEENT:   Normal oral mucosa, EOMI	  Cardiovascular: Normal S1 S2, No JVD, No murmurs  Respiratory: Lungs clear to auscultation	  Psychiatry: Alert  Gastrointestinal:  Soft, Non-tender, + BS	  Skin: No rashes   Neurologic: Non-focal  Extremities:  No edema  Vascular: Peripheral pulses palpable    	    	  	  CARDIAC MARKERS:  Labs personally reviewed by me                                  11.5   6.76  )-----------( 185      ( 05 Dec 2019 20:33 )             36.1     12-05    131<L>  |  100  |  55<H>  ----------------------------<  372<H>  5.8<H>   |  16<L>  |  1.81<H>    Ca    9.9      05 Dec 2019 20:33  Phos  4.1     12-05  Mg     1.8     12-05    TPro  7.4  /  Alb  3.1<L>  /  TBili  0.5  /  DBili  x   /  AST  27  /  ALT  15  /  AlkPhos  130<H>  12-05          Assessment /Plan:   Chronic diastolic HF - euvolemic, off Lasix  CAD - c/w DAPT, Metoprolol and ASA  HTN - well controlled      Srini Velasco DO [facility]  Cardiovascular Medicine  [phone]

## 2019-12-05 NOTE — PHYSICAL THERAPY INITIAL EVALUATION ADULT - GENERAL OBSERVATIONS, REHAB EVAL
Pt rec'd semi supine in bed in NAD, +IV, +infante, dtr bedside, premedicated, reports pain in buttocks region. Willing to work with PT.

## 2019-12-05 NOTE — DISCHARGE NOTE PROVIDER - PROVIDER TOKENS
PROVIDER:[TOKEN:[[id number]]],PROVIDER:[TOKEN:[[id number]]] PROVIDER:[TOKEN:[[id number]],FOLLOWUP:[Routine]],PROVIDER:[TOKEN:[[id number]],FOLLOWUP:[1 month]],FREE:[LAST:[urology],PHONE:[(   )    -],FAX:[(   )    -],ADDRESS:[[phone]],FOLLOWUP:[Routine]],FREE:[LAST:[[facility] [place]],PHONE:[(   )    -],FAX:[(   )    -],ADDRESS:[[phone]],FOLLOWUP:[Routine]],PROVIDER:[TOKEN:[[id number]],FOLLOWUP:[1 month]] PROVIDER:[TOKEN:[[id number]],FOLLOWUP:[Routine]],PROVIDER:[TOKEN:[[id number]],FOLLOWUP:[1 month]],PROVIDER:[TOKEN:[[id number]],FOLLOWUP:[1 month]],FREE:[LAST:[urology],PHONE:[(   )    -],FAX:[(   )    -],ADDRESS:[[phone]],FOLLOWUP:[Routine]]

## 2019-12-05 NOTE — DISCHARGE NOTE PROVIDER - NSDCMRMEDTOKEN_GEN_ALL_CORE_FT
acetaminophen 500 mg oral tablet: 2 tab(s) orally 3 times a day, As Needed   Aldactone 25 mg oral tablet: 1 tab(s) orally once a day  atorvastatin 20 mg oral tablet: 1 tab(s) orally once a day  clopidogrel 75 mg oral tablet: 1 tab(s) orally once a day  Ecotrin Adult Low Strength 81 mg oral delayed release tablet: 1 tab(s) orally once a day  gabapentin 100 mg oral capsule: 1 cap(s) orally 2 times a day  HumaLOG 100 units/mL injectable solution: 4 unit(s) injectable 3 times a day    if sugar: 150-200 add 2 units  201-250 add 4 units  251-300 add 6 units  301-350 add 8 units  351-400 add 10 units  Lantus 100 units/mL subcutaneous solution: 12 unit(s) subcutaneous once a day (at bedtime)  Lasix 20 mg oral tablet: 1 tab(s) orally once a day  levothyroxine 25 mcg (0.025 mg) oral tablet: 1 tab(s) orally once a day  lisinopril 2.5 mg oral tablet: 1 tab(s) orally once a day  metoprolol tartrate 25 mg oral tablet: 0.5 tab(s) orally 2 times a day  Pepcid 20 mg oral tablet: 1 tab(s) orally once a day acetaminophen 500 mg oral tablet: 2 tab(s) orally every 6 hours, As needed, Mild Pain (1 - 3)  atorvastatin 40 mg oral tablet: 1 tab(s) orally once a day (at bedtime)  clopidogrel 75 mg oral tablet: 1 tab(s) orally once a day  Ecotrin Adult Low Strength 81 mg oral delayed release tablet: 1 tab(s) orally once a day  ertapenem 1 g injection: 1 gram(s) injectable once a day through 1/14/20  gabapentin 100 mg oral capsule: 1 cap(s) orally 2 times a day  heparin: 5000 unit(s) subcutaneous every 8 hours  HumaLOG 100 units/mL injectable solution: 4 unit(s) injectable 3 times a day    if sugar: 150-200 add 2 units  201-250 add 4 units  251-300 add 6 units  301-350 add 8 units  351-400 add 10 units  lactobacillus acidophilus oral capsule: 1 cap(s) orally 3 times a day  Lantus 100 units/mL subcutaneous solution: 12 unit(s) subcutaneous once a day (at bedtime)  Lasix 20 mg oral tablet: 1 tab(s) orally once a day  levothyroxine 25 mcg (0.025 mg) oral tablet: 1 tab(s) orally once a day  melatonin 3 mg oral tablet: 1 tab(s) orally once a day (at bedtime), As needed, Insomnia  metoprolol tartrate 25 mg oral tablet: 0.5 tab(s) orally 2 times a day  nystatin 100,000 units/g topical cream: 1 application topically 2 times a day  Pepcid 20 mg oral tablet: 1 tab(s) orally once a day

## 2019-12-05 NOTE — PHYSICAL THERAPY INITIAL EVALUATION ADULT - ACTIVE RANGE OF MOTION EXAMINATION, REHAB EVAL
[12/11]/bilateral  lower extremity Active ROM was WFL (within functional limits)/bilateral upper extremity Active ROM was WFL (within functional limits)

## 2019-12-05 NOTE — PROVIDER CONTACT NOTE (CRITICAL VALUE NOTIFICATION) - TEST AND RESULT REPORTED:
Blood cultures drawn on Dec 4th prelim results growth in aerobic and anaerobic bottle gram positive cocci in pairs and chains

## 2019-12-05 NOTE — PROGRESS NOTE ADULT - ASSESSMENT
87 yo female with a hx of of CHF, DM, HLD, HTN, and an STEMI who was admitted for Sepsis 2/2 gluteal celluitis present on admission.

## 2019-12-05 NOTE — CONSULT NOTE ADULT - ASSESSMENT
87 yo female with a hx of of CHF, DM, HLD, HTN, and an STEMI who presents to the ED for a several day history of buttocks pain. Patient states that the pain started about a week ago, but has suddenly gotten worse over the past few days. She describes it as a sharp pain and has tried taking OTC acetaminophen, which has not helped the pain at all. Patient endorses one episode of nausea earlier this morning, but denies any vomiting. She has not noticed a rash in that area, but does endorse that the skin is difficult to visualize.  Patient has a wound care nurse that comes to the house a few times per week. She came yesterday for wound care dressing changes.    Of note, patient was hospitalized at [facility] from September 4 to October 4 at [facility] for an ulcer in between her buttocks. During that hospitalization, patient required debridement of the wound as well as IV antibiotics.     In the ED, her VS /75, HR 77, RR 18 with SpO2 of 99% and T of 99.2. Patient received a 500 cc bolus of fluids as well as Doxycycline 500 mg x1. (04 Dec 2019 17:13)  WBC 13.5.  ESR 71, CRP 10.6.  UA (-) nit/mod LE, wbc 10.  Bcx (+) GPC pairs/chains from 2 sets.  CTAP without contrast shows L medial gluteal skin cellulitis and air-filled  tract.  No drainable fluid collection.  Pt on vanco/aztreonam/flagyl.     ID consult called for further abx managment.       Sepsis:    - Pt with fever, leukocytosis and bacteremia.  Source likely from gluteal cellulitis.  CTap shows L gluteal skin cellulitis with air-filled tract, without drainable fluid collection.   Agree with broad spectrum abx for now until all culture data finalized.      - MRI pelvis ordered to evaluate for perianal fistula.    - Blood culture (+) GPC pairs/chains.  Cont vanco/aztreonam/flagyl.  Pt with h/o enterococcus and anerobic infection from rt gluteal wound during prior hospitalization.     - f/u urine cultures.      - Surgery f/u      Will follow,    Sandrita Zaman  [phone]

## 2019-12-05 NOTE — DISCHARGE NOTE PROVIDER - CARE PROVIDERS DIRECT ADDRESSES
,[email or web address],DirectAddress_Unknown ,[email or web address],DirectAddress_Unknown,DirectAddress_[email or web address]_Unknown,[email or web address] ,[email or web address],DirectAddress_Unknown,[email or web address],DirectAddress_Unknown

## 2019-12-05 NOTE — PHYSICAL THERAPY INITIAL EVALUATION ADULT - PLANNED THERAPY INTERVENTIONS, PT EVAL
GOAL: Pt will perform 3 stairs with or without U HR as needed within 4weeks./gait training/bed mobility training/transfer training

## 2019-12-05 NOTE — PHYSICAL THERAPY INITIAL EVALUATION ADULT - DISCHARGE DISPOSITION, PT EVAL
[12/11] TBD pending further amb. anticpate subacute rehab. Pt refusing to go to rehab. If pt returns home pt will require assist for all funcitonal mobility and home PT for progressive strengthening, Gait training, and balance. rehabilitation facility/subacute [12/11] Pt refusing to go to rehab. If pt returns home pt will require assist for all funcitonal mobility and home PT for progressive strengthening, Gait training, and balance.

## 2019-12-05 NOTE — CHART NOTE - NSCHARTNOTEFT_GEN_A_CORE
C/C: RRT for Rigors/SOB.      HPI: (ED note)  87 yo female with a hx of of CHF, DM, HLD, HTN, and an STEMI who presents to the ED for a several day history of buttocks pain. Patient states that the pain started about a week ago, but has suddenly gotten worse over the past few days. She describes it as a sharp pain and has tried taking OTC acetaminophen, which has not helped the pain at all. Patient endorses one episode of nasuea earlier this morning, but denies any vomitting. She has not noticed a rash in that area, but does endorse that the skin is difficult to visualize.  Patient has a wound care nurse that comes to the house a few times per week. She came yesterday for wound care dressing changes.    Of note, patient was hospitalized at [facility] from September 4 to October 4 at [facility] for an ulcer in between her buttocks. During that hospitalization, patient required debridement of the wound as well as IV antibiotics.     In the ED, her VS /75, HR 77, RR 18 with SpO2 of 99% and T of 99.2. Patient received a 500 cc bolus of fluids as well as Doxycycline 500 mg x1. (04 Dec 2019 17:13)      REVIEW OF SYSTEMS:    CONSTITUTIONAL: + Chills  EYES/ENT: No visual changes;  No vertigo or throat pain   NECK: No pain or stiffness  RESPIRATORY: SOB, no cough, no wheeze  CARDIOVASCULAR: No chest pain or palpitations  GASTROINTESTINAL: +Nausea.  GENITOURINARY: No dysuria, frequency or hematuria  NEUROLOGICAL: No numbness or weakness  SKIN: + buttock wound      VITAL SIGNS:  Temp during RRT : 100.2 Oral  b/p: 134/103  HR: 119  O2: 100% on 2 liters    T(C): 37.3 (12-05-19 @ 14:34), Max: 37.3 (12-05-19 @ 14:34)  HR: 75 (12-05-19 @ 14:34)  BP: 92/53 (12-05-19 @ 14:34)  RR: 18 (12-05-19 @ 14:34)  SpO2: 94% (12-05-19 @ 14:34)          LABS:                        11.5   6.76  )-----------( 185      ( 05 Dec 2019 20:33 )             36.1     12-05    131<L>  |  100  |  55<H>  ----------------------------<  372<H>  5.8<H>   |  16<L>  |  1.81<H>    Ca    9.9      05 Dec 2019 20:33  Phos  4.1     12-05  Mg     1.8     12-05    TPro  7.4  /  Alb  3.1<L>  /  TBili  0.5  /  DBili  x   /  AST  27  /  ALT  15  /  AlkPhos  130<H>  12-05             CULTURES:  .Blood Blood-Peripheral  12-04 @ 17:30   Growth in aerobic bottle: Gram Positive Cocci in Pairs and Chains  Growth in anaerobic bottle: Gram positive cocci in pairs  "Due to technical problems, Proteus sp. will Not be reported as part of  the BCID panel until further notice"  ***Blood Panel PCR results on this specimen are available  approximately 3 hours after the Gram stain result.***  Gram stain, PCR, and/or culture results may not always  correspond due to difference in methodologies.  ************************************************************  This PCR assay was performed using "[employer].  The following targets are tested for: Enterococcus,  vancomycin resistant enterococci, Listeria monocytogenes,  coagulase negative staphylococci, S. aureus,  methicillin resistantS. aureus, Streptococcus agalactiae  (Group B), S. pneumoniae, S. pyogenes (Group A),  Acinetobacter baumannii, Enterobacter cloacae, E. coli,  Klebsiella oxytoca, K. pneumoniae, Proteus sp.,  Serratia marcescens, Haemophilus influenzae,  Neisseria meningitidis, Pseudomonas aeruginosa, Candida  albicans, C. glabrata, C krusei, C parapsilosis,  C. tropicalis and the KPC resistance gene.  --  Blood Culture PCR      .Blood Blood-Peripheral  12-04 @ 12:05   Growth in aerobic and anaerobic bottles: Gram Positive Cocci in Pairs and  Chains  "Due to technical problems, Proteus sp. will Not be reported as part of  the BCID panel until further notice"  ***Blood Panel PCR results on this specimen are available  approximately 3 hours after the Gram stain result.***  Gram stain, PCR, and/or culture results may not always  correspond due to difference in methodologies.  ************************************************************  This PCR assay was performed using "[employer].  The following targets are tested for: Enterococcus,  vancomycin resistant enterococci, Listeria monocytogenes,  coagulase negative staphylococci, S. aureus,  methicillin resistant S. aureus, Streptococcus agalactiae  (Group B), S. pneumoniae, S. pyogenes (Group A),  Acinetobacter baumannii, Enterobacter cloacae, E. coli,  Klebsiella oxytoca, K. pneumoniae, Proteus sp.,  Serratia marcescens, Haemophilus influenzae,  Neisseria meningitidis, Pseudomonas aeruginosa, Candida  albicans, C. glabrata, C krusei, C parapsilosis,  C. tropicalis and the KPC resistance gene.  --  Blood Culture PCR      .Surgical Swab  09-15 @ 18:03   Rare Enterococcus species "Susceptibilities not performed"  Few Bacteroides thetaiotamcron group "Susceptibilities not performed"  --  --      .Blood  09-08 @ 01:32   No growth at 5 days.  --  --      RADIOLOGY RESULTS:      < from: Xray Chest 1 View-PORTABLE IMMEDIATE (12.05.19 @ 20:52) >      EXAM:  XR CHEST PORTABLE IMMED 1V                            PROCEDURE DATE:  12/05/2019      ******PRELIMINARY REPORT******    ******PRELIMINARY REPORT******              INTERPRETATION:  no urgent/emergent findings.    discussed with Dr. Greenberg        ******PRELIMINARY REPORT******    ******PRELIMINARY REPORT******         < end of copied text >      PHYSICAL EXAM: after resolution of symptoms  General: Awake and alert, no acute distress, non-toxic appearance  HEENT: No mucositis or thrush, mucous membrane pink and moist  CV: S1, S2 present, RRR, no JVD  Pulm: Respirations non-labored, LLL diminished. Clear to auscultation otherwise  GI: Abdominal soft, non-tender, +BS x 4. +brown diarrhea  : No CVA tenderness, voiding without difficulty  Ext: No peripheral edema, +2 pedal pulses b/l, no peripheral cyanosis  Neuro: AAO x 3, PERRLA, CN II-XII grossly intact, no neurological deficits    A/P:  This is a 86yFemale with h/o CHF, HTN, HLD, STEMI, Cellulitis. Admit for Myalgia, found to have recurrent buttock cellulitis on IV antibiotics.  1) Bacteremia  - Rigors resolved after 1g IV Tylenol.   - d/w ID - change Vanco to Daptomycin if no PNA on CXR  - CXR - Preliminary- no emergent findings. Personally discussed with Radiologist - no PNA seen on imaging  - d/w Surgery - No further interventions at this time, awaiting MRI   - discussed event with Dr. Boo  - Continue close monitoring  - f/u MRI and labs        ROSI ANDERSON  Spectra Link # [id number] C/C: RRT for Rigors/SOB.      HPI: (ED note)  85 yo female with a hx of of CHF, DM, HLD, HTN, and an STEMI who presents to the ED for a several day history of buttocks pain. Patient states that the pain started about a week ago, but has suddenly gotten worse over the past few days. She describes it as a sharp pain and has tried taking OTC acetaminophen, which has not helped the pain at all. Patient endorses one episode of nasuea earlier this morning, but denies any vomitting. She has not noticed a rash in that area, but does endorse that the skin is difficult to visualize.  Patient has a wound care nurse that comes to the house a few times per week. She came yesterday for wound care dressing changes.    Of note, patient was hospitalized at [facility] from September 4 to October 4 at [facility] for an ulcer in between her buttocks. During that hospitalization, patient required debridement of the wound as well as IV antibiotics.     In the ED, her VS /75, HR 77, RR 18 with SpO2 of 99% and T of 99.2. Patient received a 500 cc bolus of fluids as well as Doxycycline 500 mg x1. (04 Dec 2019 17:13)      REVIEW OF SYSTEMS:    CONSTITUTIONAL: + Chills  EYES/ENT: No visual changes;  No vertigo or throat pain   NECK: No pain or stiffness  RESPIRATORY: SOB, no cough, no wheeze  CARDIOVASCULAR: No chest pain or palpitations  GASTROINTESTINAL: +Nausea.  GENITOURINARY: No dysuria, frequency or hematuria  NEUROLOGICAL: No numbness or weakness  SKIN: + buttock wound      VITAL SIGNS:  Temp during RRT : 100.2 Oral  b/p: 134/103  HR: 119  O2: 100% on 2 liters    T(C): 37.3 (12-05-19 @ 14:34), Max: 37.3 (12-05-19 @ 14:34)  HR: 75 (12-05-19 @ 14:34)  BP: 92/53 (12-05-19 @ 14:34)  RR: 18 (12-05-19 @ 14:34)  SpO2: 94% (12-05-19 @ 14:34)          LABS:                        11.5   6.76  )-----------( 185      ( 05 Dec 2019 20:33 )             36.1     12-05    131<L>  |  100  |  55<H>  ----------------------------<  372<H>  5.8<H>   |  16<L>  |  1.81<H>    Ca    9.9      05 Dec 2019 20:33  Phos  4.1     12-05  Mg     1.8     12-05    TPro  7.4  /  Alb  3.1<L>  /  TBili  0.5  /  DBili  x   /  AST  27  /  ALT  15  /  AlkPhos  130<H>  12-05             CULTURES:  .Blood Blood-Peripheral  12-04 @ 17:30   Growth in aerobic bottle: Gram Positive Cocci in Pairs and Chains  Growth in anaerobic bottle: Gram positive cocci in pairs  "Due to technical problems, Proteus sp. will Not be reported as part of  the BCID panel until further notice"  ***Blood Panel PCR results on this specimen are available  approximately 3 hours after the Gram stain result.***  Gram stain, PCR, and/or culture results may not always  correspond due to difference in methodologies.  ************************************************************  This PCR assay was performed using [employer].  The following targets are tested for: Enterococcus,  vancomycin resistant enterococci, Listeria monocytogenes,  coagulase negative staphylococci, S. aureus,  methicillin resistantS. aureus, Streptococcus agalactiae  (Group B), S. pneumoniae, S. pyogenes (Group A),  Acinetobacter baumannii, Enterobacter cloacae, E. coli,  Klebsiella oxytoca, K. pneumoniae, Proteus sp.,  Serratia marcescens, Haemophilus influenzae,  Neisseria meningitidis, Pseudomonas aeruginosa, Candida  albicans, C. glabrata, C krusei, C parapsilosis,  C. tropicalis and the KPC resistance gene.  --  Blood Culture PCR      .Blood Blood-Peripheral  12-04 @ 12:05   Growth in aerobic and anaerobic bottles: Gram Positive Cocci in Pairs and  Chains  "Due to technical problems, Proteus sp. will Not be reported as part of  the BCID panel until further notice"  ***Blood Panel PCR results on this specimen are available  approximately 3 hours after the Gram stain result.***  Gram stain, PCR, and/or culture results may not always  correspond due to difference in methodologies.  ************************************************************  This PCR assay was performed using [employer].  The following targets are tested for: Enterococcus,  vancomycin resistant enterococci, Listeria monocytogenes,  coagulase negative staphylococci, S. aureus,  methicillin resistant S. aureus, Streptococcus agalactiae  (Group B), S. pneumoniae, S. pyogenes (Group A),  Acinetobacter baumannii, Enterobacter cloacae, E. coli,  Klebsiella oxytoca, K. pneumoniae, Proteus sp.,  Serratia marcescens, Haemophilus influenzae,  Neisseria meningitidis, Pseudomonas aeruginosa, Candida  albicans, C. glabrata, C krusei, C parapsilosis,  C. tropicalis and the KPC resistance gene.  --  Blood Culture PCR      .Surgical Swab  09-15 @ 18:03   Rare Enterococcus species "Susceptibilities not performed"  Few Bacteroides thetaiotamcron group "Susceptibilities not performed"  --  --      .Blood  09-08 @ 01:32   No growth at 5 days.  --  --      RADIOLOGY RESULTS:      < from: Xray Chest 1 View-PORTABLE IMMEDIATE (12.05.19 @ 20:52) >      EXAM:  XR CHEST PORTABLE IMMED 1V                            PROCEDURE DATE:  12/05/2019      ******PRELIMINARY REPORT******    ******PRELIMINARY REPORT******              INTERPRETATION:  no urgent/emergent findings.    discussed with Dr. Greenberg        ******PRELIMINARY REPORT******    ******PRELIMINARY REPORT******         < end of copied text >      PHYSICAL EXAM: after resolution of symptoms  General: Awake and alert, no acute distress, non-toxic appearance  HEENT: No mucositis or thrush, mucous membrane pink and moist  CV: S1, S2 present, RRR, no JVD  Pulm: Respirations non-labored, LLL diminished. Clear to auscultation otherwise  GI: Abdominal soft, non-tender, +BS x 4. +brown diarrhea  : No CVA tenderness, voiding without difficulty  Ext: No peripheral edema, +2 pedal pulses b/l, no peripheral cyanosis  Neuro: AAO x 3, PERRLA, CN II-XII grossly intact, no neurological deficits    A/P:  This is a 86yFemale with h/o CHF, HTN, HLD, STEMI, Cellulitis. Admit for Myalgia, found to have recurrent buttock cellulitis on IV antibiotics.  1) Bacteremia  - Rigors resolved after 1g IV Tylenol.   - d/w ID - change Vanco to Daptomycin if no PNA on CXR  - CXR - Preliminary- no emergent findings. Personally discussed with Radiologist - no PNA seen on imaging  - d/w Surgery - No further interventions at this time, awaiting MRI   - discussed event with Dr. Boo  - Continue close monitoring  - f/u MRI  2) Hyperkalemia  - K=5.8. No peaked Twaves on EKG.   - h/o CHF and new ROB. will treat with Insulin 10u/D50 IV x 1. Will repeat BMP in 6 hours  3) Hyponatremia  - Will start gentle hydration NS @ 50cc/hr. Repeat BMP 6 hours  4) ROB  -  BUN 55, Creatinine 1.8. likely prerenal  - Will start gentle hydration  - Recheck labs in 6 hours           ROSI ANDERSON  Spectra Link # [id number]

## 2019-12-05 NOTE — CHART NOTE - NSCHARTNOTEFT_GEN_A_CORE
follow up-  called wound care consult for eval per surgery-  continue gluteal wound care as below pending wound care eval  Pack with saline moistened sterile gauze  Cover with Dry gauze   Apply ABD pads  dressing change twice daily and as needed if soiled  ( pending wound care eval)  Alexandria Lozada(NP)  [address] [facility], [phone]

## 2019-12-05 NOTE — PHYSICAL THERAPY INITIAL EVALUATION ADULT - BED MOBILITY LIMITATIONS, REHAB EVAL
[12/11]/decreased ability to use legs for bridging/pushing/impaired ability to control trunk for mobility

## 2019-12-05 NOTE — DISCHARGE NOTE PROVIDER - NSDCCPCAREPLAN_GEN_ALL_CORE_FT
PRINCIPAL DISCHARGE DIAGNOSIS  Diagnosis: Buttock pain  Assessment and Plan of Treatment: PRINCIPAL DISCHARGE DIAGNOSIS  Diagnosis: Cellulitis of buttock  Assessment and Plan of Treatment: Cellulitis of buttock      SECONDARY DISCHARGE DIAGNOSES  Diagnosis: UTI (urinary tract infection)  Assessment and Plan of Treatment: UTI (urinary tract infection)    Diagnosis: Hyperkalemia  Assessment and Plan of Treatment: Hyperkalemia    Diagnosis: ROB (acute kidney injury)  Assessment and Plan of Treatment: ROB (acute kidney injury)    Diagnosis: Hyponatremia  Assessment and Plan of Treatment: Hyponatremia PRINCIPAL DISCHARGE DIAGNOSIS  Diagnosis: Cellulitis of buttock  Assessment and Plan of Treatment: Cellulitis of buttock/ossible OM  treatment with antibiotic-ertapenem as prsecribed  has picc line  picc line care  weekly cbc with diff, bmp  wound care      SECONDARY DISCHARGE DIAGNOSES  Diagnosis: UTI (urinary tract infection)  Assessment and Plan of Treatment: UTI (urinary tract infection)  completed antibiotic    Diagnosis: Hyperkalemia  Assessment and Plan of Treatment: Hyperkalemia  resolved    Diagnosis: ROB (acute kidney injury)  Assessment and Plan of Treatment: ROB (acute kidney injury)  secondary to urinary retention; has infante cath; failed trial of voiding on 12/19 and infante cath replaced  follow up with urologist a [facility]    Diagnosis: Hyponatremia  Assessment and Plan of Treatment: Hyponatremia  stable PRINCIPAL DISCHARGE DIAGNOSIS  Diagnosis: Cellulitis of buttock  Assessment and Plan of Treatment: Cellulitis of buttock/ossible OM  treatment with antibiotic-ertapenem as prsecribed through 1/14/20  has picc line  picc line care  weekly cbc with diff, bmp, ESR, CRP  wound care      SECONDARY DISCHARGE DIAGNOSES  Diagnosis: UTI (urinary tract infection)  Assessment and Plan of Treatment: UTI (urinary tract infection)  completed antibiotic    Diagnosis: Hyperkalemia  Assessment and Plan of Treatment: Hyperkalemia  low potassium diet  monitor kidney function    Diagnosis: ROB (acute kidney injury)  Assessment and Plan of Treatment: ROB (acute kidney injury)  secondary to urinary retention; has infante cath; failed trial of voiding on 12/19 and infante cath replaced  follow up with urologist as outpatient    Diagnosis: Hyponatremia  Assessment and Plan of Treatment: Hyponatremiaresolved

## 2019-12-05 NOTE — CHART NOTE - NSCHARTNOTEFT_GEN_A_CORE
12/5/2019 2047    Critical Care Note--Medicine Attending    See Rapid Response Team sheet for detail, assessment and plan.   I personally provided 40 minutes of critical care time for rigors, fever in this patient UNKNOWN to me previously with persistent buttock cellulitis with polymicrobial isolate from blood, CAD, type 2 DM.  Patient treated with antipyretic with Tylenol with resolution of the rigors.  Patient with stable hemodynamics.  Chest radiograph and EKG ordered.   For planned MRI of the affected buttock area.   Would provide Lantus earlier with patient still with glucose toxicity.   Would suggest reviewing with ID antibiotic regimen and consider reevaluation by general surgery.  Patient/ daughter in attendance aware and agree with recommendations as above.   Emotional support provided to patient/ daughter.  Care reviewed with house staff and covering NP/PA.   Care as per PRIMARY PROVIDER.    Mauro Mcguire MD  [phone]

## 2019-12-05 NOTE — PHYSICAL THERAPY INITIAL EVALUATION ADULT - ASR WT BEARING STATUS EVAL
CONTINUED: CT A&P: Interval improvement from the large open wound on 09/20/2019. Currently, left medial gluteal skin surface cellulitis with 2 underlying air filled tracts; one between the skin surface and the anal verge and the other extending in the soft tissues just distal to the coccyx. No drainable abscess. Air-filled tract between the skin surface and the anal verge, however, may be seen in the setting of perianal fistula. If clinically warranted, more definite evaluation may be obtained with contrast enhanced MR perianal fistula protocol. Moderately distended urinary bladder. Chronic clinically for urinary retention. no weight-bearing restrictions/CONTINUED: CT A&P: Interval improvement from the large open wound on 09/20/2019. Currently, left medial gluteal skin surface cellulitis with 2 underlying air filled tracts; one between the skin surface and the anal verge and the other extending in the soft tissues just distal to the coccyx. No drainable abscess. Air-filled tract between the skin surface and the anal verge, however, may be seen in the setting of perianal fistula. If clinically warranted, more definite evaluation may be obtained with contrast enhanced MR perianal fistula protocol. Moderately distended urinary bladder. Chronic clinically for urinary retention. US KIDENY BLADDER: No hydronephrosis. Distended urinary bladder with debris; correlate with urinalysis. MRI PELVIS: Extremely limited/nondiagnostic study as the patient could not tolerate the examination and the study was terminated prematurely. Fluid collection overlying the inferior sacrum/coccyx. Underlying osteomyelitis is not excluded. Suggestion of a left perianal fistula. CXR: Pulmonary vascular congestion. No focal consolidation. No pleural effusion or pneumothorax. TTE: Left ventricular enlargement. Severe segmental left ventricular systolic dysfunction. Akinesis of the anteroseptum, anterior wall and apex. Diffuse hypokinesis of the remaining segments. Moderate diastolic dysfunction (Stage II). Right atrial enlargement. Right ventricular enlargement with decreased right ventricular systolic function.  Estimated pulmonary artery systolic pressure equals 70 mm Hg, assuming right atrial pressure equals 8 mm Hg, consistent with severe pulmonary pressures. Unable to rule out endocarditis. Consider ALONDRA if clinically indicated.

## 2019-12-06 DIAGNOSIS — N17.9 ACUTE KIDNEY FAILURE, UNSPECIFIED: ICD-10-CM

## 2019-12-06 LAB
ANION GAP SERPL CALC-SCNC: 10 MMOL/L — SIGNIFICANT CHANGE UP (ref 5–17)
ANION GAP SERPL CALC-SCNC: 13 MMOL/L — SIGNIFICANT CHANGE UP (ref 5–17)
BASE EXCESS BLDV CALC-SCNC: -10.1 MMOL/L — LOW (ref -2–2)
BUN SERPL-MCNC: 50 MG/DL — HIGH (ref 7–23)
BUN SERPL-MCNC: 56 MG/DL — HIGH (ref 7–23)
CA-I SERPL-SCNC: 1.25 MMOL/L — SIGNIFICANT CHANGE UP (ref 1.12–1.3)
CALCIUM SERPL-MCNC: 8.1 MG/DL — LOW (ref 8.4–10.5)
CALCIUM SERPL-MCNC: 9.4 MG/DL — SIGNIFICANT CHANGE UP (ref 8.4–10.5)
CHLORIDE BLDV-SCNC: 104 MMOL/L — SIGNIFICANT CHANGE UP (ref 96–108)
CHLORIDE SERPL-SCNC: 102 MMOL/L — SIGNIFICANT CHANGE UP (ref 96–108)
CHLORIDE SERPL-SCNC: 106 MMOL/L — SIGNIFICANT CHANGE UP (ref 96–108)
CO2 BLDV-SCNC: 16 MMOL/L — LOW (ref 22–30)
CO2 SERPL-SCNC: 17 MMOL/L — LOW (ref 22–31)
CO2 SERPL-SCNC: 17 MMOL/L — LOW (ref 22–31)
CREAT SERPL-MCNC: 1.71 MG/DL — HIGH (ref 0.5–1.3)
CREAT SERPL-MCNC: 1.85 MG/DL — HIGH (ref 0.5–1.3)
GAS PNL BLDV: 137 MMOL/L — SIGNIFICANT CHANGE UP (ref 135–145)
GAS PNL BLDV: SIGNIFICANT CHANGE UP
GAS PNL BLDV: SIGNIFICANT CHANGE UP
GLUCOSE BLDC GLUCOMTR-MCNC: 169 MG/DL — HIGH (ref 70–99)
GLUCOSE BLDC GLUCOMTR-MCNC: 240 MG/DL — HIGH (ref 70–99)
GLUCOSE BLDC GLUCOMTR-MCNC: 241 MG/DL — HIGH (ref 70–99)
GLUCOSE BLDC GLUCOMTR-MCNC: 259 MG/DL — HIGH (ref 70–99)
GLUCOSE BLDC GLUCOMTR-MCNC: 301 MG/DL — HIGH (ref 70–99)
GLUCOSE BLDV-MCNC: 243 MG/DL — HIGH (ref 70–99)
GLUCOSE SERPL-MCNC: 205 MG/DL — HIGH (ref 70–99)
GLUCOSE SERPL-MCNC: 275 MG/DL — HIGH (ref 70–99)
GRAM STN FLD: SIGNIFICANT CHANGE UP
HCO3 BLDV-SCNC: 15 MMOL/L — LOW (ref 21–29)
HCT VFR BLD CALC: 31 % — LOW (ref 34.5–45)
HCT VFR BLDA CALC: 28 % — LOW (ref 39–50)
HGB BLD CALC-MCNC: 9 G/DL — LOW (ref 11.5–15.5)
HGB BLD-MCNC: 10.2 G/DL — LOW (ref 11.5–15.5)
LACTATE BLDV-MCNC: 2 MMOL/L — SIGNIFICANT CHANGE UP (ref 0.7–2)
MAGNESIUM SERPL-MCNC: 1.6 MG/DL — SIGNIFICANT CHANGE UP (ref 1.6–2.6)
MCHC RBC-ENTMCNC: 28.9 PG — SIGNIFICANT CHANGE UP (ref 27–34)
MCHC RBC-ENTMCNC: 32.9 GM/DL — SIGNIFICANT CHANGE UP (ref 32–36)
MCV RBC AUTO: 87.8 FL — SIGNIFICANT CHANGE UP (ref 80–100)
NRBC # BLD: 0 /100 WBCS — SIGNIFICANT CHANGE UP (ref 0–0)
OTHER CELLS CSF MANUAL: 12 ML/DL — LOW (ref 18–22)
PCO2 BLDV: 34 MMHG — LOW (ref 35–50)
PH BLDV: 7.28 — LOW (ref 7.35–7.45)
PHOSPHATE SERPL-MCNC: 3.3 MG/DL — SIGNIFICANT CHANGE UP (ref 2.5–4.5)
PLATELET # BLD AUTO: 170 K/UL — SIGNIFICANT CHANGE UP (ref 150–400)
PO2 BLDV: 108 MMHG — HIGH (ref 25–45)
POTASSIUM BLDV-SCNC: 4.4 MMOL/L — SIGNIFICANT CHANGE UP (ref 3.5–5.3)
POTASSIUM SERPL-MCNC: 4.6 MMOL/L — SIGNIFICANT CHANGE UP (ref 3.5–5.3)
POTASSIUM SERPL-MCNC: 5.4 MMOL/L — HIGH (ref 3.5–5.3)
POTASSIUM SERPL-SCNC: 4.6 MMOL/L — SIGNIFICANT CHANGE UP (ref 3.5–5.3)
POTASSIUM SERPL-SCNC: 5.4 MMOL/L — HIGH (ref 3.5–5.3)
RBC # BLD: 3.53 M/UL — LOW (ref 3.8–5.2)
RBC # FLD: 14.7 % — HIGH (ref 10.3–14.5)
SAO2 % BLDV: 98 % — HIGH (ref 67–88)
SODIUM SERPL-SCNC: 132 MMOL/L — LOW (ref 135–145)
SODIUM SERPL-SCNC: 133 MMOL/L — LOW (ref 135–145)
SPECIMEN SOURCE: SIGNIFICANT CHANGE UP
SPECIMEN SOURCE: SIGNIFICANT CHANGE UP
WBC # BLD: 11.48 K/UL — HIGH (ref 3.8–10.5)
WBC # FLD AUTO: 11.48 K/UL — HIGH (ref 3.8–10.5)

## 2019-12-06 PROCEDURE — 99232 SBSQ HOSP IP/OBS MODERATE 35: CPT

## 2019-12-06 PROCEDURE — 76770 US EXAM ABDO BACK WALL COMP: CPT | Mod: 26

## 2019-12-06 RX ORDER — METOPROLOL TARTRATE 50 MG
12.5 TABLET ORAL
Refills: 0 | Status: DISCONTINUED | OUTPATIENT
Start: 2019-12-06 | End: 2019-12-23

## 2019-12-06 RX ORDER — ONDANSETRON 8 MG/1
4 TABLET, FILM COATED ORAL EVERY 8 HOURS
Refills: 0 | Status: DISCONTINUED | OUTPATIENT
Start: 2019-12-06 | End: 2019-12-08

## 2019-12-06 RX ORDER — ALBUTEROL 90 UG/1
2.5 AEROSOL, METERED ORAL ONCE
Refills: 0 | Status: COMPLETED | OUTPATIENT
Start: 2019-12-06 | End: 2019-12-06

## 2019-12-06 RX ORDER — ACETAMINOPHEN 500 MG
650 TABLET ORAL ONCE
Refills: 0 | Status: COMPLETED | OUTPATIENT
Start: 2019-12-06 | End: 2019-12-06

## 2019-12-06 RX ADMIN — Medication 50 MILLIGRAM(S): at 06:31

## 2019-12-06 RX ADMIN — Medication 100 MILLIGRAM(S): at 09:08

## 2019-12-06 RX ADMIN — Medication 2: at 18:11

## 2019-12-06 RX ADMIN — Medication 4: at 13:20

## 2019-12-06 RX ADMIN — Medication 650 MILLIGRAM(S): at 18:39

## 2019-12-06 RX ADMIN — GABAPENTIN 100 MILLIGRAM(S): 400 CAPSULE ORAL at 22:43

## 2019-12-06 RX ADMIN — Medication 3: at 09:02

## 2019-12-06 RX ADMIN — ENOXAPARIN SODIUM 40 MILLIGRAM(S): 100 INJECTION SUBCUTANEOUS at 13:29

## 2019-12-06 RX ADMIN — Medication 1 TABLET(S): at 22:43

## 2019-12-06 RX ADMIN — Medication 50 MILLIGRAM(S): at 20:14

## 2019-12-06 RX ADMIN — CLOPIDOGREL BISULFATE 75 MILLIGRAM(S): 75 TABLET, FILM COATED ORAL at 13:28

## 2019-12-06 RX ADMIN — Medication 100 MILLIGRAM(S): at 00:21

## 2019-12-06 RX ADMIN — Medication 1 TABLET(S): at 00:00

## 2019-12-06 RX ADMIN — TRAMADOL HYDROCHLORIDE 50 MILLIGRAM(S): 50 TABLET ORAL at 22:37

## 2019-12-06 RX ADMIN — INSULIN GLARGINE 15 UNIT(S): 100 INJECTION, SOLUTION SUBCUTANEOUS at 22:43

## 2019-12-06 RX ADMIN — Medication 100 MILLIGRAM(S): at 18:45

## 2019-12-06 RX ADMIN — Medication 1 TABLET(S): at 06:31

## 2019-12-06 RX ADMIN — FAMOTIDINE 20 MILLIGRAM(S): 10 INJECTION INTRAVENOUS at 13:28

## 2019-12-06 RX ADMIN — Medication 8 UNIT(S): at 09:02

## 2019-12-06 RX ADMIN — ALBUTEROL 2.5 MILLIGRAM(S): 90 AEROSOL, METERED ORAL at 06:31

## 2019-12-06 RX ADMIN — GABAPENTIN 100 MILLIGRAM(S): 400 CAPSULE ORAL at 06:31

## 2019-12-06 RX ADMIN — ATORVASTATIN CALCIUM 40 MILLIGRAM(S): 80 TABLET, FILM COATED ORAL at 00:00

## 2019-12-06 RX ADMIN — Medication 8 UNIT(S): at 13:20

## 2019-12-06 RX ADMIN — ATORVASTATIN CALCIUM 40 MILLIGRAM(S): 80 TABLET, FILM COATED ORAL at 22:43

## 2019-12-06 RX ADMIN — Medication 12.5 MILLIGRAM(S): at 00:00

## 2019-12-06 RX ADMIN — Medication 25 MICROGRAM(S): at 06:31

## 2019-12-06 RX ADMIN — Medication 81 MILLIGRAM(S): at 13:28

## 2019-12-06 RX ADMIN — Medication 650 MILLIGRAM(S): at 19:24

## 2019-12-06 RX ADMIN — TRAMADOL HYDROCHLORIDE 50 MILLIGRAM(S): 50 TABLET ORAL at 21:08

## 2019-12-06 RX ADMIN — Medication 1 TABLET(S): at 13:30

## 2019-12-06 NOTE — CONSULT NOTE ADULT - CONSULT REQUESTED BY NAME
Alexandria, NP Initiate Treatment: Apply Desonide cream to cheek and nose twice daily x 2 weeks / month as needed\\nApply Mupirocin ointment to cheek and nose twice daily x 2 weeks / month as needed Plan: Patient comes in for inflammation on left cheek and nose\\n\\nDiscussed with patient that inflammation is a normal reaction from using Efudex Discontinue Regimen: Discontinue Fluorouracil\\nDiscontinue Metronidazole Detail Level: Zone

## 2019-12-06 NOTE — CONSULT NOTE ADULT - ASSESSMENT
85 yo female with a hx of of CHF, DM, HLD, HTN, and an STEMI who presents to the ED for a several day history of buttocks pain found to have ROB in setting of sepsis/rob/hypotension

## 2019-12-06 NOTE — PROGRESS NOTE ADULT - ASSESSMENT
85 yo female with a hx of of CHF, DM, HLD, HTN, and an STEMI who was admitted for Sepsis 2/2 gluteal celluitis present on admission.

## 2019-12-06 NOTE — PROVIDER CONTACT NOTE (CRITICAL VALUE NOTIFICATION) - NS PROVIDER READ BACK
yes/12/6 blood cultures showed growth in both aerobic and anaerobic bottles for gram positive cocci in pairs and chains

## 2019-12-06 NOTE — PROVIDER CONTACT NOTE (CRITICAL VALUE NOTIFICATION) - TEST AND RESULT REPORTED:
12/6 blood cultures showed growth in both aerobic and anaerobic bottles for gram positive cocci in pairs and chains

## 2019-12-06 NOTE — PROGRESS NOTE ADULT - ASSESSMENT
87 yo female with a hx of of CHF, DM, HLD, HTN, and an STEMI who presents to the ED for a several day history of buttocks pain. Patient states that the pain started about a week ago, but has suddenly gotten worse over the past few days. She describes it as a sharp pain and has tried taking OTC acetaminophen, which has not helped the pain at all. Patient endorses one episode of nausea earlier this morning, but denies any vomiting. She has not noticed a rash in that area, but does endorse that the skin is difficult to visualize.  Patient has a wound care nurse that comes to the house a few times per week. She came yesterday for wound care dressing changes.    Of note, patient was hospitalized at [facility] from September 4 to October 4 at [facility] for an ulcer in between her buttocks. During that hospitalization, patient required debridement of the wound as well as IV antibiotics.     In the ED, her VS /75, HR 77, RR 18 with SpO2 of 99% and T of 99.2. Patient received a 500 cc bolus of fluids as well as Doxycycline 500 mg x1. (04 Dec 2019 17:13)  WBC 13.5.  ESR 71, CRP 10.6.  UA (-) nit/mod LE, wbc 10.  Bcx (+) GPC pairs/chains from 2 sets.  CTAP without contrast shows L medial gluteal skin cellulitis and air-filled  tract.  No drainable fluid collection.  Pt on vanco/aztreonam/flagyl.     ID consult called for further abx managment.       Sepsis:    - Pt with fever, leukocytosis and bacteremia.  Source likely from gluteal cellulitis.  CTap shows L gluteal skin cellulitis with air-filled tract, without drainable fluid collection.   Agree with broad spectrum abx for now until all culture data finalized.      - MRI pelvis ordered to evaluate for perianal fistula- pt unable to tolerate exam.  Cannot r/o underlying OM.  Recommend bone scan for further evaluation.      - Blood culture (+) GPC pairs/chains.  Pt initially on vanco/aztreonam/flagyl.  s/p RRT secondary to rigors on 12/5.  Abx broadened to dapto/azactam/flagyl to cover for possible resistant enterococcus/VRE.  Pending sensitivities.  Pt with h/o enterococcus and anerobic infection from rt gluteal wound during prior hospitalization.    - Urine cultures growing >100K GPO.  Pt already on appropriate coverage, f/u sensitivities..      - Surgery f/u for perianal fistula      Will follow,    Sandrita Zaman  [phone]

## 2019-12-06 NOTE — PROVIDER CONTACT NOTE (CRITICAL VALUE NOTIFICATION) - NS PROVIDER READ BACK TO LAB
12/6 blood cultures showed growth in both aerobic and anaerobic bottles for gram positive cocci in pairs and chains/yes

## 2019-12-06 NOTE — PROVIDER CONTACT NOTE (CRITICAL VALUE NOTIFICATION) - ASSESSMENT
Patient alert and oriented x4. No complaints of discomfort or distress. VSS. Patient afebrile. Denies dizziness, chills, HA, N/V, chest pain.

## 2019-12-06 NOTE — CONSULT NOTE ADULT - SUBJECTIVE AND OBJECTIVE BOX
[facility] Consult Note Nephrology - CONSULTATION NOTE    85 yo female with a hx of of CHF, DM, HLD, HTN, and an STEMI who presents to the ED for a several day history of buttocks pain. Patient states that the pain started about a week ago, but has suddenly gotten worse over the past few days. She describes it as a sharp pain and has tried taking OTC acetaminophen, which has not helped the pain at all. Patient endorses one episode of nasuea earlier this morning, but denies any vomitting. She has not noticed a rash in that area, but does endorse that the skin is difficult to visualize.  Patient has a wound care nurse that comes to the house a few times per week. She came yesterday for wound care dressing changes.    Of note, patient was hospitalized at [facility] from  to  at [facility] for an ulcer in between her buttocks. During that hospitalization, patient required debridement of the wound as well as IV antibiotics.     In the ED, her VS /75, HR 77, RR 18 with SpO2 of 99% and T of 99.2. Patient received a 500 cc bolus of fluids as well as Doxycycline 500 mg x1    Renal consult called for ROB in setting of sepsis /hypotension/bacteremia. Baseline cr around 1mg/dl currently is 1.7mg/dl. Pt is having urineoutput; unclear if she has been taking nsaids  denies any f/c/n/v/d/c/    PAST MEDICAL & SURGICAL HISTORY:  CHF (congestive heart failure)  STEMI (ST elevation myocardial infarction)  Palpitations  Diabetes mellitus  Dyslipidemia  HTN (hypertension)  S/P cardiac cath  S/P tonsillectomy  S/P lumpectomy, left breast: premalignant  S/P appendectomy  S/P cholecystectomy    codeine (Unknown)  Kiwi (Anaphylaxis)  penicillins (Anaphylaxis)    Home Medications Reviewed  Hospital Medications:   MEDICATIONS  (STANDING):  aspirin enteric coated 81 milliGRAM(s) Oral daily  atorvastatin 40 milliGRAM(s) Oral at bedtime  aztreonam  IVPB 500 milliGRAM(s) IV Intermittent every 12 hours  clopidogrel Tablet 75 milliGRAM(s) Oral daily  DAPTOmycin IVPB      dextrose 5%. 1000 milliLiter(s) (50 mL/Hr) IV Continuous <Continuous>  dextrose 50% Injectable 12.5 Gram(s) IV Push once  dextrose 50% Injectable 25 Gram(s) IV Push once  dextrose 50% Injectable 25 Gram(s) IV Push once  enoxaparin Injectable 40 milliGRAM(s) SubCutaneous daily  famotidine    Tablet 20 milliGRAM(s) Oral daily  gabapentin 100 milliGRAM(s) Oral two times a day  influenza   Vaccine 0.5 milliLiter(s) IntraMuscular once  insulin glargine Injectable (LANTUS) 15 Unit(s) SubCutaneous at bedtime  insulin lispro (HumaLOG) corrective regimen sliding scale   SubCutaneous three times a day before meals  insulin lispro (HumaLOG) corrective regimen sliding scale   SubCutaneous at bedtime  insulin lispro Injectable (HumaLOG) 8 Unit(s) SubCutaneous three times a day before meals  lactobacillus acidophilus 1 Tablet(s) Oral three times a day  levothyroxine 25 MICROGram(s) Oral daily  metoprolol tartrate 12.5 milliGRAM(s) Oral two times a day  metroNIDAZOLE  IVPB 500 milliGRAM(s) IV Intermittent every 8 hours  sodium chloride 0.9%. 1000 milliLiter(s) (50 mL/Hr) IV Continuous <Continuous>    SOCIAL HISTORY:  Denies ETOh,Smoking,   FAMILY HISTORY:    REVIEW OF SYSTEMS:  CONSTITUTIONAL: No weakness, fevers or chills  EYES/ENT: No visual changes;  No vertigo or throat pain   NECK: No pain or stiffness  RESPIRATORY: No cough, wheezing, hemoptysis; No shortness of breath  CARDIOVASCULAR: No chest pain or palpitations.  GASTROINTESTINAL: No abdominal or epigastric pain. No nausea, vomiting, or hematemesis; No diarrhea or constipation. No melena or hematochezia.  GENITOURINARY: No dysuria, frequency, foamy urine, urinary urgency, incontinence or hematuria  NEUROLOGICAL: No numbness or weakness  SKIN: No itching, burning, rashes, or lesions   VASCULAR: No bilateral lower extremity edema.   All other review of systems is negative unless indicated above.    VITALS:  T(F): 98.4 (19 @ 11:53), Max: 99.2 (19 @ 14:34)  HR: 76 (19 @ 11:53)  BP: 89/47 (19 @ 11:53)  RR: 18 (19 @ 11:53)  SpO2: 97% (19 @ 11:53)  Wt(kg): --     @ 07:01  -   @ 07:00  --------------------------------------------------------  IN: 1930 mL / OUT: 0 mL / NET: 1930 mL     @ 07:01  -   @ 13:13  --------------------------------------------------------  IN: 240 mL / OUT: 0 mL / NET: 240 mL        PHYSICAL EXAM:  Constitutional: NAD  HEENT: anicteric sclera, oropharynx clear, MMM  Neck: No JVD  Respiratory: CTAB, no wheezes, rales or rhonchi  Cardiovascular: S1, S2, RRR  Gastrointestinal: BS+, soft, NT/ND  Extremities: +peripheral edema  Neurological: A/O x 3, no focal deficits  Psychiatric: Normal mood, normal affect  : No CVA tenderness. No infante.     LABS:      133<L>  |  106  |  50<H>  ----------------------------<  275<H>  4.6   |  17<L>  |  1.71<H>    Ca    8.1<L>      06 Dec 2019 09:31  Phos  3.3       Mg     1.6         TPro  7.4  /  Alb  3.1<L>  /  TBili  0.5  /  DBili      /  AST  27  /  ALT  15  /  AlkPhos  130<H>      Creatinine Trend: 1.71 <--, 1.85 <--, 1.81 <--, 1.27 <--                        10.2   11.48 )-----------( 170      ( 06 Dec 2019 03:33 )             31.0     Urine Studies:  Urinalysis Basic - ( 04 Dec 2019 12:24 )    Color: Light Yellow / Appearance: Clear / S.015 / pH:   Gluc:  / Ketone: Negative  / Bili: Negative / Urobili: Negative   Blood:  / Protein: Negative / Nitrite: Negative   Leuk Esterase: Moderate / RBC: 2 /hpf / WBC 10 /HPF   Sq Epi:  / Non Sq Epi: 0 /hpf / Bacteria: Negative        RADIOLOGY & ADDITIONAL STUDIES:

## 2019-12-06 NOTE — CHART NOTE - NSCHARTNOTEFT_GEN_A_CORE
follow up-  d/w pt; MRI pelvis- limited study-OM can not be ruled out;  per d/w MD, d/w pt MRI without contrast and pt agreed; pt to get pain medication before MRI;   Alexandria Lozada(NP)  [facility], [phone]

## 2019-12-06 NOTE — CONSULT NOTE ADULT - PROBLEM SELECTOR RECOMMENDATION 9
ddx includes- pre renal vs atn from sepsis vs r/o obstruction ( hx of obstruction)  check ua  check urine na /cr/cl/osm/k  check urine pro/cr  check renal us  keep off ace/arb/nsaids/  c/w nacl @ 50cc  will trend bmp

## 2019-12-06 NOTE — CONSULT NOTE ADULT - ASSESSMENT
Impression:    Left buttock healed surgical site  mid buttock surgical wound s/p debridement of abscess    Recommend:  1.) Topical therapy: mid buttock wound - irrigate with NS, pat dry, pack loosely with aquacel rope, cover with tegederm daily  2.) Surgery input noted  3.) Abx per Medicine/ID  4.) Nutrition optimization  5.) Offload by side laying  6.) Maintain on an alternating air with low air loss surface  7.) f/o MRI    Care as per medicine will follow w/ you  Upon discharge f/[facility] as [facility] [address] [place] [address] [phone]  Seen with Dr. Greene  Thank you for this consult  Lacey Sparrow, NP-C, CWOCN [id number]

## 2019-12-06 NOTE — PROGRESS NOTE ADULT - SUBJECTIVE AND OBJECTIVE BOX
Patient is a 86y old  Female who presents with a chief complaint of Sepsis 2/2 Cellulitis (06 Dec 2019 14:57)      INTERVAL HISTORY: feels ok       	  MEDICATIONS:  metoprolol tartrate 12.5 milliGRAM(s) Oral two times a day        PHYSICAL EXAM:  T(C): 36.9 (12-06-19 @ 11:53), Max: 37.2 (12-06-19 @ 06:29)  HR: 76 (12-06-19 @ 11:53) (68 - 86)  BP: 89/47 (12-06-19 @ 11:53) (89/47 - 112/63)  RR: 18 (12-06-19 @ 11:53) (17 - 19)  SpO2: 97% (12-06-19 @ 11:53) (97% - 99%)  Wt(kg): --  I&O's Summary    05 Dec 2019 07:01  -  06 Dec 2019 07:00  --------------------------------------------------------  IN: 1930 mL / OUT: 0 mL / NET: 1930 mL    06 Dec 2019 07:01  -  06 Dec 2019 15:51  --------------------------------------------------------  IN: 240 mL / OUT: 0 mL / NET: 240 mL          Appearance: In no distress	  HEENT:    PERRL, EOMI	  Cardiovascular:  S1 S2, No JVD  Respiratory: Lungs clear to auscultation	  Gastrointestinal:  Soft, Non-tender, + BS	  Extremities:  No edema of LE                                10.2   11.48 )-----------( 170      ( 06 Dec 2019 03:33 )             31.0     12-06    133<L>  |  106  |  50<H>  ----------------------------<  275<H>  4.6   |  17<L>  |  1.71<H>    Ca    8.1<L>      06 Dec 2019 09:31  Phos  3.3     12-06  Mg     1.6     12-06    TPro  7.4  /  Alb  3.1<L>  /  TBili  0.5  /  DBili  x   /  AST  27  /  ALT  15  /  AlkPhos  130<H>  12-05        Labs personally reviewed    Assessment /Plan:   Chronic diastolic HF - euvolemic, off Lasix  CAD - c/w DAPT, Metoprolol and ASA  HTN - well controlled      Srini Velasco DO [facility]  Cardiovascular Medicine  [phone]

## 2019-12-06 NOTE — PROGRESS NOTE ADULT - ASSESSMENT
85 yo woman with a hx of CHF, DM, HLD, HTN, CAD prior MI, sp debridement of bilateral buttocks (9/15, 9/28) presenting with buttock cellulitis. CT consistent with cellulitis without underlying abscess.    - no urgent surgical intervention at this time  - c/w IV antibiotics per ID  - cont local wound care  - f/u repeat blood cultures  - f/u MRI pelvis official report  - Continue care as per medicine     Red surgery  [id number]

## 2019-12-06 NOTE — PROGRESS NOTE ADULT - SUBJECTIVE AND OBJECTIVE BOX
RED SURGERY DAILY PROGRESS NOTE:       SUBJECTIVE/ROS: Patient seen and examined at bedside.  Patient still has some pain but is controlled.  BCx growing streptococcus.           MEDICATIONS  (STANDING):  acetaminophen   Tablet .. 1000 milliGRAM(s) Oral every 8 hours  aspirin enteric coated 81 milliGRAM(s) Oral daily  atorvastatin 40 milliGRAM(s) Oral at bedtime  aztreonam  IVPB 500 milliGRAM(s) IV Intermittent every 12 hours  clopidogrel Tablet 75 milliGRAM(s) Oral daily  dextrose 5%. 1000 milliLiter(s) (50 mL/Hr) IV Continuous <Continuous>  dextrose 50% Injectable 12.5 Gram(s) IV Push once  dextrose 50% Injectable 25 Gram(s) IV Push once  dextrose 50% Injectable 25 Gram(s) IV Push once  enoxaparin Injectable 40 milliGRAM(s) SubCutaneous daily  famotidine    Tablet 20 milliGRAM(s) Oral daily  gabapentin 100 milliGRAM(s) Oral two times a day  influenza   Vaccine 0.5 milliLiter(s) IntraMuscular once  insulin glargine Injectable (LANTUS) 10 Unit(s) SubCutaneous at bedtime  insulin lispro (HumaLOG) corrective regimen sliding scale   SubCutaneous three times a day before meals  insulin lispro (HumaLOG) corrective regimen sliding scale   SubCutaneous at bedtime  insulin lispro Injectable (HumaLOG) 3 Unit(s) SubCutaneous three times a day before meals  levothyroxine 25 MICROGram(s) Oral daily  metoprolol tartrate 12.5 milliGRAM(s) Oral two times a day  metroNIDAZOLE  IVPB 500 milliGRAM(s) IV Intermittent every 8 hours  vancomycin  IVPB 1000 milliGRAM(s) IV Intermittent every 24 hours    MEDICATIONS  (PRN):  dextrose 40% Gel 15 Gram(s) Oral once PRN Blood Glucose LESS THAN 70 milliGRAM(s)/deciliter  glucagon  Injectable 1 milliGRAM(s) IntraMuscular once PRN Glucose LESS THAN 70 milligrams/deciliter  traMADol 25 milliGRAM(s) Oral every 6 hours PRN Moderate Pain (4 - 6)  traMADol 50 milliGRAM(s) Oral every 6 hours PRN Severe Pain (7 - 10)      OBJECTIVE:    ICU Vital Signs Last 24 Hrs  T(C): 38 (06 Dec 2019 19:36), Max: 38 (06 Dec 2019 19:36)  T(F): 100.4 (06 Dec 2019 19:36), Max: 100.4 (06 Dec 2019 19:36)  HR: 82 (06 Dec 2019 19:36) (68 - 86)  BP: 96/61 (06 Dec 2019 19:36) (90/48 - 112/63)  BP(mean): --  ABP: --  ABP(mean): --  RR: 18 (06 Dec 2019 19:36) (17 - 19)  SpO2: 97% (06 Dec 2019 19:36) (97% - 99%)    I&O's Detail    05 Dec 2019 07:01  -  06 Dec 2019 07:00  --------------------------------------------------------  IN:    IV PiggyBack: 300 mL    Oral Fluid: 1280 mL    sodium chloride 0.9%.: 350 mL  Total IN: 1930 mL    OUT:  Total OUT: 0 mL    Total NET: 1930 mL      06 Dec 2019 07:01  -  06 Dec 2019 19:56  --------------------------------------------------------  IN:    Oral Fluid: 240 mL  Total IN: 240 mL    OUT:  Total OUT: 0 mL    Total NET: 240 mL        LABS:                                 10.2   11.48 )-----------( 170      ( 06 Dec 2019 03:33 )             31.0     12    133<L>  |  106  |  50<H>  ----------------------------<  275<H>  4.6   |  17<L>  |  1.71<H>    Ca    8.1<L>      06 Dec 2019 09:31  Phos  3.3     12-  Mg     1.6         TPro  7.4  /  Alb  3.1<L>  /  TBili  0.5  /  DBili  x   /  AST  27  /  ALT  15  /  AlkPhos  130<H>  1205    Urinalysis Basic - ( 04 Dec 2019 12:24 )    Color: Light Yellow / Appearance: Clear / S.015 / pH: x  Gluc: x / Ketone: Negative  / Bili: Negative / Urobili: Negative   Blood: x / Protein: Negative / Nitrite: Negative   Leuk Esterase: Moderate / RBC: 2 /hpf / WBC 10 /HPF   Sq Epi: x / Non Sq Epi: 0 /hpf / Bacteria: Negative                PHYSICAL EXAM:  GEN: NAD, resting quietly  PULM: symmetric chest rise bilaterally, no increased WOB  CV: regular rate, peripheral pulses intact  ABD: soft, NTND  : right and left incision sites with healthy granulation tissue, overlying cellulitis.  Small cavity at superior portion of gluteal cleft, no drainage noted.  EXTR: no cyanosis or edema, moving all extremities

## 2019-12-06 NOTE — CONSULT NOTE ADULT - SUBJECTIVE AND OBJECTIVE BOX
Wound SURGERY CONSULT NOTE    HPI:  85 yo female with a hx of of CHF, DM, HLD, HTN, and an STEMI who presents to the ED for a several day history of buttocks pain. Patient states that the pain started about a week ago, but has suddenly gotten worse over the past few days. She describes it as a sharp pain and has tried taking OTC acetaminophen, which has not helped the pain at all. Patient endorses one episode of nasuea earlier this morning, but denies any vomitting. She has not noticed a rash in that area, but does endorse that the skin is difficult to visualize.  Patient has a wound care nurse that comes to the house a few times per week. She came yesterday for wound care dressing changes.    Of note, patient was hospitalized at [facility] from September 4 to October 4 at [facility] for an ulcer in between her buttocks. During that hospitalization, patient required debridement of the wound as well as IV antibiotics.     In the ED, her VS /75, HR 77, RR 18 with SpO2 of 99% and T of 99.2. Patient received a 500 cc bolus of fluids as well as Doxycycline 500 mg x1. (04 Dec 2019 17:13)    Ms. Merlini was encountered on an alternating air with low air loss surface resting comfortably. She is able to turn and reposition herself independently. She was seen with Dr. Greene.    PAST MEDICAL & SURGICAL HISTORY:  CHF (congestive heart failure)  STEMI (ST elevation myocardial infarction)  Palpitations  Diabetes mellitus  Dyslipidemia  HTN (hypertension)  S/P cardiac cath  S/P tonsillectomy  S/P lumpectomy, left breast: premalignant  S/P appendectomy  S/P cholecystectomy      REVIEW OF SYSTEMS  General: no fever or chills  Respiratory and Thorax: no SOB or cough  Cardiovascular:	no CP  Gastrointestinal:	+nausea  Genitourinary:	 no dysuria  Musculoskeletal:	 no contractures or deformities  Neurological:	no LOC  Endocrine: DM  Skin: buttocks wounds as per HPI    MEDICATIONS  (STANDING):  aspirin enteric coated 81 milliGRAM(s) Oral daily  atorvastatin 40 milliGRAM(s) Oral at bedtime  aztreonam  IVPB 500 milliGRAM(s) IV Intermittent every 12 hours  clopidogrel Tablet 75 milliGRAM(s) Oral daily  DAPTOmycin IVPB      dextrose 5%. 1000 milliLiter(s) (50 mL/Hr) IV Continuous <Continuous>  dextrose 50% Injectable 12.5 Gram(s) IV Push once  dextrose 50% Injectable 25 Gram(s) IV Push once  dextrose 50% Injectable 25 Gram(s) IV Push once  enoxaparin Injectable 40 milliGRAM(s) SubCutaneous daily  famotidine    Tablet 20 milliGRAM(s) Oral daily  gabapentin 100 milliGRAM(s) Oral two times a day  influenza   Vaccine 0.5 milliLiter(s) IntraMuscular once  insulin glargine Injectable (LANTUS) 15 Unit(s) SubCutaneous at bedtime  insulin lispro (HumaLOG) corrective regimen sliding scale   SubCutaneous three times a day before meals  insulin lispro (HumaLOG) corrective regimen sliding scale   SubCutaneous at bedtime  insulin lispro Injectable (HumaLOG) 8 Unit(s) SubCutaneous three times a day before meals  lactobacillus acidophilus 1 Tablet(s) Oral three times a day  levothyroxine 25 MICROGram(s) Oral daily  metoprolol tartrate 12.5 milliGRAM(s) Oral two times a day  metroNIDAZOLE  IVPB 500 milliGRAM(s) IV Intermittent every 8 hours  sodium chloride 0.9%. 1000 milliLiter(s) (50 mL/Hr) IV Continuous <Continuous>    MEDICATIONS  (PRN):  acetaminophen   Tablet .. 1000 milliGRAM(s) Oral every 6 hours PRN Mild Pain (1 - 3)  dextrose 40% Gel 15 Gram(s) Oral once PRN Blood Glucose LESS THAN 70 milliGRAM(s)/deciliter  glucagon  Injectable 1 milliGRAM(s) IntraMuscular once PRN Glucose LESS THAN 70 milligrams/deciliter  traMADol 25 milliGRAM(s) Oral every 6 hours PRN Moderate Pain (4 - 6)  traMADol 50 milliGRAM(s) Oral every 6 hours PRN Severe Pain (7 - 10)    Allergies    codeine (Unknown)  Kiwi (Anaphylaxis)  penicillins (Anaphylaxis)    Intolerances    SOCIAL HISTORY:  Lives with adult children, Denies smoking, drugs, occasional ETOH    FAMILY HISTORY: non contributory    Vital Signs Last 24 Hrs  T(C): 36.9 (06 Dec 2019 11:53), Max: 37.2 (06 Dec 2019 06:29)  T(F): 98.4 (06 Dec 2019 11:53), Max: 99 (06 Dec 2019 06:29)  HR: 76 (06 Dec 2019 11:53) (68 - 86)  BP: 89/47 (06 Dec 2019 11:53) (89/47 - 112/63)  BP(mean): --  RR: 18 (06 Dec 2019 11:53) (17 - 19)  SpO2: 97% (06 Dec 2019 11:53) (97% - 99%)    Physical Exam:  General: NAD, WN/ WG  Respiratory: no SOB on room air  Gastrointestinal: soft NT/ND  Neurology: sensation grossly intact  Musculoskeletal: no contractures or deformities  Vascular: no BLE edema, BLE equally warm  Skin:  Left buttock with well healed old surgical scar with no drainage, mid buttock wound L 2cm x W 1cm x D 4cm with tunneling at 1 oclock to 4cm, small amoung of serosanguinous drainage, no necrotic tissue, No odor, erythema, increased warmth, tenderness, induration, fluctuance    LABS:  12-06    133<L>  |  106  |  50<H>  ----------------------------<  275<H>  4.6   |  17<L>  |  1.71<H>    Ca    8.1<L>      06 Dec 2019 09:31  Phos  3.3     12-06  Mg     1.6     12-06    TPro  7.4  /  Alb  3.1<L>  /  TBili  0.5  /  DBili  x   /  AST  27  /  ALT  15  /  AlkPhos  130<H>  12-05                          10.2   11.48 )-----------( 170      ( 06 Dec 2019 03:33 )             31.0           RADIOLOGY & ADDITIONAL STUDIES:    EXAM:  MR PELVIS                        PROCEDURE DATE:  12/05/2019    INTERPRETATION:  CLINICAL INFORMATION: Buttocks pain. Perianal fistula.    COMPARISON: CT abdomen pelvis 12/4/2019    PROCEDURE:   MRI of the pelvis was performed without intravenous contrast as patient   declined contrast administration.   Study was terminated prematurely as the patient could not tolerate the   examination.   Only 2 limited sagittal FIESTA sequences and a single axial T2 FSE   sequences were acquired.    FINDINGS:    Extremely limited/near nondiagnostic study secondary as the patient could   not tolerate the examination.    A few small uterine fibroids are noted. No adnexal mass.    A 2.4 x 0.8 cm fluid collection overlies the inferior sacrum and coccyx   (7:22). Evaluation of the underlying bone for osteomyelitis is limited.    There is suggestion of a left perianal fistula extending to the medial   gluteal fold however this is not completely evaluated.    IMPRESSION:     Extremely limited/nondiagnostic study as the patient could not tolerate   the examination and the study was terminated prematurely.    Fluid collection overlying the inferior sacrum/coccyx. Underlying   osteomyelitis is not excluded.    Suggestion of a left perianal fistula.    EXAM:  CT ABDOMEN AND PELVIS                        PROCEDURE DATE:  12/04/2019    INTERPRETATION:  CLINICAL INFORMATION: Abdominal distention. Follow-up   known buttock abscess.     COMPARISON: CT abdomen and pelvis 09/20/2019.    PROCEDURE:   CT of the Abdomen and Pelvis was performed without intravenous contrast.   Intravenous contrast: None.  Oral contrast: None.  Sagittal and coronal reformats were performed.    FINDINGS:    Evaluation of the solid visceral organs and vasculature is limited   without the administration of intravenous contrast.    Motion limited study.    LOWER CHEST: Coronary artery atherosclerotic calcifications.    LIVER: Within normal limits.  BILE DUCTS: Normal caliber.  GALLBLADDER: Cholecystectomy.  SPLEEN: Within normal limits.  PANCREAS: Within normal limits.  ADRENALS: Within normal limits.  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    BLADDER: Moderately distended.  REPRODUCTIVE ORGANS: Calcified fibroid uterus. No solid adnexal masses.    BOWEL: No bowel obstruction.   PERITONEUM: No ascites.  VESSELS: Atherosclerotic change.  RETROPERITONEUM/LYMPH NODES: No lymphadenopathy.    ABDOMINAL WALL: Marked interval improvement from the open wound apparent   on 09/20/2019. Current study demonstrates inflammatory change along the   left medial gluteal skin surface, compatible with cellulitis, with a   small air-filled tract extending between the skin surface and the left   anal verge (series 3, image and 100 and series 6, image 65). An   additional air-filled tract is identified coursing in craniocaudal   dimension in the soft tissues just distal to the coccygeal tip (series 6   image 69). No drainable fluid collection.  BONES: Degenerative changes. Osteopenia with grade 2 anterolisthesis of   L5 over S1    IMPRESSION:     Interval improvement from the large open wound on 09/20/2019. Currently,   left medial gluteal skin surface cellulitis with 2 underlying air filled   tracts; one between the skin surface and the anal verge and the other   extending in the soft tissues just distal to the coccyx. No drainable   abscess. Air-filled tract between the skin surface and the anal verge,   however, may be seen in the setting of perianal fistula. If clinically   warranted, more definite evaluation may be obtained with contrast   enhanced MR perianal fistula protocol.    Moderately distended urinary bladder. Chronic clinically for urinary   retention.    Cultures:  12.5.2019 blood cx - neg to date x 1, gram positive cocci in pairs and chains x 1  12.4.2019 blood cx - gram positive cocci in pains and chains in aerobic bottle x 1, gram positive cocci in pairs in the anaerobic bottle x1  12.4.2019 urine cx - >100,000 CFU/ml gram positive organisms and <10,000 CFU/ml normal urogen barry

## 2019-12-06 NOTE — PROVIDER CONTACT NOTE (CRITICAL VALUE NOTIFICATION) - ACTION/TREATMENT ORDERED:
Bartolome Velazquez notified. Will continue antibiotics. Repeat blood cultures to be ordered for AM. Will continue to monitor.
Maria Elena Groves NP notified. Blood culture result similar to one drawn before. No new orders. Will continue to monitor.
Continue with current abx order

## 2019-12-06 NOTE — PROGRESS NOTE ADULT - SUBJECTIVE AND OBJECTIVE BOX
[facility]    Subjective: Patient seen and examined. No new events except as noted.   had episode of rigors last night  ABx adjusted   feeling good today   no pain, no fever or chills     REVIEW OF SYSTEMS:    CONSTITUTIONAL: No weakness, fevers or chills  EYES/ENT: No visual changes;  No vertigo or throat pain   NECK: No pain or stiffness  RESPIRATORY: No cough, wheezing, hemoptysis; No shortness of breath  CARDIOVASCULAR: No chest pain or palpitations  GASTROINTESTINAL: No abdominal or epigastric pain.   GENITOURINARY: No dysuria, frequency or hematuria  NEUROLOGICAL: No numbness or weakness  SKIN: No itching, burning, rashes, or lesions   All other review of systems is negative unless indicated above.    MEDICATIONS:  MEDICATIONS  (STANDING):  aspirin enteric coated 81 milliGRAM(s) Oral daily  atorvastatin 40 milliGRAM(s) Oral at bedtime  aztreonam  IVPB 500 milliGRAM(s) IV Intermittent every 12 hours  clopidogrel Tablet 75 milliGRAM(s) Oral daily  DAPTOmycin IVPB      dextrose 5%. 1000 milliLiter(s) (50 mL/Hr) IV Continuous <Continuous>  dextrose 50% Injectable 12.5 Gram(s) IV Push once  dextrose 50% Injectable 25 Gram(s) IV Push once  dextrose 50% Injectable 25 Gram(s) IV Push once  enoxaparin Injectable 40 milliGRAM(s) SubCutaneous daily  famotidine    Tablet 20 milliGRAM(s) Oral daily  gabapentin 100 milliGRAM(s) Oral two times a day  influenza   Vaccine 0.5 milliLiter(s) IntraMuscular once  insulin glargine Injectable (LANTUS) 15 Unit(s) SubCutaneous at bedtime  insulin lispro (HumaLOG) corrective regimen sliding scale   SubCutaneous three times a day before meals  insulin lispro (HumaLOG) corrective regimen sliding scale   SubCutaneous at bedtime  insulin lispro Injectable (HumaLOG) 8 Unit(s) SubCutaneous three times a day before meals  lactobacillus acidophilus 1 Tablet(s) Oral three times a day  levothyroxine 25 MICROGram(s) Oral daily  metoprolol tartrate 12.5 milliGRAM(s) Oral two times a day  metroNIDAZOLE  IVPB 500 milliGRAM(s) IV Intermittent every 8 hours  sodium chloride 0.9%. 1000 milliLiter(s) (50 mL/Hr) IV Continuous <Continuous>      PHYSICAL EXAM:  T(C): 36.9 (12-06-19 @ 11:53), Max: 37.2 (12-06-19 @ 06:29)  HR: 76 (12-06-19 @ 11:53) (68 - 86)  BP: 89/47 (12-06-19 @ 11:53) (89/47 - 112/63)  RR: 18 (12-06-19 @ 11:53) (17 - 19)  SpO2: 97% (12-06-19 @ 11:53) (97% - 99%)  Wt(kg): --  I&O's Summary    05 Dec 2019 07:01  -  06 Dec 2019 07:00  --------------------------------------------------------  IN: 1930 mL / OUT: 0 mL / NET: 1930 mL    06 Dec 2019 07:01  -  06 Dec 2019 14:57  --------------------------------------------------------  IN: 240 mL / OUT: 0 mL / NET: 240 mL          Appearance: Normal	  HEENT:   Normal oral mucosa, PERRL, EOMI	  Lymphatic: No lymphadenopathy , no edema  Cardiovascular: Normal S1 S2, No JVD, No murmurs , Peripheral pulses palpable 2+ bilaterally  Respiratory: Lungs clear to auscultation, normal effort 	  Gastrointestinal:  Soft, Non-tender, + BS	  Skin: buttock dressing intact, mild tenderness on palpation   Musculoskeletal: Normal range of motion, normal strength  Psychiatry:  Mood & affect appropriate  Ext: No edema      All labs, Imaging and EKGs personally reviewed                           10.2   11.48 )-----------( 170      ( 06 Dec 2019 03:33 )             31.0               12-06    133<L>  |  106  |  50<H>  ----------------------------<  275<H>  4.6   |  17<L>  |  1.71<H>    Ca    8.1<L>      06 Dec 2019 09:31  Phos  3.3     12-06  Mg     1.6     12-06    TPro  7.4  /  Alb  3.1<L>  /  TBili  0.5  /  DBili  x   /  AST  27  /  ALT  15  /  AlkPhos  130<H>  12-05           < from: MR Pelvis No Cont (12.05.19 @ 23:47) >  IMPRESSION:     Extremely limited/nondiagnostic study as the patient could not tolerate   the examination and the study was terminated prematurely.    Fluid collection overlying the inferior sacrum/coccyx. Underlying   osteomyelitis is not excluded.    Suggestion of a left perianal fistula.

## 2019-12-06 NOTE — PROVIDER CONTACT NOTE (CRITICAL VALUE NOTIFICATION) - TEST AND RESULT REPORTED:
Blood cultures drawn on December 5th growth in anaerobic bottle gram positive cocci in pairs and chains

## 2019-12-06 NOTE — PROGRESS NOTE ADULT - SUBJECTIVE AND OBJECTIVE BOX
Infectious Diseases progress note:    Subjective: Events noted.  Rapid response called yesterday for rigors.  Pt awake, alert, NAD at this time.  c/o feeling nauseous.  Abx broadened to dapto/aztreonam/flagyl.   Pt unable to tolerate MRI and declining IV contrast.      ROS:  CONSTITUTIONAL:  No fever, chills, rigors  CARDIOVASCULAR:  No chest pain or palpitations  RESPIRATORY:   No SOB, cough, dyspnea on exertion.  No wheezing  GASTROINTESTINAL: c/o nausea  EXTREMITIES:  No swelling or joint pain  GENITOURINARY:  No burning on urination, increased frequency or urgency.  No flank pain  NEUROLOGIC:  No HA, visual disturbances  SKIN: No rashes    Allergies    codeine (Unknown)  Kiwi (Anaphylaxis)  penicillins (Anaphylaxis)    Intolerances        ANTIBIOTICS/RELEVANT:  antimicrobials  aztreonam  IVPB 500 milliGRAM(s) IV Intermittent every 12 hours  DAPTOmycin IVPB      metroNIDAZOLE  IVPB 500 milliGRAM(s) IV Intermittent every 8 hours    immunologic:  influenza   Vaccine 0.5 milliLiter(s) IntraMuscular once    OTHER:  acetaminophen   Tablet .. 1000 milliGRAM(s) Oral every 6 hours PRN  aspirin enteric coated 81 milliGRAM(s) Oral daily  atorvastatin 40 milliGRAM(s) Oral at bedtime  clopidogrel Tablet 75 milliGRAM(s) Oral daily  dextrose 40% Gel 15 Gram(s) Oral once PRN  dextrose 5%. 1000 milliLiter(s) IV Continuous <Continuous>  dextrose 50% Injectable 12.5 Gram(s) IV Push once  dextrose 50% Injectable 25 Gram(s) IV Push once  dextrose 50% Injectable 25 Gram(s) IV Push once  enoxaparin Injectable 40 milliGRAM(s) SubCutaneous daily  famotidine    Tablet 20 milliGRAM(s) Oral daily  gabapentin 100 milliGRAM(s) Oral two times a day  glucagon  Injectable 1 milliGRAM(s) IntraMuscular once PRN  insulin glargine Injectable (LANTUS) 15 Unit(s) SubCutaneous at bedtime  insulin lispro (HumaLOG) corrective regimen sliding scale   SubCutaneous three times a day before meals  insulin lispro (HumaLOG) corrective regimen sliding scale   SubCutaneous at bedtime  insulin lispro Injectable (HumaLOG) 8 Unit(s) SubCutaneous three times a day before meals  lactobacillus acidophilus 1 Tablet(s) Oral three times a day  levothyroxine 25 MICROGram(s) Oral daily  metoprolol tartrate 12.5 milliGRAM(s) Oral two times a day  sodium chloride 0.9%. 1000 milliLiter(s) IV Continuous <Continuous>  traMADol 25 milliGRAM(s) Oral every 6 hours PRN  traMADol 50 milliGRAM(s) Oral every 6 hours PRN      Objective:  Vital Signs Last 24 Hrs  T(C): 36.9 (06 Dec 2019 11:53), Max: 37.2 (06 Dec 2019 06:29)  T(F): 98.4 (06 Dec 2019 11:53), Max: 99 (06 Dec 2019 06:29)  HR: 76 (06 Dec 2019 11:53) (68 - 86)  BP: 90/48 (06 Dec 2019 08:55) (90/48 - 112/63)  BP(mean): --  RR: 18 (06 Dec 2019 11:53) (17 - 19)  SpO2: 97% (06 Dec 2019 11:53) (97% - 99%)    PHYSICAL EXAM:  Constitutional:NAD  Eyes:NIDIA, EOMI  Ear/Nose/Throat: no thrush, mucositis.  Moist mucous membranes	  Neck:no JVD, no lymphadenopathy, supple  Respiratory: CTA lucian  Cardiovascular: S1S2 RRR, no murmurs  Gastrointestinal:soft, nontender,  nondistended (+) BS  Extremities:no e/e/c  Skin:  buttock wounds dsg c/d/i        LABS:                        10.2   11.48 )-----------( 170      ( 06 Dec 2019 03:33 )             31.0     12-06    133<L>  |  106  |  50<H>  ----------------------------<  275<H>  4.6   |  17<L>  |  1.71<H>    Ca    8.1<L>      06 Dec 2019 09:31  Phos  3.3     12-06  Mg     1.6     12-06    TPro  7.4  /  Alb  3.1<L>  /  TBili  0.5  /  DBili  x   /  AST  27  /  ALT  15  /  AlkPhos  130<H>  12-05              MICROBIOLOGY:    Culture - Blood in AM (12.05.19 @ 09:27)    Gram Stain:   Growth in anaerobic bottle: Gram Positive Cocci in Pairs and Chains    Specimen Source: .Blood Blood-Peripheral    Culture Results:   Growth in anaerobic bottle: Gram Positive Cocci in Pairs and Chains        Culture - Blood in AM (12.05.19 @ 09:27)    Specimen Source: .Blood Blood-Peripheral    Culture Results:   No growth to date.        Culture - Blood (12.04.19 @ 17:30)    Gram Stain:   Growth in aerobic bottle: Gram Positive Cocci in Pairs and Chains  Growth in anaerobic bottle: Gram positive cocci in pairs    -  Streptococcus sp. (Not Grp A, B or S pneumoniae): Detec    Specimen Source: .Blood Blood-Peripheral    Organism: Blood Culture PCR    Culture Results:   Growth in aerobic and anaerobic bottles: Streptococcus anginosus  Susceptibility to follow.  "Due to technical problems, Proteus sp. will Not be reported as part of  the BCID panel until further notice"  ***Blood Panel PCR results on this specimen are available  approximately 3 hours after the Gram stain result.***  Gram stain, PCR, and/or culture results may not always  correspond due to difference in methodologies.  ************************************************************  This PCR assay was performed using [employer].  The following targets are tested for: Enterococcus,  vancomycin resistant enterococci, Listeria monocytogenes,  coagulase negative staphylococci, S. aureus,  methicillin resistant S. aureus, Streptococcus agalactiae  (Group B), S. pneumoniae, S. pyogenes (Group A),  Acinetobacter baumannii, Enterobacter cloacae, E. coli,  Klebsiella oxytoca, K. pneumoniae, Proteus sp.,  Serratia marcescens, Haemophilus influenzae,  Neisseria meningitidis, Pseudomonas aeruginosa, Candida  albicans, C. glabrata, C krusei, C parapsilosis,  C. tropicalis and the KPC resistance gene.    Organism Identification: Blood Culture PCR    Method Type: PCR        Culture - Urine (12.04.19 @ 17:48)    Specimen Source: .Urine Clean Catch (Midstream)    Culture Results:   >100,000 CFU/ml Gram positive organisms  <10,000 CFU/ml Normal Urogenital barry present          RADIOLOGY & ADDITIONAL STUDIES:    < from: US Kidney and Bladder (12.06.19 @ 15:31) >  FINDINGS:    Right kidney:  9.1 cm. No hydronephrosis or calculi. A cyst in the lower   pole measures 1.3 x 1.7 x 1.5 cm.    Left kidney:  9.7 cm. No hydronephrosis. Trace lower pole perinephric   fluid.    Urinary bladder: Debris within the urinary bladder. Prevoid volume   measures 528 cc. Patient did not have the urge to void.    IMPRESSION:     No hydronephrosis.    Distended urinary bladder with debris; correlate with urinalysis.    < end of copied text >          < from: MR Pelvis No Cont (12.05.19 @ 23:47) >    FINDINGS:    Extremely limited/near nondiagnostic study secondary as the patient could   not tolerate the examination.    A few small uterine fibroids are noted. No adnexal mass.    A 2.4 x 0.8 cm fluid collection overlies the inferior sacrum and coccyx   (7:22). Evaluation of the underlying bone for osteomyelitis is limited.    There is suggestion of a left perianal fistula extending to the medial   gluteal fold however this is not completely evaluated.    IMPRESSION:     Extremely limited/nondiagnostic study as the patient could not tolerate   the examination and the study was terminated prematurely.    Fluid collection overlying the inferior sacrum/coccyx. Underlying   osteomyelitis is not excluded.    Suggestion of a left perianal fistula.    < end of copied text >          < from: Xray Chest 1 View-PORTABLE IMMEDIATE (12.05.19 @ 20:52) >  IMPRESSION:    Pulmonary vascular congestion. No focal consolidation. No pleural   effusion or pneumothorax.    < end of copied text >

## 2019-12-07 LAB
-  AMPICILLIN: SIGNIFICANT CHANGE UP
-  CEFTRIAXONE: SIGNIFICANT CHANGE UP
-  CIPROFLOXACIN: SIGNIFICANT CHANGE UP
-  CLINDAMYCIN: SIGNIFICANT CHANGE UP
-  ERYTHROMYCIN: SIGNIFICANT CHANGE UP
-  LEVOFLOXACIN: SIGNIFICANT CHANGE UP
-  LEVOFLOXACIN: SIGNIFICANT CHANGE UP
-  NITROFURANTOIN: SIGNIFICANT CHANGE UP
-  PENICILLIN: SIGNIFICANT CHANGE UP
-  TETRACYCLINE: SIGNIFICANT CHANGE UP
-  VANCOMYCIN: SIGNIFICANT CHANGE UP
-  VANCOMYCIN: SIGNIFICANT CHANGE UP
ANION GAP SERPL CALC-SCNC: 13 MMOL/L — SIGNIFICANT CHANGE UP (ref 5–17)
ANION GAP SERPL CALC-SCNC: 14 MMOL/L — SIGNIFICANT CHANGE UP (ref 5–17)
APPEARANCE UR: ABNORMAL
BACTERIA # UR AUTO: ABNORMAL
BASOPHILS # BLD AUTO: 0.02 K/UL — SIGNIFICANT CHANGE UP (ref 0–0.2)
BASOPHILS NFR BLD AUTO: 0.3 % — SIGNIFICANT CHANGE UP (ref 0–2)
BILIRUB UR-MCNC: NEGATIVE — SIGNIFICANT CHANGE UP
BUN SERPL-MCNC: 63 MG/DL — HIGH (ref 7–23)
BUN SERPL-MCNC: 66 MG/DL — HIGH (ref 7–23)
CALCIUM SERPL-MCNC: 8.9 MG/DL — SIGNIFICANT CHANGE UP (ref 8.4–10.5)
CALCIUM SERPL-MCNC: 9.7 MG/DL — SIGNIFICANT CHANGE UP (ref 8.4–10.5)
CHLORIDE SERPL-SCNC: 100 MMOL/L — SIGNIFICANT CHANGE UP (ref 96–108)
CHLORIDE SERPL-SCNC: 99 MMOL/L — SIGNIFICANT CHANGE UP (ref 96–108)
CHLORIDE UR-SCNC: <35 MMOL/L — SIGNIFICANT CHANGE UP
CK SERPL-CCNC: 58 U/L — SIGNIFICANT CHANGE UP (ref 25–170)
CO2 SERPL-SCNC: 15 MMOL/L — LOW (ref 22–31)
CO2 SERPL-SCNC: 16 MMOL/L — LOW (ref 22–31)
COLOR SPEC: YELLOW — SIGNIFICANT CHANGE UP
CREAT ?TM UR-MCNC: 107 MG/DL — SIGNIFICANT CHANGE UP
CREAT SERPL-MCNC: 1.58 MG/DL — HIGH (ref 0.5–1.3)
CREAT SERPL-MCNC: 1.85 MG/DL — HIGH (ref 0.5–1.3)
CULTURE RESULTS: SIGNIFICANT CHANGE UP
DIFF PNL FLD: ABNORMAL
EOSINOPHIL # BLD AUTO: 0.11 K/UL — SIGNIFICANT CHANGE UP (ref 0–0.5)
EOSINOPHIL NFR BLD AUTO: 1.6 % — SIGNIFICANT CHANGE UP (ref 0–6)
EPI CELLS # UR: 1 /HPF — SIGNIFICANT CHANGE UP (ref 0–5)
GLUCOSE BLDC GLUCOMTR-MCNC: 111 MG/DL — HIGH (ref 70–99)
GLUCOSE BLDC GLUCOMTR-MCNC: 144 MG/DL — HIGH (ref 70–99)
GLUCOSE BLDC GLUCOMTR-MCNC: 179 MG/DL — HIGH (ref 70–99)
GLUCOSE BLDC GLUCOMTR-MCNC: 245 MG/DL — HIGH (ref 70–99)
GLUCOSE SERPL-MCNC: 110 MG/DL — HIGH (ref 70–99)
GLUCOSE SERPL-MCNC: 227 MG/DL — HIGH (ref 70–99)
GLUCOSE UR QL: SIGNIFICANT CHANGE UP
HCT VFR BLD CALC: 31.2 % — LOW (ref 34.5–45)
HGB BLD-MCNC: 10.1 G/DL — LOW (ref 11.5–15.5)
HYALINE CASTS # UR AUTO: 0 /LPF — SIGNIFICANT CHANGE UP (ref 0–7)
IMM GRANULOCYTES NFR BLD AUTO: 0.4 % — SIGNIFICANT CHANGE UP (ref 0–1.5)
KETONES UR-MCNC: NEGATIVE — SIGNIFICANT CHANGE UP
LEUKOCYTE ESTERASE UR-ACNC: ABNORMAL
LYMPHOCYTES # BLD AUTO: 0.66 K/UL — LOW (ref 1–3.3)
LYMPHOCYTES # BLD AUTO: 9.3 % — LOW (ref 13–44)
MCHC RBC-ENTMCNC: 28.5 PG — SIGNIFICANT CHANGE UP (ref 27–34)
MCHC RBC-ENTMCNC: 32.4 GM/DL — SIGNIFICANT CHANGE UP (ref 32–36)
MCV RBC AUTO: 87.9 FL — SIGNIFICANT CHANGE UP (ref 80–100)
METHOD TYPE: SIGNIFICANT CHANGE UP
MONOCYTES # BLD AUTO: 0.78 K/UL — SIGNIFICANT CHANGE UP (ref 0–0.9)
MONOCYTES NFR BLD AUTO: 11 % — SIGNIFICANT CHANGE UP (ref 2–14)
NEUTROPHILS # BLD AUTO: 5.46 K/UL — SIGNIFICANT CHANGE UP (ref 1.8–7.4)
NEUTROPHILS NFR BLD AUTO: 77.4 % — HIGH (ref 43–77)
NITRITE UR-MCNC: NEGATIVE — SIGNIFICANT CHANGE UP
ORGANISM # SPEC MICROSCOPIC CNT: SIGNIFICANT CHANGE UP
OSMOLALITY UR: 400 MOSM/KG — SIGNIFICANT CHANGE UP (ref 50–1200)
PH UR: 6 — SIGNIFICANT CHANGE UP (ref 5–8)
PLATELET # BLD AUTO: 144 K/UL — LOW (ref 150–400)
POTASSIUM SERPL-MCNC: 5.4 MMOL/L — HIGH (ref 3.5–5.3)
POTASSIUM SERPL-MCNC: 5.9 MMOL/L — HIGH (ref 3.5–5.3)
POTASSIUM SERPL-SCNC: 5.4 MMOL/L — HIGH (ref 3.5–5.3)
POTASSIUM SERPL-SCNC: 5.9 MMOL/L — HIGH (ref 3.5–5.3)
POTASSIUM UR-SCNC: 34 MMOL/L — SIGNIFICANT CHANGE UP
PROT ?TM UR-MCNC: 35 MG/DL — HIGH (ref 0–12)
PROT UR-MCNC: SIGNIFICANT CHANGE UP
PROT/CREAT UR-RTO: 0.3 RATIO — HIGH (ref 0–0.2)
RBC # BLD: 3.55 M/UL — LOW (ref 3.8–5.2)
RBC # FLD: 14.9 % — HIGH (ref 10.3–14.5)
RBC CASTS # UR COMP ASSIST: 4 /HPF — SIGNIFICANT CHANGE UP (ref 0–4)
SODIUM SERPL-SCNC: 128 MMOL/L — LOW (ref 135–145)
SODIUM SERPL-SCNC: 129 MMOL/L — LOW (ref 135–145)
SODIUM UR-SCNC: 53 MMOL/L — SIGNIFICANT CHANGE UP
SODIUM UR-SCNC: 54 MMOL/L — SIGNIFICANT CHANGE UP
SP GR SPEC: 1.02 — SIGNIFICANT CHANGE UP (ref 1.01–1.02)
SPECIMEN SOURCE: SIGNIFICANT CHANGE UP
UROBILINOGEN FLD QL: SIGNIFICANT CHANGE UP
UUN UR-MCNC: 416 MG/DL — SIGNIFICANT CHANGE UP
WBC # BLD: 7.06 K/UL — SIGNIFICANT CHANGE UP (ref 3.8–10.5)
WBC # FLD AUTO: 7.06 K/UL — SIGNIFICANT CHANGE UP (ref 3.8–10.5)
WBC UR QL: 384 /HPF — HIGH (ref 0–5)

## 2019-12-07 RX ORDER — CALCIUM GLUCONATE 100 MG/ML
1 VIAL (ML) INTRAVENOUS ONCE
Refills: 0 | Status: COMPLETED | OUTPATIENT
Start: 2019-12-07 | End: 2019-12-07

## 2019-12-07 RX ADMIN — Medication 2: at 08:18

## 2019-12-07 RX ADMIN — FAMOTIDINE 20 MILLIGRAM(S): 10 INJECTION INTRAVENOUS at 12:24

## 2019-12-07 RX ADMIN — Medication 1 TABLET(S): at 12:25

## 2019-12-07 RX ADMIN — Medication 81 MILLIGRAM(S): at 12:24

## 2019-12-07 RX ADMIN — TRAMADOL HYDROCHLORIDE 50 MILLIGRAM(S): 50 TABLET ORAL at 03:56

## 2019-12-07 RX ADMIN — Medication 100 MILLIGRAM(S): at 02:07

## 2019-12-07 RX ADMIN — Medication 1 TABLET(S): at 05:33

## 2019-12-07 RX ADMIN — Medication 100 MILLIGRAM(S): at 09:36

## 2019-12-07 RX ADMIN — TRAMADOL HYDROCHLORIDE 50 MILLIGRAM(S): 50 TABLET ORAL at 05:55

## 2019-12-07 RX ADMIN — Medication 8 UNIT(S): at 12:25

## 2019-12-07 RX ADMIN — Medication 8 UNIT(S): at 17:24

## 2019-12-07 RX ADMIN — ENOXAPARIN SODIUM 40 MILLIGRAM(S): 100 INJECTION SUBCUTANEOUS at 12:24

## 2019-12-07 RX ADMIN — Medication 100 GRAM(S): at 14:55

## 2019-12-07 RX ADMIN — Medication 25 MICROGRAM(S): at 05:33

## 2019-12-07 RX ADMIN — Medication 1000 MILLIGRAM(S): at 08:19

## 2019-12-07 RX ADMIN — INSULIN GLARGINE 15 UNIT(S): 100 INJECTION, SOLUTION SUBCUTANEOUS at 22:47

## 2019-12-07 RX ADMIN — GABAPENTIN 100 MILLIGRAM(S): 400 CAPSULE ORAL at 05:33

## 2019-12-07 RX ADMIN — Medication 1000 MILLIGRAM(S): at 13:55

## 2019-12-07 RX ADMIN — TRAMADOL HYDROCHLORIDE 50 MILLIGRAM(S): 50 TABLET ORAL at 09:35

## 2019-12-07 RX ADMIN — Medication 50 MILLIGRAM(S): at 09:29

## 2019-12-07 RX ADMIN — Medication 50 MILLIGRAM(S): at 19:27

## 2019-12-07 RX ADMIN — SODIUM CHLORIDE 50 MILLILITER(S): 9 INJECTION INTRAMUSCULAR; INTRAVENOUS; SUBCUTANEOUS at 05:33

## 2019-12-07 RX ADMIN — ATORVASTATIN CALCIUM 40 MILLIGRAM(S): 80 TABLET, FILM COATED ORAL at 22:49

## 2019-12-07 RX ADMIN — CLOPIDOGREL BISULFATE 75 MILLIGRAM(S): 75 TABLET, FILM COATED ORAL at 12:24

## 2019-12-07 RX ADMIN — TRAMADOL HYDROCHLORIDE 50 MILLIGRAM(S): 50 TABLET ORAL at 20:30

## 2019-12-07 RX ADMIN — GABAPENTIN 100 MILLIGRAM(S): 400 CAPSULE ORAL at 17:24

## 2019-12-07 RX ADMIN — Medication 1000 MILLIGRAM(S): at 08:29

## 2019-12-07 RX ADMIN — DAPTOMYCIN 109.6 MILLIGRAM(S): 500 INJECTION, POWDER, LYOPHILIZED, FOR SOLUTION INTRAVENOUS at 23:21

## 2019-12-07 RX ADMIN — Medication 1 TABLET(S): at 22:48

## 2019-12-07 RX ADMIN — Medication 8 UNIT(S): at 08:24

## 2019-12-07 RX ADMIN — Medication 100 MILLIGRAM(S): at 17:24

## 2019-12-07 RX ADMIN — TRAMADOL HYDROCHLORIDE 50 MILLIGRAM(S): 50 TABLET ORAL at 22:50

## 2019-12-07 NOTE — PROGRESS NOTE ADULT - ASSESSMENT
87 yo woman with a hx of CHF, DM, HLD, HTN, CAD prior MI, sp debridement of bilateral buttocks (9/15, 9/28) presenting with buttock cellulitis. CT consistent with cellulitis without underlying abscess.    - no urgent surgical intervention at this time  - c/w IV antibiotics per ID  - cont local wound care  - f/u repeat blood cultures  -MRI pelvis limited  - Continue care as per medicine     Red surgery  [id number]

## 2019-12-07 NOTE — PROGRESS NOTE ADULT - PROBLEM SELECTOR PLAN 1
positive blood Cx  ID eval appreciated  ABx as per ID, adjusted   Surg F/U appreciated  MRI noted, patient refused contrast, limited study  OM can not be ruled out  may repeat MRI, pending   Abx duration to be discussed

## 2019-12-07 NOTE — PROGRESS NOTE ADULT - SUBJECTIVE AND OBJECTIVE BOX
Infectious Diseases progress note:    Subjective: Pt seen and examined earlier.  Daughter at bedside.  Pt awaiting repeat MRI.  Multiple blood cultures positive GPC p/c - growing strep anginosus.  Ucx growing enterococcus.      ROS:  CONSTITUTIONAL:  No fever, chills, rigors  CARDIOVASCULAR:  No chest pain or palpitations  RESPIRATORY:   No SOB, cough, dyspnea on exertion.  No wheezing  GASTROINTESTINAL:  No abd pain, N/V, diarrhea/constipation  EXTREMITIES:  No swelling or joint pain  GENITOURINARY:  No burning on urination, increased frequency or urgency.  No flank pain  NEUROLOGIC:  No HA, visual disturbances  SKIN: No rashes    Allergies    codeine (Unknown)  Kiwi (Anaphylaxis)  penicillins (Anaphylaxis)    Intolerances        ANTIBIOTICS/RELEVANT:  antimicrobials  aztreonam  IVPB 500 milliGRAM(s) IV Intermittent every 12 hours  DAPTOmycin IVPB 240 milliGRAM(s) IV Intermittent every 48 hours  DAPTOmycin IVPB      metroNIDAZOLE  IVPB 500 milliGRAM(s) IV Intermittent every 8 hours    immunologic:  influenza   Vaccine 0.5 milliLiter(s) IntraMuscular once    OTHER:  acetaminophen   Tablet .. 1000 milliGRAM(s) Oral every 6 hours PRN  aspirin enteric coated 81 milliGRAM(s) Oral daily  atorvastatin 40 milliGRAM(s) Oral at bedtime  clopidogrel Tablet 75 milliGRAM(s) Oral daily  dextrose 40% Gel 15 Gram(s) Oral once PRN  dextrose 5%. 1000 milliLiter(s) IV Continuous <Continuous>  dextrose 50% Injectable 12.5 Gram(s) IV Push once  dextrose 50% Injectable 25 Gram(s) IV Push once  dextrose 50% Injectable 25 Gram(s) IV Push once  enoxaparin Injectable 40 milliGRAM(s) SubCutaneous daily  famotidine    Tablet 20 milliGRAM(s) Oral daily  gabapentin 100 milliGRAM(s) Oral two times a day  glucagon  Injectable 1 milliGRAM(s) IntraMuscular once PRN  insulin glargine Injectable (LANTUS) 15 Unit(s) SubCutaneous at bedtime  insulin lispro (HumaLOG) corrective regimen sliding scale   SubCutaneous three times a day before meals  insulin lispro (HumaLOG) corrective regimen sliding scale   SubCutaneous at bedtime  insulin lispro Injectable (HumaLOG) 8 Unit(s) SubCutaneous three times a day before meals  lactobacillus acidophilus 1 Tablet(s) Oral three times a day  levothyroxine 25 MICROGram(s) Oral daily  metoprolol tartrate 12.5 milliGRAM(s) Oral two times a day  ondansetron Injectable 4 milliGRAM(s) IV Push every 8 hours PRN  sodium chloride 0.9%. 1000 milliLiter(s) IV Continuous <Continuous>  traMADol 25 milliGRAM(s) Oral every 6 hours PRN  traMADol 50 milliGRAM(s) Oral every 6 hours PRN      Objective:  Vital Signs Last 24 Hrs  T(C): 36.8 (07 Dec 2019 17:20), Max: 37.8 (07 Dec 2019 13:54)  T(F): 98.3 (07 Dec 2019 17:20), Max: 100 (07 Dec 2019 13:54)  HR: 79 (07 Dec 2019 17:20) (66 - 95)  BP: 92/55 (07 Dec 2019 17:20) (92/52 - 109/64)  BP(mean): --  RR: 16 (07 Dec 2019 17:20) (16 - 18)  SpO2: 95% (07 Dec 2019 12:18) (95% - 98%)    PHYSICAL EXAM:  Constitutional:NAD  Eyes:NIDIA, EOMI  Ear/Nose/Throat: no thrush, mucositis.  Moist mucous membranes	  Neck:no JVD, no lymphadenopathy, supple  Respiratory: CTA lucian  Cardiovascular: S1S2 RRR, no murmurs  Gastrointestinal:soft, nontender,  nondistended (+) BS  Extremities:no e/e/c  Skin:  perineal dsg c/d/i        LABS:                        10.1   7.06  )-----------( 144      ( 07 Dec 2019 10:01 )             31.2     12-    128<L>  |  100  |  66<H>  ----------------------------<  110<H>  5.4<H>   |  15<L>  |  1.85<H>    Ca    9.7      07 Dec 2019 17:12  Phos  3.3     12-  Mg     1.6     12-    TPro  7.4  /  Alb  3.1<L>  /  TBili  0.5  /  DBili  x   /  AST  27  /  ALT  15  /  AlkPhos  130<H>  12-05      Urinalysis Basic - ( 07 Dec 2019 04:23 )    Color: Yellow / Appearance: Slightly Turbid / S.022 / pH: x  Gluc: x / Ketone: Negative  / Bili: Negative / Urobili: <2 mg/dL   Blood: x / Protein: Trace / Nitrite: Negative   Leuk Esterase: Large / RBC: 4 /HPF /  /HPF   Sq Epi: x / Non Sq Epi: 1 /HPF / Bacteria: Moderate          MICROBIOLOGY:    Culture - Blood in AM (19 @ 06:02)    Gram Stain:   Growth in aerobic bottle: Gram positive cocci in pairs  Growth in anaerobic bottle: Gram Positive Cocci in Pairs and Chains    Specimen Source: .Blood Blood    Culture Results:   Growth in aerobic bottle: Gram positive cocci in pairs  Growth in anaerobic bottle: Gram Positive Cocci in Pairs and Chains    Culture - Blood in AM (19 @ 06:02)    Specimen Source: .Blood Blood    Culture Results:   No growth to date.    Culture - Blood in AM (19 @ 09:27)    Gram Stain:   Growth in anaerobic bottle: Gram Positive Cocci in Pairs and Chains    Specimen Source: .Blood Blood-Peripheral    Culture Results:   Growth in anaerobic bottle: Streptococcus anginosus  See previous culture [id number]    Culture - Blood in AM (19 @ 09:27)    Specimen Source: .Blood Blood-Peripheral    Culture Results:   No growth to date.    Culture - Urine (19 @ 17:48)    -  Ciprofloxacin: R >2    -  Ampicillin: S <=2 Predicts results to ampicillin/sulbactam, amoxacillin-clavulanate and  piperacillin-tazobactam.    -  Nitrofurantoin: S <=32 Should not be used to treat pyelonephritis.    -  Levofloxacin: R >4    -  Tetra/Doxy: R >8    -  Vancomycin: S 2    Specimen Source: .Urine Clean Catch (Midstream)    Culture Results:   >100,000 CFU/ml Enterococcus faecalis  <10,000 CFU/ml Normal Urogenital barry present    Organism Identification: Enterococcus faecalis    Organism: Enterococcus faecalis    Method Type: KAREN    Culture - Blood (19 @ 17:30)    -  Clindamycin: R    -  Erythromycin: R    -  Ceftriaxone: S 0.19    -  Streptococcus sp. (Not Grp A, B or S pneumoniae): Detec    -  Vancomycin: S    -  Levofloxacin: S    -  Penicillin: S 0.032    Gram Stain:   Growth in aerobic bottle: Gram Positive Cocci in Pairs and Chains  Growth in anaerobic bottle: Gram positive cocci in pairs    Specimen Source: .Blood Blood-Peripheral    Organism: Streptococcus anginosus    Organism: Streptococcus anginosus    Organism: Blood Culture PCR    Culture Results:   Growth in aerobic and anaerobic bottles: Streptococcus anginosus  "Due to technical problems, Proteus sp. will Not be reported as part of  the BCID panel until further notice"  ***Blood Panel PCR results on this specimen are available  approximately 3 hours after the Gram stain result.***  Gram stain, PCR, and/or culture results may not always  correspond due to difference in methodologies.  ************************************************************  This PCR assay was performed using [employer].  The following targets are tested for: Enterococcus,  vancomycin resistant enterococci, Listeria monocytogenes,  coagulase negative staphylococci, S. aureus,  methicillin resistant S. aureus, Streptococcus agalactiae  (Group B), S. pneumoniae, S. pyogenes (Group A),  Acinetobacter baumannii, Enterobacter cloacae, E. coli,  Klebsiella oxytoca, K. pneumoniae, Proteus sp.,  Serratia marcescens, Haemophilus influenzae,  Neisseria meningitidis, Pseudomonas aeruginosa, Candida  albicans, C. glabrata, C krusei, C parapsilosis,  C. tropicalis and the KPC resistance gene.    Organism Identification: Streptococcus anginosus  Blood Culture PCR    Method Type: PCR    Method Type: KB    Method Type: ETEST          RADIOLOGY & ADDITIONAL STUDIES:    < from: US Kidney and Bladder (19 @ 15:31) >    IMPRESSION:     No hydronephrosis.    Distended urinary bladder with debris; correlate with urinalysis.    < end of copied text >    < from: MR Pelvis No Cont (19 @ 23:47) >  IMPRESSION:     Extremely limited/nondiagnostic study as the patient could not tolerate   the examination and the study was terminated prematurely.    Fluid collection overlying the inferior sacrum/coccyx. Underlying   osteomyelitis is not excluded.    Suggestion of a left perianal fistula.    < end of copied text >

## 2019-12-07 NOTE — PROGRESS NOTE ADULT - SUBJECTIVE AND OBJECTIVE BOX
[facility]    Subjective: Patient seen and examined. No new events except as noted.   feeling better today  pending repeat MRI     REVIEW OF SYSTEMS:    CONSTITUTIONAL: No weakness, fevers or chills  EYES/ENT: No visual changes;  No vertigo or throat pain   NECK: No pain or stiffness  RESPIRATORY: No cough, wheezing, hemoptysis; No shortness of breath  CARDIOVASCULAR: No chest pain or palpitations  GASTROINTESTINAL: No abdominal or epigastric pain. No nausea, vomiting, or hematemesis; No diarrhea or constipation. No melena or hematochezia.  GENITOURINARY: No dysuria, frequency or hematuria  NEUROLOGICAL: No numbness or weakness  SKIN: No itching, burning, rashes, or lesions   All other review of systems is negative unless indicated above.    MEDICATIONS:  MEDICATIONS  (STANDING):  aspirin enteric coated 81 milliGRAM(s) Oral daily  atorvastatin 40 milliGRAM(s) Oral at bedtime  aztreonam  IVPB 500 milliGRAM(s) IV Intermittent every 12 hours  clopidogrel Tablet 75 milliGRAM(s) Oral daily  DAPTOmycin IVPB 240 milliGRAM(s) IV Intermittent every 48 hours  DAPTOmycin IVPB      dextrose 5%. 1000 milliLiter(s) (50 mL/Hr) IV Continuous <Continuous>  dextrose 50% Injectable 12.5 Gram(s) IV Push once  dextrose 50% Injectable 25 Gram(s) IV Push once  dextrose 50% Injectable 25 Gram(s) IV Push once  enoxaparin Injectable 40 milliGRAM(s) SubCutaneous daily  famotidine    Tablet 20 milliGRAM(s) Oral daily  gabapentin 100 milliGRAM(s) Oral two times a day  influenza   Vaccine 0.5 milliLiter(s) IntraMuscular once  insulin glargine Injectable (LANTUS) 15 Unit(s) SubCutaneous at bedtime  insulin lispro (HumaLOG) corrective regimen sliding scale   SubCutaneous three times a day before meals  insulin lispro (HumaLOG) corrective regimen sliding scale   SubCutaneous at bedtime  insulin lispro Injectable (HumaLOG) 8 Unit(s) SubCutaneous three times a day before meals  lactobacillus acidophilus 1 Tablet(s) Oral three times a day  levothyroxine 25 MICROGram(s) Oral daily  metoprolol tartrate 12.5 milliGRAM(s) Oral two times a day  metroNIDAZOLE  IVPB 500 milliGRAM(s) IV Intermittent every 8 hours  sodium chloride 0.9%. 1000 milliLiter(s) (50 mL/Hr) IV Continuous <Continuous>      PHYSICAL EXAM:  T(C): 37.1 (19 @ 20:10), Max: 37.8 (19 @ 13:54)  HR: 67 (19 @ 20:10) (66 - 95)  BP: 92/60 (19 @ 20:10) (92/52 - 109/64)  RR: 18 (19 @ 20:10) (16 - 18)  SpO2: 95% (19 @ 20:10) (95% - 98%)  Wt(kg): --  I&O's Summary    06 Dec 2019 07:  -  07 Dec 2019 07:00  --------------------------------------------------------  IN: 1960 mL / OUT: 200 mL / NET: 1760 mL    07 Dec 2019 07:01  -  07 Dec 2019 22:56  --------------------------------------------------------  IN: 1495 mL / OUT: 200 mL / NET: 1295 mL          Appearance: Normal	  HEENT:   Normal oral mucosa, PERRL, EOMI	  Lymphatic: No lymphadenopathy , no edema  Cardiovascular: Normal S1 S2, No JVD, No murmurs , Peripheral pulses palpable 2+ bilaterally  Respiratory: Lungs clear to auscultation, normal effort 	  Gastrointestinal:  Soft, Non-tender, + BS	  Skin: sacrum dressingintact, mild pain on palpation   Musculoskeletal: Normal range of motion, normal strength  Psychiatry:  Mood & affect appropriate  Ext: No edema      All labs, Imaging and EKGs personally reviewed                           10.1   7.06  )-----------( 144      ( 07 Dec 2019 10:01 )             31.2               12-07    128<L>  |  100  |  66<H>  ----------------------------<  110<H>  5.4<H>   |  15<L>  |  1.85<H>    Ca    9.7      07 Dec 2019 17:12  Phos  3.3     12-06  Mg     1.6     12-06             CARDIAC MARKERS ( 07 Dec 2019 07:08 )  x     / x     / 58 U/L / x     / x                  Urinalysis Basic - ( 07 Dec 2019 04:23 )    Color: Yellow / Appearance: Slightly Turbid / S.022 / pH: x  Gluc: x / Ketone: Negative  / Bili: Negative / Urobili: <2 mg/dL   Blood: x / Protein: Trace / Nitrite: Negative   Leuk Esterase: Large / RBC: 4 /HPF /  /HPF   Sq Epi: x / Non Sq Epi: 1 /HPF / Bacteria: Moderate

## 2019-12-07 NOTE — PROGRESS NOTE ADULT - SUBJECTIVE AND OBJECTIVE BOX
Patient seen and examined  no complaints    codeine (Unknown)  Kiwi (Anaphylaxis)  penicillins (Anaphylaxis)    Hospital Medications:   MEDICATIONS  (STANDING):  aspirin enteric coated 81 milliGRAM(s) Oral daily  atorvastatin 40 milliGRAM(s) Oral at bedtime  aztreonam  IVPB 500 milliGRAM(s) IV Intermittent every 12 hours  clopidogrel Tablet 75 milliGRAM(s) Oral daily  DAPTOmycin IVPB 240 milliGRAM(s) IV Intermittent every 48 hours  DAPTOmycin IVPB      dextrose 5%. 1000 milliLiter(s) (50 mL/Hr) IV Continuous <Continuous>  dextrose 50% Injectable 12.5 Gram(s) IV Push once  dextrose 50% Injectable 25 Gram(s) IV Push once  dextrose 50% Injectable 25 Gram(s) IV Push once  enoxaparin Injectable 40 milliGRAM(s) SubCutaneous daily  famotidine    Tablet 20 milliGRAM(s) Oral daily  gabapentin 100 milliGRAM(s) Oral two times a day  influenza   Vaccine 0.5 milliLiter(s) IntraMuscular once  insulin glargine Injectable (LANTUS) 15 Unit(s) SubCutaneous at bedtime  insulin lispro (HumaLOG) corrective regimen sliding scale   SubCutaneous three times a day before meals  insulin lispro (HumaLOG) corrective regimen sliding scale   SubCutaneous at bedtime  insulin lispro Injectable (HumaLOG) 8 Unit(s) SubCutaneous three times a day before meals  lactobacillus acidophilus 1 Tablet(s) Oral three times a day  levothyroxine 25 MICROGram(s) Oral daily  metoprolol tartrate 12.5 milliGRAM(s) Oral two times a day  metroNIDAZOLE  IVPB 500 milliGRAM(s) IV Intermittent every 8 hours  sodium chloride 0.9%. 1000 milliLiter(s) (50 mL/Hr) IV Continuous <Continuous>        VITALS:  T(F): 100 (19 @ 13:54), Max: 102.1 (19 @ 18:19)  HR: 95 (19 @ 12:18)  BP: 99/63 (19 @ 12:18)  RR: 16 (19 @ 12:18)  SpO2: 95% (19 @ 12:18)  Wt(kg): --     @ 07:01  -   @ 07:00  --------------------------------------------------------  IN: 1960 mL / OUT: 200 mL / NET: 1760 mL     @ 07:01  -   @ 15:02  --------------------------------------------------------  IN: 610 mL / OUT: 200 mL / NET: 410 mL      PHYSICAL EXAM:  Constitutional: NAD  HEENT: anicteric sclera, oropharynx clear, MMM  Neck: No JVD  Respiratory: CTAB, no wheezes, rales or rhonchi  Cardiovascular: S1, S2, RRR  Gastrointestinal: BS+, soft, NT/ND  Extremities: +peripheral edema  Neurological: A/O x 3, no focal deficits  Psychiatric: Normal mood, normal affect  : No CVA tenderness. No infante.     LABS:      129<L>  |  99  |  63<H>  ----------------------------<  227<H>  5.9<H>   |  16<L>  |  1.58<H>    Ca    8.9      07 Dec 2019 07:08  Phos  3.3       Mg     1.6         TPro  7.4  /  Alb  3.1<L>  /  TBili  0.5  /  DBili      /  AST  27  /  ALT  15  /  AlkPhos  130<H>  12    Creatinine Trend: 1.58 <--, 1.71 <--, 1.85 <--, 1.81 <--, 1.27 <--                        10.1   7.06  )-----------( 144      ( 07 Dec 2019 10:01 )             31.2     Urine Studies:  Urinalysis Basic - ( 07 Dec 2019 04:23 )    Color: Yellow / Appearance: Slightly Turbid / S.022 / pH:   Gluc:  / Ketone: Negative  / Bili: Negative / Urobili: <2 mg/dL   Blood:  / Protein: Trace / Nitrite: Negative   Leuk Esterase: Large / RBC: 4 /HPF /  /HPF   Sq Epi:  / Non Sq Epi: 1 /HPF / Bacteria: Moderate      Osmolality, Random Urine: 400 mosm/Kg ( @ 04:19)  Potassium, Random Urine: 34 mmol/L ( @ 00:54)  Sodium, Random Urine: 53 mmol/L ( @ 00:54)  Sodium, Random Urine: 54 mmol/L ( @ 00:54)  Chloride, Random Urine: <35 mmol/L ( @ 00:54)  Creatinine, Random Urine: 107 mg/dL ( @ 00:54)  Protein/Creatinine Ratio Calculation: 0.3 Ratio ( @ 00:54)    RADIOLOGY & ADDITIONAL STUDIES:

## 2019-12-07 NOTE — PROGRESS NOTE ADULT - SUBJECTIVE AND OBJECTIVE BOX
RED SURGERY DAILY PROGRESS NOTE:       SUBJECTIVE/ROS: Patient seen and examined at bedside.  Patient still has some pain but is controlled.  BCx growing streptococcus.           MEDICATIONS  (STANDING):  acetaminophen   Tablet .. 1000 milliGRAM(s) Oral every 8 hours  aspirin enteric coated 81 milliGRAM(s) Oral daily  atorvastatin 40 milliGRAM(s) Oral at bedtime  aztreonam  IVPB 500 milliGRAM(s) IV Intermittent every 12 hours  clopidogrel Tablet 75 milliGRAM(s) Oral daily  dextrose 5%. 1000 milliLiter(s) (50 mL/Hr) IV Continuous <Continuous>  dextrose 50% Injectable 12.5 Gram(s) IV Push once  dextrose 50% Injectable 25 Gram(s) IV Push once  dextrose 50% Injectable 25 Gram(s) IV Push once  enoxaparin Injectable 40 milliGRAM(s) SubCutaneous daily  famotidine    Tablet 20 milliGRAM(s) Oral daily  gabapentin 100 milliGRAM(s) Oral two times a day  influenza   Vaccine 0.5 milliLiter(s) IntraMuscular once  insulin glargine Injectable (LANTUS) 10 Unit(s) SubCutaneous at bedtime  insulin lispro (HumaLOG) corrective regimen sliding scale   SubCutaneous three times a day before meals  insulin lispro (HumaLOG) corrective regimen sliding scale   SubCutaneous at bedtime  insulin lispro Injectable (HumaLOG) 3 Unit(s) SubCutaneous three times a day before meals  levothyroxine 25 MICROGram(s) Oral daily  metoprolol tartrate 12.5 milliGRAM(s) Oral two times a day  metroNIDAZOLE  IVPB 500 milliGRAM(s) IV Intermittent every 8 hours  vancomycin  IVPB 1000 milliGRAM(s) IV Intermittent every 24 hours    MEDICATIONS  (PRN):  dextrose 40% Gel 15 Gram(s) Oral once PRN Blood Glucose LESS THAN 70 milliGRAM(s)/deciliter  glucagon  Injectable 1 milliGRAM(s) IntraMuscular once PRN Glucose LESS THAN 70 milligrams/deciliter  traMADol 25 milliGRAM(s) Oral every 6 hours PRN Moderate Pain (4 - 6)  traMADol 50 milliGRAM(s) Oral every 6 hours PRN Severe Pain (7 - 10)      OBJECTIVE:      ICU Vital Signs Last 24 Hrs  T(C): 36.8 (07 Dec 2019 09:31), Max: 38.9 (06 Dec 2019 18:19)  T(F): 98.3 (07 Dec 2019 09:31), Max: 102.1 (06 Dec 2019 18:19)  HR: 73 (07 Dec 2019 09:31) (66 - 86)  BP: 109/64 (07 Dec 2019 09:31) (92/52 - 109/64)  BP(mean): --  ABP: --  ABP(mean): --  RR: 18 (07 Dec 2019 09:31) (18 - 20)  SpO2: 95% (07 Dec 2019 09:31) (95% - 98%)    I&O's Detail    06 Dec 2019 07:01  -  07 Dec 2019 07:00  --------------------------------------------------------  IN:    IV PiggyBack: 50 mL    Oral Fluid: 760 mL    sodium chloride 0.9%.: 1150 mL  Total IN: 1960 mL    OUT:    Voided: 200 mL  Total OUT: 200 mL    Total NET: 1760 mL      07 Dec 2019 07:01  -  07 Dec 2019 11:17  --------------------------------------------------------  IN:    IV PiggyBack: 150 mL    Oral Fluid: 240 mL  Total IN: 390 mL    OUT:  Total OUT: 0 mL    Total NET: 390 mL            LABS:                                                   10.1   7.06  )-----------( 144      ( 07 Dec 2019 10:01 )             31.2     12-07    129<L>  |  99  |  63<H>  ----------------------------<  227<H>  5.9<H>   |  16<L>  |  1.58<H>    Ca    8.9      07 Dec 2019 07:08  Phos  3.3     12-06  Mg     1.6     12-06    TPro  7.4  /  Alb  3.1<L>  /  TBili  0.5  /  DBili  x   /  AST  27  /  ALT  15  /  AlkPhos  130<H>  12-05      Urinalysis Basic - ( 04 Dec 2019 12:24 )    Color: Light Yellow / Appearance: Clear / S.015 / pH: x  Gluc: x / Ketone: Negative  / Bili: Negative / Urobili: Negative   Blood: x / Protein: Negative / Nitrite: Negative   Leuk Esterase: Moderate / RBC: 2 /hpf / WBC 10 /HPF   Sq Epi: x / Non Sq Epi: 0 /hpf / Bacteria: Negative                PHYSICAL EXAM:  GEN: NAD, resting quietly  PULM: symmetric chest rise bilaterally, no increased WOB  CV: regular rate, peripheral pulses intact  ABD: soft, NTND  : right and left incision sites with healthy granulation tissue, overlying cellulitis.  Small cavity at superior portion of gluteal cleft, no drainage noted.  EXTR: no cyanosis or edema, moving all extremities

## 2019-12-07 NOTE — PROGRESS NOTE ADULT - PROBLEM SELECTOR PLAN 1
ddx includes pre renal vs atn vs obstruction  u lytes showing intravascular depletion--> c/w nacl @ 50cc  renal us showing bladder with 528cc--> check bladder scan--> if > then 180cc of urine then would place infante as pt with hx of urinary obstruction  LOw na and high k--> s/p cate gluconate--> awaiting repeat- -> consider lokelma  f/u bladder scan -- releiving bladder obstruction will help improved low na and high k  will monitor with you  discussed with np

## 2019-12-07 NOTE — PROGRESS NOTE ADULT - ASSESSMENT
85 yo female with a hx of of CHF, DM, HLD, HTN, and an STEMI who presents to the ED for a several day history of buttocks pain. Patient states that the pain started about a week ago, but has suddenly gotten worse over the past few days. She describes it as a sharp pain and has tried taking OTC acetaminophen, which has not helped the pain at all. Patient endorses one episode of nausea earlier this morning, but denies any vomiting. She has not noticed a rash in that area, but does endorse that the skin is difficult to visualize.  Patient has a wound care nurse that comes to the house a few times per week. She came yesterday for wound care dressing changes.    Of note, patient was hospitalized at [facility] from September 4 to October 4 at [facility] for an ulcer in between her buttocks. During that hospitalization, patient required debridement of the wound as well as IV antibiotics.     In the ED, her VS /75, HR 77, RR 18 with SpO2 of 99% and T of 99.2. Patient received a 500 cc bolus of fluids as well as Doxycycline 500 mg x1. (04 Dec 2019 17:13)  WBC 13.5.  ESR 71, CRP 10.6.  UA (-) nit/mod LE, wbc 10.  Bcx (+) GPC pairs/chains from 2 sets.  CTAP without contrast shows L medial gluteal skin cellulitis and air-filled  tract.  No drainable fluid collection.  Pt on vanco/aztreonam/flagyl.     ID consult called for further abx managment.       Sepsis:    - Pt with fever, leukocytosis and bacteremia.  Source likely from gluteal cellulitis.  CTap shows L gluteal skin cellulitis with air-filled tract, without drainable fluid collection.   Agree with broad spectrum abx for now until all culture data finalized.      - MRI pelvis ordered to evaluate for perianal fistula- pt unable to tolerate exam.  Cannot r/o underlying OM, there is fluid collection near sacrum/coccyx.  Recommend bone scan vs. repeat MRI for further evaluation.      - Blood culture (+) GPC pairs/chains - identified as strep anginosus.  r/o abscess near sacrum/coccyx.  Awaiting repeat MRI.    Recommend echocardiogram.      - Urine cultures growing enterococcus faecalis.  Based on sensitivities of blood and urine cultures, can switch change from daptomycin to vancomycin.  Cont azactam and flagyl for now to continue possible polymicrobial infection involving perineal wounds.            Will follow,    Sandrita Zaman  [phone]

## 2019-12-07 NOTE — PROGRESS NOTE ADULT - ASSESSMENT
87 yo female with a hx of of CHF, DM, HLD, HTN, and an STEMI who presents to the ED for a several day history of buttocks pain found to have ROB in setting of sepsis/rob/hypotension

## 2019-12-08 LAB
ANION GAP SERPL CALC-SCNC: 14 MMOL/L — SIGNIFICANT CHANGE UP (ref 5–17)
BUN SERPL-MCNC: 70 MG/DL — HIGH (ref 7–23)
CALCIUM SERPL-MCNC: 9.6 MG/DL — SIGNIFICANT CHANGE UP (ref 8.4–10.5)
CHLORIDE SERPL-SCNC: 98 MMOL/L — SIGNIFICANT CHANGE UP (ref 96–108)
CO2 SERPL-SCNC: 15 MMOL/L — LOW (ref 22–31)
CREAT SERPL-MCNC: 1.8 MG/DL — HIGH (ref 0.5–1.3)
GLUCOSE BLDC GLUCOMTR-MCNC: 127 MG/DL — HIGH (ref 70–99)
GLUCOSE BLDC GLUCOMTR-MCNC: 134 MG/DL — HIGH (ref 70–99)
GLUCOSE BLDC GLUCOMTR-MCNC: 214 MG/DL — HIGH (ref 70–99)
GLUCOSE BLDC GLUCOMTR-MCNC: 82 MG/DL — SIGNIFICANT CHANGE UP (ref 70–99)
GLUCOSE BLDC GLUCOMTR-MCNC: 83 MG/DL — SIGNIFICANT CHANGE UP (ref 70–99)
GLUCOSE SERPL-MCNC: 190 MG/DL — HIGH (ref 70–99)
HCT VFR BLD CALC: 32.3 % — LOW (ref 34.5–45)
HGB BLD-MCNC: 10.2 G/DL — LOW (ref 11.5–15.5)
MCHC RBC-ENTMCNC: 28.1 PG — SIGNIFICANT CHANGE UP (ref 27–34)
MCHC RBC-ENTMCNC: 31.6 GM/DL — LOW (ref 32–36)
MCV RBC AUTO: 89 FL — SIGNIFICANT CHANGE UP (ref 80–100)
PLATELET # BLD AUTO: 165 K/UL — SIGNIFICANT CHANGE UP (ref 150–400)
POTASSIUM SERPL-MCNC: 5.6 MMOL/L — HIGH (ref 3.5–5.3)
POTASSIUM SERPL-SCNC: 5.6 MMOL/L — HIGH (ref 3.5–5.3)
RBC # BLD: 3.63 M/UL — LOW (ref 3.8–5.2)
RBC # FLD: 15 % — HIGH (ref 10.3–14.5)
SODIUM SERPL-SCNC: 127 MMOL/L — LOW (ref 135–145)
WBC # BLD: 9.9 K/UL — SIGNIFICANT CHANGE UP (ref 3.8–10.5)
WBC # FLD AUTO: 9.9 K/UL — SIGNIFICANT CHANGE UP (ref 3.8–10.5)

## 2019-12-08 PROCEDURE — 72197 MRI PELVIS W/O & W/DYE: CPT | Mod: 26

## 2019-12-08 RX ORDER — INSULIN GLARGINE 100 [IU]/ML
7 INJECTION, SOLUTION SUBCUTANEOUS ONCE
Refills: 0 | Status: COMPLETED | OUTPATIENT
Start: 2019-12-08 | End: 2019-12-09

## 2019-12-08 RX ORDER — SODIUM BICARBONATE 1 MEQ/ML
1300 SYRINGE (ML) INTRAVENOUS THREE TIMES A DAY
Refills: 0 | Status: DISCONTINUED | OUTPATIENT
Start: 2019-12-08 | End: 2019-12-20

## 2019-12-08 RX ORDER — ONDANSETRON 8 MG/1
4 TABLET, FILM COATED ORAL EVERY 6 HOURS
Refills: 0 | Status: DISCONTINUED | OUTPATIENT
Start: 2019-12-08 | End: 2019-12-23

## 2019-12-08 RX ORDER — SODIUM ZIRCONIUM CYCLOSILICATE 10 G/10G
5 POWDER, FOR SUSPENSION ORAL ONCE
Refills: 0 | Status: COMPLETED | OUTPATIENT
Start: 2019-12-08 | End: 2019-12-08

## 2019-12-08 RX ADMIN — CLOPIDOGREL BISULFATE 75 MILLIGRAM(S): 75 TABLET, FILM COATED ORAL at 14:34

## 2019-12-08 RX ADMIN — Medication 1 TABLET(S): at 22:31

## 2019-12-08 RX ADMIN — Medication 8 UNIT(S): at 08:43

## 2019-12-08 RX ADMIN — GABAPENTIN 100 MILLIGRAM(S): 400 CAPSULE ORAL at 05:15

## 2019-12-08 RX ADMIN — Medication 8 UNIT(S): at 18:05

## 2019-12-08 RX ADMIN — Medication 50 MILLIGRAM(S): at 17:05

## 2019-12-08 RX ADMIN — Medication 100 MILLIGRAM(S): at 11:20

## 2019-12-08 RX ADMIN — Medication 1000 MILLIGRAM(S): at 03:52

## 2019-12-08 RX ADMIN — Medication 1300 MILLIGRAM(S): at 14:36

## 2019-12-08 RX ADMIN — SODIUM ZIRCONIUM CYCLOSILICATE 5 GRAM(S): 10 POWDER, FOR SUSPENSION ORAL at 17:05

## 2019-12-08 RX ADMIN — ONDANSETRON 4 MILLIGRAM(S): 8 TABLET, FILM COATED ORAL at 22:31

## 2019-12-08 RX ADMIN — Medication 1 TABLET(S): at 05:15

## 2019-12-08 RX ADMIN — Medication 100 MILLIGRAM(S): at 18:09

## 2019-12-08 RX ADMIN — Medication 50 MILLIGRAM(S): at 05:15

## 2019-12-08 RX ADMIN — Medication 1 TABLET(S): at 14:35

## 2019-12-08 RX ADMIN — ATORVASTATIN CALCIUM 40 MILLIGRAM(S): 80 TABLET, FILM COATED ORAL at 22:31

## 2019-12-08 RX ADMIN — SODIUM CHLORIDE 50 MILLILITER(S): 9 INJECTION INTRAMUSCULAR; INTRAVENOUS; SUBCUTANEOUS at 05:19

## 2019-12-08 RX ADMIN — GABAPENTIN 100 MILLIGRAM(S): 400 CAPSULE ORAL at 19:46

## 2019-12-08 RX ADMIN — Medication 81 MILLIGRAM(S): at 14:35

## 2019-12-08 RX ADMIN — FAMOTIDINE 20 MILLIGRAM(S): 10 INJECTION INTRAVENOUS at 14:44

## 2019-12-08 RX ADMIN — Medication 1000 MILLIGRAM(S): at 14:32

## 2019-12-08 RX ADMIN — Medication 1300 MILLIGRAM(S): at 22:31

## 2019-12-08 RX ADMIN — Medication 2: at 08:43

## 2019-12-08 RX ADMIN — Medication 1000 MILLIGRAM(S): at 15:29

## 2019-12-08 RX ADMIN — ONDANSETRON 4 MILLIGRAM(S): 8 TABLET, FILM COATED ORAL at 15:29

## 2019-12-08 RX ADMIN — ENOXAPARIN SODIUM 40 MILLIGRAM(S): 100 INJECTION SUBCUTANEOUS at 14:36

## 2019-12-08 RX ADMIN — Medication 1000 MILLIGRAM(S): at 04:18

## 2019-12-08 RX ADMIN — Medication 25 MICROGRAM(S): at 05:15

## 2019-12-08 RX ADMIN — Medication 100 MILLIGRAM(S): at 02:24

## 2019-12-08 RX ADMIN — TRAMADOL HYDROCHLORIDE 50 MILLIGRAM(S): 50 TABLET ORAL at 19:57

## 2019-12-08 RX ADMIN — ONDANSETRON 4 MILLIGRAM(S): 8 TABLET, FILM COATED ORAL at 11:17

## 2019-12-08 NOTE — PROGRESS NOTE ADULT - ASSESSMENT
85 yo woman with a hx of CHF, DM, HLD, HTN, CAD prior MI, sp debridement of bilateral buttocks (9/15, 9/28) presenting with buttock cellulitis. CT consistent with cellulitis without underlying abscess.    Plan:   - no urgent surgical intervention at this time  - c/w IV antibiotics per ID (BC grew gram+ cocci in chains)   - cont local wound care  - Continue care as per medicine     Red surgery  [id number] 85 yo woman with a hx of CHF, DM, HLD, HTN, CAD prior MI, sp debridement of bilateral buttocks (9/15, 9/28) presenting with buttock cellulitis. CT consistent with cellulitis without underlying abscess.    Plan:   - no urgent surgical intervention at this time  - Follow up TTE  - c/w IV antibiotics per ID (BC grew gram+ cocci in chains)   - cont local wound care  - Continue care as per medicine     Red surgery  [id number]

## 2019-12-08 NOTE — PROGRESS NOTE ADULT - SUBJECTIVE AND OBJECTIVE BOX
[facility]    Subjective: Patient seen and examined. No new events except as noted.   feeling weak today  urinary retention     REVIEW OF SYSTEMS:    CONSTITUTIONAL: No weakness, fevers or chills  EYES/ENT: No visual changes;  No vertigo or throat pain   NECK: No pain or stiffness  RESPIRATORY: No cough, wheezing, hemoptysis; No shortness of breath  CARDIOVASCULAR: No chest pain or palpitations  GASTROINTESTINAL: No abdominal or epigastric pain. No nausea, vomiting, or hematemesis; No diarrhea or constipation. No melena or hematochezia.  GENITOURINARY: No dysuria, frequency or hematuria  NEUROLOGICAL: No numbness or weakness  SKIN: No itching, burning, rashes, or lesions   All other review of systems is negative unless indicated above.    MEDICATIONS:  MEDICATIONS  (STANDING):  aspirin enteric coated 81 milliGRAM(s) Oral daily  atorvastatin 40 milliGRAM(s) Oral at bedtime  aztreonam  IVPB 500 milliGRAM(s) IV Intermittent every 12 hours  clopidogrel Tablet 75 milliGRAM(s) Oral daily  DAPTOmycin IVPB 240 milliGRAM(s) IV Intermittent every 48 hours  DAPTOmycin IVPB      dextrose 5%. 1000 milliLiter(s) (50 mL/Hr) IV Continuous <Continuous>  dextrose 50% Injectable 12.5 Gram(s) IV Push once  dextrose 50% Injectable 25 Gram(s) IV Push once  dextrose 50% Injectable 25 Gram(s) IV Push once  enoxaparin Injectable 40 milliGRAM(s) SubCutaneous daily  famotidine    Tablet 20 milliGRAM(s) Oral daily  gabapentin 100 milliGRAM(s) Oral two times a day  influenza   Vaccine 0.5 milliLiter(s) IntraMuscular once  insulin glargine Injectable (LANTUS) 15 Unit(s) SubCutaneous at bedtime  insulin lispro (HumaLOG) corrective regimen sliding scale   SubCutaneous three times a day before meals  insulin lispro (HumaLOG) corrective regimen sliding scale   SubCutaneous at bedtime  insulin lispro Injectable (HumaLOG) 8 Unit(s) SubCutaneous three times a day before meals  lactobacillus acidophilus 1 Tablet(s) Oral three times a day  levothyroxine 25 MICROGram(s) Oral daily  metoprolol tartrate 12.5 milliGRAM(s) Oral two times a day  metroNIDAZOLE  IVPB 500 milliGRAM(s) IV Intermittent every 8 hours  sodium bicarbonate 1300 milliGRAM(s) Oral three times a day  sodium chloride 0.9%. 1000 milliLiter(s) (50 mL/Hr) IV Continuous <Continuous>      PHYSICAL EXAM:  T(C): 36.9 (19 @ 19:52), Max: 37.1 (19 @ 04:17)  HR: 74 (19 @ 19:52) (68 - 78)  BP: 107/69 (19 @ 19:52) (93/56 - 110/66)  RR: 18 (19 @ 19:52) (16 - 18)  SpO2: 99% (19 @ 19:52) (94% - 100%)  Wt(kg): --  I&O's Summary    07 Dec 2019 07:  -  08 Dec 2019 07:00  --------------------------------------------------------  IN: 2285 mL / OUT: 620 mL / NET: 1665 mL    08 Dec 2019 07:01  -  08 Dec 2019 23:15  --------------------------------------------------------  IN: 685 mL / OUT: 390 mL / NET: 295 mL          Appearance: Normal	  HEENT:   Normal oral mucosa, PERRL, EOMI	  Lymphatic: No lymphadenopathy , no edema  Cardiovascular: Normal S1 S2  Respiratory: Lungs clear to auscultation, normal effort 	  Gastrointestinal:  Soft, Non-tender, + BS	  Skin: No rashes, No ecchymoses, No cyanosis, warm to touch  Musculoskeletal: Normal range of motion, normal strength  Psychiatry:  Mood & affect appropriate  Ext: No edema      All labs, Imaging and EKGs personally reviewed                           10.2   990  )-----------( 165      ( 08 Dec 2019 09:34 )             32.3                   127<L>  |  98  |  70<H>  ----------------------------<  190<H>  5.6<H>   |  15<L>  |  1.80<H>    Ca    9.6      08 Dec 2019 06:20             CARDIAC MARKERS ( 07 Dec 2019 07:08 )  x     / x     / 58 U/L / x     / x                  Urinalysis Basic - ( 07 Dec 2019 04:23 )    Color: Yellow / Appearance: Slightly Turbid / S.022 / pH: x  Gluc: x / Ketone: Negative  / Bili: Negative / Urobili: <2 mg/dL   Blood: x / Protein: Trace / Nitrite: Negative   Leuk Esterase: Large / RBC: 4 /HPF /  /HPF   Sq Epi: x / Non Sq Epi: 1 /HPF / Bacteria: Moderate

## 2019-12-08 NOTE — PROGRESS NOTE ADULT - PROBLEM SELECTOR PLAN 1
sec to a pre-renal state.  H/O urinary obstruction  noted PVR was 210- pt refused infante placement  BP bordeline  low but stable  NAGMA  hperkalemia  hyponatremia  UOSM was 524  Off IVF   appears relatively euvolemic    recs:  will give a dose of Lokelma and start NAHCO3 tabs  rpt basic metabolic panel in AM  RPT a bediside blaader italia later in the day to monitor  PVR

## 2019-12-08 NOTE — PROGRESS NOTE ADULT - SUBJECTIVE AND OBJECTIVE BOX
[facility] : Progress Note :: [phone], (office [phone]),   Dr Geronimo / Dr Bone / Dr Kumari / Dr Gibson / Dr Elsy SANTIAGO / Dr Barrett / Dr Lagos / Dr Joseph arizmendi  _____________________________________________________________________________________________    noted. Had  PVR 210ml    codeine (Unknown)  Kiwi (Anaphylaxis)  penicillins (Anaphylaxis)    Hospital Medications:   MEDICATIONS  (STANDING):  aspirin enteric coated 81 milliGRAM(s) Oral daily  atorvastatin 40 milliGRAM(s) Oral at bedtime  aztreonam  IVPB 500 milliGRAM(s) IV Intermittent every 12 hours  clopidogrel Tablet 75 milliGRAM(s) Oral daily  DAPTOmycin IVPB 240 milliGRAM(s) IV Intermittent every 48 hours  DAPTOmycin IVPB      dextrose 5%. 1000 milliLiter(s) (50 mL/Hr) IV Continuous <Continuous>  dextrose 50% Injectable 12.5 Gram(s) IV Push once  dextrose 50% Injectable 25 Gram(s) IV Push once  dextrose 50% Injectable 25 Gram(s) IV Push once  enoxaparin Injectable 40 milliGRAM(s) SubCutaneous daily  famotidine    Tablet 20 milliGRAM(s) Oral daily  gabapentin 100 milliGRAM(s) Oral two times a day  influenza   Vaccine 0.5 milliLiter(s) IntraMuscular once  insulin glargine Injectable (LANTUS) 15 Unit(s) SubCutaneous at bedtime  insulin lispro (HumaLOG) corrective regimen sliding scale   SubCutaneous three times a day before meals  insulin lispro (HumaLOG) corrective regimen sliding scale   SubCutaneous at bedtime  insulin lispro Injectable (HumaLOG) 8 Unit(s) SubCutaneous three times a day before meals  lactobacillus acidophilus 1 Tablet(s) Oral three times a day  levothyroxine 25 MICROGram(s) Oral daily  metoprolol tartrate 12.5 milliGRAM(s) Oral two times a day  metroNIDAZOLE  IVPB 500 milliGRAM(s) IV Intermittent every 8 hours  sodium chloride 0.9%. 1000 milliLiter(s) (50 mL/Hr) IV Continuous <Continuous>        VITALS:  T(F): 98.8 (19 @ 04:17), Max: 100 (19 @ 13:54)  HR: 68 (19 @ 04:17)  BP: 99/60 (19 @ 04:17)  RR: 18 (19 @ 04:17)  SpO2: 96% (19 @ 04:17)  Wt(kg): --     @ 07:01  -   @ 07:00  --------------------------------------------------------  IN: 2285 mL / OUT: 620 mL / NET: 1665 mL     @ 07:01  -   @ 09:44  --------------------------------------------------------  IN: 0 mL / OUT: 140 mL / NET: -140 mL        PHYSICAL EXAM:  Constitutional: NAD  HEENT: anicteric sclera, oropharynx clear.  Neck: No JVD  Respiratory: CTAB, no wheezes, rales or rhonchi  Cardiovascular: S1, S2, RRR  Gastrointestinal: BS+, soft, NT/ND  Extremities:  No peripheral edema  Neurological: A/O x 3, no focal deficits  Psychiatric: Normal mood, normal affect  : No CVA tenderness. No infante.   Skin: No rashes      LABS:      127<L>  |  98  |  70<H>  ----------------------------<  190<H>  5.6<H>   |  15<L>  |  1.80<H>    Ca    9.6      08 Dec 2019 06:20      Creatinine Trend: 1.80 <--, 1.85 <--, 1.58 <--, 1.71 <--, 1.85 <--, 1.81 <--, 1.27 <--                        10.1   7.06  )-----------( 144      ( 07 Dec 2019 10:01 )             31.2     Urine Studies:  Urinalysis Basic - ( 07 Dec 2019 04:23 )    Color: Yellow / Appearance: Slightly Turbid / S.022 / pH:   Gluc:  / Ketone: Negative  / Bili: Negative / Urobili: <2 mg/dL   Blood:  / Protein: Trace / Nitrite: Negative   Leuk Esterase: Large / RBC: 4 /HPF /  /HPF   Sq Epi:  / Non Sq Epi: 1 /HPF / Bacteria: Moderate      Osmolality, Random Urine: 400 mosm/Kg ( @ 04:19)  Potassium, Random Urine: 34 mmol/L ( @ 00:54)  Sodium, Random Urine: 53 mmol/L ( @ 00:54)  Sodium, Random Urine: 54 mmol/L ( @ 00:54)  Chloride, Random Urine: <35 mmol/L ( @ 00:54)  Creatinine, Random Urine: 107 mg/dL ( @ 00:54)  Protein/Creatinine Ratio Calculation: 0.3 Ratio ( @ 00:54)    RADIOLOGY & ADDITIONAL STUDIES:

## 2019-12-08 NOTE — CHART NOTE - NSCHARTNOTEFT_GEN_A_CORE
CC: Urinary Retention    Notified by RN that patient had a PVR of 210.  Patient has a history of urinary obstruction.  As per Renal, patient should have a infante placed if PVR is greater than 180.  Patient has been refusing infante placement.  Patient was seen at bedside, discussed risks/ benefits of infante placement.  Patient is now amenable to having ifnante placed.      Clif Brandon PA-C  Department of Medicine  #[id number]

## 2019-12-08 NOTE — PROGRESS NOTE ADULT - SUBJECTIVE AND OBJECTIVE BOX
GENERAL SURGERY PROGRESS NOTE    SUBJECTIVE:   Patient seen and examined at bedside.    Patient still has some pain but is controlled.    BCx growing streptococcus.       OBJECTIVE:    PHYSICAL EXAM:  GEN: NAD, resting quietly  PULM: symmetric chest rise bilaterally, no increased WOB  ABD: soft, NTND  : right and left incision sites with healthy granulation tissue, overlying cellulitis.  Small cavity at superior portion of gluteal cleft, no drainage noted.  EXTR: no cyanosis or edema, moving all extremities    T(C): 36.6 (19 @ 10:56), Max: 37.8 (19 @ 13:54)  HR: 70 (19 @ 10:56) (67 - 95)  BP: 99/62 (19 @ 10:56) (92/55 - 99/63)  RR: 18 (19 @ 10:56) (16 - 18)  SpO2: 100% (19 @ 10:56) (95% - 100%)  Wt(kg): --    LABS:                        10.2   9.90  )-----------( 165      ( 08 Dec 2019 09:34 )             32.3     12    127<L>  |  98  |  70<H>  ----------------------------<  190<H>  5.6<H>   |  15<L>  |  1.80<H>    Ca    9.6      08 Dec 2019 06:20        Urinalysis Basic - ( 07 Dec 2019 04:23 )    Color: Yellow / Appearance: Slightly Turbid / S.022 / pH: x  Gluc: x / Ketone: Negative  / Bili: Negative / Urobili: <2 mg/dL   Blood: x / Protein: Trace / Nitrite: Negative   Leuk Esterase: Large / RBC: 4 /HPF /  /HPF   Sq Epi: x / Non Sq Epi: 1 /HPF / Bacteria: Moderate          CAPILLARY BLOOD GLUCOSE      POCT Blood Glucose.: 214 mg/dL (08 Dec 2019 08:35)  POCT Blood Glucose.: 179 mg/dL (07 Dec 2019 22:43)  POCT Blood Glucose.: 111 mg/dL (07 Dec 2019 17:20)  POCT Blood Glucose.: 144 mg/dL (07 Dec 2019 12:16)      Is&O's    19 @ 07:  -  19 @ 07:00  --------------------------------------------------------  IN: 2285 mL / OUT: 620 mL / NET: 1665 mL    19 @ 07:  -  19 @ 12:00  --------------------------------------------------------  IN: 160 mL / OUT: 140 mL / NET: 20 mL GENERAL SURGERY PROGRESS NOTE    SUBJECTIVE:   Patient seen and examined at bedside.  Continuing to have fevers  Patient still has some pain but is controlled.    BCx growing streptococcus.       OBJECTIVE:    PHYSICAL EXAM:  GEN: NAD, resting quietly  PULM: symmetric chest rise bilaterally, no increased WOB  ABD: soft, NTND  : right and left incision sites with healthy granulation tissue, overlying cellulitis.  Small cavity at superior portion of gluteal cleft, no drainage noted.  EXTR: no cyanosis or edema, moving all extremities    T(C): 36.6 (19 @ 10:56), Max: 37.8 (19 @ 13:54)  HR: 70 (19 @ 10:56) (67 - 95)  BP: 99/62 (19 @ 10:56) (92/55 - 99/63)  RR: 18 (19 @ 10:56) (16 - 18)  SpO2: 100% (19 @ 10:56) (95% - 100%)  Wt(kg): --    LABS:                        10.2   9.90  )-----------( 165      ( 08 Dec 2019 09:34 )             32.3     12-    127<L>  |  98  |  70<H>  ----------------------------<  190<H>  5.6<H>   |  15<L>  |  1.80<H>    Ca    9.6      08 Dec 2019 06:20        Urinalysis Basic - ( 07 Dec 2019 04:23 )    Color: Yellow / Appearance: Slightly Turbid / S.022 / pH: x  Gluc: x / Ketone: Negative  / Bili: Negative / Urobili: <2 mg/dL   Blood: x / Protein: Trace / Nitrite: Negative   Leuk Esterase: Large / RBC: 4 /HPF /  /HPF   Sq Epi: x / Non Sq Epi: 1 /HPF / Bacteria: Moderate          CAPILLARY BLOOD GLUCOSE      POCT Blood Glucose.: 214 mg/dL (08 Dec 2019 08:35)  POCT Blood Glucose.: 179 mg/dL (07 Dec 2019 22:43)  POCT Blood Glucose.: 111 mg/dL (07 Dec 2019 17:20)  POCT Blood Glucose.: 144 mg/dL (07 Dec 2019 12:16)      Is&O's    19 @ 07:01  -  19 @ 07:00  --------------------------------------------------------  IN: 2285 mL / OUT: 620 mL / NET: 1665 mL    19 @ 07:  -  19 @ 12:00  --------------------------------------------------------  IN: 160 mL / OUT: 140 mL / NET: 20 mL

## 2019-12-08 NOTE — PROGRESS NOTE ADULT - SUBJECTIVE AND OBJECTIVE BOX
Patient is a 86y old  Female who presents with a chief complaint of Sepsis 2/2 Cellulitis (08 Dec 2019 11:59)      INTERVAL HISTORY: feels ok    MEDICATIONS:  metoprolol tartrate 12.5 milliGRAM(s) Oral two times a day        PHYSICAL EXAM:  T(C): 36.9 (12-08-19 @ 19:52), Max: 37.1 (12-08-19 @ 04:17)  HR: 74 (12-08-19 @ 19:52) (68 - 78)  BP: 107/69 (12-08-19 @ 19:52) (93/56 - 110/66)  RR: 18 (12-08-19 @ 19:52) (16 - 18)  SpO2: 99% (12-08-19 @ 19:52) (94% - 100%)  Wt(kg): --  I&O's Summary    07 Dec 2019 07:01  -  08 Dec 2019 07:00  --------------------------------------------------------  IN: 2285 mL / OUT: 620 mL / NET: 1665 mL    08 Dec 2019 07:01  -  08 Dec 2019 23:16  --------------------------------------------------------  IN: 685 mL / OUT: 390 mL / NET: 295 mL          Appearance: In no distress	  HEENT:    PERRL, EOMI	  Cardiovascular:  S1 S2, No JVD  Respiratory: Lungs clear to auscultation	  Gastrointestinal:  Soft, Non-tender, + BS	  Extremities:  No edema of LE                                10.2   9.90  )-----------( 165      ( 08 Dec 2019 09:34 )             32.3     12-08    127<L>  |  98  |  70<H>  ----------------------------<  190<H>  5.6<H>   |  15<L>  |  1.80<H>    Ca    9.6      08 Dec 2019 06:20          Labs personally reviewed      Assessment /Plan:   Chronic diastolic HF - euvolemic, off Lasix  CAD - c/w DAPT, Metoprolol and ASA  HTN - well controlled        Srini Velasco DO [facility]  Cardiovascular Medicine  [phone]

## 2019-12-09 DIAGNOSIS — E87.1 HYPO-OSMOLALITY AND HYPONATREMIA: ICD-10-CM

## 2019-12-09 DIAGNOSIS — E87.5 HYPERKALEMIA: ICD-10-CM

## 2019-12-09 LAB
ANION GAP SERPL CALC-SCNC: 12 MMOL/L — SIGNIFICANT CHANGE UP (ref 5–17)
ANION GAP SERPL CALC-SCNC: 14 MMOL/L — SIGNIFICANT CHANGE UP (ref 5–17)
BUN SERPL-MCNC: 60 MG/DL — HIGH (ref 7–23)
BUN SERPL-MCNC: 67 MG/DL — HIGH (ref 7–23)
CALCIUM SERPL-MCNC: 9 MG/DL — SIGNIFICANT CHANGE UP (ref 8.4–10.5)
CALCIUM SERPL-MCNC: 9.2 MG/DL — SIGNIFICANT CHANGE UP (ref 8.4–10.5)
CHLORIDE SERPL-SCNC: 100 MMOL/L — SIGNIFICANT CHANGE UP (ref 96–108)
CHLORIDE SERPL-SCNC: 97 MMOL/L — SIGNIFICANT CHANGE UP (ref 96–108)
CO2 SERPL-SCNC: 15 MMOL/L — LOW (ref 22–31)
CO2 SERPL-SCNC: 17 MMOL/L — LOW (ref 22–31)
CREAT SERPL-MCNC: 1.51 MG/DL — HIGH (ref 0.5–1.3)
CREAT SERPL-MCNC: 1.54 MG/DL — HIGH (ref 0.5–1.3)
GLUCOSE BLDC GLUCOMTR-MCNC: 141 MG/DL — HIGH (ref 70–99)
GLUCOSE BLDC GLUCOMTR-MCNC: 149 MG/DL — HIGH (ref 70–99)
GLUCOSE BLDC GLUCOMTR-MCNC: 177 MG/DL — HIGH (ref 70–99)
GLUCOSE BLDC GLUCOMTR-MCNC: 202 MG/DL — HIGH (ref 70–99)
GLUCOSE BLDC GLUCOMTR-MCNC: 248 MG/DL — HIGH (ref 70–99)
GLUCOSE SERPL-MCNC: 121 MG/DL — HIGH (ref 70–99)
GLUCOSE SERPL-MCNC: 176 MG/DL — HIGH (ref 70–99)
HCT VFR BLD CALC: 31.6 % — LOW (ref 34.5–45)
HGB BLD-MCNC: 10.5 G/DL — LOW (ref 11.5–15.5)
MCHC RBC-ENTMCNC: 28.9 PG — SIGNIFICANT CHANGE UP (ref 27–34)
MCHC RBC-ENTMCNC: 33.2 GM/DL — SIGNIFICANT CHANGE UP (ref 32–36)
MCV RBC AUTO: 87.1 FL — SIGNIFICANT CHANGE UP (ref 80–100)
PLATELET # BLD AUTO: 217 K/UL — SIGNIFICANT CHANGE UP (ref 150–400)
POTASSIUM SERPL-MCNC: 5.3 MMOL/L — SIGNIFICANT CHANGE UP (ref 3.5–5.3)
POTASSIUM SERPL-MCNC: 6 MMOL/L — HIGH (ref 3.5–5.3)
POTASSIUM SERPL-SCNC: 5.3 MMOL/L — SIGNIFICANT CHANGE UP (ref 3.5–5.3)
POTASSIUM SERPL-SCNC: 6 MMOL/L — HIGH (ref 3.5–5.3)
RBC # BLD: 3.63 M/UL — LOW (ref 3.8–5.2)
RBC # FLD: 15.1 % — HIGH (ref 10.3–14.5)
SODIUM SERPL-SCNC: 126 MMOL/L — LOW (ref 135–145)
SODIUM SERPL-SCNC: 129 MMOL/L — LOW (ref 135–145)
WBC # BLD: 12.43 K/UL — HIGH (ref 3.8–10.5)
WBC # FLD AUTO: 12.43 K/UL — HIGH (ref 3.8–10.5)

## 2019-12-09 PROCEDURE — 99232 SBSQ HOSP IP/OBS MODERATE 35: CPT

## 2019-12-09 PROCEDURE — 93306 TTE W/DOPPLER COMPLETE: CPT | Mod: 26

## 2019-12-09 RX ORDER — VANCOMYCIN HCL 1 G
1000 VIAL (EA) INTRAVENOUS EVERY 24 HOURS
Refills: 0 | Status: DISCONTINUED | OUTPATIENT
Start: 2019-12-09 | End: 2019-12-13

## 2019-12-09 RX ORDER — SODIUM ZIRCONIUM CYCLOSILICATE 10 G/10G
10 POWDER, FOR SUSPENSION ORAL ONCE
Refills: 0 | Status: COMPLETED | OUTPATIENT
Start: 2019-12-09 | End: 2019-12-09

## 2019-12-09 RX ORDER — ACETAMINOPHEN 500 MG
650 TABLET ORAL EVERY 6 HOURS
Refills: 0 | Status: DISCONTINUED | OUTPATIENT
Start: 2019-12-09 | End: 2019-12-23

## 2019-12-09 RX ORDER — CALCIUM GLUCONATE 100 MG/ML
1 VIAL (ML) INTRAVENOUS ONCE
Refills: 0 | Status: COMPLETED | OUTPATIENT
Start: 2019-12-09 | End: 2019-12-09

## 2019-12-09 RX ORDER — HEPARIN SODIUM 5000 [USP'U]/ML
5000 INJECTION INTRAVENOUS; SUBCUTANEOUS EVERY 8 HOURS
Refills: 0 | Status: DISCONTINUED | OUTPATIENT
Start: 2019-12-09 | End: 2019-12-23

## 2019-12-09 RX ORDER — SODIUM ZIRCONIUM CYCLOSILICATE 10 G/10G
5 POWDER, FOR SUSPENSION ORAL ONCE
Refills: 0 | Status: DISCONTINUED | OUTPATIENT
Start: 2019-12-09 | End: 2019-12-09

## 2019-12-09 RX ADMIN — Medication 1300 MILLIGRAM(S): at 05:26

## 2019-12-09 RX ADMIN — Medication 1300 MILLIGRAM(S): at 21:07

## 2019-12-09 RX ADMIN — TRAMADOL HYDROCHLORIDE 50 MILLIGRAM(S): 50 TABLET ORAL at 19:42

## 2019-12-09 RX ADMIN — Medication 1 TABLET(S): at 21:09

## 2019-12-09 RX ADMIN — Medication 250 MILLIGRAM(S): at 21:10

## 2019-12-09 RX ADMIN — Medication 50 MILLIGRAM(S): at 19:31

## 2019-12-09 RX ADMIN — Medication 50 MILLIGRAM(S): at 05:26

## 2019-12-09 RX ADMIN — SODIUM ZIRCONIUM CYCLOSILICATE 10 GRAM(S): 10 POWDER, FOR SUSPENSION ORAL at 13:19

## 2019-12-09 RX ADMIN — HEPARIN SODIUM 5000 UNIT(S): 5000 INJECTION INTRAVENOUS; SUBCUTANEOUS at 16:17

## 2019-12-09 RX ADMIN — Medication 81 MILLIGRAM(S): at 12:20

## 2019-12-09 RX ADMIN — Medication 100 GRAM(S): at 12:18

## 2019-12-09 RX ADMIN — ATORVASTATIN CALCIUM 40 MILLIGRAM(S): 80 TABLET, FILM COATED ORAL at 21:07

## 2019-12-09 RX ADMIN — HEPARIN SODIUM 5000 UNIT(S): 5000 INJECTION INTRAVENOUS; SUBCUTANEOUS at 23:49

## 2019-12-09 RX ADMIN — Medication 2: at 18:12

## 2019-12-09 RX ADMIN — Medication 12.5 MILLIGRAM(S): at 05:26

## 2019-12-09 RX ADMIN — INSULIN GLARGINE 15 UNIT(S): 100 INJECTION, SOLUTION SUBCUTANEOUS at 23:07

## 2019-12-09 RX ADMIN — Medication 25 MICROGRAM(S): at 05:26

## 2019-12-09 RX ADMIN — Medication 1300 MILLIGRAM(S): at 13:11

## 2019-12-09 RX ADMIN — Medication 100 MILLIGRAM(S): at 18:00

## 2019-12-09 RX ADMIN — INSULIN GLARGINE 7 UNIT(S): 100 INJECTION, SOLUTION SUBCUTANEOUS at 00:07

## 2019-12-09 RX ADMIN — ONDANSETRON 4 MILLIGRAM(S): 8 TABLET, FILM COATED ORAL at 19:45

## 2019-12-09 RX ADMIN — CLOPIDOGREL BISULFATE 75 MILLIGRAM(S): 75 TABLET, FILM COATED ORAL at 12:21

## 2019-12-09 RX ADMIN — FAMOTIDINE 20 MILLIGRAM(S): 10 INJECTION INTRAVENOUS at 12:22

## 2019-12-09 RX ADMIN — Medication 100 MILLIGRAM(S): at 09:47

## 2019-12-09 RX ADMIN — GABAPENTIN 100 MILLIGRAM(S): 400 CAPSULE ORAL at 05:26

## 2019-12-09 RX ADMIN — Medication 8 UNIT(S): at 18:13

## 2019-12-09 RX ADMIN — GABAPENTIN 100 MILLIGRAM(S): 400 CAPSULE ORAL at 18:01

## 2019-12-09 RX ADMIN — Medication 650 MILLIGRAM(S): at 13:09

## 2019-12-09 RX ADMIN — TRAMADOL HYDROCHLORIDE 50 MILLIGRAM(S): 50 TABLET ORAL at 21:28

## 2019-12-09 RX ADMIN — Medication 1 TABLET(S): at 05:26

## 2019-12-09 RX ADMIN — Medication 1 TABLET(S): at 13:11

## 2019-12-09 RX ADMIN — SODIUM CHLORIDE 50 MILLILITER(S): 9 INJECTION INTRAMUSCULAR; INTRAVENOUS; SUBCUTANEOUS at 21:08

## 2019-12-09 RX ADMIN — Medication 12.5 MILLIGRAM(S): at 17:57

## 2019-12-09 NOTE — PROGRESS NOTE ADULT - ASSESSMENT
87 yo woman with a hx of CHF, DM, HLD, HTN, CAD prior MI, sp debridement of bilateral buttocks (9/15, 9/28) presenting with buttock cellulitis.     Plan:   - no urgent surgical intervention at this time  - Follow up TTE  - c/w IV antibiotics per ID  - cont local wound care  - Continue care as per medicine     Red surgery  [id number] 87 yo woman with a hx of CHF, DM, HLD, HTN, CAD prior MI, sp debridement of bilateral buttocks (9/15, 9/28) presenting with buttock cellulitis.     Plan:   - f/u MRI official report  - no urgent surgical intervention at this time  - Follow up TTE  - c/w IV antibiotics per ID  - cont local wound care  - Continue care as per medicine     Red surgery  [id number]

## 2019-12-09 NOTE — CHART NOTE - NSCHARTNOTEFT_GEN_A_CORE
called by RN pt with urinary retention /bladder scan; seen by urology recurrent urinary retention; advised infante cath for now  Patient is a 86y old  Female who presents with a chief complaint of Sepsis 2/2 Cellulitis (09 Dec 2019 07:47)          Vital Signs Last 24 Hrs  T(C): 37.3 (09 Dec 2019 05:22), Max: 37.3 (09 Dec 2019 05:22)  T(F): 99.1 (09 Dec 2019 05:22), Max: 99.1 (09 Dec 2019 05:22)  HR: 68 (09 Dec 2019 05:22) (68 - 78)  BP: 122/70 (09 Dec 2019 05:22) (93/56 - 122/70)  BP(mean): --  RR: 18 (09 Dec 2019 05:22) (16 - 18)  SpO2: 98% (09 Dec 2019 05:22) (94% - 100%)                        10.5   12.43 )-----------( 217      ( 09 Dec 2019 07:43 )             31.6     12-09    126<L>  |  97  |  67<H>  ----------------------------<  121<H>  6.0<H>   |  15<L>  |  1.54<H>    Ca    9.0      09 Dec 2019 07:01              A/P called by RN pt with urinary retention /bladder scan; > 600 ml of urine; seen by urology for  urinary retention; advised infante cath for now.   Patient is a 86y old  Female who presents with a chief complaint of Sepsis 2/2 Cellulitis (09 Dec 2019 07:47)          Vital Signs Last 24 Hrs  T(C): 37.3 (09 Dec 2019 05:22), Max: 37.3 (09 Dec 2019 05:22)  T(F): 99.1 (09 Dec 2019 05:22), Max: 99.1 (09 Dec 2019 05:22)  HR: 68 (09 Dec 2019 05:22) (68 - 78)  BP: 122/70 (09 Dec 2019 05:22) (93/56 - 122/70)  BP(mean): --  RR: 18 (09 Dec 2019 05:22) (16 - 18)  SpO2: 98% (09 Dec 2019 05:22) (94% - 100%)                        10.5   12.43 )-----------( 217      ( 09 Dec 2019 07:43 )             31.6     12-09    126<L>  |  97  |  67<H>  ----------------------------<  121<H>  6.0<H>   |  15<L>  |  1.54<H>    Ca    9.0      09 Dec 2019 07:01  pt I alert and oriented x2-was not copoerating during exam    CVS- S1S2 RRR  Resp- clear on auscultation  GI- abdomen soft, bowel sounds present, lower abdomen  mildy tender, suprapubic area  Ext- able to move extremities        A/P  85 yo female with a hx of of CHF, DM, HLD, HTN, and an STEMI who was admitted for Sepsis 2/2 gluteal celluitis present on admission.  bacteremia/ UTI/ROB with elevated BUN/crand  potassium  urinary retention-seen by urology and advised infante cath-after explanation pt agreed for infante ; per d/w renal -continue infante cath for now, infante cath placed and drained 650ml of urine  hyperkalemia likely from ROB/urinary retention; continue low potassium diet; per d/w md, lokelma 10mg po one dose, calcium gluconate 1 gm IV;  f/u BMP at 1pm.d/w md plan of care  Alexandria Lozada(NP)  [facility], [phone] called by RN pt with urinary retention /bladder scan; > 600 ml of urine; seen by urology for  urinary retention; advised infante cath for now.   Patient is a 86y old  Female who presents with a chief complaint of Sepsis 2/2 Cellulitis (09 Dec 2019 07:47)          Vital Signs Last 24 Hrs  T(C): 37.3 (09 Dec 2019 05:22), Max: 37.3 (09 Dec 2019 05:22)  T(F): 99.1 (09 Dec 2019 05:22), Max: 99.1 (09 Dec 2019 05:22)  HR: 68 (09 Dec 2019 05:22) (68 - 78)  BP: 122/70 (09 Dec 2019 05:22) (93/56 - 122/70)  BP(mean): --  RR: 18 (09 Dec 2019 05:22) (16 - 18)  SpO2: 98% (09 Dec 2019 05:22) (94% - 100%)                        10.5   12.43 )-----------( 217      ( 09 Dec 2019 07:43 )             31.6     12-09    126<L>  |  97  |  67<H>  ----------------------------<  121<H>  6.0<H>   |  15<L>  |  1.54<H>    Ca    9.0      09 Dec 2019 07:01  pt I alert and oriented x2-was not cooperating during exam    CVS- S1S2 RRR  Resp- clear on auscultation  GI- abdomen soft, bowel sounds present, lower abdomen  mildy tender, suprapubic area  Ext- able to move extremities        A/P  87 yo female with a hx of of CHF, DM, HLD, HTN, and an STEMI who was admitted for Sepsis 2/2 gluteal celluitis present on admission.  bacteremia/ UTI/ROB with elevated BUN/crand  potassium  urinary retention-seen by urology and advised infante cath-after explanation pt agreed for infante ; per d/w renal -continue infante cath for now, infante cath placed and drained 650ml of urine  hyperkalemia likely from ROB/urinary retention; continue low potassium diet; per d/w md, lokelma 10mg po one dose, calcium gluconate 1 gm IV;  f/u BMP at 1pm.d/w md plan of care  Alexandria Lozada(NP)  3 [facility], [phone]

## 2019-12-09 NOTE — PROGRESS NOTE ADULT - SUBJECTIVE AND OBJECTIVE BOX
[facility]    Subjective: Patient seen and examined. No new events except as noted.   repeat MRI yesterday   feeling weakness     REVIEW OF SYSTEMS:    CONSTITUTIONAL: + wekaness, no fever   EYES/ENT: No visual changes;  No vertigo or throat pain   NECK: No pain or stiffness  RESPIRATORY: No cough, wheezing, hemoptysis; No shortness of breath  CARDIOVASCULAR: No chest pain or palpitations  GASTROINTESTINAL: No abdominal or epigastric pain. No nausea, vomiting, or hematemesis; No diarrhea or constipation. No melena or hematochezia.  GENITOURINARY: No dysuria, frequency or hematuria  NEUROLOGICAL: No numbness or weakness  SKIN: No itching, burning, rashes, or lesions   All other review of systems is negative unless indicated above.    MEDICATIONS:  MEDICATIONS  (STANDING):  aspirin enteric coated 81 milliGRAM(s) Oral daily  atorvastatin 40 milliGRAM(s) Oral at bedtime  aztreonam  IVPB 500 milliGRAM(s) IV Intermittent every 12 hours  clopidogrel Tablet 75 milliGRAM(s) Oral daily  dextrose 5%. 1000 milliLiter(s) (50 mL/Hr) IV Continuous <Continuous>  dextrose 50% Injectable 12.5 Gram(s) IV Push once  dextrose 50% Injectable 25 Gram(s) IV Push once  dextrose 50% Injectable 25 Gram(s) IV Push once  famotidine    Tablet 20 milliGRAM(s) Oral daily  gabapentin 100 milliGRAM(s) Oral two times a day  heparin  Injectable 5000 Unit(s) SubCutaneous every 8 hours  influenza   Vaccine 0.5 milliLiter(s) IntraMuscular once  insulin glargine Injectable (LANTUS) 15 Unit(s) SubCutaneous at bedtime  insulin lispro (HumaLOG) corrective regimen sliding scale   SubCutaneous three times a day before meals  insulin lispro (HumaLOG) corrective regimen sliding scale   SubCutaneous at bedtime  insulin lispro Injectable (HumaLOG) 8 Unit(s) SubCutaneous three times a day before meals  lactobacillus acidophilus 1 Tablet(s) Oral three times a day  levothyroxine 25 MICROGram(s) Oral daily  metoprolol tartrate 12.5 milliGRAM(s) Oral two times a day  metroNIDAZOLE  IVPB 500 milliGRAM(s) IV Intermittent every 8 hours  sodium bicarbonate 1300 milliGRAM(s) Oral three times a day  sodium chloride 0.9%. 1000 milliLiter(s) (50 mL/Hr) IV Continuous <Continuous>  vancomycin  IVPB 1000 milliGRAM(s) IV Intermittent every 24 hours      PHYSICAL EXAM:  T(C): 36.6 (12-09-19 @ 20:59), Max: 38.2 (12-09-19 @ 13:45)  HR: 61 (12-09-19 @ 20:59) (61 - 76)  BP: 106/63 (12-09-19 @ 20:59) (106/60 - 122/70)  RR: 18 (12-09-19 @ 20:59) (18 - 18)  SpO2: 98% (12-09-19 @ 20:59) (94% - 98%)  Wt(kg): --  I&O's Summary    08 Dec 2019 07:01  -  09 Dec 2019 07:00  --------------------------------------------------------  IN: 1555 mL / OUT: 690 mL / NET: 865 mL    09 Dec 2019 07:01  -  09 Dec 2019 22:41  --------------------------------------------------------  IN: 1350 mL / OUT: 965 mL / NET: 385 mL          Appearance: Normal	  HEENT:   Normal oral mucosa, PERRL, EOMI	  Lymphatic: No lymphadenopathy , no edema  Cardiovascular: Normal S1 S2, No JVD, No murmurs , Peripheral pulses palpable 2+ bilaterally  Respiratory: Lungs clear to auscultation, normal effort 	  Gastrointestinal:  Soft, Non-tender, + BS	  Skin: posterior dresing intact   Musculoskeletal: Normal range of motion, normal strength  Psychiatry:  Mood & affect appropriate  Ext: No edema      All labs, Imaging and EKGs personally reviewed                           10.5 12.43 )-----------( 217      ( 09 Dec 2019 07:43 )             31.6               12-09    129<L>  |  100  |  60<H>  ----------------------------<  176<H>  5.3   |  17<L>  |  1.51<H>    Ca    9.2      09 Dec 2019 14:54

## 2019-12-09 NOTE — CHART NOTE - NSCHARTNOTEFT_GEN_A_CORE
CC: Urinary Retention    Notified by RN that patient had 693cc of urine on her bladder scan.. Patient has been refusing a infante since yesterday evening and continues to adamantly refuse infante placement and straight catheterization.   Called Nephrologist Dr. Chin who re-iterated that patient needs a infante.  Called Urology as per Dr. Chin's recommendations.  Urology stated that there is no intervention on their part if patient is refusing infante placement.  Plan to bladder scan in 6 hours and continue to encourage infante placement.  Will endorse to AM team, attending to follow.    Clif Brandon PA-C  Department of Medicine  #[id number]

## 2019-12-09 NOTE — DIETITIAN INITIAL EVALUATION ADULT. - ADD RECOMMEND
add nepro x daily to provide 950 calories, 38gm protein, assist with tray preparation, feeding; provide encouragement at mealtime add nepro x daily to provide 950 calories, 38gm protein, assist with tray preparation, feeding; provide encouragement at mealtime. Assist with menus, tray preparation. Monitor/encourage PO intake.

## 2019-12-09 NOTE — CHART NOTE - NSCHARTNOTEFT_GEN_A_CORE
Upon Nutritional Assessment by the Registered Dietitian your patient was determined to meet criteria / has evidence of the following diagnosis/diagnoses:          [ ]  Mild Protein Calorie Malnutrition        [x ]  Moderate Protein Calorie Malnutrition        [ ] Severe Protein Calorie Malnutrition        [ ] Unspecified Protein Calorie Malnutrition        [ ] Underweight / BMI <19        [ ] Morbid Obesity / BMI > 40      Findings as based on:  [ X] Comprehensive nutrition assessment weight loss 5% in < 3 months, inadequate protein energy intake > 1 month  [ ] Nutrition Focused Physical Exam  [X ] Other: poor PO intake > 1 month      Nutrition Plan/Recommendations:  add nepro 2x daily, assist with tray preparation, feeding, encouragement; monitor hydration; continue zofran for nausea        PROVIDER Section:     By signing this assessment you are acknowledging and agree with the diagnosis/diagnoses assigned by the Registered Dietitian    Comments:

## 2019-12-09 NOTE — DIETITIAN INITIAL EVALUATION ADULT. - PROBLEM SELECTOR PLAN 9
Transitions of Care Status:  1.  Name of PCP: [facility]  2.  PCP Contacted on Admission: [ ] Y    [ ] N    3.  PCP contacted at Discharge: [ ] Y    [ ] N    [ ] N/A  4.  Post-Discharge Appointment Date and Location:  5.  Summary of Handoff given to PCP:

## 2019-12-09 NOTE — PROGRESS NOTE ADULT - PROBLEM SELECTOR PLAN 1
ddx includes pre renal vs atn vs obstruction  u lytes showing intravascular depletion--> c/w nacl @ 50cc  agreed to folehy- s/p infante this morning with >600cc urineoutput  should help with low na and high k  monitor urineoutput  c/w infante  c/w ivf as above  trend bmp

## 2019-12-09 NOTE — DIETITIAN INITIAL EVALUATION ADULT. - PROBLEM SELECTOR PLAN 1
Patient with hx of wounds and recurrent cellulitis, elevated ESR/CRP  - Will start patient on Vancomycin, Aztreonam, and Flagyl (patient with hx of anaphylaxis to penicillin) to cover for MRSA, strep, pseudomonas, anaerobes)  - Will get an MRI of pelvis to assess for osteomyleitis  - Wound care culture

## 2019-12-09 NOTE — PROGRESS NOTE ADULT - ASSESSMENT
85 yo female with a hx of of CHF, DM, HLD, HTN, and an STEMI who presents to the ED for a several day history of buttocks pain. Patient states that the pain started about a week ago, but has suddenly gotten worse over the past few days. She describes it as a sharp pain and has tried taking OTC acetaminophen, which has not helped the pain at all. Patient endorses one episode of nausea earlier this morning, but denies any vomiting. She has not noticed a rash in that area, but does endorse that the skin is difficult to visualize.  Patient has a wound care nurse that comes to the house a few times per week. She came yesterday for wound care dressing changes.    Of note, patient was hospitalized at [facility] from September 4 to October 4 at [facility] for an ulcer in between her buttocks. During that hospitalization, patient required debridement of the wound as well as IV antibiotics.     In the ED, her VS /75, HR 77, RR 18 with SpO2 of 99% and T of 99.2. Patient received a 500 cc bolus of fluids as well as Doxycycline 500 mg x1. (04 Dec 2019 17:13)  WBC 13.5.  ESR 71, CRP 10.6.  UA (-) nit/mod LE, wbc 10.  Bcx (+) GPC pairs/chains from 2 sets.  CTAP without contrast shows L medial gluteal skin cellulitis and air-filled  tract.  No drainable fluid collection.  Pt on vanco/aztreonam/flagyl.     ID consult called for further abx managment.       Sepsis:    - Pt with fever, leukocytosis and bacteremia.  Source likely from gluteal cellulitis.  CTap shows L gluteal skin cellulitis with air-filled tract, without drainable fluid collection.   Agree with broad spectrum abx for now until all culture data finalized.      - MRI pelvis ordered to evaluate for perianal fistula- pt unable to tolerate exam.  Cannot r/o underlying OM, there is fluid collection near sacrum/coccyx.  Recommend bone scan vs. repeat MRI for further evaluation.      - Blood culture (+) GPC pairs/chains - identified as strep anginosus.  r/o abscess near sacrum/coccyx.  Awaiting repeat MRI.    Recommend echocardiogram.      - Urine cultures growing enterococcus faecalis.  Based on sensitivities of blood and urine cultures, can switch change from daptomycin to vancomycin.  Cont azactam and flagyl for now to continue possible polymicrobial infection involving perineal wounds.            Will follow,    Sandrita Zaman  [phone] 85 yo female with a hx of of CHF, DM, HLD, HTN, and an STEMI who presents to the ED for a several day history of buttocks pain. Patient states that the pain started about a week ago, but has suddenly gotten worse over the past few days. She describes it as a sharp pain and has tried taking OTC acetaminophen, which has not helped the pain at all. Patient endorses one episode of nausea earlier this morning, but denies any vomiting. She has not noticed a rash in that area, but does endorse that the skin is difficult to visualize.  Patient has a wound care nurse that comes to the house a few times per week. She came yesterday for wound care dressing changes.    Of note, patient was hospitalized at [facility] from September 4 to October 4 at [facility] for an ulcer in between her buttocks. During that hospitalization, patient required debridement of the wound as well as IV antibiotics.     In the ED, her VS /75, HR 77, RR 18 with SpO2 of 99% and T of 99.2. Patient received a 500 cc bolus of fluids as well as Doxycycline 500 mg x1. (04 Dec 2019 17:13)  WBC 13.5.  ESR 71, CRP 10.6.  UA (-) nit/mod LE, wbc 10.  Bcx (+) GPC pairs/chains from 2 sets.  CTAP without contrast shows L medial gluteal skin cellulitis and air-filled  tract.  No drainable fluid collection.  Pt on vanco/aztreonam/flagyl.     ID consult called for further abx managment.       Sepsis:    - Pt with fever, leukocytosis and bacteremia.  Source likely from gluteal cellulitis.  CTap shows L gluteal skin cellulitis with air-filled tract, without drainable fluid collection.   Agree with broad spectrum abx for now until all culture data finalized.      - MRI pelvis ordered to evaluate for perianal fistula- pt unable to tolerate exam.  Cannot r/o underlying OM, there is fluid collection near sacrum/coccyx.  Recommend bone scan vs. repeat MRI for further evaluation.      - Blood culture (+) GPC pairs/chains - identified as strep anginosus.  r/o abscess near sacrum/coccyx.  Awaiting repeat MRI with IV contrast.   Evaluate for drainable focus.    - TTE no vegetations reported, unable to r/o endocarditis.  Pt may need further ALONDRA to evaluate for endocarditits.     - Urine cultures growing enterococcus faecalis.  Based on sensitivities of blood and urine cultures, can switch change from daptomycin to vancomycin.  Cont azactam and flagyl for now to continue possible polymicrobial infection involving perineal wounds.  Will d/w family regarding pt's prior penicillin allergy.  (?possible switch to meropenem - d/w pharmacy)          Will follow,    Sandrita Zaman  [phone]

## 2019-12-09 NOTE — DIETITIAN INITIAL EVALUATION ADULT. - OTHER INFO
Patient visited at bedside, per RN patient not eating, entree untouched at lunch, other alternatives offered patient refusing. MD aware (at bedside), encouraging patient to eat. patient noted with hyperkalemia. Glucerna supplement ordered however potassium elevated. patient confused stating only " I don't want to eat." No family at bedside. Patient states she has a patch on her arm for diabetes. patient noted with recent admission in September with UTI at which time weight was 137  lbs. patient nauseous, possibly from hyperkalemia, receiving glucerna will request change to Nepro in light of hyperkalemia Patient visited at bedside, per RN patient not eating, entree untouched at lunch, other alternatives offered patient refusing. MD aware (at bedside), encouraging patient to eat. patient noted with hyperkalemia. Glucerna supplement ordered however potassium elevated. patient confused stating only " I don't want to eat." No family at bedside. Patient states she has a patch on her arm for diabetes. patient noted with recent admission in September with UTI at which time weight was 137  lbs. Patient nauseous, possibly from hyperkalemia, receiving glucerna will request change to Nepro in light of hyperkalemia. per RN very poor intake past 2 days per RN. Noted today patient does not touch food, refuses offers for alternate menu items.

## 2019-12-09 NOTE — PROGRESS NOTE ADULT - SUBJECTIVE AND OBJECTIVE BOX
Patient seen and examined  was refusing infante- hgih k and low sodium  finally infante placed and >600 cc urineoutput      codeine (Unknown)  Kiwi (Anaphylaxis)  penicillins (Anaphylaxis)    Hospital Medications:   MEDICATIONS  (STANDING):  aspirin enteric coated 81 milliGRAM(s) Oral daily  atorvastatin 40 milliGRAM(s) Oral at bedtime  aztreonam  IVPB 500 milliGRAM(s) IV Intermittent every 12 hours  clopidogrel Tablet 75 milliGRAM(s) Oral daily  DAPTOmycin IVPB 240 milliGRAM(s) IV Intermittent every 48 hours  DAPTOmycin IVPB      dextrose 5%. 1000 milliLiter(s) (50 mL/Hr) IV Continuous <Continuous>  dextrose 50% Injectable 12.5 Gram(s) IV Push once  dextrose 50% Injectable 25 Gram(s) IV Push once  dextrose 50% Injectable 25 Gram(s) IV Push once  famotidine    Tablet 20 milliGRAM(s) Oral daily  gabapentin 100 milliGRAM(s) Oral two times a day  heparin  Injectable 5000 Unit(s) SubCutaneous every 8 hours  influenza   Vaccine 0.5 milliLiter(s) IntraMuscular once  insulin glargine Injectable (LANTUS) 15 Unit(s) SubCutaneous at bedtime  insulin lispro (HumaLOG) corrective regimen sliding scale   SubCutaneous three times a day before meals  insulin lispro (HumaLOG) corrective regimen sliding scale   SubCutaneous at bedtime  insulin lispro Injectable (HumaLOG) 8 Unit(s) SubCutaneous three times a day before meals  lactobacillus acidophilus 1 Tablet(s) Oral three times a day  levothyroxine 25 MICROGram(s) Oral daily  metoprolol tartrate 12.5 milliGRAM(s) Oral two times a day  metroNIDAZOLE  IVPB 500 milliGRAM(s) IV Intermittent every 8 hours  sodium bicarbonate 1300 milliGRAM(s) Oral three times a day  sodium chloride 0.9%. 1000 milliLiter(s) (50 mL/Hr) IV Continuous <Continuous>        VITALS:  T(F): 99.1 (19 @ 14:07), Max: 100.8 (19 @ 13:45)  HR: 76 (19 @ 13:45)  BP: 106/60 (19 @ 13:45)  RR: 18 (19 @ 13:45)  SpO2: 96% (19 @ 13:45)  Wt(kg): --     @ 07:  -   @ 07:00  --------------------------------------------------------  IN: 1555 mL / OUT: 690 mL / NET: 865 mL     @ 07:01  -   @ 14:16  --------------------------------------------------------  IN: 580 mL / OUT: 650 mL / NET: -70 mL    PHYSICAL EXAM:  Constitutional: NAD  HEENT: anicteric sclera, oropharynx clear, MMM  Neck: No JVD  Respiratory: CTAB, no wheezes, rales or rhonchi  Cardiovascular: S1, S2, RRR  Gastrointestinal: BS+, soft, NT/ND  Extremities: +peripheral edema  Neurological: A/O x 3, no focal deficits  Psychiatric: Normal mood, normal affect  :+ indwelling infante.    LABS:      126<L>  |  97  |  67<H>  ----------------------------<  121<H>  6.0<H>   |  15<L>  |  1.54<H>    Ca    9.0      09 Dec 2019 07:01      Creatinine Trend: 1.54 <--, 1.80 <--, 1.85 <--, 1.58 <--, 1.71 <--, 1.85 <--, 1.81 <--, 1.27 <--                        10.5   12.43 )-----------( 217      ( 09 Dec 2019 07:43 )             31.6     Urine Studies:  Urinalysis Basic - ( 07 Dec 2019 04:23 )    Color: Yellow / Appearance: Slightly Turbid / S.022 / pH:   Gluc:  / Ketone: Negative  / Bili: Negative / Urobili: <2 mg/dL   Blood:  / Protein: Trace / Nitrite: Negative   Leuk Esterase: Large / RBC: 4 /HPF /  /HPF   Sq Epi:  / Non Sq Epi: 1 /HPF / Bacteria: Moderate      Osmolality, Random Urine: 400 mosm/Kg ( @ 04:19)  Potassium, Random Urine: 34 mmol/L ( @ 00:54)  Sodium, Random Urine: 53 mmol/L ( @ 00:54)  Sodium, Random Urine: 54 mmol/L ( @ 00:54)  Chloride, Random Urine: <35 mmol/L ( @ 00:54)  Creatinine, Random Urine: 107 mg/dL ( @ 00:54)  Protein/Creatinine Ratio Calculation: 0.3 Ratio ( @ 00:54)    RADIOLOGY & ADDITIONAL STUDIES:

## 2019-12-09 NOTE — PROGRESS NOTE ADULT - PROBLEM SELECTOR PLAN 2
likely 2/2 urinary obstruction  s/p infante  c/w iv nacl to forward flow  s/p lokelma and cate gluconate  d/w np  repeat bmp in couple of hours

## 2019-12-09 NOTE — PROGRESS NOTE ADULT - SUBJECTIVE AND OBJECTIVE BOX
RED SURGERY DAILY PROGRESS NOTE:       SUBJECTIVE/ROS: Patient seen and examined at bedside.  No fevers overnight.  Patient still has some pain but is controlled, cellulitis improving on abx.  BCx growing streptococcus.           MEDICATIONS  (STANDING):  aspirin enteric coated 81 milliGRAM(s) Oral daily  atorvastatin 40 milliGRAM(s) Oral at bedtime  aztreonam  IVPB 500 milliGRAM(s) IV Intermittent every 12 hours  clopidogrel Tablet 75 milliGRAM(s) Oral daily  DAPTOmycin IVPB 240 milliGRAM(s) IV Intermittent every 48 hours  DAPTOmycin IVPB      dextrose 5%. 1000 milliLiter(s) (50 mL/Hr) IV Continuous <Continuous>  dextrose 50% Injectable 12.5 Gram(s) IV Push once  dextrose 50% Injectable 25 Gram(s) IV Push once  dextrose 50% Injectable 25 Gram(s) IV Push once  enoxaparin Injectable 40 milliGRAM(s) SubCutaneous daily  famotidine    Tablet 20 milliGRAM(s) Oral daily  gabapentin 100 milliGRAM(s) Oral two times a day  influenza   Vaccine 0.5 milliLiter(s) IntraMuscular once  insulin glargine Injectable (LANTUS) 15 Unit(s) SubCutaneous at bedtime  insulin lispro (HumaLOG) corrective regimen sliding scale   SubCutaneous three times a day before meals  insulin lispro (HumaLOG) corrective regimen sliding scale   SubCutaneous at bedtime  insulin lispro Injectable (HumaLOG) 8 Unit(s) SubCutaneous three times a day before meals  lactobacillus acidophilus 1 Tablet(s) Oral three times a day  levothyroxine 25 MICROGram(s) Oral daily  metoprolol tartrate 12.5 milliGRAM(s) Oral two times a day  metroNIDAZOLE  IVPB 500 milliGRAM(s) IV Intermittent every 8 hours  sodium bicarbonate 1300 milliGRAM(s) Oral three times a day  sodium chloride 0.9%. 1000 milliLiter(s) (50 mL/Hr) IV Continuous <Continuous>    MEDICATIONS  (PRN):  acetaminophen   Tablet .. 1000 milliGRAM(s) Oral every 6 hours PRN Mild Pain (1 - 3)  dextrose 40% Gel 15 Gram(s) Oral once PRN Blood Glucose LESS THAN 70 milliGRAM(s)/deciliter  glucagon  Injectable 1 milliGRAM(s) IntraMuscular once PRN Glucose LESS THAN 70 milligrams/deciliter  ondansetron Injectable 4 milliGRAM(s) IV Push every 6 hours PRN Nausea and/or Vomiting  traMADol 25 milliGRAM(s) Oral every 6 hours PRN Moderate Pain (4 - 6)  traMADol 50 milliGRAM(s) Oral every 6 hours PRN Severe Pain (7 - 10)      OBJECTIVE:    Vital Signs Last 24 Hrs  T(C): 37.3 (09 Dec 2019 05:22), Max: 37.3 (09 Dec 2019 05:22)  T(F): 99.1 (09 Dec 2019 05:22), Max: 99.1 (09 Dec 2019 05:22)  HR: 68 (09 Dec 2019 05:22) (68 - 78)  BP: 122/70 (09 Dec 2019 05:22) (93/56 - 122/70)  BP(mean): --  RR: 18 (09 Dec 2019 05:22) (16 - 18)  SpO2: 98% (09 Dec 2019 05:22) (94% - 100%)        I&O's Detail    08 Dec 2019 07:01  -  09 Dec 2019 07:00  --------------------------------------------------------  IN:    IV PiggyBack: 400 mL    Oral Fluid: 230 mL    sodium chloride 0.9%.: 925 mL  Total IN: 1555 mL    OUT:    Voided: 690 mL  Total OUT: 690 mL    Total NET: 865 mL          Daily     Daily Weight in k.1 (09 Dec 2019 06:00)    LABS:                        10.2   9.90  )-----------( 165      ( 08 Dec 2019 09:34 )             32.3     12-    126<L>  |  97  |  67<H>  ----------------------------<  121<H>  6.0<H>   |  15<L>  |  1.54<H>    Ca    9.0      09 Dec 2019 07:01                    PHYSICAL EXAM:  GEN: NAD, resting quietly  PULM: symmetric chest rise bilaterally, no increased WOB  ABD: soft, NTND  : right and left incision sites with healthy granulation tissue, overlying cellulitis.  Small cavity at superior portion of gluteal cleft, no drainage noted.  EXTR: no cyanosis or edema, moving all extremities        < from: MR Pelvis No Cont (12.05.19 @ 23:47) >  IMPRESSION:     Extremely limited/nondiagnostic study as the patient could not tolerate   the examination and the study was terminated prematurely.    Fluid collection overlying the inferior sacrum/coccyx. Underlying   osteomyelitis is not excluded.    Suggestion of a left perianal fistula.    < end of copied text >

## 2019-12-09 NOTE — PROGRESS NOTE ADULT - PROBLEM SELECTOR PLAN 1
positive blood Cx  ID eval appreciated  ABx as per ID, adjusted   Surg F/U appreciated  MRI noted, patient refused contrast, limited study  OM can not be ruled out  repeat MRI   Abx duration to be discussed

## 2019-12-09 NOTE — PHARMACOTHERAPY INTERVENTION NOTE - COMMENTS
Patient is currently on aztreonam 500mg IV q12h, daptomycin 240mg IV q48h, and metronidazole 500mg IV q8h for perineal wound infection. Blood culture grew Strep anginosus. Suggested daptomycin can be changed to vancomycin 1g IV q24h. Patient is currently on aztreonam 500mg IV q12h, daptomycin 240mg IV q48h, and metronidazole 500mg IV q8h for perineal wound infection. Blood culture grew Strep anginosus. Suggested daptomycin can be changed to vancomycin 1g IV q24h. Dr. Zaman also mentioned patient will be going to OR for drainage for better source control.

## 2019-12-09 NOTE — PROGRESS NOTE ADULT - SUBJECTIVE AND OBJECTIVE BOX
Infectious Diseases progress note:    Subjective: Events noted.  s/p Lieberman placement for urinary retention.  Tmax 100.8 today.  Pt awake, alert.  c/o pain in left perineal area.     ROS:  CONSTITUTIONAL:  No fever, chills, rigors  CARDIOVASCULAR:  No chest pain or palpitations  RESPIRATORY:   No SOB, cough, dyspnea on exertion.  No wheezing  GASTROINTESTINAL:  No abd pain, N/V, diarrhea/constipation  EXTREMITIES:  No swelling or joint pain  GENITOURINARY:  No burning on urination, increased frequency or urgency.  No flank pain  NEUROLOGIC:  No HA, visual disturbances  SKIN: No rashes    Allergies    codeine (Unknown)  Kiwi (Anaphylaxis)  penicillins (Anaphylaxis)    Intolerances        ANTIBIOTICS/RELEVANT:  antimicrobials  aztreonam  IVPB 500 milliGRAM(s) IV Intermittent every 12 hours  DAPTOmycin IVPB 240 milliGRAM(s) IV Intermittent every 48 hours  DAPTOmycin IVPB      metroNIDAZOLE  IVPB 500 milliGRAM(s) IV Intermittent every 8 hours    immunologic:  influenza   Vaccine 0.5 milliLiter(s) IntraMuscular once    OTHER:  acetaminophen   Tablet .. 1000 milliGRAM(s) Oral every 6 hours PRN  acetaminophen   Tablet .. 650 milliGRAM(s) Oral every 6 hours PRN  aspirin enteric coated 81 milliGRAM(s) Oral daily  atorvastatin 40 milliGRAM(s) Oral at bedtime  clopidogrel Tablet 75 milliGRAM(s) Oral daily  dextrose 40% Gel 15 Gram(s) Oral once PRN  dextrose 5%. 1000 milliLiter(s) IV Continuous <Continuous>  dextrose 50% Injectable 12.5 Gram(s) IV Push once  dextrose 50% Injectable 25 Gram(s) IV Push once  dextrose 50% Injectable 25 Gram(s) IV Push once  famotidine    Tablet 20 milliGRAM(s) Oral daily  gabapentin 100 milliGRAM(s) Oral two times a day  glucagon  Injectable 1 milliGRAM(s) IntraMuscular once PRN  heparin  Injectable 5000 Unit(s) SubCutaneous every 8 hours  insulin glargine Injectable (LANTUS) 15 Unit(s) SubCutaneous at bedtime  insulin lispro (HumaLOG) corrective regimen sliding scale   SubCutaneous three times a day before meals  insulin lispro (HumaLOG) corrective regimen sliding scale   SubCutaneous at bedtime  insulin lispro Injectable (HumaLOG) 8 Unit(s) SubCutaneous three times a day before meals  lactobacillus acidophilus 1 Tablet(s) Oral three times a day  levothyroxine 25 MICROGram(s) Oral daily  metoprolol tartrate 12.5 milliGRAM(s) Oral two times a day  ondansetron Injectable 4 milliGRAM(s) IV Push every 6 hours PRN  sodium bicarbonate 1300 milliGRAM(s) Oral three times a day  sodium chloride 0.9%. 1000 milliLiter(s) IV Continuous <Continuous>  traMADol 25 milliGRAM(s) Oral every 6 hours PRN  traMADol 50 milliGRAM(s) Oral every 6 hours PRN      Objective:  Vital Signs Last 24 Hrs  T(C): 37.3 (09 Dec 2019 14:07), Max: 38.2 (09 Dec 2019 13:45)  T(F): 99.1 (09 Dec 2019 14:07), Max: 100.8 (09 Dec 2019 13:45)  HR: 76 (09 Dec 2019 13:45) (68 - 76)  BP: 106/60 (09 Dec 2019 13:45) (93/56 - 122/70)  BP(mean): --  RR: 18 (09 Dec 2019 13:45) (18 - 18)  SpO2: 96% (09 Dec 2019 13:45) (94% - 99%)    PHYSICAL EXAM:  Constitutional:NAD  Eyes:NIDIA, EOMI  Ear/Nose/Throat: no thrush, mucositis.  Moist mucous membranes	  Neck:no JVD, no lymphadenopathy, supple  Respiratory: CTA lucian  Cardiovascular: S1S2 RRR, no murmurs  Gastrointestinal:soft, nontender,  nondistended (+) BS  Extremities:no e/e/c  Skin:  L perineal area swollen.  small clean opening in wound (fistulous tract).  No active drainage.         LABS:                        10.5   12.43 )-----------( 217      ( 09 Dec 2019 07:43 )             31.6     12-09    129<L>  |  100  |  60<H>  ----------------------------<  176<H>  5.3   |  17<L>  |  1.51<H>    Ca    9.2      09 Dec 2019 14:54                MICROBIOLOGY:      Culture - Blood in AM (12.06.19 @ 06:02)    Gram Stain:   Growth in aerobic bottle: Gram positive cocci in pairs  Growth in anaerobic bottle: Gram Positive Cocci in Pairs and Chains    Specimen Source: .Blood Blood    Culture Results:   Growth in aerobic and anaerobic bottles: Streptococcus anginosus  See previous culture [id number]    Culture - Blood in AM (12.06.19 @ 06:02)    Specimen Source: .Blood Blood    Culture Results:   No growth to date.    Culture - Urine (12.04.19 @ 17:48)    -  Levofloxacin: R >4    -  Nitrofurantoin: S <=32 Should not be used to treat pyelonephritis.    -  Tetra/Doxy: R >8    -  Vancomycin: S 2    -  Ampicillin: S <=2 Predicts results to ampicillin/sulbactam, amoxacillin-clavulanate and  piperacillin-tazobactam.    -  Ciprofloxacin: R >2    Specimen Source: .Urine Clean Catch (Midstream)    Culture Results:   >100,000 CFU/ml Enterococcus faecalis  <10,000 CFU/ml Normal Urogenital barry present    Organism Identification: Enterococcus faecalis    Organism: Enterococcus faecalis    Method Type: KAREN    Culture - Urine (12.04.19 @ 17:48)    -  Levofloxacin: R >4    -  Nitrofurantoin: S <=32 Should not be used to treat pyelonephritis.    -  Tetra/Doxy: R >8    -  Vancomycin: S 2    -  Ampicillin: S <=2 Predicts results to ampicillin/sulbactam, amoxacillin-clavulanate and  piperacillin-tazobactam.    -  Ciprofloxacin: R >2    Specimen Source: .Urine Clean Catch (Midstream)    Culture Results:   >100,000 CFU/ml Enterococcus faecalis  <10,000 CFU/ml Normal Urogenital barry present    Organism Identification: Enterococcus faecalis    Organism: Enterococcus faecalis    Method Type: KAREN        RADIOLOGY & ADDITIONAL STUDIES:    < from: US Kidney and Bladder (12.06.19 @ 15:31) >    FINDINGS:    Right kidney:  9.1 cm. No hydronephrosis or calculi. A cyst in the lower   pole measures 1.3 x 1.7 x 1.5 cm.    Left kidney:  9.7 cm. No hydronephrosis. Trace lower pole perinephric   fluid.    Urinary bladder: Debris within the urinary bladder. Prevoid volume   measures 528 cc. Patient did not have the urge to void.    IMPRESSION:     No hydronephrosis.    Distended urinary bladder with debris; correlate with urinalysis.    < end of copied text >          < from: Transthoracic Echocardiogram (12.09.19 @ 13:53) >  Conclusions:  1. Left ventricular enlargement.  2. Severe segmental left ventricular systolic dysfunction.  Akinesis of the anteroseptum, anterior wall and apex.  Diffuse hypokinesis of the remaining segments.  3. Moderate diastolic dysfunction (Stage II).  4. Right atrial enlargement.  5. Right ventricular enlargement with decreased right  ventricular systolic function.  6. Estimated pulmonary artery systolic pressure equals 70  mm Hg, assuming right atrial pressure equals 8 mm Hg,  consistent withsevere pulmonary pressures.  Unable to rule out endocarditis. Consider ALONDRA if clinically  indicated.    < end of copied text > Infectious Diseases progress note:    Subjective: Events noted.  s/p Lieberman placement for urinary retention.  Tmax 100.8 today.  Pt awake, alert.  c/o pain in left perineal area.     ROS:  CONSTITUTIONAL:  No fever, chills, rigors  CARDIOVASCULAR:  No chest pain or palpitations  RESPIRATORY:   No SOB, cough, dyspnea on exertion.  No wheezing  GASTROINTESTINAL:  No abd pain, N/V, diarrhea/constipation  EXTREMITIES:  No swelling or joint pain  GENITOURINARY:  No burning on urination, increased frequency or urgency.  No flank pain  NEUROLOGIC:  No HA, visual disturbances  SKIN: No rashes    Allergies    codeine (Unknown)  Kiwi (Anaphylaxis)  penicillins (Anaphylaxis)    Intolerances        ANTIBIOTICS/RELEVANT:  antimicrobials  aztreonam  IVPB 500 milliGRAM(s) IV Intermittent every 12 hours  DAPTOmycin IVPB 240 milliGRAM(s) IV Intermittent every 48 hours  DAPTOmycin IVPB      metroNIDAZOLE  IVPB 500 milliGRAM(s) IV Intermittent every 8 hours    immunologic:  influenza   Vaccine 0.5 milliLiter(s) IntraMuscular once    OTHER:  acetaminophen   Tablet .. 1000 milliGRAM(s) Oral every 6 hours PRN  acetaminophen   Tablet .. 650 milliGRAM(s) Oral every 6 hours PRN  aspirin enteric coated 81 milliGRAM(s) Oral daily  atorvastatin 40 milliGRAM(s) Oral at bedtime  clopidogrel Tablet 75 milliGRAM(s) Oral daily  dextrose 40% Gel 15 Gram(s) Oral once PRN  dextrose 5%. 1000 milliLiter(s) IV Continuous <Continuous>  dextrose 50% Injectable 12.5 Gram(s) IV Push once  dextrose 50% Injectable 25 Gram(s) IV Push once  dextrose 50% Injectable 25 Gram(s) IV Push once  famotidine    Tablet 20 milliGRAM(s) Oral daily  gabapentin 100 milliGRAM(s) Oral two times a day  glucagon  Injectable 1 milliGRAM(s) IntraMuscular once PRN  heparin  Injectable 5000 Unit(s) SubCutaneous every 8 hours  insulin glargine Injectable (LANTUS) 15 Unit(s) SubCutaneous at bedtime  insulin lispro (HumaLOG) corrective regimen sliding scale   SubCutaneous three times a day before meals  insulin lispro (HumaLOG) corrective regimen sliding scale   SubCutaneous at bedtime  insulin lispro Injectable (HumaLOG) 8 Unit(s) SubCutaneous three times a day before meals  lactobacillus acidophilus 1 Tablet(s) Oral three times a day  levothyroxine 25 MICROGram(s) Oral daily  metoprolol tartrate 12.5 milliGRAM(s) Oral two times a day  ondansetron Injectable 4 milliGRAM(s) IV Push every 6 hours PRN  sodium bicarbonate 1300 milliGRAM(s) Oral three times a day  sodium chloride 0.9%. 1000 milliLiter(s) IV Continuous <Continuous>  traMADol 25 milliGRAM(s) Oral every 6 hours PRN  traMADol 50 milliGRAM(s) Oral every 6 hours PRN      Objective:  Vital Signs Last 24 Hrs  T(C): 37.3 (09 Dec 2019 14:07), Max: 38.2 (09 Dec 2019 13:45)  T(F): 99.1 (09 Dec 2019 14:07), Max: 100.8 (09 Dec 2019 13:45)  HR: 76 (09 Dec 2019 13:45) (68 - 76)  BP: 106/60 (09 Dec 2019 13:45) (93/56 - 122/70)  BP(mean): --  RR: 18 (09 Dec 2019 13:45) (18 - 18)  SpO2: 96% (09 Dec 2019 13:45) (94% - 99%)    PHYSICAL EXAM:  Constitutional:NAD  Eyes:NIDIA, EOMI  Ear/Nose/Throat: no thrush, mucositis.  Moist mucous membranes	  Neck:no JVD, no lymphadenopathy, supple  Respiratory: CTA lucian  Cardiovascular: S1S2 RRR, no murmurs  Gastrointestinal:soft, nontender,  nondistended (+) BS  Extremities:no e/e/c  Skin:  L perineal area swollen, indurated with erythema.  small clean opening in wound (fistulous tract).  No active drainage.         LABS:                        10.5   12.43 )-----------( 217      ( 09 Dec 2019 07:43 )             31.6     12-09    129<L>  |  100  |  60<H>  ----------------------------<  176<H>  5.3   |  17<L>  |  1.51<H>    Ca    9.2      09 Dec 2019 14:54                MICROBIOLOGY:      Culture - Blood in AM (12.06.19 @ 06:02)    Gram Stain:   Growth in aerobic bottle: Gram positive cocci in pairs  Growth in anaerobic bottle: Gram Positive Cocci in Pairs and Chains    Specimen Source: .Blood Blood    Culture Results:   Growth in aerobic and anaerobic bottles: Streptococcus anginosus  See previous culture [id number]    Culture - Blood in AM (12.06.19 @ 06:02)    Specimen Source: .Blood Blood    Culture Results:   No growth to date.    Culture - Urine (12.04.19 @ 17:48)    -  Levofloxacin: R >4    -  Nitrofurantoin: S <=32 Should not be used to treat pyelonephritis.    -  Tetra/Doxy: R >8    -  Vancomycin: S 2    -  Ampicillin: S <=2 Predicts results to ampicillin/sulbactam, amoxacillin-clavulanate and  piperacillin-tazobactam.    -  Ciprofloxacin: R >2    Specimen Source: .Urine Clean Catch (Midstream)    Culture Results:   >100,000 CFU/ml Enterococcus faecalis  <10,000 CFU/ml Normal Urogenital barry present    Organism Identification: Enterococcus faecalis    Organism: Enterococcus faecalis    Method Type: KAREN    Culture - Urine (12.04.19 @ 17:48)    -  Levofloxacin: R >4    -  Nitrofurantoin: S <=32 Should not be used to treat pyelonephritis.    -  Tetra/Doxy: R >8    -  Vancomycin: S 2    -  Ampicillin: S <=2 Predicts results to ampicillin/sulbactam, amoxacillin-clavulanate and  piperacillin-tazobactam.    -  Ciprofloxacin: R >2    Specimen Source: .Urine Clean Catch (Midstream)    Culture Results:   >100,000 CFU/ml Enterococcus faecalis  <10,000 CFU/ml Normal Urogenital barry present    Organism Identification: Enterococcus faecalis    Organism: Enterococcus faecalis    Method Type: KAREN        RADIOLOGY & ADDITIONAL STUDIES:    < from: US Kidney and Bladder (12.06.19 @ 15:31) >    FINDINGS:    Right kidney:  9.1 cm. No hydronephrosis or calculi. A cyst in the lower   pole measures 1.3 x 1.7 x 1.5 cm.    Left kidney:  9.7 cm. No hydronephrosis. Trace lower pole perinephric   fluid.    Urinary bladder: Debris within the urinary bladder. Prevoid volume   measures 528 cc. Patient did not have the urge to void.    IMPRESSION:     No hydronephrosis.    Distended urinary bladder with debris; correlate with urinalysis.    < end of copied text >          < from: Transthoracic Echocardiogram (12.09.19 @ 13:53) >  Conclusions:  1. Left ventricular enlargement.  2. Severe segmental left ventricular systolic dysfunction.  Akinesis of the anteroseptum, anterior wall and apex.  Diffuse hypokinesis of the remaining segments.  3. Moderate diastolic dysfunction (Stage II).  4. Right atrial enlargement.  5. Right ventricular enlargement with decreased right  ventricular systolic function.  6. Estimated pulmonary artery systolic pressure equals 70  mm Hg, assuming right atrial pressure equals 8 mm Hg,  consistent withsevere pulmonary pressures.  Unable to rule out endocarditis. Consider ALONDRA if clinically  indicated.    < end of copied text >

## 2019-12-09 NOTE — PROGRESS NOTE ADULT - SUBJECTIVE AND OBJECTIVE BOX
Patient is a 86y old  Female who presents with a chief complaint of Sepsis 2/2 Cellulitis (09 Dec 2019 22:41)      INTERVAL HISTORY: feels ok  	  MEDICATIONS:  metoprolol tartrate 12.5 milliGRAM(s) Oral two times a day        PHYSICAL EXAM:  T(C): 36.6 (12-09-19 @ 20:59), Max: 38.2 (12-09-19 @ 13:45)  HR: 61 (12-09-19 @ 20:59) (61 - 76)  BP: 106/63 (12-09-19 @ 20:59) (106/60 - 122/70)  RR: 18 (12-09-19 @ 20:59) (18 - 18)  SpO2: 98% (12-09-19 @ 20:59) (94% - 98%)  Wt(kg): --  I&O's Summary    08 Dec 2019 07:01  -  09 Dec 2019 07:00  --------------------------------------------------------  IN: 1555 mL / OUT: 690 mL / NET: 865 mL    09 Dec 2019 07:01  -  09 Dec 2019 23:12  --------------------------------------------------------  IN: 1350 mL / OUT: 965 mL / NET: 385 mL          Appearance: In no distress	  HEENT:    PERRL, EOMI	  Cardiovascular:  S1 S2, No JVD  Respiratory: Lungs clear to auscultation	  Gastrointestinal:  Soft, Non-tender, + BS	  Extremities:  No edema of LE                                10.5   12.43 )-----------( 217      ( 09 Dec 2019 07:43 )             31.6     12-09    129<L>  |  100  |  60<H>  ----------------------------<  176<H>  5.3   |  17<L>  |  1.51<H>    Ca    9.2      09 Dec 2019 14:54          Labs personally reviewed      Assessment /Plan:   Chronic diastolic HF - euvolemic, off Lasix  CAD - c/w DAPT, Metoprolol and ASA  HTN - well controlled          Srini Velasco DO [facility]  Cardiovascular Medicine  [phone]

## 2019-12-10 LAB
ANION GAP SERPL CALC-SCNC: 14 MMOL/L — SIGNIFICANT CHANGE UP (ref 5–17)
BASOPHILS # BLD AUTO: 0.06 K/UL — SIGNIFICANT CHANGE UP (ref 0–0.2)
BASOPHILS NFR BLD AUTO: 0.7 % — SIGNIFICANT CHANGE UP (ref 0–2)
BUN SERPL-MCNC: 60 MG/DL — HIGH (ref 7–23)
CALCIUM SERPL-MCNC: 8.6 MG/DL — SIGNIFICANT CHANGE UP (ref 8.4–10.5)
CHLORIDE SERPL-SCNC: 98 MMOL/L — SIGNIFICANT CHANGE UP (ref 96–108)
CO2 SERPL-SCNC: 18 MMOL/L — LOW (ref 22–31)
CREAT SERPL-MCNC: 1.56 MG/DL — HIGH (ref 0.5–1.3)
CULTURE RESULTS: SIGNIFICANT CHANGE UP
EOSINOPHIL # BLD AUTO: 0.06 K/UL — SIGNIFICANT CHANGE UP (ref 0–0.5)
EOSINOPHIL NFR BLD AUTO: 0.7 % — SIGNIFICANT CHANGE UP (ref 0–6)
GLUCOSE BLDC GLUCOMTR-MCNC: 100 MG/DL — HIGH (ref 70–99)
GLUCOSE BLDC GLUCOMTR-MCNC: 107 MG/DL — HIGH (ref 70–99)
GLUCOSE BLDC GLUCOMTR-MCNC: 161 MG/DL — HIGH (ref 70–99)
GLUCOSE BLDC GLUCOMTR-MCNC: 174 MG/DL — HIGH (ref 70–99)
GLUCOSE BLDC GLUCOMTR-MCNC: 228 MG/DL — HIGH (ref 70–99)
GLUCOSE BLDC GLUCOMTR-MCNC: 93 MG/DL — SIGNIFICANT CHANGE UP (ref 70–99)
GLUCOSE SERPL-MCNC: 243 MG/DL — HIGH (ref 70–99)
HCT VFR BLD CALC: 32.3 % — LOW (ref 34.5–45)
HGB BLD-MCNC: 10.7 G/DL — LOW (ref 11.5–15.5)
IMM GRANULOCYTES NFR BLD AUTO: 1 % — SIGNIFICANT CHANGE UP (ref 0–1.5)
LYMPHOCYTES # BLD AUTO: 1.38 K/UL — SIGNIFICANT CHANGE UP (ref 1–3.3)
LYMPHOCYTES # BLD AUTO: 15.1 % — SIGNIFICANT CHANGE UP (ref 13–44)
MCHC RBC-ENTMCNC: 28.6 PG — SIGNIFICANT CHANGE UP (ref 27–34)
MCHC RBC-ENTMCNC: 33.1 GM/DL — SIGNIFICANT CHANGE UP (ref 32–36)
MCV RBC AUTO: 86.4 FL — SIGNIFICANT CHANGE UP (ref 80–100)
MONOCYTES # BLD AUTO: 0.82 K/UL — SIGNIFICANT CHANGE UP (ref 0–0.9)
MONOCYTES NFR BLD AUTO: 9 % — SIGNIFICANT CHANGE UP (ref 2–14)
NEUTROPHILS # BLD AUTO: 6.73 K/UL — SIGNIFICANT CHANGE UP (ref 1.8–7.4)
NEUTROPHILS NFR BLD AUTO: 73.5 % — SIGNIFICANT CHANGE UP (ref 43–77)
PLATELET # BLD AUTO: 247 K/UL — SIGNIFICANT CHANGE UP (ref 150–400)
POTASSIUM SERPL-MCNC: 5.1 MMOL/L — SIGNIFICANT CHANGE UP (ref 3.5–5.3)
POTASSIUM SERPL-SCNC: 5.1 MMOL/L — SIGNIFICANT CHANGE UP (ref 3.5–5.3)
RBC # BLD: 3.74 M/UL — LOW (ref 3.8–5.2)
RBC # FLD: 15.4 % — HIGH (ref 10.3–14.5)
SODIUM SERPL-SCNC: 130 MMOL/L — LOW (ref 135–145)
SPECIMEN SOURCE: SIGNIFICANT CHANGE UP
WBC # BLD: 9.14 K/UL — SIGNIFICANT CHANGE UP (ref 3.8–10.5)
WBC # FLD AUTO: 9.14 K/UL — SIGNIFICANT CHANGE UP (ref 3.8–10.5)

## 2019-12-10 PROCEDURE — 99232 SBSQ HOSP IP/OBS MODERATE 35: CPT

## 2019-12-10 RX ORDER — TRAMADOL HYDROCHLORIDE 50 MG/1
50 TABLET ORAL EVERY 12 HOURS
Refills: 0 | Status: DISCONTINUED | OUTPATIENT
Start: 2019-12-10 | End: 2019-12-10

## 2019-12-10 RX ORDER — TRAMADOL HYDROCHLORIDE 50 MG/1
50 TABLET ORAL EVERY 6 HOURS
Refills: 0 | Status: DISCONTINUED | OUTPATIENT
Start: 2019-12-10 | End: 2019-12-10

## 2019-12-10 RX ORDER — INSULIN GLARGINE 100 [IU]/ML
7 INJECTION, SOLUTION SUBCUTANEOUS ONCE
Refills: 0 | Status: COMPLETED | OUTPATIENT
Start: 2019-12-10 | End: 2019-12-10

## 2019-12-10 RX ORDER — TRAMADOL HYDROCHLORIDE 50 MG/1
25 TABLET ORAL EVERY 12 HOURS
Refills: 0 | Status: DISCONTINUED | OUTPATIENT
Start: 2019-12-10 | End: 2019-12-10

## 2019-12-10 RX ORDER — TRAMADOL HYDROCHLORIDE 50 MG/1
25 TABLET ORAL EVERY 6 HOURS
Refills: 0 | Status: DISCONTINUED | OUTPATIENT
Start: 2019-12-10 | End: 2019-12-10

## 2019-12-10 RX ADMIN — GABAPENTIN 100 MILLIGRAM(S): 400 CAPSULE ORAL at 06:10

## 2019-12-10 RX ADMIN — Medication 25 MICROGRAM(S): at 06:11

## 2019-12-10 RX ADMIN — Medication 1 TABLET(S): at 22:32

## 2019-12-10 RX ADMIN — Medication 100 MILLIGRAM(S): at 01:55

## 2019-12-10 RX ADMIN — Medication 1 TABLET(S): at 06:11

## 2019-12-10 RX ADMIN — Medication 50 MILLIGRAM(S): at 22:32

## 2019-12-10 RX ADMIN — HEPARIN SODIUM 5000 UNIT(S): 5000 INJECTION INTRAVENOUS; SUBCUTANEOUS at 06:11

## 2019-12-10 RX ADMIN — HEPARIN SODIUM 5000 UNIT(S): 5000 INJECTION INTRAVENOUS; SUBCUTANEOUS at 22:32

## 2019-12-10 RX ADMIN — HEPARIN SODIUM 5000 UNIT(S): 5000 INJECTION INTRAVENOUS; SUBCUTANEOUS at 13:57

## 2019-12-10 RX ADMIN — Medication 1000 MILLIGRAM(S): at 04:33

## 2019-12-10 RX ADMIN — Medication 1000 MILLIGRAM(S): at 23:15

## 2019-12-10 RX ADMIN — Medication 1 TABLET(S): at 14:02

## 2019-12-10 RX ADMIN — Medication 50 MILLIGRAM(S): at 06:16

## 2019-12-10 RX ADMIN — Medication 1000 MILLIGRAM(S): at 03:06

## 2019-12-10 RX ADMIN — Medication 2: at 08:25

## 2019-12-10 RX ADMIN — Medication 100 MILLIGRAM(S): at 09:59

## 2019-12-10 RX ADMIN — Medication 1: at 14:04

## 2019-12-10 RX ADMIN — Medication 100 MILLIGRAM(S): at 17:38

## 2019-12-10 RX ADMIN — Medication 1300 MILLIGRAM(S): at 06:09

## 2019-12-10 RX ADMIN — Medication 8 UNIT(S): at 10:09

## 2019-12-10 RX ADMIN — Medication 1300 MILLIGRAM(S): at 14:03

## 2019-12-10 RX ADMIN — FAMOTIDINE 20 MILLIGRAM(S): 10 INJECTION INTRAVENOUS at 08:25

## 2019-12-10 RX ADMIN — INSULIN GLARGINE 7 UNIT(S): 100 INJECTION, SOLUTION SUBCUTANEOUS at 23:47

## 2019-12-10 RX ADMIN — Medication 8 UNIT(S): at 13:27

## 2019-12-10 RX ADMIN — ATORVASTATIN CALCIUM 40 MILLIGRAM(S): 80 TABLET, FILM COATED ORAL at 22:32

## 2019-12-10 RX ADMIN — Medication 250 MILLIGRAM(S): at 23:38

## 2019-12-10 RX ADMIN — Medication 1300 MILLIGRAM(S): at 22:33

## 2019-12-10 RX ADMIN — GABAPENTIN 100 MILLIGRAM(S): 400 CAPSULE ORAL at 17:43

## 2019-12-10 RX ADMIN — Medication 1000 MILLIGRAM(S): at 22:41

## 2019-12-10 RX ADMIN — CLOPIDOGREL BISULFATE 75 MILLIGRAM(S): 75 TABLET, FILM COATED ORAL at 13:22

## 2019-12-10 RX ADMIN — Medication 81 MILLIGRAM(S): at 13:22

## 2019-12-10 RX ADMIN — Medication 12.5 MILLIGRAM(S): at 06:11

## 2019-12-10 NOTE — PROGRESS NOTE ADULT - SUBJECTIVE AND OBJECTIVE BOX
Patient seen and examined  no complaints    codeine (Unknown)  Kiwi (Anaphylaxis)  penicillins (Anaphylaxis)    Hospital Medications:   MEDICATIONS  (STANDING):  aspirin enteric coated 81 milliGRAM(s) Oral daily  atorvastatin 40 milliGRAM(s) Oral at bedtime  aztreonam  IVPB 500 milliGRAM(s) IV Intermittent every 12 hours  clopidogrel Tablet 75 milliGRAM(s) Oral daily  dextrose 5%. 1000 milliLiter(s) (50 mL/Hr) IV Continuous <Continuous>  dextrose 50% Injectable 12.5 Gram(s) IV Push once  dextrose 50% Injectable 25 Gram(s) IV Push once  dextrose 50% Injectable 25 Gram(s) IV Push once  famotidine    Tablet 20 milliGRAM(s) Oral daily  gabapentin 100 milliGRAM(s) Oral two times a day  heparin  Injectable 5000 Unit(s) SubCutaneous every 8 hours  influenza   Vaccine 0.5 milliLiter(s) IntraMuscular once  insulin glargine Injectable (LANTUS) 15 Unit(s) SubCutaneous at bedtime  insulin lispro (HumaLOG) corrective regimen sliding scale   SubCutaneous three times a day before meals  insulin lispro (HumaLOG) corrective regimen sliding scale   SubCutaneous at bedtime  insulin lispro Injectable (HumaLOG) 8 Unit(s) SubCutaneous three times a day before meals  lactobacillus acidophilus 1 Tablet(s) Oral three times a day  levothyroxine 25 MICROGram(s) Oral daily  metoprolol tartrate 12.5 milliGRAM(s) Oral two times a day  metroNIDAZOLE  IVPB 500 milliGRAM(s) IV Intermittent every 8 hours  sodium bicarbonate 1300 milliGRAM(s) Oral three times a day  sodium chloride 0.9%. 1000 milliLiter(s) (50 mL/Hr) IV Continuous <Continuous>  vancomycin  IVPB 1000 milliGRAM(s) IV Intermittent every 24 hours      VITALS:  T(F): 98 (12-10-19 @ 06:06), Max: 100.8 (19 @ 13:45)  HR: 60 (12-10-19 @ 06:06)  BP: 102/64 (12-10-19 @ 06:06)  RR: 18 (12-10-19 @ 06:06)  SpO2: 99% (12-10-19 @ 06:06)  Wt(kg): --     @ 07:01  -  12-10 @ 07:00  --------------------------------------------------------  IN: 1450 mL / OUT: 1215 mL / NET: 235 mL      PHYSICAL EXAM:  Constitutional: NAD  HEENT: anicteric sclera, oropharynx clear, MMM  Neck: No JVD  Respiratory: CTAB, no wheezes, rales or rhonchi  Cardiovascular: S1, S2, RRR  Gastrointestinal: BS+, soft, NT/ND  Extremities: +peripheral edema  Neurological: A/O x 3, no focal deficits  Psychiatric: Normal mood, normal affect  :+ indwelling infante.    LABS:  12-10    130<L>  |  98  |  60<H>  ----------------------------<  243<H>  5.1   |  18<L>  |  1.56<H>    Ca    8.6      10 Dec 2019 08:56      Creatinine Trend: 1.56 <--, 1.51 <--, 1.54 <--, 1.80 <--, 1.85 <--, 1.58 <--, 1.71 <--, 1.85 <--, 1.81 <--, 1.27 <--                        10.5   12.43 )-----------( 217      ( 09 Dec 2019 07:43 )             31.6     Urine Studies:  Urinalysis Basic - ( 07 Dec 2019 04:23 )    Color: Yellow / Appearance: Slightly Turbid / S.022 / pH:   Gluc:  / Ketone: Negative  / Bili: Negative / Urobili: <2 mg/dL   Blood:  / Protein: Trace / Nitrite: Negative   Leuk Esterase: Large / RBC: 4 /HPF /  /HPF   Sq Epi:  / Non Sq Epi: 1 /HPF / Bacteria: Moderate      Osmolality, Random Urine: 400 mosm/Kg ( @ 04:19)  Potassium, Random Urine: 34 mmol/L ( @ 00:54)  Sodium, Random Urine: 53 mmol/L ( @ 00:54)  Sodium, Random Urine: 54 mmol/L ( @ 00:54)  Chloride, Random Urine: <35 mmol/L ( @ 00:54)  Creatinine, Random Urine: 107 mg/dL ( @ 00:54)  Protein/Creatinine Ratio Calculation: 0.3 Ratio ( @ 00:54)    RADIOLOGY & ADDITIONAL STUDIES:

## 2019-12-10 NOTE — PROGRESS NOTE ADULT - ASSESSMENT
87 yo woman with a hx of CHF, DM, HLD, HTN, CAD prior MI, sp debridement of bilateral buttocks (9/15, 9/28) presenting with buttock cellulitis. CT consistent with cellulitis without underlying abscess.    Plan:   - no urgent surgical intervention at this time  - c/w IV antibiotics per ID (BC grew gram+ cocci in chains)   - cont local wound care  - Needs PT evaluation   - OOB  - Continue care as per medicine     Red surgery  [id number]

## 2019-12-10 NOTE — CHART NOTE - NSCHARTNOTEFT_GEN_A_CORE
Notified by RN that pt was not eating during the day, didn't have dinner, now refusing snack. BG was 93. Lantus was lowered for tonight only. Will recheck in AM.       Elisha Muro NP, #[id number]

## 2019-12-10 NOTE — PROGRESS NOTE ADULT - PROBLEM SELECTOR PLAN 4
- Holding spironolactone and lasix as patient was hypotensive  Card eval  Echo noted, ? endocarditis  ? ALONDRA

## 2019-12-10 NOTE — PROGRESS NOTE ADULT - SUBJECTIVE AND OBJECTIVE BOX
Infectious Diseases progress note:    Subjective:  Events noted.  Pt seen and examined earlier today.  s/p repeat MRI.  Pt seen by wound care.  No new fevers.  Leukocytosis resolved.     ROS:  CONSTITUTIONAL:  No fever, chills, rigors  CARDIOVASCULAR:  No chest pain or palpitations  RESPIRATORY:   No SOB, cough, dyspnea on exertion.  No wheezing  GASTROINTESTINAL:  No abd pain, N/V, diarrhea/constipation  EXTREMITIES:  No swelling or joint pain  GENITOURINARY:  No burning on urination, increased frequency or urgency.  No flank pain  NEUROLOGIC:  No HA, visual disturbances  SKIN: No rashes    Allergies    codeine (Unknown)  Kiwi (Anaphylaxis)  penicillins (Anaphylaxis)    Intolerances        ANTIBIOTICS/RELEVANT:  antimicrobials  aztreonam  IVPB 500 milliGRAM(s) IV Intermittent every 12 hours  metroNIDAZOLE  IVPB 500 milliGRAM(s) IV Intermittent every 8 hours  vancomycin  IVPB 1000 milliGRAM(s) IV Intermittent every 24 hours    immunologic:  influenza   Vaccine 0.5 milliLiter(s) IntraMuscular once    OTHER:  acetaminophen   Tablet .. 1000 milliGRAM(s) Oral every 6 hours PRN  acetaminophen   Tablet .. 650 milliGRAM(s) Oral every 6 hours PRN  aspirin enteric coated 81 milliGRAM(s) Oral daily  atorvastatin 40 milliGRAM(s) Oral at bedtime  clopidogrel Tablet 75 milliGRAM(s) Oral daily  dextrose 40% Gel 15 Gram(s) Oral once PRN  dextrose 5%. 1000 milliLiter(s) IV Continuous <Continuous>  dextrose 50% Injectable 12.5 Gram(s) IV Push once  dextrose 50% Injectable 25 Gram(s) IV Push once  dextrose 50% Injectable 25 Gram(s) IV Push once  famotidine    Tablet 20 milliGRAM(s) Oral daily  gabapentin 100 milliGRAM(s) Oral two times a day  glucagon  Injectable 1 milliGRAM(s) IntraMuscular once PRN  heparin  Injectable 5000 Unit(s) SubCutaneous every 8 hours  insulin glargine Injectable (LANTUS) 15 Unit(s) SubCutaneous at bedtime  insulin lispro (HumaLOG) corrective regimen sliding scale   SubCutaneous three times a day before meals  insulin lispro (HumaLOG) corrective regimen sliding scale   SubCutaneous at bedtime  insulin lispro Injectable (HumaLOG) 8 Unit(s) SubCutaneous three times a day before meals  lactobacillus acidophilus 1 Tablet(s) Oral three times a day  levothyroxine 25 MICROGram(s) Oral daily  metoprolol tartrate 12.5 milliGRAM(s) Oral two times a day  ondansetron Injectable 4 milliGRAM(s) IV Push every 6 hours PRN  sodium bicarbonate 1300 milliGRAM(s) Oral three times a day  sodium chloride 0.9%. 1000 milliLiter(s) IV Continuous <Continuous>  traMADol 25 milliGRAM(s) Oral every 12 hours PRN  traMADol 50 milliGRAM(s) Oral every 12 hours PRN      Objective:  Vital Signs Last 24 Hrs  T(C): 36.4 (10 Dec 2019 14:30), Max: 36.9 (10 Dec 2019 00:15)  T(F): 97.5 (10 Dec 2019 14:30), Max: 98.5 (10 Dec 2019 00:15)  HR: 57 (10 Dec 2019 14:30) (57 - 66)  BP: 112/72 (10 Dec 2019 14:30) (102/64 - 114/69)  BP(mean): --  RR: 18 (10 Dec 2019 14:30) (18 - 18)  SpO2: 99% (10 Dec 2019 14:30) (98% - 99%)    PHYSICAL EXAM:  Constitutional:NAD  Eyes:NIDIA, EOMI  Ear/Nose/Throat: no thrush, mucositis.  Moist mucous membranes	  Neck:no JVD, no lymphadenopathy, supple  Respiratory: CTA lucian  Cardiovascular: S1S2 RRR, no murmurs  Gastrointestinal:soft, nontender,  nondistended (+) BS  Extremities:no e/e/c  Skin:  wound dsg over perineum intact        LABS:                        10.7   9.14  )-----------( 247      ( 10 Dec 2019 12:51 )             32.3     12-10    130<L>  |  98  |  60<H>  ----------------------------<  243<H>  5.1   |  18<L>  |  1.56<H>    Ca    8.6      10 Dec 2019 08:56            MICROBIOLOGY:    Culture - Blood in AM (12.06.19 @ 06:02)    Gram Stain:   Growth in aerobic bottle: Gram positive cocci in pairs  Growth in anaerobic bottle: Gram Positive Cocci in Pairs and Chains    Specimen Source: .Blood Blood    Culture Results:   Growth in aerobic and anaerobic bottles: Streptococcus anginosus  See previous culture [id number]    Culture - Blood in AM (12.06.19 @ 06:02)    Specimen Source: .Blood Blood    Culture Results:   No growth to date.    Culture - Blood in AM (12.05.19 @ 09:27)    Gram Stain:   Growth in anaerobic bottle: Gram Positive Cocci in Pairs and Chains    Specimen Source: .Blood Blood-Peripheral    Culture Results:   Growth in anaerobic bottle: Streptococcus anginosus  See previous culture [id number]    Culture - Blood in AM (12.05.19 @ 09:27)    Specimen Source: .Blood Blood-Peripheral    Culture Results:   No growth at 5 days.    Culture - Urine (12.04.19 @ 17:48)    -  Ciprofloxacin: R >2    -  Nitrofurantoin: S <=32 Should not be used to treat pyelonephritis.    -  Levofloxacin: R >4    -  Tetra/Doxy: R >8    -  Vancomycin: S 2    -  Ampicillin: S <=2 Predicts results to ampicillin/sulbactam, amoxacillin-clavulanate and  piperacillin-tazobactam.    Specimen Source: .Urine Clean Catch (Midstream)    Culture Results:   >100,000 CFU/ml Enterococcus faecalis  <10,000 CFU/ml Normal Urogenital barry present    Organism Identification: Enterococcus faecalis    Organism: Enterococcus faecalis    Method Type: KAREN          RADIOLOGY & ADDITIONAL STUDIES:  < from: MR Pelvis w/wo IV Cont (12.08.19 @ 22:40) >  IMPRESSION:     Limited examination due to motion artifact.    Abscess overlying the inferior sacrum and coccyx. Evaluation for   underlying osteomyelitis of the coccyx is limited however questioned.     No definite evidence of a perianal fistula.    < end of copied text >

## 2019-12-10 NOTE — PROGRESS NOTE ADULT - SUBJECTIVE AND OBJECTIVE BOX
Wound Surgery Progress Note:    Follow up visit to patient for wound management. this is an 85 yo female with a hx of of CHF, DM, HLD, HTN, and an STEMI who was admitted for Sepsis 2/2 gluteal celluitis present on admission with positive blood cx. MRI noted, no perianal fistula noted, however, abscess identified however, could not be palpated or identified on exam.    PAST MEDICAL & SURGICAL HISTORY:  CHF (congestive heart failure)  STEMI (ST elevation myocardial infarction)  Palpitations  Diabetes mellitus  Dyslipidemia  HTN (hypertension)  S/P cardiac cath  S/P tonsillectomy  S/P lumpectomy, left breast: premalignant  S/P appendectomy  S/P cholecystectomy    MEDICATIONS  (STANDING):  aspirin enteric coated 81 milliGRAM(s) Oral daily  atorvastatin 40 milliGRAM(s) Oral at bedtime  aztreonam  IVPB 500 milliGRAM(s) IV Intermittent every 12 hours  clopidogrel Tablet 75 milliGRAM(s) Oral daily  dextrose 5%. 1000 milliLiter(s) (50 mL/Hr) IV Continuous <Continuous>  dextrose 50% Injectable 12.5 Gram(s) IV Push once  dextrose 50% Injectable 25 Gram(s) IV Push once  dextrose 50% Injectable 25 Gram(s) IV Push once  famotidine    Tablet 20 milliGRAM(s) Oral daily  gabapentin 100 milliGRAM(s) Oral two times a day  heparin  Injectable 5000 Unit(s) SubCutaneous every 8 hours  influenza   Vaccine 0.5 milliLiter(s) IntraMuscular once  insulin glargine Injectable (LANTUS) 15 Unit(s) SubCutaneous at bedtime  insulin lispro (HumaLOG) corrective regimen sliding scale   SubCutaneous three times a day before meals  insulin lispro (HumaLOG) corrective regimen sliding scale   SubCutaneous at bedtime  insulin lispro Injectable (HumaLOG) 8 Unit(s) SubCutaneous three times a day before meals  lactobacillus acidophilus 1 Tablet(s) Oral three times a day  levothyroxine 25 MICROGram(s) Oral daily  metoprolol tartrate 12.5 milliGRAM(s) Oral two times a day  metroNIDAZOLE  IVPB 500 milliGRAM(s) IV Intermittent every 8 hours  sodium bicarbonate 1300 milliGRAM(s) Oral three times a day  sodium chloride 0.9%. 1000 milliLiter(s) (50 mL/Hr) IV Continuous <Continuous>  vancomycin  IVPB 1000 milliGRAM(s) IV Intermittent every 24 hours    MEDICATIONS  (PRN):  acetaminophen   Tablet .. 1000 milliGRAM(s) Oral every 6 hours PRN Mild Pain (1 - 3)  acetaminophen   Tablet .. 650 milliGRAM(s) Oral every 6 hours PRN Temp greater or equal to 38C (100.4F)  dextrose 40% Gel 15 Gram(s) Oral once PRN Blood Glucose LESS THAN 70 milliGRAM(s)/deciliter  glucagon  Injectable 1 milliGRAM(s) IntraMuscular once PRN Glucose LESS THAN 70 milligrams/deciliter  ondansetron Injectable 4 milliGRAM(s) IV Push every 6 hours PRN Nausea and/or Vomiting  traMADol 25 milliGRAM(s) Oral every 6 hours PRN Moderate Pain (4 - 6)  traMADol 50 milliGRAM(s) Oral every 6 hours PRN Severe Pain (7 - 10)    Allergies    codeine (Unknown)  Kiwi (Anaphylaxis)  penicillins (Anaphylaxis)    Intolerances    Vital Signs Last 24 Hrs  T(C): 36.7 (10 Dec 2019 06:06), Max: 38.2 (09 Dec 2019 13:45)  T(F): 98 (10 Dec 2019 06:06), Max: 100.8 (09 Dec 2019 13:45)  HR: 60 (10 Dec 2019 06:06) (60 - 76)  BP: 102/64 (10 Dec 2019 06:06) (102/64 - 114/69)  BP(mean): --  RR: 18 (10 Dec 2019 06:06) (18 - 18)  SpO2: 99% (10 Dec 2019 06:06) (96% - 99%)    Physical Exam:  General: NAD, WN/ WG  Respiratory: no SOB on room air  Gastrointestinal: soft NT/ND  Neurology: sensation grossly intact  Musculoskeletal: no contractures or deformities  Vascular: no BLE edema, BLE equally warm  Skin:  Left buttock with well healed old surgical scar with no drainage, mid sacrum L 2cm x W 1cm x D 4cm with tunneling at 1 oclock to 4cm, small amount of serosanguinous drainage, no necrotic tissue, No odor, erythema, increased warmth, tenderness, induration, fluctuance    LABS:  12-10    130<L>  |  98  |  60<H>  ----------------------------<  243<H>  5.1   |  18<L>  |  1.56<H>    Ca    8.6      10 Dec 2019 08:56                            10.7   9.14  )-----------( 247      ( 10 Dec 2019 12:51 )             32.3           RADIOLOGY & ADDITIONAL STUDIES:    EXAM:  MR PELVIS WAW IC                        PROCEDURE DATE:  12/08/2019    INTERPRETATION:  CLINICAL INFORMATION: Sepsis secondary to gluteal   cellulitis. Evaluate for osteomyelitis and/or fistula formation.    COMPARISON: None.    PROCEDURE:   MRI of the pelvis was performed with and without intravenous contrast.  IV Contrast: Gadavist. 6 cc administered, 1.5 cc discarded.      FINDINGS:    Limited evaluation due to motion artifact.    Fluid collection measuring 2.8 x 1.7 cm overlying the inferior sacrum and   coccyx (6, 26 and 11, 31) demonstrating peripheral enhancement suggestive   of a abscess. Evaluation for underlying osteomyelitis of the coccyx is   limited however questioned.     UTERUS: Multiple intramural fibroids, largest intramural fundal fibroid   measuring 1.4 cm.    ADNEXA: Within normal limits.    BLADDER: Within normal limits.    LYMPH NODES: No pelvic lymphadenopathy.    VISUALIZED PORTIONS:    ABDOMINAL ORGANS: Within normal limits.  BOWEL: No definite evidence of a perianal fistula.  PERITONEUM: No ascites.  VESSELS: Within normal limits.  ABDOMINAL WALL: Within normal limits.    IMPRESSION:     Limited examination due to motion artifact.    Abscess overlying the inferior sacrum and coccyx. Evaluation for   underlying osteomyelitis of the coccyx is limited however questioned.     No definite evidence of a perianal fistula.      Cultures:

## 2019-12-10 NOTE — CHART NOTE - NSCHARTNOTEFT_GEN_A_CORE
follow up- called surgery and spoke to resident for follow up on MRI pelvis-    Abscess overlying the inferior sacrum and coccyx. Evaluation for   underlying osteomyelitis of the coccyx is limited however questioned.     No definite evidence of a perianal fistula.

## 2019-12-10 NOTE — PROGRESS NOTE ADULT - ASSESSMENT
85 yo female with a hx of of CHF, DM, HLD, HTN, and an STEMI who presents to the ED for a several day history of buttocks pain. Patient states that the pain started about a week ago, but has suddenly gotten worse over the past few days. She describes it as a sharp pain and has tried taking OTC acetaminophen, which has not helped the pain at all. Patient endorses one episode of nausea earlier this morning, but denies any vomiting. She has not noticed a rash in that area, but does endorse that the skin is difficult to visualize.  Patient has a wound care nurse that comes to the house a few times per week. She came yesterday for wound care dressing changes.    Of note, patient was hospitalized at [facility] from September 4 to October 4 at [facility] for an ulcer in between her buttocks. During that hospitalization, patient required debridement of the wound as well as IV antibiotics.     In the ED, her VS /75, HR 77, RR 18 with SpO2 of 99% and T of 99.2. Patient received a 500 cc bolus of fluids as well as Doxycycline 500 mg x1. (04 Dec 2019 17:13)  WBC 13.5.  ESR 71, CRP 10.6.  UA (-) nit/mod LE, wbc 10.  Bcx (+) GPC pairs/chains from 2 sets.  CTAP without contrast shows L medial gluteal skin cellulitis and air-filled  tract.  No drainable fluid collection.  Pt on vanco/aztreonam/flagyl.     ID consult called for further abx managment.       Sepsis:    - Pt with fever, leukocytosis and bacteremia.  Source likely from gluteal cellulitis.  CTap shows L gluteal skin cellulitis with air-filled tract, without drainable fluid collection.   Agree with broad spectrum abx for now until all culture data finalized.      - MRI pelvis ordered to evaluate for perianal fistula- pt unable to tolerate exam.  Cannot r/o underlying OM, there is fluid collection near sacrum/coccyx.  Recommend bone scan vs. repeat MRI for further evaluation.      - Blood culture (+) GPC pairs/chains - identified as strep anginosus.  r/o abscess near sacrum/coccyx.  Awaiting repeat MRI with IV contrast.   Evaluate for drainable focus.    - TTE no vegetations reported, unable to r/o endocarditis.  Pt may need further ALONDRA to evaluate for endocarditits.     - Urine cultures growing enterococcus faecalis.  Based on sensitivities of blood and urine cultures, can switch change from daptomycin to vancomycin.  Cont azactam and flagyl for now to continue possible polymicrobial infection involving perineal wounds.  Will d/w family regarding pt's prior penicillin allergy.  (?possible switch to meropenem - d/w pharmacy)    - repeat MRI pelvis with IV contrast demostrates abscess collection overlying sacrum and coccyx.  High suspicion of OM.  Plan for eventual picc line once blood cultures cleared.  f/u with surgery regarding need for further drainage.          Will follow,    Sandrita Zaman  [phone]

## 2019-12-10 NOTE — PROGRESS NOTE ADULT - ASSESSMENT
Impression:    Left buttock healed surgical site  mid buttock surgical wound s/p debridement of abscess    Recommend:  1.) Topical therapy: mid buttock wound - irrigate with NS, pat dry, pack loosely with aquacel rope, cover with tegederm daily  2.) Abscess management per surgery  3.) Abx per Medicine/ID  4.) Nutrition optimization  5.) Offload by side laying  6.) Maintain on an alternating air with low air loss surface    Care as per medicine will follow w/ you  Upon discharge f/u as outpatient at [facility] [address] [place] [address] [phone]  Seen with Dr. Greene  Thank you for this consult  Lacey Sparrow, NP-C, CWOCN [id number]

## 2019-12-10 NOTE — PROGRESS NOTE ADULT - PROBLEM SELECTOR PLAN 1
positive blood Cx  ID eval appreciated  ABx as per ID, adjusted   Surg F/U appreciated  MRI noted, patient refused contrast, limited study  OM can not be ruled out  repeat MRI noted, ? osteo   Abx duration to be discussed

## 2019-12-10 NOTE — PROGRESS NOTE ADULT - PROBLEM SELECTOR PLAN 2
likely 2/2 urinary obstruction  s/p infante  c/w iv nacl to forward flow  s/p lokelma and cate gluconate  d/w np

## 2019-12-10 NOTE — PROGRESS NOTE ADULT - SUBJECTIVE AND OBJECTIVE BOX
GENERAL SURGERY PROGRESS NOTE    SUBJECTIVE:   Patient seen and examined at bedside.    Continuing to have low-grade fevers fevers  Patient still has some pain but is controlled.    Patient states she has not been getting out of bed, it is impeditive that she does.   Tolerating diet with no N/V.      OBJECTIVE:    PHYSICAL EXAM:  GEN: NAD, resting quietly  PULM: symmetric chest rise bilaterally, no increased WOB  ABD: soft, NTND  : right and left incision sites with healthy granulation tissue, overlying cellulitis.  Small cavity at superior portion of gluteal cleft, no drainage noted. Area of erythema overall improving.   EXTR: no cyanosis or edema, moving all extremities    T(C): 36.7 (12-10-19 @ 06:06), Max: 38.2 (12-09-19 @ 13:45)  HR: 60 (12-10-19 @ 06:06) (60 - 76)  BP: 102/64 (12-10-19 @ 06:06) (102/64 - 114/69)  RR: 18 (12-10-19 @ 06:06) (18 - 18)  SpO2: 99% (12-10-19 @ 06:06) (96% - 99%)  Wt(kg): --    LABS:                        10.5   12.43 )-----------( 217      ( 09 Dec 2019 07:43 )             31.6     12-10    130<L>  |  98  |  60<H>  ----------------------------<  243<H>  5.1   |  18<L>  |  1.56<H>    Ca    8.6      10 Dec 2019 08:56      CAPILLARY BLOOD GLUCOSE  POCT Blood Glucose.: 228 mg/dL (10 Dec 2019 08:19)  POCT Blood Glucose.: 202 mg/dL (09 Dec 2019 22:21)  POCT Blood Glucose.: 248 mg/dL (09 Dec 2019 17:53)  POCT Blood Glucose.: 149 mg/dL (09 Dec 2019 13:17)  POCT Blood Glucose.: 177 mg/dL (09 Dec 2019 12:09)      Is&O's    12-09-19 @ 07:01  -  12-10-19 @ 07:00  --------------------------------------------------------  IN: 1450 mL / OUT: 1215 mL / NET: 235 mL

## 2019-12-10 NOTE — PROGRESS NOTE ADULT - SUBJECTIVE AND OBJECTIVE BOX
[facility]    Subjective: Patient seen and examined. No new events except as noted.   weakness  poor oral intake     REVIEW OF SYSTEMS:    CONSTITUTIONAL: + weakness  EYES/ENT: No visual changes;  No vertigo or throat pain   NECK: No pain or stiffness  RESPIRATORY: No cough, wheezing, hemoptysis; No shortness of breath  CARDIOVASCULAR: No chest pain or palpitations  GASTROINTESTINAL: No abdominal or epigastric pain. No nausea, vomiting, or hematemesis; No diarrhea or constipation. No melena or hematochezia.  GENITOURINARY: No dysuria, frequency or hematuria  NEUROLOGICAL: No numbness or weakness  SKIN: back pain   All other review of systems is negative unless indicated above.    MEDICATIONS:  MEDICATIONS  (STANDING):  aspirin enteric coated 81 milliGRAM(s) Oral daily  atorvastatin 40 milliGRAM(s) Oral at bedtime  aztreonam  IVPB 500 milliGRAM(s) IV Intermittent every 12 hours  clopidogrel Tablet 75 milliGRAM(s) Oral daily  dextrose 5%. 1000 milliLiter(s) (50 mL/Hr) IV Continuous <Continuous>  dextrose 50% Injectable 12.5 Gram(s) IV Push once  dextrose 50% Injectable 25 Gram(s) IV Push once  dextrose 50% Injectable 25 Gram(s) IV Push once  famotidine    Tablet 20 milliGRAM(s) Oral daily  gabapentin 100 milliGRAM(s) Oral two times a day  heparin  Injectable 5000 Unit(s) SubCutaneous every 8 hours  influenza   Vaccine 0.5 milliLiter(s) IntraMuscular once  insulin glargine Injectable (LANTUS) 15 Unit(s) SubCutaneous at bedtime  insulin lispro (HumaLOG) corrective regimen sliding scale   SubCutaneous three times a day before meals  insulin lispro (HumaLOG) corrective regimen sliding scale   SubCutaneous at bedtime  insulin lispro Injectable (HumaLOG) 8 Unit(s) SubCutaneous three times a day before meals  lactobacillus acidophilus 1 Tablet(s) Oral three times a day  levothyroxine 25 MICROGram(s) Oral daily  metoprolol tartrate 12.5 milliGRAM(s) Oral two times a day  metroNIDAZOLE  IVPB 500 milliGRAM(s) IV Intermittent every 8 hours  sodium bicarbonate 1300 milliGRAM(s) Oral three times a day  sodium chloride 0.9%. 1000 milliLiter(s) (50 mL/Hr) IV Continuous <Continuous>  vancomycin  IVPB 1000 milliGRAM(s) IV Intermittent every 24 hours      PHYSICAL EXAM:  T(C): 36.4 (12-10-19 @ 14:30), Max: 36.9 (12-09-19 @ 17:20)  HR: 57 (12-10-19 @ 14:30) (57 - 72)  BP: 112/72 (12-10-19 @ 14:30) (102/64 - 114/69)  RR: 18 (12-10-19 @ 14:30) (18 - 18)  SpO2: 99% (12-10-19 @ 14:30) (97% - 99%)  Wt(kg): --  I&O's Summary    09 Dec 2019 07:01  -  10 Dec 2019 07:00  --------------------------------------------------------  IN: 1450 mL / OUT: 1215 mL / NET: 235 mL    10 Dec 2019 07:01  -  10 Dec 2019 16:13  --------------------------------------------------------  IN: 0 mL / OUT: 200 mL / NET: -200 mL          Appearance: Normal	  HEENT:   Normal oral mucosa, PERRL, EOMI	  Lymphatic: No lymphadenopathy , no edema  Cardiovascular: Normal S1 S2  Respiratory: Lungs clear to auscultation, normal effort 	  Gastrointestinal:  Soft, Non-tender, + BS	  Skin: No rashes, No ecchymoses, No cyanosis, warm to touch  Musculoskeletal: Normal range of motion, normal strength  Psychiatry:  Mood & affect appropriate  Ext: No edema      All labs, Imaging and EKGs personally reviewed                           10.7   9.14  )-----------( 247      ( 10 Dec 2019 12:51 )             32.3               12-10    130<L>  |  98  |  60<H>  ----------------------------<  243<H>  5.1   |  18<L>  |  1.56<H>    Ca    8.6      10 Dec 2019 08:56    < from: Transthoracic Echocardiogram (12.09.19 @ 13:53) >  Conclusions:  1. Left ventricular enlargement.  2. Severe segmental left ventricular systolic dysfunction.  Akinesis of the anteroseptum, anterior wall and apex.  Diffuse hypokinesis of the remaining segments.  3. Moderate diastolic dysfunction (Stage II).  4. Right atrial enlargement.  5. Right ventricular enlargement with decreased right  ventricular systolic function.  6. Estimated pulmonary artery systolic pressure equals 70  mm Hg, assuming right atrial pressure equals 8 mm Hg,  consistent withsevere pulmonary pressures.  Unable to rule out endocarditis. Consider ALONDRA if clinically  indicated.

## 2019-12-10 NOTE — PROGRESS NOTE ADULT - PROBLEM SELECTOR PLAN 1
ddx includes pre renal vs atn vs obstruction  u lytes showing intravascular depletion--> c/w nacl @ 50cc  s/p infante  with >600cc urineoutput  low na and high k improving given relief of renal obstruction  monitor urineoutput  c/w infante  c/w ivf as above  trend bmp

## 2019-12-11 LAB
ANION GAP SERPL CALC-SCNC: 13 MMOL/L — SIGNIFICANT CHANGE UP (ref 5–17)
BUN SERPL-MCNC: 53 MG/DL — HIGH (ref 7–23)
CALCIUM SERPL-MCNC: 8.7 MG/DL — SIGNIFICANT CHANGE UP (ref 8.4–10.5)
CHLORIDE SERPL-SCNC: 99 MMOL/L — SIGNIFICANT CHANGE UP (ref 96–108)
CO2 SERPL-SCNC: 19 MMOL/L — LOW (ref 22–31)
CREAT SERPL-MCNC: 1.39 MG/DL — HIGH (ref 0.5–1.3)
CULTURE RESULTS: SIGNIFICANT CHANGE UP
GLUCOSE BLDC GLUCOMTR-MCNC: 123 MG/DL — HIGH (ref 70–99)
GLUCOSE BLDC GLUCOMTR-MCNC: 150 MG/DL — HIGH (ref 70–99)
GLUCOSE BLDC GLUCOMTR-MCNC: 185 MG/DL — HIGH (ref 70–99)
GLUCOSE BLDC GLUCOMTR-MCNC: 208 MG/DL — HIGH (ref 70–99)
GLUCOSE SERPL-MCNC: 136 MG/DL — HIGH (ref 70–99)
POTASSIUM SERPL-MCNC: 4.4 MMOL/L — SIGNIFICANT CHANGE UP (ref 3.5–5.3)
POTASSIUM SERPL-SCNC: 4.4 MMOL/L — SIGNIFICANT CHANGE UP (ref 3.5–5.3)
SODIUM SERPL-SCNC: 131 MMOL/L — LOW (ref 135–145)
SPECIMEN SOURCE: SIGNIFICANT CHANGE UP
VANCOMYCIN TROUGH SERPL-MCNC: 13.4 UG/ML — SIGNIFICANT CHANGE UP (ref 10–20)

## 2019-12-11 RX ADMIN — GABAPENTIN 100 MILLIGRAM(S): 400 CAPSULE ORAL at 05:17

## 2019-12-11 RX ADMIN — Medication 1300 MILLIGRAM(S): at 13:39

## 2019-12-11 RX ADMIN — Medication 1300 MILLIGRAM(S): at 05:17

## 2019-12-11 RX ADMIN — Medication 1000 MILLIGRAM(S): at 13:41

## 2019-12-11 RX ADMIN — Medication 1 TABLET(S): at 05:17

## 2019-12-11 RX ADMIN — Medication 1000 MILLIGRAM(S): at 19:33

## 2019-12-11 RX ADMIN — Medication 1000 MILLIGRAM(S): at 20:15

## 2019-12-11 RX ADMIN — Medication 1 TABLET(S): at 21:02

## 2019-12-11 RX ADMIN — Medication 1000 MILLIGRAM(S): at 09:22

## 2019-12-11 RX ADMIN — Medication 81 MILLIGRAM(S): at 12:39

## 2019-12-11 RX ADMIN — Medication 1300 MILLIGRAM(S): at 21:03

## 2019-12-11 RX ADMIN — Medication 50 MILLIGRAM(S): at 10:20

## 2019-12-11 RX ADMIN — HEPARIN SODIUM 5000 UNIT(S): 5000 INJECTION INTRAVENOUS; SUBCUTANEOUS at 13:38

## 2019-12-11 RX ADMIN — SODIUM CHLORIDE 50 MILLILITER(S): 9 INJECTION INTRAMUSCULAR; INTRAVENOUS; SUBCUTANEOUS at 12:40

## 2019-12-11 RX ADMIN — Medication 1: at 13:04

## 2019-12-11 RX ADMIN — ATORVASTATIN CALCIUM 40 MILLIGRAM(S): 80 TABLET, FILM COATED ORAL at 21:02

## 2019-12-11 RX ADMIN — Medication 8 UNIT(S): at 17:38

## 2019-12-11 RX ADMIN — Medication 12.5 MILLIGRAM(S): at 17:38

## 2019-12-11 RX ADMIN — Medication 8 UNIT(S): at 08:43

## 2019-12-11 RX ADMIN — Medication 25 MICROGRAM(S): at 05:17

## 2019-12-11 RX ADMIN — GABAPENTIN 100 MILLIGRAM(S): 400 CAPSULE ORAL at 17:38

## 2019-12-11 RX ADMIN — Medication 100 MILLIGRAM(S): at 09:21

## 2019-12-11 RX ADMIN — CLOPIDOGREL BISULFATE 75 MILLIGRAM(S): 75 TABLET, FILM COATED ORAL at 12:39

## 2019-12-11 RX ADMIN — HEPARIN SODIUM 5000 UNIT(S): 5000 INJECTION INTRAVENOUS; SUBCUTANEOUS at 05:17

## 2019-12-11 RX ADMIN — HEPARIN SODIUM 5000 UNIT(S): 5000 INJECTION INTRAVENOUS; SUBCUTANEOUS at 21:02

## 2019-12-11 RX ADMIN — Medication 100 MILLIGRAM(S): at 17:44

## 2019-12-11 RX ADMIN — Medication 1 TABLET(S): at 13:39

## 2019-12-11 RX ADMIN — Medication 8 UNIT(S): at 13:04

## 2019-12-11 RX ADMIN — INSULIN GLARGINE 15 UNIT(S): 100 INJECTION, SOLUTION SUBCUTANEOUS at 22:26

## 2019-12-11 RX ADMIN — Medication 1000 MILLIGRAM(S): at 08:39

## 2019-12-11 RX ADMIN — FAMOTIDINE 20 MILLIGRAM(S): 10 INJECTION INTRAVENOUS at 12:39

## 2019-12-11 RX ADMIN — Medication 250 MILLIGRAM(S): at 20:56

## 2019-12-11 RX ADMIN — Medication 100 MILLIGRAM(S): at 02:05

## 2019-12-11 RX ADMIN — Medication 50 MILLIGRAM(S): at 22:26

## 2019-12-11 RX ADMIN — Medication 1000 MILLIGRAM(S): at 14:25

## 2019-12-11 NOTE — PROGRESS NOTE ADULT - PROBLEM SELECTOR PLAN 1
positive blood Cx  ID eval appreciated  ABx as per ID, adjusted   Surg F/U appreciated  MRI noted, patient refused contrast, limited study  OM can not be ruled out  repeat MRI noted, ? osteo   Abx duration to be discussed  repeat blood Cx till negative, after wards will plan for PICC Line placement for long term ABx

## 2019-12-11 NOTE — PROGRESS NOTE ADULT - SUBJECTIVE AND OBJECTIVE BOX
Infectious Diseases progress note:    Subjective: NAD, pt more awake and alert today.  Afebrile.  C/o irritation at dsg site over perineum.  Daughter at bedside.     ROS:  CONSTITUTIONAL:  No fever, chills, rigors  CARDIOVASCULAR:  No chest pain or palpitations  RESPIRATORY:   No SOB, cough, dyspnea on exertion.  No wheezing  GASTROINTESTINAL:  No abd pain, N/V, diarrhea/constipation  EXTREMITIES:  No swelling or joint pain  GENITOURINARY:  No burning on urination, increased frequency or urgency.  No flank pain  NEUROLOGIC:  No HA, visual disturbances  SKIN: No rashes    Allergies    codeine (Unknown)  Kiwi (Anaphylaxis)  penicillins (Anaphylaxis)    Intolerances        ANTIBIOTICS/RELEVANT:  antimicrobials  aztreonam  IVPB 500 milliGRAM(s) IV Intermittent every 12 hours  metroNIDAZOLE  IVPB 500 milliGRAM(s) IV Intermittent every 8 hours  vancomycin  IVPB 1000 milliGRAM(s) IV Intermittent every 24 hours    immunologic:  influenza   Vaccine 0.5 milliLiter(s) IntraMuscular once    OTHER:  acetaminophen   Tablet .. 1000 milliGRAM(s) Oral every 6 hours PRN  acetaminophen   Tablet .. 650 milliGRAM(s) Oral every 6 hours PRN  aspirin enteric coated 81 milliGRAM(s) Oral daily  atorvastatin 40 milliGRAM(s) Oral at bedtime  clopidogrel Tablet 75 milliGRAM(s) Oral daily  dextrose 40% Gel 15 Gram(s) Oral once PRN  dextrose 5%. 1000 milliLiter(s) IV Continuous <Continuous>  dextrose 50% Injectable 12.5 Gram(s) IV Push once  dextrose 50% Injectable 25 Gram(s) IV Push once  dextrose 50% Injectable 25 Gram(s) IV Push once  famotidine    Tablet 20 milliGRAM(s) Oral daily  gabapentin 100 milliGRAM(s) Oral two times a day  glucagon  Injectable 1 milliGRAM(s) IntraMuscular once PRN  heparin  Injectable 5000 Unit(s) SubCutaneous every 8 hours  insulin glargine Injectable (LANTUS) 15 Unit(s) SubCutaneous at bedtime  insulin lispro (HumaLOG) corrective regimen sliding scale   SubCutaneous three times a day before meals  insulin lispro (HumaLOG) corrective regimen sliding scale   SubCutaneous at bedtime  insulin lispro Injectable (HumaLOG) 8 Unit(s) SubCutaneous three times a day before meals  lactobacillus acidophilus 1 Tablet(s) Oral three times a day  levothyroxine 25 MICROGram(s) Oral daily  metoprolol tartrate 12.5 milliGRAM(s) Oral two times a day  ondansetron Injectable 4 milliGRAM(s) IV Push every 6 hours PRN  sodium bicarbonate 1300 milliGRAM(s) Oral three times a day  traMADol 25 milliGRAM(s) Oral every 12 hours PRN  traMADol 50 milliGRAM(s) Oral every 12 hours PRN      Objective:  Vital Signs Last 24 Hrs  T(C): 36.6 (11 Dec 2019 13:28), Max: 36.6 (11 Dec 2019 04:36)  T(F): 97.8 (11 Dec 2019 13:28), Max: 97.9 (11 Dec 2019 04:36)  HR: 67 (11 Dec 2019 13:28) (55 - 75)  BP: 113/70 (11 Dec 2019 13:28) (104/61 - 113/70)  BP(mean): --  RR: 18 (11 Dec 2019 13:28) (18 - 18)  SpO2: 99% (11 Dec 2019 13:28) (97% - 99%)    PHYSICAL EXAM:  Constitutional:NAD  Eyes:NIDIA, EOMI  Ear/Nose/Throat: no thrush, mucositis.  Moist mucous membranes	  Neck:no JVD, no lymphadenopathy, supple  Respiratory: CTA lucian  Cardiovascular: S1S2 RRR, no murmurs  Gastrointestinal:soft, nontender,  nondistended (+) BS  Extremities:no e/e/c  Skin: rt perineal dsg c/d/i.          LABS:                        10.7   9.14  )-----------( 247      ( 10 Dec 2019 12:51 )             32.3     12-11    131<L>  |  99  |  53<H>  ----------------------------<  136<H>  4.4   |  19<L>  |  1.39<H>    Ca    8.7      11 Dec 2019 06:29        MICROBIOLOGY:    Culture - Blood in AM (12.10.19 @ 13:06)    Specimen Source: .Blood Blood-Venous    Culture Results:   No growth to date.      Culture - Blood in AM (12.10.19 @ 13:06)    Specimen Source: .Blood Blood-Peripheral    Culture Results:   No growth to date.      Culture - Blood in AM (12.06.19 @ 06:02)    Gram Stain:   Growth in aerobic bottle: Gram positive cocci in pairs  Growth in anaerobic bottle: Gram Positive Cocci in Pairs and Chains    Specimen Source: .Blood Blood    Culture Results:   Growth in aerobic and anaerobic bottles: Streptococcus anginosus  See previous culture [id number]      Culture - Blood in AM (12.06.19 @ 06:02)    Specimen Source: .Blood Blood    Culture Results:   No growth at 5 days.      Culture - Blood in AM (12.05.19 @ 09:27)    Gram Stain:   Growth in anaerobic bottle: Gram Positive Cocci in Pairs and Chains    Specimen Source: .Blood Blood-Peripheral    Culture Results:   Growth in anaerobic bottle: Streptococcus anginosus  See previous culture [id number]          RADIOLOGY & ADDITIONAL STUDIES:    < from: MR Pelvis w/wo IV Cont (12.08.19 @ 22:40) >    IMPRESSION:     Limited examination due to motion artifact.    Abscess overlying the inferior sacrum and coccyx. Evaluation for   underlying osteomyelitis of the coccyx is limited however questioned.     No definite evidence of a perianal fistula.    < end of copied text >

## 2019-12-11 NOTE — PROGRESS NOTE ADULT - SUBJECTIVE AND OBJECTIVE BOX
[facility]    Subjective: Patient seen and examined. No new events except as noted.   daughter at the bedside  patient refusing rehab after discharge   + weakness     REVIEW OF SYSTEMS:    CONSTITUTIONAL: + weakness, afebrile   EYES/ENT: No visual changes;  No vertigo or throat pain   NECK: No pain or stiffness  RESPIRATORY: No cough, wheezing, hemoptysis; No shortness of breath  CARDIOVASCULAR: No chest pain or palpitations  GASTROINTESTINAL: No abdominal or epigastric pain.  GENITOURINARY: No dysuria, frequency or hematuria  NEUROLOGICAL: No numbness or weakness  SKIN: lower back pain   All other review of systems is negative unless indicated above.    MEDICATIONS:  MEDICATIONS  (STANDING):  aspirin enteric coated 81 milliGRAM(s) Oral daily  atorvastatin 40 milliGRAM(s) Oral at bedtime  aztreonam  IVPB 500 milliGRAM(s) IV Intermittent every 12 hours  clopidogrel Tablet 75 milliGRAM(s) Oral daily  dextrose 5%. 1000 milliLiter(s) (50 mL/Hr) IV Continuous <Continuous>  dextrose 50% Injectable 12.5 Gram(s) IV Push once  dextrose 50% Injectable 25 Gram(s) IV Push once  dextrose 50% Injectable 25 Gram(s) IV Push once  famotidine    Tablet 20 milliGRAM(s) Oral daily  gabapentin 100 milliGRAM(s) Oral two times a day  heparin  Injectable 5000 Unit(s) SubCutaneous every 8 hours  influenza   Vaccine 0.5 milliLiter(s) IntraMuscular once  insulin glargine Injectable (LANTUS) 15 Unit(s) SubCutaneous at bedtime  insulin lispro (HumaLOG) corrective regimen sliding scale   SubCutaneous three times a day before meals  insulin lispro (HumaLOG) corrective regimen sliding scale   SubCutaneous at bedtime  insulin lispro Injectable (HumaLOG) 8 Unit(s) SubCutaneous three times a day before meals  lactobacillus acidophilus 1 Tablet(s) Oral three times a day  levothyroxine 25 MICROGram(s) Oral daily  metoprolol tartrate 12.5 milliGRAM(s) Oral two times a day  metroNIDAZOLE  IVPB 500 milliGRAM(s) IV Intermittent every 8 hours  sodium bicarbonate 1300 milliGRAM(s) Oral three times a day  vancomycin  IVPB 1000 milliGRAM(s) IV Intermittent every 24 hours      PHYSICAL EXAM:  T(C): 36.6 (12-11-19 @ 13:28), Max: 36.6 (12-11-19 @ 04:36)  HR: 67 (12-11-19 @ 13:28) (55 - 75)  BP: 113/70 (12-11-19 @ 13:28) (104/61 - 113/70)  RR: 18 (12-11-19 @ 13:28) (18 - 18)  SpO2: 99% (12-11-19 @ 13:28) (97% - 99%)  Wt(kg): --  I&O's Summary    10 Dec 2019 07:01  -  11 Dec 2019 07:00  --------------------------------------------------------  IN: 1400 mL / OUT: 600 mL / NET: 800 mL    11 Dec 2019 07:01  -  11 Dec 2019 18:17  --------------------------------------------------------  IN: 210 mL / OUT: 400 mL / NET: -190 mL          Appearance: Normal	  HEENT:   Normal oral mucosa, PERRL, EOMI	  Lymphatic: No lymphadenopathy , no edema  Cardiovascular: Normal S1 S2, No JVD  Respiratory: Lungs clear to auscultation, normal effort 	  Gastrointestinal:  Soft, Non-tender, + BS	  Skin: lower back dressing intact   Musculoskeletal: Normal range of motion, normal strength  Psychiatry:  Mood & affect appropriate  Ext: No edema      All labs, Imaging and EKGs personally reviewed                             10.7   9.14  )-----------( 247      ( 10 Dec 2019 12:51 )             32.3               12-11    131<L>  |  99  |  53<H>  ----------------------------<  136<H>  4.4   |  19<L>  |  1.39<H>    Ca    8.7      11 Dec 2019 06:29

## 2019-12-11 NOTE — PROGRESS NOTE ADULT - SUBJECTIVE AND OBJECTIVE BOX
GENERAL SURGERY PROGRESS NOTE    SUBJECTIVE:   Patient seen and examined at bedside.    afebrile for 24hrs  Patient still has some pain but is controlled.    Patient states she has not been getting out of bed, it is imperative that she does.   Tolerating diet with no N/V.      OBJECTIVE:    PHYSICAL EXAM:  GEN: NAD, resting quietly  PULM: symmetric chest rise bilaterally, no increased WOB  ABD: soft, NTND  : right and left incision sites with healthy granulation tissue, overlying cellulitis.  Small cavity at superior portion of gluteal cleft, no drainage noted. Area of erythema overall improving.   EXTR: no cyanosis or edema, moving all extremities    ICU Vital Signs Last 24 Hrs  T(C): 36.6 (11 Dec 2019 13:28), Max: 36.6 (11 Dec 2019 04:36)  T(F): 97.8 (11 Dec 2019 13:28), Max: 97.9 (11 Dec 2019 04:36)  HR: 67 (11 Dec 2019 13:28) (55 - 75)  BP: 113/70 (11 Dec 2019 13:28) (104/61 - 113/70)  BP(mean): --  ABP: --  ABP(mean): --  RR: 18 (11 Dec 2019 13:28) (18 - 18)  SpO2: 99% (11 Dec 2019 13:28) (97% - 99%)    LABS:                                      10.7   9.14  )-----------( 247      ( 10 Dec 2019 12:51 )             32.3       12-11    131<L>  |  99  |  53<H>  ----------------------------<  136<H>  4.4   |  19<L>  |  1.39<H>    Ca    8.7      11 Dec 2019 06:29    I&O's Detail    10 Dec 2019 07:01  -  11 Dec 2019 07:00  --------------------------------------------------------  IN:    IV PiggyBack: 550 mL    Oral Fluid: 50 mL    sodium chloride 0.9%: 800 mL  Total IN: 1400 mL    OUT:    Indwelling Catheter - Urethral: 600 mL  Total OUT: 600 mL    Total NET: 800 mL      11 Dec 2019 07:01  -  11 Dec 2019 17:28  --------------------------------------------------------  IN:    IV PiggyBack: 150 mL    Oral Fluid: 60 mL  Total IN: 210 mL    OUT:    Indwelling Catheter - Urethral: 400 mL  Total OUT: 400 mL    Total NET: -190 mL

## 2019-12-11 NOTE — PROGRESS NOTE ADULT - SUBJECTIVE AND OBJECTIVE BOX
Patient seen and examined  no complaints    codeine (Unknown)  Kiwi (Anaphylaxis)  penicillins (Anaphylaxis)    Hospital Medications:   MEDICATIONS  (STANDING):  aspirin enteric coated 81 milliGRAM(s) Oral daily  atorvastatin 40 milliGRAM(s) Oral at bedtime  aztreonam  IVPB 500 milliGRAM(s) IV Intermittent every 12 hours  clopidogrel Tablet 75 milliGRAM(s) Oral daily  dextrose 5%. 1000 milliLiter(s) (50 mL/Hr) IV Continuous <Continuous>  dextrose 50% Injectable 12.5 Gram(s) IV Push once  dextrose 50% Injectable 25 Gram(s) IV Push once  dextrose 50% Injectable 25 Gram(s) IV Push once  famotidine    Tablet 20 milliGRAM(s) Oral daily  gabapentin 100 milliGRAM(s) Oral two times a day  heparin  Injectable 5000 Unit(s) SubCutaneous every 8 hours  influenza   Vaccine 0.5 milliLiter(s) IntraMuscular once  insulin glargine Injectable (LANTUS) 15 Unit(s) SubCutaneous at bedtime  insulin lispro (HumaLOG) corrective regimen sliding scale   SubCutaneous three times a day before meals  insulin lispro (HumaLOG) corrective regimen sliding scale   SubCutaneous at bedtime  insulin lispro Injectable (HumaLOG) 8 Unit(s) SubCutaneous three times a day before meals  lactobacillus acidophilus 1 Tablet(s) Oral three times a day  levothyroxine 25 MICROGram(s) Oral daily  metoprolol tartrate 12.5 milliGRAM(s) Oral two times a day  metroNIDAZOLE  IVPB 500 milliGRAM(s) IV Intermittent every 8 hours  sodium bicarbonate 1300 milliGRAM(s) Oral three times a day  vancomycin  IVPB 1000 milliGRAM(s) IV Intermittent every 24 hours      VITALS:  T(F): 97.9 (19 @ 04:36), Max: 97.9 (19 @ 04:36)  HR: 55 (19 @ 04:36)  BP: 104/61 (19 @ 04:36)  RR: 18 (19 @ 04:36)  SpO2: 97% (19 @ 04:36)  Wt(kg): --    12-10 @ 07:01  -   @ 07:00  --------------------------------------------------------  IN: 1400 mL / OUT: 600 mL / NET: 800 mL     @ 07:01  -   @ 13:14  --------------------------------------------------------  IN: 210 mL / OUT: 0 mL / NET: 210 mL          PHYSICAL EXAM:  Constitutional: NAD  HEENT: anicteric sclera, oropharynx clear, MMM  Neck: No JVD  Respiratory: CTAB, no wheezes, rales or rhonchi  Cardiovascular: S1, S2, RRR  Gastrointestinal: BS+, soft, NT/ND  Extremities: +peripheral edema  Neurological: A/O x 3, no focal deficits  Psychiatric: Normal mood, normal affect  :+ indwelling infante.    LABS:      131<L>  |  99  |  53<H>  ----------------------------<  136<H>  4.4   |  19<L>  |  1.39<H>    Ca    8.7      11 Dec 2019 06:29      Creatinine Trend: 1.39 <--, 1.56 <--, 1.51 <--, 1.54 <--, 1.80 <--, 1.85 <--, 1.58 <--, 1.71 <--, 1.85 <--, 1.81 <--                        10.7   9.14  )-----------( 247      ( 10 Dec 2019 12:51 )             32.3     Urine Studies:  Urinalysis Basic - ( 07 Dec 2019 04:23 )    Color: Yellow / Appearance: Slightly Turbid / S.022 / pH:   Gluc:  / Ketone: Negative  / Bili: Negative / Urobili: <2 mg/dL   Blood:  / Protein: Trace / Nitrite: Negative   Leuk Esterase: Large / RBC: 4 /HPF /  /HPF   Sq Epi:  / Non Sq Epi: 1 /HPF / Bacteria: Moderate      Osmolality, Random Urine: 400 mosm/Kg ( @ 04:19)  Potassium, Random Urine: 34 mmol/L ( @ 00:54)  Sodium, Random Urine: 53 mmol/L ( @ 00:54)  Sodium, Random Urine: 54 mmol/L ( @ 00:54)  Chloride, Random Urine: <35 mmol/L ( @ 00:54)  Creatinine, Random Urine: 107 mg/dL ( @ 00:54)  Protein/Creatinine Ratio Calculation: 0.3 Ratio ( @ 00:54)    RADIOLOGY & ADDITIONAL STUDIES:

## 2019-12-11 NOTE — PROGRESS NOTE ADULT - PROBLEM SELECTOR PLAN 1
ddx includes pre renal vs atn vs obstruction  pt tolerating po intake- --> d/c IVF- encourage po intake  s/p infante  with >600cc urineoutput  hyperkalemia and hyponatremia improving  monitor urineoutput  c/w infante--> f/u urology recs when to d/c infante  trend bmp

## 2019-12-11 NOTE — PROGRESS NOTE ADULT - ASSESSMENT
87 yo woman with a hx of CHF, DM, HLD, HTN, CAD prior MI, sp debridement of bilateral buttocks (9/15, 9/28) presenting with buttock cellulitis. CT consistent with cellulitis without underlying abscess.    Plan:   - no urgent surgical intervention at this time  - c/w IV antibiotics per ID (BC grew gram+ cocci in chains)   - cont local wound care  - Needs PT evaluation   - OOB with assistance  - Continue care as per medicine     Red surgery  [id number]

## 2019-12-11 NOTE — PROGRESS NOTE ADULT - ASSESSMENT
87 yo female with a hx of of CHF, DM, HLD, HTN, and an STEMI who presents to the ED for a several day history of buttocks pain. Patient states that the pain started about a week ago, but has suddenly gotten worse over the past few days. She describes it as a sharp pain and has tried taking OTC acetaminophen, which has not helped the pain at all. Patient endorses one episode of nausea earlier this morning, but denies any vomiting. She has not noticed a rash in that area, but does endorse that the skin is difficult to visualize.  Patient has a wound care nurse that comes to the house a few times per week. She came yesterday for wound care dressing changes.    Of note, patient was hospitalized at [facility] from September 4 to October 4 at [facility] for an ulcer in between her buttocks. During that hospitalization, patient required debridement of the wound as well as IV antibiotics.     In the ED, her VS /75, HR 77, RR 18 with SpO2 of 99% and T of 99.2. Patient received a 500 cc bolus of fluids as well as Doxycycline 500 mg x1. (04 Dec 2019 17:13)  WBC 13.5.  ESR 71, CRP 10.6.  UA (-) nit/mod LE, wbc 10.  Bcx (+) GPC pairs/chains from 2 sets.  CTAP without contrast shows L medial gluteal skin cellulitis and air-filled  tract.  No drainable fluid collection.  Pt on vanco/aztreonam/flagyl.     ID consult called for further abx managment.       Sepsis:    - Pt with fever, leukocytosis and bacteremia.  Source likely from gluteal cellulitis.  CTap shows L gluteal skin cellulitis with air-filled tract, without drainable fluid collection.   Agree with broad spectrum abx for now until all culture data finalized.      - MRI pelvis ordered to evaluate for perianal fistula- pt unable to tolerate exam.  Cannot r/o underlying OM, there is fluid collection near sacrum/coccyx.  Recommend bone scan vs. repeat MRI for further evaluation.      - Blood culture (+) GPC pairs/chains - identified as strep anginosus.      - TTE no vegetations reported, unable to r/o endocarditis.  Pt may need further ALONDRA to evaluate for endocarditits, although unlikely to change abx managment.     - Urine cultures growing enterococcus faecalis.  Based on sensitivities of blood and urine cultures, can switch change from daptomycin to vancomycin.  Cont azactam and flagyl for now to continue possible polymicrobial infection involving perineal wounds.  Will d/w family regarding pt's prior penicillin allergy.  (pt had 'swelling' at the age of 10 or 15 towards pcn.  Does not recall taking other beta lactam abx in the past.)  Possible test dose with ertapenem tomorrow.     - repeat MRI pelvis with IV contrast demostrates abscess collection overlying sacrum and coccyx.  High suspicion of OM.  Plan for eventual picc line once blood cultures cleared.  No further intervention as per surgery.  Plan for 6 week IV abx course.          d/w pt and daughter at bedside.         Will follow,    Sandrita Zaman  [phone]

## 2019-12-11 NOTE — PROGRESS NOTE ADULT - SUBJECTIVE AND OBJECTIVE BOX
Patient is a 86y old  Female who presents with a chief complaint of Sepsis 2/2 Cellulitis (11 Dec 2019 19:19)      INTERVAL HISTORY: feels ok    	  MEDICATIONS:  metoprolol tartrate 12.5 milliGRAM(s) Oral two times a day        PHYSICAL EXAM:  T(C): 36.9 (12-11-19 @ 20:07), Max: 36.9 (12-11-19 @ 20:07)  HR: 63 (12-11-19 @ 20:07) (55 - 69)  BP: 122/78 (12-11-19 @ 20:07) (104/61 - 122/78)  RR: 18 (12-11-19 @ 20:07) (18 - 18)  SpO2: 96% (12-11-19 @ 20:07) (96% - 99%)  Wt(kg): --  I&O's Summary    10 Dec 2019 07:01  -  11 Dec 2019 07:00  --------------------------------------------------------  IN: 1400 mL / OUT: 600 mL / NET: 800 mL    11 Dec 2019 07:01  -  11 Dec 2019 21:59  --------------------------------------------------------  IN: 940 mL / OUT: 650 mL / NET: 290 mL          Appearance: In no distress	  HEENT:    PERRL, EOMI	  Cardiovascular:  S1 S2, No JVD  Respiratory: Lungs clear to auscultation	  Gastrointestinal:  Soft, Non-tender, + BS	  Extremities:  No edema of LE                                10.7   9.14  )-----------( 247      ( 10 Dec 2019 12:51 )             32.3     12-11    131<L>  |  99  |  53<H>  ----------------------------<  136<H>  4.4   |  19<L>  |  1.39<H>    Ca    8.7      11 Dec 2019 06:29          Labs personally reviewed      Assessment /Plan:   Chronic diastolic HF - euvolemic, off Lasix  CAD - c/w DAPT, Metoprolol and ASA  HTN - well controlled      Srini Velasco DO [facility]  Cardiovascular Medicine  [phone]

## 2019-12-11 NOTE — CHART NOTE - NSCHARTNOTEFT_GEN_A_CORE
follow up- per d/w urology team, trial  of voiding when pt is more ambulatory; d/w pt/family; TOV when pt is more ambulator to avoid retention/worsening ROB;  per d/w renal d/c IVF; monitor BMP without IVF.  Alexandria Lozada(NP)  3 [facility], [phone]

## 2019-12-12 LAB
ANION GAP SERPL CALC-SCNC: 11 MMOL/L — SIGNIFICANT CHANGE UP (ref 5–17)
BASOPHILS # BLD AUTO: 0.03 K/UL — SIGNIFICANT CHANGE UP (ref 0–0.2)
BASOPHILS NFR BLD AUTO: 0.3 % — SIGNIFICANT CHANGE UP (ref 0–2)
BUN SERPL-MCNC: 44 MG/DL — HIGH (ref 7–23)
CALCIUM SERPL-MCNC: 8.3 MG/DL — LOW (ref 8.4–10.5)
CHLORIDE SERPL-SCNC: 98 MMOL/L — SIGNIFICANT CHANGE UP (ref 96–108)
CO2 SERPL-SCNC: 20 MMOL/L — LOW (ref 22–31)
CREAT SERPL-MCNC: 1.23 MG/DL — SIGNIFICANT CHANGE UP (ref 0.5–1.3)
EOSINOPHIL # BLD AUTO: 0.27 K/UL — SIGNIFICANT CHANGE UP (ref 0–0.5)
EOSINOPHIL NFR BLD AUTO: 2.7 % — SIGNIFICANT CHANGE UP (ref 0–6)
GLUCOSE BLDC GLUCOMTR-MCNC: 128 MG/DL — HIGH (ref 70–99)
GLUCOSE BLDC GLUCOMTR-MCNC: 146 MG/DL — HIGH (ref 70–99)
GLUCOSE BLDC GLUCOMTR-MCNC: 162 MG/DL — HIGH (ref 70–99)
GLUCOSE BLDC GLUCOMTR-MCNC: 182 MG/DL — HIGH (ref 70–99)
GLUCOSE BLDC GLUCOMTR-MCNC: 193 MG/DL — HIGH (ref 70–99)
GLUCOSE SERPL-MCNC: 212 MG/DL — HIGH (ref 70–99)
HCT VFR BLD CALC: 31.7 % — LOW (ref 34.5–45)
HGB BLD-MCNC: 10.4 G/DL — LOW (ref 11.5–15.5)
IMM GRANULOCYTES NFR BLD AUTO: 1.3 % — SIGNIFICANT CHANGE UP (ref 0–1.5)
LYMPHOCYTES # BLD AUTO: 1.42 K/UL — SIGNIFICANT CHANGE UP (ref 1–3.3)
LYMPHOCYTES # BLD AUTO: 14.3 % — SIGNIFICANT CHANGE UP (ref 13–44)
MCHC RBC-ENTMCNC: 28 PG — SIGNIFICANT CHANGE UP (ref 27–34)
MCHC RBC-ENTMCNC: 32.8 GM/DL — SIGNIFICANT CHANGE UP (ref 32–36)
MCV RBC AUTO: 85.4 FL — SIGNIFICANT CHANGE UP (ref 80–100)
MONOCYTES # BLD AUTO: 0.78 K/UL — SIGNIFICANT CHANGE UP (ref 0–0.9)
MONOCYTES NFR BLD AUTO: 7.8 % — SIGNIFICANT CHANGE UP (ref 2–14)
NEUTROPHILS # BLD AUTO: 7.33 K/UL — SIGNIFICANT CHANGE UP (ref 1.8–7.4)
NEUTROPHILS NFR BLD AUTO: 73.6 % — SIGNIFICANT CHANGE UP (ref 43–77)
PLATELET # BLD AUTO: 270 K/UL — SIGNIFICANT CHANGE UP (ref 150–400)
POTASSIUM SERPL-MCNC: 4.3 MMOL/L — SIGNIFICANT CHANGE UP (ref 3.5–5.3)
POTASSIUM SERPL-SCNC: 4.3 MMOL/L — SIGNIFICANT CHANGE UP (ref 3.5–5.3)
RBC # BLD: 3.71 M/UL — LOW (ref 3.8–5.2)
RBC # FLD: 15.5 % — HIGH (ref 10.3–14.5)
SODIUM SERPL-SCNC: 129 MMOL/L — LOW (ref 135–145)
SODIUM UR-SCNC: <35 MMOL/L — SIGNIFICANT CHANGE UP
WBC # BLD: 9.96 K/UL — SIGNIFICANT CHANGE UP (ref 3.8–10.5)
WBC # FLD AUTO: 9.96 K/UL — SIGNIFICANT CHANGE UP (ref 3.8–10.5)

## 2019-12-12 RX ADMIN — Medication 1000 MILLIGRAM(S): at 18:22

## 2019-12-12 RX ADMIN — Medication 1300 MILLIGRAM(S): at 22:15

## 2019-12-12 RX ADMIN — INSULIN GLARGINE 15 UNIT(S): 100 INJECTION, SOLUTION SUBCUTANEOUS at 22:41

## 2019-12-12 RX ADMIN — HEPARIN SODIUM 5000 UNIT(S): 5000 INJECTION INTRAVENOUS; SUBCUTANEOUS at 22:15

## 2019-12-12 RX ADMIN — Medication 1300 MILLIGRAM(S): at 13:34

## 2019-12-12 RX ADMIN — Medication 1 TABLET(S): at 22:15

## 2019-12-12 RX ADMIN — Medication 1000 MILLIGRAM(S): at 01:57

## 2019-12-12 RX ADMIN — Medication 1: at 08:44

## 2019-12-12 RX ADMIN — Medication 50 MILLIGRAM(S): at 13:26

## 2019-12-12 RX ADMIN — Medication 1 TABLET(S): at 13:34

## 2019-12-12 RX ADMIN — FAMOTIDINE 20 MILLIGRAM(S): 10 INJECTION INTRAVENOUS at 13:01

## 2019-12-12 RX ADMIN — Medication 100 MILLIGRAM(S): at 17:31

## 2019-12-12 RX ADMIN — Medication 8 UNIT(S): at 08:45

## 2019-12-12 RX ADMIN — Medication 1000 MILLIGRAM(S): at 13:26

## 2019-12-12 RX ADMIN — ATORVASTATIN CALCIUM 40 MILLIGRAM(S): 80 TABLET, FILM COATED ORAL at 22:15

## 2019-12-12 RX ADMIN — HEPARIN SODIUM 5000 UNIT(S): 5000 INJECTION INTRAVENOUS; SUBCUTANEOUS at 05:10

## 2019-12-12 RX ADMIN — ONDANSETRON 4 MILLIGRAM(S): 8 TABLET, FILM COATED ORAL at 05:10

## 2019-12-12 RX ADMIN — Medication 8 UNIT(S): at 13:27

## 2019-12-12 RX ADMIN — Medication 1300 MILLIGRAM(S): at 05:10

## 2019-12-12 RX ADMIN — Medication 1 TABLET(S): at 05:10

## 2019-12-12 RX ADMIN — CLOPIDOGREL BISULFATE 75 MILLIGRAM(S): 75 TABLET, FILM COATED ORAL at 13:01

## 2019-12-12 RX ADMIN — Medication 8 UNIT(S): at 17:30

## 2019-12-12 RX ADMIN — Medication 100 MILLIGRAM(S): at 10:20

## 2019-12-12 RX ADMIN — Medication 1000 MILLIGRAM(S): at 02:30

## 2019-12-12 RX ADMIN — GABAPENTIN 100 MILLIGRAM(S): 400 CAPSULE ORAL at 17:30

## 2019-12-12 RX ADMIN — Medication 250 MILLIGRAM(S): at 22:14

## 2019-12-12 RX ADMIN — Medication 1000 MILLIGRAM(S): at 17:42

## 2019-12-12 RX ADMIN — Medication 12.5 MILLIGRAM(S): at 17:30

## 2019-12-12 RX ADMIN — GABAPENTIN 100 MILLIGRAM(S): 400 CAPSULE ORAL at 05:10

## 2019-12-12 RX ADMIN — Medication 81 MILLIGRAM(S): at 13:01

## 2019-12-12 RX ADMIN — Medication 650 MILLIGRAM(S): at 18:21

## 2019-12-12 RX ADMIN — Medication 1: at 13:27

## 2019-12-12 RX ADMIN — HEPARIN SODIUM 5000 UNIT(S): 5000 INJECTION INTRAVENOUS; SUBCUTANEOUS at 13:28

## 2019-12-12 RX ADMIN — Medication 100 MILLIGRAM(S): at 02:42

## 2019-12-12 RX ADMIN — Medication 25 MICROGRAM(S): at 05:10

## 2019-12-12 NOTE — PROGRESS NOTE ADULT - SUBJECTIVE AND OBJECTIVE BOX
[facility] - WALLACE Lagos / WALLACE Gibson / ANN Barrett/ WALLACE Chin/ WALLACE Kumari/ ESPINOZA Bone / GABRIEL Geronimo / ALEXUS Kaur  ---------------------------------------------------------------------------------------------------------------  seen and examined today for ROB and hyponatremia  Interval : serum creatinine improved, serum sodium lower  VITALS:  T(F): 98.4 (12-12-19 @ 04:39), Max: 98.4 (12-11-19 @ 20:07)  HR: 58 (12-12-19 @ 04:39)  BP: 101/54 (12-12-19 @ 04:39)  RR: 18 (12-12-19 @ 04:39)  SpO2: 98% (12-12-19 @ 04:39)  Wt(kg): --    12-11 @ 07:01  -  12-12 @ 07:00  --------------------------------------------------------  IN: 1580 mL / OUT: 950 mL / NET: 630 mL      Physical Exam :-  Constitutional: NAD  Neck: Supple.  Respiratory: Bilateral equal breath sounds,  Cardiovascular: S1, S2 normal,  Gastrointestinal: Bowel Sounds present, soft, non tender.  Extremities: No edema  Neurological: Alert and Oriented x 3, no focal deficits  Psychiatric: Normal mood, normal affect  Data:-  Allergies :   codeine (Unknown)  Kiwi (Anaphylaxis)  penicillins (Anaphylaxis)    Hospital Medications:   MEDICATIONS  (STANDING):  aspirin enteric coated 81 milliGRAM(s) Oral daily  atorvastatin 40 milliGRAM(s) Oral at bedtime  aztreonam  IVPB 500 milliGRAM(s) IV Intermittent every 12 hours  clopidogrel Tablet 75 milliGRAM(s) Oral daily  dextrose 5%. 1000 milliLiter(s) (50 mL/Hr) IV Continuous <Continuous>  dextrose 50% Injectable 12.5 Gram(s) IV Push once  dextrose 50% Injectable 25 Gram(s) IV Push once  dextrose 50% Injectable 25 Gram(s) IV Push once  famotidine    Tablet 20 milliGRAM(s) Oral daily  gabapentin 100 milliGRAM(s) Oral two times a day  heparin  Injectable 5000 Unit(s) SubCutaneous every 8 hours  influenza   Vaccine 0.5 milliLiter(s) IntraMuscular once  insulin glargine Injectable (LANTUS) 15 Unit(s) SubCutaneous at bedtime  insulin lispro (HumaLOG) corrective regimen sliding scale   SubCutaneous three times a day before meals  insulin lispro (HumaLOG) corrective regimen sliding scale   SubCutaneous at bedtime  insulin lispro Injectable (HumaLOG) 8 Unit(s) SubCutaneous three times a day before meals  lactobacillus acidophilus 1 Tablet(s) Oral three times a day  levothyroxine 25 MICROGram(s) Oral daily  metoprolol tartrate 12.5 milliGRAM(s) Oral two times a day  metroNIDAZOLE  IVPB 500 milliGRAM(s) IV Intermittent every 8 hours  sodium bicarbonate 1300 milliGRAM(s) Oral three times a day  vancomycin  IVPB 1000 milliGRAM(s) IV Intermittent every 24 hours    12-12    129<L>  |  98  |  44<H>  ----------------------------<  212<H>  4.3   |  20<L>  |  1.23    Ca    8.3<L>      12 Dec 2019 07:07      Creatinine Trend: 1.23 <--, 1.39 <--, 1.56 <--, 1.51 <--, 1.54 <--, 1.80 <--, 1.85 <--, 1.58 <--, 1.71 <--, 1.85 <--, 1.81 <--                        10.4   9.96  )-----------( 270      ( 12 Dec 2019 08:16 )             31.7

## 2019-12-12 NOTE — PROGRESS NOTE ADULT - SUBJECTIVE AND OBJECTIVE BOX
[facility]    Subjective: Patient seen and examined. No new events except as noted.   feeling better   PT   oral feeding     REVIEW OF SYSTEMS:    CONSTITUTIONAL: No weakness, fevers or chills  EYES/ENT: No visual changes;  No vertigo or throat pain   NECK: No pain or stiffness  RESPIRATORY: No cough, wheezing, hemoptysis; No shortness of breath  CARDIOVASCULAR: No chest pain or palpitations  GASTROINTESTINAL: No abdominal or epigastric pain. No nausea, vomiting, or hematemesis; No diarrhea or constipation. No melena or hematochezia.  GENITOURINARY: No dysuria, frequency or hematuria  NEUROLOGICAL: No numbness or weakness  SKIN: No itching, burning, rashes, or lesions   All other review of systems is negative unless indicated above.    MEDICATIONS:  MEDICATIONS  (STANDING):  aspirin enteric coated 81 milliGRAM(s) Oral daily  atorvastatin 40 milliGRAM(s) Oral at bedtime  aztreonam  IVPB 500 milliGRAM(s) IV Intermittent every 12 hours  clopidogrel Tablet 75 milliGRAM(s) Oral daily  dextrose 5%. 1000 milliLiter(s) (50 mL/Hr) IV Continuous <Continuous>  dextrose 50% Injectable 12.5 Gram(s) IV Push once  dextrose 50% Injectable 25 Gram(s) IV Push once  dextrose 50% Injectable 25 Gram(s) IV Push once  famotidine    Tablet 20 milliGRAM(s) Oral daily  gabapentin 100 milliGRAM(s) Oral two times a day  heparin  Injectable 5000 Unit(s) SubCutaneous every 8 hours  influenza   Vaccine 0.5 milliLiter(s) IntraMuscular once  insulin glargine Injectable (LANTUS) 15 Unit(s) SubCutaneous at bedtime  insulin lispro (HumaLOG) corrective regimen sliding scale   SubCutaneous three times a day before meals  insulin lispro (HumaLOG) corrective regimen sliding scale   SubCutaneous at bedtime  insulin lispro Injectable (HumaLOG) 8 Unit(s) SubCutaneous three times a day before meals  lactobacillus acidophilus 1 Tablet(s) Oral three times a day  levothyroxine 25 MICROGram(s) Oral daily  metoprolol tartrate 12.5 milliGRAM(s) Oral two times a day  metroNIDAZOLE  IVPB 500 milliGRAM(s) IV Intermittent every 8 hours  sodium bicarbonate 1300 milliGRAM(s) Oral three times a day  vancomycin  IVPB 1000 milliGRAM(s) IV Intermittent every 24 hours      PHYSICAL EXAM:  T(C): 36.8 (12-12-19 @ 12:33), Max: 36.9 (12-11-19 @ 20:07)  HR: 89 (12-12-19 @ 12:33) (58 - 89)  BP: 116/72 (12-12-19 @ 12:33) (101/54 - 122/78)  RR: 18 (12-12-19 @ 12:33) (18 - 18)  SpO2: 95% (12-12-19 @ 12:33) (95% - 98%)  Wt(kg): --  I&O's Summary    11 Dec 2019 07:01  -  12 Dec 2019 07:00  --------------------------------------------------------  IN: 1580 mL / OUT: 950 mL / NET: 630 mL    12 Dec 2019 07:01  -  12 Dec 2019 16:42  --------------------------------------------------------  IN: 0 mL / OUT: 300 mL / NET: -300 mL          Appearance: Normal	  HEENT:   Normal oral mucosa, PERRL, EOMI	  Lymphatic: No lymphadenopathy , no edema  Cardiovascular: Normal S1 S2, No JVD, No murmurs , Peripheral pulses palpable 2+ bilaterally  Respiratory: Lungs clear to auscultation, normal effort 	  Gastrointestinal:  Soft, Non-tender, + BS	  Skin: dressing intact lower back   Musculoskeletal: Normal range of motion, normal strength  Psychiatry:  Mood & affect appropriate  Ext: No edema      All labs, Imaging and EKGs personally reviewed                           10.4   9.96  )-----------( 270      ( 12 Dec 2019 08:16 )             31.7               12-12    129<L>  |  98  |  44<H>  ----------------------------<  212<H>  4.3   |  20<L>  |  1.23    Ca    8.3<L>      12 Dec 2019 07:07

## 2019-12-12 NOTE — PROGRESS NOTE ADULT - SUBJECTIVE AND OBJECTIVE BOX
Infectious Diseases progress note:    Subjective: Feels comfortable, states she is better today.  Afebrile, no leukocytosis.     ROS:  CONSTITUTIONAL:  No fever, chills, rigors  CARDIOVASCULAR:  No chest pain or palpitations  RESPIRATORY:   No SOB, cough, dyspnea on exertion.  No wheezing  GASTROINTESTINAL:  No abd pain, N/V, diarrhea/constipation  EXTREMITIES:  No swelling or joint pain  GENITOURINARY:  No burning on urination, increased frequency or urgency.  No flank pain  NEUROLOGIC:  No HA, visual disturbances  SKIN: No rashes    Allergies    codeine (Unknown)  Kiwi (Anaphylaxis)  penicillins (Anaphylaxis)    Intolerances        ANTIBIOTICS/RELEVANT:  antimicrobials  aztreonam  IVPB 500 milliGRAM(s) IV Intermittent every 12 hours  metroNIDAZOLE  IVPB 500 milliGRAM(s) IV Intermittent every 8 hours  vancomycin  IVPB 1000 milliGRAM(s) IV Intermittent every 24 hours    immunologic:  influenza   Vaccine 0.5 milliLiter(s) IntraMuscular once    OTHER:  acetaminophen   Tablet .. 1000 milliGRAM(s) Oral every 6 hours PRN  acetaminophen   Tablet .. 650 milliGRAM(s) Oral every 6 hours PRN  aspirin enteric coated 81 milliGRAM(s) Oral daily  atorvastatin 40 milliGRAM(s) Oral at bedtime  clopidogrel Tablet 75 milliGRAM(s) Oral daily  dextrose 40% Gel 15 Gram(s) Oral once PRN  dextrose 5%. 1000 milliLiter(s) IV Continuous <Continuous>  dextrose 50% Injectable 12.5 Gram(s) IV Push once  dextrose 50% Injectable 25 Gram(s) IV Push once  dextrose 50% Injectable 25 Gram(s) IV Push once  famotidine    Tablet 20 milliGRAM(s) Oral daily  gabapentin 100 milliGRAM(s) Oral two times a day  glucagon  Injectable 1 milliGRAM(s) IntraMuscular once PRN  heparin  Injectable 5000 Unit(s) SubCutaneous every 8 hours  insulin glargine Injectable (LANTUS) 15 Unit(s) SubCutaneous at bedtime  insulin lispro (HumaLOG) corrective regimen sliding scale   SubCutaneous three times a day before meals  insulin lispro (HumaLOG) corrective regimen sliding scale   SubCutaneous at bedtime  insulin lispro Injectable (HumaLOG) 8 Unit(s) SubCutaneous three times a day before meals  lactobacillus acidophilus 1 Tablet(s) Oral three times a day  levothyroxine 25 MICROGram(s) Oral daily  metoprolol tartrate 12.5 milliGRAM(s) Oral two times a day  ondansetron Injectable 4 milliGRAM(s) IV Push every 6 hours PRN  sodium bicarbonate 1300 milliGRAM(s) Oral three times a day  traMADol 25 milliGRAM(s) Oral every 12 hours PRN  traMADol 50 milliGRAM(s) Oral every 12 hours PRN      Objective:  Vital Signs Last 24 Hrs  T(C): 36.6 (13 Dec 2019 04:49), Max: 36.8 (12 Dec 2019 12:33)  T(F): 97.8 (13 Dec 2019 04:49), Max: 98.2 (12 Dec 2019 12:33)  HR: 55 (13 Dec 2019 04:49) (55 - 89)  BP: 102/65 (13 Dec 2019 04:49) (100/62 - 126/80)  BP(mean): --  RR: 18 (13 Dec 2019 04:49) (18 - 18)  SpO2: 95% (13 Dec 2019 04:49) (95% - 100%)    PHYSICAL EXAM:  Constitutional:NAD  Eyes:NIDIA, EOMI  Ear/Nose/Throat: no thrush, mucositis.  Moist mucous membranes	  Neck:no JVD, no lymphadenopathy, supple  Respiratory: CTA lucian  Cardiovascular: S1S2 RRR, no murmurs  Gastrointestinal:soft, nontender,  nondistended (+) BS  Extremities:no e/e/c  Skin:  rt/left perineal dsg c/d/i        LABS:                        10.4   9.96  )-----------( 270      ( 12 Dec 2019 08:16 )             31.7     12-12    129<L>  |  98  |  44<H>  ----------------------------<  212<H>  4.3   |  20<L>  |  1.23    Ca    8.3<L>      12 Dec 2019 07:07                  Vancomycin Level, Trough: 13.4 ug/mL (12-11 @ 19:54)              MICROBIOLOGY:    Culture - Blood in AM (12.10.19 @ 13:06)    Specimen Source: .Blood Blood-Venous    Culture Results:   No growth to date.    Culture - Blood in AM (12.10.19 @ 13:06)    Specimen Source: .Blood Blood-Peripheral    Culture Results:   No growth to date.    Culture - Blood in AM (12.06.19 @ 06:02)    Gram Stain:   Growth in aerobic bottle: Gram positive cocci in pairs  Growth in anaerobic bottle: Gram Positive Cocci in Pairs and Chains    Specimen Source: .Blood Blood    Culture Results:   Growth in aerobic and anaerobic bottles: Streptococcus anginosus  See previous culture [id number]    Culture - Blood in AM (12.06.19 @ 06:02)    Specimen Source: .Blood Blood    Culture Results:   No growth at 5 days.    Culture - Blood in AM (12.05.19 @ 09:27)    Specimen Source: .Blood Blood-Peripheral    Culture Results:   No growth at 5 days.          RADIOLOGY & ADDITIONAL STUDIES:

## 2019-12-12 NOTE — CHART NOTE - NSCHARTNOTEFT_GEN_A_CORE
Nutrition services    Malnutrition follow up      Chart, labs  reviewed,  events noted.  85 yo female with a hx of of CHF, DM, HLD, HTN, and an STEMI who was admitted for Sepsis 2/2 gluteal celluitis present on admission.     Diet: Consistent carbohydrate DASH, No concentrated potassium  PO intake:  Patient with slightly improved appetite and intake now 30-40% of meals  plus nepro 2x daily  Tray prep provided by staff, patient makes needs known, patient requires encouragement to eat    Skin:  per RN documentation R lower butt abscess  L lower butt wound  Edema: none    Weight: 67.9 kg,  59kg dosing ( accurate)    Nutrition  Diagnosis: malnutrition  Nutrition dx; addressed, ongoing    Recommendations and Monitorin. continue current diet  2. continue nepro x2 daily  3. monitor/encourage PO intake.   4. assist with menus  Monitor  weight, skin, labs.     Discussed with team     RD remains available

## 2019-12-12 NOTE — PHARMACOTHERAPY INTERVENTION NOTE - COMMENTS
Patient is currently on aztreonam, metronidazole, and vancomycin for possibly polymicrobial perineal wound infection. Blood culture grew strep anginosus. Dr. Zaman spoke with the patient regarding penicillin allergy. Apparently the allergy was rash and edema from more than 15 years ago. The cross reactivity for penicillin with carbapenem is very low <1%. (J Allergy Clin Immunol Pract 2019;7:46-60).    Spoke with Dr. Zaman regarding switch patient to carbapenem as it is more superior therapy and also eliminating 3 drug combination. Dr. Zaman will switch the patient to ertapenem 1g q24h tomorrow on 12/13 morning.  She will give the patient the test doses (graded challenge) as the following:  Ertapenem 10mg/50mL NS over 30 min (at time 0)  Ertapenem 100mg/50mL NS over 30 min (at time 60 min)  Ertapenem 890mg/50mL NS over 30 min (at time 120 min) Patient is currently on aztreonam, metronidazole, and vancomycin for possibly polymicrobial perineal wound infection. Blood culture grew strep anginosus. Dr. Zaman spoke with the patient regarding penicillin allergy. Apparently the allergy was rash and edema from more than 15 years ago. The cross reactivity for penicillin with carbapenem is very low <1%. (J Allergy Clin Immunol Pract 2019;7:46-60).    Spoke with Dr. Zaman regarding switch patient to carbapenem as it is more superior therapy and also eliminating 3 drug regimen. Dr. Zaman said that she will switch the patient to ertapenem 1g q24h tomorrow on 12/13 morning.  She will give the patient the test doses (graded challenge) as the following:  Ertapenem 10mg/50mL NS over 30 min (at time 0)  Ertapenem 100mg/50mL NS over 30 min (at time 60 min)  Ertapenem 890mg/50mL NS over 30 min (at time 120 min)    Also suggested to hold tomorrow morning dose of metoprolol if graded challenge is to be done.

## 2019-12-12 NOTE — PROGRESS NOTE ADULT - ASSESSMENT
85 yo woman with a hx of CHF, DM, HLD, HTN, CAD prior MI, sp debridement of bilateral buttocks (9/15, 9/28) presenting with buttock cellulitis. CT consistent with cellulitis without underlying abscess.    Plan:   - no urgent surgical intervention at this time  - c/w IV antibiotics per ID   - cont local wound care  - OOB with assistance  - Continue care as per medicine     Red surgery  [id number]

## 2019-12-12 NOTE — PROGRESS NOTE ADULT - ASSESSMENT
85 yo female with a hx of of CHF, DM, HLD, HTN, and an STEMI who presents to the ED for a several day history of buttocks pain. Patient states that the pain started about a week ago, but has suddenly gotten worse over the past few days. She describes it as a sharp pain and has tried taking OTC acetaminophen, which has not helped the pain at all. Patient endorses one episode of nausea earlier this morning, but denies any vomiting. She has not noticed a rash in that area, but does endorse that the skin is difficult to visualize.  Patient has a wound care nurse that comes to the house a few times per week. She came yesterday for wound care dressing changes.    Of note, patient was hospitalized at [facility] from September 4 to October 4 at [facility] for an ulcer in between her buttocks. During that hospitalization, patient required debridement of the wound as well as IV antibiotics.     In the ED, her VS /75, HR 77, RR 18 with SpO2 of 99% and T of 99.2. Patient received a 500 cc bolus of fluids as well as Doxycycline 500 mg x1. (04 Dec 2019 17:13)  WBC 13.5.  ESR 71, CRP 10.6.  UA (-) nit/mod LE, wbc 10.  Bcx (+) GPC pairs/chains from 2 sets.  CTAP without contrast shows L medial gluteal skin cellulitis and air-filled  tract.  No drainable fluid collection.  Pt on vanco/aztreonam/flagyl.     ID consult called for further abx managment.       Sepsis:    - Pt with fever, leukocytosis and bacteremia.  Source likely from gluteal cellulitis.  CTap shows L gluteal skin cellulitis with air-filled tract, without drainable fluid collection.   Agree with broad spectrum abx for now until all culture data finalized.      - MRI pelvis ordered to evaluate for perianal fistula- pt unable to tolerate exam.  Cannot r/o underlying OM, there is fluid collection near sacrum/coccyx.  Recommend bone scan vs. repeat MRI for further evaluation.      - Blood culture (+) GPC pairs/chains - identified as strep anginosus.      - TTE no vegetations reported, unable to r/o endocarditis.  Pt may need further ALONDRA to evaluate for endocarditits, although will  not change abx duration from ID standpoint.     - Urine cultures growing enterococcus faecalis.  Based on sensitivities of blood and urine cultures, can switch change from daptomycin to vancomycin.  Cont azactam and flagyl for now to continue possible polymicrobial infection involving perineal wounds.  Will d/w family regarding pt's prior penicillin allergy.  (pt had 'swelling' at the age of 10 or 15 towards pcn.  Does not recall taking other beta lactam abx in the past.)  d/w Pharmacy, will plan for ertapenem test dose on 12/13.     - repeat MRI pelvis with IV contrast demostrates abscess collection overlying sacrum and coccyx.  High suspicion of OM.  Plan for eventual picc line once blood cultures cleared at least 48-72 hrs.  No further intervention as per surgery.  Plan for 6 week IV abx course.          d/w pt and daughter at bedside.         Will follow,    Sandrita Zaman  [phone]

## 2019-12-12 NOTE — PROGRESS NOTE ADULT - SUBJECTIVE AND OBJECTIVE BOX
RED SURGERY DAILY PROGRESS NOTE:       SUBJECTIVE/ROS: Patient seen and examined at bedside with Dr. Wan.  Patient still has some pain but is controlled.  Patient states she worked with Physical therapy yesterday but could not get out of bed today.  Encouraged patient to be OOB.   Tolerating diet with no N/V.       MEDICATIONS  (STANDING):  aspirin enteric coated 81 milliGRAM(s) Oral daily  atorvastatin 40 milliGRAM(s) Oral at bedtime  aztreonam  IVPB 500 milliGRAM(s) IV Intermittent every 12 hours  clopidogrel Tablet 75 milliGRAM(s) Oral daily  dextrose 5%. 1000 milliLiter(s) (50 mL/Hr) IV Continuous <Continuous>  dextrose 50% Injectable 12.5 Gram(s) IV Push once  dextrose 50% Injectable 25 Gram(s) IV Push once  dextrose 50% Injectable 25 Gram(s) IV Push once  famotidine    Tablet 20 milliGRAM(s) Oral daily  gabapentin 100 milliGRAM(s) Oral two times a day  heparin  Injectable 5000 Unit(s) SubCutaneous every 8 hours  influenza   Vaccine 0.5 milliLiter(s) IntraMuscular once  insulin glargine Injectable (LANTUS) 15 Unit(s) SubCutaneous at bedtime  insulin lispro (HumaLOG) corrective regimen sliding scale   SubCutaneous three times a day before meals  insulin lispro (HumaLOG) corrective regimen sliding scale   SubCutaneous at bedtime  insulin lispro Injectable (HumaLOG) 8 Unit(s) SubCutaneous three times a day before meals  lactobacillus acidophilus 1 Tablet(s) Oral three times a day  levothyroxine 25 MICROGram(s) Oral daily  metoprolol tartrate 12.5 milliGRAM(s) Oral two times a day  metroNIDAZOLE  IVPB 500 milliGRAM(s) IV Intermittent every 8 hours  sodium bicarbonate 1300 milliGRAM(s) Oral three times a day  vancomycin  IVPB 1000 milliGRAM(s) IV Intermittent every 24 hours    MEDICATIONS  (PRN):  acetaminophen   Tablet .. 1000 milliGRAM(s) Oral every 6 hours PRN Mild Pain (1 - 3)  acetaminophen   Tablet .. 650 milliGRAM(s) Oral every 6 hours PRN Temp greater or equal to 38C (100.4F)  dextrose 40% Gel 15 Gram(s) Oral once PRN Blood Glucose LESS THAN 70 milliGRAM(s)/deciliter  glucagon  Injectable 1 milliGRAM(s) IntraMuscular once PRN Glucose LESS THAN 70 milligrams/deciliter  ondansetron Injectable 4 milliGRAM(s) IV Push every 6 hours PRN Nausea and/or Vomiting  traMADol 25 milliGRAM(s) Oral every 12 hours PRN Moderate Pain (4 - 6)  traMADol 50 milliGRAM(s) Oral every 12 hours PRN Severe Pain (7 - 10)      OBJECTIVE:    Vital Signs Last 24 Hrs  T(C): 36.7 (12 Dec 2019 16:52), Max: 36.9 (11 Dec 2019 20:07)  T(F): 98.1 (12 Dec 2019 16:52), Max: 98.4 (11 Dec 2019 20:07)  HR: 65 (12 Dec 2019 16:52) (58 - 89)  BP: 100/62 (12 Dec 2019 16:52) (100/62 - 122/78)  BP(mean): --  RR: 18 (12 Dec 2019 16:52) (18 - 18)  SpO2: 100% (12 Dec 2019 16:52) (95% - 100%)        I&O's Detail    11 Dec 2019 07:01  -  12 Dec 2019 07:00  --------------------------------------------------------  IN:    IV PiggyBack: 650 mL    Oral Fluid: 930 mL  Total IN: 1580 mL    OUT:    Indwelling Catheter - Urethral: 950 mL  Total OUT: 950 mL    Total NET: 630 mL      12 Dec 2019 07:01  -  12 Dec 2019 18:12  --------------------------------------------------------  IN:    IV PiggyBack: 250 mL    Oral Fluid: 100 mL  Total IN: 350 mL    OUT:    Indwelling Catheter - Urethral: 300 mL  Total OUT: 300 mL    Total NET: 50 mL          Daily     Daily     LABS:                        10.4   9.96  )-----------( 270      ( 12 Dec 2019 08:16 )             31.7     12-12    129<L>  |  98  |  44<H>  ----------------------------<  212<H>  4.3   |  20<L>  |  1.23    Ca    8.3<L>      12 Dec 2019 07:07                    PHYSICAL EXAM:  GEN: NAD, resting quietly  PULM: symmetric chest rise bilaterally, no increased WOB  ABD: soft, NTND  : right and left incision sites with healthy granulation tissue, overlying cellulitis.  Small cavity at superior portion of gluteal cleft, no drainage noted. Area of erythema overall improving.   EXTR: no cyanosis or edema, moving all extremities

## 2019-12-12 NOTE — PROGRESS NOTE ADULT - PROBLEM SELECTOR PLAN 1
ddx includes pre renal vs atn vs obstruction  pt tolerating po intake  s/p infante  with >600cc urine output,   monitor I&Os   f/u urology recs when to d/c infante  trend bmp

## 2019-12-12 NOTE — PHARMACOTHERAPY INTERVENTION NOTE - COMMENTS
Recommend to hold AM dose of metoprolol for 12/13/19 pending allergy test.   Potential interaction of metoprolol and epinephrine (diminished effect of epinephrine should patient require it during allergy test)  Informed med NP

## 2019-12-13 LAB
ANION GAP SERPL CALC-SCNC: 10 MMOL/L — SIGNIFICANT CHANGE UP (ref 5–17)
BASOPHILS # BLD AUTO: 0.05 K/UL — SIGNIFICANT CHANGE UP (ref 0–0.2)
BASOPHILS NFR BLD AUTO: 0.4 % — SIGNIFICANT CHANGE UP (ref 0–2)
BUN SERPL-MCNC: 32 MG/DL — HIGH (ref 7–23)
CALCIUM SERPL-MCNC: 8.4 MG/DL — SIGNIFICANT CHANGE UP (ref 8.4–10.5)
CHLORIDE SERPL-SCNC: 102 MMOL/L — SIGNIFICANT CHANGE UP (ref 96–108)
CO2 SERPL-SCNC: 22 MMOL/L — SIGNIFICANT CHANGE UP (ref 22–31)
CREAT SERPL-MCNC: 1.09 MG/DL — SIGNIFICANT CHANGE UP (ref 0.5–1.3)
EOSINOPHIL # BLD AUTO: 0.3 K/UL — SIGNIFICANT CHANGE UP (ref 0–0.5)
EOSINOPHIL NFR BLD AUTO: 2.6 % — SIGNIFICANT CHANGE UP (ref 0–6)
GLUCOSE BLDC GLUCOMTR-MCNC: 100 MG/DL — HIGH (ref 70–99)
GLUCOSE BLDC GLUCOMTR-MCNC: 111 MG/DL — HIGH (ref 70–99)
GLUCOSE BLDC GLUCOMTR-MCNC: 114 MG/DL — HIGH (ref 70–99)
GLUCOSE BLDC GLUCOMTR-MCNC: 131 MG/DL — HIGH (ref 70–99)
GLUCOSE BLDC GLUCOMTR-MCNC: 185 MG/DL — HIGH (ref 70–99)
GLUCOSE BLDC GLUCOMTR-MCNC: 55 MG/DL — LOW (ref 70–99)
GLUCOSE BLDC GLUCOMTR-MCNC: 56 MG/DL — LOW (ref 70–99)
GLUCOSE BLDC GLUCOMTR-MCNC: 84 MG/DL — SIGNIFICANT CHANGE UP (ref 70–99)
GLUCOSE SERPL-MCNC: 129 MG/DL — HIGH (ref 70–99)
HCT VFR BLD CALC: 30.9 % — LOW (ref 34.5–45)
HGB BLD-MCNC: 10.1 G/DL — LOW (ref 11.5–15.5)
IMM GRANULOCYTES NFR BLD AUTO: 0.7 % — SIGNIFICANT CHANGE UP (ref 0–1.5)
LYMPHOCYTES # BLD AUTO: 17.5 % — SIGNIFICANT CHANGE UP (ref 13–44)
LYMPHOCYTES # BLD AUTO: 2.02 K/UL — SIGNIFICANT CHANGE UP (ref 1–3.3)
MCHC RBC-ENTMCNC: 28.3 PG — SIGNIFICANT CHANGE UP (ref 27–34)
MCHC RBC-ENTMCNC: 32.7 GM/DL — SIGNIFICANT CHANGE UP (ref 32–36)
MCV RBC AUTO: 86.6 FL — SIGNIFICANT CHANGE UP (ref 80–100)
MONOCYTES # BLD AUTO: 1 K/UL — HIGH (ref 0–0.9)
MONOCYTES NFR BLD AUTO: 8.7 % — SIGNIFICANT CHANGE UP (ref 2–14)
NEUTROPHILS # BLD AUTO: 8.11 K/UL — HIGH (ref 1.8–7.4)
NEUTROPHILS NFR BLD AUTO: 70.1 % — SIGNIFICANT CHANGE UP (ref 43–77)
OSMOLALITY UR: 502 MOS/KG — SIGNIFICANT CHANGE UP (ref 300–900)
PLATELET # BLD AUTO: 271 K/UL — SIGNIFICANT CHANGE UP (ref 150–400)
POTASSIUM SERPL-MCNC: 4.5 MMOL/L — SIGNIFICANT CHANGE UP (ref 3.5–5.3)
POTASSIUM SERPL-SCNC: 4.5 MMOL/L — SIGNIFICANT CHANGE UP (ref 3.5–5.3)
RBC # BLD: 3.57 M/UL — LOW (ref 3.8–5.2)
RBC # FLD: 16.2 % — HIGH (ref 10.3–14.5)
SODIUM SERPL-SCNC: 134 MMOL/L — LOW (ref 135–145)
WBC # BLD: 11.56 K/UL — HIGH (ref 3.8–10.5)
WBC # FLD AUTO: 11.56 K/UL — HIGH (ref 3.8–10.5)

## 2019-12-13 RX ORDER — ERTAPENEM SODIUM 1 G/1
1000 INJECTION, POWDER, LYOPHILIZED, FOR SOLUTION INTRAMUSCULAR; INTRAVENOUS EVERY 24 HOURS
Refills: 0 | Status: DISCONTINUED | OUTPATIENT
Start: 2019-12-14 | End: 2019-12-23

## 2019-12-13 RX ORDER — EPINEPHRINE 0.3 MG/.3ML
0.3 INJECTION INTRAMUSCULAR; SUBCUTANEOUS ONCE
Refills: 0 | Status: DISCONTINUED | OUTPATIENT
Start: 2019-12-13 | End: 2019-12-23

## 2019-12-13 RX ORDER — DIPHENHYDRAMINE HCL 50 MG
50 CAPSULE ORAL EVERY 4 HOURS
Refills: 0 | Status: DISCONTINUED | OUTPATIENT
Start: 2019-12-13 | End: 2019-12-23

## 2019-12-13 RX ADMIN — Medication 8 UNIT(S): at 17:17

## 2019-12-13 RX ADMIN — HEPARIN SODIUM 5000 UNIT(S): 5000 INJECTION INTRAVENOUS; SUBCUTANEOUS at 22:09

## 2019-12-13 RX ADMIN — Medication 100 MILLIGRAM(S): at 02:23

## 2019-12-13 RX ADMIN — ATORVASTATIN CALCIUM 40 MILLIGRAM(S): 80 TABLET, FILM COATED ORAL at 22:09

## 2019-12-13 RX ADMIN — Medication 1000 MILLIGRAM(S): at 01:05

## 2019-12-13 RX ADMIN — HEPARIN SODIUM 5000 UNIT(S): 5000 INJECTION INTRAVENOUS; SUBCUTANEOUS at 15:19

## 2019-12-13 RX ADMIN — Medication 1 TABLET(S): at 06:28

## 2019-12-13 RX ADMIN — Medication 25 MICROGRAM(S): at 06:28

## 2019-12-13 RX ADMIN — Medication 1000 MILLIGRAM(S): at 10:32

## 2019-12-13 RX ADMIN — CLOPIDOGREL BISULFATE 75 MILLIGRAM(S): 75 TABLET, FILM COATED ORAL at 12:43

## 2019-12-13 RX ADMIN — Medication 81 MILLIGRAM(S): at 12:43

## 2019-12-13 RX ADMIN — Medication 1 TABLET(S): at 15:19

## 2019-12-13 RX ADMIN — FAMOTIDINE 20 MILLIGRAM(S): 10 INJECTION INTRAVENOUS at 12:43

## 2019-12-13 RX ADMIN — Medication 1000 MILLIGRAM(S): at 17:50

## 2019-12-13 RX ADMIN — Medication 1000 MILLIGRAM(S): at 17:16

## 2019-12-13 RX ADMIN — Medication 8 UNIT(S): at 08:31

## 2019-12-13 RX ADMIN — Medication 1300 MILLIGRAM(S): at 06:28

## 2019-12-13 RX ADMIN — Medication 1 TABLET(S): at 22:09

## 2019-12-13 RX ADMIN — GABAPENTIN 100 MILLIGRAM(S): 400 CAPSULE ORAL at 17:17

## 2019-12-13 RX ADMIN — HEPARIN SODIUM 5000 UNIT(S): 5000 INJECTION INTRAVENOUS; SUBCUTANEOUS at 06:28

## 2019-12-13 RX ADMIN — Medication 1300 MILLIGRAM(S): at 15:18

## 2019-12-13 RX ADMIN — GABAPENTIN 100 MILLIGRAM(S): 400 CAPSULE ORAL at 06:28

## 2019-12-13 RX ADMIN — Medication 1000 MILLIGRAM(S): at 11:00

## 2019-12-13 RX ADMIN — Medication 12.5 MILLIGRAM(S): at 17:17

## 2019-12-13 RX ADMIN — Medication 8 UNIT(S): at 12:43

## 2019-12-13 RX ADMIN — Medication 1000 MILLIGRAM(S): at 00:35

## 2019-12-13 RX ADMIN — Medication 1300 MILLIGRAM(S): at 22:09

## 2019-12-13 RX ADMIN — Medication 50 MILLIGRAM(S): at 00:36

## 2019-12-13 NOTE — PROGRESS NOTE ADULT - SUBJECTIVE AND OBJECTIVE BOX
[facility]    Subjective: Patient seen and examined. No new events except as noted.   feeling better     REVIEW OF SYSTEMS:    CONSTITUTIONAL: No weakness, fevers or chills  EYES/ENT: No visual changes;  No vertigo or throat pain   NECK: No pain or stiffness  RESPIRATORY: No cough, wheezing, hemoptysis; No shortness of breath  CARDIOVASCULAR: No chest pain or palpitations  GASTROINTESTINAL: No abdominal or epigastric pain. No nausea, vomiting, or hematemesis; No diarrhea or constipation. No melena or hematochezia.  GENITOURINARY: No dysuria, frequency or hematuria  NEUROLOGICAL: No numbness or weakness  SKIN: No itching, burning, rashes, or lesions   All other review of systems is negative unless indicated above.    MEDICATIONS:  MEDICATIONS  (STANDING):  aspirin enteric coated 81 milliGRAM(s) Oral daily  atorvastatin 40 milliGRAM(s) Oral at bedtime  clopidogrel Tablet 75 milliGRAM(s) Oral daily  dextrose 5%. 1000 milliLiter(s) (50 mL/Hr) IV Continuous <Continuous>  dextrose 50% Injectable 12.5 Gram(s) IV Push once  dextrose 50% Injectable 25 Gram(s) IV Push once  dextrose 50% Injectable 25 Gram(s) IV Push once  famotidine    Tablet 20 milliGRAM(s) Oral daily  gabapentin 100 milliGRAM(s) Oral two times a day  heparin  Injectable 5000 Unit(s) SubCutaneous every 8 hours  influenza   Vaccine 0.5 milliLiter(s) IntraMuscular once  insulin glargine Injectable (LANTUS) 15 Unit(s) SubCutaneous at bedtime  insulin lispro (HumaLOG) corrective regimen sliding scale   SubCutaneous three times a day before meals  insulin lispro (HumaLOG) corrective regimen sliding scale   SubCutaneous at bedtime  insulin lispro Injectable (HumaLOG) 8 Unit(s) SubCutaneous three times a day before meals  lactobacillus acidophilus 1 Tablet(s) Oral three times a day  levothyroxine 25 MICROGram(s) Oral daily  metoprolol tartrate 12.5 milliGRAM(s) Oral two times a day  sodium bicarbonate 1300 milliGRAM(s) Oral three times a day      PHYSICAL EXAM:  T(C): 36.9 (12-13-19 @ 09:27), Max: 36.9 (12-13-19 @ 09:27)  HR: 60 (12-13-19 @ 09:27) (55 - 61)  BP: 113/66 (12-13-19 @ 09:27) (102/65 - 126/80)  RR: 18 (12-13-19 @ 09:27) (18 - 18)  SpO2: 95% (12-13-19 @ 09:27) (95% - 97%)  Wt(kg): --  I&O's Summary    12 Dec 2019 07:01  -  13 Dec 2019 07:00  --------------------------------------------------------  IN: 1170 mL / OUT: 900 mL / NET: 270 mL    13 Dec 2019 07:01  -  13 Dec 2019 17:26  --------------------------------------------------------  IN: 0 mL / OUT: 400 mL / NET: -400 mL          Appearance: Normal	  HEENT:   Normal oral mucosa, PERRL, EOMI	  Lymphatic: No lymphadenopathy , no edema  Cardiovascular: Normal S1 S2, No JVD  Respiratory: Lungs clear to auscultation, normal effort 	  Gastrointestinal:  Soft, Non-tender, + BS	  Skin: No rashes, No ecchymoses, No cyanosis, warm to touch  Musculoskeletal: laying flat   Psychiatry:  Mood & affect appropriate  Ext: No edema      All labs, Imaging and EKGs personally reviewed                           10.1   11.56 )-----------( 271      ( 13 Dec 2019 08:38 )             30.9               12-13    134<L>  |  102  |  32<H>  ----------------------------<  129<H>  4.5   |  22  |  1.09    Ca    8.4      13 Dec 2019 06:44

## 2019-12-13 NOTE — PROGRESS NOTE ADULT - SUBJECTIVE AND OBJECTIVE BOX
Patient seen and examined  no complaints      codeine (Unknown)  Kiwi (Anaphylaxis)  penicillins (Anaphylaxis)    Hospital Medications:   MEDICATIONS  (STANDING):  aspirin enteric coated 81 milliGRAM(s) Oral daily  atorvastatin 40 milliGRAM(s) Oral at bedtime  clopidogrel Tablet 75 milliGRAM(s) Oral daily  dextrose 5%. 1000 milliLiter(s) (50 mL/Hr) IV Continuous <Continuous>  dextrose 50% Injectable 12.5 Gram(s) IV Push once  dextrose 50% Injectable 25 Gram(s) IV Push once  dextrose 50% Injectable 25 Gram(s) IV Push once  ertapenem 890 milliGRAM(s),sodium chloride 0.9% 50 milliLiter(s) 890 milliGRAM(s) IV Intermittent once  famotidine    Tablet 20 milliGRAM(s) Oral daily  gabapentin 100 milliGRAM(s) Oral two times a day  heparin  Injectable 5000 Unit(s) SubCutaneous every 8 hours  influenza   Vaccine 0.5 milliLiter(s) IntraMuscular once  insulin glargine Injectable (LANTUS) 15 Unit(s) SubCutaneous at bedtime  insulin lispro (HumaLOG) corrective regimen sliding scale   SubCutaneous three times a day before meals  insulin lispro (HumaLOG) corrective regimen sliding scale   SubCutaneous at bedtime  insulin lispro Injectable (HumaLOG) 8 Unit(s) SubCutaneous three times a day before meals  lactobacillus acidophilus 1 Tablet(s) Oral three times a day  levothyroxine 25 MICROGram(s) Oral daily  metoprolol tartrate 12.5 milliGRAM(s) Oral two times a day  sodium bicarbonate 1300 milliGRAM(s) Oral three times a day        VITALS:  T(F): 98.5 (19 @ 09:27), Max: 98.5 (19 @ 09:27)  HR: 60 (19 @ 09:27)  BP: 113/66 (19 @ 09:27)  RR: 18 (19 @ 09:27)  SpO2: 95% (19 @ 09:27)  Wt(kg): --     @ 07:01  -   @ 07:00  --------------------------------------------------------  IN: 1170 mL / OUT: 900 mL / NET: 270 mL        PHYSICAL EXAM:  Constitutional: NAD  HEENT: anicteric sclera, oropharynx clear, MMM  Neck: No JVD  Respiratory: CTAB, no wheezes, rales or rhonchi  Cardiovascular: S1, S2, RRR  Gastrointestinal: BS+, soft, NT/ND  Extremities: +peripheral edema  Neurological: A/O x 3, no focal deficits  Psychiatric: Normal mood, normal affect  :+ indwelling infante.      LABS:      134<L>  |  102  |  32<H>  ----------------------------<  129<H>  4.5   |  22  |  1.09    Ca    8.4      13 Dec 2019 06:44      Creatinine Trend: 1.09 <--, 1.23 <--, 1.39 <--, 1.56 <--, 1.51 <--, 1.54 <--, 1.80 <--, 1.85 <--, 1.58 <--                        10.1   11.56 )-----------( 271      ( 13 Dec 2019 08:38 )             30.9     Urine Studies:  Urinalysis Basic - ( 07 Dec 2019 04:23 )    Color: Yellow / Appearance: Slightly Turbid / S.022 / pH:   Gluc:  / Ketone: Negative  / Bili: Negative / Urobili: <2 mg/dL   Blood:  / Protein: Trace / Nitrite: Negative   Leuk Esterase: Large / RBC: 4 /HPF /  /HPF   Sq Epi:  / Non Sq Epi: 1 /HPF / Bacteria: Moderate      Osmolality, Random Urine: 502 mos/kg ( @ 16:03)  Sodium, Random Urine: <35 mmol/L ( @ 13:12)  Osmolality, Random Urine: 400 mosm/Kg ( @ 04:19)  Potassium, Random Urine: 34 mmol/L ( @ 00:54)  Sodium, Random Urine: 53 mmol/L ( @ 00:54)  Sodium, Random Urine: 54 mmol/L ( @ 00:54)  Chloride, Random Urine: <35 mmol/L ( @ 00:54)  Creatinine, Random Urine: 107 mg/dL ( @ 00:54)  Protein/Creatinine Ratio Calculation: 0.3 Ratio ( @ 00:54)    RADIOLOGY & ADDITIONAL STUDIES:

## 2019-12-13 NOTE — PROGRESS NOTE ADULT - ASSESSMENT
85 yo female with a hx of of CHF, DM, HLD, HTN, and an STEMI who presents to the ED for a several day history of buttocks pain. Patient states that the pain started about a week ago, but has suddenly gotten worse over the past few days. She describes it as a sharp pain and has tried taking OTC acetaminophen, which has not helped the pain at all. Patient endorses one episode of nausea earlier this morning, but denies any vomiting. She has not noticed a rash in that area, but does endorse that the skin is difficult to visualize.  Patient has a wound care nurse that comes to the house a few times per week. She came yesterday for wound care dressing changes.    Of note, patient was hospitalized at [facility] from September 4 to October 4 at [facility] for an ulcer in between her buttocks. During that hospitalization, patient required debridement of the wound as well as IV antibiotics.     In the ED, her VS /75, HR 77, RR 18 with SpO2 of 99% and T of 99.2. Patient received a 500 cc bolus of fluids as well as Doxycycline 500 mg x1. (04 Dec 2019 17:13)  WBC 13.5.  ESR 71, CRP 10.6.  UA (-) nit/mod LE, wbc 10.  Bcx (+) GPC pairs/chains from 2 sets.  CTAP without contrast shows L medial gluteal skin cellulitis and air-filled  tract.  No drainable fluid collection.  Pt on vanco/aztreonam/flagyl.     ID consult called for further abx managment.       Sepsis:    - Pt with fever, leukocytosis and bacteremia.  Source likely from gluteal cellulitis.  CTap shows L gluteal skin cellulitis with air-filled tract, without drainable fluid collection.   Agree with broad spectrum abx for now until all culture data finalized.      - MRI pelvis ordered to evaluate for perianal fistula- pt unable to tolerate exam.  Cannot r/o underlying OM, there is fluid collection near sacrum/coccyx.  Recommend bone scan vs. repeat MRI for further evaluation.      - Blood culture (+) GPC pairs/chains - identified as strep anginosus.      - TTE no vegetations reported, unable to r/o endocarditis.  Pt may need further ALONDRA to evaluate for endocarditits, although will  not change abx duration from ID standpoint.     - Urine cultures growing enterococcus faecalis.  Based on sensitivities of blood and urine cultures, can switch change from daptomycin to vancomycin.   Will d/w family regarding pt's prior penicillin allergy.  (pt had 'swelling' at the age of 10 or 15 towards pcn.  Does not recall taking other beta lactam abx in the past.)  d/w Pharmacy , will plan for ertapenem test dose on 12/13.  Pt tolerating this AM.  d/c azactam and flagyl.      - Can d/c vancomycin, has completed > 7 day course for enterococcus UTI.  Erta will cover strep anginosus/polymicrobial infectious source and enable ease of dosing while pt on long term therapy.    - repeat MRI pelvis with IV contrast demonstrates abscess collection overlying sacrum and coccyx.  High suspicion of OM.  Plan for eventual picc line once blood cultures cleared at least 48-72 hrs.  No further intervention as per surgery.  Plan for 6 week IV abx course.          d/w pt at bedside.         Will follow,    Sandrita Zaman  [phone]

## 2019-12-13 NOTE — PROGRESS NOTE ADULT - PROBLEM SELECTOR PLAN 1
ddx includes pre renal vs atn vs obstruction  pt tolerating po intake- --> off IVF- encourage po intake  s/p infante  with >600cc urineoutput  hyperkalemia and hyponatremia improving  monitor urineoutput  c/w infante--> f/u urology recs when to d/c infante  trend bmp

## 2019-12-13 NOTE — PROGRESS NOTE ADULT - SUBJECTIVE AND OBJECTIVE BOX
Infectious Diseases progress note:    Subjective: NAD, resting comfortably, tolerating ertapenem.  Pt c/o discomfort in perineal area - (+) BM    ROS:  CONSTITUTIONAL:  No fever, chills, rigors  CARDIOVASCULAR:  No chest pain or palpitations  RESPIRATORY:   No SOB, cough, dyspnea on exertion.  No wheezing  GASTROINTESTINAL:  No abd pain, N/V, diarrhea/constipation  EXTREMITIES:  No swelling or joint pain  GENITOURINARY:  No burning on urination, increased frequency or urgency.  No flank pain  NEUROLOGIC:  No HA, visual disturbances  SKIN: No rashes    Allergies    codeine (Unknown)  Kiwi (Anaphylaxis)  penicillins (Anaphylaxis)    Intolerances        ANTIBIOTICS/RELEVANT:  antimicrobials  vancomycin  IVPB 1000 milliGRAM(s) IV Intermittent every 24 hours    immunologic:  influenza   Vaccine 0.5 milliLiter(s) IntraMuscular once    OTHER:  acetaminophen   Tablet .. 1000 milliGRAM(s) Oral every 6 hours PRN  acetaminophen   Tablet .. 650 milliGRAM(s) Oral every 6 hours PRN  aspirin enteric coated 81 milliGRAM(s) Oral daily  atorvastatin 40 milliGRAM(s) Oral at bedtime  clopidogrel Tablet 75 milliGRAM(s) Oral daily  dextrose 40% Gel 15 Gram(s) Oral once PRN  dextrose 5%. 1000 milliLiter(s) IV Continuous <Continuous>  dextrose 50% Injectable 12.5 Gram(s) IV Push once  dextrose 50% Injectable 25 Gram(s) IV Push once  dextrose 50% Injectable 25 Gram(s) IV Push once  diphenhydrAMINE   Injectable 50 milliGRAM(s) IntraMuscular every 4 hours PRN  EPINEPHrine     1 mG/mL Injectable 0.3 milliGRAM(s) IntraMuscular once PRN  ertapenem 100 milliGRAM(s),sodium chloride 0.9% 50 milliLiter(s) 100 milliGRAM(s) IV Intermittent once  ertapenem 890 milliGRAM(s),sodium chloride 0.9% 50 milliLiter(s) 890 milliGRAM(s) IV Intermittent once  famotidine    Tablet 20 milliGRAM(s) Oral daily  gabapentin 100 milliGRAM(s) Oral two times a day  glucagon  Injectable 1 milliGRAM(s) IntraMuscular once PRN  heparin  Injectable 5000 Unit(s) SubCutaneous every 8 hours  insulin glargine Injectable (LANTUS) 15 Unit(s) SubCutaneous at bedtime  insulin lispro (HumaLOG) corrective regimen sliding scale   SubCutaneous three times a day before meals  insulin lispro (HumaLOG) corrective regimen sliding scale   SubCutaneous at bedtime  insulin lispro Injectable (HumaLOG) 8 Unit(s) SubCutaneous three times a day before meals  lactobacillus acidophilus 1 Tablet(s) Oral three times a day  levothyroxine 25 MICROGram(s) Oral daily  metoprolol tartrate 12.5 milliGRAM(s) Oral two times a day  ondansetron Injectable 4 milliGRAM(s) IV Push every 6 hours PRN  sodium bicarbonate 1300 milliGRAM(s) Oral three times a day  traMADol 25 milliGRAM(s) Oral every 12 hours PRN  traMADol 50 milliGRAM(s) Oral every 12 hours PRN      Objective:  Vital Signs Last 24 Hrs  T(C): 36.9 (13 Dec 2019 09:27), Max: 36.9 (13 Dec 2019 09:27)  T(F): 98.5 (13 Dec 2019 09:27), Max: 98.5 (13 Dec 2019 09:27)  HR: 60 (13 Dec 2019 09:27) (55 - 89)  BP: 113/66 (13 Dec 2019 09:27) (100/62 - 126/80)  BP(mean): --  RR: 18 (13 Dec 2019 09:27) (18 - 18)  SpO2: 95% (13 Dec 2019 09:27) (95% - 100%)    PHYSICAL EXAM:    Constitutional:NAD  Eyes:NIDIA, EOMI  Ear/Nose/Throat: no thrush, mucositis.  Moist mucous membranes	  Neck:no JVD, no lymphadenopathy, supple  Respiratory: CTA lucian  Cardiovascular: S1S2 RRR, no murmurs  Gastrointestinal:soft, nontender,  nondistended (+) BS  Extremities:no e/e/c  Skin:  no rashes, open wounds or ulcerations        LABS:                        10.1   11.56 )-----------( 271      ( 13 Dec 2019 08:38 )             30.9     12-13    134<L>  |  102  |  32<H>  ----------------------------<  129<H>  4.5   |  22  |  1.09    Ca    8.4      13 Dec 2019 06:44          Vancomycin Level, Trough: 13.4 ug/mL (12-11 @ 19:54)          MICROBIOLOGY:    Culture - Blood in AM (12.10.19 @ 13:06)    Specimen Source: .Blood Blood-Venous    Culture Results:   No growth to date.    Culture - Blood in AM (12.10.19 @ 13:06)    Specimen Source: .Blood Blood-Peripheral    Culture Results:   No growth to date.    Culture - Blood in AM (12.06.19 @ 06:02)    Gram Stain:   Growth in aerobic bottle: Gram positive cocci in pairs  Growth in anaerobic bottle: Gram Positive Cocci in Pairs and Chains    Specimen Source: .Blood Blood    Culture Results:   Growth in aerobic and anaerobic bottles: Streptococcus anginosus  See previous culture [id number]    Culture - Blood in AM (12.06.19 @ 06:02)    Specimen Source: .Blood Blood    Culture Results:   No growth at 5 days.          RADIOLOGY & ADDITIONAL STUDIES:

## 2019-12-13 NOTE — CHART NOTE - NSCHARTNOTEFT_GEN_A_CORE
follow up- per ID, ertapenem allergy testing and  no allergy reaction noted or reported; pt/family awatre; f/u by ID.  Alexandria Lozada(NP)  [facility], [phone]

## 2019-12-13 NOTE — PROVIDER CONTACT NOTE (OTHER) - SITUATION
Pt was clammy/ daughter at bedside said po intake was poor. Fingerstick done showed pt blood glucose 55 then56. Pt was alert and oriented. Pt ate next fs 86/ then 110 and 2200 fs 185.

## 2019-12-13 NOTE — PROGRESS NOTE ADULT - SUBJECTIVE AND OBJECTIVE BOX
Patient is a 86y old  Female who presents with a chief complaint of Sepsis 2/2 Cellulitis (13 Dec 2019 12:54)      INTERVAL HISTORY: feels ok    MEDICATIONS:  EPINEPHrine     1 mG/mL Injectable 0.3 milliGRAM(s) IntraMuscular once PRN  metoprolol tartrate 12.5 milliGRAM(s) Oral two times a day        PHYSICAL EXAM:  T(C): 37 (12-13-19 @ 19:41), Max: 37 (12-13-19 @ 19:41)  HR: 59 (12-13-19 @ 19:41) (55 - 61)  BP: 107/57 (12-13-19 @ 19:41) (102/65 - 126/80)  RR: 18 (12-13-19 @ 19:41) (18 - 18)  SpO2: 97% (12-13-19 @ 19:41) (95% - 97%)  Wt(kg): --  I&O's Summary    12 Dec 2019 07:01  -  13 Dec 2019 07:00  --------------------------------------------------------  IN: 1170 mL / OUT: 900 mL / NET: 270 mL    13 Dec 2019 07:01  -  13 Dec 2019 21:05  --------------------------------------------------------  IN: 510 mL / OUT: 550 mL / NET: -40 mL          Appearance: In no distress	  HEENT:    PERRL, EOMI	  Cardiovascular:  S1 S2, No JVD  Respiratory: Lungs clear to auscultation	  Gastrointestinal:  Soft, Non-tender, + BS	  Extremities:  No edema of LE                                10.1   11.56 )-----------( 271      ( 13 Dec 2019 08:38 )             30.9     12-13    134<L>  |  102  |  32<H>  ----------------------------<  129<H>  4.5   |  22  |  1.09    Ca    8.4      13 Dec 2019 06:44          Labs personally reviewed      Assessment /Plan:   Chronic diastolic HF - euvolemic, off Lasix  CAD - c/w DAPT, Metoprolol and ASA  HTN - well controlled        Srini Velasco DO [facility]  Cardiovascular Medicine  [phone]

## 2019-12-14 LAB
ANION GAP SERPL CALC-SCNC: 13 MMOL/L — SIGNIFICANT CHANGE UP (ref 5–17)
BASOPHILS # BLD AUTO: 0.03 K/UL — SIGNIFICANT CHANGE UP (ref 0–0.2)
BASOPHILS NFR BLD AUTO: 0.3 % — SIGNIFICANT CHANGE UP (ref 0–2)
BUN SERPL-MCNC: 24 MG/DL — HIGH (ref 7–23)
CALCIUM SERPL-MCNC: 8.7 MG/DL — SIGNIFICANT CHANGE UP (ref 8.4–10.5)
CHLORIDE SERPL-SCNC: 97 MMOL/L — SIGNIFICANT CHANGE UP (ref 96–108)
CO2 SERPL-SCNC: 20 MMOL/L — LOW (ref 22–31)
CREAT SERPL-MCNC: 1.08 MG/DL — SIGNIFICANT CHANGE UP (ref 0.5–1.3)
EOSINOPHIL # BLD AUTO: 0.13 K/UL — SIGNIFICANT CHANGE UP (ref 0–0.5)
EOSINOPHIL NFR BLD AUTO: 1.4 % — SIGNIFICANT CHANGE UP (ref 0–6)
GLUCOSE BLDC GLUCOMTR-MCNC: 189 MG/DL — HIGH (ref 70–99)
GLUCOSE BLDC GLUCOMTR-MCNC: 215 MG/DL — HIGH (ref 70–99)
GLUCOSE BLDC GLUCOMTR-MCNC: 218 MG/DL — HIGH (ref 70–99)
GLUCOSE BLDC GLUCOMTR-MCNC: 255 MG/DL — HIGH (ref 70–99)
GLUCOSE SERPL-MCNC: 306 MG/DL — HIGH (ref 70–99)
HCT VFR BLD CALC: 33.5 % — LOW (ref 34.5–45)
HGB BLD-MCNC: 10.9 G/DL — LOW (ref 11.5–15.5)
IMM GRANULOCYTES NFR BLD AUTO: 0.7 % — SIGNIFICANT CHANGE UP (ref 0–1.5)
LYMPHOCYTES # BLD AUTO: 1.3 K/UL — SIGNIFICANT CHANGE UP (ref 1–3.3)
LYMPHOCYTES # BLD AUTO: 13.9 % — SIGNIFICANT CHANGE UP (ref 13–44)
MCHC RBC-ENTMCNC: 28.7 PG — SIGNIFICANT CHANGE UP (ref 27–34)
MCHC RBC-ENTMCNC: 32.5 GM/DL — SIGNIFICANT CHANGE UP (ref 32–36)
MCV RBC AUTO: 88.2 FL — SIGNIFICANT CHANGE UP (ref 80–100)
MONOCYTES # BLD AUTO: 0.64 K/UL — SIGNIFICANT CHANGE UP (ref 0–0.9)
MONOCYTES NFR BLD AUTO: 6.8 % — SIGNIFICANT CHANGE UP (ref 2–14)
NEUTROPHILS # BLD AUTO: 7.21 K/UL — SIGNIFICANT CHANGE UP (ref 1.8–7.4)
NEUTROPHILS NFR BLD AUTO: 76.9 % — SIGNIFICANT CHANGE UP (ref 43–77)
PLATELET # BLD AUTO: 276 K/UL — SIGNIFICANT CHANGE UP (ref 150–400)
POTASSIUM SERPL-MCNC: 4.4 MMOL/L — SIGNIFICANT CHANGE UP (ref 3.5–5.3)
POTASSIUM SERPL-SCNC: 4.4 MMOL/L — SIGNIFICANT CHANGE UP (ref 3.5–5.3)
RBC # BLD: 3.8 M/UL — SIGNIFICANT CHANGE UP (ref 3.8–5.2)
RBC # FLD: 16.7 % — HIGH (ref 10.3–14.5)
SODIUM SERPL-SCNC: 130 MMOL/L — LOW (ref 135–145)
WBC # BLD: 9.38 K/UL — SIGNIFICANT CHANGE UP (ref 3.8–10.5)
WBC # FLD AUTO: 9.38 K/UL — SIGNIFICANT CHANGE UP (ref 3.8–10.5)

## 2019-12-14 RX ADMIN — Medication 81 MILLIGRAM(S): at 13:09

## 2019-12-14 RX ADMIN — Medication 3: at 08:07

## 2019-12-14 RX ADMIN — HEPARIN SODIUM 5000 UNIT(S): 5000 INJECTION INTRAVENOUS; SUBCUTANEOUS at 22:00

## 2019-12-14 RX ADMIN — Medication 1: at 13:09

## 2019-12-14 RX ADMIN — Medication 1300 MILLIGRAM(S): at 05:03

## 2019-12-14 RX ADMIN — ATORVASTATIN CALCIUM 40 MILLIGRAM(S): 80 TABLET, FILM COATED ORAL at 22:00

## 2019-12-14 RX ADMIN — Medication 1 TABLET(S): at 13:08

## 2019-12-14 RX ADMIN — Medication 8 UNIT(S): at 13:09

## 2019-12-14 RX ADMIN — GABAPENTIN 100 MILLIGRAM(S): 400 CAPSULE ORAL at 17:28

## 2019-12-14 RX ADMIN — Medication 2: at 17:28

## 2019-12-14 RX ADMIN — FAMOTIDINE 20 MILLIGRAM(S): 10 INJECTION INTRAVENOUS at 13:09

## 2019-12-14 RX ADMIN — Medication 1000 MILLIGRAM(S): at 22:30

## 2019-12-14 RX ADMIN — Medication 12.5 MILLIGRAM(S): at 17:28

## 2019-12-14 RX ADMIN — Medication 1300 MILLIGRAM(S): at 13:10

## 2019-12-14 RX ADMIN — Medication 8 UNIT(S): at 17:27

## 2019-12-14 RX ADMIN — INSULIN GLARGINE 15 UNIT(S): 100 INJECTION, SOLUTION SUBCUTANEOUS at 22:00

## 2019-12-14 RX ADMIN — Medication 1300 MILLIGRAM(S): at 22:00

## 2019-12-14 RX ADMIN — Medication 8 UNIT(S): at 08:07

## 2019-12-14 RX ADMIN — Medication 12.5 MILLIGRAM(S): at 05:03

## 2019-12-14 RX ADMIN — Medication 1000 MILLIGRAM(S): at 22:00

## 2019-12-14 RX ADMIN — HEPARIN SODIUM 5000 UNIT(S): 5000 INJECTION INTRAVENOUS; SUBCUTANEOUS at 13:10

## 2019-12-14 RX ADMIN — Medication 1000 MILLIGRAM(S): at 10:50

## 2019-12-14 RX ADMIN — Medication 25 MICROGRAM(S): at 05:02

## 2019-12-14 RX ADMIN — Medication 1 TABLET(S): at 05:02

## 2019-12-14 RX ADMIN — GABAPENTIN 100 MILLIGRAM(S): 400 CAPSULE ORAL at 05:01

## 2019-12-14 RX ADMIN — CLOPIDOGREL BISULFATE 75 MILLIGRAM(S): 75 TABLET, FILM COATED ORAL at 13:09

## 2019-12-14 RX ADMIN — Medication 1000 MILLIGRAM(S): at 10:06

## 2019-12-14 RX ADMIN — ERTAPENEM SODIUM 120 MILLIGRAM(S): 1 INJECTION, POWDER, LYOPHILIZED, FOR SOLUTION INTRAMUSCULAR; INTRAVENOUS at 04:29

## 2019-12-14 RX ADMIN — Medication 1 TABLET(S): at 22:00

## 2019-12-14 RX ADMIN — HEPARIN SODIUM 5000 UNIT(S): 5000 INJECTION INTRAVENOUS; SUBCUTANEOUS at 05:01

## 2019-12-14 NOTE — PROGRESS NOTE ADULT - SUBJECTIVE AND OBJECTIVE BOX
[facility]    Subjective: Patient seen and examined. No new events except as noted.   sittign down  feeling better  refusing pICC Line     REVIEW OF SYSTEMS:    CONSTITUTIONAL: + weakness  EYES/ENT: No visual changes;  No vertigo or throat pain   NECK: No pain or stiffness  RESPIRATORY: No cough, wheezing, hemoptysis; No shortness of breath  CARDIOVASCULAR: No chest pain or palpitations  GASTROINTESTINAL: No abdominal or epigastric pain. No nausea, vomiting, or hematemesis; No diarrhea or constipation. No melena or hematochezia.  GENITOURINARY: No dysuria, frequency or hematuria  NEUROLOGICAL: No numbness or weakness  SKIN: No itching, burning, rashes, or lesions   All other review of systems is negative unless indicated above.    MEDICATIONS:  MEDICATIONS  (STANDING):  aspirin enteric coated 81 milliGRAM(s) Oral daily  atorvastatin 40 milliGRAM(s) Oral at bedtime  clopidogrel Tablet 75 milliGRAM(s) Oral daily  dextrose 5%. 1000 milliLiter(s) (50 mL/Hr) IV Continuous <Continuous>  dextrose 50% Injectable 12.5 Gram(s) IV Push once  dextrose 50% Injectable 25 Gram(s) IV Push once  dextrose 50% Injectable 25 Gram(s) IV Push once  ertapenem  IVPB 1000 milliGRAM(s) IV Intermittent every 24 hours  famotidine    Tablet 20 milliGRAM(s) Oral daily  gabapentin 100 milliGRAM(s) Oral two times a day  heparin  Injectable 5000 Unit(s) SubCutaneous every 8 hours  influenza   Vaccine 0.5 milliLiter(s) IntraMuscular once  insulin glargine Injectable (LANTUS) 15 Unit(s) SubCutaneous at bedtime  insulin lispro (HumaLOG) corrective regimen sliding scale   SubCutaneous three times a day before meals  insulin lispro (HumaLOG) corrective regimen sliding scale   SubCutaneous at bedtime  insulin lispro Injectable (HumaLOG) 8 Unit(s) SubCutaneous three times a day before meals  lactobacillus acidophilus 1 Tablet(s) Oral three times a day  levothyroxine 25 MICROGram(s) Oral daily  metoprolol tartrate 12.5 milliGRAM(s) Oral two times a day  sodium bicarbonate 1300 milliGRAM(s) Oral three times a day      PHYSICAL EXAM:  T(C): 36.9 (12-14-19 @ 12:23), Max: 37.1 (12-13-19 @ 21:11)  HR: 57 (12-14-19 @ 12:23) (57 - 62)  BP: 124/73 (12-14-19 @ 12:23) (104/60 - 124/73)  RR: 18 (12-14-19 @ 12:23) (18 - 18)  SpO2: 99% (12-14-19 @ 12:23) (96% - 99%)  Wt(kg): --  I&O's Summary    13 Dec 2019 07:01  -  14 Dec 2019 07:00  --------------------------------------------------------  IN: 870 mL / OUT: 960 mL / NET: -90 mL    14 Dec 2019 07:01  -  14 Dec 2019 19:26  --------------------------------------------------------  IN: 600 mL / OUT: 0 mL / NET: 600 mL          Appearance: Normal	  HEENT:   Normal oral mucosa, PERRL, EOMI	  Lymphatic: No lymphadenopathy , no edema  Cardiovascular: Normal S1 S2  Respiratory: Lungs clear to auscultation, normal effort 	  Gastrointestinal:  Soft, Non-tender, + BS	  Skin: dressing intact   Musculoskeletal: Normal range of motion, normal strength  Psychiatry:  Mood & affect appropriate  Ext: No edema      All labs, Imaging and EKGs personally reviewed                             10.9   9.38  )-----------( 276      ( 14 Dec 2019 10:54 )             33.5               12-14    130<L>  |  97  |  24<H>  ----------------------------<  306<H>  4.4   |  20<L>  |  1.08    Ca    8.7      14 Dec 2019 07:28

## 2019-12-14 NOTE — PROGRESS NOTE ADULT - SUBJECTIVE AND OBJECTIVE BOX
Patient is a 86y old  Female who presents with a chief complaint of Sepsis 2/2 Cellulitis (14 Dec 2019 13:25)      INTERVAL HISTORY:     TELEMETRY:  	  MEDICATIONS:  EPINEPHrine     1 mG/mL Injectable 0.3 milliGRAM(s) IntraMuscular once PRN  metoprolol tartrate 12.5 milliGRAM(s) Oral two times a day        PHYSICAL EXAM:  T(C): 36.9 (12-14-19 @ 19:34), Max: 37 (12-14-19 @ 04:10)  HR: 61 (12-14-19 @ 19:34) (57 - 61)  BP: 117/71 (12-14-19 @ 19:34) (104/60 - 124/73)  RR: 18 (12-14-19 @ 19:34) (18 - 18)  SpO2: 98% (12-14-19 @ 19:34) (96% - 99%)  Wt(kg): --  I&O's Summary    13 Dec 2019 07:01  -  14 Dec 2019 07:00  --------------------------------------------------------  IN: 870 mL / OUT: 960 mL / NET: -90 mL    14 Dec 2019 07:01  -  14 Dec 2019 23:13  --------------------------------------------------------  IN: 600 mL / OUT: 400 mL / NET: 200 mL          Appearance: In no distress	  HEENT:    PERRL, EOMI	  Cardiovascular:  S1 S2, No JVD  Respiratory: Lungs clear to auscultation	  Gastrointestinal:  Soft, Non-tender, + BS	  Extremities:  No edema of LE                                10.9   9.38  )-----------( 276      ( 14 Dec 2019 10:54 )             33.5     12-14    130<L>  |  97  |  24<H>  ----------------------------<  306<H>  4.4   |  20<L>  |  1.08    Ca    8.7      14 Dec 2019 07:28          Labs personally reviewed      Assessment /Plan:   Chronic diastolic HF - euvolemic, off Lasix  CAD - c/w DAPT, Metoprolol and ASA  HTN - well controlled        Srini Velasco DO [facility]  Cardiovascular Medicine  [address] [place] [address], [address]  Office: [phone]  Cell: [phone]

## 2019-12-14 NOTE — PROGRESS NOTE ADULT - ASSESSMENT
87 yo female with a hx of of CHF, DM, HLD, HTN, and an STEMI who presents to the ED for a several day history of buttocks pain. Patient states that the pain started about a week ago, but has suddenly gotten worse over the past few days. She describes it as a sharp pain and has tried taking OTC acetaminophen, which has not helped the pain at all. Patient endorses one episode of nausea earlier this morning, but denies any vomiting. She has not noticed a rash in that area, but does endorse that the skin is difficult to visualize.  Patient has a wound care nurse that comes to the house a few times per week. She came yesterday for wound care dressing changes.    Of note, patient was hospitalized at [facility] from September 4 to October 4 at [facility] for an ulcer in between her buttocks. During that hospitalization, patient required debridement of the wound as well as IV antibiotics.     In the ED, her VS /75, HR 77, RR 18 with SpO2 of 99% and T of 99.2. Patient received a 500 cc bolus of fluids as well as Doxycycline 500 mg x1. (04 Dec 2019 17:13)  WBC 13.5.  ESR 71, CRP 10.6.  UA (-) nit/mod LE, wbc 10.  Bcx (+) GPC pairs/chains from 2 sets.  CTAP without contrast shows L medial gluteal skin cellulitis and air-filled  tract.  No drainable fluid collection.  Pt on vanco/aztreonam/flagyl.     ID consult called for further abx managment.       Sepsis:    - Pt with fever, leukocytosis and bacteremia.  Source likely from gluteal cellulitis.  CTap shows L gluteal skin cellulitis with air-filled tract, without drainable fluid collection.   Agree with broad spectrum abx for now until all culture data finalized.      - MRI pelvis ordered to evaluate for perianal fistula- pt unable to tolerate exam.  Cannot r/o underlying OM, there is fluid collection near sacrum/coccyx.  Recommend bone scan vs. repeat MRI for further evaluation.      - Blood culture (+) GPC pairs/chains - identified as strep anginosus.      - TTE no vegetations reported, unable to r/o endocarditis.  Pt may need further ALONDRA to evaluate for endocarditits, although will  not change abx duration from ID standpoint.     - Urine cultures growing enterococcus faecalis.  Based on sensitivities of blood and urine cultures, can switch change from daptomycin to vancomycin.   Will d/w family regarding pt's prior penicillin allergy.  (pt had 'swelling' at the age of 10 or 15 towards pcn.  Does not recall taking other beta lactam abx in the past.)  d/w Pharmacy , will plan for ertapenem test dose on 12/13.  Pt tolerating this AM.  d/c azactam and flagyl.      - Can d/c vancomycin, has completed > 7 day course for enterococcus UTI.  Erta will cover strep anginosus/polymicrobial infectious source and enable ease of dosing while pt on long term therapy.    - repeat MRI pelvis with IV contrast demonstrates abscess collection overlying sacrum and coccyx.  High suspicion of OM.  Plan for eventual picc line once blood cultures cleared at least 48-72 hrs.  No further intervention as per surgery.       Plan for 6 week IV abx course.  Pt will require picc line - this was discussed with pt, and she is in agreement.    PT evaluation          d/w pt at bedside.         Will follow,    Sandrita Zaman  [phone]

## 2019-12-14 NOTE — PROGRESS NOTE ADULT - SUBJECTIVE AND OBJECTIVE BOX
Infectious Diseases progress note:    Subjective: Pt sitting in chair, states she feels weak, and everything "hurts all over".  No fever or leukocytosis.  No diarrhea reported.  No acute o/n events.  Pt tolerating ertapenem - d/w RN in detail.     ROS:  CONSTITUTIONAL:  No fever, chills, rigors  CARDIOVASCULAR:  No chest pain or palpitations  RESPIRATORY:   No SOB, cough, dyspnea on exertion.  No wheezing  GASTROINTESTINAL:  No abd pain, N/V, diarrhea/constipation  EXTREMITIES:  No swelling or joint pain  GENITOURINARY:  No burning on urination, increased frequency or urgency.  No flank pain  NEUROLOGIC:  No HA, visual disturbances  SKIN: No rashes    Allergies    codeine (Unknown)  Kiwi (Anaphylaxis)  penicillins (Anaphylaxis)    Intolerances        ANTIBIOTICS/RELEVANT:  antimicrobials  ertapenem  IVPB 1000 milliGRAM(s) IV Intermittent every 24 hours    immunologic:  influenza   Vaccine 0.5 milliLiter(s) IntraMuscular once    OTHER:  acetaminophen   Tablet .. 1000 milliGRAM(s) Oral every 6 hours PRN  acetaminophen   Tablet .. 650 milliGRAM(s) Oral every 6 hours PRN  aspirin enteric coated 81 milliGRAM(s) Oral daily  atorvastatin 40 milliGRAM(s) Oral at bedtime  clopidogrel Tablet 75 milliGRAM(s) Oral daily  dextrose 40% Gel 15 Gram(s) Oral once PRN  dextrose 5%. 1000 milliLiter(s) IV Continuous <Continuous>  dextrose 50% Injectable 12.5 Gram(s) IV Push once  dextrose 50% Injectable 25 Gram(s) IV Push once  dextrose 50% Injectable 25 Gram(s) IV Push once  diphenhydrAMINE   Injectable 50 milliGRAM(s) IntraMuscular every 4 hours PRN  EPINEPHrine     1 mG/mL Injectable 0.3 milliGRAM(s) IntraMuscular once PRN  famotidine    Tablet 20 milliGRAM(s) Oral daily  gabapentin 100 milliGRAM(s) Oral two times a day  glucagon  Injectable 1 milliGRAM(s) IntraMuscular once PRN  heparin  Injectable 5000 Unit(s) SubCutaneous every 8 hours  insulin glargine Injectable (LANTUS) 15 Unit(s) SubCutaneous at bedtime  insulin lispro (HumaLOG) corrective regimen sliding scale   SubCutaneous three times a day before meals  insulin lispro (HumaLOG) corrective regimen sliding scale   SubCutaneous at bedtime  insulin lispro Injectable (HumaLOG) 8 Unit(s) SubCutaneous three times a day before meals  lactobacillus acidophilus 1 Tablet(s) Oral three times a day  levothyroxine 25 MICROGram(s) Oral daily  metoprolol tartrate 12.5 milliGRAM(s) Oral two times a day  ondansetron Injectable 4 milliGRAM(s) IV Push every 6 hours PRN  sodium bicarbonate 1300 milliGRAM(s) Oral three times a day  traMADol 25 milliGRAM(s) Oral every 12 hours PRN  traMADol 50 milliGRAM(s) Oral every 12 hours PRN      Objective:  Vital Signs Last 24 Hrs  T(C): 36.7 (15 Dec 2019 09:25), Max: 36.9 (14 Dec 2019 12:23)  T(F): 98 (15 Dec 2019 09:25), Max: 98.5 (14 Dec 2019 12:23)  HR: 70 (15 Dec 2019 09:25) (57 - 72)  BP: 140/90 (15 Dec 2019 04:58) (117/71 - 140/90)  BP(mean): --  RR: 18 (15 Dec 2019 04:58) (18 - 18)  SpO2: 97% (15 Dec 2019 04:58) (97% - 99%)    PHYSICAL EXAM:  Constitutional:NAD  Eyes:NIDIA, EOMI  Ear/Nose/Throat: no thrush, mucositis.  Moist mucous membranes	  Neck:no JVD, no lymphadenopathy, supple  Respiratory: CTA lucian  Cardiovascular: S1S2 RRR, no murmurs  Gastrointestinal:soft, nontender,  nondistended (+) BS  Extremities:no e/e/c  Skin:  no rashes        LABS:                        10.9   9.38  )-----------( 276      ( 14 Dec 2019 10:54 )             33.5     12-14    130<L>  |  97  |  24<H>  ----------------------------<  306<H>  4.4   |  20<L>  |  1.08    Ca    8.7      14 Dec 2019 07:28                  Vancomycin Level, Trough: 13.4 ug/mL (12-11 @ 19:54)              MICROBIOLOGY:    Culture - Blood in AM (12.10.19 @ 13:06)    Specimen Source: .Blood Blood-Venous    Culture Results:   No growth to date.    Culture - Blood in AM (12.10.19 @ 13:06)    Specimen Source: .Blood Blood-Peripheral    Culture Results:   No growth to date.    Culture - Blood in AM (12.06.19 @ 06:02)    Gram Stain:   Growth in aerobic bottle: Gram positive cocci in pairs  Growth in anaerobic bottle: Gram Positive Cocci in Pairs and Chains    Specimen Source: .Blood Blood    Culture Results:   Growth in aerobic and anaerobic bottles: Streptococcus anginosus  See previous culture [id number]    Culture - Blood in AM (12.05.19 @ 09:27)    Gram Stain:   Growth in anaerobic bottle: Gram Positive Cocci in Pairs and Chains    Specimen Source: .Blood Blood-Peripheral    Culture Results:   Growth in anaerobic bottle: Streptococcus anginosus  See previous culture [id number]          RADIOLOGY & ADDITIONAL STUDIES:

## 2019-12-14 NOTE — PROGRESS NOTE ADULT - PROBLEM SELECTOR PLAN 1
positive blood Cx  ID eval appreciated  ABx as per ID, adjusted   Surg F/U appreciated  MRI noted, patient refused contrast, limited study  OM can not be ruled out  repeat MRI noted, ? osteo   Abx duration to be discussed  repeat blood Cx till negative, will plan for PICC Line placement for long term ABx

## 2019-12-15 LAB
CULTURE RESULTS: SIGNIFICANT CHANGE UP
CULTURE RESULTS: SIGNIFICANT CHANGE UP
GLUCOSE BLDC GLUCOMTR-MCNC: 167 MG/DL — HIGH (ref 70–99)
GLUCOSE BLDC GLUCOMTR-MCNC: 176 MG/DL — HIGH (ref 70–99)
GLUCOSE BLDC GLUCOMTR-MCNC: 201 MG/DL — HIGH (ref 70–99)
GLUCOSE BLDC GLUCOMTR-MCNC: 203 MG/DL — HIGH (ref 70–99)
SPECIMEN SOURCE: SIGNIFICANT CHANGE UP
SPECIMEN SOURCE: SIGNIFICANT CHANGE UP

## 2019-12-15 RX ADMIN — Medication 1: at 12:33

## 2019-12-15 RX ADMIN — HEPARIN SODIUM 5000 UNIT(S): 5000 INJECTION INTRAVENOUS; SUBCUTANEOUS at 21:40

## 2019-12-15 RX ADMIN — Medication 1: at 07:54

## 2019-12-15 RX ADMIN — Medication 12.5 MILLIGRAM(S): at 17:24

## 2019-12-15 RX ADMIN — Medication 1 TABLET(S): at 21:38

## 2019-12-15 RX ADMIN — Medication 1000 MILLIGRAM(S): at 21:39

## 2019-12-15 RX ADMIN — GABAPENTIN 100 MILLIGRAM(S): 400 CAPSULE ORAL at 06:39

## 2019-12-15 RX ADMIN — Medication 1300 MILLIGRAM(S): at 12:34

## 2019-12-15 RX ADMIN — Medication 1000 MILLIGRAM(S): at 12:36

## 2019-12-15 RX ADMIN — GABAPENTIN 100 MILLIGRAM(S): 400 CAPSULE ORAL at 17:24

## 2019-12-15 RX ADMIN — Medication 12.5 MILLIGRAM(S): at 06:39

## 2019-12-15 RX ADMIN — HEPARIN SODIUM 5000 UNIT(S): 5000 INJECTION INTRAVENOUS; SUBCUTANEOUS at 06:39

## 2019-12-15 RX ADMIN — Medication 1000 MILLIGRAM(S): at 22:09

## 2019-12-15 RX ADMIN — Medication 1 TABLET(S): at 06:39

## 2019-12-15 RX ADMIN — HEPARIN SODIUM 5000 UNIT(S): 5000 INJECTION INTRAVENOUS; SUBCUTANEOUS at 14:27

## 2019-12-15 RX ADMIN — Medication 25 MICROGRAM(S): at 06:39

## 2019-12-15 RX ADMIN — INSULIN GLARGINE 15 UNIT(S): 100 INJECTION, SOLUTION SUBCUTANEOUS at 21:37

## 2019-12-15 RX ADMIN — Medication 1000 MILLIGRAM(S): at 12:34

## 2019-12-15 RX ADMIN — Medication 8 UNIT(S): at 07:54

## 2019-12-15 RX ADMIN — CLOPIDOGREL BISULFATE 75 MILLIGRAM(S): 75 TABLET, FILM COATED ORAL at 12:34

## 2019-12-15 RX ADMIN — Medication 81 MILLIGRAM(S): at 12:34

## 2019-12-15 RX ADMIN — Medication 1 TABLET(S): at 14:26

## 2019-12-15 RX ADMIN — ATORVASTATIN CALCIUM 40 MILLIGRAM(S): 80 TABLET, FILM COATED ORAL at 21:38

## 2019-12-15 RX ADMIN — Medication 8 UNIT(S): at 17:24

## 2019-12-15 RX ADMIN — Medication 8 UNIT(S): at 12:33

## 2019-12-15 RX ADMIN — ERTAPENEM SODIUM 120 MILLIGRAM(S): 1 INJECTION, POWDER, LYOPHILIZED, FOR SOLUTION INTRAMUSCULAR; INTRAVENOUS at 04:33

## 2019-12-15 RX ADMIN — FAMOTIDINE 20 MILLIGRAM(S): 10 INJECTION INTRAVENOUS at 06:38

## 2019-12-15 RX ADMIN — Medication 1300 MILLIGRAM(S): at 06:38

## 2019-12-15 RX ADMIN — Medication 1300 MILLIGRAM(S): at 21:38

## 2019-12-15 RX ADMIN — Medication 2: at 17:24

## 2019-12-15 NOTE — PROGRESS NOTE ADULT - ASSESSMENT
85 yo female with a hx of of CHF, DM, HLD, HTN, and an STEMI who presents to the ED for a several day history of buttocks pain. Patient states that the pain started about a week ago, but has suddenly gotten worse over the past few days. She describes it as a sharp pain and has tried taking OTC acetaminophen, which has not helped the pain at all. Patient endorses one episode of nausea earlier this morning, but denies any vomiting. She has not noticed a rash in that area, but does endorse that the skin is difficult to visualize.  Patient has a wound care nurse that comes to the house a few times per week. She came yesterday for wound care dressing changes.    Of note, patient was hospitalized at [facility] from September 4 to October 4 at [facility] for an ulcer in between her buttocks. During that hospitalization, patient required debridement of the wound as well as IV antibiotics.     In the ED, her VS /75, HR 77, RR 18 with SpO2 of 99% and T of 99.2. Patient received a 500 cc bolus of fluids as well as Doxycycline 500 mg x1. (04 Dec 2019 17:13)  WBC 13.5.  ESR 71, CRP 10.6.  UA (-) nit/mod LE, wbc 10.  Bcx (+) GPC pairs/chains from 2 sets.  CTAP without contrast shows L medial gluteal skin cellulitis and air-filled  tract.  No drainable fluid collection.  Pt on vanco/aztreonam/flagyl.     ID consult called for further abx managment.       Sepsis:    - Pt with fever, leukocytosis and bacteremia.  Source likely from gluteal cellulitis.  CTap shows L gluteal skin cellulitis with air-filled tract, without drainable fluid collection.   Agree with broad spectrum abx for now until all culture data finalized.      - MRI pelvis ordered to evaluate for perianal fistula- pt unable to tolerate exam.  Cannot r/o underlying OM, there is fluid collection near sacrum/coccyx.  Recommend bone scan vs. repeat MRI for further evaluation.      - Blood culture (+) GPC pairs/chains - identified as strep anginosus.      - TTE no vegetations reported, unable to r/o endocarditis.  Pt may need further ALONDRA to evaluate for endocarditits, although will  not change abx duration from ID standpoint.     - Urine cultures growing enterococcus faecalis.  Based on sensitivities of blood and urine cultures, can switch change from daptomycin to vancomycin.   Will d/w family regarding pt's prior penicillin allergy.  (pt had 'swelling' at the age of 10 or 15 towards pcn.  Does not recall taking other beta lactam abx in the past.)  d/w Pharmacy , will plan for ertapenem test dose on 12/13.  Pt tolerating this AM.  d/c azactam and flagyl.      - Can d/c vancomycin, has completed > 7 day course for enterococcus UTI.  Erta will cover strep anginosus/polymicrobial infectious source and enable ease of dosing while pt on long term therapy.    - repeat MRI pelvis with IV contrast demonstrates abscess collection overlying sacrum and coccyx.  High suspicion of OM.  Plan for eventual picc line once blood cultures cleared at least 48-72 hrs.  No further intervention as per surgery.       Plan for 6 week IV abx course.  Pt will require picc line - this was discussed with pt, and she is in agreement.  Check weekly cbc, sma-7, esr, crp.      PT evaluation          d/w pt at bedside.         Will follow,    Sandrita Zaman  [phone]

## 2019-12-15 NOTE — PROGRESS NOTE ADULT - SUBJECTIVE AND OBJECTIVE BOX
[facility]    Subjective: Patient seen and examined. No new events except as noted.   feeling beter today   cont to refuse PICC line     REVIEW OF SYSTEMS:    CONSTITUTIONAL: + weakness   EYES/ENT: No visual changes;  No vertigo or throat pain   NECK: No pain or stiffness  RESPIRATORY: No cough, wheezing, hemoptysis; No shortness of breath  CARDIOVASCULAR: No chest pain or palpitations  GASTROINTESTINAL: No abdominal or epigastric pain. No nausea, vomiting, or hematemesis; No diarrhea or constipation. No melena or hematochezia.  GENITOURINARY: No dysuria, frequency or hematuria  NEUROLOGICAL: No numbness or weakness  SKIN: No itching, burning, rashes, or lesions   All other review of systems is negative unless indicated above.    MEDICATIONS:  MEDICATIONS  (STANDING):  aspirin enteric coated 81 milliGRAM(s) Oral daily  atorvastatin 40 milliGRAM(s) Oral at bedtime  clopidogrel Tablet 75 milliGRAM(s) Oral daily  dextrose 5%. 1000 milliLiter(s) (50 mL/Hr) IV Continuous <Continuous>  dextrose 50% Injectable 12.5 Gram(s) IV Push once  dextrose 50% Injectable 25 Gram(s) IV Push once  dextrose 50% Injectable 25 Gram(s) IV Push once  ertapenem  IVPB 1000 milliGRAM(s) IV Intermittent every 24 hours  famotidine    Tablet 20 milliGRAM(s) Oral daily  gabapentin 100 milliGRAM(s) Oral two times a day  heparin  Injectable 5000 Unit(s) SubCutaneous every 8 hours  influenza   Vaccine 0.5 milliLiter(s) IntraMuscular once  insulin glargine Injectable (LANTUS) 15 Unit(s) SubCutaneous at bedtime  insulin lispro (HumaLOG) corrective regimen sliding scale   SubCutaneous three times a day before meals  insulin lispro (HumaLOG) corrective regimen sliding scale   SubCutaneous at bedtime  insulin lispro Injectable (HumaLOG) 8 Unit(s) SubCutaneous three times a day before meals  lactobacillus acidophilus 1 Tablet(s) Oral three times a day  levothyroxine 25 MICROGram(s) Oral daily  metoprolol tartrate 12.5 milliGRAM(s) Oral two times a day  sodium bicarbonate 1300 milliGRAM(s) Oral three times a day      PHYSICAL EXAM:  T(C): 36.7 (12-15-19 @ 09:25), Max: 36.9 (12-14-19 @ 19:34)  HR: 67 (12-15-19 @ 16:56) (61 - 72)  BP: 111/68 (12-15-19 @ 16:56) (111/68 - 140/90)  RR: 18 (12-15-19 @ 11:31) (18 - 18)  SpO2: 100% (12-15-19 @ 16:56) (97% - 100%)  Wt(kg): --  I&O's Summary    14 Dec 2019 07:01  -  15 Dec 2019 07:00  --------------------------------------------------------  IN: 1130 mL / OUT: 575 mL / NET: 555 mL    15 Dec 2019 07:01  -  15 Dec 2019 17:09  --------------------------------------------------------  IN: 480 mL / OUT: 200 mL / NET: 280 mL          Appearance: Normal	  HEENT:   Normal oral mucosa, PERRL, EOMI	  Lymphatic: No lymphadenopathy , no edema  Cardiovascular: Normal S1 S2  Respiratory: Lungs clear to auscultation, normal effort 	  Gastrointestinal:  Soft, Non-tender, + BS	  Skin: dressing intact back   Musculoskeletal: Normal range of motion, normal strength  Psychiatry:  Mood & affect appropriate  Ext: No edema      All labs, Imaging and EKGs personally reviewed                             10.9   9.38  )-----------( 276      ( 14 Dec 2019 10:54 )             33.5               12-14    130<L>  |  97  |  24<H>  ----------------------------<  306<H>  4.4   |  20<L>  |  1.08    Ca    8.7      14 Dec 2019 07:28

## 2019-12-15 NOTE — PROGRESS NOTE ADULT - SUBJECTIVE AND OBJECTIVE BOX
Infectious Diseases progress note:    Subjective: NAD, afebrile.  Resting in chair.      ROS:  CONSTITUTIONAL:  No fever, chills, rigors  CARDIOVASCULAR:  No chest pain or palpitations  RESPIRATORY:   No SOB, cough, dyspnea on exertion.  No wheezing  GASTROINTESTINAL:  No abd pain, N/V, diarrhea/constipation  EXTREMITIES:  No swelling or joint pain  GENITOURINARY:  No burning on urination, increased frequency or urgency.  No flank pain  NEUROLOGIC:  No HA, visual disturbances  SKIN: No rashes    Allergies    codeine (Unknown)  Kiwi (Anaphylaxis)  penicillins (Anaphylaxis)    Intolerances        ANTIBIOTICS/RELEVANT:  antimicrobials  ertapenem  IVPB 1000 milliGRAM(s) IV Intermittent every 24 hours    immunologic:  influenza   Vaccine 0.5 milliLiter(s) IntraMuscular once    OTHER:  acetaminophen   Tablet .. 1000 milliGRAM(s) Oral every 6 hours PRN  acetaminophen   Tablet .. 650 milliGRAM(s) Oral every 6 hours PRN  aspirin enteric coated 81 milliGRAM(s) Oral daily  atorvastatin 40 milliGRAM(s) Oral at bedtime  clopidogrel Tablet 75 milliGRAM(s) Oral daily  dextrose 40% Gel 15 Gram(s) Oral once PRN  dextrose 5%. 1000 milliLiter(s) IV Continuous <Continuous>  dextrose 50% Injectable 12.5 Gram(s) IV Push once  dextrose 50% Injectable 25 Gram(s) IV Push once  dextrose 50% Injectable 25 Gram(s) IV Push once  diphenhydrAMINE   Injectable 50 milliGRAM(s) IntraMuscular every 4 hours PRN  EPINEPHrine     1 mG/mL Injectable 0.3 milliGRAM(s) IntraMuscular once PRN  famotidine    Tablet 20 milliGRAM(s) Oral daily  gabapentin 100 milliGRAM(s) Oral two times a day  glucagon  Injectable 1 milliGRAM(s) IntraMuscular once PRN  heparin  Injectable 5000 Unit(s) SubCutaneous every 8 hours  insulin glargine Injectable (LANTUS) 15 Unit(s) SubCutaneous at bedtime  insulin lispro (HumaLOG) corrective regimen sliding scale   SubCutaneous three times a day before meals  insulin lispro (HumaLOG) corrective regimen sliding scale   SubCutaneous at bedtime  insulin lispro Injectable (HumaLOG) 8 Unit(s) SubCutaneous three times a day before meals  lactobacillus acidophilus 1 Tablet(s) Oral three times a day  levothyroxine 25 MICROGram(s) Oral daily  metoprolol tartrate 12.5 milliGRAM(s) Oral two times a day  ondansetron Injectable 4 milliGRAM(s) IV Push every 6 hours PRN  sodium bicarbonate 1300 milliGRAM(s) Oral three times a day  traMADol 25 milliGRAM(s) Oral every 12 hours PRN  traMADol 50 milliGRAM(s) Oral every 12 hours PRN      Objective:  Vital Signs Last 24 Hrs  T(C): 36.7 (15 Dec 2019 09:25), Max: 36.9 (14 Dec 2019 19:34)  T(F): 98 (15 Dec 2019 09:25), Max: 98.4 (14 Dec 2019 19:34)  HR: 67 (15 Dec 2019 16:56) (61 - 72)  BP: 111/68 (15 Dec 2019 16:56) (111/68 - 140/90)  BP(mean): --  RR: 18 (15 Dec 2019 11:31) (18 - 18)  SpO2: 100% (15 Dec 2019 16:56) (97% - 100%)    PHYSICAL EXAM:  Constitutional:NAD  Eyes:NIDIA, EOMI  Ear/Nose/Throat: no thrush, mucositis.  Moist mucous membranes	  Neck:no JVD, no lymphadenopathy, supple  Respiratory: CTA lucian  Cardiovascular: S1S2 RRR, no murmurs  Gastrointestinal:soft, nontender,  nondistended (+) BS  Extremities:no e/e/c  Skin:  no rashes, open wounds or ulcerations        LABS:                        10.9   9.38  )-----------( 276      ( 14 Dec 2019 10:54 )             33.5     12-14    130<L>  |  97  |  24<H>  ----------------------------<  306<H>  4.4   |  20<L>  |  1.08    Ca    8.7      14 Dec 2019 07:28                  Vancomycin Level, Trough: 13.4 ug/mL (12-11 @ 19:54)              MICROBIOLOGY:    Culture - Blood in AM (12.10.19 @ 13:06)    Specimen Source: .Blood Blood-Venous    Culture Results:   No growth at 5 days.    Culture - Blood in AM (12.10.19 @ 13:06)    Specimen Source: .Blood Blood-Peripheral    Culture Results:   No growth at 5 days.          RADIOLOGY & ADDITIONAL STUDIES:    < from: MR Pelvis w/wo IV Cont (12.08.19 @ 22:40) >  IMPRESSION:     Limited examination due to motion artifact.    Abscess overlying the inferior sacrum and coccyx. Evaluation for   underlying osteomyelitis of the coccyx is limited however questioned.     No definite evidence of a perianal fistula.    < end of copied text >

## 2019-12-15 NOTE — PROGRESS NOTE ADULT - PROBLEM SELECTOR PLAN 1
positive blood Cx  ID eval appreciated  ABx as per ID, adjusted   Surg F/U appreciated  MRI noted, patient refused contrast, limited study  OM can not be ruled out  repeat MRI noted, ? osteo   Abx duration to be discussed  repeat blood Cx till negative, will plan for PICC Line placement for long term ABx  patient is refusing PICC line, discussed with daughter over the phone

## 2019-12-16 LAB
ANION GAP SERPL CALC-SCNC: 9 MMOL/L — SIGNIFICANT CHANGE UP (ref 5–17)
BUN SERPL-MCNC: 23 MG/DL — SIGNIFICANT CHANGE UP (ref 7–23)
CALCIUM SERPL-MCNC: 8.7 MG/DL — SIGNIFICANT CHANGE UP (ref 8.4–10.5)
CHLORIDE SERPL-SCNC: 98 MMOL/L — SIGNIFICANT CHANGE UP (ref 96–108)
CO2 SERPL-SCNC: 27 MMOL/L — SIGNIFICANT CHANGE UP (ref 22–31)
CREAT SERPL-MCNC: 0.95 MG/DL — SIGNIFICANT CHANGE UP (ref 0.5–1.3)
GLUCOSE BLDC GLUCOMTR-MCNC: 189 MG/DL — HIGH (ref 70–99)
GLUCOSE BLDC GLUCOMTR-MCNC: 225 MG/DL — HIGH (ref 70–99)
GLUCOSE BLDC GLUCOMTR-MCNC: 225 MG/DL — HIGH (ref 70–99)
GLUCOSE BLDC GLUCOMTR-MCNC: 241 MG/DL — HIGH (ref 70–99)
GLUCOSE SERPL-MCNC: 275 MG/DL — HIGH (ref 70–99)
HCT VFR BLD CALC: 32.9 % — LOW (ref 34.5–45)
HGB BLD-MCNC: 10.4 G/DL — LOW (ref 11.5–15.5)
MCHC RBC-ENTMCNC: 28.5 PG — SIGNIFICANT CHANGE UP (ref 27–34)
MCHC RBC-ENTMCNC: 31.6 GM/DL — LOW (ref 32–36)
MCV RBC AUTO: 90.1 FL — SIGNIFICANT CHANGE UP (ref 80–100)
PLATELET # BLD AUTO: 281 K/UL — SIGNIFICANT CHANGE UP (ref 150–400)
POTASSIUM SERPL-MCNC: 4.2 MMOL/L — SIGNIFICANT CHANGE UP (ref 3.5–5.3)
POTASSIUM SERPL-SCNC: 4.2 MMOL/L — SIGNIFICANT CHANGE UP (ref 3.5–5.3)
RBC # BLD: 3.65 M/UL — LOW (ref 3.8–5.2)
RBC # FLD: 16.9 % — HIGH (ref 10.3–14.5)
SODIUM SERPL-SCNC: 134 MMOL/L — LOW (ref 135–145)
WBC # BLD: 8.9 K/UL — SIGNIFICANT CHANGE UP (ref 3.8–10.5)
WBC # FLD AUTO: 8.9 K/UL — SIGNIFICANT CHANGE UP (ref 3.8–10.5)

## 2019-12-16 RX ADMIN — CLOPIDOGREL BISULFATE 75 MILLIGRAM(S): 75 TABLET, FILM COATED ORAL at 12:16

## 2019-12-16 RX ADMIN — ATORVASTATIN CALCIUM 40 MILLIGRAM(S): 80 TABLET, FILM COATED ORAL at 21:33

## 2019-12-16 RX ADMIN — Medication 25 MICROGRAM(S): at 05:13

## 2019-12-16 RX ADMIN — Medication 2: at 12:17

## 2019-12-16 RX ADMIN — Medication 1000 MILLIGRAM(S): at 13:13

## 2019-12-16 RX ADMIN — Medication 8 UNIT(S): at 08:30

## 2019-12-16 RX ADMIN — FAMOTIDINE 20 MILLIGRAM(S): 10 INJECTION INTRAVENOUS at 12:16

## 2019-12-16 RX ADMIN — Medication 1000 MILLIGRAM(S): at 12:17

## 2019-12-16 RX ADMIN — Medication 1 TABLET(S): at 21:33

## 2019-12-16 RX ADMIN — ERTAPENEM SODIUM 120 MILLIGRAM(S): 1 INJECTION, POWDER, LYOPHILIZED, FOR SOLUTION INTRAMUSCULAR; INTRAVENOUS at 04:42

## 2019-12-16 RX ADMIN — Medication 8 UNIT(S): at 12:18

## 2019-12-16 RX ADMIN — Medication 8 UNIT(S): at 17:15

## 2019-12-16 RX ADMIN — Medication 1 TABLET(S): at 16:17

## 2019-12-16 RX ADMIN — Medication 1 TABLET(S): at 05:14

## 2019-12-16 RX ADMIN — Medication 2: at 17:15

## 2019-12-16 RX ADMIN — Medication 12.5 MILLIGRAM(S): at 17:16

## 2019-12-16 RX ADMIN — Medication 81 MILLIGRAM(S): at 12:16

## 2019-12-16 RX ADMIN — Medication 2: at 08:30

## 2019-12-16 RX ADMIN — HEPARIN SODIUM 5000 UNIT(S): 5000 INJECTION INTRAVENOUS; SUBCUTANEOUS at 21:33

## 2019-12-16 RX ADMIN — HEPARIN SODIUM 5000 UNIT(S): 5000 INJECTION INTRAVENOUS; SUBCUTANEOUS at 05:14

## 2019-12-16 RX ADMIN — Medication 12.5 MILLIGRAM(S): at 05:13

## 2019-12-16 RX ADMIN — HEPARIN SODIUM 5000 UNIT(S): 5000 INJECTION INTRAVENOUS; SUBCUTANEOUS at 16:16

## 2019-12-16 RX ADMIN — Medication 1300 MILLIGRAM(S): at 21:33

## 2019-12-16 RX ADMIN — Medication 1300 MILLIGRAM(S): at 05:14

## 2019-12-16 RX ADMIN — GABAPENTIN 100 MILLIGRAM(S): 400 CAPSULE ORAL at 05:14

## 2019-12-16 RX ADMIN — INSULIN GLARGINE 15 UNIT(S): 100 INJECTION, SOLUTION SUBCUTANEOUS at 22:25

## 2019-12-16 RX ADMIN — Medication 1300 MILLIGRAM(S): at 16:17

## 2019-12-16 RX ADMIN — GABAPENTIN 100 MILLIGRAM(S): 400 CAPSULE ORAL at 17:15

## 2019-12-16 NOTE — PROGRESS NOTE ADULT - SUBJECTIVE AND OBJECTIVE BOX
Infectious Diseases progress note:    Subjective: Events noted.  Pt refusing to sign consent for picc line.  No fevers/leukocytosis.    ROS:  CONSTITUTIONAL:  No fever, chills, rigors  CARDIOVASCULAR:  No chest pain or palpitations  RESPIRATORY:   No SOB, cough, dyspnea on exertion.  No wheezing  GASTROINTESTINAL:  No abd pain, N/V, diarrhea/constipation  EXTREMITIES:  No swelling or joint pain  GENITOURINARY:  No burning on urination, increased frequency or urgency.  No flank pain  NEUROLOGIC:  No HA, visual disturbances  SKIN: No rashes    Allergies    codeine (Unknown)  Kiwi (Anaphylaxis)  penicillins (Anaphylaxis)    Intolerances        ANTIBIOTICS/RELEVANT:  antimicrobials  ertapenem  IVPB 1000 milliGRAM(s) IV Intermittent every 24 hours    immunologic:  influenza   Vaccine 0.5 milliLiter(s) IntraMuscular once    OTHER:  acetaminophen   Tablet .. 1000 milliGRAM(s) Oral every 6 hours PRN  acetaminophen   Tablet .. 650 milliGRAM(s) Oral every 6 hours PRN  aspirin enteric coated 81 milliGRAM(s) Oral daily  atorvastatin 40 milliGRAM(s) Oral at bedtime  clopidogrel Tablet 75 milliGRAM(s) Oral daily  dextrose 40% Gel 15 Gram(s) Oral once PRN  dextrose 5%. 1000 milliLiter(s) IV Continuous <Continuous>  dextrose 50% Injectable 12.5 Gram(s) IV Push once  dextrose 50% Injectable 25 Gram(s) IV Push once  dextrose 50% Injectable 25 Gram(s) IV Push once  diphenhydrAMINE   Injectable 50 milliGRAM(s) IntraMuscular every 4 hours PRN  EPINEPHrine     1 mG/mL Injectable 0.3 milliGRAM(s) IntraMuscular once PRN  famotidine    Tablet 20 milliGRAM(s) Oral daily  gabapentin 100 milliGRAM(s) Oral two times a day  glucagon  Injectable 1 milliGRAM(s) IntraMuscular once PRN  heparin  Injectable 5000 Unit(s) SubCutaneous every 8 hours  insulin glargine Injectable (LANTUS) 15 Unit(s) SubCutaneous at bedtime  insulin lispro (HumaLOG) corrective regimen sliding scale   SubCutaneous three times a day before meals  insulin lispro (HumaLOG) corrective regimen sliding scale   SubCutaneous at bedtime  insulin lispro Injectable (HumaLOG) 8 Unit(s) SubCutaneous three times a day before meals  lactobacillus acidophilus 1 Tablet(s) Oral three times a day  levothyroxine 25 MICROGram(s) Oral daily  metoprolol tartrate 12.5 milliGRAM(s) Oral two times a day  ondansetron Injectable 4 milliGRAM(s) IV Push every 6 hours PRN  sodium bicarbonate 1300 milliGRAM(s) Oral three times a day  traMADol 25 milliGRAM(s) Oral every 12 hours PRN  traMADol 50 milliGRAM(s) Oral every 12 hours PRN      Objective:  Vital Signs Last 24 Hrs  T(C): 36.7 (16 Dec 2019 11:11), Max: 36.9 (15 Dec 2019 19:41)  T(F): 98.1 (16 Dec 2019 11:11), Max: 98.4 (15 Dec 2019 19:41)  HR: 66 (16 Dec 2019 11:11) (60 - 67)  BP: 127/77 (16 Dec 2019 11:11) (111/68 - 130/81)  BP(mean): --  RR: 16 (16 Dec 2019 11:11) (16 - 18)  SpO2: 99% (16 Dec 2019 11:11) (96% - 100%)    PHYSICAL EXAM:  Constitutional:NAD  Eyes:NIDIA, EOMI  Ear/Nose/Throat: no thrush, mucositis.  Moist mucous membranes	  Neck:no JVD, no lymphadenopathy, supple  Respiratory: CTA lucian  Cardiovascular: S1S2 RRR, no murmurs  Gastrointestinal:soft, nontender,  nondistended (+) BS  Extremities:no e/e/c  Skin:  no rashes, open wounds or ulcerations        LABS:                        10.4   8.90  )-----------( 281      ( 16 Dec 2019 12:53 )             32.9     12-16    134<L>  |  98  |  23  ----------------------------<  275<H>  4.2   |  27  |  0.95    Ca    8.7      16 Dec 2019 10:43                  Vancomycin Level, Trough: 13.4 ug/mL (12-11 @ 19:54)              MICROBIOLOGY:    Culture - Blood in AM (12.10.19 @ 13:06)    Specimen Source: .Blood Blood-Venous    Culture Results:   No growth at 5 days.    Culture - Blood in AM (12.10.19 @ 13:06)    Specimen Source: .Blood Blood-Peripheral    Culture Results:   No growth at 5 days.    Culture - Blood in AM (12.06.19 @ 06:02)    Gram Stain:   Growth in aerobic bottle: Gram positive cocci in pairs  Growth in anaerobic bottle: Gram Positive Cocci in Pairs and Chains    Specimen Source: .Blood Blood    Culture Results:   Growth in aerobic and anaerobic bottles: Streptococcus anginosus  See previous culture [id number]          RADIOLOGY & ADDITIONAL STUDIES:

## 2019-12-16 NOTE — PROGRESS NOTE ADULT - PROBLEM SELECTOR PLAN 1
Arlin improving S/P infante catheter  F/U Urology recs  hyperkalemia and hyponatremia improving  monitor urine output  trend bmp

## 2019-12-16 NOTE — PROGRESS NOTE ADULT - SUBJECTIVE AND OBJECTIVE BOX
[facility]    Subjective: Patient seen and examined. No new events except as noted.   doing well  cont to refuse picc line placement  discussed in detail with patient and daughter over the phone     REVIEW OF SYSTEMS:    CONSTITUTIONAL: No weakness, fevers or chills  EYES/ENT: No visual changes;  No vertigo or throat pain   NECK: No pain or stiffness  RESPIRATORY: No cough, wheezing, hemoptysis; No shortness of breath  CARDIOVASCULAR: No chest pain or palpitations  GASTROINTESTINAL: No abdominal or epigastric pain. No nausea, vomiting, or hematemesis; No diarrhea or constipation. No melena or hematochezia.  GENITOURINARY: No dysuria, frequency or hematuria  NEUROLOGICAL: No numbness or weakness  SKIN: No itching, burning, rashes, or lesions   All other review of systems is negative unless indicated above.    MEDICATIONS:  MEDICATIONS  (STANDING):  aspirin enteric coated 81 milliGRAM(s) Oral daily  atorvastatin 40 milliGRAM(s) Oral at bedtime  clopidogrel Tablet 75 milliGRAM(s) Oral daily  dextrose 5%. 1000 milliLiter(s) (50 mL/Hr) IV Continuous <Continuous>  dextrose 50% Injectable 12.5 Gram(s) IV Push once  dextrose 50% Injectable 25 Gram(s) IV Push once  dextrose 50% Injectable 25 Gram(s) IV Push once  ertapenem  IVPB 1000 milliGRAM(s) IV Intermittent every 24 hours  famotidine    Tablet 20 milliGRAM(s) Oral daily  gabapentin 100 milliGRAM(s) Oral two times a day  heparin  Injectable 5000 Unit(s) SubCutaneous every 8 hours  influenza   Vaccine 0.5 milliLiter(s) IntraMuscular once  insulin glargine Injectable (LANTUS) 15 Unit(s) SubCutaneous at bedtime  insulin lispro (HumaLOG) corrective regimen sliding scale   SubCutaneous three times a day before meals  insulin lispro (HumaLOG) corrective regimen sliding scale   SubCutaneous at bedtime  insulin lispro Injectable (HumaLOG) 8 Unit(s) SubCutaneous three times a day before meals  lactobacillus acidophilus 1 Tablet(s) Oral three times a day  levothyroxine 25 MICROGram(s) Oral daily  metoprolol tartrate 12.5 milliGRAM(s) Oral two times a day  sodium bicarbonate 1300 milliGRAM(s) Oral three times a day      PHYSICAL EXAM:  T(C): 36.7 (12-16-19 @ 11:11), Max: 36.9 (12-15-19 @ 19:41)  HR: 66 (12-16-19 @ 11:11) (60 - 67)  BP: 127/77 (12-16-19 @ 11:11) (117/78 - 130/81)  RR: 16 (12-16-19 @ 11:11) (16 - 18)  SpO2: 99% (12-16-19 @ 11:11) (96% - 100%)  Wt(kg): --  I&O's Summary    15 Dec 2019 07:01  -  16 Dec 2019 07:00  --------------------------------------------------------  IN: 1160 mL / OUT: 610 mL / NET: 550 mL    16 Dec 2019 07:01  -  16 Dec 2019 17:07  --------------------------------------------------------  IN: 480 mL / OUT: 200 mL / NET: 280 mL          Appearance: Normal	  HEENT:   Normal oral mucosa, PERRL, EOMI	  Lymphatic: No lymphadenopathy , no edema  Cardiovascular: Normal S1 S2, No JVD, No murmurs , Peripheral pulses palpable 2+ bilaterally  Respiratory: Lungs clear to auscultation, normal effort 	  Gastrointestinal:  Soft, Non-tender, + BS	  Skin: No rashes, No ecchymoses, No cyanosis, warm to touch  Musculoskeletal: Normal range of motion, normal strength  Psychiatry:  Mood & affect appropriate  Ext: No edema      All labs, Imaging and EKGs personally reviewed                             10.4   8.90  )-----------( 281      ( 16 Dec 2019 12:53 )             32.9               12-16    134<L>  |  98  |  23  ----------------------------<  275<H>  4.2   |  27  |  0.95    Ca    8.7      16 Dec 2019 10:43

## 2019-12-16 NOTE — PROGRESS NOTE ADULT - ASSESSMENT
85 yo female with a hx of of CHF, DM, HLD, HTN, and an STEMI who presents to the ED for a several day history of buttocks pain. Patient states that the pain started about a week ago, but has suddenly gotten worse over the past few days. She describes it as a sharp pain and has tried taking OTC acetaminophen, which has not helped the pain at all. Patient endorses one episode of nausea earlier this morning, but denies any vomiting. She has not noticed a rash in that area, but does endorse that the skin is difficult to visualize.  Patient has a wound care nurse that comes to the house a few times per week. She came yesterday for wound care dressing changes.    Of note, patient was hospitalized at [facility] from September 4 to October 4 at [facility] for an ulcer in between her buttocks. During that hospitalization, patient required debridement of the wound as well as IV antibiotics.     In the ED, her VS /75, HR 77, RR 18 with SpO2 of 99% and T of 99.2. Patient received a 500 cc bolus of fluids as well as Doxycycline 500 mg x1. (04 Dec 2019 17:13)  WBC 13.5.  ESR 71, CRP 10.6.  UA (-) nit/mod LE, wbc 10.  Bcx (+) GPC pairs/chains from 2 sets.  CTAP without contrast shows L medial gluteal skin cellulitis and air-filled  tract.  No drainable fluid collection.  Pt on vanco/aztreonam/flagyl.     ID consult called for further abx managment.       Sepsis:    - Pt with fever, leukocytosis and bacteremia.  Source likely from gluteal cellulitis.  CTap shows L gluteal skin cellulitis with air-filled tract, without drainable fluid collection.   Agree with broad spectrum abx for now until all culture data finalized.      - MRI pelvis ordered to evaluate for perianal fistula- pt unable to tolerate exam.  Cannot r/o underlying OM, there is fluid collection near sacrum/coccyx.  Recommend bone scan vs. repeat MRI for further evaluation.      - Blood culture (+) GPC pairs/chains - identified as strep anginosus.      - TTE no vegetations reported, unable to r/o endocarditis.  Pt may need further ALONDRA to evaluate for endocarditits, although will  not change abx duration from ID standpoint.     - Urine cultures growing enterococcus faecalis.  Based on sensitivities of blood and urine cultures, can switch change from daptomycin to vancomycin.   Will d/w family regarding pt's prior penicillin allergy.  (pt had 'swelling' at the age of 10 or 15 towards pcn.  Does not recall taking other beta lactam abx in the past.)  d/w Pharmacy , will plan for ertapenem test dose on 12/13.  Pt tolerating this AM.  d/c azactam and flagyl.      - Can d/c vancomycin, has completed > 7 day course for enterococcus UTI.  Erta will cover strep anginosus/polymicrobial infectious source and enable ease of dosing while pt on long term therapy.    - repeat MRI pelvis with IV contrast demonstrates abscess collection overlying sacrum and coccyx.  High suspicion of OM.  Plan for eventual picc line once blood cultures cleared at least 48-72 hrs.  No further intervention as per surgery.       Plan for 6 week IV abx course.  Pt will require picc line - this was discussed with pt, and she is in agreement previously.      Today asked to sign consent, however refusing, citing a previous example where she was asked to sign consent for a heart device which she did not end up needing and was "unnecessary".  I Discussed PICC line, and need for 6 week course of IV abx for her infection.  Pt expressed understanding but states she is waiting to speak to her "other doctors".      PT evaluation            Will follow,    Sandrita Zaman  [phone]

## 2019-12-16 NOTE — PROGRESS NOTE ADULT - SUBJECTIVE AND OBJECTIVE BOX
RED SURGERY DAILY PROGRESS NOTE:       SUBJECTIVE/ROS: Patient seen and examined at bedside.  Patient reports her pain is improving.  She has been afebrile, on Invanz.         MEDICATIONS  (STANDING):  aspirin enteric coated 81 milliGRAM(s) Oral daily  atorvastatin 40 milliGRAM(s) Oral at bedtime  clopidogrel Tablet 75 milliGRAM(s) Oral daily  dextrose 5%. 1000 milliLiter(s) (50 mL/Hr) IV Continuous <Continuous>  dextrose 50% Injectable 12.5 Gram(s) IV Push once  dextrose 50% Injectable 25 Gram(s) IV Push once  dextrose 50% Injectable 25 Gram(s) IV Push once  ertapenem  IVPB 1000 milliGRAM(s) IV Intermittent every 24 hours  famotidine    Tablet 20 milliGRAM(s) Oral daily  gabapentin 100 milliGRAM(s) Oral two times a day  heparin  Injectable 5000 Unit(s) SubCutaneous every 8 hours  influenza   Vaccine 0.5 milliLiter(s) IntraMuscular once  insulin glargine Injectable (LANTUS) 15 Unit(s) SubCutaneous at bedtime  insulin lispro (HumaLOG) corrective regimen sliding scale   SubCutaneous three times a day before meals  insulin lispro (HumaLOG) corrective regimen sliding scale   SubCutaneous at bedtime  insulin lispro Injectable (HumaLOG) 8 Unit(s) SubCutaneous three times a day before meals  lactobacillus acidophilus 1 Tablet(s) Oral three times a day  levothyroxine 25 MICROGram(s) Oral daily  metoprolol tartrate 12.5 milliGRAM(s) Oral two times a day  sodium bicarbonate 1300 milliGRAM(s) Oral three times a day    MEDICATIONS  (PRN):  acetaminophen   Tablet .. 1000 milliGRAM(s) Oral every 6 hours PRN Mild Pain (1 - 3)  acetaminophen   Tablet .. 650 milliGRAM(s) Oral every 6 hours PRN Temp greater or equal to 38C (100.4F)  dextrose 40% Gel 15 Gram(s) Oral once PRN Blood Glucose LESS THAN 70 milliGRAM(s)/deciliter  diphenhydrAMINE   Injectable 50 milliGRAM(s) IntraMuscular every 4 hours PRN Rash and/or Itching  EPINEPHrine     1 mG/mL Injectable 0.3 milliGRAM(s) IntraMuscular once PRN severe allergic reaction  glucagon  Injectable 1 milliGRAM(s) IntraMuscular once PRN Glucose LESS THAN 70 milligrams/deciliter  ondansetron Injectable 4 milliGRAM(s) IV Push every 6 hours PRN Nausea and/or Vomiting  traMADol 25 milliGRAM(s) Oral every 12 hours PRN Moderate Pain (4 - 6)  traMADol 50 milliGRAM(s) Oral every 12 hours PRN Severe Pain (7 - 10)      OBJECTIVE:    Vital Signs Last 24 Hrs  T(C): 36.8 (16 Dec 2019 04:49), Max: 36.9 (15 Dec 2019 19:41)  T(F): 98.2 (16 Dec 2019 04:49), Max: 98.4 (15 Dec 2019 19:41)  HR: 64 (16 Dec 2019 04:49) (64 - 72)  BP: 121/71 (16 Dec 2019 04:49) (111/68 - 139/83)  BP(mean): --  RR: 16 (16 Dec 2019 04:49) (16 - 18)  SpO2: 99% (16 Dec 2019 04:49) (96% - 100%)        I&O's Detail    15 Dec 2019 07:01  -  16 Dec 2019 07:00  --------------------------------------------------------  IN:    Oral Fluid: 1160 mL  Total IN: 1160 mL    OUT:    Indwelling Catheter - Urethral: 610 mL  Total OUT: 610 mL    Total NET: 550 mL          Daily     Daily Weight in k.2 (16 Dec 2019 04:49)    LABS:                        10.9   9.38  )-----------( 276      ( 14 Dec 2019 10:54 )             33.5                         PHYSICAL EXAM:  GEN: NAD, resting quietly  PULM: symmetric chest rise bilaterally, no increased WOB  ABD: soft, NTND  : right and left incision sites with healthy granulation tissue, overlying cellulitis.  Small cavity at superior portion of gluteal cleft, no drainage noted (improved). Area of erythema overall improving.   EXTR: no cyanosis or edema, moving all extremities

## 2019-12-16 NOTE — PROGRESS NOTE ADULT - SUBJECTIVE AND OBJECTIVE BOX
[facility] - WALLACE Lagos / WALLACE Gibson / ANN Barrett/ WALLACE Chin/ WALLACE Kumari/ ESPINOZA Bone / GABRIEL Geronimo / ALEXUS Kaur  ---------------------------------------------------------------------------------------------------------------  seen and examined today for ROB  Interval : serum creatinine at baseline, sodium umproved  VITALS:  T(F): 98.1 (12-16-19 @ 11:11), Max: 98.4 (12-15-19 @ 19:41)  HR: 66 (12-16-19 @ 11:11)  BP: 127/77 (12-16-19 @ 11:11)  RR: 16 (12-16-19 @ 11:11)  SpO2: 99% (12-16-19 @ 11:11)  Wt(kg): --    12-15 @ 07:01  -  12-16 @ 07:00  --------------------------------------------------------  IN: 1160 mL / OUT: 610 mL / NET: 550 mL    12-16 @ 07:01  -  12-16 @ 12:25  --------------------------------------------------------  IN: 240 mL / OUT: 0 mL / NET: 240 mL      Physical Exam :-  Constitutional: NAD  Neck: Supple.  Respiratory: Bilateral equal breath sounds,  Cardiovascular: S1, S2 normal,  Gastrointestinal: Bowel Sounds present, soft, non tender.  Extremities: No edema  Neurological: Alert and Oriented x 3, no focal deficits  Psychiatric: Normal mood, normal affect  Data:-  Allergies :   codeine (Unknown)  Kiwi (Anaphylaxis)  penicillins (Anaphylaxis)    Hospital Medications:   MEDICATIONS  (STANDING):  aspirin enteric coated 81 milliGRAM(s) Oral daily  atorvastatin 40 milliGRAM(s) Oral at bedtime  clopidogrel Tablet 75 milliGRAM(s) Oral daily  dextrose 5%. 1000 milliLiter(s) (50 mL/Hr) IV Continuous <Continuous>  dextrose 50% Injectable 12.5 Gram(s) IV Push once  dextrose 50% Injectable 25 Gram(s) IV Push once  dextrose 50% Injectable 25 Gram(s) IV Push once  ertapenem  IVPB 1000 milliGRAM(s) IV Intermittent every 24 hours  famotidine    Tablet 20 milliGRAM(s) Oral daily  gabapentin 100 milliGRAM(s) Oral two times a day  heparin  Injectable 5000 Unit(s) SubCutaneous every 8 hours  influenza   Vaccine 0.5 milliLiter(s) IntraMuscular once  insulin glargine Injectable (LANTUS) 15 Unit(s) SubCutaneous at bedtime  insulin lispro (HumaLOG) corrective regimen sliding scale   SubCutaneous three times a day before meals  insulin lispro (HumaLOG) corrective regimen sliding scale   SubCutaneous at bedtime  insulin lispro Injectable (HumaLOG) 8 Unit(s) SubCutaneous three times a day before meals  lactobacillus acidophilus 1 Tablet(s) Oral three times a day  levothyroxine 25 MICROGram(s) Oral daily  metoprolol tartrate 12.5 milliGRAM(s) Oral two times a day  sodium bicarbonate 1300 milliGRAM(s) Oral three times a day    12-16    134<L>  |  98  |  23  ----------------------------<  275<H>  4.2   |  27  |  0.95    Ca    8.7      16 Dec 2019 10:43      Creatinine Trend: 0.95 <--, 1.08 <--, 1.09 <--, 1.23 <--, 1.39 <--, 1.56 <--, 1.51 <--

## 2019-12-16 NOTE — CHART NOTE - NSCHARTNOTEFT_GEN_A_CORE
follow up- per ID Plan for 6 week IV abx course.  Pt will require picc line - this was discussed with pt and daughter Nancy. pt is refusing picc; d/w md and team; will continue to discuss the need for picc and compliance with treatment as part of the treatment plan;  team is aware,  Alexandria Lozada(NP)  [facility], [phone]

## 2019-12-17 LAB
GLUCOSE BLDC GLUCOMTR-MCNC: 169 MG/DL — HIGH (ref 70–99)
GLUCOSE BLDC GLUCOMTR-MCNC: 199 MG/DL — HIGH (ref 70–99)
GLUCOSE BLDC GLUCOMTR-MCNC: 212 MG/DL — HIGH (ref 70–99)
GLUCOSE BLDC GLUCOMTR-MCNC: 258 MG/DL — HIGH (ref 70–99)

## 2019-12-17 RX ADMIN — Medication 1300 MILLIGRAM(S): at 22:12

## 2019-12-17 RX ADMIN — Medication 1: at 09:01

## 2019-12-17 RX ADMIN — FAMOTIDINE 20 MILLIGRAM(S): 10 INJECTION INTRAVENOUS at 13:03

## 2019-12-17 RX ADMIN — CLOPIDOGREL BISULFATE 75 MILLIGRAM(S): 75 TABLET, FILM COATED ORAL at 13:02

## 2019-12-17 RX ADMIN — Medication 1000 MILLIGRAM(S): at 22:12

## 2019-12-17 RX ADMIN — GABAPENTIN 100 MILLIGRAM(S): 400 CAPSULE ORAL at 05:47

## 2019-12-17 RX ADMIN — Medication 8 UNIT(S): at 13:01

## 2019-12-17 RX ADMIN — GABAPENTIN 100 MILLIGRAM(S): 400 CAPSULE ORAL at 17:34

## 2019-12-17 RX ADMIN — Medication 1 TABLET(S): at 22:12

## 2019-12-17 RX ADMIN — Medication 3: at 13:01

## 2019-12-17 RX ADMIN — Medication 81 MILLIGRAM(S): at 13:02

## 2019-12-17 RX ADMIN — Medication 8 UNIT(S): at 09:01

## 2019-12-17 RX ADMIN — Medication 25 MICROGRAM(S): at 05:47

## 2019-12-17 RX ADMIN — ERTAPENEM SODIUM 120 MILLIGRAM(S): 1 INJECTION, POWDER, LYOPHILIZED, FOR SOLUTION INTRAMUSCULAR; INTRAVENOUS at 05:23

## 2019-12-17 RX ADMIN — Medication 1 TABLET(S): at 05:47

## 2019-12-17 RX ADMIN — Medication 1000 MILLIGRAM(S): at 22:42

## 2019-12-17 RX ADMIN — INSULIN GLARGINE 15 UNIT(S): 100 INJECTION, SOLUTION SUBCUTANEOUS at 22:12

## 2019-12-17 RX ADMIN — Medication 1 TABLET(S): at 13:02

## 2019-12-17 RX ADMIN — Medication 12.5 MILLIGRAM(S): at 05:47

## 2019-12-17 RX ADMIN — Medication 12.5 MILLIGRAM(S): at 17:35

## 2019-12-17 RX ADMIN — Medication 1: at 17:35

## 2019-12-17 RX ADMIN — Medication 1300 MILLIGRAM(S): at 05:47

## 2019-12-17 RX ADMIN — Medication 8 UNIT(S): at 17:34

## 2019-12-17 RX ADMIN — ATORVASTATIN CALCIUM 40 MILLIGRAM(S): 80 TABLET, FILM COATED ORAL at 22:12

## 2019-12-17 RX ADMIN — Medication 1300 MILLIGRAM(S): at 13:03

## 2019-12-17 RX ADMIN — Medication 650 MILLIGRAM(S): at 13:55

## 2019-12-17 RX ADMIN — Medication 650 MILLIGRAM(S): at 14:30

## 2019-12-17 RX ADMIN — HEPARIN SODIUM 5000 UNIT(S): 5000 INJECTION INTRAVENOUS; SUBCUTANEOUS at 05:47

## 2019-12-17 RX ADMIN — HEPARIN SODIUM 5000 UNIT(S): 5000 INJECTION INTRAVENOUS; SUBCUTANEOUS at 13:02

## 2019-12-17 RX ADMIN — HEPARIN SODIUM 5000 UNIT(S): 5000 INJECTION INTRAVENOUS; SUBCUTANEOUS at 22:12

## 2019-12-17 RX ADMIN — Medication 1000 MILLIGRAM(S): at 01:25

## 2019-12-17 RX ADMIN — Medication 1000 MILLIGRAM(S): at 00:55

## 2019-12-17 NOTE — PROGRESS NOTE ADULT - PROBLEM SELECTOR PLAN 1
Arlin improving S/P infante catheter  F/U Urology recs  hyperkalemia and hyponatremia improving  monitor urine output  trend bmp-- pending today

## 2019-12-17 NOTE — PROGRESS NOTE ADULT - SUBJECTIVE AND OBJECTIVE BOX
GENERAL SURGERY PROGRESS NOTE    SUBJECTIVE  Patient seen and examined. No acute events overnight. Afebrile. Notes pain improving. Denies fever, chills, SOB, chest pain.         OBJECTIVE    PHYSICAL EXAM  General: Appears well, NAD  CHEST: breathing comfortably  CV: appears well perfused  Buttocks: R/L incision sites healing, mild erythema improving, no drainage noted  Extremities: Grossly symmetric    T(C): 37.4 (12-17-19 @ 13:53), Max: 37.4 (12-17-19 @ 13:53)  HR: 71 (12-17-19 @ 13:53) (58 - 71)  BP: 124/76 (12-17-19 @ 13:53) (112/65 - 136/84)  RR: 18 (12-17-19 @ 13:53) (18 - 18)  SpO2: 97% (12-17-19 @ 13:53) (97% - 99%)    12-16-19 @ 07:01  -  12-17-19 @ 07:00  --------------------------------------------------------  IN: 870 mL / OUT: 700 mL / NET: 170 mL        MEDICATIONS  acetaminophen   Tablet .. 1000 milliGRAM(s) Oral every 6 hours PRN  acetaminophen   Tablet .. 650 milliGRAM(s) Oral every 6 hours PRN  aspirin enteric coated 81 milliGRAM(s) Oral daily  atorvastatin 40 milliGRAM(s) Oral at bedtime  clopidogrel Tablet 75 milliGRAM(s) Oral daily  dextrose 40% Gel 15 Gram(s) Oral once PRN  dextrose 5%. 1000 milliLiter(s) IV Continuous <Continuous>  dextrose 50% Injectable 12.5 Gram(s) IV Push once  dextrose 50% Injectable 25 Gram(s) IV Push once  dextrose 50% Injectable 25 Gram(s) IV Push once  diphenhydrAMINE   Injectable 50 milliGRAM(s) IntraMuscular every 4 hours PRN  EPINEPHrine     1 mG/mL Injectable 0.3 milliGRAM(s) IntraMuscular once PRN  ertapenem  IVPB 1000 milliGRAM(s) IV Intermittent every 24 hours  famotidine    Tablet 20 milliGRAM(s) Oral daily  gabapentin 100 milliGRAM(s) Oral two times a day  glucagon  Injectable 1 milliGRAM(s) IntraMuscular once PRN  heparin  Injectable 5000 Unit(s) SubCutaneous every 8 hours  influenza   Vaccine 0.5 milliLiter(s) IntraMuscular once  insulin glargine Injectable (LANTUS) 15 Unit(s) SubCutaneous at bedtime  insulin lispro (HumaLOG) corrective regimen sliding scale   SubCutaneous three times a day before meals  insulin lispro (HumaLOG) corrective regimen sliding scale   SubCutaneous at bedtime  insulin lispro Injectable (HumaLOG) 8 Unit(s) SubCutaneous three times a day before meals  lactobacillus acidophilus 1 Tablet(s) Oral three times a day  levothyroxine 25 MICROGram(s) Oral daily  metoprolol tartrate 12.5 milliGRAM(s) Oral two times a day  ondansetron Injectable 4 milliGRAM(s) IV Push every 6 hours PRN  sodium bicarbonate 1300 milliGRAM(s) Oral three times a day  traMADol 25 milliGRAM(s) Oral every 12 hours PRN  traMADol 50 milliGRAM(s) Oral every 12 hours PRN      LABS                        10.4   8.90  )-----------( 281      ( 16 Dec 2019 12:53 )             32.9     12-16    134<L>  |  98  |  23  ----------------------------<  275<H>  4.2   |  27  |  0.95    Ca    8.7      16 Dec 2019 10:43            RADIOLOGY & ADDITIONAL STUDIES

## 2019-12-17 NOTE — PROGRESS NOTE ADULT - SUBJECTIVE AND OBJECTIVE BOX
Infectious Diseases progress note:    Subjective: NAD.  No acute o/n events.  Afebrile.  Tolerating ertapenem.     ROS:  CONSTITUTIONAL:  No fever, chills, rigors  CARDIOVASCULAR:  No chest pain or palpitations  RESPIRATORY:   No SOB, cough, dyspnea on exertion.  No wheezing  GASTROINTESTINAL:  No abd pain, N/V, diarrhea/constipation  EXTREMITIES:  No swelling or joint pain  GENITOURINARY:  No burning on urination, increased frequency or urgency.  No flank pain  NEUROLOGIC:  No HA, visual disturbances  SKIN: No rashes    Allergies    codeine (Unknown)  Kiwi (Anaphylaxis)  penicillins (Anaphylaxis)    Intolerances        ANTIBIOTICS/RELEVANT:  antimicrobials  ertapenem  IVPB 1000 milliGRAM(s) IV Intermittent every 24 hours    immunologic:  influenza   Vaccine 0.5 milliLiter(s) IntraMuscular once    OTHER:  acetaminophen   Tablet .. 1000 milliGRAM(s) Oral every 6 hours PRN  acetaminophen   Tablet .. 650 milliGRAM(s) Oral every 6 hours PRN  aspirin enteric coated 81 milliGRAM(s) Oral daily  atorvastatin 40 milliGRAM(s) Oral at bedtime  clopidogrel Tablet 75 milliGRAM(s) Oral daily  dextrose 40% Gel 15 Gram(s) Oral once PRN  dextrose 5%. 1000 milliLiter(s) IV Continuous <Continuous>  dextrose 50% Injectable 12.5 Gram(s) IV Push once  dextrose 50% Injectable 25 Gram(s) IV Push once  dextrose 50% Injectable 25 Gram(s) IV Push once  diphenhydrAMINE   Injectable 50 milliGRAM(s) IntraMuscular every 4 hours PRN  EPINEPHrine     1 mG/mL Injectable 0.3 milliGRAM(s) IntraMuscular once PRN  famotidine    Tablet 20 milliGRAM(s) Oral daily  gabapentin 100 milliGRAM(s) Oral two times a day  glucagon  Injectable 1 milliGRAM(s) IntraMuscular once PRN  heparin  Injectable 5000 Unit(s) SubCutaneous every 8 hours  insulin glargine Injectable (LANTUS) 15 Unit(s) SubCutaneous at bedtime  insulin lispro (HumaLOG) corrective regimen sliding scale   SubCutaneous three times a day before meals  insulin lispro (HumaLOG) corrective regimen sliding scale   SubCutaneous at bedtime  insulin lispro Injectable (HumaLOG) 8 Unit(s) SubCutaneous three times a day before meals  lactobacillus acidophilus 1 Tablet(s) Oral three times a day  levothyroxine 25 MICROGram(s) Oral daily  metoprolol tartrate 12.5 milliGRAM(s) Oral two times a day  ondansetron Injectable 4 milliGRAM(s) IV Push every 6 hours PRN  sodium bicarbonate 1300 milliGRAM(s) Oral three times a day  traMADol 25 milliGRAM(s) Oral every 12 hours PRN  traMADol 50 milliGRAM(s) Oral every 12 hours PRN      Objective:  Vital Signs Last 24 Hrs  T(C): 37.4 (17 Dec 2019 13:53), Max: 37.4 (17 Dec 2019 13:53)  T(F): 99.4 (17 Dec 2019 13:53), Max: 99.4 (17 Dec 2019 13:53)  HR: 71 (17 Dec 2019 13:53) (58 - 71)  BP: 124/76 (17 Dec 2019 13:53) (112/65 - 136/84)  BP(mean): --  RR: 18 (17 Dec 2019 13:53) (18 - 18)  SpO2: 97% (17 Dec 2019 13:53) (97% - 99%)    PHYSICAL EXAM:  Constitutional:NAD  Eyes:NIDIA, EOMI  Ear/Nose/Throat: no thrush, mucositis.  Moist mucous membranes	  Neck:no JVD, no lymphadenopathy, supple  Respiratory: CTA lucian  Cardiovascular: S1S2 RRR, no murmurs  Gastrointestinal:soft, nontender,  nondistended (+) BS  Extremities:no e/e/c  Skin:  no rashes, open wounds or ulcerations        LABS:                        10.4   8.90  )-----------( 281      ( 16 Dec 2019 12:53 )             32.9     12-16    134<L>  |  98  |  23  ----------------------------<  275<H>  4.2   |  27  |  0.95    Ca    8.7      16 Dec 2019 10:43            MICROBIOLOGY:          RADIOLOGY & ADDITIONAL STUDIES:    < from: MR Pelvis w/wo IV Cont (12.08.19 @ 22:40) >  IMPRESSION:     Limited examination due to motion artifact.    Abscess overlying the inferior sacrum and coccyx. Evaluation for   underlying osteomyelitis of the coccyx is limited however questioned.     No definite evidence of a perianal fistula.    < end of copied text >

## 2019-12-17 NOTE — PROGRESS NOTE ADULT - ASSESSMENT
85 yo female with a hx of of CHF, DM, HLD, HTN, and an STEMI who presents to the ED for a several day history of buttocks pain. Patient states that the pain started about a week ago, but has suddenly gotten worse over the past few days. She describes it as a sharp pain and has tried taking OTC acetaminophen, which has not helped the pain at all. Patient endorses one episode of nausea earlier this morning, but denies any vomiting. She has not noticed a rash in that area, but does endorse that the skin is difficult to visualize.  Patient has a wound care nurse that comes to the house a few times per week. She came yesterday for wound care dressing changes.    Of note, patient was hospitalized at [facility] from September 4 to October 4 at [facility] for an ulcer in between her buttocks. During that hospitalization, patient required debridement of the wound as well as IV antibiotics.     In the ED, her VS /75, HR 77, RR 18 with SpO2 of 99% and T of 99.2. Patient received a 500 cc bolus of fluids as well as Doxycycline 500 mg x1. (04 Dec 2019 17:13)  WBC 13.5.  ESR 71, CRP 10.6.  UA (-) nit/mod LE, wbc 10.  Bcx (+) GPC pairs/chains from 2 sets.  CTAP without contrast shows L medial gluteal skin cellulitis and air-filled  tract.  No drainable fluid collection.  Pt on vanco/aztreonam/flagyl.     ID consult called for further abx managment.       Sepsis:    - Pt with fever, leukocytosis and bacteremia.  Source likely from gluteal cellulitis.  CTap shows L gluteal skin cellulitis with air-filled tract, without drainable fluid collection.   Agree with broad spectrum abx for now until all culture data finalized.      - MRI pelvis ordered to evaluate for perianal fistula- pt unable to tolerate exam.  Cannot r/o underlying OM, there is fluid collection near sacrum/coccyx.  Recommend bone scan vs. repeat MRI for further evaluation.      - Blood culture (+) GPC pairs/chains - identified as strep anginosus.      - TTE no vegetations reported, unable to r/o endocarditis.  Pt may need further ALONDRA to evaluate for endocarditits, although will  not change abx duration from ID standpoint.     - Urine cultures growing enterococcus faecalis.  Based on sensitivities of blood and urine cultures, can switch change from daptomycin to vancomycin.   Will d/w family regarding pt's prior penicillin allergy.  (pt had 'swelling' at the age of 10 or 15 towards pcn.  Does not recall taking other beta lactam abx in the past.)  d/w Pharmacy , will plan for ertapenem test dose on 12/13.  Pt tolerating this AM.  d/c azactam and flagyl.      - Can d/c vancomycin, has completed > 7 day course for enterococcus UTI.  Erta will cover strep anginosus/polymicrobial infectious source and enable ease of dosing while pt on long term therapy.    - repeat MRI pelvis with IV contrast demonstrates abscess collection overlying sacrum and coccyx.  High suspicion of OM.  Plan for eventual picc line once blood cultures cleared at least 48-72 hrs.  No further intervention as per surgery.       Plan for 6 week IV abx course.  Pt will require picc line - this was discussed with pt, and she is in agreement previously.      Pt still hesitating to sign consent. I Discussed PICC line, and need for 6 week course of IV abx for her infection.  Pt requesting oral antibiotics, I explained to pt oral antibiotics are not an option due to involvement of bone and presence of abscess.  Pt expressed understanding and nodded her head, saying "ok, ok".  Also called pt's daughter Nancy, over the phone who said she would call pt over the phone and speak with her.  Pt's grandson is also coming to visit her this evening and will talk to her.              Will follow,    Sandrita Zaman  [phone]

## 2019-12-17 NOTE — PROGRESS NOTE ADULT - ASSESSMENT
85 yo woman with a hx of CHF, DM, HLD, HTN, CAD prior MI, sp debridement of bilateral buttocks (9/15, 9/28) presenting with buttock cellulitis. CT consistent with cellulitis without underlying abscess.    Plan:   - no urgent surgical intervention at this time  - c/w IV antibiotics per ID, pt refusing picc line  - cont local wound care  - OOB with assistance  - Continue care as per medicine   - will sign off / reconsult prn     Red surgery  [id number]

## 2019-12-17 NOTE — PROGRESS NOTE ADULT - SUBJECTIVE AND OBJECTIVE BOX
Patient is a 86y old  Female who presents with a chief complaint of Sepsis 2/2 Cellulitis (17 Dec 2019 15:32)      INTERVAL HISTORY: feels ok    	  MEDICATIONS:  EPINEPHrine     1 mG/mL Injectable 0.3 milliGRAM(s) IntraMuscular once PRN  metoprolol tartrate 12.5 milliGRAM(s) Oral two times a day        PHYSICAL EXAM:  T(C): 36.9 (12-17-19 @ 19:45), Max: 37.4 (12-17-19 @ 13:53)  HR: 61 (12-17-19 @ 19:45) (59 - 71)  BP: 118/70 (12-17-19 @ 19:45) (112/65 - 136/84)  RR: 18 (12-17-19 @ 19:45) (18 - 18)  SpO2: 98% (12-17-19 @ 19:45) (97% - 99%)  Wt(kg): --  I&O's Summary    16 Dec 2019 07:01  -  17 Dec 2019 07:00  --------------------------------------------------------  IN: 870 mL / OUT: 700 mL / NET: 170 mL    17 Dec 2019 07:01  -  17 Dec 2019 22:29  --------------------------------------------------------  IN: 730 mL / OUT: 300 mL / NET: 430 mL          Appearance: In no distress	  HEENT:    PERRL, EOMI	  Cardiovascular:  S1 S2, No JVD  Respiratory: Lungs clear to auscultation	  Gastrointestinal:  Soft, Non-tender, + BS	  Extremities:  No edema of LE                                10.4   8.90  )-----------( 281      ( 16 Dec 2019 12:53 )             32.9     12-16    134<L>  |  98  |  23  ----------------------------<  275<H>  4.2   |  27  |  0.95    Ca    8.7      16 Dec 2019 10:43          Labs personally reviewed      Assessment /Plan:   Chronic diastolic HF - euvolemic, off Lasix  CAD - c/w DAPT, Metoprolol and ASA  HTN - well controlled        Srini Velasco DO [facility]  Cardiovascular Medicine  [address] [place] [address], [address]  Office: [phone]  Cell: [phone]

## 2019-12-17 NOTE — PROGRESS NOTE ADULT - SUBJECTIVE AND OBJECTIVE BOX
Patient seen and examined  no complaints    codeine (Unknown)  Kiwi (Anaphylaxis)  penicillins (Anaphylaxis)    Hospital Medications:   MEDICATIONS  (STANDING):  aspirin enteric coated 81 milliGRAM(s) Oral daily  atorvastatin 40 milliGRAM(s) Oral at bedtime  clopidogrel Tablet 75 milliGRAM(s) Oral daily  dextrose 5%. 1000 milliLiter(s) (50 mL/Hr) IV Continuous <Continuous>  dextrose 50% Injectable 12.5 Gram(s) IV Push once  dextrose 50% Injectable 25 Gram(s) IV Push once  dextrose 50% Injectable 25 Gram(s) IV Push once  ertapenem  IVPB 1000 milliGRAM(s) IV Intermittent every 24 hours  famotidine    Tablet 20 milliGRAM(s) Oral daily  gabapentin 100 milliGRAM(s) Oral two times a day  heparin  Injectable 5000 Unit(s) SubCutaneous every 8 hours  influenza   Vaccine 0.5 milliLiter(s) IntraMuscular once  insulin glargine Injectable (LANTUS) 15 Unit(s) SubCutaneous at bedtime  insulin lispro (HumaLOG) corrective regimen sliding scale   SubCutaneous three times a day before meals  insulin lispro (HumaLOG) corrective regimen sliding scale   SubCutaneous at bedtime  insulin lispro Injectable (HumaLOG) 8 Unit(s) SubCutaneous three times a day before meals  lactobacillus acidophilus 1 Tablet(s) Oral three times a day  levothyroxine 25 MICROGram(s) Oral daily  metoprolol tartrate 12.5 milliGRAM(s) Oral two times a day  sodium bicarbonate 1300 milliGRAM(s) Oral three times a day      VITALS:  T(F): 99.4 (12-17-19 @ 13:53), Max: 99.4 (12-17-19 @ 13:53)  HR: 71 (12-17-19 @ 13:53)  BP: 124/76 (12-17-19 @ 13:53)  RR: 18 (12-17-19 @ 13:53)  SpO2: 97% (12-17-19 @ 13:53)  Wt(kg): --    12-16 @ 07:01  -  12-17 @ 07:00  --------------------------------------------------------  IN: 870 mL / OUT: 700 mL / NET: 170 mL        Physical Exam :-  Constitutional: NAD  Neck: Supple.  Respiratory: Bilateral equal breath sounds,  Cardiovascular: S1, S2 normal,  Gastrointestinal: Bowel Sounds present, soft, non tender.  Extremities: No edema  Neurological:  awake   Psychiatric: Normal mood, normal affect  gu + indwelling infante  LABS:  12-16    134<L>  |  98  |  23  ----------------------------<  275<H>  4.2   |  27  |  0.95    Ca    8.7      16 Dec 2019 10:43      Creatinine Trend: 0.95 <--, 1.08 <--, 1.09 <--, 1.23 <--, 1.39 <--                        10.4   8.90  )-----------( 281      ( 16 Dec 2019 12:53 )             32.9     Urine Studies:    Osmolality, Random Urine: 502 mos/kg (12-12 @ 16:03)  Sodium, Random Urine: <35 mmol/L (12-12 @ 13:12)    RADIOLOGY & ADDITIONAL STUDIES:

## 2019-12-17 NOTE — PROGRESS NOTE ADULT - PROBLEM SELECTOR PROBLEM 4
states ate something and thinks hes allergic , fels like uvula is swollen CHF (congestive heart failure)

## 2019-12-17 NOTE — PROGRESS NOTE ADULT - SUBJECTIVE AND OBJECTIVE BOX
[facility]    Subjective: Patient seen and examined. No new events except as noted.   cont to refuse PICC line placement     REVIEW OF SYSTEMS:    CONSTITUTIONAL: No weakness, fevers or chills  EYES/ENT: No visual changes;  No vertigo or throat pain   NECK: No pain or stiffness  RESPIRATORY: No cough, wheezing, hemoptysis; No shortness of breath  CARDIOVASCULAR: No chest pain or palpitations  GASTROINTESTINAL: No abdominal or epigastric pain. No nausea, vomiting, or hematemesis; No diarrhea or constipation. No melena or hematochezia.  GENITOURINARY: No dysuria, frequency or hematuria  NEUROLOGICAL: No numbness or weakness  SKIN: No itching, burning, rashes, or lesions   All other review of systems is negative unless indicated above.    MEDICATIONS:  MEDICATIONS  (STANDING):  aspirin enteric coated 81 milliGRAM(s) Oral daily  atorvastatin 40 milliGRAM(s) Oral at bedtime  clopidogrel Tablet 75 milliGRAM(s) Oral daily  dextrose 5%. 1000 milliLiter(s) (50 mL/Hr) IV Continuous <Continuous>  dextrose 50% Injectable 12.5 Gram(s) IV Push once  dextrose 50% Injectable 25 Gram(s) IV Push once  dextrose 50% Injectable 25 Gram(s) IV Push once  ertapenem  IVPB 1000 milliGRAM(s) IV Intermittent every 24 hours  famotidine    Tablet 20 milliGRAM(s) Oral daily  gabapentin 100 milliGRAM(s) Oral two times a day  heparin  Injectable 5000 Unit(s) SubCutaneous every 8 hours  influenza   Vaccine 0.5 milliLiter(s) IntraMuscular once  insulin glargine Injectable (LANTUS) 15 Unit(s) SubCutaneous at bedtime  insulin lispro (HumaLOG) corrective regimen sliding scale   SubCutaneous three times a day before meals  insulin lispro (HumaLOG) corrective regimen sliding scale   SubCutaneous at bedtime  insulin lispro Injectable (HumaLOG) 8 Unit(s) SubCutaneous three times a day before meals  lactobacillus acidophilus 1 Tablet(s) Oral three times a day  levothyroxine 25 MICROGram(s) Oral daily  metoprolol tartrate 12.5 milliGRAM(s) Oral two times a day  sodium bicarbonate 1300 milliGRAM(s) Oral three times a day      PHYSICAL EXAM:  T(C): 37.4 (12-17-19 @ 13:53), Max: 37.4 (12-17-19 @ 13:53)  HR: 71 (12-17-19 @ 13:53) (58 - 71)  BP: 124/76 (12-17-19 @ 13:53) (112/65 - 136/84)  RR: 18 (12-17-19 @ 13:53) (18 - 18)  SpO2: 97% (12-17-19 @ 13:53) (97% - 99%)  Wt(kg): --  I&O's Summary    16 Dec 2019 07:01  -  17 Dec 2019 07:00  --------------------------------------------------------  IN: 870 mL / OUT: 700 mL / NET: 170 mL    17 Dec 2019 07:01  -  17 Dec 2019 18:16  --------------------------------------------------------  IN: 480 mL / OUT: 300 mL / NET: 180 mL          Appearance: Normal	  HEENT:   Normal oral mucosa, PERRL, EOMI	  Lymphatic: No lymphadenopathy , no edema  Cardiovascular: Normal S1 S2, No JVD, No murmurs , Peripheral pulses palpable 2+ bilaterally  Respiratory: Lungs clear to auscultation, normal effort 	  Gastrointestinal:  Soft, Non-tender, + BS	  Skin: lower back dressing intact   Musculoskeletal: Normal range of motion, normal strength  Psychiatry:  Mood & affect appropriate  Ext: No edema      All labs, Imaging and EKGs personally reviewed                           10.4   8.90  )-----------( 281      ( 16 Dec 2019 12:53 )             32.9               12-16    134<L>  |  98  |  23  ----------------------------<  275<H>  4.2   |  27  |  0.95    Ca    8.7      16 Dec 2019 10:43

## 2019-12-18 LAB
ANION GAP SERPL CALC-SCNC: 11 MMOL/L — SIGNIFICANT CHANGE UP (ref 5–17)
BASOPHILS # BLD AUTO: 0.04 K/UL — SIGNIFICANT CHANGE UP (ref 0–0.2)
BASOPHILS NFR BLD AUTO: 0.6 % — SIGNIFICANT CHANGE UP (ref 0–2)
BUN SERPL-MCNC: 26 MG/DL — HIGH (ref 7–23)
CALCIUM SERPL-MCNC: 9 MG/DL — SIGNIFICANT CHANGE UP (ref 8.4–10.5)
CHLORIDE SERPL-SCNC: 101 MMOL/L — SIGNIFICANT CHANGE UP (ref 96–108)
CO2 SERPL-SCNC: 26 MMOL/L — SIGNIFICANT CHANGE UP (ref 22–31)
CREAT SERPL-MCNC: 0.92 MG/DL — SIGNIFICANT CHANGE UP (ref 0.5–1.3)
EOSINOPHIL # BLD AUTO: 0.18 K/UL — SIGNIFICANT CHANGE UP (ref 0–0.5)
EOSINOPHIL NFR BLD AUTO: 2.5 % — SIGNIFICANT CHANGE UP (ref 0–6)
GLUCOSE BLDC GLUCOMTR-MCNC: 150 MG/DL — HIGH (ref 70–99)
GLUCOSE BLDC GLUCOMTR-MCNC: 172 MG/DL — HIGH (ref 70–99)
GLUCOSE BLDC GLUCOMTR-MCNC: 198 MG/DL — HIGH (ref 70–99)
GLUCOSE BLDC GLUCOMTR-MCNC: 216 MG/DL — HIGH (ref 70–99)
GLUCOSE SERPL-MCNC: 214 MG/DL — HIGH (ref 70–99)
HCT VFR BLD CALC: 31.7 % — LOW (ref 34.5–45)
HGB BLD-MCNC: 10.1 G/DL — LOW (ref 11.5–15.5)
IMM GRANULOCYTES NFR BLD AUTO: 0.3 % — SIGNIFICANT CHANGE UP (ref 0–1.5)
LYMPHOCYTES # BLD AUTO: 1.37 K/UL — SIGNIFICANT CHANGE UP (ref 1–3.3)
LYMPHOCYTES # BLD AUTO: 19.1 % — SIGNIFICANT CHANGE UP (ref 13–44)
MCHC RBC-ENTMCNC: 28.5 PG — SIGNIFICANT CHANGE UP (ref 27–34)
MCHC RBC-ENTMCNC: 31.9 GM/DL — LOW (ref 32–36)
MCV RBC AUTO: 89.5 FL — SIGNIFICANT CHANGE UP (ref 80–100)
MONOCYTES # BLD AUTO: 0.61 K/UL — SIGNIFICANT CHANGE UP (ref 0–0.9)
MONOCYTES NFR BLD AUTO: 8.5 % — SIGNIFICANT CHANGE UP (ref 2–14)
NEUTROPHILS # BLD AUTO: 4.94 K/UL — SIGNIFICANT CHANGE UP (ref 1.8–7.4)
NEUTROPHILS NFR BLD AUTO: 69 % — SIGNIFICANT CHANGE UP (ref 43–77)
PLATELET # BLD AUTO: 248 K/UL — SIGNIFICANT CHANGE UP (ref 150–400)
POTASSIUM SERPL-MCNC: 4.8 MMOL/L — SIGNIFICANT CHANGE UP (ref 3.5–5.3)
POTASSIUM SERPL-SCNC: 4.8 MMOL/L — SIGNIFICANT CHANGE UP (ref 3.5–5.3)
RBC # BLD: 3.54 M/UL — LOW (ref 3.8–5.2)
RBC # FLD: 17.1 % — HIGH (ref 10.3–14.5)
SODIUM SERPL-SCNC: 138 MMOL/L — SIGNIFICANT CHANGE UP (ref 135–145)
WBC # BLD: 7.16 K/UL — SIGNIFICANT CHANGE UP (ref 3.8–10.5)
WBC # FLD AUTO: 7.16 K/UL — SIGNIFICANT CHANGE UP (ref 3.8–10.5)

## 2019-12-18 RX ORDER — NYSTATIN CREAM 100000 [USP'U]/G
1 CREAM TOPICAL
Refills: 0 | Status: DISCONTINUED | OUTPATIENT
Start: 2019-12-18 | End: 2019-12-23

## 2019-12-18 RX ADMIN — Medication 1 TABLET(S): at 14:55

## 2019-12-18 RX ADMIN — ERTAPENEM SODIUM 120 MILLIGRAM(S): 1 INJECTION, POWDER, LYOPHILIZED, FOR SOLUTION INTRAMUSCULAR; INTRAVENOUS at 04:27

## 2019-12-18 RX ADMIN — Medication 650 MILLIGRAM(S): at 22:00

## 2019-12-18 RX ADMIN — GABAPENTIN 100 MILLIGRAM(S): 400 CAPSULE ORAL at 17:17

## 2019-12-18 RX ADMIN — Medication 1300 MILLIGRAM(S): at 21:14

## 2019-12-18 RX ADMIN — CLOPIDOGREL BISULFATE 75 MILLIGRAM(S): 75 TABLET, FILM COATED ORAL at 12:40

## 2019-12-18 RX ADMIN — Medication 1: at 17:18

## 2019-12-18 RX ADMIN — Medication 12.5 MILLIGRAM(S): at 17:18

## 2019-12-18 RX ADMIN — Medication 8 UNIT(S): at 08:01

## 2019-12-18 RX ADMIN — HEPARIN SODIUM 5000 UNIT(S): 5000 INJECTION INTRAVENOUS; SUBCUTANEOUS at 21:14

## 2019-12-18 RX ADMIN — HEPARIN SODIUM 5000 UNIT(S): 5000 INJECTION INTRAVENOUS; SUBCUTANEOUS at 14:54

## 2019-12-18 RX ADMIN — Medication 12.5 MILLIGRAM(S): at 05:26

## 2019-12-18 RX ADMIN — INSULIN GLARGINE 15 UNIT(S): 100 INJECTION, SOLUTION SUBCUTANEOUS at 22:16

## 2019-12-18 RX ADMIN — FAMOTIDINE 20 MILLIGRAM(S): 10 INJECTION INTRAVENOUS at 12:40

## 2019-12-18 RX ADMIN — Medication 0: at 22:17

## 2019-12-18 RX ADMIN — ATORVASTATIN CALCIUM 40 MILLIGRAM(S): 80 TABLET, FILM COATED ORAL at 21:14

## 2019-12-18 RX ADMIN — HEPARIN SODIUM 5000 UNIT(S): 5000 INJECTION INTRAVENOUS; SUBCUTANEOUS at 05:27

## 2019-12-18 RX ADMIN — Medication 1 TABLET(S): at 05:26

## 2019-12-18 RX ADMIN — GABAPENTIN 100 MILLIGRAM(S): 400 CAPSULE ORAL at 05:27

## 2019-12-18 RX ADMIN — Medication 1300 MILLIGRAM(S): at 14:54

## 2019-12-18 RX ADMIN — NYSTATIN CREAM 1 APPLICATION(S): 100000 CREAM TOPICAL at 21:16

## 2019-12-18 RX ADMIN — Medication 8 UNIT(S): at 12:40

## 2019-12-18 RX ADMIN — Medication 25 MICROGRAM(S): at 05:27

## 2019-12-18 RX ADMIN — Medication 1300 MILLIGRAM(S): at 05:27

## 2019-12-18 RX ADMIN — Medication 8 UNIT(S): at 17:18

## 2019-12-18 RX ADMIN — Medication 1 TABLET(S): at 21:14

## 2019-12-18 RX ADMIN — Medication 2: at 12:40

## 2019-12-18 RX ADMIN — Medication 650 MILLIGRAM(S): at 21:14

## 2019-12-18 RX ADMIN — Medication 1: at 08:06

## 2019-12-18 RX ADMIN — Medication 81 MILLIGRAM(S): at 12:40

## 2019-12-18 NOTE — CHART NOTE - NSCHARTNOTEFT_GEN_A_CORE
follow up- discharge home with home care in process as pt is refusing rehab; require picc line for abx course- pt refusing @ this time; discussed midline and per d/w ID/ MD will do midline for now as pt is refusing picc line; midline can stay for 29 days if there is no problem.  d/w pt/daughter and in agreement;  pt requires weekly blood work while on antibiotic to be done by either home care/MD ; d/w team.  Alexandria Lozada(NP)  [facility], [phone]

## 2019-12-18 NOTE — PROGRESS NOTE ADULT - ASSESSMENT
87 yo female with a hx of of CHF, DM, HLD, HTN, and an STEMI who presents to the ED for a several day history of buttocks pain. Patient states that the pain started about a week ago, but has suddenly gotten worse over the past few days. She describes it as a sharp pain and has tried taking OTC acetaminophen, which has not helped the pain at all. Patient endorses one episode of nausea earlier this morning, but denies any vomiting. She has not noticed a rash in that area, but does endorse that the skin is difficult to visualize.  Patient has a wound care nurse that comes to the house a few times per week. She came yesterday for wound care dressing changes.    Of note, patient was hospitalized at [facility] from September 4 to October 4 at [facility] for an ulcer in between her buttocks. During that hospitalization, patient required debridement of the wound as well as IV antibiotics.     In the ED, her VS /75, HR 77, RR 18 with SpO2 of 99% and T of 99.2. Patient received a 500 cc bolus of fluids as well as Doxycycline 500 mg x1. (04 Dec 2019 17:13)  WBC 13.5.  ESR 71, CRP 10.6.  UA (-) nit/mod LE, wbc 10.  Bcx (+) GPC pairs/chains from 2 sets.  CTAP without contrast shows L medial gluteal skin cellulitis and air-filled  tract.  No drainable fluid collection.  Pt on vanco/aztreonam/flagyl.     ID consult called for further abx managment.       Sepsis:    - Pt with fever, leukocytosis and bacteremia.  Source likely from gluteal cellulitis.  CTap shows L gluteal skin cellulitis with air-filled tract, without drainable fluid collection.   Agree with broad spectrum abx for now until all culture data finalized.      - MRI pelvis ordered to evaluate for perianal fistula- pt unable to tolerate exam.  Cannot r/o underlying OM, there is fluid collection near sacrum/coccyx.  Recommend bone scan vs. repeat MRI for further evaluation.      - Blood culture (+) GPC pairs/chains - identified as strep anginosus.      - TTE no vegetations reported, unable to r/o endocarditis.  Pt may need further ALONDRA to evaluate for endocarditits, although will  not change abx duration from ID standpoint.     - Urine cultures growing enterococcus faecalis.  Based on sensitivities of blood and urine cultures, can switch change from daptomycin to vancomycin.   Will d/w family regarding pt's prior penicillin allergy.  (pt had 'swelling' at the age of 10 or 15 towards pcn.  Does not recall taking other beta lactam abx in the past.)  d/w Pharmacy , will plan for ertapenem test dose on 12/13.  Pt tolerating this AM.  d/c azactam and flagyl.      - Can d/c vancomycin, has completed > 7 day course for enterococcus UTI.  Erta will cover strep anginosus/polymicrobial infectious source and enable ease of dosing while pt on long term therapy.    - repeat MRI pelvis with IV contrast demonstrates abscess collection overlying sacrum and coccyx.  High suspicion of OM.  Plan for eventual picc line once blood cultures cleared at least 48-72 hrs.  No further intervention as per surgery.       Plan for 6 week IV abx course.  Pt will require picc line - this was discussed with pt, and she is in agreement previously.      (12/17) Pt still hesitating to sign consent. I Discussed PICC line, and need for 6 week course of IV abx for her infection.  Pt requesting oral antibiotics, I explained to pt oral antibiotics are not an option due to involvement of bone and presence of abscess.  Pt expressed understanding and nodded her head, saying "ok, ok".  Continues to refuse PICC.  Also called pt's daughter Nancy, over the phone who said she would call pt over the phone and speak with her.  Pt's grandson is also coming to visit her this evening and will talk to her.        (12/18) Pt cont to refuse to sign consent for PICC line.  D/w team regarding placing midline instead.   Can be placed up to 29 days.  Pt has already received 2 week of abx inpatient.  Plan for 4 more weeks using midline.           Will follow,    Sandrita Zaman  [phone]

## 2019-12-18 NOTE — PROGRESS NOTE ADULT - PROBLEM SELECTOR PLAN 1
Arlin resolved S/P infante catheter  F/U Urology recs  hyperkalemia and hyponatremia resolved  monitor urine output  trend bmp

## 2019-12-18 NOTE — PROGRESS NOTE ADULT - SUBJECTIVE AND OBJECTIVE BOX
[facility] - WALLACE Lagos / WALLACE Gibson / ANN Barrett/ WALLACE Chin/ WALLACE Kumari/ ESPINOZA Bone / GABRIEL Geronimo / ALEXUS Kaur  ---------------------------------------------------------------------------------------------------------------  seen and examined today for Arlin and hyponatremia  Interval : serum creatinine and sodium WNL  VITALS:  T(F): 97.8 (12-18-19 @ 07:58), Max: 99.4 (12-17-19 @ 13:53)  HR: 60 (12-18-19 @ 07:58)  BP: 135/74 (12-18-19 @ 07:58)  RR: 16 (12-18-19 @ 07:58)  SpO2: 99% (12-18-19 @ 07:58)  Wt(kg): --    12-17 @ 07:01  -  12-18 @ 07:00  --------------------------------------------------------  IN: 970 mL / OUT: 750 mL / NET: 220 mL      Physical Exam :-  Constitutional: NAD  Neck: Supple.  Respiratory: Bilateral equal breath sounds,  Cardiovascular: S1, S2 normal,  Gastrointestinal: Bowel Sounds present, soft, non tender.  Extremities: 1+ edema  Neurological: Alert and Oriented x 3, no focal deficits  Psychiatric: Normal mood, normal affect  Data:-  Allergies :   codeine (Unknown)  Kiwi (Anaphylaxis)  penicillins (Anaphylaxis)    Hospital Medications:   MEDICATIONS  (STANDING):  aspirin enteric coated 81 milliGRAM(s) Oral daily  atorvastatin 40 milliGRAM(s) Oral at bedtime  clopidogrel Tablet 75 milliGRAM(s) Oral daily  dextrose 5%. 1000 milliLiter(s) (50 mL/Hr) IV Continuous <Continuous>  dextrose 50% Injectable 12.5 Gram(s) IV Push once  dextrose 50% Injectable 25 Gram(s) IV Push once  dextrose 50% Injectable 25 Gram(s) IV Push once  ertapenem  IVPB 1000 milliGRAM(s) IV Intermittent every 24 hours  famotidine    Tablet 20 milliGRAM(s) Oral daily  gabapentin 100 milliGRAM(s) Oral two times a day  heparin  Injectable 5000 Unit(s) SubCutaneous every 8 hours  influenza   Vaccine 0.5 milliLiter(s) IntraMuscular once  insulin glargine Injectable (LANTUS) 15 Unit(s) SubCutaneous at bedtime  insulin lispro (HumaLOG) corrective regimen sliding scale   SubCutaneous three times a day before meals  insulin lispro (HumaLOG) corrective regimen sliding scale   SubCutaneous at bedtime  insulin lispro Injectable (HumaLOG) 8 Unit(s) SubCutaneous three times a day before meals  lactobacillus acidophilus 1 Tablet(s) Oral three times a day  levothyroxine 25 MICROGram(s) Oral daily  metoprolol tartrate 12.5 milliGRAM(s) Oral two times a day  sodium bicarbonate 1300 milliGRAM(s) Oral three times a day    12-18    138  |  101  |  26<H>  ----------------------------<  214<H>  4.8   |  26  |  0.92    Ca    9.0      18 Dec 2019 07:03      Creatinine Trend: 0.92 <--, 0.95 <--, 1.08 <--, 1.09 <--, 1.23 <--                        10.1   7.16  )-----------( 248      ( 18 Dec 2019 08:31 )             31.7

## 2019-12-18 NOTE — CHART NOTE - NSCHARTNOTEFT_GEN_A_CORE
Nutrition Follow Up Note  Patient seen for: Malnutrtion    Source: chart reviewed, events noted  87 yo female with a hx of of CHF, DM, HLD, HTN, and an STEMI who presents to the ED for a several day history of buttocks pain found to have ROB in setting of sepsis/rob/hypotension         Diet : Consistent carbohydrate DASH, no concentrated Potassium    Patient reports: improved appetite, no GI complaints, "forcing herself to eat more"     PO intake : 50% of meals plus nepro 1-2 daily. Patient encouraged daily to increase intake     Source for PO intake: observed, staff, chart  patient feeds self           Daily Weight in k.5 (12-18), Weight in k.7 (12-18), Weight in k.5 (12-17), Weight in k.2 (12-16), Weight in k (12-15), Weight in k.6 (12-14), Weight in k.4 (12-13)  % Weight Change    Pertinent Medications: MEDICATIONS  (STANDING):  aspirin enteric coated 81 milliGRAM(s) Oral daily  atorvastatin 40 milliGRAM(s) Oral at bedtime  clopidogrel Tablet 75 milliGRAM(s) Oral daily  dextrose 5%. 1000 milliLiter(s) (50 mL/Hr) IV Continuous <Continuous>  dextrose 50% Injectable 12.5 Gram(s) IV Push once  dextrose 50% Injectable 25 Gram(s) IV Push once  dextrose 50% Injectable 25 Gram(s) IV Push once  ertapenem  IVPB 1000 milliGRAM(s) IV Intermittent every 24 hours  famotidine    Tablet 20 milliGRAM(s) Oral daily  gabapentin 100 milliGRAM(s) Oral two times a day  heparin  Injectable 5000 Unit(s) SubCutaneous every 8 hours  influenza   Vaccine 0.5 milliLiter(s) IntraMuscular once  insulin glargine Injectable (LANTUS) 15 Unit(s) SubCutaneous at bedtime  insulin lispro (HumaLOG) corrective regimen sliding scale   SubCutaneous three times a day before meals  insulin lispro (HumaLOG) corrective regimen sliding scale   SubCutaneous at bedtime  insulin lispro Injectable (HumaLOG) 8 Unit(s) SubCutaneous three times a day before meals  lactobacillus acidophilus 1 Tablet(s) Oral three times a day  levothyroxine 25 MICROGram(s) Oral daily  metoprolol tartrate 12.5 milliGRAM(s) Oral two times a day  nystatin Cream 1 Application(s) Topical two times a day  sodium bicarbonate 1300 milliGRAM(s) Oral three times a day    MEDICATIONS  (PRN):  acetaminophen   Tablet .. 1000 milliGRAM(s) Oral every 6 hours PRN Mild Pain (1 - 3)  acetaminophen   Tablet .. 650 milliGRAM(s) Oral every 6 hours PRN Temp greater or equal to 38C (100.4F)  dextrose 40% Gel 15 Gram(s) Oral once PRN Blood Glucose LESS THAN 70 milliGRAM(s)/deciliter  diphenhydrAMINE   Injectable 50 milliGRAM(s) IntraMuscular every 4 hours PRN Rash and/or Itching  EPINEPHrine     1 mG/mL Injectable 0.3 milliGRAM(s) IntraMuscular once PRN severe allergic reaction  glucagon  Injectable 1 milliGRAM(s) IntraMuscular once PRN Glucose LESS THAN 70 milligrams/deciliter  ondansetron Injectable 4 milliGRAM(s) IV Push every 6 hours PRN Nausea and/or Vomiting    Pertinent Labs:  @ 07:03: Na 138, BUN 26<H>, Cr 0.92, <H>, K+ 4.8, Phos --, Mg --, Alk Phos --, ALT/SGPT --, AST/SGOT --, HbA1c --    Finger Sticks:  POCT Blood Glucose.: 216 mg/dL ( @ 12:22)  POCT Blood Glucose.: 198 mg/dL ( @ 07:58)  POCT Blood Glucose.: 212 mg/dL ( @ 21:52)  POCT Blood Glucose.: 199 mg/dL ( @ 17:09)      Skin per nursing documentation:  right lower buttocks; Wound Type: abscess,  no pressure breakdown  Edema: 2+ left leg, right leg    Estimated Needs:   [X ] no change since previous assessment  [ ] recalculated:     Previous Nutrition Diagnosis: Moderate malnutrition  Nutrition Diagnosis is: ongoing, improving        Recommend  1)  continue nepro 2x daily  2) Monitor/encourage PO intake. as needed    Monitoring and Evaluation:     Continue to monitor Nutritional intake, Tolerance to diet prescription, weights, labs, skin integrity    RD remains available upon request and will follow up per protocol

## 2019-12-18 NOTE — PROGRESS NOTE ADULT - PROBLEM SELECTOR PLAN 1
positive blood Cx  ID eval appreciated  ABx as per ID, adjusted   Surg F/U appreciated  MRI noted, patient refused contrast, limited study  OM can not be ruled out  repeat MRI noted, ? osteo   Abx duration to be discussed  repeat blood Cx till negative, will plan for PICC Line placement for long term ABx  patient is refusing PICC line, discussed with daughter over the phone  Midline placement for IV ABx

## 2019-12-18 NOTE — CHART NOTE - NSCHARTNOTEFT_GEN_A_CORE
follow up- called vascular team to speak with pt family as pt family leaning towards Right BKA option which was discussed earlier by vascular team; follow up by vascular; d/w attending md/team.  Alexandria Lozada(NP)  [address], [phone]

## 2019-12-18 NOTE — PROGRESS NOTE ADULT - SUBJECTIVE AND OBJECTIVE BOX
[facility]    Subjective: Patient seen and examined. No new events except as noted.   doing better   plan for midline placement     REVIEW OF SYSTEMS:    CONSTITUTIONAL: No weakness, fevers or chills  EYES/ENT: No visual changes;  No vertigo or throat pain   NECK: No pain or stiffness  RESPIRATORY: No cough, wheezing, hemoptysis; No shortness of breath  CARDIOVASCULAR: No chest pain or palpitations  GASTROINTESTINAL: No abdominal or epigastric pain.   GENITOURINARY: No dysuria, frequency or hematuria  NEUROLOGICAL: No numbness or weakness  SKIN: No itching, burning, rashes, or lesions   All other review of systems is negative unless indicated above.    MEDICATIONS:  MEDICATIONS  (STANDING):  aspirin enteric coated 81 milliGRAM(s) Oral daily  atorvastatin 40 milliGRAM(s) Oral at bedtime  clopidogrel Tablet 75 milliGRAM(s) Oral daily  dextrose 5%. 1000 milliLiter(s) (50 mL/Hr) IV Continuous <Continuous>  dextrose 50% Injectable 12.5 Gram(s) IV Push once  dextrose 50% Injectable 25 Gram(s) IV Push once  dextrose 50% Injectable 25 Gram(s) IV Push once  ertapenem  IVPB 1000 milliGRAM(s) IV Intermittent every 24 hours  famotidine    Tablet 20 milliGRAM(s) Oral daily  gabapentin 100 milliGRAM(s) Oral two times a day  heparin  Injectable 5000 Unit(s) SubCutaneous every 8 hours  influenza   Vaccine 0.5 milliLiter(s) IntraMuscular once  insulin glargine Injectable (LANTUS) 15 Unit(s) SubCutaneous at bedtime  insulin lispro (HumaLOG) corrective regimen sliding scale   SubCutaneous three times a day before meals  insulin lispro (HumaLOG) corrective regimen sliding scale   SubCutaneous at bedtime  insulin lispro Injectable (HumaLOG) 8 Unit(s) SubCutaneous three times a day before meals  lactobacillus acidophilus 1 Tablet(s) Oral three times a day  levothyroxine 25 MICROGram(s) Oral daily  metoprolol tartrate 12.5 milliGRAM(s) Oral two times a day  nystatin Cream 1 Application(s) Topical two times a day  sodium bicarbonate 1300 milliGRAM(s) Oral three times a day      PHYSICAL EXAM:  T(C): 36.9 (12-18-19 @ 12:48), Max: 36.9 (12-17-19 @ 19:45)  HR: 72 (12-18-19 @ 17:16) (60 - 72)  BP: 138/83 (12-18-19 @ 17:16) (118/70 - 138/83)  RR: 17 (12-18-19 @ 12:48) (16 - 18)  SpO2: 99% (12-18-19 @ 17:16) (96% - 99%)  Wt(kg): --  I&O's Summary    17 Dec 2019 07:01  -  18 Dec 2019 07:00  --------------------------------------------------------  IN: 970 mL / OUT: 750 mL / NET: 220 mL    18 Dec 2019 07:01  -  18 Dec 2019 17:39  --------------------------------------------------------  IN: 480 mL / OUT: 300 mL / NET: 180 mL          Appearance: Normal	  HEENT:   Normal oral mucosa, PERRL, EOMI	  Lymphatic: No lymphadenopathy , no edema  Cardiovascular: Normal S1 S2  Respiratory: Lungs clear to auscultation, normal effort 	  Gastrointestinal:  Soft, Non-tender, + BS	  Skin: dressing buttock   Musculoskeletal: Normal range of motion, normal strength  Psychiatry:  Mood & affect appropriate  Ext: No edema      All labs, Imaging and EKGs personally reviewed                           10.1   7.16  )-----------( 248      ( 18 Dec 2019 08:31 )             31.7               12-18    138  |  101  |  26<H>  ----------------------------<  214<H>  4.8   |  26  |  0.92    Ca    9.0      18 Dec 2019 07:03

## 2019-12-18 NOTE — PROGRESS NOTE ADULT - SUBJECTIVE AND OBJECTIVE BOX
Infectious Diseases progress note:    Subjective: Events noted.  No acute o/n events.  Afebrile.  Pt resting in recliner.  Pt refusing to sign consent for picc line.  Plan for midline instead.      ROS:  CONSTITUTIONAL:  No fever, chills, rigors  CARDIOVASCULAR:  No chest pain or palpitations  RESPIRATORY:   No SOB, cough, dyspnea on exertion.  No wheezing  GASTROINTESTINAL:  No abd pain, N/V, diarrhea/constipation  EXTREMITIES:  No swelling or joint pain  GENITOURINARY:  No burning on urination, increased frequency or urgency.  No flank pain  NEUROLOGIC:  No HA, visual disturbances  SKIN: No rashes    Allergies    codeine (Unknown)  Kiwi (Anaphylaxis)  penicillins (Anaphylaxis)    Intolerances        ANTIBIOTICS/RELEVANT:  antimicrobials  ertapenem  IVPB 1000 milliGRAM(s) IV Intermittent every 24 hours    immunologic:  influenza   Vaccine 0.5 milliLiter(s) IntraMuscular once    OTHER:  acetaminophen   Tablet .. 1000 milliGRAM(s) Oral every 6 hours PRN  acetaminophen   Tablet .. 650 milliGRAM(s) Oral every 6 hours PRN  aspirin enteric coated 81 milliGRAM(s) Oral daily  atorvastatin 40 milliGRAM(s) Oral at bedtime  clopidogrel Tablet 75 milliGRAM(s) Oral daily  dextrose 40% Gel 15 Gram(s) Oral once PRN  dextrose 5%. 1000 milliLiter(s) IV Continuous <Continuous>  dextrose 50% Injectable 12.5 Gram(s) IV Push once  dextrose 50% Injectable 25 Gram(s) IV Push once  dextrose 50% Injectable 25 Gram(s) IV Push once  diphenhydrAMINE   Injectable 50 milliGRAM(s) IntraMuscular every 4 hours PRN  EPINEPHrine     1 mG/mL Injectable 0.3 milliGRAM(s) IntraMuscular once PRN  famotidine    Tablet 20 milliGRAM(s) Oral daily  gabapentin 100 milliGRAM(s) Oral two times a day  glucagon  Injectable 1 milliGRAM(s) IntraMuscular once PRN  heparin  Injectable 5000 Unit(s) SubCutaneous every 8 hours  insulin glargine Injectable (LANTUS) 15 Unit(s) SubCutaneous at bedtime  insulin lispro (HumaLOG) corrective regimen sliding scale   SubCutaneous three times a day before meals  insulin lispro (HumaLOG) corrective regimen sliding scale   SubCutaneous at bedtime  insulin lispro Injectable (HumaLOG) 8 Unit(s) SubCutaneous three times a day before meals  lactobacillus acidophilus 1 Tablet(s) Oral three times a day  levothyroxine 25 MICROGram(s) Oral daily  metoprolol tartrate 12.5 milliGRAM(s) Oral two times a day  nystatin Cream 1 Application(s) Topical two times a day  ondansetron Injectable 4 milliGRAM(s) IV Push every 6 hours PRN  sodium bicarbonate 1300 milliGRAM(s) Oral three times a day      Objective:  Vital Signs Last 24 Hrs  T(C): 36.9 (18 Dec 2019 12:48), Max: 36.9 (17 Dec 2019 19:45)  T(F): 98.5 (18 Dec 2019 12:48), Max: 98.5 (18 Dec 2019 12:48)  HR: 72 (18 Dec 2019 17:16) (60 - 72)  BP: 138/83 (18 Dec 2019 17:16) (118/70 - 138/83)  BP(mean): --  RR: 17 (18 Dec 2019 12:48) (16 - 18)  SpO2: 99% (18 Dec 2019 17:16) (96% - 99%)    PHYSICAL EXAM:  Constitutional:NAD  Eyes:NIDIA, EOMI  Ear/Nose/Throat: no thrush, mucositis.  Moist mucous membranes	  Neck:no JVD, no lymphadenopathy, supple  Respiratory: CTA lucian  Cardiovascular: S1S2 RRR, no murmurs  Gastrointestinal:soft, nontender,  nondistended (+) BS  Extremities:no e/e/c  Skin:  no rashes        LABS:                        10.1   7.16  )-----------( 248      ( 18 Dec 2019 08:31 )             31.7     12-18    138  |  101  |  26<H>  ----------------------------<  214<H>  4.8   |  26  |  0.92    Ca    9.0      18 Dec 2019 07:03            MICROBIOLOGY:    Culture - Blood in AM (12.10.19 @ 13:06)    Specimen Source: .Blood Blood-Venous    Culture Results:   No growth at 5 days.    Culture - Blood in AM (12.10.19 @ 13:06)    Specimen Source: .Blood Blood-Peripheral    Culture Results:   No growth at 5 days.    Culture - Blood in AM (12.06.19 @ 06:02)    Gram Stain:   Growth in aerobic bottle: Gram positive cocci in pairs  Growth in anaerobic bottle: Gram Positive Cocci in Pairs and Chains    Specimen Source: .Blood Blood    Culture Results:   Growth in aerobic and anaerobic bottles: Streptococcus anginosus  See previous culture [id number]    Culture - Blood in AM (12.06.19 @ 06:02)    Specimen Source: .Blood Blood    Culture Results:   No growth at 5 days.          RADIOLOGY & ADDITIONAL STUDIES:    < from: MR Pelvis w/wo IV Cont (12.08.19 @ 22:40) >    FINDINGS:    Limited evaluation due to motion artifact.    Fluid collection measuring 2.8 x 1.7 cm overlying the inferior sacrum and   coccyx (6, 26 and 11, 31) demonstrating peripheral enhancement suggestive   of a abscess. Evaluation for underlying osteomyelitis of the coccyx is   limited however questioned.     UTERUS: Multiple intramural fibroids, largest intramural fundal fibroid   measuring 1.4 cm.    ADNEXA: Within normal limits.    BLADDER: Within normal limits.    LYMPH NODES: No pelvic lymphadenopathy.    VISUALIZED PORTIONS:    ABDOMINAL ORGANS: Within normal limits.  BOWEL: No definite evidence of a perianal fistula.  PERITONEUM: No ascites.  VESSELS: Within normal limits.  ABDOMINAL WALL: Within normal limits.    IMPRESSION:     Limited examination due to motion artifact.    Abscess overlying the inferior sacrum and coccyx. Evaluation for   underlying osteomyelitis of the coccyx is limited however questioned.     No definite evidence of a perianal fistula.    < end of copied text >

## 2019-12-19 DIAGNOSIS — F05 DELIRIUM DUE TO KNOWN PHYSIOLOGICAL CONDITION: ICD-10-CM

## 2019-12-19 LAB
GLUCOSE BLDC GLUCOMTR-MCNC: 120 MG/DL — HIGH (ref 70–99)
GLUCOSE BLDC GLUCOMTR-MCNC: 137 MG/DL — HIGH (ref 70–99)
GLUCOSE BLDC GLUCOMTR-MCNC: 144 MG/DL — HIGH (ref 70–99)
GLUCOSE BLDC GLUCOMTR-MCNC: 170 MG/DL — HIGH (ref 70–99)

## 2019-12-19 PROCEDURE — 99222 1ST HOSP IP/OBS MODERATE 55: CPT

## 2019-12-19 RX ORDER — LANOLIN ALCOHOL/MO/W.PET/CERES
3 CREAM (GRAM) TOPICAL AT BEDTIME
Refills: 0 | Status: DISCONTINUED | OUTPATIENT
Start: 2019-12-19 | End: 2019-12-23

## 2019-12-19 RX ADMIN — NYSTATIN CREAM 1 APPLICATION(S): 100000 CREAM TOPICAL at 21:25

## 2019-12-19 RX ADMIN — Medication 1000 MILLIGRAM(S): at 14:27

## 2019-12-19 RX ADMIN — Medication 1000 MILLIGRAM(S): at 14:55

## 2019-12-19 RX ADMIN — Medication 12.5 MILLIGRAM(S): at 05:51

## 2019-12-19 RX ADMIN — HEPARIN SODIUM 5000 UNIT(S): 5000 INJECTION INTRAVENOUS; SUBCUTANEOUS at 14:23

## 2019-12-19 RX ADMIN — Medication 1300 MILLIGRAM(S): at 05:52

## 2019-12-19 RX ADMIN — Medication 1300 MILLIGRAM(S): at 14:22

## 2019-12-19 RX ADMIN — HEPARIN SODIUM 5000 UNIT(S): 5000 INJECTION INTRAVENOUS; SUBCUTANEOUS at 21:25

## 2019-12-19 RX ADMIN — FAMOTIDINE 20 MILLIGRAM(S): 10 INJECTION INTRAVENOUS at 12:50

## 2019-12-19 RX ADMIN — ATORVASTATIN CALCIUM 40 MILLIGRAM(S): 80 TABLET, FILM COATED ORAL at 21:25

## 2019-12-19 RX ADMIN — Medication 81 MILLIGRAM(S): at 12:50

## 2019-12-19 RX ADMIN — HEPARIN SODIUM 5000 UNIT(S): 5000 INJECTION INTRAVENOUS; SUBCUTANEOUS at 05:52

## 2019-12-19 RX ADMIN — ERTAPENEM SODIUM 120 MILLIGRAM(S): 1 INJECTION, POWDER, LYOPHILIZED, FOR SOLUTION INTRAMUSCULAR; INTRAVENOUS at 05:52

## 2019-12-19 RX ADMIN — GABAPENTIN 100 MILLIGRAM(S): 400 CAPSULE ORAL at 05:51

## 2019-12-19 RX ADMIN — Medication 25 MICROGRAM(S): at 05:51

## 2019-12-19 RX ADMIN — INSULIN GLARGINE 15 UNIT(S): 100 INJECTION, SOLUTION SUBCUTANEOUS at 22:29

## 2019-12-19 RX ADMIN — Medication 8 UNIT(S): at 17:59

## 2019-12-19 RX ADMIN — Medication 1 TABLET(S): at 05:51

## 2019-12-19 RX ADMIN — Medication 12.5 MILLIGRAM(S): at 18:00

## 2019-12-19 RX ADMIN — Medication 1000 MILLIGRAM(S): at 22:59

## 2019-12-19 RX ADMIN — Medication 1000 MILLIGRAM(S): at 09:00

## 2019-12-19 RX ADMIN — Medication 1300 MILLIGRAM(S): at 21:26

## 2019-12-19 RX ADMIN — Medication 8 UNIT(S): at 12:47

## 2019-12-19 RX ADMIN — Medication 8 UNIT(S): at 08:52

## 2019-12-19 RX ADMIN — CLOPIDOGREL BISULFATE 75 MILLIGRAM(S): 75 TABLET, FILM COATED ORAL at 12:50

## 2019-12-19 RX ADMIN — Medication 1000 MILLIGRAM(S): at 08:19

## 2019-12-19 RX ADMIN — Medication 1000 MILLIGRAM(S): at 22:29

## 2019-12-19 RX ADMIN — Medication 1 TABLET(S): at 14:22

## 2019-12-19 RX ADMIN — GABAPENTIN 100 MILLIGRAM(S): 400 CAPSULE ORAL at 18:00

## 2019-12-19 RX ADMIN — NYSTATIN CREAM 1 APPLICATION(S): 100000 CREAM TOPICAL at 10:45

## 2019-12-19 RX ADMIN — Medication 1 TABLET(S): at 21:25

## 2019-12-19 RX ADMIN — Medication 1: at 12:47

## 2019-12-19 NOTE — PROGRESS NOTE ADULT - ASSESSMENT
87 yo female with a hx of of CHF, DM, HLD, HTN, and an STEMI who presents to the ED for a several day history of buttocks pain. Patient states that the pain started about a week ago, but has suddenly gotten worse over the past few days. She describes it as a sharp pain and has tried taking OTC acetaminophen, which has not helped the pain at all. Patient endorses one episode of nausea earlier this morning, but denies any vomiting. She has not noticed a rash in that area, but does endorse that the skin is difficult to visualize.  Patient has a wound care nurse that comes to the house a few times per week. She came yesterday for wound care dressing changes.    Of note, patient was hospitalized at [facility] from September 4 to October 4 at [facility] for an ulcer in between her buttocks. During that hospitalization, patient required debridement of the wound as well as IV antibiotics.     In the ED, her VS /75, HR 77, RR 18 with SpO2 of 99% and T of 99.2. Patient received a 500 cc bolus of fluids as well as Doxycycline 500 mg x1. (04 Dec 2019 17:13)  WBC 13.5.  ESR 71, CRP 10.6.  UA (-) nit/mod LE, wbc 10.  Bcx (+) GPC pairs/chains from 2 sets.  CTAP without contrast shows L medial gluteal skin cellulitis and air-filled  tract.  No drainable fluid collection.  Pt on vanco/aztreonam/flagyl.     ID consult called for further abx managment.       Sepsis:    - Pt with fever, leukocytosis and bacteremia.  Source likely from gluteal cellulitis.  CTap shows L gluteal skin cellulitis with air-filled tract, without drainable fluid collection.   Agree with broad spectrum abx for now until all culture data finalized.      - MRI pelvis ordered to evaluate for perianal fistula- pt unable to tolerate exam.  Cannot r/o underlying OM, there is fluid collection near sacrum/coccyx.  Recommend bone scan vs. repeat MRI for further evaluation.      - Blood culture (+) GPC pairs/chains - identified as strep anginosus.      - TTE no vegetations reported, unable to r/o endocarditis.  Pt may need further ALONDRA to evaluate for endocarditits, although will  not change abx duration from ID standpoint.     - Urine cultures growing enterococcus faecalis.  Based on sensitivities of blood and urine cultures, can switch change from daptomycin to vancomycin.   Will d/w family regarding pt's prior penicillin allergy.  (pt had 'swelling' at the age of 10 or 15 towards pcn.  Does not recall taking other beta lactam abx in the past.)  d/w Pharmacy , will plan for ertapenem test dose on 12/13.  Pt tolerating this AM.  d/c azactam and flagyl.      - Can d/c vancomycin, has completed > 7 day course for enterococcus UTI.  Erta will cover strep anginosus/polymicrobial infectious source and enable ease of dosing while pt on long term therapy.    - repeat MRI pelvis with IV contrast demonstrates abscess collection overlying sacrum and coccyx.  High suspicion of OM.  Plan for eventual picc line once blood cultures cleared at least 48-72 hrs.  No further intervention as per surgery.       Plan for 6 week IV abx course.  Pt will require picc line - this was discussed with pt, and she is in agreement previously.      (12/17) Pt still hesitating to sign consent. I Discussed PICC line, and need for 6 week course of IV abx for her infection.  Pt requesting oral antibiotics, I explained to pt oral antibiotics are not an option due to involvement of bone and presence of abscess.  Pt expressed understanding and nodded her head, saying "ok, ok".  Continues to refuse PICC.  Also called pt's daughter Nancy, over the phone who said she would call pt over the phone and speak with her.  Pt's grandson is also coming to visit her this evening and will talk to her.        (12/18) Pt cont to refuse to sign consent for PICC line.    (12/19) Pt also refusing midline.  Pt seen by Behavorial health, and deemed not to have capacity to refuse IV access for IV abx.  Awaiting decision from daughter regarding signing consent for PICC line on behalf of pt.            Will follow,    Sandrita Zaman  [phone]

## 2019-12-19 NOTE — BEHAVIORAL HEALTH ASSESSMENT NOTE - NSBHCONSULTFOLLOWAFTERCARE_PSY_A_CORE FT
OP psych f/u at [facility]- [phone]  [facility] clinic- [phone]

## 2019-12-19 NOTE — BEHAVIORAL HEALTH ASSESSMENT NOTE - NSBHCHARTREVIEWVS_PSY_A_CORE FT
T(C): 36.5 (12-19-19 @ 05:15), Max: 37.3 (12-18-19 @ 20:16)  HR: 58 (12-19-19 @ 05:15) (58 - 72)  BP: 132/79 (12-19-19 @ 05:15) (113/78 - 138/83)  RR: 17 (12-19-19 @ 05:15) (17 - 18)  SpO2: 99% (12-19-19 @ 05:15) (98% - 99%)  Wt(kg): --

## 2019-12-19 NOTE — BEHAVIORAL HEALTH ASSESSMENT NOTE - NSBHCONSULTMEDS_PSY_A_CORE FT
1. No standing psych meds for now.    2. PRN: Melatonin 3mg PO qHS PRN insomnia.    3. Check: TSH, B12, folate, RPR    4. Minimize use of benzos, opioids, anticholinergics, or other deliriogenic agents when possible.  Maintain sleep wake cycle.  Provide frequent reorientation and redirection.  Family member at bedside if possible. Assess for need for glasses and hearing aid (if applicable).    5. Pt does not meet criteria for psychiatric hospitalization.  Recommend outpt psych f/u at [facility] after d/c- [phone].   [facility] Psych will follow.    6. Pt does not have capacity to make decisions regarding antibiotics

## 2019-12-19 NOTE — BEHAVIORAL HEALTH ASSESSMENT NOTE - NSBHCHARTREVIEWIMAGING_PSY_A_CORE FT
< from: CT Head No Cont (07.30.16 @ 12:52) >    Comparison: None    FINDINGS:     Head CT:  There is no intraparenchymal hematoma, mass effect or midline shift. No   abnormal extra-axial fluid collections are present. There is no evidence   of acute transcortical territorial infarction.    Mild prominence of the sulci and ventricles are consistent with   age-appropriate volume loss. There are hemispheric white matter areas of   low attenuation which are nonspecific but likely related to sequelae of   microvascular disease.  Prominent area of encephalomalacia and gliosis in   the left frontal lobe, likely a sequela of prior infarction.    Calvarium is intact. The visualized paranasal sinuses and tympanomastoid   cavities are clear.    < end of copied text >

## 2019-12-19 NOTE — PROVIDER CONTACT NOTE (OTHER) - BACKGROUND
Infante cath. was previously placed for urinary retention.  S/P infante removal this am.  Pt voided total 25 cc urine.

## 2019-12-19 NOTE — BEHAVIORAL HEALTH ASSESSMENT NOTE - NSBHCHARTREVIEWLAB_PSY_A_CORE FT
10.1   7.16  )-----------( 248      ( 18 Dec 2019 08:31 )             31.7     12-18    138  |  101  |  26<H>  ----------------------------<  214<H>  4.8   |  26  |  0.92    Ca    9.0      18 Dec 2019 07:03

## 2019-12-19 NOTE — BEHAVIORAL HEALTH ASSESSMENT NOTE - SUICIDE PROTECTIVE FACTORS
Cultural, spiritual and/or moral attitudes against suicide/Supportive social network of family or friends

## 2019-12-19 NOTE — BEHAVIORAL HEALTH ASSESSMENT NOTE - NSBHCHARTREVIEWINVESTIGATE_PSY_A_CORE FT
< from: 12 Lead ECG (12.05.19 @ 21:39) >      Ventricular Rate 96 BPM    Atrial Rate 96 BPM    P-R Interval 212 ms    QRS Duration 158 ms    Q-T Interval 398 ms    QTC Calculation(Bezet) 502 ms    P Axis 81 degrees    R Axis -33 degrees    T Axis 170 degrees    Diagnosis Line SINUS RHYTHM [id number] DEGREE A-V BLOCK  LEFT AXIS DEVIATION  LEFT BUNDLE BRANCH BLOCK  ABNORMAL ECG    Confirmed by MD CORDOBA JONATHAN ([id number]) on 12/6/2019 5:46:26 PM    < end of copied text >

## 2019-12-19 NOTE — PROGRESS NOTE ADULT - SUBJECTIVE AND OBJECTIVE BOX
Infectious Diseases progress note:    Subjective: Events noted.  Pt refusing midline placement.  Seen by Behavioral health to assess capacity.  No acute o/n events, afebrile.      ROS:  CONSTITUTIONAL:  No fever, chills, rigors  CARDIOVASCULAR:  No chest pain or palpitations  RESPIRATORY:   No SOB, cough, dyspnea on exertion.  No wheezing  GASTROINTESTINAL:  No abd pain, N/V, diarrhea/constipation  EXTREMITIES:  No swelling or joint pain  GENITOURINARY:  No burning on urination, increased frequency or urgency.  No flank pain  NEUROLOGIC:  No HA, visual disturbances  SKIN: No rashes    Allergies    codeine (Unknown)  Kiwi (Anaphylaxis)  penicillins (Anaphylaxis)    Intolerances        ANTIBIOTICS/RELEVANT:  antimicrobials  ertapenem  IVPB 1000 milliGRAM(s) IV Intermittent every 24 hours    immunologic:  influenza   Vaccine 0.5 milliLiter(s) IntraMuscular once    OTHER:  acetaminophen   Tablet .. 1000 milliGRAM(s) Oral every 6 hours PRN  acetaminophen   Tablet .. 650 milliGRAM(s) Oral every 6 hours PRN  aspirin enteric coated 81 milliGRAM(s) Oral daily  atorvastatin 40 milliGRAM(s) Oral at bedtime  clopidogrel Tablet 75 milliGRAM(s) Oral daily  dextrose 40% Gel 15 Gram(s) Oral once PRN  dextrose 5%. 1000 milliLiter(s) IV Continuous <Continuous>  dextrose 50% Injectable 12.5 Gram(s) IV Push once  dextrose 50% Injectable 25 Gram(s) IV Push once  dextrose 50% Injectable 25 Gram(s) IV Push once  diphenhydrAMINE   Injectable 50 milliGRAM(s) IntraMuscular every 4 hours PRN  EPINEPHrine     1 mG/mL Injectable 0.3 milliGRAM(s) IntraMuscular once PRN  famotidine    Tablet 20 milliGRAM(s) Oral daily  gabapentin 100 milliGRAM(s) Oral two times a day  glucagon  Injectable 1 milliGRAM(s) IntraMuscular once PRN  heparin  Injectable 5000 Unit(s) SubCutaneous every 8 hours  insulin glargine Injectable (LANTUS) 15 Unit(s) SubCutaneous at bedtime  insulin lispro (HumaLOG) corrective regimen sliding scale   SubCutaneous three times a day before meals  insulin lispro (HumaLOG) corrective regimen sliding scale   SubCutaneous at bedtime  insulin lispro Injectable (HumaLOG) 8 Unit(s) SubCutaneous three times a day before meals  lactobacillus acidophilus 1 Tablet(s) Oral three times a day  levothyroxine 25 MICROGram(s) Oral daily  melatonin 3 milliGRAM(s) Oral at bedtime PRN  metoprolol tartrate 12.5 milliGRAM(s) Oral two times a day  nystatin Cream 1 Application(s) Topical two times a day  ondansetron Injectable 4 milliGRAM(s) IV Push every 6 hours PRN  sodium bicarbonate 1300 milliGRAM(s) Oral three times a day      Objective:  Vital Signs Last 24 Hrs  T(C): 37.3 (19 Dec 2019 14:40), Max: 37.3 (18 Dec 2019 20:16)  T(F): 99.1 (19 Dec 2019 14:40), Max: 99.1 (18 Dec 2019 20:16)  HR: 61 (19 Dec 2019 14:40) (58 - 72)  BP: 127/76 (19 Dec 2019 14:40) (113/78 - 138/83)  BP(mean): --  RR: 18 (19 Dec 2019 14:40) (17 - 18)  SpO2: 98% (19 Dec 2019 14:40) (98% - 99%)    PHYSICAL EXAM:  Constitutional:NAD  Eyes:NIDIA, EOMI  Ear/Nose/Throat: no thrush, mucositis.  Moist mucous membranes	  Neck:no JVD, no lymphadenopathy, supple  Respiratory: CTA lucian  Cardiovascular: S1S2 RRR, no murmurs  Gastrointestinal:soft, nontender,  nondistended (+) BS  Extremities:no e/e/c  Skin:  no rashes        LABS:                        10.1   7.16  )-----------( 248      ( 18 Dec 2019 08:31 )             31.7     12-18    138  |  101  |  26<H>  ----------------------------<  214<H>  4.8   |  26  |  0.92    Ca    9.0      18 Dec 2019 07:03              MICROBIOLOGY:    Culture - Blood in AM (12.10.19 @ 13:06)    Specimen Source: .Blood Blood-Venous    Culture Results:   No growth at 5 days.      Culture - Blood in AM (12.10.19 @ 13:06)    Specimen Source: .Blood Blood-Peripheral    Culture Results:   No growth at 5 days.      Culture - Blood in AM (12.06.19 @ 06:02)    Gram Stain:   Growth in aerobic bottle: Gram positive cocci in pairs  Growth in anaerobic bottle: Gram Positive Cocci in Pairs and Chains    Specimen Source: .Blood Blood    Culture Results:   Growth in aerobic and anaerobic bottles: Streptococcus anginosus  See previous culture [id number]      Culture - Blood in AM (12.06.19 @ 06:02)    Gram Stain:   Growth in aerobic bottle: Gram positive cocci in pairs  Growth in anaerobic bottle: Gram Positive Cocci in Pairs and Chains    Specimen Source: .Blood Blood    Culture Results:   Growth in aerobic and anaerobic bottles: Streptococcus anginosus  See previous culture [id number]          RADIOLOGY & ADDITIONAL STUDIES:    < from: MR Pelvis w/wo IV Cont (12.08.19 @ 22:40) >  FINDINGS:    Limited evaluation due to motion artifact.    Fluid collection measuring 2.8 x 1.7 cm overlying the inferior sacrum and   coccyx (6, 26 and 11, 31) demonstrating peripheral enhancement suggestive   of a abscess. Evaluation for underlying osteomyelitis of the coccyx is   limited however questioned.     UTERUS: Multiple intramural fibroids, largest intramural fundal fibroid   measuring 1.4 cm.    ADNEXA: Within normal limits.    BLADDER: Within normal limits.    LYMPH NODES: No pelvic lymphadenopathy.    VISUALIZED PORTIONS:    ABDOMINAL ORGANS: Within normal limits.  BOWEL: No definite evidence of a perianal fistula.  PERITONEUM: No ascites.  VESSELS: Within normal limits.  ABDOMINAL WALL: Within normal limits.    IMPRESSION:     Limited examination due to motion artifact.    Abscess overlying the inferior sacrum and coccyx. Evaluation for   underlying osteomyelitis of the coccyx is limited however questioned.     No definite evidence of a perianal fistula.    < end of copied text >

## 2019-12-19 NOTE — BEHAVIORAL HEALTH ASSESSMENT NOTE - SUMMARY
Patient is an 86-year-old woman, , domiciled with daughter and her family, homemaker; no PPHx, outpatient treatment, or prior psychiatric hospitalizations; no prior SA or substance abuse issues, PMH significant for CHF, DM, HLD, HTN, and an STEMI admitted for osteomyelitis from gluteal cellulitis. Psychiatry was consulted to assess capacity for making decisions regarding IV abx.  Pt currently AOx2, with impairments of attention c/w delirium, also unable to provide understanding or appreciation of refusing IV abx; therefore, lacks capacity to make decisions regarding abx therapy.

## 2019-12-19 NOTE — PROGRESS NOTE ADULT - PROBLEM SELECTOR PLAN 1
positive blood Cx  ID eval appreciated  ABx as per ID, adjusted   Surg F/U appreciated  MRI noted, patient refused contrast, limited study  OM can not be ruled out  repeat MRI noted, ? osteo   Abx duration to be discussed  repeat blood Cx till negative, will plan for PICC Line placement for long term ABx  patient is refusing PICC line  Psych eval appreciated, no capacity   daughter willing to consent for PICC line with sedation   awaiting for consent  IR notified, may do at the bedside if can give ativan   discussed in detail with daughter over the phone

## 2019-12-19 NOTE — PROGRESS NOTE ADULT - SUBJECTIVE AND OBJECTIVE BOX
[facility]    Subjective: Patient seen and examined. No new events except as noted.   doing well  cont to refuse any line placement      REVIEW OF SYSTEMS:    CONSTITUTIONAL: No weakness, fevers or chills  EYES/ENT: No visual changes;  No vertigo or throat pain   NECK: No pain or stiffness  RESPIRATORY: No cough, wheezing, hemoptysis; No shortness of breath  CARDIOVASCULAR: No chest pain or palpitations  GASTROINTESTINAL: No abdominal or epigastric pain. No nausea, vomiting, or hematemesis; No diarrhea or constipation. No melena or hematochezia.  GENITOURINARY: No dysuria, frequency or hematuria  NEUROLOGICAL: No numbness or weakness  SKIN: No itching, burning, rashes, or lesions   All other review of systems is negative unless indicated above.    MEDICATIONS:  MEDICATIONS  (STANDING):  aspirin enteric coated 81 milliGRAM(s) Oral daily  atorvastatin 40 milliGRAM(s) Oral at bedtime  clopidogrel Tablet 75 milliGRAM(s) Oral daily  dextrose 5%. 1000 milliLiter(s) (50 mL/Hr) IV Continuous <Continuous>  dextrose 50% Injectable 12.5 Gram(s) IV Push once  dextrose 50% Injectable 25 Gram(s) IV Push once  dextrose 50% Injectable 25 Gram(s) IV Push once  ertapenem  IVPB 1000 milliGRAM(s) IV Intermittent every 24 hours  famotidine    Tablet 20 milliGRAM(s) Oral daily  gabapentin 100 milliGRAM(s) Oral two times a day  heparin  Injectable 5000 Unit(s) SubCutaneous every 8 hours  influenza   Vaccine 0.5 milliLiter(s) IntraMuscular once  insulin glargine Injectable (LANTUS) 15 Unit(s) SubCutaneous at bedtime  insulin lispro (HumaLOG) corrective regimen sliding scale   SubCutaneous three times a day before meals  insulin lispro (HumaLOG) corrective regimen sliding scale   SubCutaneous at bedtime  insulin lispro Injectable (HumaLOG) 8 Unit(s) SubCutaneous three times a day before meals  lactobacillus acidophilus 1 Tablet(s) Oral three times a day  levothyroxine 25 MICROGram(s) Oral daily  metoprolol tartrate 12.5 milliGRAM(s) Oral two times a day  nystatin Cream 1 Application(s) Topical two times a day  sodium bicarbonate 1300 milliGRAM(s) Oral three times a day      PHYSICAL EXAM:  T(C): 37.3 (12-19-19 @ 14:40), Max: 37.3 (12-18-19 @ 20:16)  HR: 61 (12-19-19 @ 14:40) (58 - 72)  BP: 127/76 (12-19-19 @ 14:40) (113/78 - 138/83)  RR: 18 (12-19-19 @ 14:40) (17 - 18)  SpO2: 98% (12-19-19 @ 14:40) (98% - 99%)  Wt(kg): --  I&O's Summary    18 Dec 2019 07:01  -  19 Dec 2019 07:00  --------------------------------------------------------  IN: 890 mL / OUT: 1025 mL / NET: -135 mL    19 Dec 2019 07:01  -  19 Dec 2019 16:52  --------------------------------------------------------  IN: 0 mL / OUT: 25 mL / NET: -25 mL          Appearance: Normal	  HEENT:   Normal oral mucosa, PERRL, EOMI	  Lymphatic: No lymphadenopathy , no edema  Cardiovascular: Normal S1 S2, No JVD  Respiratory: Lungs clear to auscultation, normal effort 	  Gastrointestinal:  Soft, Non-tender, + BS	  Skin: No rashes, No ecchymoses, No cyanosis, warm to touch  Musculoskeletal: Normal range of motion, normal strength  Psychiatry:  Mood & affect appropriate  Ext: No edema      All labs, Imaging and EKGs personally reviewed                             10.1   7.16  )-----------( 248      ( 18 Dec 2019 08:31 )             31.7               12-18    138  |  101  |  26<H>  ----------------------------<  214<H>  4.8   |  26  |  0.92    Ca    9.0      18 Dec 2019 07:03

## 2019-12-19 NOTE — BEHAVIORAL HEALTH ASSESSMENT NOTE - RISK ASSESSMENT
Low Acute Suicide Risk Risk factors: delirium, acute medical issues    Protective factors: no current or past SIIP/HIIP, no history of SA/SIB, no history of psych admissions, no active substance abuse, domiciled, social supports.    Pt is not at acutely elevated risk of harm to self/others; does not meet criteria for psychiatric admission.

## 2019-12-19 NOTE — BEHAVIORAL HEALTH ASSESSMENT NOTE - HPI (INCLUDE ILLNESS QUALITY, SEVERITY, DURATION, TIMING, CONTEXT, MODIFYING FACTORS, ASSOCIATED SIGNS AND SYMPTOMS)
Patient is an 86-year-old woman, , domiciled with daughter and her family, homemaker; no PPHx, outpatient treatment, or prior psychiatric hospitalizations; no prior SA or substance abuse issues, PMH significant for CHF, DM, HLD, HTN, and an STEMI admitted for osteomyelitis from gluteal cellulitis. Psychiatry was consulted to assess capacity for making decisions regarding IV abx.    Patient was seen laying down resting in bed, oriented x2 (off on date), with 2/5 attention by serial 7s.  Met with pt and Alexandria NP from primary team to discuss plan of care.  Pt states she does not want the PICC line to be placed. Patient was uncertain of the reason for needing IV abx, and states that her medical situation was not explained to her, although per primary team it had been explained numerous times.  Pt was not capable of demonstrating her understanding her medical condition and treatment being offered, perseverating on oral abx being sufficient despite explanation in simple language that IV abx are needed.  She was unable to demonstrate her appreciation of the consequences of refuing IV abx, could not provide reasoning for her decision apart from explaining she had always taken oral tablets in the past.  Patient denies having a depressed mood, anhedonia or any other sx of depression, denies AH/VH, or delusional thoughts, denies suicidal/homicidal ideations, behaviors, or attempts.  LIves with her daughter, enjoys arts and crocheting.    Collateral: daughter Bonnie contacted with permission ([phone]): thinks mother is “going crazy” with an acute mental status change in the hospital setting, and has always been afraid of medical procedures. She reports that she thinks her mother has seemed depressed, but only during the times she is hospitalized, similarly to the previous hospitalizations.  She denies pt showing sx of memory loss but feels mother has a "mental block” when trying to get mother to understand medical treatment options such as the PICC line and recalls her being disorientated briefly last week in the hospital.

## 2019-12-20 LAB
ANION GAP SERPL CALC-SCNC: 11 MMOL/L — SIGNIFICANT CHANGE UP (ref 5–17)
BASOPHILS # BLD AUTO: 0.04 K/UL — SIGNIFICANT CHANGE UP (ref 0–0.2)
BASOPHILS NFR BLD AUTO: 0.5 % — SIGNIFICANT CHANGE UP (ref 0–2)
BUN SERPL-MCNC: 21 MG/DL — SIGNIFICANT CHANGE UP (ref 7–23)
CALCIUM SERPL-MCNC: 9.1 MG/DL — SIGNIFICANT CHANGE UP (ref 8.4–10.5)
CHLORIDE SERPL-SCNC: 100 MMOL/L — SIGNIFICANT CHANGE UP (ref 96–108)
CO2 SERPL-SCNC: 25 MMOL/L — SIGNIFICANT CHANGE UP (ref 22–31)
CREAT SERPL-MCNC: 0.96 MG/DL — SIGNIFICANT CHANGE UP (ref 0.5–1.3)
EOSINOPHIL # BLD AUTO: 0.18 K/UL — SIGNIFICANT CHANGE UP (ref 0–0.5)
EOSINOPHIL NFR BLD AUTO: 2.3 % — SIGNIFICANT CHANGE UP (ref 0–6)
FOLATE SERPL-MCNC: >20 NG/ML — SIGNIFICANT CHANGE UP
GLUCOSE BLDC GLUCOMTR-MCNC: 109 MG/DL — HIGH (ref 70–99)
GLUCOSE BLDC GLUCOMTR-MCNC: 120 MG/DL — HIGH (ref 70–99)
GLUCOSE BLDC GLUCOMTR-MCNC: 122 MG/DL — HIGH (ref 70–99)
GLUCOSE BLDC GLUCOMTR-MCNC: 200 MG/DL — HIGH (ref 70–99)
GLUCOSE BLDC GLUCOMTR-MCNC: 66 MG/DL — LOW (ref 70–99)
GLUCOSE BLDC GLUCOMTR-MCNC: 68 MG/DL — LOW (ref 70–99)
GLUCOSE BLDC GLUCOMTR-MCNC: 75 MG/DL — SIGNIFICANT CHANGE UP (ref 70–99)
GLUCOSE BLDC GLUCOMTR-MCNC: 81 MG/DL — SIGNIFICANT CHANGE UP (ref 70–99)
GLUCOSE BLDC GLUCOMTR-MCNC: 86 MG/DL — SIGNIFICANT CHANGE UP (ref 70–99)
GLUCOSE BLDC GLUCOMTR-MCNC: 89 MG/DL — SIGNIFICANT CHANGE UP (ref 70–99)
GLUCOSE SERPL-MCNC: 85 MG/DL — SIGNIFICANT CHANGE UP (ref 70–99)
HCT VFR BLD CALC: 32.3 % — LOW (ref 34.5–45)
HGB BLD-MCNC: 10.3 G/DL — LOW (ref 11.5–15.5)
IMM GRANULOCYTES NFR BLD AUTO: 0.4 % — SIGNIFICANT CHANGE UP (ref 0–1.5)
LYMPHOCYTES # BLD AUTO: 1.32 K/UL — SIGNIFICANT CHANGE UP (ref 1–3.3)
LYMPHOCYTES # BLD AUTO: 16.7 % — SIGNIFICANT CHANGE UP (ref 13–44)
MCHC RBC-ENTMCNC: 28.5 PG — SIGNIFICANT CHANGE UP (ref 27–34)
MCHC RBC-ENTMCNC: 31.9 GM/DL — LOW (ref 32–36)
MCV RBC AUTO: 89.2 FL — SIGNIFICANT CHANGE UP (ref 80–100)
MONOCYTES # BLD AUTO: 0.68 K/UL — SIGNIFICANT CHANGE UP (ref 0–0.9)
MONOCYTES NFR BLD AUTO: 8.6 % — SIGNIFICANT CHANGE UP (ref 2–14)
NEUTROPHILS # BLD AUTO: 5.65 K/UL — SIGNIFICANT CHANGE UP (ref 1.8–7.4)
NEUTROPHILS NFR BLD AUTO: 71.5 % — SIGNIFICANT CHANGE UP (ref 43–77)
PLATELET # BLD AUTO: 271 K/UL — SIGNIFICANT CHANGE UP (ref 150–400)
POTASSIUM SERPL-MCNC: 4.7 MMOL/L — SIGNIFICANT CHANGE UP (ref 3.5–5.3)
POTASSIUM SERPL-SCNC: 4.7 MMOL/L — SIGNIFICANT CHANGE UP (ref 3.5–5.3)
RBC # BLD: 3.62 M/UL — LOW (ref 3.8–5.2)
RBC # FLD: 17.6 % — HIGH (ref 10.3–14.5)
SODIUM SERPL-SCNC: 136 MMOL/L — SIGNIFICANT CHANGE UP (ref 135–145)
T PALLIDUM AB TITR SER: NEGATIVE — SIGNIFICANT CHANGE UP
TSH SERPL-MCNC: 4.79 UIU/ML — HIGH (ref 0.27–4.2)
VIT B12 SERPL-MCNC: 592 PG/ML — SIGNIFICANT CHANGE UP (ref 232–1245)
WBC # BLD: 7.9 K/UL — SIGNIFICANT CHANGE UP (ref 3.8–10.5)
WBC # FLD AUTO: 7.9 K/UL — SIGNIFICANT CHANGE UP (ref 3.8–10.5)

## 2019-12-20 PROCEDURE — 71045 X-RAY EXAM CHEST 1 VIEW: CPT | Mod: 26

## 2019-12-20 RX ORDER — CHLORHEXIDINE GLUCONATE 213 G/1000ML
1 SOLUTION TOPICAL DAILY
Refills: 0 | Status: DISCONTINUED | OUTPATIENT
Start: 2019-12-20 | End: 2019-12-23

## 2019-12-20 RX ADMIN — GABAPENTIN 100 MILLIGRAM(S): 400 CAPSULE ORAL at 18:03

## 2019-12-20 RX ADMIN — Medication 1: at 12:40

## 2019-12-20 RX ADMIN — Medication 1 TABLET(S): at 14:13

## 2019-12-20 RX ADMIN — NYSTATIN CREAM 1 APPLICATION(S): 100000 CREAM TOPICAL at 09:24

## 2019-12-20 RX ADMIN — Medication 1 TABLET(S): at 21:50

## 2019-12-20 RX ADMIN — Medication 1000 MILLIGRAM(S): at 22:21

## 2019-12-20 RX ADMIN — GABAPENTIN 100 MILLIGRAM(S): 400 CAPSULE ORAL at 05:49

## 2019-12-20 RX ADMIN — Medication 25 MICROGRAM(S): at 05:48

## 2019-12-20 RX ADMIN — HEPARIN SODIUM 5000 UNIT(S): 5000 INJECTION INTRAVENOUS; SUBCUTANEOUS at 14:13

## 2019-12-20 RX ADMIN — ERTAPENEM SODIUM 120 MILLIGRAM(S): 1 INJECTION, POWDER, LYOPHILIZED, FOR SOLUTION INTRAMUSCULAR; INTRAVENOUS at 05:49

## 2019-12-20 RX ADMIN — Medication 8 UNIT(S): at 12:40

## 2019-12-20 RX ADMIN — Medication 1 TABLET(S): at 05:49

## 2019-12-20 RX ADMIN — Medication 650 MILLIGRAM(S): at 11:07

## 2019-12-20 RX ADMIN — CLOPIDOGREL BISULFATE 75 MILLIGRAM(S): 75 TABLET, FILM COATED ORAL at 12:39

## 2019-12-20 RX ADMIN — Medication 12.5 MILLIGRAM(S): at 05:49

## 2019-12-20 RX ADMIN — HEPARIN SODIUM 5000 UNIT(S): 5000 INJECTION INTRAVENOUS; SUBCUTANEOUS at 21:50

## 2019-12-20 RX ADMIN — Medication 8 UNIT(S): at 09:24

## 2019-12-20 RX ADMIN — Medication 12.5 MILLIGRAM(S): at 18:03

## 2019-12-20 RX ADMIN — Medication 1000 MILLIGRAM(S): at 21:51

## 2019-12-20 RX ADMIN — Medication 8 UNIT(S): at 18:02

## 2019-12-20 RX ADMIN — Medication 1300 MILLIGRAM(S): at 05:49

## 2019-12-20 RX ADMIN — CHLORHEXIDINE GLUCONATE 1 APPLICATION(S): 213 SOLUTION TOPICAL at 18:02

## 2019-12-20 RX ADMIN — Medication 81 MILLIGRAM(S): at 12:40

## 2019-12-20 RX ADMIN — NYSTATIN CREAM 1 APPLICATION(S): 100000 CREAM TOPICAL at 21:51

## 2019-12-20 RX ADMIN — ATORVASTATIN CALCIUM 40 MILLIGRAM(S): 80 TABLET, FILM COATED ORAL at 21:50

## 2019-12-20 RX ADMIN — FAMOTIDINE 20 MILLIGRAM(S): 10 INJECTION INTRAVENOUS at 12:39

## 2019-12-20 RX ADMIN — Medication 0.5 MILLIGRAM(S): at 11:10

## 2019-12-20 RX ADMIN — HEPARIN SODIUM 5000 UNIT(S): 5000 INJECTION INTRAVENOUS; SUBCUTANEOUS at 05:48

## 2019-12-20 NOTE — PROGRESS NOTE ADULT - ASSESSMENT
85 yo female with a hx of of CHF, DM, HLD, HTN, and an STEMI who presents to the ED for a several day history of buttocks pain. Patient states that the pain started about a week ago, but has suddenly gotten worse over the past few days. She describes it as a sharp pain and has tried taking OTC acetaminophen, which has not helped the pain at all. Patient endorses one episode of nausea earlier this morning, but denies any vomiting. She has not noticed a rash in that area, but does endorse that the skin is difficult to visualize.  Patient has a wound care nurse that comes to the house a few times per week. She came yesterday for wound care dressing changes.    Of note, patient was hospitalized at [facility] from September 4 to October 4 at [facility] for an ulcer in between her buttocks. During that hospitalization, patient required debridement of the wound as well as IV antibiotics.     In the ED, her VS /75, HR 77, RR 18 with SpO2 of 99% and T of 99.2. Patient received a 500 cc bolus of fluids as well as Doxycycline 500 mg x1. (04 Dec 2019 17:13)  WBC 13.5.  ESR 71, CRP 10.6.  UA (-) nit/mod LE, wbc 10.  Bcx (+) GPC pairs/chains from 2 sets.  CTAP without contrast shows L medial gluteal skin cellulitis and air-filled  tract.  No drainable fluid collection.  Pt on vanco/aztreonam/flagyl.     ID consult called for further abx managment.       Sepsis:    - Pt with fever, leukocytosis and bacteremia.  Source likely from gluteal cellulitis.  CTap shows L gluteal skin cellulitis with air-filled tract, without drainable fluid collection.   Agree with broad spectrum abx for now until all culture data finalized.      - MRI pelvis ordered to evaluate for perianal fistula- pt unable to tolerate exam.  Cannot r/o underlying OM, there is fluid collection near sacrum/coccyx.  Recommend bone scan vs. repeat MRI for further evaluation.      - Blood culture (+) GPC pairs/chains - identified as strep anginosus.      - TTE no vegetations reported, unable to r/o endocarditis.  Pt may need further ALONDRA to evaluate for endocarditits, although will  not change abx duration from ID standpoint.     - Urine cultures growing enterococcus faecalis.  Based on sensitivities of blood and urine cultures, can switch change from daptomycin to vancomycin.   Will d/w family regarding pt's prior penicillin allergy.  (pt had 'swelling' at the age of 10 or 15 towards pcn.  Does not recall taking other beta lactam abx in the past.)  d/w Pharmacy , will plan for ertapenem test dose on 12/13.  Pt tolerating this AM.  d/c azactam and flagyl.      - Can d/c vancomycin, has completed > 7 day course for enterococcus UTI.  Erta will cover strep anginosus/polymicrobial infectious source and enable ease of dosing while pt on long term therapy.    - repeat MRI pelvis with IV contrast demonstrates abscess collection overlying sacrum and coccyx.  High suspicion of OM.  Plan for eventual picc line once blood cultures cleared at least 48-72 hrs.  No further intervention as per surgery.       Plan for 6 week IV abx course.  Pt s/p PICC line.  End date: 1/14.  Weekly cbc, sma-7, esr, crp                Will follow,    Sandrita Zaman  [phone]

## 2019-12-20 NOTE — PROGRESS NOTE ADULT - SUBJECTIVE AND OBJECTIVE BOX
[facility]    Subjective: Patient seen and examined. No new events except as noted.   S/P PICC Line placement   D/C planing     REVIEW OF SYSTEMS:    CONSTITUTIONAL: No weakness, fevers or chills  EYES/ENT: No visual changes;  No vertigo or throat pain   NECK: No pain or stiffness  RESPIRATORY: No cough, wheezing, hemoptysis; No shortness of breath  CARDIOVASCULAR: No chest pain or palpitations  GASTROINTESTINAL: No abdominal or epigastric pain. No nausea, vomiting, or hematemesis; No diarrhea or constipation. No melena or hematochezia.  GENITOURINARY: No dysuria, frequency or hematuria  NEUROLOGICAL: No numbness or weakness  SKIN: No itching, burning, rashes, or lesions   All other review of systems is negative unless indicated above.    MEDICATIONS:  MEDICATIONS  (STANDING):  aspirin enteric coated 81 milliGRAM(s) Oral daily  atorvastatin 40 milliGRAM(s) Oral at bedtime  clopidogrel Tablet 75 milliGRAM(s) Oral daily  dextrose 5%. 1000 milliLiter(s) (50 mL/Hr) IV Continuous <Continuous>  dextrose 50% Injectable 12.5 Gram(s) IV Push once  dextrose 50% Injectable 25 Gram(s) IV Push once  dextrose 50% Injectable 25 Gram(s) IV Push once  ertapenem  IVPB 1000 milliGRAM(s) IV Intermittent every 24 hours  famotidine    Tablet 20 milliGRAM(s) Oral daily  gabapentin 100 milliGRAM(s) Oral two times a day  heparin  Injectable 5000 Unit(s) SubCutaneous every 8 hours  influenza   Vaccine 0.5 milliLiter(s) IntraMuscular once  insulin glargine Injectable (LANTUS) 15 Unit(s) SubCutaneous at bedtime  insulin lispro (HumaLOG) corrective regimen sliding scale   SubCutaneous three times a day before meals  insulin lispro (HumaLOG) corrective regimen sliding scale   SubCutaneous at bedtime  insulin lispro Injectable (HumaLOG) 8 Unit(s) SubCutaneous three times a day before meals  lactobacillus acidophilus 1 Tablet(s) Oral three times a day  levothyroxine 25 MICROGram(s) Oral daily  metoprolol tartrate 12.5 milliGRAM(s) Oral two times a day  nystatin Cream 1 Application(s) Topical two times a day      PHYSICAL EXAM:  T(C): 37.2 (12-20-19 @ 13:46), Max: 37.3 (12-19-19 @ 14:40)  HR: 61 (12-20-19 @ 13:46) (61 - 63)  BP: 122/77 (12-20-19 @ 13:46) (122/77 - 144/78)  RR: 18 (12-20-19 @ 13:46) (16 - 18)  SpO2: 94% (12-20-19 @ 13:46) (94% - 98%)  Wt(kg): --  I&O's Summary    19 Dec 2019 07:01  -  20 Dec 2019 07:00  --------------------------------------------------------  IN: 670 mL / OUT: 1000 mL / NET: -330 mL          Appearance: Normal	  HEENT:   Normal oral mucosa, PERRL, EOMI	  Lymphatic: No lymphadenopathy , no edema  Cardiovascular: Normal S1 S2, No JVD, No murmurs , Peripheral pulses palpable 2+ bilaterally  Respiratory: Lungs clear to auscultation, normal effort 	  Gastrointestinal:  Soft, Non-tender, + BS	  Skin: No rashes, No ecchymoses, No cyanosis, warm to touch  Musculoskeletal: Normal range of motion, normal strength  Psychiatry:  Mood & affect appropriate  Ext: No edema      All labs, Imaging and EKGs personally reviewed                           10.3   7.90  )-----------( 271      ( 20 Dec 2019 08:42 )             32.3               12-20    136  |  100  |  21  ----------------------------<  85  4.7   |  25  |  0.96    Ca    9.1      20 Dec 2019 07:24

## 2019-12-20 NOTE — PROGRESS NOTE ADULT - SUBJECTIVE AND OBJECTIVE BOX
Patient is a 86y old  Female who presents with a chief complaint of Sepsis 2/2 Cellulitis (20 Dec 2019 14:34)      INTERVAL HISTORY:     TELEMETRY:  	  MEDICATIONS:  EPINEPHrine     1 mG/mL Injectable 0.3 milliGRAM(s) IntraMuscular once PRN  metoprolol tartrate 12.5 milliGRAM(s) Oral two times a day        PHYSICAL EXAM:  T(C): 37.4 (12-20-19 @ 20:13), Max: 37.4 (12-20-19 @ 20:13)  HR: 63 (12-20-19 @ 20:13) (61 - 63)  BP: 108/68 (12-20-19 @ 20:13) (108/68 - 132/81)  RR: 18 (12-20-19 @ 20:13) (16 - 18)  SpO2: 96% (12-20-19 @ 20:13) (94% - 97%)  Wt(kg): --  I&O's Summary    19 Dec 2019 07:01  -  20 Dec 2019 07:00  --------------------------------------------------------  IN: 670 mL / OUT: 1000 mL / NET: -330 mL    20 Dec 2019 07:01  -  20 Dec 2019 23:45  --------------------------------------------------------  IN: 720 mL / OUT: 425 mL / NET: 295 mL          Appearance: In no distress	  HEENT:    PERRL, EOMI	  Cardiovascular:  S1 S2, No JVD  Respiratory: Lungs clear to auscultation	  Gastrointestinal:  Soft, Non-tender, + BS	  Extremities:  No edema of LE                                10.3   7.90  )-----------( 271      ( 20 Dec 2019 08:42 )             32.3     12-20    136  |  100  |  21  ----------------------------<  85  4.7   |  25  |  0.96    Ca    9.1      20 Dec 2019 07:24          Labs personally reviewed      Assessment /Plan:   Chronic diastolic HF - euvolemic, off Lasix  CAD - c/w DAPT, Metoprolol and ASA  HTN - well controlled        Srini Velasco DO [facility]  Cardiovascular Medicine  [address] [place] [address], [address]  Office: [phone]  Cell: [phone]

## 2019-12-20 NOTE — PROGRESS NOTE ADULT - ASSESSMENT
87 yo female with a hx of of CHF, DM, HLD, HTN, and an STEMI who was admitted for Sepsis 2/2 gluteal celluitis present on admission. 5

## 2019-12-20 NOTE — PROVIDER CONTACT NOTE (OTHER) - RECOMMENDATIONS
bladder scan - re-insert infante cath.
Continue plan of care
Continue plan of care.
Follow hypoglycemia protocol and provider orders
Follow provider orders
Will continue plan of care.

## 2019-12-20 NOTE — PROGRESS NOTE ADULT - PROBLEM SELECTOR PLAN 1
Arlin resolved S/P infante catheter  F/U Urology recs  hyperkalemia and hyponatremia resolved  metabolic acidosis improved-- d/c po bicarb  monitor urine output  trend bmp

## 2019-12-20 NOTE — PROGRESS NOTE ADULT - SUBJECTIVE AND OBJECTIVE BOX
Infectious Diseases progress note:    Subjective:  NAD, smiling and appears pleasant.  s/p picc line placement today.    ROS:  CONSTITUTIONAL:  No fever, chills, rigors  CARDIOVASCULAR:  No chest pain or palpitations  RESPIRATORY:   No SOB, cough, dyspnea on exertion.  No wheezing  GASTROINTESTINAL:  No abd pain, N/V, diarrhea/constipation  EXTREMITIES:  No swelling or joint pain  GENITOURINARY:  No burning on urination, increased frequency or urgency.  No flank pain  NEUROLOGIC:  No HA, visual disturbances  SKIN: No rashes    Allergies    codeine (Unknown)  Kiwi (Anaphylaxis)  penicillins (Anaphylaxis)    Intolerances        ANTIBIOTICS/RELEVANT:  antimicrobials  ertapenem  IVPB 1000 milliGRAM(s) IV Intermittent every 24 hours    immunologic:  influenza   Vaccine 0.5 milliLiter(s) IntraMuscular once    OTHER:  acetaminophen   Tablet .. 1000 milliGRAM(s) Oral every 6 hours PRN  acetaminophen   Tablet .. 650 milliGRAM(s) Oral every 6 hours PRN  aspirin enteric coated 81 milliGRAM(s) Oral daily  atorvastatin 40 milliGRAM(s) Oral at bedtime  chlorhexidine 4% Liquid 1 Application(s) Topical daily  clopidogrel Tablet 75 milliGRAM(s) Oral daily  dextrose 40% Gel 15 Gram(s) Oral once PRN  dextrose 5%. 1000 milliLiter(s) IV Continuous <Continuous>  dextrose 50% Injectable 12.5 Gram(s) IV Push once  dextrose 50% Injectable 25 Gram(s) IV Push once  dextrose 50% Injectable 25 Gram(s) IV Push once  diphenhydrAMINE   Injectable 50 milliGRAM(s) IntraMuscular every 4 hours PRN  EPINEPHrine     1 mG/mL Injectable 0.3 milliGRAM(s) IntraMuscular once PRN  famotidine    Tablet 20 milliGRAM(s) Oral daily  gabapentin 100 milliGRAM(s) Oral two times a day  glucagon  Injectable 1 milliGRAM(s) IntraMuscular once PRN  heparin  Injectable 5000 Unit(s) SubCutaneous every 8 hours  insulin glargine Injectable (LANTUS) 15 Unit(s) SubCutaneous at bedtime  insulin lispro (HumaLOG) corrective regimen sliding scale   SubCutaneous three times a day before meals  insulin lispro (HumaLOG) corrective regimen sliding scale   SubCutaneous at bedtime  insulin lispro Injectable (HumaLOG) 8 Unit(s) SubCutaneous three times a day before meals  lactobacillus acidophilus 1 Tablet(s) Oral three times a day  levothyroxine 25 MICROGram(s) Oral daily  melatonin 3 milliGRAM(s) Oral at bedtime PRN  metoprolol tartrate 12.5 milliGRAM(s) Oral two times a day  nystatin Cream 1 Application(s) Topical two times a day  ondansetron Injectable 4 milliGRAM(s) IV Push every 6 hours PRN      Objective:  Vital Signs Last 24 Hrs  T(C): 37.4 (20 Dec 2019 20:13), Max: 37.4 (20 Dec 2019 20:13)  T(F): 99.3 (20 Dec 2019 20:13), Max: 99.3 (20 Dec 2019 20:13)  HR: 63 (20 Dec 2019 20:13) (61 - 63)  BP: 108/68 (20 Dec 2019 20:13) (108/68 - 132/81)  BP(mean): --  RR: 18 (20 Dec 2019 20:13) (16 - 18)  SpO2: 96% (20 Dec 2019 20:13) (94% - 97%)    PHYSICAL EXAM:  Constitutional:NAD  Eyes:NIDIA, EOMI  Ear/Nose/Throat: no thrush, mucositis.  Moist mucous membranes	  Neck:no JVD, no lymphadenopathy, supple  Respiratory: CTA lucian  Cardiovascular: S1S2 RRR, no murmurs  Gastrointestinal:soft, nontender,  nondistended (+) BS  Extremities: LUE picc in place  Skin:  perineal wound dsg c/d/i        LABS:                        10.3   7.90  )-----------( 271      ( 20 Dec 2019 08:42 )             32.3     12-20    136  |  100  |  21  ----------------------------<  85  4.7   |  25  |  0.96    Ca    9.1      20 Dec 2019 07:24            MICROBIOLOGY:    Culture - Blood in AM (12.10.19 @ 13:06)    Specimen Source: .Blood Blood-Venous    Culture Results:   No growth at 5 days.    Culture - Blood in AM (12.10.19 @ 13:06)    Specimen Source: .Blood Blood-Peripheral    Culture Results:   No growth at 5 days.          RADIOLOGY & ADDITIONAL STUDIES:    < from: Xray Chest 1 View- PORTABLE-Urgent (12.20.19 @ 13:38) >  FINDINGS:    Lines/Tubes: Right PICC line tip in SVC    Heart and mediastinum:  Mild cardiomegaly, unchanged.    Lungs, pleura, and airways: Trace bilateral effusions and mild pulmonary edema. No pneumothorax    Bones and soft tissues: The bones and soft tissues are unchanged.    Impression:    Right PICC line tip in SVC without pneumothorax    Mild pulmonary edema and trace bilateral pleural effusions, new    < end of copied text >

## 2019-12-20 NOTE — PROVIDER CONTACT NOTE (OTHER) - ACTION/TREATMENT ORDERED:
pt to be given tylenol. Pt already on ABX
N.p. aware - awaiting further instructions.  Will continue to monitor Pt for safety and comfort.
Will put order for tylenol and zofran
Continue with juice along with solid food, continue protocol.
Hold lantus
Hold nighttime dose of Lantus.  Continue plan of care.
PA to speak with patient about arelis
bladder scan in 4 hours

## 2019-12-20 NOTE — PROVIDER CONTACT NOTE (OTHER) - ASSESSMENT
Initial fs=66mg/dL, recheck=68mg/dL.  Pt was tired, didn't eat most of dinner.  No diaphoresis, weakness, headache. or shaking.  Pt given apple juice, 1rst recheck fs=81mg/dL.  Given more juice 2nd recheck fs=75mg/dL.

## 2019-12-20 NOTE — PROGRESS NOTE ADULT - PROBLEM SELECTOR PLAN 1
positive blood Cx  ID eval appreciated  ABx as per ID, adjusted   Surg F/U appreciated  MRI noted, patient refused contrast, limited study  OM can not be ruled out  repeat MRI noted, ? osteo   Abx duration to be discussed  repeat blood Cx till negative, will plan for PICC Line placement for long term ABx  patient is refusing PICC line  Psych eval appreciated, no capacity   S/P PICC line  D/C planing to rehab Vs home

## 2019-12-20 NOTE — PROGRESS NOTE ADULT - SUBJECTIVE AND OBJECTIVE BOX
Patient seen and examined  no complaints    codeine (Unknown)  Kiwi (Anaphylaxis)  penicillins (Anaphylaxis)    Hospital Medications:   MEDICATIONS  (STANDING):  aspirin enteric coated 81 milliGRAM(s) Oral daily  atorvastatin 40 milliGRAM(s) Oral at bedtime  clopidogrel Tablet 75 milliGRAM(s) Oral daily  dextrose 5%. 1000 milliLiter(s) (50 mL/Hr) IV Continuous <Continuous>  dextrose 50% Injectable 12.5 Gram(s) IV Push once  dextrose 50% Injectable 25 Gram(s) IV Push once  dextrose 50% Injectable 25 Gram(s) IV Push once  ertapenem  IVPB 1000 milliGRAM(s) IV Intermittent every 24 hours  famotidine    Tablet 20 milliGRAM(s) Oral daily  gabapentin 100 milliGRAM(s) Oral two times a day  heparin  Injectable 5000 Unit(s) SubCutaneous every 8 hours  influenza   Vaccine 0.5 milliLiter(s) IntraMuscular once  insulin glargine Injectable (LANTUS) 15 Unit(s) SubCutaneous at bedtime  insulin lispro (HumaLOG) corrective regimen sliding scale   SubCutaneous three times a day before meals  insulin lispro (HumaLOG) corrective regimen sliding scale   SubCutaneous at bedtime  insulin lispro Injectable (HumaLOG) 8 Unit(s) SubCutaneous three times a day before meals  lactobacillus acidophilus 1 Tablet(s) Oral three times a day  levothyroxine 25 MICROGram(s) Oral daily  metoprolol tartrate 12.5 milliGRAM(s) Oral two times a day  nystatin Cream 1 Application(s) Topical two times a day  sodium bicarbonate 1300 milliGRAM(s) Oral three times a day      VITALS:  T(F): 97.9 (12-20-19 @ 05:43), Max: 99.1 (12-19-19 @ 14:40)  HR: 62 (12-20-19 @ 05:43)  BP: 132/81 (12-20-19 @ 05:43)  RR: 16 (12-20-19 @ 05:43)  SpO2: 97% (12-20-19 @ 05:43)  Wt(kg): --    12-19 @ 07:01  -  12-20 @ 07:00  --------------------------------------------------------  IN: 670 mL / OUT: 1000 mL / NET: -330 mL        Physical Exam :-  Constitutional: NAD  Neck: Supple.  Respiratory: Bilateral equal breath sounds,  Cardiovascular: S1, S2 normal,  Gastrointestinal: Bowel Sounds present, soft, non tender.  Extremities: 1+ edema  Neurological: Alert and Oriented x 3, no focal deficits  Psychiatric: Normal mood, normal affect  :+indwelling infante      LABS:  12-20    136  |  100  |  21  ----------------------------<  85  4.7   |  25  |  0.96    Ca    9.1      20 Dec 2019 07:24      Creatinine Trend: 0.96 <--, 0.92 <--, 0.95 <--, 1.08 <--                        10.3   7.90  )-----------( 271      ( 20 Dec 2019 08:42 )             32.3     Urine Studies:      RADIOLOGY & ADDITIONAL STUDIES:

## 2019-12-20 NOTE — PROVIDER CONTACT NOTE (OTHER) - ASSESSMENT
Five repeat fingersticks done, pt now maintaining fs above 100mg/dL x2.  Pt with no s&s of hypoglycemia.  Will continue to monitor.

## 2019-12-21 LAB
ANION GAP SERPL CALC-SCNC: 9 MMOL/L — SIGNIFICANT CHANGE UP (ref 5–17)
BUN SERPL-MCNC: 24 MG/DL — HIGH (ref 7–23)
CALCIUM SERPL-MCNC: 9 MG/DL — SIGNIFICANT CHANGE UP (ref 8.4–10.5)
CHLORIDE SERPL-SCNC: 96 MMOL/L — SIGNIFICANT CHANGE UP (ref 96–108)
CO2 SERPL-SCNC: 26 MMOL/L — SIGNIFICANT CHANGE UP (ref 22–31)
CREAT SERPL-MCNC: 1.05 MG/DL — SIGNIFICANT CHANGE UP (ref 0.5–1.3)
GLUCOSE BLDC GLUCOMTR-MCNC: 224 MG/DL — HIGH (ref 70–99)
GLUCOSE BLDC GLUCOMTR-MCNC: 228 MG/DL — HIGH (ref 70–99)
GLUCOSE BLDC GLUCOMTR-MCNC: 253 MG/DL — HIGH (ref 70–99)
GLUCOSE BLDC GLUCOMTR-MCNC: 268 MG/DL — HIGH (ref 70–99)
GLUCOSE SERPL-MCNC: 181 MG/DL — HIGH (ref 70–99)
HCT VFR BLD CALC: 32.6 % — LOW (ref 34.5–45)
HGB BLD-MCNC: 10.1 G/DL — LOW (ref 11.5–15.5)
MCHC RBC-ENTMCNC: 28.5 PG — SIGNIFICANT CHANGE UP (ref 27–34)
MCHC RBC-ENTMCNC: 31 GM/DL — LOW (ref 32–36)
MCV RBC AUTO: 92.1 FL — SIGNIFICANT CHANGE UP (ref 80–100)
PLATELET # BLD AUTO: 272 K/UL — SIGNIFICANT CHANGE UP (ref 150–400)
POTASSIUM SERPL-MCNC: 4.3 MMOL/L — SIGNIFICANT CHANGE UP (ref 3.5–5.3)
POTASSIUM SERPL-SCNC: 4.3 MMOL/L — SIGNIFICANT CHANGE UP (ref 3.5–5.3)
RBC # BLD: 3.54 M/UL — LOW (ref 3.8–5.2)
RBC # FLD: 17.7 % — HIGH (ref 10.3–14.5)
SODIUM SERPL-SCNC: 131 MMOL/L — LOW (ref 135–145)
WBC # BLD: 6.51 K/UL — SIGNIFICANT CHANGE UP (ref 3.8–10.5)
WBC # FLD AUTO: 6.51 K/UL — SIGNIFICANT CHANGE UP (ref 3.8–10.5)

## 2019-12-21 RX ADMIN — HEPARIN SODIUM 5000 UNIT(S): 5000 INJECTION INTRAVENOUS; SUBCUTANEOUS at 21:13

## 2019-12-21 RX ADMIN — NYSTATIN CREAM 1 APPLICATION(S): 100000 CREAM TOPICAL at 21:13

## 2019-12-21 RX ADMIN — Medication 1 TABLET(S): at 14:12

## 2019-12-21 RX ADMIN — Medication 1 TABLET(S): at 21:13

## 2019-12-21 RX ADMIN — HEPARIN SODIUM 5000 UNIT(S): 5000 INJECTION INTRAVENOUS; SUBCUTANEOUS at 05:14

## 2019-12-21 RX ADMIN — Medication 81 MILLIGRAM(S): at 12:46

## 2019-12-21 RX ADMIN — Medication 3: at 08:28

## 2019-12-21 RX ADMIN — Medication 12.5 MILLIGRAM(S): at 06:09

## 2019-12-21 RX ADMIN — Medication 1 TABLET(S): at 05:14

## 2019-12-21 RX ADMIN — FAMOTIDINE 20 MILLIGRAM(S): 10 INJECTION INTRAVENOUS at 12:46

## 2019-12-21 RX ADMIN — INSULIN GLARGINE 15 UNIT(S): 100 INJECTION, SOLUTION SUBCUTANEOUS at 22:56

## 2019-12-21 RX ADMIN — Medication 12.5 MILLIGRAM(S): at 17:52

## 2019-12-21 RX ADMIN — NYSTATIN CREAM 1 APPLICATION(S): 100000 CREAM TOPICAL at 08:28

## 2019-12-21 RX ADMIN — Medication 3: at 12:47

## 2019-12-21 RX ADMIN — Medication 8 UNIT(S): at 12:47

## 2019-12-21 RX ADMIN — GABAPENTIN 100 MILLIGRAM(S): 400 CAPSULE ORAL at 17:52

## 2019-12-21 RX ADMIN — ERTAPENEM SODIUM 120 MILLIGRAM(S): 1 INJECTION, POWDER, LYOPHILIZED, FOR SOLUTION INTRAMUSCULAR; INTRAVENOUS at 05:13

## 2019-12-21 RX ADMIN — Medication 650 MILLIGRAM(S): at 12:00

## 2019-12-21 RX ADMIN — ATORVASTATIN CALCIUM 40 MILLIGRAM(S): 80 TABLET, FILM COATED ORAL at 21:13

## 2019-12-21 RX ADMIN — Medication 1000 MILLIGRAM(S): at 21:44

## 2019-12-21 RX ADMIN — CLOPIDOGREL BISULFATE 75 MILLIGRAM(S): 75 TABLET, FILM COATED ORAL at 12:46

## 2019-12-21 RX ADMIN — Medication 2: at 17:53

## 2019-12-21 RX ADMIN — HEPARIN SODIUM 5000 UNIT(S): 5000 INJECTION INTRAVENOUS; SUBCUTANEOUS at 14:12

## 2019-12-21 RX ADMIN — Medication 8 UNIT(S): at 17:52

## 2019-12-21 RX ADMIN — GABAPENTIN 100 MILLIGRAM(S): 400 CAPSULE ORAL at 05:14

## 2019-12-21 RX ADMIN — Medication 8 UNIT(S): at 08:28

## 2019-12-21 RX ADMIN — Medication 1000 MILLIGRAM(S): at 21:13

## 2019-12-21 RX ADMIN — Medication 25 MICROGRAM(S): at 05:14

## 2019-12-21 RX ADMIN — CHLORHEXIDINE GLUCONATE 1 APPLICATION(S): 213 SOLUTION TOPICAL at 05:14

## 2019-12-21 NOTE — PROGRESS NOTE ADULT - SUBJECTIVE AND OBJECTIVE BOX
Infectious Diseases progress note:    Subjective: NAD, no acute o/n events.  Daughter and grandson at bedside.      ROS:  CONSTITUTIONAL:  No fever, chills, rigors  CARDIOVASCULAR:  No chest pain or palpitations  RESPIRATORY:   No SOB, cough, dyspnea on exertion.  No wheezing  GASTROINTESTINAL:  No abd pain, N/V, diarrhea/constipation  EXTREMITIES:  No swelling or joint pain  GENITOURINARY:  No burning on urination, increased frequency or urgency.  No flank pain  NEUROLOGIC:  No HA, visual disturbances  SKIN: No rashes    Allergies    codeine (Unknown)  Kiwi (Anaphylaxis)  penicillins (Anaphylaxis)    Intolerances        ANTIBIOTICS/RELEVANT:  antimicrobials  ertapenem  IVPB 1000 milliGRAM(s) IV Intermittent every 24 hours    immunologic:  influenza   Vaccine 0.5 milliLiter(s) IntraMuscular once    OTHER:  acetaminophen   Tablet .. 1000 milliGRAM(s) Oral every 6 hours PRN  acetaminophen   Tablet .. 650 milliGRAM(s) Oral every 6 hours PRN  aspirin enteric coated 81 milliGRAM(s) Oral daily  atorvastatin 40 milliGRAM(s) Oral at bedtime  chlorhexidine 4% Liquid 1 Application(s) Topical daily  clopidogrel Tablet 75 milliGRAM(s) Oral daily  dextrose 40% Gel 15 Gram(s) Oral once PRN  dextrose 5%. 1000 milliLiter(s) IV Continuous <Continuous>  dextrose 50% Injectable 12.5 Gram(s) IV Push once  dextrose 50% Injectable 25 Gram(s) IV Push once  dextrose 50% Injectable 25 Gram(s) IV Push once  diphenhydrAMINE   Injectable 50 milliGRAM(s) IntraMuscular every 4 hours PRN  EPINEPHrine     1 mG/mL Injectable 0.3 milliGRAM(s) IntraMuscular once PRN  famotidine    Tablet 20 milliGRAM(s) Oral daily  gabapentin 100 milliGRAM(s) Oral two times a day  glucagon  Injectable 1 milliGRAM(s) IntraMuscular once PRN  heparin  Injectable 5000 Unit(s) SubCutaneous every 8 hours  insulin glargine Injectable (LANTUS) 15 Unit(s) SubCutaneous at bedtime  insulin lispro (HumaLOG) corrective regimen sliding scale   SubCutaneous three times a day before meals  insulin lispro (HumaLOG) corrective regimen sliding scale   SubCutaneous at bedtime  insulin lispro Injectable (HumaLOG) 8 Unit(s) SubCutaneous three times a day before meals  lactobacillus acidophilus 1 Tablet(s) Oral three times a day  levothyroxine 25 MICROGram(s) Oral daily  melatonin 3 milliGRAM(s) Oral at bedtime PRN  metoprolol tartrate 12.5 milliGRAM(s) Oral two times a day  nystatin Cream 1 Application(s) Topical two times a day  ondansetron Injectable 4 milliGRAM(s) IV Push every 6 hours PRN      Objective:  Vital Signs Last 24 Hrs  T(C): 37 (21 Dec 2019 12:10), Max: 37.4 (20 Dec 2019 20:13)  T(F): 98.6 (21 Dec 2019 12:10), Max: 99.3 (20 Dec 2019 20:13)  HR: 72 (21 Dec 2019 12:10) (58 - 72)  BP: 136/83 (21 Dec 2019 12:10) (107/57 - 136/83)  BP(mean): --  RR: 18 (21 Dec 2019 12:10) (16 - 18)  SpO2: 98% (21 Dec 2019 12:10) (96% - 100%)    PHYSICAL EXAM:  Constitutional:NAD  Eyes:NIDIA, EOMI  Ear/Nose/Throat: no thrush, mucositis.  Moist mucous membranes	  Neck:no JVD, no lymphadenopathy, supple  Respiratory: CTA lucian  Cardiovascular: S1S2 RRR, no murmurs  Gastrointestinal:soft, nontender,  nondistended (+) BS  Extremities:no e/e/c  Skin:  no rashes,  RUE picc         LABS:                        10.1   6.51  )-----------( 272      ( 21 Dec 2019 09:58 )             32.6     12-21    131<L>  |  96  |  24<H>  ----------------------------<  181<H>  4.3   |  26  |  1.05    Ca    9.0      21 Dec 2019 07:27                              MICROBIOLOGY:          RADIOLOGY & ADDITIONAL STUDIES:

## 2019-12-21 NOTE — PROGRESS NOTE ADULT - SUBJECTIVE AND OBJECTIVE BOX
[facility]    Subjective: Patient seen and examined. No new events except as noted.     REVIEW OF SYSTEMS:    CONSTITUTIONAL: No weakness, fevers or chills  EYES/ENT: No visual changes;  No vertigo or throat pain   NECK: No pain or stiffness  RESPIRATORY: No cough, wheezing, hemoptysis; No shortness of breath  CARDIOVASCULAR: No chest pain or palpitations  GASTROINTESTINAL: No abdominal or epigastric pain. No nausea, vomiting, or hematemesis; No diarrhea or constipation. No melena or hematochezia.  GENITOURINARY: No dysuria, frequency or hematuria  NEUROLOGICAL: No numbness or weakness  SKIN: No itching, burning, rashes, or lesions   All other review of systems is negative unless indicated above.    MEDICATIONS:  MEDICATIONS  (STANDING):  aspirin enteric coated 81 milliGRAM(s) Oral daily  atorvastatin 40 milliGRAM(s) Oral at bedtime  chlorhexidine 4% Liquid 1 Application(s) Topical daily  clopidogrel Tablet 75 milliGRAM(s) Oral daily  dextrose 5%. 1000 milliLiter(s) (50 mL/Hr) IV Continuous <Continuous>  dextrose 50% Injectable 12.5 Gram(s) IV Push once  dextrose 50% Injectable 25 Gram(s) IV Push once  dextrose 50% Injectable 25 Gram(s) IV Push once  ertapenem  IVPB 1000 milliGRAM(s) IV Intermittent every 24 hours  famotidine    Tablet 20 milliGRAM(s) Oral daily  gabapentin 100 milliGRAM(s) Oral two times a day  heparin  Injectable 5000 Unit(s) SubCutaneous every 8 hours  influenza   Vaccine 0.5 milliLiter(s) IntraMuscular once  insulin glargine Injectable (LANTUS) 15 Unit(s) SubCutaneous at bedtime  insulin lispro (HumaLOG) corrective regimen sliding scale   SubCutaneous three times a day before meals  insulin lispro (HumaLOG) corrective regimen sliding scale   SubCutaneous at bedtime  insulin lispro Injectable (HumaLOG) 8 Unit(s) SubCutaneous three times a day before meals  lactobacillus acidophilus 1 Tablet(s) Oral three times a day  levothyroxine 25 MICROGram(s) Oral daily  metoprolol tartrate 12.5 milliGRAM(s) Oral two times a day  nystatin Cream 1 Application(s) Topical two times a day      PHYSICAL EXAM:  T(C): 37 (12-21-19 @ 12:10), Max: 37.4 (12-20-19 @ 20:13)  HR: 72 (12-21-19 @ 12:10) (58 - 72)  BP: 136/83 (12-21-19 @ 12:10) (107/57 - 136/83)  RR: 18 (12-21-19 @ 12:10) (16 - 18)  SpO2: 98% (12-21-19 @ 12:10) (96% - 100%)  Wt(kg): --  I&O's Summary    20 Dec 2019 07:01  -  21 Dec 2019 07:00  --------------------------------------------------------  IN: 1370 mL / OUT: 775 mL / NET: 595 mL    21 Dec 2019 07:01  -  21 Dec 2019 17:52  --------------------------------------------------------  IN: 0 mL / OUT: 350 mL / NET: -350 mL          Appearance: Normal	  HEENT:   Normal oral mucosa, PERRL, EOMI	  Lymphatic: No lymphadenopathy , no edema  Cardiovascular: Normal S1 S2, No JVD, No murmurs , Peripheral pulses palpable 2+ bilaterally  Respiratory: Lungs clear to auscultation, normal effort 	  Gastrointestinal:  Soft, Non-tender, + BS	  Skin: No rashes, No ecchymoses, No cyanosis, warm to touch  Musculoskeletal: Normal range of motion, normal strength  Psychiatry:  Mood & affect appropriate  Ext: No edema      All labs, Imaging and EKGs personally reviewed                           10.1   6.51  )-----------( 272      ( 21 Dec 2019 09:58 )             32.6               12-21    131<L>  |  96  |  24<H>  ----------------------------<  181<H>  4.3   |  26  |  1.05    Ca    9.0      21 Dec 2019 07:27

## 2019-12-21 NOTE — PROGRESS NOTE ADULT - ASSESSMENT
85 yo female with a hx of of CHF, DM, HLD, HTN, and an STEMI who presents to the ED for a several day history of buttocks pain. Patient states that the pain started about a week ago, but has suddenly gotten worse over the past few days. She describes it as a sharp pain and has tried taking OTC acetaminophen, which has not helped the pain at all. Patient endorses one episode of nausea earlier this morning, but denies any vomiting. She has not noticed a rash in that area, but does endorse that the skin is difficult to visualize.  Patient has a wound care nurse that comes to the house a few times per week. She came yesterday for wound care dressing changes.    Of note, patient was hospitalized at [facility] from September 4 to October 4 at [facility] for an ulcer in between her buttocks. During that hospitalization, patient required debridement of the wound as well as IV antibiotics.     In the ED, her VS /75, HR 77, RR 18 with SpO2 of 99% and T of 99.2. Patient received a 500 cc bolus of fluids as well as Doxycycline 500 mg x1. (04 Dec 2019 17:13)  WBC 13.5.  ESR 71, CRP 10.6.  UA (-) nit/mod LE, wbc 10.  Bcx (+) GPC pairs/chains from 2 sets.  CTAP without contrast shows L medial gluteal skin cellulitis and air-filled  tract.  No drainable fluid collection.  Pt on vanco/aztreonam/flagyl.     ID consult called for further abx managment.       Sepsis:    - Pt with fever, leukocytosis and bacteremia.  Source likely from gluteal cellulitis.  CTap shows L gluteal skin cellulitis with air-filled tract, without drainable fluid collection.   Agree with broad spectrum abx for now until all culture data finalized.      - MRI pelvis ordered to evaluate for perianal fistula- pt unable to tolerate exam.  Cannot r/o underlying OM, there is fluid collection near sacrum/coccyx.  Recommend bone scan vs. repeat MRI for further evaluation.      - Blood culture (+) GPC pairs/chains - identified as strep anginosus.      - TTE no vegetations reported, unable to r/o endocarditis.  Pt may need further ALONDRA to evaluate for endocarditits, although will  not change abx duration from ID standpoint.     - Urine cultures growing enterococcus faecalis.  Based on sensitivities of blood and urine cultures, can switch change from daptomycin to vancomycin.   Will d/w family regarding pt's prior penicillin allergy.  (pt had 'swelling' at the age of 10 or 15 towards pcn.  Does not recall taking other beta lactam abx in the past.)  d/w Pharmacy , will plan for ertapenem test dose on 12/13.  Pt tolerating this AM.  d/c azactam and flagyl.      - Can d/c vancomycin, has completed > 7 day course for enterococcus UTI.  Erta will cover strep anginosus/polymicrobial infectious source and enable ease of dosing while pt on long term therapy.    - repeat MRI pelvis with IV contrast demonstrates abscess collection overlying sacrum and coccyx.  High suspicion of OM.  Plan for eventual picc line once blood cultures cleared at least 48-72 hrs.  No further intervention as per surgery.       Plan for 6 week IV abx course.  Pt s/p PICC line.  End date: 1/14.  Weekly cbc, sma-7, esr, crp                Will follow,    Sandrita Zaman  [phone]

## 2019-12-22 LAB
GLUCOSE BLDC GLUCOMTR-MCNC: 117 MG/DL — HIGH (ref 70–99)
GLUCOSE BLDC GLUCOMTR-MCNC: 194 MG/DL — HIGH (ref 70–99)
GLUCOSE BLDC GLUCOMTR-MCNC: 211 MG/DL — HIGH (ref 70–99)
GLUCOSE BLDC GLUCOMTR-MCNC: 93 MG/DL — SIGNIFICANT CHANGE UP (ref 70–99)

## 2019-12-22 RX ORDER — FUROSEMIDE 40 MG
40 TABLET ORAL ONCE
Refills: 0 | Status: COMPLETED | OUTPATIENT
Start: 2019-12-22 | End: 2019-12-22

## 2019-12-22 RX ORDER — FUROSEMIDE 40 MG
20 TABLET ORAL DAILY
Refills: 0 | Status: DISCONTINUED | OUTPATIENT
Start: 2019-12-23 | End: 2019-12-23

## 2019-12-22 RX ADMIN — ATORVASTATIN CALCIUM 40 MILLIGRAM(S): 80 TABLET, FILM COATED ORAL at 22:00

## 2019-12-22 RX ADMIN — Medication 1 TABLET(S): at 05:17

## 2019-12-22 RX ADMIN — FAMOTIDINE 20 MILLIGRAM(S): 10 INJECTION INTRAVENOUS at 11:23

## 2019-12-22 RX ADMIN — HEPARIN SODIUM 5000 UNIT(S): 5000 INJECTION INTRAVENOUS; SUBCUTANEOUS at 05:18

## 2019-12-22 RX ADMIN — Medication 1 TABLET(S): at 21:59

## 2019-12-22 RX ADMIN — GABAPENTIN 100 MILLIGRAM(S): 400 CAPSULE ORAL at 17:30

## 2019-12-22 RX ADMIN — Medication 8 UNIT(S): at 17:31

## 2019-12-22 RX ADMIN — Medication 25 MICROGRAM(S): at 05:18

## 2019-12-22 RX ADMIN — Medication 1000 MILLIGRAM(S): at 21:59

## 2019-12-22 RX ADMIN — HEPARIN SODIUM 5000 UNIT(S): 5000 INJECTION INTRAVENOUS; SUBCUTANEOUS at 14:34

## 2019-12-22 RX ADMIN — Medication 2: at 12:22

## 2019-12-22 RX ADMIN — Medication 81 MILLIGRAM(S): at 11:22

## 2019-12-22 RX ADMIN — GABAPENTIN 100 MILLIGRAM(S): 400 CAPSULE ORAL at 05:18

## 2019-12-22 RX ADMIN — Medication 12.5 MILLIGRAM(S): at 05:18

## 2019-12-22 RX ADMIN — CLOPIDOGREL BISULFATE 75 MILLIGRAM(S): 75 TABLET, FILM COATED ORAL at 11:23

## 2019-12-22 RX ADMIN — Medication 12.5 MILLIGRAM(S): at 17:31

## 2019-12-22 RX ADMIN — Medication 1000 MILLIGRAM(S): at 22:50

## 2019-12-22 RX ADMIN — Medication 8 UNIT(S): at 12:22

## 2019-12-22 RX ADMIN — CHLORHEXIDINE GLUCONATE 1 APPLICATION(S): 213 SOLUTION TOPICAL at 05:38

## 2019-12-22 RX ADMIN — Medication 1 TABLET(S): at 11:22

## 2019-12-22 RX ADMIN — Medication 1: at 08:49

## 2019-12-22 RX ADMIN — HEPARIN SODIUM 5000 UNIT(S): 5000 INJECTION INTRAVENOUS; SUBCUTANEOUS at 22:00

## 2019-12-22 RX ADMIN — Medication 40 MILLIGRAM(S): at 15:26

## 2019-12-22 RX ADMIN — NYSTATIN CREAM 1 APPLICATION(S): 100000 CREAM TOPICAL at 08:50

## 2019-12-22 RX ADMIN — NYSTATIN CREAM 1 APPLICATION(S): 100000 CREAM TOPICAL at 22:00

## 2019-12-22 RX ADMIN — ERTAPENEM SODIUM 120 MILLIGRAM(S): 1 INJECTION, POWDER, LYOPHILIZED, FOR SOLUTION INTRAMUSCULAR; INTRAVENOUS at 05:20

## 2019-12-22 RX ADMIN — INSULIN GLARGINE 15 UNIT(S): 100 INJECTION, SOLUTION SUBCUTANEOUS at 22:14

## 2019-12-22 RX ADMIN — Medication 8 UNIT(S): at 08:50

## 2019-12-22 NOTE — PROGRESS NOTE ADULT - ASSESSMENT
85 yo female with a hx of of CHF, DM, HLD, HTN, and an STEMI who presents to the ED for a several day history of buttocks pain. Patient states that the pain started about a week ago, but has suddenly gotten worse over the past few days. She describes it as a sharp pain and has tried taking OTC acetaminophen, which has not helped the pain at all. Patient endorses one episode of nausea earlier this morning, but denies any vomiting. She has not noticed a rash in that area, but does endorse that the skin is difficult to visualize.  Patient has a wound care nurse that comes to the house a few times per week. She came yesterday for wound care dressing changes.    Of note, patient was hospitalized at [facility] from September 4 to October 4 at [facility] for an ulcer in between her buttocks. During that hospitalization, patient required debridement of the wound as well as IV antibiotics.     In the ED, her VS /75, HR 77, RR 18 with SpO2 of 99% and T of 99.2. Patient received a 500 cc bolus of fluids as well as Doxycycline 500 mg x1. (04 Dec 2019 17:13)  WBC 13.5.  ESR 71, CRP 10.6.  UA (-) nit/mod LE, wbc 10.  Bcx (+) GPC pairs/chains from 2 sets.  CTAP without contrast shows L medial gluteal skin cellulitis and air-filled  tract.  No drainable fluid collection.  Pt on vanco/aztreonam/flagyl.     ID consult called for further abx managment.       Sepsis:    - Pt with fever, leukocytosis and bacteremia.  Source likely from gluteal cellulitis.  CTap shows L gluteal skin cellulitis with air-filled tract, without drainable fluid collection.   Agree with broad spectrum abx for now until all culture data finalized.      - MRI pelvis ordered to evaluate for perianal fistula- pt unable to tolerate exam.  Cannot r/o underlying OM, there is fluid collection near sacrum/coccyx.  Recommend bone scan vs. repeat MRI for further evaluation.      - Blood culture (+) GPC pairs/chains - identified as strep anginosus.      - TTE no vegetations reported, unable to r/o endocarditis.  Pt may need further ALONDRA to evaluate for endocarditits, although will  not change abx duration from ID standpoint.     - Urine cultures growing enterococcus faecalis.  Based on sensitivities of blood and urine cultures, can switch change from daptomycin to vancomycin.   Will d/w family regarding pt's prior penicillin allergy.  (pt had 'swelling' at the age of 10 or 15 towards pcn.  Does not recall taking other beta lactam abx in the past.)  d/w Pharmacy , will plan for ertapenem test dose on 12/13.  Pt tolerating this AM.  d/c azactam and flagyl.      - Can d/c vancomycin, has completed > 7 day course for enterococcus UTI.  Erta will cover strep anginosus/polymicrobial infectious source and enable ease of dosing while pt on long term therapy.    - repeat MRI pelvis with IV contrast demonstrates abscess collection overlying sacrum and coccyx.  High suspicion of OM.  Plan for eventual picc line once blood cultures cleared at least 48-72 hrs.  No further intervention as per surgery.       Plan for 6 week IV abx course.  Pt s/p PICC line.  End date: 1/14.  Weekly cbc, sma-7, esr, crp.    No new ID issues at the present time. Awaiting rehab                Will follow,    Sandrita Zaman  [phone]

## 2019-12-22 NOTE — PROGRESS NOTE ADULT - SUBJECTIVE AND OBJECTIVE BOX
Patient is a 86y old  Female who presents with a chief complaint of Sepsis 2/2 Cellulitis (22 Dec 2019 16:13)       	  MEDICATIONS:  EPINEPHrine     1 mG/mL Injectable 0.3 milliGRAM(s) IntraMuscular once PRN  metoprolol tartrate 12.5 milliGRAM(s) Oral two times a day        PHYSICAL EXAM:  T(C): 37 (12-22-19 @ 20:03), Max: 37.1 (12-21-19 @ 21:21)  HR: 66 (12-22-19 @ 20:03) (66 - 70)  BP: 124/76 (12-22-19 @ 20:03) (121/61 - 138/82)  RR: 18 (12-22-19 @ 20:03) (16 - 18)  SpO2: 97% (12-22-19 @ 20:03) (97% - 99%)  Wt(kg): --  I&O's Summary    21 Dec 2019 07:01  -  22 Dec 2019 07:00  --------------------------------------------------------  IN: 1020 mL / OUT: 950 mL / NET: 70 mL    22 Dec 2019 07:01  -  22 Dec 2019 20:35  --------------------------------------------------------  IN: 900 mL / OUT: 550 mL / NET: 350 mL          Appearance: In no distress	  HEENT:    PERRL, EOMI	  Cardiovascular:  S1 S2, No JVD  Respiratory: Lungs clear to auscultation	  Gastrointestinal:  Soft, Non-tender, + BS	  Extremities:  No edema of LE                                10.1   6.51  )-----------( 272      ( 21 Dec 2019 09:58 )             32.6     12-21    131<L>  |  96  |  24<H>  ----------------------------<  181<H>  4.3   |  26  |  1.05    Ca    9.0      21 Dec 2019 07:27          Labs personally reviewed      Assessment /Plan:   Chronic diastolic HF - euvolemic, off Lasix  CAD - c/w DAPT, Metoprolol and ASA  HTN - well controlled      Srini Velasco DO [facility]  Cardiovascular Medicine  [address] [place] [address], [address]  Office: [phone]  Cell: [phone]

## 2019-12-22 NOTE — PROGRESS NOTE ADULT - SUBJECTIVE AND OBJECTIVE BOX
Infectious Diseases progress note:    Subjective: NAD, afebrile. No acute o/n events. States she feels pain all over, otherwise nontoxic appearing.  Daughter at bedside.     ROS:  CONSTITUTIONAL:  No fever, chills, rigors  CARDIOVASCULAR:  No chest pain or palpitations  RESPIRATORY:   No SOB, cough, dyspnea on exertion.  No wheezing  GASTROINTESTINAL:  No abd pain, N/V, diarrhea/constipation  EXTREMITIES:  No swelling or joint pain  GENITOURINARY:  No burning on urination, increased frequency or urgency.  No flank pain  NEUROLOGIC:  No HA, visual disturbances  SKIN: No rashes    Allergies    codeine (Unknown)  Kiwi (Anaphylaxis)  penicillins (Anaphylaxis)    Intolerances        ANTIBIOTICS/RELEVANT:  antimicrobials  ertapenem  IVPB 1000 milliGRAM(s) IV Intermittent every 24 hours    immunologic:  influenza   Vaccine 0.5 milliLiter(s) IntraMuscular once    OTHER:  acetaminophen   Tablet .. 1000 milliGRAM(s) Oral every 6 hours PRN  acetaminophen   Tablet .. 650 milliGRAM(s) Oral every 6 hours PRN  aspirin enteric coated 81 milliGRAM(s) Oral daily  atorvastatin 40 milliGRAM(s) Oral at bedtime  chlorhexidine 4% Liquid 1 Application(s) Topical daily  clopidogrel Tablet 75 milliGRAM(s) Oral daily  dextrose 40% Gel 15 Gram(s) Oral once PRN  dextrose 5%. 1000 milliLiter(s) IV Continuous <Continuous>  dextrose 50% Injectable 12.5 Gram(s) IV Push once  dextrose 50% Injectable 25 Gram(s) IV Push once  dextrose 50% Injectable 25 Gram(s) IV Push once  diphenhydrAMINE   Injectable 50 milliGRAM(s) IntraMuscular every 4 hours PRN  EPINEPHrine     1 mG/mL Injectable 0.3 milliGRAM(s) IntraMuscular once PRN  famotidine    Tablet 20 milliGRAM(s) Oral daily  gabapentin 100 milliGRAM(s) Oral two times a day  glucagon  Injectable 1 milliGRAM(s) IntraMuscular once PRN  heparin  Injectable 5000 Unit(s) SubCutaneous every 8 hours  insulin glargine Injectable (LANTUS) 15 Unit(s) SubCutaneous at bedtime  insulin lispro (HumaLOG) corrective regimen sliding scale   SubCutaneous three times a day before meals  insulin lispro (HumaLOG) corrective regimen sliding scale   SubCutaneous at bedtime  insulin lispro Injectable (HumaLOG) 8 Unit(s) SubCutaneous three times a day before meals  lactobacillus acidophilus 1 Tablet(s) Oral three times a day  levothyroxine 25 MICROGram(s) Oral daily  melatonin 3 milliGRAM(s) Oral at bedtime PRN  metoprolol tartrate 12.5 milliGRAM(s) Oral two times a day  nystatin Cream 1 Application(s) Topical two times a day  ondansetron Injectable 4 milliGRAM(s) IV Push every 6 hours PRN      Objective:  Vital Signs Last 24 Hrs  T(C): 37.1 (22 Dec 2019 16:03), Max: 37.1 (21 Dec 2019 21:21)  T(F): 98.7 (22 Dec 2019 16:03), Max: 98.8 (21 Dec 2019 21:21)  HR: 70 (22 Dec 2019 16:03) (66 - 70)  BP: 135/81 (22 Dec 2019 16:03) (121/61 - 138/82)  BP(mean): --  RR: 18 (22 Dec 2019 16:03) (16 - 18)  SpO2: 97% (22 Dec 2019 16:03) (97% - 99%)    PHYSICAL EXAM:  Constitutional:NAD  Eyes:NIDIA, EOMI  Ear/Nose/Throat: no thrush, mucositis.  Moist mucous membranes	  Neck:no JVD, no lymphadenopathy, supple  Respiratory: CTA lucian  Cardiovascular: S1S2 RRR, no murmurs  Gastrointestinal:soft, nontender,  nondistended (+) BS  Extremities:no e/e/c, RUE picc in place  Skin:  no rashes, open wounds or ulcerations        LABS:                        10.1   6.51  )-----------( 272      ( 21 Dec 2019 09:58 )             32.6     12-21    131<L>  |  96  |  24<H>  ----------------------------<  181<H>  4.3   |  26  |  1.05    Ca    9.0      21 Dec 2019 07:27              MICROBIOLOGY:    Culture - Blood in AM (12.10.19 @ 13:06)    Specimen Source: .Blood Blood-Venous    Culture Results:   No growth at 5 days.      Culture - Blood in AM (12.10.19 @ 13:06)    Specimen Source: .Blood Blood-Peripheral    Culture Results:   No growth at 5 days.      Culture - Blood in AM (12.06.19 @ 06:02)    Gram Stain:   Growth in aerobic bottle: Gram positive cocci in pairs  Growth in anaerobic bottle: Gram Positive Cocci in Pairs and Chains    Specimen Source: .Blood Blood    Culture Results:   Growth in aerobic and anaerobic bottles: Streptococcus anginosus  See previous culture [id number]        RADIOLOGY & ADDITIONAL STUDIES:    < from: Xray Chest 1 View- PORTABLE-Urgent (12.20.19 @ 13:38) >  FINDINGS:    Lines/Tubes: Right PICC line tip in SVC    Heart and mediastinum:  Mild cardiomegaly, unchanged.    Lungs, pleura, and airways: Trace bilateral effusions and mild pulmonary edema. No pneumothorax    Bones and soft tissues: The bones and soft tissues are unchanged.    Impression:    Right PICC line tip in SVC without pneumothorax    Mild pulmonary edema and trace bilateral pleural effusions, new    < end of copied text >

## 2019-12-22 NOTE — PROGRESS NOTE ADULT - SUBJECTIVE AND OBJECTIVE BOX
[facility]    Subjective: Patient seen and examined. No new events except as noted.     REVIEW OF SYSTEMS:    CONSTITUTIONAL: No weakness, fevers or chills  EYES/ENT: No visual changes;  No vertigo or throat pain   NECK: No pain or stiffness  RESPIRATORY: No cough, wheezing, hemoptysis; No shortness of breath  CARDIOVASCULAR: No chest pain or palpitations  GASTROINTESTINAL: No abdominal or epigastric pain. No nausea, vomiting, or hematemesis; No diarrhea or constipation. No melena or hematochezia.  GENITOURINARY: No dysuria, frequency or hematuria  NEUROLOGICAL: No numbness or weakness  SKIN: No itching, burning, rashes, or lesions   All other review of systems is negative unless indicated above.    MEDICATIONS:  MEDICATIONS  (STANDING):  aspirin enteric coated 81 milliGRAM(s) Oral daily  atorvastatin 40 milliGRAM(s) Oral at bedtime  chlorhexidine 4% Liquid 1 Application(s) Topical daily  clopidogrel Tablet 75 milliGRAM(s) Oral daily  dextrose 5%. 1000 milliLiter(s) (50 mL/Hr) IV Continuous <Continuous>  dextrose 50% Injectable 12.5 Gram(s) IV Push once  dextrose 50% Injectable 25 Gram(s) IV Push once  dextrose 50% Injectable 25 Gram(s) IV Push once  ertapenem  IVPB 1000 milliGRAM(s) IV Intermittent every 24 hours  famotidine    Tablet 20 milliGRAM(s) Oral daily  gabapentin 100 milliGRAM(s) Oral two times a day  heparin  Injectable 5000 Unit(s) SubCutaneous every 8 hours  influenza   Vaccine 0.5 milliLiter(s) IntraMuscular once  insulin glargine Injectable (LANTUS) 15 Unit(s) SubCutaneous at bedtime  insulin lispro (HumaLOG) corrective regimen sliding scale   SubCutaneous three times a day before meals  insulin lispro (HumaLOG) corrective regimen sliding scale   SubCutaneous at bedtime  insulin lispro Injectable (HumaLOG) 8 Unit(s) SubCutaneous three times a day before meals  lactobacillus acidophilus 1 Tablet(s) Oral three times a day  levothyroxine 25 MICROGram(s) Oral daily  metoprolol tartrate 12.5 milliGRAM(s) Oral two times a day  nystatin Cream 1 Application(s) Topical two times a day      PHYSICAL EXAM:  T(C): 37 (12-22-19 @ 20:03), Max: 37.1 (12-22-19 @ 16:03)  HR: 66 (12-22-19 @ 20:03) (66 - 70)  BP: 124/76 (12-22-19 @ 20:03) (124/76 - 138/82)  RR: 18 (12-22-19 @ 20:03) (16 - 18)  SpO2: 97% (12-22-19 @ 20:03) (97% - 99%)  Wt(kg): --  I&O's Summary    21 Dec 2019 07:01  -  22 Dec 2019 07:00  --------------------------------------------------------  IN: 1020 mL / OUT: 950 mL / NET: 70 mL    22 Dec 2019 07:01  -  22 Dec 2019 21:55  --------------------------------------------------------  IN: 900 mL / OUT: 550 mL / NET: 350 mL          Appearance: Normal	  HEENT:   Normal oral mucosa, PERRL, EOMI	  Lymphatic: No lymphadenopathy , no edema  Cardiovascular: Normal S1 S2, No JVD, No murmurs , Peripheral pulses palpable 2+ bilaterally  Respiratory: Lungs clear to auscultation, normal effort 	  Gastrointestinal:  Soft, Non-tender, + BS	  Skin: No rashes, No ecchymoses, No cyanosis, warm to touch  Musculoskeletal: Normal range of motion, normal strength  Psychiatry:  Mood & affect appropriate  Ext: LE edema       All labs, Imaging and EKGs personally reviewed                           10.1   6.51  )-----------( 272      ( 21 Dec 2019 09:58 )             32.6               12-21    131<L>  |  96  |  24<H>  ----------------------------<  181<H>  4.3   |  26  |  1.05    Ca    9.0      21 Dec 2019 07:27

## 2019-12-23 ENCOUNTER — TRANSCRIPTION ENCOUNTER (OUTPATIENT)
Age: 84
End: 2019-12-23

## 2019-12-23 VITALS
TEMPERATURE: 98 F | OXYGEN SATURATION: 96 % | SYSTOLIC BLOOD PRESSURE: 136 MMHG | RESPIRATION RATE: 18 BRPM | HEART RATE: 66 BPM | DIASTOLIC BLOOD PRESSURE: 90 MMHG

## 2019-12-23 LAB
ANION GAP SERPL CALC-SCNC: 7 MMOL/L — SIGNIFICANT CHANGE UP (ref 5–17)
BUN SERPL-MCNC: 25 MG/DL — HIGH (ref 7–23)
CALCIUM SERPL-MCNC: 9.2 MG/DL — SIGNIFICANT CHANGE UP (ref 8.4–10.5)
CHLORIDE SERPL-SCNC: 103 MMOL/L — SIGNIFICANT CHANGE UP (ref 96–108)
CO2 SERPL-SCNC: 28 MMOL/L — SIGNIFICANT CHANGE UP (ref 22–31)
CREAT SERPL-MCNC: 1.16 MG/DL — SIGNIFICANT CHANGE UP (ref 0.5–1.3)
GLUCOSE BLDC GLUCOMTR-MCNC: 190 MG/DL — HIGH (ref 70–99)
GLUCOSE BLDC GLUCOMTR-MCNC: 247 MG/DL — HIGH (ref 70–99)
GLUCOSE SERPL-MCNC: 214 MG/DL — HIGH (ref 70–99)
POTASSIUM SERPL-MCNC: 4.6 MMOL/L — SIGNIFICANT CHANGE UP (ref 3.5–5.3)
POTASSIUM SERPL-SCNC: 4.6 MMOL/L — SIGNIFICANT CHANGE UP (ref 3.5–5.3)
SODIUM SERPL-SCNC: 138 MMOL/L — SIGNIFICANT CHANGE UP (ref 135–145)

## 2019-12-23 PROCEDURE — 84156 ASSAY OF PROTEIN URINE: CPT

## 2019-12-23 PROCEDURE — 84132 ASSAY OF SERUM POTASSIUM: CPT

## 2019-12-23 PROCEDURE — 97110 THERAPEUTIC EXERCISES: CPT

## 2019-12-23 PROCEDURE — C1751: CPT

## 2019-12-23 PROCEDURE — 83735 ASSAY OF MAGNESIUM: CPT

## 2019-12-23 PROCEDURE — 83605 ASSAY OF LACTIC ACID: CPT

## 2019-12-23 PROCEDURE — 86780 TREPONEMA PALLIDUM: CPT

## 2019-12-23 PROCEDURE — 85027 COMPLETE CBC AUTOMATED: CPT

## 2019-12-23 PROCEDURE — 36569 INSJ PICC 5 YR+ W/O IMAGING: CPT

## 2019-12-23 PROCEDURE — 93005 ELECTROCARDIOGRAM TRACING: CPT

## 2019-12-23 PROCEDURE — 84443 ASSAY THYROID STIM HORMONE: CPT

## 2019-12-23 PROCEDURE — 82550 ASSAY OF CK (CPK): CPT

## 2019-12-23 PROCEDURE — 82435 ASSAY OF BLOOD CHLORIDE: CPT

## 2019-12-23 PROCEDURE — 82803 BLOOD GASES ANY COMBINATION: CPT

## 2019-12-23 PROCEDURE — 97162 PT EVAL MOD COMPLEX 30 MIN: CPT

## 2019-12-23 PROCEDURE — 81001 URINALYSIS AUTO W/SCOPE: CPT

## 2019-12-23 PROCEDURE — 71045 X-RAY EXAM CHEST 1 VIEW: CPT

## 2019-12-23 PROCEDURE — 94640 AIRWAY INHALATION TREATMENT: CPT

## 2019-12-23 PROCEDURE — 83935 ASSAY OF URINE OSMOLALITY: CPT

## 2019-12-23 PROCEDURE — 84295 ASSAY OF SERUM SODIUM: CPT

## 2019-12-23 PROCEDURE — 84100 ASSAY OF PHOSPHORUS: CPT

## 2019-12-23 PROCEDURE — 82607 VITAMIN B-12: CPT

## 2019-12-23 PROCEDURE — 76770 US EXAM ABDO BACK WALL COMP: CPT

## 2019-12-23 PROCEDURE — 85014 HEMATOCRIT: CPT

## 2019-12-23 PROCEDURE — 71100 X-RAY EXAM RIBS UNI 2 VIEWS: CPT | Mod: 26

## 2019-12-23 PROCEDURE — 97530 THERAPEUTIC ACTIVITIES: CPT

## 2019-12-23 PROCEDURE — 80053 COMPREHEN METABOLIC PANEL: CPT

## 2019-12-23 PROCEDURE — 82436 ASSAY OF URINE CHLORIDE: CPT

## 2019-12-23 PROCEDURE — 93306 TTE W/DOPPLER COMPLETE: CPT

## 2019-12-23 PROCEDURE — 87086 URINE CULTURE/COLONY COUNT: CPT

## 2019-12-23 PROCEDURE — 87186 SC STD MICRODIL/AGAR DIL: CPT

## 2019-12-23 PROCEDURE — 82746 ASSAY OF FOLIC ACID SERUM: CPT

## 2019-12-23 PROCEDURE — 72197 MRI PELVIS W/O & W/DYE: CPT

## 2019-12-23 PROCEDURE — 87184 SC STD DISK METHOD PER PLATE: CPT

## 2019-12-23 PROCEDURE — 82962 GLUCOSE BLOOD TEST: CPT

## 2019-12-23 PROCEDURE — 72195 MRI PELVIS W/O DYE: CPT

## 2019-12-23 PROCEDURE — 99285 EMERGENCY DEPT VISIT HI MDM: CPT

## 2019-12-23 PROCEDURE — 83690 ASSAY OF LIPASE: CPT

## 2019-12-23 PROCEDURE — 80048 BASIC METABOLIC PNL TOTAL CA: CPT

## 2019-12-23 PROCEDURE — 84540 ASSAY OF URINE/UREA-N: CPT

## 2019-12-23 PROCEDURE — 82570 ASSAY OF URINE CREATININE: CPT

## 2019-12-23 PROCEDURE — 82947 ASSAY GLUCOSE BLOOD QUANT: CPT

## 2019-12-23 PROCEDURE — 71100 X-RAY EXAM RIBS UNI 2 VIEWS: CPT

## 2019-12-23 PROCEDURE — 74176 CT ABD & PELVIS W/O CONTRAST: CPT

## 2019-12-23 PROCEDURE — 84300 ASSAY OF URINE SODIUM: CPT

## 2019-12-23 PROCEDURE — 85652 RBC SED RATE AUTOMATED: CPT

## 2019-12-23 PROCEDURE — 83036 HEMOGLOBIN GLYCOSYLATED A1C: CPT

## 2019-12-23 PROCEDURE — 80202 ASSAY OF VANCOMYCIN: CPT

## 2019-12-23 PROCEDURE — 87150 DNA/RNA AMPLIFIED PROBE: CPT

## 2019-12-23 PROCEDURE — 87040 BLOOD CULTURE FOR BACTERIA: CPT

## 2019-12-23 PROCEDURE — 84133 ASSAY OF URINE POTASSIUM: CPT

## 2019-12-23 PROCEDURE — 82330 ASSAY OF CALCIUM: CPT

## 2019-12-23 PROCEDURE — A9585: CPT

## 2019-12-23 PROCEDURE — 86140 C-REACTIVE PROTEIN: CPT

## 2019-12-23 RX ORDER — ATORVASTATIN CALCIUM 80 MG/1
1 TABLET, FILM COATED ORAL
Qty: 0 | Refills: 0 | DISCHARGE
Start: 2019-12-23

## 2019-12-23 RX ORDER — NYSTATIN CREAM 100000 [USP'U]/G
1 CREAM TOPICAL
Qty: 0 | Refills: 0 | DISCHARGE
Start: 2019-12-23

## 2019-12-23 RX ORDER — LANOLIN ALCOHOL/MO/W.PET/CERES
1 CREAM (GRAM) TOPICAL
Qty: 0 | Refills: 0 | DISCHARGE
Start: 2019-12-23

## 2019-12-23 RX ORDER — LISINOPRIL 2.5 MG/1
1 TABLET ORAL
Qty: 0 | Refills: 0 | DISCHARGE

## 2019-12-23 RX ORDER — SPIRONOLACTONE 25 MG/1
1 TABLET, FILM COATED ORAL
Qty: 0 | Refills: 0 | DISCHARGE

## 2019-12-23 RX ORDER — ERTAPENEM SODIUM 1 G/1
1 INJECTION, POWDER, LYOPHILIZED, FOR SOLUTION INTRAMUSCULAR; INTRAVENOUS
Qty: 0 | Refills: 0 | DISCHARGE
Start: 2019-12-23

## 2019-12-23 RX ORDER — INSULIN LISPRO 100/ML
4 VIAL (ML) SUBCUTANEOUS
Qty: 0 | Refills: 0 | DISCHARGE

## 2019-12-23 RX ORDER — LACTOBACILLUS ACIDOPHILUS 100MM CELL
1 CAPSULE ORAL
Qty: 0 | Refills: 0 | DISCHARGE
Start: 2019-12-23

## 2019-12-23 RX ORDER — INSULIN GLARGINE 100 [IU]/ML
12 INJECTION, SOLUTION SUBCUTANEOUS
Qty: 0 | Refills: 0 | DISCHARGE

## 2019-12-23 RX ORDER — ACETAMINOPHEN 500 MG
2 TABLET ORAL
Qty: 0 | Refills: 0 | DISCHARGE
Start: 2019-12-23

## 2019-12-23 RX ORDER — HEPARIN SODIUM 5000 [USP'U]/ML
5000 INJECTION INTRAVENOUS; SUBCUTANEOUS
Qty: 0 | Refills: 0 | DISCHARGE
Start: 2019-12-23

## 2019-12-23 RX ADMIN — CHLORHEXIDINE GLUCONATE 1 APPLICATION(S): 213 SOLUTION TOPICAL at 05:42

## 2019-12-23 RX ADMIN — CLOPIDOGREL BISULFATE 75 MILLIGRAM(S): 75 TABLET, FILM COATED ORAL at 11:41

## 2019-12-23 RX ADMIN — Medication 1 TABLET(S): at 11:41

## 2019-12-23 RX ADMIN — HEPARIN SODIUM 5000 UNIT(S): 5000 INJECTION INTRAVENOUS; SUBCUTANEOUS at 05:36

## 2019-12-23 RX ADMIN — GABAPENTIN 100 MILLIGRAM(S): 400 CAPSULE ORAL at 05:36

## 2019-12-23 RX ADMIN — ERTAPENEM SODIUM 120 MILLIGRAM(S): 1 INJECTION, POWDER, LYOPHILIZED, FOR SOLUTION INTRAMUSCULAR; INTRAVENOUS at 05:42

## 2019-12-23 RX ADMIN — Medication 1: at 08:21

## 2019-12-23 RX ADMIN — NYSTATIN CREAM 1 APPLICATION(S): 100000 CREAM TOPICAL at 08:22

## 2019-12-23 RX ADMIN — Medication 12.5 MILLIGRAM(S): at 05:36

## 2019-12-23 RX ADMIN — Medication 25 MICROGRAM(S): at 05:36

## 2019-12-23 RX ADMIN — Medication 1 TABLET(S): at 05:36

## 2019-12-23 RX ADMIN — FAMOTIDINE 20 MILLIGRAM(S): 10 INJECTION INTRAVENOUS at 11:41

## 2019-12-23 RX ADMIN — Medication 81 MILLIGRAM(S): at 11:42

## 2019-12-23 RX ADMIN — Medication 2: at 12:19

## 2019-12-23 RX ADMIN — Medication 20 MILLIGRAM(S): at 11:41

## 2019-12-23 RX ADMIN — Medication 8 UNIT(S): at 08:21

## 2019-12-23 RX ADMIN — Medication 8 UNIT(S): at 12:20

## 2019-12-23 NOTE — PROGRESS NOTE ADULT - ATTENDING COMMENTS
Above noted  Surgical input noted  Need for offloading of sacral wound discussed with patient
For any question, call:  Cell # [phone]  Pager # [phone]  Callback # [phone]
For any question, call:  Cell # [phone]  Pager # [phone]  Callback # [phone]
For any question, call:  Cell # [phone]  Pager # [phone]  Callback # [phone]
For any question, call:  Cell # [phone]  Pager # [phone]  Callback # [phone]
Advanced care planning was discussed with patient and family.  Advanced care planning forms were reviewed and discussed.  Differential diagnosis and plan of care discussed with patient after the evaluation.   Pain assessed and judicious use of narcotics when appropriate was discussed.  Importance of Fall prevention discussed.  Counseling on Smoking and Alcohol cessation was offered when appropriate.  Counseling on Diet, exercise, and medication compliance was done.
Advanced care planning was discussed with patient and family.  Advanced care planning forms were reviewed and discussed.
Advanced care planning was discussed with patient and family.  Advanced care planning forms were reviewed and discussed.
Advanced care planning was discussed with patient and family.  Advanced care planning forms were reviewed and discussed.      D/C Planing
Advanced care planning was discussed with patient and family.  Advanced care planning forms were reviewed and discussed.    patient cont to refuse PICC line
Advanced care planning was discussed with patient and family.  Advanced care planning forms were reviewed and discussed.    patient cont to refuse PICC line  awaiting for her son to return from [place]
Advanced care planning was discussed with patient and family.  Advanced care planning forms were reviewed and discussed.    patient cont to refuse PICC line  plan for midline placement instead of PICC   awaiting for her son to return from [place]
Advanced care planning was discussed with patient and family.  Advanced care planning forms were reviewed and discussed.  Differential diagnosis and plan of care discussed with patient after the evaluation.   Pain assessed and judicious use of narcotics when appropriate was discussed.  Importance of Fall prevention discussed.  Counseling on Smoking and Alcohol cessation was offered when appropriate.  Counseling on Diet, exercise, and medication compliance was done.

## 2019-12-23 NOTE — DISCHARGE NOTE NURSING/CASE MANAGEMENT/SOCIAL WORK - PATIENT PORTAL LINK FT
You can access the FollowMyHealth Patient Portal offered by [facility] by registering at the following website: [email or web address]. By joining [employer]’s FollowMyHealth portal, you will also be able to view your health information using other applications (apps) compatible with our system.

## 2019-12-23 NOTE — PROGRESS NOTE ADULT - PROBLEM SELECTOR PLAN 1
positive blood Cx  ID eval appreciated  ABx as per ID, adjusted   Surg F/U appreciated  MRI noted, patient refused contrast, limited study  OM can not be ruled out  repeat MRI noted, ? osteo   Abx duration to be discussed  repeat blood Cx till negative, will plan for PICC Line placement for long term ABx  patient is refusing PICC line  Psych eval appreciated, no capacity   S/P PICC line  D/C planing to rehab

## 2019-12-23 NOTE — CHART NOTE - NSCHARTNOTEFT_GEN_A_CORE
follow up- pt is cleared by MD for discharge to rehab; discussed discharge medication/follow up.  Alexandria Lozada(NP)  [address] [facility], [phone]

## 2019-12-23 NOTE — PROGRESS NOTE ADULT - REASON FOR ADMISSION
Sepsis 2/2 Cellulitis

## 2019-12-23 NOTE — PROGRESS NOTE ADULT - SUBJECTIVE AND OBJECTIVE BOX
[facility] - WALLACE Lagos / WALLACE Gibson / ANN Barrett/ WALLACE Chin/ WALLACE Kumari/ ESPINOZA Bone / GABRIEL Geronimo / ALEXUS Kaur  ---------------------------------------------------------------------------------------------------------------  seen and examined today for ROB on CKD  Interval : NAD  VITALS:  T(F): 98.4 (12-23-19 @ 05:16), Max: 98.7 (12-22-19 @ 16:03)  HR: 66 (12-23-19 @ 05:16)  BP: 132/77 (12-23-19 @ 05:16)  RR: 18 (12-23-19 @ 05:16)  SpO2: 99% (12-23-19 @ 05:16)  Wt(kg): --    12-22 @ 07:01  -  12-23 @ 07:00  --------------------------------------------------------  IN: 1140 mL / OUT: 2425 mL / NET: -1285 mL      Physical Exam :-  Constitutional: NAD  Neck: Supple.  Respiratory: Bilateral equal breath sounds,  Cardiovascular: S1, S2 normal,  Gastrointestinal: Bowel Sounds present, soft, non tender.  Extremities: 1+ edema  Neurological: Alert and Oriented x 3, no focal deficits  Psychiatric: Normal mood, normal affect  Data:-  Allergies :   codeine (Unknown)  Kiwi (Anaphylaxis)  penicillins (Anaphylaxis)    Hospital Medications:   MEDICATIONS  (STANDING):  aspirin enteric coated 81 milliGRAM(s) Oral daily  atorvastatin 40 milliGRAM(s) Oral at bedtime  chlorhexidine 4% Liquid 1 Application(s) Topical daily  clopidogrel Tablet 75 milliGRAM(s) Oral daily  dextrose 5%. 1000 milliLiter(s) (50 mL/Hr) IV Continuous <Continuous>  dextrose 50% Injectable 12.5 Gram(s) IV Push once  dextrose 50% Injectable 25 Gram(s) IV Push once  dextrose 50% Injectable 25 Gram(s) IV Push once  ertapenem  IVPB 1000 milliGRAM(s) IV Intermittent every 24 hours  famotidine    Tablet 20 milliGRAM(s) Oral daily  furosemide    Tablet 20 milliGRAM(s) Oral daily  gabapentin 100 milliGRAM(s) Oral two times a day  heparin  Injectable 5000 Unit(s) SubCutaneous every 8 hours  influenza   Vaccine 0.5 milliLiter(s) IntraMuscular once  insulin glargine Injectable (LANTUS) 15 Unit(s) SubCutaneous at bedtime  insulin lispro (HumaLOG) corrective regimen sliding scale   SubCutaneous three times a day before meals  insulin lispro (HumaLOG) corrective regimen sliding scale   SubCutaneous at bedtime  insulin lispro Injectable (HumaLOG) 8 Unit(s) SubCutaneous three times a day before meals  lactobacillus acidophilus 1 Tablet(s) Oral three times a day  levothyroxine 25 MICROGram(s) Oral daily  metoprolol tartrate 12.5 milliGRAM(s) Oral two times a day  nystatin Cream 1 Application(s) Topical two times a day    12-23    138  |  103  |  25<H>  ----------------------------<  214<H>  4.6   |  28  |  1.16    Ca    9.2      23 Dec 2019 07:07      Creatinine Trend: 1.16 <--, 1.05 <--, 0.96 <--, 0.92 <--

## 2019-12-23 NOTE — PROGRESS NOTE ADULT - NSHPATTENDINGPLANDISCUSS_GEN_ALL_CORE
Dr. Boo
Dr. Boo, Medicine NP
Dr. Boo, Medicine NP
Medicine
Medicine
house staff
house staff, daughter at the bedside

## 2019-12-23 NOTE — PROGRESS NOTE ADULT - SUBJECTIVE AND OBJECTIVE BOX
[facility]    Subjective: Patient seen and examined. No new events except as noted.   doing well   DC planing today after rib series for rib pain     REVIEW OF SYSTEMS:    CONSTITUTIONAL: No weakness, fevers or chills  EYES/ENT: No visual changes;  No vertigo or throat pain   NECK: No pain or stiffness  RESPIRATORY: No cough, wheezing, hemoptysis; No shortness of breath  CARDIOVASCULAR: No chest pain or palpitations  GASTROINTESTINAL: No abdominal or epigastric pain. No nausea, vomiting, or hematemesis; No diarrhea or constipation. No melena or hematochezia.  GENITOURINARY: No dysuria, frequency or hematuria  NEUROLOGICAL: No numbness or weakness  SKIN: No itching, burning, rashes, or lesions   All other review of systems is negative unless indicated above.    MEDICATIONS:  MEDICATIONS  (STANDING):  aspirin enteric coated 81 milliGRAM(s) Oral daily  atorvastatin 40 milliGRAM(s) Oral at bedtime  chlorhexidine 4% Liquid 1 Application(s) Topical daily  clopidogrel Tablet 75 milliGRAM(s) Oral daily  dextrose 5%. 1000 milliLiter(s) (50 mL/Hr) IV Continuous <Continuous>  dextrose 50% Injectable 12.5 Gram(s) IV Push once  dextrose 50% Injectable 25 Gram(s) IV Push once  dextrose 50% Injectable 25 Gram(s) IV Push once  ertapenem  IVPB 1000 milliGRAM(s) IV Intermittent every 24 hours  famotidine    Tablet 20 milliGRAM(s) Oral daily  furosemide    Tablet 20 milliGRAM(s) Oral daily  gabapentin 100 milliGRAM(s) Oral two times a day  heparin  Injectable 5000 Unit(s) SubCutaneous every 8 hours  influenza   Vaccine 0.5 milliLiter(s) IntraMuscular once  insulin glargine Injectable (LANTUS) 15 Unit(s) SubCutaneous at bedtime  insulin lispro (HumaLOG) corrective regimen sliding scale   SubCutaneous three times a day before meals  insulin lispro (HumaLOG) corrective regimen sliding scale   SubCutaneous at bedtime  insulin lispro Injectable (HumaLOG) 8 Unit(s) SubCutaneous three times a day before meals  lactobacillus acidophilus 1 Tablet(s) Oral three times a day  levothyroxine 25 MICROGram(s) Oral daily  metoprolol tartrate 12.5 milliGRAM(s) Oral two times a day  nystatin Cream 1 Application(s) Topical two times a day      PHYSICAL EXAM:  T(C): 36.7 (12-23-19 @ 13:51), Max: 37.1 (12-22-19 @ 16:03)  HR: 66 (12-23-19 @ 13:51) (66 - 70)  BP: 136/90 (12-23-19 @ 13:51) (124/76 - 136/90)  RR: 18 (12-23-19 @ 13:51) (18 - 18)  SpO2: 96% (12-23-19 @ 13:51) (96% - 99%)  Wt(kg): --  I&O's Summary    22 Dec 2019 07:01  -  23 Dec 2019 07:00  --------------------------------------------------------  IN: 1140 mL / OUT: 2425 mL / NET: -1285 mL    23 Dec 2019 07:01  -  23 Dec 2019 15:53  --------------------------------------------------------  IN: 0 mL / OUT: 650 mL / NET: -650 mL          Appearance: Normal	  HEENT:   Normal oral mucosa, PERRL, EOMI	  Lymphatic: No lymphadenopathy , no edema  Cardiovascular: Normal S1 S2  Respiratory: Lungs clear to auscultation, normal effort. rib pain   Gastrointestinal:  Soft, Non-tender, + BS	  Skin: No rashes, No ecchymoses, No cyanosis, warm to touch  Musculoskeletal: Normal range of motion, normal strength  Psychiatry:  Mood & affect appropriate  Ext: No edema      All labs, Imaging and EKGs personally reviewed                   12-23    138  |  103  |  25<H>  ----------------------------<  214<H>  4.6   |  28  |  1.16    Ca    9.2      23 Dec 2019 07:07

## 2019-12-23 NOTE — CHART NOTE - NSCHARTNOTEFT_GEN_A_CORE
follow up- informed by Pt daughter pt reported to her that a few days ago during transfer pt felt some pain at the left lower rib area and pt still c/o pain with movement; per d/w md, will do left rib series to r/o any fracture;  on Exam, no redness or ecchymosis at the left rib area; non tender, when moved pt states its hurting.  pt is resting in bed comfortably; NAD; pending discharge to rehab.

## 2019-12-23 NOTE — PROGRESS NOTE ADULT - PROBLEM SELECTOR PLAN 1
Arlin resolved S/P infante catheter  F/U Urology recs  hyperkalemia and hyponatremia resolved  metabolic acidosis improved-- d/c po bicarb  monitor urine output  OK to restart Lasix 20 QD, can hold ACEI since BP well managed without  trend bmp

## 2019-12-23 NOTE — PROGRESS NOTE ADULT - SUBJECTIVE AND OBJECTIVE BOX
Patient is a 86y old  Female who presents with a chief complaint of Sepsis 2/2 Cellulitis (23 Dec 2019 11:09)      INTERVAL HISTORY: MSK cp      	  MEDICATIONS:  EPINEPHrine     1 mG/mL Injectable 0.3 milliGRAM(s) IntraMuscular once PRN  furosemide    Tablet 20 milliGRAM(s) Oral daily  metoprolol tartrate 12.5 milliGRAM(s) Oral two times a day        PHYSICAL EXAM:  T(C): 36.7 (12-23-19 @ 13:51), Max: 37.1 (12-22-19 @ 16:03)  HR: 66 (12-23-19 @ 13:51) (66 - 70)  BP: 136/90 (12-23-19 @ 13:51) (124/76 - 136/90)  RR: 18 (12-23-19 @ 13:51) (18 - 18)  SpO2: 96% (12-23-19 @ 13:51) (96% - 99%)  Wt(kg): --  I&O's Summary    22 Dec 2019 07:01  -  23 Dec 2019 07:00  --------------------------------------------------------  IN: 1140 mL / OUT: 2425 mL / NET: -1285 mL    23 Dec 2019 07:01  -  23 Dec 2019 15:45  --------------------------------------------------------  IN: 0 mL / OUT: 650 mL / NET: -650 mL          Appearance: In no distress	  HEENT:    PERRL, EOMI	  Cardiovascular:  S1 S2, No JVD  Respiratory: Lungs clear to auscultation	  Gastrointestinal:  Soft, Non-tender, + BS	  Extremities:  No edema of LE            12-23    138  |  103  |  25<H>  ----------------------------<  214<H>  4.6   |  28  |  1.16    Ca    9.2      23 Dec 2019 07:07          Labs personally reviewed      Assessment /Plan:   Chronic diastolic HF - euvolemic, off Lasix  CAD - c/w DAPT, Metoprolol and ASA  HTN - well controlled        Srini Velasco DO [facility]  Cardiovascular Medicine  [address] [place] [address], [address]  Office: [phone]  Cell: [phone]

## 2019-12-29 NOTE — CHART NOTE - NSCHARTNOTESELECT_GEN_ALL_CORE
Speech Language Pathology Treatment  MODIFIED BARIUM SWALLOW STUDY    Patient Name:  Ashley Koenig   MRN:  [id number]  Admitting Diagnosis: Pneumonia of right middle lobe due to infectious organism    Recommendations:                 General Recommendations:  SWALLOWING THERAPY; ASPIRATION PRECAUTIONS  Diet recommendations:  SOFT MECHANICAL CONSISTENCY DIET AND THIN LIQUIDS  Aspiration Precautions: IN PLACE  General Precautions: Standard, aspiration  Communication strategies:  Verbal    Subjective     Pt was brought down to the Radiology dept for a MBSS  Patient goals: to eat    Pain/Comfort:  ·  0/10    Objective:     Has the patient been evaluated by SLP for swallowing?   Yes  Keep patient NPO? No  Current Respiratory Status:    NC    Pt was presented with the following consistencies for the MBSS:  Thin Liquids: Pt with min swallow delay 3-5 seconds with pooling in to valleculae and pyriform sinus with clearance with successive swallow. No supraglottic penetration or aspiration noted.  Nectar Thick Liquids: Pt with a 3 second delay in initiation of swallow with minimal residue to valleculae and pyriform sinus with clearance with successive swallow.  No supraglottic penetration or aspiration noted.  Pudding Consistency:  Pt with a 3 second delay in initiation of swallow with minimal residue to valleculae and pyriform sinus with clearance with successive swallow.  No supraglottic penetration or aspiration noted.  Cracker: Pt with about a 10 second delay before initiation of swallow with minimal residue to pyriform sinus with clearance with successive swallow.  No supraglottic penetration or aspiration noted.  Pt with overall minimally  decreased tongue base retraction and oral motor skills.    Assessment:     Ashley Koenig is a 89 y.o. female with an SLP diagnosis of Dysphagia with minimal swallow delays but NO aspiration or supraglottic penetration on any consistency per MBSS.  Pt was recommended for a Soft  Mechanical consistency diet and thin liquids with basic swallow precautions which were reviewed with the patient and her family who were in room.    Goals:   Multidisciplinary Problems     SLP Goals        Problem: SLP Goal    Goal Priority Disciplines Outcome   SLP Goal     SLP Ongoing, Progressing   Description:  LTG:  Pt will tolerate least restrictive diet consistency safely and efficiently.    ST. MBSS to further assess swallowing- MBSS completed 19 with no aspiration and recommendation for a Soft Mechanical Consistency diet and thin liquids with basic swallowing precaution. Results and Recs reviewed with pt and her family.              2. Laryngeal and Pharyngeal exercises x20 to improve swallowing.               3. Oral Motor tasks x20  Reassess goal by Bennett 3, 2019                     Plan:     · Patient to be seen:      · Plan of Care expires:   2020  · Plan of Care reviewed with:  Patient; grandson  · SLP Follow-Up:     Yes; continued S.T. For swallowing     Discharge recommendations:      Barriers to Discharge:  Will need assistance    Time Tracking:     SLP Treatment Date:   19  Speech Start Time:  1135  Speech Stop Time:  1205  Speech Total Time (min):  30 min    Billable Minutes: 30 minutes    Araceli Garcia CCC-SLP  2019   Nutrition Services

## 2020-02-18 ENCOUNTER — APPOINTMENT (OUTPATIENT)
Dept: HOME HEALTH SERVICES | Facility: HOME HEALTH | Age: 85
End: 2020-02-18
Payer: MEDICARE

## 2020-02-18 VITALS
RESPIRATION RATE: 17 BRPM | SYSTOLIC BLOOD PRESSURE: 120 MMHG | OXYGEN SATURATION: 98 % | HEART RATE: 76 BPM | DIASTOLIC BLOOD PRESSURE: 80 MMHG | TEMPERATURE: 97.3 F

## 2020-02-18 DIAGNOSIS — Z87.898 PERSONAL HISTORY OF OTHER SPECIFIED CONDITIONS: ICD-10-CM

## 2020-02-18 PROCEDURE — 99498 ADVNCD CARE PLAN ADDL 30 MIN: CPT | Mod: 25

## 2020-02-18 PROCEDURE — 99350 HOME/RES VST EST HIGH MDM 60: CPT | Mod: 25

## 2020-02-18 PROCEDURE — 99497 ADVNCD CARE PLAN 30 MIN: CPT

## 2020-02-18 RX ORDER — LISINOPRIL 2.5 MG/1
2.5 TABLET ORAL DAILY
Refills: 0 | Status: COMPLETED | COMMUNITY
Start: 2019-11-08 | End: 2020-02-18

## 2020-02-18 RX ORDER — FUROSEMIDE 20 MG/1
20 TABLET ORAL
Qty: 180 | Refills: 3 | Status: COMPLETED | COMMUNITY
Start: 2017-08-21 | End: 2020-02-18

## 2020-02-18 RX ORDER — ATORVASTATIN CALCIUM 80 MG/1
80 TABLET, FILM COATED ORAL
Qty: 90 | Refills: 3 | Status: COMPLETED | COMMUNITY
Start: 2017-06-16 | End: 2020-02-18

## 2020-02-18 RX ORDER — SPIRONOLACTONE 25 MG/1
25 TABLET ORAL
Qty: 90 | Refills: 2 | Status: COMPLETED | COMMUNITY
Start: 2017-06-16 | End: 2020-02-18

## 2020-02-18 NOTE — HISTORY OF PRESENT ILLNESS
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 rehab discontinued spironolactone, lasix, atorvastatin and lisinopril. infante was also placed for recovery of sacral wound, removed last week and patient is voiding freely.\par \par Patient has been feeling much better with resolution of bone pain, and fever. she is able to walk with walker. VNS RN coming in to change dressing today, however daughter would like to switch to home care LI.\par no fever, chills, nausea, vomiting, SOB.\par appetite is improving, continues to tolerate food. eats regular food no swallowing problems.\par BM regular- sometimes have diarrhoea alternating with constipation, been like this for years. last BM today\par continent with bowels/bladder, wears depends, no urinary symptoms, voiding well\par sleeps well except when wakes up to pee 2-3 times/night\par no memory problems, however developed delirium at hospital as per the chart notes\par mood stable\par no recent falls, last fall 2 years ago, using walker\par has one wound right buttock which is dressed daily\par \par DM type 2 w/ complications- neuropathy/retinopathy/nephropathy- x 30 years, not controlled, patient states her baseline FS is around 63- 200 range. patient does not have regular eating schedule. last hba1c was 10.0, lantus was increased that led to frequent episodes of hypoglycemia. follows ophthalmology and podiatry. cannot feel legs due to neuropathy. follows endocrinology.\par \par CAD s/p stent, CHF, Angina- EF -23%, recent echo by cardiologist. baseline weight is 122 lbs. gets occasional chest pains yang when stressed or when walks outside or with cold air. uses nitro when needed.\par \par recurrent UTIs- multiple, last one in 11/2019 while at hospital.\par ----------\par lives with daughter and grand son, independent with ADLs

## 2020-02-18 NOTE — COUNSELING
[Improve exercise tolerance] : improve exercise tolerance [Improve mobility] : improve mobility [Improve weight] : improve weight [Decrease stress] : decrease stress [Decrease hospital use] : decrease hospital use [Comfort Care] : comfort care [Minimize unnecessary interventions] : minimize unnecessary interventions [Discussed disease trajectory with patient/caregiver] : discussed disease trajectory with patient/caregiver [Maintain functional ability] : maintain functional ability [Likely to achieve goals/desired outcomes] : likely to achieve goals/desired outcomes [Patient/Caregiver has ___ understanding of disease process] : patient/caregiver has [unfilled] understanding of disease process [Advanced Directives discussed: ____] : Advanced directives discussed: [unfilled] [Limited] : Treatment Guidelines: Limited [DNR] : Code Status: DNR [DNI] : Intubation: DNI [[facility]: _____] [facility]last [facility] date: [unfilled] [Normal Weight (BMI <25)] : normal weight - BMI <25 [DASH diet given] : DASH diet given [Medical alert] : medical alert [Non - Smoker] : non-smoker [Use assistive device to avoid falls] : use assistive device to avoid falls [Use grab bars] : use grab bars [Remove clutter and unsafe carpeting to avoid falls] : remove clutter and unsafe carpeting to avoid falls [Smoke/CO Detectors] : smoke/CO detectors [Not Indicated] : not indicated [FreeTextEntry5] : Mammogram 5/2017

## 2020-02-18 NOTE — PHYSICAL EXAM
[No Acute Distress] : no acute distress [Well Nourished] : well nourished [Well Developed] : well developed [PERRL] : pupils equal, round and reactive to light [EOMI] : extra ocular movement intact [Normal Oropharynx] : the oropharynx was normal [No JVD] : no jugular venous distention [Supple] : the neck was supple [No LAD] : no lymphadenopathy [No Respiratory Distress] : no respiratory distress [Clear to Auscultation] : lungs were clear to auscultation bilaterally [No Accessory Muscle Use] : no accessory muscle use [Regular Rhythm] : with a regular rhythm [Normal Rate] : heart rate was normal  [Normal S1, S2] : normal S1 and S2 [Normal Bowel Sounds] : normal bowel sounds [No Edema] : there was no peripheral edema [Non Tender] : non-tender [Soft] : abdomen soft [Not Distended] : not distended [No CVA Tenderness] : no ~M costovertebral angle tenderness [No Masses] : no abdominal mass palpated [Normal Post Cervical Nodes] : no posterior cervical lymphadenopathy [No Joint Swelling] : no joint swelling seen [No Spinal Tenderness] : no spinal tenderness [No Clubbing, Cyanosis] : no clubbing  or cyanosis of the fingernails [No Skin Lesions] : no skin lesions [No Rash] : no rash [Cranial Nerves Intact] : cranial nerves 2-12 were intact [No Motor Deficits] : the motor exam was normal [Oriented x3] : oriented to person, place, and time [Normal Mood] : the mood was normal [Normal Affect] : the affect was normal [de-identified] : skin color good, awake, interactive, pleasant,  [de-identified] : wears glasses [de-identified] : systolic murmur 3/6,  [de-identified] : 2 x 3 cm open wound with large amount of yellow discharge soiling the dressing inner upper side of right buttock, no signs of infection, no redness or odour [de-identified] : neuropathy B/L LE [de-identified] : slow unstable gait, uses walker [de-identified] : thinks she met provider after 6 months, does not remember details of hospital visit

## 2020-02-18 NOTE — REASON FOR VISIT
[Post Hospitalization] : a post hospitalization visit [Formal Caregiver] : formal caregiver [Pre-Visit Preparation] : pre-visit preparation was done [Intercurrent Specialty/Sub-specialty Visits] : the patient has intercurrent specialty/sub-specialty visits [FreeTextEntry3] : RN ,

## 2020-02-23 ENCOUNTER — EMERGENCY (EMERGENCY)
Facility: HOSPITAL | Age: 85
LOS: 1 days | Discharge: HOME | End: 2020-02-23
Attending: EMERGENCY MEDICINE
Payer: MEDICARE

## 2020-02-23 ENCOUNTER — TRANSCRIPTION ENCOUNTER (OUTPATIENT)
Age: 85
End: 2020-02-23

## 2020-02-23 ENCOUNTER — FORM ENCOUNTER (OUTPATIENT)
Age: 85
End: 2020-02-23

## 2020-02-23 VITALS
HEART RATE: 77 BPM | DIASTOLIC BLOOD PRESSURE: 65 MMHG | TEMPERATURE: 98 F | RESPIRATION RATE: 17 BRPM | OXYGEN SATURATION: 100 % | WEIGHT: 125 LBS | HEIGHT: 60 IN | SYSTOLIC BLOOD PRESSURE: 132 MMHG

## 2020-02-23 DIAGNOSIS — Z98.89 OTHER SPECIFIED POSTPROCEDURAL STATES: Chronic | ICD-10-CM

## 2020-02-23 DIAGNOSIS — Z90.49 ACQUIRED ABSENCE OF OTHER SPECIFIED PARTS OF DIGESTIVE TRACT: Chronic | ICD-10-CM

## 2020-02-23 PROCEDURE — 99284 EMERGENCY DEPT VISIT MOD MDM: CPT

## 2020-02-23 PROCEDURE — 70450 CT HEAD/BRAIN W/O DYE: CPT

## 2020-02-23 PROCEDURE — 70450 CT HEAD/BRAIN W/O DYE: CPT | Mod: 26

## 2020-02-23 PROCEDURE — 99284 EMERGENCY DEPT VISIT MOD MDM: CPT | Mod: 25

## 2020-02-23 NOTE — ED PROVIDER NOTE - PATIENT PORTAL LINK FT
You can access the FollowMyHealth Patient Portal offered by [facility] by registering at the following website: [email or web address]. By joining [employer]’s FollowMyHealth portal, you will also be able to view your health information using other applications (apps) compatible with our system.

## 2020-02-23 NOTE — ED PROVIDER NOTE - CLINICAL SUMMARY MEDICAL DECISION MAKING FREE TEXT BOX
87F anticoagulated on AC w/ headache, head ct r/o hemrrohage, reassess 87F anticoagulated on AC w/ headache, head ct r/o hemrrohage, reassess    ESPINOZA Dawkins MD: Pt is an 88 y/o female w/ PMH CHF, DM, HLD, HTN, CAD on ASA and Plavix who p/w intermittent HAx2 days. States she was recently admitted for cellulitis that had to be debrided, sunsequently went to rehab and was d/c home last week. Per daughter, pt lost her balance in the bathroom, was there with a nurse/aide, pt hit head against the wall. Felt ok at the time. Past 2-3 days, pt experienced intermittent HA to top of head, fleeting, lasting seconds at a time. Told her PCP who told her to go to ED for an evaluation. Pt on ASA and plavix.  Pt denies: chest pain, SOB, cough, fevers, chills, pleuritic chest pain, abdominal pain, n/v/d, back pain, neck pain, neck stiffness, focal numbness or weakness, visual changes, dizziness, lightheadedness, leg pain/swelling, recent travel, recent trauma, recent immobilization, dysuria, hematuria, rash. Pt without any HA at this time. Denies thunderclap HA or worst HA of life. Will image head with CT to r/o acute traumatic injury. Does not want anything for pain right now.

## 2020-02-23 NOTE — ED CLERICAL - NS ED CLERK NOTE PRE-ARRIVAL INFORMATION; ADDITIONAL PRE-ARRIVAL INFORMATION
This patient is enrolled in the [facility] and receives comprehensive home-based primary care.    - To obtain additional clinical information , or to discuss any questions or concerns, you can call the [facility] team at [phone], 24 hours a day.    - If discharged, this patient will be followed up by the [facility] team within 2 days.    - If this patient requires admission, please use the hospitalist service.,

## 2020-02-23 NOTE — ED PROVIDER NOTE - NSFOLLOWUPINSTRUCTIONS_ED_ALL_ED_FT
Headache    A headache is pain or discomfort felt around the head or neck area. The specific cause of a headache may not be found as there are many types including tension headaches, migraine headaches, and cluster headaches. Watch your condition for any changes. Things you can do to manage your pain include taking over the counter and prescription medications as instructed by your health care provider, lying down in a dark quiet room, limiting stress, getting regular sleep, and refraining from alcohol and tobacco products.    SEEK IMMEDIATE MEDICAL CARE IF YOU HAVE ANY OF THE FOLLOWING SYMPTOMS: fever, vomiting, stiff neck, loss of vision, problems with speech, muscle weakness, loss of balance, trouble walking, passing out, or confusion.    - Follow up with your primary care doctor in 1-2 days.    - Bring results with you to the appointment.   - Take tylenol 650mg or motrin 600mg every 6 hours for pain or fever.   - Return to the ED for new or worsening symptoms.

## 2020-02-23 NOTE — ED ADULT TRIAGE NOTE - CHIEF COMPLAINT QUOTE
sharp pain on her head x 2 days. feels like a quick stabbing pain then passes, had two episodes of this pain today   recently discharged from rehab on Monday for a fall 1 week ago

## 2020-02-23 NOTE — ED ADULT NURSE NOTE - CHPI ED NUR SYMPTOMS NEG
no vomiting/no dizziness/no change in level of consciousness/no confusion/no nausea/no loss of consciousness/no fever/no blurred vision

## 2020-02-23 NOTE — ED ADULT NURSE NOTE - OBJECTIVE STATEMENT
pt c/o "HA that started x2 days ago. the pain would come and feel like a sharp stabbing pain and last for about a minute. last night the HA came and went twice and my doctor wanted me to come in." as per pt daughter, pt "was recently dc from rehab and had a fall about a week ago while in the rehab and she hit her head." pt denies any LOC, recurrent falls, lightheadedness, dizziness, HA, chest pain, SOB, abd. pain, n/v/d, tingling at present. pt taking clopidigrel.

## 2020-02-23 NOTE — ED ADULT NURSE NOTE - NSIMPLEMENTINTERV_GEN_ALL_ED
Implemented All Fall with Harm Risk Interventions:  [place] to call system. Call bell, personal items and telephone within reach. Instruct patient to call for assistance. Room bathroom lighting operational. Non-slip footwear when patient is off stretcher. Physically safe environment: no spills, clutter or unnecessary equipment. Stretcher in lowest position, wheels locked, appropriate side rails in place. Provide visual cue, wrist band, yellow gown, etc. Monitor gait and stability. Monitor for mental status changes and reorient to person, place, and time. Review medications for side effects contributing to fall risk. Reinforce activity limits and safety measures with patient and family. Provide visual clues: red socks.

## 2020-02-23 NOTE — ED PROVIDER NOTE - PHYSICAL EXAMINATION
CONSTITUTIONAL: NAD, awake, alert  HEAD: Normocephalic; atraumatic  EYES: EOMI, no nystagmus  ENMT: External appears normal, MMM  CARD: Normal Sl, S2; no audible murmurs  RESP: normal wob, lungs ctab  ABD: soft, non-distended; non-tender  MSK: no edema, normal ROM in all four extremities  SKIN: Warm, dry, no rashes  NEURO: aaox3, moving all extremities spontaneously, 5/5 strength, sensation intact

## 2020-02-23 NOTE — ED ADULT NURSE NOTE - TEMPLATE
Quality 111:Pneumonia Vaccination Status For Older Adults: Pneumococcal Vaccination not Administered or Previously Received, Reason not Otherwise Specified Quality 431: Preventive Care And Screening: Unhealthy Alcohol Use - Screening: Patient screened for unhealthy alcohol use using a single question and scores less than 2 times per year Quality 47: Advance Care Plan: Advance Care Planning discussed and documented in the medical record; patient did not wish or was not able to name a surrogate decision maker or provide an advance care plan. Quality 226: Preventive Care And Screening: Tobacco Use: Screening And Cessation Intervention: Patient screened for tobacco and never smoked Quality 154 Part B: Falls: Risk Screening (Should Be Reported With Measure 155.): Patient screened for future fall risk; documentation of no falls in the past year or only one fall without injury in the past year Quality 155 (Denominator): Falls Plan Of Care: Plan of Care not Documented, Reason not Otherwise Specified Detail Level: Detailed Quality 131: Pain Assessment And Follow-Up: Pain assessment using a standardized tool is documented as negative, no follow-up plan required Quality 110: Preventive Care And Screening: Influenza Immunization: Influenza Immunization previously received during influenza season Quality 154 Part A: Falls: Risk Assessment (Should Be Reported With Measure 155.): Falls risk assessment completed and documented in the past 12 months. Neuro

## 2020-02-23 NOTE — ED PROVIDER NOTE - NS ED ROS FT
General: denies fever, chills  HENT: denies nasal congestion, rhinorrhea  CV: denies chest pain, palpitations  Resp: denies difficulty breathing, cough  Abdominal: denies nausea, vomiting, abdominal pain  MSK: denies muscle aches, leg swelling  Neuro: + headaches, + numbness   Skin: denies rashes, bruises

## 2020-02-23 NOTE — ED PROVIDER NOTE - OBJECTIVE STATEMENT
87F w/ CHF, DM, HLD, HTN, CAD on ASA and Plavix p/w headaches x2 days. States she was admitted for cellulitis that had to be debrided. D/C'ed from rehab last week. Had a fall last week. A few days later started experiencing headaches which are sharp, self resolve, are on the top of the head on the R. Reports R hand numbness which is chronic, no new weakness/numbness.

## 2020-02-24 ENCOUNTER — APPOINTMENT (OUTPATIENT)
Dept: HOME HEALTH SERVICES | Facility: HOME HEALTH | Age: 85
End: 2020-02-24

## 2020-02-24 VITALS
RESPIRATION RATE: 16 BRPM | TEMPERATURE: 97.8 F | OXYGEN SATURATION: 100 % | DIASTOLIC BLOOD PRESSURE: 80 MMHG | HEART RATE: 80 BPM | SYSTOLIC BLOOD PRESSURE: 124 MMHG

## 2020-02-25 LAB
ALBUMIN SERPL ELPH-MCNC: 4 G/DL
ALP BLD-CCNC: 159 U/L
ALT SERPL-CCNC: 16 U/L
ANION GAP SERPL CALC-SCNC: 13 MMOL/L
AST SERPL-CCNC: 26 U/L
BASOPHILS # BLD AUTO: 0.04 K/UL
BASOPHILS NFR BLD AUTO: 0.5 %
BILIRUB SERPL-MCNC: 0.4 MG/DL
BUN SERPL-MCNC: 35 MG/DL
CALCIUM SERPL-MCNC: 9.7 MG/DL
CHLORIDE SERPL-SCNC: 102 MMOL/L
CO2 SERPL-SCNC: 25 MMOL/L
CREAT SERPL-MCNC: 0.97 MG/DL
EOSINOPHIL # BLD AUTO: 0.33 K/UL
EOSINOPHIL NFR BLD AUTO: 4.2 %
ESTIMATED AVERAGE GLUCOSE: 174 MG/DL
HBA1C MFR BLD HPLC: 7.7 %
HCT VFR BLD CALC: 41.7 %
HGB BLD-MCNC: 13.1 G/DL
IMM GRANULOCYTES NFR BLD AUTO: 0.3 %
LYMPHOCYTES # BLD AUTO: 1.53 K/UL
LYMPHOCYTES NFR BLD AUTO: 19.2 %
MAN DIFF?: NORMAL
MCHC RBC-ENTMCNC: 29.1 PG
MCHC RBC-ENTMCNC: 31.4 GM/DL
MCV RBC AUTO: 92.7 FL
MONOCYTES # BLD AUTO: 0.63 K/UL
MONOCYTES NFR BLD AUTO: 7.9 %
NEUTROPHILS # BLD AUTO: 5.4 K/UL
NEUTROPHILS NFR BLD AUTO: 67.9 %
PLATELET # BLD AUTO: 227 K/UL
POTASSIUM SERPL-SCNC: 4.4 MMOL/L
PROT SERPL-MCNC: 6.8 G/DL
RBC # BLD: 4.5 M/UL
RBC # FLD: 17 %
SODIUM SERPL-SCNC: 140 MMOL/L
TSH SERPL-ACNC: 2.67 UIU/ML
WBC # FLD AUTO: 7.95 K/UL

## 2020-03-04 ENCOUNTER — TRANSCRIPTION ENCOUNTER (OUTPATIENT)
Age: 85
End: 2020-03-04

## 2020-03-09 ENCOUNTER — EMERGENCY (EMERGENCY)
Facility: HOSPITAL | Age: 85
LOS: 1 days | Discharge: HOME | End: 2020-03-09
Attending: STUDENT IN AN ORGANIZED HEALTH CARE EDUCATION/TRAINING PROGRAM
Payer: MEDICARE

## 2020-03-09 ENCOUNTER — TRANSCRIPTION ENCOUNTER (OUTPATIENT)
Age: 85
End: 2020-03-09

## 2020-03-09 VITALS
HEART RATE: 100 BPM | DIASTOLIC BLOOD PRESSURE: 81 MMHG | WEIGHT: 110.23 LBS | HEIGHT: 62 IN | OXYGEN SATURATION: 97 % | RESPIRATION RATE: 16 BRPM | SYSTOLIC BLOOD PRESSURE: 152 MMHG

## 2020-03-09 VITALS
OXYGEN SATURATION: 98 % | DIASTOLIC BLOOD PRESSURE: 69 MMHG | SYSTOLIC BLOOD PRESSURE: 102 MMHG | RESPIRATION RATE: 19 BRPM | TEMPERATURE: 99 F | HEART RATE: 75 BPM

## 2020-03-09 DIAGNOSIS — Z90.49 ACQUIRED ABSENCE OF OTHER SPECIFIED PARTS OF DIGESTIVE TRACT: Chronic | ICD-10-CM

## 2020-03-09 DIAGNOSIS — Z98.89 OTHER SPECIFIED POSTPROCEDURAL STATES: Chronic | ICD-10-CM

## 2020-03-09 PROCEDURE — 99283 EMERGENCY DEPT VISIT LOW MDM: CPT

## 2020-03-09 NOTE — ED ADULT NURSE REASSESSMENT NOTE - COMFORT CARE
plan of care explained/darkened lights/warm blanket provided/side rails up/treatment delay explained/wait time explained

## 2020-03-09 NOTE — ED PROVIDER NOTE - NSFOLLOWUPINSTRUCTIONS_ED_ALL_ED_FT
No signs of emergency medical condition on today's workup.  Presumptive diagnosis made, but further evaluation may be required by your primary care doctor or specialist for a definitive diagnosis.  Therefore, follow up as directed and if symptoms change/worsen or any emergency conditions, please return to the ER.     you presumptive diagnosis si an epistaxis (nosebleed).     Epistaxis is the medical term for a nosebleed. Nosebleeds are common and can be caused by many conditions, such as injury, infections, dry environments, medicines, nose picking, and home heating and cooling systems. Try controlling your nosebleed by pinching your nose continuously for at least 10 minutes. Avoid lying down while you are having a nosebleed. Sit up and lean forward. Avoid blowing or sniffing your nose for a number of hours after having a nosebleed. Resume your normal activities as you are able, but avoid straining, lifting, or bending at the waist for several days. Maintain humidity in your home by using less air conditioning or by using a humidifier.     If your nose was packed by your health care provider, keep the packing inside of your nose until a health care provider removes it. If a balloon catheter was used to pack your nose, do not cut or remove it unless your health care provider has instructed you to do that.     Aspirin and blood thinners make bleeding more likely. If you are prescribed these medicines and you suffer from nosebleeds, ask your health care provider if you should stop taking the medicines or adjust the dose. Do not stop medicines unless directed by your health care provider.    SEEK IMMEDIATE MEDICAL CARE IF YOU HAVE ANY OF THE FOLLOWING SYMPTOMS: nosebleed lasting longer than 20 minutes, unusual bleeding from or bruising on other parts of your body, dizziness or lightheadedness, fainting, nosebleed occurring after a head injury, or fever.

## 2020-03-09 NOTE — ED ADULT NURSE NOTE - OBJECTIVE STATEMENT
Patient   is  alert   and  oriented x3.  Color is  good  and  skin  warm  to touch.  She  had  1  episode  of  nose bleeding  earlier  this  morning.  No  s/s  of  bleeding  noted  at this  time.  She  denies  headache or  dizziness.  Patient  was forgetful  upon  to  EMS  arrival.  No  neuro  deficit  noted.

## 2020-03-09 NOTE — ED PROVIDER NOTE - OBJECTIVE STATEMENT
87o F Hx CAD s/p stent on ASA and Plavix, HTN, DM2 presenting with complaints of epistaxis. Woke at 7am and started having bleeding from the R nare. applied pressure and bleeding resolved within 30min when her daughter, nurse and EMS arrives. Per EMS pt had /100 which they were concerned about and pt was slightly confused. Called patients doctor that thought she should be checked out. pt states that she remembers being confused because there had been so many people present. states that she is hungry but otherwise feels fine. hasn't taken her medication today. Denies fevers, chills, N/V/, abd pain.

## 2020-03-09 NOTE — ED ADULT NURSE NOTE - NSIMPLEMENTINTERV_GEN_ALL_ED
Implemented All Fall with Harm Risk Interventions:  [place] to call system. Call bell, personal items and telephone within reach. Instruct patient to call for assistance. Room bathroom lighting operational. Non-slip footwear when patient is off stretcher. Physically safe environment: no spills, clutter or unnecessary equipment. Stretcher in lowest position, wheels locked, appropriate side rails in place. Provide visual cue, wrist band, yellow gown, etc. Monitor gait and stability. Monitor for mental status changes and reorient to person, place, and time. Review medications for side effects contributing to fall risk. Reinforce activity limits and safety measures with patient and family. Provide visual clues: red socks.

## 2020-03-09 NOTE — ED PROVIDER NOTE - ENMT, MLM
Airway patent, Nasal mucosa clear, with no active bleeding. Mouth with normal mucosa. Throat has no vesicles, no oropharyngeal exudates and uvula is midline.

## 2020-03-09 NOTE — ED ADULT NURSE NOTE - NS ED NOTE ABUSE SUSPICION NEGLECT YN
Subjective:       Patient ID: King Perez is a 36 y.o. female who presents today for a routine clinic visit for MS.  The history has been provided by the patient. She was last seen in December 2018.     MS HPI:  · DMT: Rebif 44mcg three times a week   · Side effects from DMT? Yes - Scar tissue at injection sites; sometimes painful to inject; she has tried Avonex in the past, but did not like how this made her feel. She has also been on Copaxone.   · Taking vitamin D3 as recommended? Yes -  Dose: 5000 units daily   · She denies any new or different symptoms, but she is trying to avoid the heat.   · She runs for exercise and does cross-training.     SOCIAL HISTORY  Social History     Tobacco Use    Smoking status: Never Smoker    Smokeless tobacco: Never Used   Substance Use Topics    Alcohol use: Yes     Comment: socially    Drug use: No     Living arrangements - the patient lives with her    Employment: salon owner     MS ROS:  · Fatigue: Not new; works long hours   · Sleep Disturbance: The quality of her sleep has not been good lately. She takes melatonin, which helps somewhat.   · Bladder Dysfunction: No  · Bowel Dysfunction: No  · Spasticity: She gets muscle spasms to left-side lower body. These are intermittent and tolerable.   · Visual Symptoms: No  · Cognitive: Mostly good.   · Mood Disorder: No; situational anxiety at times  · Gait Disturbance: No  · Falls: No  · Hand Dysfunction: She had some issues with handwriting recently, but has not noticed any other fine motor dysfunction.   · Pain: No  · Sexual Dysfunction: Not assessed   · Skin Breakdown: No  · Tremors: No  · Dysphagia:  No   · Dysarthria:  Might take effort to get the words out; often coincides with difficulty writing   · Heat sensitivity:  Yes - If she gets overheated or too cold, she gets numb and tingly and tired. She feels like the heat is more tolerable than it used to be   · Any un-met adaptive needs? No  · Copay  Assist?  Yes - $0      Objective:        1. 25 foot timed walk: 3.9 seconds today without assist (wearing flip-flops); was 3.65 seconds at last visit without assist   Neurologic Exam       MENTAL STATUS: grossly intact  CRANIAL NERVE EXAM: There is no internuclear ophthalmoplegia. Extraocular   muscles are intact.  No facial asymmetry.There is no dysarthria. Visual acuity:  20/20 vision OD and OS  MOTOR EXAM: Normal bulk and tone throughout UE and LE bilaterally. Rapid sequential movements are normal. Strength is 5/5 in all groups in the lower extremities and upper extremities.   REFLEXES: Symmetric and 1+ throughout in all upper extremities; unable to elicit patellar or Achilles reflexes.   SENSORY EXAM: Normal to vibration in upper and lower extremities; light touch slightly less to left foot   COORDINATION: Normal finger-to-nose exam. She can walk on toes and heels and hop on each foot ten times. Tandem walking normal.   GAIT: Narrow based and stable.      Imaging:     No new imaging to review today.   Labs:     Lab Results   Component Value Date    ILPMNRUH82HS 50 11/12/2018    TPAVQYZV71BP 51 07/03/2017    KLFISKFR66DF 19 (L) 02/21/2015     Lab Results   Component Value Date    WBC 3.97 03/21/2019    RBC 3.43 (L) 03/21/2019    HGB 11.3 (L) 03/21/2019    HCT 33.8 (L) 03/21/2019    MCV 99 (H) 03/21/2019    MCH 32.9 (H) 03/21/2019    MCHC 33.4 03/21/2019    RDW 11.8 03/21/2019     03/21/2019    MPV 9.1 (L) 03/21/2019    GRAN 2.3 03/21/2019    GRAN 58.1 03/21/2019    LYMPH 1.3 03/21/2019    LYMPH 33.8 03/21/2019    MONO 0.2 (L) 03/21/2019    MONO 6.0 03/21/2019    EOS 0.1 03/21/2019    BASO 0.01 03/21/2019    EOSINOPHIL 1.5 03/21/2019    BASOPHIL 0.3 03/21/2019     Sodium   Date Value Ref Range Status   03/21/2019 137 136 - 145 mmol/L Final     Potassium   Date Value Ref Range Status   03/21/2019 4.0 3.5 - 5.1 mmol/L Final     Chloride   Date Value Ref Range Status   03/21/2019 105 95 - 110 mmol/L Final      CO2   Date Value Ref Range Status   03/21/2019 24 23 - 29 mmol/L Final     Glucose   Date Value Ref Range Status   03/21/2019 92 70 - 110 mg/dL Final     BUN, Bld   Date Value Ref Range Status   03/21/2019 13 6 - 20 mg/dL Final     Creatinine   Date Value Ref Range Status   03/21/2019 0.7 0.5 - 1.4 mg/dL Final     Calcium   Date Value Ref Range Status   03/21/2019 9.0 8.7 - 10.5 mg/dL Final     Total Protein   Date Value Ref Range Status   03/21/2019 6.9 6.0 - 8.4 g/dL Final     Albumin   Date Value Ref Range Status   03/21/2019 3.6 3.5 - 5.2 g/dL Final     Total Bilirubin   Date Value Ref Range Status   03/21/2019 0.4 0.1 - 1.0 mg/dL Final     Comment:     For infants and newborns, interpretation of results should be based  on gestational age, weight and in agreement with clinical  observations.  Premature Infant recommended reference ranges:  Up to 24 hours.............<8.0 mg/dL  Up to 48 hours............<12.0 mg/dL  3-5 days..................<15.0 mg/dL  6-29 days.................<15.0 mg/dL       Alkaline Phosphatase   Date Value Ref Range Status   03/21/2019 39 (L) 55 - 135 U/L Final     AST   Date Value Ref Range Status   03/21/2019 28 10 - 40 U/L Final     ALT   Date Value Ref Range Status   03/21/2019 30 10 - 44 U/L Final     Anion Gap   Date Value Ref Range Status   03/21/2019 8 8 - 16 mmol/L Final     eGFR if    Date Value Ref Range Status   03/21/2019 >60 >60 mL/min/1.73 m^2 Final     eGFR if non    Date Value Ref Range Status   03/21/2019 >60 >60 mL/min/1.73 m^2 Final     Comment:     Calculation used to obtain the estimated glomerular filtration  rate (eGFR) is the CKD-EPI equation.          Diagnosis/Assessment/Plan:    1. Multiple Sclerosis  · Assessment: King is clinically stable on Rebif.   · Imaging: MRI brain in November 2019 (unless she is pregnant)   · Disease Modifying Therapies: We have decided to stay the course with Rebif for now. She is not actively  trying to conceive at the moment as she and her  are looking for a house to purchase. They hope to find something by the end of the summer, then start trying to conceive. If there is a possibility she could be pregnant in the next 6 months, I am hesitant to start any medication that suppresses her immune system, as most would require some time off of drug while trying to conceive. We did discuss Avonex and Plegridy, but she did not tolerate Avonex in the past, and I suspect Plegridy would have a similar effect. She is ok with continuing Rebif for now. After pregnancy and delivery, we can certainly consider an alternative.     2. MS Symptom Assessment / Management  · Sleep Disturbance: Discussed trying Z-Quil or other OTC Sleep Aide   · Spasticity: Will monitor.   · Hand Dysfunction: Will monitor.   · Dysarthria:  Will monitor.     Over 50% of this 30 minute visit was spent in direct face to face counseling of the patient about MS, DMT considerations, and MS symptom management. The patient agrees with the plan of care. She will follow up with Dr. Hernandez in September.     Margie Fierro, [facility] Hospital[facility]-BC, MSCN      There are no diagnoses linked to this encounter.       No

## 2020-03-09 NOTE — ED PROVIDER NOTE - CLINICAL SUMMARY MEDICAL DECISION MAKING FREE TEXT BOX
88yo F presenting with complaints of R nare epistaxis, now resolved. Per EMS had been mildly confused while at home. alert and oriented, reports feeling normal. afebrile, not tachycardic well appearing. will observe for rebleeding. dc home with PMD f/u.

## 2020-03-09 NOTE — ED PROVIDER NOTE - PATIENT PORTAL LINK FT
You can access the FollowMyHealth Patient Portal offered by [facility] by registering at the following website: [email or web address]. By joining [employer]’s FollowMyHealth portal, you will also be able to view your health information using other applications (apps) compatible with our system.

## 2020-03-09 NOTE — ED PROVIDER NOTE - CPE EDP NEURO NORM
Hospitalist Daily Progress Note         Chief Complaint - Fever 9 Weeks to 74 years and Vomiting      Subjective:  Chaka Hilario is a 26 year old male who was admitted on 8/24/2019 for community-acquired pneumonia. This morning patient say he still has cough has generalized body aches, has nausea and vomiting. Overnight patient developed chest pain, he was evaluated by my partner, a CT scan of the chest showed no acute finding including PE.     PHYSICAL EXAMINATION:   Visit Vitals  /76 (BP Location: [facility], Patient Position: Semi-Syed's)   Pulse 139   Temp 100.1 °F (37.8 °C) (Oral)   Resp 20   Ht 5' 10\" (1.778 m)   Wt 78.6 kg   SpO2 94%   BMI 24.86 kg/m²       GENERAL:  26-year-old male alert and oriented not in distress.   HEENT:   PERRLA, moist mucous membrane.  LUNGS:  Good respiratory effort.  Clear to auscultation bilaterally, no wheezing.  HEART:  Regular rate and rhythm.  S1, S2 well heard.   ABDOMEN:  Flat, not distended, soft and nontender.    MUSCULOSKELETAL:  Able to move all 4 extremities adequately.  No edema, no clubbing, no cyanosis.   NEUROLOGIC:  Cranial nerves II through XII intact. No motor or sensory deficits  Medication:  Reviewed from [employer].     Labs   Recent Labs   Lab 08/26/19  0500 08/25/19  1720 08/25/19  0549 08/24/19  0638 08/23/19  1035   WBC 13.6*  --  11.6* 11.8* 12.7*   RBC 3.83*  --  3.80* 4.28* 4.55   HGB 12.0*  --  12.0* 13.4 14.4     --  176 182 190   SODIUM 136 135  --  135 136   POTASSIUM 3.3* 4.0  --  3.7 4.0   CHLORIDE 104 104  --  101 100   CO2 16* 20*  --  25 23   CALCIUM 8.8 8.9  --  9.1 9.7   GLUCOSE 204* 267*  --  223* 217*   BUN 8 7  --  7 10   CREATININE 0.66* 0.76  --  0.79 0.79   AST 26 23  --  49* 20   GPT 23 26  --  39 17   ALKPT 77 82  --  78 73   BILIRUBIN 0.5 0.5  --  1.1* 1.2*   ALBUMIN 2.9* 3.2*  --  3.7 4.2   LIPA  --   --   --   --  <50*   GFRNA >90 >90  --  >90 >90   PHOS  --   --  1.4*  --   --        Reviewed pertinent - PMH, PSH, social  history and FH   Imaging      Assessment and plan.    1. Pneumonia, community-acquired/bilateral pneumonitis: Failed outpatient therapy, patient will be placed on IV antibiotics per protocol including ceftriaxone and azithromycin, IV fluids, pending  strep/Legionella antigens/respiratory pathogens, follow blood cultures. Symptomatic treatments including acetaminophen. Due to complicated nature of this pneumonia/pneumonitis/worsening leukocytosis/recurrent fevers, we will have infection disease take a look at this patient.     2. Diabetes mellitus type 1: Severe hyperglycemia, elevated beta hydroxybutyrate. Patient has been placed on IV insulin, continue as per protocol, Lantus has been added. Patient uses insulin pump on outpatient basis. Once patient is stable insulin pump can be resumed. Endocrinology is following.    3. Nausea/vomiting: Possible  Due  Above #1, possible gastric paralysis , Zofran and  reglan  p.r.n.     Chronic active health issues     Benign essential hypertension, on lisinopril, hold.     Von Willebrand disease    Discuss with patient's mother/patient's girlfriend/patient/RN/ID.      Disposition: To be determined     DVT prophylaxis-SCD when in bed - ambulatory -Lovenox     Goals of Care:  Patient is decisional:  Yes   Patient has POA documents in the chart:  Yes   Code Status:  Full        Xiomara Bowles M.D.    8/26/2019        normal...

## 2020-03-09 NOTE — ED PROVIDER NOTE - ATTENDING CONTRIBUTION TO CARE
87 YOF pmHx CAD s/p stent on ASA and Plavix, HTN, DM2 here with epistaxis from right nare when waking up at 7a this morning, Applied pressure and bleeding stopped prior to EMS arrival. Per EMS she was questionable confused? but patient endorsing she was overwhelmed with multiple people and remembers everything. Patient awake and alert here without complaints. No h/o dementia. Denies f/c, headache, numbness, tingling, n/v, cp, sob abd pain, weakness.  AP - aox3 here, not confused. very pleasant. home nurse at bedside reports she has been at baseline the entire morning. no active bleeding from right nare. will monitor for rebleed but otherwise if continues to feel well will dc with close pmd f/u

## 2020-03-10 ENCOUNTER — APPOINTMENT (OUTPATIENT)
Dept: HOME HEALTH SERVICES | Facility: HOME HEALTH | Age: 85
End: 2020-03-10

## 2020-03-10 VITALS
SYSTOLIC BLOOD PRESSURE: 110 MMHG | DIASTOLIC BLOOD PRESSURE: 72 MMHG | TEMPERATURE: 98.7 F | OXYGEN SATURATION: 96 % | RESPIRATION RATE: 16 BRPM | HEART RATE: 80 BPM

## 2020-03-19 NOTE — PROGRESS NOTE ADULT - PROBLEM SELECTOR PROBLEM 5
bladder N32.81    Osteoarthritis M19.90    Controlled type 2 diabetes mellitus with stage 3 chronic kidney disease, without long-term current use of insulin ([facility]) E11.22, N18.3    Restless legs syndrome G25.81    ANTHONY (obstructive sleep apnea) G47.33    Paroxysmal atrial fibrillation ([facility]) I48.0    Allergic rhinitis J30.9    Chronic obstructive pulmonary disease ([facility]) J44.9    COPD, mild ([facility]) J44.9    Vertigo R42    Interstitial lung disease ([facility]) J84.9    Chronic cough R05    Bronchiectasis without complication ([facility]) A43.9    Acute systolic congestive heart failure ([facility]) I50.21    Coronary artery disease involving native coronary artery of native heart with angina pectoris ([facility]) I25.119    A-fib ([facility]) I48.91    Hypothyroid E03.9    Chronic respiratory failure with hypoxia ([facility]) J96.11    Failure of outpatient treatment Z78.9    SOB (shortness of breath) R06.02    Pneumonia J18.9       Isolation/Infection:   Isolation          Droplet        Patient Infection Status     Infection Onset Added Last Indicated Last Indicated By Review Planned Expiration Resolved Resolved By    Rhinovirus 03/17/20 03/18/20 03/17/20 Respiratory Panel, Molecular 03/20/20             Nurse Assessment:  Last Vital Signs: BP (!) 105/57   Pulse 68   Temp 98 °F (36.7 °C) (Oral)   Resp 16   Ht 5' 2\" (1.575 m)   Wt 169 lb 8 oz (76.9 kg)   SpO2 93%   BMI 31.00 kg/m²     Last documented pain score (0-10 scale): Pain Level: 0  Last Weight:   Wt Readings from Last 1 Encounters:   03/19/20 169 lb 8 oz (76.9 kg)     Mental Status:  oriented, alert and able to concentrate and follow conversation    IV Access:  - None    Nursing Mobility/ADLs:  Walking   Independent  Transfer  Independent  Bathing  Assisted  Dressing  Independent  Toileting  Independent  Feeding  Independent  Med Admin  Assisted  Med Delivery   whole    Wound Care Documentation and Therapy:        Elimination:  Continence:   · Bowel:  Yes  · Bladder: Yes  Urinary Catheter: None   Colostomy/Ileostomy/Ileal Conduit: No       Date of Last BM: 3-18-20    Intake/Output Summary (Last 24 hours) at 3/19/2020 1437  Last data filed at 3/19/2020 0857  Gross per 24 hour   Intake 480 ml   Output --   Net 480 ml     I/O last 3 completed shifts: In: 800 [P.O.:800]  Out: -     Safety Concerns:     None    Impairments/Disabilities:      Continous Oxygen need    Nutrition Therapy:  Current Nutrition Therapy:   - Oral Diet:  General and Carb Control 5 carbs/meal (2000kcals/day)    Routes of Feeding: Oral  Liquids: Thin Liquids  Daily Fluid Restriction: yes - amount 2000 mL  Last Modified Barium Swallow with Video (Video Swallowing Test): not done    Treatments at the Time of Hospital Discharge:   Respiratory Treatments: Albuterol and Symbicort  Oxygen Therapy:  is on oxygen at 2 L/min per nasal cannula.   Ventilator:    - No ventilator support    Rehab Therapies: SN,PT,OT  Weight Bearing Status/Restrictions: No weight bearing restirctions  Other Medical Equipment (for information only, NOT a DME order):  None  Other Treatments: None    Patient's personal belongings (please select all that are sent with patient):  None    RN SIGNATURE:  Electronically signed by Jennifer Diaz RN on 3/19/20 at 2:31 PM EDT    CASE MANAGEMENT/SOCIAL WORK SECTION    Inpatient Status Date: 3-17-20    Readmission Risk Assessment Score:  Readmission Risk              Risk of Unplanned Readmission:        40           Discharging to Facility/ [address] [place]       Phone [phone]    / signature: Electronically signed by FABIANO Prescott on 3/19/20 at 2:36 PM EDT    PHYSICIAN SECTION    Prognosis: Fair    Condition at Discharge: Stable    Rehab Potential (if transferring to Rehab): N/A    Recommended Labs or Other Treatments After Discharge: BMP on Monday 3/23, results to PCP    Physician Certification: I certify the above information and transfer of Aminta Neal  is necessary for the continuing treatment of the diagnosis listed and that she requires Home Care for less 30 days.      Update Admission H&P: No change in H&P    PHYSICIAN SIGNATURE:  Electronically signed by KTAHY Murphy CNP /  Maco Olvera MD on 3/19/20 at 11:56 AM EDT HTN (hypertension)

## 2020-03-23 ENCOUNTER — APPOINTMENT (OUTPATIENT)
Dept: HOME HEALTH SERVICES | Facility: HOME HEALTH | Age: 85
End: 2020-03-23

## 2020-04-07 ENCOUNTER — LABORATORY RESULT (OUTPATIENT)
Age: 85
End: 2020-04-07

## 2020-04-08 LAB
ALBUMIN SERPL ELPH-MCNC: 3.6 G/DL
ALP BLD-CCNC: 110 U/L
ALT SERPL-CCNC: 10 U/L
ANION GAP SERPL CALC-SCNC: 14 MMOL/L
APPEARANCE: ABNORMAL
AST SERPL-CCNC: 19 U/L
BASOPHILS # BLD AUTO: 0.01 K/UL
BASOPHILS NFR BLD AUTO: 0.2 %
BILIRUB SERPL-MCNC: 1 MG/DL
BILIRUBIN URINE: NEGATIVE
BLOOD URINE: NEGATIVE
BUN SERPL-MCNC: 33 MG/DL
CALCIUM SERPL-MCNC: 8.5 MG/DL
CHLORIDE SERPL-SCNC: 95 MMOL/L
CO2 SERPL-SCNC: 24 MMOL/L
COLOR: YELLOW
CREAT SERPL-MCNC: 1.09 MG/DL
EOSINOPHIL # BLD AUTO: 0.02 K/UL
EOSINOPHIL NFR BLD AUTO: 0.4 %
GLUCOSE QUALITATIVE U: NEGATIVE
HCT VFR BLD CALC: 32.8 %
HGB BLD-MCNC: 10.8 G/DL
IMM GRANULOCYTES NFR BLD AUTO: 0.2 %
KETONES URINE: NEGATIVE
LEUKOCYTE ESTERASE URINE: ABNORMAL
LYMPHOCYTES # BLD AUTO: 0.88 K/UL
LYMPHOCYTES NFR BLD AUTO: 16 %
MAN DIFF?: NORMAL
MCHC RBC-ENTMCNC: 30.2 PG
MCHC RBC-ENTMCNC: 32.9 GM/DL
MCV RBC AUTO: 91.6 FL
MONOCYTES # BLD AUTO: 0.46 K/UL
MONOCYTES NFR BLD AUTO: 8.4 %
NEUTROPHILS # BLD AUTO: 4.11 K/UL
NEUTROPHILS NFR BLD AUTO: 74.8 %
NITRITE URINE: NEGATIVE
PH URINE: 5
PLATELET # BLD AUTO: 155 K/UL
POTASSIUM SERPL-SCNC: 4 MMOL/L
PROT SERPL-MCNC: 6.2 G/DL
PROTEIN URINE: NORMAL
RBC # BLD: 3.58 M/UL
RBC # FLD: 14.3 %
SODIUM SERPL-SCNC: 133 MMOL/L
SPECIFIC GRAVITY URINE: 1.01
UROBILINOGEN URINE: NORMAL
WBC # FLD AUTO: 5.49 K/UL

## 2020-04-09 ENCOUNTER — RX RENEWAL (OUTPATIENT)
Age: 85
End: 2020-04-09

## 2020-04-10 ENCOUNTER — TRANSCRIPTION ENCOUNTER (OUTPATIENT)
Age: 85
End: 2020-04-10

## 2020-04-27 ENCOUNTER — RX RENEWAL (OUTPATIENT)
Age: 85
End: 2020-04-27

## 2020-05-07 ENCOUNTER — TRANSCRIPTION ENCOUNTER (OUTPATIENT)
Age: 85
End: 2020-05-07

## 2020-05-15 ENCOUNTER — APPOINTMENT (OUTPATIENT)
Dept: HOME HEALTH SERVICES | Facility: HOME HEALTH | Age: 85
End: 2020-05-15

## 2020-05-18 ENCOUNTER — RX RENEWAL (OUTPATIENT)
Age: 85
End: 2020-05-18

## 2020-05-29 ENCOUNTER — TRANSCRIPTION ENCOUNTER (OUTPATIENT)
Age: 85
End: 2020-05-29

## 2020-06-03 NOTE — PROGRESS NOTE ADULT - PROVIDER SPECIALTY LIST ADULT
Cardiology Bilateral Helical Rim Advancement Flap Text: The defect edges were debeveled with a #15 blade scalpel.  Given the location of the defect and the proximity to free margins (helical rim) a bilateral helical rim advancement flap was deemed most appropriate.  Using a sterile surgical marker, the appropriate advancement flaps were drawn incorporating the defect and placing the expected incisions between the helical rim and antihelix where possible.  The area thus outlined was incised through and through with a #15 scalpel blade.  With a skin hook and iris scissors, the flaps were gently and sharply undermined and freed up.

## 2020-06-08 ENCOUNTER — RX RENEWAL (OUTPATIENT)
Age: 85
End: 2020-06-08

## 2020-06-15 ENCOUNTER — APPOINTMENT (OUTPATIENT)
Dept: HOME HEALTH SERVICES | Facility: HOME HEALTH | Age: 85
End: 2020-06-15

## 2020-06-17 ENCOUNTER — RX RENEWAL (OUTPATIENT)
Age: 85
End: 2020-06-17

## 2020-06-30 ENCOUNTER — APPOINTMENT (OUTPATIENT)
Dept: HOME HEALTH SERVICES | Facility: HOME HEALTH | Age: 85
End: 2020-06-30
Payer: MEDICARE

## 2020-06-30 PROCEDURE — 99497 ADVNCD CARE PLAN 30 MIN: CPT | Mod: 95

## 2020-06-30 PROCEDURE — 99350 HOME/RES VST EST HIGH MDM 60: CPT | Mod: 25,95

## 2020-06-30 NOTE — REASON FOR VISIT
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

## 2020-06-30 NOTE — HISTORY OF PRESENT ILLNESS
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 \par The patient and the [facility] clinician participated in the telehealth encounter.\par Other participants included: daughter Bonnie\par \par Patient and her representative consents to the use of telehealth. All questions related to telehealth answered\par \par \par interval events- patient developed right breast abscess, drained by surgery. \par \par Patient has been feeling well overall. she has skin rash appearing 2-3 weeks ago on her chest area, resolved with gold balm cream. now it appeared on lower abdomen and is very itching. she does not have any pain associated with it. no new medications/soaps/detergents.\par no fever, chills, nausea, vomiting, SOB.\par appetite is good, continues to tolerate food. eats regular food no swallowing problems, \par weight has been stable at 134 lbs\par BM regular- sometimes have diarrhoea alternating with constipation, been like this for years. last BM today\par continent with bowels/bladder, wears depends, no urinary symptoms, voiding well\par sleeps well except when wakes up to pee 2-3 times/night\par no memory problems, however developed delirium at hospital as per the chart notes\par mood stable\par no recent falls, last fall 2 years ago, using walker\par sacral wound is closed, breast incision site has healed\par \par DM type 2 w/ complications- neuropathy/retinopathy/nephropathy- x 30 years, not controlled, patient states her baseline FS is around 63- 200 range. patient does not have regular eating schedule. last hba1c was improved at 8.4,  follows endocrinology, ophthalmology and podiatry. cannot feel legs due to neuropathy. \par \par CAD s/p stent, CHF, Angina- EF -23%, recent echo by cardiologist. baseline weight is 133 lbs. gets occasional chest pains yang when stressed or when walks outside or with cold air. uses nitro when needed.\par \par recurrent UTIs- multiple, last one in 11/2019 while at hospital.\par ----------\par lives with daughter and grand son, independent with ADLs

## 2020-06-30 NOTE — PHYSICAL EXAM
[No Acute Distress] : no acute distress [Well Nourished] : well nourished [Well Developed] : well developed [EOMI] : extra ocular movement intact [PERRL] : pupils equal, round and reactive to light [Normal Oropharynx] : the oropharynx was normal [No JVD] : no jugular venous distention [Supple] : the neck was supple [No LAD] : no lymphadenopathy [No Respiratory Distress] : no respiratory distress [No Accessory Muscle Use] : no accessory muscle use [Normal S1, S2] : normal S1 and S2 [Non Tender] : non-tender [Soft] : abdomen soft [Not Distended] : not distended [Normal Post Cervical Nodes] : no posterior cervical lymphadenopathy [No Spinal Tenderness] : no spinal tenderness [No CVA Tenderness] : no ~M costovertebral angle tenderness [No Joint Swelling] : no joint swelling seen [No Clubbing, Cyanosis] : no clubbing  or cyanosis of the fingernails [No Rash] : no rash [Cranial Nerves Intact] : cranial nerves 2-12 were intact [No Motor Deficits] : the motor exam was normal [Oriented x3] : oriented to person, place, and time [Normal Affect] : the affect was normal [Normal Mood] : the mood was normal [Normal Voice/Communication] : normal voice communication [de-identified] : wears glasses [de-identified] : skin color good, awake, interactive, pleasant,  [de-identified] : 1+ edema B/L LE [de-identified] : healed incision site RUQ right breast [de-identified] : slow unstable gait, uses walker [de-identified] : 2-3 mm multiple erythematous skin lesions lower right and left side of abdomen, scattered with no dermatomal distribution, no signs of infection [de-identified] : thinks she met provider after 6 months, does not remember details of hospital visit [de-identified] : neuropathy B/L LE

## 2020-06-30 NOTE — COUNSELING
[Improve exercise tolerance] : improve exercise tolerance [Improve mobility] : improve mobility [Improve weight] : improve weight [Decrease stress] : decrease stress [Decrease hospital use] : decrease hospital use [Minimize unnecessary interventions] : minimize unnecessary interventions [Comfort Care] : comfort care [Likely to achieve goals/desired outcomes] : likely to achieve goals/desired outcomes [Discussed disease trajectory with patient/caregiver] : discussed disease trajectory with patient/caregiver [Patient/Caregiver has ___ understanding of disease process] : patient/caregiver has [unfilled] understanding of disease process [Maintain functional ability] : maintain functional ability [DNR] : Code Status: DNR [Advanced Directives discussed: ____] : Advanced directives discussed: [unfilled] [Limited] : Treatment Guidelines: Limited [DNI] : Intubation: DNI [[facility]: [facility]/last verification date: [unfilled] [Normal Weight (BMI <25)] : normal weight - BMI <25 [DASH diet given] : DASH diet given [Non - Smoker] : non-smoker [Use assistive device to avoid falls] : use assistive device to avoid falls [Medical alert] : medical alert [Remove clutter and unsafe carpeting to avoid falls] : remove clutter and unsafe carpeting to avoid falls [Use grab bars] : use grab bars [Not Indicated] : not indicated [Smoke/CO Detectors] : smoke/CO detectors [FreeTextEntry5] : Mammogram 5/2017

## 2020-07-01 ENCOUNTER — RX RENEWAL (OUTPATIENT)
Age: 85
End: 2020-07-01

## 2020-07-15 ENCOUNTER — RX RENEWAL (OUTPATIENT)
Age: 85
End: 2020-07-15

## 2020-08-12 ENCOUNTER — APPOINTMENT (OUTPATIENT)
Dept: HOME HEALTH SERVICES | Facility: HOME HEALTH | Age: 85
End: 2020-08-12

## 2020-08-14 ENCOUNTER — RX RENEWAL (OUTPATIENT)
Age: 85
End: 2020-08-14

## 2020-08-18 ENCOUNTER — RX RENEWAL (OUTPATIENT)
Age: 85
End: 2020-08-18

## 2020-08-27 ENCOUNTER — RX RENEWAL (OUTPATIENT)
Age: 85
End: 2020-08-27

## 2020-09-04 ENCOUNTER — APPOINTMENT (OUTPATIENT)
Dept: HOME HEALTH SERVICES | Facility: HOME HEALTH | Age: 85
End: 2020-09-04
Payer: MEDICARE

## 2020-09-04 DIAGNOSIS — Z01.84 ENCOUNTER FOR ANTIBODY RESPONSE EXAMINATION: ICD-10-CM

## 2020-09-04 PROCEDURE — 99350 HOME/RES VST EST HIGH MDM 60: CPT | Mod: 95

## 2020-09-04 NOTE — REASON FOR VISIT
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

## 2020-09-04 NOTE — PHYSICAL EXAM
[No Acute Distress] : no acute distress [Well Developed] : well developed [Well Nourished] : well nourished [PERRL] : pupils equal, round and reactive to light [Normal Voice/Communication] : normal voice communication [EOMI] : extra ocular movement intact [Normal Oropharynx] : the oropharynx was normal [Supple] : the neck was supple [No JVD] : no jugular venous distention [No LAD] : no lymphadenopathy [No Respiratory Distress] : no respiratory distress [Normal S1, S2] : normal S1 and S2 [No Accessory Muscle Use] : no accessory muscle use [Soft] : abdomen soft [Non Tender] : non-tender [Not Distended] : not distended [Normal Post Cervical Nodes] : no posterior cervical lymphadenopathy [No CVA Tenderness] : no ~M costovertebral angle tenderness [No Clubbing, Cyanosis] : no clubbing  or cyanosis of the fingernails [No Joint Swelling] : no joint swelling seen [No Spinal Tenderness] : no spinal tenderness [No Motor Deficits] : the motor exam was normal [Cranial Nerves Intact] : cranial nerves 2-12 were intact [No Rash] : no rash [Normal Mood] : the mood was normal [Normal Affect] : the affect was normal [Oriented x3] : oriented to person, place, and time [de-identified] : skin color good, awake, interactive, pleasant,  [de-identified] : wears glasses [de-identified] : slow unstable gait, uses walker [de-identified] : healed incision site RUQ right breast [de-identified] : 1+ edema B/L LE [de-identified] : 0.5 x.3 cm oval shaped stage 2 ulcer just above the anal area, base clean with purplish hue, no discharge, no fluctuance, no surrounding erythema. no signs of infection [de-identified] : neuropathy B/L LE [de-identified] : thinks she met provider after 6 months, does not remember details of hospital visit

## 2020-09-04 NOTE — CURRENT MEDS
[Medication and Allergies Reconciled] : medication and allergies reconciled [Reviewed patient reported medication adherence from Comprehensive Assessment] : Reviewed patient reported medication adherence from comprehensive assessment [Compliant with medications] : Patient is compliant with medications [High Risk Medications Reviewed and Reconciled (Beers Criteria)] : high risk medications reviewed and reconciled

## 2020-09-04 NOTE — HISTORY OF PRESENT ILLNESS
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 \par The patient and the [facility] clinician participated in the telehealth encounter.\par Other participants included: daughter Bonnie\par \par Patient and her representative consents to the use of telehealth. All questions related to telehealth answered\par \par \par interval events- patient had antibody testing for covid by endo, which was positive.\par \par Patient has been feeling well overall as per herself ad her daughter. daughter noticed a new sacral sore just above anus yesterday and got very anxious due to history of ischiorectal abscess and recurrent sacral sores.\par there is no redness or discharge. daughter using neosporin.\par no fever, chills, nausea, vomiting, SOB.\par appetite is good, continues to tolerate food. eats regular food no swallowing problems, \par weight has been stable at 134 lbs\par BM regular- sometimes have diarrhoea alternating with constipation, been like this for years. last BM today\par continent with bowels/bladder, wears depends, no urinary symptoms, voiding well\par sleeps well except when wakes up to pee 2-3 times/night\par no memory problems, however developed delirium at hospital as per the chart notes\par mood stable\par no recent falls, last fall 2 years ago, using walker\par \par DM type 2 w/ complications- neuropathy/retinopathy/nephropathy- x 30 years, not controlled, patient states her baseline FS is around 63- 200 range. patient does not have regular eating schedule. last hba1c was improved at 8.4,  follows endocrinology, ophthalmology and podiatry. cannot feel legs due to neuropathy. \par \par CAD s/p stent, CHF, Angina- EF -23%, recent echo by cardiologist. baseline weight is 133 lbs. gets occasional chest pains yang when stressed or when walks outside or with cold air. uses nitro when needed.\par \par recurrent UTIs- multiple, last one in 11/2019 while at hospital.\par ----------\par lives with daughter and grand son, independent with ADLs [FreeTextEntry1] : Gait instability

## 2020-09-04 NOTE — COUNSELING
[Improve exercise tolerance] : improve exercise tolerance [Improve mobility] : improve mobility [Improve weight] : improve weight [Decrease stress] : decrease stress [Decrease hospital use] : decrease hospital use [Minimize unnecessary interventions] : minimize unnecessary interventions [Comfort Care] : comfort care [Maintain functional ability] : maintain functional ability [Discussed disease trajectory with patient/caregiver] : discussed disease trajectory with patient/caregiver [Likely to achieve goals/desired outcomes] : likely to achieve goals/desired outcomes [Patient/Caregiver has ___ understanding of disease process] : patient/caregiver has [unfilled] understanding of disease process [Advanced Directives discussed: ____] : Advanced directives discussed: [unfilled] [Limited] : Treatment Guidelines: Limited [[facility]: [facility]: [unfilled] [DNR] : Code Status: DNR [DNI] : Intubation: DNI [Normal Weight (BMI <25)] : normal weight - BMI <25 [DASH diet given] : DASH diet given [Non - Smoker] : non-smoker [Medical alert] : medical alert [Remove clutter and unsafe carpeting to avoid falls] : remove clutter and unsafe carpeting to avoid falls [Use assistive device to avoid falls] : use assistive device to avoid falls [Use grab bars] : use grab bars [Not Indicated] : not indicated [Smoke/CO Detectors] : smoke/CO detectors [FreeTextEntry5] : Mammogram 5/2017

## 2020-09-09 ENCOUNTER — RX RENEWAL (OUTPATIENT)
Age: 85
End: 2020-09-09

## 2020-09-16 ENCOUNTER — APPOINTMENT (OUTPATIENT)
Dept: HOME HEALTH SERVICES | Facility: HOME HEALTH | Age: 85
End: 2020-09-16
Payer: MEDICARE

## 2020-09-16 DIAGNOSIS — Z86.19 PERSONAL HISTORY OF OTHER INFECTIOUS AND PARASITIC DISEASES: ICD-10-CM

## 2020-09-16 DIAGNOSIS — E11.21 TYPE 2 DIABETES MELLITUS WITH DIABETIC NEPHROPATHY: ICD-10-CM

## 2020-09-16 DIAGNOSIS — L89.313 PRESSURE ULCER OF RIGHT BUTTOCK, STAGE 3: ICD-10-CM

## 2020-09-16 DIAGNOSIS — M79.601 PAIN IN RIGHT ARM: ICD-10-CM

## 2020-09-16 DIAGNOSIS — Z87.440 PERSONAL HISTORY OF URINARY (TRACT) INFECTIONS: ICD-10-CM

## 2020-09-16 DIAGNOSIS — Z87.2 PERSONAL HISTORY OF DISEASES OF THE SKIN AND SUBCUTANEOUS TISSUE: ICD-10-CM

## 2020-09-16 DIAGNOSIS — R21 RASH AND OTHER NONSPECIFIC SKIN ERUPTION: ICD-10-CM

## 2020-09-16 DIAGNOSIS — K61.39 OTHER ISCHIORECTAL ABSCESS: ICD-10-CM

## 2020-09-16 PROCEDURE — 99349 HOME/RES VST EST MOD MDM 40: CPT

## 2020-09-18 VITALS — TEMPERATURE: 97 F | OXYGEN SATURATION: 97 % | RESPIRATION RATE: 16 BRPM | HEART RATE: 58 BPM

## 2020-09-18 PROBLEM — Z86.19 HISTORY OF VIRAL INFECTION: Status: RESOLVED | Noted: 2018-11-05 | Resolved: 2020-09-18

## 2020-09-18 PROBLEM — M79.601 PAIN OF RIGHT UPPER EXTREMITY: Status: RESOLVED | Noted: 2020-05-15 | Resolved: 2020-09-18

## 2020-09-18 PROBLEM — R21 SKIN RASH: Status: RESOLVED | Noted: 2020-06-30 | Resolved: 2020-09-18

## 2020-09-18 PROBLEM — K61.39 ISCHIORECTAL ABSCESS: Status: RESOLVED | Noted: 2019-11-12 | Resolved: 2020-09-18

## 2020-09-18 PROBLEM — L89.313 PRESSURE INJURY OF RIGHT BUTTOCK, STAGE 3: Status: RESOLVED | Noted: 2020-02-18 | Resolved: 2020-09-18

## 2020-09-18 PROBLEM — Z87.440 HISTORY OF RECURRENT URINARY TRACT INFECTION: Status: RESOLVED | Noted: 2017-06-16 | Resolved: 2020-09-18

## 2020-09-18 PROBLEM — Z87.2 HISTORY OF PRESSURE INJURY OF SKIN: Status: RESOLVED | Noted: 2020-04-10 | Resolved: 2020-09-18

## 2020-09-18 PROBLEM — E11.21 DIABETIC NEPHROPATHY ASSOCIATED WITH TYPE 2 DIABETES MELLITUS: Status: RESOLVED | Noted: 2018-04-27 | Resolved: 2020-09-18

## 2020-09-18 NOTE — COUNSELING
[Improve exercise tolerance] : improve exercise tolerance [Improve weight] : improve weight [Improve mobility] : improve mobility [Decrease stress] : decrease stress [Decrease hospital use] : decrease hospital use [Minimize unnecessary interventions] : minimize unnecessary interventions [Maintain functional ability] : maintain functional ability [Comfort Care] : comfort care [Discussed disease trajectory with patient/caregiver] : discussed disease trajectory with patient/caregiver [Likely to achieve goals/desired outcomes] : likely to achieve goals/desired outcomes [Patient/Caregiver has ___ understanding of disease process] : patient/caregiver has [unfilled] understanding of disease process [Advanced Directives discussed: ____] : Advanced directives discussed: [unfilled] [DNR] : Code Status: DNR [Limited] : Treatment Guidelines: Limited [DNI] : Intubation: DNI [[facility]: [facility]last [facility]: [unfilled] [Normal Weight (BMI <25)] : normal weight - BMI <25 [DASH diet given] : DASH diet given [Medical alert] : medical alert [Remove clutter and unsafe carpeting to avoid falls] : remove clutter and unsafe carpeting to avoid falls [Smoke/CO Detectors] : smoke/CO detectors [Use grab bars] : use grab bars [Not Indicated] : not indicated [FreeTextEntry5] : Mammogram 5/2017 [Overweight - ( BMI 25.1 - 29.9 )] : overweight -  ( BMI 25.1 - 29.9 ) [Continue diet as tolerated] : continue diet as tolerated based on goals of care [Non - Smoker] : non-smoker [Use assistive device to avoid falls] : use assistive device to avoid falls [] : foot exam [Not Recommended] : Aspirin use not recommended due to overall prognosis

## 2020-09-21 LAB
ALBUMIN SERPL ELPH-MCNC: 4.3 G/DL
ANION GAP SERPL CALC-SCNC: 12 MMOL/L
BASOPHILS # BLD AUTO: 0.06 K/UL
BASOPHILS NFR BLD AUTO: 0.9 %
BUN SERPL-MCNC: 41 MG/DL
CALCIUM SERPL-MCNC: 9.3 MG/DL
CHLORIDE SERPL-SCNC: 104 MMOL/L
CO2 SERPL-SCNC: 23 MMOL/L
CREAT SERPL-MCNC: 1.4 MG/DL
EOSINOPHIL # BLD AUTO: 0.3 K/UL
EOSINOPHIL NFR BLD AUTO: 4.4 %
ESTIMATED AVERAGE GLUCOSE: 232 MG/DL
GLUCOSE SERPL-MCNC: 157 MG/DL
HBA1C MFR BLD HPLC: 9.7 %
HCT VFR BLD CALC: 37.9 %
HGB BLD-MCNC: 12.3 G/DL
IMM GRANULOCYTES NFR BLD AUTO: 0.1 %
LYMPHOCYTES # BLD AUTO: 1.9 K/UL
LYMPHOCYTES NFR BLD AUTO: 27.9 %
MAN DIFF?: NORMAL
MCHC RBC-ENTMCNC: 31.5 PG
MCHC RBC-ENTMCNC: 32.5 GM/DL
MCV RBC AUTO: 97.2 FL
MONOCYTES # BLD AUTO: 0.69 K/UL
MONOCYTES NFR BLD AUTO: 10.1 %
NEUTROPHILS # BLD AUTO: 3.85 K/UL
NEUTROPHILS NFR BLD AUTO: 56.6 %
PHOSPHATE SERPL-MCNC: 3.4 MG/DL
PLATELET # BLD AUTO: 161 K/UL
POTASSIUM SERPL-SCNC: 4.3 MMOL/L
RBC # BLD: 3.9 M/UL
RBC # FLD: 13.3 %
SODIUM SERPL-SCNC: 139 MMOL/L
WBC # FLD AUTO: 6.81 K/UL

## 2020-09-22 NOTE — PROGRESS NOTE ADULT - PROBLEM SELECTOR PLAN 1
Assessment and Plan   Diagnoses and all orders for this visit:    1. LLQ abdominal pain  -     CT ABD PELV W WO CONT; Future  Onset 1 and half years ago, persistent and feels the same since onset. Located on the left side. Feels like he has to use the bathroom but does not go. Pain is always there but will occasionally worsen especially at night. No association with food. Denies any nausea, vomiting, diarrhea, constipation, melena, hematochezia, fever, chills, night sweats, unexpected weight loss. Unclear etiology. Recommend getting a CT scan. Consider GI referral if negative. Not sure if he would benefit from a PPI given lack of association with food and location. 2. Chest pain, unspecified type  Seen by cardiology. Reports that the pain is less but still occurs. Occurs at rest and not when he is working. Was advised to schedule his stress test as recommended. Was given the phone number to get that scheduled. Patient is currently self-pay and was requesting financial assistance. I sent a message to our     Of note, his PHQ score was 6. We will need to address at his next visit    Benefits, risks, possible drug interactions, and side effects of all new medications were reviewed with the patient. Pt verbalized understanding. Return to clinic: After Caroline Maddox MD  [facility]  9/23/2020    No future appointments. History of Present Illness   Chief Complaint   Establish care    [facility] Yolanda Dos Santos is a 43 y.o. male     Accompanied by his young daughter who is too young to provide translation services. We used an official  over the phone. I did not get a full social history given his daughter was in the room. Review of Systems   Constitutional: Negative for chills and fever. HENT: Negative for hearing loss. Eyes: Negative for blurred vision. Respiratory: Negative for shortness of breath. Cardiovascular: Negative for chest pain. Gastrointestinal: Positive for abdominal pain. Negative for blood in stool, constipation, diarrhea, melena, nausea and vomiting. Genitourinary: Negative for dysuria and hematuria. Musculoskeletal: Negative for joint pain. Skin: Negative for rash. Neurological: Negative for headaches. Past Medical History   No Known Allergies     No current outpatient medications on file. No current facility-administered medications for this visit. There is no problem list on file for this patient. History reviewed. No pertinent surgical history. Social History     Tobacco Use    Smoking status: Former Smoker    Smokeless tobacco: Never Used    Tobacco comment: only a litle bit in his 25s   Substance Use Topics    Alcohol use: Never     Frequency: Never      Family History   Problem Relation Age of Onset    Heart Disease Father     Cancer Neg Hx         Physical Exam   Vitals:       Visit Vitals  /74 (BP 1 Location: Left arm, BP Patient Position: Sitting)   Pulse 73   Temp 98.3 °F (36.8 °C) (Oral)   Resp 16   Ht 5' 8.5\" (1.74 m)   Wt 175 lb 9.6 oz (79.7 kg)   SpO2 98%   BMI 26.31 kg/m²        Physical Exam  Constitutional:       General: He is not in acute distress. Appearance: He is well-developed. HENT:      Right Ear: Tympanic membrane, ear canal and external ear normal.      Left Ear: Tympanic membrane, ear canal and external ear normal.   Eyes:      Extraocular Movements: Extraocular movements intact. Conjunctiva/sclera: Conjunctivae normal.   Neck:      Musculoskeletal: Neck supple. Cardiovascular:      Rate and Rhythm: Normal rate and regular rhythm. Pulses: Normal pulses. Heart sounds: No murmur. No friction rub. No gallop. Pulmonary:      Effort: No respiratory distress. Breath sounds: No wheezing or rales. Abdominal:      General: Bowel sounds are normal. There is no distension.       Palpations: Abdomen is soft. There is no hepatomegaly, splenomegaly or mass. Tenderness: There is no abdominal tenderness (mild LLQ tenderness). There is no guarding or rebound. Hernia: No hernia is present. Skin:     General: Skin is warm. Findings: No rash. Neurological:      Mental Status: He is alert. Gait: Gait normal.   Psychiatric:         Thought Content:  Thought content normal.         Judgment: Judgment normal.          Voice recognition software is utilized and this note may contain transcription errors positive blood Cx  ID eval appreciated  ABx as per ID, adjusted   Surg F/U appreciated  MRI noted, patient refused contrast, limited study  OM can not be ruled out  may repeat MRI no contrast VS Bone scan   Abx duration to be discussed

## 2020-10-01 NOTE — PHYSICAL EXAM
[No Acute Distress] : no acute distress [Well Nourished] : well nourished [Well Developed] : well developed [Normal Voice/Communication] : normal voice communication [PERRL] : pupils equal, round and reactive to light [EOMI] : extra ocular movement intact [Normal Oropharynx] : the oropharynx was normal [Supple] : the neck was supple [No Accessory Muscle Use] : no accessory muscle use [No Respiratory Distress] : no respiratory distress [Normal S1, S2] : normal S1 and S2 [Non Tender] : non-tender [Soft] : abdomen soft [Not Distended] : not distended [Normal Post Cervical Nodes] : no posterior cervical lymphadenopathy [No CVA Tenderness] : no ~M costovertebral angle tenderness [No Spinal Tenderness] : no spinal tenderness [No Joint Swelling] : no joint swelling seen [No Clubbing, Cyanosis] : no clubbing  or cyanosis of the fingernails [No Rash] : no rash [Cranial Nerves Intact] : cranial nerves 2-12 were intact [Oriented x3] : oriented to person, place, and time [No Motor Deficits] : the motor exam was normal [Normal Affect] : the affect was normal [Normal Mood] : the mood was normal [Normal Sclera/Conjunctiva] : normal sclera/conjunctiva [Normal Outer Ear/Nose] : the ears and nose were normal in appearance [Clear to Auscultation] : lungs were clear to auscultation bilaterally [Normal Rate] : heart rate was normal  [Regular Rhythm] : with a regular rhythm [No Murmurs] : no murmurs heard [No Edema] : there was no peripheral edema [de-identified] : skin color good, awake, interactive, pleasant,  [de-identified] : wears glasses [de-identified] : slow unstable gait, uses walker [de-identified] : stage II pressure ulcer of sacrum, ~1x2 cm, no drainage [de-identified] : neuropathy B/L LE

## 2020-10-01 NOTE — HEALTH RISK ASSESSMENT
[HRA Reviewed] : Health risk assessment reviewed [Independent] : using telephone [Full assistance needed] : managing finances [No falls in past year] : Patient reported no falls in the past year [Yes] : The patient does have visual impairment [TimeGetUpGo] : n/a

## 2020-10-01 NOTE — REASON FOR VISIT
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

## 2020-10-01 NOTE — REVIEW OF SYSTEMS
[Vision Problems] : vision problems [Negative] : Genitourinary [FreeTextEntry1] : As above or per HPI. Otherwise, rest of ROS negative.

## 2020-10-01 NOTE — HISTORY OF PRESENT ILLNESS
[Patient] : patient [FreeTextEntry1] : Gait instability [FreeTextEntry2] : Patient denies fever, cough, trouble breathing, rash and vomiting. Patient has not been in close contact with someone who is COVID positive. N95 mask, gloves and eye wear worn during visit: Y \par Total face to face time with patient: 10 minutes. 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 \par \par interval events- patient had antibody testing for covid by endo, which was positive.\par \par Patient has been feeling well overall as per herself ad her daughter. \par appetite is overall good, though somewhat variable from day to day, eats regular food no swallowing problems, \par weight has been stable at 135 lbs\par BM regular- sometimes have diarrhea alternating with constipation, been like this for years. \par continent with bowels/bladder, wears depends, no urinary symptoms\par sleeps well except when wakes up to pee 2-3 times/night\par no memory problems\par no recent falls, last fall 2 years ago, using walker\par completed PT. Has occasional knee pain. Will reorder PT \par Sacral ulcer: Healed. Still using medi-honey for very minor superficial abrasion that is also healing \par DM type 2 w/ complications- neuropathy/retinopathy/nephropathy- x 30 years, not controlled, patient states her baseline FS is around high 100s-200 range. Improved from previously 300s. Has low BG in 60s much less frequently now. checks BG 3-5 times daily. BG management tricky as patient does not have regular eating schedule. last hba1c was improved at 8.4,  follows endocrinology, ophthalmology and podiatry. cannot feel legs due to neuropathy. \par \par CAD s/p stent, CHF, Angina- EF -23%, recent echo by cardiologist. baseline weight is 133 lbs. gets occasional chest pains yang when stressed or when walks outside or with cold air. usually resolves with rest, uses nitro when needed.\par \par recurrent UTIs- multiple, last one in 11/2019 while at hospital. Does not currently have any urinary symptoms.\par Denies fever, chills, nausea, vomiting, SOB, cough, worsened LE edema. \par ----------\par lives with daughter and grand son, independent with ADLs\par ambulates w/ walker

## 2020-10-04 ENCOUNTER — FORM ENCOUNTER (OUTPATIENT)
Age: 85
End: 2020-10-04

## 2020-10-07 ENCOUNTER — APPOINTMENT (OUTPATIENT)
Dept: HOME HEALTH SERVICES | Facility: HOME HEALTH | Age: 85
End: 2020-10-07

## 2020-10-21 NOTE — PATIENT PROFILE ADULT. - SPIRITUAL CULTURAL, RELIGIOUS PRACTICES/VALUES, PROFILE
Please have staff look at images on media tab to decide whether she has to be seen by ortho/ pod  instead    None

## 2020-10-23 ENCOUNTER — NON-APPOINTMENT (OUTPATIENT)
Age: 85
End: 2020-10-23

## 2020-11-30 ENCOUNTER — RX RENEWAL (OUTPATIENT)
Age: 85
End: 2020-11-30

## 2020-12-02 NOTE — ED ADULT NURSE NOTE - CAS TRG GENERAL AIRWAY, MLM
Patent Encourage PO intake, obtain food preferences, provide feeding assistance as needed. Monitor PO intake, diet tolerance, weight trends, labs, GI function, and skin integrity.

## 2020-12-03 ENCOUNTER — APPOINTMENT (OUTPATIENT)
Dept: HOME HEALTH SERVICES | Facility: HOME HEALTH | Age: 85
End: 2020-12-03
Payer: MEDICARE

## 2020-12-03 PROCEDURE — 99349 HOME/RES VST EST MOD MDM 40: CPT | Mod: 95

## 2020-12-03 NOTE — COUNSELING
[Overweight - ( BMI 25.1 - 29.9 )] : overweight -  ( BMI 25.1 - 29.9 ) [Continue diet as tolerated] : continue diet as tolerated based on goals of care [Non - Smoker] : non-smoker [Use assistive device to avoid falls] : use assistive device to avoid falls [] : foot exam [Not Recommended] : Aspirin use not recommended due to overall prognosis [Improve exercise tolerance] : improve exercise tolerance [Improve mobility] : improve mobility [Improve weight] : improve weight [Decrease stress] : decrease stress [Decrease hospital use] : decrease hospital use [Minimize unnecessary interventions] : minimize unnecessary interventions [Comfort Care] : comfort care [Maintain functional ability] : maintain functional ability [Discussed disease trajectory with patient/caregiver] : discussed disease trajectory with patient/caregiver [Likely to achieve goals/desired outcomes] : likely to achieve goals/desired outcomes [Patient/Caregiver has ___ understanding of disease process] : patient/caregiver has [unfilled] understanding of disease process [Advanced Directives discussed: ____] : Advanced directives discussed: [unfilled] [DNR] : Code Status: DNR [Limited] : Treatment Guidelines: Limited [DNI] : Intubation: DNI [Last Verification Date: _____] : [facility] [[email or web address]]ST Completion/last verification date: [unfilled]

## 2020-12-06 RX ORDER — INSULIN LISPRO 100 [IU]/ML
100 INJECTION, SOLUTION INTRAVENOUS; SUBCUTANEOUS
Qty: 4 | Refills: 0 | Status: DISCONTINUED | COMMUNITY
Start: 2018-02-12 | End: 2020-12-06

## 2020-12-06 NOTE — CHRONIC CARE ASSESSMENT
[PPS Score: ____] : Palliative Performance Scale (PPS) Score: [unfilled] [Limited decision making ability] : limited decision making ability [de-identified] : n/a [de-identified] : adequate

## 2020-12-06 NOTE — HEALTH RISK ASSESSMENT
[Independent] : using telephone [Full assistance needed] : managing finances [No falls in past year] : Patient reported no falls in the past year [Yes] : The patient does have visual impairment [HRA Reviewed] : Health risk assessments reviewed [TimeGetUpGo] : n/a

## 2020-12-06 NOTE — PHYSICAL EXAM
[No Acute Distress] : no acute distress [Well Nourished] : well nourished [Normal Sclera/Conjunctiva] : normal sclera/conjunctiva [Normal Outer Ear/Nose] : the ears and nose were normal in appearance [Normal Oropharynx] : the oropharynx was normal [Supple] : the neck was supple [No Respiratory Distress] : no respiratory distress [No Accessory Muscle Use] : no accessory muscle use [No Edema] : there was no peripheral edema [Non Tender] : non-tender [Soft] : abdomen soft [Not Distended] : not distended [No CVA Tenderness] : no ~M costovertebral angle tenderness [No Spinal Tenderness] : no spinal tenderness [No Joint Swelling] : no joint swelling seen [No Clubbing, Cyanosis] : no clubbing  or cyanosis of the fingernails [No Rash] : no rash [Cranial Nerves Intact] : cranial nerves 2-12 were intact [No Motor Deficits] : the motor exam was normal [Oriented x3] : oriented to person, place, and time [Normal Affect] : the affect was normal [Normal Mood] : the mood was normal [de-identified] : skin color good, awake, interactive, pleasant,  [de-identified] : wears glasses [de-identified] : slow unstable gait, uses walker [de-identified] : stage I pressure ulcer of sacrum  [de-identified] : neuropathy B/L LE

## 2020-12-06 NOTE — REASON FOR VISIT
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

## 2020-12-06 NOTE — REVIEW OF SYSTEMS
[Vision Problems] : vision problems [Negative] : Heme/Lymph [FreeTextEntry1] : As above or per HPI. Otherwise, rest of ROS negative.

## 2020-12-06 NOTE — HISTORY OF PRESENT ILLNESS
[Patient] : patient [FreeTextEntry1] : Gait instability [FreeTextEntry2] : RACHEL MERLINI is being seen for a visit provided via telehealth via [[employer]]. This visit was first attempted using [employer] telehealth real-time audio visual technology, but was unable to be completed.\par RACHEL MERLINI was located at their home, [address]\[place] [address], at the time of the visit.\par [facility] clinician, JERRICA GOMEZ, was located remotely at their home in [place] at the time of the visit. \par The patient, RACHEL MERLINI, and [facility] clinician, JERRICA GOMEZ, participated in the telehealth encounter.\par Other participants included dtr \par RACHEL MERLINI (Feb 22 1933) or his/her representative consents to the use of telehealth. 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 \par \par interval events- patient had antibody testing for covid by endo, which was positive.\par CHF: f/b cards. Nnw on restriction of 1L H2O daily, Took Lasix 60 qd x7 days, now back down to Lasix 20 mg PO QD. Has had weight gain from ~135 to 144 lbs recently. Has variable SOB. \par Pt likes spending time in the kitchen, dtr trying to get her to walk every 2h \par Has stage I pressure ulcer of buttock: using medi honey now, sometimes skin opens \par Dtr has ordered roho cushion and will pay out of pocket. Has small pimple on upper thigh that is healing. \par will resume PT once wound care resolves \par \par appetite is overall good, though somewhat variable from day to day, eats regular food no swallowing problems\par BM regular- sometimes have diarrhea alternating with constipation, been like this for years. \par continent with bowels/bladder, wears depends, no urinary symptoms\par sleeps well except when wakes up to pee 2-3 times/night\par no memory problems\par no recent falls, last fall 2 years ago, using walker\par completed PT. Has occasional knee pain. Will reorder PT\par DM type 2 w/ complications- neuropathy/retinopathy/nephropathy- x 30 years, not controlled, patient states her baseline FS is around high 100s-200 range. Improved from previously 300s. Has low BG in 60s much less frequently now. checks BG 3-5 times daily. BG management tricky as patient does not have regular eating schedule. last hba1c was improved at 8.4,  follows endocrinology, ophthalmology and podiatry. cannot feel legs due to neuropathy. Has Humalog ISS for meals and takes lantus 14U QHS \par \par CAD s/p stent, CHF, Angina- EF -23%, recent echo by cardiologist. baseline weight is 133 lbs. gets occasional chest pains yang when stressed or when walks outside or with cold air. usually resolves with rest, uses nitro when needed.\par \par recurrent UTIs- multiple, last one in 11/2019 while at hospital. Does not currently have any urinary symptoms.\par Denies fever, chills, nausea, vomiting, SOB, cough, worsened LE edema. \par ----------\par lives with daughter and grand son, independent with ADLs\par ambulates w/ walker

## 2020-12-16 ENCOUNTER — TRANSCRIPTION ENCOUNTER (OUTPATIENT)
Age: 85
End: 2020-12-16

## 2020-12-17 ENCOUNTER — NON-APPOINTMENT (OUTPATIENT)
Age: 85
End: 2020-12-17

## 2020-12-17 ENCOUNTER — TRANSCRIPTION ENCOUNTER (OUTPATIENT)
Age: 85
End: 2020-12-17

## 2020-12-18 ENCOUNTER — APPOINTMENT (OUTPATIENT)
Dept: HOME HEALTH SERVICES | Facility: HOME HEALTH | Age: 85
End: 2020-12-18

## 2020-12-18 ENCOUNTER — LABORATORY RESULT (OUTPATIENT)
Age: 85
End: 2020-12-18

## 2020-12-18 VITALS
TEMPERATURE: 97.4 F | RESPIRATION RATE: 18 BRPM | HEART RATE: 64 BPM | OXYGEN SATURATION: 98 % | SYSTOLIC BLOOD PRESSURE: 124 MMHG | DIASTOLIC BLOOD PRESSURE: 78 MMHG

## 2020-12-19 ENCOUNTER — LABORATORY RESULT (OUTPATIENT)
Age: 85
End: 2020-12-19

## 2020-12-19 ENCOUNTER — TRANSCRIPTION ENCOUNTER (OUTPATIENT)
Age: 85
End: 2020-12-19

## 2020-12-21 ENCOUNTER — NON-APPOINTMENT (OUTPATIENT)
Age: 85
End: 2020-12-21

## 2020-12-23 LAB
ALBUMIN SERPL ELPH-MCNC: 4.4 G/DL
ALP BLD-CCNC: 176 U/L
ALT SERPL-CCNC: 13 U/L
ANION GAP SERPL CALC-SCNC: 12 MMOL/L
APPEARANCE: ABNORMAL
AST SERPL-CCNC: 19 U/L
BASOPHILS # BLD AUTO: 0.05 K/UL
BASOPHILS NFR BLD AUTO: 0.7 %
BILIRUB SERPL-MCNC: 0.5 MG/DL
BILIRUBIN URINE: NEGATIVE
BLOOD URINE: NORMAL
BUN SERPL-MCNC: 83 MG/DL
CALCIUM SERPL-MCNC: 10.2 MG/DL
CHLORIDE SERPL-SCNC: 105 MMOL/L
CO2 SERPL-SCNC: 24 MMOL/L
COLOR: YELLOW
CREAT SERPL-MCNC: 1.75 MG/DL
EOSINOPHIL # BLD AUTO: 0.19 K/UL
EOSINOPHIL NFR BLD AUTO: 2.7 %
ESTIMATED AVERAGE GLUCOSE: 232 MG/DL
GLUCOSE QUALITATIVE U: NEGATIVE
HBA1C MFR BLD HPLC: 9.7 %
HCT VFR BLD CALC: 40.5 %
HGB BLD-MCNC: 13.5 G/DL
IMM GRANULOCYTES NFR BLD AUTO: 0.3 %
KETONES URINE: NEGATIVE
LEUKOCYTE ESTERASE URINE: ABNORMAL
LYMPHOCYTES # BLD AUTO: 1.68 K/UL
LYMPHOCYTES NFR BLD AUTO: 23.9 %
MAN DIFF?: NORMAL
MCHC RBC-ENTMCNC: 31.7 PG
MCHC RBC-ENTMCNC: 33.3 GM/DL
MCV RBC AUTO: 95.1 FL
MONOCYTES # BLD AUTO: 0.61 K/UL
MONOCYTES NFR BLD AUTO: 8.7 %
NEUTROPHILS # BLD AUTO: 4.48 K/UL
NEUTROPHILS NFR BLD AUTO: 63.7 %
NITRITE URINE: NEGATIVE
PH URINE: 5
PLATELET # BLD AUTO: 211 K/UL
POTASSIUM SERPL-SCNC: 4.5 MMOL/L
PROT SERPL-MCNC: 7.7 G/DL
PROTEIN URINE: NORMAL
RBC # BLD: 4.26 M/UL
RBC # FLD: 12.8 %
SARS-COV-2 IGG SERPL IA-ACNC: 129 INDEX
SARS-COV-2 IGG SERPL QL IA: POSITIVE
SARS-COV-2 N GENE NPH QL NAA+PROBE: NOT DETECTED
SODIUM SERPL-SCNC: 141 MMOL/L
SPECIFIC GRAVITY URINE: 1.01
UROBILINOGEN URINE: NORMAL
WBC # FLD AUTO: 7.03 K/UL

## 2020-12-28 DIAGNOSIS — Z20.828 CONTACT WITH AND (SUSPECTED) EXPOSURE TO OTHER VIRAL COMMUNICABLE DISEASES: ICD-10-CM

## 2020-12-31 ENCOUNTER — NON-APPOINTMENT (OUTPATIENT)
Age: 85
End: 2020-12-31

## 2020-12-31 LAB
ALBUMIN SERPL ELPH-MCNC: 4.1 G/DL
ANION GAP SERPL CALC-SCNC: 15 MMOL/L
BUN SERPL-MCNC: 72 MG/DL
CALCIUM SERPL-MCNC: 9.5 MG/DL
CHLORIDE SERPL-SCNC: 102 MMOL/L
CO2 SERPL-SCNC: 19 MMOL/L
CREAT SERPL-MCNC: 1.72 MG/DL
GLUCOSE SERPL-MCNC: 356 MG/DL
PHOSPHATE SERPL-MCNC: 4 MG/DL
POTASSIUM SERPL-SCNC: 4.9 MMOL/L
SODIUM SERPL-SCNC: 136 MMOL/L

## 2021-01-05 ENCOUNTER — APPOINTMENT (OUTPATIENT)
Dept: HOME HEALTH SERVICES | Facility: HOME HEALTH | Age: 86
End: 2021-01-05

## 2021-01-08 ENCOUNTER — NON-APPOINTMENT (OUTPATIENT)
Age: 86
End: 2021-01-08

## 2021-01-10 ENCOUNTER — NON-APPOINTMENT (OUTPATIENT)
Age: 86
End: 2021-01-10

## 2021-01-10 ENCOUNTER — TRANSCRIPTION ENCOUNTER (OUTPATIENT)
Age: 86
End: 2021-01-10

## 2021-01-10 ENCOUNTER — EMERGENCY (EMERGENCY)
Facility: HOSPITAL | Age: 86
LOS: 1 days | Discharge: HOME | End: 2021-01-10
Attending: EMERGENCY MEDICINE
Payer: MEDICARE

## 2021-01-10 VITALS
RESPIRATION RATE: 19 BRPM | OXYGEN SATURATION: 99 % | DIASTOLIC BLOOD PRESSURE: 79 MMHG | TEMPERATURE: 98 F | WEIGHT: 141.1 LBS | SYSTOLIC BLOOD PRESSURE: 139 MMHG | HEIGHT: 62 IN | HEART RATE: 84 BPM

## 2021-01-10 VITALS
SYSTOLIC BLOOD PRESSURE: 111 MMHG | DIASTOLIC BLOOD PRESSURE: 86 MMHG | TEMPERATURE: 98 F | HEART RATE: 89 BPM | RESPIRATION RATE: 18 BRPM | OXYGEN SATURATION: 100 %

## 2021-01-10 DIAGNOSIS — Z98.89 OTHER SPECIFIED POSTPROCEDURAL STATES: Chronic | ICD-10-CM

## 2021-01-10 DIAGNOSIS — Z90.49 ACQUIRED ABSENCE OF OTHER SPECIFIED PARTS OF DIGESTIVE TRACT: Chronic | ICD-10-CM

## 2021-01-10 PROCEDURE — 99284 EMERGENCY DEPT VISIT MOD MDM: CPT | Mod: 25

## 2021-01-10 PROCEDURE — 99284 EMERGENCY DEPT VISIT MOD MDM: CPT

## 2021-01-10 PROCEDURE — 73110 X-RAY EXAM OF WRIST: CPT | Mod: 26,LT

## 2021-01-10 PROCEDURE — 73090 X-RAY EXAM OF FOREARM: CPT | Mod: 26,LT

## 2021-01-10 PROCEDURE — 73110 X-RAY EXAM OF WRIST: CPT

## 2021-01-10 PROCEDURE — 73090 X-RAY EXAM OF FOREARM: CPT

## 2021-01-10 RX ORDER — ACETAMINOPHEN 500 MG
975 TABLET ORAL ONCE
Refills: 0 | Status: COMPLETED | OUTPATIENT
Start: 2021-01-10 | End: 2021-01-10

## 2021-01-10 RX ADMIN — Medication 975 MILLIGRAM(S): at 16:04

## 2021-01-10 NOTE — CONSULT NOTE ADULT - SUBJECTIVE AND OBJECTIVE BOX
87y R Hand Dominant. Female patient presents to [facility] ED c/o severe L wrist pain s/p mechanical fall. Patient denies head hit or LOC. Localizing pain to distal radius. Denies radiation of pain. Pt Reports subjective tingling but denies numbness, or burning. Patient denies any other injuries.    PMH:  CHF (congestive heart failure)  STEMI (ST elevation myocardial infarction)  Palpitations  Diabetes mellitus  Dyslipidemia  HTN (hypertension)      PSH:  S/P cardiac cath  S/P tonsillectomy  S/P lumpectomy, left breast  S/P appendectomy  S/P cholecystectomy    AH:  codeine (Unknown)    Meds: See med rec    T(C): 36.6 (01-10-21 @ 15:34)  HR: 89 (01-10-21 @ 15:34)  BP: 111/86 (01-10-21 @ 15:34)  RR: 18 (01-10-21 @ 15:34)  SpO2: 100% (01-10-21 @ 15:34)  Wt(kg): --    PE :  Gen: NAD, A/Ox3, Following commands  RUE  Skin intact,  visible deformity of wrist, + soft tissue swelling, +ecchymosis  Decreased ROM of Wrist 2/2 pain  Normal Elbow/Shoulder ROM w/o pain  + TTP over distal radius  No Snuff box TTP  + Rad Pulse   SILT C5-T1  +AIN/PIN/Ulnar/Radial/Musc/Median  compartments soft and compressible    Secondary Survey:   No TTP over bony prominences, SILT, palpable pulses, full/painless A/PROM, compartments soft. No TTP over spinous processes or paraspinal muscles at C/T/L spine. No palpable step off. No other injuries or complaints. Able to SLR BL. Negative logroll/heelstrike BL. Ambulating w/o assistance/pain.    Imaging:  XRay L Wrist  3 views of L Wrist demonstrates L distal radius fracture, extra-articular w/ dorsal displacement of carpus/hand in relation to forearm. No other fx/dislocations noted.     Procedure Note:  After verbal consent obtained, ~ 10cc of 1% Lidocaine injected into area around DR/Ulna as hematoma block. UE hung by fingers and reduction maneuver performed. A well padded sugartong splint was applied to Forearm/Wrist and mold held. OH XR obtained which show improved alignment of Fracture. Post reduction NV exam unchanged. Pt tolerated procedure well.

## 2021-01-10 NOTE — ED PROVIDER NOTE - NSFOLLOWUPCLINICS_GEN_ALL_ED_FT
[facility]  Orthopedic Surgery  [address], [address] & [address]th [place] [address]  Phone: [phone]  Fax:   Follow Up Time:

## 2021-01-10 NOTE — ED PROVIDER NOTE - CONSTITUTIONAL, MLM
normal... frail elderly woman, awake, alert, oriented to person, place, time/situation and in no apparent distress.

## 2021-01-10 NOTE — ED PROVIDER NOTE - NSFOLLOWUPINSTRUCTIONS_ED_ALL_ED_FT
you were seen in the emergency department today for left wrist pain after a fall.   you had xrays and were found to have a fracture of your radius and ulna fractures.   you were seen by orthopedics and had a splint applied.   you can take tylenol 500-1000mg every 6 hours for pain.   ice affected limb and keep the arm elevated.   do not bear any weight on the left arm, keep in a splint and sling.   -Keep splint clean/dry/intact;  move your fingers regularly to help prevent swelling.   -we Recommend Ca & Vit D supplementation  -we Recommend DEXA scan/Osteoporosis workup outpatient and treatment as needed  -we Recommend FU w/ outpatient endocrinologist   follow up with your primary care doctor.   return to the emergency department for any new or worsening symptoms including but not limited to the following: severe/worsening pain, numbness, tingling, skin changes, fevers, chills.   see additional packet for further information.

## 2021-01-10 NOTE — ED PROVIDER NOTE - OBJECTIVE STATEMENT
86yo F Hx HTN, DM, CAD, CHF presentiing with complaints of L wrist pain s/p fall. pt reports that she was walking when she tripped over her rug, landing on outstretched arm. no head strike or loc. able to stand up following. no lower extremity pain, L wrist swelling and pain with movement. no elbow or shoulder pain. r hand dominant. no lightheadedness, dizziness, cp or sob prior to or after falling. 86yo F Hx HTN, DM, CAD, CHF presentiing with complaints of L wrist pain s/p fall. pt reports that she was walking when she tripped over her rug, landing on outstretched arm. no head strike or loc. able to stand up following. no lower extremity pain, L wrist swelling and pain with movement. no elbow or shoulder pain. r hand dominant. no lightheadedness, dizziness, cp or sob prior to or after falling.    Attendinyo female presents with left wrist pain and swelling after fall on outstretched arm earlier.  no head injury or LOC.

## 2021-01-10 NOTE — ED ADULT NURSE NOTE - CHIEF COMPLAINT QUOTE
s/p mechanical fall at home, c/o L wrist pain/swelling, +covid swab: summer 2020, antibody positive: 12/25/2020, no head injury/LOC, +amb with walker at [facility]

## 2021-01-10 NOTE — CONSULT NOTE ADULT - ASSESSMENT
A/P:   87yFemale s/p Mech Fall w/  LDistal Radius Fracture s/p closed reduction and splinting.    -Pt advised to remove all jewelry from affected limb.  -Pain control  -Strict Ice/Elevation to help with swelling  -NWB L UE with splint and sling  -Keep splint clean/dry/intact;  -Encourage active finger motion to help with swelling  -Pt aware of possible need for surgical intervention of distal radius fracture. Will FU as outpatient  -Recommend Ca & Vit D supplementation  -Recommend DEXA scan/Osteoporosis workup outpatient and treatment as needed  -Recommend FU w/ outpatient endocrinologist   -Pt made aware of signs and symptoms of compartment syndrome and acute carpal tunnel syndrome. Aware of need to return to ED if symptoms develop.  -Recommend follow up outpatient w/ Dr. Pinto in 2-3 days. Call office to schedule appointment.  -All Patient's and Family Member's questions answered. Patient/family understand and agree w/ plan.  -Will discuss w/ attending and advise if plan changes.    Ryan Blackwell M.D.   Orthopaedic Surgery  [id number]

## 2021-01-10 NOTE — ED PROVIDER NOTE - CARE PLAN
Principal Discharge DX:	Radial fracture  Secondary Diagnosis:	Ulna styloid fracture, closed   Principal Discharge DX:	Radial fracture  Goal:	left  Secondary Diagnosis:	Ulna styloid fracture, closed

## 2021-01-10 NOTE — ED PROVIDER NOTE - GASTROINTESTINAL [-], MLM
Dry Eye Syndrome Counseling: I have discussed the diagnosis and the pathophysiology of this disease with the patient. Eyelid pathology and systemic illnesses such as Sjogren's disease or rheumatoid arthritis may contribute to severity. Vision may be limited by dry eye, and symptoms exacerbated by environmental factors such as smoke, wind, or prolonged eye use. Treatment options include, but are not limited to, artificial tears, punctal plugs, topical cyclosporine, oral omega-3 supplements, Lipiflow, moisture goggles, and lubricating ointments. I stressed the importance of compliance with treatment. no abdominal pain/no nausea/no vomiting

## 2021-01-10 NOTE — ED ADULT NURSE NOTE - OBJECTIVE STATEMENT
88 y/o female presents to ED from home via EMS c/o L wrist pain s/p slip and fall onto outstretched hands. Pt states she ambulated with a walker at home. Fall was witnessed by daughter. No LOC, head or neck injury reported. Pt is A&O. Breathing even and unlabored. Skin warm, dry, intact. Gross motor and neuro intact. Safety and comfort provided. Call bell within reach. 88 y/o female presents to ED from home via EMS c/o L wrist pain s/p slip and fall onto outstretched hands. Pt states she ambulated with a walker at home. Fall was witnessed by daughter. No LOC, head or neck injury reported. Pt is A&O. Breathing even and unlabored. Skin warm, dry, intact. L wrist swollen - pt verbalized pain when moving arm. Limited ROM due to injury. +sensation.  Safety and comfort provided. Call bell within reach.

## 2021-01-10 NOTE — ED ADULT NURSE REASSESSMENT NOTE - NS ED NURSE REASSESS COMMENT FT1
Patient discharged from ED. Discharge paperwork provided. Verbalized understanding of teaching. Vital signs stable, afebrile. Patient leaving unit in wheelchair, free from harm.

## 2021-01-10 NOTE — ED PROVIDER NOTE - CLINICAL SUMMARY MEDICAL DECISION MAKING FREE TEXT BOX
86yo F presenting with L wrist pain s/p slip and fall. no head injury or LOC. with swelling and bruising of left wrist, tenderness of radial and ulnar aspect. R hand dominant, concern for fracture. will obtaim imaging and analgesia. reassess.

## 2021-01-10 NOTE — ED PROVIDER NOTE - MUSCULOSKELETAL, MLM
Spine appears normal, no midline spinal tenderness or stepoffs. no pelvic instability or hip tenderness. L wrist with swelling and bruising over the dorsal aspect. tenderness of the radial and ulnar aspect. pain in the wrist with palpation of the proximal forearm. no elbow or shoulder tenderness.

## 2021-01-10 NOTE — ED PROVIDER NOTE - PATIENT PORTAL LINK FT
You can access the FollowMyHealth Patient Portal offered by [facility] by registering at the following website: [email or web address]. By joining [employer]’s FollowMyHealth portal, you will also be able to view your health information using other applications (apps) compatible with our system.

## 2021-01-11 ENCOUNTER — APPOINTMENT (OUTPATIENT)
Dept: HOME HEALTH SERVICES | Facility: HOME HEALTH | Age: 86
End: 2021-01-11

## 2021-01-11 RX ORDER — CIPROFLOXACIN HYDROCHLORIDE 250 MG/1
250 TABLET, FILM COATED ORAL
Qty: 14 | Refills: 0 | Status: DISCONTINUED | COMMUNITY
Start: 2020-12-21 | End: 2021-01-11

## 2021-01-11 RX ORDER — ACETAMINOPHEN 325 MG/1
325 TABLET, FILM COATED ORAL EVERY 6 HOURS
Qty: 240 | Refills: 0 | Status: DISCONTINUED | COMMUNITY
Start: 2018-11-05 | End: 2021-01-11

## 2021-01-15 ENCOUNTER — APPOINTMENT (OUTPATIENT)
Dept: ORTHOPEDIC SURGERY | Facility: CLINIC | Age: 86
End: 2021-01-15
Payer: MEDICARE

## 2021-01-15 VITALS
WEIGHT: 141 LBS | SYSTOLIC BLOOD PRESSURE: 148 MMHG | TEMPERATURE: 96.6 F | HEART RATE: 88 BPM | HEIGHT: 61 IN | BODY MASS INDEX: 26.62 KG/M2 | DIASTOLIC BLOOD PRESSURE: 85 MMHG

## 2021-01-15 DIAGNOSIS — S52.532A COLLES' FRACTURE OF LEFT RADIUS, INITIAL ENCOUNTER FOR CLOSED FRACTURE: ICD-10-CM

## 2021-01-15 PROCEDURE — 25600 CLTX DST RDL FX/EPHYS SEP WO: CPT | Mod: LT

## 2021-01-15 PROCEDURE — 99072 ADDL SUPL MATRL&STAF TM PHE: CPT

## 2021-01-15 PROCEDURE — 99204 OFFICE O/P NEW MOD 45 MIN: CPT | Mod: 57

## 2021-01-15 PROCEDURE — 73110 X-RAY EXAM OF WRIST: CPT | Mod: LT

## 2021-01-15 NOTE — PHYSICAL EXAM
[UE] : Sensory: Intact in bilateral upper extremities [Rad] : radial 2+ and symmetric bilaterally [Normal] : Alert and in no acute distress [Poor Appearance] : well-appearing [Acute Distress] : not in acute distress [Obese] : not obese [de-identified] : The patient has no respiratory distress. Mood and affect are normal. The patient is alert and oriented to person, place and time.\par Left upper extremity sugar tong splint is in good condition.  Sensation of the fingers is intact.  Finger motion is good.  There is mild discomfort with motion of the left shoulder.  There is no lymphedema. [de-identified] : \par EXAM: WRIST COMPLETE LEFT (MIN 3 VIEW)\par EXAM: FOREARM LEFT\par \par PROCEDURE DATE: 01/10/2021\par \par INTERPRETATION: CLINICAL INDICATION: Left wrist pain status post fall\par \par TECHNIQUE: 2 views of left forearm, 3 views of the left wrist\par \par COMPARISON: None\par \par FINDINGS:\par \par Acute impacted fracture of the distal radius with intra-articular extension. Acute avulsion fracture of the ulnar styloid process.\par No dislocation. Mild basilar joint arthrosis.\par Soft tissue swelling about the wrist. Vascular calcifications.\par \par \par IMPRESSION:\par Acute impacted fracture of the distal radius. Acute avulsion fracture of the ulnar styloid process.\par \par PATRICIA FRANKLIN M.D., RADIOLOGY RESIDENT\par This document has been electronically signed.\par ROBERTO ZAMAN MD; Attending Radiologist\par This document has been electronically signed. Jan 11 2021 8:40AM\par \par \par AP, lateral and oblique x-rays of the left wrist demonstrate distal radius fracture with ulnar styloid avulsion.  There is some shortening and comminution of the fracture.

## 2021-01-15 NOTE — DISCUSSION/SUMMARY
[de-identified] : The patient has distal radius fracture at the left wrist.  I have discussed the pathology, natural history and treatment options with the patient and her daughter.  The reduction is not anatomic but based on the position of the patient's age I think that it is appropriate.  This has been discussed in detail with the patient.  She will be maintained in a sugar tong splint for an additional 2 weeks.  She will return at that time and if x-rays are satisfactory will be switched to a short arm cast.  She will be seen sooner if there are any difficulties.  She should work on finger and shoulder range of motion exercises.

## 2021-01-15 NOTE — HISTORY OF PRESENT ILLNESS
[de-identified] : 87 year old RHD female accompanied by her daughter for evaluation of a left wrist fracture sustained 1/10/21 when she fell at home. She was evaluated in the ED at [facility] where xrays were taken. She was diagnosed with a fracture which was reduced and splinted. She is uncomfortable in the sugar tong splint. She complains of pain at the wrist and swelling of her fingers. She is taking Tylenol as needed for pain.

## 2021-01-20 ENCOUNTER — APPOINTMENT (OUTPATIENT)
Dept: ORTHOPEDIC SURGERY | Facility: HOSPITAL | Age: 86
End: 2021-01-20

## 2021-01-28 ENCOUNTER — NON-APPOINTMENT (OUTPATIENT)
Age: 86
End: 2021-01-28

## 2021-01-28 ENCOUNTER — APPOINTMENT (OUTPATIENT)
Dept: HOME HEALTH SERVICES | Facility: HOME HEALTH | Age: 86
End: 2021-01-28

## 2021-01-28 NOTE — COUNSELING
[Overweight - ( BMI 25.1 - 29.9 )] : overweight -  ( BMI 25.1 - 29.9 ) [Continue diet as tolerated] : continue diet as tolerated based on goals of care [Non - Smoker] : non-smoker [Use assistive device to avoid falls] : use assistive device to avoid falls [] : foot exam [Not Recommended] : Aspirin use not recommended due to overall prognosis [Improve exercise tolerance] : improve exercise tolerance [Improve mobility] : improve mobility [Improve weight] : improve weight [Decrease stress] : decrease stress [Decrease hospital use] : decrease hospital use [Minimize unnecessary interventions] : minimize unnecessary interventions [Comfort Care] : comfort care [Maintain functional ability] : maintain functional ability [Discussed disease trajectory with patient/caregiver] : discussed disease trajectory with patient/caregiver [Likely to achieve goals/desired outcomes] : likely to achieve goals/desired outcomes [Patient/Caregiver has ___ understanding of disease process] : patient/caregiver has [unfilled] understanding of disease process [Advanced Directives discussed: ____] : Advanced directives discussed: [unfilled] [DNR] : Code Status: DNR [Limited] : Treatment Guidelines: Limited [DNI] : Intubation: DNI [[facility] Date: _____] [facility] Com[facility]/last verification date: [unfilled]

## 2021-01-29 ENCOUNTER — APPOINTMENT (OUTPATIENT)
Dept: ORTHOPEDIC SURGERY | Facility: CLINIC | Age: 86
End: 2021-01-29
Payer: MEDICARE

## 2021-01-29 VITALS
WEIGHT: 141 LBS | SYSTOLIC BLOOD PRESSURE: 144 MMHG | HEIGHT: 61 IN | DIASTOLIC BLOOD PRESSURE: 85 MMHG | HEART RATE: 65 BPM | TEMPERATURE: 96.3 F | BODY MASS INDEX: 26.62 KG/M2

## 2021-01-29 PROCEDURE — 99024 POSTOP FOLLOW-UP VISIT: CPT

## 2021-01-29 PROCEDURE — 73110 X-RAY EXAM OF WRIST: CPT | Mod: LT

## 2021-01-29 NOTE — PHYSICAL EXAM
[No Acute Distress] : no acute distress [Well Nourished] : well nourished [Normal Sclera/Conjunctiva] : normal sclera/conjunctiva [Normal Outer Ear/Nose] : the ears and nose were normal in appearance [Normal Oropharynx] : the oropharynx was normal [Supple] : the neck was supple [No Respiratory Distress] : no respiratory distress [No Accessory Muscle Use] : no accessory muscle use [No Edema] : there was no peripheral edema [Non Tender] : non-tender [Soft] : abdomen soft [Not Distended] : not distended [No CVA Tenderness] : no ~M costovertebral angle tenderness [No Spinal Tenderness] : no spinal tenderness [No Joint Swelling] : no joint swelling seen [No Clubbing, Cyanosis] : no clubbing  or cyanosis of the fingernails [No Rash] : no rash [Cranial Nerves Intact] : cranial nerves 2-12 were intact [No Motor Deficits] : the motor exam was normal [Oriented x3] : oriented to person, place, and time [Normal Affect] : the affect was normal [Normal Mood] : the mood was normal [de-identified] : skin color good, awake, interactive, pleasant,  [de-identified] : wears glasses [de-identified] : slow unstable gait, uses walker [de-identified] : stage I pressure ulcer of sacrum  [de-identified] : neuropathy B/L LE

## 2021-01-29 NOTE — CHRONIC CARE ASSESSMENT
[PPS Score: ____] : Palliative Performance Scale (PPS) Score: [unfilled] [Limited decision making ability] : limited decision making ability [de-identified] : n/a [de-identified] : adequate

## 2021-02-04 ENCOUNTER — APPOINTMENT (OUTPATIENT)
Dept: HOME HEALTH SERVICES | Facility: HOME HEALTH | Age: 86
End: 2021-02-04
Payer: MEDICARE

## 2021-02-04 DIAGNOSIS — L89.152 PRESSURE ULCER OF SACRAL REGION, STAGE 2: ICD-10-CM

## 2021-02-04 DIAGNOSIS — T14.8XXA OTHER INJURY OF UNSPECIFIED BODY REGION, INITIAL ENCOUNTER: ICD-10-CM

## 2021-02-04 DIAGNOSIS — R26.81 UNSTEADINESS ON FEET: ICD-10-CM

## 2021-02-04 PROCEDURE — 99349 HOME/RES VST EST MOD MDM 40: CPT | Mod: 95

## 2021-02-04 NOTE — COUNSELING
[Overweight - ( BMI 25.1 - 29.9 )] : overweight -  ( BMI 25.1 - 29.9 ) [Continue diet as tolerated] : continue diet as tolerated based on goals of care [Non - Smoker] : non-smoker [Use assistive device to avoid falls] : use assistive device to avoid falls [] : foot exam [Not Recommended] : Aspirin use not recommended due to overall prognosis [Improve exercise tolerance] : improve exercise tolerance [Improve mobility] : improve mobility [Improve weight] : improve weight [Decrease stress] : decrease stress [Decrease hospital use] : decrease hospital use [Minimize unnecessary interventions] : minimize unnecessary interventions [Comfort Care] : comfort care [Maintain functional ability] : maintain functional ability [Discussed disease trajectory with patient/caregiver] : discussed disease trajectory with patient/caregiver [Likely to achieve goals/desired outcomes] : likely to achieve goals/desired outcomes [Patient/Caregiver has ___ understanding of disease process] : patient/caregiver has [unfilled] understanding of disease process [Advanced Directives discussed: ____] : Advanced directives discussed: [unfilled] [DNR] : Code Status: DNR [Limited] : Treatment Guidelines: Limited [DNI] : Intubation: DNI [Last [facility] Date: _____] [facility] Completion/last verification date: [unfilled]

## 2021-02-16 NOTE — HISTORY OF PRESENT ILLNESS
[Patient] : patient [FreeTextEntry1] : Gait instability [FreeTextEntry2] : RACHEL MERLINI is being seen for a visit provided via telehealth via [facility].This visit was first attempted using [employer] real-time audio visual technology, but was unable to be completed.\par RACHEL MERLINI was located at their home, [address]\[place] [address], at the time of the visit.\par [facility] clinician, JERRICA GOMEZ, was located remotely at their home in [place] at the time of the visit. \par The patient, RACHEL MERLINI, and [facility] clinician, JERRICA GOMEZ, participated in the telehealth encounter.\par Other participants included dtr \par RACHEL MERLINI (Feb 22 1933) or his/her representative consents to the use of telehealth. 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 Has short arm cast for left wrist fracture. F/b ortho. ortho to x-ray right knee at next visit. \par CHF: on Lasix 40 mg PO QD. weight is 141-143 lbs recently. Has mild SOB w/ exertion. \par Pt likes spending time in the kitchen, dtr trying to get her to walk every 2h> \par stage I pressure ulcer of buttock has healed: using medi honey now, sometimes skin opens. Has roho cushion \par Has fungal infection of right 2nd and middle fingers. Using ketoconazole cream irregularly w/o improvement. Shared w/ patient that no need to treat. \par appetite is overall good, though somewhat variable from day to day, eats regular food no swallowing problems\par BM regular- sometimes have diarrhea alternating with constipation, been like this for years. \par continent with bowels/bladder, wears depends, no urinary symptoms\par sleeps well except when wakes up to pee 2-3 times/night\par no memory problems\par no recent falls, last fall 2 years ago, using walker\par DM type 2 w/ complications- neuropathy/retinopathy/nephropathy- x 30 years, controlled, patient states her baseline FS is around high 100s-low 200s range . checks BG 3-5 times daily. BG management tricky as patient does not have regular eating schedule follows endocrinology, ophthalmology and podiatry. cannot feel legs due to neuropathy. Has Humalog ISS for meals and takes lantus 15U QHS \par \par CAD s/p stent, CHF, Angina- EF -23%, recent echo by cardiologist. baseline weight is 133 lbs. gets occasional chest pains yang when stressed or when walks outside or with cold air. usually resolves with rest, uses nitro when needed.\par \par recurrent UTIs- Does not currently have any urinary symptoms.\par Denies fever, vomiting, SOB, cough, worsened LE edema. \par ----------\par lives with daughter and grand son, independent with ADLs\par ambulates w/ walker

## 2021-02-16 NOTE — CHRONIC CARE ASSESSMENT
[PPS Score: ____] : Palliative Performance Scale (PPS) Score: [unfilled] [Limited decision making ability] : limited decision making ability [de-identified] : n/a [de-identified] : adequate

## 2021-02-16 NOTE — PHYSICAL EXAM
[No Acute Distress] : no acute distress [Well Nourished] : well nourished [Normal Sclera/Conjunctiva] : normal sclera/conjunctiva [Normal Outer Ear/Nose] : the ears and nose were normal in appearance [Supple] : the neck was supple [No Respiratory Distress] : no respiratory distress [No Accessory Muscle Use] : no accessory muscle use [No Edema] : there was no peripheral edema [Soft] : abdomen soft [Not Distended] : not distended [No CVA Tenderness] : no ~M costovertebral angle tenderness [No Spinal Tenderness] : no spinal tenderness [No Joint Swelling] : no joint swelling seen [No Clubbing, Cyanosis] : no clubbing  or cyanosis of the fingernails [No Rash] : no rash [Cranial Nerves Intact] : cranial nerves 2-12 were intact [No Motor Deficits] : the motor exam was normal [Oriented x3] : oriented to person, place, and time [Normal Affect] : the affect was normal [Normal Mood] : the mood was normal [de-identified] : skin color good, awake, interactive, pleasant,  [de-identified] : wears glasses [de-identified] : slow unstable gait, uses walker [de-identified] : +Fungal nail infection of 2nd and 3rd digits of left hand [de-identified] : neuropathy B/L LE

## 2021-02-22 ENCOUNTER — APPOINTMENT (OUTPATIENT)
Dept: ORTHOPEDIC SURGERY | Facility: CLINIC | Age: 86
End: 2021-02-22
Payer: MEDICARE

## 2021-02-22 VITALS
WEIGHT: 141 LBS | SYSTOLIC BLOOD PRESSURE: 122 MMHG | HEIGHT: 61 IN | HEART RATE: 66 BPM | BODY MASS INDEX: 26.62 KG/M2 | DIASTOLIC BLOOD PRESSURE: 79 MMHG

## 2021-02-22 DIAGNOSIS — M17.0 BILATERAL PRIMARY OSTEOARTHRITIS OF KNEE: ICD-10-CM

## 2021-02-22 PROCEDURE — 99213 OFFICE O/P EST LOW 20 MIN: CPT | Mod: 24

## 2021-02-22 PROCEDURE — 99072 ADDL SUPL MATRL&STAF TM PHE: CPT

## 2021-02-22 PROCEDURE — 73564 X-RAY EXAM KNEE 4 OR MORE: CPT | Mod: 50

## 2021-02-22 PROCEDURE — 73110 X-RAY EXAM OF WRIST: CPT | Mod: LT

## 2021-02-22 NOTE — HISTORY OF PRESENT ILLNESS
[de-identified] : The patient presents 6 weeks s/p left distal radius fracture. She is comfortable in her short arm cast. She reports improvement in wrist pain. Denies any numbness or tingling. She complains of pain in both knees, R>L x several months. Pain is slightly worse since her fall in January. She has pain when walking and difficulty getting in and out of the car. She occasionally takes Tylenol. She is currently doing home PT for general conditioning and strengthening.

## 2021-02-22 NOTE — DISCUSSION/SUMMARY
[de-identified] : The patient's left distal radius fracture has healed.  The cast is removed.  She is started on home range of motion exercises for her fingers, wrists, elbows and shoulder.  She is given a wrist immobilizer for protection as she needs to use her hands to use her walker.  I would like to reevaluate her in 3 weeks.  In terms of her knees she has bilateral knee arthritis with recent exacerbation.  She will continue on her home physical therapy program for this condition.

## 2021-02-22 NOTE — PHYSICAL EXAM
[Slightly Antalgic] : slightly antalgic [Walker] : ambulates with walker [UE/LE] : Sensory: Intact in bilateral upper & lower extremities [DP] : dorsalis pedis 2+ and symmetric bilaterally [Rad] : radial 2+ and symmetric bilaterally [Normal] : Alert and in no acute distress [Poor Appearance] : well-appearing [Acute Distress] : not in acute distress [Obese] : not obese [de-identified] : The patient has no respiratory distress. Mood and affect are normal. The patient is alert and oriented to person, place and time.\par Left upper extremity cast is intact.  It is removed today.  There is no tenderness.  The skin is intact.  Tendon function is intact.  She has limitation of wrist and finger motion.  There is no lymphedema.\par Examination of the lower extremities demonstrates no pain with motion of the hips.  There is anteromedial tenderness of both knees.  Quadriceps and hamstring function are intact.  The collateral and cruciate ligaments are stable.  Range of motion 0 to 100 degrees of both knees.  The calves are soft and nontender.  There is swelling of both ankles. [de-identified] : AP, lateral and oblique x-rays of the left wrist demonstrate healing of the distal radius fracture.\par AP, lateral, tunnel and sunrise x-rays of both knees demonstrate moderate degenerative changes of all 3 compartments in both knees.  There are no fractures or dislocations.

## 2021-03-08 ENCOUNTER — RX RENEWAL (OUTPATIENT)
Age: 86
End: 2021-03-08

## 2021-03-08 ENCOUNTER — APPOINTMENT (OUTPATIENT)
Dept: HOME HEALTH SERVICES | Facility: HOME HEALTH | Age: 86
End: 2021-03-08

## 2021-03-15 ENCOUNTER — RX RENEWAL (OUTPATIENT)
Age: 86
End: 2021-03-15

## 2021-03-15 ENCOUNTER — APPOINTMENT (OUTPATIENT)
Dept: ORTHOPEDIC SURGERY | Facility: CLINIC | Age: 86
End: 2021-03-15
Payer: MEDICARE

## 2021-03-15 VITALS — DIASTOLIC BLOOD PRESSURE: 78 MMHG | HEART RATE: 67 BPM | SYSTOLIC BLOOD PRESSURE: 121 MMHG

## 2021-03-15 DIAGNOSIS — M25.512 PAIN IN LEFT SHOULDER: ICD-10-CM

## 2021-03-15 PROCEDURE — 99024 POSTOP FOLLOW-UP VISIT: CPT

## 2021-03-19 ENCOUNTER — NON-APPOINTMENT (OUTPATIENT)
Age: 86
End: 2021-03-19

## 2021-03-26 ENCOUNTER — NON-APPOINTMENT (OUTPATIENT)
Age: 86
End: 2021-03-26

## 2021-03-29 ENCOUNTER — APPOINTMENT (OUTPATIENT)
Dept: HOME HEALTH SERVICES | Facility: HOME HEALTH | Age: 86
End: 2021-03-29
Payer: MEDICARE

## 2021-03-29 VITALS
TEMPERATURE: 97.3 F | RESPIRATION RATE: 16 BRPM | SYSTOLIC BLOOD PRESSURE: 142 MMHG | OXYGEN SATURATION: 98 % | HEART RATE: 63 BPM | DIASTOLIC BLOOD PRESSURE: 51 MMHG

## 2021-03-29 PROCEDURE — 99349 HOME/RES VST EST MOD MDM 40: CPT

## 2021-03-29 NOTE — COUNSELING
[Overweight - ( BMI 25.1 - 29.9 )] : overweight -  ( BMI 25.1 - 29.9 ) [Continue diet as tolerated] : continue diet as tolerated based on goals of care [Non - Smoker] : non-smoker [Use assistive device to avoid falls] : use assistive device to avoid falls [] : foot exam [Not Recommended] : Aspirin use not recommended due to overall prognosis [Improve exercise tolerance] : improve exercise tolerance [Improve mobility] : improve mobility [Improve weight] : improve weight [Decrease stress] : decrease stress [Decrease hospital use] : decrease hospital use [Minimize unnecessary interventions] : minimize unnecessary interventions [Comfort Care] : comfort care [Maintain functional ability] : maintain functional ability [Discussed disease trajectory with patient/caregiver] : discussed disease trajectory with patient/caregiver [Likely to achieve goals/desired outcomes] : likely to achieve goals/desired outcomes [Patient/Caregiver has ___ understanding of disease process] : patient/caregiver has [unfilled] understanding of disease process [Advanced Directives discussed: ____] : Advanced directives discussed: [unfilled] [DNR] : Code Status: DNR [Limited] : Treatment Guidelines: Limited [DNI] : Intubation: DNI [Last [facility] Date: _____] : [facility] Completion/last verification date: [unfilled]

## 2021-04-02 ENCOUNTER — NON-APPOINTMENT (OUTPATIENT)
Age: 86
End: 2021-04-02

## 2021-04-05 ENCOUNTER — APPOINTMENT (OUTPATIENT)
Dept: HOME HEALTH SERVICES | Facility: HOME HEALTH | Age: 86
End: 2021-04-05
Payer: MEDICARE

## 2021-04-05 VITALS
OXYGEN SATURATION: 98 % | RESPIRATION RATE: 16 BRPM | TEMPERATURE: 98 F | HEART RATE: 65 BPM | DIASTOLIC BLOOD PRESSURE: 70 MMHG | SYSTOLIC BLOOD PRESSURE: 140 MMHG

## 2021-04-05 PROCEDURE — 0031A: CPT

## 2021-04-13 NOTE — PHYSICAL EXAM
[No Acute Distress] : no acute distress [Well Nourished] : well nourished [Normal Sclera/Conjunctiva] : normal sclera/conjunctiva [Normal Outer Ear/Nose] : the ears and nose were normal in appearance [Supple] : the neck was supple [No Respiratory Distress] : no respiratory distress [No Accessory Muscle Use] : no accessory muscle use [No Edema] : there was no peripheral edema [Soft] : abdomen soft [Not Distended] : not distended [No CVA Tenderness] : no ~M costovertebral angle tenderness [No Spinal Tenderness] : no spinal tenderness [No Joint Swelling] : no joint swelling seen [No Clubbing, Cyanosis] : no clubbing  or cyanosis of the fingernails [No Rash] : no rash [Cranial Nerves Intact] : cranial nerves 2-12 were intact [No Motor Deficits] : the motor exam was normal [Oriented x3] : oriented to person, place, and time [Normal Affect] : the affect was normal [Normal Mood] : the mood was normal [Clear to Auscultation] : lungs were clear to auscultation bilaterally [Normal Rate] : heart rate was normal  [Regular Rhythm] : with a regular rhythm [Normal S1, S2] : normal S1 and S2 [Non Tender] : non-tender [de-identified] : skin color good, awake, interactive, pleasant,  [de-identified] : wears glasses [de-identified] : slow unstable gait, uses walker [de-identified] : +Fungal nail infection of 2nd and 3rd digits of left hand [de-identified] : neuropathy B/L LE

## 2021-04-13 NOTE — CHRONIC CARE ASSESSMENT
[PPS Score: ____] : Palliative Performance Scale (PPS) Score: [unfilled] [Limited decision making ability] : limited decision making ability [de-identified] : n/a [de-identified] : adequate

## 2021-04-13 NOTE — HISTORY OF PRESENT ILLNESS
[Patient] : patient [FreeTextEntry1] : Gait instability [FreeTextEntry2] : Patient denies fever, cough, trouble breathing, rash and vomiting. Patient has not been in close contact with someone who is COVID positive. N95 mask, gloves and eye wear worn during visit: Y \par Total face to face time with patient: 20 minutes. 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 Has short arm cast for left wrist fracture. F/b ortho. \par Endocrinologist got labs in February, will not get labs at this time. Labs needed next visit unless taken by another provider at about the same time. \par CHF: on Lasix 40 mg PO QD. weight is 141-145 lbs recently. Has mild SOB w/ exertion. \par Pt likes spending time in the kitchen, dtr trying to get her to walk every 2h> \par stage I pressure ulcer of buttock has healed: using medi honey now, sometimes skin opens. Has roho cushion \par Has fungal infection of right 2nd and middle fingers. Using ketoconazole cream irregularly w/o improvement. Shared w/ patient that no need to treat, but may attempt treatment for cosmetic reasons. \par appetite is overall good, though somewhat variable from day to day, eats regular food no swallowing problems\par BM regular- sometimes have diarrhea alternating with constipation, been like this for years. \par continent with bowels/bladder, wears depends, no urinary symptoms\par sleeps well except when wakes up to pee 2-3 times/night\par no memory problems\par no recent falls, last fall 2 years ago, using walker\par DM type 2 w/ complications- neuropathy/retinopathy/nephropathy- x 30 years, controlled, patient states her baseline FS is around 100s range . checks BG 3-5 times daily. BG management tricky as patient does not have regular eating schedule follows endocrinology, ophthalmology and podiatry. cannot feel legs due to neuropathy. Has Humalog ISS for meals and takes lantus 15U QHS \par \par CAD s/p stent, CHF, Angina- EF -23%, recent echo by cardiologist. baseline weight is 133 lbs. gets occasional chest pains yang when stressed or when walks outside or with cold air. usually resolves with rest, uses nitro when needed.\par \par recurrent UTIs- Does not currently have any urinary symptoms.Doesn't usually have urinar symptoms. \par Denies fever, vomiting, SOB, cough, worsened LE edema. \par ----------\par lives with daughter and grand son, independent with ADLs\par ambulates w/ walker

## 2021-04-22 ENCOUNTER — APPOINTMENT (OUTPATIENT)
Dept: ORTHOPEDIC SURGERY | Facility: CLINIC | Age: 86
End: 2021-04-22
Payer: MEDICARE

## 2021-04-22 VITALS
WEIGHT: 141 LBS | HEART RATE: 63 BPM | HEIGHT: 61 IN | BODY MASS INDEX: 26.62 KG/M2 | DIASTOLIC BLOOD PRESSURE: 74 MMHG | SYSTOLIC BLOOD PRESSURE: 132 MMHG

## 2021-04-22 PROCEDURE — 99213 OFFICE O/P EST LOW 20 MIN: CPT

## 2021-04-22 PROCEDURE — 99072 ADDL SUPL MATRL&STAF TM PHE: CPT

## 2021-04-22 NOTE — PHYSICAL EXAM
[Slightly Antalgic] : slightly antalgic [Walker] : ambulates with walker [UE] : Sensory: Intact in bilateral upper extremities [Bicep] : biceps 2+ and symmetric bilaterally [Rad] : radial 2+ and symmetric bilaterally [Normal] : Alert and in no acute distress [Poor Appearance] : well-appearing [Acute Distress] : not in acute distress [Obese] : not obese [de-identified] : The patient has no respiratory distress. Mood and affect are normal. The patient is alert and oriented to person, place and time.\par The patient has some limitation of elevation and rotation of the left shoulder.  There is no tenderness of the shoulder.  There is no tenderness of the left elbow.  The elbow was stable.  There is mild tenderness of the distal radius.  She has stiffness of the wrist and the fingers on the left hand.  Skin is intact.  There is no lymphedema.

## 2021-04-22 NOTE — HISTORY OF PRESENT ILLNESS
[de-identified] : The patient presents 14 weeks s/p left distal radius fracture.  She has had only a few sessions of home PT for left wrist and left shoulder. She is working with [employer] to improve strength in her hand. She complains of stiffness and pain of her wrist and hand. She cannot make a tight fist.

## 2021-04-22 NOTE — DISCUSSION/SUMMARY
[de-identified] : The patient's left wrist fracture has healed.  She does exhibit stiffness of her fingers, wrist and her left shoulder.  I think she requires continued physical therapy and have given her a new referral.  She is encouraged to work on home exercises as well.  She will return in 6 weeks.

## 2021-04-27 ENCOUNTER — APPOINTMENT (OUTPATIENT)
Dept: HOME HEALTH SERVICES | Facility: HOME HEALTH | Age: 86
End: 2021-04-27

## 2021-05-19 ENCOUNTER — NON-APPOINTMENT (OUTPATIENT)
Age: 86
End: 2021-05-19

## 2021-05-24 ENCOUNTER — NON-APPOINTMENT (OUTPATIENT)
Age: 86
End: 2021-05-24

## 2021-05-28 ENCOUNTER — APPOINTMENT (OUTPATIENT)
Dept: HOME HEALTH SERVICES | Facility: HOME HEALTH | Age: 86
End: 2021-05-28
Payer: MEDICARE

## 2021-05-28 DIAGNOSIS — Z86.39 PERSONAL HISTORY OF OTHER ENDOCRINE, NUTRITIONAL AND METABOLIC DISEASE: ICD-10-CM

## 2021-05-28 DIAGNOSIS — S69.91XA UNSPECIFIED INJURY OF RIGHT WRIST, HAND AND FINGER(S), INITIAL ENCOUNTER: ICD-10-CM

## 2021-05-28 PROCEDURE — 99349 HOME/RES VST EST MOD MDM 40: CPT

## 2021-05-30 VITALS
HEART RATE: 56 BPM | SYSTOLIC BLOOD PRESSURE: 124 MMHG | OXYGEN SATURATION: 98 % | DIASTOLIC BLOOD PRESSURE: 71 MMHG | RESPIRATION RATE: 16 BRPM

## 2021-05-30 PROBLEM — S69.91XA INJURY OF RIGHT WRIST, INITIAL ENCOUNTER: Status: RESOLVED | Noted: 2021-01-15 | Resolved: 2021-05-30

## 2021-05-30 PROBLEM — Z86.39 HISTORY OF HYPERGLYCEMIA: Status: RESOLVED | Noted: 2020-12-17 | Resolved: 2021-05-30

## 2021-05-30 NOTE — REASON FOR VISIT
[Follow-Up] : a follow-up visit [Family Member] : family member [Pre-Visit Preparation] : pre-visit preparation was done [Intercurrent Specialty/Sub-specialty Visits] : the patient has no intercurrent specialty/sub-specialty visits [FreeTextEntry1] : DM2

## 2021-05-30 NOTE — HISTORY OF PRESENT ILLNESS
[Patient] : patient [FreeTextEntry1] : Gait instability [FreeTextEntry2] : Patient denies fever, cough, trouble breathing, rash and vomiting. Patient has not been in close contact with someone who is COVID positive. N95 mask, gloves and eye wear worn during visit: Y \par Total face to face time with patient: 25 minutes. 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 Expressed hearty support for trip \par Completed OT for left hand pain s/p fracture, taking tylenol \par Requesting PT for unsteady gait. \par Back pain: encouraged tylenol PRN \par Pruritus of back: no rash, encouraged using ammonium lactate for relief \par CHF: on Lasix 40 mg PO QD. weight is 141-145 lbs recently. Has chronic mild SOB w/ exertion. \par Pt likes spending time in the kitchen, dtr trying to get her to walk every 2h. Pt becomes very fatigued when doing chores. \par stage I pressure ulcer of buttock has healed: using medi honey now, sometimes skin opens. Has roho cushion \par Has fungal infection of right 2nd and middle fingers. Using ketoconazole cream irregularly w/o improvement. Shared w/ patient that no need to treat, but may attempt treatment for cosmetic reasons. \par appetite is overall good, though somewhat variable from day to day, eats regular food no swallowing problems\par BM regular- sometimes have diarrhea alternating with constipation, been like this for years. \par continent with bowels/bladder, wears depends, no urinary symptoms\par sleeps well except when wakes up to pee 2-3 times/night\par no memory problems\par no recent falls, last fall 2 years ago, using walker\par DM type 2 w/ complications- neuropathy/retinopathy/nephropathy- x 30 years, controlled, patient states her baseline FS is around 100s range . checks BG 3-5 times daily. BG management tricky as patient does not have regular eating schedule follows endocrinology, ophthalmology and podiatry. cannot feel legs due to neuropathy. Has Humalog ISS for meals and takes lantus 15U QHS \par \par CAD s/p stent, CHF, Angina- EF -23%, recent echo by cardiologist. baseline weight is 133 lbs. gets occasional chest pains yang when stressed or when walks outside or with cold air. usually resolves with rest, uses nitro when needed.\par \par recurrent UTIs- Does not currently have any urinary symptoms.Doesn't usually have urinar symptoms. \par Denies fever, vomiting, SOB, cough, worsened LE edema. \par ----------\par lives with daughter and grand son, independent with ADLs\par ambulates w/ walker

## 2021-05-30 NOTE — PHYSICAL EXAM
[No Acute Distress] : no acute distress [Well Nourished] : well nourished [Normal Sclera/Conjunctiva] : normal sclera/conjunctiva [Normal Outer Ear/Nose] : the ears and nose were normal in appearance [Supple] : the neck was supple [No Respiratory Distress] : no respiratory distress [Clear to Auscultation] : lungs were clear to auscultation bilaterally [No Accessory Muscle Use] : no accessory muscle use [Normal Rate] : heart rate was normal  [Regular Rhythm] : with a regular rhythm [Normal S1, S2] : normal S1 and S2 [No Edema] : there was no peripheral edema [Non Tender] : non-tender [Soft] : abdomen soft [Not Distended] : not distended [No CVA Tenderness] : no ~M costovertebral angle tenderness [No Spinal Tenderness] : no spinal tenderness [No Joint Swelling] : no joint swelling seen [No Clubbing, Cyanosis] : no clubbing  or cyanosis of the fingernails [No Rash] : no rash [Cranial Nerves Intact] : cranial nerves 2-12 were intact [No Motor Deficits] : the motor exam was normal [Oriented x3] : oriented to person, place, and time [Normal Affect] : the affect was normal [Normal Mood] : the mood was normal [de-identified] : skin color good, awake, interactive, pleasant,  [de-identified] : wears glasses [de-identified] : slow unstable gait, uses walker [de-identified] : neuropathy B/L LE [de-identified] : +Fungal nail infection of 2nd and 3rd digits of left hand

## 2021-05-30 NOTE — DATA REVIEWED
[FreeTextEntry1] : Labs from [facility], 5/21 \par Cr 1.6 \par Hgb 12.1 \par A1C 8.8 \par HDL 40\par LDL 62\par BNP 2250\par TSH 5.3 \par COVID-19 Anitbody: 2500

## 2021-05-30 NOTE — CHRONIC CARE ASSESSMENT
[PPS Score: ____] : Palliative Performance Scale (PPS) Score: [unfilled] [Limited decision making ability] : limited decision making ability [de-identified] : n/a [de-identified] : adequate

## 2021-06-14 ENCOUNTER — APPOINTMENT (OUTPATIENT)
Dept: ORTHOPEDIC SURGERY | Facility: CLINIC | Age: 86
End: 2021-06-14
Payer: MEDICARE

## 2021-06-14 VITALS
HEIGHT: 61 IN | SYSTOLIC BLOOD PRESSURE: 130 MMHG | HEART RATE: 51 BPM | WEIGHT: 141 LBS | BODY MASS INDEX: 26.62 KG/M2 | DIASTOLIC BLOOD PRESSURE: 68 MMHG

## 2021-06-14 DIAGNOSIS — S52.532D COLLES' FRACTURE OF LEFT RADIUS, SUBSEQUENT ENCOUNTER FOR CLOSED FRACTURE WITH ROUTINE HEALING: ICD-10-CM

## 2021-06-14 PROCEDURE — 99072 ADDL SUPL MATRL&STAF TM PHE: CPT

## 2021-06-14 PROCEDURE — 99213 OFFICE O/P EST LOW 20 MIN: CPT

## 2021-06-14 NOTE — PHYSICAL EXAM
[Slightly Antalgic] : slightly antalgic [Walker] : ambulates with walker [UE] : Sensory: Intact in bilateral upper extremities [Bicep] : biceps 2+ and symmetric bilaterally [Rad] : radial 2+ and symmetric bilaterally [Normal] : Alert and in no acute distress [Poor Appearance] : well-appearing [Acute Distress] : not in acute distress [Obese] : not obese [de-identified] : The patient has no respiratory distress. Mood and affect are normal. The patient is alert and oriented to person, place and time.\par The patient has some limitation of elevation and rotation of the left shoulder.  There is no tenderness of the shoulder.  There is no tenderness of the left elbow.  The elbow was stable.  There is mild tenderness of the left distal radius.  She has stiffness of the wrist and the fingers on the left hand.  She has had improvement since the last visit. The skin is intact.  There is no lymphedema.

## 2021-06-14 NOTE — DISCUSSION/SUMMARY
[de-identified] : The patient has recovered fairly well from her left distal radius fracture.  She still has some residual stiffness of her wrist and of her fingers.  She is happy with the result.  She is encouraged to continue on a home exercise program to enhance range of motion.  She will return as needed.

## 2021-06-14 NOTE — HISTORY OF PRESENT ILLNESS
[de-identified] : The patient presents 5 months s/p left distal radius fracture.  She has completed a course of physical therapy for her wrist. She is feeling much better and reports overall improvement in pain. Her hand feels stiff. She has occasional pain when lifting something heavy. She is working on a home exercise program.

## 2021-06-16 NOTE — ED ADULT NURSE NOTE - SEPSIS REFERENCE DATA CRITERIA 1
"    TCM DISCHARGE FOLLOW UP CALL      \"Hi, my name is  Brittanie , and I am calling from  [facility].  I am calling to follow up and see how things are going for you after your recent hospitalization.      Tell me how you are doing now that you are home?\" she is doing well so far      Discharge Instructions     Do you understand your discharge instructions?  Pt. Response: yes, no questions      \"Has an appointment with your primary care provider been scheduled?\" Yes, 4/20  Medications    \"Did you have any medication changes?   none   Do you have any concerns with obtaining the medications or questions about your medications that you would like a RN to review with you?  no thank you  **If yes, escalate to CC RN responsible for patient's clinic or [facility] RN  Send an inbasket Epic message if RN is unavailable after call.     \"When you see the provider, I would recommend that you bring your medications with you.\"    Call Summary    \"What questions or concerns do you have about your recent visit and your follow-up care? 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           Can be addressed if scheduled with RN or SW either Care Coordination or if declining to triage for further questions.         \"If you have questions or things don't continue to improve, we encourage you contact us through the main clinic number [phone] Even if the clinic is not open, triage nurses are available 24/7 to help you.               I am with Clinic Care Coordination, and we are a team of nurses, social workers, community health workers, and financial resource workers. We work together with your primary doctor.   We are here to see if we can help you with a variety of things - from resources in the community such as food assistance, transportation, help in the home, equipment needs, assistance with medications, coordination of your appointments, help with any medical questions, and things like this.      We would " have a nurse or  speak with you and together come up with goals to help you to improve your health and wellness and do the best to help you stay out of the hospital.          If the patient agrees, then explain that the appointment can be in person (If has RN or SW in clinic) or on the telephone if remote or if easier.      I would like to assist to help you to make that appointment now.  The next available appointment is   .       Appointment for Care Coordination assessment has been made with _____________ on (Date) at______________ (time)    Send in basket to CCC RN at clinic or in hub to put in order.          Temp > 100F or < 96.8F; SBP < 90 mmHG; HR > 120bpm; Resp > 24/min

## 2021-06-21 ENCOUNTER — NON-APPOINTMENT (OUTPATIENT)
Age: 86
End: 2021-06-21

## 2021-07-13 ENCOUNTER — NON-APPOINTMENT (OUTPATIENT)
Age: 86
End: 2021-07-13

## 2021-07-15 ENCOUNTER — APPOINTMENT (OUTPATIENT)
Dept: HOME HEALTH SERVICES | Facility: HOME HEALTH | Age: 86
End: 2021-07-15
Payer: MEDICARE

## 2021-07-15 ENCOUNTER — APPOINTMENT (OUTPATIENT)
Dept: HOME HEALTH SERVICES | Facility: HOME HEALTH | Age: 86
End: 2021-07-15

## 2021-07-15 VITALS — HEART RATE: 70 BPM | OXYGEN SATURATION: 98 % | RESPIRATION RATE: 16 BRPM

## 2021-07-15 PROCEDURE — 99349 HOME/RES VST EST MOD MDM 40: CPT

## 2021-07-15 NOTE — COUNSELING
[Overweight - ( BMI 25.1 - 29.9 )] : overweight -  ( BMI 25.1 - 29.9 ) [Continue diet as tolerated] : continue diet as tolerated based on goals of care [Non - Smoker] : non-smoker [Use assistive device to avoid falls] : use assistive device to avoid falls [] : foot exam [Not Recommended] : Aspirin use not recommended due to overall prognosis [Improve exercise tolerance] : improve exercise tolerance [Improve mobility] : improve mobility [Improve weight] : improve weight [Decrease stress] : decrease stress [Decrease hospital use] : decrease hospital use [Minimize unnecessary interventions] : minimize unnecessary interventions [Comfort Care] : comfort care [Maintain functional ability] : maintain functional ability [Discussed disease trajectory with patient/caregiver] : discussed disease trajectory with patient/caregiver [Likely to achieve goals/desired outcomes] : likely to achieve goals/desired outcomes [Patient/Caregiver has ___ understanding of disease process] : patient/caregiver has [unfilled] understanding of disease process [Advanced Directives discussed: ____] : Advanced directives discussed: [unfilled] [DNR] : Code Status: DNR [Limited] : Treatment Guidelines: Limited [DNI] : Intubation: DNI [[facility] Ver[facility] Date: _____] [facility] Completion/last verification date: [unfilled]

## 2021-07-22 ENCOUNTER — NON-APPOINTMENT (OUTPATIENT)
Age: 86
End: 2021-07-22

## 2021-07-26 NOTE — PHYSICAL EXAM
[No Acute Distress] : no acute distress [Well Nourished] : well nourished [Normal Sclera/Conjunctiva] : normal sclera/conjunctiva [Normal Outer Ear/Nose] : the ears and nose were normal in appearance [Supple] : the neck was supple [No Respiratory Distress] : no respiratory distress [Clear to Auscultation] : lungs were clear to auscultation bilaterally [No Accessory Muscle Use] : no accessory muscle use [Normal Rate] : heart rate was normal  [Regular Rhythm] : with a regular rhythm [Normal S1, S2] : normal S1 and S2 [No Edema] : there was no peripheral edema [Non Tender] : non-tender [Soft] : abdomen soft [Not Distended] : not distended [No CVA Tenderness] : no ~M costovertebral angle tenderness [No Spinal Tenderness] : no spinal tenderness [No Joint Swelling] : no joint swelling seen [No Clubbing, Cyanosis] : no clubbing  or cyanosis of the fingernails [No Rash] : no rash [Cranial Nerves Intact] : cranial nerves 2-12 were intact [No Motor Deficits] : the motor exam was normal [Oriented x3] : oriented to person, place, and time [Normal Affect] : the affect was normal [Normal Mood] : the mood was normal [de-identified] : skin color good, awake, interactive, pleasant,  [de-identified] : wears glasses [de-identified] : slow unstable gait, uses walker [de-identified] : +Fungal nail infection of 2nd and 3rd digits of left hand [de-identified] : neuropathy B/L LE

## 2021-07-26 NOTE — CHRONIC CARE ASSESSMENT
[PPS Score: ____] : Palliative Performance Scale (PPS) Score: [unfilled] [Limited decision making ability] : limited decision making ability [de-identified] : n/a [de-identified] : adequate

## 2021-07-26 NOTE — HISTORY OF PRESENT ILLNESS
[Patient] : patient [FreeTextEntry1] : Gait instability [FreeTextEntry2] : Patient denies fever, cough, trouble breathing, rash and vomiting. Patient has not been in close contact with someone who is COVID positive. N95 mask, gloves and eye wear worn during visit: Y \par Total face to face time with patient: 25 minutes. 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 weight is 141-145 lbs recently. Has chronic mild SOB w/ exertion. \par Pt likes spending time in the kitchen, dtr trying to get her to walk every 2h. Pt becomes very fatigued when doing chores. Has bl ankle swelling worse at night. Recommend elevation. \par stage I pressure ulcer of buttock has healed: using medi honey now, sometimes skin opens. Has roho cushion \par Has fungal infection of right 2nd and middle fingers. Using ketoconazole cream irregularly w/o improvement. Shared w/ patient that no need to treat, but may attempt treatment for cosmetic reasons. \par appetite is overall good, though somewhat variable from day to day, eats regular food no swallowing problems\par BM regular- sometimes have diarrhea alternating with constipation, been like this for years. \par continent with bowels/bladder, wears depends, no urinary symptoms\par sleeps well except when wakes up to pee 2-3 times/night\par no memory problems\par no recent falls, last fall 2 years ago, using walker\par DM type 2 w/ complications- neuropathy/retinopathy/nephropathy- x 30 years, controlled, patient states her baseline FS is around 100s range . checks BG 3-5 times daily. BG management tricky as patient does not have regular eating schedule follows endocrinology, ophthalmology and podiatry. cannot feel legs due to neuropathy. Has Humalog ISS for meals and takes lantus 15U QHS, Per dtr, A1C recently improved to high 7s. \par \par CAD s/p stent, CHF, Angina- EF -23%, recent echo by cardiologist. baseline weight is 133 lbs. gets occasional chest pains yang when stressed or when walks outside or with cold air. usually resolves with rest, uses nitro when needed.\par \par recurrent UTIs- Does not currently have any urinary symptoms.Doesn't usually have urinary symptoms. \par Denies fever, vomiting, SOB, cough, worsened LE edema. \par ----------\par lives with daughter and grand son, independent with ADLs\par ambulates w/ walker \par Patient speaks Nigerian, English and Kinyarwanda. Patient born and grew up in [place], however, parents are from near [place].

## 2021-07-26 NOTE — DATA REVIEWED
[FreeTextEntry1] : Labs from [facility], 5/21 \par Cr 1.6 \par Hgb 12.1 \par A1C 8.8 \par HDL 40\par LDL 62\par BNP 2250\par TSH 5.3 \par COVID-19 Anitbody: 2500

## 2021-08-12 ENCOUNTER — NON-APPOINTMENT (OUTPATIENT)
Age: 86
End: 2021-08-12

## 2021-08-13 ENCOUNTER — APPOINTMENT (OUTPATIENT)
Dept: HOME HEALTH SERVICES | Facility: HOME HEALTH | Age: 86
End: 2021-08-13

## 2021-09-03 NOTE — SURGICAL HISTORY
09/03/21 1350   Child Life   Location Speciality Clinic  ([facility])   Intervention Referral/Consult;Initial Assessment;Preparation;Procedure Support;Family Support   Preparation Comment This CFLS was consulted to provide preparation for a blood draw.  Upon this CFLS entering the exam room, patient was appropriately teary.  Per mother, patient has previous experience two months ago and coped well, declined topical aneshetic and further CFL needs.   Procedure Support Comment During the procedure, patient displayed signficant anxiety, pulling his arm away and crying.  Required two pokes and an additional  required to stabilze patient's arm for safety.  At the completion, patient was able to calm with support from mother.   Family Support Comment Patient's mother is present with patient during this visit.   Anxiety Appropriate   Anxieties, Fears or Concerns pokes   Techniques to [place] with Loss/Stress/Change family presence   Able to Shift Focus From Anxiety Difficult   Outcomes/Follow Up Continue to Follow/Support      [PSH Reviewed and Updated] : past surgical history reviewed and updated

## 2021-09-21 ENCOUNTER — RX CHANGE (OUTPATIENT)
Age: 86
End: 2021-09-21

## 2021-09-21 RX ORDER — FUROSEMIDE 20 MG/1
20 TABLET ORAL TWICE DAILY
Qty: 180 | Refills: 0 | Status: DISCONTINUED | COMMUNITY
Start: 2020-02-18 | End: 2021-09-21

## 2021-09-23 ENCOUNTER — NON-APPOINTMENT (OUTPATIENT)
Age: 86
End: 2021-09-23

## 2021-10-01 ENCOUNTER — APPOINTMENT (OUTPATIENT)
Dept: HOME HEALTH SERVICES | Facility: HOME HEALTH | Age: 86
End: 2021-10-01
Payer: MEDICARE

## 2021-10-01 VITALS
DIASTOLIC BLOOD PRESSURE: 76 MMHG | OXYGEN SATURATION: 98 % | RESPIRATION RATE: 16 BRPM | HEART RATE: 62 BPM | TEMPERATURE: 96.8 F | SYSTOLIC BLOOD PRESSURE: 131 MMHG

## 2021-10-01 DIAGNOSIS — Z23 ENCOUNTER FOR IMMUNIZATION: ICD-10-CM

## 2021-10-01 DIAGNOSIS — E55.9 VITAMIN D DEFICIENCY, UNSPECIFIED: ICD-10-CM

## 2021-10-01 PROCEDURE — 99349 HOME/RES VST EST MOD MDM 40: CPT

## 2021-10-01 NOTE — COUNSELING
[Overweight - ( BMI 25.1 - 29.9 )] : overweight -  ( BMI 25.1 - 29.9 ) [Continue diet as tolerated] : continue diet as tolerated based on goals of care [Non - Smoker] : non-smoker [Use assistive device to avoid falls] : use assistive device to avoid falls [] : foot exam [Not Recommended] : Aspirin use not recommended due to overall prognosis [Improve exercise tolerance] : improve exercise tolerance [Improve mobility] : improve mobility [Improve weight] : improve weight [Decrease stress] : decrease stress [Decrease hospital use] : decrease hospital use [Minimize unnecessary interventions] : minimize unnecessary interventions [Comfort Care] : comfort care [Maintain functional ability] : maintain functional ability [Discussed disease trajectory with patient/caregiver] : discussed disease trajectory with patient/caregiver [Likely to achieve goals/desired outcomes] : likely to achieve goals/desired outcomes [Patient/Caregiver has ___ understanding of disease process] : patient/caregiver has [unfilled] understanding of disease process [Advanced Directives discussed: ____] : Advanced directives discussed: [unfilled] [DNR] : Code Status: DNR [Limited] : Treatment Guidelines: Limited [DNI] : Intubation: DNI [[facility]: _____] [facility]/last verification date: [unfilled]

## 2021-10-03 ENCOUNTER — RX RENEWAL (OUTPATIENT)
Age: 86
End: 2021-10-03

## 2021-10-05 PROBLEM — E55.9 VITAMIN D INSUFFICIENCY: Status: ACTIVE | Noted: 2017-06-16

## 2021-10-05 PROBLEM — Z23 NEED FOR COVID-19 VACCINE: Status: RESOLVED | Noted: 2021-03-20 | Resolved: 2021-10-05

## 2021-10-05 NOTE — HISTORY OF PRESENT ILLNESS
[Patient] : patient [FreeTextEntry1] : Gait instability [FreeTextEntry2] : Patient denies fever, cough, trouble breathing, rash and vomiting. Patient has not been in close contact with someone who is COVID positive. N95 mask, gloves and eye wear worn during visit: Y \par Total face to face time with patient: 25 minutes. 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 weight is 141-145 lbs recently. Has chronic mild SOB w/ exertion. \par Pt likes spending time in the kitchen, dtr trying to get her to walk every 2h. Pt becomes very fatigued when doing chores. Has bl ankle swelling worse at night. Continue elevation. \par stage I pressure ulcer of buttock has healed: using medi honey now, sometimes skin opens. Has roho cushion \par Has fungal infection of right 2nd and middle fingers. Using ketoconazole cream irregularly w/o improvement. Shared w/ patient that no need to treat, but may attempt treatment for cosmetic reasons. \par appetite is overall good, though somewhat variable from day to day, eats regular food no swallowing problems\par BM regular- sometimes have diarrhea alternating with constipation, been like this for years. \par continent with bowels/bladder, wears depends, no urinary symptoms\par sleeps well except when wakes up to pee 2-3 times/night\par no memory problems\par no recent falls, last fall 2 years ago, using walker\par DM type 2 w/ complications- neuropathy/retinopathy/nephropathy- x 30 years, controlled, patient states her baseline FS is around 100s-200s range . Sometimes sugars are elevated bc pt has trouble taking insulin on her own. Dtr assists. checks BG 3-5 times daily. BG management tricky as patient does not have regular eating schedule follows endocrinology, ophthalmology and podiatry. cannot feel legs due to neuropathy. Has Humalog ISS for meals and takes lantus 15U QHS,\par \par CAD s/p stent, CHF, Angina- EF -23%, recent echo by cardiologist.. gets occasional chest pains yang when stressed or when walks outside or with cold air. usually resolves with rest, uses nitro when needed.\par \par recurrent UTIs- Does not currently have any urinary symptoms.Doesn't usually have urinary symptoms. \par Denies fever, vomiting, SOB, cough, worsened LE edema. \par ----------\par lives with daughter and grand son, independent with ADLs\par ambulates w/ walker \par Patient speaks Setswana, English and Faroese. Patient born and grew up in [place], however, parents are from near [place]. \par Son has wedding shower coming up. Will see photos for wedding shower at next visit and large wedding is in spring. Fiances are Huey and Eleanor. \par

## 2021-10-05 NOTE — PHYSICAL EXAM
Lab results are normal   [No Acute Distress] : no acute distress [Well Nourished] : well nourished [Normal Sclera/Conjunctiva] : normal sclera/conjunctiva [Normal Outer Ear/Nose] : the ears and nose were normal in appearance [Supple] : the neck was supple [No Respiratory Distress] : no respiratory distress [Clear to Auscultation] : lungs were clear to auscultation bilaterally [No Accessory Muscle Use] : no accessory muscle use [Normal Rate] : heart rate was normal  [Regular Rhythm] : with a regular rhythm [Normal S1, S2] : normal S1 and S2 [No Edema] : there was no peripheral edema [Non Tender] : non-tender [Soft] : abdomen soft [Not Distended] : not distended [No CVA Tenderness] : no ~M costovertebral angle tenderness [No Spinal Tenderness] : no spinal tenderness [No Joint Swelling] : no joint swelling seen [No Clubbing, Cyanosis] : no clubbing  or cyanosis of the fingernails [No Rash] : no rash [Cranial Nerves Intact] : cranial nerves 2-12 were intact [No Motor Deficits] : the motor exam was normal [Oriented x3] : oriented to person, place, and time [Normal Affect] : the affect was normal [Normal Mood] : the mood was normal [de-identified] : skin color good, awake, interactive, pleasant,  [de-identified] : wears glasses [de-identified] : slow unstable gait, uses walker [de-identified] : +Fungal nail infection of 2nd and 3rd digits of left hand [de-identified] : neuropathy B/L LE

## 2021-10-05 NOTE — DATA REVIEWED
[FreeTextEntry1] : Labs from [facility], 5/21 \par Cr 1.6 \par Hgb 12.1 \par A1C 8.8 \par HDL 40\par LDL 62\par BNP 2250\par TSH 5.3 \par COVID-19 Anitbody: 2500

## 2021-10-05 NOTE — CHRONIC CARE ASSESSMENT
[PPS Score: ____] : Palliative Performance Scale (PPS) Score: [unfilled] [Limited decision making ability] : limited decision making ability [de-identified] : n/a [de-identified] : adequate

## 2021-10-08 ENCOUNTER — NON-APPOINTMENT (OUTPATIENT)
Age: 86
End: 2021-10-08

## 2021-10-08 LAB
25(OH)D3 SERPL-MCNC: 28 NG/ML
ALBUMIN SERPL ELPH-MCNC: 4.2 G/DL
ALP BLD-CCNC: 129 U/L
ALT SERPL-CCNC: 11 U/L
ANION GAP SERPL CALC-SCNC: 14 MMOL/L
AST SERPL-CCNC: 16 U/L
BASOPHILS # BLD AUTO: 0.05 K/UL
BASOPHILS NFR BLD AUTO: 0.7 %
BILIRUB SERPL-MCNC: 0.4 MG/DL
BUN SERPL-MCNC: 87 MG/DL
CALCIUM SERPL-MCNC: 9.8 MG/DL
CHLORIDE SERPL-SCNC: 106 MMOL/L
CO2 SERPL-SCNC: 21 MMOL/L
CREAT SERPL-MCNC: 2.05 MG/DL
EOSINOPHIL # BLD AUTO: 0.32 K/UL
EOSINOPHIL NFR BLD AUTO: 4.4 %
ESTIMATED AVERAGE GLUCOSE: 194 MG/DL
GLUCOSE SERPL-MCNC: 142 MG/DL
HBA1C MFR BLD HPLC: 8.4 %
HCT VFR BLD CALC: 35.1 %
HGB BLD-MCNC: 11.1 G/DL
IMM GRANULOCYTES NFR BLD AUTO: 0.3 %
LYMPHOCYTES # BLD AUTO: 1.94 K/UL
LYMPHOCYTES NFR BLD AUTO: 26.6 %
MAN DIFF?: NORMAL
MCHC RBC-ENTMCNC: 30.2 PG
MCHC RBC-ENTMCNC: 31.6 GM/DL
MCV RBC AUTO: 95.4 FL
MONOCYTES # BLD AUTO: 0.65 K/UL
MONOCYTES NFR BLD AUTO: 8.9 %
NEUTROPHILS # BLD AUTO: 4.31 K/UL
NEUTROPHILS NFR BLD AUTO: 59.1 %
PLATELET # BLD AUTO: 197 K/UL
POTASSIUM SERPL-SCNC: 4.8 MMOL/L
PROT SERPL-MCNC: 6.7 G/DL
RBC # BLD: 3.68 M/UL
RBC # FLD: 13.1 %
SODIUM SERPL-SCNC: 141 MMOL/L
TSH SERPL-ACNC: 4 UIU/ML
WBC # FLD AUTO: 7.29 K/UL

## 2021-10-25 ENCOUNTER — LABORATORY RESULT (OUTPATIENT)
Age: 86
End: 2021-10-25

## 2021-10-26 ENCOUNTER — NON-APPOINTMENT (OUTPATIENT)
Age: 86
End: 2021-10-26

## 2021-11-01 DIAGNOSIS — Z12.39 ENCOUNTER FOR OTHER SCREENING FOR MALIGNANT NEOPLASM OF BREAST: ICD-10-CM

## 2021-11-22 ENCOUNTER — APPOINTMENT (OUTPATIENT)
Dept: HOME HEALTH SERVICES | Facility: HOME HEALTH | Age: 86
End: 2021-11-22

## 2021-12-02 ENCOUNTER — APPOINTMENT (OUTPATIENT)
Dept: HOME HEALTH SERVICES | Facility: HOME HEALTH | Age: 86
End: 2021-12-02
Payer: MEDICARE

## 2021-12-02 VITALS
SYSTOLIC BLOOD PRESSURE: 131 MMHG | OXYGEN SATURATION: 98 % | HEART RATE: 61 BPM | TEMPERATURE: 98.2 F | DIASTOLIC BLOOD PRESSURE: 69 MMHG | RESPIRATION RATE: 16 BRPM

## 2021-12-02 PROCEDURE — 99349 HOME/RES VST EST MOD MDM 40: CPT

## 2021-12-02 NOTE — HISTORY OF PRESENT ILLNESS
[Patient] : patient [FreeTextEntry1] : Gait instability [FreeTextEntry2] : Patient denies fever, cough, trouble breathing, rash and vomiting. Patient has not been in close contact with someone who is COVID positive. N95 mask, gloves and eye wear worn during visit: Y \par Total face to face time with patient: 25 minutes. 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 Stopped lasix for several days 2/2 ROB. Swelling in legs now improving. Pruritus in legs returned. No change in SOB \par Back pain:helped by tylenol PRN \par CHF: on Lasix 40 mg PO QD. weight is 141-145 lbs recently. Has chronic mild SOB w/ exertion. \par Pt likes spending time in the kitchen, dtr trying to get her to walk every 2h. Pt becomes very fatigued when doing chores. Has bl ankle swelling worse at night. Continue elevation. \par recurring stage I pressure ulcer of buttock has healed: using medi honey now, sometimes skin opens. Has roho cushion \par Has fungal infection of right 2nd and middle fingers. Using ketoconazole cream irregularly w/o improvement. Shared w/ patient that no need to treat, but may attempt treatment for cosmetic reasons. \par appetite is overall good, though somewhat variable from day to day, eats regular food no swallowing problems\par BM regular- sometimes have diarrhea alternating with constipation, been like this for years. \par continent with bowels/bladder, wears depends, no urinary symptoms\par sleeps well except when wakes up to pee 2-3 times/night\par no memory problems\par no recent falls, last fall 2 years ago, using walker\par DM type 2 w/ complications- neuropathy/retinopathy/nephropathy- x 30 years, controlled, patient states her baseline FS is around 100s-200s range . Sometimes sugars are elevated bc pt has trouble taking insulin on her own. Dtr assists. checks BG 3-5 times daily. BG management tricky as patient does not have regular eating schedule follows endocrinology, ophthalmology and podiatry. cannot feel legs due to neuropathy. Has Humalog ISS for meals and takes lantus 15U QHS,\par \par CAD s/p stent, CHF, Angina- EF -23%, recent echo by cardiologist.. gets occasional chest pains yang when stressed or when walks outside or with cold air. usually resolves with rest, uses nitro when needed.\par \par recurrent UTIs- Currently completing abx for UTI as above.  Does not currently have any urinary symptoms.Doesn't usually have urinary symptoms. \par Denies fever, vomiting, SOB, cough, worsened LE edema. \par ----------\par lives with daughter (Nancy Juárez), and grand son, independent with ADLs\par ambulates w/ walker \par Patient speaks Tongan, English and Greenlandic. Patient born and grew up in [place], however, parents are from near [place]. \par Son has wedding shower coming up. Will see photos for wedding shower at next visit and large wedding is in spring. Fiances are Huey and Eleanor. \par

## 2021-12-02 NOTE — DATA REVIEWED
[FreeTextEntry1] : Labs from [facility], 5/21 \par Cr 1.6 \par Hgb 12.1 \par A1C 8.8 \par HDL 40\par LDL 62\par BNP 2250\par TSH 5.3 \par COVID-19 Anitbody: 2500

## 2021-12-02 NOTE — COUNSELING
[Overweight - ( BMI 25.1 - 29.9 )] : overweight -  ( BMI 25.1 - 29.9 ) [Continue diet as tolerated] : continue diet as tolerated based on goals of care [Non - Smoker] : non-smoker [Use assistive device to avoid falls] : use assistive device to avoid falls [] : foot exam [Not Recommended] : Aspirin use not recommended due to overall prognosis [Improve exercise tolerance] : improve exercise tolerance [Improve mobility] : improve mobility [Improve weight] : improve weight [Decrease stress] : decrease stress [Decrease hospital use] : decrease hospital use [Minimize unnecessary interventions] : minimize unnecessary interventions [Comfort Care] : comfort care [Maintain functional ability] : maintain functional ability [Discussed disease trajectory with patient/caregiver] : discussed disease trajectory with patient/caregiver [Likely to achieve goals/desired outcomes] : likely to achieve goals/desired outcomes [Patient/Caregiver has ___ understanding of disease process] : patient/caregiver has [unfilled] understanding of disease process [Advanced Directives discussed: ____] : Advanced directives discussed: [unfilled] [DNR] : Code Status: DNR [Limited] : Treatment Guidelines: Limited [DNI] : Intubation: DNI [Last Verification Date: _____] : [facility] Completion/last verification date: [unfilled]

## 2021-12-02 NOTE — CHRONIC CARE ASSESSMENT
[PPS Score: ____] : Palliative Performance Scale (PPS) Score: [unfilled] [Limited decision making ability] : limited decision making ability [de-identified] : n/a [de-identified] : adequate

## 2021-12-02 NOTE — PHYSICAL EXAM
[No Acute Distress] : no acute distress [Well Nourished] : well nourished [Normal Sclera/Conjunctiva] : normal sclera/conjunctiva [Normal Outer Ear/Nose] : the ears and nose were normal in appearance [Supple] : the neck was supple [No Respiratory Distress] : no respiratory distress [Clear to Auscultation] : lungs were clear to auscultation bilaterally [No Accessory Muscle Use] : no accessory muscle use [Normal Rate] : heart rate was normal  [Regular Rhythm] : with a regular rhythm [Normal S1, S2] : normal S1 and S2 [No Edema] : there was no peripheral edema [Non Tender] : non-tender [Soft] : abdomen soft [Not Distended] : not distended [No CVA Tenderness] : no ~M costovertebral angle tenderness [No Spinal Tenderness] : no spinal tenderness [No Joint Swelling] : no joint swelling seen [No Clubbing, Cyanosis] : no clubbing  or cyanosis of the fingernails [No Rash] : no rash [Cranial Nerves Intact] : cranial nerves 2-12 were intact [No Motor Deficits] : the motor exam was normal [Oriented x3] : oriented to person, place, and time [Normal Affect] : the affect was normal [Normal Mood] : the mood was normal [de-identified] : skin color good, awake, interactive, pleasant,  [de-identified] : wears glasses [de-identified] : slow unstable gait, uses walker [de-identified] : +Fungal nail infection of 2nd and 3rd digits of left hand; small scabs from itching 2/2 pruritus on legs and breasts  [de-identified] : neuropathy B/L LE

## 2021-12-23 ENCOUNTER — RX RENEWAL (OUTPATIENT)
Age: 86
End: 2021-12-23

## 2022-01-11 DIAGNOSIS — N63.0 UNSPECIFIED LUMP IN UNSPECIFIED BREAST: ICD-10-CM

## 2022-01-12 ENCOUNTER — NON-APPOINTMENT (OUTPATIENT)
Age: 87
End: 2022-01-12

## 2022-01-19 ENCOUNTER — NON-APPOINTMENT (OUTPATIENT)
Age: 87
End: 2022-01-19

## 2022-01-26 ENCOUNTER — APPOINTMENT (OUTPATIENT)
Dept: HOME HEALTH SERVICES | Facility: HOME HEALTH | Age: 87
End: 2022-01-26
Payer: MEDICARE

## 2022-01-26 VITALS
RESPIRATION RATE: 16 BRPM | DIASTOLIC BLOOD PRESSURE: 72 MMHG | SYSTOLIC BLOOD PRESSURE: 127 MMHG | TEMPERATURE: 97.8 F | HEART RATE: 60 BPM | OXYGEN SATURATION: 97 %

## 2022-01-26 DIAGNOSIS — L29.9 PRURITUS, UNSPECIFIED: ICD-10-CM

## 2022-01-26 PROCEDURE — G0439: CPT

## 2022-01-26 PROCEDURE — 99349 HOME/RES VST EST MOD MDM 40: CPT | Mod: 25

## 2022-01-27 ENCOUNTER — LABORATORY RESULT (OUTPATIENT)
Age: 87
End: 2022-01-27

## 2022-01-28 ENCOUNTER — NON-APPOINTMENT (OUTPATIENT)
Age: 87
End: 2022-01-28

## 2022-01-31 ENCOUNTER — NON-APPOINTMENT (OUTPATIENT)
Age: 87
End: 2022-01-31

## 2022-02-08 NOTE — PROGRESS NOTE ADULT - ASSESSMENT
ASSESSMENT:   86 year old female with fevers, UTI, and area of erythema over right buttock, no abscess on CT scan.     PLAN:  - Final CT w/o collection, no surgical intervention at this time. continue antibiotics   - Appreciate ID recommendations  - Continue care per primary team, call back with any questions [id number]    [facility]   [phone] See above

## 2022-02-21 ENCOUNTER — TRANSCRIPTION ENCOUNTER (OUTPATIENT)
Age: 87
End: 2022-02-21

## 2022-02-22 ENCOUNTER — NON-APPOINTMENT (OUTPATIENT)
Age: 87
End: 2022-02-22

## 2022-02-24 LAB
APPEARANCE: ABNORMAL
BACTERIA: ABNORMAL
BILIRUBIN URINE: NEGATIVE
BLOOD URINE: NEGATIVE
COLOR: NORMAL
GLUCOSE QUALITATIVE U: NEGATIVE
HYALINE CASTS: 0 /LPF
KETONES URINE: NEGATIVE
LEUKOCYTE ESTERASE URINE: ABNORMAL
MICROSCOPIC-UA: NORMAL
NITRITE URINE: NEGATIVE
PH URINE: 5
PROTEIN URINE: NEGATIVE
RED BLOOD CELLS URINE: 1 /HPF
SPECIFIC GRAVITY URINE: 1.01
SQUAMOUS EPITHELIAL CELLS: 0 /HPF
UROBILINOGEN URINE: NORMAL
WHITE BLOOD CELLS URINE: 55 /HPF

## 2022-02-25 ENCOUNTER — APPOINTMENT (OUTPATIENT)
Dept: HOME HEALTH SERVICES | Facility: HOME HEALTH | Age: 87
End: 2022-02-25

## 2022-02-28 ENCOUNTER — NON-APPOINTMENT (OUTPATIENT)
Age: 87
End: 2022-02-28

## 2022-03-02 LAB — BACTERIA UR CULT: ABNORMAL

## 2022-03-02 NOTE — DATA REVIEWED
[FreeTextEntry1] : Labs from [facility], 5/21 \par Cr 1.6 \par Hgb 12.1 \par A1C 8.8 \par HDL 40\par LDL 62\par BNP 2250\par TSH 5.3 \par COVID-19 Anitbody: 2500

## 2022-03-02 NOTE — HISTORY OF PRESENT ILLNESS
[Patient] : patient [FreeTextEntry1] : Gait instability [FreeTextEntry2] : Patient denies fever, cough, trouble breathing, rash and vomiting. Patient has not been in close contact with someone who is COVID positive. N95 mask, gloves and eye wear worn during visit: Y \par Total face to face time with patient: 25 minutes. 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 Stopped lasix for several days 2/2 ROB. Swelling in legs now improving. Pruritus in legs returned. No change in SOB \par Back pain:helped by tylenol PRN \par CHF: on Lasix 40 mg PO QD. weight is 141-145 lbs recently. Has chronic mild SOB w/ exertion. \par Pt likes spending time in the kitchen, dtr trying to get her to walk every 2h. Pt becomes very fatigued when doing chores. Has bl ankle swelling worse at night. Continue elevation. \par recurring stage I pressure ulcer of buttock has healed: using medi honey now, sometimes skin opens. Has roho cushion \par Has fungal infection of right 2nd and middle fingers. Using ketoconazole cream irregularly w/o improvement. Shared w/ patient that no need to treat, but may attempt treatment for cosmetic reasons. \par appetite is overall good, though somewhat variable from day to day, eats regular food no swallowing problems\par BM regular- sometimes have diarrhea alternating with constipation, been like this for years. \par continent with bowels/bladder, wears depends, no urinary symptoms\par sleeps well except when wakes up to pee 2-3 times/night\par no memory problems\par no recent falls, last fall 2 years ago, using walker\par DM type 2 w/ complications- neuropathy/retinopathy/nephropathy- x 30 years, controlled, patient states her baseline FS is around 100s-200s range Somtimes n 300s.  Sometimes sugars are elevated bc pt has trouble taking insulin on her own. Dtr assists. checks BG 3-5 times daily. BG management tricky as patient does not have regular eating schedule follows endocrinology, ophthalmology and podiatry. cannot feel legs due to neuropathy. Has Humalog ISS for meals and takes lantus 15U QHS,\par \par CAD s/p stent, CHF, Angina- EF -23%, recent echo by cardiologist.. gets occasional chest pains yang when stressed or when walks outside or with cold air. usually resolves with rest, uses nitro when needed.\par \par recurrent UTIs- Currently completing abx for UTI as above.  Does not currently have any urinary symptoms.Doesn't usually have urinary symptoms. \par Denies fever, vomiting, SOB, cough, worsened LE edema. \par ----------\par lives with daughter (Nancy Gonzales-stefanie), and grand son, independent with ADLs\par ambulates w/ walker \par Patient speaks Malay, English and Yakut. Patient born and grew up in [place], however, parents are from near [place]. \par Son has wedding coming up in March 2022. Fiances are Huey and Eleanor. \par

## 2022-03-02 NOTE — CHRONIC CARE ASSESSMENT
[Limited decision making ability] : limited decision making ability [PPS Score: ____] : Palliative Performance Scale (PPS) Score: [unfilled] [de-identified] : n/a [de-identified] : adequate

## 2022-03-02 NOTE — PHYSICAL EXAM
[No Acute Distress] : no acute distress [Well Nourished] : well nourished [Normal Sclera/Conjunctiva] : normal sclera/conjunctiva [Normal Outer Ear/Nose] : the ears and nose were normal in appearance [Supple] : the neck was supple [No Respiratory Distress] : no respiratory distress [Clear to Auscultation] : lungs were clear to auscultation bilaterally [No Accessory Muscle Use] : no accessory muscle use [Normal Rate] : heart rate was normal  [Regular Rhythm] : with a regular rhythm [Normal S1, S2] : normal S1 and S2 [No Edema] : there was no peripheral edema [Non Tender] : non-tender [Soft] : abdomen soft [Not Distended] : not distended [No CVA Tenderness] : no ~M costovertebral angle tenderness [No Spinal Tenderness] : no spinal tenderness [No Joint Swelling] : no joint swelling seen [No Clubbing, Cyanosis] : no clubbing  or cyanosis of the fingernails [No Rash] : no rash [Cranial Nerves Intact] : cranial nerves 2-12 were intact [No Motor Deficits] : the motor exam was normal [Oriented x3] : oriented to person, place, and time [Normal Affect] : the affect was normal [Normal Mood] : the mood was normal [de-identified] : skin color good, awake, interactive, pleasant,  [de-identified] : wears glasses [de-identified] : slow unstable gait, uses walker [de-identified] : +Fungal nail infection of 2nd and 3rd digits of left hand; small scabs from itching 2/2 pruritus on legs and breasts  [de-identified] : neuropathy B/L LE

## 2022-03-02 NOTE — REASON FOR VISIT
[Family Member] : family member [Pre-Visit Preparation] : pre-visit preparation was done [Subsequent Annual Medicare Wellness Visit] : a subsequent annual Medicare wellness visit [Intercurrent Specialty/Sub-specialty Visits] : the patient has no intercurrent specialty/sub-specialty visits [FreeTextEntry1] : DM2

## 2022-03-02 NOTE — COUNSELING
[Overweight - ( BMI 25.1 - 29.9 )] : overweight -  ( BMI 25.1 - 29.9 ) [Continue diet as tolerated] : continue diet as tolerated based on goals of care [Non - Smoker] : non-smoker [Use assistive device to avoid falls] : use assistive device to avoid falls [] : foot exam [Not Recommended] : Aspirin use not recommended due to overall prognosis [Improve exercise tolerance] : improve exercise tolerance [Improve mobility] : improve mobility [Improve weight] : improve weight [Decrease stress] : decrease stress [Decrease hospital use] : decrease hospital use [Minimize unnecessary interventions] : minimize unnecessary interventions [Comfort Care] : comfort care [Maintain functional ability] : maintain functional ability [Discussed disease trajectory with patient/caregiver] : discussed disease trajectory with patient/caregiver [Likely to achieve goals/desired outcomes] : likely to achieve goals/desired outcomes [Patient/Caregiver has ___ understanding of disease process] : patient/caregiver has [unfilled] understanding of disease process [Advanced Directives discussed: ____] : Advanced directives discussed: [unfilled] [DNR] : Code Status: DNR [Limited] : Treatment Guidelines: Limited [DNI] : Intubation: DNI [Last Verification Date: _____] : [facility] Completion/last verification date: [unfilled] [Established patient, extensive review of history, medical and functional status, risk factors and patient education] : Established patient, extensive review of history, medical and functional status, risk factors and patient education and counseling provided for subsequent annual wellness visit

## 2022-03-16 ENCOUNTER — APPOINTMENT (OUTPATIENT)
Dept: OBGYN | Facility: CLINIC | Age: 87
End: 2022-03-16
Payer: MEDICARE

## 2022-03-16 DIAGNOSIS — R92.8 OTHER ABNORMAL AND INCONCLUSIVE FINDINGS ON DIAGNOSTIC IMAGING OF BREAST: ICD-10-CM

## 2022-03-16 PROCEDURE — 99213 OFFICE O/P EST LOW 20 MIN: CPT

## 2022-03-29 ENCOUNTER — APPOINTMENT (OUTPATIENT)
Dept: HOME HEALTH SERVICES | Facility: HOME HEALTH | Age: 87
End: 2022-03-29
Payer: MEDICARE

## 2022-03-29 VITALS
RESPIRATION RATE: 16 BRPM | SYSTOLIC BLOOD PRESSURE: 115 MMHG | HEART RATE: 62 BPM | DIASTOLIC BLOOD PRESSURE: 55 MMHG | OXYGEN SATURATION: 97 %

## 2022-03-29 DIAGNOSIS — N39.0 URINARY TRACT INFECTION, SITE NOT SPECIFIED: ICD-10-CM

## 2022-03-29 DIAGNOSIS — Z71.89 OTHER SPECIFIED COUNSELING: ICD-10-CM

## 2022-03-29 DIAGNOSIS — R82.90 UNSPECIFIED ABNORMAL FINDINGS IN URINE: ICD-10-CM

## 2022-03-29 PROCEDURE — 99349 HOME/RES VST EST MOD MDM 40: CPT

## 2022-03-29 RX ORDER — CIPROFLOXACIN HYDROCHLORIDE 250 MG/1
250 TABLET, FILM COATED ORAL DAILY
Qty: 5 | Refills: 0 | Status: DISCONTINUED | COMMUNITY
Start: 2022-01-31 | End: 2022-03-29

## 2022-03-29 NOTE — HISTORY OF PRESENT ILLNESS
[Patient] : patient [FreeTextEntry1] : Gait instability [FreeTextEntry2] : Patient denies fever, cough, trouble breathing, rash and vomiting. Patient has not been in close contact with someone who is COVID positive. N95 mask, gloves and eye wear worn during visit: Y \par Total face to face time with patient: 25 minutes. 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 weight is 141-145 lbs recently. No change in chronic mild SOB w/ exertion. \par Pt likes spending time in the kitchen, dtr trying to get her to walk every 2h. Pt becomes very fatigued when doing chores. Has bl ankle swelling worse at night. Continue elevation. \par recurring stage I pressure ulcer of buttock has healed: using medi honey now, sometimes skin opens. Has roho cushion \par Has fungal infection of right 2nd and middle fingers. Using ketoconazole cream irregularly w/o improvement. Shared w/ patient that no need to treat, but may attempt treatment for cosmetic reasons. \par appetite is overall good, though somewhat variable from day to day, eats regular food no swallowing problems\par BM regular- sometimes have diarrhea alternating with constipation, been like this for years. \par continent with bowels/bladder, wears depends, no urinary symptoms\par sleeps well except when wakes up to pee 2-3 times/night\par no memory problems\par no recent falls, last fall 2 years ago, using walker\par DM type 2 w/ complications- neuropathy/retinopathy/nephropathy- x 30 years, controlled, patient states her baseline FS is around 100s-200s range Somtimes n 300s.  Sometimes sugars are elevated bc pt has trouble taking insulin on her own. Dtr assists. checks BG 3-5 times daily. BG management tricky as patient does not have regular eating schedule follows endocrinology, ophthalmology and podiatry. Dtr places diabetic medicines and insulin on counter for patient to take during the day, which has improved compliance. A1C improved to 8.1%. cannot feel legs due to neuropathy. Has Humalog ISS for meals and takes lantus 13U QHS. \par \par CAD s/p stent, CHF, Angina- EF -23%, recent echo by cardiologist.. gets occasional chest pains yang when stressed or when walks outside or with cold air. usually resolves with rest, uses nitro when needed.\par \par recurrent UTIs- Currently completing abx for UTI as above.  Does not currently have any urinary symptoms.Doesn't usually have urinary symptoms. \par Denies fever, vomiting, cough, worsened LE edema. \par ----------\par lives with daughter (Nancy Juárez), and grand son, independent with ADLs\par ambulates w/ walker \par Patient speaks Malay, English and Latvian. Patient born and grew up in [place], however, parents are from near [place]. \par Son has wedding coming up in March 2022. Fiances are Huey and Eleanor. \par

## 2022-03-29 NOTE — REASON FOR VISIT
[Family Member] : family member [Pre-Visit Preparation] : pre-visit preparation was done [Follow-Up] : a follow-up visit [Intercurrent Specialty/Sub-specialty Visits] : the patient has no intercurrent specialty/sub-specialty visits [FreeTextEntry1] : DM2

## 2022-03-29 NOTE — PHYSICAL EXAM
[No Acute Distress] : no acute distress [Well Nourished] : well nourished [Normal Sclera/Conjunctiva] : normal sclera/conjunctiva [Normal Outer Ear/Nose] : the ears and nose were normal in appearance [Supple] : the neck was supple [No Respiratory Distress] : no respiratory distress [Clear to Auscultation] : lungs were clear to auscultation bilaterally [No Accessory Muscle Use] : no accessory muscle use [Normal Rate] : heart rate was normal  [Regular Rhythm] : with a regular rhythm [Normal S1, S2] : normal S1 and S2 [No Edema] : there was no peripheral edema [Non Tender] : non-tender [Soft] : abdomen soft [Not Distended] : not distended [No CVA Tenderness] : no ~M costovertebral angle tenderness [No Spinal Tenderness] : no spinal tenderness [No Joint Swelling] : no joint swelling seen [No Clubbing, Cyanosis] : no clubbing  or cyanosis of the fingernails [No Rash] : no rash [Cranial Nerves Intact] : cranial nerves 2-12 were intact [No Motor Deficits] : the motor exam was normal [Oriented x3] : oriented to person, place, and time [Normal Affect] : the affect was normal [Normal Mood] : the mood was normal [de-identified] : skin color good, awake, interactive, pleasant,  [de-identified] : wears glasses [de-identified] : slow unstable gait, uses walker [de-identified] : +Fungal nail infection of 2nd and 3rd digits of left hand; small scabs from itching 2/2 pruritus on legs and breasts  [de-identified] : neuropathy B/L LE

## 2022-03-29 NOTE — CHRONIC CARE ASSESSMENT
[PPS Score: ____] : Palliative Performance Scale (PPS) Score: [unfilled] [Limited decision making ability] : limited decision making ability [de-identified] : n/a [de-identified] : adequate

## 2022-03-29 NOTE — COUNSELING
[Overweight - ( BMI 25.1 - 29.9 )] : overweight -  ( BMI 25.1 - 29.9 ) [Continue diet as tolerated] : continue diet as tolerated based on goals of care [Non - Smoker] : non-smoker [Use assistive device to avoid falls] : use assistive device to avoid falls [] : foot exam [Not Recommended] : Aspirin use not recommended due to overall prognosis [Improve exercise tolerance] : improve exercise tolerance [Improve mobility] : improve mobility [Improve weight] : improve weight [Decrease stress] : decrease stress [Decrease hospital use] : decrease hospital use [Minimize unnecessary interventions] : minimize unnecessary interventions [Comfort Care] : comfort care [Maintain functional ability] : maintain functional ability [Discussed disease trajectory with patient/caregiver] : discussed disease trajectory with patient/caregiver [Likely to achieve goals/desired outcomes] : likely to achieve goals/desired outcomes [Patient/Caregiver has ___ understanding of disease process] : patient/caregiver has [unfilled] understanding of disease process [Advanced Directives discussed: ____] : Advanced directives discussed: [unfilled] [DNR] : Code Status: DNR [Limited] : Treatment Guidelines: Limited [DNI] : Intubation: DNI [Last Ver[facility] Date: _____] : [facility] Completion/last verification date: [unfilled]

## 2022-03-29 NOTE — DATA REVIEWED
[FreeTextEntry1] : Labs from [facility], 5/21 \par Cr 1.6 \par Hgb 12.1 \par A1C 8.8 \par HDL 40\par LDL 62\par BNP 2250\par TSH 5.3 \par COVID-19 Anitbody: 2500

## 2022-04-25 ENCOUNTER — APPOINTMENT (OUTPATIENT)
Dept: HOME HEALTH SERVICES | Facility: HOME HEALTH | Age: 87
End: 2022-04-25

## 2022-04-27 ENCOUNTER — NON-APPOINTMENT (OUTPATIENT)
Age: 87
End: 2022-04-27

## 2022-05-03 NOTE — ED ADULT TRIAGE NOTE - RESPIRATORY RATE (BREATHS/MIN)
[No Acute Distress] : no acute distress [No Respiratory Distress] : no respiratory distress  [Normal] : affect was normal and insight and judgment were intact [de-identified] : Pt unable to position camera to foot with spider bite. Unable to visualize. 16

## 2022-05-17 ENCOUNTER — NON-APPOINTMENT (OUTPATIENT)
Age: 87
End: 2022-05-17

## 2022-05-24 ENCOUNTER — NON-APPOINTMENT (OUTPATIENT)
Age: 87
End: 2022-05-24

## 2022-05-24 NOTE — ED ADULT TRIAGE NOTE - CCCP TRG CHIEF CMPLNT
mechanical fall, L wrist pain Hemigard Postcare Instructions: The HEMIGARD strips are to remain completely dry for at least 5-7 days.

## 2022-07-27 ENCOUNTER — APPOINTMENT (OUTPATIENT)
Dept: HOME HEALTH SERVICES | Facility: HOME HEALTH | Age: 87
End: 2022-07-27

## 2022-07-27 DIAGNOSIS — M19.90 UNSPECIFIED OSTEOARTHRITIS, UNSPECIFIED SITE: ICD-10-CM

## 2022-07-27 PROCEDURE — 99349 HOME/RES VST EST MOD MDM 40: CPT

## 2022-07-27 NOTE — COUNSELING
[Overweight - ( BMI 25.1 - 29.9 )] : overweight -  ( BMI 25.1 - 29.9 ) [Continue diet as tolerated] : continue diet as tolerated based on goals of care [Non - Smoker] : non-smoker [Use assistive device to avoid falls] : use assistive device to avoid falls [] : foot exam [Not Recommended] : Aspirin use not recommended due to overall prognosis [Improve exercise tolerance] : improve exercise tolerance [Improve mobility] : improve mobility [Improve weight] : improve weight [Decrease stress] : decrease stress [Decrease hospital use] : decrease hospital use [Minimize unnecessary interventions] : minimize unnecessary interventions [Comfort Care] : comfort care [Maintain functional ability] : maintain functional ability [Discussed disease trajectory with patient/caregiver] : discussed disease trajectory with patient/caregiver [Likely to achieve goals/desired outcomes] : likely to achieve goals/desired outcomes [Patient/Caregiver has ___ understanding of disease process] : patient/caregiver has [unfilled] understanding of disease process [Advanced Directives discussed: ____] : Advanced directives discussed: [unfilled] [DNR] : Code Status: DNR [Limited] : Treatment Guidelines: Limited [DNI] : Intubation: DNI [Last Verification Date: _____] : [facility] Completion/last verification date: [unfilled]

## 2022-07-28 VITALS
RESPIRATION RATE: 16 BRPM | SYSTOLIC BLOOD PRESSURE: 122 MMHG | OXYGEN SATURATION: 98 % | TEMPERATURE: 97.7 F | DIASTOLIC BLOOD PRESSURE: 64 MMHG | HEART RATE: 63 BPM

## 2022-08-07 RX ORDER — FUROSEMIDE 40 MG/1
40 TABLET ORAL
Qty: 90 | Refills: 0 | Status: DISCONTINUED | COMMUNITY
Start: 2021-01-12 | End: 2022-08-07

## 2022-08-07 NOTE — HISTORY OF PRESENT ILLNESS
[Patient] : patient [FreeTextEntry1] : Gait instability [FreeTextEntry2] : COVID-19 Screen - 07/27/2022 \par N95 mask, gloves, eyewear, and gown (if indicated) used during visit: Yes \par Patient or caregiver denies fever, cough, trouble breathing, rash, and vomiting. Patient has not been in close contact with anyone who is COVID-19 positive or suspected of having COVID-19. 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 Weight is 141-145 lbs recently. No change in chronic mild SOB w/ exertion. \par Pt likes spending time in the kitchen, dtr trying to get her to walk every 2h. Pt becomes very fatigued when doing chores. Has bl ankle swelling worse at night. Continue elevation. \par \par Recurring stage I pressure ulcer of buttock has healed: using medi honey now, sometimes skin opens. Has roho cushion.\par \par Has fungal infection of right 2nd and middle fingers. Using ketoconazole cream irregularly w/o improvement. Shared w/ patient that no need to treat, but may attempt treatment for cosmetic reasons. \par \par DM type 2 w/ complications- neuropathy/retinopathy/nephropathy- x 30 years, controlled, patient states her baseline FS is around 100s-200s range, sometimes in 300s. Sometimes sugars are elevated bc pt has trouble taking insulin on her own. Dtr assists. Checks BG 3-5 times daily. BG management tricky as patient does not have regular eating schedule follows endocrinology, ophthalmology and podiatry. Dtr places diabetic medicines and insulin on counter for patient to take during the day, which has improved compliance. A1C improved to 8.1%. cannot feel legs due to neuropathy. Has Humalog ISS for meals and takes lantus 14U QHS. \par \par Last A1c: 8.3% (3/2022) \par \par CAD s/p stent, CHF, Angina- EF -23%, recent echo by cardiologist. Gets occasional chest pains yang when stressed or when walks outside or with cold air. usually resolves with rest, uses nitro when needed.\par \par Recurrent UTIs: Does not currently have any urinary symptoms. Doesn't usually have urinary symptoms. \par \par Subjective\par -Appetite/weight: Overall good, though somewhat variable from day to day, eats regular food no swallowing problems. \par -Falls/gait: No recent falls, last fall 2 years ago. Ambulates with walker. \par -BM: Regular- sometimes has diarrhea alternating with constipation, been like this for years. \par -Urine: Continent with bowels/bladder, wears depends, no urinary symptoms. \par -Sleep: Sleeps well except when wakes up to urinate 2-3 times/night. \par -Mood/memory: No memory problems\par \par Denies fever, vomiting, cough, worsened LE edema. \par ----------\par lives with daughter (Nancy Juárez), and grand son, independent with ADLs\par \par Patient speaks Barbadian, English and Togolese. Patient born and grew up in [place], however, parents are from near [place]. \par \par Specialists:\par Cardiology:\par Endocrinology

## 2022-08-07 NOTE — DATA REVIEWED
[FreeTextEntry1] : Labs from [facility], 5/21 \par Cr 1.6 \par Hgb 12.1 \par A1C 8.8 \par HDL 40\par LDL 62\par BNP 2250\par TSH 5.3 \par COVID-19 Anitbody: 2500

## 2022-08-07 NOTE — HEALTH RISK ASSESSMENT
[Independent] : using telephone [Full assistance needed] : managing finances [Yes] : The patient does have visual impairment [HRA Reviewed] : Health risk assessments reviewed [No falls in past year] : Patient reported no falls in the past year [TimeGetUpGo] : 0

## 2022-08-07 NOTE — CHRONIC CARE ASSESSMENT
[PPS Score: ____] : Palliative Performance Scale (PPS) Score: [unfilled] [Limited decision making ability] : limited decision making ability [de-identified] : n/a [de-identified] : adequate

## 2022-08-07 NOTE — PHYSICAL EXAM
[No Acute Distress] : no acute distress [Well Nourished] : well nourished [Normal Sclera/Conjunctiva] : normal sclera/conjunctiva [Normal Outer Ear/Nose] : the ears and nose were normal in appearance [Supple] : the neck was supple [No Respiratory Distress] : no respiratory distress [Clear to Auscultation] : lungs were clear to auscultation bilaterally [No Accessory Muscle Use] : no accessory muscle use [Normal Rate] : heart rate was normal  [Regular Rhythm] : with a regular rhythm [Normal S1, S2] : normal S1 and S2 [Non Tender] : non-tender [Soft] : abdomen soft [Not Distended] : not distended [No CVA Tenderness] : no ~M costovertebral angle tenderness [No Spinal Tenderness] : no spinal tenderness [No Joint Swelling] : no joint swelling seen [No Clubbing, Cyanosis] : no clubbing  or cyanosis of the fingernails [No Rash] : no rash [Cranial Nerves Intact] : cranial nerves 2-12 were intact [No Motor Deficits] : the motor exam was normal [Oriented x3] : oriented to person, place, and time [Normal Affect] : the affect was normal [Normal Mood] : the mood was normal [de-identified] : skin color good, awake, interactive, pleasant,  [de-identified] : wears glasses [de-identified] : 1+ bilateral lower extremity edema  [de-identified] : slow unstable gait, uses walker [de-identified] : +Fungal nail infection of 2nd and 3rd digits of left hand; small scabs from itching 2/2 pruritus on legs and breasts  [de-identified] : neuropathy B/L LE

## 2022-08-09 ENCOUNTER — APPOINTMENT (OUTPATIENT)
Dept: HOME HEALTH SERVICES | Facility: HOME HEALTH | Age: 87
End: 2022-08-09

## 2022-09-19 ENCOUNTER — APPOINTMENT (OUTPATIENT)
Dept: HOME HEALTH SERVICES | Facility: HOME HEALTH | Age: 87
End: 2022-09-19

## 2022-09-19 VITALS
OXYGEN SATURATION: 98 % | RESPIRATION RATE: 16 BRPM | SYSTOLIC BLOOD PRESSURE: 118 MMHG | TEMPERATURE: 98.2 F | DIASTOLIC BLOOD PRESSURE: 64 MMHG | HEART RATE: 62 BPM

## 2022-09-19 PROCEDURE — 99349 HOME/RES VST EST MOD MDM 40: CPT

## 2022-09-19 RX ORDER — NITROGLYCERIN 0.4 MG/1
0.4 TABLET SUBLINGUAL
Qty: 1 | Refills: 3 | Status: DISCONTINUED | COMMUNITY
Start: 2018-03-09 | End: 2022-09-19

## 2022-09-19 RX ORDER — FUROSEMIDE 20 MG/1
20 TABLET ORAL
Qty: 360 | Refills: 1 | Status: DISCONTINUED | COMMUNITY
Start: 2021-09-21 | End: 2022-09-19

## 2022-09-19 NOTE — PHYSICAL EXAM
[No Acute Distress] : no acute distress [Well Nourished] : well nourished [Normal Sclera/Conjunctiva] : normal sclera/conjunctiva [Normal Outer Ear/Nose] : the ears and nose were normal in appearance [Supple] : the neck was supple [No Respiratory Distress] : no respiratory distress [Clear to Auscultation] : lungs were clear to auscultation bilaterally [No Accessory Muscle Use] : no accessory muscle use [Normal Rate] : heart rate was normal  [Regular Rhythm] : with a regular rhythm [Normal S1, S2] : normal S1 and S2 [Non Tender] : non-tender [Soft] : abdomen soft [Not Distended] : not distended [No CVA Tenderness] : no ~M costovertebral angle tenderness [No Spinal Tenderness] : no spinal tenderness [No Joint Swelling] : no joint swelling seen [No Clubbing, Cyanosis] : no clubbing  or cyanosis of the fingernails [No Rash] : no rash [Cranial Nerves Intact] : cranial nerves 2-12 were intact [No Motor Deficits] : the motor exam was normal [Oriented x3] : oriented to person, place, and time [Normal Affect] : the affect was normal [Normal Mood] : the mood was normal [de-identified] : skin color good, awake, interactive, pleasant,  [de-identified] : wears glasses [de-identified] : 1+ bilateral lower extremity edema  [de-identified] : slow unstable gait, uses walker [de-identified] : +Fungal nail infection of 2nd and 3rd digits of left hand; small scabs from itching 2/2 pruritus on legs and breasts  [de-identified] : neuropathy B/L LE

## 2022-09-19 NOTE — HISTORY OF PRESENT ILLNESS
[Patient] : patient [FreeTextEntry1] : Gait instability [FreeTextEntry2] : COVID-19 Screen - 09/19/2022 \par N95 mask, gloves, eyewear, and gown (if indicated) used during visit: Yes \par Patient or caregiver denies fever, cough, trouble breathing, rash, and vomiting. Patient has not been in close contact with anyone who is COVID-19 positive or suspected of having COVID-19. 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 \par \par CHF: Now taking Torsemide 20 mg daily, no longer on Lasix 40 mg PO QD. Weight is 141-145 lbs recently. No change in chronic mild SOB w/ exertion. Mild lower extremity swelling ankles/feet - advised elevation, use of compression stocking. \par \par Pt likes spending time in the kitchen, dtr trying to get her to walk every 2h. Pt becomes very fatigued when doing chores. Has bl ankle swelling worse at night. Continue elevation. \par \par Recurring stage I pressure ulcer of buttock has healed: using medi honey now, sometimes skin opens. Has roho cushion.\par \par Has fungal infection of right 2nd and middle fingers. Using ketoconazole cream irregularly w/o improvement. Shared w/ patient that no need to treat, but may attempt treatment for cosmetic reasons. \par \par DM type 2 w/ complications- neuropathy/retinopathy/nephropathy- x 30 years, controlled, patient states her baseline FS is around 100s-200s range, sometimes in 300s. Sometimes sugars are elevated bc pt has trouble taking insulin on her own. Dtr assists. Checks BG 3-5 times daily. BG management tricky as patient does not have regular eating schedule follows endocrinology, ophthalmology and podiatry. Dtr places diabetic medicines and insulin on counter for patient to take during the day, which has improved compliance. Cannot feel legs due to neuropathy. Has Humalog ISS for meals and takes lantus 14U QHS. Last A1c in the mid 7s as per daughter, does not remember exact result. \par \par CAD s/p stent, CHF, Angina- EF -23%, recent echo by cardiologist. Gets occasional chest pains yang when stressed or when walks outside or with cold air. usually resolves with rest, uses nitro when needed.\par \par Recurrent UTIs: Does not currently have any urinary symptoms. Doesn't usually have urinary symptoms. \par \par Subjective\par -Appetite/weight: Overall good, though somewhat variable from day to day, eats regular food no swallowing problems. \par -Falls/gait: No recent falls, last fall 2 years ago. Ambulates with walker. \par -BM: Regular- sometimes has diarrhea alternating with constipation, been like this for years. \par -Urine: Continent with bowels/bladder, wears depends, no urinary symptoms. \par -Sleep: Sleeps well except when wakes up to urinate 2-3 times/night. \par -Mood/memory: No memory problems\par \par Denies fever, vomiting, cough, worsened LE edema. \par ----------\par lives with daughter (Nancy Juárez), and grand son, independent with ADLs\par \par Patient speaks Beninese, English and Central African. Patient born and grew up in [place], however, parents are from near [place]. \par \par Specialists:\par Cardiology\par Endocrinology \par \par Refused flu vaccine

## 2022-09-19 NOTE — CHRONIC CARE ASSESSMENT
[PPS Score: ____] : Palliative Performance Scale (PPS) Score: [unfilled] [Limited decision making ability] : limited decision making ability [de-identified] : n/a [de-identified] : adequate

## 2022-09-19 NOTE — HEALTH RISK ASSESSMENT
[Independent] : using telephone [Full assistance needed] : managing finances [HRA Reviewed] : Health risk assessments reviewed [No falls in past year] : Patient reported no falls in the past year [Yes] : The patient does have visual impairment [TimeGetUpGo] : 0

## 2022-11-09 ENCOUNTER — NON-APPOINTMENT (OUTPATIENT)
Age: 87
End: 2022-11-09

## 2022-11-10 ENCOUNTER — APPOINTMENT (OUTPATIENT)
Dept: HOME HEALTH SERVICES | Facility: HOME HEALTH | Age: 87
End: 2022-11-10

## 2022-11-17 ENCOUNTER — FORM ENCOUNTER (OUTPATIENT)
Age: 87
End: 2022-11-17

## 2022-11-30 NOTE — ED PROVIDER NOTE - INTERPRETATION
Was not able to leave message    ----- Message from Yolanda Rivero MD sent at 11/23/2022  1:46 PM CST -----  Hi, please let patient (Adrian) know their biopsy result was benign (non-cancerous--type of mole called an intradermal nevus), no additional treatment is required      
normal sinus rhythm

## 2022-12-21 ENCOUNTER — APPOINTMENT (OUTPATIENT)
Dept: HOME HEALTH SERVICES | Facility: HOME HEALTH | Age: 87
End: 2022-12-21

## 2022-12-21 VITALS
RESPIRATION RATE: 14 BRPM | TEMPERATURE: 97.4 F | OXYGEN SATURATION: 98 % | SYSTOLIC BLOOD PRESSURE: 118 MMHG | DIASTOLIC BLOOD PRESSURE: 56 MMHG | HEART RATE: 55 BPM

## 2022-12-21 DIAGNOSIS — K21.9 GASTRO-ESOPHAGEAL REFLUX DISEASE W/OUT ESOPHAGITIS: ICD-10-CM

## 2022-12-21 DIAGNOSIS — E03.9 HYPOTHYROIDISM, UNSPECIFIED: ICD-10-CM

## 2022-12-21 PROCEDURE — 99349 HOME/RES VST EST MOD MDM 40: CPT | Mod: 25

## 2022-12-21 PROCEDURE — 0124A: CPT

## 2022-12-21 RX ORDER — MUPIROCIN 20 MG/G
2 OINTMENT TOPICAL 3 TIMES DAILY
Qty: 1 | Refills: 3 | Status: COMPLETED | COMMUNITY
Start: 2020-06-30 | End: 2022-12-21

## 2022-12-21 RX ORDER — NYSTATIN 100000 [USP'U]/G
100000 CREAM TOPICAL 3 TIMES DAILY
Qty: 1 | Refills: 3 | Status: COMPLETED | COMMUNITY
Start: 2020-12-18 | End: 2022-12-21

## 2022-12-21 RX ORDER — ZINC OXIDE
40 OINTMENT (GRAM) TOPICAL
Qty: 1 | Refills: 4 | Status: COMPLETED | COMMUNITY
Start: 2020-04-10 | End: 2022-12-21

## 2022-12-21 RX ORDER — KETOCONAZOLE 20 MG/G
2 CREAM TOPICAL
Qty: 60 | Refills: 0 | Status: COMPLETED | COMMUNITY
Start: 2020-07-10 | End: 2022-12-21

## 2022-12-21 RX ORDER — GREEN TEA/HOODIA GORDONII 315-12.5MG
CAPSULE ORAL
Qty: 90 | Refills: 3 | Status: COMPLETED | COMMUNITY
Start: 2020-03-23 | End: 2022-12-21

## 2022-12-21 RX ORDER — CLOBETASOL PROPIONATE 0.5 MG/G
0.05 OINTMENT TOPICAL TWICE DAILY
Qty: 1 | Refills: 2 | Status: COMPLETED | COMMUNITY
Start: 2020-06-30 | End: 2022-12-21

## 2022-12-21 RX ORDER — ONDANSETRON 8 MG/1
8 TABLET ORAL
Refills: 0 | Status: COMPLETED | COMMUNITY
Start: 2019-11-08 | End: 2022-12-21

## 2022-12-21 RX ORDER — DICLOFENAC SODIUM 10 MG/G
1 GEL TOPICAL
Qty: 1 | Refills: 0 | Status: COMPLETED | COMMUNITY
Start: 2020-05-15 | End: 2022-12-21

## 2022-12-21 RX ORDER — DENOSUMAB 60 MG/ML
60 INJECTION SUBCUTANEOUS
Qty: 1 | Refills: 0 | Status: COMPLETED | COMMUNITY
Start: 2021-02-09 | End: 2022-12-21

## 2022-12-21 RX ORDER — DEXTROSE 3.75 G
4 TABLET,CHEWABLE ORAL
Qty: 30 | Refills: 0 | Status: COMPLETED | COMMUNITY
Start: 2019-07-24 | End: 2022-12-21

## 2022-12-21 RX ORDER — NUT.TX.IMPAIRED DIGESTIVE FXN 0.035-1/ML
LIQUID (ML) ORAL
Refills: 0 | Status: COMPLETED | COMMUNITY
Start: 2019-11-08 | End: 2022-12-21

## 2022-12-21 RX ORDER — MULTIVITAMIN WITH FOLIC ACID 400 MCG
TABLET ORAL
Qty: 90 | Refills: 3 | Status: COMPLETED | COMMUNITY
Start: 2021-03-15 | End: 2022-12-21

## 2022-12-22 PROBLEM — K21.9 GERD (GASTROESOPHAGEAL REFLUX DISEASE): Status: ACTIVE | Noted: 2022-12-21

## 2022-12-22 PROBLEM — E03.9 ADULT HYPOTHYROIDISM: Status: ACTIVE | Noted: 2019-11-12

## 2022-12-22 NOTE — HISTORY OF PRESENT ILLNESS
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 Visit done with daughter in home. Patient states doing well except for occasional ankle edema. \par \par \par Interval Events\par -Cardiologist visit 11/2022. No changes in medication. No imaging. \par -Endrocrinologist visit 12/2022. Labs drawn no results yet. \par -Opthalmologist visit 10/2022. Had laser treatment in right eye. Next visit in 6 months .\par \par Subjective:\par -Appetite/weight: appetite poor. Weight stable. no nutritional supplements \par -Gait/falls: unsteady gait, uses walker. Last fell 2/2021 and broke arm. \par -Pain: bilateral knee and ankle pain intermittently. PT helped in past.  degenerative changes in some joints no surgical intervention. Diclofenac prescribed. \par -Sleep: sleep good.\par -BMs: regular. no straining. no hematochezia. \par -Urine: no concerns. continent. \par -Skin: intact. \par -DME: walker, wheelchair\par -Mood/memory: mood is ok. short term memory slightly starting to go. \par -Communication: full conversation. \par -Health Maintenance: UTD vaccines except for flu, shingles vaccine. Pneumonia shot in past. \par -Hospitalizations in the past year: none \par \par \par Medical Issues:\par a) CHF: EF -23% (2017) no  recent echo by cardiologist. Continue Entresto, metoprolol, Atorvastatin and torsemide.  Not fluid restriction and not taking daily weights. Sleeps in recliner, no PND, has ankle edema intermittently. \par b) DMII with neuropathy/retinopathy/nephropathy: A1c 7.8% (8/2022) on Humalog SSI  with meals. and Lantus 14 units. Daughter writes out the sliding scale and leaves her insulin out for her to take. BS before meals. Log shows  mg/dl. No hypoglycemic episodes. Continue Gabapentin. FU with endocrinologist. \par c) CAD s/p MI w PCI/MARTA stent 2014:  Has MEAD when walking, No angina episodes but has NTG if needed. Continue statin, plavix, metoprolol. \par d) Hypothyroid: No symptoms. Continue levothyroxine. \par e) GERD: Chronic. Continue Famotidine and pantoprazole. \par \par Specialists:\par Cardiology\par Endocrinology \par Ophthalmologist\par Podiatrist  \par \par Social hx: lives with daughter (Nancy Juárez), and grand son, independent with ADLs. Patient speaks French, English and Ugandan. Patient born and grew up in [place], however, parents are from near [place].\par Caregivers: no HHA. Daughter in the house with her all day. \par MOLST: Last reviewed 3/29/2022. No MOLST for me to sign. They have to look for it. \par HCP: Nancy Stevens \par \par Patient denies fever, cough, trouble breathing, rash, vomiting, diarrhea. Patient has not been in close contact with someone who is COVID positive.\par N95 mask, gloves, eye wear used during visit: Total face to face time with patient:[]\par \par RACHEL MERLINI RACHEL Feb 22 1933 being screened for COVID-19 vaccine administration visit. \par Information received by [patient]/[caregiver]/[proxy]\par RACHEL MERLINI denies moderate to severe acute illness with or without fever.\par RACHEL MERLINI denies having a positive test for COVID-19 and denies being told by a doctor that she has COVID-19 in the past 14 days.\par RACHEL MERLINI denies receiving passive antibody therapy (monoclonal antibodies or convalescent plasma) as a treatment for COVID-19 in the past 90 days (3 months).\par RACHEL MERLINI denies ever having an immediate allergic reaction of any severity to a prior vaccine or injectable therapy, and denies anaphylaxis from any cause, including polysorbate-80 or polyethylene glycol (Miralax).\par RACHEL MERLINI's legal representative is: self (patient)\par \par MICHELLE SABA educated RACHEL MERLINI about the Pfizer COVID-19 vaccine and provided the information sheet. \par  \par MICHELLE SABA educated RACHEL MERLINLIZBETH that this vaccine booster is recommended to boost the vaccine's effectiveness.\par MICHELLE SABA educated that there will be no cost to RACHEL MERLINI for this vaccine and that any monies or benefits for administering the vaccine will be assigned and transferred to the vaccinating provider, including benefits/monies from RACHEL MERLINI's  health plan, Medicare or other third parties who are financially responsible for their medical care. \par  \par RACHEL MERLINI consents to the release of all information needed (including but not limited to medical records, copies of claims and itemized bills) to verify payment and as needed for other public health purposes, including reporting to applicable vaccine registries.\par  \par MICHELLE SABA provided RACHEL MERLINI a chance to ask questions and reviewed the benefits and risks of the vaccination as described. RACHEL MERLINI consents to the administration of the COVID-19 vaccination. \par \par Patient screened as above.\par After consent verbally received, the Pfizer-BioNTech COVID19 vaccine vial was gently inverted 10 times, and in an upright position, using aseptic technique, a 0.3 mL dose was drawn up into a 1 mL syringe.  The right deltoid was prepped with alcohol swab, and vaccine was administered at a 90 degree angle.  A bandage was applied.\par Patient was monitored for 15 minutes afterwards.  Complications included: none\par \par

## 2022-12-22 NOTE — CHRONIC CARE ASSESSMENT
[PPS Score: ____] : Palliative Performance Scale (PPS) Score: [unfilled] [Limited decision making ability] : limited decision making ability [de-identified] : n/a [de-identified] : adequate

## 2022-12-22 NOTE — REASON FOR VISIT
[Follow-Up] : a follow-up visit [Family Member] : family member [Pre-Visit Preparation] : pre-visit preparation was done [Intercurrent Specialty/Sub-specialty Visits] : the patient has intercurrent specialty/sub-specialty visits [FreeTextEntry2] : medical records [FreeTextEntry3] : endocrinologist, cardiologist, opthalmologist

## 2022-12-22 NOTE — HEALTH RISK ASSESSMENT
[Independent] : using telephone [Full assistance needed] : managing finances [No falls in past year] : Patient reported no falls in the past year [Yes] : The patient does have visual impairment [HRA Reviewed] : Health risk assessments reviewed [TimeGetUpGo] : 25

## 2022-12-22 NOTE — PHYSICAL EXAM
[No Acute Distress] : no acute distress [Well Nourished] : well nourished [Normal Sclera/Conjunctiva] : normal sclera/conjunctiva [Normal Outer Ear/Nose] : the ears and nose were normal in appearance [Supple] : the neck was supple [No Respiratory Distress] : no respiratory distress [Clear to Auscultation] : lungs were clear to auscultation bilaterally [No Accessory Muscle Use] : no accessory muscle use [Normal Rate] : heart rate was normal  [Regular Rhythm] : with a regular rhythm [Normal S1, S2] : normal S1 and S2 [Non Tender] : non-tender [Soft] : abdomen soft [Not Distended] : not distended [No CVA Tenderness] : no ~M costovertebral angle tenderness [No Spinal Tenderness] : no spinal tenderness [No Joint Swelling] : no joint swelling seen [No Rash] : no rash [Cranial Nerves Intact] : cranial nerves 2-12 were intact [No Motor Deficits] : the motor exam was normal [Oriented x3] : oriented to person, place, and time [Normal Affect] : the affect was normal [Normal Mood] : the mood was normal [PERRL] : pupils equal, round and reactive to light [EOMI] : extra ocular movement intact [Normal Oropharynx] : the oropharynx was normal [No JVD] : no jugular venous distention [No Murmurs] : no murmurs heard [Normal Strength/Tone] : muscle strength and tone were normal [No Skin Lesions] : no skin lesions [de-identified] : pleasant,  [de-identified] : trace edema [de-identified] : slow unstable gait off balance when getting up from sitting

## 2022-12-22 NOTE — COUNSELING
[Normal Weight - ( BMI  <25 )] : normal weight - ( BMI  <25 ) [DASH diet recommended] : DASH diet recommended [Non - Smoker] : non-smoker [Use assistive device to avoid falls] : use assistive device to avoid falls [Remove clutter and unsafe carpeting to avoid falls] : remove clutter and unsafe carpeting to avoid falls [] : foot exam [Minimize unnecessary interventions] : minimize unnecessary interventions [Maintain functional ability] : maintain functional ability [Discussed disease trajectory with patient/caregiver] : discussed disease trajectory with patient/caregiver [DNR] : Code Status: DNR [Limited] : Treatment Guidelines: Limited [DNI] : Intubation: DNI [Last Verification Date: _____] : [facility]pletion/last verification date: [unfilled] [_____] : HCP: [unfilled]

## 2023-01-03 ENCOUNTER — NON-APPOINTMENT (OUTPATIENT)
Age: 88
End: 2023-01-03

## 2023-01-05 ENCOUNTER — TRANSCRIPTION ENCOUNTER (OUTPATIENT)
Age: 88
End: 2023-01-05

## 2023-01-05 ENCOUNTER — NON-APPOINTMENT (OUTPATIENT)
Age: 88
End: 2023-01-05

## 2023-01-06 ENCOUNTER — NON-APPOINTMENT (OUTPATIENT)
Age: 88
End: 2023-01-06

## 2023-01-07 ENCOUNTER — LABORATORY RESULT (OUTPATIENT)
Age: 88
End: 2023-01-07

## 2023-01-08 ENCOUNTER — TRANSCRIPTION ENCOUNTER (OUTPATIENT)
Age: 88
End: 2023-01-08

## 2023-01-08 ENCOUNTER — NON-APPOINTMENT (OUTPATIENT)
Age: 88
End: 2023-01-08

## 2023-01-09 ENCOUNTER — LABORATORY RESULT (OUTPATIENT)
Age: 88
End: 2023-01-09

## 2023-01-09 ENCOUNTER — NON-APPOINTMENT (OUTPATIENT)
Age: 88
End: 2023-01-09

## 2023-01-09 ENCOUNTER — APPOINTMENT (OUTPATIENT)
Dept: HOME HEALTH SERVICES | Facility: HOME HEALTH | Age: 88
End: 2023-01-09

## 2023-01-09 VITALS
OXYGEN SATURATION: 97 % | TEMPERATURE: 98.1 F | HEART RATE: 55 BPM | RESPIRATION RATE: 19 BRPM | SYSTOLIC BLOOD PRESSURE: 131 MMHG | DIASTOLIC BLOOD PRESSURE: 76 MMHG

## 2023-01-09 RX ORDER — BENZONATATE 100 MG/1
100 CAPSULE ORAL
Qty: 21 | Refills: 0 | Status: DISCONTINUED | COMMUNITY
Start: 2023-01-06 | End: 2023-01-09

## 2023-01-10 ENCOUNTER — NON-APPOINTMENT (OUTPATIENT)
Age: 88
End: 2023-01-10

## 2023-01-10 ENCOUNTER — TRANSCRIPTION ENCOUNTER (OUTPATIENT)
Age: 88
End: 2023-01-10

## 2023-02-08 ENCOUNTER — APPOINTMENT (OUTPATIENT)
Dept: HOME HEALTH SERVICES | Facility: HOME HEALTH | Age: 88
End: 2023-02-08

## 2023-02-15 NOTE — COUNSELING
Report received from REFUGIO Edmonds. AAOx4, VSS, NPO MN except meds @ 1336. 24 G right hand   [Overweight - ( BMI 25.1 - 29.9 )] : overweight -  ( BMI 25.1 - 29.9 ) [Continue diet as tolerated] : continue diet as tolerated based on goals of care [Non - Smoker] : non-smoker [Use assistive device to avoid falls] : use assistive device to avoid falls [] : foot exam [Not Recommended] : Aspirin use not recommended due to overall prognosis [Improve exercise tolerance] : improve exercise tolerance [Improve mobility] : improve mobility [Improve weight] : improve weight [Decrease stress] : decrease stress [Decrease hospital use] : decrease hospital use [Minimize unnecessary interventions] : minimize unnecessary interventions [Comfort Care] : comfort care [Maintain functional ability] : maintain functional ability [Discussed disease trajectory with patient/caregiver] : discussed disease trajectory with patient/caregiver [Likely to achieve goals/desired outcomes] : likely to achieve goals/desired outcomes [Patient/Caregiver has ___ understanding of disease process] : patient/caregiver has [unfilled] understanding of disease process [Advanced Directives discussed: ____] : Advanced directives discussed: [unfilled] [DNR] : Code Status: DNR [Limited] : Treatment Guidelines: Limited [DNI] : Intubation: DNI [[facility]: _____] [facility] Completion/last verification date: [unfilled]

## 2023-02-21 ENCOUNTER — NON-APPOINTMENT (OUTPATIENT)
Age: 88
End: 2023-02-21

## 2023-02-21 ENCOUNTER — TRANSCRIPTION ENCOUNTER (OUTPATIENT)
Age: 88
End: 2023-02-21

## 2023-02-21 ENCOUNTER — APPOINTMENT (OUTPATIENT)
Dept: HOME HEALTH SERVICES | Facility: HOME HEALTH | Age: 88
End: 2023-02-21
Payer: MEDICARE

## 2023-02-21 PROCEDURE — 99349 HOME/RES VST EST MOD MDM 40: CPT | Mod: 95

## 2023-02-22 ENCOUNTER — NON-APPOINTMENT (OUTPATIENT)
Age: 88
End: 2023-02-22

## 2023-03-13 ENCOUNTER — NON-APPOINTMENT (OUTPATIENT)
Age: 88
End: 2023-03-13

## 2023-03-14 ENCOUNTER — NON-APPOINTMENT (OUTPATIENT)
Age: 88
End: 2023-03-14

## 2023-04-06 NOTE — PROGRESS NOTE ADULT - PROBLEM SELECTOR PROBLEM 1
ROB (acute kidney injury) Tranexamic Acid Counseling:  Patient advised of the small risk of bleeding problems with tranexamic acid. They were also instructed to call if they developed any nausea, vomiting or diarrhea. All of the patient's questions and concerns were addressed.

## 2023-04-13 NOTE — PROGRESS NOTE ADULT - PROBLEM SELECTOR PLAN 1
1. Well visit physical.  No concerns on exam.  Screening blood work reviewed shows no concerns.  Recently had repeat colonoscopy.     2.  Arthritis.  Rheumatoid.  Continue current meds and rheumatoid follow-ups.     3. Insomnia.  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  Prescription for Ambien to use as needed.     #4.  Recurrent eye infections.  Prescription for erythromycin.    #5.  Continue routine visits with neurology.    6.  Right-sided nasal polyp has caused some bleeding.  Referral to ENT.    Boone Avery DO  for questions and f/u please call office   [place] [phone] [facility] [phone]  any forms needed please fax   [place] [phone] [facility] [phone]  for Physical therapy orders can call [phone]  for Radiology orders can call [phone]  for general referrals can call [phone]  for cardiac testing can call [phone]   likely pre renal harshil  now resolved, appetite improving.   No objection with restarting home dose of lasix 20mg PO daily.

## 2023-04-28 ENCOUNTER — TRANSCRIPTION ENCOUNTER (OUTPATIENT)
Age: 88
End: 2023-04-28

## 2023-04-28 ENCOUNTER — APPOINTMENT (OUTPATIENT)
Dept: HOME HEALTH SERVICES | Facility: HOME HEALTH | Age: 88
End: 2023-04-28
Payer: MEDICARE

## 2023-04-28 ENCOUNTER — LABORATORY RESULT (OUTPATIENT)
Age: 88
End: 2023-04-28

## 2023-04-28 DIAGNOSIS — L03.115 CELLULITIS OF RIGHT LOWER LIMB: ICD-10-CM

## 2023-04-28 DIAGNOSIS — M10.061 IDIOPATHIC GOUT, RIGHT KNEE: ICD-10-CM

## 2023-04-28 PROCEDURE — 99349 HOME/RES VST EST MOD MDM 40: CPT | Mod: 95

## 2023-04-29 ENCOUNTER — NON-APPOINTMENT (OUTPATIENT)
Age: 88
End: 2023-04-29

## 2023-04-29 ENCOUNTER — TRANSCRIPTION ENCOUNTER (OUTPATIENT)
Age: 88
End: 2023-04-29

## 2023-04-30 ENCOUNTER — NON-APPOINTMENT (OUTPATIENT)
Age: 88
End: 2023-04-30

## 2023-04-30 LAB
ALBUMIN SERPL ELPH-MCNC: 4.1 G/DL
ALP BLD-CCNC: 157 U/L
ALT SERPL-CCNC: 6 U/L
ANION GAP SERPL CALC-SCNC: 16 MMOL/L
AST SERPL-CCNC: 12 U/L
BILIRUB SERPL-MCNC: 0.6 MG/DL
BUN SERPL-MCNC: 85 MG/DL
CALCIUM SERPL-MCNC: 9.5 MG/DL
CHLORIDE SERPL-SCNC: 99 MMOL/L
CO2 SERPL-SCNC: 20 MMOL/L
CREAT SERPL-MCNC: 2.54 MG/DL
CRP SERPL-MCNC: 26 MG/L
EGFR: 17 ML/MIN/1.73M2
ERYTHROCYTE [SEDIMENTATION RATE] IN BLOOD BY WESTERGREN METHOD: 29 MM/HR
GLUCOSE SERPL-MCNC: 505 MG/DL
POTASSIUM SERPL-SCNC: 6.1 MMOL/L
PROT SERPL-MCNC: 6.9 G/DL
SODIUM SERPL-SCNC: 136 MMOL/L
URATE SERPL-MCNC: 11.7 MG/DL

## 2023-05-01 ENCOUNTER — TRANSCRIPTION ENCOUNTER (OUTPATIENT)
Age: 88
End: 2023-05-01

## 2023-05-01 ENCOUNTER — NON-APPOINTMENT (OUTPATIENT)
Age: 88
End: 2023-05-01

## 2023-05-01 ENCOUNTER — LABORATORY RESULT (OUTPATIENT)
Age: 88
End: 2023-05-01

## 2023-05-01 ENCOUNTER — APPOINTMENT (OUTPATIENT)
Dept: HOME HEALTH SERVICES | Facility: HOME HEALTH | Age: 88
End: 2023-05-01

## 2023-05-01 VITALS
SYSTOLIC BLOOD PRESSURE: 138 MMHG | TEMPERATURE: 98.1 F | OXYGEN SATURATION: 99 % | HEART RATE: 59 BPM | RESPIRATION RATE: 19 BRPM | DIASTOLIC BLOOD PRESSURE: 70 MMHG

## 2023-05-01 RX ORDER — AZITHROMYCIN 250 MG/1
250 TABLET, FILM COATED ORAL
Qty: 1 | Refills: 0 | Status: DISCONTINUED | COMMUNITY
Start: 2023-01-06 | End: 2023-05-01

## 2023-05-02 ENCOUNTER — NON-APPOINTMENT (OUTPATIENT)
Age: 88
End: 2023-05-02

## 2023-05-02 ENCOUNTER — LABORATORY RESULT (OUTPATIENT)
Age: 88
End: 2023-05-02

## 2023-05-03 ENCOUNTER — NON-APPOINTMENT (OUTPATIENT)
Age: 88
End: 2023-05-03

## 2023-05-03 DIAGNOSIS — Z79.899 OTHER LONG TERM (CURRENT) DRUG THERAPY: ICD-10-CM

## 2023-05-05 ENCOUNTER — NON-APPOINTMENT (OUTPATIENT)
Age: 88
End: 2023-05-05

## 2023-05-06 ENCOUNTER — LABORATORY RESULT (OUTPATIENT)
Age: 88
End: 2023-05-06

## 2023-05-07 ENCOUNTER — TRANSCRIPTION ENCOUNTER (OUTPATIENT)
Age: 88
End: 2023-05-07

## 2023-05-07 ENCOUNTER — NON-APPOINTMENT (OUTPATIENT)
Age: 88
End: 2023-05-07

## 2023-05-07 DIAGNOSIS — E79.0 HYPERURICEMIA W/OUT SIGNS OF INFLAMMATORY ARTHRITIS AND TOPHACEOUS DISEASE: ICD-10-CM

## 2023-05-08 ENCOUNTER — NON-APPOINTMENT (OUTPATIENT)
Age: 88
End: 2023-05-08

## 2023-05-08 ENCOUNTER — LABORATORY RESULT (OUTPATIENT)
Age: 88
End: 2023-05-08

## 2023-05-08 NOTE — ED ADULT NURSE NOTE - NSFALLRSKINDICTYPE_ED_ALL_ED
Impaired Gait/Need for Mobility Assisted Device Isotretinoin Counseling: Patient should get monthly blood tests, not donate blood, not drive at night if vision affected, not share medication, and not undergo elective surgery for 6 months after tx completed. Side effects reviewed, pt to contact office should one occur.

## 2023-05-09 ENCOUNTER — LABORATORY RESULT (OUTPATIENT)
Age: 88
End: 2023-05-09

## 2023-05-09 ENCOUNTER — APPOINTMENT (OUTPATIENT)
Dept: HOME HEALTH SERVICES | Facility: HOME HEALTH | Age: 88
End: 2023-05-09
Payer: MEDICARE

## 2023-05-09 VITALS
RESPIRATION RATE: 16 BRPM | DIASTOLIC BLOOD PRESSURE: 70 MMHG | SYSTOLIC BLOOD PRESSURE: 110 MMHG | TEMPERATURE: 97.6 F | HEART RATE: 74 BPM | OXYGEN SATURATION: 96 %

## 2023-05-09 PROCEDURE — 99349 HOME/RES VST EST MOD MDM 40: CPT

## 2023-05-09 NOTE — HISTORY OF PRESENT ILLNESS
[Patient] : patient [Family Member] : family member [Patient is stable] : patient is stable [FreeTextEntry1] : Gait instability [FreeTextEntry2] : RACHEL MERLINI is a 89 year female with PMH of diabetes type 2 x 30 years (uncontrolled) c/b neuropathy and retinopathy, CAD s/p MI w PCI/MARTA stent 2014, systolic CHF (EF ~23% 8/2017), Angina pectoris, HTN, HL, recurrent UTIs, gait instability, lung nodule, recurrent UTIs, admitted at 9/4/19.for fever 2/2 UTI and ischiorectal abscess bilateral s/p debridement, then again hospitalized for ischial pain on 12/2019, found to have recurrence of abscess with 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 Visit done with daughter in home. Patient states doing well except for occasional ankle edema. 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 Advise slow recovery is possible. \par - Labs pending\par - UA/UCx pending - reported pt with less urinary symptoms\par \par \par Subjective:\par -Appetite/weight: appetite poor. Weight stable. no nutritional supplements \par -Gait/falls: unsteady gait, uses walker. \par -Pain: right knee pain\par -Sleep: sleep good.\par -BMs: regular. no straining. no hematochezia. \par -Urine: no concerns. continent. \par -Skin: intact. \par -DME: walker, wheelchair\par -Mood/memory: mood is ok. short term memory slightly starting to go. \par -Communication: full conversation. \par -Health Maintenance: UTD vaccines except for flu, shingles vaccine. Pneumonia shot in past. \par -Hospitalizations in the past year: none \par \par \par Medical Issues:\par a) CHF: EF -23% (2017) no  recent echo by cardiologist. Continue Entresto, metoprolol, Atorvastatin and torsemide.  Not fluid restriction and not taking daily weights. Sleeps in recliner, no PND, has ankle edema intermittently. \par b) DMII with neuropathy/retinopathy/nephropathy: A1c 7.8% (8/2022) on Humalog SSI  with meals. and Lantus 14 units. Daughter writes out the sliding scale and leaves her insulin out for her to take. BS before meals. Log shows  mg/dl. No hypoglycemic episodes. Continue Gabapentin. FU with endocrinologist. \par c) CAD s/p MI w PCI/MARTA stent 2014:  Has MEAD when walking, No angina episodes but has NTG if needed. Continue statin, plavix, metoprolol. \par d) Hypothyroid: No symptoms. Continue levothyroxine. \par e) GERD: Chronic. Continue Famotidine and pantoprazole. \par \par Specialists:\par Cardiology\par Endocrinology \par Ophthalmologist\par Podiatrist  \par \par Social hx: lives with daughter (Nancy Juárez), and grand son, independent with ADLs. Patient speaks Andorran, English and Martiniquais. Patient born and grew up in [place], however, parents are from near [place].\par Caregivers: no HHA. Daughter in the house with her all day. \par MOLST: Last reviewed 3/29/2022. No MOLST for me to sign. They have to look for it. \par HCP: Nancy Stevens \par \par \par \par

## 2023-05-09 NOTE — CHRONIC CARE ASSESSMENT
[PPS Score: ____] : Palliative Performance Scale (PPS) Score: [unfilled] [Limited decision making ability] : limited decision making ability [de-identified] : n/a [de-identified] : adequate

## 2023-05-09 NOTE — HEALTH RISK ASSESSMENT
[HRA Reviewed] : Health risk assessment reviewed [Independent] : using telephone [Full assistance needed] : managing finances [No falls in past year] : Patient reported no falls in the past year [Yes] : The patient does have visual impairment [TimeGetUpGo] : 25

## 2023-05-09 NOTE — PHYSICAL EXAM
[No Acute Distress] : no acute distress [Well Nourished] : well nourished [Normal Sclera/Conjunctiva] : normal sclera/conjunctiva [PERRL] : pupils equal, round and reactive to light [EOMI] : extra ocular movement intact [Normal Outer Ear/Nose] : the ears and nose were normal in appearance [Normal Oropharynx] : the oropharynx was normal [No JVD] : no jugular venous distention [Supple] : the neck was supple [No Respiratory Distress] : no respiratory distress [Clear to Auscultation] : lungs were clear to auscultation bilaterally [No Accessory Muscle Use] : no accessory muscle use [Normal Rate] : heart rate was normal  [Regular Rhythm] : with a regular rhythm [Normal S1, S2] : normal S1 and S2 [No Murmurs] : no murmurs heard [Non Tender] : non-tender [Soft] : abdomen soft [Not Distended] : not distended [No CVA Tenderness] : no ~M costovertebral angle tenderness [No Spinal Tenderness] : no spinal tenderness [No Joint Swelling] : no joint swelling seen [Normal Strength/Tone] : muscle strength and tone were normal [No Rash] : no rash [No Skin Lesions] : no skin lesions [Cranial Nerves Intact] : cranial nerves 2-12 were intact [No Motor Deficits] : the motor exam was normal [Oriented x3] : oriented to person, place, and time [Normal Affect] : the affect was normal [Normal Mood] : the mood was normal [Breast Exam Declined] : patient declined to have breast exam done [Normal Bowel Sounds] : normal bowel sounds [de-identified] : pleasant,  [de-identified] : trace edema [de-identified] : slow unstable gait off balance when getting up from sitting [de-identified] : Right knee redness and pain

## 2023-05-09 NOTE — COUNSELING
[Normal Weight - ( BMI  <25 )] : normal weight - ( BMI  <25 ) [DASH diet recommended] : DASH diet recommended [Non - Smoker] : non-smoker [Use assistive device to avoid falls] : use assistive device to avoid falls [Remove clutter and unsafe carpeting to avoid falls] : remove clutter and unsafe carpeting to avoid falls [] : foot exam [Minimize unnecessary interventions] : minimize unnecessary interventions [Maintain functional ability] : maintain functional ability [Discussed disease trajectory with patient/caregiver] : discussed disease trajectory with patient/caregiver [DNR] : Code Status: DNR [Limited] : Treatment Guidelines: Limited [DNI] : Intubation: DNI [[facility] Ver[facility] Date: _____] : [facility] Com[facility]/last verification date: [unfilled] [_____] : HCP: [unfilled]

## 2023-05-10 ENCOUNTER — LABORATORY RESULT (OUTPATIENT)
Age: 88
End: 2023-05-10

## 2023-05-15 ENCOUNTER — NON-APPOINTMENT (OUTPATIENT)
Age: 88
End: 2023-05-15

## 2023-05-16 ENCOUNTER — APPOINTMENT (OUTPATIENT)
Dept: HOME HEALTH SERVICES | Facility: HOME HEALTH | Age: 88
End: 2023-05-16

## 2023-05-30 ENCOUNTER — LABORATORY RESULT (OUTPATIENT)
Age: 88
End: 2023-05-30

## 2023-05-31 ENCOUNTER — NON-APPOINTMENT (OUTPATIENT)
Age: 88
End: 2023-05-31

## 2023-06-02 ENCOUNTER — NON-APPOINTMENT (OUTPATIENT)
Age: 88
End: 2023-06-02

## 2023-06-02 LAB — URATE SERPL-MCNC: 10.7 MG/DL

## 2023-06-14 ENCOUNTER — NON-APPOINTMENT (OUTPATIENT)
Age: 88
End: 2023-06-14

## 2023-06-14 LAB
TSH SERPL-ACNC: 2.19 UIU/ML
URATE SERPL-MCNC: 10.5 MG/DL

## 2023-06-28 ENCOUNTER — RX RENEWAL (OUTPATIENT)
Age: 88
End: 2023-06-28

## 2023-06-28 LAB — URATE SERPL-MCNC: 9.9 MG/DL

## 2023-06-30 ENCOUNTER — APPOINTMENT (OUTPATIENT)
Dept: HOME HEALTH SERVICES | Facility: HOME HEALTH | Age: 88
End: 2023-06-30
Payer: MEDICARE

## 2023-06-30 VITALS
SYSTOLIC BLOOD PRESSURE: 120 MMHG | RESPIRATION RATE: 16 BRPM | OXYGEN SATURATION: 98 % | HEART RATE: 78 BPM | DIASTOLIC BLOOD PRESSURE: 60 MMHG | TEMPERATURE: 97.8 F

## 2023-06-30 PROCEDURE — 99349 HOME/RES VST EST MOD MDM 40: CPT

## 2023-06-30 NOTE — CHRONIC CARE ASSESSMENT
[PPS Score: ____] : Palliative Performance Scale (PPS) Score: [unfilled] [Limited decision making ability] : limited decision making ability [de-identified] : n/a [de-identified] : adequate

## 2023-06-30 NOTE — COUNSELING
[Normal Weight - ( BMI  <25 )] : normal weight - ( BMI  <25 ) [DASH diet recommended] : DASH diet recommended [Non - Smoker] : non-smoker [Use assistive device to avoid falls] : use assistive device to avoid falls [Remove clutter and unsafe carpeting to avoid falls] : remove clutter and unsafe carpeting to avoid falls [] : foot exam [Minimize unnecessary interventions] : minimize unnecessary interventions [Maintain functional ability] : maintain functional ability [Discussed disease trajectory with patient/caregiver] : discussed disease trajectory with patient/caregiver [DNR] : Code Status: DNR [Limited] : Treatment Guidelines: Limited [DNI] : Intubation: DNI [[facility]: _____[facility]last verification date: [unfilled] [_____] : HCP: [unfilled]

## 2023-06-30 NOTE — PHYSICAL EXAM
[No Acute Distress] : no acute distress [Well Nourished] : well nourished [Normal Sclera/Conjunctiva] : normal sclera/conjunctiva [PERRL] : pupils equal, round and reactive to light [EOMI] : extra ocular movement intact [Normal Outer Ear/Nose] : the ears and nose were normal in appearance [Normal Oropharynx] : the oropharynx was normal [No JVD] : no jugular venous distention [Supple] : the neck was supple [No Respiratory Distress] : no respiratory distress [Clear to Auscultation] : lungs were clear to auscultation bilaterally [No Accessory Muscle Use] : no accessory muscle use [Normal Rate] : heart rate was normal  [Regular Rhythm] : with a regular rhythm [Normal S1, S2] : normal S1 and S2 [No Murmurs] : no murmurs heard [Breast Exam Declined] : patient declined to have breast exam done [Normal Bowel Sounds] : normal bowel sounds [Non Tender] : non-tender [Soft] : abdomen soft [Not Distended] : not distended [No CVA Tenderness] : no ~M costovertebral angle tenderness [No Spinal Tenderness] : no spinal tenderness [No Joint Swelling] : no joint swelling seen [Normal Strength/Tone] : muscle strength and tone were normal [No Rash] : no rash [No Skin Lesions] : no skin lesions [Cranial Nerves Intact] : cranial nerves 2-12 were intact [No Motor Deficits] : the motor exam was normal [Oriented x3] : oriented to person, place, and time [Normal Affect] : the affect was normal [Normal Mood] : the mood was normal [de-identified] : pleasant and cooperative during exam  [de-identified] : trace edema [de-identified] : slow unstable gait off balance when getting up from sitting, right knee pain

## 2023-06-30 NOTE — HISTORY OF PRESENT ILLNESS
[Patient is stable] : patient is stable [Patient] : patient [Family Member] : family member [FreeTextEntry1] : Gait instability [FreeTextEntry2] : RACHEL MERLINI is a 89 year female with PMH of diabetes type 2 x 30 years (uncontrolled) c/b neuropathy and retinopathy, CAD s/p MI w PCI/MARTA stent 2014, systolic CHF (EF ~23% 8/2017), Angina pectoris, HTN, HL, recurrent UTIs, gait instability, lung nodule, recurrent UTIs, admitted at 9/4/19.for fever 2/2 UTI and ischiorectal abscess bilateral s/p debridement, then again hospitalized for ischial pain on 12/2019, found to have recurrence of abscess with 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 Daughter called after visit. \par \par \par Interval Events\par - Right knee pain is improving. uric acid level tending down now 9.9. Increase Allopurinol to 100 mgs QOD, increase gabapentin 200 mg BID, Tylenol, Diclofenac gel\par \par \par Subjective:\par -Appetite/weight: appetite poor. Weight stable. no nutritional supplements \par -Gait/falls: unsteady gait, uses walker. \par -Pain: right knee pain\par -Sleep: sleep good.\par -BMs: regular. no straining. no hematochezia. \par -Urine: no concerns. continent. \par -Skin: intact. \par -DME: walker, wheelchair\par -Mood/memory: mood is ok. short term memory slightly starting to go. \par \par \par \par Specialists:\par Cardiology\par Endocrinology \par Ophthalmologist\par Podiatrist  \par \par Social hx: lives with daughter (Nancy Gonzales-stefanie), and grand son, independent with ADLs. Patient speaks Bengali, English and Portuguese. Patient born and grew up in [place], however, parents are from near [place].\par Caregivers: no HHA. Daughter in the house with her all day. \par MOLST: Last reviewed 3/29/2022. No MOLST for me to sign. They have to look for it. \par HCP: Nancy Stevens \par \par \par \par

## 2023-08-11 LAB — URATE SERPL-MCNC: 9.2 MG/DL

## 2023-08-16 ENCOUNTER — NON-APPOINTMENT (OUTPATIENT)
Age: 88
End: 2023-08-16

## 2023-08-17 ENCOUNTER — APPOINTMENT (OUTPATIENT)
Dept: HOME HEALTH SERVICES | Facility: HOME HEALTH | Age: 88
End: 2023-08-17

## 2023-08-18 ENCOUNTER — APPOINTMENT (OUTPATIENT)
Dept: HOME HEALTH SERVICES | Facility: HOME HEALTH | Age: 88
End: 2023-08-18
Payer: MEDICARE

## 2023-08-18 VITALS
SYSTOLIC BLOOD PRESSURE: 120 MMHG | HEART RATE: 60 BPM | OXYGEN SATURATION: 98 % | DIASTOLIC BLOOD PRESSURE: 70 MMHG | TEMPERATURE: 97.6 F | RESPIRATION RATE: 18 BRPM

## 2023-08-18 DIAGNOSIS — M10.9 GOUT, UNSPECIFIED: ICD-10-CM

## 2023-08-18 PROCEDURE — 99349 HOME/RES VST EST MOD MDM 40: CPT

## 2023-08-18 NOTE — REASON FOR VISIT
[Family Member] : family member [Pre-Visit Preparation] : pre-visit preparation was done [Intercurrent Specialty/Sub-specialty Visits] : the patient has intercurrent specialty/sub-specialty visits [Acute] : an acute visit [FreeTextEntry2] : medical records [FreeTextEntry3] : endocrinologist, cardiologist, opthalmologist

## 2023-08-18 NOTE — CHRONIC CARE ASSESSMENT
[PPS Score: ____] : Palliative Performance Scale (PPS) Score: [unfilled] [Limited decision making ability] : limited decision making ability [de-identified] : n/a [de-identified] : adequate

## 2023-08-18 NOTE — HISTORY OF PRESENT ILLNESS
[Patient is stable] : patient is stable [Patient] : patient [Family Member] : family member [FreeTextEntry1] : Gait instability [FreeTextEntry2] : RACHEL MERLINI is a 89 year female with PMH of diabetes type 2 x 30 years (uncontrolled) c/b neuropathy and retinopathy, CAD s/p MI w PCI/MARTA stent 2014, systolic CHF (EF ~23% 8/2017), Angina pectoris, HTN, HL, recurrent UTIs, gait instability, lung nodule, recurrent UTIs, admitted at 9/4/19.for fever 2/2 UTI and ischiorectal abscess bilateral s/p debridement, then again hospitalized for ischial pain on 12/2019, found to have recurrence of abscess with 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 Daughter present at visit.   Interval Event - bilateral leg edema - continue lasix 40 mgs, elevate legs, compression stockings, advised adhering to a low sodium diet - repeat bmp/uric acid  for right knee gout (daughter reports that knee pain has improved) on 8/23/2023  - 7/27/2023 uric Acid 9.2 - rash behind her left leg - continue triamcinolone cream, scab behind right knee continue mupirocin, few small water blisters on right lower leg - continue Lasix - Continue to f/u with Cardiologist Dr Michelle Hines    Subjective: -Appetite/weight: appetite fair. Weight stable. no nutritional supplements  -Gait/falls: unsteady gait, uses walker.  -Pain: right knee pain -Sleep: sleep good. -BMs: regular. no straining. no hematochezia.  -Urine: no concerns. continent.  -Skin: intact.  -DME: walker, wheelchair -Mood/memory: mood is ok. short term memory slightly starting to go.     Specialists: Cardiology Endocrinology  Ophthalmologist Podiatrist    Social hx: lives with daughter (Nancy Gonzales-na), and grand son, independent with ADLs. Patient speaks Burmese, English and Japanese. Patient born and grew up in [place], however, parents are from near [place]. Caregivers: no HHA. Daughter in the house with her all day.  MOLST: DNR, DNI, hospitalize, no feeding tube, trial of IV fluids, use antibiotics  HCP: Nancy Stevens

## 2023-08-18 NOTE — PHYSICAL EXAM
[No Acute Distress] : no acute distress [Well Nourished] : well nourished [Normal Sclera/Conjunctiva] : normal sclera/conjunctiva [PERRL] : pupils equal, round and reactive to light [EOMI] : extra ocular movement intact [Normal Outer Ear/Nose] : the ears and nose were normal in appearance [Normal Oropharynx] : the oropharynx was normal [No JVD] : no jugular venous distention [Supple] : the neck was supple [No Respiratory Distress] : no respiratory distress [Clear to Auscultation] : lungs were clear to auscultation bilaterally [No Accessory Muscle Use] : no accessory muscle use [Normal Rate] : heart rate was normal  [Regular Rhythm] : with a regular rhythm [Normal S1, S2] : normal S1 and S2 [No Murmurs] : no murmurs heard [Breast Exam Declined] : patient declined to have breast exam done [Normal Bowel Sounds] : normal bowel sounds [Non Tender] : non-tender [Soft] : abdomen soft [Not Distended] : not distended [No CVA Tenderness] : no ~M costovertebral angle tenderness [No Spinal Tenderness] : no spinal tenderness [No Joint Swelling] : no joint swelling seen [Normal Strength/Tone] : muscle strength and tone were normal [Cranial Nerves Intact] : cranial nerves 2-12 were intact [No Motor Deficits] : the motor exam was normal [Oriented x3] : oriented to person, place, and time [Normal Affect] : the affect was normal [Normal Mood] : the mood was normal [Patient Refused] : rectal exam was refused by the patient [de-identified] : pleasant and cooperative during exam  [de-identified] : bilateral lower leg edema [de-identified] : slow unstable gait off balance when getting up from sitting, right knee pain  [de-identified] : rash behind her left leg - continue triamcinolone cream, scab behind right knee continue mupirocin, few small water blisters on right lower leg - continue Lasix

## 2023-08-18 NOTE — COUNSELING
[Normal Weight - ( BMI  <25 )] : normal weight - ( BMI  <25 ) [DASH diet recommended] : DASH diet recommended [Non - Smoker] : non-smoker [Use assistive device to avoid falls] : use assistive device to avoid falls [Remove clutter and unsafe carpeting to avoid falls] : remove clutter and unsafe carpeting to avoid falls [] : foot exam [Minimize unnecessary interventions] : minimize unnecessary interventions [Maintain functional ability] : maintain functional ability [Discussed disease trajectory with patient/caregiver] : discussed disease trajectory with patient/caregiver [DNR] : Code Status: DNR [Limited] : Treatment Guidelines: Limited [DNI] : Intubation: DNI [[facility] Date: _____] : [facility]/last verification date: [unfilled] [_____] : HCP: [unfilled]

## 2023-08-25 ENCOUNTER — NON-APPOINTMENT (OUTPATIENT)
Age: 88
End: 2023-08-25

## 2023-08-25 LAB
ANION GAP SERPL CALC-SCNC: 14 MMOL/L
BUN SERPL-MCNC: 60 MG/DL
CALCIUM SERPL-MCNC: 9.6 MG/DL
CHLORIDE SERPL-SCNC: 104 MMOL/L
CO2 SERPL-SCNC: 26 MMOL/L
CREAT SERPL-MCNC: 2.28 MG/DL
EGFR: 20 ML/MIN/1.73M2
GLUCOSE SERPL-MCNC: 151 MG/DL
POTASSIUM SERPL-SCNC: 4.2 MMOL/L
SODIUM SERPL-SCNC: 145 MMOL/L
URATE SERPL-MCNC: 9.9 MG/DL

## 2023-09-14 ENCOUNTER — APPOINTMENT (OUTPATIENT)
Dept: HOME HEALTH SERVICES | Facility: HOME HEALTH | Age: 88
End: 2023-09-14
Payer: MEDICARE

## 2023-09-14 DIAGNOSIS — E78.49 OTHER HYPERLIPIDEMIA: ICD-10-CM

## 2023-09-14 PROCEDURE — 99349 HOME/RES VST EST MOD MDM 40: CPT

## 2023-12-11 ENCOUNTER — TRANSCRIPTION ENCOUNTER (OUTPATIENT)
Age: 88
End: 2023-12-11

## 2023-12-12 ENCOUNTER — LABORATORY RESULT (OUTPATIENT)
Age: 88
End: 2023-12-12

## 2023-12-13 LAB
APPEARANCE: ABNORMAL
BILIRUBIN URINE: NEGATIVE
BLOOD URINE: ABNORMAL
COLOR: YELLOW
GLUCOSE QUALITATIVE U: NEGATIVE MG/DL
KETONES URINE: NEGATIVE MG/DL
LEUKOCYTE ESTERASE URINE: ABNORMAL
NITRITE URINE: NEGATIVE
PH URINE: 6.5
PROTEIN URINE: 30 MG/DL
SPECIFIC GRAVITY URINE: 1.01
UROBILINOGEN URINE: 0.2 MG/DL

## 2023-12-15 ENCOUNTER — TRANSCRIPTION ENCOUNTER (OUTPATIENT)
Age: 88
End: 2023-12-15

## 2024-01-30 NOTE — PROGRESS NOTE ADULT - PROBLEM SELECTOR PLAN 3
- Holding spironolactone and lasix as patient was hypotensive  will resume when stable  Card eval
Sent urine tests  daily BMP
possibly due to dehydration, sepsis vs drug reaction   IV hydration  renal eval appreciated   avoid nephrotoxic medications   follow up renal recs  renal retention  infante placed
possibly due to dehydration, sepsis vs drug readtion   IV hydration  renal eval appreciated   avoid nephrotoxic medications   follow up renal recs
resolved  possibly due to dehydration, sepsis vs drug reaction   IV hydration  renal eval appreciated   avoid nephrotoxic medications   follow up renal recs  renal retention  lasix for LE edema, on IV, ca change to oral on discharge
resolved  possibly due to dehydration, sepsis vs drug reaction   IV hydration  renal eval appreciated   avoid nephrotoxic medications   follow up renal recs  renal retention  oral lasix daily
urinary obstruction likely contributing  monitor na
urinary obstruction likely contributing  on nacl  monitor na
urinary obstruction likely contributing  resolved  monitor na
urinary obstuction likely contributing  on nacl  monitor na  check urine osm and urine lytes
urinary obstuction likely contributing  on nacl  monitor na  check urine osm and urine lytes
OR CARLOS CASTILLO

## 2024-02-01 ENCOUNTER — NON-APPOINTMENT (OUTPATIENT)
Age: 89
End: 2024-02-01

## 2024-02-08 ENCOUNTER — NON-APPOINTMENT (OUTPATIENT)
Age: 89
End: 2024-02-08

## 2024-02-12 ENCOUNTER — APPOINTMENT (OUTPATIENT)
Dept: HOME HEALTH SERVICES | Facility: HOME HEALTH | Age: 89
End: 2024-02-12

## 2024-02-12 ENCOUNTER — NON-APPOINTMENT (OUTPATIENT)
Age: 89
End: 2024-02-12

## 2024-02-12 VITALS
HEART RATE: 62 BPM | OXYGEN SATURATION: 98 % | SYSTOLIC BLOOD PRESSURE: 120 MMHG | RESPIRATION RATE: 18 BRPM | DIASTOLIC BLOOD PRESSURE: 75 MMHG | TEMPERATURE: 97.5 F

## 2024-02-12 DIAGNOSIS — T14.8XXA OTHER INJURY OF UNSPECIFIED BODY REGION, INITIAL ENCOUNTER: ICD-10-CM

## 2024-02-14 NOTE — COUNSELING
[Normal Weight - ( BMI  <25 )] : normal weight - ( BMI  <25 ) [DASH diet recommended] : DASH diet recommended [Non - Smoker] : non-smoker [Use assistive device to avoid falls] : use assistive device to avoid falls [Remove clutter and unsafe carpeting to avoid falls] : remove clutter and unsafe carpeting to avoid falls [] : foot exam [Minimize unnecessary interventions] : minimize unnecessary interventions [Maintain functional ability] : maintain functional ability [Discussed disease trajectory with patient/caregiver] : discussed disease trajectory with patient/caregiver [DNR] : Code Status: DNR [Limited] : Treatment Guidelines: Limited [DNI] : Intubation: DNI [[facility] Date: _____] [facility]/last verification date: [unfilled] [_____] : HCP: [unfilled]

## 2024-02-15 ENCOUNTER — APPOINTMENT (OUTPATIENT)
Dept: HOME HEALTH SERVICES | Facility: HOME HEALTH | Age: 89
End: 2024-02-15
Payer: MEDICARE

## 2024-02-15 VITALS
OXYGEN SATURATION: 98 % | RESPIRATION RATE: 16 BRPM | HEART RATE: 78 BPM | SYSTOLIC BLOOD PRESSURE: 120 MMHG | TEMPERATURE: 97.8 F | DIASTOLIC BLOOD PRESSURE: 80 MMHG

## 2024-02-15 DIAGNOSIS — E11.22 TYPE 2 DIABETES MELLITUS WITH DIABETIC CHRONIC KIDNEY DISEASE: ICD-10-CM

## 2024-02-15 DIAGNOSIS — I50.22 CHRONIC SYSTOLIC (CONGESTIVE) HEART FAILURE: ICD-10-CM

## 2024-02-15 DIAGNOSIS — I25.118 ATHEROSCLEROTIC HEART DISEASE OF NATIVE CORONARY ARTERY WITH OTHER FORMS OF ANGINA PECTORIS: ICD-10-CM

## 2024-02-15 DIAGNOSIS — N18.30 TYPE 2 DIABETES MELLITUS WITH DIABETIC CHRONIC KIDNEY DISEASE: ICD-10-CM

## 2024-02-15 DIAGNOSIS — I70.0 ATHEROSCLEROSIS OF AORTA: ICD-10-CM

## 2024-02-15 PROCEDURE — 99349 HOME/RES VST EST MOD MDM 40: CPT

## 2024-02-15 NOTE — REVIEW OF SYSTEMS
[Itching] : Itching [Skin Rash] : skin rash [Negative] : Heme/Lymph [de-identified] : to bilateral lower legs

## 2024-02-15 NOTE — REASON FOR VISIT
[Follow-Up] : a follow-up visit [Family Member] : family member [Pre-Visit Preparation] : pre-visit preparation was done [Intercurrent Specialty/Sub-specialty Visits] : the patient has intercurrent specialty/sub-specialty visits [Acute] : an acute visit [FreeTextEntry2] : medical records [FreeTextEntry3] : endocrinologist, cardiologist, opthalmologist

## 2024-02-15 NOTE — CHRONIC CARE ASSESSMENT
[Limited decision making ability] : limited decision making ability [PPS Score: ____] : Palliative Performance Scale (PPS) Score: [unfilled] [de-identified] : n/a [de-identified] : adequate

## 2024-02-15 NOTE — PHYSICAL EXAM
[No Acute Distress] : no acute distress [Well Nourished] : well nourished [Normal Sclera/Conjunctiva] : normal sclera/conjunctiva [PERRL] : pupils equal, round and reactive to light [EOMI] : extra ocular movement intact [Normal Outer Ear/Nose] : the ears and nose were normal in appearance [Normal Oropharynx] : the oropharynx was normal [No JVD] : no jugular venous distention [Supple] : the neck was supple [No Respiratory Distress] : no respiratory distress [Clear to Auscultation] : lungs were clear to auscultation bilaterally [No Accessory Muscle Use] : no accessory muscle use [Normal Rate] : heart rate was normal  [Regular Rhythm] : with a regular rhythm [Normal S1, S2] : normal S1 and S2 [No Murmurs] : no murmurs heard [Breast Exam Declined] : patient declined to have breast exam done [Normal Bowel Sounds] : normal bowel sounds [Non Tender] : non-tender [Soft] : abdomen soft [Not Distended] : not distended [Patient Refused] : rectal exam was refused by the patient [No CVA Tenderness] : no ~M costovertebral angle tenderness [No Spinal Tenderness] : no spinal tenderness [No Joint Swelling] : no joint swelling seen [Normal Strength/Tone] : muscle strength and tone were normal [Cranial Nerves Intact] : cranial nerves 2-12 were intact [No Motor Deficits] : the motor exam was normal [Oriented x3] : oriented to person, place, and time [Normal Affect] : the affect was normal [Normal Mood] : the mood was normal [de-identified] : pleasant and cooperative during exam  [de-identified] : bilateral lower leg edema [de-identified] : slow unstable gait off balance when getting up from sitting, right knee pain  [de-identified] :  Posterior Left Lower extremity - 2.5 X 1.5 X 0.3 pressure ulcer, Anterior left shin skin tear 1 inch x 0.5 inch  -  RLE with bilateral scabs

## 2024-02-15 NOTE — HISTORY OF PRESENT ILLNESS
[Patient is stable] : patient is stable [Patient] : patient [Family Member] : family member [FreeTextEntry1] : Gait instability [FreeTextEntry2] : RACHEL MERLINI is a 89 year female with PMH of diabetes type 2 x 30 years (uncontrolled) c/b neuropathy and retinopathy, CAD s/p MI w PCI/MARTA stent 2014, systolic CHF (EF ~23% 8/2017), Angina pectoris, HTN, HL, recurrent UTIs, gait instability, lung nodule, recurrent UTIs, admitted at 9/4/19.for fever 2/2 UTI and ischiorectal abscess bilateral s/p debridement, then again hospitalized for ischial pain on 12/2019, found to have recurrence of abscess with 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 Daughter present at visit.   Interval Event - Posterior Left Lower extremity - 2.5 X 1.5 X 0.3 pressure ulcer - Cleanse with NS, Santyl, telfa gauze, apply gurdeep wrap; s/p kelfex - Anterior left shin skin tear 1 inch x 0.5 inch  -  cleanse with ns, pat dry, apply mupirocin, telfa gauze, apply gurdeep wrap - RLE Anterior and Posterior with multiple scabs from water blisters - dried open to air - Requested wound care referral - Pt s/p Cardiologist with Dr Michelle Hines - No changes - pt lower extremities with dry skin - Ammonium Lactate   Subjective: -Appetite/weight: appetite fair. Weight stable. no nutritional supplements  -Gait/falls: unsteady gait, uses walker.  -Pain: right knee pain -Sleep: sleep good. -BMs: regular. no straining. no hematochezia.  -Urine: no concerns. continent.  -DME: walker, wheelchair -Mood/memory: mood is ok. short term memory slightly starting to go.     Specialists: Cardiology Endocrinology  Ophthalmologist Podiatrist    Social hx: lives with daughter (Nancy Gonzales-stefanie), and grand son, independent with ADLs. Patient speaks Latvian, English and Syriac. Patient born and grew up in [place], however, parents are from near [place]. Caregivers: no HHA. Daughter in the house with her all day.  MOLST: DNR, DNI, hospitalize, no feeding tube, trial of IV fluids, use antibiotics  HCP: Nancy Stevens

## 2024-02-29 DIAGNOSIS — Z00.00 ENCOUNTER FOR GENERAL ADULT MEDICAL EXAMINATION W/OUT ABNORMAL FINDINGS: ICD-10-CM

## 2024-03-19 ENCOUNTER — APPOINTMENT (OUTPATIENT)
Dept: HOME HEALTH SERVICES | Facility: HOME HEALTH | Age: 89
End: 2024-03-19
Payer: MEDICARE

## 2024-03-19 VITALS
RESPIRATION RATE: 18 BRPM | HEART RATE: 66 BPM | OXYGEN SATURATION: 99 % | SYSTOLIC BLOOD PRESSURE: 120 MMHG | TEMPERATURE: 97.5 F | DIASTOLIC BLOOD PRESSURE: 60 MMHG

## 2024-03-19 DIAGNOSIS — I10 ESSENTIAL (PRIMARY) HYPERTENSION: ICD-10-CM

## 2024-03-19 DIAGNOSIS — E11.51 TYPE 2 DIABETES MELLITUS WITH DIABETIC PERIPHERAL ANGIOPATHY W/OUT GANGRENE: ICD-10-CM

## 2024-03-19 DIAGNOSIS — Z87.898 PERSONAL HISTORY OF OTHER SPECIFIED CONDITIONS: ICD-10-CM

## 2024-03-19 DIAGNOSIS — I70.209 TYPE 2 DIABETES MELLITUS WITH DIABETIC PERIPHERAL ANGIOPATHY W/OUT GANGRENE: ICD-10-CM

## 2024-03-19 DIAGNOSIS — R07.89 OTHER CHEST PAIN: ICD-10-CM

## 2024-03-19 PROCEDURE — 99349 HOME/RES VST EST MOD MDM 40: CPT

## 2024-03-19 NOTE — PHYSICAL EXAM
[No Acute Distress] : no acute distress [Well Nourished] : well nourished [Normal Sclera/Conjunctiva] : normal sclera/conjunctiva [PERRL] : pupils equal, round and reactive to light [Normal Outer Ear/Nose] : the ears and nose were normal in appearance [EOMI] : extra ocular movement intact [Normal Oropharynx] : the oropharynx was normal [No JVD] : no jugular venous distention [Supple] : the neck was supple [No Respiratory Distress] : no respiratory distress [Clear to Auscultation] : lungs were clear to auscultation bilaterally [No Accessory Muscle Use] : no accessory muscle use [Normal Rate] : heart rate was normal  [Normal S1, S2] : normal S1 and S2 [Regular Rhythm] : with a regular rhythm [No Murmurs] : no murmurs heard [Breast Exam Declined] : patient declined to have breast exam done [Non Tender] : non-tender [Normal Bowel Sounds] : normal bowel sounds [Soft] : abdomen soft [Not Distended] : not distended [Patient Refused] : rectal exam was refused by the patient [No CVA Tenderness] : no ~M costovertebral angle tenderness [No Spinal Tenderness] : no spinal tenderness [No Joint Swelling] : no joint swelling seen [Cranial Nerves Intact] : cranial nerves 2-12 were intact [Normal Strength/Tone] : muscle strength and tone were normal [Oriented x3] : oriented to person, place, and time [No Motor Deficits] : the motor exam was normal [Normal Affect] : the affect was normal [Normal Mood] : the mood was normal [de-identified] : pleasant and cooperative during exam  [de-identified] : slow unstable gait off balance when getting up from sitting, right knee pain  [de-identified] : bilateral lower leg edema [de-identified] : Posterior Left Lower extremity - 2 X 1 inch pressure ulcer. RLE and LLE Anterior and Posterior with multiple scabs from water blisters - dried open to air

## 2024-03-19 NOTE — HISTORY OF PRESENT ILLNESS
[Patient is stable] : patient is stable [Patient] : patient [Family Member] : family member [FreeTextEntry1] : Gait instability [FreeTextEntry2] : RACHEL MERLINI is a 89 year female with PMH of diabetes type 2 x 30 years (uncontrolled) c/b neuropathy and retinopathy, CAD s/p MI w PCI/MARTA stent 2014, systolic CHF (EF ~23% 8/2017), Angina pectoris, HTN, HL, recurrent UTIs, gait instability, lung nodule, recurrent UTIs, admitted at 9/4/19.for fever 2/2 UTI and ischiorectal abscess bilateral s/p debridement, then again hospitalized for ischial pain on 12/2019, found to have recurrence of abscess with 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 Daughter present at visit.   Interval Events  - Posterior Left Lower extremity - 2 X 1 inch pressure ulcer - Continue to cleanse with NS, pat dry, Santyl, telfa gauze, apply John wrap - RLE and LLE Anterior and Posterior with multiple scabs from water blisters - dried open to air  - c/w Ammonium Lactate - Will order John wrap  - Change care manager to nursing  - April 3rd will f/u with wound care MD - [facility] comes 2 x a week - Continue to f/u with Cardiologist with Dr Michelle Hines    Subjective: -Appetite/weight: appetite fair. Weight stable. no nutritional supplements  -Gait/falls: unsteady gait, uses walker.  -Pain: right knee chronic pain -Sleep: sleep good. -BMs: regular. no straining. no hematochezia.  -Urine: no concerns. continent.  -DME: walker, wheelchair -Mood/memory: mood is ok. short term memory slightly starting to go.     Specialists: Cardiology Endocrinology  Ophthalmologist Podiatrist    Social hx: lives with daughter (Nancy Gonzales-na), and grand son, independent with ADLs. Patient speaks Cymro, English and Uzbek. Patient born and grew up in [place], however, parents are from near [place]. Caregivers: no HHA. Daughter in the house with her all day.  MOLST: DNR, DNI, hospitalize, no feeding tube, trial of IV fluids, use antibiotics  HCP: Nancy Stevens

## 2024-03-19 NOTE — REASON FOR VISIT
[Follow-Up] : a follow-up visit [Family Member] : family member [Pre-Visit Preparation] : pre-visit preparation was done [Intercurrent Specialty/Sub-specialty Visits] : the patient has intercurrent specialty/sub-specialty visits [FreeTextEntry3] : endocrinologist, cardiologist, opthalmologist [FreeTextEntry2] : medical records

## 2024-04-17 ENCOUNTER — NON-APPOINTMENT (OUTPATIENT)
Age: 89
End: 2024-04-17

## 2024-04-22 ENCOUNTER — APPOINTMENT (OUTPATIENT)
Dept: HOME HEALTH SERVICES | Facility: HOME HEALTH | Age: 89
End: 2024-04-22

## 2024-04-22 VITALS
RESPIRATION RATE: 18 BRPM | DIASTOLIC BLOOD PRESSURE: 68 MMHG | SYSTOLIC BLOOD PRESSURE: 108 MMHG | OXYGEN SATURATION: 99 % | HEART RATE: 62 BPM

## 2024-04-22 RX ORDER — PANTOPRAZOLE 40 MG/1
40 TABLET, DELAYED RELEASE ORAL
Qty: 90 | Refills: 2 | Status: ACTIVE | COMMUNITY
Start: 2022-02-08

## 2024-04-22 RX ORDER — TRIAMCINOLONE ACETONIDE 1 MG/G
0.1 CREAM TOPICAL 3 TIMES DAILY
Qty: 1 | Refills: 3 | Status: ACTIVE | COMMUNITY
Start: 2023-08-17

## 2024-04-22 RX ORDER — ACETAMINOPHEN 500 MG/1
500 TABLET, COATED ORAL EVERY 6 HOURS
Qty: 240 | Refills: 0 | Status: ACTIVE | COMMUNITY
Start: 2021-01-11

## 2024-04-22 RX ORDER — DICLOFENAC SODIUM 1% 10 MG/G
1 GEL TOPICAL DAILY
Qty: 1 | Refills: 1 | Status: ACTIVE | COMMUNITY
Start: 2022-12-21

## 2024-04-22 RX ORDER — HYDROCORTISONE 1 %
12 CREAM (GRAM) TOPICAL TWICE DAILY
Qty: 1 | Refills: 3 | Status: ACTIVE | COMMUNITY
Start: 2018-03-09

## 2024-04-22 RX ORDER — COLCHICINE 0.6 MG/1
0.6 CAPSULE ORAL
Qty: 5 | Refills: 0 | Status: ACTIVE | COMMUNITY
Start: 2023-04-28

## 2024-04-22 RX ORDER — ALLOPURINOL 100 MG/1
100 TABLET ORAL
Qty: 90 | Refills: 3 | Status: ACTIVE | COMMUNITY
Start: 2023-05-07

## 2024-04-22 RX ORDER — SACUBITRIL AND VALSARTAN 24; 26 MG/1; MG/1
24-26 TABLET, FILM COATED ORAL TWICE DAILY
Qty: 180 | Refills: 3 | Status: ACTIVE | COMMUNITY
Start: 2020-12-03

## 2024-04-22 RX ORDER — INSULIN LISPRO 200 [IU]/ML
200 INJECTION, SOLUTION SUBCUTANEOUS
Qty: 1 | Refills: 3 | Status: ACTIVE | COMMUNITY
Start: 2020-08-27

## 2024-04-22 RX ORDER — FAMOTIDINE 20 MG/1
20 TABLET, FILM COATED ORAL
Qty: 90 | Refills: 3 | Status: ACTIVE | COMMUNITY
Start: 2019-11-08

## 2024-04-22 RX ORDER — GABAPENTIN 100 MG/1
100 CAPSULE ORAL
Qty: 120 | Refills: 3 | Status: ACTIVE | COMMUNITY
Start: 2019-11-08

## 2024-04-22 RX ORDER — MUPIROCIN 20 MG/G
2 OINTMENT TOPICAL 3 TIMES DAILY
Qty: 2 | Refills: 1 | Status: ACTIVE | COMMUNITY

## 2024-04-22 RX ORDER — ATORVASTATIN CALCIUM 20 MG/1
20 TABLET, FILM COATED ORAL
Qty: 90 | Refills: 3 | Status: ACTIVE | COMMUNITY
Start: 2020-03-23

## 2024-04-22 RX ORDER — METOLAZONE 5 MG/1
5 TABLET ORAL DAILY
Refills: 0 | Status: ACTIVE | COMMUNITY
Start: 2023-09-14

## 2024-04-22 RX ORDER — COLLAGENASE SANTYL 250 [ARB'U]/G
250 OINTMENT TOPICAL
Qty: 1 | Refills: 3 | Status: ACTIVE | COMMUNITY
Start: 2024-02-12

## 2024-04-22 RX ORDER — TORSEMIDE 20 MG/1
20 TABLET ORAL
Refills: 0 | Status: ACTIVE | COMMUNITY
Start: 2022-06-24

## 2024-04-22 RX ORDER — METOPROLOL TARTRATE 25 MG/1
25 TABLET, FILM COATED ORAL TWICE DAILY
Qty: 180 | Refills: 1 | Status: ACTIVE | COMMUNITY
Start: 2017-06-16

## 2024-05-02 ENCOUNTER — RX RENEWAL (OUTPATIENT)
Age: 89
End: 2024-05-02

## 2024-05-02 RX ORDER — INSULIN GLARGINE 100 [IU]/ML
100 INJECTION, SOLUTION SUBCUTANEOUS
Qty: 5 | Refills: 3 | Status: ACTIVE | COMMUNITY
Start: 2017-06-16 | End: 1900-01-01

## 2024-05-07 ENCOUNTER — NON-APPOINTMENT (OUTPATIENT)
Age: 89
End: 2024-05-07

## 2024-05-07 ENCOUNTER — TRANSCRIPTION ENCOUNTER (OUTPATIENT)
Age: 89
End: 2024-05-07

## 2024-05-07 RX ORDER — PREDNISONE 20 MG/1
20 TABLET ORAL DAILY
Qty: 5 | Refills: 0 | Status: DISCONTINUED | COMMUNITY
Start: 2023-04-28 | End: 2024-05-07

## 2024-05-07 RX ORDER — CIPROFLOXACIN HYDROCHLORIDE 250 MG/1
250 TABLET, FILM COATED ORAL
Qty: 14 | Refills: 1 | Status: DISCONTINUED | COMMUNITY
Start: 2019-11-14 | End: 2024-05-07

## 2024-05-07 RX ORDER — CEPHALEXIN 500 MG/1
500 CAPSULE ORAL
Qty: 10 | Refills: 0 | Status: DISCONTINUED | COMMUNITY
Start: 2023-04-28 | End: 2024-05-07

## 2024-05-08 ENCOUNTER — NON-APPOINTMENT (OUTPATIENT)
Age: 89
End: 2024-05-08

## 2024-05-08 RX ORDER — AZITHROMYCIN 250 MG/1
250 TABLET, FILM COATED ORAL
Qty: 1 | Refills: 0 | Status: ACTIVE | COMMUNITY
Start: 2024-05-08 | End: 1900-01-01

## 2024-05-08 RX ORDER — GUAIFENESIN 400 MG/20ML
400 SOLUTION ORAL EVERY 4 HOURS
Qty: 1 | Refills: 0 | Status: ACTIVE | COMMUNITY
Start: 2024-05-08 | End: 1900-01-01

## 2024-05-09 ENCOUNTER — NON-APPOINTMENT (OUTPATIENT)
Age: 89
End: 2024-05-09

## 2024-05-09 LAB
INFLUENZA A RESULT: NOT DETECTED
INFLUENZA B RESULT: NOT DETECTED
RESP SYN VIRUS RESULT: NOT DETECTED
SARS-COV-2 RESULT: NOT DETECTED

## 2024-05-10 ENCOUNTER — APPOINTMENT (OUTPATIENT)
Dept: HOME HEALTH SERVICES | Facility: HOME HEALTH | Age: 89
End: 2024-05-10
Payer: MEDICARE

## 2024-05-10 VITALS
TEMPERATURE: 98 F | OXYGEN SATURATION: 97 % | RESPIRATION RATE: 18 BRPM | DIASTOLIC BLOOD PRESSURE: 70 MMHG | SYSTOLIC BLOOD PRESSURE: 120 MMHG | HEART RATE: 78 BPM

## 2024-05-10 DIAGNOSIS — Z23 ENCOUNTER FOR IMMUNIZATION: ICD-10-CM

## 2024-05-10 DIAGNOSIS — E11.40 TYPE 2 DIABETES MELLITUS WITH DIABETIC NEUROPATHY, UNSPECIFIED: ICD-10-CM

## 2024-05-10 DIAGNOSIS — R05.1 ACUTE COUGH: ICD-10-CM

## 2024-05-10 DIAGNOSIS — E11.319 TYPE 2 DIABETES MELLITUS WITH UNSPECIFIED DIABETIC RETINOPATHY W/OUT MACULAR EDEMA: ICD-10-CM

## 2024-05-10 DIAGNOSIS — Z79.4 TYPE 2 DIABETES MELLITUS WITH DIABETIC NEUROPATHY, UNSPECIFIED: ICD-10-CM

## 2024-05-10 PROCEDURE — 99349 HOME/RES VST EST MOD MDM 40: CPT

## 2024-05-10 RX ORDER — AZITHROMYCIN 500 MG/1
500 TABLET, FILM COATED ORAL DAILY
Qty: 4 | Refills: 0 | Status: ACTIVE | COMMUNITY
Start: 2024-05-10 | End: 1900-01-01

## 2024-05-10 RX ORDER — PREDNISONE 5 MG/1
5 TABLET ORAL DAILY
Qty: 5 | Refills: 0 | Status: ACTIVE | COMMUNITY
Start: 2024-05-10 | End: 1900-01-01

## 2024-05-10 NOTE — HISTORY OF PRESENT ILLNESS
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 Daughter present at visit.   Interval Events  - Pt with persistent cough with jyoti mucous - start Azithromycin 500 mgs daily, Prednisone 5 mgs daily x 5 days, c/w Mucinex - Pt with ongoing difficulty seeing through right eye has been getting laser treatment secondary to retinopathy by retinal specialist, will f/u next Wednesday - Dr Joe Lewis - debrided wound on posterior left lower leg las week, will f/u June 5th - [facility] comes 2 x a week - Continue to f/u with Cardiologist with Dr Michelle Hnies    Subjective: -Appetite/weight: appetite fair. Weight stable. no nutritional supplements  -Gait/falls: unsteady gait, uses walker.  -Pain: right knee chronic pain -Sleep: sleep good. -BMs: regular. no straining. no hematochezia.  -Urine: no concerns. continent.  -DME: walker, wheelchair -Mood/memory: mood is ok. short term memory slightly starting to go.     Specialists: Cardiology Endocrinology  Ophthalmologist Podiatrist    Social hx: lives with daughter (Nancy - Christian-na), and grand son, independent with ADLs. Patient speaks Indonesian, English and Gibraltarian. Patient born and grew up in [place], however, parents are from near [place]. Caregivers: no HHA. Daughter in the house with her all day.  MOLST: DNR, DNI, hospitalize, no feeding tube, trial of IV fluids, use antibiotics  HCP: Nancy Stevens

## 2024-05-10 NOTE — PHYSICAL EXAM
[de-identified] : bilateral lower leg edema [de-identified] : pleasant and cooperative during exam  [de-identified] : slow unstable gait off balance when getting up from sitting, right knee pain  [de-identified] : Posterior Left Lower extremity with healing ulcer. RLE and LLE Anterior and Posterior with multiple scabs from water blisters - dried open to air

## 2024-05-13 ENCOUNTER — NON-APPOINTMENT (OUTPATIENT)
Age: 89
End: 2024-05-13

## 2024-05-13 LAB
ALBUMIN SERPL ELPH-MCNC: 3.8 G/DL
ALP BLD-CCNC: 189 U/L
ALT SERPL-CCNC: 24 U/L
ANION GAP SERPL CALC-SCNC: 17 MMOL/L
AST SERPL-CCNC: 26 U/L
BILIRUB SERPL-MCNC: 0.7 MG/DL
BUN SERPL-MCNC: 110 MG/DL
CALCIUM SERPL-MCNC: 9 MG/DL
CHLORIDE SERPL-SCNC: 91 MMOL/L
CO2 SERPL-SCNC: 22 MMOL/L
CREAT SERPL-MCNC: 3.72 MG/DL
EGFR: 11 ML/MIN/1.73M2
GLUCOSE SERPL-MCNC: 346 MG/DL
HCT VFR BLD CALC: 33.1 %
HGB BLD-MCNC: 11.1 G/DL
MCHC RBC-ENTMCNC: 31.2 PG
MCHC RBC-ENTMCNC: 33.5 GM/DL
MCV RBC AUTO: 93 FL
PLATELET # BLD AUTO: 111 K/UL
POTASSIUM SERPL-SCNC: 4 MMOL/L
PROT SERPL-MCNC: 6.4 G/DL
RBC # BLD: 3.56 M/UL
RBC # FLD: 14.4 %
SODIUM SERPL-SCNC: 131 MMOL/L
WBC # FLD AUTO: 8.73 K/UL

## 2024-05-22 ENCOUNTER — NON-APPOINTMENT (OUTPATIENT)
Age: 89
End: 2024-05-22

## 2024-05-22 LAB
ANION GAP SERPL CALC-SCNC: 14 MMOL/L
BUN SERPL-MCNC: 74 MG/DL
CALCIUM SERPL-MCNC: 9.5 MG/DL
CHLORIDE SERPL-SCNC: 101 MMOL/L
CO2 SERPL-SCNC: 26 MMOL/L
CREAT SERPL-MCNC: 2.17 MG/DL
EGFR: 21 ML/MIN/1.73M2
GLUCOSE SERPL-MCNC: 191 MG/DL
POTASSIUM SERPL-SCNC: 4.5 MMOL/L
SODIUM SERPL-SCNC: 141 MMOL/L

## 2024-06-10 ENCOUNTER — RX RENEWAL (OUTPATIENT)
Age: 89
End: 2024-06-10

## 2024-06-10 RX ORDER — CLOPIDOGREL BISULFATE 75 MG/1
75 TABLET, FILM COATED ORAL
Qty: 90 | Refills: 2 | Status: ACTIVE | COMMUNITY
Start: 2017-06-16 | End: 1900-01-01

## 2024-06-28 ENCOUNTER — RX RENEWAL (OUTPATIENT)
Age: 89
End: 2024-06-28
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
## 2024-08-29 ENCOUNTER — NON-APPOINTMENT (OUTPATIENT)
Age: 89
End: 2024-08-29

## 2024-09-03 ENCOUNTER — NON-APPOINTMENT (OUTPATIENT)
Age: 89
End: 2024-09-03

## 2024-09-11 ENCOUNTER — APPOINTMENT (OUTPATIENT)
Dept: HOME HEALTH SERVICES | Facility: HOME HEALTH | Age: 89
End: 2024-09-11
Payer: MEDICARE

## 2024-09-11 VITALS
TEMPERATURE: 97.4 F | OXYGEN SATURATION: 98 % | DIASTOLIC BLOOD PRESSURE: 70 MMHG | SYSTOLIC BLOOD PRESSURE: 120 MMHG | HEART RATE: 60 BPM | RESPIRATION RATE: 16 BRPM

## 2024-09-11 DIAGNOSIS — I50.22 CHRONIC SYSTOLIC (CONGESTIVE) HEART FAILURE: ICD-10-CM

## 2024-09-11 DIAGNOSIS — E11.51 TYPE 2 DIABETES MELLITUS WITH DIABETIC PERIPHERAL ANGIOPATHY W/OUT GANGRENE: ICD-10-CM

## 2024-09-11 DIAGNOSIS — R05.1 ACUTE COUGH: ICD-10-CM

## 2024-09-11 DIAGNOSIS — I70.209 TYPE 2 DIABETES MELLITUS WITH DIABETIC PERIPHERAL ANGIOPATHY W/OUT GANGRENE: ICD-10-CM

## 2024-09-11 PROCEDURE — 99349 HOME/RES VST EST MOD MDM 40: CPT

## 2024-09-11 NOTE — COUNSELING
[Normal Weight - ( BMI  <25 )] : normal weight - ( BMI  <25 ) [DASH diet recommended] : DASH diet recommended [Non - Smoker] : non-smoker [Use assistive device to avoid falls] : use assistive device to avoid falls [Remove clutter and unsafe carpeting to avoid falls] : remove clutter and unsafe carpeting to avoid falls [] : foot exam [Minimize unnecessary interventions] : minimize unnecessary interventions [Maintain functional ability] : maintain functional ability [Discussed disease trajectory with patient/caregiver] : discussed disease trajectory with patient/caregiver [DNR] : Code Status: DNR [Limited] : Treatment Guidelines: Limited [DNI] : Intubation: DNI [[facility] Verification Date: _____] : [facility]pletion/last verification date: [unfilled] [_____] : HCP: [unfilled]

## 2024-09-11 NOTE — PHYSICAL EXAM
[No Acute Distress] : no acute distress [Well Nourished] : well nourished [Normal Sclera/Conjunctiva] : normal sclera/conjunctiva [PERRL] : pupils equal, round and reactive to light [EOMI] : extra ocular movement intact [Normal Outer Ear/Nose] : the ears and nose were normal in appearance [Normal Oropharynx] : the oropharynx was normal [No JVD] : no jugular venous distention [Supple] : the neck was supple [No Respiratory Distress] : no respiratory distress [Clear to Auscultation] : lungs were clear to auscultation bilaterally [No Accessory Muscle Use] : no accessory muscle use [Normal Rate] : heart rate was normal  [Regular Rhythm] : with a regular rhythm [Normal S1, S2] : normal S1 and S2 [No Murmurs] : no murmurs heard [Breast Exam Declined] : patient declined to have breast exam done [Normal Bowel Sounds] : normal bowel sounds [Non Tender] : non-tender [Soft] : abdomen soft [Not Distended] : not distended [Patient Refused] : rectal exam was refused by the patient [No CVA Tenderness] : no ~M costovertebral angle tenderness [No Spinal Tenderness] : no spinal tenderness [No Joint Swelling] : no joint swelling seen [Normal Strength/Tone] : muscle strength and tone were normal [Cranial Nerves Intact] : cranial nerves 2-12 were intact [No Motor Deficits] : the motor exam was normal [Oriented x3] : oriented to person, place, and time [Normal Affect] : the affect was normal [Normal Mood] : the mood was normal [de-identified] : pleasant and cooperative during exam  [de-identified] : bilateral lower leg edema [de-identified] : slow unstable gait off balance when getting up from sitting, right knee pain  [de-identified] : Posterior Left Lower extremity with healed ulcer. RLE and LLE Anterior and Posterior with multiple scabs from water blisters - dried open to air

## 2024-09-11 NOTE — REVIEW OF SYSTEMS
[Itching] : Itching [Skin Rash] : skin rash [Negative] : Heme/Lymph [de-identified] : to bilateral lower legs, healed ulcer to left posterior leg

## 2024-09-11 NOTE — CHRONIC CARE ASSESSMENT
[PPS Score: ____] : Palliative Performance Scale (PPS) Score: [unfilled] [Limited decision making ability] : limited decision making ability [de-identified] : n/a [de-identified] : adequate

## 2024-09-11 NOTE — HISTORY OF PRESENT ILLNESS
[Patient is stable] : patient is stable [Patient] : patient [Family Member] : family member [FreeTextEntry1] : Gait instability [FreeTextEntry2] : RACHEL MERLINI is a 89 year female with PMH of diabetes type 2 x 30 years (uncontrolled) c/b neuropathy and retinopathy, CAD s/p MI w PCI/MARTA stent 2014, systolic CHF (EF ~23% 8/2017), Angina pectoris, HTN, HL, recurrent UTIs, gait instability, lung nodule, recurrent UTIs, admitted at 9/4/19.for fever 2/2 UTI and ischiorectal abscess bilateral s/p debridement, then again hospitalized for ischial pain on 12/2019, found to have recurrence of abscess with 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 Daughter present at visit.   Interval Events  - Started with HHA 7 days a week for 3 hours, would like to increase the hours - will ask  to f/u - Scheduled to see endocrinologist next week  - Seen by cardiologist Dr Michelle Hines last week - No changes  - Pt referral with [facility]  - Lower leg swelling, chronic- increase Torsemide to 60 mgs x 1-2 days, will f/u with Cardiologist  - Dermatologist wants to start her on Dupixent for itching- pt will consider   Subjective: -Appetite/weight: appetite fair. Weight stable. no nutritional supplements  -Gait/falls: unsteady gait, uses walker.  -Pain: right knee chronic pain -Sleep: sleep good. -BMs: regular. no straining. no hematochezia.  -Urine: no concerns. continent.  -DME: walker, wheelchair -Mood/memory: mood is ok. short term memory slightly starting to go.     Specialists: Cardiology Dr Michelle Hines Endocrinology Dr Sears  Ophthalmologist Podiatrist    Social hx: lives with daughter (Nancy - Christian-na), and grand son, independent with ADLs. Patient speaks Ukrainian, English and Japanese. Patient born and grew up in [place], however, parents are from near [place]. Caregivers: no HHA. Daughter in the house with her all day.  MOLST: DNR, DNI, hospitalize, no feeding tube, trial of IV fluids, use antibiotics  HCP: Nancy Stevens

## 2024-09-12 ENCOUNTER — NON-APPOINTMENT (OUTPATIENT)
Age: 89
End: 2024-09-12

## 2024-10-08 ENCOUNTER — APPOINTMENT (OUTPATIENT)
Dept: HOME HEALTH SERVICES | Facility: HOME HEALTH | Age: 89
End: 2024-10-08

## 2024-10-08 VITALS
OXYGEN SATURATION: 99 % | TEMPERATURE: 96 F | HEART RATE: 70 BPM | SYSTOLIC BLOOD PRESSURE: 126 MMHG | DIASTOLIC BLOOD PRESSURE: 72 MMHG | RESPIRATION RATE: 17 BRPM

## 2024-10-24 ENCOUNTER — NON-APPOINTMENT (OUTPATIENT)
Age: 89
End: 2024-10-24

## 2024-10-24 DIAGNOSIS — R53.1 WEAKNESS: ICD-10-CM

## 2024-10-25 ENCOUNTER — LABORATORY RESULT (OUTPATIENT)
Age: 89
End: 2024-10-25

## 2024-10-25 ENCOUNTER — NON-APPOINTMENT (OUTPATIENT)
Age: 89
End: 2024-10-25

## 2024-10-27 NOTE — CONSULT NOTE ADULT - PROBLEM SELECTOR PROBLEM 1
Pt d/c'ed  IV removed  Meds given  D/c instructions given  Ambulatory w/ sober ride  
ROB (acute kidney injury)

## 2024-10-28 ENCOUNTER — NON-APPOINTMENT (OUTPATIENT)
Age: 89
End: 2024-10-28

## 2024-10-28 ENCOUNTER — TRANSCRIPTION ENCOUNTER (OUTPATIENT)
Age: 89
End: 2024-10-28

## 2024-10-31 ENCOUNTER — NON-APPOINTMENT (OUTPATIENT)
Age: 89
End: 2024-10-31

## 2024-11-01 ENCOUNTER — NON-APPOINTMENT (OUTPATIENT)
Age: 89
End: 2024-11-01

## 2024-11-04 ENCOUNTER — NON-APPOINTMENT (OUTPATIENT)
Age: 89
End: 2024-11-04

## 2024-11-04 DIAGNOSIS — R68.89 OTHER GENERAL SYMPTOMS AND SIGNS: ICD-10-CM

## 2024-11-04 DIAGNOSIS — R05.1 ACUTE COUGH: ICD-10-CM

## 2024-11-05 ENCOUNTER — NON-APPOINTMENT (OUTPATIENT)
Age: 89
End: 2024-11-05

## 2024-11-05 ENCOUNTER — LABORATORY RESULT (OUTPATIENT)
Age: 89
End: 2024-11-05

## 2024-11-06 ENCOUNTER — LABORATORY RESULT (OUTPATIENT)
Age: 89
End: 2024-11-06

## 2024-11-07 ENCOUNTER — NON-APPOINTMENT (OUTPATIENT)
Age: 89
End: 2024-11-07

## 2024-11-08 ENCOUNTER — TRANSCRIPTION ENCOUNTER (OUTPATIENT)
Age: 89
End: 2024-11-08

## 2024-11-08 ENCOUNTER — LABORATORY RESULT (OUTPATIENT)
Age: 89
End: 2024-11-08

## 2024-11-20 ENCOUNTER — LABORATORY RESULT (OUTPATIENT)
Age: 89
End: 2024-11-20

## 2024-12-02 ENCOUNTER — NON-APPOINTMENT (OUTPATIENT)
Age: 88
End: 2024-12-02

## 2024-12-09 ENCOUNTER — RX RENEWAL (OUTPATIENT)
Age: 88
End: 2024-12-09

## 2024-12-13 ENCOUNTER — NON-APPOINTMENT (OUTPATIENT)
Age: 88
End: 2024-12-13

## 2024-12-23 ENCOUNTER — TRANSCRIPTION ENCOUNTER (OUTPATIENT)
Age: 88
End: 2024-12-23

## 2024-12-23 PROBLEM — R73.9 HYPERGLYCEMIA: Status: ACTIVE | Noted: 2024-12-23

## 2025-01-01 ENCOUNTER — RESULT REVIEW (OUTPATIENT)
Age: 89
End: 2025-01-01

## 2025-01-01 ENCOUNTER — INPATIENT (INPATIENT)
Facility: HOSPITAL | Age: 89
LOS: 15 days | DRG: 301 | End: 2025-03-26
Attending: STUDENT IN AN ORGANIZED HEALTH CARE EDUCATION/TRAINING PROGRAM | Admitting: STUDENT IN AN ORGANIZED HEALTH CARE EDUCATION/TRAINING PROGRAM
Payer: COMMERCIAL

## 2025-01-01 ENCOUNTER — NON-APPOINTMENT (OUTPATIENT)
Age: 89
End: 2025-01-01

## 2025-01-01 VITALS
HEART RATE: 65 BPM | SYSTOLIC BLOOD PRESSURE: 112 MMHG | WEIGHT: 139.99 LBS | HEIGHT: 67 IN | RESPIRATION RATE: 19 BRPM | TEMPERATURE: 98 F | DIASTOLIC BLOOD PRESSURE: 63 MMHG | OXYGEN SATURATION: 97 %

## 2025-01-01 VITALS
OXYGEN SATURATION: 90 % | SYSTOLIC BLOOD PRESSURE: 141 MMHG | DIASTOLIC BLOOD PRESSURE: 58 MMHG | RESPIRATION RATE: 18 BRPM | HEART RATE: 96 BPM | TEMPERATURE: 98 F

## 2025-01-01 DIAGNOSIS — Z98.89 OTHER SPECIFIED POSTPROCEDURAL STATES: Chronic | ICD-10-CM

## 2025-01-01 DIAGNOSIS — I70.90 UNSPECIFIED ATHEROSCLEROSIS: ICD-10-CM

## 2025-01-01 DIAGNOSIS — E87.5 HYPERKALEMIA: ICD-10-CM

## 2025-01-01 DIAGNOSIS — Z29.9 ENCOUNTER FOR PROPHYLACTIC MEASURES, UNSPECIFIED: ICD-10-CM

## 2025-01-01 DIAGNOSIS — N17.9 ACUTE KIDNEY FAILURE, UNSPECIFIED: ICD-10-CM

## 2025-01-01 DIAGNOSIS — Z71.89 OTHER SPECIFIED COUNSELING: ICD-10-CM

## 2025-01-01 DIAGNOSIS — R53.81 OTHER MALAISE: ICD-10-CM

## 2025-01-01 DIAGNOSIS — Z51.5 ENCOUNTER FOR PALLIATIVE CARE: ICD-10-CM

## 2025-01-01 DIAGNOSIS — I50.20 UNSPECIFIED SYSTOLIC (CONGESTIVE) HEART FAILURE: ICD-10-CM

## 2025-01-01 DIAGNOSIS — R52 PAIN, UNSPECIFIED: ICD-10-CM

## 2025-01-01 DIAGNOSIS — E03.9 HYPOTHYROIDISM, UNSPECIFIED: ICD-10-CM

## 2025-01-01 DIAGNOSIS — I95.9 HYPOTENSION, UNSPECIFIED: ICD-10-CM

## 2025-01-01 DIAGNOSIS — G93.41 METABOLIC ENCEPHALOPATHY: ICD-10-CM

## 2025-01-01 DIAGNOSIS — N18.5 CHRONIC KIDNEY DISEASE, STAGE 5: ICD-10-CM

## 2025-01-01 DIAGNOSIS — Z90.49 ACQUIRED ABSENCE OF OTHER SPECIFIED PARTS OF DIGESTIVE TRACT: Chronic | ICD-10-CM

## 2025-01-01 DIAGNOSIS — Z79.899 OTHER LONG TERM (CURRENT) DRUG THERAPY: ICD-10-CM

## 2025-01-01 DIAGNOSIS — I10 ESSENTIAL (PRIMARY) HYPERTENSION: ICD-10-CM

## 2025-01-01 DIAGNOSIS — N39.0 URINARY TRACT INFECTION, SITE NOT SPECIFIED: ICD-10-CM

## 2025-01-01 DIAGNOSIS — E11.9 TYPE 2 DIABETES MELLITUS WITHOUT COMPLICATIONS: ICD-10-CM

## 2025-01-01 DIAGNOSIS — I73.9 PERIPHERAL VASCULAR DISEASE, UNSPECIFIED: ICD-10-CM

## 2025-01-01 DIAGNOSIS — R74.01 ELEVATION OF LEVELS OF LIVER TRANSAMINASE LEVELS: ICD-10-CM

## 2025-01-01 DIAGNOSIS — K68.3 RETROPERITONEAL HEMATOMA: ICD-10-CM

## 2025-01-01 DIAGNOSIS — R33.9 RETENTION OF URINE, UNSPECIFIED: ICD-10-CM

## 2025-01-01 LAB
-  AMOXICILLIN/CLAVULANIC ACID: SIGNIFICANT CHANGE UP
-  AMPICILLIN/SULBACTAM: SIGNIFICANT CHANGE UP
-  AMPICILLIN: SIGNIFICANT CHANGE UP
-  AMPICILLIN: SIGNIFICANT CHANGE UP
-  AZTREONAM: SIGNIFICANT CHANGE UP
-  CEFAZOLIN: SIGNIFICANT CHANGE UP
-  CEFEPIME: SIGNIFICANT CHANGE UP
-  CEFOXITIN: SIGNIFICANT CHANGE UP
-  CEFTRIAXONE: SIGNIFICANT CHANGE UP
-  CEFUROXIME: SIGNIFICANT CHANGE UP
-  CIPROFLOXACIN: SIGNIFICANT CHANGE UP
-  CIPROFLOXACIN: SIGNIFICANT CHANGE UP
-  ERTAPENEM: SIGNIFICANT CHANGE UP
-  GENTAMICIN: SIGNIFICANT CHANGE UP
-  IMIPENEM: SIGNIFICANT CHANGE UP
-  LEVOFLOXACIN: SIGNIFICANT CHANGE UP
-  LEVOFLOXACIN: SIGNIFICANT CHANGE UP
-  MEROPENEM: SIGNIFICANT CHANGE UP
-  NITROFURANTOIN: SIGNIFICANT CHANGE UP
-  NITROFURANTOIN: SIGNIFICANT CHANGE UP
-  PIPERACILLIN/TAZOBACTAM: SIGNIFICANT CHANGE UP
-  TETRACYCLINE: SIGNIFICANT CHANGE UP
-  TOBRAMYCIN: SIGNIFICANT CHANGE UP
-  TRIMETHOPRIM/SULFAMETHOXAZOLE: SIGNIFICANT CHANGE UP
-  VANCOMYCIN: SIGNIFICANT CHANGE UP
A1C WITH ESTIMATED AVERAGE GLUCOSE RESULT: 9.4 % — HIGH (ref 4–5.6)
ADD ON TEST-SPECIMEN IN LAB: SIGNIFICANT CHANGE UP
ALBUMIN SERPL ELPH-MCNC: 2.7 G/DL — LOW (ref 3.3–5)
ALBUMIN SERPL ELPH-MCNC: 2.8 G/DL — LOW (ref 3.3–5)
ALBUMIN SERPL ELPH-MCNC: 3 G/DL — LOW (ref 3.3–5)
ALBUMIN SERPL ELPH-MCNC: 3.1 G/DL — LOW (ref 3.3–5)
ALBUMIN SERPL ELPH-MCNC: 3.1 G/DL — LOW (ref 3.3–5)
ALBUMIN SERPL ELPH-MCNC: 3.3 G/DL — SIGNIFICANT CHANGE UP (ref 3.3–5)
ALP SERPL-CCNC: 112 U/L — SIGNIFICANT CHANGE UP (ref 40–120)
ALP SERPL-CCNC: 152 U/L — HIGH (ref 40–120)
ALP SERPL-CCNC: 153 U/L — HIGH (ref 40–120)
ALP SERPL-CCNC: 159 U/L — HIGH (ref 40–120)
ALP SERPL-CCNC: 174 U/L — HIGH (ref 40–120)
ALP SERPL-CCNC: 184 U/L — HIGH (ref 40–120)
ALT FLD-CCNC: 17 U/L — SIGNIFICANT CHANGE UP (ref 10–45)
ALT FLD-CCNC: 60 U/L — HIGH (ref 10–45)
ALT FLD-CCNC: 78 U/L — HIGH (ref 10–45)
ALT FLD-CCNC: 84 U/L — HIGH (ref 10–45)
ALT FLD-CCNC: 87 U/L — HIGH (ref 10–45)
ALT FLD-CCNC: 95 U/L — HIGH (ref 10–45)
AMMONIA BLD-MCNC: 30 UMOL/L — SIGNIFICANT CHANGE UP (ref 11–55)
ANION GAP SERPL CALC-SCNC: 15 MMOL/L — SIGNIFICANT CHANGE UP (ref 5–17)
ANION GAP SERPL CALC-SCNC: 16 MMOL/L — SIGNIFICANT CHANGE UP (ref 5–17)
ANION GAP SERPL CALC-SCNC: 18 MMOL/L — HIGH (ref 5–17)
ANION GAP SERPL CALC-SCNC: 19 MMOL/L — HIGH (ref 5–17)
ANION GAP SERPL CALC-SCNC: 21 MMOL/L — HIGH (ref 5–17)
ANION GAP SERPL CALC-SCNC: 22 MMOL/L — HIGH (ref 5–17)
ANION GAP SERPL CALC-SCNC: 23 MMOL/L — HIGH (ref 5–17)
ANION GAP SERPL CALC-SCNC: 24 MMOL/L — HIGH (ref 5–17)
ANISOCYTOSIS BLD QL: SLIGHT — SIGNIFICANT CHANGE UP
APPEARANCE UR: ABNORMAL
APPEARANCE UR: ABNORMAL
APTT BLD: 107.9 SEC — HIGH (ref 24.5–35.6)
APTT BLD: 111 SEC — HIGH (ref 24.5–35.6)
APTT BLD: 149.1 SEC — CRITICAL HIGH (ref 24.5–35.6)
APTT BLD: 160 SEC — CRITICAL HIGH (ref 24.5–35.6)
APTT BLD: 31 SEC — SIGNIFICANT CHANGE UP (ref 24.5–35.6)
APTT BLD: 31.8 SEC — SIGNIFICANT CHANGE UP (ref 24.5–35.6)
APTT BLD: 32.2 SEC — SIGNIFICANT CHANGE UP (ref 24.5–35.6)
APTT BLD: 34.3 SEC — SIGNIFICANT CHANGE UP (ref 24.5–35.6)
APTT BLD: 36.6 SEC — HIGH (ref 24.5–35.6)
APTT BLD: 38.6 SEC — HIGH (ref 24.5–35.6)
APTT BLD: 43.2 SEC — HIGH (ref 24.5–35.6)
APTT BLD: 45 SEC — HIGH (ref 24.5–35.6)
APTT BLD: 63.6 SEC — HIGH (ref 24.5–35.6)
APTT BLD: 69.3 SEC — HIGH (ref 24.5–35.6)
APTT BLD: 88.9 SEC — HIGH (ref 24.5–35.6)
APTT BLD: 91.8 SEC — HIGH (ref 24.5–35.6)
APTT BLD: 92.5 SEC — HIGH (ref 24.5–35.6)
APTT BLD: 94.4 SEC — HIGH (ref 24.5–35.6)
APTT BLD: 96.9 SEC — HIGH (ref 24.5–35.6)
AST SERPL-CCNC: 114 U/L — HIGH (ref 10–40)
AST SERPL-CCNC: 121 U/L — HIGH (ref 10–40)
AST SERPL-CCNC: 132 U/L — HIGH (ref 10–40)
AST SERPL-CCNC: 149 U/L — HIGH (ref 10–40)
AST SERPL-CCNC: 15 U/L — SIGNIFICANT CHANGE UP (ref 10–40)
AST SERPL-CCNC: 87 U/L — HIGH (ref 10–40)
BACTERIA # UR AUTO: ABNORMAL /HPF
BACTERIA # UR AUTO: ABNORMAL /HPF
BASOPHILS # BLD AUTO: 0.02 K/UL — SIGNIFICANT CHANGE UP (ref 0–0.2)
BASOPHILS # BLD AUTO: 0.05 K/UL — SIGNIFICANT CHANGE UP (ref 0–0.2)
BASOPHILS NFR BLD AUTO: 0.1 % — SIGNIFICANT CHANGE UP (ref 0–2)
BASOPHILS NFR BLD AUTO: 0.9 % — SIGNIFICANT CHANGE UP (ref 0–2)
BILIRUB DIRECT SERPL-MCNC: 2.3 MG/DL — HIGH (ref 0–0.3)
BILIRUB SERPL-MCNC: 0.8 MG/DL — SIGNIFICANT CHANGE UP (ref 0.2–1.2)
BILIRUB SERPL-MCNC: 1.9 MG/DL — HIGH (ref 0.2–1.2)
BILIRUB SERPL-MCNC: 2.1 MG/DL — HIGH (ref 0.2–1.2)
BILIRUB SERPL-MCNC: 2.7 MG/DL — HIGH (ref 0.2–1.2)
BILIRUB SERPL-MCNC: 3.2 MG/DL — HIGH (ref 0.2–1.2)
BILIRUB SERPL-MCNC: 4.6 MG/DL — HIGH (ref 0.2–1.2)
BILIRUB UR-MCNC: NEGATIVE — SIGNIFICANT CHANGE UP
BILIRUB UR-MCNC: NEGATIVE — SIGNIFICANT CHANGE UP
BLD GP AB SCN SERPL QL: NEGATIVE — SIGNIFICANT CHANGE UP
BUN SERPL-MCNC: 102 MG/DL — HIGH (ref 7–23)
BUN SERPL-MCNC: 112 MG/DL — HIGH (ref 7–23)
BUN SERPL-MCNC: 124 MG/DL — HIGH (ref 7–23)
BUN SERPL-MCNC: 125 MG/DL — HIGH (ref 7–23)
BUN SERPL-MCNC: 126 MG/DL — HIGH (ref 7–23)
BUN SERPL-MCNC: 134 MG/DL — HIGH (ref 7–23)
BUN SERPL-MCNC: 137 MG/DL — HIGH (ref 7–23)
BUN SERPL-MCNC: 148 MG/DL — HIGH (ref 7–23)
BUN SERPL-MCNC: 43 MG/DL — HIGH (ref 7–23)
BUN SERPL-MCNC: 48 MG/DL — HIGH (ref 7–23)
BUN SERPL-MCNC: 63 MG/DL — HIGH (ref 7–23)
BUN SERPL-MCNC: 79 MG/DL — HIGH (ref 7–23)
BUN SERPL-MCNC: >151 MG/DL — HIGH (ref 7–23)
BURR CELLS BLD QL SMEAR: PRESENT — SIGNIFICANT CHANGE UP
CALCIUM SERPL-MCNC: 8.2 MG/DL — LOW (ref 8.4–10.5)
CALCIUM SERPL-MCNC: 8.3 MG/DL — LOW (ref 8.4–10.5)
CALCIUM SERPL-MCNC: 8.3 MG/DL — LOW (ref 8.4–10.5)
CALCIUM SERPL-MCNC: 8.5 MG/DL — SIGNIFICANT CHANGE UP (ref 8.4–10.5)
CALCIUM SERPL-MCNC: 8.6 MG/DL — SIGNIFICANT CHANGE UP (ref 8.4–10.5)
CALCIUM SERPL-MCNC: 8.6 MG/DL — SIGNIFICANT CHANGE UP (ref 8.4–10.5)
CALCIUM SERPL-MCNC: 8.7 MG/DL — SIGNIFICANT CHANGE UP (ref 8.4–10.5)
CALCIUM SERPL-MCNC: 8.9 MG/DL — SIGNIFICANT CHANGE UP (ref 8.4–10.5)
CALCIUM SERPL-MCNC: 9 MG/DL — SIGNIFICANT CHANGE UP (ref 8.4–10.5)
CALCIUM SERPL-MCNC: 9 MG/DL — SIGNIFICANT CHANGE UP (ref 8.4–10.5)
CALCIUM SERPL-MCNC: 9.1 MG/DL — SIGNIFICANT CHANGE UP (ref 8.4–10.5)
CALCIUM SERPL-MCNC: 9.3 MG/DL — SIGNIFICANT CHANGE UP (ref 8.4–10.5)
CALCIUM SERPL-MCNC: 9.8 MG/DL — SIGNIFICANT CHANGE UP (ref 8.4–10.5)
CAST: 20 /LPF — HIGH (ref 0–4)
CAST: 7 /LPF — HIGH (ref 0–4)
CHLORIDE SERPL-SCNC: 100 MMOL/L — SIGNIFICANT CHANGE UP (ref 96–108)
CHLORIDE SERPL-SCNC: 100 MMOL/L — SIGNIFICANT CHANGE UP (ref 96–108)
CHLORIDE SERPL-SCNC: 101 MMOL/L — SIGNIFICANT CHANGE UP (ref 96–108)
CHLORIDE SERPL-SCNC: 95 MMOL/L — LOW (ref 96–108)
CHLORIDE SERPL-SCNC: 96 MMOL/L — SIGNIFICANT CHANGE UP (ref 96–108)
CHLORIDE SERPL-SCNC: 97 MMOL/L — SIGNIFICANT CHANGE UP (ref 96–108)
CHLORIDE SERPL-SCNC: 98 MMOL/L — SIGNIFICANT CHANGE UP (ref 96–108)
CHLORIDE SERPL-SCNC: 99 MMOL/L — SIGNIFICANT CHANGE UP (ref 96–108)
CHOLEST SERPL-MCNC: 82 MG/DL — SIGNIFICANT CHANGE UP
CO2 SERPL-SCNC: 15 MMOL/L — LOW (ref 22–31)
CO2 SERPL-SCNC: 16 MMOL/L — LOW (ref 22–31)
CO2 SERPL-SCNC: 17 MMOL/L — LOW (ref 22–31)
CO2 SERPL-SCNC: 18 MMOL/L — LOW (ref 22–31)
CO2 SERPL-SCNC: 18 MMOL/L — LOW (ref 22–31)
CO2 SERPL-SCNC: 20 MMOL/L — LOW (ref 22–31)
CO2 SERPL-SCNC: 20 MMOL/L — LOW (ref 22–31)
CO2 SERPL-SCNC: 22 MMOL/L — SIGNIFICANT CHANGE UP (ref 22–31)
CO2 SERPL-SCNC: 25 MMOL/L — SIGNIFICANT CHANGE UP (ref 22–31)
COLOR SPEC: YELLOW — SIGNIFICANT CHANGE UP
COLOR SPEC: YELLOW — SIGNIFICANT CHANGE UP
CREAT SERPL-MCNC: 2.46 MG/DL — HIGH (ref 0.5–1.3)
CREAT SERPL-MCNC: 2.73 MG/DL — HIGH (ref 0.5–1.3)
CREAT SERPL-MCNC: 3.53 MG/DL — HIGH (ref 0.5–1.3)
CREAT SERPL-MCNC: 3.81 MG/DL — HIGH (ref 0.5–1.3)
CREAT SERPL-MCNC: 3.99 MG/DL — HIGH (ref 0.5–1.3)
CREAT SERPL-MCNC: 4.12 MG/DL — HIGH (ref 0.5–1.3)
CREAT SERPL-MCNC: 5.07 MG/DL — HIGH (ref 0.5–1.3)
CREAT SERPL-MCNC: 5.16 MG/DL — HIGH (ref 0.5–1.3)
CREAT SERPL-MCNC: 6.01 MG/DL — HIGH (ref 0.5–1.3)
CREAT SERPL-MCNC: 6.49 MG/DL — HIGH (ref 0.5–1.3)
CREAT SERPL-MCNC: 6.62 MG/DL — HIGH (ref 0.5–1.3)
CREAT SERPL-MCNC: 7.02 MG/DL — HIGH (ref 0.5–1.3)
CREAT SERPL-MCNC: 7.14 MG/DL — HIGH (ref 0.5–1.3)
CULTURE RESULTS: ABNORMAL
CULTURE RESULTS: SIGNIFICANT CHANGE UP
CULTURE RESULTS: SIGNIFICANT CHANGE UP
DIFF PNL FLD: ABNORMAL
DIFF PNL FLD: ABNORMAL
EGFR: 10 ML/MIN/1.73M2 — LOW
EGFR: 11 ML/MIN/1.73M2 — LOW
EGFR: 11 ML/MIN/1.73M2 — LOW
EGFR: 12 ML/MIN/1.73M2 — LOW
EGFR: 12 ML/MIN/1.73M2 — LOW
EGFR: 16 ML/MIN/1.73M2 — LOW
EGFR: 16 ML/MIN/1.73M2 — LOW
EGFR: 18 ML/MIN/1.73M2 — LOW
EGFR: 18 ML/MIN/1.73M2 — LOW
EGFR: 5 ML/MIN/1.73M2 — LOW
EGFR: 6 ML/MIN/1.73M2 — LOW
EGFR: 7 ML/MIN/1.73M2 — LOW
EGFR: 7 ML/MIN/1.73M2 — LOW
EGFR: 8 ML/MIN/1.73M2 — LOW
EGFR: 8 ML/MIN/1.73M2 — LOW
EOSINOPHIL # BLD AUTO: 0.01 K/UL — SIGNIFICANT CHANGE UP (ref 0–0.5)
EOSINOPHIL # BLD AUTO: 0.25 K/UL — SIGNIFICANT CHANGE UP (ref 0–0.5)
EOSINOPHIL NFR BLD AUTO: 0.1 % — SIGNIFICANT CHANGE UP (ref 0–6)
EOSINOPHIL NFR BLD AUTO: 4.3 % — SIGNIFICANT CHANGE UP (ref 0–6)
ESTIMATED AVERAGE GLUCOSE: 223 MG/DL — HIGH (ref 68–114)
FIBRINOGEN PPP-MCNC: 161 MG/DL — LOW (ref 200–445)
GAS PNL BLDV: SIGNIFICANT CHANGE UP
GLUCOSE BLDC GLUCOMTR-MCNC: 111 MG/DL — HIGH (ref 70–99)
GLUCOSE BLDC GLUCOMTR-MCNC: 122 MG/DL — HIGH (ref 70–99)
GLUCOSE BLDC GLUCOMTR-MCNC: 128 MG/DL — HIGH (ref 70–99)
GLUCOSE BLDC GLUCOMTR-MCNC: 139 MG/DL — HIGH (ref 70–99)
GLUCOSE BLDC GLUCOMTR-MCNC: 151 MG/DL — HIGH (ref 70–99)
GLUCOSE BLDC GLUCOMTR-MCNC: 156 MG/DL — HIGH (ref 70–99)
GLUCOSE BLDC GLUCOMTR-MCNC: 164 MG/DL — HIGH (ref 70–99)
GLUCOSE BLDC GLUCOMTR-MCNC: 168 MG/DL — HIGH (ref 70–99)
GLUCOSE BLDC GLUCOMTR-MCNC: 179 MG/DL — HIGH (ref 70–99)
GLUCOSE BLDC GLUCOMTR-MCNC: 182 MG/DL — HIGH (ref 70–99)
GLUCOSE BLDC GLUCOMTR-MCNC: 183 MG/DL — HIGH (ref 70–99)
GLUCOSE BLDC GLUCOMTR-MCNC: 189 MG/DL — HIGH (ref 70–99)
GLUCOSE BLDC GLUCOMTR-MCNC: 192 MG/DL — HIGH (ref 70–99)
GLUCOSE BLDC GLUCOMTR-MCNC: 193 MG/DL — HIGH (ref 70–99)
GLUCOSE BLDC GLUCOMTR-MCNC: 207 MG/DL — HIGH (ref 70–99)
GLUCOSE BLDC GLUCOMTR-MCNC: 208 MG/DL — HIGH (ref 70–99)
GLUCOSE BLDC GLUCOMTR-MCNC: 209 MG/DL — HIGH (ref 70–99)
GLUCOSE BLDC GLUCOMTR-MCNC: 216 MG/DL — HIGH (ref 70–99)
GLUCOSE BLDC GLUCOMTR-MCNC: 221 MG/DL — HIGH (ref 70–99)
GLUCOSE BLDC GLUCOMTR-MCNC: 221 MG/DL — HIGH (ref 70–99)
GLUCOSE BLDC GLUCOMTR-MCNC: 223 MG/DL — HIGH (ref 70–99)
GLUCOSE BLDC GLUCOMTR-MCNC: 223 MG/DL — HIGH (ref 70–99)
GLUCOSE BLDC GLUCOMTR-MCNC: 225 MG/DL — HIGH (ref 70–99)
GLUCOSE BLDC GLUCOMTR-MCNC: 228 MG/DL — HIGH (ref 70–99)
GLUCOSE BLDC GLUCOMTR-MCNC: 239 MG/DL — HIGH (ref 70–99)
GLUCOSE BLDC GLUCOMTR-MCNC: 239 MG/DL — HIGH (ref 70–99)
GLUCOSE BLDC GLUCOMTR-MCNC: 244 MG/DL — HIGH (ref 70–99)
GLUCOSE BLDC GLUCOMTR-MCNC: 249 MG/DL — HIGH (ref 70–99)
GLUCOSE BLDC GLUCOMTR-MCNC: 249 MG/DL — HIGH (ref 70–99)
GLUCOSE BLDC GLUCOMTR-MCNC: 265 MG/DL — HIGH (ref 70–99)
GLUCOSE BLDC GLUCOMTR-MCNC: 286 MG/DL — HIGH (ref 70–99)
GLUCOSE BLDC GLUCOMTR-MCNC: 292 MG/DL — HIGH (ref 70–99)
GLUCOSE BLDC GLUCOMTR-MCNC: 299 MG/DL — HIGH (ref 70–99)
GLUCOSE BLDC GLUCOMTR-MCNC: 380 MG/DL — HIGH (ref 70–99)
GLUCOSE BLDC GLUCOMTR-MCNC: 432 MG/DL — HIGH (ref 70–99)
GLUCOSE BLDC GLUCOMTR-MCNC: 436 MG/DL — HIGH (ref 70–99)
GLUCOSE BLDC GLUCOMTR-MCNC: 448 MG/DL — HIGH (ref 70–99)
GLUCOSE BLDC GLUCOMTR-MCNC: 87 MG/DL — SIGNIFICANT CHANGE UP (ref 70–99)
GLUCOSE SERPL-MCNC: 109 MG/DL — HIGH (ref 70–99)
GLUCOSE SERPL-MCNC: 127 MG/DL — HIGH (ref 70–99)
GLUCOSE SERPL-MCNC: 148 MG/DL — HIGH (ref 70–99)
GLUCOSE SERPL-MCNC: 159 MG/DL — HIGH (ref 70–99)
GLUCOSE SERPL-MCNC: 191 MG/DL — HIGH (ref 70–99)
GLUCOSE SERPL-MCNC: 193 MG/DL — HIGH (ref 70–99)
GLUCOSE SERPL-MCNC: 196 MG/DL — HIGH (ref 70–99)
GLUCOSE SERPL-MCNC: 213 MG/DL — HIGH (ref 70–99)
GLUCOSE SERPL-MCNC: 216 MG/DL — HIGH (ref 70–99)
GLUCOSE SERPL-MCNC: 236 MG/DL — HIGH (ref 70–99)
GLUCOSE SERPL-MCNC: 360 MG/DL — HIGH (ref 70–99)
GLUCOSE SERPL-MCNC: 446 MG/DL — HIGH (ref 70–99)
GLUCOSE SERPL-MCNC: 85 MG/DL — SIGNIFICANT CHANGE UP (ref 70–99)
GLUCOSE UR QL: NEGATIVE MG/DL — SIGNIFICANT CHANGE UP
GLUCOSE UR QL: NEGATIVE MG/DL — SIGNIFICANT CHANGE UP
HAPTOGLOB SERPL-MCNC: <20 MG/DL — LOW (ref 34–200)
HBV SURFACE AB SER-ACNC: <3.3 MIU/ML — LOW
HBV SURFACE AB SER-ACNC: ABNORMAL
HBV SURFACE AG SER-ACNC: SIGNIFICANT CHANGE UP
HCT VFR BLD CALC: 19.1 % — CRITICAL LOW (ref 34.5–45)
HCT VFR BLD CALC: 19.5 % — CRITICAL LOW (ref 34.5–45)
HCT VFR BLD CALC: 22.3 % — LOW (ref 34.5–45)
HCT VFR BLD CALC: 23 % — LOW (ref 34.5–45)
HCT VFR BLD CALC: 23.1 % — LOW (ref 34.5–45)
HCT VFR BLD CALC: 23.7 % — LOW (ref 34.5–45)
HCT VFR BLD CALC: 23.8 % — LOW (ref 34.5–45)
HCT VFR BLD CALC: 24.3 % — LOW (ref 34.5–45)
HCT VFR BLD CALC: 25.4 % — LOW (ref 34.5–45)
HCT VFR BLD CALC: 25.8 % — LOW (ref 34.5–45)
HCT VFR BLD CALC: 26.4 % — LOW (ref 34.5–45)
HCT VFR BLD CALC: 26.7 % — LOW (ref 34.5–45)
HCT VFR BLD CALC: 27 % — LOW (ref 34.5–45)
HCT VFR BLD CALC: 27.1 % — LOW (ref 34.5–45)
HCT VFR BLD CALC: 27.5 % — LOW (ref 34.5–45)
HCT VFR BLD CALC: 28.4 % — LOW (ref 34.5–45)
HCT VFR BLD CALC: 29.3 % — LOW (ref 34.5–45)
HCT VFR BLD CALC: 30.3 % — LOW (ref 34.5–45)
HCT VFR BLD CALC: 31.1 % — LOW (ref 34.5–45)
HCT VFR BLD CALC: 32.3 % — LOW (ref 34.5–45)
HCT VFR BLD CALC: 33 % — LOW (ref 34.5–45)
HCV AB S/CO SERPL IA: 0.06 S/CO — SIGNIFICANT CHANGE UP
HCV AB SERPL-IMP: SIGNIFICANT CHANGE UP
HDLC SERPL-MCNC: 32 MG/DL — LOW
HEPARINASE TEG R TIME: 5.9 MIN — SIGNIFICANT CHANGE UP (ref 4.3–8.3)
HGB BLD-MCNC: 10 G/DL — LOW (ref 11.5–15.5)
HGB BLD-MCNC: 10 G/DL — LOW (ref 11.5–15.5)
HGB BLD-MCNC: 10.1 G/DL — LOW (ref 11.5–15.5)
HGB BLD-MCNC: 10.3 G/DL — LOW (ref 11.5–15.5)
HGB BLD-MCNC: 11.2 G/DL — LOW (ref 11.5–15.5)
HGB BLD-MCNC: 6.5 G/DL — CRITICAL LOW (ref 11.5–15.5)
HGB BLD-MCNC: 6.7 G/DL — CRITICAL LOW (ref 11.5–15.5)
HGB BLD-MCNC: 7.4 G/DL — LOW (ref 11.5–15.5)
HGB BLD-MCNC: 8 G/DL — LOW (ref 11.5–15.5)
HGB BLD-MCNC: 8 G/DL — LOW (ref 11.5–15.5)
HGB BLD-MCNC: 8.1 G/DL — LOW (ref 11.5–15.5)
HGB BLD-MCNC: 8.1 G/DL — LOW (ref 11.5–15.5)
HGB BLD-MCNC: 8.4 G/DL — LOW (ref 11.5–15.5)
HGB BLD-MCNC: 8.4 G/DL — LOW (ref 11.5–15.5)
HGB BLD-MCNC: 8.7 G/DL — LOW (ref 11.5–15.5)
HGB BLD-MCNC: 8.8 G/DL — LOW (ref 11.5–15.5)
HGB BLD-MCNC: 9 G/DL — LOW (ref 11.5–15.5)
HGB BLD-MCNC: 9.1 G/DL — LOW (ref 11.5–15.5)
HGB BLD-MCNC: 9.3 G/DL — LOW (ref 11.5–15.5)
HGB BLD-MCNC: 9.5 G/DL — LOW (ref 11.5–15.5)
HGB BLD-MCNC: 9.5 G/DL — LOW (ref 11.5–15.5)
IMM GRANULOCYTES NFR BLD AUTO: 1 % — HIGH (ref 0–0.9)
INR BLD: 1.3 RATIO — HIGH (ref 0.85–1.16)
INR BLD: 1.33 RATIO — HIGH (ref 0.85–1.16)
INR BLD: 1.41 RATIO — HIGH (ref 0.85–1.16)
INR BLD: 1.42 RATIO — HIGH (ref 0.85–1.16)
INR BLD: 1.42 RATIO — HIGH (ref 0.85–1.16)
INR BLD: 1.82 RATIO — HIGH (ref 0.85–1.16)
KETONES UR-MCNC: NEGATIVE MG/DL — SIGNIFICANT CHANGE UP
KETONES UR-MCNC: NEGATIVE MG/DL — SIGNIFICANT CHANGE UP
LACTATE SERPL-SCNC: 2.5 MMOL/L — HIGH (ref 0.5–2)
LDH SERPL L TO P-CCNC: 473 U/L — HIGH (ref 50–242)
LDH SERPL L TO P-CCNC: 587 U/L — HIGH (ref 50–242)
LEUKOCYTE ESTERASE UR-ACNC: ABNORMAL
LEUKOCYTE ESTERASE UR-ACNC: ABNORMAL
LIPID PNL WITH DIRECT LDL SERPL: 35 MG/DL — SIGNIFICANT CHANGE UP
LYMPHOCYTES # BLD AUTO: 0.51 K/UL — LOW (ref 1–3.3)
LYMPHOCYTES # BLD AUTO: 0.55 K/UL — LOW (ref 1–3.3)
LYMPHOCYTES # BLD AUTO: 2.8 % — LOW (ref 13–44)
LYMPHOCYTES # BLD AUTO: 8.7 % — LOW (ref 13–44)
MACROCYTES BLD QL: SLIGHT — SIGNIFICANT CHANGE UP
MAGNESIUM SERPL-MCNC: 1.5 MG/DL — LOW (ref 1.6–2.6)
MAGNESIUM SERPL-MCNC: 1.5 MG/DL — LOW (ref 1.6–2.6)
MAGNESIUM SERPL-MCNC: 1.7 MG/DL — SIGNIFICANT CHANGE UP (ref 1.6–2.6)
MAGNESIUM SERPL-MCNC: 1.8 MG/DL — SIGNIFICANT CHANGE UP (ref 1.6–2.6)
MAGNESIUM SERPL-MCNC: 1.8 MG/DL — SIGNIFICANT CHANGE UP (ref 1.6–2.6)
MAGNESIUM SERPL-MCNC: 1.9 MG/DL — SIGNIFICANT CHANGE UP (ref 1.6–2.6)
MANUAL SMEAR VERIFICATION: SIGNIFICANT CHANGE UP
MCHC RBC-ENTMCNC: 30.6 PG — SIGNIFICANT CHANGE UP (ref 27–34)
MCHC RBC-ENTMCNC: 30.7 PG — SIGNIFICANT CHANGE UP (ref 27–34)
MCHC RBC-ENTMCNC: 30.8 PG — SIGNIFICANT CHANGE UP (ref 27–34)
MCHC RBC-ENTMCNC: 30.9 PG — SIGNIFICANT CHANGE UP (ref 27–34)
MCHC RBC-ENTMCNC: 31.3 PG — SIGNIFICANT CHANGE UP (ref 27–34)
MCHC RBC-ENTMCNC: 31.4 PG — SIGNIFICANT CHANGE UP (ref 27–34)
MCHC RBC-ENTMCNC: 31.7 PG — SIGNIFICANT CHANGE UP (ref 27–34)
MCHC RBC-ENTMCNC: 31.9 G/DL — LOW (ref 32–36)
MCHC RBC-ENTMCNC: 31.9 PG — SIGNIFICANT CHANGE UP (ref 27–34)
MCHC RBC-ENTMCNC: 32 PG — SIGNIFICANT CHANGE UP (ref 27–34)
MCHC RBC-ENTMCNC: 32.1 PG — SIGNIFICANT CHANGE UP (ref 27–34)
MCHC RBC-ENTMCNC: 32.2 PG — SIGNIFICANT CHANGE UP (ref 27–34)
MCHC RBC-ENTMCNC: 32.2 PG — SIGNIFICANT CHANGE UP (ref 27–34)
MCHC RBC-ENTMCNC: 32.3 PG — SIGNIFICANT CHANGE UP (ref 27–34)
MCHC RBC-ENTMCNC: 32.3 PG — SIGNIFICANT CHANGE UP (ref 27–34)
MCHC RBC-ENTMCNC: 32.5 G/DL — SIGNIFICANT CHANGE UP (ref 32–36)
MCHC RBC-ENTMCNC: 32.5 PG — SIGNIFICANT CHANGE UP (ref 27–34)
MCHC RBC-ENTMCNC: 32.6 G/DL — SIGNIFICANT CHANGE UP (ref 32–36)
MCHC RBC-ENTMCNC: 32.7 G/DL — SIGNIFICANT CHANGE UP (ref 32–36)
MCHC RBC-ENTMCNC: 33 G/DL — SIGNIFICANT CHANGE UP (ref 32–36)
MCHC RBC-ENTMCNC: 33 G/DL — SIGNIFICANT CHANGE UP (ref 32–36)
MCHC RBC-ENTMCNC: 33.2 G/DL — SIGNIFICANT CHANGE UP (ref 32–36)
MCHC RBC-ENTMCNC: 33.2 G/DL — SIGNIFICANT CHANGE UP (ref 32–36)
MCHC RBC-ENTMCNC: 33.2 PG — SIGNIFICANT CHANGE UP (ref 27–34)
MCHC RBC-ENTMCNC: 33.3 G/DL — SIGNIFICANT CHANGE UP (ref 32–36)
MCHC RBC-ENTMCNC: 33.3 G/DL — SIGNIFICANT CHANGE UP (ref 32–36)
MCHC RBC-ENTMCNC: 33.9 G/DL — SIGNIFICANT CHANGE UP (ref 32–36)
MCHC RBC-ENTMCNC: 34.1 G/DL — SIGNIFICANT CHANGE UP (ref 32–36)
MCHC RBC-ENTMCNC: 34.1 G/DL — SIGNIFICANT CHANGE UP (ref 32–36)
MCHC RBC-ENTMCNC: 34.2 G/DL — SIGNIFICANT CHANGE UP (ref 32–36)
MCHC RBC-ENTMCNC: 34.5 G/DL — SIGNIFICANT CHANGE UP (ref 32–36)
MCHC RBC-ENTMCNC: 34.6 G/DL — SIGNIFICANT CHANGE UP (ref 32–36)
MCHC RBC-ENTMCNC: 34.6 G/DL — SIGNIFICANT CHANGE UP (ref 32–36)
MCHC RBC-ENTMCNC: 34.8 G/DL — SIGNIFICANT CHANGE UP (ref 32–36)
MCHC RBC-ENTMCNC: 35.1 G/DL — SIGNIFICANT CHANGE UP (ref 32–36)
MCHC RBC-ENTMCNC: 35.2 G/DL — SIGNIFICANT CHANGE UP (ref 32–36)
MCHC RBC-ENTMCNC: 35.3 G/DL — SIGNIFICANT CHANGE UP (ref 32–36)
MCV RBC AUTO: 87.1 FL — SIGNIFICANT CHANGE UP (ref 80–100)
MCV RBC AUTO: 88.8 FL — SIGNIFICANT CHANGE UP (ref 80–100)
MCV RBC AUTO: 88.8 FL — SIGNIFICANT CHANGE UP (ref 80–100)
MCV RBC AUTO: 89.2 FL — SIGNIFICANT CHANGE UP (ref 80–100)
MCV RBC AUTO: 89.8 FL — SIGNIFICANT CHANGE UP (ref 80–100)
MCV RBC AUTO: 90.7 FL — SIGNIFICANT CHANGE UP (ref 80–100)
MCV RBC AUTO: 90.7 FL — SIGNIFICANT CHANGE UP (ref 80–100)
MCV RBC AUTO: 93 FL — SIGNIFICANT CHANGE UP (ref 80–100)
MCV RBC AUTO: 93.5 FL — SIGNIFICANT CHANGE UP (ref 80–100)
MCV RBC AUTO: 93.6 FL — SIGNIFICANT CHANGE UP (ref 80–100)
MCV RBC AUTO: 93.9 FL — SIGNIFICANT CHANGE UP (ref 80–100)
MCV RBC AUTO: 94.6 FL — SIGNIFICANT CHANGE UP (ref 80–100)
MCV RBC AUTO: 94.9 FL — SIGNIFICANT CHANGE UP (ref 80–100)
MCV RBC AUTO: 95.4 FL — SIGNIFICANT CHANGE UP (ref 80–100)
MCV RBC AUTO: 96 FL — SIGNIFICANT CHANGE UP (ref 80–100)
MCV RBC AUTO: 96.1 FL — SIGNIFICANT CHANGE UP (ref 80–100)
MCV RBC AUTO: 97.5 FL — SIGNIFICANT CHANGE UP (ref 80–100)
MCV RBC AUTO: 97.7 FL — SIGNIFICANT CHANGE UP (ref 80–100)
MCV RBC AUTO: 98.3 FL — SIGNIFICANT CHANGE UP (ref 80–100)
MCV RBC AUTO: 99 FL — SIGNIFICANT CHANGE UP (ref 80–100)
MCV RBC AUTO: 99.4 FL — SIGNIFICANT CHANGE UP (ref 80–100)
METHOD TYPE: SIGNIFICANT CHANGE UP
METHOD TYPE: SIGNIFICANT CHANGE UP
MONOCYTES # BLD AUTO: 0.41 K/UL — SIGNIFICANT CHANGE UP (ref 0–0.9)
MONOCYTES # BLD AUTO: 1.42 K/UL — HIGH (ref 0–0.9)
MONOCYTES NFR BLD AUTO: 7 % — SIGNIFICANT CHANGE UP (ref 2–14)
MONOCYTES NFR BLD AUTO: 7.2 % — SIGNIFICANT CHANGE UP (ref 2–14)
NEUTROPHILS # BLD AUTO: 17.67 K/UL — HIGH (ref 1.8–7.4)
NEUTROPHILS # BLD AUTO: 4.61 K/UL — SIGNIFICANT CHANGE UP (ref 1.8–7.4)
NEUTROPHILS NFR BLD AUTO: 79.1 % — HIGH (ref 43–77)
NEUTROPHILS NFR BLD AUTO: 88.8 % — HIGH (ref 43–77)
NITRITE UR-MCNC: NEGATIVE — SIGNIFICANT CHANGE UP
NITRITE UR-MCNC: NEGATIVE — SIGNIFICANT CHANGE UP
NON HDL CHOLESTEROL: 50 MG/DL — SIGNIFICANT CHANGE UP
NRBC BLD AUTO-RTO: 0 /100 WBCS — SIGNIFICANT CHANGE UP (ref 0–0)
NRBC BLD AUTO-RTO: 1 /100 WBCS — HIGH (ref 0–0)
NRBC BLD AUTO-RTO: 2 /100 WBCS — HIGH (ref 0–0)
NRBC BLD AUTO-RTO: 4 /100 WBCS — HIGH (ref 0–0)
NT-PROBNP SERPL-SCNC: HIGH PG/ML (ref 0–300)
ORGANISM # SPEC MICROSCOPIC CNT: ABNORMAL
OSMOLALITY UR: 377 MOS/KG — SIGNIFICANT CHANGE UP (ref 300–900)
OVALOCYTES BLD QL SMEAR: SLIGHT — SIGNIFICANT CHANGE UP
PH UR: 7.5 — SIGNIFICANT CHANGE UP (ref 5–8)
PH UR: >=9 (ref 5–8)
PHOSPHATE SERPL-MCNC: 3.5 MG/DL — SIGNIFICANT CHANGE UP (ref 2.5–4.5)
PHOSPHATE SERPL-MCNC: 3.9 MG/DL — SIGNIFICANT CHANGE UP (ref 2.5–4.5)
PHOSPHATE SERPL-MCNC: 4.8 MG/DL — HIGH (ref 2.5–4.5)
PHOSPHATE SERPL-MCNC: 5.3 MG/DL — HIGH (ref 2.5–4.5)
PHOSPHATE SERPL-MCNC: 5.7 MG/DL — HIGH (ref 2.5–4.5)
PHOSPHATE SERPL-MCNC: 5.9 MG/DL — HIGH (ref 2.5–4.5)
PLAT MORPH BLD: NORMAL — SIGNIFICANT CHANGE UP
PLATELET # BLD AUTO: 104 K/UL — LOW (ref 150–400)
PLATELET # BLD AUTO: 104 K/UL — LOW (ref 150–400)
PLATELET # BLD AUTO: 105 K/UL — LOW (ref 150–400)
PLATELET # BLD AUTO: 110 K/UL — LOW (ref 150–400)
PLATELET # BLD AUTO: 42 K/UL — LOW (ref 150–400)
PLATELET # BLD AUTO: 44 K/UL — LOW (ref 150–400)
PLATELET # BLD AUTO: 49 K/UL — LOW (ref 150–400)
PLATELET # BLD AUTO: 58 K/UL — LOW (ref 150–400)
PLATELET # BLD AUTO: 64 K/UL — LOW (ref 150–400)
PLATELET # BLD AUTO: 64 K/UL — LOW (ref 150–400)
PLATELET # BLD AUTO: 72 K/UL — LOW (ref 150–400)
PLATELET # BLD AUTO: 76 K/UL — LOW (ref 150–400)
PLATELET # BLD AUTO: 79 K/UL — LOW (ref 150–400)
PLATELET # BLD AUTO: 79 K/UL — LOW (ref 150–400)
PLATELET # BLD AUTO: 81 K/UL — LOW (ref 150–400)
PLATELET # BLD AUTO: 82 K/UL — LOW (ref 150–400)
PLATELET # BLD AUTO: 84 K/UL — LOW (ref 150–400)
PLATELET # BLD AUTO: 88 K/UL — LOW (ref 150–400)
PLATELET # BLD AUTO: 96 K/UL — LOW (ref 150–400)
PLATELET # BLD AUTO: 98 K/UL — LOW (ref 150–400)
PLATELET # BLD AUTO: 99 K/UL — LOW (ref 150–400)
POIKILOCYTOSIS BLD QL AUTO: SLIGHT — SIGNIFICANT CHANGE UP
POLYCHROMASIA BLD QL SMEAR: SLIGHT — SIGNIFICANT CHANGE UP
POTASSIUM SERPL-MCNC: 3.8 MMOL/L — SIGNIFICANT CHANGE UP (ref 3.5–5.3)
POTASSIUM SERPL-MCNC: 3.8 MMOL/L — SIGNIFICANT CHANGE UP (ref 3.5–5.3)
POTASSIUM SERPL-MCNC: 4 MMOL/L — SIGNIFICANT CHANGE UP (ref 3.5–5.3)
POTASSIUM SERPL-MCNC: 4.6 MMOL/L — SIGNIFICANT CHANGE UP (ref 3.5–5.3)
POTASSIUM SERPL-MCNC: 4.6 MMOL/L — SIGNIFICANT CHANGE UP (ref 3.5–5.3)
POTASSIUM SERPL-MCNC: 4.7 MMOL/L — SIGNIFICANT CHANGE UP (ref 3.5–5.3)
POTASSIUM SERPL-MCNC: 5.1 MMOL/L — SIGNIFICANT CHANGE UP (ref 3.5–5.3)
POTASSIUM SERPL-MCNC: 5.1 MMOL/L — SIGNIFICANT CHANGE UP (ref 3.5–5.3)
POTASSIUM SERPL-MCNC: 5.2 MMOL/L — SIGNIFICANT CHANGE UP (ref 3.5–5.3)
POTASSIUM SERPL-MCNC: 5.4 MMOL/L — HIGH (ref 3.5–5.3)
POTASSIUM SERPL-MCNC: 5.5 MMOL/L — HIGH (ref 3.5–5.3)
POTASSIUM SERPL-MCNC: 5.5 MMOL/L — HIGH (ref 3.5–5.3)
POTASSIUM SERPL-MCNC: 5.7 MMOL/L — HIGH (ref 3.5–5.3)
POTASSIUM SERPL-SCNC: 3.8 MMOL/L — SIGNIFICANT CHANGE UP (ref 3.5–5.3)
POTASSIUM SERPL-SCNC: 3.8 MMOL/L — SIGNIFICANT CHANGE UP (ref 3.5–5.3)
POTASSIUM SERPL-SCNC: 4 MMOL/L — SIGNIFICANT CHANGE UP (ref 3.5–5.3)
POTASSIUM SERPL-SCNC: 4.6 MMOL/L — SIGNIFICANT CHANGE UP (ref 3.5–5.3)
POTASSIUM SERPL-SCNC: 4.6 MMOL/L — SIGNIFICANT CHANGE UP (ref 3.5–5.3)
POTASSIUM SERPL-SCNC: 4.7 MMOL/L — SIGNIFICANT CHANGE UP (ref 3.5–5.3)
POTASSIUM SERPL-SCNC: 5.1 MMOL/L — SIGNIFICANT CHANGE UP (ref 3.5–5.3)
POTASSIUM SERPL-SCNC: 5.1 MMOL/L — SIGNIFICANT CHANGE UP (ref 3.5–5.3)
POTASSIUM SERPL-SCNC: 5.2 MMOL/L — SIGNIFICANT CHANGE UP (ref 3.5–5.3)
POTASSIUM SERPL-SCNC: 5.4 MMOL/L — HIGH (ref 3.5–5.3)
POTASSIUM SERPL-SCNC: 5.5 MMOL/L — HIGH (ref 3.5–5.3)
POTASSIUM SERPL-SCNC: 5.5 MMOL/L — HIGH (ref 3.5–5.3)
POTASSIUM SERPL-SCNC: 5.7 MMOL/L — HIGH (ref 3.5–5.3)
PROCALCITONIN SERPL-MCNC: 0.22 NG/ML — HIGH (ref 0.02–0.1)
PROT SERPL-MCNC: 4.7 G/DL — LOW (ref 6–8.3)
PROT SERPL-MCNC: 5 G/DL — LOW (ref 6–8.3)
PROT SERPL-MCNC: 5.1 G/DL — LOW (ref 6–8.3)
PROT SERPL-MCNC: 5.6 G/DL — LOW (ref 6–8.3)
PROT SERPL-MCNC: 5.6 G/DL — LOW (ref 6–8.3)
PROT SERPL-MCNC: 6 G/DL — SIGNIFICANT CHANGE UP (ref 6–8.3)
PROT UR-MCNC: 100 MG/DL
PROT UR-MCNC: 30 MG/DL
PROTHROM AB SERPL-ACNC: 14.8 SEC — HIGH (ref 9.9–13.4)
PROTHROM AB SERPL-ACNC: 15.2 SEC — HIGH (ref 9.9–13.4)
PROTHROM AB SERPL-ACNC: 16 SEC — HIGH (ref 9.9–13.4)
PROTHROM AB SERPL-ACNC: 16.1 SEC — HIGH (ref 9.9–13.4)
PROTHROM AB SERPL-ACNC: 16.3 SEC — HIGH (ref 9.9–13.4)
PROTHROM AB SERPL-ACNC: 20.6 SEC — HIGH (ref 9.9–13.4)
RAPIDTEG MAXIMUM AMPLITUDE: 43.2 MM — LOW (ref 52–70)
RBC # BLD: 2 M/UL — LOW (ref 3.8–5.2)
RBC # BLD: 2.02 M/UL — LOW (ref 3.8–5.2)
RBC # BLD: 2.32 M/UL — LOW (ref 3.8–5.2)
RBC # BLD: 2.53 M/UL — LOW (ref 3.8–5.2)
RBC # BLD: 2.59 M/UL — LOW (ref 3.8–5.2)
RBC # BLD: 2.59 M/UL — LOW (ref 3.8–5.2)
RBC # BLD: 2.64 M/UL — LOW (ref 3.8–5.2)
RBC # BLD: 2.68 M/UL — LOW (ref 3.8–5.2)
RBC # BLD: 2.72 M/UL — LOW (ref 3.8–5.2)
RBC # BLD: 2.8 M/UL — LOW (ref 3.8–5.2)
RBC # BLD: 2.82 M/UL — LOW (ref 3.8–5.2)
RBC # BLD: 2.84 M/UL — LOW (ref 3.8–5.2)
RBC # BLD: 2.87 M/UL — LOW (ref 3.8–5.2)
RBC # BLD: 2.89 M/UL — LOW (ref 3.8–5.2)
RBC # BLD: 2.94 M/UL — LOW (ref 3.8–5.2)
RBC # BLD: 3.1 M/UL — LOW (ref 3.8–5.2)
RBC # BLD: 3.1 M/UL — LOW (ref 3.8–5.2)
RBC # BLD: 3.12 M/UL — LOW (ref 3.8–5.2)
RBC # BLD: 3.14 M/UL — LOW (ref 3.8–5.2)
RBC # BLD: 3.25 M/UL — LOW (ref 3.8–5.2)
RBC # BLD: 3.64 M/UL — LOW (ref 3.8–5.2)
RBC # FLD: 15.5 % — HIGH (ref 10.3–14.5)
RBC # FLD: 16.2 % — HIGH (ref 10.3–14.5)
RBC # FLD: 16.4 % — HIGH (ref 10.3–14.5)
RBC # FLD: 16.5 % — HIGH (ref 10.3–14.5)
RBC # FLD: 16.8 % — HIGH (ref 10.3–14.5)
RBC # FLD: 16.8 % — HIGH (ref 10.3–14.5)
RBC # FLD: 16.9 % — HIGH (ref 10.3–14.5)
RBC # FLD: 17.1 % — HIGH (ref 10.3–14.5)
RBC # FLD: 17.2 % — HIGH (ref 10.3–14.5)
RBC # FLD: 17.2 % — HIGH (ref 10.3–14.5)
RBC # FLD: 17.3 % — HIGH (ref 10.3–14.5)
RBC # FLD: 17.4 % — HIGH (ref 10.3–14.5)
RBC # FLD: 17.7 % — HIGH (ref 10.3–14.5)
RBC # FLD: 17.8 % — HIGH (ref 10.3–14.5)
RBC # FLD: 17.9 % — HIGH (ref 10.3–14.5)
RBC # FLD: 17.9 % — HIGH (ref 10.3–14.5)
RBC # FLD: 18.1 % — HIGH (ref 10.3–14.5)
RBC # FLD: 18.4 % — HIGH (ref 10.3–14.5)
RBC # FLD: 18.6 % — HIGH (ref 10.3–14.5)
RBC BLD AUTO: ABNORMAL
RBC CASTS # UR COMP ASSIST: 2 /HPF — SIGNIFICANT CHANGE UP (ref 0–4)
RBC CASTS # UR COMP ASSIST: 36 /HPF — HIGH (ref 0–4)
RETICS #: 100.7 K/UL — SIGNIFICANT CHANGE UP (ref 25–125)
RETICS/RBC NFR: 2.8 % — HIGH (ref 0.5–2.5)
REVIEW: SIGNIFICANT CHANGE UP
REVIEW: SIGNIFICANT CHANGE UP
RH IG SCN BLD-IMP: POSITIVE — SIGNIFICANT CHANGE UP
SODIUM SERPL-SCNC: 136 MMOL/L — SIGNIFICANT CHANGE UP (ref 135–145)
SODIUM SERPL-SCNC: 137 MMOL/L — SIGNIFICANT CHANGE UP (ref 135–145)
SODIUM SERPL-SCNC: 138 MMOL/L — SIGNIFICANT CHANGE UP (ref 135–145)
SODIUM SERPL-SCNC: 138 MMOL/L — SIGNIFICANT CHANGE UP (ref 135–145)
SODIUM SERPL-SCNC: 139 MMOL/L — SIGNIFICANT CHANGE UP (ref 135–145)
SODIUM UR-SCNC: 61 MMOL/L — SIGNIFICANT CHANGE UP
SP GR SPEC: 1.02 — SIGNIFICANT CHANGE UP (ref 1–1.03)
SP GR SPEC: 1.02 — SIGNIFICANT CHANGE UP (ref 1–1.03)
SPECIMEN SOURCE: SIGNIFICANT CHANGE UP
SQUAMOUS # UR AUTO: 2 /HPF — SIGNIFICANT CHANGE UP (ref 0–5)
SQUAMOUS # UR AUTO: 21 /HPF — HIGH (ref 0–5)
TEG FUNCTIONAL FIBRINOGEN: 9.4 MM — LOW (ref 15–32)
TEG MAXIMUM AMPLITUDE: 45.5 MM — LOW (ref 52–69)
TEG REACTION TIME: 5.2 MIN — SIGNIFICANT CHANGE UP (ref 4.6–9.1)
TRI-PHOS CRY UR QL COMP ASSIST: PRESENT
TRIGL SERPL-MCNC: 68 MG/DL — SIGNIFICANT CHANGE UP
UROBILINOGEN FLD QL: 0.2 MG/DL — SIGNIFICANT CHANGE UP (ref 0.2–1)
UROBILINOGEN FLD QL: 1 MG/DL — SIGNIFICANT CHANGE UP (ref 0.2–1)
VANCOMYCIN FLD-MCNC: 12.5 UG/ML — SIGNIFICANT CHANGE UP
VANCOMYCIN TROUGH SERPL-MCNC: 15.1 UG/ML — SIGNIFICANT CHANGE UP (ref 10–20)
WBC # BLD: 10.06 K/UL — SIGNIFICANT CHANGE UP (ref 3.8–10.5)
WBC # BLD: 10.23 K/UL — SIGNIFICANT CHANGE UP (ref 3.8–10.5)
WBC # BLD: 10.47 K/UL — SIGNIFICANT CHANGE UP (ref 3.8–10.5)
WBC # BLD: 10.77 K/UL — HIGH (ref 3.8–10.5)
WBC # BLD: 10.95 K/UL — HIGH (ref 3.8–10.5)
WBC # BLD: 11.24 K/UL — HIGH (ref 3.8–10.5)
WBC # BLD: 12.78 K/UL — HIGH (ref 3.8–10.5)
WBC # BLD: 13.14 K/UL — HIGH (ref 3.8–10.5)
WBC # BLD: 13.33 K/UL — HIGH (ref 3.8–10.5)
WBC # BLD: 13.59 K/UL — HIGH (ref 3.8–10.5)
WBC # BLD: 15.85 K/UL — HIGH (ref 3.8–10.5)
WBC # BLD: 19.07 K/UL — HIGH (ref 3.8–10.5)
WBC # BLD: 19.86 K/UL — HIGH (ref 3.8–10.5)
WBC # BLD: 20.69 K/UL — HIGH (ref 3.8–10.5)
WBC # BLD: 23.9 K/UL — HIGH (ref 3.8–10.5)
WBC # BLD: 5.06 K/UL — SIGNIFICANT CHANGE UP (ref 3.8–10.5)
WBC # BLD: 5.83 K/UL — SIGNIFICANT CHANGE UP (ref 3.8–10.5)
WBC # BLD: 6.04 K/UL — SIGNIFICANT CHANGE UP (ref 3.8–10.5)
WBC # BLD: 7 K/UL — SIGNIFICANT CHANGE UP (ref 3.8–10.5)
WBC # BLD: 7.23 K/UL — SIGNIFICANT CHANGE UP (ref 3.8–10.5)
WBC # BLD: 9.78 K/UL — SIGNIFICANT CHANGE UP (ref 3.8–10.5)
WBC # FLD AUTO: 10.06 K/UL — SIGNIFICANT CHANGE UP (ref 3.8–10.5)
WBC # FLD AUTO: 10.23 K/UL — SIGNIFICANT CHANGE UP (ref 3.8–10.5)
WBC # FLD AUTO: 10.47 K/UL — SIGNIFICANT CHANGE UP (ref 3.8–10.5)
WBC # FLD AUTO: 10.77 K/UL — HIGH (ref 3.8–10.5)
WBC # FLD AUTO: 10.95 K/UL — HIGH (ref 3.8–10.5)
WBC # FLD AUTO: 11.24 K/UL — HIGH (ref 3.8–10.5)
WBC # FLD AUTO: 12.78 K/UL — HIGH (ref 3.8–10.5)
WBC # FLD AUTO: 13.14 K/UL — HIGH (ref 3.8–10.5)
WBC # FLD AUTO: 13.33 K/UL — HIGH (ref 3.8–10.5)
WBC # FLD AUTO: 13.59 K/UL — HIGH (ref 3.8–10.5)
WBC # FLD AUTO: 15.85 K/UL — HIGH (ref 3.8–10.5)
WBC # FLD AUTO: 19.07 K/UL — HIGH (ref 3.8–10.5)
WBC # FLD AUTO: 19.86 K/UL — HIGH (ref 3.8–10.5)
WBC # FLD AUTO: 20.69 K/UL — HIGH (ref 3.8–10.5)
WBC # FLD AUTO: 23.9 K/UL — HIGH (ref 3.8–10.5)
WBC # FLD AUTO: 5.06 K/UL — SIGNIFICANT CHANGE UP (ref 3.8–10.5)
WBC # FLD AUTO: 5.83 K/UL — SIGNIFICANT CHANGE UP (ref 3.8–10.5)
WBC # FLD AUTO: 6.04 K/UL — SIGNIFICANT CHANGE UP (ref 3.8–10.5)
WBC # FLD AUTO: 7 K/UL — SIGNIFICANT CHANGE UP (ref 3.8–10.5)
WBC # FLD AUTO: 7.23 K/UL — SIGNIFICANT CHANGE UP (ref 3.8–10.5)
WBC # FLD AUTO: 9.78 K/UL — SIGNIFICANT CHANGE UP (ref 3.8–10.5)
WBC CLUMPS # UR AUTO: PRESENT
WBC UR QL: 23 /HPF — HIGH (ref 0–5)
WBC UR QL: >998 /HPF — HIGH (ref 0–5)

## 2025-01-01 PROCEDURE — 99223 1ST HOSP IP/OBS HIGH 75: CPT

## 2025-01-01 PROCEDURE — 74176 CT ABD & PELVIS W/O CONTRAST: CPT | Mod: 26

## 2025-01-01 PROCEDURE — 99233 SBSQ HOSP IP/OBS HIGH 50: CPT | Mod: 25

## 2025-01-01 PROCEDURE — 86900 BLOOD TYPING SEROLOGIC ABO: CPT

## 2025-01-01 PROCEDURE — 99497 ADVNCD CARE PLAN 30 MIN: CPT | Mod: 25

## 2025-01-01 PROCEDURE — 71045 X-RAY EXAM CHEST 1 VIEW: CPT | Mod: 26

## 2025-01-01 PROCEDURE — 83615 LACTATE (LD) (LDH) ENZYME: CPT

## 2025-01-01 PROCEDURE — P9059: CPT

## 2025-01-01 PROCEDURE — 99231 SBSQ HOSP IP/OBS SF/LOW 25: CPT

## 2025-01-01 PROCEDURE — 97530 THERAPEUTIC ACTIVITIES: CPT

## 2025-01-01 PROCEDURE — G0545: CPT

## 2025-01-01 PROCEDURE — 85610 PROTHROMBIN TIME: CPT

## 2025-01-01 PROCEDURE — 97164 PT RE-EVAL EST PLAN CARE: CPT

## 2025-01-01 PROCEDURE — 36415 COLL VENOUS BLD VENIPUNCTURE: CPT

## 2025-01-01 PROCEDURE — 85014 HEMATOCRIT: CPT

## 2025-01-01 PROCEDURE — 99233 SBSQ HOSP IP/OBS HIGH 50: CPT | Mod: GC

## 2025-01-01 PROCEDURE — 82962 GLUCOSE BLOOD TEST: CPT

## 2025-01-01 PROCEDURE — 85396 CLOTTING ASSAY WHOLE BLOOD: CPT

## 2025-01-01 PROCEDURE — P9100: CPT

## 2025-01-01 PROCEDURE — 93925 LOWER EXTREMITY STUDY: CPT | Mod: 26

## 2025-01-01 PROCEDURE — 83010 ASSAY OF HAPTOGLOBIN QUANT: CPT

## 2025-01-01 PROCEDURE — 82435 ASSAY OF BLOOD CHLORIDE: CPT

## 2025-01-01 PROCEDURE — 76705 ECHO EXAM OF ABDOMEN: CPT

## 2025-01-01 PROCEDURE — 96372 THER/PROPH/DIAG INJ SC/IM: CPT

## 2025-01-01 PROCEDURE — 84100 ASSAY OF PHOSPHORUS: CPT

## 2025-01-01 PROCEDURE — 76770 US EXAM ABDO BACK WALL COMP: CPT

## 2025-01-01 PROCEDURE — 85045 AUTOMATED RETICULOCYTE COUNT: CPT

## 2025-01-01 PROCEDURE — 99291 CRITICAL CARE FIRST HOUR: CPT | Mod: GC

## 2025-01-01 PROCEDURE — 83880 ASSAY OF NATRIURETIC PEPTIDE: CPT

## 2025-01-01 PROCEDURE — 87086 URINE CULTURE/COLONY COUNT: CPT

## 2025-01-01 PROCEDURE — 99498 ADVNCD CARE PLAN ADDL 30 MIN: CPT | Mod: 25

## 2025-01-01 PROCEDURE — 86901 BLOOD TYPING SEROLOGIC RH(D): CPT

## 2025-01-01 PROCEDURE — 76705 ECHO EXAM OF ABDOMEN: CPT | Mod: 26,GC

## 2025-01-01 PROCEDURE — 85384 FIBRINOGEN ACTIVITY: CPT

## 2025-01-01 PROCEDURE — 93923 UPR/LXTR ART STDY 3+ LVLS: CPT

## 2025-01-01 PROCEDURE — C1751: CPT

## 2025-01-01 PROCEDURE — 83935 ASSAY OF URINE OSMOLALITY: CPT

## 2025-01-01 PROCEDURE — 84145 PROCALCITONIN (PCT): CPT

## 2025-01-01 PROCEDURE — 99233 SBSQ HOSP IP/OBS HIGH 50: CPT

## 2025-01-01 PROCEDURE — 99223 1ST HOSP IP/OBS HIGH 75: CPT | Mod: 25

## 2025-01-01 PROCEDURE — 80202 ASSAY OF VANCOMYCIN: CPT

## 2025-01-01 PROCEDURE — C1894: CPT

## 2025-01-01 PROCEDURE — 80053 COMPREHEN METABOLIC PANEL: CPT

## 2025-01-01 PROCEDURE — 87040 BLOOD CULTURE FOR BACTERIA: CPT

## 2025-01-01 PROCEDURE — 99291 CRITICAL CARE FIRST HOUR: CPT | Mod: 25

## 2025-01-01 PROCEDURE — 76705 ECHO EXAM OF ABDOMEN: CPT | Mod: 26

## 2025-01-01 PROCEDURE — 76604 US EXAM CHEST: CPT | Mod: 26,GC

## 2025-01-01 PROCEDURE — 99232 SBSQ HOSP IP/OBS MODERATE 35: CPT

## 2025-01-01 PROCEDURE — P9037: CPT

## 2025-01-01 PROCEDURE — 99291 CRITICAL CARE FIRST HOUR: CPT | Mod: GC,25

## 2025-01-01 PROCEDURE — 86706 HEP B SURFACE ANTIBODY: CPT

## 2025-01-01 PROCEDURE — 83735 ASSAY OF MAGNESIUM: CPT

## 2025-01-01 PROCEDURE — 93922 UPR/L XTREMITY ART 2 LEVELS: CPT | Mod: 26

## 2025-01-01 PROCEDURE — P9016: CPT

## 2025-01-01 PROCEDURE — 93925 LOWER EXTREMITY STUDY: CPT

## 2025-01-01 PROCEDURE — 36800 INSERTION OF CANNULA: CPT | Mod: GC

## 2025-01-01 PROCEDURE — 85018 HEMOGLOBIN: CPT

## 2025-01-01 PROCEDURE — 86803 HEPATITIS C AB TEST: CPT

## 2025-01-01 PROCEDURE — 85027 COMPLETE CBC AUTOMATED: CPT

## 2025-01-01 PROCEDURE — 75635 CT ANGIO ABDOMINAL ARTERIES: CPT | Mod: 26

## 2025-01-01 PROCEDURE — 93308 TTE F-UP OR LMTD: CPT | Mod: 26

## 2025-01-01 PROCEDURE — 83605 ASSAY OF LACTIC ACID: CPT

## 2025-01-01 PROCEDURE — 97166 OT EVAL MOD COMPLEX 45 MIN: CPT

## 2025-01-01 PROCEDURE — 99291 CRITICAL CARE FIRST HOUR: CPT

## 2025-01-01 PROCEDURE — 87077 CULTURE AEROBIC IDENTIFY: CPT

## 2025-01-01 PROCEDURE — 93308 TTE F-UP OR LMTD: CPT | Mod: 26,GC

## 2025-01-01 PROCEDURE — 97162 PT EVAL MOD COMPLEX 30 MIN: CPT

## 2025-01-01 PROCEDURE — 82947 ASSAY GLUCOSE BLOOD QUANT: CPT

## 2025-01-01 PROCEDURE — 87340 HEPATITIS B SURFACE AG IA: CPT

## 2025-01-01 PROCEDURE — 93010 ELECTROCARDIOGRAM REPORT: CPT

## 2025-01-01 PROCEDURE — 76604 US EXAM CHEST: CPT | Mod: 26

## 2025-01-01 PROCEDURE — 85730 THROMBOPLASTIN TIME PARTIAL: CPT

## 2025-01-01 PROCEDURE — 83036 HEMOGLOBIN GLYCOSYLATED A1C: CPT

## 2025-01-01 PROCEDURE — 82803 BLOOD GASES ANY COMBINATION: CPT

## 2025-01-01 PROCEDURE — 97161 PT EVAL LOW COMPLEX 20 MIN: CPT

## 2025-01-01 PROCEDURE — 75635 CT ANGIO ABDOMINAL ARTERIES: CPT | Mod: MC

## 2025-01-01 PROCEDURE — 99261: CPT

## 2025-01-01 PROCEDURE — 93922 UPR/L XTREMITY ART 2 LEVELS: CPT

## 2025-01-01 PROCEDURE — 80048 BASIC METABOLIC PNL TOTAL CA: CPT

## 2025-01-01 PROCEDURE — 85025 COMPLETE CBC W/AUTO DIFF WBC: CPT

## 2025-01-01 PROCEDURE — 84300 ASSAY OF URINE SODIUM: CPT

## 2025-01-01 PROCEDURE — 99285 EMERGENCY DEPT VISIT HI MDM: CPT | Mod: 25

## 2025-01-01 PROCEDURE — 93306 TTE W/DOPPLER COMPLETE: CPT | Mod: 26

## 2025-01-01 PROCEDURE — 76770 US EXAM ABDO BACK WALL COMP: CPT | Mod: 26

## 2025-01-01 PROCEDURE — 36430 TRANSFUSION BLD/BLD COMPNT: CPT

## 2025-01-01 PROCEDURE — 74176 CT ABD & PELVIS W/O CONTRAST: CPT | Mod: MC

## 2025-01-01 PROCEDURE — 82140 ASSAY OF AMMONIA: CPT

## 2025-01-01 PROCEDURE — 99222 1ST HOSP IP/OBS MODERATE 55: CPT

## 2025-01-01 PROCEDURE — 81001 URINALYSIS AUTO W/SCOPE: CPT

## 2025-01-01 PROCEDURE — 36569 INSJ PICC 5 YR+ W/O IMAGING: CPT

## 2025-01-01 PROCEDURE — 97110 THERAPEUTIC EXERCISES: CPT

## 2025-01-01 PROCEDURE — 87186 SC STD MICRODIL/AGAR DIL: CPT

## 2025-01-01 PROCEDURE — 84295 ASSAY OF SERUM SODIUM: CPT

## 2025-01-01 PROCEDURE — 84132 ASSAY OF SERUM POTASSIUM: CPT

## 2025-01-01 PROCEDURE — 86850 RBC ANTIBODY SCREEN: CPT

## 2025-01-01 PROCEDURE — 86923 COMPATIBILITY TEST ELECTRIC: CPT

## 2025-01-01 PROCEDURE — 82330 ASSAY OF CALCIUM: CPT

## 2025-01-01 PROCEDURE — C8929: CPT

## 2025-01-01 PROCEDURE — 82248 BILIRUBIN DIRECT: CPT

## 2025-01-01 PROCEDURE — 93005 ELECTROCARDIOGRAM TRACING: CPT

## 2025-01-01 PROCEDURE — 80061 LIPID PANEL: CPT

## 2025-01-01 PROCEDURE — 71045 X-RAY EXAM CHEST 1 VIEW: CPT

## 2025-01-01 RX ORDER — ACETAMINOPHEN 500 MG/5ML
1000 LIQUID (ML) ORAL ONCE
Refills: 0 | Status: COMPLETED | OUTPATIENT
Start: 2025-01-01 | End: 2025-01-01

## 2025-01-01 RX ORDER — GABAPENTIN 400 MG/1
100 CAPSULE ORAL THREE TIMES A DAY
Refills: 0 | Status: DISCONTINUED | OUTPATIENT
Start: 2025-01-01 | End: 2025-01-01

## 2025-01-01 RX ORDER — HEPARIN SODIUM 1000 [USP'U]/ML
2500 INJECTION INTRAVENOUS; SUBCUTANEOUS EVERY 6 HOURS
Refills: 0 | Status: DISCONTINUED | OUTPATIENT
Start: 2025-01-01 | End: 2025-01-01

## 2025-01-01 RX ORDER — GLYCOPYRROLATE 0.2 MG/ML
0.4 INJECTION INTRAMUSCULAR; INTRAVENOUS EVERY 6 HOURS
Refills: 0 | Status: DISCONTINUED | OUTPATIENT
Start: 2025-01-01 | End: 2025-01-01

## 2025-01-01 RX ORDER — ONDANSETRON HCL/PF 4 MG/2 ML
4 VIAL (ML) INJECTION ONCE
Refills: 0 | Status: COMPLETED | OUTPATIENT
Start: 2025-01-01 | End: 2025-01-01

## 2025-01-01 RX ORDER — HYDROMORPHONE/SOD CHLOR,ISO/PF 2 MG/10 ML
0.2 SYRINGE (ML) INJECTION
Refills: 0 | Status: DISCONTINUED | OUTPATIENT
Start: 2025-01-01 | End: 2025-01-01

## 2025-01-01 RX ORDER — HEPARIN SODIUM 1000 [USP'U]/ML
800 INJECTION INTRAVENOUS; SUBCUTANEOUS
Qty: 25000 | Refills: 0 | Status: DISCONTINUED | OUTPATIENT
Start: 2025-01-01 | End: 2025-01-01

## 2025-01-01 RX ORDER — SODIUM CHLORIDE 9 G/1000ML
1000 INJECTION, SOLUTION INTRAVENOUS
Refills: 0 | Status: DISCONTINUED | OUTPATIENT
Start: 2025-01-01 | End: 2025-01-01

## 2025-01-01 RX ORDER — METOCLOPRAMIDE HCL 10 MG
10 TABLET ORAL EVERY 8 HOURS
Refills: 0 | Status: DISCONTINUED | OUTPATIENT
Start: 2025-01-01 | End: 2025-01-01

## 2025-01-01 RX ORDER — HEPARIN SODIUM 1000 [USP'U]/ML
INJECTION INTRAVENOUS; SUBCUTANEOUS
Qty: 25000 | Refills: 0 | Status: DISCONTINUED | OUTPATIENT
Start: 2025-01-01 | End: 2025-01-01

## 2025-01-01 RX ORDER — ATORVASTATIN CALCIUM 80 MG/1
1 TABLET, FILM COATED ORAL
Refills: 0 | DISCHARGE

## 2025-01-01 RX ORDER — HYDROMORPHONE/SOD CHLOR,ISO/PF 2 MG/10 ML
0.2 SYRINGE (ML) INJECTION ONCE
Refills: 0 | Status: DISCONTINUED | OUTPATIENT
Start: 2025-01-01 | End: 2025-01-01

## 2025-01-01 RX ORDER — SODIUM ZIRCONIUM CYCLOSILICATE 5 G/5G
5 POWDER, FOR SUSPENSION ORAL ONCE
Refills: 0 | Status: DISCONTINUED | OUTPATIENT
Start: 2025-01-01 | End: 2025-01-01

## 2025-01-01 RX ORDER — SODIUM BICARBONATE 1 MEQ/ML
650 SYRINGE (ML) INTRAVENOUS THREE TIMES A DAY
Refills: 0 | Status: DISCONTINUED | OUTPATIENT
Start: 2025-01-01 | End: 2025-01-01

## 2025-01-01 RX ORDER — HYDROMORPHONE/SOD CHLOR,ISO/PF 2 MG/10 ML
0.5 SYRINGE (ML) INJECTION ONCE
Refills: 0 | Status: DISCONTINUED | OUTPATIENT
Start: 2025-01-01 | End: 2025-01-01

## 2025-01-01 RX ORDER — LORAZEPAM 4 MG/ML
0.25 VIAL (ML) INJECTION ONCE
Refills: 0 | Status: DISCONTINUED | OUTPATIENT
Start: 2025-01-01 | End: 2025-01-01

## 2025-01-01 RX ORDER — MIDODRINE HYDROCHLORIDE 5 MG/1
10 TABLET ORAL EVERY 8 HOURS
Refills: 0 | Status: DISCONTINUED | OUTPATIENT
Start: 2025-01-01 | End: 2025-01-01

## 2025-01-01 RX ORDER — GABAPENTIN 400 MG/1
100 CAPSULE ORAL
Refills: 0 | Status: DISCONTINUED | OUTPATIENT
Start: 2025-01-01 | End: 2025-01-01

## 2025-01-01 RX ORDER — HEPARIN SODIUM 1000 [USP'U]/ML
5500 INJECTION INTRAVENOUS; SUBCUTANEOUS EVERY 6 HOURS
Refills: 0 | Status: DISCONTINUED | OUTPATIENT
Start: 2025-01-01 | End: 2025-01-01

## 2025-01-01 RX ORDER — HYDROMORPHONE/SOD CHLOR,ISO/PF 2 MG/10 ML
0.5 SYRINGE (ML) INJECTION
Refills: 0 | Status: DISCONTINUED | OUTPATIENT
Start: 2025-01-01 | End: 2025-01-01

## 2025-01-01 RX ORDER — ACETAMINOPHEN 500 MG/5ML
1000 LIQUID (ML) ORAL ONCE
Refills: 0 | Status: DISCONTINUED | OUTPATIENT
Start: 2025-01-01 | End: 2025-01-01

## 2025-01-01 RX ORDER — CLOPIDOGREL BISULFATE 75 MG/1
75 TABLET, FILM COATED ORAL DAILY
Refills: 0 | Status: DISCONTINUED | OUTPATIENT
Start: 2025-01-01 | End: 2025-01-01

## 2025-01-01 RX ORDER — OXYCODONE HYDROCHLORIDE 30 MG/1
2.5 TABLET ORAL EVERY 6 HOURS
Refills: 0 | Status: DISCONTINUED | OUTPATIENT
Start: 2025-01-01 | End: 2025-01-01

## 2025-01-01 RX ORDER — HEPARIN SODIUM 1000 [USP'U]/ML
1000 INJECTION INTRAVENOUS; SUBCUTANEOUS
Qty: 25000 | Refills: 0 | Status: DISCONTINUED | OUTPATIENT
Start: 2025-01-01 | End: 2025-01-01

## 2025-01-01 RX ORDER — HEPARIN SODIUM 1000 [USP'U]/ML
5000 INJECTION INTRAVENOUS; SUBCUTANEOUS EVERY 6 HOURS
Refills: 0 | Status: DISCONTINUED | OUTPATIENT
Start: 2025-01-01 | End: 2025-01-01

## 2025-01-01 RX ORDER — MAGNESIUM SULFATE 500 MG/ML
1 SYRINGE (ML) INJECTION ONCE
Refills: 0 | Status: COMPLETED | OUTPATIENT
Start: 2025-01-01 | End: 2025-01-01

## 2025-01-01 RX ORDER — HYDROMORPHONE/SOD CHLOR,ISO/PF 2 MG/10 ML
1 SYRINGE (ML) INJECTION EVERY 4 HOURS
Refills: 0 | Status: DISCONTINUED | OUTPATIENT
Start: 2025-01-01 | End: 2025-01-01

## 2025-01-01 RX ORDER — DEXTROSE 50 % IN WATER 50 %
25 SYRINGE (ML) INTRAVENOUS ONCE
Refills: 0 | Status: DISCONTINUED | OUTPATIENT
Start: 2025-01-01 | End: 2025-01-01

## 2025-01-01 RX ORDER — SACUBITRIL AND VALSARTAN 6; 6 MG/1; MG/1
1 PELLET ORAL
Refills: 0 | Status: DISCONTINUED | OUTPATIENT
Start: 2025-01-01 | End: 2025-01-01

## 2025-01-01 RX ORDER — LIDOCAINE HYDROCHLORIDE 20 MG/ML
1 JELLY TOPICAL DAILY
Refills: 0 | Status: DISCONTINUED | OUTPATIENT
Start: 2025-01-01 | End: 2025-01-01

## 2025-01-01 RX ORDER — INSULIN GLARGINE-YFGN 100 [IU]/ML
6 INJECTION, SOLUTION SUBCUTANEOUS ONCE
Refills: 0 | Status: COMPLETED | OUTPATIENT
Start: 2025-01-01 | End: 2025-01-01

## 2025-01-01 RX ORDER — OXYCODONE HYDROCHLORIDE 30 MG/1
5 TABLET ORAL EVERY 6 HOURS
Refills: 0 | Status: DISCONTINUED | OUTPATIENT
Start: 2025-01-01 | End: 2025-01-01

## 2025-01-01 RX ORDER — SODIUM CHLORIDE 9 G/1000ML
500 INJECTION, SOLUTION INTRAVENOUS
Refills: 0 | Status: COMPLETED | OUTPATIENT
Start: 2025-01-01 | End: 2025-01-01

## 2025-01-01 RX ORDER — LORAZEPAM 4 MG/ML
0.5 VIAL (ML) INJECTION
Refills: 0 | Status: DISCONTINUED | OUTPATIENT
Start: 2025-01-01 | End: 2025-01-01

## 2025-01-01 RX ORDER — SODIUM ZIRCONIUM CYCLOSILICATE 5 G/5G
5 POWDER, FOR SUSPENSION ORAL ONCE
Refills: 0 | Status: COMPLETED | OUTPATIENT
Start: 2025-01-01 | End: 2025-01-01

## 2025-01-01 RX ORDER — HYDROMORPHONE/SOD CHLOR,ISO/PF 2 MG/10 ML
0.5 SYRINGE (ML) INJECTION EVERY 4 HOURS
Refills: 0 | Status: DISCONTINUED | OUTPATIENT
Start: 2025-01-01 | End: 2025-01-01

## 2025-01-01 RX ORDER — LEVOTHYROXINE SODIUM 300 MCG
25 TABLET ORAL
Refills: 0 | Status: DISCONTINUED | OUTPATIENT
Start: 2025-01-01 | End: 2025-01-01

## 2025-01-01 RX ORDER — SODIUM CHLORIDE 9 G/1000ML
500 INJECTION, SOLUTION INTRAVENOUS ONCE
Refills: 0 | Status: COMPLETED | OUTPATIENT
Start: 2025-01-01 | End: 2025-01-01

## 2025-01-01 RX ORDER — GABAPENTIN 400 MG/1
100 CAPSULE ORAL ONCE
Refills: 0 | Status: COMPLETED | OUTPATIENT
Start: 2025-01-01 | End: 2025-01-01

## 2025-01-01 RX ORDER — ATORVASTATIN CALCIUM 80 MG/1
20 TABLET, FILM COATED ORAL AT BEDTIME
Refills: 0 | Status: DISCONTINUED | OUTPATIENT
Start: 2025-01-01 | End: 2025-01-01

## 2025-01-01 RX ORDER — CEFTRIAXONE 500 MG/1
1000 INJECTION, POWDER, FOR SOLUTION INTRAMUSCULAR; INTRAVENOUS EVERY 24 HOURS
Refills: 0 | Status: COMPLETED | OUTPATIENT
Start: 2025-01-01 | End: 2025-01-01

## 2025-01-01 RX ORDER — DESMOPRESSIN ACETATE 4 UG/ML
20 INJECTION INTRAVENOUS ONCE
Refills: 0 | Status: DISCONTINUED | OUTPATIENT
Start: 2025-01-01 | End: 2025-01-01

## 2025-01-01 RX ORDER — ACETAMINOPHEN 500 MG/5ML
650 LIQUID (ML) ORAL EVERY 6 HOURS
Refills: 0 | Status: DISCONTINUED | OUTPATIENT
Start: 2025-01-01 | End: 2025-01-01

## 2025-01-01 RX ORDER — INSULIN LISPRO 100 U/ML
6 INJECTION, SOLUTION INTRAVENOUS; SUBCUTANEOUS
Refills: 0 | Status: DISCONTINUED | OUTPATIENT
Start: 2025-01-01 | End: 2025-01-01

## 2025-01-01 RX ORDER — LEVOTHYROXINE SODIUM 300 MCG
50 TABLET ORAL DAILY
Refills: 0 | Status: DISCONTINUED | OUTPATIENT
Start: 2025-01-01 | End: 2025-01-01

## 2025-01-01 RX ORDER — LEVOTHYROXINE SODIUM 300 MCG
40 TABLET ORAL AT BEDTIME
Refills: 0 | Status: COMPLETED | OUTPATIENT
Start: 2025-01-01 | End: 2025-01-01

## 2025-01-01 RX ORDER — HEPARIN SODIUM 1000 [USP'U]/ML
5000 INJECTION INTRAVENOUS; SUBCUTANEOUS EVERY 12 HOURS
Refills: 0 | Status: DISCONTINUED | OUTPATIENT
Start: 2025-01-01 | End: 2025-01-01

## 2025-01-01 RX ORDER — MAGNESIUM, ALUMINUM HYDROXIDE 200-200 MG
30 TABLET,CHEWABLE ORAL EVERY 4 HOURS
Refills: 0 | Status: DISCONTINUED | OUTPATIENT
Start: 2025-01-01 | End: 2025-01-01

## 2025-01-01 RX ORDER — DESMOPRESSIN ACETATE 4 UG/ML
20 INJECTION INTRAVENOUS ONCE
Refills: 0 | Status: COMPLETED | OUTPATIENT
Start: 2025-01-01 | End: 2025-01-01

## 2025-01-01 RX ORDER — HYDROMORPHONE/SOD CHLOR,ISO/PF 2 MG/10 ML
0.5 SYRINGE (ML) INJECTION EVERY 6 HOURS
Refills: 0 | Status: DISCONTINUED | OUTPATIENT
Start: 2025-01-01 | End: 2025-01-01

## 2025-01-01 RX ORDER — QUETIAPINE FUMARATE 25 MG/1
12.5 TABLET ORAL ONCE
Refills: 0 | Status: COMPLETED | OUTPATIENT
Start: 2025-01-01 | End: 2025-01-01

## 2025-01-01 RX ORDER — OXYCODONE HYDROCHLORIDE 30 MG/1
2.5 TABLET ORAL ONCE
Refills: 0 | Status: DISCONTINUED | OUTPATIENT
Start: 2025-01-01 | End: 2025-01-01

## 2025-01-01 RX ORDER — LEVOTHYROXINE SODIUM 300 MCG
1 TABLET ORAL
Refills: 0 | DISCHARGE

## 2025-01-01 RX ORDER — INSULIN LISPRO 100 U/ML
INJECTION, SOLUTION INTRAVENOUS; SUBCUTANEOUS EVERY 6 HOURS
Refills: 0 | Status: DISCONTINUED | OUTPATIENT
Start: 2025-01-01 | End: 2025-01-01

## 2025-01-01 RX ORDER — DEXTROSE 50 % IN WATER 50 %
15 SYRINGE (ML) INTRAVENOUS ONCE
Refills: 0 | Status: DISCONTINUED | OUTPATIENT
Start: 2025-01-01 | End: 2025-01-01

## 2025-01-01 RX ORDER — LEVOTHYROXINE SODIUM 300 MCG
37.5 TABLET ORAL
Refills: 0 | Status: DISCONTINUED | OUTPATIENT
Start: 2025-01-01 | End: 2025-01-01

## 2025-01-01 RX ORDER — INSULIN LISPRO 100 U/ML
4 INJECTION, SOLUTION INTRAVENOUS; SUBCUTANEOUS
Refills: 0 | Status: DISCONTINUED | OUTPATIENT
Start: 2025-01-01 | End: 2025-01-01

## 2025-01-01 RX ORDER — CEFTRIAXONE 500 MG/1
1000 INJECTION, POWDER, FOR SOLUTION INTRAMUSCULAR; INTRAVENOUS EVERY 24 HOURS
Refills: 0 | Status: DISCONTINUED | OUTPATIENT
Start: 2025-01-01 | End: 2025-01-01

## 2025-01-01 RX ORDER — INFLUENZA A VIRUS A/IDAHO/07/2018 (H1N1) ANTIGEN (MDCK CELL DERIVED, PROPIOLACTONE INACTIVATED, INFLUENZA A VIRUS A/INDIANA/08/2018 (H3N2) ANTIGEN (MDCK CELL DERIVED, PROPIOLACTONE INACTIVATED), INFLUENZA B VIRUS B/SINGAPORE/INFTT-16-0610/2016 ANTIGEN (MDCK CELL DERIVED, PROPIOLACTONE INACTIVATED), INFLUENZA B VIRUS B/IOWA/06/2017 ANTIGEN (MDCK CELL DERIVED, PROPIOLACTONE INACTIVATED) 15; 15; 15; 15 UG/.5ML; UG/.5ML; UG/.5ML; UG/.5ML
0.5 INJECTION, SUSPENSION INTRAMUSCULAR ONCE
Refills: 0 | Status: DISCONTINUED | OUTPATIENT
Start: 2025-01-01 | End: 2025-01-01

## 2025-01-01 RX ORDER — INSULIN LISPRO 100 U/ML
INJECTION, SOLUTION INTRAVENOUS; SUBCUTANEOUS
Refills: 0 | Status: DISCONTINUED | OUTPATIENT
Start: 2025-01-01 | End: 2025-01-01

## 2025-01-01 RX ORDER — HYDROMORPHONE/SOD CHLOR,ISO/PF 2 MG/10 ML
0.4 SYRINGE (ML) INJECTION
Refills: 0 | Status: DISCONTINUED | OUTPATIENT
Start: 2025-01-01 | End: 2025-01-01

## 2025-01-01 RX ORDER — VANCOMYCIN HCL IN 5 % DEXTROSE 1.5G/250ML
1000 PLASTIC BAG, INJECTION (ML) INTRAVENOUS ONCE
Refills: 0 | Status: COMPLETED | OUTPATIENT
Start: 2025-01-01 | End: 2025-01-01

## 2025-01-01 RX ORDER — TORSEMIDE 10 MG
1 TABLET ORAL
Refills: 0 | DISCHARGE

## 2025-01-01 RX ORDER — INSULIN GLARGINE-YFGN 100 [IU]/ML
12 INJECTION, SOLUTION SUBCUTANEOUS AT BEDTIME
Refills: 0 | Status: DISCONTINUED | OUTPATIENT
Start: 2025-01-01 | End: 2025-01-01

## 2025-01-01 RX ORDER — MELATONIN 5 MG
3 TABLET ORAL AT BEDTIME
Refills: 0 | Status: DISCONTINUED | OUTPATIENT
Start: 2025-01-01 | End: 2025-01-01

## 2025-01-01 RX ORDER — TORSEMIDE 10 MG
20 TABLET ORAL DAILY
Refills: 0 | Status: DISCONTINUED | OUTPATIENT
Start: 2025-01-01 | End: 2025-01-01

## 2025-01-01 RX ORDER — BUMETANIDE 1 MG/1
1 TABLET ORAL ONCE
Refills: 0 | Status: COMPLETED | OUTPATIENT
Start: 2025-01-01 | End: 2025-01-01

## 2025-01-01 RX ORDER — SENNA 187 MG
2 TABLET ORAL AT BEDTIME
Refills: 0 | Status: DISCONTINUED | OUTPATIENT
Start: 2025-01-01 | End: 2025-01-01

## 2025-01-01 RX ORDER — HYDROMORPHONE/SOD CHLOR,ISO/PF 2 MG/10 ML
0.25 SYRINGE (ML) INJECTION ONCE
Refills: 0 | Status: DISCONTINUED | OUTPATIENT
Start: 2025-01-01 | End: 2025-01-01

## 2025-01-01 RX ORDER — ONDANSETRON HCL/PF 4 MG/2 ML
4 VIAL (ML) INJECTION EVERY 8 HOURS
Refills: 0 | Status: DISCONTINUED | OUTPATIENT
Start: 2025-01-01 | End: 2025-01-01

## 2025-01-01 RX ORDER — DEXTROSE 50 % IN WATER 50 %
25 SYRINGE (ML) INTRAVENOUS ONCE
Refills: 0 | Status: COMPLETED | OUTPATIENT
Start: 2025-01-01 | End: 2025-01-01

## 2025-01-01 RX ORDER — SACUBITRIL AND VALSARTAN 6; 6 MG/1; MG/1
1 PELLET ORAL
Refills: 0 | DISCHARGE

## 2025-01-01 RX ORDER — SODIUM ZIRCONIUM CYCLOSILICATE 5 G/5G
10 POWDER, FOR SUSPENSION ORAL THREE TIMES A DAY
Refills: 0 | Status: COMPLETED | OUTPATIENT
Start: 2025-01-01 | End: 2025-01-01

## 2025-01-01 RX ORDER — VANCOMYCIN HCL IN 5 % DEXTROSE 1.5G/250ML
500 PLASTIC BAG, INJECTION (ML) INTRAVENOUS ONCE
Refills: 0 | Status: COMPLETED | OUTPATIENT
Start: 2025-01-01 | End: 2025-01-01

## 2025-01-01 RX ORDER — GLUCAGON 3 MG/1
1 POWDER NASAL ONCE
Refills: 0 | Status: DISCONTINUED | OUTPATIENT
Start: 2025-01-01 | End: 2025-01-01

## 2025-01-01 RX ORDER — HYDROMORPHONE/SOD CHLOR,ISO/PF 2 MG/10 ML
0.7 SYRINGE (ML) INJECTION
Refills: 0 | Status: DISCONTINUED | OUTPATIENT
Start: 2025-01-01 | End: 2025-01-01

## 2025-01-01 RX ORDER — DEXTROSE 50 % IN WATER 50 %
12.5 SYRINGE (ML) INTRAVENOUS ONCE
Refills: 0 | Status: DISCONTINUED | OUTPATIENT
Start: 2025-01-01 | End: 2025-01-01

## 2025-01-01 RX ORDER — DEXMEDETOMIDINE HYDROCHLORIDE IN SODIUM CHLORIDE 4 UG/ML
0.2 INJECTION INTRAVENOUS
Qty: 200 | Refills: 0 | Status: DISCONTINUED | OUTPATIENT
Start: 2025-01-01 | End: 2025-01-01

## 2025-01-01 RX ORDER — INSULIN LISPRO 100 U/ML
INJECTION, SOLUTION INTRAVENOUS; SUBCUTANEOUS AT BEDTIME
Refills: 0 | Status: DISCONTINUED | OUTPATIENT
Start: 2025-01-01 | End: 2025-01-01

## 2025-01-01 RX ORDER — METOPROLOL SUCCINATE 50 MG/1
12.5 TABLET, EXTENDED RELEASE ORAL
Refills: 0 | Status: DISCONTINUED | OUTPATIENT
Start: 2025-01-01 | End: 2025-01-01

## 2025-01-01 RX ORDER — HYDROMORPHONE/SOD CHLOR,ISO/PF 2 MG/10 ML
0.2 SYRINGE (ML) INJECTION EVERY 4 HOURS
Refills: 0 | Status: DISCONTINUED | OUTPATIENT
Start: 2025-01-01 | End: 2025-01-01

## 2025-01-01 RX ORDER — VANCOMYCIN HCL IN 5 % DEXTROSE 1.5G/250ML
1250 PLASTIC BAG, INJECTION (ML) INTRAVENOUS ONCE
Refills: 0 | Status: COMPLETED | OUTPATIENT
Start: 2025-01-01 | End: 2025-01-01

## 2025-01-01 RX ORDER — MIDODRINE HYDROCHLORIDE 5 MG/1
5 TABLET ORAL EVERY 8 HOURS
Refills: 0 | Status: DISCONTINUED | OUTPATIENT
Start: 2025-01-01 | End: 2025-01-01

## 2025-01-01 RX ORDER — OXYCODONE HYDROCHLORIDE 30 MG/1
2.5 TABLET ORAL EVERY 8 HOURS
Refills: 0 | Status: DISCONTINUED | OUTPATIENT
Start: 2025-01-01 | End: 2025-01-01

## 2025-01-01 RX ADMIN — METOPROLOL SUCCINATE 12.5 MILLIGRAM(S): 50 TABLET, EXTENDED RELEASE ORAL at 05:37

## 2025-01-01 RX ADMIN — Medication 0.5 MILLIGRAM(S): at 16:00

## 2025-01-01 RX ADMIN — Medication 650 MILLIGRAM(S): at 14:12

## 2025-01-01 RX ADMIN — Medication 0.5 MILLIGRAM(S): at 05:57

## 2025-01-01 RX ADMIN — SODIUM ZIRCONIUM CYCLOSILICATE 10 GRAM(S): 5 POWDER, FOR SUSPENSION ORAL at 21:47

## 2025-01-01 RX ADMIN — INSULIN GLARGINE-YFGN 12 UNIT(S): 100 INJECTION, SOLUTION SUBCUTANEOUS at 21:49

## 2025-01-01 RX ADMIN — Medication 0.5 MILLIGRAM(S): at 11:20

## 2025-01-01 RX ADMIN — Medication 400 MILLIGRAM(S): at 12:46

## 2025-01-01 RX ADMIN — Medication 20 MILLIGRAM(S): at 08:49

## 2025-01-01 RX ADMIN — Medication 0.7 MILLIGRAM(S): at 08:21

## 2025-01-01 RX ADMIN — Medication 40 MICROGRAM(S): at 21:40

## 2025-01-01 RX ADMIN — Medication 0.2 MILLIGRAM(S): at 14:40

## 2025-01-01 RX ADMIN — Medication 1 APPLICATION(S): at 07:16

## 2025-01-01 RX ADMIN — GABAPENTIN 100 MILLIGRAM(S): 400 CAPSULE ORAL at 13:21

## 2025-01-01 RX ADMIN — Medication 3 MILLIGRAM(S): at 21:20

## 2025-01-01 RX ADMIN — METOPROLOL SUCCINATE 12.5 MILLIGRAM(S): 50 TABLET, EXTENDED RELEASE ORAL at 17:30

## 2025-01-01 RX ADMIN — Medication 4 MILLIGRAM(S): at 11:20

## 2025-01-01 RX ADMIN — LIDOCAINE HYDROCHLORIDE 1 PATCH: 20 JELLY TOPICAL at 11:53

## 2025-01-01 RX ADMIN — Medication 4 MILLIGRAM(S): at 04:30

## 2025-01-01 RX ADMIN — DEXMEDETOMIDINE HYDROCHLORIDE IN SODIUM CHLORIDE 3.32 MICROGRAM(S)/KG/HR: 4 INJECTION INTRAVENOUS at 18:54

## 2025-01-01 RX ADMIN — Medication 0.5 MILLIGRAM(S): at 05:52

## 2025-01-01 RX ADMIN — INSULIN LISPRO 2: 100 INJECTION, SOLUTION INTRAVENOUS; SUBCUTANEOUS at 05:34

## 2025-01-01 RX ADMIN — Medication 1 APPLICATION(S): at 12:04

## 2025-01-01 RX ADMIN — Medication 100 MILLIGRAM(S): at 06:15

## 2025-01-01 RX ADMIN — GABAPENTIN 100 MILLIGRAM(S): 400 CAPSULE ORAL at 17:31

## 2025-01-01 RX ADMIN — Medication 650 MILLIGRAM(S): at 14:32

## 2025-01-01 RX ADMIN — Medication 650 MILLIGRAM(S): at 05:04

## 2025-01-01 RX ADMIN — HEPARIN SODIUM 900 UNIT(S)/HR: 1000 INJECTION INTRAVENOUS; SUBCUTANEOUS at 15:20

## 2025-01-01 RX ADMIN — INSULIN LISPRO 2: 100 INJECTION, SOLUTION INTRAVENOUS; SUBCUTANEOUS at 17:09

## 2025-01-01 RX ADMIN — Medication 0.2 MILLIGRAM(S): at 21:08

## 2025-01-01 RX ADMIN — Medication 0.5 MILLIGRAM(S): at 17:30

## 2025-01-01 RX ADMIN — GABAPENTIN 100 MILLIGRAM(S): 400 CAPSULE ORAL at 06:14

## 2025-01-01 RX ADMIN — Medication 50 MICROGRAM(S): at 06:14

## 2025-01-01 RX ADMIN — Medication 0.5 MILLIGRAM(S): at 18:00

## 2025-01-01 RX ADMIN — Medication 0.25 MILLIGRAM(S): at 19:10

## 2025-01-01 RX ADMIN — LIDOCAINE HYDROCHLORIDE 1 PATCH: 20 JELLY TOPICAL at 00:41

## 2025-01-01 RX ADMIN — INSULIN LISPRO 2: 100 INJECTION, SOLUTION INTRAVENOUS; SUBCUTANEOUS at 17:31

## 2025-01-01 RX ADMIN — Medication 1000 MILLILITER(S): at 16:28

## 2025-01-01 RX ADMIN — CLOPIDOGREL BISULFATE 75 MILLIGRAM(S): 75 TABLET, FILM COATED ORAL at 13:21

## 2025-01-01 RX ADMIN — Medication 1 APPLICATION(S): at 12:06

## 2025-01-01 RX ADMIN — Medication 40 MICROGRAM(S): at 02:05

## 2025-01-01 RX ADMIN — Medication 0.5 MILLIGRAM(S): at 21:16

## 2025-01-01 RX ADMIN — Medication 1000 MILLIGRAM(S): at 13:20

## 2025-01-01 RX ADMIN — Medication 0.5 MILLIGRAM(S): at 22:15

## 2025-01-01 RX ADMIN — Medication 1000 MILLIGRAM(S): at 21:02

## 2025-01-01 RX ADMIN — HEPARIN SODIUM 5000 UNIT(S): 1000 INJECTION INTRAVENOUS; SUBCUTANEOUS at 17:15

## 2025-01-01 RX ADMIN — INSULIN LISPRO 1: 100 INJECTION, SOLUTION INTRAVENOUS; SUBCUTANEOUS at 18:34

## 2025-01-01 RX ADMIN — SODIUM CHLORIDE 500 MILLILITER(S): 9 INJECTION, SOLUTION INTRAVENOUS at 18:51

## 2025-01-01 RX ADMIN — Medication 0.5 MILLIGRAM(S): at 15:00

## 2025-01-01 RX ADMIN — Medication 0.5 MILLIGRAM(S): at 17:06

## 2025-01-01 RX ADMIN — Medication 0.5 MILLIGRAM(S): at 11:55

## 2025-01-01 RX ADMIN — Medication 1 APPLICATION(S): at 13:13

## 2025-01-01 RX ADMIN — INSULIN LISPRO 4 UNIT(S): 100 INJECTION, SOLUTION INTRAVENOUS; SUBCUTANEOUS at 13:21

## 2025-01-01 RX ADMIN — Medication 0.5 MILLIGRAM(S): at 11:04

## 2025-01-01 RX ADMIN — GABAPENTIN 100 MILLIGRAM(S): 400 CAPSULE ORAL at 05:37

## 2025-01-01 RX ADMIN — Medication 0.2 MILLIGRAM(S): at 00:54

## 2025-01-01 RX ADMIN — Medication 0.2 MILLIGRAM(S): at 01:41

## 2025-01-01 RX ADMIN — INSULIN LISPRO 3: 100 INJECTION, SOLUTION INTRAVENOUS; SUBCUTANEOUS at 08:33

## 2025-01-01 RX ADMIN — Medication 25 GRAM(S): at 11:19

## 2025-01-01 RX ADMIN — Medication 650 MILLIGRAM(S): at 21:47

## 2025-01-01 RX ADMIN — Medication 0.5 MILLIGRAM(S): at 07:38

## 2025-01-01 RX ADMIN — Medication 0.2 MILLIGRAM(S): at 09:22

## 2025-01-01 RX ADMIN — Medication 650 MILLIGRAM(S): at 13:21

## 2025-01-01 RX ADMIN — HEPARIN SODIUM 800 UNIT(S)/HR: 1000 INJECTION INTRAVENOUS; SUBCUTANEOUS at 01:13

## 2025-01-01 RX ADMIN — MIDODRINE HYDROCHLORIDE 10 MILLIGRAM(S): 5 TABLET ORAL at 21:40

## 2025-01-01 RX ADMIN — Medication 0.5 MILLIGRAM(S): at 12:35

## 2025-01-01 RX ADMIN — OXYCODONE HYDROCHLORIDE 2.5 MILLIGRAM(S): 30 TABLET ORAL at 06:05

## 2025-01-01 RX ADMIN — SACUBITRIL AND VALSARTAN 1 TABLET(S): 6; 6 PELLET ORAL at 17:31

## 2025-01-01 RX ADMIN — Medication 0.5 MILLIGRAM(S): at 21:31

## 2025-01-01 RX ADMIN — SACUBITRIL AND VALSARTAN 1 TABLET(S): 6; 6 PELLET ORAL at 06:15

## 2025-01-01 RX ADMIN — INSULIN LISPRO 2: 100 INJECTION, SOLUTION INTRAVENOUS; SUBCUTANEOUS at 06:33

## 2025-01-01 RX ADMIN — CLOPIDOGREL BISULFATE 75 MILLIGRAM(S): 75 TABLET, FILM COATED ORAL at 13:20

## 2025-01-01 RX ADMIN — LIDOCAINE HYDROCHLORIDE 1 PATCH: 20 JELLY TOPICAL at 18:57

## 2025-01-01 RX ADMIN — Medication 10 MILLIGRAM(S): at 16:01

## 2025-01-01 RX ADMIN — LIDOCAINE HYDROCHLORIDE 1 PATCH: 20 JELLY TOPICAL at 12:56

## 2025-01-01 RX ADMIN — Medication 1 APPLICATION(S): at 12:16

## 2025-01-01 RX ADMIN — CEFTRIAXONE 100 MILLIGRAM(S): 500 INJECTION, POWDER, FOR SOLUTION INTRAMUSCULAR; INTRAVENOUS at 18:54

## 2025-01-01 RX ADMIN — Medication 1 APPLICATION(S): at 12:33

## 2025-01-01 RX ADMIN — Medication 0.5 MILLIGRAM(S): at 10:51

## 2025-01-01 RX ADMIN — Medication 250 MILLIGRAM(S): at 02:04

## 2025-01-01 RX ADMIN — Medication 4 MILLIGRAM(S): at 14:08

## 2025-01-01 RX ADMIN — Medication 0.5 MILLIGRAM(S): at 23:11

## 2025-01-01 RX ADMIN — INSULIN LISPRO 2: 100 INJECTION, SOLUTION INTRAVENOUS; SUBCUTANEOUS at 05:24

## 2025-01-01 RX ADMIN — HEPARIN SODIUM 900 UNIT(S)/HR: 1000 INJECTION INTRAVENOUS; SUBCUTANEOUS at 01:06

## 2025-01-01 RX ADMIN — Medication 650 MILLIGRAM(S): at 01:00

## 2025-01-01 RX ADMIN — Medication 0.5 MILLIGRAM(S): at 15:06

## 2025-01-01 RX ADMIN — Medication 0.2 MILLIGRAM(S): at 01:05

## 2025-01-01 RX ADMIN — GABAPENTIN 100 MILLIGRAM(S): 400 CAPSULE ORAL at 00:54

## 2025-01-01 RX ADMIN — LIDOCAINE HYDROCHLORIDE 1 PATCH: 20 JELLY TOPICAL at 19:41

## 2025-01-01 RX ADMIN — Medication 0.5 MILLIGRAM(S): at 04:57

## 2025-01-01 RX ADMIN — Medication 650 MILLIGRAM(S): at 20:00

## 2025-01-01 RX ADMIN — Medication 166.67 MILLIGRAM(S): at 19:42

## 2025-01-01 RX ADMIN — Medication 0.5 MILLIGRAM(S): at 14:09

## 2025-01-01 RX ADMIN — Medication 0.5 MILLIGRAM(S): at 03:37

## 2025-01-01 RX ADMIN — Medication 650 MILLIGRAM(S): at 23:27

## 2025-01-01 RX ADMIN — MIDODRINE HYDROCHLORIDE 10 MILLIGRAM(S): 5 TABLET ORAL at 06:15

## 2025-01-01 RX ADMIN — Medication 0.2 MILLIGRAM(S): at 05:12

## 2025-01-01 RX ADMIN — QUETIAPINE FUMARATE 12.5 MILLIGRAM(S): 25 TABLET ORAL at 00:54

## 2025-01-01 RX ADMIN — Medication 0.5 MILLIGRAM(S): at 22:16

## 2025-01-01 RX ADMIN — OXYCODONE HYDROCHLORIDE 2.5 MILLIGRAM(S): 30 TABLET ORAL at 10:24

## 2025-01-01 RX ADMIN — Medication 0.2 MILLIGRAM(S): at 10:45

## 2025-01-01 RX ADMIN — Medication 1 APPLICATION(S): at 05:05

## 2025-01-01 RX ADMIN — Medication 4 MILLIGRAM(S): at 19:04

## 2025-01-01 RX ADMIN — INSULIN GLARGINE-YFGN 12 UNIT(S): 100 INJECTION, SOLUTION SUBCUTANEOUS at 22:24

## 2025-01-01 RX ADMIN — GABAPENTIN 100 MILLIGRAM(S): 400 CAPSULE ORAL at 05:23

## 2025-01-01 RX ADMIN — Medication 0.2 MILLIGRAM(S): at 03:13

## 2025-01-01 RX ADMIN — Medication 0.5 MILLIGRAM(S): at 23:21

## 2025-01-01 RX ADMIN — LIDOCAINE HYDROCHLORIDE 1 PATCH: 20 JELLY TOPICAL at 11:38

## 2025-01-01 RX ADMIN — INSULIN LISPRO 6: 100 INJECTION, SOLUTION INTRAVENOUS; SUBCUTANEOUS at 08:45

## 2025-01-01 RX ADMIN — METOPROLOL SUCCINATE 12.5 MILLIGRAM(S): 50 TABLET, EXTENDED RELEASE ORAL at 17:31

## 2025-01-01 RX ADMIN — Medication 650 MILLIGRAM(S): at 06:46

## 2025-01-01 RX ADMIN — Medication 0.2 MILLIGRAM(S): at 07:45

## 2025-01-01 RX ADMIN — HEPARIN SODIUM 5000 UNIT(S): 1000 INJECTION INTRAVENOUS; SUBCUTANEOUS at 20:37

## 2025-01-01 RX ADMIN — SODIUM ZIRCONIUM CYCLOSILICATE 5 GRAM(S): 5 POWDER, FOR SUSPENSION ORAL at 18:16

## 2025-01-01 RX ADMIN — INSULIN LISPRO 1: 100 INJECTION, SOLUTION INTRAVENOUS; SUBCUTANEOUS at 12:13

## 2025-01-01 RX ADMIN — Medication 500 MILLILITER(S): at 21:52

## 2025-01-01 RX ADMIN — INSULIN LISPRO 3: 100 INJECTION, SOLUTION INTRAVENOUS; SUBCUTANEOUS at 11:50

## 2025-01-01 RX ADMIN — Medication 20 MILLIGRAM(S): at 06:14

## 2025-01-01 RX ADMIN — Medication 0.5 MILLIGRAM(S): at 00:03

## 2025-01-01 RX ADMIN — HEPARIN SODIUM 900 UNIT(S)/HR: 1000 INJECTION INTRAVENOUS; SUBCUTANEOUS at 19:18

## 2025-01-01 RX ADMIN — Medication 0.5 MILLIGRAM(S): at 13:20

## 2025-01-01 RX ADMIN — OXYCODONE HYDROCHLORIDE 2.5 MILLIGRAM(S): 30 TABLET ORAL at 22:00

## 2025-01-01 RX ADMIN — OXYCODONE HYDROCHLORIDE 2.5 MILLIGRAM(S): 30 TABLET ORAL at 21:35

## 2025-01-01 RX ADMIN — Medication 0.5 MILLIGRAM(S): at 18:15

## 2025-01-01 RX ADMIN — Medication 0.5 MILLIGRAM(S): at 15:21

## 2025-01-01 RX ADMIN — SACUBITRIL AND VALSARTAN 1 TABLET(S): 6; 6 PELLET ORAL at 08:49

## 2025-01-01 RX ADMIN — METOPROLOL SUCCINATE 12.5 MILLIGRAM(S): 50 TABLET, EXTENDED RELEASE ORAL at 08:49

## 2025-01-01 RX ADMIN — Medication 40 MICROGRAM(S): at 22:06

## 2025-01-01 RX ADMIN — MIDODRINE HYDROCHLORIDE 10 MILLIGRAM(S): 5 TABLET ORAL at 13:10

## 2025-01-01 RX ADMIN — Medication 0.5 MILLIGRAM(S): at 02:38

## 2025-01-01 RX ADMIN — Medication 0.5 MILLIGRAM(S): at 19:22

## 2025-01-01 RX ADMIN — HEPARIN SODIUM 800 UNIT(S)/HR: 1000 INJECTION INTRAVENOUS; SUBCUTANEOUS at 02:53

## 2025-01-01 RX ADMIN — Medication 0.5 MILLIGRAM(S): at 22:38

## 2025-01-01 RX ADMIN — Medication 0.5 MILLIGRAM(S): at 01:24

## 2025-01-01 RX ADMIN — Medication 0.5 MILLIGRAM(S): at 12:55

## 2025-01-01 RX ADMIN — SODIUM ZIRCONIUM CYCLOSILICATE 10 GRAM(S): 5 POWDER, FOR SUSPENSION ORAL at 14:32

## 2025-01-01 RX ADMIN — ATORVASTATIN CALCIUM 20 MILLIGRAM(S): 80 TABLET, FILM COATED ORAL at 22:24

## 2025-01-01 RX ADMIN — Medication 0.5 MILLIGRAM(S): at 19:04

## 2025-01-01 RX ADMIN — HEPARIN SODIUM 5000 UNIT(S): 1000 INJECTION INTRAVENOUS; SUBCUTANEOUS at 05:12

## 2025-01-01 RX ADMIN — Medication 650 MILLIGRAM(S): at 13:10

## 2025-01-01 RX ADMIN — SODIUM CHLORIDE 500 MILLILITER(S): 9 INJECTION, SOLUTION INTRAVENOUS at 11:58

## 2025-01-01 RX ADMIN — HEPARIN SODIUM 800 UNIT(S)/HR: 1000 INJECTION INTRAVENOUS; SUBCUTANEOUS at 07:15

## 2025-01-01 RX ADMIN — Medication 650 MILLIGRAM(S): at 01:09

## 2025-01-01 RX ADMIN — Medication 0.5 MILLIGRAM(S): at 15:30

## 2025-01-01 RX ADMIN — INSULIN LISPRO 2: 100 INJECTION, SOLUTION INTRAVENOUS; SUBCUTANEOUS at 13:04

## 2025-01-01 RX ADMIN — Medication 0.5 MILLIGRAM(S): at 13:19

## 2025-01-01 RX ADMIN — Medication 650 MILLIGRAM(S): at 22:06

## 2025-01-01 RX ADMIN — Medication 1 APPLICATION(S): at 15:02

## 2025-01-01 RX ADMIN — Medication 0.5 MILLIGRAM(S): at 10:30

## 2025-01-01 RX ADMIN — Medication 0.5 MILLIGRAM(S): at 17:42

## 2025-01-01 RX ADMIN — INSULIN LISPRO 2: 100 INJECTION, SOLUTION INTRAVENOUS; SUBCUTANEOUS at 21:37

## 2025-01-01 RX ADMIN — BUMETANIDE 1 MILLIGRAM(S): 1 TABLET ORAL at 12:56

## 2025-01-01 RX ADMIN — Medication 0.2 MILLIGRAM(S): at 22:35

## 2025-01-01 RX ADMIN — Medication 0.2 MILLIGRAM(S): at 01:54

## 2025-01-01 RX ADMIN — OXYCODONE HYDROCHLORIDE 2.5 MILLIGRAM(S): 30 TABLET ORAL at 05:16

## 2025-01-01 RX ADMIN — Medication 40 MILLIGRAM(S): at 06:14

## 2025-01-01 RX ADMIN — GABAPENTIN 100 MILLIGRAM(S): 400 CAPSULE ORAL at 22:05

## 2025-01-01 RX ADMIN — Medication 0.5 MILLIGRAM(S): at 09:53

## 2025-01-01 RX ADMIN — HEPARIN SODIUM 800 UNIT(S)/HR: 1000 INJECTION INTRAVENOUS; SUBCUTANEOUS at 18:12

## 2025-01-01 RX ADMIN — HEPARIN SODIUM 5000 UNIT(S): 1000 INJECTION INTRAVENOUS; SUBCUTANEOUS at 05:05

## 2025-01-01 RX ADMIN — Medication 50 MICROGRAM(S): at 06:06

## 2025-01-01 RX ADMIN — OXYCODONE HYDROCHLORIDE 2.5 MILLIGRAM(S): 30 TABLET ORAL at 01:39

## 2025-01-01 RX ADMIN — LIDOCAINE HYDROCHLORIDE 1 PATCH: 20 JELLY TOPICAL at 07:00

## 2025-01-01 RX ADMIN — Medication 650 MILLIGRAM(S): at 06:15

## 2025-01-01 RX ADMIN — ATORVASTATIN CALCIUM 20 MILLIGRAM(S): 80 TABLET, FILM COATED ORAL at 21:22

## 2025-01-01 RX ADMIN — HEPARIN SODIUM 0 UNIT(S)/HR: 1000 INJECTION INTRAVENOUS; SUBCUTANEOUS at 16:57

## 2025-01-01 RX ADMIN — INSULIN LISPRO 2: 100 INJECTION, SOLUTION INTRAVENOUS; SUBCUTANEOUS at 09:00

## 2025-01-01 RX ADMIN — CLOPIDOGREL BISULFATE 75 MILLIGRAM(S): 75 TABLET, FILM COATED ORAL at 13:18

## 2025-01-01 RX ADMIN — HEPARIN SODIUM 800 UNIT(S)/HR: 1000 INJECTION INTRAVENOUS; SUBCUTANEOUS at 02:57

## 2025-01-01 RX ADMIN — INSULIN LISPRO 2: 100 INJECTION, SOLUTION INTRAVENOUS; SUBCUTANEOUS at 15:43

## 2025-01-01 RX ADMIN — HEPARIN SODIUM 800 UNIT(S)/HR: 1000 INJECTION INTRAVENOUS; SUBCUTANEOUS at 07:55

## 2025-01-01 RX ADMIN — OXYCODONE HYDROCHLORIDE 2.5 MILLIGRAM(S): 30 TABLET ORAL at 01:15

## 2025-01-01 RX ADMIN — Medication 40 MILLIGRAM(S): at 06:05

## 2025-01-01 RX ADMIN — Medication 0.5 MILLIGRAM(S): at 04:56

## 2025-01-01 RX ADMIN — HEPARIN SODIUM 800 UNIT(S)/HR: 1000 INJECTION INTRAVENOUS; SUBCUTANEOUS at 19:30

## 2025-01-01 RX ADMIN — Medication 650 MILLIGRAM(S): at 08:00

## 2025-01-01 RX ADMIN — Medication 0.5 MILLIGRAM(S): at 18:38

## 2025-01-01 RX ADMIN — HEPARIN SODIUM 800 UNIT(S)/HR: 1000 INJECTION INTRAVENOUS; SUBCUTANEOUS at 09:37

## 2025-01-01 RX ADMIN — Medication 400 MILLIGRAM(S): at 20:12

## 2025-01-01 RX ADMIN — Medication 650 MILLIGRAM(S): at 22:20

## 2025-01-01 RX ADMIN — INSULIN GLARGINE-YFGN 6 UNIT(S): 100 INJECTION, SOLUTION SUBCUTANEOUS at 05:31

## 2025-01-01 RX ADMIN — INSULIN LISPRO 2: 100 INJECTION, SOLUTION INTRAVENOUS; SUBCUTANEOUS at 17:26

## 2025-01-01 RX ADMIN — Medication 0.2 MILLIGRAM(S): at 17:09

## 2025-01-01 RX ADMIN — Medication 3 MILLIGRAM(S): at 00:11

## 2025-01-01 RX ADMIN — Medication 0.5 MILLIGRAM(S): at 05:40

## 2025-01-01 RX ADMIN — Medication 0.5 MILLIGRAM(S): at 10:11

## 2025-01-01 RX ADMIN — HEPARIN SODIUM 1200 UNIT(S)/HR: 1000 INJECTION INTRAVENOUS; SUBCUTANEOUS at 23:21

## 2025-01-01 RX ADMIN — GABAPENTIN 100 MILLIGRAM(S): 400 CAPSULE ORAL at 06:06

## 2025-01-01 RX ADMIN — INSULIN LISPRO 4 UNIT(S): 100 INJECTION, SOLUTION INTRAVENOUS; SUBCUTANEOUS at 08:45

## 2025-01-01 RX ADMIN — Medication 1000 MILLIGRAM(S): at 21:36

## 2025-01-01 RX ADMIN — Medication 1 APPLICATION(S): at 06:33

## 2025-01-01 RX ADMIN — Medication 650 MILLIGRAM(S): at 11:21

## 2025-01-01 RX ADMIN — Medication 650 MILLIGRAM(S): at 13:13

## 2025-01-01 RX ADMIN — LIDOCAINE HYDROCHLORIDE 1 PATCH: 20 JELLY TOPICAL at 12:34

## 2025-01-01 RX ADMIN — Medication 400 MILLIGRAM(S): at 20:33

## 2025-01-01 RX ADMIN — Medication 50 MICROGRAM(S): at 05:04

## 2025-01-01 RX ADMIN — CLOPIDOGREL BISULFATE 75 MILLIGRAM(S): 75 TABLET, FILM COATED ORAL at 11:21

## 2025-01-01 RX ADMIN — INSULIN LISPRO 1: 100 INJECTION, SOLUTION INTRAVENOUS; SUBCUTANEOUS at 18:06

## 2025-01-01 RX ADMIN — HEPARIN SODIUM 900 UNIT(S)/HR: 1000 INJECTION INTRAVENOUS; SUBCUTANEOUS at 19:16

## 2025-01-01 RX ADMIN — OXYCODONE HYDROCHLORIDE 2.5 MILLIGRAM(S): 30 TABLET ORAL at 00:48

## 2025-01-01 RX ADMIN — Medication 0.5 MILLIGRAM(S): at 22:04

## 2025-01-01 RX ADMIN — METOPROLOL SUCCINATE 12.5 MILLIGRAM(S): 50 TABLET, EXTENDED RELEASE ORAL at 06:05

## 2025-01-01 RX ADMIN — Medication 0.5 MILLIGRAM(S): at 23:35

## 2025-01-01 RX ADMIN — OXYCODONE HYDROCHLORIDE 2.5 MILLIGRAM(S): 30 TABLET ORAL at 12:50

## 2025-01-01 RX ADMIN — CLOPIDOGREL BISULFATE 75 MILLIGRAM(S): 75 TABLET, FILM COATED ORAL at 12:18

## 2025-01-01 RX ADMIN — INSULIN LISPRO 2: 100 INJECTION, SOLUTION INTRAVENOUS; SUBCUTANEOUS at 18:01

## 2025-01-01 RX ADMIN — Medication 0.5 MILLIGRAM(S): at 02:02

## 2025-01-01 RX ADMIN — CEFTRIAXONE 100 MILLIGRAM(S): 500 INJECTION, POWDER, FOR SOLUTION INTRAMUSCULAR; INTRAVENOUS at 18:20

## 2025-01-01 RX ADMIN — Medication 50 MICROGRAM(S): at 05:16

## 2025-01-01 RX ADMIN — Medication 10 MILLIGRAM(S): at 15:30

## 2025-01-01 RX ADMIN — HEPARIN SODIUM 1000 UNIT(S)/HR: 1000 INJECTION INTRAVENOUS; SUBCUTANEOUS at 07:28

## 2025-01-01 RX ADMIN — MIDODRINE HYDROCHLORIDE 10 MILLIGRAM(S): 5 TABLET ORAL at 05:15

## 2025-01-01 RX ADMIN — GABAPENTIN 100 MILLIGRAM(S): 400 CAPSULE ORAL at 06:15

## 2025-01-01 RX ADMIN — OXYCODONE HYDROCHLORIDE 2.5 MILLIGRAM(S): 30 TABLET ORAL at 10:55

## 2025-01-01 RX ADMIN — Medication 0.2 MILLIGRAM(S): at 09:52

## 2025-01-01 RX ADMIN — Medication 0.2 MILLIGRAM(S): at 14:08

## 2025-01-01 RX ADMIN — OXYCODONE HYDROCHLORIDE 2.5 MILLIGRAM(S): 30 TABLET ORAL at 12:34

## 2025-01-01 RX ADMIN — OXYCODONE HYDROCHLORIDE 2.5 MILLIGRAM(S): 30 TABLET ORAL at 00:19

## 2025-01-01 RX ADMIN — Medication 1000 MILLIGRAM(S): at 19:04

## 2025-01-01 RX ADMIN — Medication 2 TABLET(S): at 21:32

## 2025-01-01 RX ADMIN — Medication 0.5 MILLIGRAM(S): at 05:08

## 2025-01-01 RX ADMIN — INSULIN LISPRO 4: 100 INJECTION, SOLUTION INTRAVENOUS; SUBCUTANEOUS at 08:37

## 2025-01-01 RX ADMIN — Medication 50 MICROGRAM(S): at 05:00

## 2025-01-01 RX ADMIN — Medication 0.5 MILLIGRAM(S): at 02:24

## 2025-01-01 RX ADMIN — INSULIN LISPRO 4: 100 INJECTION, SOLUTION INTRAVENOUS; SUBCUTANEOUS at 17:42

## 2025-01-01 RX ADMIN — Medication 650 MILLIGRAM(S): at 05:21

## 2025-01-01 RX ADMIN — OXYCODONE HYDROCHLORIDE 2.5 MILLIGRAM(S): 30 TABLET ORAL at 16:38

## 2025-01-01 RX ADMIN — Medication 0.5 MILLIGRAM(S): at 17:57

## 2025-01-01 RX ADMIN — Medication 0.25 MILLIGRAM(S): at 18:59

## 2025-01-01 RX ADMIN — SODIUM CHLORIDE 500 MILLILITER(S): 9 INJECTION, SOLUTION INTRAVENOUS at 02:05

## 2025-01-01 RX ADMIN — INSULIN LISPRO 5: 100 INJECTION, SOLUTION INTRAVENOUS; SUBCUTANEOUS at 13:21

## 2025-01-01 RX ADMIN — ATORVASTATIN CALCIUM 20 MILLIGRAM(S): 80 TABLET, FILM COATED ORAL at 21:48

## 2025-01-01 RX ADMIN — Medication 650 MILLIGRAM(S): at 19:23

## 2025-01-01 RX ADMIN — Medication 650 MILLIGRAM(S): at 05:35

## 2025-01-01 RX ADMIN — DESMOPRESSIN ACETATE 220 MICROGRAM(S): 4 INJECTION INTRAVENOUS at 11:44

## 2025-01-01 RX ADMIN — Medication 4 MILLIGRAM(S): at 10:51

## 2025-01-01 RX ADMIN — Medication 0.2 MILLIGRAM(S): at 17:54

## 2025-01-01 RX ADMIN — Medication 0.5 MILLIGRAM(S): at 01:07

## 2025-01-01 RX ADMIN — Medication 650 MILLIGRAM(S): at 13:42

## 2025-01-01 RX ADMIN — Medication 0.5 MILLIGRAM(S): at 08:27

## 2025-01-01 RX ADMIN — Medication 40 MILLIGRAM(S): at 05:36

## 2025-01-01 RX ADMIN — INSULIN LISPRO 2: 100 INJECTION, SOLUTION INTRAVENOUS; SUBCUTANEOUS at 17:29

## 2025-01-01 RX ADMIN — Medication 650 MILLIGRAM(S): at 07:29

## 2025-01-01 RX ADMIN — Medication 0.5 MILLIGRAM(S): at 13:55

## 2025-01-01 RX ADMIN — Medication 4 MILLIGRAM(S): at 13:20

## 2025-01-01 RX ADMIN — Medication 20 MILLIGRAM(S): at 22:23

## 2025-01-01 RX ADMIN — GABAPENTIN 100 MILLIGRAM(S): 400 CAPSULE ORAL at 05:16

## 2025-01-01 RX ADMIN — Medication 0.5 MILLIGRAM(S): at 09:00

## 2025-01-01 RX ADMIN — Medication 0.5 MILLIGRAM(S): at 06:04

## 2025-01-01 RX ADMIN — Medication 40 MICROGRAM(S): at 22:46

## 2025-01-01 RX ADMIN — Medication 1 APPLICATION(S): at 05:17

## 2025-01-01 RX ADMIN — MIDODRINE HYDROCHLORIDE 10 MILLIGRAM(S): 5 TABLET ORAL at 05:23

## 2025-01-01 RX ADMIN — HEPARIN SODIUM 1000 UNIT(S)/HR: 1000 INJECTION INTRAVENOUS; SUBCUTANEOUS at 02:08

## 2025-01-01 RX ADMIN — Medication 2 TABLET(S): at 21:33

## 2025-01-01 RX ADMIN — INSULIN LISPRO 4 UNIT(S): 100 INJECTION, SOLUTION INTRAVENOUS; SUBCUTANEOUS at 17:31

## 2025-01-01 RX ADMIN — Medication 40 MILLIGRAM(S): at 08:49

## 2025-01-01 RX ADMIN — OXYCODONE HYDROCHLORIDE 2.5 MILLIGRAM(S): 30 TABLET ORAL at 17:08

## 2025-01-01 RX ADMIN — Medication 0.2 MILLIGRAM(S): at 06:23

## 2025-01-01 RX ADMIN — Medication 25 MICROGRAM(S): at 08:57

## 2025-01-01 RX ADMIN — Medication 0.5 MILLIGRAM(S): at 09:54

## 2025-01-01 RX ADMIN — SODIUM ZIRCONIUM CYCLOSILICATE 5 GRAM(S): 5 POWDER, FOR SUSPENSION ORAL at 12:00

## 2025-01-01 RX ADMIN — INSULIN LISPRO 3: 100 INJECTION, SOLUTION INTRAVENOUS; SUBCUTANEOUS at 12:00

## 2025-01-01 RX ADMIN — SODIUM ZIRCONIUM CYCLOSILICATE 10 GRAM(S): 5 POWDER, FOR SUSPENSION ORAL at 05:00

## 2025-01-01 RX ADMIN — Medication 650 MILLIGRAM(S): at 17:42

## 2025-01-01 RX ADMIN — Medication 50 MILLILITER(S): at 10:35

## 2025-01-01 RX ADMIN — Medication 0.2 MILLIGRAM(S): at 10:53

## 2025-01-01 RX ADMIN — Medication 0.2 MILLIGRAM(S): at 20:15

## 2025-01-01 RX ADMIN — Medication 0.5 MILLIGRAM(S): at 16:14

## 2025-01-01 RX ADMIN — Medication 0.5 MILLIGRAM(S): at 11:16

## 2025-01-01 RX ADMIN — Medication 400 MILLIGRAM(S): at 18:34

## 2025-01-01 RX ADMIN — GABAPENTIN 100 MILLIGRAM(S): 400 CAPSULE ORAL at 05:09

## 2025-01-01 RX ADMIN — HEPARIN SODIUM 800 UNIT(S)/HR: 1000 INJECTION INTRAVENOUS; SUBCUTANEOUS at 07:34

## 2025-01-01 RX ADMIN — Medication 20 MILLIGRAM(S): at 22:52

## 2025-01-01 RX ADMIN — Medication 650 MILLIGRAM(S): at 14:00

## 2025-01-01 RX ADMIN — Medication 0.5 MILLIGRAM(S): at 00:00

## 2025-01-01 RX ADMIN — Medication 0.5 MILLIGRAM(S): at 02:13

## 2025-01-01 RX ADMIN — HEPARIN SODIUM 800 UNIT(S)/HR: 1000 INJECTION INTRAVENOUS; SUBCUTANEOUS at 20:41

## 2025-01-01 RX ADMIN — OXYCODONE HYDROCHLORIDE 2.5 MILLIGRAM(S): 30 TABLET ORAL at 20:35

## 2025-01-01 RX ADMIN — INSULIN LISPRO 1: 100 INJECTION, SOLUTION INTRAVENOUS; SUBCUTANEOUS at 01:05

## 2025-01-01 RX ADMIN — Medication 0.5 MILLIGRAM(S): at 13:50

## 2025-01-01 RX ADMIN — HEPARIN SODIUM 1000 UNIT(S)/HR: 1000 INJECTION INTRAVENOUS; SUBCUTANEOUS at 09:04

## 2025-01-01 RX ADMIN — INSULIN LISPRO 2: 100 INJECTION, SOLUTION INTRAVENOUS; SUBCUTANEOUS at 12:46

## 2025-01-01 RX ADMIN — CEFTRIAXONE 100 MILLIGRAM(S): 500 INJECTION, POWDER, FOR SOLUTION INTRAMUSCULAR; INTRAVENOUS at 18:34

## 2025-01-01 RX ADMIN — Medication 0.2 MILLIGRAM(S): at 19:15

## 2025-01-01 RX ADMIN — INSULIN LISPRO 4: 100 INJECTION, SOLUTION INTRAVENOUS; SUBCUTANEOUS at 12:00

## 2025-01-01 RX ADMIN — Medication 650 MILLIGRAM(S): at 21:20

## 2025-01-01 RX ADMIN — INSULIN LISPRO 4: 100 INJECTION, SOLUTION INTRAVENOUS; SUBCUTANEOUS at 12:33

## 2025-01-01 RX ADMIN — Medication 0.5 MILLIGRAM(S): at 21:33

## 2025-01-01 RX ADMIN — HEPARIN SODIUM 900 UNIT(S)/HR: 1000 INJECTION INTRAVENOUS; SUBCUTANEOUS at 10:04

## 2025-01-01 RX ADMIN — OXYCODONE HYDROCHLORIDE 2.5 MILLIGRAM(S): 30 TABLET ORAL at 13:26

## 2025-01-01 RX ADMIN — LIDOCAINE HYDROCHLORIDE 1 PATCH: 20 JELLY TOPICAL at 08:35

## 2025-01-01 RX ADMIN — INSULIN LISPRO 4 UNIT(S): 100 INJECTION, SOLUTION INTRAVENOUS; SUBCUTANEOUS at 13:04

## 2025-01-01 RX ADMIN — Medication 50 MICROGRAM(S): at 05:37

## 2025-01-01 RX ADMIN — Medication 650 MILLIGRAM(S): at 05:00

## 2025-01-01 RX ADMIN — Medication 0.7 MILLIGRAM(S): at 09:00

## 2025-01-01 RX ADMIN — Medication 650 MILLIGRAM(S): at 13:36

## 2025-01-01 RX ADMIN — Medication 100 GRAM(S): at 17:27

## 2025-01-01 RX ADMIN — GABAPENTIN 100 MILLIGRAM(S): 400 CAPSULE ORAL at 08:48

## 2025-01-01 RX ADMIN — GABAPENTIN 100 MILLIGRAM(S): 400 CAPSULE ORAL at 21:19

## 2025-01-01 RX ADMIN — Medication 0.5 MILLIGRAM(S): at 05:56

## 2025-01-01 RX ADMIN — INSULIN LISPRO 2: 100 INJECTION, SOLUTION INTRAVENOUS; SUBCUTANEOUS at 08:33

## 2025-01-01 RX ADMIN — OXYCODONE HYDROCHLORIDE 2.5 MILLIGRAM(S): 30 TABLET ORAL at 21:40

## 2025-01-01 RX ADMIN — Medication 0.2 MILLIGRAM(S): at 17:56

## 2025-01-01 RX ADMIN — MIDODRINE HYDROCHLORIDE 10 MILLIGRAM(S): 5 TABLET ORAL at 21:04

## 2025-01-01 RX ADMIN — Medication 0.5 MILLIGRAM(S): at 14:52

## 2025-01-01 RX ADMIN — SODIUM CHLORIDE 500 MILLILITER(S): 9 INJECTION, SOLUTION INTRAVENOUS at 01:02

## 2025-01-01 RX ADMIN — GABAPENTIN 100 MILLIGRAM(S): 400 CAPSULE ORAL at 21:04

## 2025-01-01 RX ADMIN — Medication 650 MILLIGRAM(S): at 05:23

## 2025-01-01 RX ADMIN — Medication 0.2 MILLIGRAM(S): at 03:33

## 2025-01-01 RX ADMIN — Medication 0.5 MILLIGRAM(S): at 18:45

## 2025-01-01 RX ADMIN — Medication 0.5 MILLIGRAM(S): at 00:11

## 2025-01-01 RX ADMIN — HEPARIN SODIUM 1000 UNIT(S)/HR: 1000 INJECTION INTRAVENOUS; SUBCUTANEOUS at 18:34

## 2025-01-01 RX ADMIN — METOPROLOL SUCCINATE 12.5 MILLIGRAM(S): 50 TABLET, EXTENDED RELEASE ORAL at 06:14

## 2025-01-01 RX ADMIN — Medication 1 APPLICATION(S): at 12:13

## 2025-01-01 RX ADMIN — GABAPENTIN 100 MILLIGRAM(S): 400 CAPSULE ORAL at 17:27

## 2025-01-01 RX ADMIN — Medication 650 MILLIGRAM(S): at 21:19

## 2025-01-01 RX ADMIN — DESMOPRESSIN ACETATE 220 MICROGRAM(S): 4 INJECTION INTRAVENOUS at 19:36

## 2025-01-01 RX ADMIN — HEPARIN SODIUM 0 UNIT(S)/HR: 1000 INJECTION INTRAVENOUS; SUBCUTANEOUS at 07:51

## 2025-01-01 RX ADMIN — HEPARIN SODIUM 900 UNIT(S)/HR: 1000 INJECTION INTRAVENOUS; SUBCUTANEOUS at 11:10

## 2025-01-01 RX ADMIN — OXYCODONE HYDROCHLORIDE 2.5 MILLIGRAM(S): 30 TABLET ORAL at 05:05

## 2025-01-01 RX ADMIN — INSULIN LISPRO 6: 100 INJECTION, SOLUTION INTRAVENOUS; SUBCUTANEOUS at 12:25

## 2025-01-01 RX ADMIN — Medication 650 MILLIGRAM(S): at 21:04

## 2025-01-01 RX ADMIN — Medication 650 MILLIGRAM(S): at 21:40

## 2025-01-01 RX ADMIN — Medication 400 MILLIGRAM(S): at 18:49

## 2025-01-01 RX ADMIN — Medication 0.5 MILLIGRAM(S): at 02:21

## 2025-01-01 RX ADMIN — Medication 650 MILLIGRAM(S): at 00:10

## 2025-01-01 RX ADMIN — INSULIN LISPRO 2: 100 INJECTION, SOLUTION INTRAVENOUS; SUBCUTANEOUS at 02:07

## 2025-01-02 ENCOUNTER — NON-APPOINTMENT (OUTPATIENT)
Age: 89
End: 2025-01-02

## 2025-01-03 ENCOUNTER — LABORATORY RESULT (OUTPATIENT)
Age: 89
End: 2025-01-03

## 2025-01-04 DIAGNOSIS — N30.00 ACUTE CYSTITIS W/OUT HEMATURIA: ICD-10-CM

## 2025-01-05 PROBLEM — R05.9 COUGH: Status: ACTIVE | Noted: 2025-01-05

## 2025-01-05 RX ORDER — CIPROFLOXACIN HYDROCHLORIDE 250 MG/1
250 TABLET, FILM COATED ORAL
Qty: 10 | Refills: 0 | Status: ACTIVE | COMMUNITY
Start: 2025-01-04 | End: 1900-01-01

## 2025-01-06 ENCOUNTER — TRANSCRIPTION ENCOUNTER (OUTPATIENT)
Age: 89
End: 2025-01-06

## 2025-01-07 ENCOUNTER — NON-APPOINTMENT (OUTPATIENT)
Age: 89
End: 2025-01-07

## 2025-01-07 ENCOUNTER — TRANSCRIPTION ENCOUNTER (OUTPATIENT)
Age: 89
End: 2025-01-07

## 2025-01-07 ENCOUNTER — LABORATORY RESULT (OUTPATIENT)
Age: 89
End: 2025-01-07

## 2025-01-08 ENCOUNTER — TRANSCRIPTION ENCOUNTER (OUTPATIENT)
Age: 89
End: 2025-01-08

## 2025-01-09 ENCOUNTER — TRANSCRIPTION ENCOUNTER (OUTPATIENT)
Age: 89
End: 2025-01-09

## 2025-01-09 ENCOUNTER — NON-APPOINTMENT (OUTPATIENT)
Age: 89
End: 2025-01-09

## 2025-01-10 ENCOUNTER — APPOINTMENT (OUTPATIENT)
Dept: HOME HEALTH SERVICES | Facility: HOME HEALTH | Age: 89
End: 2025-01-10

## 2025-01-10 ENCOUNTER — NON-APPOINTMENT (OUTPATIENT)
Age: 89
End: 2025-01-10

## 2025-01-10 VITALS
TEMPERATURE: 98.7 F | OXYGEN SATURATION: 97 % | DIASTOLIC BLOOD PRESSURE: 68 MMHG | HEART RATE: 76 BPM | SYSTOLIC BLOOD PRESSURE: 127 MMHG | RESPIRATION RATE: 18 BRPM

## 2025-01-21 ENCOUNTER — NON-APPOINTMENT (OUTPATIENT)
Age: 89
End: 2025-01-21

## 2025-01-24 ENCOUNTER — APPOINTMENT (OUTPATIENT)
Dept: HOME HEALTH SERVICES | Facility: HOME HEALTH | Age: 89
End: 2025-01-24
Payer: MEDICARE

## 2025-01-24 VITALS
SYSTOLIC BLOOD PRESSURE: 126 MMHG | OXYGEN SATURATION: 98 % | RESPIRATION RATE: 18 BRPM | DIASTOLIC BLOOD PRESSURE: 66 MMHG | TEMPERATURE: 98 F

## 2025-01-24 DIAGNOSIS — I50.22 CHRONIC SYSTOLIC (CONGESTIVE) HEART FAILURE: ICD-10-CM

## 2025-01-24 DIAGNOSIS — Z79.4 TYPE 2 DIABETES MELLITUS WITH DIABETIC NEUROPATHY, UNSPECIFIED: ICD-10-CM

## 2025-01-24 DIAGNOSIS — I25.118 ATHEROSCLEROTIC HEART DISEASE OF NATIVE CORONARY ARTERY WITH OTHER FORMS OF ANGINA PECTORIS: ICD-10-CM

## 2025-01-24 DIAGNOSIS — I70.0 ATHEROSCLEROSIS OF AORTA: ICD-10-CM

## 2025-01-24 DIAGNOSIS — N18.30 TYPE 2 DIABETES MELLITUS WITH DIABETIC CHRONIC KIDNEY DISEASE: ICD-10-CM

## 2025-01-24 DIAGNOSIS — E11.40 TYPE 2 DIABETES MELLITUS WITH DIABETIC NEUROPATHY, UNSPECIFIED: ICD-10-CM

## 2025-01-24 DIAGNOSIS — E11.22 TYPE 2 DIABETES MELLITUS WITH DIABETIC CHRONIC KIDNEY DISEASE: ICD-10-CM

## 2025-01-24 PROCEDURE — 99350 HOME/RES VST EST HIGH MDM 60: CPT

## 2025-01-29 ENCOUNTER — RX RENEWAL (OUTPATIENT)
Age: 89
End: 2025-01-29

## 2025-02-11 ENCOUNTER — NON-APPOINTMENT (OUTPATIENT)
Age: 89
End: 2025-02-11

## 2025-02-12 ENCOUNTER — APPOINTMENT (OUTPATIENT)
Dept: HOME HEALTH SERVICES | Facility: HOME HEALTH | Age: 89
End: 2025-02-12

## 2025-02-12 ENCOUNTER — NON-APPOINTMENT (OUTPATIENT)
Age: 89
End: 2025-02-12

## 2025-02-12 VITALS
SYSTOLIC BLOOD PRESSURE: 125 MMHG | OXYGEN SATURATION: 99 % | HEART RATE: 74 BPM | DIASTOLIC BLOOD PRESSURE: 78 MMHG | TEMPERATURE: 98.1 F | RESPIRATION RATE: 18 BRPM

## 2025-02-12 DIAGNOSIS — S81.809A UNSPECIFIED OPEN WOUND, UNSPECIFIED LOWER LEG, INITIAL ENCOUNTER: ICD-10-CM

## 2025-02-20 NOTE — ED ADULT NURSE NOTE - NS TRANSFER PATIENT BELONGINGS
4 Eyes Skin Assessment     NAME:  Pro Miller  YOB: 1932  MEDICAL RECORD NUMBER:  [id number]    Clothing

## 2025-03-10 ENCOUNTER — TRANSCRIPTION ENCOUNTER (OUTPATIENT)
Age: 89
End: 2025-03-10

## 2025-03-10 ENCOUNTER — APPOINTMENT (OUTPATIENT)
Dept: AFTER HOURS CARE | Facility: EMERGENCY ROOM | Age: 89
End: 2025-03-10

## 2025-03-10 NOTE — ED ADULT NURSE REASSESSMENT NOTE - NS ED NURSE REASSESS COMMENT FT1
Patient's daughter at bedside refusing further intervention from nursing staff until provider speaks with patient and family, patient's daughter states "I don't want to wake her at this time, we need to talk to a provider first." Provider James made aware of the situation.

## 2025-03-10 NOTE — ED PROVIDER NOTE - ATTENDING CONTRIBUTION TO CARE
Patient is a 89-year-old female with a history of type 2 diabetes mellitus, hypertension, dyslipidemia, CAD, CHF, here for evaluation of acute onset right leg pain that started around 1 PM.  Patient does get cramping here and there but this pain is very different from prior.  She denies any previous stents in her leg.  No history of DVT/PE.  No falls.      VS noted  Gen:  elderly, uncomfortable, writhing in pain  HEENT: EOMI, mmm  Lungs: CTAB/L no C/ W /R   CVS: RRR   Abd; Soft non tender, non distended   Ext:  right leg: Lower leg is cool to touch, no palpable pulse, no color change,  left leg is warmer, both legs are slightly edematous  Skin: no rash  Neuro AAOx3 non focal clear speech  a/p:  acute onset right lower leg pain, leg is cool to touch distally, no palpable pulse.  Concern for acute clot.  Plan for CTA with runoff, stat vascular consult.  Will give Dilaudid for pain control.  - James FLORES

## 2025-03-10 NOTE — ED PROVIDER NOTE - OBJECTIVE STATEMENT
89 female past medical history hypertension, diabetes on insulin, CAD, status post stent, CHF on torsemide presenting for right lower extremity pain.  Patient with daughter at bedside, stating that approximately 3 hours prior to arrival she had acute onset of pain to her right shin area, without trauma or other known inciting event at onset.  Does not involve the knee, does not radiate.  Daughter notes that she has been taking care of multiple chronic wounds to the area which look better than before.  No fevers.  No abdominal pain, nausea, vomiting, chest pain, shortness of breath. On plavix.

## 2025-03-10 NOTE — ED PROVIDER NOTE - PROGRESS NOTE DETAILS
Severiano Hudson MD PGY3: sgy paged. Severiano Hudson MD PGY3: sgy consulted, will see pt. Severiano Hudson MD PGY3: Labs significant for ROB with low GFR, discussed with patient and family risks and benefits of proceeding with IV contrast, including risk of further kidney injury versus diagnostic utility with concern for arterial occlusion, agrees to proceed with IV contrast study as benefits outweigh risks. Dano Moore,  (PGY-2) Patient found to have Question bilateral popliteal artery occlusion on delayed phase, with abnormal downstream runoff. CT also shows enlarged heart. I spoke With vascular surgery Saeed about these findings who states admit to medicine and start heparin as patient would likely not tolerate an angiogram.  Endorsed to hospitalist.  Patient and daughter aware of findings amenable with admission.

## 2025-03-10 NOTE — CONSULT NOTE ADULT - ASSESSMENT
89F PMHx HTN, DM on Insulin, CAD with stents on plavix, CHF presenting with RLE pain. Patient endorses acute onset of pain in her right shin area without trauma or an inciting events. Patient has chronic wounds. Collateral information obtained from daughter. Patient follows with a wound care clinic for bilateral chronic wounds. Patient has never seen a vascular surgeon. In the ED, AFVSS. Labs significant for elevated , Cr 3.99, glucose 360, eGFR 10, potassium 5.2 on VBG. In the ED patient received a CT angio abd aorta with bilateral runoff. CT Angio showing progression of chronic atherosclerosis.       Plan:   - No acute vascular surgery intervention   - Recommend medical admission for kidney disease  - Recommend cardiology for optimization of heart failure  - SAPPHIRE/PVRs    Discussed with Vascular Surgery Fellow Shahid Dickerson  Vascular Surgery  89F PMHx HTN, DM on Insulin, CAD with stents on plavix, CHF presenting with RLE pain. Patient endorses acute onset of pain in her right shin area without trauma or an inciting events. Patient has chronic wounds. Collateral information obtained from daughter. Patient follows with a wound care clinic for bilateral chronic wounds. Patient has never seen a vascular surgeon. In the ED, AFVSS. Labs significant for elevated , Cr 3.99, glucose 360, eGFR 10, potassium 5.2 on VBG. In the ED patient received a CT angio abd aorta with bilateral runoff. CT Angio showing progression of chronic atherosclerosis.       Plan:   - No acute vascular surgery intervention   - Heparin drip without bolus  - Recommend medical admission for kidney disease  - Recommend cardiology for optimization of heart failure  - SAPPHIRE/PVRs    Discussed with Vascular Surgery Fellow Shahid Dickerson  Vascular Surgery  89F PMHx HTN, DM on Insulin, CAD with stents on plavix, CHF presenting with RLE pain. Patient endorses acute onset of pain in her right shin area without trauma or an inciting events. Patient has chronic wounds. Collateral information obtained from daughter. Patient follows with a wound care clinic for bilateral chronic wounds. Patient has never seen a vascular surgeon. In the ED, AFVSS. Labs significant for elevated , Cr 3.99, glucose 360, eGFR 10, potassium 5.2 on VBG. In the ED patient received a CT angio abd aorta with bilateral runoff. CT Angio showing progression of chronic atherosclerosis with quesitonable popliteal artery occlusions, limited study secondary to poor cardiac output       Plan:   - No acute vascular surgery intervention   - Images reviewed with vascular surgery fellow - findings are not acute and vessels reconstitute   - Heparin drip without bolus  - Recommend medical admission for kidney disease  - Recommend cardiology for optimization of heart failure  - SAPPHIRE/PVRs  - Vascular Surgery will follow    Discussed with Vascular Surgery Fellow Shahid Dickerson  Vascular Surgery

## 2025-03-10 NOTE — ED CLERICAL - NS ED CLERK NOTE PRE-ARRIVAL INFORMATION; ADDITIONAL PRE-ARRIVAL INFORMATION

## 2025-03-10 NOTE — ED ADULT NURSE NOTE - NSFALLRISKINTERV_ED_ALL_ED
Assistance OOB with selected safe patient handling equipment if applicable/Assistance with ambulation/Communicate fall risk and risk factors to all staff, patient, and family/Monitor gait and stability/Provide visual cue: yellow wristband, yellow gown, etc/Reinforce activity limits and safety measures with patient and family/Call bell, personal items and telephone in reach/Instruct patient to call for assistance before getting out of bed/chair/stretcher/Non-slip footwear applied when patient is off stretcher/[place] to call system/Physically safe environment - no spills, clutter or unnecessary equipment/Purposeful Proactive Rounding/Room/bathroom lighting operational, light cord in reach

## 2025-03-10 NOTE — ED PROVIDER NOTE - CHIEF COMPLAINT
The patient is a 89y Female complaining of pain, knee.

## 2025-03-10 NOTE — CONSULT NOTE ADULT - SUBJECTIVE AND OBJECTIVE BOX
Vascular Surgery Consult Note    HPI:  89F PMHx HTN, DM on Insulin, CAD with stents on plavix, CHF presenting with RLE pain. Patient endorses acute onset of pain in her right shin area without trauma or an inciting events. Patient has chronic wounds. Collateral information obtained from daughter. Patient follows with a wound care clinic for bilateral chronic wounds. Patient has never seen a vascular surgeon. In the ED, AFVSS. Labs significant for elevated , Cr 3.99, glucose 360, eGFR 10, potassium 5.2 on VBG. In the ED patient received a CT angio abd aorta with bilateral runoff. CT Angio showing progression of chronic atherosclerosis.     PAST MEDICAL & SURGICAL HISTORY:  HTN (hypertension)      Dyslipidemia      Diabetes mellitus      Palpitations      STEMI (ST elevation myocardial infarction)      CHF (congestive heart failure)      S/P cholecystectomy      S/P appendectomy      S/P lumpectomy, left breast  premalignant      S/P tonsillectomy      S/P cardiac cath        Allergies    Kiwi (Anaphylaxis)  codeine (Unknown)  penicillins (Anaphylaxis)    Intolerances      Home Medications:  acetaminophen 500 mg oral tablet: 2 tab(s) orally every 6 hours, As needed, Mild Pain (1 - 3) (23 Dec 2019 09:45)  atorvastatin 40 mg oral tablet: 1 tab(s) orally once a day (at bedtime) (23 Dec 2019 09:45)  clopidogrel 75 mg oral tablet: 1 tab(s) orally once a day (04 Dec 2019 16:02)  Ecotrin Adult Low Strength 81 mg oral delayed release tablet: 1 tab(s) orally once a day (04 Dec 2019 16:02)  ertapenem 1 g injection: 1 gram(s) injectable once a day through 1/14/20 (23 Dec 2019 09:49)  gabapentin 100 mg oral capsule: 1 cap(s) orally 2 times a day (04 Dec 2019 16:02)  heparin: 5000 unit(s) subcutaneous every 8 hours (23 Dec 2019 09:45)  HumaLOG 100 units/mL injectable solution: 4 unit(s) injectable 3 times a day    if sugar: 150-200 add 2 units  201-250 add 4 units  251-300 add 6 units  301-350 add 8 units  351-400 add 10 units (23 Dec 2019 09:45)  lactobacillus acidophilus oral capsule: 1 cap(s) orally 3 times a day (23 Dec 2019 09:49)  Lantus 100 units/mL subcutaneous solution: 12 unit(s) subcutaneous once a day (at bedtime) (23 Dec 2019 09:49)  Lasix 20 mg oral tablet: 1 tab(s) orally once a day (04 Dec 2019 16:02)  levothyroxine 25 mcg (0.025 mg) oral tablet: 1 tab(s) orally once a day (04 Dec 2019 16:02)  melatonin 3 mg oral tablet: 1 tab(s) orally once a day (at bedtime), As needed, Insomnia (23 Dec 2019 09:49)  metoprolol tartrate 25 mg oral tablet: 0.5 tab(s) orally 2 times a day (04 Dec 2019 16:02)  nystatin 100,000 units/g topical cream: 1 application topically 2 times a day (23 Dec 2019 09:49)  Pepcid 20 mg oral tablet: 1 tab(s) orally once a day (04 Dec 2019 16:02)    MEDICATIONS  (STANDING):      SOCIAL HISTORY:  FAMILY HISTORY:      ___________________________________________  OBJECTIVE:  Vital Signs Last 24 Hrs  T(C): 36.5 (10 Mar 2025 17:34), Max: 36.5 (10 Mar 2025 17:34)  T(F): 97.7 (10 Mar 2025 17:34), Max: 97.7 (10 Mar 2025 17:34)  HR: 62 (10 Mar 2025 19:00) (50 - 77)  BP: 119/78 (10 Mar 2025 19:00) (110/82 - 136/76)  BP(mean): --  RR: 17 (10 Mar 2025 19:00) (16 - 19)  SpO2: 95% (10 Mar 2025 19:00) (95% - 98%)    Parameters below as of 10 Mar 2025 19:00  Patient On (Oxygen Delivery Method): nasal cannula  O2 Flow (L/min): 4  CAPILLARY BLOOD GLUCOSE        I&O's Detail    General: Well developed, well nourished, NAD  Neuro: Alert and oriented, no focal deficits, moves all extremities spontaneously  HEENT: NCAT, EOMI, anicteric, mucosa moist  Respiratory: Airway patent, respirations unlabored  CVS: Regular rate and rhythm  Abdomen: Soft, nontender, nondistended  Extremities: motor and sensory intact, no palpable pulses bilaterally, no signals bilaterally, palpable femoral pulses   Skin: Warm, dry, appropriate color  ____________________________________________  LABS:  CBC Full  -  ( 10 Mar 2025 17:57 )  WBC Count : 5.83 K/uL  RBC Count : 2.89 M/uL  Hemoglobin : 9.3 g/dL  Hematocrit : 28.4 %  Platelet Count - Automated : 104 K/uL  Mean Cell Volume : 98.3 fl  Mean Cell Hemoglobin : 32.2 pg  Mean Cell Hemoglobin Concentration : 32.7 g/dL  Auto Neutrophil # : x  Auto Lymphocyte # : x  Auto Monocyte # : x  Auto Eosinophil # : x  Auto Basophil # : x  Auto Neutrophil % : x  Auto Lymphocyte % : x  Auto Monocyte % : x  Auto Eosinophil % : x  Auto Basophil % : x    03-10    136  |  101  |  124[H]  ----------------------------<  360[H]  4.6   |  17[L]  |  3.99[H]    Ca    9.1      10 Mar 2025 17:57    TPro  6.0  /  Alb  3.3  /  TBili  0.8  /  DBili  x   /  AST  15  /  ALT  17  /  AlkPhos  184[H]  03-10    LIVER FUNCTIONS - ( 10 Mar 2025 17:57 )  Alb: 3.3 g/dL / Pro: 6.0 g/dL / ALK PHOS: 184 U/L / ALT: 17 U/L / AST: 15 U/L / GGT: x           PT/INR - ( 10 Mar 2025 17:57 )   PT: 16.1 sec;   INR: 1.42 ratio         PTT - ( 10 Mar 2025 17:57 )  PTT:31.0 sec  Urinalysis Basic - ( 10 Mar 2025 17:57 )    Color: x / Appearance: x / SG: x / pH: x  Gluc: 360 mg/dL / Ketone: x  / Bili: x / Urobili: x   Blood: x / Protein: x / Nitrite: x   Leuk Esterase: x / RBC: x / WBC x   Sq Epi: x / Non Sq Epi: x / Bacteria: x            ____________________________________________  MICRO:  RECENT CULTURES:    ____________________________________________  RADIOLOGY:   Vascular Surgery Consult Note    HPI:  89F PMHx HTN, DM on Insulin, CAD with stents on plavix, CHF presenting with RLE pain. Patient endorses acute onset of pain in her right shin area without trauma or an inciting events. Patient has chronic wounds. Collateral information obtained from daughter. Patient follows with a wound care clinic for bilateral chronic wounds. Patient has never seen a vascular surgeon. In the ED, AFVSS. Labs significant for elevated , Cr 3.99, glucose 360, eGFR 10, potassium 5.2 on VBG. In the ED patient received a CT angio abd aorta with bilateral runoff. CT Angio showing progression of chronic atherosclerosis.     PAST MEDICAL & SURGICAL HISTORY:  HTN (hypertension)      Dyslipidemia      Diabetes mellitus      Palpitations      STEMI (ST elevation myocardial infarction)      CHF (congestive heart failure)      S/P cholecystectomy      S/P appendectomy      S/P lumpectomy, left breast  premalignant      S/P tonsillectomy      S/P cardiac cath        Allergies    Kiwi (Anaphylaxis)  codeine (Unknown)  penicillins (Anaphylaxis)    Intolerances      Home Medications:  acetaminophen 500 mg oral tablet: 2 tab(s) orally every 6 hours, As needed, Mild Pain (1 - 3) (23 Dec 2019 09:45)  atorvastatin 40 mg oral tablet: 1 tab(s) orally once a day (at bedtime) (23 Dec 2019 09:45)  clopidogrel 75 mg oral tablet: 1 tab(s) orally once a day (04 Dec 2019 16:02)  Ecotrin Adult Low Strength 81 mg oral delayed release tablet: 1 tab(s) orally once a day (04 Dec 2019 16:02)  ertapenem 1 g injection: 1 gram(s) injectable once a day through 1/14/20 (23 Dec 2019 09:49)  gabapentin 100 mg oral capsule: 1 cap(s) orally 2 times a day (04 Dec 2019 16:02)  heparin: 5000 unit(s) subcutaneous every 8 hours (23 Dec 2019 09:45)  HumaLOG 100 units/mL injectable solution: 4 unit(s) injectable 3 times a day    if sugar: 150-200 add 2 units  201-250 add 4 units  251-300 add 6 units  301-350 add 8 units  351-400 add 10 units (23 Dec 2019 09:45)  lactobacillus acidophilus oral capsule: 1 cap(s) orally 3 times a day (23 Dec 2019 09:49)  Lantus 100 units/mL subcutaneous solution: 12 unit(s) subcutaneous once a day (at bedtime) (23 Dec 2019 09:49)  Lasix 20 mg oral tablet: 1 tab(s) orally once a day (04 Dec 2019 16:02)  levothyroxine 25 mcg (0.025 mg) oral tablet: 1 tab(s) orally once a day (04 Dec 2019 16:02)  melatonin 3 mg oral tablet: 1 tab(s) orally once a day (at bedtime), As needed, Insomnia (23 Dec 2019 09:49)  metoprolol tartrate 25 mg oral tablet: 0.5 tab(s) orally 2 times a day (04 Dec 2019 16:02)  nystatin 100,000 units/g topical cream: 1 application topically 2 times a day (23 Dec 2019 09:49)  Pepcid 20 mg oral tablet: 1 tab(s) orally once a day (04 Dec 2019 16:02)    MEDICATIONS  (STANDING):      SOCIAL HISTORY:  FAMILY HISTORY:      ___________________________________________  OBJECTIVE:  Vital Signs Last 24 Hrs  T(C): 36.5 (10 Mar 2025 17:34), Max: 36.5 (10 Mar 2025 17:34)  T(F): 97.7 (10 Mar 2025 17:34), Max: 97.7 (10 Mar 2025 17:34)  HR: 62 (10 Mar 2025 19:00) (50 - 77)  BP: 119/78 (10 Mar 2025 19:00) (110/82 - 136/76)  BP(mean): --  RR: 17 (10 Mar 2025 19:00) (16 - 19)  SpO2: 95% (10 Mar 2025 19:00) (95% - 98%)    Parameters below as of 10 Mar 2025 19:00  Patient On (Oxygen Delivery Method): nasal cannula  O2 Flow (L/min): 4  CAPILLARY BLOOD GLUCOSE        I&O's Detail    General: Well developed, well nourished, NAD  Neuro: Alert and oriented, no focal deficits, moves all extremities spontaneously  HEENT: NCAT, EOMI, anicteric, mucosa moist  Respiratory: Airway patent, respirations unlabored  CVS: Regular rate and rhythm  Abdomen: Soft, nontender, nondistended  Extremities: motor and sensory intact, no palpable pulses bilaterally, no signals bilaterally, palpable femoral pulses   Skin: Warm, dry, appropriate color  ____________________________________________  LABS:  CBC Full  -  ( 10 Mar 2025 17:57 )  WBC Count : 5.83 K/uL  RBC Count : 2.89 M/uL  Hemoglobin : 9.3 g/dL  Hematocrit : 28.4 %  Platelet Count - Automated : 104 K/uL  Mean Cell Volume : 98.3 fl  Mean Cell Hemoglobin : 32.2 pg  Mean Cell Hemoglobin Concentration : 32.7 g/dL  Auto Neutrophil # : x  Auto Lymphocyte # : x  Auto Monocyte # : x  Auto Eosinophil # : x  Auto Basophil # : x  Auto Neutrophil % : x  Auto Lymphocyte % : x  Auto Monocyte % : x  Auto Eosinophil % : x  Auto Basophil % : x    03-10    136  |  101  |  124[H]  ----------------------------<  360[H]  4.6   |  17[L]  |  3.99[H]    Ca    9.1      10 Mar 2025 17:57    TPro  6.0  /  Alb  3.3  /  TBili  0.8  /  DBili  x   /  AST  15  /  ALT  17  /  AlkPhos  184[H]  03-10    LIVER FUNCTIONS - ( 10 Mar 2025 17:57 )  Alb: 3.3 g/dL / Pro: 6.0 g/dL / ALK PHOS: 184 U/L / ALT: 17 U/L / AST: 15 U/L / GGT: x           PT/INR - ( 10 Mar 2025 17:57 )   PT: 16.1 sec;   INR: 1.42 ratio         PTT - ( 10 Mar 2025 17:57 )  PTT:31.0 sec  Urinalysis Basic - ( 10 Mar 2025 17:57 )    Color: x / Appearance: x / SG: x / pH: x  Gluc: 360 mg/dL / Ketone: x  / Bili: x / Urobili: x   Blood: x / Protein: x / Nitrite: x   Leuk Esterase: x / RBC: x / WBC x   Sq Epi: x / Non Sq Epi: x / Bacteria: x    ____________________________________________  RADIOLOGY:  < from: CT Angio Abd Aorta w/run-off w/ IV Cont (03.10.25 @ 20:04) >  IMPRESSION:    Limited study due to suspected poor cardiac output.    Question bilateral popliteal artery occlusion on delayed phase, with   abnormal downstream runoff. Diffusely calcified bilateral trifurcation   arteries are limited in evaluation. Recommend arterial duplex ultrasound   correlation and clinical evaluation. Bilateral calf and foot extensive   soft tissue edema. Correlate for lower extremity ischemia or other   process.    Partially imaged heart is enlarged, particularly at the left ventricle   which is thinned towards the apex. Coronary artery calcifications.   Correlate reflux of contrast into the IVC and hepatic veins. Correlate   for cardiac dysfunction.Recommend echocardiogram.    --- End of Report ---    < end of copied text >

## 2025-03-10 NOTE — ED PROVIDER NOTE - PHYSICAL EXAMINATION
CONSTITUTIONAL: awake, appears in pain  SKIN: shallow ulcerations to b/l legs, without erythema or purulence  HEAD: Normocephalic; atraumatic.  EYES: no conjunctival injection. no scleral icterus  ENT: No nasal discharge; airway clear.  CARD: S1, S2 normal; regular rate  RESP: No wheezes, rales or rhonchi. Good air movement bilaterally.   ABD: soft ntnd, no guarding, no distention, no rigidity.   EXT: skin as above. RLE foot feels cooler than L, no palpable or doppler-able pulse  NEURO: Alert, oriented, grossly unremarkable

## 2025-03-10 NOTE — ED PROVIDER NOTE - CLINICAL SUMMARY MEDICAL DECISION MAKING FREE TEXT BOX
89 female past medical history hypertension, diabetes on insulin, CAD, status post stent, CHF on torsemide presenting for right lower extremity pain.  With initial vital signs nonactionable.  Presenting with acute right lower extremity pain without any infectious findings on exam, with exam significant for cool foot without palpable or dopplerable pulses.  Concern for acute arterial occlusion, to evaluate labs, CTA, consult vascular surgery.

## 2025-03-10 NOTE — ED ADULT NURSE REASSESSMENT NOTE - NS ED NURSE REASSESS COMMENT FT1
1934 pt given Zofran for nausea Vascular at bedside attempting to get pulse on the right leg and pending ct scan daughter at bedside MPRN

## 2025-03-10 NOTE — ED ADULT NURSE NOTE - OBJECTIVE STATEMENT
89 y.o F A&02 presenting from home with daughter for increasing left lower leg pain by where she is currently being treated for a stage 3 pressure injury. Pt has PMH of HTN , HLD, MI with one stent, DMII, CHF, gout, and chronic eczema. Pt daughter states around 12pm today she started getting worse pain then usual in her lower right leg. Pt was given Tylenol with no relief. Pt denies CP, SOB, HA, vision changes, n/v/d, fevers chills, abdominal pain.

## 2025-03-11 ENCOUNTER — NON-APPOINTMENT (OUTPATIENT)
Age: 89
End: 2025-03-11

## 2025-03-11 NOTE — H&P ADULT - NSHPPHYSICALEXAM_GEN_ALL_CORE
Vital Signs Last 24 Hrs  T(C): 36.6 (11 Mar 2025 03:10), Max: 36.6 (10 Mar 2025 23:15)  T(F): 97.9 (11 Mar 2025 03:10), Max: 97.9 (11 Mar 2025 03:10)  HR: 66 (11 Mar 2025 03:10) (50 - 77)  BP: 155/74 (11 Mar 2025 03:10) (110/82 - 167/83)  BP(mean): --  RR: 18 (11 Mar 2025 03:10) (16 - 19)  SpO2: 98% (11 Mar 2025 03:10) (95% - 98%)    Parameters below as of 11 Mar 2025 03:10  Patient On (Oxygen Delivery Method): nasal cannula  O2 Flow (L/min): 2      CONSTITUTIONAL: Well-groomed, in no apparent distress  EYES: No conjunctival or scleral injection, non-icteric  ENMT: No external nasal lesions; MMM  RESPIRATORY: Breathing comfortably; lungs CTA without wheeze/rhonchi/rales  CARDIOVASCULAR: +S1S2, RRR; 1+ pitting edema up to knee. lower limb cool to touch. I was unable to palpate DP   GASTROINTESTINAL: No tenderness, +BS throughout, no rebound/guarding  NEUROLOGIC: No gross focal neurological deficits, AAOX2  PSYCHIATRIC: mood and affect appropriate; appropriate insight and judgment Vital Signs Last 24 Hrs  T(C): 36.6 (11 Mar 2025 03:10), Max: 36.6 (10 Mar 2025 23:15)  T(F): 97.9 (11 Mar 2025 03:10), Max: 97.9 (11 Mar 2025 03:10)  HR: 66 (11 Mar 2025 03:10) (50 - 77)  BP: 155/74 (11 Mar 2025 03:10) (110/82 - 167/83)  BP(mean): --  RR: 18 (11 Mar 2025 03:10) (16 - 19)  SpO2: 98% (11 Mar 2025 03:10) (95% - 98%)    Parameters below as of 11 Mar 2025 03:10  Patient On (Oxygen Delivery Method): nasal cannula  O2 Flow (L/min): 2      CONSTITUTIONAL: Well-groomed, in no apparent distress  EYES: No conjunctival or scleral injection, non-icteric  ENMT: No external nasal lesions; MMM  RESPIRATORY: Breathing comfortably; lungs CTA without wheeze/rhonchi/rales  CARDIOVASCULAR: +S1S2, RRR; 1+ pitting edema up to knee. lower limb cool to touch. I was unable to palpate DP  GASTROINTESTINAL: No tenderness, +BS throughout, no rebound/guarding  NEUROLOGIC: No gross focal neurological deficits, AAOX2  PSYCHIATRIC: mood and affect appropriate; appropriate insight and judgment  SKIN: pt with multiple excoriation marks on upper thigh. Pt with healing wound on right posterior ankle, reportedly d/t pt's leg swelling.

## 2025-03-11 NOTE — CHART NOTE - NSCHARTNOTEFT_GEN_A_CORE
Patient seen and examined at bedside. Awake, alert, interactive. AAOx2 to self and place only. Denies chest pain, dyspnea, N/V, abdominal pain. She reports pain in bilateral legs with palpation.    Vital Signs Last 24 Hrs  T(C): 36.5 (11 Mar 2025 11:39), Max: 36.6 (10 Mar 2025 23:15)  T(F): 97.7 (11 Mar 2025 11:39), Max: 97.9 (11 Mar 2025 03:10)  HR: 65 (11 Mar 2025 11:39) (50 - 66)  BP: 114/71 (11 Mar 2025 11:39) (110/82 - 167/83)  BP(mean): --  RR: 18 (11 Mar 2025 11:39) (16 - 18)  SpO2: 99% (11 Mar 2025 11:39) (95% - 99%)    Parameters below as of 11 Mar 2025 11:39  Patient On (Oxygen Delivery Method): room air    CONSTITUTIONAL: NAD  EYES: No conjunctival or scleral injection, non-icteric;   ENMT: No external nasal lesions; MMM  NECK: Trachea midline   RESPIRATORY: Breathing comfortably; no dullness to percussion; lungs CTA without wheeze/rhonchi/rales  CARDIOVASCULAR: +S1S2, RRR, +1 bilateral lower extremity edema.   GASTROINTESTINAL: No palpable masses or tenderness, no rebound/guarding  PSYCHIATRIC: Calm, cooperative  SKIN: Multiple excoriations on bilateral legs.     89F PMHx HTN, DM on insulin, CAD s/p PCI, HFrEF. P/w acute onset of R shin pain w/o trauma 3hr PTA.     #PAD   -CT concerning for bilateral popliteal artery occlusion  -Vascular recs appreciated- No acute surgical intervention. Continue heparin drip  -Pending arterial US and SAPPHIRE/PVR  -Continue home Plavix/Lipitor     #HFrEF   -Not in acute exacerbation  -Continue home torsemide, metoprolol, Entresto    #CKD Stage 5  -Outpatient labs reviewed- GFR <15 on outpatient labs.   -Monitor Cre.   -Avoid nephrotoxic medications    #Polypharmacy  -Home medications reconciled with daughterNancy, via phone:  -Torsemide 20mg (takes 1-2 tablets depending on weight)  -Protonix 40mg daily  -Metoprolol 12.5mg BID  -Metolazone PRN (last taken a month ago per daughter)  -Levothyroxine recently increased to 50mcg daily  -Lantus 12u QHS  -Gabapentin 100mg BID  -Pepcid 20mg daily (will continue as 20mg every other day for renal dosing)  -Entresto 24/26mg BID  -Plavix 75mg daily  -Lipitor 20mg daily  -Allopurinol 100mg every day (hold due to CKD stage 4)    Care plan discussed with daughter via phone  Discussed with ACP and during IDRs Patient seen and examined at bedside. Awake, alert, interactive. AAOx2 to self and place only. Denies chest pain, dyspnea, N/V, abdominal pain. She reports pain in bilateral legs with palpation.    Vital Signs Last 24 Hrs  T(C): 36.5 (11 Mar 2025 11:39), Max: 36.6 (10 Mar 2025 23:15)  T(F): 97.7 (11 Mar 2025 11:39), Max: 97.9 (11 Mar 2025 03:10)  HR: 65 (11 Mar 2025 11:39) (50 - 66)  BP: 114/71 (11 Mar 2025 11:39) (110/82 - 167/83)  BP(mean): --  RR: 18 (11 Mar 2025 11:39) (16 - 18)  SpO2: 99% (11 Mar 2025 11:39) (95% - 99%)    Parameters below as of 11 Mar 2025 11:39  Patient On (Oxygen Delivery Method): room air    CONSTITUTIONAL: NAD  EYES: No conjunctival or scleral injection, non-icteric;   ENMT: No external nasal lesions; MMM  NECK: Trachea midline   RESPIRATORY: Breathing comfortably; no dullness to percussion; lungs CTA without wheeze/rhonchi/rales  CARDIOVASCULAR: +S1S2, RRR, +1 bilateral lower extremity edema.   GASTROINTESTINAL: No palpable masses or tenderness, no rebound/guarding  PSYCHIATRIC: Calm, cooperative  SKIN: Multiple excoriations on bilateral legs.     89F PMHx HTN, DM on insulin, CAD s/p PCI, HFrEF. P/w acute onset of R shin pain w/o trauma 3hr PTA.     #PAD   -CT concerning for bilateral popliteal artery occlusion  -Vascular recs appreciated- No acute surgical intervention. Continue heparin drip  -Pending arterial US and SAPPHIRE/PVR  -Continue home Plavix/Lipitor     #HFrEF   -Not in acute exacerbation  -Continue home torsemide, metoprolol, Entresto    #CKD Stage 5  -Outpatient labs reviewed- Cre 3.66 in 01/2025, GFR <15   -Monitor Cre.   -Avoid nephrotoxic medications    #Polypharmacy  -Home medications reconciled with daughter, Nancy, via phone:  -Torsemide 20mg (takes 1-2 tablets depending on weight)  -Protonix 40mg daily  -Metoprolol 12.5mg BID  -Metolazone PRN (last taken a month ago per daughter)  -Levothyroxine recently increased to 50mcg daily  -Lantus 12u QHS  -Gabapentin 100mg BID  -Pepcid 20mg daily (will continue as 20mg every other day for renal dosing)  -Entresto 24/26mg BID  -Plavix 75mg daily  -Lipitor 20mg daily  -Allopurinol 100mg every day (hold due to CKD stage 4)    Care plan discussed with daughter via phone  Discussed with ACP and during IDRs

## 2025-03-11 NOTE — PROGRESS NOTE ADULT - SUBJECTIVE AND OBJECTIVE BOX
consulted to see pt w/ leg wound,  pt seen by VASCULAR.  d/w team who is agreeable to defer to vasc.  remain available as requested.

## 2025-03-11 NOTE — PROGRESS NOTE ADULT - SUBJECTIVE AND OBJECTIVE BOX
OVERNIGHT EVENTS: NAEO    SUBJECTIVE: Pt seen and examined at bedside. Patient comfortable and in no-apparent distress. Pain is controlled.     MEDICATIONS  (STANDING):  atorvastatin 20 milliGRAM(s) Oral at bedtime  clopidogrel Tablet 75 milliGRAM(s) Oral daily  dextrose 5%. 1000 milliLiter(s) (50 mL/Hr) IV Continuous <Continuous>  dextrose 5%. 1000 milliLiter(s) (100 mL/Hr) IV Continuous <Continuous>  dextrose 50% Injectable 25 Gram(s) IV Push once  dextrose 50% Injectable 12.5 Gram(s) IV Push once  dextrose 50% Injectable 25 Gram(s) IV Push once  famotidine    Tablet 20 milliGRAM(s) Oral every 48 hours  gabapentin 100 milliGRAM(s) Oral two times a day  glucagon  Injectable 1 milliGRAM(s) IntraMuscular once  heparin  Infusion.  Unit(s)/Hr (12 mL/Hr) IV Continuous <Continuous>  insulin glargine Injectable (LANTUS) 12 Unit(s) SubCutaneous at bedtime  insulin lispro (ADMELOG) corrective regimen sliding scale   SubCutaneous three times a day before meals  insulin lispro (ADMELOG) corrective regimen sliding scale   SubCutaneous at bedtime  insulin lispro Injectable (ADMELOG) 4 Unit(s) SubCutaneous three times a day before meals  levothyroxine 25 MICROGram(s) Oral <User Schedule>  levothyroxine 37.5 MICROGram(s) Oral <User Schedule>  metoprolol tartrate 12.5 milliGRAM(s) Oral two times a day  pantoprazole    Tablet 40 milliGRAM(s) Oral before breakfast  sacubitril 24 mG/valsartan 26 mG 1 Tablet(s) Oral two times a day  torsemide 20 milliGRAM(s) Oral daily    MEDICATIONS  (PRN):  acetaminophen     Tablet .. 650 milliGRAM(s) Oral every 6 hours PRN Temp greater or equal to 38C (100.4F), Mild Pain (1 - 3)  aluminum hydroxide/magnesium hydroxide/simethicone Suspension 30 milliLiter(s) Oral every 4 hours PRN Dyspepsia  dextrose Oral Gel 15 Gram(s) Oral once PRN Blood Glucose LESS THAN 70 milliGRAM(s)/deciliter  heparin   Injectable 5500 Unit(s) IV Push every 6 hours PRN For aPTT less than 40  heparin   Injectable 2500 Unit(s) IV Push every 6 hours PRN For aPTT between 40 - 57  HYDROmorphone  Injectable 0.5 milliGRAM(s) IV Push every 4 hours PRN Severe Pain (7 - 10)  melatonin 3 milliGRAM(s) Oral at bedtime PRN Insomnia  metoclopramide Injectable 10 milliGRAM(s) IV Push every 8 hours PRN 2nd choice for nausea/vomiting  ondansetron Injectable 4 milliGRAM(s) IV Push every 8 hours PRN Nausea and/or Vomiting    T(C): 36.3 (03-11-25 @ 07:20), Max: 36.6 (03-10-25 @ 23:15)  HR: 56 (03-11-25 @ 07:20) (50 - 77)  BP: 127/57 (03-11-25 @ 07:20) (110/82 - 167/83)  RR: 18 (03-11-25 @ 07:20) (16 - 19)  SpO2: 99% (03-11-25 @ 07:20) (95% - 99%)    LABS:                        10.3   5.06  )-----------( 81       ( 11 Mar 2025 06:59 )             32.3     03-11    136  |  95[L]  |  112[H]  ----------------------------<  446[H]  5.1   |  18[L]  |  4.12[H]    Ca    9.8      11 Mar 2025 06:59  Phos  5.3     03-11  Mg     1.5     03-11    TPro  6.0  /  Alb  3.3  /  TBili  0.8  /  DBili  x   /  AST  15  /  ALT  17  /  AlkPhos  184[H]  03-10    PT/INR - ( 10 Mar 2025 17:57 )   PT: 16.1 sec;   INR: 1.42 ratio         PTT - ( 11 Mar 2025 06:59 )  PTT:149.1 sec  Urinalysis Basic - ( 11 Mar 2025 06:59 )    Color: x / Appearance: x / SG: x / pH: x  Gluc: 446 mg/dL / Ketone: x  / Bili: x / Urobili: x   Blood: x / Protein: x / Nitrite: x   Leuk Esterase: x / RBC: x / WBC x   Sq Epi: x / Non Sq Epi: x / Bacteria: x      PE:  Gen: NAD  CV: Pulse regular present  Resp: Nonlabored  Abdomen: Soft, nontender  Extremities: motor and sensory intact, no palpable pulses bilaterally, no signals bilaterally, palpable femoral pulses   Skin: Warm, dry, appropriate color

## 2025-03-11 NOTE — H&P ADULT - PROBLEM SELECTOR PLAN 2
-pt found retaining urine >600cc and unable to void by herself. Unclear if this is due to her not used to using purwix or she's unable.   -straight cath x1 ordered. rpt bladder scan ordered at 10am.   -f/u UA

## 2025-03-11 NOTE — PROGRESS NOTE ADULT - ASSESSMENT
89F PMHx HTN, DM on Insulin, CAD with stents on plavix, CHF presenting with RLE pain. Patient endorses acute onset of pain in her right shin area without trauma or an inciting events. Patient has chronic wounds. Collateral information obtained from daughter. Patient follows with a wound care clinic for bilateral chronic wounds. Patient has never seen a vascular surgeon. In the ED, AFVSS. Labs significant for elevated , Cr 3.99, glucose 360, eGFR 10, potassium 5.2 on VBG. In the ED patient received a CT angio abd aorta with bilateral runoff. CT Angio showing progression of chronic atherosclerosis with quesitonable popliteal artery occlusions, limited study secondary to poor cardiac output       Plan:   - No acute vascular surgery intervention   - Images reviewed with vascular surgery fellow - findings are not acute and vessels reconstitute   - Heparin drip   - ASPPHIRE/PVRs  - Vascular Surgery will follow    Vascular Surgery

## 2025-03-11 NOTE — H&P ADULT - NSHPLABSRESULTS_GEN_ALL_CORE
9.3    5.83  )-----------( 104      ( 10 Mar 2025 17:57 )             28.4       03-10    136  |  101  |  124[H]  ----------------------------<  360[H]  4.6   |  17[L]  |  3.99[H]    Ca    9.1      10 Mar 2025 17:57    TPro  6.0  /  Alb  3.3  /  TBili  0.8  /  DBili  x   /  AST  15  /  ALT  17  /  AlkPhos  184[H]  03-10              Urinalysis Basic - ( 10 Mar 2025 17:57 )    Color: x / Appearance: x / SG: x / pH: x  Gluc: 360 mg/dL / Ketone: x  / Bili: x / Urobili: x   Blood: x / Protein: x / Nitrite: x   Leuk Esterase: x / RBC: x / WBC x   Sq Epi: x / Non Sq Epi: x / Bacteria: x        PT/INR - ( 10 Mar 2025 17:57 )   PT: 16.1 sec;   INR: 1.42 ratio         PTT - ( 10 Mar 2025 17:57 )  PTT:31.0 sec          CAPILLARY BLOOD GLUCOSE

## 2025-03-11 NOTE — H&P ADULT - PROBLEM SELECTOR PLAN 4
-d/t Layne MORA not working, I could not check previous echos.   -pt on entresto.   -pt's cardiology Dr. Michelle Hines @ [facility] ([phone]. Obtain record) -d/t Layne MORA not working, I could not check previous echos.   -pt on entresto and lopressor   -pt's cardiology Dr. Michelle Hines @ [facility] ([phone]. Obtain record)

## 2025-03-11 NOTE — ED ADULT NURSE REASSESSMENT NOTE - NS ED NURSE REASSESS COMMENT FT1
GABRIEL Salvador contacted via spectra link to inform provider that patient is nauseous with dry heaving. RN sent picture of EKG to provider via teams. Patient and family updated on POC. Patient placed on Purewick for comfort. Patient on continuous cardiac monitor. Call bell within reach, bed in lowest position. ACP [place] contacted via spectra link to inform provider that patient is nauseous with dry heaving. Patient and family updated on POC. Patient placed on Purewick for comfort. Patient on continuous cardiac monitor. Call bell within reach, bed in lowest position.

## 2025-03-11 NOTE — H&P ADULT - HISTORY OF PRESENT ILLNESS
89F PMHx HTN, DM on insulin, CAD s/p PCI, HFrEF. P/w acute onset of R shin pain w/o trauma 3hr PTA. Of note, pt has chronic b/l wounds, seen by wound care clinic, but never saw vascular surg before. Denies f/c, cp, sob, abd pain, n/v/d.     In the ED, afebrile, HR 50-70, BP stable, was put on 4LNC ?   CBC w/ wbc 5.8, hgb 9.3 (was 10.1 in '19), plt 104 (was wnl in '19).   Coag w/ INR 1.42  CMP w/ SCr 3.99 (was 1.16 in '19), alk phos 184.   VBG w/ lactate 1.6.     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 Partially imagined heart enlarged and abnormal.     Vasc surg c/s in the ED: no acute intervention hep gtt, SAPPHIRE/PVRs.     *of note, Layne MORA unavailable in pt's chart and i was unable to review.  89F PMHx HTN, DM on insulin, CAD s/p PCI, HFrEF. P/w acute onset of R shin pain w/o trauma 3hr PTA. Of note, pt has chronic b/l wounds, seen by wound care clinic, but never saw vascular surg before. Denies f/c, cp, sob, abd pain, n/v/d.     In the ED, afebrile, HR 50-70, BP stable, saturates well on RA while awake, desats to high 80s while asleep. Pt is not on O2 at baseline.   CBC w/ wbc 5.8, hgb 9.3 (was 10.1 in '19), plt 104 (was wnl in '19).   Coag w/ INR 1.42  CMP w/ SCr 3.99 (was 1.16 in '19), alk phos 184.   VBG w/ lactate 1.6.     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 Partially imagined heart enlarged and abnormal.     Vasc surg c/s in the ED: no acute intervention hep gtt, SAPPHIRE/PVRs.     *of note, Layne HIE unavailable in pt's chart and i was unable to review.

## 2025-03-11 NOTE — H&P ADULT - CONVERSATION DETAILS
Pt AAOx2-3. Daughter is HCP (she does not have paperwork tonight--brought wrong paperwork today--will bring copy of HCP after she goes home).   Discussed diagnosis and prognosis of pt's current condition with patient's daughter Ms. Cruz, who reports that pt can be unreasonable at time, in terms of types of medical intervention she will accept. Ms. Cruz is particularly worried that pt had declined angiogram before and if vascular surgery later recommends it, patient would decline.  I briefly touched on pt's prognosis, given patient's underlying conditions, she would not be a good candidate for surgical intervention. Ms. Cruz understood.   All questions answered at this time.     Patient is currently FULL CODE. Recommend ongoing GOC.

## 2025-03-12 NOTE — PATIENT PROFILE ADULT - NSPRONUTRITIONRISK_GEN_A_NUR
What Type Of Note Output Would You Prefer (Optional)?: Bullet Format Hpi Title: Evaluation of Skin Lesions Year Removed: 1900 Pressure injury stage 2 or greater

## 2025-03-12 NOTE — PROGRESS NOTE ADULT - ASSESSMENT
89F PMHx HTN, DM on insulin, CAD s/p PCI, HFrEF. P/w acute onset of R shin pain w/o trauma 3hr PTA.

## 2025-03-12 NOTE — PATIENT PROFILE ADULT - NSTRANSFERBELONGINGSRESP_GEN_A_NUR
Goal Outcome Evaluation:  Plan of Care Reviewed With: patient        Progress: improving  Outcome Summary: Pt is A&O x4. Pt uses urinal & bedpan. Diarrhea continuous today. Contact spores precautions maintained. Pt ambulated to chair today. Walker and x1-2 assist. Pt repositioned in bed per orders. Coccyx dressing changed per wound care orders. Surgical dressing monitored, clean, dry & intact. Safety and fall risk preventions maintained. Bed low and call light in place.    yes

## 2025-03-12 NOTE — PATIENT PROFILE ADULT - FALL HARM RISK - HARM RISK INTERVENTIONS

## 2025-03-12 NOTE — PROGRESS NOTE ADULT - ASSESSMENT
89F PMHx HTN, DM on Insulin, CAD with stents on plavix, CHF presenting with RLE pain. Patient endorses acute onset of pain in her right shin area without trauma or an inciting events. Patient has chronic wounds. Collateral information obtained from daughter. Patient follows with a wound care clinic for bilateral chronic wounds. Patient has never seen a vascular surgeon. In the ED, AFVSS. Labs significant for elevated , Cr 3.99, glucose 360, eGFR 10, potassium 5.2 on VBG. In the ED patient received a CT angio abd aorta with bilateral runoff. CT Angio showing progression of chronic atherosclerosis with quesitonable popliteal artery occlusions, limited study secondary to poor cardiac output       Plan:   - No acute vascular surgery intervention   - Images reviewed with vascular surgery fellow - findings are not acute and vessels reconstitute   - Heparin drip   - Please obtain SAPPHIRE/PVRs  - Vascular Surgery will follow    Vascular Surgery

## 2025-03-12 NOTE — PROGRESS NOTE ADULT - PROBLEM SELECTOR PLAN 8
VTE PPx: Heparin drip   GI ppx: PPI     CODE: FULL    3/12- Care plan discussed with daughter at bedside.

## 2025-03-12 NOTE — PROGRESS NOTE ADULT - PROBLEM SELECTOR PLAN 6
-Outpatient labs reviewed- Cre 3.66 in 01/2025, GFR <15   -Monitor Cre.   -Avoid nephrotoxic medications

## 2025-03-12 NOTE — PROGRESS NOTE ADULT - PROBLEM SELECTOR PLAN 4
-Pt's cardiology Dr. Michelle Hines @ [facility] ([phone])  -Not in acute exacerbation  Hold home torsemide, Entresto due to ROB  Continue home metoprolol

## 2025-03-12 NOTE — PROGRESS NOTE ADULT - PROBLEM SELECTOR PLAN 1
-CT concerning for bilateral popliteal artery occlusion  -Vascular recs appreciated- No acute surgical intervention. Continue heparin drip  -Pending arterial US and SAPPHIRE/PVR  -Continue home Plavix/Lipitor

## 2025-03-12 NOTE — PROGRESS NOTE ADULT - NSPROGADDITIONALINFOA_GEN_ALL_CORE
Time-based billing (NON-critical care).     60 minutes spent on total encounter. The necessity of the time spent during the encounter on this date of service was due to:     - Reviewing, and interpreting labs, testing, and imaging.  - Independently obtaining a review of systems and performing a physical exam  - Reviewing prior records and where necessary, outpatient records.  - Counselling and educating patient regarding interpretation of aforementioned items and plan of care.      d/w ACP

## 2025-03-12 NOTE — PATIENT PROFILE ADULT - FUNCTIONAL ASSESSMENT - DAILY ACTIVITY 4.
[[email or web address]] : Presents complaining of increasing shortness of breath, fatigue along with swelling in his legs over the past month.  He denies any chest pain, palpitations or orthopnea.  No fever or chills.  No recent change in his medications.
1 = Total assistance

## 2025-03-12 NOTE — PROGRESS NOTE ADULT - SUBJECTIVE AND OBJECTIVE BOX
SURGERY DAILY PROGRESS NOTE    24 Hour/Overnight Events: No acute events overnight    SUBJECTIVE: Patient seen and evaluated on AM rounds.    ------------------------------------------------------------------------------------------------------------  OBJECTIVE:  Vital Signs Last 24 Hrs  T(C): 36.5 (12 Mar 2025 05:12), Max: 36.5 (11 Mar 2025 11:39)  T(F): 97.7 (12 Mar 2025 05:12), Max: 97.7 (11 Mar 2025 11:39)  HR: 71 (12 Mar 2025 05:12) (65 - 71)  BP: 129/51 (12 Mar 2025 05:12) (110/69 - 129/51)  BP(mean): --  RR: 19 (12 Mar 2025 05:12) (18 - 19)  SpO2: 100% (12 Mar 2025 05:12) (94% - 100%)    Parameters below as of 11 Mar 2025 20:37  Patient On (Oxygen Delivery Method): room air      I&O's Detail      LABS:                        10.0   6.04  )-----------( 98       ( 12 Mar 2025 07:03 )             30.3     03-12    137  |  99  |  125[H]  ----------------------------<  236[H]  5.2   |  17[L]  |  5.16[H]    Ca    9.3      12 Mar 2025 07:03  Phos  5.3     03-11  Mg     1.5     03-12    TPro  6.0  /  Alb  3.3  /  TBili  0.8  /  DBili  x   /  AST  15  /  ALT  17  /  AlkPhos  184[H]  03-10    LIVER FUNCTIONS - ( 10 Mar 2025 17:57 )  Alb: 3.3 g/dL / Pro: 6.0 g/dL / ALK PHOS: 184 U/L / ALT: 17 U/L / AST: 15 U/L / GGT: x           PT/INR - ( 10 Mar 2025 17:57 )   PT: 16.1 sec;   INR: 1.42 ratio         PTT - ( 12 Mar 2025 07:03 )  PTT:88.9 sec  Urinalysis Basic - ( 12 Mar 2025 07:03 )    Color: x / Appearance: x / SG: x / pH: x  Gluc: 236 mg/dL / Ketone: x  / Bili: x / Urobili: x   Blood: x / Protein: x / Nitrite: x   Leuk Esterase: x / RBC: x / WBC x   Sq Epi: x / Non Sq Epi: x / Bacteria: x    PE:  Gen: NAD  CV: Pulse regular present  Resp: Nonlabored  Abdomen: Soft, nontender  Extremities: motor and sensory intact, no palpable pulses bilaterally, no signals bilaterally, palpable femoral pulses   Skin: Warm, dry, appropriate color

## 2025-03-12 NOTE — PROGRESS NOTE ADULT - SUBJECTIVE AND OBJECTIVE BOX
[facility] Division of Hospital Medicine  Ashleigh Alejo DO  MS Teams PREFERRED      SUBJECTIVE / OVERNIGHT EVENTS: This morning, patient was agitated due to pain during arterial US- improved with premedication. Later, her IV infiltrated with heparin running, developed hematoma in RUE; not cooperating with replacement of IV access - ordered midline. Daughter at bedside.     ADDITIONAL REVIEW OF SYSTEMS:    MEDICATIONS  (STANDING):  atorvastatin 20 milliGRAM(s) Oral at bedtime  clopidogrel Tablet 75 milliGRAM(s) Oral daily  dextrose 5%. 1000 milliLiter(s) (50 mL/Hr) IV Continuous <Continuous>  dextrose 5%. 1000 milliLiter(s) (100 mL/Hr) IV Continuous <Continuous>  dextrose 50% Injectable 25 Gram(s) IV Push once  dextrose 50% Injectable 12.5 Gram(s) IV Push once  dextrose 50% Injectable 25 Gram(s) IV Push once  famotidine    Tablet 20 milliGRAM(s) Oral every 48 hours  gabapentin 100 milliGRAM(s) Oral two times a day  glucagon  Injectable 1 milliGRAM(s) IntraMuscular once  heparin  Infusion.  Unit(s)/Hr (12 mL/Hr) IV Continuous <Continuous>  insulin glargine Injectable (LANTUS) 12 Unit(s) SubCutaneous at bedtime  insulin lispro (ADMELOG) corrective regimen sliding scale   SubCutaneous three times a day before meals  insulin lispro (ADMELOG) corrective regimen sliding scale   SubCutaneous at bedtime  insulin lispro Injectable (ADMELOG) 6 Unit(s) SubCutaneous three times a day before meals  lactated ringers. 1000 milliLiter(s) (75 mL/Hr) IV Continuous <Continuous>  levothyroxine 50 MICROGram(s) Oral daily  magnesium sulfate  IVPB 1 Gram(s) IV Intermittent once  metoprolol tartrate 12.5 milliGRAM(s) Oral two times a day  pantoprazole    Tablet 40 milliGRAM(s) Oral before breakfast  sacubitril 24 mG/valsartan 26 mG 1 Tablet(s) Oral two times a day  torsemide 20 milliGRAM(s) Oral daily    MEDICATIONS  (PRN):  acetaminophen     Tablet .. 650 milliGRAM(s) Oral every 6 hours PRN Temp greater or equal to 38C (100.4F), Mild Pain (1 - 3)  aluminum hydroxide/magnesium hydroxide/simethicone Suspension 30 milliLiter(s) Oral every 4 hours PRN Dyspepsia  dextrose Oral Gel 15 Gram(s) Oral once PRN Blood Glucose LESS THAN 70 milliGRAM(s)/deciliter  heparin   Injectable 5500 Unit(s) IV Push every 6 hours PRN For aPTT less than 40  heparin   Injectable 2500 Unit(s) IV Push every 6 hours PRN For aPTT between 40 - 57  HYDROmorphone   Tablet 1 milliGRAM(s) Oral every 4 hours PRN Severe Pain (7 - 10)  melatonin 3 milliGRAM(s) Oral at bedtime PRN Insomnia  metoclopramide Injectable 10 milliGRAM(s) IV Push every 8 hours PRN 2nd choice for nausea/vomiting  ondansetron Injectable 4 milliGRAM(s) IV Push every 8 hours PRN Nausea and/or Vomiting      I&O's Summary      PHYSICAL EXAM:  Vital Signs Last 24 Hrs  T(C): 36.5 (12 Mar 2025 11:32), Max: 36.5 (12 Mar 2025 05:12)  T(F): 97.7 (12 Mar 2025 11:32), Max: 97.7 (12 Mar 2025 05:12)  HR: 60 (12 Mar 2025 11:32) (60 - 71)  BP: 117/70 (12 Mar 2025 11:32) (110/69 - 129/51)  BP(mean): --  RR: 18 (12 Mar 2025 11:32) (18 - 19)  SpO2: 98% (12 Mar 2025 11:32) (94% - 100%)    Parameters below as of 12 Mar 2025 11:32  Patient On (Oxygen Delivery Method): room air    CONSTITUTIONAL: NAD  EYES: No conjunctival or scleral injection, non-icteric;   ENMT: No external nasal lesions; MMM  NECK: Trachea midline   RESPIRATORY: Breathing comfortably; no dullness to percussion; lungs CTA without wheeze/rhonchi/rales  CARDIOVASCULAR: +S1S2, RRR, +1 bilateral lower extremity edema.   GASTROINTESTINAL: No palpable masses or tenderness, no rebound/guarding  PSYCHIATRIC: Calm, cooperative  SKIN: Multiple excoriations on bilateral legs.       LABS:                        10.0   6.04  )-----------( 98       ( 12 Mar 2025 07:03 )             30.3     03-12    137  |  99  |  125[H]  ----------------------------<  236[H]  5.2   |  17[L]  |  5.16[H]    Ca    9.3      12 Mar 2025 07:03  Phos  5.3     03-11  Mg     1.5     03-12    TPro  6.0  /  Alb  3.3  /  TBili  0.8  /  DBili  x   /  AST  15  /  ALT  17  /  AlkPhos  184[H]  03-10    PT/INR - ( 10 Mar 2025 17:57 )   PT: 16.1 sec;   INR: 1.42 ratio         PTT - ( 12 Mar 2025 07:03 )  PTT:88.9 sec      Urinalysis Basic - ( 12 Mar 2025 07:03 )    Color: x / Appearance: x / SG: x / pH: x  Gluc: 236 mg/dL / Ketone: x  / Bili: x / Urobili: x   Blood: x / Protein: x / Nitrite: x   Leuk Esterase: x / RBC: x / WBC x   Sq Epi: x / Non Sq Epi: x / Bacteria: x          RADIOLOGY & ADDITIONAL TESTS:  Results Reviewed:   Imaging Personally Reviewed:  Electrocardiogram Personally Reviewed:    COORDINATION OF CARE:  Care Discussed with Consultants/Other Providers [Y/N]: Y  Prior or Outpatient Records Reviewed [Y/N]: Y

## 2025-03-13 NOTE — CONSULT NOTE ADULT - ASSESSMENT
89-year-old female with PMHx HTN, DM on insulin, CAD s/p PCI, HFrEF, CKD stage V (baseline SCr 3.4-3.6) presenting with acute onset of R shin pain in setting of severe PVD. Nephrology consulted for ROB on CKD, hyperkalemia.

## 2025-03-13 NOTE — PROGRESS NOTE ADULT - ASSESSMENT
89F PMHx HTN, DM on Insulin, CAD with stents on plavix, CHF presenting with RLE pain. Patient endorses acute onset of pain in her right shin area without trauma or an inciting events. Patient has chronic wounds. Collateral information obtained from daughter. Patient follows with a wound care clinic for bilateral chronic wounds. Patient has never seen a vascular surgeon. In the ED, AFVSS. Labs significant for elevated , Cr 3.99, glucose 360, eGFR 10, potassium 5.2 on VBG. In the ED patient received a CT angio abd aorta with bilateral runoff. CT Angio showing progression of chronic atherosclerosis with quesitonable popliteal artery occlusions, limited study secondary to poor cardiac output       Plan:   - No acute vascular surgery intervention   - US arterial duplex reviewed   - Images reviewed with vascular surgery fellow - findings are not acute and vessels reconstitute   - Heparin drip   - Please obtain SAPPHIRE/PVRs  - Vascular Surgery will follow    Vascular Surgery

## 2025-03-13 NOTE — PROGRESS NOTE ADULT - NSPROGADDITIONALINFOA_GEN_ALL_CORE
Time-based billing (NON-critical care).     55 minutes spent on total encounter. The necessity of the time spent during the encounter on this date of service was due to:     - Reviewing, and interpreting labs, testing, and imaging.  - Independently obtaining a review of systems and performing a physical exam  - Reviewing prior records and where necessary, outpatient records.  - Counselling and educating patient regarding interpretation of aforementioned items and plan of care.      d/w ACP

## 2025-03-13 NOTE — PROGRESS NOTE ADULT - SUBJECTIVE AND OBJECTIVE BOX
[facility] Division of Hospital Medicine  Ashleigh Alejo DO  MS Teams PREFERRED      SUBJECTIVE / OVERNIGHT EVENTS: Patient sleeping this morning - arousable to light touch. History limited due to cognitive impairment. Daughter at bedside.  ADDITIONAL REVIEW OF SYSTEMS:    MEDICATIONS  (STANDING):  atorvastatin 20 milliGRAM(s) Oral at bedtime  clopidogrel Tablet 75 milliGRAM(s) Oral daily  dextrose 5%. 1000 milliLiter(s) (50 mL/Hr) IV Continuous <Continuous>  dextrose 5%. 1000 milliLiter(s) (100 mL/Hr) IV Continuous <Continuous>  dextrose 50% Injectable 25 Gram(s) IV Push once  dextrose 50% Injectable 12.5 Gram(s) IV Push once  dextrose 50% Injectable 25 Gram(s) IV Push once  famotidine    Tablet 20 milliGRAM(s) Oral every 48 hours  gabapentin 100 milliGRAM(s) Oral two times a day  glucagon  Injectable 1 milliGRAM(s) IntraMuscular once  heparin  Infusion. 800 Unit(s)/Hr (8 mL/Hr) IV Continuous <Continuous>  influenza  Vaccine (HIGH DOSE) 0.5 milliLiter(s) IntraMuscular once  insulin lispro (ADMELOG) corrective regimen sliding scale   SubCutaneous three times a day before meals  insulin lispro (ADMELOG) corrective regimen sliding scale   SubCutaneous at bedtime  levothyroxine 50 MICROGram(s) Oral daily  metoprolol tartrate 12.5 milliGRAM(s) Oral two times a day  pantoprazole    Tablet 40 milliGRAM(s) Oral before breakfast  sodium chloride 0.9% Bolus 500 milliLiter(s) IV Bolus once  sodium zirconium cyclosilicate 5 Gram(s) Oral once    MEDICATIONS  (PRN):  acetaminophen     Tablet .. 650 milliGRAM(s) Oral every 6 hours PRN Temp greater or equal to 38C (100.4F), Mild Pain (1 - 3)  aluminum hydroxide/magnesium hydroxide/simethicone Suspension 30 milliLiter(s) Oral every 4 hours PRN Dyspepsia  dextrose Oral Gel 15 Gram(s) Oral once PRN Blood Glucose LESS THAN 70 milliGRAM(s)/deciliter  heparin   Injectable 5500 Unit(s) IV Push every 6 hours PRN For aPTT less than 40  heparin   Injectable 2500 Unit(s) IV Push every 6 hours PRN For aPTT between 40 - 57  melatonin 3 milliGRAM(s) Oral at bedtime PRN Insomnia  metoclopramide Injectable 10 milliGRAM(s) IV Push every 8 hours PRN 2nd choice for nausea/vomiting  ondansetron Injectable 4 milliGRAM(s) IV Push every 8 hours PRN Nausea and/or Vomiting      I&O's Summary      PHYSICAL EXAM:  Vital Signs Last 24 Hrs  T(C): 36.2 (13 Mar 2025 12:03), Max: 36.8 (13 Mar 2025 05:25)  T(F): 97.2 (13 Mar 2025 12:03), Max: 98.3 (13 Mar 2025 05:25)  HR: 55 (13 Mar 2025 12:03) (55 - 64)  BP: 100/57 (13 Mar 2025 12:03) (100/57 - 131/84)  BP(mean): --  RR: 18 (13 Mar 2025 12:03) (18 - 18)  SpO2: 100% (13 Mar 2025 12:03) (97% - 100%)    Parameters below as of 13 Mar 2025 12:03  Patient On (Oxygen Delivery Method): room air    CONSTITUTIONAL: NAD  EYES: No conjunctival or scleral injection, non-icteric;   ENMT: No external nasal lesions; MMM  NECK: Trachea midline   RESPIRATORY: Breathing comfortably; no dullness to percussion; lungs CTA without wheeze/rhonchi/rales  CARDIOVASCULAR: +S1S2, RRR, +1 bilateral lower extremity edema.   GASTROINTESTINAL: No palpable masses or tenderness, no rebound/guarding  PSYCHIATRIC: Calm, cooperative  SKIN: Multiple excoriations on bilateral legs.         LABS:                        10.0   7.23  )-----------( 110      ( 13 Mar 2025 07:09 )             29.3     03-13    139  |  100  |  126[H]  ----------------------------<  85  5.5[H]   |  16[L]  |  6.01[H]    Ca    9.0      13 Mar 2025 07:09  Mg     1.9     03-13      PTT - ( 13 Mar 2025 10:01 )  PTT:43.2 sec      Urinalysis Basic - ( 13 Mar 2025 07:09 )    Color: x / Appearance: x / SG: x / pH: x  Gluc: 85 mg/dL / Ketone: x  / Bili: x / Urobili: x   Blood: x / Protein: x / Nitrite: x   Leuk Esterase: x / RBC: x / WBC x   Sq Epi: x / Non Sq Epi: x / Bacteria: x          RADIOLOGY & ADDITIONAL TESTS:  Results Reviewed:   Imaging Personally Reviewed:  Electrocardiogram Personally Reviewed:    COORDINATION OF CARE:  Care Discussed with Consultants/Other Providers [Y/N]: Y  Prior or Outpatient Records Reviewed [Y/N]: Y

## 2025-03-13 NOTE — CONSULT NOTE ADULT - SUBJECTIVE AND OBJECTIVE BOX
[facility] DIVISION OF KIDNEY DISEASES AND HYPERTENSION -- [phone]  -- INITIAL CONSULT NOTE  --------------------------------------------------------------------------------  HPI: 89-year-old female with PMHx HTN, DM on insulin, CAD s/p PCI, HFrEF, CKD stage V (baseline SCr 3.4-3.6) presenting with acute onset of R shin pain in setting of severe PVD. Nephrology consulted for ROB on CKD, hyperkalemia.     On review of [facility], pt. last outpatient SCr was 3.66 (1/2025). Baseline SCr appears to be 3.4-3.6. Outpatient nephrologist is Dr. Parth Barrientos. SCr on admission was elevated at 3.99. SCr has worsened to 6.0 (3/13/25). Urinalysis (3/11) cloudy, 30 protein, large LE, blood, 23 WBC and triple phosphate crystals. 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     Pt. seen and examined at bedside earlier today. Patient moaning and groaning in pain. Patient's daughter is present at bedside. Per daughter she has not eaten or drank in 2 days since she has been admitted as been altered due to pain medications. She only has 1 IV access so medication administration has been a challenge. Daughter believes that CKD is due to DM and HTN. At home she is on entresto and torsemide 20 or 40mg depending on her weight. Pt. moans upon touching legs, has occlusive/stenotic PVD bilaterally with open wounds.      PAST HISTORY  --------------------------------------------------------------------------------  PAST MEDICAL & SURGICAL HISTORY:  HTN (hypertension)      Dyslipidemia      Diabetes mellitus      Palpitations      STEMI (ST elevation myocardial infarction)      CHF (congestive heart failure)      S/P cholecystectomy      S/P appendectomy      S/P lumpectomy, left breast  premalignant      S/P tonsillectomy      S/P cardiac cath        FAMILY HISTORY:    PAST SOCIAL HISTORY:    ALLERGIES & MEDICATIONS  --------------------------------------------------------------------------------  Allergies    Kiwi (Anaphylaxis)  codeine (Unknown)  penicillins (Anaphylaxis)    Intolerances      Standing Inpatient Medications  atorvastatin 20 milliGRAM(s) Oral at bedtime  clopidogrel Tablet 75 milliGRAM(s) Oral daily  dextrose 5%. 1000 milliLiter(s) IV Continuous <Continuous>  dextrose 5%. 1000 milliLiter(s) IV Continuous <Continuous>  dextrose 50% Injectable 25 Gram(s) IV Push once  dextrose 50% Injectable 12.5 Gram(s) IV Push once  dextrose 50% Injectable 25 Gram(s) IV Push once  famotidine    Tablet 20 milliGRAM(s) Oral every 48 hours  gabapentin 100 milliGRAM(s) Oral two times a day  glucagon  Injectable 1 milliGRAM(s) IntraMuscular once  heparin  Infusion. 800 Unit(s)/Hr IV Continuous <Continuous>  influenza  Vaccine (HIGH DOSE) 0.5 milliLiter(s) IntraMuscular once  insulin lispro (ADMELOG) corrective regimen sliding scale   SubCutaneous three times a day before meals  insulin lispro (ADMELOG) corrective regimen sliding scale   SubCutaneous at bedtime  levothyroxine 50 MICROGram(s) Oral daily  metoprolol tartrate 12.5 milliGRAM(s) Oral two times a day  pantoprazole    Tablet 40 milliGRAM(s) Oral before breakfast  sodium zirconium cyclosilicate 5 Gram(s) Oral once    PRN Inpatient Medications  acetaminophen     Tablet .. 650 milliGRAM(s) Oral every 6 hours PRN  aluminum hydroxide/magnesium hydroxide/simethicone Suspension 30 milliLiter(s) Oral every 4 hours PRN  dextrose Oral Gel 15 Gram(s) Oral once PRN  heparin   Injectable 5500 Unit(s) IV Push every 6 hours PRN  heparin   Injectable 2500 Unit(s) IV Push every 6 hours PRN  melatonin 3 milliGRAM(s) Oral at bedtime PRN  metoclopramide Injectable 10 milliGRAM(s) IV Push every 8 hours PRN  ondansetron Injectable 4 milliGRAM(s) IV Push every 8 hours PRN      REVIEW OF SYSTEMS  --------------------------------------------------------------------------------  Unable to obtain ROS due to current clinical status.     VITALS/PHYSICAL EXAM  --------------------------------------------------------------------------------  T(C): 36.2 (03-13-25 @ 12:03), Max: 36.8 (03-13-25 @ 05:25)  HR: 55 (03-13-25 @ 12:03) (55 - 64)  BP: 100/57 (03-13-25 @ 12:03) (100/57 - 131/84)  RR: 18 (03-13-25 @ 12:03) (18 - 18)  SpO2: 100% (03-13-25 @ 12:03) (97% - 100%)  Wt(kg): --  Height (cm): 170.2 (03-12-25 @ 16:25)  Weight (kg): 66.4 (03-12-25 @ 16:25)  BMI (kg/m2): 22.9 (03-12-25 @ 16:25)  BSA (m2): 1.77 (03-12-25 @ 16:25)      Physical Exam:  Gen: elderly, frail, chronically ill appearing.  Pulm: CTA B/L  CV: RRR, S1S2;  Abd: +BS, soft  : No suprapubic tenderness  Extremities: +R>L  Neuro: lethargic, moaning.  Skin: Warm      LABS/STUDIES  --------------------------------------------------------------------------------              10.0   7.23  >-----------<  110      [03-13-25 @ 07:09]              29.3     139  |  100  |  126  ----------------------------<  85      [03-13-25 @ 07:09]  5.5   |  16  |  6.01        Ca     9.0     [03-13-25 @ 07:09]      Mg     1.9     [03-13-25 @ 07:09]        PTT: 43.2       [03-13-25 @ 10:01]      Creatinine Trend:  SCr 6.01 [03-13 @ 07:09]  SCr 5.16 [03-12 @ 07:03]  SCr 4.12 [03-11 @ 06:59]  SCr 3.99 [03-10 @ 17:57]    Urinalysis - [03-13-25 @ 07:09]      Color  / Appearance  / SG  / pH       Gluc 85 / Ketone   / Bili  / Urobili        Blood  / Protein  / Leuk Est  / Nitrite       RBC  / WBC  / Hyaline  / Gran  / Sq Epi  / Non Sq Epi  / Bacteria       Lipid: chol 82, TG 68, HDL 32, LDL --      [03-11-25 @ 06:59]

## 2025-03-13 NOTE — PROGRESS NOTE ADULT - PROBLEM SELECTOR PLAN 4
-Pt's cardiology Dr. Michelle Hines @ [facility] ([phone])  -Not in acute exacerbation  Hold home torsemide, Entresto due to ROB  Continue home metoprolol

## 2025-03-13 NOTE — PROGRESS NOTE ADULT - PROBLEM SELECTOR PLAN 1
-CT concerning for bilateral popliteal artery occlusion  - US Arterial: There is bilateral arterial vascular stenotic and occlusive disease. On the right, there is a severe stenosis of the popliteal artery proximally followed a segmental occlusion of the popliteal artery distally. The right posterior tibial, peroneal, anterior tibial and dorsal pedis   arteries are all occluded. On the left, there are segmental occlusions of the superficial femoral   artery in the small and distal thigh. The left posterior tibial peroneal trunk, posterior tibial, peroneal, anterior tibial and dorsal pedis arteries are occluded  -Vascular recs appreciated- No acute surgical intervention. Continue heparin drip  -Patient unable to cooperate with SAPPHIRE/PVR  -Continue home Plavix/Lipitor

## 2025-03-13 NOTE — PROGRESS NOTE ADULT - SUBJECTIVE AND OBJECTIVE BOX
SURGERY DAILY PROGRESS NOTE    24 Hour/Overnight Events: No acute events overnight    SUBJECTIVE: Patient seen and evaluated on AM rounds.   ------------------------------------------------------------------------------------------------------------  OBJECTIVE:  Vital Signs Last 24 Hrs  T(C): 36.8 (13 Mar 2025 05:25), Max: 36.8 (13 Mar 2025 05:25)  T(F): 98.3 (13 Mar 2025 05:25), Max: 98.3 (13 Mar 2025 05:25)  HR: 62 (13 Mar 2025 05:25) (60 - 64)  BP: 107/62 (13 Mar 2025 05:25) (107/62 - 131/84)  BP(mean): --  RR: 18 (12 Mar 2025 20:30) (18 - 18)  SpO2: 100% (13 Mar 2025 05:25) (97% - 100%)    Parameters below as of 13 Mar 2025 05:25  Patient On (Oxygen Delivery Method): room air      I&O's Detail      LABS:                        10.0   7.23  )-----------( 110      ( 13 Mar 2025 07:09 )             29.3     03-13    139  |  100  |  126[H]  ----------------------------<  85  5.5[H]   |  16[L]  |  6.01[H]    Ca    9.0      13 Mar 2025 07:09  Mg     1.9     03-13        PTT - ( 13 Mar 2025 02:28 )  PTT:69.3 sec  Urinalysis Basic - ( 13 Mar 2025 07:09 )    Color: x / Appearance: x / SG: x / pH: x  Gluc: 85 mg/dL / Ketone: x  / Bili: x / Urobili: x   Blood: x / Protein: x / Nitrite: x   Leuk Esterase: x / RBC: x / WBC x   Sq Epi: x / Non Sq Epi: x / Bacteria: x      PE:  Gen: NAD  CV: Pulse regular present  Resp: Nonlabored  Abdomen: Soft, nontender  Extremities: motor and sensory intact, no palpable pulses bilaterally, no signals bilaterally, palpable femoral pulses   Skin: Warm, dry, appropriate color

## 2025-03-13 NOTE — PROGRESS NOTE ADULT - PROBLEM SELECTOR PLAN 6
ROB on CKD stage 5. Possible prerenal, ATN, contrast-induced.   -Outpatient labs reviewed- Cre 3.66 in 01/2025, GFR <15   -Monitor Cre.   -Avoid nephrotoxic medications    Cre worsening   US Renal:  No hydronephrosis. Increased renal cortical echogenicity compatible with renal parenchymal disease.  Check UA, Linda, UCre.  Holding torsemide, Entresto  Bladder Scan Q8H - most recent 169cc  Trial IVF  Nephrology recs appreciated

## 2025-03-13 NOTE — PROGRESS NOTE ADULT - PROBLEM SELECTOR PLAN 8
VTE PPx: Heparin drip   GI ppx: PPI     CODE: FULL    3/13- Care plan discussed with daughter at bedside.

## 2025-03-14 NOTE — GOALS OF CARE CONVERSATION - ADVANCED CARE PLANNING - CONVERSATION DETAILS
Conversation held with daughter, Nancy, at bedside. Reviewed events leading to patient's current status, and overall condition at present. Explained role of healthcare proxy and its role in decision making should the patient be unable to express his own wishes or lacks capacity to make medical decisions. Explained Family Health Care Decision Act (FHCDA) Surrogate Decision Maker Hierarchy in the absence of a health care agent. Nancy reports that she is documented healthcare proxy and primary caregiver, though she confers with her brother, who lives in [place].     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 Further management pending several vascular studies, however, difficulty obtaining due to patient's agitation likely secondary to pain. Also with poor IV access - attempts to place IVs also limited secondary to pain/agitation. Explained that we can premedicate her with pain medication, however, also poses difficulty as she is significantly altered and pain medication may worsen delirium. Course also complicated by worsening renal function, hyperkalemia, poor oral intake. Overall, patient with poor prognosis. Extensive discussion regarding quality vs. quantity of life. Nancy hopeful that if patient's mentation improves, she will be less combative with procedures/testing and she will be able to eat/drink, allowing her kidney function to recover. Explained that given patient's comorbidities, frailty and decreased physiologic reserves, may not recover to her former cognitive and functional baseline. Nancy expressed understanding but remained optimistic. She will consider further interventions, including dialysis and vascular intervention, as necessity arises. Regarding resuscitative measures, including chest compression and intubation, Nancy will discuss with her brother further - patient remains FULL CODE.

## 2025-03-14 NOTE — PROGRESS NOTE ADULT - PROBLEM SELECTOR PLAN 8
-Home medications reconciled with daughter, Nancy, via phone:  -Torsemide 20mg (takes 1-2 tablets depending on weight)  - HOLDING due to ROB   -Protonix 40mg daily - Holding as patient confused, difficulty taking PO medications, also with ROB  -Metoprolol 12.5mg BID  -Metolazone PRN (last taken a month ago per daughter)  -Levothyroxine recently increased to 50mcg daily  -Lantus 12u QHS - Holding due to poor oral intake  -Gabapentin 100mg BID - Holding as patient confused, difficulty taking PO medications, also with ROB   -Pepcid 20mg daily (will continue as 20mg every other day for renal dosing) - Holding as patient confused, difficulty taking PO medications, also with ROB  -Entresto 24/26mg BID - HOLDING due to ROB   -Plavix 75mg daily  -Lipitor 20mg daily - Holding as patient confused, difficulty taking PO medications   -Allopurinol 100mg every day (holding due to CKD stage 4)

## 2025-03-14 NOTE — DIETITIAN INITIAL EVALUATION ADULT - NSFNSNUTRCHEWSWALLOWFT_GEN_A_CORE
Daughter reports pt has difficulty swallowing "hard foods" due to in and out of sleep. Denies pt coughing after eating and difficulty chewing. Daughter reports she wants to provide pt with softer foods. RD offered to provide a texture modified diet and recommend SLP. Daughter stated she doesn't want to be ordered for a texture modified diet yet and start with the oral nutrition supplements.  Daughter reports pt has difficulty consuming "hard foods" due to in and out of sleep. Denies pt coughing after eating and difficulty chewing. Daughter reports she wants to provide pt with softer foods. RD offered softer diet, daughter declined.

## 2025-03-14 NOTE — DIETITIAN INITIAL EVALUATION ADULT - ORAL INTAKE PTA/DIET HISTORY
Daughter reports varying appetite and PO intake PTA. Reports pt normally eats 2 meals/day but varies in amount she consumes of the 2 meals. Daughter endorses allergy to kiwis and reports "her throat closes up after eating them". Daughter denies history of difficulty chewing/swallowing. Denies chronic history of nausea/vomiting/diarrhea. Reports constipation at times PTA.  Daughter reports varying appetite and PO intake PTA. Reports pt normally eats 2 meals/day but varies in amount she consumes of the 2 meals. Daughter endorses allergy to kiwis and reports "her throat closes up after eating them". Daughter denies history of difficulty chewing/swallowing. Denies chronic history of nausea/vomiting/diarrhea. Reports constipation at times PTA.   Daughter reports pt was not receiving dialysis PTA.

## 2025-03-14 NOTE — DIETITIAN INITIAL EVALUATION ADULT - NSFNSPHYEXAMSKINFT_GEN_A_CORE
Per chart, "pt has chronic wounds" and pain in the right lower extremity.  unspecified in nursing flowsheets the location and stage of the pressure injury.   Per patient profile, pt with right heel suspected deep tissue injury.

## 2025-03-14 NOTE — DIETITIAN INITIAL EVALUATION ADULT - SIGNS/SYMPTOMS
as evidenced by pt with a deep tissue injury, HF and CKD stage 5.  as evidenced by pt consuming 0-25% trays x 3 days.

## 2025-03-14 NOTE — PROGRESS NOTE ADULT - SUBJECTIVE AND OBJECTIVE BOX
[facility] DIVISION OF KIDNEY DISEASES AND HYPERTENSION -- FOLLOW UP NOTE  --------------------------------------------------------------------------------  Chief Complaint:/subjective:  daughter and daughter's friend at bedside  daughter stated mother is more awake today  no report of any UO, pt with primafit    24 hour events: cr is uptrending         PAST HISTORY  --------------------------------------------------------------------------------  No significant changes to PMH, PSH, FHx, SHx, unless otherwise noted    ALLERGIES & MEDICATIONS  --------------------------------------------------------------------------------  Allergies    Kiwi (Anaphylaxis)  codeine (Unknown)  penicillins (Anaphylaxis)    Intolerances      Standing Inpatient Medications  clopidogrel Tablet 75 milliGRAM(s) Oral daily  dextrose 5%. 1000 milliLiter(s) IV Continuous <Continuous>  dextrose 5%. 1000 milliLiter(s) IV Continuous <Continuous>  dextrose 50% Injectable 25 Gram(s) IV Push once  dextrose 50% Injectable 12.5 Gram(s) IV Push once  dextrose 50% Injectable 25 Gram(s) IV Push once  glucagon  Injectable 1 milliGRAM(s) IntraMuscular once  heparin  Infusion. 1000 Unit(s)/Hr IV Continuous <Continuous>  HYDROmorphone  Injectable 0.5 milliGRAM(s) IntraMuscular once  influenza  Vaccine (HIGH DOSE) 0.5 milliLiter(s) IntraMuscular once  insulin lispro (ADMELOG) corrective regimen sliding scale   SubCutaneous three times a day before meals  insulin lispro (ADMELOG) corrective regimen sliding scale   SubCutaneous at bedtime  levothyroxine 50 MICROGram(s) Oral daily  sodium bicarbonate 650 milliGRAM(s) Oral three times a day  sodium chloride 0.9%. 1000 milliLiter(s) IV Continuous <Continuous>  sodium zirconium cyclosilicate 10 Gram(s) Oral three times a day    PRN Inpatient Medications  acetaminophen     Tablet .. 650 milliGRAM(s) Oral every 6 hours PRN  dextrose Oral Gel 15 Gram(s) Oral once PRN  heparin   Injectable 5500 Unit(s) IV Push every 6 hours PRN  heparin   Injectable 2500 Unit(s) IV Push every 6 hours PRN  melatonin 3 milliGRAM(s) Oral at bedtime PRN  metoclopramide Injectable 10 milliGRAM(s) IV Push every 8 hours PRN  ondansetron Injectable 4 milliGRAM(s) IV Push every 8 hours PRN      REVIEW OF SYSTEMS  --------------------------------------------------------------------------------  unable to obtain from pt    VITALS/PHYSICAL EXAM  --------------------------------------------------------------------------------  T(C): 37.2 (03-14-25 @ 06:41), Max: 37.2 (03-14-25 @ 06:41)  HR: 71 (03-14-25 @ 06:41) (55 - 71)  BP: 128/76 (03-14-25 @ 06:41) (100/57 - 128/76)  RR: 18 (03-14-25 @ 06:41) (18 - 18)  SpO2: 97% (03-14-25 @ 06:41) (97% - 100%)  Wt(kg): --  Adult Advanced Hemodynamics Last 24 Hrs  ABP: --  ABP(mean): --  CVP(mm Hg): --  CO: --  CI: --  PA: --  PA(mean): --  PCWP: --  SVR: --  SVRI: --  Height (cm): 170.2 (03-13-25 @ 17:33)  Weight (kg): 66.4 (03-13-25 @ 17:33)  BMI (kg/m2): 22.9 (03-13-25 @ 17:33)  BSA (m2): 1.77 (03-13-25 @ 17:33)      Physical Exam:  	Gen: NAD, lethargic but arousable, more awake today   	HEENT:  no jvp  	Pulm: CTA B/L  	CV: RRR, S1S2; no rub  	Back:  no sacral edema  	Abd: +BS, soft   	: No suprapubic tenderness  	Ext: no edema  	Neuro: as above   	Skin: Warm   	Vascular access:    LABS/STUDIES  --------------------------------------------------------------------------------              9.5    12.78 >-----------<  104      [03-14-25 @ 08:47]              27.5     Hemoglobin: 9.5 g/dL (03-14-25 @ 08:47)  Hemoglobin: 10.0 g/dL (03-13-25 @ 07:09)    Platelet Count - Automated: 104 K/uL (03-14-25 @ 08:47)  Platelet Count - Automated: 110 K/uL (03-13-25 @ 07:09)    136  |  98  |  148  ----------------------------<  109      [03-14-25 @ 08:47]  5.7   |  15  |  6.62        Ca     8.7     [03-14-25 @ 08:47]      Mg     1.9     [03-13-25 @ 07:09]        PTT: 107.9      [03-14-25 @ 08:47]      Creatinine Trend:  SCr 6.62 [03-14 @ 08:47]  SCr 6.49 [03-13 @ 22:33]  SCr 6.01 [03-13 @ 07:09]  SCr 5.16 [03-12 @ 07:03]  SCr 4.12 [03-11 @ 06:59]    Urinalysis - [03-14-25 @ 08:47]      Color  / Appearance  / SG  / pH       Gluc 109 / Ketone   / Bili  / Urobili        Blood  / Protein  / Leuk Est  / Nitrite       RBC  / WBC  / Hyaline  / Gran  / Sq Epi  / Non Sq Epi  / Bacteria       Lipid: chol 82, TG 68, HDL 32, LDL --      [03-11-25 @ 06:59]

## 2025-03-14 NOTE — DIETITIAN INITIAL EVALUATION ADULT - NSFNSGIASSESSMENTFT_GEN_A_CORE
Daughter denies diarrhea/constipation but endorses nausea/vomiting. No documented BM per nursing flowsheets.

## 2025-03-14 NOTE — PROGRESS NOTE ADULT - PROBLEM SELECTOR PLAN 5
-Pt's cardiology Dr. Michelle Hines @ [facility] ([phone])  -Not in acute exacerbation  Hold home torsemide, Entresto due to ROB  Continue home metoprolol

## 2025-03-14 NOTE — PROGRESS NOTE ADULT - SUBJECTIVE AND OBJECTIVE BOX
SURGERY DAILY PROGRESS NOTE    STATUS POST:      SUBJECTIVE: Pt seen and examined at bedside. No acute events overnight.       OBJECTIVE:  Vital Signs Last 24 Hrs  T(C): 36.6 (14 Mar 2025 12:00), Max: 37.2 (14 Mar 2025 06:41)  T(F): 97.8 (14 Mar 2025 12:00), Max: 99 (14 Mar 2025 06:41)  HR: 65 (14 Mar 2025 12:00) (63 - 71)  BP: 130/80 (14 Mar 2025 12:00) (114/75 - 130/80)  BP(mean): --  RR: 18 (14 Mar 2025 12:00) (18 - 18)  SpO2: 98% (14 Mar 2025 12:00) (97% - 98%)    Parameters below as of 14 Mar 2025 12:00  Patient On (Oxygen Delivery Method): room air        I&O's Summary    14 Mar 2025 07:01  -  14 Mar 2025 15:39  --------------------------------------------------------  IN: 0 mL / OUT: 300 mL / NET: -300 mL        Physical Exam:  Gen: NAD  CV: Pulse regular present  Resp: Nonlabored  Abdomen: Soft, nontender  Extremities: motor and sensory intact, no palpable pulses bilaterally, no signals bilaterally, palpable femoral pulses   Skin: Warm, dry, appropriate color  LABS:                        9.5    12.78 )-----------( 104      ( 14 Mar 2025 08:47 )             27.5     03-14    136  |  98  |  148[H]  ----------------------------<  109[H]  5.7[H]   |  15[L]  |  6.62[H]    Ca    8.7      14 Mar 2025 08:47  Mg     1.9     03-13      PTT - ( 14 Mar 2025 08:47 )  PTT:107.9 sec  Urinalysis Basic - ( 14 Mar 2025 08:47 )    Color: x / Appearance: x / SG: x / pH: x  Gluc: 109 mg/dL / Ketone: x  / Bili: x / Urobili: x   Blood: x / Protein: x / Nitrite: x   Leuk Esterase: x / RBC: x / WBC x   Sq Epi: x / Non Sq Epi: x / Bacteria: x        RADIOLOGY & ADDITIONAL STUDIES:

## 2025-03-14 NOTE — DIETITIAN INITIAL EVALUATION ADULT - PROBLEM SELECTOR PLAN 4
-d/t Layne MORA not working, I could not check previous echos.   -pt on entresto and lopressor   -pt's cardiology Dr. Michelle Hines @ [facility] ([phone]. Obtain record)

## 2025-03-14 NOTE — DIETITIAN INITIAL EVALUATION ADULT - ENERGY INTAKE
Daughter reports pt has only been consuming applesauce 1 or 2x/day since admission. Reports pt is always sleeping and has poor appetite.   Per nursing flowsheets, documented pt consuming 0-25% of trays.   Daughter is requesting oral nutrition supplements to provide to the pt when she is awake.

## 2025-03-14 NOTE — GOALS OF CARE CONVERSATION - ADVANCED CARE PLANNING - NSPROPTRIGHTCAREGIVER_GEN_A_NUR
yes Quality 130: Documentation Of Current Medications In The Medical Record: Current Medications Documented Detail Level: Detailed Quality 110: Preventive Care And Screening: Influenza Immunization: Influenza Immunization previously received during influenza season

## 2025-03-14 NOTE — DIETITIAN INITIAL EVALUATION ADULT - PERTINENT LABORATORY DATA
03-14  Sodium 136 mmol/L (WNL)   Potassium 5.7 mmol/L (H) - Trending up, noted to be ordered for lokelma   BUN/Creatine 148 (H)/ 6.62 (H)   Calcium 8.7 mg/dl (WNL)  Magnesium 1.9 mg/dl (WNL)   Phosphorus 5.3 mg/dl (H) (3/11)- No updated Phosphorus level   eGFR 5 (L)  A1C with Estimated Average Glucose Result: 9.4 %   POCT blood glucose x 24 hours:  mg/dl

## 2025-03-14 NOTE — PROGRESS NOTE ADULT - ASSESSMENT
89F PMHx HTN, DM on Insulin, CAD with stents on plavix, CHF presenting with RLE pain. Patient endorses acute onset of pain in her right shin area without trauma or an inciting events. Patient has chronic wounds. Collateral information obtained from daughter. Patient follows with a wound care clinic for bilateral chronic wounds. Patient has never seen a vascular surgeon. In the ED, AFVSS. Labs significant for elevated , Cr 3.99, glucose 360, eGFR 10, potassium 5.2 on VBG. In the ED patient received a CT angio abd aorta with bilateral runoff. CT Angio showing progression of chronic atherosclerosis with quesitonable popliteal artery occlusions, limited study secondary to poor cardiac output       Plan:   - No acute vascular surgery intervention   - US arterial duplex reviewed   - Images reviewed with vascular surgery fellow - findings are not acute and vessels reconstitute   - Heparin drip   - Please obtain SAPPHIRE/PVRs  - Patient can follow up outpatient with Dr. Ivan     Vascular Surgery

## 2025-03-14 NOTE — PROGRESS NOTE ADULT - PROBLEM SELECTOR PLAN 1
felt hot, dizzy, body aches, vomiting x 1 day, -CT concerning for bilateral popliteal artery occlusion  - US Arterial: There is bilateral arterial vascular stenotic and occlusive disease. On the right, there is a severe stenosis of the popliteal artery proximally followed a segmental occlusion of the popliteal artery distally. The right posterior tibial, peroneal, anterior tibial and dorsal pedis arteries are all occluded. On the left, there are segmental occlusions of the superficial femoral artery in the small and distal thigh. The left posterior tibial peroneal trunk, posterior tibial, peroneal, anterior tibial and dorsal pedis arteries are occluded  -Vascular recs appreciated- No acute surgical intervention. Continue heparin drip  -Patient unable to cooperate with SAPPHIRE/PVR - will reattempt today with premedication for pain. Risks and benefits discussed with daughter.   -Continue home Plavix/Lipitor

## 2025-03-14 NOTE — DIETITIAN INITIAL EVALUATION ADULT - REASON INDICATOR FOR ASSESSMENT
Nutrition consult warranted for: Pressure injury stage 2 or >   Information obtained from: electronic medical record and daughter (Bonnie)   Per chart pt with cognitive impairment " confused, disoriented to place, time and situation"   Chart reviewed, events noted.

## 2025-03-14 NOTE — PROGRESS NOTE ADULT - SUBJECTIVE AND OBJECTIVE BOX
[facility] Division of Hospital Medicine  Ashleigh Alejo DO  [employer] Teams PREFERRED      SUBJECTIVE / OVERNIGHT EVENTS: No acute events overnight. Patient with minimal urine output - 267cc on bladder scan. History limited due to cognitive impairment. Patient easily arousable to light touch.   ADDITIONAL REVIEW OF SYSTEMS:    MEDICATIONS  (STANDING):  clopidogrel Tablet 75 milliGRAM(s) Oral daily  dextrose 5%. 1000 milliLiter(s) (50 mL/Hr) IV Continuous <Continuous>  dextrose 5%. 1000 milliLiter(s) (100 mL/Hr) IV Continuous <Continuous>  dextrose 50% Injectable 25 Gram(s) IV Push once  dextrose 50% Injectable 12.5 Gram(s) IV Push once  dextrose 50% Injectable 25 Gram(s) IV Push once  glucagon  Injectable 1 milliGRAM(s) IntraMuscular once  heparin  Infusion. 1000 Unit(s)/Hr (10 mL/Hr) IV Continuous <Continuous>  influenza  Vaccine (HIGH DOSE) 0.5 milliLiter(s) IntraMuscular once  insulin lispro (ADMELOG) corrective regimen sliding scale   SubCutaneous three times a day before meals  insulin lispro (ADMELOG) corrective regimen sliding scale   SubCutaneous at bedtime  levothyroxine 50 MICROGram(s) Oral daily  sodium bicarbonate 650 milliGRAM(s) Oral three times a day  sodium chloride 0.9%. 1000 milliLiter(s) (50 mL/Hr) IV Continuous <Continuous>  sodium zirconium cyclosilicate 5 Gram(s) Oral once  sodium zirconium cyclosilicate 10 Gram(s) Oral three times a day    MEDICATIONS  (PRN):  acetaminophen     Tablet .. 650 milliGRAM(s) Oral every 6 hours PRN Temp greater or equal to 38C (100.4F), Mild Pain (1 - 3)  dextrose Oral Gel 15 Gram(s) Oral once PRN Blood Glucose LESS THAN 70 milliGRAM(s)/deciliter  heparin   Injectable 5500 Unit(s) IV Push every 6 hours PRN For aPTT less than 40  heparin   Injectable 2500 Unit(s) IV Push every 6 hours PRN For aPTT between 40 - 57  melatonin 3 milliGRAM(s) Oral at bedtime PRN Insomnia  metoclopramide Injectable 10 milliGRAM(s) IV Push every 8 hours PRN 2nd choice for nausea/vomiting  ondansetron Injectable 4 milliGRAM(s) IV Push every 8 hours PRN Nausea and/or Vomiting      I&O's Summary    14 Mar 2025 07:01  -  14 Mar 2025 16:32  --------------------------------------------------------  IN: 0 mL / OUT: 300 mL / NET: -300 mL        PHYSICAL EXAM:  Vital Signs Last 24 Hrs  T(C): 36.6 (14 Mar 2025 12:00), Max: 37.2 (14 Mar 2025 06:41)  T(F): 97.8 (14 Mar 2025 12:00), Max: 99 (14 Mar 2025 06:41)  HR: 65 (14 Mar 2025 12:00) (63 - 71)  BP: 130/80 (14 Mar 2025 12:00) (114/75 - 130/80)  BP(mean): --  RR: 18 (14 Mar 2025 12:00) (18 - 18)  SpO2: 98% (14 Mar 2025 12:00) (97% - 98%)    Parameters below as of 14 Mar 2025 12:00  Patient On (Oxygen Delivery Method): room air      CONSTITUTIONAL: NAD  EYES: No conjunctival or scleral injection, non-icteric;   ENMT: No external nasal lesions; MMM  NECK: Trachea midline   RESPIRATORY: Breathing comfortably; no dullness to percussion; lungs CTA without wheeze/rhonchi/rales  CARDIOVASCULAR: +S1S2, RRR   GASTROINTESTINAL: No palpable masses or tenderness, no rebound/guarding  PSYCHIATRIC: Unable to assess  SKIN: Multiple excoriations on bilateral legs.         LABS:                        9.5    12.78 )-----------( 104      ( 14 Mar 2025 08:47 )             27.5     03-14    136  |  98  |  148[H]  ----------------------------<  109[H]  5.7[H]   |  15[L]  |  6.62[H]    Ca    8.7      14 Mar 2025 08:47  Mg     1.9     03-13      PTT - ( 14 Mar 2025 08:47 )  PTT:107.9 sec      Urinalysis Basic - ( 14 Mar 2025 08:47 )    Color: x / Appearance: x / SG: x / pH: x  Gluc: 109 mg/dL / Ketone: x  / Bili: x / Urobili: x   Blood: x / Protein: x / Nitrite: x   Leuk Esterase: x / RBC: x / WBC x   Sq Epi: x / Non Sq Epi: x / Bacteria: x          RADIOLOGY & ADDITIONAL TESTS:  Results Reviewed:   Imaging Personally Reviewed:  Electrocardiogram Personally Reviewed:    COORDINATION OF CARE:  Care Discussed with Consultants/Other Providers [Y/N]: Y  Prior or Outpatient Records Reviewed [Y/N]: Y

## 2025-03-14 NOTE — DIETITIAN INITIAL EVALUATION ADULT - OTHER INFO
Per chart   NEURO  - Per chart "cognitive impairment"   CARDIAC   - Hx of HTN, CAD s/p PCI, HFrEF and STEMI   ENDOCRINE  - Hx of DM   - Home regimen of lantus and humalog per H&P   - A1C 9.4%   - POCT blood glucose x 24 hours:  mg/dl   - Ordered for insulin sliding corrective regimen   RENAL  - CKD stage 5   - Ordered for lokelma in house

## 2025-03-14 NOTE — DIETITIAN INITIAL EVALUATION ADULT - NS FNS DIET ORDER
Diet, Consistent Carbohydrate/No Snacks:   DASH/TLC {Sodium & Cholesterol Restricted} (DASH)  For patients receiving Renal Replacement - No Protein Restr, No Conc K, No Conc Phos, Low Sodium (RENAL) (03-13-25 @ 13:29)

## 2025-03-14 NOTE — DIETITIAN INITIAL EVALUATION ADULT - PERSON TAUGHT/METHOD
Daughter educated on patient's diet restrictions in house. Daughter educated on importance of adequate protein and increased calorie needs. Provided daughter with strategies to assist in increasing PO intake in setting of AMS and fatigue.   Daughter verbalized understanding and made aware that RD will remain available for future questions./verbal instruction/individual instruction/daughter instructed

## 2025-03-14 NOTE — DIETITIAN INITIAL EVALUATION ADULT - ADD RECOMMEND
1. Recommend consistent carbohydrate diet, with no concentrated potassium, no concentrated phosphorus and low sodium  - Recommend removing DASH/TLC restriction in an effort to promote PO intake  - Recommend obtaining new phosphorus levels to determine if restriction is warranted.   - If pt meeting < 50% estimated needs in > 5 days recommend to remove consistent carbohydrate diet restriction  - If pt meeting <50% estimated needs >/= 7 days consider alternative means of nutrition enteral nutrition vs. TPN  2. Recommend Nepro 1.8 carb steady 2x/day (420 calories, 19 grams of protein) to promote intake and aid in wound healing.   3. When medically feasible recommend adding Nephro-Coy and vitamin C to aid in wound healing.    4. Monitor and encourage PO intake   - Recommend tracking intake in nursing flowsheets.   - Honor preferences as medically able   5. Trend GI tolerance, skin, weight and labs.  6. RD to remain available for future education and interventions.  1. Recommend consistent carbohydrate diet, with no concentrated potassium, no concentrated phosphorus and low sodium  - Recommend removing DASH/TLC restriction in an effort to promote PO intake  - Recommend obtaining new phosphorus levels to determine if restriction is warranted.   - If pt meeting < 50% estimated needs in > 5 days recommend to remove consistent carbohydrate diet restriction  2. Recommend Nepro 1.8 2x/day (420 calories, 19 grams of protein) to promote intake and aid in wound healing.   3. When medically feasible recommend adding Nephro-Coy to aid in wound healing.    4. Monitor and encourage PO intake   - Recommend tracking intake in nursing flowsheets.   - Honor preferences as medically able   5. Trend GI tolerance, skin, weight and labs.  6. RD to remain available for future education and interventions.

## 2025-03-14 NOTE — DIETITIAN INITIAL EVALUATION ADULT - PHYSCIAL ASSESSMENT
Daughter reports usual body weight of 134lbs PTA. Daughter denies changes in weight including unintentional/intentional weight loss/weight gain PTA.   Current dosing weight: 66.4kg/146.4lbs   Bed scale weight taken upon assessment: 65.1kg/143.2lbs.   Question accuracy of dosing and bed scale weight   Weight fluctuations likely in setting of fluid shifts.   RD to monitor weight fluctuations and trends.   IBW: 135lbs   %IBW: 106%

## 2025-03-14 NOTE — ADVANCED PRACTICE NURSE CONSULT - ASSESSMENT
PICC Insertion Note : Catheter type: DL PICC  : Bard  Power injectable: Yes  LOT#  [id number]    Informed consent obtained by covering floor team.  Procedure assisted by: Corrina PATINO RN  Time out was preformed, confirming the patient's first and last name, date of birth, procedure, and correct site prior to state of procedure.    Patient was placed in HOB 30 degrees position. Patient placement site was prepped with chlorhexidine solution, then draped using maximum sterile barrier protection. The area was injected with 2  ml of 1% lidocaine. Using the ultrasound, the catheter was introduced. Strict adherence to outline aseptic technique. Upon completion of line placement, the insertion site was covered with a sterile occlusive dressing. Pt tolerated procedure well. Minimal blood loss.  Vital sighs stable.      All materials used for catheter insertion, including the intact guide wire, were accounted for at the end of the procedure.    Number of attempts: 1  Complications/Comments: None    Emergency Placement: No    Site: New  Anatomical Site of insertion: Left Brachial  Catheter size/length: 4 F 35 cm  US guided Bard DL power picc placed    Post procedure verification with chest Xray as per orders.

## 2025-03-14 NOTE — DIETITIAN INITIAL EVALUATION ADULT - NSFNSNUTRHOMESUPPLEMENTFT_GEN_A_CORE
English Daughter reports pt would take a multivitamin PTA and occasionally consume Boost oral nutrition supplements.

## 2025-03-14 NOTE — DIETITIAN INITIAL EVALUATION ADULT - OTHER CALCULATIONS
Current weight of 143.5lbs used to estimate needs. Needs adjusted for deep tissue injury, CKD stage 5 and HFrEF Current weight of 143.5lbs used to estimate needs. Needs adjusted for deep tissue injury, CKD stage 5 and HFrEF. Defer fluids to team.

## 2025-03-14 NOTE — DIETITIAN INITIAL EVALUATION ADULT - PERTINENT MEDS FT
dextrose 5%. 1000 milliLiter(s) (50 mL/Hr) IV Continuous <Continuous>  dextrose 5%. 1000 milliLiter(s) (100 mL/Hr) IV Continuous <Continuous>  insulin lispro (ADMELOG) corrective regimen sliding scale   SubCutaneous three times a day before meals  insulin lispro (ADMELOG) corrective regimen sliding scale   SubCutaneous at bedtime  levothyroxine 50 MICROGram(s) Oral daily  sodium chloride 0.9%. 1000 milliLiter(s) (50 mL/Hr) IV Continuous   metoclopramide Injectable 10 milliGRAM(s)   ondansetron Injectable

## 2025-03-15 NOTE — PROGRESS NOTE ADULT - PROBLEM SELECTOR PLAN 2
Patient with hyperkalemia, likely due to ROB on CKD  3/14 - Lokelma 10mg TID   K is improved at 5.1 today.

## 2025-03-15 NOTE — PROCEDURE NOTE - NSTOLERANCE_GEN_A_CORE
seen discussed  concerned about ongoing peritonitis but she is somewhat better  to change ab and follow with wbc and another ct scan  surgery and family Patient tolerated procedure well.

## 2025-03-15 NOTE — CONSULT NOTE ADULT - ASSESSMENT
Interventional Radiology    Evaluate for Procedure: evaluation of right RPH    HPI: 89 yo female presented with RLE pain found to have PAD initiated on Hep GTT now with spontaneous right RPH. IR consulted for evaluation.     Allergies: codeine (Unknown)  penicillins (Anaphylaxis)    Medications (Abx/Cardiac/Anticoagulation/Blood Products)    buMETAnide Injectable: 1 milliGRAM(s) IV Push (03-14 @ 12:56)  cefTRIAXone   IVPB: 100 mL/Hr IV Intermittent (03-15 @ 18:54)  clopidogrel Tablet: 75 milliGRAM(s) Oral (03-15 @ 12:18)  heparin  Infusion.: 900 Unit(s)/Hr IV Continuous (03-14 @ 19:19)  heparin  Infusion.: 800 Unit(s)/Hr IV Continuous (03-15 @ 07:16)  metoprolol tartrate: 12.5 milliGRAM(s) Oral (03-14 @ 06:05)    Data:  152.4  68.8  T(C): 36.3  HR: 85  BP: 125/71  RR: 23  SpO2: 100%    -WBC 9.78 / HgB 6.7 / Hct 19.1 / Plt 76  -Na 138 / Cl 98 / BUN >151 / Glucose 213  -K 5.1 / CO2 16 / Cr 7.14  -ALT -- / Alk Phos -- / T.Bili --  -INR -- / PTT 63.6      Radiology: CT reviewed    Assessment/Plan:   89 yo female presented with RLE pain found to have PAD initiated on Hep GTT now with spontaneous right RPH. IR consulted for evaluation.   -- chart and imaging reviewed  -- patient hemodynamically stable at this time  -- resuscitate and reverse coagulopathy  -- f/u post transfusion CBC   -- if patient clinically decompensates & has acute change in vital signs call for urgent angiographic evaluation (if still within family's GOC)   d/w Dr. Ortiz    --  Jake Bautista DO/SHALONDA/JARETT, PGY-6 Chief Resident  Vascular and Interventional Radiology   Available on [employer] Teams    - Non-emergent consults: Place IR consult order in [place]  - Emergent issues (pager): [facility] [phone]; [facility] [phone]; [phone]  - Scheduling questions: [facility] [phone]; [facility] [phone]  - Clinic/outpatient booking: [facility] [phone]; [facility] [phone] Interventional Radiology    Evaluate for Procedure: evaluation of right RPH    HPI: 89 yo female presented with RLE pain found to have PAD initiated on Hep GTT now with spontaneous right RPH. IR consulted for evaluation.     Allergies: codeine (Unknown)  penicillins (Anaphylaxis)    Medications (Abx/Cardiac/Anticoagulation/Blood Products)    buMETAnide Injectable: 1 milliGRAM(s) IV Push (03-14 @ 12:56)  cefTRIAXone   IVPB: 100 mL/Hr IV Intermittent (03-15 @ 18:54)  clopidogrel Tablet: 75 milliGRAM(s) Oral (03-15 @ 12:18)  heparin  Infusion.: 900 Unit(s)/Hr IV Continuous (03-14 @ 19:19)  heparin  Infusion.: 800 Unit(s)/Hr IV Continuous (03-15 @ 07:16)  metoprolol tartrate: 12.5 milliGRAM(s) Oral (03-14 @ 06:05)    Data:  152.4  68.8  T(C): 36.3  HR: 85  BP: 125/71  RR: 23  SpO2: 100%    -WBC 9.78 / HgB 6.7 / Hct 19.1 / Plt 76  -Na 138 / Cl 98 / BUN >151 / Glucose 213  -K 5.1 / CO2 16 / Cr 7.14  -ALT -- / Alk Phos -- / T.Bili --  -INR -- / PTT 63.6      Radiology: CT reviewed    Assessment/Plan:   89 yo female presented with RLE pain found to have PAD initiated on Hep GTT now with spontaneous right RPH. IR consulted for evaluation.   -- chart and imaging reviewed  -- patient hemodynamically stable at this time.  -- resuscitate and reverse coagulopathy.  -- f/u post transfusion CBC.   -- if patient clinically decompensates & has acute change in vital signs call for urgent angiographic evaluation (if still within family's GOC)   d/w Dr. Ortiz.    --  Jake Bautista DO/SHALONDA/JARETT, PGY-6 Chief Resident  Vascular and Interventional Radiology   Available on [employer] Teams    - Non-emergent consults: Place IR consult order in [place]  - Emergent issues (pager): [facility] [phone]; [facility] [phone]; [phone]  - Scheduling questions: [facility] [phone]; [facility] [phone]  - Clinic/outpatient booking: [facility] [phone]; [facility] [phone]

## 2025-03-15 NOTE — CHART NOTE - NSCHARTNOTEFT_GEN_A_CORE
MICU ACCEPT NOTE    CHIEF COMPLAINT: Patient is a 89y old  Female who presents with a chief complaint of Right leg pain (14 Mar 2025 16:31)      HPI:  89F PMHx HTN, DM on insulin, CAD s/p PCI, HFrEF. P/w acute onset of R shin pain w/o trauma 3hr PTA. Of note, pt has chronic b/l wounds, seen by wound care clinic, but never saw vascular surg before. Denies f/c, cp, sob, abd pain, n/v/d.     In the ED, afebrile, HR 50-70, BP stable, saturates well on RA while awake, desats to high 80s while asleep. Pt is not on O2 at baseline. CBC w/ wbc 5.8, hgb 9.3 (was 10.1 in '19), plt 104 (was wnl in '19). Coag w/ INR 1.42. CMP w/ SCr 3.99 (was 1.16 in '19), alk phos 184.  VBG w/ lactate 1.6. 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 Partially imagined heart enlarged and abnormal.  Vasc surg c/s in the ED: no acute intervention hep gtt, SAPPHIRE/PVRs. of [facility] and i was unable to review. MICU consulted due to potential for difficult Shiley placement in setting of patient on heparin gtt, on plavix, and with patient likely to need sedation.      PAST MEDICAL & SURGICAL HISTORY:  HTN (hypertension)      Dyslipidemia      Diabetes mellitus      Palpitations      STEMI (ST elevation myocardial infarction)      CHF (congestive heart failure)      S/P cholecystectomy      S/P appendectomy      S/P lumpectomy, left breast  premalignant      S/P tonsillectomy      S/P cardiac cath          FAMILY HISTORY:      SOCIAL HISTORY:  Smoking:   Substance Use:   EtOH Use:   Advance Directives:     MEDICATIONS  (STANDING):  cefTRIAXone   IVPB 1000 milliGRAM(s) IV Intermittent every 24 hours  chlorhexidine 4% Liquid 1 Application(s) Topical <User Schedule>  clopidogrel Tablet 75 milliGRAM(s) Oral daily  dextrose 5%. 1000 milliLiter(s) (100 mL/Hr) IV Continuous <Continuous>  dextrose 5%. 1000 milliLiter(s) (50 mL/Hr) IV Continuous <Continuous>  dextrose 50% Injectable 25 Gram(s) IV Push once  dextrose 50% Injectable 12.5 Gram(s) IV Push once  dextrose 50% Injectable 25 Gram(s) IV Push once  glucagon  Injectable 1 milliGRAM(s) IntraMuscular once  influenza  Vaccine (HIGH DOSE) 0.5 milliLiter(s) IntraMuscular once  insulin lispro (ADMELOG) corrective regimen sliding scale   SubCutaneous three times a day before meals  insulin lispro (ADMELOG) corrective regimen sliding scale   SubCutaneous at bedtime  levothyroxine 50 MICROGram(s) Oral daily  sodium bicarbonate 650 milliGRAM(s) Oral three times a day  sodium chloride 0.9%. 1000 milliLiter(s) (50 mL/Hr) IV Continuous <Continuous>  sodium zirconium cyclosilicate 10 Gram(s) Oral three times a day    MEDICATIONS  (PRN):  acetaminophen     Tablet .. 650 milliGRAM(s) Oral every 6 hours PRN Temp greater or equal to 38C (100.4F), Mild Pain (1 - 3)  dextrose Oral Gel 15 Gram(s) Oral once PRN Blood Glucose LESS THAN 70 milliGRAM(s)/deciliter  melatonin 3 milliGRAM(s) Oral at bedtime PRN Insomnia  metoclopramide Injectable 10 milliGRAM(s) IV Push every 8 hours PRN 2nd choice for nausea/vomiting  ondansetron Injectable 4 milliGRAM(s) IV Push every 8 hours PRN Nausea and/or Vomiting  sodium chloride 0.9% lock flush 10 milliLiter(s) IV Push every 1 hour PRN Pre/post blood products, medications, blood draw, and to maintain line patency      Allergies    Kiwi (Anaphylaxis)  codeine (Unknown)  penicillins (Anaphylaxis)    Intolerances        REVIEW OF SYSTEMS:  CONSTITUTIONAL: No weakness, fevers or chills  EYES/ENT: No visual changes;  No vertigo or throat pain   NECK: No pain or stiffness  RESPIRATORY: No cough, wheezing, hemoptysis; No shortness of breath  CARDIOVASCULAR: No chest pain or palpitations  GASTROINTESTINAL: No abdominal or epigastric pain. No nausea, vomiting, or hematemesis; No diarrhea or constipation. No melena or hematochezia.  GENITOURINARY: No dysuria, frequency or hematuria  NEUROLOGICAL: No numbness or weakness  SKIN: No itching, rashes  [ ] All other systems negative  [ ] Unable to assess ROS because ________    OBJECTIVE:  ICU Vital Signs Last 24 Hrs  T(C): 36.3 (15 Mar 2025 17:00), Max: 36.6 (14 Mar 2025 19:39)  T(F): 97.3 (15 Mar 2025 17:00), Max: 97.9 (14 Mar 2025 19:39)  HR: 64 (15 Mar 2025 17:00) (64 - 88)  BP: 95/59 (15 Mar 2025 17:00) (93/57 - 120/54)  BP(mean): --  ABP: --  ABP(mean): --  RR: 18 (15 Mar 2025 17:00) (18 - 18)  SpO2: 100% (15 Mar 2025 17:00) (99% - 100%)    O2 Parameters below as of 15 Mar 2025 17:00  Patient On (Oxygen Delivery Method): room air              03-14 @ 07:01  -  03-15 @ 07:00  --------------------------------------------------------  IN: 800 mL / OUT: 850 mL / NET: -50 mL      CAPILLARY BLOOD GLUCOSE      POCT Blood Glucose.: 208 mg/dL (15 Mar 2025 16:38)      PHYSICAL EXAM:  GENERAL: NAD, lying in bed comfortably  HEAD:  Atraumatic, normocephalic  EYES: EOMI, PERRLA, conjunctiva and sclera clear  ENT: Moist mucous membranes  NECK: Supple, no JVD  HEART: S1, S2, regular rate and rhythm, no murmurs, rubs, or gallops  LUNGS: Unlabored respirations.  Clear to auscultation bilaterally, no crackles, wheezing, or rhonchi  ABDOMEN: Soft, nontender, nondistended, +BS  EXTREMITIES: 2+ peripheral pulses bilaterally. No clubbing, cyanosis, or edema  NERVOUS SYSTEM:  A&Ox3, no focal deficits   SKIN: No rashes or lesions  LINES:     LABS:                        6.7    9.78  )-----------( 76       ( 15 Mar 2025 15:59 )             19.1     Hgb Trend: 6.7<--, 6.5<--, 7.4<--, 8.1<--, 9.5<--  03-15    138  |  98  |  >151[H]  ----------------------------<  213[H]  5.1   |  16[L]  |  7.14[H]    Ca    8.3[L]      15 Mar 2025 06:59      Creatinine Trend: 7.14<--, 7.02<--, 6.62<--, 6.49<--, 6.01<--, 5.16<--  PTT - ( 15 Mar 2025 15:59 )  PTT:63.6 sec  Urinalysis Basic - ( 15 Mar 2025 06:59 )    Color: x / Appearance: x / SG: x / pH: x  Gluc: 213 mg/dL / Ketone: x  / Bili: x / Urobili: x   Blood: x / Protein: x / Nitrite: x   Leuk Esterase: x / RBC: x / WBC x   Sq Epi: x / Non Sq Epi: x / Bacteria: x            MICROBIOLOGY:     RADIOLOGY & ADDITIONAL TESTS:      ASSESSMENT: 89F PMHx HTN, DM on insulin, CAD s/p PCI, HFrEF. P/w acute onset of R shin pain w/o trauma. MICU consulted iso urgent shiley placement for increasing uremia and uremic encephalopathy       ===NEURO===  #Mental status:   -Currently A&O x 3  - Precedex for sedation    #Metabolic Encephalopathy  - Likely iso rising uremia    ===CV===  #HTN  - Hold home torsemide, Entresto, Metoprolol due to ROB and low Hgb  #CAD s/p stent placement (2014) LAD  -c/w DAPT  - Entresto held iso ROB  - Plavix held iso acute bleed, restart when H/H stable      #CHF: EF 20% (2019)  -Diuretic: Hold torsemide   -Trend I/Os and daily weights, and Cr    #Hemodynamically stable/unstable:  - Stable     #Popliteal Occlusion  -US Arterial: Right severe stenosis of the popliteal artery proximally followed a segmental occlusion of the popliteal artery distally. The right posterior tibial, peroneal, anterior tibial and dorsal pedis arteries are all occluded.   - On the left, there are segmental occlusions of the superficial femoral artery in the small and distal thigh. The left posterior tibial peroneal trunk, posterior tibial, peroneal, anterior tibial and dorsal pedis arteries are occluded  - No acute surgical intervention- Hold Plavix iso shiley placement  - C/w lipator    ===PULM===  - No active issues    ===RENAL===  #ROB on CKD stage IV  -US Renal:  No hydronephrosis. Increased renal cortical echogenicity compatible with renal parenchymal disease.  - Monitor Cr  - Avoid nephrotoxic medications  - Lieberman as above for possible retention    #Urgent HD  - Rising Uremia >151  - Hemodynamically stable, alert, protecting airway, able to lie flat.  - Has been taking ASA and Plavix since 2014, started on heparin gtt this admission  - With unexplained drop in Hgb from 10 on arrival to 6.5 today  - DDAVP prior to Shiley placement for platelet dysfunction in setting of uremia  - Would consider CT A/P to assess for source of potential bleed  - C/w fluids      #Uremic Bleeding Dysfunction  - Will require DDVAP prior to shiley placement 3mg/kg    ===GI===  #Diet: NPO iso mentation  #Stress Ulcer/GI Prophylaxis: PPI HELD    ===ENDO===  #DM2: HbA1c 9.4  Home regimen: 12U Lantus/Humalog 4U   - q6h LDSS    #Hypothyroidism  - C/w IV 40mg synthroid    ===METABOLIC===  #Electrolyte abnormalities  #Hyperkalemia  - C/w Lokelma TID    ===HEMATOLOGIC===  #Thrombocytopenia  #Anemia  - Bleed vs ARF  - CT A/P to for evaluation  #CBC results show 6.7/19.1  #DVT prophylaxis with heparin ggt will hold iso shiley placement     ===ID===  #Pt without strong objective or clinical evidence of infection. Will observe off antibiotics    ===SKIN===  #Lines:    ===ETHICS===    Code Status: FULL CODE MICU ACCEPT NOTE    CHIEF COMPLAINT: Patient is a 89y old  Female who presents with a chief complaint of Right leg pain (14 Mar 2025 16:31)      HPI:  89F PMHx HTN, DM on insulin, CAD s/p PCI, HFrEF. P/w acute onset of R shin pain w/o trauma 3hr PTA. Of note, pt has chronic b/l wounds, seen by wound care clinic, but never saw vascular surg before. Denies f/c, cp, sob, abd pain, n/v/d.     In the ED, afebrile, HR 50-70, BP stable, saturates well on RA while awake, desats to high 80s while asleep. Pt is not on O2 at baseline. CBC w/ wbc 5.8, hgb 9.3 (was 10.1 in '19), plt 104 (was wnl in '19). Coag w/ INR 1.42. CMP w/ SCr 3.99 (was 1.16 in '19), alk phos 184.  VBG w/ lactate 1.6. 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 Partially imagined heart enlarged and abnormal.  Vasc surg c/s in the ED: no acute intervention hep gtt, SAPPHIRE/PVRs. [facility]'s [facility] and i was unable to review. MICU consulted due to potential for difficult Shiley placement in setting of patient on heparin gtt, on plavix, and with patient likely to need sedation.      PAST MEDICAL & SURGICAL HISTORY:  HTN (hypertension)      Dyslipidemia      Diabetes mellitus      Palpitations      STEMI (ST elevation myocardial infarction)      CHF (congestive heart failure)      S/P cholecystectomy      S/P appendectomy      S/P lumpectomy, left breast  premalignant      S/P tonsillectomy      S/P cardiac cath          FAMILY HISTORY:      SOCIAL HISTORY:  Smoking:   Substance Use:   EtOH Use:   Advance Directives:     MEDICATIONS  (STANDING):  cefTRIAXone   IVPB 1000 milliGRAM(s) IV Intermittent every 24 hours  chlorhexidine 4% Liquid 1 Application(s) Topical <User Schedule>  clopidogrel Tablet 75 milliGRAM(s) Oral daily  dextrose 5%. 1000 milliLiter(s) (100 mL/Hr) IV Continuous <Continuous>  dextrose 5%. 1000 milliLiter(s) (50 mL/Hr) IV Continuous <Continuous>  dextrose 50% Injectable 25 Gram(s) IV Push once  dextrose 50% Injectable 12.5 Gram(s) IV Push once  dextrose 50% Injectable 25 Gram(s) IV Push once  glucagon  Injectable 1 milliGRAM(s) IntraMuscular once  influenza  Vaccine (HIGH DOSE) 0.5 milliLiter(s) IntraMuscular once  insulin lispro (ADMELOG) corrective regimen sliding scale   SubCutaneous three times a day before meals  insulin lispro (ADMELOG) corrective regimen sliding scale   SubCutaneous at bedtime  levothyroxine 50 MICROGram(s) Oral daily  sodium bicarbonate 650 milliGRAM(s) Oral three times a day  sodium chloride 0.9%. 1000 milliLiter(s) (50 mL/Hr) IV Continuous <Continuous>  sodium zirconium cyclosilicate 10 Gram(s) Oral three times a day    MEDICATIONS  (PRN):  acetaminophen     Tablet .. 650 milliGRAM(s) Oral every 6 hours PRN Temp greater or equal to 38C (100.4F), Mild Pain (1 - 3)  dextrose Oral Gel 15 Gram(s) Oral once PRN Blood Glucose LESS THAN 70 milliGRAM(s)/deciliter  melatonin 3 milliGRAM(s) Oral at bedtime PRN Insomnia  metoclopramide Injectable 10 milliGRAM(s) IV Push every 8 hours PRN 2nd choice for nausea/vomiting  ondansetron Injectable 4 milliGRAM(s) IV Push every 8 hours PRN Nausea and/or Vomiting  sodium chloride 0.9% lock flush 10 milliLiter(s) IV Push every 1 hour PRN Pre/post blood products, medications, blood draw, and to maintain line patency      Allergies    Kiwi (Anaphylaxis)  codeine (Unknown)  penicillins (Anaphylaxis)    Intolerances        REVIEW OF SYSTEMS:  CONSTITUTIONAL: No weakness, fevers or chills  EYES/ENT: No visual changes;  No vertigo or throat pain   NECK: No pain or stiffness  RESPIRATORY: No cough, wheezing, hemoptysis; No shortness of breath  CARDIOVASCULAR: No chest pain or palpitations  GASTROINTESTINAL: No abdominal or epigastric pain. No nausea, vomiting, or hematemesis; No diarrhea or constipation. No melena or hematochezia.  GENITOURINARY: No dysuria, frequency or hematuria  NEUROLOGICAL: No numbness or weakness  SKIN: No itching, rashes  [ ] All other systems negative  [ ] Unable to assess ROS because ________    OBJECTIVE:  ICU Vital Signs Last 24 Hrs  T(C): 36.3 (15 Mar 2025 17:00), Max: 36.6 (14 Mar 2025 19:39)  T(F): 97.3 (15 Mar 2025 17:00), Max: 97.9 (14 Mar 2025 19:39)  HR: 64 (15 Mar 2025 17:00) (64 - 88)  BP: 95/59 (15 Mar 2025 17:00) (93/57 - 120/54)  BP(mean): --  ABP: --  ABP(mean): --  RR: 18 (15 Mar 2025 17:00) (18 - 18)  SpO2: 100% (15 Mar 2025 17:00) (99% - 100%)    O2 Parameters below as of 15 Mar 2025 17:00  Patient On (Oxygen Delivery Method): room air              03-14 @ 07:01  -  03-15 @ 07:00  --------------------------------------------------------  IN: 800 mL / OUT: 850 mL / NET: -50 mL      CAPILLARY BLOOD GLUCOSE      POCT Blood Glucose.: 208 mg/dL (15 Mar 2025 16:38)      PHYSICAL EXAM:  GENERAL: NAD, lying in bed comfortably  HEAD:  Atraumatic, normocephalic  EYES: EOMI, PERRLA, conjunctiva and sclera clear  ENT: Moist mucous membranes  NECK: Supple, no JVD  HEART: S1, S2, regular rate and rhythm, no murmurs, rubs, or gallops  LUNGS: Unlabored respirations.  Clear to auscultation bilaterally, no crackles, wheezing, or rhonchi  ABDOMEN: Soft, nontender, nondistended, +BS  EXTREMITIES: 2+ peripheral pulses bilaterally. No clubbing, cyanosis, or edema  NERVOUS SYSTEM:  A&Ox3, no focal deficits   SKIN: No rashes or lesions  LINES:     LABS:                        6.7    9.78  )-----------( 76       ( 15 Mar 2025 15:59 )             19.1     Hgb Trend: 6.7<--, 6.5<--, 7.4<--, 8.1<--, 9.5<--  03-15    138  |  98  |  >151[H]  ----------------------------<  213[H]  5.1   |  16[L]  |  7.14[H]    Ca    8.3[L]      15 Mar 2025 06:59      Creatinine Trend: 7.14<--, 7.02<--, 6.62<--, 6.49<--, 6.01<--, 5.16<--  PTT - ( 15 Mar 2025 15:59 )  PTT:63.6 sec  Urinalysis Basic - ( 15 Mar 2025 06:59 )    Color: x / Appearance: x / SG: x / pH: x  Gluc: 213 mg/dL / Ketone: x  / Bili: x / Urobili: x   Blood: x / Protein: x / Nitrite: x   Leuk Esterase: x / RBC: x / WBC x   Sq Epi: x / Non Sq Epi: x / Bacteria: x            MICROBIOLOGY:     RADIOLOGY & ADDITIONAL TESTS:      ASSESSMENT: 89F PMHx HTN, DM on insulin, CAD s/p PCI, HFrEF. P/w acute onset of R shin pain w/o trauma. MICU consulted iso urgent shiley placement for increasing uremia and uremic encephalopathy       ===NEURO===  #Mental status:   -Currently A&O x 0  - Precedex for sedation    #Metabolic Encephalopathy  - Likely iso rising uremia    ===CV===  #HTN  - Hold home torsemide, Entresto, Metoprolol due to ROB and low Hgb  #CAD s/p stent placement (2014) LAD  - Entresto held iso ROB  - Plavix held iso acute bleed, restart when H/H stable      #CHF: EF 20% (2019)  -Diuretic: Hold torsemide   -Trend I/Os and daily weights, and Cr  - Repeat echo in AM    #Hemodynamically stable/unstable:  - Stable   - VS: T:97.3, HR:64, BP: 95/59, RR: 18/100%    #Popliteal Occlusion  -US Arterial: Right severe stenosis of the popliteal artery proximally followed a segmental occlusion of the popliteal artery distally. The right posterior tibial, peroneal, anterior tibial and dorsal pedis arteries are all occluded.   - On the left, there are segmental occlusions of the superficial femoral artery in the small and distal thigh. The left posterior tibial peroneal trunk, posterior tibial, peroneal, anterior tibial and dorsal pedis arteries are occluded  - No acute surgical intervention  - Hold Plavix iso shiley placement  - C/w lipator    ===PULM===  - No active issues    ===RENAL===  #ROB on CKD stage IV  -US Renal:  No hydronephrosis. Increased renal cortical echogenicity compatible with renal parenchymal disease.  - Monitor Cr  - Avoid nephrotoxic medications  - Lieberman as above for possible retention    #Urgent HD  - Rising Uremia >151  - Hemodynamically stable, alert, protecting airway, able to lie flat.  - Has been taking ASA and Plavix since 2014, started on heparin gtt this admission  - With unexplained drop in Hgb from 10 on arrival to 6.5 today  - DDAVP prior to Shiley placement for platelet dysfunction in setting of uremia  - Would consider CT A/P to assess for source of potential bleed  - C/w fluids      #Uremic Bleeding Dysfunction  - Will require DDVAP prior to shiley placement 3mg/kg    ===GI===  #Diet: NPO iso mentation  #Stress Ulcer/GI Prophylaxis: PPI HELD    ===ENDO===  #DM2: HbA1c 9.4  Home regimen: 12U Lantus/Humalog 4U   - q6h LDSS    #Hypothyroidism  - C/w IV 40mg synthroid    ===METABOLIC===  #Electrolyte abnormalities  #Hyperkalemia  - C/w Lokelma TID    ===HEMATOLOGIC===  #Thrombocytopenia  #Anemia  - Bleed vs ARF  - CT A/P to for evaluation  #CBC results show 6.7/19.1  #DVT prophylaxis with heparin gtt will hold iso shiley placement     ===ID===  #Pt without strong objective or clinical evidence of infection. Will observe off antibiotics    ===SKIN===  #Lines:    ===ETHICS===  Code Status: FULL CODE MICU ACCEPT NOTE    CHIEF COMPLAINT: Patient is a 89y old  Female who presents with a chief complaint of Right leg pain (14 Mar 2025 16:31)      HPI:  89F PMHx HTN, DM on insulin, CAD s/p PCI, HFrEF. P/w acute onset of R shin pain w/o trauma 3hr PTA. Of note, pt has chronic b/l wounds, seen by wound care clinic, but never saw vascular surg before. Denies f/c, cp, sob, abd pain, n/v/d.     In the ED, afebrile, HR 50-70, BP stable, saturates well on RA while awake, desats to high 80s while asleep. Pt is not on O2 at baseline. CBC w/ wbc 5.8, hgb 9.3 (was 10.1 in '19), plt 104 (was wnl in '19). Coag w/ INR 1.42. CMP w/ SCr 3.99 (was 1.16 in '19), alk phos 184.  VBG w/ lactate 1.6. 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 Partially imagined heart enlarged and abnormal.  Vasc surg c/s in the ED: no acute intervention hep gtt, SAPPHIRE/PVRs. [facility] and i was unable to review. MICU consulted due to potential for difficult Shiley placement in setting of patient on heparin gtt, on plavix, and with patient likely to need sedation.      PAST MEDICAL & SURGICAL HISTORY:  HTN (hypertension)      Dyslipidemia      Diabetes mellitus      Palpitations      STEMI (ST elevation myocardial infarction)      CHF (congestive heart failure)      S/P cholecystectomy      S/P appendectomy      S/P lumpectomy, left breast  premalignant      S/P tonsillectomy      S/P cardiac cath          FAMILY HISTORY:      SOCIAL HISTORY:  Smoking:   Substance Use:   EtOH Use:   Advance Directives:     MEDICATIONS  (STANDING):  cefTRIAXone   IVPB 1000 milliGRAM(s) IV Intermittent every 24 hours  chlorhexidine 4% Liquid 1 Application(s) Topical <User Schedule>  clopidogrel Tablet 75 milliGRAM(s) Oral daily  dextrose 5%. 1000 milliLiter(s) (100 mL/Hr) IV Continuous <Continuous>  dextrose 5%. 1000 milliLiter(s) (50 mL/Hr) IV Continuous <Continuous>  dextrose 50% Injectable 25 Gram(s) IV Push once  dextrose 50% Injectable 12.5 Gram(s) IV Push once  dextrose 50% Injectable 25 Gram(s) IV Push once  glucagon  Injectable 1 milliGRAM(s) IntraMuscular once  influenza  Vaccine (HIGH DOSE) 0.5 milliLiter(s) IntraMuscular once  insulin lispro (ADMELOG) corrective regimen sliding scale   SubCutaneous three times a day before meals  insulin lispro (ADMELOG) corrective regimen sliding scale   SubCutaneous at bedtime  levothyroxine 50 MICROGram(s) Oral daily  sodium bicarbonate 650 milliGRAM(s) Oral three times a day  sodium chloride 0.9%. 1000 milliLiter(s) (50 mL/Hr) IV Continuous <Continuous>  sodium zirconium cyclosilicate 10 Gram(s) Oral three times a day    MEDICATIONS  (PRN):  acetaminophen     Tablet .. 650 milliGRAM(s) Oral every 6 hours PRN Temp greater or equal to 38C (100.4F), Mild Pain (1 - 3)  dextrose Oral Gel 15 Gram(s) Oral once PRN Blood Glucose LESS THAN 70 milliGRAM(s)/deciliter  melatonin 3 milliGRAM(s) Oral at bedtime PRN Insomnia  metoclopramide Injectable 10 milliGRAM(s) IV Push every 8 hours PRN 2nd choice for nausea/vomiting  ondansetron Injectable 4 milliGRAM(s) IV Push every 8 hours PRN Nausea and/or Vomiting  sodium chloride 0.9% lock flush 10 milliLiter(s) IV Push every 1 hour PRN Pre/post blood products, medications, blood draw, and to maintain line patency      Allergies    Kiwi (Anaphylaxis)  codeine (Unknown)  penicillins (Anaphylaxis)    Intolerances        REVIEW OF SYSTEMS:  CONSTITUTIONAL: No weakness, fevers or chills  EYES/ENT: No visual changes;  No vertigo or throat pain   NECK: No pain or stiffness  RESPIRATORY: No cough, wheezing, hemoptysis; No shortness of breath  CARDIOVASCULAR: No chest pain or palpitations  GASTROINTESTINAL: No abdominal or epigastric pain. No nausea, vomiting, or hematemesis; No diarrhea or constipation. No melena or hematochezia.  GENITOURINARY: No dysuria, frequency or hematuria  NEUROLOGICAL: No numbness or weakness  SKIN: No itching, rashes  [ ] All other systems negative  [ ] Unable to assess ROS because ________    OBJECTIVE:  ICU Vital Signs Last 24 Hrs  T(C): 36.3 (15 Mar 2025 17:00), Max: 36.6 (14 Mar 2025 19:39)  T(F): 97.3 (15 Mar 2025 17:00), Max: 97.9 (14 Mar 2025 19:39)  HR: 64 (15 Mar 2025 17:00) (64 - 88)  BP: 95/59 (15 Mar 2025 17:00) (93/57 - 120/54)  BP(mean): --  ABP: --  ABP(mean): --  RR: 18 (15 Mar 2025 17:00) (18 - 18)  SpO2: 100% (15 Mar 2025 17:00) (99% - 100%)    O2 Parameters below as of 15 Mar 2025 17:00  Patient On (Oxygen Delivery Method): room air              03-14 @ 07:01  -  03-15 @ 07:00  --------------------------------------------------------  IN: 800 mL / OUT: 850 mL / NET: -50 mL      CAPILLARY BLOOD GLUCOSE      POCT Blood Glucose.: 208 mg/dL (15 Mar 2025 16:38)      PHYSICAL EXAM:  GENERAL: NAD, lying in bed comfortably  HEAD:  Atraumatic, normocephalic  EYES: EOMI, PERRLA, conjunctiva and sclera clear  ENT: Moist mucous membranes  NECK: Supple, no JVD  HEART: S1, S2, regular rate and rhythm, no murmurs, rubs, or gallops  LUNGS: Unlabored respirations.  Clear to auscultation bilaterally, no crackles, wheezing, or rhonchi  ABDOMEN: Soft, nontender, nondistended, +BS  EXTREMITIES: 2+ peripheral pulses bilaterally. No clubbing, cyanosis, or edema  NERVOUS SYSTEM:  A&Ox3, no focal deficits   SKIN: No rashes or lesions  LINES:     LABS:                        6.7    9.78  )-----------( 76       ( 15 Mar 2025 15:59 )             19.1     Hgb Trend: 6.7<--, 6.5<--, 7.4<--, 8.1<--, 9.5<--  03-15    138  |  98  |  >151[H]  ----------------------------<  213[H]  5.1   |  16[L]  |  7.14[H]    Ca    8.3[L]      15 Mar 2025 06:59      Creatinine Trend: 7.14<--, 7.02<--, 6.62<--, 6.49<--, 6.01<--, 5.16<--  PTT - ( 15 Mar 2025 15:59 )  PTT:63.6 sec  Urinalysis Basic - ( 15 Mar 2025 06:59 )    Color: x / Appearance: x / SG: x / pH: x  Gluc: 213 mg/dL / Ketone: x  / Bili: x / Urobili: x   Blood: x / Protein: x / Nitrite: x   Leuk Esterase: x / RBC: x / WBC x   Sq Epi: x / Non Sq Epi: x / Bacteria: x            MICROBIOLOGY:     RADIOLOGY & ADDITIONAL TESTS:      ASSESSMENT: 89F PMHx HTN, DM on insulin, CAD s/p PCI, HFrEF. P/w acute onset of R shin pain w/o trauma. MICU consulted iso urgent shiley placement for increasing uremia and uremic encephalopathy       ===NEURO===  #Mental status:   -Currently A&O x 0  - Precedex for sedation    #Metabolic Encephalopathy  - Likely iso rising uremia    ===CV===  #HTN  - Hold home torsemide, Entresto, Metoprolol due to ROB and low Hgb  #CAD s/p stent placement (2014) LAD  - Entresto held iso ROB  - Plavix held iso acute bleed, restart when H/H stable      #CHF: EF 20% (2019)  -Diuretic: Hold torsemide   -Trend I/Os and daily weights, and Cr  - Repeat echo in AM    #Hemodynamically stable/unstable:  - Stable   - VS: T:97.3, HR:64, BP: 95/59, RR: 18/100%    #Popliteal Occlusion  -US Arterial: Right severe stenosis of the popliteal artery proximally followed a segmental occlusion of the popliteal artery distally. The right posterior tibial, peroneal, anterior tibial and dorsal pedis arteries are all occluded.   - On the left, there are segmental occlusions of the superficial femoral artery in the small and distal thigh. The left posterior tibial peroneal trunk, posterior tibial, peroneal, anterior tibial and dorsal pedis arteries are occluded  - No acute surgical intervention  - Hold Plavix iso shiley placement  - C/w lipator    ===PULM===  - No active issues    ===RENAL===  #ROB on CKD stage IV  -US Renal:  No hydronephrosis. Increased renal cortical echogenicity compatible with renal parenchymal disease.  - Monitor Cr  - Avoid nephrotoxic medications  - Lieberman as above for possible retention    #Urgent HD  - Rising Uremia >151  - Hemodynamically stable, alert, protecting airway, able to lie flat.  - Has been taking ASA and Plavix since 2014, started on heparin gtt this admission  - With unexplained drop in Hgb from 10 on arrival to 6.5 today  - DDAVP prior to Shiley placement for platelet dysfunction in setting of uremia  - Would consider CT A/P to assess for source of potential bleed  - C/w fluids      #Uremic Bleeding Dysfunction  - Will require DDVAP prior to shiley placement 3mg/kg    ===GI===  #Diet: NPO iso mentation  #Stress Ulcer/GI Prophylaxis: PPI HELD    ===ENDO===  #DM2: HbA1c 9.4  Home regimen: 12U Lantus/Humalog 4U   - q6h LDSS    #Hypothyroidism  - C/w IV 40mg synthroid    ===METABOLIC===  #Electrolyte abnormalities  #Hyperkalemia  - C/w Lokelma TID    ===HEMATOLOGIC===  #Thrombocytopenia  #Anemia  - Bleed vs ARF  - CT A/P to for evaluation  #CBC results show 6.7/19.1  #DVT prophylaxis with heparin gtt will hold iso shiley placement     ===ID===  #Pt without strong objective or clinical evidence of infection. Will observe off antibiotics    ===SKIN===  #Lines:    ===ETHICS===  Code Status: FULL CODE       Critical Care Attending Attestation:   89F Hx HTN, T2DM on Insulin, CAD s/p PCI, HFrEF20% admitted 3/11 for Rt Reyes Pain found Rt Popliteal Artery Occlusion started on IV Heparin Gtt and worsening Renal Failure/CKD5 last 3 days requiring Urgent HD iso Acute Hemorrhagic Anemia Hb 6.5 unresponsive to 1 PRBC transfusion with repeated Hb 6.7 on borderline low BP with CT Abdomen/Pelvis showed Rt RP Bleed.  - Advanced Dementia; A&O x 1; Metabolic/Uremic Encephalopathy Grade 2-3   - Afebrile, RAO2 SpO2 97%   - Hemodynamically stable on ICU Arrival /52 mmHg   - Anemia and Thrombocytopenia with Known HFrEF and Rt Pop Art Clot on AC    - Hold IV Heparin and TTE in AM   - Rt Retroperitoneal Hematoma on transfusion prn on 2nd PRBC   - Serial CBC, CMP, PT/INR, Rajan, Lactate   - NPO for Metabolic Encephalopathy   - T2DM on Insulin need dose adjustment for NPO   - ARF/CKD5 need Urgent HD in ICU iso RP Bleed   - DVT PPx - Hold all AC   - GOC - Full Code        Patient seen and examined with ICU Resident/Fellow at bedside after lab data, medical records and radiology reports reviewed. I have read and agreeable in general with resident's Documented Note, Assessment and Management Plan which reflected my opinions from bedside round and discussion.   Total Critical Care Time = 45 Min excluding teaching and procedure activity. MICU ACCEPT NOTE    CHIEF COMPLAINT: Patient is a 89y old  Female who presents with a chief complaint of Right leg pain (14 Mar 2025 16:31)      HPI:  89F PMHx HTN, DM on insulin, CAD s/p PCI, HFrEF. P/w acute onset of R shin pain w/o trauma 3hr PTA. Of note, pt has chronic b/l wounds, seen by wound care clinic, but never saw vascular surg before. Denies f/c, cp, sob, abd pain, n/v/d.     In the ED, afebrile, HR 50-70, BP stable, saturates well on RA while awake, desats to high 80s while asleep. Pt is not on O2 at baseline. CBC w/ wbc 5.8, hgb 9.3 (was 10.1 in '19), plt 104 (was wnl in '19). Coag w/ INR 1.42. CMP w/ SCr 3.99 (was 1.16 in '19), alk phos 184.  VBG w/ lactate 1.6. 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 Partially imagined heart enlarged and abnormal.  Vasc surg c/s in the ED: no acute intervention hep gtt, SAPPHIRE/PVRs. [facility] and i was unable to review. MICU consulted due to potential for difficult Shiley placement in setting of patient on heparin gtt, on plavix, and with patient likely to need sedation.      PAST MEDICAL & SURGICAL HISTORY:  HTN (hypertension)      Dyslipidemia      Diabetes mellitus      Palpitations      STEMI (ST elevation myocardial infarction)      CHF (congestive heart failure)      S/P cholecystectomy      S/P appendectomy      S/P lumpectomy, left breast  premalignant      S/P tonsillectomy      S/P cardiac cath          FAMILY HISTORY:      SOCIAL HISTORY:  Smoking:   Substance Use:   EtOH Use:   Advance Directives:     MEDICATIONS  (STANDING):  cefTRIAXone   IVPB 1000 milliGRAM(s) IV Intermittent every 24 hours  chlorhexidine 4% Liquid 1 Application(s) Topical <User Schedule>  clopidogrel Tablet 75 milliGRAM(s) Oral daily  dextrose 5%. 1000 milliLiter(s) (100 mL/Hr) IV Continuous <Continuous>  dextrose 5%. 1000 milliLiter(s) (50 mL/Hr) IV Continuous <Continuous>  dextrose 50% Injectable 25 Gram(s) IV Push once  dextrose 50% Injectable 12.5 Gram(s) IV Push once  dextrose 50% Injectable 25 Gram(s) IV Push once  glucagon  Injectable 1 milliGRAM(s) IntraMuscular once  influenza  Vaccine (HIGH DOSE) 0.5 milliLiter(s) IntraMuscular once  insulin lispro (ADMELOG) corrective regimen sliding scale   SubCutaneous three times a day before meals  insulin lispro (ADMELOG) corrective regimen sliding scale   SubCutaneous at bedtime  levothyroxine 50 MICROGram(s) Oral daily  sodium bicarbonate 650 milliGRAM(s) Oral three times a day  sodium chloride 0.9%. 1000 milliLiter(s) (50 mL/Hr) IV Continuous <Continuous>  sodium zirconium cyclosilicate 10 Gram(s) Oral three times a day    MEDICATIONS  (PRN):  acetaminophen     Tablet .. 650 milliGRAM(s) Oral every 6 hours PRN Temp greater or equal to 38C (100.4F), Mild Pain (1 - 3)  dextrose Oral Gel 15 Gram(s) Oral once PRN Blood Glucose LESS THAN 70 milliGRAM(s)/deciliter  melatonin 3 milliGRAM(s) Oral at bedtime PRN Insomnia  metoclopramide Injectable 10 milliGRAM(s) IV Push every 8 hours PRN 2nd choice for nausea/vomiting  ondansetron Injectable 4 milliGRAM(s) IV Push every 8 hours PRN Nausea and/or Vomiting  sodium chloride 0.9% lock flush 10 milliLiter(s) IV Push every 1 hour PRN Pre/post blood products, medications, blood draw, and to maintain line patency      Allergies    Kiwi (Anaphylaxis)  codeine (Unknown)  penicillins (Anaphylaxis)    Intolerances        REVIEW OF SYSTEMS:  CONSTITUTIONAL: No weakness, fevers or chills  EYES/ENT: No visual changes;  No vertigo or throat pain   NECK: No pain or stiffness  RESPIRATORY: No cough, wheezing, hemoptysis; No shortness of breath  CARDIOVASCULAR: No chest pain or palpitations  GASTROINTESTINAL: No abdominal or epigastric pain. No nausea, vomiting, or hematemesis; No diarrhea or constipation. No melena or hematochezia.  GENITOURINARY: No dysuria, frequency or hematuria  NEUROLOGICAL: No numbness or weakness  SKIN: No itching, rashes  [ ] All other systems negative  [ ] Unable to assess ROS because ________    OBJECTIVE:  ICU Vital Signs Last 24 Hrs  T(C): 36.3 (15 Mar 2025 17:00), Max: 36.6 (14 Mar 2025 19:39)  T(F): 97.3 (15 Mar 2025 17:00), Max: 97.9 (14 Mar 2025 19:39)  HR: 64 (15 Mar 2025 17:00) (64 - 88)  BP: 95/59 (15 Mar 2025 17:00) (93/57 - 120/54)  BP(mean): --  ABP: --  ABP(mean): --  RR: 18 (15 Mar 2025 17:00) (18 - 18)  SpO2: 100% (15 Mar 2025 17:00) (99% - 100%)    O2 Parameters below as of 15 Mar 2025 17:00  Patient On (Oxygen Delivery Method): room air              03-14 @ 07:01  -  03-15 @ 07:00  --------------------------------------------------------  IN: 800 mL / OUT: 850 mL / NET: -50 mL      CAPILLARY BLOOD GLUCOSE      POCT Blood Glucose.: 208 mg/dL (15 Mar 2025 16:38)      PHYSICAL EXAM:  GENERAL: NAD, lying in bed comfortably  HEAD:  Atraumatic, normocephalic  EYES: EOMI, PERRLA, conjunctiva and sclera clear  ENT: Moist mucous membranes  NECK: Supple, no JVD  HEART: S1, S2, regular rate and rhythm, no murmurs, rubs, or gallops  LUNGS: Unlabored respirations.  Clear to auscultation bilaterally, no crackles, wheezing, or rhonchi  ABDOMEN: Soft, nontender, nondistended, +BS  EXTREMITIES: 2+ peripheral pulses bilaterally. No clubbing, cyanosis, or edema  NERVOUS SYSTEM:  A&Ox3, no focal deficits   SKIN: No rashes or lesions  LINES:     LABS:                        6.7    9.78  )-----------( 76       ( 15 Mar 2025 15:59 )             19.1     Hgb Trend: 6.7<--, 6.5<--, 7.4<--, 8.1<--, 9.5<--  03-15    138  |  98  |  >151[H]  ----------------------------<  213[H]  5.1   |  16[L]  |  7.14[H]    Ca    8.3[L]      15 Mar 2025 06:59      Creatinine Trend: 7.14<--, 7.02<--, 6.62<--, 6.49<--, 6.01<--, 5.16<--  PTT - ( 15 Mar 2025 15:59 )  PTT:63.6 sec  Urinalysis Basic - ( 15 Mar 2025 06:59 )    Color: x / Appearance: x / SG: x / pH: x  Gluc: 213 mg/dL / Ketone: x  / Bili: x / Urobili: x   Blood: x / Protein: x / Nitrite: x   Leuk Esterase: x / RBC: x / WBC x   Sq Epi: x / Non Sq Epi: x / Bacteria: x            MICROBIOLOGY:     RADIOLOGY & ADDITIONAL TESTS:      ASSESSMENT: 89F PMHx HTN, DM on insulin, CAD s/p PCI, HFrEF. P/w acute onset of R shin pain w/o trauma. MICU consulted iso urgent shiley placement for increasing uremia and uremic encephalopathy       ===NEURO===  #Mental status:   -Currently A&O x 0  - Precedex for sedation    #Metabolic Encephalopathy  - Likely iso rising uremia    ===CV===  #HTN  - Hold home torsemide, Entresto, Metoprolol due to ROB and low Hgb  #CAD s/p stent placement (2014) LAD  - Entresto held iso ROB  - Plavix held iso acute bleed, restart when H/H stable      #CHF: EF 20% (2019)  -Diuretic: Hold torsemide   -Trend I/Os and daily weights, and Cr  - Repeat echo in AM    #Hemodynamically stable/unstable:  - Stable   - VS: T:97.3, HR:64, BP: 95/59, RR: 18/100%    #Popliteal Occlusion  -US Arterial: Right severe stenosis of the popliteal artery proximally followed a segmental occlusion of the popliteal artery distally. The right posterior tibial, peroneal, anterior tibial and dorsal pedis arteries are all occluded.   - On the left, there are segmental occlusions of the superficial femoral artery in the small and distal thigh. The left posterior tibial peroneal trunk, posterior tibial, peroneal, anterior tibial and dorsal pedis arteries are occluded  - No acute surgical intervention  - Hold Plavix iso shiley placement  - C/w lipator    ===PULM===  - No active issues    ===RENAL===  #ROB on CKD stage IV  -US Renal:  No hydronephrosis. Increased renal cortical echogenicity compatible with renal parenchymal disease.  - Monitor Cr  - Avoid nephrotoxic medications  - Lieberman as above for possible retention    #Urgent HD  - Rising Uremia >151  - Hemodynamically stable, alert, protecting airway, able to lie flat.  - Has been taking ASA and Plavix since 2014, started on heparin gtt this admission  - With unexplained drop in Hgb from 10 on arrival to 6.5 today  - DDAVP prior to Shiley placement for platelet dysfunction in setting of uremia  - Would consider CT A/P to assess for source of potential bleed  - C/w fluids      #Uremic Bleeding Dysfunction  - Will require DDVAP prior to shiley placement 3mg/kg    ===GI===  #Diet: NPO iso mentation  #Stress Ulcer/GI Prophylaxis: PPI HELD    ===ENDO===  #DM2: HbA1c 9.4  Home regimen: 12U Lantus/Humalog 4U   - q6h LDSS    #Hypothyroidism  - C/w IV 40mg synthroid    ===METABOLIC===  #Electrolyte abnormalities  #Hyperkalemia  - C/w Lokelma TID    ===HEMATOLOGIC===  #Thrombocytopenia  #Anemia  - Bleed vs ARF  - CT A/P to for evaluation  #CBC results show 6.7/19.1  #DVT prophylaxis with heparin gtt will hold iso shiley placement     ===ID===  #Pt without strong objective or clinical evidence of infection. Will observe off antibiotics    ===SKIN===  #Lines:    ===ETHICS===  Code Status: FULL CODE       Critical Care Attending Attestation:   89F Hx HTN, T2DM on Insulin, CAD s/p PCI, HFrEF20% admitted 3/11 for Rt Reyes Pain found Rt Popliteal Artery Occlusion followed by Vascular Surgery and started on IV Heparin Gtt and worsening Renal Failure/CKD5 in last 3 days requiring Urgent HD iso Acute Hemorrhagic Anemia Hb 6.5 unresponsive to 1 PRBC transfusion with repeated Hb 6.7 on borderline low BP with CT Abdomen/Pelvis showed Rt RP Bleed.  - Advanced Dementia; A&O x 1; Metabolic/Uremic Encephalopathy Grade 2-3   - Afebrile, RAO2 SpO2 97%   - Hemodynamically stable on ICU Arrival /52 mmHg   - Anemia and Thrombocytopenia with Known HFrEF and Rt Pop Art Clot on AC    - Hold IV Heparin and TTE in AM   - Rt Retroperitoneal Hematoma on transfusion prn on 2nd PRBC   - Vascular Surgery informed   - Serial CBC, CMP, PT/INR, Rajan, Lactate   - NPO for Metabolic Encephalopathy   - T2DM on Insulin need dose adjustment for NPO   - ARF/CKD5 need DDAVP and Urgent HDCath/ IHD in ICU iso RP Bleed   - DVT PPx - Hold all AC   - GOC - Full Code        Patient seen and examined with ICU Resident/Fellow at bedside after lab data, medical records and radiology reports reviewed. I have read and agreeable in general with resident's Documented Note, Assessment and Management Plan which reflected my opinions from bedside round and discussion.   Total Critical Care Time = 45 Min excluding teaching and procedure activity. MICU ACCEPT NOTE    CHIEF COMPLAINT: Patient is a 89y old  Female who presents with a chief complaint of Right leg pain (14 Mar 2025 16:31)      HPI:  89F PMHx HTN, DM on insulin, CAD s/p PCI, HFrEF. P/w acute onset of R shin pain w/o trauma 3hr PTA. Of note, pt has chronic b/l wounds, seen by wound care clinic, but never saw vascular surg before. Denies f/c, cp, sob, abd pain, n/v/d.     In the ED, afebrile, HR 50-70, BP stable, saturates well on RA while awake, desats to high 80s while asleep. Pt is not on O2 at baseline. CBC w/ wbc 5.8, hgb 9.3 (was 10.1 in '19), plt 104 (was wnl in '19). Coag w/ INR 1.42. CMP w/ SCr 3.99 (was 1.16 in '19), alk phos 184.  VBG w/ lactate 1.6. 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 Partially imagined heart enlarged and abnormal.  Vasc surg c/s in the ED: no acute intervention hep gtt, SAPPHIRE/PVRs. [facility] and i was unable to review. MICU consulted due to potential for difficult Shiley placement in setting of patient on heparin gtt, on plavix, and with patient likely to need sedation.      PAST MEDICAL & SURGICAL HISTORY:  HTN (hypertension)      Dyslipidemia      Diabetes mellitus      Palpitations      STEMI (ST elevation myocardial infarction)      CHF (congestive heart failure)      S/P cholecystectomy      S/P appendectomy      S/P lumpectomy, left breast  premalignant      S/P tonsillectomy      S/P cardiac cath          FAMILY HISTORY:      SOCIAL HISTORY:  Smoking:   Substance Use:   EtOH Use:   Advance Directives:     MEDICATIONS  (STANDING):  cefTRIAXone   IVPB 1000 milliGRAM(s) IV Intermittent every 24 hours  chlorhexidine 4% Liquid 1 Application(s) Topical <User Schedule>  clopidogrel Tablet 75 milliGRAM(s) Oral daily  dextrose 5%. 1000 milliLiter(s) (100 mL/Hr) IV Continuous <Continuous>  dextrose 5%. 1000 milliLiter(s) (50 mL/Hr) IV Continuous <Continuous>  dextrose 50% Injectable 25 Gram(s) IV Push once  dextrose 50% Injectable 12.5 Gram(s) IV Push once  dextrose 50% Injectable 25 Gram(s) IV Push once  glucagon  Injectable 1 milliGRAM(s) IntraMuscular once  influenza  Vaccine (HIGH DOSE) 0.5 milliLiter(s) IntraMuscular once  insulin lispro (ADMELOG) corrective regimen sliding scale   SubCutaneous three times a day before meals  insulin lispro (ADMELOG) corrective regimen sliding scale   SubCutaneous at bedtime  levothyroxine 50 MICROGram(s) Oral daily  sodium bicarbonate 650 milliGRAM(s) Oral three times a day  sodium chloride 0.9%. 1000 milliLiter(s) (50 mL/Hr) IV Continuous <Continuous>  sodium zirconium cyclosilicate 10 Gram(s) Oral three times a day    MEDICATIONS  (PRN):  acetaminophen     Tablet .. 650 milliGRAM(s) Oral every 6 hours PRN Temp greater or equal to 38C (100.4F), Mild Pain (1 - 3)  dextrose Oral Gel 15 Gram(s) Oral once PRN Blood Glucose LESS THAN 70 milliGRAM(s)/deciliter  melatonin 3 milliGRAM(s) Oral at bedtime PRN Insomnia  metoclopramide Injectable 10 milliGRAM(s) IV Push every 8 hours PRN 2nd choice for nausea/vomiting  ondansetron Injectable 4 milliGRAM(s) IV Push every 8 hours PRN Nausea and/or Vomiting  sodium chloride 0.9% lock flush 10 milliLiter(s) IV Push every 1 hour PRN Pre/post blood products, medications, blood draw, and to maintain line patency      Allergies    Kiwi (Anaphylaxis)  codeine (Unknown)  penicillins (Anaphylaxis)    Intolerances        REVIEW OF SYSTEMS:  CONSTITUTIONAL: No weakness, fevers or chills  EYES/ENT: No visual changes;  No vertigo or throat pain   NECK: No pain or stiffness  RESPIRATORY: No cough, wheezing, hemoptysis; No shortness of breath  CARDIOVASCULAR: No chest pain or palpitations  GASTROINTESTINAL: No abdominal or epigastric pain. No nausea, vomiting, or hematemesis; No diarrhea or constipation. No melena or hematochezia.  GENITOURINARY: No dysuria, frequency or hematuria  NEUROLOGICAL: No numbness or weakness  SKIN: No itching, rashes  [ ] All other systems negative  [ ] Unable to assess ROS because ________    OBJECTIVE:  ICU Vital Signs Last 24 Hrs  T(C): 36.3 (15 Mar 2025 17:00), Max: 36.6 (14 Mar 2025 19:39)  T(F): 97.3 (15 Mar 2025 17:00), Max: 97.9 (14 Mar 2025 19:39)  HR: 64 (15 Mar 2025 17:00) (64 - 88)  BP: 95/59 (15 Mar 2025 17:00) (93/57 - 120/54)  BP(mean): --  ABP: --  ABP(mean): --  RR: 18 (15 Mar 2025 17:00) (18 - 18)  SpO2: 100% (15 Mar 2025 17:00) (99% - 100%)    O2 Parameters below as of 15 Mar 2025 17:00  Patient On (Oxygen Delivery Method): room air              03-14 @ 07:01  -  03-15 @ 07:00  --------------------------------------------------------  IN: 800 mL / OUT: 850 mL / NET: -50 mL      CAPILLARY BLOOD GLUCOSE      POCT Blood Glucose.: 208 mg/dL (15 Mar 2025 16:38)      PHYSICAL EXAM:  GENERAL: NAD, lying in bed comfortably  HEAD:  Atraumatic, normocephalic  EYES: EOMI, PERRLA, conjunctiva and sclera clear  ENT: Moist mucous membranes  NECK: Supple, no JVD  HEART: S1, S2, regular rate and rhythm, no murmurs, rubs, or gallops  LUNGS: Unlabored respirations.  Clear to auscultation bilaterally, no crackles, wheezing, or rhonchi  ABDOMEN: Soft, nontender, nondistended, +BS  EXTREMITIES: 2+ peripheral pulses bilaterally. No clubbing, cyanosis, or edema  NERVOUS SYSTEM:  A&Ox3, no focal deficits   SKIN: No rashes or lesions  LINES:     LABS:                        6.7    9.78  )-----------( 76       ( 15 Mar 2025 15:59 )             19.1     Hgb Trend: 6.7<--, 6.5<--, 7.4<--, 8.1<--, 9.5<--  03-15    138  |  98  |  >151[H]  ----------------------------<  213[H]  5.1   |  16[L]  |  7.14[H]    Ca    8.3[L]      15 Mar 2025 06:59      Creatinine Trend: 7.14<--, 7.02<--, 6.62<--, 6.49<--, 6.01<--, 5.16<--  PTT - ( 15 Mar 2025 15:59 )  PTT:63.6 sec  Urinalysis Basic - ( 15 Mar 2025 06:59 )    Color: x / Appearance: x / SG: x / pH: x  Gluc: 213 mg/dL / Ketone: x  / Bili: x / Urobili: x   Blood: x / Protein: x / Nitrite: x   Leuk Esterase: x / RBC: x / WBC x   Sq Epi: x / Non Sq Epi: x / Bacteria: x            MICROBIOLOGY:     RADIOLOGY & ADDITIONAL TESTS:      ASSESSMENT: 89F PMHx HTN, DM on insulin, CAD s/p PCI, HFrEF. P/w acute onset of R shin pain w/o trauma. MICU consulted iso urgent shiley placement for increasing uremia and uremic encephalopathy       ===NEURO===  #Mental status:   -Currently A&O x 0  - Precedex for sedation    #Metabolic Encephalopathy  - Likely iso rising uremia    ===CV===  #HTN  - Hold home torsemide, Entresto, Metoprolol due to ROB and low Hgb  #CAD s/p stent placement (2014) LAD  - Entresto held iso ROB  - Plavix held iso acute bleed, restart when H/H stable      #CHF: EF 20% (2019)  -Diuretic: Hold torsemide   -Trend I/Os and daily weights, and Cr  - Repeat echo in AM    #Hemodynamically stable/unstable:  - Stable   - VS: T:97.3, HR:64, BP: 95/59, RR: 18/100%    #Popliteal Occlusion  -US Arterial: Right severe stenosis of the popliteal artery proximally followed a segmental occlusion of the popliteal artery distally. The right posterior tibial, peroneal, anterior tibial and dorsal pedis arteries are all occluded.   - On the left, there are segmental occlusions of the superficial femoral artery in the small and distal thigh. The left posterior tibial peroneal trunk, posterior tibial, peroneal, anterior tibial and dorsal pedis arteries are occluded  - No acute surgical intervention  - Hold Plavix iso shiley placement  - C/w lipator    ===PULM===  - No active issues    ===RENAL===  #ROB on CKD stage IV  -US Renal:  No hydronephrosis. Increased renal cortical echogenicity compatible with renal parenchymal disease.  - Monitor Cr  - Avoid nephrotoxic medications  - Lieberman as above for possible retention    #Urgent HD  - Rising Uremia >151  - Hemodynamically stable, alert, protecting airway, able to lie flat.  - Has been taking ASA and Plavix since 2014, started on heparin gtt this admission  - With unexplained drop in Hgb from 10 on arrival to 6.5 today  - DDAVP prior to Shiley placement for platelet dysfunction in setting of uremia  - Would consider CT A/P to assess for source of potential bleed  - C/w fluids      #Uremic Bleeding Dysfunction  - Will require DDVAP prior to shiley placement 3mg/kg    ===GI===  #Diet: NPO iso mentation  #Stress Ulcer/GI Prophylaxis: PPI HELD    ===ENDO===  #DM2: HbA1c 9.4  Home regimen: 12U Lantus/Humalog 4U   - q6h LDSS    #Hypothyroidism  - C/w IV 40mg synthroid    ===METABOLIC===  #Electrolyte abnormalities  #Hyperkalemia  - C/w Lokelma TID    ===HEMATOLOGIC===  #Thrombocytopenia  #Anemia  - Bleed vs ARF  - CT A/P to for evaluation  #CBC results show 6.7/19.1  #DVT prophylaxis with heparin gtt will hold iso shiley placement     ===ID===  #Pt without strong objective or clinical evidence of infection. Will observe off antibiotics    ===SKIN===  #Lines:    ===ETHICS===  Code Status: FULL CODE       Critical Care Attending Attestation:   89F Hx HTN, T2DM on Insulin, CAD s/p PCI, HFrEF20% admitted 3/11 for Rt Reyes Pain found Rt Popliteal Artery Occlusion followed by Vascular Surgery and started on IV Heparin Gtt and worsening Renal Failure/CKD5 in last 3 days requiring Urgent HD iso Acute Hemorrhagic Anemia Hb 6.5 unresponsive to 1 PRBC transfusion with repeated Hb 6.7 on borderline low BP with CT Abdomen/Pelvis showed Rt RP Bleed.  - Advanced Dementia; A&O x 1; Metabolic/Uremic Encephalopathy Grade 2-3   - Afebrile, RAO2 SpO2 97%   - Hemodynamically stable on ICU Arrival /52 mmHg   - Bedside POCUS - Poor LVF, Global Hypokinesis, IVC 0.8 cm with >50% collapse  - Anemia and Thrombocytopenia with Known HFrEF and Rt Pop Art Clot on AC    - Hold IV Heparin and TTE in AM   - Rt Retroperitoneal Hematoma on transfusion prn on 2nd PRBC   - Vascular Surgery informed   - Serial CBC, CMP, PT/INR, Rajan, Lactate   - NPO for Metabolic Encephalopathy   - T2DM on Insulin need dose adjustment for NPO   - ARF/CKD5 need DDAVP and Urgent HDCath/ IHD in ICU iso RP Bleed   - DVT PPx - Hold all AC   - GOC - Full Code        Patient seen and examined with ICU Resident/Fellow at bedside after lab data, medical records and radiology reports reviewed. I have read and agreeable in general with resident's Documented Note, Assessment and Management Plan which reflected my opinions from bedside round and discussion.   Total Critical Care Time = 45 Min excluding teaching and procedure activity.

## 2025-03-15 NOTE — PROGRESS NOTE ADULT - SUBJECTIVE AND OBJECTIVE BOX
[facility] Division of Kidney Diseases & Hypertension  FOLLOW UP NOTE  [phone]--------------------------------------------------------------------------------  Chief Complaint:Atherosclerosis, DVT, ROB on CKD.     24 hour events/subjective:  Pt was seen and examined earlier today. Pt is in pain ( back pain). On Heparin gtt.   Pt is in AMS. ROS is limited. Daughter was at the bedside.       PAST HISTORY  --------------------------------------------------------------------------------  No significant changes to PMH, PSH, FHx, SHx, unless otherwise noted    ALLERGIES & MEDICATIONS  --------------------------------------------------------------------------------  Allergies    Kiwi (Anaphylaxis)  codeine (Unknown)  penicillins (Anaphylaxis)    Intolerances      Standing Inpatient Medications  cefTRIAXone   IVPB 1000 milliGRAM(s) IV Intermittent every 24 hours  chlorhexidine 4% Liquid 1 Application(s) Topical <User Schedule>  clopidogrel Tablet 75 milliGRAM(s) Oral daily  dextrose 5%. 1000 milliLiter(s) IV Continuous <Continuous>  dextrose 5%. 1000 milliLiter(s) IV Continuous <Continuous>  dextrose 50% Injectable 12.5 Gram(s) IV Push once  dextrose 50% Injectable 25 Gram(s) IV Push once  dextrose 50% Injectable 25 Gram(s) IV Push once  glucagon  Injectable 1 milliGRAM(s) IntraMuscular once  heparin  Infusion. 800 Unit(s)/Hr IV Continuous <Continuous>  influenza  Vaccine (HIGH DOSE) 0.5 milliLiter(s) IntraMuscular once  insulin lispro (ADMELOG) corrective regimen sliding scale   SubCutaneous three times a day before meals  insulin lispro (ADMELOG) corrective regimen sliding scale   SubCutaneous at bedtime  levothyroxine 50 MICROGram(s) Oral daily  sodium bicarbonate 650 milliGRAM(s) Oral three times a day  sodium chloride 0.9%. 1000 milliLiter(s) IV Continuous <Continuous>  sodium zirconium cyclosilicate 10 Gram(s) Oral three times a day    PRN Inpatient Medications  acetaminophen     Tablet .. 650 milliGRAM(s) Oral every 6 hours PRN  dextrose Oral Gel 15 Gram(s) Oral once PRN  heparin   Injectable 5500 Unit(s) IV Push every 6 hours PRN  heparin   Injectable 2500 Unit(s) IV Push every 6 hours PRN  melatonin 3 milliGRAM(s) Oral at bedtime PRN  metoclopramide Injectable 10 milliGRAM(s) IV Push every 8 hours PRN  ondansetron Injectable 4 milliGRAM(s) IV Push every 8 hours PRN  sodium chloride 0.9% lock flush 10 milliLiter(s) IV Push every 1 hour PRN      REVIEW OF SYSTEMS  --------------------------------------------------------------------------------  ROS is limited due to mental status.     VITALS/PHYSICAL EXAM  --------------------------------------------------------------------------------  T(C): 36.5 (03-15-25 @ 05:36), Max: 36.6 (03-14-25 @ 12:00)  HR: 88 (03-15-25 @ 05:36) (65 - 88)  BP: 103/62 (03-15-25 @ 05:36) (103/62 - 130/80)  RR: 18 (03-15-25 @ 05:36) (18 - 18)  SpO2: 99% (03-15-25 @ 05:36) (98% - 99%)  Wt(kg): --  Height (cm): 170.2 (03-14-25 @ 16:31)  Weight (kg): 66.4 (03-14-25 @ 16:31)  BMI (kg/m2): 22.9 (03-14-25 @ 16:31)  BSA (m2): 1.77 (03-14-25 @ 16:31)      03-14-25 @ 07:01  -  03-15-25 @ 07:00  --------------------------------------------------------  IN: 800 mL / OUT: 850 mL / NET: -50 mL      Physical Exam:  Gen: NAD, lethargic but arousable,   HEENT:  no jvp  Pulm: CTA B/L  CV: RRR, S1S2; no rub  Back:  no sacral edema  Abd: +BS, soft   : No suprapubic tenderness  Ext: no edema  Neuro: AAOx 1-2, Could not perform the test for asterixis.   Skin: Warm   Vascular access: Needs HD catheter today.     LABS/STUDIES  --------------------------------------------------------------------------------              6.5    10.47 >-----------<  99       [03-15-25 @ 08:50]              19.5     138  |  98  |  >151  ----------------------------<  213      [03-15-25 @ 06:59]  5.1   |  16  |  7.14        Ca     8.3     [03-15-25 @ 06:59]        PTT: 91.8       [03-15-25 @ 08:50]      Creatinine Trend:  SCr 7.14 [03-15 @ 06:59]  SCr 7.02 [03-14 @ 18:09]  SCr 6.62 [03-14 @ 08:47]  SCr 6.49 [03-13 @ 22:33]  SCr 6.01 [03-13 @ 07:09]      Urine Sodium 61      [03-14-25 @ 17:00]  Urine Osmolality 377      [03-14-25 @ 17:00]    Lipid: chol 82, TG 68, HDL 32, LDL --      [03-11-25 @ 06:59]

## 2025-03-15 NOTE — PROGRESS NOTE ADULT - ASSESSMENT
89F PMHx HTN, DM on insulin, CAD s/p PCI, HFrEF. P/w acute onset of R shin pain w/o trauma 3hr PTA

## 2025-03-15 NOTE — PROVIDER CONTACT NOTE (CRITICAL VALUE NOTIFICATION) - ASSESSMENT
Pt A&Ox2-3, VSS, no s/s distress
Pt A/Ox0-1. Vital Signs Stable. Pt yelling w/ no relieve in pain. No active signs of bleeding.

## 2025-03-15 NOTE — PROGRESS NOTE ADULT - ASSESSMENT
89-year-old female with PMHx HTN, DM on insulin, CAD s/p PCI, HFrEF, CKD stage V (baseline SCr 3.4-3.6) presenting with acute onset of R shin pain in setting of severe PVD. Nephrology consulted for ROB on CKD, hyperkalemia.     ROB on CKD:  #rob likely vol depletion- on diuretic and entresto +contrast   r/o retention- Serial bladder scans.   ATN is likely, Atherosclerosis vs contrast induced.   Scr continues to increase as does K, and bun, and bicarb is lower  Pt has AMS. Likely 2/2 Uremia.     PLAN:  Spoke with the patient's daughter extensively about the dialysis and consent was obtained. It was placed in the charts. Pt's daughter was okay with temporary dialysis for now. Regarding the need for long term HD, she wanted to discuss with her brother and finalize.   Please get the HD catheter placed. Give DDAVP 0.3mcg/kg before the HD cath placement.   Plan for HD today - 2 hours session. (to decrease the risk of DDS)  Continue to hold diuretic and entresto  Strict I/os.     Hyperkalemia  was on entresto  Continue with lokelma 10 TID for today.    HD today.   Renal diet.     Metabolic acidosis  monitor bicarb trend  C/wsodium bicarb tabs 650mg tid - can discontinue bicarb supplementation on Monday       Discussed with the attending on call  Discussed with the primary team.    89-year-old female with PMHx HTN, DM on insulin, CAD s/p PCI, HFrEF, CKD stage V (baseline SCr 3.4-3.6) presenting with acute onset of R shin pain in setting of severe PVD. Nephrology consulted for ROB on CKD, hyperkalemia.       ROB on CKD:    #rob likely vol depletion- on diuretic and Entresto +Contrast  R/o retention- Serial bladder scans.   ATN is likely, Atherosclerosis vs contrast induced.   Scr continues to increase as does K, and bun, and bicarb is lower  Pt has AMS. Likely 2/2 Uremia.       PLAN:  Spoke with the patient's daughter extensively about the dialysis and consent was obtained. It was placed in the charts. Pt's daughter was okay with temporary dialysis for now. Regarding the need for long term HD, she wanted to discuss with her brother and finalize.   Please get the HD catheter placed. Give DDAVP 0.3mcg/kg before the HD cath placement.   Plan for HD today - 2 hours session. (to decrease the risk of DDS)  Continue to hold diuretic and entresto  Strict I/os.     Hyperkalemia  was on entresto  Continue with lokelma 10 TID for today.    HD today.   Renal diet.     Metabolic acidosis  monitor bicarb trend  C/wsodium bicarb tabs 650mg tid - can discontinue bicarb supplementation on Monday         Discussed with the primary team.

## 2025-03-15 NOTE — PROGRESS NOTE ADULT - SUBJECTIVE AND OBJECTIVE BOX
[facility] Division of Hospital Medicine  Maynor Oates DO  Pager (M-F, 8A-5P):  MS Teams PREFERRED        SUBJECTIVE / OVERNIGHT EVENTS:  No overnight events  Limited history due to cognitive impairment.     MEDICATIONS  (STANDING):  cefTRIAXone   IVPB 1000 milliGRAM(s) IV Intermittent every 24 hours  chlorhexidine 4% Liquid 1 Application(s) Topical <User Schedule>  clopidogrel Tablet 75 milliGRAM(s) Oral daily  dextrose 5%. 1000 milliLiter(s) (50 mL/Hr) IV Continuous <Continuous>  dextrose 5%. 1000 milliLiter(s) (100 mL/Hr) IV Continuous <Continuous>  dextrose 50% Injectable 12.5 Gram(s) IV Push once  dextrose 50% Injectable 25 Gram(s) IV Push once  dextrose 50% Injectable 25 Gram(s) IV Push once  glucagon  Injectable 1 milliGRAM(s) IntraMuscular once  heparin  Infusion. 800 Unit(s)/Hr (8 mL/Hr) IV Continuous <Continuous>  influenza  Vaccine (HIGH DOSE) 0.5 milliLiter(s) IntraMuscular once  insulin lispro (ADMELOG) corrective regimen sliding scale   SubCutaneous three times a day before meals  insulin lispro (ADMELOG) corrective regimen sliding scale   SubCutaneous at bedtime  levothyroxine 50 MICROGram(s) Oral daily  sodium bicarbonate 650 milliGRAM(s) Oral three times a day  sodium chloride 0.9%. 1000 milliLiter(s) (50 mL/Hr) IV Continuous <Continuous>  sodium zirconium cyclosilicate 10 Gram(s) Oral three times a day    MEDICATIONS  (PRN):  acetaminophen     Tablet .. 650 milliGRAM(s) Oral every 6 hours PRN Temp greater or equal to 38C (100.4F), Mild Pain (1 - 3)  dextrose Oral Gel 15 Gram(s) Oral once PRN Blood Glucose LESS THAN 70 milliGRAM(s)/deciliter  heparin   Injectable 5500 Unit(s) IV Push every 6 hours PRN For aPTT less than 40  heparin   Injectable 2500 Unit(s) IV Push every 6 hours PRN For aPTT between 40 - 57  melatonin 3 milliGRAM(s) Oral at bedtime PRN Insomnia  metoclopramide Injectable 10 milliGRAM(s) IV Push every 8 hours PRN 2nd choice for nausea/vomiting  ondansetron Injectable 4 milliGRAM(s) IV Push every 8 hours PRN Nausea and/or Vomiting  sodium chloride 0.9% lock flush 10 milliLiter(s) IV Push every 1 hour PRN Pre/post blood products, medications, blood draw, and to maintain line patency      I&O's Summary    14 Mar 2025 07:01  -  15 Mar 2025 07:00  --------------------------------------------------------  IN: 800 mL / OUT: 850 mL / NET: -50 mL        PHYSICAL EXAM:  Vital Signs Last 24 Hrs  T(C): 36.5 (15 Mar 2025 05:36), Max: 36.6 (14 Mar 2025 12:00)  T(F): 97.7 (15 Mar 2025 05:36), Max: 97.9 (14 Mar 2025 19:39)  HR: 88 (15 Mar 2025 05:36) (65 - 88)  BP: 103/62 (15 Mar 2025 05:36) (103/62 - 130/80)  BP(mean): --  RR: 18 (15 Mar 2025 05:36) (18 - 18)  SpO2: 99% (15 Mar 2025 05:36) (98% - 99%)    Parameters below as of 15 Mar 2025 05:36  Patient On (Oxygen Delivery Method): room air      CONSTITUTIONAL: NAD  EYES: No conjunctival or scleral injection, non-icteric;   ENMT: No external nasal lesions; MMM  NECK: Trachea midline   RESPIRATORY: Breathing comfortably; no dullness to percussion; lungs CTA without wheeze/rhonchi/rales  CARDIOVASCULAR: +S1S2, RRR   GASTROINTESTINAL: No palpable masses or tenderness, no rebound/guarding  PSYCHIATRIC: Unable to assess  SKIN: Multiple excoriations on bilateral legs.     LABS:                        6.5    10.47 )-----------( 99       ( 15 Mar 2025 08:50 )             19.5     03-15    138  |  98  |  >151[H]  ----------------------------<  213[H]  5.1   |  16[L]  |  7.14[H]    Ca    8.3[L]      15 Mar 2025 06:59      PTT - ( 15 Mar 2025 08:50 )  PTT:91.8 sec      Urinalysis Basic - ( 15 Mar 2025 06:59 )    Color: x / Appearance: x / SG: x / pH: x  Gluc: 213 mg/dL / Ketone: x  / Bili: x / Urobili: x   Blood: x / Protein: x / Nitrite: x   Leuk Esterase: x / RBC: x / WBC x   Sq Epi: x / Non Sq Epi: x / Bacteria: x          RADIOLOGY & ADDITIONAL TESTS:  Results Reviewed: CBC personally reviewed by me Hgb 6.5   Imaging Personally Reviewed:  Electrocardiogram Personally Reviewed:    COORDINATION OF CARE:  Care Discussed with Consultants/Other Providers [Y/N]:  Prior or Outpatient Records Reviewed [Y/N]:

## 2025-03-15 NOTE — CONSULT NOTE ADULT - ASSESSMENT
89F with PMH of HTN, DM2 on insulin, CAD s/p PCI, HFrEF, presenting with acute onset of R shin pain, found to have R popliteal artery occlusion, now on heparin gtt. Patient with worsening renal failure, worsening uremia, recommended for urgent HD. MICU consulted for difficult Shiley placement.    #ROB on CKD  #Urgent HD  #Thrombocytopenia  #Anemia  - Patient recommended for HD due to worsening uremia and mental status.  - Hemodynamically stable, alert, protecting airway, able to lie flat.  - Has been taking ASA and Plavix since 2014, started on heparin gtt this admission  - Patient with good functional status prior; patient and daughter amenable to trialing HD  - With unexplained drop in Hgb from 10 on arrival to 6.5 today    Recommendations:  - Would draw VBGs with BMPs  - DDAVP prior to Shiley placement for platelet dysfunction in setting of uremia  - Would favor sedation for Shiley placement, patient likely to move  - Consider femoral access, will be temporary so low risk of infection and possibly less agitating to patient  - Would consider CT A/P to assess for source of potential bleed  - Would revisit need for Plavix, has been taking for presumed sent placed in 2014  - Patient does not need to be monitored in ICU setting and is not a MICU candidate at this time, please reconsult as needed    Recommendations not final until signed by attending.

## 2025-03-15 NOTE — CHART NOTE - NSCHARTNOTEFT_GEN_A_CORE
: Ana Laura KENDALL NP     INDICATION: Hemorrheic shock      PROCEDURE:  [x ] LIMITED ECHO  [ x] LIMITED CHEST  [ ] LIMITED RETROPERITONEAL  [ x] LIMITED ABDOMINAL  [ ] LIMITED DVT  [ ] NEEDLE GUIDANCE VASCULAR  [ ] NEEDLE GUIDANCE THORACENTESIS  [ ] NEEDLE GUIDANCE PARACENTESIS  [ ] NEEDLE GUIDANCE PERICARDIOCENTESIS  [ ] OTHER    FINDINGS:  Echo   RV smaller than LV   LV with sever reduced systolic function   IVC 8 cm with moderate respiratory variability   no pericardial effusion noted     Chest   A line predominance   Right Pleural base with small pleural effusion and consolidation   Left Pleural base difficult to visualize but appears to be without consolidation or effusion     Abd   Bladder is collapsed   (+) Echogenic fluid below blow diaphragm       INTERPRETATION:  LV with sever reduced systolic dysfunction   IVC small .8 cm representing hypovolemic state   (+) echogenic sedimented fluid below diaphragm may represent RP bleed

## 2025-03-15 NOTE — PROGRESS NOTE ADULT - PROBLEM SELECTOR PLAN 8
-Home medications reconciled with daughter, Nancy, via phone:  -Torsemide 20mg (takes 1-2 tablets depending on weight)  - HOLDING due to ROB   -Protonix 40mg daily - Holding as patient confused, difficulty taking PO medications, also with ROB  -Metoprolol 12.5mg BID  -Metolazone PRN (last taken a month ago per daughter)  -Levothyroxine recently increased to 50mcg daily  -Lantus 12u QHS - Holding due to poor oral intake  -Gabapentin 100mg BID - Holding as patient confused, difficulty taking PO medications, also with ROB   -Pepcid 20mg daily (will continue as 20mg every other day for renal dosing) - Holding as patient confused, difficulty taking PO medications, also with ROB.  -Entresto 24/26mg BID - HOLDING due to ROB   -Plavix 75mg daily  -Lipitor 20mg daily - Holding as patient confused, difficulty taking PO medications   -Allopurinol 100mg every day (holding due to CKD stage 4)

## 2025-03-15 NOTE — PROGRESS NOTE ADULT - PROBLEM SELECTOR PLAN 1
-CT concerning for bilateral popliteal artery occlusion  - US Arterial: There is bilateral arterial vascular stenotic and occlusive disease. On the right, there is a severe stenosis of the popliteal artery proximally followed a segmental occlusion of the popliteal artery distally. The right posterior tibial, peroneal, anterior tibial and dorsal pedis arteries are all occluded. On the left, there are segmental occlusions of the superficial femoral artery in the small and distal thigh. The left posterior tibial peroneal trunk, posterior tibial, peroneal, anterior tibial and dorsal pedis arteries are occluded  -Vascular recs appreciated- No acute surgical intervention. Continue heparin drip  -Continue home Plavix/Lipitor.  Hgb at 6.5 today, will require transfusion.

## 2025-03-15 NOTE — CHART NOTE - NSCHARTNOTEFT_GEN_A_CORE
HPI:  89F with PMH of HTN, DM2 on insulin, CAD s/p PCI, HFrEF, presenting with acute onset of R shin pain, found to have R popliteal artery occlusion, on heparin gtt. Patient with worsening renal failure, recommended for urgent HD. Vascular surgery consulted for line placement. During evaluation, vascular team recommended MICU consult due to the potential challenges with hemodynamics that might arouse during procedure at the bedside.   MICU consulted for difficult Shiley placement and recommended CT A/P to r/o RP in settings of acute anemia. Heparin gtt was d/c'd at 15:30. Radiology department notified of need for emergent CT.          Vital Signs Last 24 Hrs  T(C): 36.3 (15 Mar 2025 17:00), Max: 36.6 (14 Mar 2025 19:39)  T(F): 97.3 (15 Mar 2025 17:00), Max: 97.9 (14 Mar 2025 19:39)  HR: 64 (15 Mar 2025 17:00) (64 - 88)  BP: 95/59 (15 Mar 2025 17:00) (93/57 - 120/54)  BP(mean): --  RR: 18 (15 Mar 2025 17:00) (18 - 18)  SpO2: 100% (15 Mar 2025 17:00) (99% - 100%)    Parameters below as of 15 Mar 2025 17:00  Patient On (Oxygen Delivery Method): room air          Labs:                          6.7    9.78  )-----------( 76       ( 15 Mar 2025 15:59 )             19.1     03-15    138  |  98  |  >151[H]  ----------------------------<  213[H]  5.1   |  16[L]  |  7.14[H]    Ca    8.3[L]      15 Mar 2025 06:59        Radiology:    CT A/P -performed, pending read         PHYSICAL EXAM:  GENERAL: NAD, lying in bed, mons in pain  HEAD: Atraumatic, Normocephalic  EYES: EOMI, PERRLA, conjunctiva and sclera clear  ENT: Moist mucous membranes  NECK: Supple, No JVD  CHEST/LUNG: Clear to auscultation bilaterally; No rales, rhonchi, wheezing, or rubs. Unlabored respirations  HEART: Regular rate and rhythm; No murmurs, rubs, or gallops  ABDOMEN: Bowel sounds present; Soft, Nontender, Nondistended. No hepatomegaly  EXTREMITIES:  2+ Peripheral Pulses, brisk capillary refill. No clubbing, cyanosis, or edema  NERVOUS SYSTEM:  Alert & Oriented x1-2, speech clear.         Assessment & Plan:  HPI:  89F with PMH of HTN, DM2 on insulin, CAD s/p PCI, HFrEF, presenting with acute onset of R shin pain, found to have R popliteal artery occlusion, on heparin gtt. Patient with acute renal failure, requiring urgent HD.   Vascular surgery consulted for line placement. During evaluation, vascular team recommended MICU consult due to the potential challenges with hemodynamics that might arouse during procedure at the bedside.   MICU consulted for a difficult Shiley placement and recommended CT A/P to r/o RP in settings of acute anemia. Heparin gtt was d/c'd at 15:30. Radiology department notified of need for emergent CT.      -stat CT A/P performed, pending read   -2 units of PRBC to transfuse in settings of acute anemia   -NPO  -Heparin gtt d/c'd at 1530   -patient was accepted to MICU for urgent HD and management of   -Dr. Mcgee and Elia made aware      LISA Gibbs-BC  Dept of Medicine

## 2025-03-15 NOTE — CHART NOTE - NSCHARTNOTEFT_GEN_A_CORE
Vascular surgery called earlier today for bedside central venous catheter placement due to need for emergent temporary hemodialysis access. On chart review, she had a drop in hemoglobin from 9.5 g/dL to 6.5 g/dL over 24 hours, as well as thrombocytopenia and profound uremia, while on both therapeutic heparin and clopidogrel. Given her high risk profile, particularly with regards to bleeding, recommended medical critical care evaluation for possible admission and higher level of monitoring. We also discussed with the medicine service our concern that the patient's laboratory derangements and extremely high risk of bleeding raise strong suspicion for undiagnosed bleeding. She received a blood transfusion with almost no improvement in her hemoglobin and hematocrit, reinforcing this suspicion. Cross sectional imaging was obtained showing a retroperitoneal bleed. She is now transferred to MICU where she will undergo resuscitation, central venous catheter placement, and emergency hemodialysis. Imaging findings and patient status discussed throughout the day with vascular surgical fellow Dr. Connie Mccullough.     Strongly recommend interventional radiology consultation as angioembolization would be the next step in treatment if control of bleeding is required. Surgery remains available if needed.     Orlando Manning, PGY-2  Vascular Surgery ([id number])

## 2025-03-15 NOTE — PROGRESS NOTE ADULT - PROBLEM SELECTOR PLAN 5
-Pt's cardiology Dr. Michelle Hines @ [facility] ([phone])  -Not in acute exacerbation  Hold home torsemide, Entresto due to ROB  Continue home metoprolol.

## 2025-03-15 NOTE — PROVIDER CONTACT NOTE (CRITICAL VALUE NOTIFICATION) - BACKGROUND
Dx atherosclerosis, RLE occlusion. PMHx CHF, diabetes, CAD, DM.
pt admitted d/t RLE popliteal artery occlusions

## 2025-03-16 NOTE — PROGRESS NOTE ADULT - SUBJECTIVE AND OBJECTIVE BOX
[facility] DIVISION OF KIDNEY DISEASES AND HYPERTENSION -- FOLLOW UP NOTE  --------------------------------------------------------------------------------  Chief Complaint:    24 hour events/subjective:    s/p first HD yesterday  3 units PRBC due to RP bleed also    PAST HISTORY  --------------------------------------------------------------------------------  No significant changes to PMH, PSH, FHx, SHx, unless otherwise noted    ALLERGIES & MEDICATIONS  --------------------------------------------------------------------------------  Allergies    Kiwi (Anaphylaxis)  codeine (Unknown)  penicillins (Anaphylaxis)    Intolerances      Standing Inpatient Medications  cefTRIAXone   IVPB 1000 milliGRAM(s) IV Intermittent every 24 hours  chlorhexidine 2% Cloths 1 Application(s) Topical <User Schedule>  chlorhexidine 4% Liquid 1 Application(s) Topical <User Schedule>  dexMEDEtomidine Infusion 0.2 MICROgram(s)/kG/Hr IV Continuous <Continuous>  dextrose 5%. 1000 milliLiter(s) IV Continuous <Continuous>  dextrose 5%. 1000 milliLiter(s) IV Continuous <Continuous>  dextrose 50% Injectable 25 Gram(s) IV Push once  dextrose 50% Injectable 12.5 Gram(s) IV Push once  dextrose 50% Injectable 25 Gram(s) IV Push once  glucagon  Injectable 1 milliGRAM(s) IntraMuscular once  influenza  Vaccine (HIGH DOSE) 0.5 milliLiter(s) IntraMuscular once  insulin lispro (ADMELOG) corrective regimen sliding scale   SubCutaneous every 6 hours  levothyroxine Injectable 40 MICROGram(s) IV Push at bedtime  sodium bicarbonate 650 milliGRAM(s) Oral three times a day  sodium chloride 0.9%. 1000 milliLiter(s) IV Continuous <Continuous>    PRN Inpatient Medications  acetaminophen     Tablet .. 650 milliGRAM(s) Oral every 6 hours PRN  dextrose Oral Gel 15 Gram(s) Oral once PRN  melatonin 3 milliGRAM(s) Oral at bedtime PRN  metoclopramide Injectable 10 milliGRAM(s) IV Push every 8 hours PRN  ondansetron Injectable 4 milliGRAM(s) IV Push every 8 hours PRN  sodium chloride 0.9% lock flush 10 milliLiter(s) IV Push every 1 hour PRN      VITALS/PHYSICAL EXAM  --------------------------------------------------------------------------------  T(C): 36.5 (03-16-25 @ 08:00), Max: 36.6 (03-15-25 @ 12:20)  HR: 69 (03-16-25 @ 11:00) (61 - 85)  BP: 112/56 (03-16-25 @ 11:00) (90/45 - 128/60)  RR: 15 (03-16-25 @ 11:00) (9 - 23)  SpO2: 100% (03-16-25 @ 11:00) (94% - 100%)  Wt(kg): --  Height (cm): 152.4 (03-15-25 @ 18:37)  Weight (kg): 68.8 (03-15-25 @ 18:37)  BMI (kg/m2): 29.6 (03-15-25 @ 18:37)  BSA (m2): 1.66 (03-15-25 @ 18:37)      03-15-25 @ 07:01  -  03-16-25 @ 07:00  --------------------------------------------------------  IN: 700 mL / OUT: 760 mL / NET: -60 mL      Physical Exam:  	    LABS/STUDIES  --------------------------------------------------------------------------------              8.4    10.23 >-----------<  42       [03-16-25 @ 09:54]              23.8     137  |  99  |  102  ----------------------------<  193      [03-16-25 @ 01:46]  4.6   |  16  |  5.07        Ca     8.3     [03-16-25 @ 01:46]      Mg     1.7     [03-16-25 @ 01:46]      Phos  5.9     [03-16-25 @ 01:46]    TPro  5.6  /  Alb  3.1  /  TBili  4.6  /  DBili  x   /  AST  121  /  ALT  78  /  AlkPhos  152  [03-16-25 @ 01:46]    PT/INR: PT 16.0 , INR 1.41       [03-16-25 @ 10:43]  PTT: 38.6       [03-16-25 @ 10:43]          [03-16-25 @ 01:46]    Creatinine Trend:  SCr 5.07 [03-16 @ 01:46]  SCr 7.14 [03-15 @ 06:59]  SCr 7.02 [03-14 @ 18:09]  SCr 6.62 [03-14 @ 08:47]  SCr 6.49 [03-13 @ 22:33]    Urinalysis - [03-16-25 @ 01:46]      Color  / Appearance  / SG  / pH       Gluc 193 / Ketone   / Bili  / Urobili        Blood  / Protein  / Leuk Est  / Nitrite       RBC  / WBC  / Hyaline  / Gran  / Sq Epi  / Non Sq Epi  / Bacteria     Urine Sodium 61      [03-14-25 @ 17:00]  Urine Osmolality 377      [03-14-25 @ 17:00]    Lipid: chol 82, TG 68, HDL 32, LDL --      [03-11-25 @ 06:59]    HBsAg Nonreact      [03-15-25 @ 15:59]  HCV 0.06, Nonreact      [03-15-25 @ 15:59]

## 2025-03-16 NOTE — PROGRESS NOTE ADULT - SUBJECTIVE AND OBJECTIVE BOX
Patient is a 89y old  Female who presents with a chief complaint of Right leg pain (14 Mar 2025 16:31)      24 hour events: ***    REVIEW OF SYSTEMS:  Constitutional: [ ] fevers [ ] chills [ ] weight loss [ ] weight gain  HEENT: [ ] dry eyes [ ] eye irritation [ ] postnasal drip [ ] nasal congestion  CV: [ ] chest pain [ ] orthopnea [ ] palpitations [ ] murmur  Resp: [ ] cough [ ] shortness of breath [ ] dyspnea [ ] wheezing [ ] sputum [ ] hemoptysis  GI: [ ] nausea [ ] vomiting [ ] diarrhea [ ] constipation [ ] abd pain [ ] dysphagia   : [ ] dysuria [ ] nocturia [ ] hematuria [ ] increased urinary frequency  Musculoskeletal: [ ] back pain [ ] myalgias [ ] arthralgias [ ] fracture  Skin: [ ] rash [ ] itch  Neurological: [ ] headache [ ] dizziness [ ] syncope [ ] weakness [ ] numbness  Psychiatric: [ ] anxiety [ ] depression  Endocrine: [ ] diabetes [ ] thyroid problem  Hematologic/Lymphatic: [ ] anemia [ ] bleeding problem  Allergic/Immunologic: [ ] itchy eyes [ ] nasal discharge [ ] hives [ ] angioedema  [ ] All other systems negative  [ ] Unable to assess ROS because ________    OBJECTIVE:  ICU Vital Signs Last 24 Hrs  T(C): 36.4 (16 Mar 2025 04:00), Max: 36.6 (15 Mar 2025 12:20)  T(F): 97.6 (16 Mar 2025 04:00), Max: 97.9 (15 Mar 2025 12:20)  HR: 65 (16 Mar 2025 06:00) (64 - 85)  BP: 98/46 (16 Mar 2025 06:00) (93/57 - 128/60)  BP(mean): 66 (16 Mar 2025 06:00) (66 - 90)  ABP: --  ABP(mean): --  RR: 14 (16 Mar 2025 06:00) (10 - 23)  SpO2: 94% (16 Mar 2025 06:00) (94% - 100%)    O2 Parameters below as of 16 Mar 2025 04:00  Patient On (Oxygen Delivery Method): room air              03-15 @ 07:01  -  03-16 @ 07:00  --------------------------------------------------------  IN: 700 mL / OUT: 760 mL / NET: -60 mL      CAPILLARY BLOOD GLUCOSE      POCT Blood Glucose.: 239 mg/dL (16 Mar 2025 05:30)      PHYSICAL EXAM:  GENERAL: NAD, well-developed  HEAD:  Atraumatic, Normocephalic  EYES: EOMI, PERRLA, conjunctiva and sclera clear  NECK: Supple, No JVD, Thyroid not palpable, non tender, Trachea midline  CHEST/LUNG: ( )ETT in place, ( )Tracheostomy in place, ( )no chest deformity,  ( )  Normal expansion/effort/palpation,  ( )Normal percussion/auscultation,  Clear to auscultation bilaterally; No wheeze  HEART: Regular rate and rhythm; No murmurs, rubs, or gallops, ( ) No JVD, ( )Normal Pulses, ( )Edema   ABDOMEN: Soft, Nontender, Nondistended; Bowel sounds presen, ( ) No Masses, (  ) No organomegaly,  (  ) Non-tender normal BS   : Madden in Place, Voiding freely  Musculoskeletal/EXTREMITIES:(  ) Normal strength, movement, and tone, (  ) No focal atropy, (  ) Normal ROM, (  ) Normal digits and nails,  2+ Peripheral Pulses, No clubbing, cyanosis, or edema  PSYCH: AAOx3, (  ) Normal mood/affect/judgment/insight, (  ) Intact memory,   NEUROLOGY: non-focal, exam  SKIN: No rashes or lesions      LINES:    HOSPITAL MEDICATIONS:  MEDICATIONS  (STANDING):  cefTRIAXone   IVPB 1000 milliGRAM(s) IV Intermittent every 24 hours  chlorhexidine 2% Cloths 1 Application(s) Topical <User Schedule>  chlorhexidine 4% Liquid 1 Application(s) Topical <User Schedule>  dexMEDEtomidine Infusion 0.2 MICROgram(s)/kG/Hr (3.32 mL/Hr) IV Continuous <Continuous>  dextrose 5%. 1000 milliLiter(s) (100 mL/Hr) IV Continuous <Continuous>  dextrose 5%. 1000 milliLiter(s) (50 mL/Hr) IV Continuous <Continuous>  dextrose 50% Injectable 25 Gram(s) IV Push once  dextrose 50% Injectable 12.5 Gram(s) IV Push once  dextrose 50% Injectable 25 Gram(s) IV Push once  glucagon  Injectable 1 milliGRAM(s) IntraMuscular once  influenza  Vaccine (HIGH DOSE) 0.5 milliLiter(s) IntraMuscular once  insulin lispro (ADMELOG) corrective regimen sliding scale   SubCutaneous every 6 hours  levothyroxine Injectable 40 MICROGram(s) IV Push at bedtime  sodium bicarbonate 650 milliGRAM(s) Oral three times a day  sodium chloride 0.9%. 1000 milliLiter(s) (50 mL/Hr) IV Continuous <Continuous>    MEDICATIONS  (PRN):  acetaminophen     Tablet .. 650 milliGRAM(s) Oral every 6 hours PRN Temp greater or equal to 38C (100.4F), Mild Pain (1 - 3)  dextrose Oral Gel 15 Gram(s) Oral once PRN Blood Glucose LESS THAN 70 milliGRAM(s)/deciliter  melatonin 3 milliGRAM(s) Oral at bedtime PRN Insomnia  metoclopramide Injectable 10 milliGRAM(s) IV Push every 8 hours PRN 2nd choice for nausea/vomiting  ondansetron Injectable 4 milliGRAM(s) IV Push every 8 hours PRN Nausea and/or Vomiting  sodium chloride 0.9% lock flush 10 milliLiter(s) IV Push every 1 hour PRN Pre/post blood products, medications, blood draw, and to maintain line patency      LABS:                        11.2   13.14 )-----------( 44       ( 16 Mar 2025 01:46 )             33.0     Hgb Trend: 11.2<--, 6.7<--, 6.5<--, 7.4<--, 8.1<--  03-16    137  |  99  |  102[H]  ----------------------------<  193[H]  4.6   |  16[L]  |  5.07[H]    Ca    8.3[L]      16 Mar 2025 01:46  Phos  5.9     03-16  Mg     1.7     03-16    TPro  5.6[L]  /  Alb  3.1[L]  /  TBili  4.6[H]  /  DBili  x   /  AST  121[H]  /  ALT  78[H]  /  AlkPhos  152[H]  03-16    Creatinine Trend: 5.07<--, 7.14<--, 7.02<--, 6.62<--, 6.49<--, 6.01<--  PT/INR - ( 16 Mar 2025 01:46 )   PT: 16.3 sec;   INR: 1.42 ratio         PTT - ( 16 Mar 2025 01:46 )  PTT:36.6 sec  Urinalysis Basic - ( 16 Mar 2025 01:46 )    Color: x / Appearance: x / SG: x / pH: x  Gluc: 193 mg/dL / Ketone: x  / Bili: x / Urobili: x   Blood: x / Protein: x / Nitrite: x   Leuk Esterase: x / RBC: x / WBC x   Sq Epi: x / Non Sq Epi: x / Bacteria: x        Venous Blood Gas:  03-16 @ 01:17  7.27/47/24/22/36.9  VBG Lactate: 1.6  Venous Blood Gas:  03-15 @ 20:05  7.27/40/49/18/77.0  VBG Lactate: 1.7      EKG:    MICROBIOLOGY:     RADIOLOGY:  [ ] Reviewed and interpreted by me    EKG:  MICROBIOLOGY:     Radiology: ***    Bedside lung ultrasound: ***    Bedside ECHO: ***    EKG:    CENTRAL LINE: Y/N          DATE INSERTED:              REMOVE: Y/N    MADDEN: Y/N                        DATE INSERTED:              REMOVE: Y/N    A-LINE: Y/N                       DATE INSERTED:              REMOVE: Y/N    GLOBAL ISSUE/BEST PRACTICE:  Analgesia:  Sedation:  HOB elevation: yes  Stress ulcer prophylaxis:  VTE prophylaxis:  Glycemic control:  Nutrition:    CODE STATUS: ***  [facility] discussion: Y     Patient is a 89y old  Female who presents with a chief complaint of Right leg pain (14 Mar 2025 16:31)      24 hour events: Hgb dropped to 8.4 from 11s, remains AAOx0.    REVIEW OF SYSTEMS:  [x] Unable to assess ROS because ________    OBJECTIVE:  ICU Vital Signs Last 24 Hrs  T(C): 36.4 (16 Mar 2025 04:00), Max: 36.6 (15 Mar 2025 12:20)  T(F): 97.6 (16 Mar 2025 04:00), Max: 97.9 (15 Mar 2025 12:20)  HR: 65 (16 Mar 2025 06:00) (64 - 85)  BP: 98/46 (16 Mar 2025 06:00) (93/57 - 128/60)  BP(mean): 66 (16 Mar 2025 06:00) (66 - 90)  ABP: --  ABP(mean): --  RR: 14 (16 Mar 2025 06:00) (10 - 23)  SpO2: 94% (16 Mar 2025 06:00) (94% - 100%)    O2 Parameters below as of 16 Mar 2025 04:00  Patient On (Oxygen Delivery Method): room air              03-15 @ 07:01  -  03-16 @ 07:00  --------------------------------------------------------  IN: 700 mL / OUT: 760 mL / NET: -60 mL      CAPILLARY BLOOD GLUCOSE      POCT Blood Glucose.: 239 mg/dL (16 Mar 2025 05:30)      PHYSICAL EXAM:  GENERAL: AAOx0, lethargic, arsousable  HEAD:  Atraumatic, Normocephalic  EYES: EOMI, PERRLA, conjunctiva and sclera clear  NECK: Supple, No JVD, Thyroid not palpable, non tender, Trachea midline  CHEST/LUNG: CTABL  HEART: RRR S1 S2, no murmur/gallops  ABDOMEN: Soft, NTND  : Madden in Place, minimal UOP  Musculoskeletal/EXTREMITIES: Bilateral feet cold, no palpable DP      LINES:    HOSPITAL MEDICATIONS:  MEDICATIONS  (STANDING):  cefTRIAXone   IVPB 1000 milliGRAM(s) IV Intermittent every 24 hours  chlorhexidine 2% Cloths 1 Application(s) Topical <User Schedule>  chlorhexidine 4% Liquid 1 Application(s) Topical <User Schedule>  dexMEDEtomidine Infusion 0.2 MICROgram(s)/kG/Hr (3.32 mL/Hr) IV Continuous <Continuous>  dextrose 5%. 1000 milliLiter(s) (100 mL/Hr) IV Continuous <Continuous>  dextrose 5%. 1000 milliLiter(s) (50 mL/Hr) IV Continuous <Continuous>  dextrose 50% Injectable 25 Gram(s) IV Push once  dextrose 50% Injectable 12.5 Gram(s) IV Push once  dextrose 50% Injectable 25 Gram(s) IV Push once  glucagon  Injectable 1 milliGRAM(s) IntraMuscular once  influenza  Vaccine (HIGH DOSE) 0.5 milliLiter(s) IntraMuscular once  insulin lispro (ADMELOG) corrective regimen sliding scale   SubCutaneous every 6 hours  levothyroxine Injectable 40 MICROGram(s) IV Push at bedtime  sodium bicarbonate 650 milliGRAM(s) Oral three times a day  sodium chloride 0.9%. 1000 milliLiter(s) (50 mL/Hr) IV Continuous <Continuous>    MEDICATIONS  (PRN):  acetaminophen     Tablet .. 650 milliGRAM(s) Oral every 6 hours PRN Temp greater or equal to 38C (100.4F), Mild Pain (1 - 3)  dextrose Oral Gel 15 Gram(s) Oral once PRN Blood Glucose LESS THAN 70 milliGRAM(s)/deciliter  melatonin 3 milliGRAM(s) Oral at bedtime PRN Insomnia  metoclopramide Injectable 10 milliGRAM(s) IV Push every 8 hours PRN 2nd choice for nausea/vomiting  ondansetron Injectable 4 milliGRAM(s) IV Push every 8 hours PRN Nausea and/or Vomiting  sodium chloride 0.9% lock flush 10 milliLiter(s) IV Push every 1 hour PRN Pre/post blood products, medications, blood draw, and to maintain line patency      LABS:                        11.2   13.14 )-----------( 44       ( 16 Mar 2025 01:46 )             33.0     Hgb Trend: 11.2<--, 6.7<--, 6.5<--, 7.4<--, 8.1<--  03-16    137  |  99  |  102[H]  ----------------------------<  193[H]  4.6   |  16[L]  |  5.07[H]    Ca    8.3[L]      16 Mar 2025 01:46  Phos  5.9     03-16  Mg     1.7     03-16    TPro  5.6[L]  /  Alb  3.1[L]  /  TBili  4.6[H]  /  DBili  x   /  AST  121[H]  /  ALT  78[H]  /  AlkPhos  152[H]  03-16    Creatinine Trend: 5.07<--, 7.14<--, 7.02<--, 6.62<--, 6.49<--, 6.01<--  PT/INR - ( 16 Mar 2025 01:46 )   PT: 16.3 sec;   INR: 1.42 ratio         PTT - ( 16 Mar 2025 01:46 )  PTT:36.6 sec  Urinalysis Basic - ( 16 Mar 2025 01:46 )    Color: x / Appearance: x / SG: x / pH: x  Gluc: 193 mg/dL / Ketone: x  / Bili: x / Urobili: x   Blood: x / Protein: x / Nitrite: x   Leuk Esterase: x / RBC: x / WBC x   Sq Epi: x / Non Sq Epi: x / Bacteria: x        Venous Blood Gas:  03-16 @ 01:17  7.27/47/24/22/36.9  VBG Lactate: 1.6  Venous Blood Gas:  03-15 @ 20:05  7.27/40/49/18/77.0  VBG Lactate: 1.7      EKG:    MICROBIOLOGY:     RADIOLOGY:  [ ] Reviewed and interpreted by me    EKG:  MICROBIOLOGY:     Radiology: ***    Bedside lung ultrasound: ***    Bedside ECHO: ***    EKG:    CENTRAL LINE: Y/N          DATE INSERTED:              REMOVE: Y/N    MADDEN: Y/N                        DATE INSERTED:              REMOVE: Y/N    A-LINE: Y/N                       DATE INSERTED:              REMOVE: Y/N    GLOBAL ISSUE/BEST PRACTICE:  Analgesia:  Sedation:  HOB elevation: yes  Stress ulcer prophylaxis:  VTE prophylaxis:  Glycemic control:  Nutrition:    CODE STATUS: ***  [facility] discussion: Y

## 2025-03-16 NOTE — PROGRESS NOTE ADULT - ASSESSMENT
ASSESSMENT: 89F PMHx HTN, DM on insulin, CAD s/p PCI, HFrEF. P/w acute onset of R shin pain w/o trauma. MICU consulted iso urgent shiley placement for increasing uremia and uremic encephalopathy       ===NEURO===  #Mental status:   -Currently A&O x 0  -Not on sedation    #Metabolic Encephalopathy  - Likely iso rising uremia    ===CV===  #HTN  - Hold home torsemide, Entresto, Metoprolol due to ROB and low Hgb  #CAD s/p stent placement (2014) LAD  - Entresto held iso ROB  - Plavix held iso acute bleed, restart when H/H stable      #CHF: EF 20% (2019)  -Diuretic: Hold torsemide   -Trend I/Os and daily weights, and Cr  - Repeat echo in AM    #Hemodynamically stable/unstable:  - Stable   - VS: T:97.3, HR:64, BP: 95/59, RR: 18/100%    #Popliteal Occlusion  -US Arterial: Right severe stenosis of the popliteal artery proximally followed a segmental occlusion of the popliteal artery distally. The right posterior tibial, peroneal, anterior tibial and dorsal pedis arteries are all occluded.   - On the left, there are segmental occlusions of the superficial femoral artery in the small and distal thigh. The left posterior tibial peroneal trunk, posterior tibial, peroneal, anterior tibial and dorsal pedis arteries are occluded  - No acute surgical intervention  - Hold Plavix iso shiley placement  - C/w lipator    ===PULM===  - No active issues    ===RENAL===  #ROB on CKD stage IV  -US Renal:  No hydronephrosis. Increased renal cortical echogenicity compatible with renal parenchymal disease.  - Monitor Cr  - Avoid nephrotoxic medications  - Lieberman as above for possible retention    #Urgent HD  - Rising Uremia >151  - Hemodynamically stable, alert, protecting airway, able to lie flat.  - Has been taking ASA and Plavix since 2014, started on heparin gtt this admission  - With unexplained drop in Hgb from 10 on arrival to 6.5 today  - DDAVP prior to Shiley placement for platelet dysfunction in setting of uremia  - Would consider CT A/P to assess for source of potential bleed  - C/w fluids      #Uremic Bleeding Dysfunction  - Will require DDVAP prior to shiley placement 3mg/kg    ===GI===  #Diet: NPO iso mentation  #Stress Ulcer/GI Prophylaxis: PPI HELD    #Elevated LFTs  - Unclear etiology  - RUQ US, hemolysis labs    ===ENDO===  #DM2: HbA1c 9.4  Home regimen: 12U Lantus/Humalog 4U   - q6h LDSS    #Hypothyroidism  - C/w IV 40mg synthroid    ===METABOLIC===  #Electrolyte abnormalities  #Hyperkalemia  - C/w Lokelma TID    ===HEMATOLOGIC===  #Thrombocytopenia  #Anemia  - Bleed vs ARF  - CT A/P to for evaluation  #CBC results show 6.7/19.1  #DVT prophylaxis with heparin gtt will hold iso shiley placement     ===ID===  #UTI  - Enterococcus + E Coli  - CTX + Vanc pending sensitivity    ===SKIN===  #Lines:    ===ETHICS===  Code Status: FULL CODE    ASSESSMENT: 89F PMHx HTN, DM on insulin, CAD s/p PCI, HFrEF. P/w acute onset of R shin pain w/o trauma. MICU consulted iso urgent shiley placement for increasing uremia and uremic encephalopathy       ===NEURO===  #Mental status  #Metabolic Encephalopathy  - Currently A&O x 0 not on sedation  - Likely iso rising uremia 2/2 ESRD + Hematoma  - iHD and hematoma mgmt as below    ===CV===  #HTN  #CAD s/p stent placement (2014) LAD  #HFrEF (20% 2019)  - Hold home torsemide, POCUS today with small IVC, maintain net even on iHD and give blood product as below  - Entresto, Metoprolol due to ROB and borderline BP  - Was on DAPT prior to admission, hold iso bleed  - Trend I/Os and daily weights, and Cr  - f/u repeat TTE    #Popliteal Occlusion  - US Arterial: Right severe stenosis of the popliteal artery proximally followed a segmental occlusion of the popliteal artery distally. The right posterior tibial, peroneal, anterior tibial and dorsal pedis arteries are all occluded.   - On the left, there are segmental occlusions of the superficial femoral artery in the small and distal thigh. The left posterior tibial peroneal trunk, posterior tibial, peroneal, anterior tibial and dorsal pedis arteries are occluded  - Was previously seen by vascular and rec no surgical intervention and started on heparin gtt  - Hold DAPT and heparin iso bleed  - resume statin once able to tolerate oral    ===PULM===  - No active issues    ===RENAL===  #ROB on CKD stage IV now on HD  -US Renal:  No hydronephrosis. Increased renal cortical echogenicity compatible with renal parenchymal disease.  - Monitor Cr  - Avoid nephrotoxic medications  - Lieberman as above for possible retention  - Started on iHD (3/15) for uremic encephlopathy    ===GI===  #Diet: NPO iso mentation  #Stress Ulcer/GI Prophylaxis: PPI HELD    #Elevated LFTs  - Unclear etiology  - RUQ US, hemolysis labs    ===ENDO===  #DM2: HbA1c 9.4  Home regimen: 12U Lantus/Humalog 4U   - q6h LDSS    #Hypothyroidism  - C/w IV 40mg synthroid    ===METABOLIC===  #Electrolyte abnormalities  #Hyperkalemia  - On HD  - No oral access due to AAOx0, can consider Lokelma +/- bicarb if able to tolerate PO    ===HEMATOLOGIC===  #Thrombocytopenia  #Anemia iso RP bleed   - Transfusion count: pRBC: 3u (3/15) + 1u (3/16); Plt: 1u (3/16); FFP: 1u (3/16)  - s/p DDAVP x 1 on 3/15  - Will give another dose of DDAVP today iso dropping hgb  - Hemolysis labs given elevated LFTs    ===ID===  #UTI  - Enterococcus + E Coli on UCx  - CTX + Vanc by trough, pending sensitivity    ===ETHICS===  Code Status: FULL CODE

## 2025-03-16 NOTE — CHART NOTE - NSCHARTNOTEFT_GEN_A_CORE
Attending: Dr. New  : Carri Winkler PA-C    INDICATION: uremic encephalopathy, Rt RP Bleed      PROCEDURE:  [X] LIMITED ECHO  [X] LIMITED CHEST  [ ] LIMITED RETROPERITONEAL  [ ] LIMITED ABDOMINAL  [ ] LIMITED DVT  [ ] NEEDLE GUIDANCE VASCULAR  [ ] NEEDLE GUIDANCE THORACENTESIS  [ ] NEEDLE GUIDANCE PARACENTESIS  [ ] NEEDLE GUIDANCE PERICARDIOCENTESIS  [ ] OTHER    FINDINGS:   Normal lung sliding.  A lines in b/l anterior lung fields.  One B line noted in left anterior lung.  B/l pleural effusions noted, with atelectatic lung.  Decreased LV fx.   No pericardial effusion noted.  RV < LV. Septal bowing noted.  IVC <1cm, >50% collapsibility w/ respiration.  No ascites noted.        INTERPRETATION:   IVC <1cm, collapsible -- low fluid volume, likely fluid responsive.  Will fluid resuscitate via blood products.

## 2025-03-16 NOTE — CONSULT NOTE ADULT - SUBJECTIVE AND OBJECTIVE BOX
89F PMHx HTN, DM on Insulin, CAD with stents on plavix, CHF presenting with RLE pain. Patient endorses acute onset of pain in her right shin area without trauma or an inciting events. Patient has chronic wounds. Collateral information obtained from daughter. Patient follows with a wound care clinic for bilateral chronic wounds. Patient has never seen a vascular surgeon. In the ED, AFVSS. Labs significant for elevated , Cr 3.99, glucose 360, eGFR 10, potassium 5.2 on VBG. In the ED patient received a CT angio abd aorta with bilateral runoff. CT Angio showing progression of chronic atherosclerosis.     Vascular surgery called 3/15 for bedside central venous catheter placement due to need for emergent temporary hemodialysis access. Pt had a drop in hemoglobin from 9.5 g/dL to 6.5 g/dL over 24 hours. She is now transferred to MICU where she will undergo resuscitation, central venous catheter placement, and emergency hemodialysis. Vascular surgery reconsulted for concern of RP Bleed.     PAST MEDICAL & SURGICAL HISTORY:  HTN (hypertension)      Dyslipidemia      Diabetes mellitus      Palpitations      STEMI (ST elevation myocardial infarction)      CHF (congestive heart failure)      S/P cholecystectomy      S/P appendectomy      S/P lumpectomy, left breast  premalignant      S/P tonsillectomy      S/P cardiac cath        Allergies    Kiwi (Anaphylaxis)  codeine (Unknown)  penicillins (Anaphylaxis)    Intolerances      Home Medications:  acetaminophen 500 mg oral tablet: 2 tab(s) orally every 6 hours, As needed, Mild Pain (1 - 3) (23 Dec 2019 09:45)  atorvastatin 40 mg oral tablet: 1 tab(s) orally once a day (at bedtime) (23 Dec 2019 09:45)  clopidogrel 75 mg oral tablet: 1 tab(s) orally once a day (04 Dec 2019 16:02)  Ecotrin Adult Low Strength 81 mg oral delayed release tablet: 1 tab(s) orally once a day (04 Dec 2019 16:02)  ertapenem 1 g injection: 1 gram(s) injectable once a day through 1/14/20 (23 Dec 2019 09:49)  gabapentin 100 mg oral capsule: 1 cap(s) orally 2 times a day (04 Dec 2019 16:02)  heparin: 5000 unit(s) subcutaneous every 8 hours (23 Dec 2019 09:45)  HumaLOG 100 units/mL injectable solution: 4 unit(s) injectable 3 times a day    if sugar: 150-200 add 2 units  201-250 add 4 units  251-300 add 6 units  301-350 add 8 units  351-400 add 10 units (23 Dec 2019 09:45)  lactobacillus acidophilus oral capsule: 1 cap(s) orally 3 times a day (23 Dec 2019 09:49)  Lantus 100 units/mL subcutaneous solution: 12 unit(s) subcutaneous once a day (at bedtime) (23 Dec 2019 09:49)  Lasix 20 mg oral tablet: 1 tab(s) orally once a day (04 Dec 2019 16:02)  levothyroxine 25 mcg (0.025 mg) oral tablet: 1 tab(s) orally once a day (04 Dec 2019 16:02)  melatonin 3 mg oral tablet: 1 tab(s) orally once a day (at bedtime), As needed, Insomnia (23 Dec 2019 09:49)  metoprolol tartrate 25 mg oral tablet: 0.5 tab(s) orally 2 times a day (04 Dec 2019 16:02)  nystatin 100,000 units/g topical cream: 1 application topically 2 times a day (23 Dec 2019 09:49)  Pepcid 20 mg oral tablet: 1 tab(s) orally once a day (04 Dec 2019 16:02)    MEDICATIONS  (STANDING):      SOCIAL HISTORY:  FAMILY HISTORY:      ___________________________________________  OBJECTIVE:  Vital Signs Last 24 Hrs  T(C): 36.5 (10 Mar 2025 17:34), Max: 36.5 (10 Mar 2025 17:34)  T(F): 97.7 (10 Mar 2025 17:34), Max: 97.7 (10 Mar 2025 17:34)  HR: 62 (10 Mar 2025 19:00) (50 - 77)  BP: 119/78 (10 Mar 2025 19:00) (110/82 - 136/76)  BP(mean): --  RR: 17 (10 Mar 2025 19:00) (16 - 19)  SpO2: 95% (10 Mar 2025 19:00) (95% - 98%)    Parameters below as of 10 Mar 2025 19:00  Patient On (Oxygen Delivery Method): nasal cannula  O2 Flow (L/min): 4  CAPILLARY BLOOD GLUCOSE        I&O's Detail    General: Well developed, well nourished, NAD  Neuro: Alert and oriented, no focal deficits, moves all extremities spontaneously  HEENT: NCAT, EOMI, anicteric, mucosa moist  Respiratory: Airway patent, respirations unlabored  CVS: Regular rate and rhythm  Abdomen: Soft, nontender, nondistended  Extremities: motor and sensory intact, no palpable pulses bilaterally, no signals bilaterally, palpable femoral pulses   Skin: Warm, dry, appropriate color  ____________________________________________  LABS:  CBC Full  -  ( 10 Mar 2025 17:57 )  WBC Count : 5.83 K/uL  RBC Count : 2.89 M/uL  Hemoglobin : 9.3 g/dL  Hematocrit : 28.4 %  Platelet Count - Automated : 104 K/uL  Mean Cell Volume : 98.3 fl  Mean Cell Hemoglobin : 32.2 pg  Mean Cell Hemoglobin Concentration : 32.7 g/dL  Auto Neutrophil # : x  Auto Lymphocyte # : x  Auto Monocyte # : x  Auto Eosinophil # : x  Auto Basophil # : x  Auto Neutrophil % : x  Auto Lymphocyte % : x  Auto Monocyte % : x  Auto Eosinophil % : x  Auto Basophil % : x    03-10    136  |  101  |  124[H]  ----------------------------<  360[H]  4.6   |  17[L]  |  3.99[H]    Ca    9.1      10 Mar 2025 17:57    TPro  6.0  /  Alb  3.3  /  TBili  0.8  /  DBili  x   /  AST  15  /  ALT  17  /  AlkPhos  184[H]  03-10    LIVER FUNCTIONS - ( 10 Mar 2025 17:57 )  Alb: 3.3 g/dL / Pro: 6.0 g/dL / ALK PHOS: 184 U/L / ALT: 17 U/L / AST: 15 U/L / GGT: x           PT/INR - ( 10 Mar 2025 17:57 )   PT: 16.1 sec;   INR: 1.42 ratio         PTT - ( 10 Mar 2025 17:57 )  PTT:31.0 sec  Urinalysis Basic - ( 10 Mar 2025 17:57 )    Color: x / Appearance: x / SG: x / pH: x  Gluc: 360 mg/dL / Ketone: x  / Bili: x / Urobili: x   Blood: x / Protein: x / Nitrite: x   Leuk Esterase: x / RBC: x / WBC x   Sq Epi: x / Non Sq Epi: x / Bacteria: x    ____________________________________________  RADIOLOGY:  < from: CT Angio Abd Aorta w/run-off w/ IV Cont (03.10.25 @ 20:04) >  IMPRESSION:    Limited study due to suspected poor cardiac output.    Question bilateral popliteal artery occlusion on delayed phase, with   abnormal downstream runoff. Diffusely calcified bilateral trifurcation   arteries are limited in evaluation. Recommend arterial duplex ultrasound   correlation and clinical evaluation. Bilateral calf and foot extensive   soft tissue edema. Correlate for lower extremity ischemia or other   process.    Partially imaged heart is enlarged, particularly at the left ventricle   which is thinned towards the apex. Coronary artery calcifications.   Correlate reflux of contrast into the IVC and hepatic veins. Correlate   for cardiac dysfunction.Recommend echocardiogram.    --- End of Report ---    < end of copied text >           89F PMHx HTN, DM on Insulin, CAD with stents on plavix, CHF presenting with RLE pain and previously consulted for chronic PAD without any good revascularization options.     Collateral information obtained from daughter. Patient follows with a wound care clinic for bilateral chronic wounds. Patient has never seen a vascular surgeon. In the ED, AFVSS. Labs significant for elevated , Cr 3.99, glucose 360, eGFR 10, potassium 5.2 on VBG. In the ED patient received a CT angio abd aorta with bilateral runoff. CT Angio showing progression of chronic atherosclerosis.     Vascular surgery called again 3/15 for bedside temporary dialysis catheter placement due to need for emergent temporary hemodialysis access. Pt had a drop in hemoglobin from 9.5 g/dL to 6.5 g/dL over 24 hours. She is now transferred to MICU where she will undergo resuscitation and CT scan showed R RP hematoma and LIJ HD access for emergency hemodialysis overnight. Patient received prbc, and DDAVP overnight.     Overnight, patient underwent HD and no other acute events.    PAST MEDICAL & SURGICAL HISTORY:  HTN (hypertension)      Dyslipidemia      Diabetes mellitus      Palpitations      STEMI (ST elevation myocardial infarction)      CHF (congestive heart failure)      S/P cholecystectomy      S/P appendectomy      S/P lumpectomy, left breast  premalignant      S/P tonsillectomy      S/P cardiac cath        Allergies    Kiwi (Anaphylaxis)  codeine (Unknown)  penicillins (Anaphylaxis)    Intolerances      Home Medications:  acetaminophen 500 mg oral tablet: 2 tab(s) orally every 6 hours, As needed, Mild Pain (1 - 3) (23 Dec 2019 09:45)  atorvastatin 40 mg oral tablet: 1 tab(s) orally once a day (at bedtime) (23 Dec 2019 09:45)  clopidogrel 75 mg oral tablet: 1 tab(s) orally once a day (04 Dec 2019 16:02)  Ecotrin Adult Low Strength 81 mg oral delayed release tablet: 1 tab(s) orally once a day (04 Dec 2019 16:02)  ertapenem 1 g injection: 1 gram(s) injectable once a day through 1/14/20 (23 Dec 2019 09:49)  gabapentin 100 mg oral capsule: 1 cap(s) orally 2 times a day (04 Dec 2019 16:02)  heparin: 5000 unit(s) subcutaneous every 8 hours (23 Dec 2019 09:45)  HumaLOG 100 units/mL injectable solution: 4 unit(s) injectable 3 times a day    if sugar: 150-200 add 2 units  201-250 add 4 units  251-300 add 6 units  301-350 add 8 units  351-400 add 10 units (23 Dec 2019 09:45)  lactobacillus acidophilus oral capsule: 1 cap(s) orally 3 times a day (23 Dec 2019 09:49)  Lantus 100 units/mL subcutaneous solution: 12 unit(s) subcutaneous once a day (at bedtime) (23 Dec 2019 09:49)  Lasix 20 mg oral tablet: 1 tab(s) orally once a day (04 Dec 2019 16:02)  levothyroxine 25 mcg (0.025 mg) oral tablet: 1 tab(s) orally once a day (04 Dec 2019 16:02)  melatonin 3 mg oral tablet: 1 tab(s) orally once a day (at bedtime), As needed, Insomnia (23 Dec 2019 09:49)  metoprolol tartrate 25 mg oral tablet: 0.5 tab(s) orally 2 times a day (04 Dec 2019 16:02)  nystatin 100,000 units/g topical cream: 1 application topically 2 times a day (23 Dec 2019 09:49)  Pepcid 20 mg oral tablet: 1 tab(s) orally once a day (04 Dec 2019 16:02)    MEDICATIONS  (STANDING):      SOCIAL HISTORY:  FAMILY HISTORY:      ___________________________________________  OBJECTIVE:  Vital Signs Last 24 Hrs  T(C): 36.5 (10 Mar 2025 17:34), Max: 36.5 (10 Mar 2025 17:34)  T(F): 97.7 (10 Mar 2025 17:34), Max: 97.7 (10 Mar 2025 17:34)  HR: 62 (10 Mar 2025 19:00) (50 - 77)  BP: 119/78 (10 Mar 2025 19:00) (110/82 - 136/76)  BP(mean): --  RR: 17 (10 Mar 2025 19:00) (16 - 19)  SpO2: 95% (10 Mar 2025 19:00) (95% - 98%)    Parameters below as of 10 Mar 2025 19:00  Patient On (Oxygen Delivery Method): nasal cannula  O2 Flow (L/min): 4  CAPILLARY BLOOD GLUCOSE        I&O's Detail    General: Well developed, well nourished, NAD  Neuro: Alert and oriented, no focal deficits, moves all extremities spontaneously  HEENT: NCAT, EOMI, anicteric, mucosa moist  Respiratory: Airway patent, respirations unlabored  CVS: Regular rate and rhythm  Abdomen: Soft, nontender, nondistended  Extremities: motor and sensory intact, no palpable pulses bilaterally, no signals bilaterally, palpable femoral pulses   Skin: Warm, dry, appropriate color  ____________________________________________  LABS:  CBC Full  -  ( 10 Mar 2025 17:57 )  WBC Count : 5.83 K/uL  RBC Count : 2.89 M/uL  Hemoglobin : 9.3 g/dL  Hematocrit : 28.4 %  Platelet Count - Automated : 104 K/uL  Mean Cell Volume : 98.3 fl  Mean Cell Hemoglobin : 32.2 pg  Mean Cell Hemoglobin Concentration : 32.7 g/dL  Auto Neutrophil # : x  Auto Lymphocyte # : x  Auto Monocyte # : x  Auto Eosinophil # : x  Auto Basophil # : x  Auto Neutrophil % : x  Auto Lymphocyte % : x  Auto Monocyte % : x  Auto Eosinophil % : x  Auto Basophil % : x    03-10    136  |  101  |  124[H]  ----------------------------<  360[H]  4.6   |  17[L]  |  3.99[H]    Ca    9.1      10 Mar 2025 17:57    TPro  6.0  /  Alb  3.3  /  TBili  0.8  /  DBili  x   /  AST  15  /  ALT  17  /  AlkPhos  184[H]  03-10    LIVER FUNCTIONS - ( 10 Mar 2025 17:57 )  Alb: 3.3 g/dL / Pro: 6.0 g/dL / ALK PHOS: 184 U/L / ALT: 17 U/L / AST: 15 U/L / GGT: x           PT/INR - ( 10 Mar 2025 17:57 )   PT: 16.1 sec;   INR: 1.42 ratio         PTT - ( 10 Mar 2025 17:57 )  PTT:31.0 sec  Urinalysis Basic - ( 10 Mar 2025 17:57 )    Color: x / Appearance: x / SG: x / pH: x  Gluc: 360 mg/dL / Ketone: x  / Bili: x / Urobili: x   Blood: x / Protein: x / Nitrite: x   Leuk Esterase: x / RBC: x / WBC x   Sq Epi: x / Non Sq Epi: x / Bacteria: x    ____________________________________________  RADIOLOGY:  < from: CT Angio Abd Aorta w/run-off w/ IV Cont (03.10.25 @ 20:04) >  IMPRESSION:    Limited study due to suspected poor cardiac output.    Question bilateral popliteal artery occlusion on delayed phase, with   abnormal downstream runoff. Diffusely calcified bilateral trifurcation   arteries are limited in evaluation. Recommend arterial duplex ultrasound   correlation and clinical evaluation. Bilateral calf and foot extensive   soft tissue edema. Correlate for lower extremity ischemia or other   process.    Partially imaged heart is enlarged, particularly at the left ventricle   which is thinned towards the apex. Coronary artery calcifications.   Correlate reflux of contrast into the IVC and hepatic veins. Correlate   for cardiac dysfunction.Recommend echocardiogram.    --- End of Report ---    < end of copied text >

## 2025-03-16 NOTE — CONSULT NOTE ADULT - ASSESSMENT
89F PMHx HTN, DM on Insulin, CAD with stents on plavix, CHF presenting with RLE pain. Patient endorses acute onset of pain in her right shin area without trauma or an inciting events. Patient has chronic wounds. Collateral information obtained from daughter. Patient follows with a wound care clinic for bilateral chronic wounds. Patient has never seen a vascular surgeon. In the ED, AFVSS. Labs significant for elevated , Cr 3.99, glucose 360, eGFR 10, potassium 5.2 on VBG. In the ED patient received a CT angio abd aorta with bilateral runoff. CT Angio showing progression of chronic atherosclerosis with quesitonable popliteal artery occlusions, limited study secondary to poor cardiac output. Vascular surgery called 3/15 for bedside central venous catheter placement due to need for emergent temporary hemodialysis access. Pt had a drop in hemoglobin from 9.5 g/dL to 6.5 g/dL over 24 hours. She is now transferred to MICU where she will undergo resuscitation, central venous catheter placement, and emergency hemodialysis. Vascular surgery reconsulted for concern of RP Bleed.  Plan:   - No acute vascular surgery intervention   - Recommend interventional radiology consultation as angioembolization would be the next step in treatment if control of bleeding is required.  - Permacath recommended if pt requires long term HD access  - Continue to monitor H/H   -Please hold antiocoagulation    Vascular Surgery    89F PMHx HTN, DM on Insulin, CAD with stents on plavix, CHF initially consulted for chronic PAD and again for HD access and found to be RP hematoma 2/2 recent hep gtt and required MICU care for monitoring and HD access. Patient remains stable this AM, not on any pressor. LIJ dressing c/d/i. R abdomen tender to exam but no guarding or rebound tenderness. Move all extremities due to discomfort during exam.     Plan:   - No acute vascular surgery intervention   - Recommend interventional radiology consultation as angioembolization would be the next step in treatment if control of bleeding is required.  - Permacath recommended if pt requires long term HD access  - Continue to monitor H/H   - please hold antiocoagulation    Vascular Surgery

## 2025-03-16 NOTE — PROGRESS NOTE ADULT - ASSESSMENT
89-year-old female with PMHx HTN, DM on insulin, CAD s/p PCI, HFrEF, CKD stage V (baseline SCr 3.4-3.6) presenting with acute onset of R shin pain in setting of severe PVD. Nephrology consulted for ROB on CKD, hyperkalemia.       ROB on CKD:  #rob likely vol depletion- on diuretic and Entresto +Contrast  R/o retention- Serial bladder scans.   ATN is likely, Atherosclerosis vs contrast induced.   Scr continues to increase as does K, and bun, and bicarb is lower  Pt has AMS. Likely 2/2 Uremia.     PLAN:  Spoke with the patient's daughter extensively about the dialysis and consent was obtained. Pt was in ICU then due to heparin and RP bleed and started HD first session yesterday, 2 hours  BUN >100 still and plan for second session today 2 hours and BFR low  UF today 0.5L  Holding entresto and diuretics   3rd session tomorrow 3/17 with 3 hours and more UF    Hyperkalemia  was on entresto  can hold lokelma now given dialysis today    Metabolic acidosis  monitor bicarb trend  dc bicarb    Overall, once we try few days of dialysis, she is more awake and we can decide on long term need for dialysis as she did have CKD at baseline in 3-4 range crt.  Her nephrologist in [place] had discussed with family re a trial of dialysis    d/w with ICU team    Jason Cosme MD  Office   Contact me directly via [employer] Teams     (After 5 pm or on weekends please page the on-call fellow/attending, can check [email or web address] for schedule. Login is josué cardona, schedule under [facility] medicine, psych, derm)

## 2025-03-17 NOTE — PROGRESS NOTE ADULT - ASSESSMENT
ASSESSMENT: 89F PMHx HTN, DM on insulin, CAD s/p PCI, HFrEF. P/w acute onset of R shin pain w/o trauma. MICU consulted iso urgent shiley placement for increasing uremia and uremic encephalopathy       ===NEURO===  #Mental status  #Metabolic Encephalopathy  - Currently A&O x 0 not on sedation  - Likely iso rising uremia 2/2 ESRD + Hematoma  - iHD and hematoma mgmt as below    ===CV===  #HTN  #CAD s/p stent placement (2014) LAD  #HFrEF (20% 2019)  - Hold home torsemide, POCUS with small IVC, maintain net even on iHD and give blood product as below  - Entresto, Metoprolol due to ROB and borderline BP  - Was on DAPT prior to admission, hold iso bleed  - Trend I/Os and daily weights, and Cr  - f/u repeat TTE    #Popliteal Occlusion  - US Arterial: Right severe stenosis of the popliteal artery proximally followed a segmental occlusion of the popliteal artery distally. The right posterior tibial, peroneal, anterior tibial and dorsal pedis arteries are all occluded.   - On the left, there are segmental occlusions of the superficial femoral artery in the small and distal thigh. The left posterior tibial peroneal trunk, posterior tibial, peroneal, anterior tibial and dorsal pedis arteries are occluded  - Was previously seen by vascular and rec no surgical intervention and started on heparin gtt  - Hold DAPT and heparin iso bleed  - resume statin once able to tolerate oral    ===PULM===  - No active issues    ===RENAL===  #ROB on CKD stage IV now on HD  -US Renal:  No hydronephrosis. Increased renal cortical echogenicity compatible with renal parenchymal disease.  - Monitor Cr  - Avoid nephrotoxic medications  - Lieberman as above for possible retention  - Started on iHD (3/15) for uremic encephlopathy    ===GI===  #Diet: NPO iso mentation  #Stress Ulcer/GI Prophylaxis: PPI HELD    #Elevated LFTs  - Unclear etiology  - RUQ US, hemolysis labs    ===ENDO===  #DM2: HbA1c 9.4  Home regimen: 12U Lantus/Humalog 4U   - q6h LDSS    #Hypothyroidism  - C/w IV 40mg synthroid    ===METABOLIC===  #Electrolyte abnormalities  #Hyperkalemia  - On HD  - No oral access due to AAOx0, can consider Lokelma +/- bicarb if able to tolerate PO    ===HEMATOLOGIC===  #Thrombocytopenia  #Anemia iso RP bleed   - Transfusion count: pRBC: 3u (3/15) + 1u (3/16); Plt: 1u (3/16); FFP: 1u (3/16)  - s/p DDAVP x 2 and 1 FFP, 1 PLT, and 4u pRBC total   - follow up IR and vascular reccs     ===ID===  #UTI  - Enterococcus + E Coli on UCx  - CTX + Vanc by trough, pending sensitivity    ===ETHICS===  Code Status: FULL CODE

## 2025-03-17 NOTE — PROGRESS NOTE ADULT - SUBJECTIVE AND OBJECTIVE BOX
SURGERY DAILY PROGRESS NOTE    24 Hour/Overnight Events: No acute events overnight    SUBJECTIVE: Patient seen and evaluated on AM rounds.   ------------------------------------------------------------------------------------------------------------  OBJECTIVE:  Vital Signs Last 24 Hrs  T(C): 36.3 (17 Mar 2025 08:00), Max: 36.6 (17 Mar 2025 04:00)  T(F): 97.4 (17 Mar 2025 08:00), Max: 97.8 (17 Mar 2025 04:00)  HR: 76 (17 Mar 2025 08:00) (61 - 101)  BP: 97/50 (17 Mar 2025 08:00) (77/55 - 112/56)  BP(mean): 69 (17 Mar 2025 08:00) (60 - 80)  RR: 15 (17 Mar 2025 08:00) (11 - 23)  SpO2: 95% (17 Mar 2025 08:00) (95% - 100%)    Parameters below as of 17 Mar 2025 08:00  Patient On (Oxygen Delivery Method): room air      I&O's Detail    16 Mar 2025 07:01  -  17 Mar 2025 07:00  --------------------------------------------------------  IN:    IV PiggyBack: 450 mL    Lactated Ringers Bolus: 500 mL    Oral Fluid: 120 mL    Plasma: 300 mL    Platelets - Single Donor: 225 mL  Total IN: 1595 mL    OUT:    Other (mL): 0 mL    Stool (mL): 1 mL    Voided (mL): 0 mL  Total OUT: 1 mL    Total NET: 1594 mL          LABS:                        8.0    10.95 )-----------( 49       ( 17 Mar 2025 02:55 )             23.0     03-17    139  |  100  |  79[H]  ----------------------------<  191[H]  3.8   |  20[L]  |  3.81[H]    Ca    8.2[L]      17 Mar 2025 00:37  Phos  4.8     03-17  Mg     1.7     03-17    TPro  4.7[L]  /  Alb  2.7[L]  /  TBili  1.9[H]  /  DBili  x   /  AST  87[H]  /  ALT  60[H]  /  AlkPhos  112  03-17    LIVER FUNCTIONS - ( 17 Mar 2025 00:37 )  Alb: 2.7 g/dL / Pro: 4.7 g/dL / ALK PHOS: 112 U/L / ALT: 60 U/L / AST: 87 U/L / GGT: x           PT/INR - ( 17 Mar 2025 00:37 )   PT: 14.8 sec;   INR: 1.30 ratio         PTT - ( 17 Mar 2025 00:37 )  PTT:32.2 sec  Urinalysis Basic - ( 17 Mar 2025 00:37 )    Color: x / Appearance: x / SG: x / pH: x  Gluc: 191 mg/dL / Ketone: x  / Bili: x / Urobili: x   Blood: x / Protein: x / Nitrite: x   Leuk Esterase: x / RBC: x / WBC x   Sq Epi: x / Non Sq Epi: x / Bacteria: x    General: Well developed, well nourished, NAD  Neuro: Alert and oriented, no focal deficits, moves all extremities spontaneously  HEENT: NCAT, EOMI, anicteric, mucosa moist  Respiratory: Airway patent, respirations unlabored  CVS: Regular rate and rhythm  Abdomen: Soft, nontender, nondistended  Extremities: motor and sensory intact, no palpable pulses bilaterally, no signals bilaterally, palpable femoral pulses   Skin: Warm, dry, appropriate color  _______________________________________

## 2025-03-17 NOTE — PROGRESS NOTE ADULT - ASSESSMENT
89F PMHx HTN, DM on Insulin, CAD with stents on plavix, CHF initially consulted for chronic PAD and again for HD access and found to be RP hematoma 2/2 recent hep gtt and required MICU care for monitoring and HD access. Patient remains stable this AM, not on any pressor. LIJ dressing c/d/i. R abdomen tender to exam but no guarding or rebound tenderness. Move all extremities due to discomfort during exam.     Plan:   - No acute vascular surgery intervention   - Recommend interventional radiology consultation as angioembolization would be the next step in treatment if control of bleeding is required.  - Permacath recommended if pt requires long term HD access   - Continue to monitor H/H   - please hold antiocoagulation  - If patient needs AVF, can fu outpatient with Dr. Barksdale when discharged  - Vascular surgery will sign off at this time, please reconsult as needed    Vascular Surgery

## 2025-03-17 NOTE — PROGRESS NOTE ADULT - PROBLEM SELECTOR PLAN 1
ROB on stage V CKD in setting of contrast, entresto and torsemide use, hypotension, poor po intake, r/o retention. On review of [facility], pt. last outpatient SCr was 3.66 (1/2025). Baseline SCr appears to be 3.4-3.6. Outpatient nephrologist is Dr. Parht Barrientos. SCr on admission was elevated at 3.99. SCr has worsened to ~7s with evidence of uremia so patient transferred to MICU 3/15/25 and initiated on iHD. Last HD 3/16/25.   - Recommend additional HD session today for 3 hours.   - Needs further GOC to determine if long term HD is appropriate for patient.   - Continue to hold entresto and diuretics.   - Stop sodium bicarbonate tabs.   - Monitor labs and I/Os. Avoid nephrotoxins including, ACE/ARB, NSAIDs, contrast, etc. Dose medications as per iHD.    If you have any questions, please feel free to contact me:  Radha Hilliard MD PGY-4  Nephrology Fellow  [employer] Teams (Preferred)/ Pager [phone]   (After 5pm or on weekends please page the on-call fellow) ROB on stage V CKD in setting of contrast, entresto and torsemide use, hypotension, poor po intake, r/o retention. On review of [facility], pt. last outpatient SCr was 3.66 (1/2025). Baseline SCr appears to be 3.4-3.6. Outpatient nephrologist is Dr. Parth Barrientos. SCr on admission was elevated at 3.99. SCr has worsened to ~7s with evidence of uremia so patient transferred to MICU 3/15/25 and initiated on iHD. Last HD 3/16/25.     - Recommend additional HD session today for 3 hours.   - Needs further GOC to determine if long term HD is appropriate for patient.   - Continue to hold entresto and diuretics.   - Stop sodium bicarbonate tabs.   - Monitor labs and I/Os. Avoid nephrotoxins including, ACE/ARB, NSAIDs, contrast, etc. Dose medications as per iHD.    If you have any questions, please feel free to contact me:  Radha Hilliard MD PGY-4  Nephrology Fellow  [employer] Teams (Preferred)/ Pager [phone]   (After 5pm or on weekends please page the on-call fellow)

## 2025-03-17 NOTE — PROGRESS NOTE ADULT - SUBJECTIVE AND OBJECTIVE BOX
[facility] Division of Kidney Diseases & Hypertension  FOLLOW UP NOTE  [phone]--------------------------------------------------------------------------------  Chief Complaint: ROB on CKD V    24 hour events/subjective: Pt. seen and examined at bedside earlier today in MICU. Pt. appears more awake and alert today.      PAST HISTORY  --------------------------------------------------------------------------------  No significant changes to PMH, PSH, FHx, SHx from H&P unless otherwise noted.    ALLERGIES & MEDICATIONS  --------------------------------------------------------------------------------  Allergies    Kiwi (Anaphylaxis)  codeine (Unknown)  penicillins (Anaphylaxis)    Intolerances      Standing Inpatient Medications  chlorhexidine 2% Cloths 1 Application(s) Topical <User Schedule>  chlorhexidine 4% Liquid 1 Application(s) Topical <User Schedule>  dextrose 5%. 1000 milliLiter(s) IV Continuous <Continuous>  dextrose 5%. 1000 milliLiter(s) IV Continuous <Continuous>  dextrose 50% Injectable 25 Gram(s) IV Push once  dextrose 50% Injectable 12.5 Gram(s) IV Push once  dextrose 50% Injectable 25 Gram(s) IV Push once  gabapentin 100 milliGRAM(s) Oral two times a day  glucagon  Injectable 1 milliGRAM(s) IntraMuscular once  influenza  Vaccine (HIGH DOSE) 0.5 milliLiter(s) IntraMuscular once  insulin lispro (ADMELOG) corrective regimen sliding scale   SubCutaneous every 6 hours  levothyroxine Injectable 40 MICROGram(s) IV Push at bedtime  lidocaine   4% Patch 1 Patch Transdermal daily  midodrine 10 milliGRAM(s) Oral every 8 hours  sodium bicarbonate 650 milliGRAM(s) Oral three times a day    PRN Inpatient Medications  acetaminophen     Tablet .. 650 milliGRAM(s) Oral every 6 hours PRN  dextrose Oral Gel 15 Gram(s) Oral once PRN  HYDROmorphone  Injectable 0.2 milliGRAM(s) IV Push every 4 hours PRN  melatonin 3 milliGRAM(s) Oral at bedtime PRN  ondansetron Injectable 4 milliGRAM(s) IV Push every 8 hours PRN  oxyCODONE    IR 2.5 milliGRAM(s) Oral every 6 hours PRN  sodium chloride 0.9% lock flush 10 milliLiter(s) IV Push every 1 hour PRN      REVIEW OF SYSTEMS  --------------------------------------------------------------------------------      All other systems were reviewed and are negative, except as noted above.    VITALS/PHYSICAL EXAM  --------------------------------------------------------------------------------  T(C): 36.3 (03-17-25 @ 08:00), Max: 36.6 (03-17-25 @ 04:00)  HR: 90 (03-17-25 @ 11:00) (69 - 101)  BP: 96/53 (03-17-25 @ 11:00) (77/55 - 109/57)  RR: 16 (03-17-25 @ 11:00) (11 - 23)  SpO2: 100% (03-17-25 @ 11:00) (95% - 100%)  Wt(kg): --  Height (cm): 152.4 (03-15-25 @ 18:37)  Weight (kg): 68.8 (03-15-25 @ 18:37)  BMI (kg/m2): 29.6 (03-15-25 @ 18:37)  BSA (m2): 1.66 (03-15-25 @ 18:37)      03-16-25 @ 07:01  -  03-17-25 @ 07:00  --------------------------------------------------------  IN: 1595 mL / OUT: 1 mL / NET: 1594 mL    03-17-25 @ 07:01  -  03-17-25 @ 11:17  --------------------------------------------------------  IN: 340 mL / OUT: 0 mL / NET: 340 mL    Physical Exam:  Gen: elderly, frail, chronically ill appearing.  Pulm: CTA B/L  CV: RRR, S1S2;  Abd: +BS, soft  : No suprapubic tenderness  Extremities: +R>L  Neuro: Awake, alert.   Access: LIJ non-tunneled HD catheter.     LABS/STUDIES  --------------------------------------------------------------------------------              8.0    10.95 >-----------<  49       [03-17-25 @ 02:55]              23.0     139  |  100  |  79  ----------------------------<  191      [03-17-25 @ 00:37]  3.8   |  20  |  3.81        Ca     8.2     [03-17-25 @ 00:37]      Mg     1.7     [03-17-25 @ 00:37]      Phos  4.8     [03-17-25 @ 00:37]    TPro  4.7  /  Alb  2.7  /  TBili  1.9  /  DBili  x   /  AST  87  /  ALT  60  /  AlkPhos  112  [03-17-25 @ 00:37]    PT/INR: PT 14.8 , INR 1.30       [03-17-25 @ 00:37]  PTT: 32.2       [03-17-25 @ 00:37]          [03-16-25 @ 01:46]    Creatinine Trend:  SCr 3.81 [03-17 @ 00:37]  SCr 5.07 [03-16 @ 01:46]  SCr 7.14 [03-15 @ 06:59]  SCr 7.02 [03-14 @ 18:09]  SCr 6.62 [03-14 @ 08:47]    Urinalysis - [03-17-25 @ 00:37]      Color  / Appearance  / SG  / pH       Gluc 191 / Ketone   / Bili  / Urobili        Blood  / Protein  / Leuk Est  / Nitrite       RBC  / WBC  / Hyaline  / Gran  / Sq Epi  / Non Sq Epi  / Bacteria     Urine Sodium 61      [03-14-25 @ 17:00]  Urine Osmolality 377      [03-14-25 @ 17:00]    Lipid: chol 82, TG 68, HDL 32, LDL --      [03-11-25 @ 06:59]    HBsAg Nonreact      [03-15-25 @ 15:59]  HCV 0.06, Nonreact      [03-15-25 @ 15:59]

## 2025-03-17 NOTE — CONSULT NOTE ADULT - SUBJECTIVE AND OBJECTIVE BOX
Interventional Radiology Inpatient Consult Note     Patient is a 89y old  Female who presents with a chief complaint of Right leg pain (14 Mar 2025 16:31) with PAD previously on hepain GTT found to have RPH x2 on noncontrast CT A/P.      Vital Signs: Vital Signs Last 24 Hrs  T(C): 36.3 (17 Mar 2025 08:00), Max: 36.6 (17 Mar 2025 04:00)  T(F): 97.4 (17 Mar 2025 08:00), Max: 97.8 (17 Mar 2025 04:00)  HR: 90 (17 Mar 2025 11:00) (69 - 101)  BP: 96/53 (17 Mar 2025 11:00) (77/55 - 109/57)  BP(mean): 70 (17 Mar 2025 11:00) (60 - 79)  RR: 16 (17 Mar 2025 11:00) (11 - 23)  SpO2: 100% (17 Mar 2025 11:00) (95% - 100%)    Parameters below as of 17 Mar 2025 08:00  Patient On (Oxygen Delivery Method): room air        Past Medical/ Surgical History: PAST MEDICAL & SURGICAL HISTORY:  HTN (hypertension)      Dyslipidemia      Diabetes mellitus      Palpitations      STEMI (ST elevation myocardial infarction)      CHF (congestive heart failure)      S/P cholecystectomy      S/P appendectomy      S/P lumpectomy, left breast  premalignant      S/P tonsillectomy      S/P cardiac cath          Allergies: Allergies    Kiwi (Anaphylaxis)  codeine (Unknown)  penicillins (Anaphylaxis)    Intolerances        Medications: MEDICATIONS  (STANDING):  chlorhexidine 2% Cloths 1 Application(s) Topical <User Schedule>  chlorhexidine 4% Liquid 1 Application(s) Topical <User Schedule>  dextrose 5%. 1000 milliLiter(s) (100 mL/Hr) IV Continuous <Continuous>  dextrose 5%. 1000 milliLiter(s) (50 mL/Hr) IV Continuous <Continuous>  dextrose 50% Injectable 25 Gram(s) IV Push once  dextrose 50% Injectable 12.5 Gram(s) IV Push once  dextrose 50% Injectable 25 Gram(s) IV Push once  gabapentin 100 milliGRAM(s) Oral two times a day  glucagon  Injectable 1 milliGRAM(s) IntraMuscular once  influenza  Vaccine (HIGH DOSE) 0.5 milliLiter(s) IntraMuscular once  insulin lispro (ADMELOG) corrective regimen sliding scale   SubCutaneous every 6 hours  levothyroxine Injectable 40 MICROGram(s) IV Push at bedtime  lidocaine   4% Patch 1 Patch Transdermal daily  midodrine 10 milliGRAM(s) Oral every 8 hours  sodium bicarbonate 650 milliGRAM(s) Oral three times a day    MEDICATIONS  (PRN):  acetaminophen     Tablet .. 650 milliGRAM(s) Oral every 6 hours PRN Temp greater or equal to 38C (100.4F), Mild Pain (1 - 3)  dextrose Oral Gel 15 Gram(s) Oral once PRN Blood Glucose LESS THAN 70 milliGRAM(s)/deciliter  HYDROmorphone  Injectable 0.2 milliGRAM(s) IV Push every 4 hours PRN Severe Pain (7 - 10)  melatonin 3 milliGRAM(s) Oral at bedtime PRN Insomnia  ondansetron Injectable 4 milliGRAM(s) IV Push every 8 hours PRN Nausea and/or Vomiting  oxyCODONE    IR 2.5 milliGRAM(s) Oral every 6 hours PRN Moderate Pain (4 - 6)  sodium chloride 0.9% lock flush 10 milliLiter(s) IV Push every 1 hour PRN Pre/post blood products, medications, blood draw, and to maintain line patency      SOCIAL HISTORY:    FAMILY HISTORY:        LABS:                        8.0    10.95 )-----------( 49       ( 17 Mar 2025 02:55 )             23.0     03-17    139  |  100  |  79[H]  ----------------------------<  191[H]  3.8   |  20[L]  |  3.81[H]    Ca    8.2[L]      17 Mar 2025 00:37  Phos  4.8     03-17  Mg     1.7     03-17    TPro  4.7[L]  /  Alb  2.7[L]  /  TBili  1.9[H]  /  DBili  x   /  AST  87[H]  /  ALT  60[H]  /  AlkPhos  112  03-17    PT/INR - ( 17 Mar 2025 00:37 )   PT: 14.8 sec;   INR: 1.30 ratio         PTT - ( 17 Mar 2025 00:37 )  PTT:32.2 sec  Urinalysis Basic - ( 17 Mar 2025 00:37 )    Color: x / Appearance: x / SG: x / pH: x  Gluc: 191 mg/dL / Ketone: x  / Bili: x / Urobili: x   Blood: x / Protein: x / Nitrite: x   Leuk Esterase: x / RBC: x / WBC x   Sq Epi: x / Non Sq Epi: x / Bacteria: x        RADIOLOGY & ADDITIONAL STUDIES:  Noncontrast CT AP reviewed with attending. Two right sided RP hematomas noted.    ASSESSMENT/DISCUSSION:  Patient is a 89y old  Female who presents with a chief complaint of Right leg pain (14 Mar 2025 16:31) with PAD previously on hepain GTT found to have RPH x2 on noncontrast CT A/P.    Vascular surgery recommended IR consult for possible embolization for RP bleeding.    IR consulted for RPH, evaluate for embolization.  IR was previously consulted on 3/15. Recommended conservative management at that time.    Chart and imaging reviewed with attending Dr. Downing. Patient required transfusion on 3/15. 1u PRBcs since on the 16th. No new hemodynamic instability. Ordered for platelet transfusion. Given relative stability of hemoglobin since 3/15 transfusion and no new hemodynamically instability, recommend continuing conservative management.    Procedure has been DEFERRED.    RECOMMENDATIONS:    - Recommend continuing conservative management at this time.  - if patient with new significant drop in hemoglobin requiring transfusion or new hemodynamically instability, consider CTA of the abdomen and pelvis to evaluate for active extravasation, reconsult IR at that time.    Fabrizio Gutierrez MD  Radiology Resident    -Available on [employer] TEAMS for all non-urgent questions  -Emergent issues: [facility]-p.[phone]; [facility]-p.[phone] ([phone])  -Non-emergent consults: Please place a [place] order "Consult-Interventional Radiology" with an appropriate callback number  -Scheduling questions: [facility]: [phone]; [facility]: [phone]  -Clinic/Outpatient booking: [facility]: [phone]; [facility]: [phone]

## 2025-03-17 NOTE — PROGRESS NOTE ADULT - SUBJECTIVE AND OBJECTIVE BOX
Hemodialysis:    Treatment tolerated well and completed for 3 hrs and 15 mins. With net UF of 1 liter. Epogen 10,000 units given. Patient had no signs of distress during and post treatment. CVC dressing changed, noted hematoma, no flow issues. Post /72, HR 65, T 37.1, R 16, O2sat 100% RA. Report given to AMBIKA Martinez RN     INTERVAL HPI/OVERNIGHT EVENTS:  - started on midodrine 10 mg q8h  -  cc bolus given  - seroquel 12.5 mg given    SUBJECTIVE: Patient seen and examined at bedside. She reports significant bilateral posterior knee pain. Did not sleep well overnight iso pain. Pain did not respond to tylenol. Afebrile.       VITAL SIGNS:  ICU Vital Signs Last 24 Hrs  T(C): 36.6 (17 Mar 2025 04:00), Max: 36.6 (17 Mar 2025 04:00)  T(F): 97.8 (17 Mar 2025 04:00), Max: 97.8 (17 Mar 2025 04:00)  HR: 94 (17 Mar 2025 06:00) (61 - 101)  BP: 86/48 (17 Mar 2025 06:00) (77/55 - 112/56)  BP(mean): 65 (17 Mar 2025 06:00) (60 - 80)  ABP: --  ABP(mean): --  RR: 19 (17 Mar 2025 06:00) (11 - 23)  SpO2: 100% (17 Mar 2025 06:00) (96% - 100%)    O2 Parameters below as of 16 Mar 2025 20:00  Patient On (Oxygen Delivery Method): nasal cannula  O2 Flow (L/min): 2          Plateau pressure:   P/F ratio:     03-16 @ 07:01  -  03-17 @ 07:00  --------------------------------------------------------  IN: 1595 mL / OUT: 1 mL / NET: 1594 mL      CAPILLARY BLOOD GLUCOSE      POCT Blood Glucose.: 223 mg/dL (17 Mar 2025 05:14)    ECG:    PHYSICAL EXAM:    General: elderly woman in NAD  HEENT: atraumatic, normocephalic. EOMI  Neck: supple, no JVD  Respiratory: CTAB, no increased work of breathing  Cardiovascular: RRR  Abdomen: soft, NT, ND  Extremities: extremities WWP, significant b/l posterior knee pain   Neurological: AOx3, answers questions appropriately     MEDICATIONS:  MEDICATIONS  (STANDING):  chlorhexidine 2% Cloths 1 Application(s) Topical <User Schedule>  chlorhexidine 4% Liquid 1 Application(s) Topical <User Schedule>  dextrose 5%. 1000 milliLiter(s) (100 mL/Hr) IV Continuous <Continuous>  dextrose 5%. 1000 milliLiter(s) (50 mL/Hr) IV Continuous <Continuous>  dextrose 50% Injectable 25 Gram(s) IV Push once  dextrose 50% Injectable 12.5 Gram(s) IV Push once  dextrose 50% Injectable 25 Gram(s) IV Push once  gabapentin 100 milliGRAM(s) Oral two times a day  glucagon  Injectable 1 milliGRAM(s) IntraMuscular once  influenza  Vaccine (HIGH DOSE) 0.5 milliLiter(s) IntraMuscular once  insulin lispro (ADMELOG) corrective regimen sliding scale   SubCutaneous every 6 hours  levothyroxine Injectable 40 MICROGram(s) IV Push at bedtime  lidocaine   4% Patch 1 Patch Transdermal daily  midodrine 10 milliGRAM(s) Oral every 8 hours  sodium bicarbonate 650 milliGRAM(s) Oral three times a day    MEDICATIONS  (PRN):  acetaminophen     Tablet .. 650 milliGRAM(s) Oral every 6 hours PRN Temp greater or equal to 38C (100.4F), Mild Pain (1 - 3)  dextrose Oral Gel 15 Gram(s) Oral once PRN Blood Glucose LESS THAN 70 milliGRAM(s)/deciliter  melatonin 3 milliGRAM(s) Oral at bedtime PRN Insomnia  metoclopramide Injectable 10 milliGRAM(s) IV Push every 8 hours PRN 2nd choice for nausea/vomiting  ondansetron Injectable 4 milliGRAM(s) IV Push every 8 hours PRN Nausea and/or Vomiting  sodium chloride 0.9% lock flush 10 milliLiter(s) IV Push every 1 hour PRN Pre/post blood products, medications, blood draw, and to maintain line patency      ALLERGIES:  Allergies    Kiwi (Anaphylaxis)  codeine (Unknown)  penicillins (Anaphylaxis)    Intolerances        LABS:                        8.0    10.95 )-----------( 49       ( 17 Mar 2025 02:55 )             23.0     03-17    139  |  100  |  79[H]  ----------------------------<  191[H]  3.8   |  20[L]  |  3.81[H]    Ca    8.2[L]      17 Mar 2025 00:37  Phos  4.8     03-17  Mg     1.7     03-17    TPro  4.7[L]  /  Alb  2.7[L]  /  TBili  1.9[H]  /  DBili  x   /  AST  87[H]  /  ALT  60[H]  /  AlkPhos  112  03-17    PT/INR - ( 17 Mar 2025 00:37 )   PT: 14.8 sec;   INR: 1.30 ratio         PTT - ( 17 Mar 2025 00:37 )  PTT:32.2 sec  Urinalysis Basic - ( 17 Mar 2025 00:37 )    Color: x / Appearance: x / SG: x / pH: x  Gluc: 191 mg/dL / Ketone: x  / Bili: x / Urobili: x   Blood: x / Protein: x / Nitrite: x   Leuk Esterase: x / RBC: x / WBC x   Sq Epi: x / Non Sq Epi: x / Bacteria: x        RADIOLOGY & ADDITIONAL TESTS: Reviewed.

## 2025-03-17 NOTE — CHART NOTE - NSCHARTNOTEFT_GEN_A_CORE
MICU Transfer Note    Transfer from: MICU  Transfer to:  ( x ) Medicine    (  ) Telemetry    (  ) RCU    (  ) Palliative    (  ) Stroke Unit    (  ) _______________  Accepting physician:  Signout given to:      MICU COURSE:      For Follow-Up:      ASSESSMENT & PLAN:                   Vital Signs Last 24 Hrs  T(C): 36.6 (17 Mar 2025 12:40), Max: 36.6 (17 Mar 2025 04:00)  T(F): 97.9 (17 Mar 2025 12:40), Max: 97.9 (17 Mar 2025 12:40)  HR: 87 (17 Mar 2025 12:40) (69 - 101)  BP: 99/50 (17 Mar 2025 12:40) (77/55 - 109/57)  BP(mean): 70 (17 Mar 2025 12:40) (60 - 79)  RR: 18 (17 Mar 2025 12:40) (11 - 23)  SpO2: 98% (17 Mar 2025 12:40) (95% - 100%)    Parameters below as of 17 Mar 2025 12:40  Patient On (Oxygen Delivery Method): room air      I&O's Summary    16 Mar 2025 07:01  -  17 Mar 2025 07:00  --------------------------------------------------------  IN: 1595 mL / OUT: 1 mL / NET: 1594 mL    17 Mar 2025 07:01  -  17 Mar 2025 13:04  --------------------------------------------------------  IN: 565 mL / OUT: 0 mL / NET: 565 mL          MEDICATIONS  (STANDING):  chlorhexidine 2% Cloths 1 Application(s) Topical <User Schedule>  chlorhexidine 4% Liquid 1 Application(s) Topical <User Schedule>  dextrose 5%. 1000 milliLiter(s) (100 mL/Hr) IV Continuous <Continuous>  dextrose 5%. 1000 milliLiter(s) (50 mL/Hr) IV Continuous <Continuous>  dextrose 50% Injectable 25 Gram(s) IV Push once  dextrose 50% Injectable 12.5 Gram(s) IV Push once  dextrose 50% Injectable 25 Gram(s) IV Push once  gabapentin 100 milliGRAM(s) Oral two times a day  glucagon  Injectable 1 milliGRAM(s) IntraMuscular once  influenza  Vaccine (HIGH DOSE) 0.5 milliLiter(s) IntraMuscular once  insulin lispro (ADMELOG) corrective regimen sliding scale   SubCutaneous every 6 hours  levothyroxine Injectable 40 MICROGram(s) IV Push at bedtime  lidocaine   4% Patch 1 Patch Transdermal daily  midodrine 10 milliGRAM(s) Oral every 8 hours  sodium bicarbonate 650 milliGRAM(s) Oral three times a day    MEDICATIONS  (PRN):  acetaminophen     Tablet .. 650 milliGRAM(s) Oral every 6 hours PRN Temp greater or equal to 38C (100.4F), Mild Pain (1 - 3)  dextrose Oral Gel 15 Gram(s) Oral once PRN Blood Glucose LESS THAN 70 milliGRAM(s)/deciliter  HYDROmorphone  Injectable 0.2 milliGRAM(s) IV Push every 4 hours PRN Severe Pain (7 - 10)  melatonin 3 milliGRAM(s) Oral at bedtime PRN Insomnia  ondansetron Injectable 4 milliGRAM(s) IV Push every 8 hours PRN Nausea and/or Vomiting  oxyCODONE    IR 2.5 milliGRAM(s) Oral every 6 hours PRN Moderate Pain (4 - 6)  sodium chloride 0.9% lock flush 10 milliLiter(s) IV Push every 1 hour PRN Pre/post blood products, medications, blood draw, and to maintain line patency        LABS                                            8.0                   Neurophils% (auto):   x      (03-17 @ 02:55):    10.95)-----------(49           Lymphocytes% (auto):  x                                             23.0                   Eosinphils% (auto):   x        Manual%: Neutrophils x    ; Lymphocytes x    ; Eosinophils x    ; Bands%: x    ; Blasts x                                    139    |  100    |  79                  Calcium: 8.2   / iCa: x      (03-17 @ 00:37)    ----------------------------<  191       Magnesium: 1.7                              3.8     |  20     |  3.81             Phosphorous: 4.8      TPro  4.7    /  Alb  2.7    /  TBili  1.9    /  DBili  x      /  AST  87     /  ALT  60     /  AlkPhos  112    17 Mar 2025 00:37    ( 03-17 @ 00:37 )   PT: 14.8 sec;   INR: 1.30 ratio  aPTT: 32.2 sec MICU Transfer Note    Transfer from: MICU  Transfer to:  ( x ) Medicine    (  ) Telemetry    (  ) RCU    (  ) Palliative    (  ) Stroke Unit    (  ) _______________    Accepting physician: Julio Cesar Rahman  Signout given to:      MICU COURSE:   89F PMHx HTN, DM on insulin, CAD s/p PCI, HFrEF. P/w acute onset of R shin pain w/o trauma, found to have b/l popliteal artery occlusion. Course c/b large RP bleed after being started on heparin gtt iso acute renal failure and uremia induced platelet dysfunction requiring emergent HD. On arrival to MICU, Shiley placed, and patient received dialysis with improvement in uremia. Patient also completed course of abx for E. coli and E. faecalis UTI with ceftriaxone and vancomycin. Received total 4u pRBC, 1u FFP, 1u PLT. IR consulted, recommending no further intervention as RP hematoma would most likely self tamponade, but if clinically worsens, would consider CTA to identify culprit vessel for embolization. This would need to be shared decision making with family at CT contrast load would worsen ROB and likely increase risk of longer term HD.       For Follow-Up:  [ ] HD per nephro, last HD session 3/17  [ ] IR reccs re: RP hematoma. Most likely will self tamponade, but if clinically worsens, would need CTA to identify culprit vessel. If CTA is required, would need risk/benefit conversation with family, as this may increase risk of requiring long term HD  [ ] nephrology and vascular input re: popliteal artery occlusions and when it will be safe to resume heparin gtt   [ ] holding home DAPT iso RP bleed  [ ] q12h CBC  [ ] f/u vanc level in AM to see if therapeutic, re-dose if necessary. Plan to treat for 3-5 day course for E. faecalis UTI (was already treated with 3d course of CFX for E. coli UTI     ASSESSMENT & PLAN:      89F PMHx HTN, DM on insulin, CAD s/p PCI, HFrEF. P/w acute onset of R shin pain w/o trauma. MICU consulted iso urgent shiley placement for increasing uremia and uremic encephalopathy       ===NEURO===  #Mental status  #Metabolic Encephalopathy  - Currently A&O x 0 not on sedation  - Likely iso rising uremia 2/2 ESRD + Hematoma  - iHD and hematoma mgmt as below    ===CV===  #HTN  #CAD s/p stent placement (2014) LAD  #HFrEF (20% 2019)  - Hold home torsemide, POCUS with small IVC, maintain net even on iHD and give blood product as below  - Entresto, Metoprolol due to ROB and borderline BP  - Was on DAPT prior to admission, hold iso bleed  - Trend I/Os and daily weights, and Cr  - f/u repeat TTE    #Popliteal Occlusion  - US Arterial: Right severe stenosis of the popliteal artery proximally followed a segmental occlusion of the popliteal artery distally. The right posterior tibial, peroneal, anterior tibial and dorsal pedis arteries are all occluded.   - On the left, there are segmental occlusions of the superficial femoral artery in the small and distal thigh. The left posterior tibial peroneal trunk, posterior tibial, peroneal, anterior tibial and dorsal pedis arteries are occluded  - Was previously seen by vascular and rec no surgical intervention and started on heparin gtt  - Hold DAPT and heparin iso bleed  - resume statin once able to tolerate oral    ===PULM===  - No active issues    ===RENAL===  #ROB on CKD stage IV now on HD  -US Renal:  No hydronephrosis. Increased renal cortical echogenicity compatible with renal parenchymal disease.  - Monitor Cr  - Avoid nephrotoxic medications  - Lieberman as above for possible retention  - Started on iHD (3/15) for uremic encephlopathy    ===GI===  #Diet: NPO iso mentation  #Stress Ulcer/GI Prophylaxis: PPI HELD    #Elevated LFTs  - Unclear etiology  - RUQ US, hemolysis labs    ===ENDO===  #DM2: HbA1c 9.4  Home regimen: 12U Lantus/Humalog 4U   - q6h LDSS    #Hypothyroidism  - C/w IV 40mg synthroid    ===METABOLIC===  #Electrolyte abnormalities  #Hyperkalemia  - On HD  - No oral access due to AAOx0, can consider Lokelma +/- bicarb if able to tolerate PO    ===HEMATOLOGIC===  #Thrombocytopenia  #Anemia iso RP bleed   - Transfusion count: pRBC: 3u (3/15) + 1u (3/16); Plt: 1u (3/16); FFP: 1u (3/16)  - s/p DDAVP x 2 and 1 FFP, 1 PLT, and 4u pRBC total   - follow up IR and vascular reccs     ===ID===  #UTI  - Enterococcus + E Coli on UCx  - CTX + Vanc by trough, pending sensitivity    ===ETHICS===  Code Status: FULL CODE                 Vital Signs Last 24 Hrs  T(C): 36.6 (17 Mar 2025 12:40), Max: 36.6 (17 Mar 2025 04:00)  T(F): 97.9 (17 Mar 2025 12:40), Max: 97.9 (17 Mar 2025 12:40)  HR: 87 (17 Mar 2025 12:40) (69 - 101)  BP: 99/50 (17 Mar 2025 12:40) (77/55 - 109/57)  BP(mean): 70 (17 Mar 2025 12:40) (60 - 79)  RR: 18 (17 Mar 2025 12:40) (11 - 23)  SpO2: 98% (17 Mar 2025 12:40) (95% - 100%)    Parameters below as of 17 Mar 2025 12:40  Patient On (Oxygen Delivery Method): room air      I&O's Summary    16 Mar 2025 07:01  -  17 Mar 2025 07:00  --------------------------------------------------------  IN: 1595 mL / OUT: 1 mL / NET: 1594 mL    17 Mar 2025 07:01  -  17 Mar 2025 13:04  --------------------------------------------------------  IN: 565 mL / OUT: 0 mL / NET: 565 mL          MEDICATIONS  (STANDING):  chlorhexidine 2% Cloths 1 Application(s) Topical <User Schedule>  chlorhexidine 4% Liquid 1 Application(s) Topical <User Schedule>  dextrose 5%. 1000 milliLiter(s) (100 mL/Hr) IV Continuous <Continuous>  dextrose 5%. 1000 milliLiter(s) (50 mL/Hr) IV Continuous <Continuous>  dextrose 50% Injectable 25 Gram(s) IV Push once  dextrose 50% Injectable 12.5 Gram(s) IV Push once  dextrose 50% Injectable 25 Gram(s) IV Push once  gabapentin 100 milliGRAM(s) Oral two times a day  glucagon  Injectable 1 milliGRAM(s) IntraMuscular once  influenza  Vaccine (HIGH DOSE) 0.5 milliLiter(s) IntraMuscular once  insulin lispro (ADMELOG) corrective regimen sliding scale   SubCutaneous every 6 hours  levothyroxine Injectable 40 MICROGram(s) IV Push at bedtime  lidocaine   4% Patch 1 Patch Transdermal daily  midodrine 10 milliGRAM(s) Oral every 8 hours  sodium bicarbonate 650 milliGRAM(s) Oral three times a day    MEDICATIONS  (PRN):  acetaminophen     Tablet .. 650 milliGRAM(s) Oral every 6 hours PRN Temp greater or equal to 38C (100.4F), Mild Pain (1 - 3)  dextrose Oral Gel 15 Gram(s) Oral once PRN Blood Glucose LESS THAN 70 milliGRAM(s)/deciliter  HYDROmorphone  Injectable 0.2 milliGRAM(s) IV Push every 4 hours PRN Severe Pain (7 - 10)  melatonin 3 milliGRAM(s) Oral at bedtime PRN Insomnia  ondansetron Injectable 4 milliGRAM(s) IV Push every 8 hours PRN Nausea and/or Vomiting  oxyCODONE    IR 2.5 milliGRAM(s) Oral every 6 hours PRN Moderate Pain (4 - 6)  sodium chloride 0.9% lock flush 10 milliLiter(s) IV Push every 1 hour PRN Pre/post blood products, medications, blood draw, and to maintain line patency        LABS                                            8.0                   Neurophils% (auto):   x      (03-17 @ 02:55):    10.95)-----------(49           Lymphocytes% (auto):  x                                             23.0                   Eosinphils% (auto):   x        Manual%: Neutrophils x    ; Lymphocytes x    ; Eosinophils x    ; Bands%: x    ; Blasts x                                    139    |  100    |  79                  Calcium: 8.2   / iCa: x      (03-17 @ 00:37)    ----------------------------<  191       Magnesium: 1.7                              3.8     |  20     |  3.81             Phosphorous: 4.8      TPro  4.7    /  Alb  2.7    /  TBili  1.9    /  DBili  x      /  AST  87     /  ALT  60     /  AlkPhos  112    17 Mar 2025 00:37    ( 03-17 @ 00:37 )   PT: 14.8 sec;   INR: 1.30 ratio  aPTT: 32.2 sec MICU Transfer Note    Transfer from: MICU  Transfer to:  ( x ) Medicine    (  ) Telemetry    (  ) RCU    (  ) Palliative    (  ) Stroke Unit    (  ) _______________    Accepting physician: Julio Cesar Rahman  Signout given to:      MICU COURSE:   89F PMHx HTN, DM on insulin, CAD s/p PCI, HFrEF. P/w acute onset of R shin pain w/o trauma, found to have b/l popliteal artery occlusion. Course c/b large RP bleed after being started on heparin gtt iso acute renal failure and uremia induced platelet dysfunction requiring emergent HD. On arrival to MICU, Shiley placed, and patient received dialysis with improvement in uremia. Patient also completed course of abx for E. coli and E. faecalis UTI with ceftriaxone and vancomycin. Received total 4u pRBC, 1u FFP, 1u PLT. IR consulted, recommending no further intervention as RP hematoma would most likely self tamponade, but if clinically worsens, would consider CTA to identify culprit vessel for embolization. This would need to be shared decision making with family at CT contrast load would worsen ROB and likely increase risk of longer term HD.       For Follow-Up:  [ ] HD per nephro, last HD session 3/17  [ ] IR reccs re: RP hematoma. Most likely will self tamponade, but if clinically worsens, would need CTA to identify culprit vessel. If CTA is required, would need risk/benefit conversation with family, as this may increase risk of requiring long term HD  [ ] nephrology and vascular input re: popliteal artery occlusions and when it will be safe to resume heparin gtt   [ ] holding home DAPT iso RP bleed  [ ] q12h CBC  [ ] f/u vanc level in AM to see if therapeutic, re-dose if necessary. Plan to treat for 3-5 day course for E. faecalis UTI (was already treated with 3d course of CFX for E. coli UTI)  [ ] pain control  [ ] avoid QTc prolonging agents, as QTc 520-540 range    ASSESSMENT & PLAN:      89F PMHx HTN, DM on insulin, CAD s/p PCI, HFrEF. P/w acute onset of R shin pain w/o trauma. MICU consulted iso urgent shiley placement for increasing uremia and uremic encephalopathy       ===NEURO===  #Mental status  #Metabolic Encephalopathy  - Currently A&O x 0 not on sedation  - Likely iso rising uremia 2/2 ESRD + Hematoma  - iHD and hematoma mgmt as below    ===CV===  #HTN  #CAD s/p stent placement (2014) LAD  #HFrEF (20% 2019)  - Hold home torsemide, POCUS with small IVC, maintain net even on iHD and give blood product as below  - Entresto, Metoprolol due to ROB and borderline BP  - Was on DAPT prior to admission, hold iso bleed  - Trend I/Os and daily weights, and Cr  - f/u repeat TTE    #Popliteal Occlusion  - US Arterial: Right severe stenosis of the popliteal artery proximally followed a segmental occlusion of the popliteal artery distally. The right posterior tibial, peroneal, anterior tibial and dorsal pedis arteries are all occluded.   - On the left, there are segmental occlusions of the superficial femoral artery in the small and distal thigh. The left posterior tibial peroneal trunk, posterior tibial, peroneal, anterior tibial and dorsal pedis arteries are occluded  - Was previously seen by vascular and rec no surgical intervention and started on heparin gtt  - Hold DAPT and heparin iso bleed  - resume statin once able to tolerate oral    ===PULM===  - No active issues    ===RENAL===  #ROB on CKD stage IV now on HD  -US Renal:  No hydronephrosis. Increased renal cortical echogenicity compatible with renal parenchymal disease.  - Monitor Cr  - Avoid nephrotoxic medications  - Lieberman as above for possible retention  - Started on iHD (3/15) for uremic encephlopathy    ===GI===  #Diet: NPO iso mentation  #Stress Ulcer/GI Prophylaxis: PPI HELD    #Elevated LFTs  - Unclear etiology  - RUQ US, hemolysis labs    ===ENDO===  #DM2: HbA1c 9.4  Home regimen: 12U Lantus/Humalog 4U   - q6h LDSS    #Hypothyroidism  - C/w IV 40mg synthroid    ===METABOLIC===  #Electrolyte abnormalities  #Hyperkalemia  - On HD  - No oral access due to AAOx0, can consider Lokelma +/- bicarb if able to tolerate PO    ===HEMATOLOGIC===  #Thrombocytopenia  #Anemia iso RP bleed   - Transfusion count: pRBC: 3u (3/15) + 1u (3/16); Plt: 1u (3/16); FFP: 1u (3/16)  - s/p DDAVP x 2 and 1 FFP, 1 PLT, and 4u pRBC total   - follow up IR and vascular reccs     ===ID===  #UTI  - Enterococcus + E Coli on UCx  - CTX + Vanc by trough, pending sensitivity    ===ETHICS===  Code Status: FULL CODE                 Vital Signs Last 24 Hrs  T(C): 36.6 (17 Mar 2025 12:40), Max: 36.6 (17 Mar 2025 04:00)  T(F): 97.9 (17 Mar 2025 12:40), Max: 97.9 (17 Mar 2025 12:40)  HR: 87 (17 Mar 2025 12:40) (69 - 101)  BP: 99/50 (17 Mar 2025 12:40) (77/55 - 109/57)  BP(mean): 70 (17 Mar 2025 12:40) (60 - 79)  RR: 18 (17 Mar 2025 12:40) (11 - 23)  SpO2: 98% (17 Mar 2025 12:40) (95% - 100%)    Parameters below as of 17 Mar 2025 12:40  Patient On (Oxygen Delivery Method): room air      I&O's Summary    16 Mar 2025 07:01  -  17 Mar 2025 07:00  --------------------------------------------------------  IN: 1595 mL / OUT: 1 mL / NET: 1594 mL    17 Mar 2025 07:01  -  17 Mar 2025 13:04  --------------------------------------------------------  IN: 565 mL / OUT: 0 mL / NET: 565 mL          MEDICATIONS  (STANDING):  chlorhexidine 2% Cloths 1 Application(s) Topical <User Schedule>  chlorhexidine 4% Liquid 1 Application(s) Topical <User Schedule>  dextrose 5%. 1000 milliLiter(s) (100 mL/Hr) IV Continuous <Continuous>  dextrose 5%. 1000 milliLiter(s) (50 mL/Hr) IV Continuous <Continuous>  dextrose 50% Injectable 25 Gram(s) IV Push once  dextrose 50% Injectable 12.5 Gram(s) IV Push once  dextrose 50% Injectable 25 Gram(s) IV Push once  gabapentin 100 milliGRAM(s) Oral two times a day  glucagon  Injectable 1 milliGRAM(s) IntraMuscular once  influenza  Vaccine (HIGH DOSE) 0.5 milliLiter(s) IntraMuscular once  insulin lispro (ADMELOG) corrective regimen sliding scale   SubCutaneous every 6 hours  levothyroxine Injectable 40 MICROGram(s) IV Push at bedtime  lidocaine   4% Patch 1 Patch Transdermal daily  midodrine 10 milliGRAM(s) Oral every 8 hours  sodium bicarbonate 650 milliGRAM(s) Oral three times a day    MEDICATIONS  (PRN):  acetaminophen     Tablet .. 650 milliGRAM(s) Oral every 6 hours PRN Temp greater or equal to 38C (100.4F), Mild Pain (1 - 3)  dextrose Oral Gel 15 Gram(s) Oral once PRN Blood Glucose LESS THAN 70 milliGRAM(s)/deciliter  HYDROmorphone  Injectable 0.2 milliGRAM(s) IV Push every 4 hours PRN Severe Pain (7 - 10)  melatonin 3 milliGRAM(s) Oral at bedtime PRN Insomnia  ondansetron Injectable 4 milliGRAM(s) IV Push every 8 hours PRN Nausea and/or Vomiting  oxyCODONE    IR 2.5 milliGRAM(s) Oral every 6 hours PRN Moderate Pain (4 - 6)  sodium chloride 0.9% lock flush 10 milliLiter(s) IV Push every 1 hour PRN Pre/post blood products, medications, blood draw, and to maintain line patency        LABS                                            8.0                   Neurophils% (auto):   x      (03-17 @ 02:55):    10.95)-----------(49           Lymphocytes% (auto):  x                                             23.0                   Eosinphils% (auto):   x        Manual%: Neutrophils x    ; Lymphocytes x    ; Eosinophils x    ; Bands%: x    ; Blasts x                                    139    |  100    |  79                  Calcium: 8.2   / iCa: x      (03-17 @ 00:37)    ----------------------------<  191       Magnesium: 1.7                              3.8     |  20     |  3.81             Phosphorous: 4.8      TPro  4.7    /  Alb  2.7    /  TBili  1.9    /  DBili  x      /  AST  87     /  ALT  60     /  AlkPhos  112    17 Mar 2025 00:37    ( 03-17 @ 00:37 )   PT: 14.8 sec;   INR: 1.30 ratio  aPTT: 32.2 sec MICU Transfer Note    Transfer from: MICU  Transfer to:  ( x ) Medicine    (  ) Telemetry    (  ) RCU    (  ) Palliative    (  ) Stroke Unit    (  ) _______________    Accepting physician: Julio Cesar Rahman  Signout given to: [id number]      MICU COURSE:  89F PMHx HTN, DM on insulin, CAD s/p PCI, HFrEF. P/w acute onset of R shin pain w/o trauma, found to have b/l popliteal artery occlusion. Course c/b large RP bleed after being started on heparin gtt iso acute renal failure and uremia induced platelet dysfunction requiring emergent HD. On arrival to MICU, Shiley placed, and patient received dialysis with improvement in uremia. Patient also completed course of abx for E. coli and E. faecalis UTI with ceftriaxone and vancomycin. Received total 4u pRBC, 1u FFP, 1u PLT. IR consulted, recommending no further intervention as RP hematoma would most likely self tamponade, but if clinically worsens, would consider CTA to identify culprit vessel for embolization. This would need to be shared decision making with family at CT contrast load would worsen ROB and likely increase risk of longer term HD.       For Follow-Up:  [ ] HD per nephro, last HD session 3/17  [ ] IR reccs re: RP hematoma. Most likely will self tamponade, but if clinically worsens, would need CTA to identify culprit vessel. If CTA is required, would need risk/benefit conversation with family, as this may increase risk of requiring long term HD  [ ] nephrology and vascular input re: popliteal artery occlusions and when it will be safe to resume heparin gtt   [ ] holding home DAPT iso RP bleed  [ ] f/u with vascular cardiology regarding medical management of BL LE stenotic lesions  [ ] q12h CBC  [ ] f/u vanc level in AM to see if therapeutic, re-dose if necessary. Plan to treat for 3-5 day course for E. faecalis UTI (was already treated with 3d course of CFX for E. coli UTI)  [ ] pain control  [ ] avoid QTc prolonging agents, as QTc 520-540 range    ASSESSMENT & PLAN:     ASSESSMENT: 89F PMHx HTN, DM on insulin, CAD s/p PCI, HFrEF. P/w acute onset of R shin pain w/o trauma. MICU consulted iso urgent shiley placement for increasing uremia and uremic encephalopathy.      ===NEURO===  #Pain related to PVD  - oxycodone 2.5 mg q8h prn for moderate pain, dilaudid IV 0.2 mg q4h for severe pain  - has nausea with dilaudid, however should avoid zofran given QTc prolonged to 520-540 range. Can give alcohol swab or anti-emetic that does not prolong QTc    ===CV===  #HTN  #CAD s/p stent placement (2014) LAD  #HFrEF (20% 2019)  - hold Entresto, Metoprolol due to ROB and borderline BP  - Was on DAPT prior to admission, hold iso bleed  - Trend I/Os and daily weights, and Cr      #Popliteal Occlusion  #PAD  - US Arterial: Right severe stenosis of the popliteal artery proximally followed a segmental occlusion of the popliteal artery distally. The right posterior tibial, peroneal, anterior tibial and dorsal pedis arteries are all occluded.   - On the left, there are segmental occlusions of the superficial femoral artery in the small and distal thigh. The left posterior tibial peroneal trunk, posterior tibial, peroneal, anterior tibial and dorsal pedis arteries are occluded  - Was previously seen by vascular and rec no surgical intervention and started on heparin gtt. Spoke with vascular, no longer recommending heparin gtt given lack of thrombus noted   - vascular cards consult  - heparin subq started 3/18 AM, monitor for tolerance  - Hold DAPT   - resume statin once LFTs improve    ===PULM===  - No active issues    ===RENAL===  #ROB on CKD stage IV now on HD  #Uremic platelet dysfunction   s/p iHD 3/15, 3/16, 3/17  - HD per renal    ===GI===  #Diet: soft and bite sized   #Stress Ulcer/GI Prophylaxis: PPI HELD    #Elevated LFTs  - Unclear etiology  - RUQ US showing mildly dilated CBD  - resume statin when LFTs improve     ===ENDO===  #DM2: HbA1c 9.4  Home regimen: 12U Lantus/Humalog 4U   - q6h MDSS    #Hypothyroidism  - C/w IV 40mg synthroid    ===METABOLIC===  #Electrolyte abnormalities  #Hyperkalemia  - On HD  - No oral access due to AAOx0, can consider Lokelma +/- bicarb if able to tolerate PO    ===HEMATOLOGIC===  #Thrombocytopenia  #Anemia iso RP bleed   - Transfusion count: pRBC: 3u (3/15) + 1u (3/16); Plt: 1u (3/16); FFP: 1u (3/16)  - s/p DDAVP x 2 and 1 FFP, 1 PLT, and 4u pRBC total   - follow up IR and vascular reccs     ===ID===  #UTI  - Enterococcus + E Coli on UCx  - CFX (3/14-3/16), vancomycin (3/15-3/19), trend levels     ===ETHICS===  Code Status: FULL CODE MICU Transfer Note    Transfer from: MICU  Transfer to:  ( x ) Medicine    (  ) Telemetry    (  ) RCU    (  ) Palliative    (  ) Stroke Unit    (  ) _______________    Accepting physician: Julio Cesar Rahman  Signout given to: GABRIEL Cannon      MICU COURSE:  89F PMHx HTN, DM on insulin, CAD s/p PCI, HFrEF. P/w acute onset of R shin pain w/o trauma, found to have b/l popliteal artery occlusion. Course c/b large RP bleed after being started on heparin gtt iso acute renal failure and uremia induced platelet dysfunction requiring emergent HD. On arrival to MICU, Shiley placed, and patient received dialysis with improvement in uremia. Patient also completed course of abx for E. coli and E. faecalis UTI with ceftriaxone and vancomycin. Received total 4u pRBC, 1u FFP, 1u PLT. IR consulted, recommending no further intervention as RP hematoma would most likely self tamponade, but if clinically worsens, would consider CTA to identify culprit vessel for embolization. This would need to be shared decision making with family at CT contrast load would worsen ROB and likely increase risk of longer term HD.       For Follow-Up:  [ ] HD per nephro, last HD session 3/18, need for HD assessed on day by day basis  [ ] f/u vascular reccs. no need for heparin gtt  [ ] q12h CBC  [ ] f/u vanc level in AM to see if therapeutic, re-dose if necessary. Plan to treat for 5 day course for E. faecalis UTI (was already treated with 3d course of CFX for E. coli UTI)  [ ] pain control  [ ] avoid QTc prolonging agents, as QTc 520-540 range  [ ] restart statin once LFTs improve    ASSESSMENT & PLAN:     ASSESSMENT: 89F PMHx HTN, DM on insulin, CAD s/p PCI, HFrEF. P/w acute onset of R shin pain w/o trauma. MICU consulted iso urgent shiley placement for increasing uremia and uremic encephalopathy.      ===NEURO===  #Pain related to PVD  - oxycodone 2.5 mg q8h prn for moderate pain, dilaudid IV 0.2 mg q4h for severe pain  - has nausea with dilaudid, however should avoid zofran given QTc prolonged to 520-540 range. Can give alcohol swab or anti-emetic that does not prolong QTc    ===CV===  #HTN  #CAD s/p stent placement (2014) LAD  #HFrEF (20% 2019)  - hold Entresto, Metoprolol due to ROB and borderline BP  - Was on DAPT prior to admission, hold iso bleed  - Trend I/Os and daily weights, and Cr      #Popliteal Occlusion  #PAD  - US Arterial: Right severe stenosis of the popliteal artery proximally followed a segmental occlusion of the popliteal artery distally. The right posterior tibial, peroneal, anterior tibial and dorsal pedis arteries are all occluded.   - On the left, there are segmental occlusions of the superficial femoral artery in the small and distal thigh. The left posterior tibial peroneal trunk, posterior tibial, peroneal, anterior tibial and dorsal pedis arteries are occluded  - Was previously seen by vascular and rec no surgical intervention and started on heparin gtt. Spoke with vascular, no longer recommending heparin gtt given lack of thrombus noted   - vascular cards consult  - heparin subq started 3/18 AM, monitor for tolerance  - Hold DAPT   - resume statin once LFTs improve    ===PULM===  - No active issues    ===RENAL===  #ROB on CKD stage IV now on HD  #Uremic platelet dysfunction   s/p iHD 3/15, 3/16, 3/17  - HD per renal    ===GI===  #Diet: soft and bite sized   #Stress Ulcer/GI Prophylaxis: PPI HELD    #Elevated LFTs  - Unclear etiology  - RUQ US showing mildly dilated CBD  - resume statin when LFTs improve     ===ENDO===  #DM2: HbA1c 9.4  Home regimen: 12U Lantus/Humalog 4U   - q6h MDSS    #Hypothyroidism  - C/w IV 40mg synthroid    ===METABOLIC===  #Electrolyte abnormalities  #Hyperkalemia  - On HD  - No oral access due to AAOx0, can consider Lokelma +/- bicarb if able to tolerate PO    ===HEMATOLOGIC===  #Thrombocytopenia  #Anemia iso RP bleed   - Transfusion count: pRBC: 3u (3/15) + 1u (3/16); Plt: 1u (3/16); FFP: 1u (3/16)  - s/p DDAVP x 2 and 1 FFP, 1 PLT, and 4u pRBC total   - follow up IR and vascular reccs     ===ID===  #UTI  - Enterococcus + E Coli on UCx  - CFX (3/14-3/16), vancomycin (3/15-3/19), trend levels     ===ETHICS===  Code Status: FULL CODE

## 2025-03-18 NOTE — PHYSICAL THERAPY INITIAL EVALUATION ADULT - BED MOBILITY TRAINING, PT EVAL
GOAL: Patient will perform bed mobility independently, in 2 weeks.
GOAL: pt will complete all bed mob Kiki in 4wks.

## 2025-03-18 NOTE — OCCUPATIONAL THERAPY INITIAL EVALUATION ADULT - RANGE OF MOTION EXAMINATION, LOWER EXTREMITY
Df/PF grimaces in pain with ROM Df/PF grimaces in pain with ROM/bilateral LE Passive ROM was WFL  (within functional limits)

## 2025-03-18 NOTE — OCCUPATIONAL THERAPY INITIAL EVALUATION ADULT - MD ORDER
OT evaluate and treat  activity increase as tolerated OT evaluate and treat  activity increase as tolerated  OT functional eval completed 3/19/25

## 2025-03-18 NOTE — OCCUPATIONAL THERAPY INITIAL EVALUATION ADULT - MANUAL MUSCLE TESTING RESULTS, REHAB EVAL
unable to assess 2* increased lethargy, noted spontaneous movement in BUE unable to assess 2* increased lethargy, noted spontaneous movement in BUE; spontaneous movement noted in BUEs - at least 2/5

## 2025-03-18 NOTE — PHYSICAL THERAPY INITIAL EVALUATION ADULT - PERTINENT HX OF CURRENT PROBLEM, REHAB EVAL
Pt is a 89F PMHx HTN, DM on Insulin, CAD with stents on plavix, CHF presenting with RLE pain. Patient endorses acute onset of pain in her right shin area without trauma or an inciting events. Patient has chronic wounds. Collateral information obtained from daughter. Patient follows with a wound care clinic for bilateral chronic wounds. Patient has never seen a vascular surgeon. In the ED, AFVSS. Labs significant for elevated , Cr 3.99, glucose 360, eGFR 10, potassium 5.2 on VBG. In the ED patient received a CT angio abd aorta with bilateral runoff. CT Angio showing progression of chronic atherosclerosis with questionable popliteal artery occlusions, limited study secondary to poor cardiac output.
Pt is a 89F PMHx HTN, DM on Insulin, CAD with stents on plavix, CHF presenting with RLE pain. Patient endorses acute onset of pain in her right shin area without trauma or an inciting events. Patient has chronic wounds. Collateral information obtained from daughter. Patient follows with a wound care clinic for bilateral chronic wounds. Patient has never seen a vascular surgeon. In the ED, AFVSS. Labs significant for elevated , Cr 3.99, glucose 360, eGFR 10, potassium 5.2 on VBG. In the ED patient received a CT angio abd aorta with bilateral runoff. CT Angio showing progression of chronic atherosclerosis with questionable popliteal artery occlusions, limited study secondary to poor cardiac output. MICU consulted iso urgent shiley placement for increasing uremia and uremic encephalopathy, upgraded to MICU for urgetn HD iso uremia/worsening SCr w/ RP bleed, 3/18 consulted [facility] care for pain regimen, goc with family. US abdomen: Status post cholecystectomy. The common bile duct is mildly dilated measuring up to approximately 9.4 mm. The distal duct is obscured by bowel gas and is not visualized on this exam. No significant intrahepatic biliary dilatation is seen. This may reflect postcholecystectomy change. Suggest correlation with liver function tests and further evaluation as clinically warranted.  Redemonstrated large right infrarenal retroperitoneal hematoma, better assessed on the prior CT.

## 2025-03-18 NOTE — PHYSICAL THERAPY INITIAL EVALUATION ADULT - STRENGTHENING, PT EVAL
GOAL: pt will improve BLE strength to 3-/5 to improve tx and amb in 4wks.
GOAL: Patient will improve bilateral UE/LE strength to 5/5, for increased limb stability and to improve gait in 2 weeks.

## 2025-03-18 NOTE — PHYSICAL THERAPY INITIAL EVALUATION ADULT - MANUAL MUSCLE TESTING RESULTS, REHAB EVAL
unable to assess 2* increased lethargy, noted spontaneous movement in BUE globally pt is 3-/5 and in all 4 extremities - severe pain in b/l LE preventing full assessment

## 2025-03-18 NOTE — PROGRESS NOTE ADULT - PROBLEM SELECTOR PLAN 11
Follow up palliative care recommendations, who are assisting in goals of care.     General  - DVT prophylaxis: heparin 5000 units q12h   - Diet: soft and bite sized   - Medication reconciliation: complete   - Code: DNR/DNI   - Medications: Tylenol 650 mg q6h as needed for pain, Maalox 30 mL q4h as needed for acid reflux, melatonin 3 mg bedtime as needed for insomnia    Disposition  - Needed before discharge: improvement in mental status, monitor off dialysis   - Anticipated discharge date: acute   - Discharge to: pending further goals of care, likely SNF or home hospice   - Appointments after discharge: PCP    Plan discussed with patient's son Neville in detail at bedside. He agrees with plan. All questions answered.

## 2025-03-18 NOTE — OCCUPATIONAL THERAPY INITIAL EVALUATION ADULT - STRENGTHENING, PT EVAL
Pt. is a 74YOM BIBEMS presenting with respiratory distress. Pt. DNR/DNI. arrives with OPA/NPA in place and on BVM by EMS. Patient was saturating @50% on arrival and responded to interventions. NRB @15L placed in trauma room. IO to R tib. 2 PIV placed. RN/MD at bedside. EKG in progress. Pt. responsive to pain. Patient will increase strength in BUE/BLE by 1/2 grade in two weeks to facilitate increased ability to perform ADLs and functional mobility

## 2025-03-18 NOTE — PHYSICAL THERAPY INITIAL EVALUATION ADULT - IMPAIRMENTS CONTRIBUTING IMPAIRED BED MOBILITY, REHAB EVAL
impaired balance/cognition/decreased flexibility/narrow base of support/decreased strength
impaired balance/cognition/pain/impaired postural control/decreased strength

## 2025-03-18 NOTE — PHYSICAL THERAPY INITIAL EVALUATION ADULT - BALANCE TRAINING, PT EVAL
GOAL: Patient will demonstrate improved static/dynamic balance to good, in order to improve stability, decrease fall risk and increase independence with ADLs within 2 weeks.
GOAL: pt will improve sitting balance to good to improve tx to WC and standing in 4wks.

## 2025-03-18 NOTE — PHYSICAL THERAPY INITIAL EVALUATION ADULT - PLANNED THERAPY INTERVENTIONS, PT EVAL
balance training/bed mobility training/strengthening/transfer training
balance training/bed mobility training/gait training/strengthening/transfer training

## 2025-03-18 NOTE — CHART NOTE - NSCHARTNOTEFT_GEN_A_CORE
MAR Accept Note  BARBARACarmenHILARIO VANEGASIEH, PGY3  Transfer to:    Accepting Attending Physician: Julio Cesar Rahman  Assigned Room: 618D    Patient seen and examined.   Labs and data reviewed.   No findings precluding transfer of service.    MICU COURSE:  89F PMHx HTN, DM on insulin, CAD s/p PCI, HFrEF. P/w acute onset of R shin pain w/o trauma, found to have b/l popliteal artery occlusion. Course c/b large RP bleed after being started on heparin gtt iso acute renal failure and uremia induced platelet dysfunction requiring emergent HD. On arrival to MICU, Shiley placed, and patient received dialysis with improvement in uremia. Patient also completed course of abx for E. coli and E. faecalis UTI with ceftriaxone and vancomycin. Received total 4u pRBC, 1u FFP, 1u PLT. IR consulted, recommending no further intervention as RP hematoma would most likely self tamponade, but if clinically worsens, would consider CTA to identify culprit vessel for embolization. This would need to be shared decision making with family at CT contrast load would worsen ROB and likely increase risk of longer term HD.     For Follow-Up:  [ ] HD per nephro, last HD session 3/17  [ ] IR reccs re: RP hematoma. Most likely will self tamponade, but if clinically worsens, would need CTA to identify culprit vessel. If CTA is required, would need risk/benefit conversation with family, as this may increase risk of requiring long term HD  [ ] nephrology and vascular input re: popliteal artery occlusions and when it will be safe to resume heparin gtt   [ ] holding home DAPT iso RP bleed  [ ] f/u with vascular cardiology regarding medical management of BL LE stenotic lesions  [ ] q12h CBC  [ ] f/u vanc level in AM to see if therapeutic, re-dose if necessary. Plan to treat for 3-5 day course for E. faecalis UTI (was already treated with 3d course of CFX for E. coli UTI)  [ ] pain control  [ ] avoid QTc prolonging agents, as QTc 520-540 range

## 2025-03-18 NOTE — PHYSICAL THERAPY INITIAL EVALUATION ADULT - PREDICTED DURATION OF THERAPY (DAYS/WKS), PT EVAL
Impression: Dry eye syndrome of bilateral lacrimal glands: H04.123. Plan: Discussed condition with patient in detail. Recommend treatment with Preservative Free Artificial tears QID. RTC if symptoms continue.
2 weeks
4wks

## 2025-03-18 NOTE — PROGRESS NOTE ADULT - PROBLEM SELECTOR PLAN 5
Hypotensive earlier in hospital course, now on midodrine.   - Midodrine 10 mg q8h; wean off as tolerated

## 2025-03-18 NOTE — PROGRESS NOTE ADULT - SUBJECTIVE AND OBJECTIVE BOX
INTERVAL HPI/OVERNIGHT EVENTS:  - oxy 2.5 mg additional given for uncontrolled pain  - vanc 500 mg dosed post HD  - ISS changed to moderate intensity    SUBJECTIVE: Patient seen and examined at bedside. She reports pain is better controlled on current regimen. Slept better throughout the night compared to night prior.       VITAL SIGNS:  ICU Vital Signs Last 24 Hrs  T(C): 36.1 (18 Mar 2025 00:00), Max: 36.7 (17 Mar 2025 19:00)  T(F): 97 (18 Mar 2025 00:00), Max: 98.1 (17 Mar 2025 19:00)  HR: 69 (18 Mar 2025 04:00) (69 - 90)  BP: 114/55 (18 Mar 2025 04:00) (91/54 - 114/55)  BP(mean): 79 (18 Mar 2025 04:00) (68 - 80)  ABP: --  ABP(mean): --  RR: 14 (18 Mar 2025 04:00) (13 - 20)  SpO2: 96% (18 Mar 2025 04:00) (94% - 100%)    O2 Parameters below as of 18 Mar 2025 04:00  Patient On (Oxygen Delivery Method): room air            Plateau pressure:   P/F ratio:     03-17 @ 07:01  -  03-18 @ 07:00  --------------------------------------------------------  IN: 875 mL / OUT: 0 mL / NET: 875 mL      CAPILLARY BLOOD GLUCOSE      POCT Blood Glucose.: 192 mg/dL (18 Mar 2025 06:31)    ECG:    PHYSICAL EXAM:    General: elderly woman in NAD  HEENT: atraumatic, normocephalic. EOMI  Neck: supple, no JVD  Respiratory: CTAB, no increased work of breathing  Cardiovascular: RRR  Abdomen: soft, NT, ND  Extremities: 2+ peripheral pulses b/l  Neurological: AOx3, answers questions appropriately     MEDICATIONS:  MEDICATIONS  (STANDING):  chlorhexidine 2% Cloths 1 Application(s) Topical <User Schedule>  dextrose 5%. 1000 milliLiter(s) (50 mL/Hr) IV Continuous <Continuous>  dextrose 5%. 1000 milliLiter(s) (100 mL/Hr) IV Continuous <Continuous>  dextrose 50% Injectable 25 Gram(s) IV Push once  dextrose 50% Injectable 12.5 Gram(s) IV Push once  dextrose 50% Injectable 25 Gram(s) IV Push once  gabapentin 100 milliGRAM(s) Oral two times a day  influenza  Vaccine (HIGH DOSE) 0.5 milliLiter(s) IntraMuscular once  insulin lispro (ADMELOG) corrective regimen sliding scale   SubCutaneous Before meals and at bedtime  levothyroxine Injectable 40 MICROGram(s) IV Push at bedtime  lidocaine   4% Patch 1 Patch Transdermal daily  midodrine 10 milliGRAM(s) Oral every 8 hours  sodium bicarbonate 650 milliGRAM(s) Oral three times a day    MEDICATIONS  (PRN):  acetaminophen     Tablet .. 650 milliGRAM(s) Oral every 6 hours PRN Temp greater or equal to 38C (100.4F), Mild Pain (1 - 3)  dextrose Oral Gel 15 Gram(s) Oral once PRN Blood Glucose LESS THAN 70 milliGRAM(s)/deciliter  HYDROmorphone  Injectable 0.2 milliGRAM(s) IV Push every 4 hours PRN Severe Pain (7 - 10)  melatonin 3 milliGRAM(s) Oral at bedtime PRN Insomnia  oxyCODONE    IR 2.5 milliGRAM(s) Oral every 6 hours PRN Moderate Pain (4 - 6)  sodium chloride 0.9% lock flush 10 milliLiter(s) IV Push every 1 hour PRN Pre/post blood products, medications, blood draw, and to maintain line patency      ALLERGIES:  Allergies    Kiwi (Anaphylaxis)  codeine (Unknown)  penicillins (Anaphylaxis)    Intolerances        LABS:                        8.8    15.85 )-----------( 79       ( 17 Mar 2025 22:39 )             25.4     03-17    137  |  99  |  48[H]  ----------------------------<  159[H]  4.0   |  22  |  2.73[H]    Ca    8.6      17 Mar 2025 22:39  Phos  3.5     03-17  Mg     1.7     03-17    TPro  5.6[L]  /  Alb  3.1[L]  /  TBili  2.1[H]  /  DBili  x   /  AST  132[H]  /  ALT  84[H]  /  AlkPhos  159[H]  03-17    PT/INR - ( 17 Mar 2025 22:39 )   PT: 15.2 sec;   INR: 1.33 ratio         PTT - ( 17 Mar 2025 22:39 )  PTT:31.8 sec  Urinalysis Basic - ( 17 Mar 2025 22:39 )    Color: x / Appearance: x / SG: x / pH: x  Gluc: 159 mg/dL / Ketone: x  / Bili: x / Urobili: x   Blood: x / Protein: x / Nitrite: x   Leuk Esterase: x / RBC: x / WBC x   Sq Epi: x / Non Sq Epi: x / Bacteria: x        RADIOLOGY & ADDITIONAL TESTS: Reviewed.

## 2025-03-18 NOTE — OCCUPATIONAL THERAPY INITIAL EVALUATION ADULT - ADL RETRAINING, OT EVAL
GOAL: Pt will be min A with toileting in 4 weeks GOAL: Patient will perform grooming sitting sink level supervision in 4 weeks

## 2025-03-18 NOTE — OCCUPATIONAL THERAPY INITIAL EVALUATION ADULT - PERTINENT HX OF CURRENT PROBLEM, REHAB EVAL
89F with PMH of HTN, DM2 on insulin, CAD s/p PCI, HFrEF, presenting with acute onset of R shin pain. States that approx 3 hours prior to arrival, patient with acute pain to R shin area. Denies prior trauma or inciting event. Of note, patient with multiple chronic wounds in R leg. Has been followed by wound care clinic but never seen vascular surgeon prior. Upon arrival patient underwent imaging which revealed occlusion of L popliteal artery and thus was started on heparin gtt. Noted to be with worsening renal function throughout stay and has been followed by Nephro. ROB presumed to be 2/2 to contrast. Due to worsening uremia, patient recommended for urgent HD with nephro. MICU consulted due to potential for difficult Shiley placement in setting of patient on heparin gtt, on plavix, CT Abd/pelvis Interval development of two large right retroperitoneal hematomas.

## 2025-03-18 NOTE — PROGRESS NOTE ADULT - ASSESSMENT
89-year-old female with PMHx HTN, DM on insulin, CAD s/p PCI, HFrEF, CKD stage V (baseline SCr 3.4-3.6) presenting with acute onset of R shin pain in setting of severe PVD. Nephrology consulted for ROB on CKD, hyperkalemia.     #ROB on CKD V  ROB on stage V CKD in setting of contrast, entresto and torsemide use, hypotension, poor po intake, r/o retention. [facility], pt. last outpatient SCr was 3.66 (1/2025). Baseline SCr appears to be 3.4-3.6. Outpatient nephrologist is Dr. Parth Barrientos. SCr on admission was elevated at 3.99. SCr has worsened to ~7s with evidence of uremia so patient transferred to MICU 3/15/25 and initiated on iHD. Last HD 3/17/25.   PLAN:   - Will hold off on HD today. Will reasses tomorrow.   - Needs further GOC to determine if long term HD is appropriate for patient discussed with son, daughter and MICU at bedside.   - Continue to hold entresto and diuretics.   - Monitor labs and I/Os. Avoid nephrotoxins including, ACE/ARB, NSAIDs, contrast, etc. Dose medications as per iHD.    If you have any questions, please feel free to contact me:  Radha Hilliard MD PGY-4  Nephrology Fellow  [employer] Teams (Preferred)/ Pager [phone]   (After 5pm or on weekends please page the on-call fellow).

## 2025-03-18 NOTE — CONSULT NOTE ADULT - TIME BILLING
I have spent 75 minutes of time including pain assessment, coordination of care and supportive counseling

## 2025-03-18 NOTE — PHYSICAL THERAPY INITIAL EVALUATION ADULT - TRANSFER TRAINING, PT EVAL
GOAL: Patient will perform all transfers independently, in 2 weeks.
GOAL: pt will complete all transfers modA and rolling walker in 4wks.

## 2025-03-18 NOTE — CONSULT NOTE ADULT - PROBLEM SELECTOR RECOMMENDATION 2
US noted for severe right stenosis of the popleteal artery , left with segmental occlusions of the superficial femoral artery  Previously seen by vascular and no surgical intervention was offered, heparin drip stopped given lack of thrombus and RP bleed

## 2025-03-18 NOTE — PROGRESS NOTE ADULT - PROBLEM SELECTOR PLAN 8
Unclear where pain is localized at this time given somnolence. Likely bilateral legs with ulcer and generalized.   - Follow up palliative care recommendations   - Gabapentin 100 mg TID   - Oxycodone IR 2.5 mg q6h PRN for moderate pain  - Dilaudid IV 0.2 mg q4h PRN for severe pain   - Also with nausea; use alcohol pads to help as we are avoiding QTc prolonging agents, as QTc 520-540 range  - Lidocaine patch daily

## 2025-03-18 NOTE — OCCUPATIONAL THERAPY INITIAL EVALUATION ADULT - LEVEL OF CONSCIOUSNESS, OT EVAL
eyes closed, will open to noxious stimuli however unable to maintain attention falling back asleep/lethargic/somnolent

## 2025-03-18 NOTE — CONSULT NOTE ADULT - PROBLEM SELECTOR RECOMMENDATION 4
PPS 20% needs assistance with all basic needs  Prior was ambulatory with walker, required assistance with dressing, fed self.  Lives with daughter.

## 2025-03-18 NOTE — PHYSICAL THERAPY INITIAL EVALUATION ADULT - GENERAL OBSERVATIONS, REHAB EVAL
Pt a/w acute onset of R shin pain w/o trauma. MICU consulted iso urgent shiley placement for increasing uremia and uremic encephalopathy, requring HD sessions. CLeared by ION Miranda and AM MICU rounds for PT. Pt received supine in bed, +ICU monitoring, +LIJ shiley, +IVL. Pt is lethargic, eyes closed, opens eyes briefly to loud voice/ incr arousal/noxious stimuli, unable to maintain. Pt is A&OX1 to name, mumbled speech at times, mostly nonverbal, follows <25% simple commands.
Pt received semisupine in bed with +heparin drip and +bed alarm. NAD noted.

## 2025-03-18 NOTE — PROGRESS NOTE ADULT - PROBLEM SELECTOR PLAN 6
Hemoglobin a1c 9.4%.   - Hold home oral hypoglycemic medication   - Insulin lispro sliding scale with meals and at bedtime  - Hypoglycemia protocol ordered, goal glucose 140-180 while inpatient

## 2025-03-18 NOTE — PROGRESS NOTE ADULT - SUBJECTIVE AND OBJECTIVE BOX
[facility] Division of Kidney Diseases & Hypertension  FOLLOW UP NOTE  [phone]--------------------------------------------------------------------------------  Chief Complaint: ROB on CKV V    24 hour events/subjective: Pt. seen and examined at bedside earlier today. Family son and daughter are at bedside. Pt. was more awake but received pain medication so sleeping this AM. Pt. tolerated 3rd HD treatment 3/17.    PAST HISTORY  --------------------------------------------------------------------------------  No significant changes to PMH, PSH, FHx, SHx from H&P unless otherwise noted.    ALLERGIES & MEDICATIONS  --------------------------------------------------------------------------------  Allergies    Kiwi (Anaphylaxis)  codeine (Unknown)  penicillins (Anaphylaxis)    Intolerances      Standing Inpatient Medications  chlorhexidine 2% Cloths 1 Application(s) Topical <User Schedule>  dextrose 5%. 1000 milliLiter(s) IV Continuous <Continuous>  dextrose 5%. 1000 milliLiter(s) IV Continuous <Continuous>  dextrose 50% Injectable 25 Gram(s) IV Push once  dextrose 50% Injectable 12.5 Gram(s) IV Push once  dextrose 50% Injectable 25 Gram(s) IV Push once  gabapentin 100 milliGRAM(s) Oral two times a day  heparin   Injectable 5000 Unit(s) SubCutaneous every 12 hours  influenza  Vaccine (HIGH DOSE) 0.5 milliLiter(s) IntraMuscular once  insulin lispro (ADMELOG) corrective regimen sliding scale   SubCutaneous Before meals and at bedtime  levothyroxine Injectable 40 MICROGram(s) IV Push at bedtime  lidocaine   4% Patch 1 Patch Transdermal daily  midodrine 10 milliGRAM(s) Oral every 8 hours  sodium bicarbonate 650 milliGRAM(s) Oral three times a day    PRN Inpatient Medications  acetaminophen     Tablet .. 650 milliGRAM(s) Oral every 6 hours PRN  dextrose Oral Gel 15 Gram(s) Oral once PRN  HYDROmorphone  Injectable 0.2 milliGRAM(s) IV Push every 4 hours PRN  melatonin 3 milliGRAM(s) Oral at bedtime PRN  oxyCODONE    IR 2.5 milliGRAM(s) Oral every 8 hours PRN  sodium chloride 0.9% lock flush 10 milliLiter(s) IV Push every 1 hour PRN      REVIEW OF SYSTEMS  --------------------------------------------------------------------------------    All other systems were reviewed and are negative, except as noted above.    VITALS/PHYSICAL EXAM  --------------------------------------------------------------------------------  T(C): 36.2 (03-18-25 @ 08:00), Max: 36.7 (03-17-25 @ 19:00)  HR: 69 (03-18-25 @ 11:06) (69 - 89)  BP: 116/55 (03-18-25 @ 11:00) (91/54 - 116/56)  RR: 16 (03-18-25 @ 11:06) (13 - 20)  SpO2: 98% (03-18-25 @ 11:06) (94% - 100%)  Wt(kg): --        03-17-25 @ 07:01  -  03-18-25 @ 07:00  --------------------------------------------------------  IN: 975 mL / OUT: 400 mL / NET: 575 mL      Physical Exam:  Gen: elderly, frail, chronically ill appearing.  Pulm: CTA B/L  CV: RRR, S1S2;  Abd: +BS, soft  : No suprapubic tenderness  Extremities: +R>L  Neuro: sleeping.  Access: LIJ non-tunneled HD catheter.     LABS/STUDIES  --------------------------------------------------------------------------------              9.1    20.69 >-----------<  82       [03-18-25 @ 12:13]              26.7     137  |  99  |  48  ----------------------------<  159      [03-17-25 @ 22:39]  4.0   |  22  |  2.73        Ca     8.6     [03-17-25 @ 22:39]      Mg     1.7     [03-17-25 @ 22:39]      Phos  3.5     [03-17-25 @ 22:39]    TPro  5.6  /  Alb  3.1  /  TBili  2.1  /  DBili  x   /  AST  132  /  ALT  84  /  AlkPhos  159  [03-17-25 @ 22:39]    PT/INR: PT 15.2 , INR 1.33       [03-17-25 @ 22:39]  PTT: 31.8       [03-17-25 @ 22:39]      Creatinine Trend:  SCr 2.73 [03-17 @ 22:39]  SCr 3.81 [03-17 @ 00:37]  SCr 5.07 [03-16 @ 01:46]  SCr 7.14 [03-15 @ 06:59]  SCr 7.02 [03-14 @ 18:09]    Urinalysis - [03-17-25 @ 22:39]      Color  / Appearance  / SG  / pH       Gluc 159 / Ketone   / Bili  / Urobili        Blood  / Protein  / Leuk Est  / Nitrite       RBC  / WBC  / Hyaline  / Gran  / Sq Epi  / Non Sq Epi  / Bacteria     Urine Sodium 61      [03-14-25 @ 17:00]  Urine Osmolality 377      [03-14-25 @ 17:00]      HBsAb <3.3      [03-15-25 @ 15:59]  HBsAb Nonreact      [03-15-25 @ 15:59]  HBsAg Nonreact      [03-15-25 @ 15:59]  HCV 0.06, Nonreact      [03-15-25 @ 15:59]

## 2025-03-18 NOTE — OCCUPATIONAL THERAPY INITIAL EVALUATION ADULT - VISUAL ACUITY
unable to assess 2* decreased command follow and lethargy, assisted eyes open however unable to maintain

## 2025-03-18 NOTE — PHYSICAL THERAPY INITIAL EVALUATION ADULT - ADDITIONAL COMMENTS
Pt is a poor historian. As per chart review, patient lives with daughter, in private home, ADL independent prior to admit, home has 5 steps to enter, 1 flight to bedroom, has RW and WC for ambulation, known to [facility] program.
Pt is a poor historian. As per chart review, patient lives with daughter, in private home, ADL independent prior to admit, home has 5 steps to enter, 1 flight to bedroom, has RW and WC for ambulation, known to [facility] program. **Receiving home PT x2 days/week. +HHA services 7 days/6hrs

## 2025-03-18 NOTE — OCCUPATIONAL THERAPY INITIAL EVALUATION ADULT - LIVES WITH, PROFILE
As per pt daughter at bedside, patient lives with daughter in a house, 5 steps to enter bed/bath setup on first floor, has HHA 7hr/day 6 days/week

## 2025-03-18 NOTE — CONSULT NOTE ADULT - PROBLEM SELECTOR RECOMMENDATION 9
ROB on stage V CKD in setting of contrast, entresto and torsemide use, hypotension, poor po intake, r/o retention. On review of [facility], pt. last outpatient SCr was 3.66 (1/2025). Baseline SCr appears to be 3.4-3.6. Outpatient nephrologist is Dr. Parth Barrientos. SCr on admission was elevated at 3.99. SCr has worsened to 6.0 (3/13/25). Urinalysis (3/11) cloudy, 30 protein, large LE, blood, 23 WBC and triple phosphate crystals. 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   - Obtain bladder scan, would place infante catheter if retaining urine.   - Change to renal diet   - Continue to hold torsemide and entresto, avoid further contrast studies.   - Recommend IVF resuscitation with NS 50 cc/hr for 1L.   - Monitor labs and I/Os. Avoid nephrotoxins including, ACE/ARB, NSAIDs, contrast, etc.
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

## 2025-03-18 NOTE — PROGRESS NOTE ADULT - PROBLEM SELECTOR PLAN 2
Has extensive PAD in bilateral legs, newly diagnosed. Was placed on DAPT and heparin drip, held due to RP bleed. Has right shin ulcer.   - Follow up with vascular cardiology regarding medical management of BL LE stenotic lesions  - Wound care consult in AM for right lower extremity wound management likely associated with PAD

## 2025-03-18 NOTE — CONSULT NOTE ADULT - ATTENDING COMMENTS
89 yo F with PMHx of DM on insulin, CAD s/p PCI, HFrEF (LVEF 15-20%), CKD stage 5 who presented with RLE pain.     CTA with bilateral politeal occlusions, severe tibial disease  Was placed on a heparin gtt initially for concern for ALI, then developed large RP hematomas  Course also complicated by renal failure and HD, uremia    -aggressive medical therapy with statin, baby ASA (once stable from a bleeding perspective can likely also put on Xarelto 2.5mg bid)  -given co-morbidities, age, clinical course with RP bleed, initiation of HD too high risk for endovascular procedures  -wound care, pain management, monitor for signs of infection
as above  trend h/h, xfuse as needed    if continued concern for bleeding rec IR eval
harshil on ckd   #harshil likely vol depletion- on diuretic and entresto +contrast  r/o retention- check bladder scan  atn vs prerenal  trial IVF today  hold diuretic  hold entresto  check ua, u na and u cr  monitor cr trends  #hyperkalemia  was on entresto  lokelma  check bladder scan for retention  monitor k this evening  #met acidosis  monitor bicarb trend    d/w pt daughter  in detail  d/w med team

## 2025-03-18 NOTE — CONSULT NOTE ADULT - ASSESSMENT
89F PMHx HTN, DM on insulin, CAD s/p PCI, HFrEF (15-20%, mild to mod reduced RV function, severe TR from tethering), CKD P/w acute onset of RLE pain, found to have severe PAD (occluded R popliteal and L PT/peroneal/AT/DP), course c/b renal failure and uremia, requiring HD. Also c/b interval development of two large right retroperitoneal hematomas on 3/15/25 with large drop in Hg in the setting of heparin gtt. Vascular cardiology consulted to assist in medical management of PAD.     CTA w runoff showing questionable bilateral popliteal artery occlusion on delayed phase, abnormal runoff. B/l trifurcation calcified, limited evaluation. B/l calf and foot soft tissue edema.   BLE arterial duplex: b/l arterial vascular stenotic and occlusive disease; severe proximal R pop stenosis and occluded distally. Occluded right PT, peroneal, AT, DP arteries. Left segmental occlusions of the SFA, occluded left PT, peroneal, AT, DP arteries.     Recs:  -no intervention planned for severe occlusive PAD bilaterally  -antiplatelets on hold in the setting of RP bleed  -atorvastatin on hold for rising LFTs  -A1C 9.4, LDL 35  -guarded prognosis in the setting of multiorgan failure, recommend aggressive goals of care 89F PMHx HTN, DM on insulin, CAD s/p PCI, HFrEF (15-20%, mild to mod reduced RV function, severe TR from tethering), CKD P/w acute onset of RLE pain, found to have severe PAD (occluded R popliteal and L PT/peroneal/AT/DP), course c/b renal failure and uremia, requiring HD. Also c/b interval development of two large right retroperitoneal hematomas on 3/15/25 with large drop in Hg in the setting of heparin gtt. Vascular cardiology consulted to assist in medical management of PAD.     CTA w runoff showing questionable bilateral popliteal artery occlusion on delayed phase, abnormal runoff. B/l trifurcation calcified, limited evaluation. B/l calf and foot soft tissue edema.   BLE arterial duplex: b/l arterial vascular stenotic and occlusive disease; severe proximal R pop stenosis and occluded distally. Occluded right PT, peroneal, AT, DP arteries. Left segmental occlusions of the SFA, occluded left PT, peroneal, AT, DP arteries.     Recs:  -no endovascular intervention at this time  -antiplatelets on hold in the setting of RP bleed, would start ASA 81 tomorrow if tolerating subQ heparin  -if tolerating ASA from bleeding perspective, can start xarelto 2.5mg BID for PAD   -atorvastatin on hold for rising LFTs, restart when able  -wound care for LE wounds, recommend offloading boot for heel pressure injuries  -recommend pain control management and PT  -guarded prognosis, recommend goals of care  -monitor for signs of gangrene/infection

## 2025-03-18 NOTE — CONSULT NOTE ADULT - SUBJECTIVE AND OBJECTIVE BOX
Follow up Vascular Visit       Date of Service:01/29/25      Chief Complaint: left stump non-healing surgical wound      Pt returns to [facility] with regards to their left stump non-healing surgical wound.  They arrive today with spouse; he arrives in a w/c. He missed his last visit with me due to car issues. He has new insurance this year we ran his insurance for epifix and this was denied. They are currently using Vashe; lotrisone to rash areas; gentamicin ointment; aquacel ag; gauze; rolled gauze to the wounds. This is being done by staff at clinic or his spouse. They are using tubular compression with short stretch for compression. They are feeling well today. Denies fevers, chills. No shortness of breath.     Allergies:   Allergies   Allergen Reactions    Bees     Sulfamethoxazole Swelling     throat       Medications:   Current Outpatient Medications:     acetaminophen (TYLENOL) 325 MG tablet, Take 2 tablets (650 mg) by mouth every 4 hours as needed for other (mild pain), Disp: 100 tablet, Rfl: 0    amLODIPine (NORVASC) 2.5 MG tablet, TAKE ONE TABLET BY MOUTH ONCE DAILY, Disp: 90 tablet, Rfl: 3    aspirin 81 MG EC tablet, Take 1 tablet (81 mg) by mouth 2 times daily, Disp: 60 tablet, Rfl: 0    atorvastatin (LIPITOR) 20 MG tablet, TAKE ONE TABLET BY MOUTH ONCE DAILY IN THE EVENING, Disp: 90 tablet, Rfl: 3    BD PEN NEEDLE YENNY 2ND GEN 32G X 4 MM miscellaneous, USE 1 PEN NEEDLES DAILY OR AS DIRECTED., Disp: 100 each, Rfl: 2    blood glucose (ACCU-CHEK GANGA) test strip, Use to test blood sugar 1 times daily or as directed., Disp: 100 strip, Rfl: 3    blood glucose monitoring (ACCU-CHEK GANGA PLUS) meter device kit, USE TO TEST BLOOD SUGAR 1 TIMES DAILY OR AS DIRECTED., Disp: , Rfl: 0    blood glucose monitoring (ACCU-CHEK MULTICLIX) lancets, USE TO TEST BLOOD SUGAR 1 TIMES DAILY OR AS DIRECTED., Disp: , Rfl: 3    clotrimazole (LOTRIMIN) 1 % external cream, Apply topically 2 times  daily. Apply to left stump rash BID, Disp: 60 g, Rfl: 2    EPINEPHrine (ANY BX GENERIC EQUIV) 0.3 MG/0.3ML injection 2-pack, INJECT 0.3MG DOSE INTRAMUSCULARLY INTO THE OUTER THIGH MUSCLE ONCE AS DIRECTED AS NEEDED FOR ANAPHYLAXIS, Disp: 2 each, Rfl: 1    hydrochlorothiazide (HYDRODIURIL) 25 MG tablet, TAKE ONE TABLET (25 mg)  BY MOUTH ONCE DAILY, Disp: 90 tablet, Rfl: 3    Insulin Glargine-yfgn 100 UNIT/ML SOPN, Inject 64 Units subcutaneously at bedtime., Disp: 60 mL, Rfl: 3    losartan (COZAAR) 100 MG tablet, TAKE ONE TABLET BY MOUTH ONCE DAILY, Disp: 90 tablet, Rfl: 3    metFORMIN (GLUCOPHAGE XR) 500 MG 24 hr tablet, TAKE TWO TABLETS BY MOUTH TWICE DAILY WITH MEALS, Disp: 360 tablet, Rfl: 3    metoprolol succinate ER (TOPROL XL) 200 MG 24 hr tablet, TAKE ONE TABLET BY MOUTH ONCE DAILY, Disp: 90 tablet, Rfl: 3    mupirocin (BACTROBAN) 2 % external ointment, Apply topically 3 times daily., Disp: 30 g, Rfl: 3    Current Facility-Administered Medications:     lidocaine (XYLOCAINE) 5 % ointment, , Topical, Daily PRN, Joyce Andres, NP, Given at 11/27/24 0850    lidocaine (XYLOCAINE) 5 % ointment, , Topical, Daily PRN, Joyce Andres, NP, Given at 10/09/24 1312    lidocaine (XYLOCAINE) 5 % ointment, , Topical, Daily PRN, Joyce Andres, NP    History:   Past Medical History:   Diagnosis Date    Diabetes (H) 1-2017    Hypertension 1-2017    Septic arthritis of right foot (H) 9/26/2023       Physical Exam:    There were no vitals taken for this visit.    General:  Patient presents to clinic in no apparent distress.  Head: normocephalic atraumatic  Psychiatric:  Alert and oriented x3.   Respiratory: unlabored breathing; no cough  Integumentary:  Skin is uniformly warm, dry and pink.    Ulcer #1 Location: left stump  Size: 2L x 1W x 0.1depth.  no sinus tract present, Wound base: initially with thick crust this was removed; to reveal red wound bed  no undermining present. Ulcer is full thickness. There is moderate drainage.  Periwound: no denudement, erythema, induration, maceration or warmth.      VASC Wound Left bka (Active)   Pre Size Length 0.8 01/15/25 0700   Pre Size Width 1.8 01/15/25 0700   Pre Size Depth 0.1 01/15/25 0700   Pre Total Sq cm 0.8 01/15/25 0700   Post Size Length 0.9 01/15/25 0700   Post Size Width 1.1 01/15/25 0700   Post Size Depth 0.1 01/15/25 0700   Post Total Sq cm 0.99 01/15/25 0700   Tunneling 4.5cm at 12 o'clock 08/14/24 0800   Number of days: 168       VASC Wound left lateral bka site (Active)   Pre Size Length 0.2 01/15/25 0700   Pre Size Width 0.2 01/15/25 0700   Pre Size Depth 0.1 01/15/25 0700   Pre Total Sq cm 0.04 01/15/25 0700   Post Size Length 0.3 01/15/25 0700   Post Size Width 0.7 01/15/25 0700   Post Size Depth 0.1 01/15/25 0700   Post Total Sq cm 0.21 01/15/25 0700   Number of days:        VASC Wound left medial BKA site (Active)   Pre Size Length 0 01/15/25 0700   Pre Size Width 0 01/15/25 0700   Pre Size Depth 0 01/15/25 0700   Pre Total Sq cm 0 01/15/25 0700   Post Size Length 0.2 01/07/25 0700   Post Size Width 0.2 01/07/25 0700   Post Size Depth 0.1 01/07/25 0700   Post Total Sq cm 0.04 01/07/25 0700   Number of days:        Incision/Surgical Site 07/09/24 Left Tibial (Active)   Number of days: 204            Circumferential volume measures:           No data to display                Labs:    I personally reviewed the following lab results today and those on care everywhere    CRP Inflammation   Date Value Ref Range Status   11/08/2019 22.2 (H) 0.0 - 8.0 mg/L Final      Sed Rate   Date Value Ref Range Status   11/08/2019 33 (H) 0 - 20 mm/h Final     Erythrocyte Sedimentation Rate   Date Value Ref Range Status   09/25/2023 81 (H) 0 - 20 mm/hr Final      Last Renal Panel:  Sodium   Date Value Ref Range Status   08/29/2024 132 (L) 135 - 145 mmol/L Final   12/04/2020 138 133 - 144 mmol/L Final     Potassium   Date Value Ref Range Status   08/29/2024 4.7 3.4 - 5.3 mmol/L Final   08/09/2021  4.9 3.4 - 5.3 mmol/L Final   12/04/2020 4.5 3.4 - 5.3 mmol/L Final     Chloride   Date Value Ref Range Status   08/29/2024 94 (L) 98 - 107 mmol/L Final   07/23/2021 101 98 - 107 mmol/L Final     Comment:     Last Lab Result: LDL Cholesterol Calculated (mg/dL   12/04/2020 103 94 - 109 mmol/L Final     Carbon Dioxide   Date Value Ref Range Status   12/04/2020 28 20 - 32 mmol/L Final     Carbon Dioxide (CO2)   Date Value Ref Range Status   08/29/2024 26 22 - 29 mmol/L Final   07/23/2021 26 22 - 31 mmol/L Final     Comment:     Last Lab Result: LDL Cholesterol Calculated (mg/dL     Anion Gap   Date Value Ref Range Status   08/29/2024 12 7 - 15 mmol/L Final   07/23/2021 12 5 - 18 mmol/L Final   12/04/2020 7 3 - 14 mmol/L Final     Glucose   Date Value Ref Range Status   08/29/2024 143 (H) 70 - 99 mg/dL Final   07/23/2021 137 (H) 70 - 125 mg/dL Final     Comment:     Last Lab Result: LDL Cholesterol Calculated (mg/dL   12/04/2020 269 (H) 70 - 99 mg/dL Final     Comment:     Fasting specimen     GLUCOSE BY METER POCT   Date Value Ref Range Status   07/09/2024 70 70 - 99 mg/dL Final     Urea Nitrogen   Date Value Ref Range Status   08/29/2024 25.2 (H) 8.0 - 23.0 mg/dL Final   07/23/2021 18 8 - 22 mg/dL Final     Comment:     Last Lab Result: LDL Cholesterol Calculated (mg/dL   12/04/2020 25 7 - 30 mg/dL Final     Creatinine   Date Value Ref Range Status   08/29/2024 0.69 0.67 - 1.17 mg/dL Final   12/04/2020 0.92 0.66 - 1.25 mg/dL Final     GFR Estimate   Date Value Ref Range Status   08/29/2024 >90 >60 mL/min/1.73m2 Final     Comment:     eGFR calculated using 2021 CKD-EPI equation.   12/04/2020 >90 >60 mL/min/[1.73_m2] Final     Comment:     Non  GFR Calc  Starting 12/18/2018, serum creatinine based estimated GFR (eGFR) will be   calculated using the Chronic Kidney Disease Epidemiology Collaboration   (CKD-EPI) equation.       Calcium   Date Value Ref Range Status   08/29/2024 9.5 8.8 - 10.4 mg/dL Final      "Comment:     Reference intervals for this test were updated on 7/16/2024 to reflect our healthy population more accurately. There may be differences in the flagging of prior results with similar values performed with this method. Those prior results can be interpreted in the context of the updated reference intervals.   12/04/2020 8.8 8.5 - 10.1 mg/dL Final     Albumin   Date Value Ref Range Status   09/25/2023 3.2 (L) 3.5 - 5.2 g/dL Final   07/23/2021 4.1 3.5 - 5.0 g/dL Final     Comment:     Last Lab Result: LDL Cholesterol Calculated (mg/dL   12/04/2020 3.4 3.4 - 5.0 g/dL Final      Lab Results   Component Value Date    WBC 14.4 09/20/2024    WBC 11.1 11/08/2019     Lab Results   Component Value Date    RBC 4.46 09/20/2024    RBC 4.68 11/08/2019     Lab Results   Component Value Date    HGB 11.5 09/20/2024    HGB 12.8 11/08/2019     Lab Results   Component Value Date    HCT 35.7 09/20/2024    HCT 40.0 11/08/2019     No components found for: \"MCT\"  Lab Results   Component Value Date    MCV 80 09/20/2024    MCV 86 11/08/2019     Lab Results   Component Value Date    MCH 25.8 09/20/2024    MCH 27.4 11/08/2019     Lab Results   Component Value Date    MCHC 32.2 09/20/2024    MCHC 32.0 11/08/2019     Lab Results   Component Value Date    RDW 14.5 09/20/2024    RDW 13.8 11/08/2019     Lab Results   Component Value Date     09/20/2024     11/08/2019      Lab Results   Component Value Date    A1C 6.3 01/15/2025    A1C 5.0 08/29/2024    A1C 6.7 09/25/2023    A1C 6.6 08/01/2022    A1C 7.5 03/25/2022    A1C 9.6 12/04/2020    A1C 9.0 07/21/2020    A1C 7.8 11/08/2019    A1C 6.5 11/29/2018    A1C 7.3 06/28/2017      TSH   Date Value Ref Range Status   11/29/2018 3.07 0.40 - 4.00 mU/L Final      No results found for: \"VITDT\"                Impression:  Encounter Diagnoses   Name Primary?    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 Debridement: Nursing staff removed the old dressing and cleanse the wound(s) with specified solution. After discussion of risk factors and verbal consent was obtained 2% Lidocaine HCL jelly was applied, under clean conditions, the left stump ulceration(s) were debrided using currette. Devitalized and nonviable tissue, along with any fibrin and slough, was removed to improve granulation tissue formation, stimulate wound healing, decrease overall bacteria load, disrupt biofilm formation and decrease edge senescence.  Total excisional debridement was 2 sq cm from the epidermis/dermis area and into the subcutaneous tissue with a depth of 0.1 cm.   Ulcers were improved afterwards and .  Measures were as noted on the flow sheet.       2.  Ulcer treatment: ulcer treatment will include irrigation and dressings to promote autolytic debridement which will include:not covered for epifix; will have wife change dressing every other day If for some reason the patient is not able to get their dressing(s) changed as outlined above (due to illness, lack of supplies, lack of help) please do the following: remove old, soiled dressings; wash the ulcers with saline; pat dry; apply ABD pad or other absorbant pad and secure with rolled gauze; avoid tape directly on your skin; patient instructed to call the clinic as soon as possible to let us know what the current issues are in receiving ulcer care. Stable            3. Edema: continue with tubular compression and short stretch. The compression wraps were applied today in clinic.     Patient presents with mild, left  lower extremity secondary lymphedema.     Custom-fit is necessary to  accommodate and deliver gradient compression throughout the affected extremities due to their abnormal, disproportionate shape.              4. Nutrition: focus on diabetes control; A1c was stable           5. Offloading: no prosthesis wearing until well healed          6. Wound Etiology: surgical     Patient will follow up with me in 2-3 weeks for reevaluation. They were instructed to call the clinic sooner with any signs or symptoms of infection or any further questions/concerns. Answered all questions.          Joyce Andres DNP, RN, CNP, CWOCN, CFCN, CLT  [facility]   [phone]        This note was electronically signed by Joyce Andres NP   HPI:  89F PMHx HTN, DM on insulin, CAD s/p PCI, HFrEF. P/w acute onset of R shin pain w/o trauma 3hr PTA. Of note, pt has chronic b/l wounds, seen by wound care clinic, but never saw vascular surg before. Denies f/c, cp, sob, abd pain, n/v/d.  In the ED, afebrile, HR 50-70, BP stable, saturates well on RA while awake, desats to high 80s while asleep. Pt is not on O2 at baseline.   CBC w/ wbc 5.8, hgb 9.3 (was 10.1 in '19), plt 104 (was wnl in '19).   Coag w/ INR 1.42  CMP w/ SCr 3.99 (was 1.16 in '19), alk phos 184.   VBG w/ lactate 1.6.     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 Partially imagined heart enlarged and abnormal.     Vasc surg c/s in the ED: no acute intervention hep gtt, SAPPHIRE/PVRs.     *of note, [facility]IE unavailable in pt's chart and i was unable to review.  (11 Mar 2025 00:50)    PERTINENT PM/SXH:   HTN (hypertension)    Dyslipidemia    Diabetes mellitus    Palpitations    STEMI (ST elevation myocardial infarction)    CHF (congestive heart failure)      S/P cholecystectomy    S/P appendectomy    S/P lumpectomy, left breast    S/P tonsillectomy    S/P cardiac cath      FAMILY HISTORY:    Family Hx substance abuse [ ]yes [ ]no  ITEMS NOT CHECKED ARE NOT PRESENT    SOCIAL HISTORY:   Significant other/partner[ ]  Children[x ]  Evangelical/Spirituality:  Substance hx:  [ ]   Tobacco hx:  [ ]   Alcohol hx: [ ]   Home Opioid hx:  [ ] I-Stop Reference No:  Living Situation: [x ]Home  [ ]Long term care  [ ]Rehab [ ]Other    ADVANCE DIRECTIVES:    DNR/MOLST  [x ]  Living Will  [ ]   DECISION MAKER(s):  [ ] Health Care Proxy(s)  [x ] Surrogate(s)  [ ] Guardian           Name(s): Phone Number(s):  Children:  Neville and Chasity  BASELINE (I)ADL(s) (prior to admission):  [place]: [ ]Total  [x ] Moderate [ ]Dependent    Allergies    Kiwi (Anaphylaxis)  codeine (Unknown)  penicillins (Anaphylaxis)    Intolerances    MEDICATIONS  (STANDING):  chlorhexidine 2% Cloths 1 Application(s) Topical <User Schedule>  dextrose 5%. 1000 milliLiter(s) (50 mL/Hr) IV Continuous <Continuous>  dextrose 5%. 1000 milliLiter(s) (100 mL/Hr) IV Continuous <Continuous>  dextrose 50% Injectable 25 Gram(s) IV Push once  dextrose 50% Injectable 12.5 Gram(s) IV Push once  dextrose 50% Injectable 25 Gram(s) IV Push once  gabapentin 100 milliGRAM(s) Oral two times a day  heparin   Injectable 5000 Unit(s) SubCutaneous every 12 hours  influenza  Vaccine (HIGH DOSE) 0.5 milliLiter(s) IntraMuscular once  insulin lispro (ADMELOG) corrective regimen sliding scale   SubCutaneous Before meals and at bedtime  levothyroxine Injectable 40 MICROGram(s) IV Push at bedtime  lidocaine   4% Patch 1 Patch Transdermal daily  midodrine 10 milliGRAM(s) Oral every 8 hours  sodium bicarbonate 650 milliGRAM(s) Oral three times a day    MEDICATIONS  (PRN):  acetaminophen     Tablet .. 650 milliGRAM(s) Oral every 6 hours PRN Temp greater or equal to 38C (100.4F), Mild Pain (1 - 3)  dextrose Oral Gel 15 Gram(s) Oral once PRN Blood Glucose LESS THAN 70 milliGRAM(s)/deciliter  HYDROmorphone  Injectable 0.2 milliGRAM(s) IV Push every 4 hours PRN Severe Pain (7 - 10)  melatonin 3 milliGRAM(s) Oral at bedtime PRN Insomnia  oxyCODONE    IR 2.5 milliGRAM(s) Oral every 8 hours PRN Moderate Pain (4 - 6)  sodium chloride 0.9% lock flush 10 milliLiter(s) IV Push every 1 hour PRN Pre/post blood products, medications, blood draw, and to maintain line patency    PRESENT SYMPTOMS: [ ]Unable to self-report  [ ] CPOT [ ] PAINADs [ ] RDOS  Source if other than patient:  [ ]Family   [ ]Team     Pain: [ ]yes [x ]no  QOL impact -   Location -                    Aggravating factors -  Quality -  Radiation -  Timing-  Severity (0-10 scale):  Minimal acceptable level (0-10 scale):     CPOT:    https://www.sccm.org/getattachment/fth96y20-0w5x-5p6y-1p5q-6794e6956g7l/Critical-Care-Pain-Observation-Tool-(CPOT)    PAIN AD Score:   [email or web address] (press ctrl +  left click to view)    Dyspnea:                           [ x]Mild [ ]Moderate [ ]Severe      RDOS:  0 to 2  minimal or no respiratory distress 0  3  mild distress  4 to 6 moderate distress  >7 severe distress  https://[email or web address].[email or web address]/handouts/hen/Respiratory_Distress_Observation_Scale.pdf (Ctrl +  left click to view)     Anxiety:                             [x ]Mild [ ]Moderate [ ]Severe  Fatigue:                             [ ]Mild [ x]Moderate [ ]Severe  Nausea:                             [ ]Mild [ ]Moderate [ ]Severe  Loss of appetite:              [ ]Mild [x ]Moderate [ ]Severe  Constipation:                    [ ]Mild [ ]Moderate [ ]Severe    PCSSQ [Palliative Care Spiritual Screening Question]   Severity (0-10):3  Score of 4 or > indicate consideration of Chaplaincy referral.  Chaplaincy Referral: [ ] yes [ ] refused [ ] following  xx deferred     Caregiver [place]? : [ x] yes [ ] no  Social work referral [ ] Patient & Family Centered Care Referral [ ]     Anticipatory Grief Present?: [x ] yes [ ] no  Social work referral [ ]  Patient & Family Centered Care Referral [ ]       Other Symptoms:  [ x]All other review of systems negative     Palliative Performance Status Version 2:       20  %    http://[email or web address] [email or web address]/files/news/palliative_performance_scale_[email or web address].pdf  PHYSICAL EXAM:  Vital Signs Last 24 Hrs  T(C): 36.2 (18 Mar 2025 08:00), Max: 36.7 (17 Mar 2025 19:00)  T(F): 97.2 (18 Mar 2025 08:00), Max: 98.1 (17 Mar 2025 19:00)  HR: 73 (18 Mar 2025 11:00) (69 - 89)  BP: 116/55 (18 Mar 2025 11:00) (91/54 - 116/56)  BP(mean): 79 (18 Mar 2025 11:00) (69 - 80)  RR: 16 (18 Mar 2025 11:00) (13 - 20)  SpO2: 98% (18 Mar 2025 11:00) (94% - 100%)    Parameters below as of 18 Mar 2025 08:00  Patient On (Oxygen Delivery Method): room air     I&O's Summary    17 Mar 2025 07:01  -  18 Mar 2025 07:00  --------------------------------------------------------  IN: 975 mL / OUT: 400 mL / NET: 575 mL      GENERAL: [ ]Cachexia    [ ]Alert  [ ]Oriented x   [ x]Lethargic  [ ]Unarousable  [ ]Verbal  [ ]Non-Verbal  Behavioral:   [ ] Anxiety  [ ] Delirium [ ] Agitation [x ] Other  HEENT:  [ ]Normal   [x ]Dry mouth   [ ]ET Tube/Trach  [ ]Oral lesions  PULMONARY:   [x ]Clear [ ]Tachypnea  [ ]Audible excessive secretions   [ ]Rhonchi        [ ]Right [ ]Left [ ]Bilateral  [ ]Crackles        [ ]Right [ ]Left [ ]Bilateral  [ ]Wheezing     [ ]Right [ ]Left [ ]Bilateral  [ ]Diminished breath sounds [ ]right [ ]left [ ]bilateral  CARDIOVASCULAR:    [x ]Regular [ ]Irregular [ ]Tachy  [ ]Cam [ ]Murmur [ ]Other  GASTROINTESTINAL:  [x ]Soft  [ ]Distended   [x ]+BS  [ ]Non tender [ ]Tender  [ ]Other [ ]PEG [ ]OGT/ NGT  Last BM:  GENITOURINARY:  [ ]Normal [x ] Incontinent   [ ]Oliguria/Anuria   [ ][place]  MUSCULOSKELETAL:   [ ]Normal   [x ]Weakness  [x ]Bed/Wheelchair bound [ ]Edema  NEUROLOGIC:   [ x]No focal deficits  [ ]Cognitive impairment  [ ]Dysphagia [ ]Dysarthria [ ]Paresis [ ]Other   SKIN:   [ x]Normal  [ ]Rash  [ ]Other  [ ]Pressure ulcer(s)       Present on admission [ ]y [ ]n    CRITICAL CARE:  [ ] Shock Present  [ ]Septic [ ]Cardiogenic [ ]Neurologic [ ]Hypovolemic  [ ]  Vasopressors [ ]  Inotropes   [ ]Respiratory failure present [ ]Mechanical ventilation [ ]Non-invasive ventilatory support [ ]High flow    [ ]Acute  [ ]Chronic [ ]Hypoxic  [ ]Hypercarbic [ ]Other  [ x]Other organ failure     LABS:                        8.8    15.85 )-----------( 79       ( 17 Mar 2025 22:39 )             25.4   03-17    137  |  99  |  48[H]  ----------------------------<  159[H]  4.0   |  22  |  2.73[H]    Ca    8.6      17 Mar 2025 22:39  Phos  3.5     03-17  Mg     1.7     03-17    TPro  5.6[L]  /  Alb  3.1[L]  /  TBili  2.1[H]  /  DBili  x   /  AST  132[H]  /  ALT  84[H]  /  AlkPhos  159[H]  03-17  PT/INR - ( 17 Mar 2025 22:39 )   PT: 15.2 sec;   INR: 1.33 ratio         PTT - ( 17 Mar 2025 22:39 )  PTT:31.8 sec    Urinalysis Basic - ( 17 Mar 2025 22:39 )    Color: x / Appearance: x / SG: x / pH: x  Gluc: 159 mg/dL / Ketone: x  / Bili: x / Urobili: x   Blood: x / Protein: x / Nitrite: x   Leuk Esterase: x / RBC: x / WBC x   Sq Epi: x / Non Sq Epi: x / Bacteria: x      RADIOLOGY & ADDITIONAL STUDIES:    < from: US Abdomen Upper Quadrant Right (03.17.25 @ 15:55) >  ACC: [id number] EXAM:  US ABDOMEN RT UPR QUADRANT   ORDERED BY:  JANINE GAMBOA     PROCEDURE DATE:  03/17/2025          INTERPRETATION:  CLINICAL INFORMATION: 89 year old female with elevated   liver function tests.    COMPARISON: Renal ultrasound 3/13/2025. CT abdomen pelvis 2/15/2025.    TECHNIQUE: Portable sonography of the right upper quadrant.    FINDINGS:  Liver: No focal abnormality is identified.  Bile ducts: Common bile duct is mildly dilated and measures 9 mm. The   distal segment of the duct is obscured by bowel gas and is not   visualized. No intrahepatic biliary dilatation is seen.  Gallbladder: Cholecystectomy.  Pancreas: Visualized portions are within normal limits.  Right kidney: 9.0 cm. No hydronephrosis. Similar-appearing septated   interpolar cyst measuring 2.5 x 2.3 x 2.6 cm.  Ascites: None.  IVC: Visualized portions are within normal limits.    Other: Large complex heterogeneous collection inferior to the right   kidney which likely represents the retroperitoneal hematoma better   appreciated on the recent  CT. The visualized portion on this examination   measures approximately 14.3 x 8.7 x 13.2 cm.    IMPRESSION:    1. Status post cholecystectomy.  2. The common bile duct is mildly dilated measuring up to approximately   9.4 mm. The distal duct is obscured by bowel gas and is not visualized on   this exam. No significant intrahepatic biliary dilatation is seen. This   may reflect postcholecystectomy change. Suggest correlation with liver   function tests andfurther evaluation as clinically warranted.  3. Redemonstrated large right infrarenal retroperitoneal hematoma, better   assessed on the prior CT.      < end of copied text >      PROTEIN CALORIE MALNUTRITION PRESENT: [ ]mild [ ]moderate [ ]severe [ ]underweight [ ]morbid obesity  https://www.andeal.org/vault/2440/web/files/ONC/Table_Clinical%20Characteristics%20to%20Document%20Malnutrition-White%20JV%20et%20al%202012.pdf    Height (cm): 152.4 (03-15-25 @ 18:37)  Weight (kg): 68.8 (03-15-25 @ 18:37)  BMI (kg/m2): 29.6 (03-15-25 @ 18:37)    [ ]PPSV2 < or = to 30% [ ]significant weight loss  [x ]poor nutritional intake  [ ]anasarca[ ]Artificial Nutrition      Other REFERRALS:  [ ]Hospice  [ ]Child Life  [ x]Social Work  [ ]Case management [ ]Holistic Therapy

## 2025-03-18 NOTE — PHYSICAL THERAPY INITIAL EVALUATION ADULT - LEVEL OF CONSCIOUSNESS, REHAB EVAL
eyes closed, will open to noxious stimuli however unable to maintain attention falling back asleep/lethargic/somnolent eyes closed, will open to voice/alert/lethargic/somnolent

## 2025-03-18 NOTE — PROGRESS NOTE ADULT - ASSESSMENT
ASSESSMENT: 89F PMHx HTN, DM on insulin, CAD s/p PCI, HFrEF. P/w acute onset of R shin pain w/o trauma. MICU consulted iso urgent shiley placement for increasing uremia and uremic encephalopathy       ===NEURO===  JAKUB, back to baseline mental status. Uremic encephalopathy now resolved     ===CV===  #HTN  #CAD s/p stent placement (2014) LAD  #HFrEF (20% 2019)  - hold Entresto, Metoprolol due to ROB and borderline BP  - Was on DAPT prior to admission, hold iso bleed  - Trend I/Os and daily weights, and Cr      #Popliteal Occlusion  - US Arterial: Right severe stenosis of the popliteal artery proximally followed a segmental occlusion of the popliteal artery distally. The right posterior tibial, peroneal, anterior tibial and dorsal pedis arteries are all occluded.   - On the left, there are segmental occlusions of the superficial femoral artery in the small and distal thigh. The left posterior tibial peroneal trunk, posterior tibial, peroneal, anterior tibial and dorsal pedis arteries are occluded  - Was previously seen by vascular and rec no surgical intervention and started on heparin gtt  - Hold DAPT and heparin iso bleed  - resume statin once able to tolerate oral    ===PULM===  - No active issues    ===RENAL===  #ROB on CKD stage IV now on HD  #Uremic platelet dysfunction   s/p iHD 3/15, 3/16, 3/17  - HD per renal    ===GI===  #Diet: soft and bite sized   #Stress Ulcer/GI Prophylaxis: PPI HELD    #Elevated LFTs  - Unclear etiology  - RUQ US showing mildly dilated CBD  - resume statin when LFTs improve     ===ENDO===  #DM2: HbA1c 9.4  Home regimen: 12U Lantus/Humalog 4U   - q6h MDSS    #Hypothyroidism  - C/w IV 40mg synthroid    ===METABOLIC===  #Electrolyte abnormalities  #Hyperkalemia  - On HD  - No oral access due to AAOx0, can consider Lokelma +/- bicarb if able to tolerate PO    ===HEMATOLOGIC===  #Thrombocytopenia  #Anemia iso RP bleed   - Transfusion count: pRBC: 3u (3/15) + 1u (3/16); Plt: 1u (3/16); FFP: 1u (3/16)  - s/p DDAVP x 2 and 1 FFP, 1 PLT, and 4u pRBC total   - follow up IR and vascular reccs     ===ID===  #UTI  - Enterococcus + E Coli on UCx  - CFX (3/14-3/16), vancomycin (3/15-3/19), trend levels     ===ETHICS===  Code Status: FULL CODE    ASSESSMENT: 89F PMHx HTN, DM on insulin, CAD s/p PCI, HFrEF. P/w acute onset of R shin pain w/o trauma. MICU consulted iso urgent shiley placement for increasing uremia and uremic encephalopathy       ===NEURO===  #Pain related to PVD  - oxycodone 5 mg q6h prn for moderate pain, dilaudid IV 0.2 mg q4h for severe pain  - has nausea with dilaudid, however should avoid zofran given QTc prolonged to 520-540 range. Can give alcohol swab or anti-emetic that does not prolong QTc    ===CV===  #HTN  #CAD s/p stent placement (2014) LAD  #HFrEF (20% 2019)  - hold Entresto, Metoprolol due to ROB and borderline BP  - Was on DAPT prior to admission, hold iso bleed  - Trend I/Os and daily weights, and Cr      #Popliteal Occlusion  - US Arterial: Right severe stenosis of the popliteal artery proximally followed a segmental occlusion of the popliteal artery distally. The right posterior tibial, peroneal, anterior tibial and dorsal pedis arteries are all occluded.   - On the left, there are segmental occlusions of the superficial femoral artery in the small and distal thigh. The left posterior tibial peroneal trunk, posterior tibial, peroneal, anterior tibial and dorsal pedis arteries are occluded  - Was previously seen by vascular and rec no surgical intervention and started on heparin gtt  - Hold DAPT and heparin iso bleed  - resume statin once able to tolerate oral    ===PULM===  - No active issues    ===RENAL===  #ROB on CKD stage IV now on HD  #Uremic platelet dysfunction   s/p iHD 3/15, 3/16, 3/17  - HD per renal    ===GI===  #Diet: soft and bite sized   #Stress Ulcer/GI Prophylaxis: PPI HELD    #Elevated LFTs  - Unclear etiology  - RUQ US showing mildly dilated CBD  - resume statin when LFTs improve     ===ENDO===  #DM2: HbA1c 9.4  Home regimen: 12U Lantus/Humalog 4U   - q6h MDSS    #Hypothyroidism  - C/w IV 40mg synthroid    ===METABOLIC===  #Electrolyte abnormalities  #Hyperkalemia  - On HD  - No oral access due to AAOx0, can consider Lokelma +/- bicarb if able to tolerate PO    ===HEMATOLOGIC===  #Thrombocytopenia  #Anemia iso RP bleed   - Transfusion count: pRBC: 3u (3/15) + 1u (3/16); Plt: 1u (3/16); FFP: 1u (3/16)  - s/p DDAVP x 2 and 1 FFP, 1 PLT, and 4u pRBC total   - follow up IR and vascular reccs     ===ID===  #UTI  - Enterococcus + E Coli on UCx  - CFX (3/14-3/16), vancomycin (3/15-3/19), trend levels     ===ETHICS===  Code Status: FULL CODE    ASSESSMENT: 89F PMHx HTN, DM on insulin, CAD s/p PCI, HFrEF. P/w acute onset of R shin pain w/o trauma. MICU consulted iso urgent shiley placement for increasing uremia and uremic encephalopathy       ===NEURO===  #Pain related to PVD  - oxycodone 5 mg q6h prn for moderate pain, dilaudid IV 0.2 mg q4h for severe pain  - has nausea with dilaudid, however should avoid zofran given QTc prolonged to 520-540 range. Can give alcohol swab or anti-emetic that does not prolong QTc    ===CV===  #HTN  #CAD s/p stent placement (2014) LAD  #HFrEF (20% 2019)  - hold Entresto, Metoprolol due to ROB and borderline BP  - Was on DAPT prior to admission, hold iso bleed  - Trend I/Os and daily weights, and Cr      #Popliteal Occlusion  #PAD  - US Arterial: Right severe stenosis of the popliteal artery proximally followed a segmental occlusion of the popliteal artery distally. The right posterior tibial, peroneal, anterior tibial and dorsal pedis arteries are all occluded.   - On the left, there are segmental occlusions of the superficial femoral artery in the small and distal thigh. The left posterior tibial peroneal trunk, posterior tibial, peroneal, anterior tibial and dorsal pedis arteries are occluded  - Was previously seen by vascular and rec no surgical intervention and started on heparin gtt. Spoke with vascular, no longer recommending heparin gtt given lack of thrombus noted   - vascular cards consult  - Hold DAPT and heparin iso bleed  - resume statin once LFTs improve    ===PULM===  - No active issues    ===RENAL===  #ROB on CKD stage IV now on HD  #Uremic platelet dysfunction   s/p iHD 3/15, 3/16, 3/17  - HD per renal    ===GI===  #Diet: soft and bite sized   #Stress Ulcer/GI Prophylaxis: PPI HELD    #Elevated LFTs  - Unclear etiology  - RUQ US showing mildly dilated CBD  - resume statin when LFTs improve     ===ENDO===  #DM2: HbA1c 9.4  Home regimen: 12U Lantus/Humalog 4U   - q6h MDSS    #Hypothyroidism  - C/w IV 40mg synthroid    ===METABOLIC===  #Electrolyte abnormalities  #Hyperkalemia  - On HD  - No oral access due to AAOx0, can consider Lokelma +/- bicarb if able to tolerate PO    ===HEMATOLOGIC===  #Thrombocytopenia  #Anemia iso RP bleed   - Transfusion count: pRBC: 3u (3/15) + 1u (3/16); Plt: 1u (3/16); FFP: 1u (3/16)  - s/p DDAVP x 2 and 1 FFP, 1 PLT, and 4u pRBC total   - follow up IR and vascular reccs     ===ID===  #UTI  - Enterococcus + E Coli on UCx  - CFX (3/14-3/16), vancomycin (3/15-3/19), trend levels     ===ETHICS===  Code Status: FULL CODE

## 2025-03-18 NOTE — PROGRESS NOTE ADULT - ASSESSMENT
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 Course c/b large RP bleed after being started on heparin gtt in the setting of acute renal failure and uremia-induced platelet dysfunction requiring emergent HD. On arrival to MICU, Shiley placed, and patient received dialysis with improvement in uremia. Patient's hemoglobin has now stabilized non-operatively regarding the retroperitoneal hematoma. Although the patient's hemodynamics and labs are improving, her mental status remains poor and her prognosis is guarded. Currently discussing goals of care and non-invasive management with the family; transferred to the general medicine service on 3/18/25.

## 2025-03-18 NOTE — PROGRESS NOTE ADULT - PROBLEM SELECTOR PLAN 1
Unclear etiology, son reports that patient is AAOX4 at baseline with poor functional status. Currently obtunded. May be secondary to hospital-acquired delirium and pain medication.   - Minimal interventions and monitor for mental status improvement

## 2025-03-18 NOTE — PROGRESS NOTE ADULT - SUBJECTIVE AND OBJECTIVE BOX
[facility] Division of Hospital Medicine  Star Díaz MD  Available via MS Teams    SUBJECTIVE / OVERNIGHT EVENTS:  Patient transferred from the MICU to the general medicine service on 3/18/25.     Patient evaluated at bedside in the afternoon after transfer. HPI and hospital course discussed with the patient's son Neville (visiting from [place] at this time) in detail. Patient unable to speak with me due to somnolent status, only awakens to tell me her name.     ADDITIONAL REVIEW OF SYSTEMS:  14 point ROS unable to be performed due to poor mental status.     COORDINATION OF CARE:  Consultant Communication and Details of Discussion (where applicable): Reviewed palliative care note; patient made DNR/DNI, but family would like to continue with other non-invasive measures at this time.     VITAL SIGNS:  T(C): 36.7 (03-18 @ 16:10), Max: 36.7 (03-17 @ 19:00)   HR: 81   BP: 94/54   RR: 16   SpO2: 93%    PHYSICAL EXAM:  CONSTITUTIONAL: chronically unwell appearing, breathing comfortably on room air  EYES: anicteric   HENT: oropharynx clear and moist, normocephalic, no palpable masses in the neck  LYMPH NODES: no cervical lymphadenopathy   RESPIRATORY: normal respiratory effort; lungs are clear to auscultation bilaterally (no wheezes/crackles)  CARDIOVASCULAR: regular rate and rhythm, normal S1 and S2, no murmur/rub/gallop  ABDOMEN: positive bowel sounds, non-tender, non-distended, no organomegaly   EXTREMITIES: no lower extremity edema, lower extremities appear cool to the touch   NEUROLOGY: obtunded, oriented to self only, does not follow commands     I&O's Summary    17 Mar 2025 07:01  -  18 Mar 2025 07:00  --------------------------------------------------------  IN: 975 mL / OUT: 400 mL / NET: 575 mL    18 Mar 2025 07:01  -  18 Mar 2025 17:06  --------------------------------------------------------  IN: 0 mL / OUT: 0 mL / NET: 0 mL        LABS:                        9.1    20.69 )-----------( 82       ( 18 Mar 2025 12:13 )             26.7     03-17    137  |  99  |  48[H]  ----------------------------<  159[H]  4.0   |  22  |  2.73[H]    Ca    8.6      17 Mar 2025 22:39  Phos  3.5     03-17  Mg     1.7     03-17    TPro  5.6[L]  /  Alb  3.1[L]  /  TBili  2.1[H]  /  DBili  x   /  AST  132[H]  /  ALT  84[H]  /  AlkPhos  159[H]  03-17    PT/INR - ( 17 Mar 2025 22:39 )   PT: 15.2 sec;   INR: 1.33 ratio         PTT - ( 17 Mar 2025 22:39 )  PTT:31.8 sec      Urinalysis Basic - ( 17 Mar 2025 22:39 )    Color: x / Appearance: x / SG: x / pH: x  Gluc: 159 mg/dL / Ketone: x  / Bili: x / Urobili: x   Blood: x / Protein: x / Nitrite: x   Leuk Esterase: x / RBC: x / WBC x   Sq Epi: x / Non Sq Epi: x / Bacteria: x        Culture - Blood (collected 16 Mar 2025 02:03)  Source: Blood Blood-Peripheral  Preliminary Report (18 Mar 2025 09:02):    No growth at 48 Hours    Culture - Blood (collected 16 Mar 2025 01:46)  Source: Blood Blood-Peripheral  Preliminary Report (18 Mar 2025 09:02):    No growth at 48 Hours      RADIOLOGY & ADDITIONAL TESTS:  New Imaging Personally Reviewed Today: none new today     MEDICATIONS  (STANDING):  dextrose 5%. 1000 milliLiter(s) (50 mL/Hr) IV Continuous <Continuous>  dextrose 5%. 1000 milliLiter(s) (100 mL/Hr) IV Continuous <Continuous>  dextrose 50% Injectable 25 Gram(s) IV Push once  dextrose 50% Injectable 12.5 Gram(s) IV Push once  dextrose 50% Injectable 25 Gram(s) IV Push once  gabapentin 100 milliGRAM(s) Oral two times a day  heparin   Injectable 5000 Unit(s) SubCutaneous every 12 hours  influenza  Vaccine (HIGH DOSE) 0.5 milliLiter(s) IntraMuscular once  insulin lispro (ADMELOG) corrective regimen sliding scale   SubCutaneous Before meals and at bedtime  levothyroxine Injectable 40 MICROGram(s) IV Push at bedtime  lidocaine   4% Patch 1 Patch Transdermal daily  midodrine 10 milliGRAM(s) Oral every 8 hours  sodium bicarbonate 650 milliGRAM(s) Oral three times a day    MEDICATIONS  (PRN):  acetaminophen     Tablet .. 650 milliGRAM(s) Oral every 6 hours PRN Temp greater or equal to 38C (100.4F), Mild Pain (1 - 3)  aluminum hydroxide/magnesium hydroxide/simethicone Suspension 30 milliLiter(s) Oral every 4 hours PRN Dyspepsia  dextrose Oral Gel 15 Gram(s) Oral once PRN Blood Glucose LESS THAN 70 milliGRAM(s)/deciliter  HYDROmorphone  Injectable 0.2 milliGRAM(s) IV Push every 4 hours PRN Severe Pain (7 - 10)  melatonin 3 milliGRAM(s) Oral at bedtime PRN Insomnia  oxyCODONE    IR 2.5 milliGRAM(s) Oral every 8 hours PRN Moderate Pain (4 - 6)  sodium chloride 0.9% lock flush 10 milliLiter(s) IV Push every 1 hour PRN Pre/post blood products, medications, blood draw, and to maintain line patency

## 2025-03-18 NOTE — PROGRESS NOTE ADULT - PROBLEM SELECTOR PLAN 4
Patient's hospital course was complicated by a large RP bleed after being started on heparin drip in the setting of acute renal failure and uremia-induced platelet dysfunction, requiring emergent HD. Received total 4u pRBC, 1u FFP, 1u PLT. IR consulted, recommending no further intervention as RP hematoma would most likely self tamponade, but if clinically worsens, would consider CTA to identify culprit vessel for embolization. This would need to be shared decision making with family at CT contrast load would worsen ROB and likely increase risk of longer term HD.  - Monitor hemoglobin twice daily to monitor for stability  - Hold DAPT and heparin drip for now; discuss with vascular surgery in AM when OK to resume

## 2025-03-18 NOTE — CONSULT NOTE ADULT - SUBJECTIVE AND OBJECTIVE BOX
Domo Wang MD  Cardiology Fellow  [phone]  All Cardiology service information can be found 24/7 on [email or web address], password: [email or web address]    Patient seen and evaluated at bedside    HPI:  89F PMHx HTN, DM on insulin, CAD s/p PCI, HFrEF (15-20%), CKD P/w acute onset of RLE pain.     TTE:    1. Left ventricular cavity is severely dilated. Left ventricular wall thickness is normal. Left ventricular systolic function is severely decreased with an ejection fraction visually estimated at 15 - 20 %. Regional wall motion abnormalities present.   2. Enlarged right ventricular cavity size and mild to moderately reduced right ventricular systolic function.   3. The atria are dilated.   4. Malcoaptation of the tricuspid valve leaflets due to tethering. Severe tricuspid regurgitation.   5. Mild to moderate mitral regurgitation.   6. No pericardial effusion seen.  7. Compared to the transthoracic echocardiogram performed on 12/9/2019, there is increased tricuspid regurgitation.      CTA w runoff showing questionable bilateral popliteal artery occlusion on delayed phase, abnormal runoff. B/l trifurcation calcified, limited evaluation. B/l calf and foot soft tissue edema.   BLE arterial duplex: b/l arterial vascular stenotic and occlusive disease; severe proximal R pop stenosis and occluded distally. Occluded right PT, peroneal, AT, DP arteries. Left segmental occlusions of the SFA, occluded left PT, peroneal, AT, DP arteries.     Vascular surgery deemed findings likely chronic, no acute interventions. Managed on heparin gtt, plavix and lipitor. Course c/b by renal failure and uremia, requiring HD. Also c/b interval development of two large right retroperitoneal hematomas on 3/15/25 with large drop in Hg. s/p DDAVP x 2 and 1 FFP, 1 PLT, and 4u pRBC total.  Antiplatelets and A/C held. U/S abdomen 3/17/25 redemonstrating hematomas.    PMHx:   HTN (hypertension)    Dyslipidemia    Diabetes mellitus    Palpitations    STEMI (ST elevation myocardial infarction)    CHF (congestive heart failure)        PSHx:   S/P cholecystectomy    S/P appendectomy    S/P lumpectomy, left breast    S/P tonsillectomy    S/P cardiac cath        Allergies:  Kiwi (Anaphylaxis)  codeine (Unknown)  penicillins (Anaphylaxis)      Current Medications:   acetaminophen     Tablet .. 650 milliGRAM(s) Oral every 6 hours PRN  chlorhexidine 2% Cloths 1 Application(s) Topical <User Schedule>  dextrose 5%. 1000 milliLiter(s) IV Continuous <Continuous>  dextrose 5%. 1000 milliLiter(s) IV Continuous <Continuous>  dextrose 50% Injectable 25 Gram(s) IV Push once  dextrose 50% Injectable 12.5 Gram(s) IV Push once  dextrose 50% Injectable 25 Gram(s) IV Push once  dextrose Oral Gel 15 Gram(s) Oral once PRN  gabapentin 100 milliGRAM(s) Oral two times a day  heparin   Injectable 5000 Unit(s) SubCutaneous every 12 hours  HYDROmorphone  Injectable 0.2 milliGRAM(s) IV Push every 4 hours PRN  influenza  Vaccine (HIGH DOSE) 0.5 milliLiter(s) IntraMuscular once  insulin lispro (ADMELOG) corrective regimen sliding scale   SubCutaneous Before meals and at bedtime  levothyroxine Injectable 40 MICROGram(s) IV Push at bedtime  lidocaine   4% Patch 1 Patch Transdermal daily  melatonin 3 milliGRAM(s) Oral at bedtime PRN  midodrine 10 milliGRAM(s) Oral every 8 hours  oxyCODONE    IR 2.5 milliGRAM(s) Oral every 8 hours PRN  sodium bicarbonate 650 milliGRAM(s) Oral three times a day  sodium chloride 0.9% lock flush 10 milliLiter(s) IV Push every 1 hour PRN    REVIEW OF SYSTEMS:  CONSTITUTIONAL: No weakness, fevers or chills  EYES/ENT: No visual changes;  No dysphagia  NECK: No pain or stiffness  RESPIRATORY: No cough, wheezing, hemoptysis; No shortness of breath  CARDIOVASCULAR: No chest pain or palpitations; No lower extremity edema  GASTROINTESTINAL: No abdominal or epigastric pain. No nausea, vomiting, or hematemesis; No diarrhea or constipation. No melena or hematochezia.  BACK: No back pain  GENITOURINARY: No dysuria, frequency or hematuria  NEUROLOGICAL: No numbness or weakness  SKIN: No itching, burning, rashes, or lesions   All other review of systems is negative unless indicated above.    Physical Exam:  T(F): 97.2 (03-18), Max: 98.1 (03-17)  HR: 69 (03-18) (69 - 84)  BP: 116/55 (03-18) (91/54 - 116/56)  RR: 16 (03-18)  SpO2: 98% (03-18)    General: elderly woman in NAD  HEENT: atraumatic, normocephalic. EOMI  Neck: supple, no JVD  Respiratory: CTAB, no increased work of breathing  Cardiovascular: RRR  Abdomen: soft, NT, ND  Extremities: 2+ peripheral pulses b/l  Neurological: AOx3, answers questions appropriately       CXR: Personally reviewed    Labs: Personally reviewed                        9.1    20.69 )-----------( 82       ( 18 Mar 2025 12:13 )             26.7     03-17    137  |  99  |  48[H]  ----------------------------<  159[H]  4.0   |  22  |  2.73[H]    Ca    8.6      17 Mar 2025 22:39  Phos  3.5     03-17  Mg     1.7     03-17    TPro  5.6[L]  /  Alb  3.1[L]  /  TBili  2.1[H]  /  DBili  x   /  AST  132[H]  /  ALT  84[H]  /  AlkPhos  159[H]  03-17    PT/INR - ( 17 Mar 2025 22:39 )   PT: 15.2 sec;   INR: 1.33 ratio         PTT - ( 17 Mar 2025 22:39 )  PTT:31.8 sec                   Domo Wang MD  Cardiology Fellow  [phone]  All Cardiology service information can be found 24/7 on [email or web address], password: [email or web address]    Patient seen and evaluated at bedside    HPI:  89F PMHx HTN, DM on insulin, CAD s/p PCI, HFrEF (15-20%), CKD P/w acute onset of RLE pain.     TTE:    1. Left ventricular cavity is severely dilated. Left ventricular wall thickness is normal. Left ventricular systolic function is severely decreased with an ejection fraction visually estimated at 15 - 20 %. Regional wall motion abnormalities present.   2. Enlarged right ventricular cavity size and mild to moderately reduced right ventricular systolic function.   3. The atria are dilated.   4. Malcoaptation of the tricuspid valve leaflets due to tethering. Severe tricuspid regurgitation.   5. Mild to moderate mitral regurgitation.   6. No pericardial effusion seen.  7. Compared to the transthoracic echocardiogram performed on 12/9/2019, there is increased tricuspid regurgitation.      CTA w runoff showing questionable bilateral popliteal artery occlusion on delayed phase, abnormal runoff. B/l trifurcation calcified, limited evaluation. B/l calf and foot soft tissue edema.   BLE arterial duplex: b/l arterial vascular stenotic and occlusive disease; severe proximal R pop stenosis and occluded distally. Occluded right PT, peroneal, AT, DP arteries. Left segmental occlusions of the SFA, occluded left PT, peroneal, AT, DP arteries.     Vascular surgery deemed findings likely chronic, no acute interventions. Managed on heparin gtt, plavix and lipitor. Course c/b by renal failure and uremia, requiring HD. Also c/b interval development of two large right retroperitoneal hematomas on 3/15/25 with large drop in Hg. s/p DDAVP x 2 and 1 FFP, 1 PLT, and 4u pRBC total.  Antiplatelets and A/C held. U/S abdomen 3/17/25 redemonstrating hematomas.    PMHx:   HTN (hypertension)    Dyslipidemia    Diabetes mellitus    Palpitations    STEMI (ST elevation myocardial infarction)    CHF (congestive heart failure)        PSHx:   S/P cholecystectomy    S/P appendectomy    S/P lumpectomy, left breast    S/P tonsillectomy    S/P cardiac cath        Allergies:  Kiwi (Anaphylaxis)  codeine (Unknown)  penicillins (Anaphylaxis)      Current Medications:   acetaminophen     Tablet .. 650 milliGRAM(s) Oral every 6 hours PRN  chlorhexidine 2% Cloths 1 Application(s) Topical <User Schedule>  dextrose 5%. 1000 milliLiter(s) IV Continuous <Continuous>  dextrose 5%. 1000 milliLiter(s) IV Continuous <Continuous>  dextrose 50% Injectable 25 Gram(s) IV Push once  dextrose 50% Injectable 12.5 Gram(s) IV Push once  dextrose 50% Injectable 25 Gram(s) IV Push once  dextrose Oral Gel 15 Gram(s) Oral once PRN  gabapentin 100 milliGRAM(s) Oral two times a day  heparin   Injectable 5000 Unit(s) SubCutaneous every 12 hours  HYDROmorphone  Injectable 0.2 milliGRAM(s) IV Push every 4 hours PRN  influenza  Vaccine (HIGH DOSE) 0.5 milliLiter(s) IntraMuscular once  insulin lispro (ADMELOG) corrective regimen sliding scale   SubCutaneous Before meals and at bedtime  levothyroxine Injectable 40 MICROGram(s) IV Push at bedtime  lidocaine   4% Patch 1 Patch Transdermal daily  melatonin 3 milliGRAM(s) Oral at bedtime PRN  midodrine 10 milliGRAM(s) Oral every 8 hours  oxyCODONE    IR 2.5 milliGRAM(s) Oral every 8 hours PRN  sodium bicarbonate 650 milliGRAM(s) Oral three times a day  sodium chloride 0.9% lock flush 10 milliLiter(s) IV Push every 1 hour PRN    REVIEW OF SYSTEMS:  CONSTITUTIONAL: No weakness, fevers or chills  EYES/ENT: No visual changes;  No dysphagia  NECK: No pain or stiffness  RESPIRATORY: No cough, wheezing, hemoptysis; No shortness of breath  CARDIOVASCULAR: No chest pain or palpitations; No lower extremity edema  GASTROINTESTINAL: No abdominal or epigastric pain. No nausea, vomiting, or hematemesis; No diarrhea or constipation. No melena or hematochezia.  BACK: No back pain  GENITOURINARY: No dysuria, frequency or hematuria  NEUROLOGICAL: No numbness or weakness  SKIN: No itching, burning, rashes, or lesions   All other review of systems is negative unless indicated above.    Physical Exam:  T(F): 97.2 (03-18), Max: 98.1 (03-17)  HR: 69 (03-18) (69 - 84)  BP: 116/55 (03-18) (91/54 - 116/56)  RR: 16 (03-18)  SpO2: 98% (03-18)    General: elderly woman   HEENT: atraumatic, normocephalic. EOMI  Respiratory: CTAB  Cardiovascular: RRR  Abdomen: soft, NT, ND  Extremities: nondopplerable pulses, heel wounds      CXR: Personally reviewed    Labs: Personally reviewed                        9.1    20.69 )-----------( 82       ( 18 Mar 2025 12:13 )             26.7     03-17    137  |  99  |  48[H]  ----------------------------<  159[H]  4.0   |  22  |  2.73[H]    Ca    8.6      17 Mar 2025 22:39  Phos  3.5     03-17  Mg     1.7     03-17    TPro  5.6[L]  /  Alb  3.1[L]  /  TBili  2.1[H]  /  DBili  x   /  AST  132[H]  /  ALT  84[H]  /  AlkPhos  159[H]  03-17    PT/INR - ( 17 Mar 2025 22:39 )   PT: 15.2 sec;   INR: 1.33 ratio         PTT - ( 17 Mar 2025 22:39 )  PTT:31.8 sec

## 2025-03-18 NOTE — CONSULT NOTE ADULT - CONVERSATION DETAILS
Met with patients two children/surrogates Neville and Chasity. They both demonstrated excellent understanding of their mothers medical issues.  They are hopeful for some level of recovery , especially Chasity , who states that her mother is not ready to leave yet.  We discussed advance age with chronic medical issues including but not limited to insulin dependent dm,  peripheral arterial disease,  CAD, renal failure and the toll these diseases take on our elderly.   Appropriately tearful, Chasity verbalized understanding of her mothers guarded state and shared she would never want her to suffer unnecessarily .    We discussed advance directives, specifically DNR/DNI.  After extensive explanation of MOLST document, family was in agreement with DNR/DNI/NIV but to continue medical interventions.  MOLST completed , copies provided to family, original placed in chart.  [facility] updated accordingly    Additionally, I discussed the option to consider a symptom directed approach bringing patient home with home hospice services.  Chasity was familiar with hospice and was appreciative of the information provided.  At this time they are not ready to move forward with this plan.   They wish to have more information from vascular , and from the team about possible "non invasive " interventions.   They also wanted to understand if dialysis was now a long term need.    Palliative will continue to follow .

## 2025-03-18 NOTE — CONSULT NOTE ADULT - ASSESSMENT
89F PMHx HTN, DM on insulin, CAD s/p PCI, HFrEF. P/w acute onset of R shin pain w/o trauma i/s/o severe PAD. Now in  MICU on HD  iso urgent shiley placement for increasing uremia and uremic encephalopathy. Palliative care consulted for goals of care, advance care planning and pain managment .

## 2025-03-19 NOTE — PROGRESS NOTE ADULT - PROBLEM SELECTOR PLAN 1
Unclear etiology, son reports that patient is AAOX4 at baseline with poor functional status. Currently lethargic, May be secondary to hospital-acquired delirium and pain medication.   - Minimal interventions and monitor for mental status improvement

## 2025-03-19 NOTE — PROGRESS NOTE ADULT - PROBLEM SELECTOR PLAN 5
spoke with pt's 2 children at bedside today, reviewed yesterday's goc discussion.  at this time goals remain for ongoing medical management, understand guarded prognosis but remain hopeful for some level of recovery. reviewed need for ongoing discussions based on information gathering from specialty teams. appreciative of ongoing palliative support  surrogates are pt's 2 children: Radha   DNR/I Trial of NIV

## 2025-03-19 NOTE — CONSULT NOTE ADULT - CONSULT REQUESTED DATE/TIME
18-Mar-2025 14:09
15-Mar-2025 20:49
16-Mar-2025 12:07
17-Mar-2025 11:50
15-Mar-2025 15:56
19-Mar-2025 12:47
10-Mar-2025 20:15
13-Mar-2025 14:03
18-Mar-2025 12:00

## 2025-03-19 NOTE — CONSULT NOTE ADULT - CONSULT REASON
UTI
PAD
ROB on CKD
Retroperitoneal hematomas
Right leg pain
Georgi catheter neyda
RP Hematoma
evaluation of spontaneous right RPH
Goals of care

## 2025-03-19 NOTE — PROGRESS NOTE ADULT - PROBLEM SELECTOR PLAN 2
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

## 2025-03-19 NOTE — CONSULT NOTE ADULT - SUBJECTIVE AND OBJECTIVE BOX
Patient is a 89y old  Female who presents with a chief complaint of LE pain (19 Mar 2025 12:03)      HPI:  89F PMHx HTN, DM on insulin, CAD s/p PCI, HFrEF. P/w acute onset of R shin pain w/o trauma 3hr PTA. Of note, pt has chronic b/l wounds, seen by wound care clinic, but never saw vascular surg before. Denies f/c, cp, sob, abd pain, n/v/d.     In the ED, afebrile, HR 50-70, BP stable, saturates well on RA while awake, desats to high 80s while asleep. Pt is not on O2 at baseline.   CBC w/ wbc 5.8, hgb 9.3 (was 10.1 in '19), plt 104 (was wnl in '19).   Coag w/ INR 1.42  CMP w/ SCr 3.99 (was 1.16 in '19), alk phos 184.   VBG w/ lactate 1.6.     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 Partially imagined heart enlarged and abnormal.     Vasc surg c/s in the ED: no acute intervention hep gtt, SAPPHIRE/PVRs.     *[facility] and i was unable to review.  (11 Mar 2025 00:50)    Above reviewed.  Per MICU downgrade note:  89F PMHx HTN, DM on insulin, CAD s/p PCI, HFrEF. P/w acute onset of R shin pain w/o trauma, found to have b/l popliteal artery occlusion. Course c/b large RP bleed after being started on heparin gtt iso acute renal failure and uremia induced platelet dysfunction requiring emergent HD. On arrival to MICU, Georgi placed, and patient received dialysis with improvement in uremia. Patient also completed course of abx for E. coli and E. faecalis UTI with ceftriaxone and vancomycin. Received total 4u pRBC, 1u FFP, 1u PLT. IR consulted, recommending no further intervention as RP hematoma would most likely self tamponade, but if clinically worsens, would consider CTA to identify culprit vessel for embolization. This would need to be shared decision making with family at CT contrast load would worsen ROB and likely increase risk of longer term HD.     UA on 3/14 w/ >998 WBC, - nitrite, +LE, and +bacteria. UCx w/ >100k CFU/mL E faecalis and E coli. S/p ceftriaxone (3/14-16) and vancomycin (3/16 x2; 3/17 skipped but level therapeutic; 3/18 x1).  CTAP 3/15 w/ new development of two large R RP hematoma. R renal cyst. US abd w/ dilated CBD (s/p cholecystectomy). R kidney w/o hydronephrosis.    ID was consulted for UCx finding of E faecalis and E coli from 3/14.        prior hospital charts reviewed [  ] - no recent hospitalizations  primary team notes reviewed [X]  other consultant notes reviewed [X]    PAST MEDICAL & SURGICAL HISTORY:  HTN (hypertension)      Dyslipidemia      Diabetes mellitus      Palpitations      STEMI (ST elevation myocardial infarction)      CHF (congestive heart failure)      S/P cholecystectomy      S/P appendectomy      S/P lumpectomy, left breast  premalignant      S/P tonsillectomy      S/P cardiac cath          Allergies  Kiwi (Anaphylaxis)  codeine (Unknown)  penicillins (Anaphylaxis)        ANTIMICROBIALS:      OTHER MEDS: MEDICATIONS  (STANDING):  acetaminophen     Tablet .. 650 every 6 hours PRN  aluminum hydroxide/magnesium hydroxide/simethicone Suspension 30 every 4 hours PRN  dextrose 50% Injectable 25 once  dextrose 50% Injectable 12.5 once  dextrose 50% Injectable 25 once  dextrose Oral Gel 15 once PRN  gabapentin 100 three times a day  heparin   Injectable 5000 every 12 hours  HYDROmorphone  Injectable 0.2 every 4 hours PRN  influenza  Vaccine (HIGH DOSE) 0.5 once  insulin lispro (ADMELOG) corrective regimen sliding scale  Before meals and at bedtime  levothyroxine 50 daily  melatonin 3 at bedtime PRN  midodrine 10 every 8 hours  oxyCODONE    IR 2.5 every 6 hours      SOCIAL HISTORY:   hx smoking  non-smoker    FAMILY HISTORY:      REVIEW OF SYSTEMS  [X] ROS unobtainable because:  dementia  [  ] All other systems negative except as noted below:	    Constitutional:  [ ] fever [ ] chills  [ ] weight loss  [ ] weakness  Skin:  [ ] rash [ ] phlebitis	  Eyes: [ ] icterus [ ] pain  [ ] discharge	  ENMT: [ ] sore throat  [ ] thrush [ ] ulcers [ ] exudates  Respiratory: [ ] dyspnea [ ] hemoptysis [ ] cough [ ] sputum	  Cardiovascular:  [ ] chest pain [ ] palpitations [ ] edema	  Gastrointestinal:  [ ] nausea [ ] vomiting [ ] diarrhea [ ] constipation [ ] pain	  Genitourinary:  [ ] dysuria [ ] frequency [ ] hematuria [ ] discharge [ ] flank pain  [ ] incontinence  Musculoskeletal:  [ ] myalgias [ ] arthralgias [ ] arthritis  [ ] back pain  Neurological:  [ ] headache [ ] seizures  [ ] confusion/altered mental status  Psychiatric:  [ ] anxiety [ ] depression	  Hematology/Lymphatics:  [ ] lymphadenopathy  Endocrine:  [ ] adrenal [ ] thyroid  Allergic/Immunologic:	 [ ] transplant [ ] seasonal    Vital Signs Last 24 Hrs  T(F): 97.6 (03-19-25 @ 12:41), Max: 99 (03-14-25 @ 06:41)    Vital Signs Last 24 Hrs  HR: 83 (03-19-25 @ 12:41) (75 - 87)  BP: 124/67 (03-19-25 @ 12:41) (94/54 - 124/67)  RR: 18 (03-19-25 @ 12:41)  SpO2: 99% (03-19-25 @ 12:41) (92% - 100%)  Wt(kg): --    PHYSICAL EXAM:  Constitutional: non-toxic, no distress  HEAD/EYES: anicteric, no conjunctival injection  ENT:  supple, no thrush  Cardiovascular:   normal S1, S2, no murmur, no edema  Respiratory:  clear BS bilaterally, no wheezes, no rales  GI:  soft, non-tender, normal bowel sounds  :  no infante, no CVA tenderness  Musculoskeletal:  no synovitis, normal ROM  Neurologic: awake and alert, normal strength, no focal findings  Skin:  no rash, no erythema, no phlebitis  Heme/Onc: no lymphadenopathy   Psychiatric:  awake, alert, appropriate mood                                8.4    19.07 )-----------( 64       ( 19 Mar 2025 07:08 )             25.8       03-19    138  |  96  |  63[H]  ----------------------------<  148[H]  4.7   |  20[L]  |  3.53[H]    Ca    9.0      19 Mar 2025 07:13  Phos  5.7     03-19  Mg     1.8     03-19    TPro  5.1[L]  /  Alb  3.0[L]  /  TBili  3.2[H]  /  DBili  x   /  AST  149[H]  /  ALT  95[H]  /  AlkPhos  153[H]  03-19      Urinalysis Basic - ( 19 Mar 2025 07:13 )    Color: x / Appearance: x / SG: x / pH: x  Gluc: 148 mg/dL / Ketone: x  / Bili: x / Urobili: x   Blood: x / Protein: x / Nitrite: x   Leuk Esterase: x / RBC: x / WBC x   Sq Epi: x / Non Sq Epi: x / Bacteria: x        MICROBIOLOGY:  Vancomycin Level, Trough: 15.1 (03-17 @ 22:39)  Vancomycin Level, Random: 12.5 (03-16 @ 17:31)                      RADIOLOGY:  imaging below personally reviewed  < from: US Abdomen Upper Quadrant Right (03.17.25 @ 15:55) >    IMPRESSION:    1. Status post cholecystectomy.  2. The common bile duct is mildly dilated measuring up to approximately   9.4 mm. The distal duct is obscured by bowel gas and is not visualized on   this exam. No significant intrahepatic biliary dilatation is seen. This   may reflect postcholecystectomy change. Suggest correlation with liver   function tests andfurther evaluation as clinically warranted.  3. Redemonstrated large right infrarenal retroperitoneal hematoma, better   assessed on the prior CT.      < end of copied text >      < from: CT Abdomen and Pelvis No Cont (03.15.25 @ 17:56) >    ACC: [id number] EXAM:  CT ABDOMEN AND PELVIS   ORDERED BY:  TOBIN RIVERA     PROCEDURE DATE:  03/15/2025          INTERPRETATION:  CLINICAL INFORMATION: Rule out retroperitoneal bleed    COMPARISON: CT abdomen pelvis 3/10/2025.    CONTRAST/COMPLICATIONS:  IV Contrast: NONE  Oral Contrast: NONE  .    PROCEDURE:  CT of the Abdomen and Pelvis was performed.  Sagittal and coronal reformats were performed.    FINDINGS:  LOWER CHEST: Small right and trace left pleural effusions with adjacent   atelectasis.    LIVER: Within normal limits.  BILE DUCTS: Normal caliber.  GALLBLADDER: Cholecystectomy.  SPLEEN: Within normal limits.  PANCREAS: Within normal limits.  ADRENALS: Within normal limits.  KIDNEYS/URETERS: Right renal cyst measuring 2.5 cm. Stable 3.5 cm   heterogenous left renal cyst.    BLADDER: Within normal limits.  REPRODUCTIVE ORGANS: Fibroid uterus    BOWEL: No bowel obstruction. Appendix is not visualized.  PERITONEUM/RETROPERITONEUM: Large right retroperitoneal hematoma with   layering internal hematocrit anterior to the psoas major measuring 12.8 x   6.1 x 7.8 cm (5-42). Additional retroperitoneal hematoma with layering   internal hematocrit anterior to the right iliacus measuring 12.8 x 5.5 x   5.2 cm area  VESSELS: Atherosclerotic changes.  LYMPH NODES: No lymphadenopathy.  ABDOMINAL WALL: Within normal limits.  BONES: Degenerative changes.    IMPRESSION:  Interval development of two large right retroperitoneal hematomas.    Findings discussed with MICU resident  at 6:25pm 3/15/25 by Dr. Zee    --- End of Report ---          THOMAS ZEE MD; Resident Radiologist  This document has been electronically signed.  LINDY BUTTS MD; Attending Radiologist  This document has been electronically signed. Mar 15 2025  9:24PM    < end of copied text >    < from: Xray Chest 1 View- PORTABLE-Urgent (Xray Chest 1 View- PORTABLE-Urgent .) (03.15.25 @ 20:54) >    ACC: [id number] EXAM:  XR CHEST PORTABLE URGENT 1V   ORDERED BY: SON THAPA     PROCEDURE DATE:  03/15/2025          INTERPRETATION:  EXAMINATION: XR CHEST URGENT    CLINICAL INDICATION: Line placement    TECHNIQUE: Single frontal view of the chest was obtained.    COMPARISON: Chest x-ray 3/14/2025.    FINDINGS:    Interval placement of left IJ Shiley catheter with tip overlying the   superior cavoatrial junction. Left upper extremity PICC in similar   position.  There is a small left pleural effusion and/or left basilar atelectasis.   The heart size cannot be accurately assessed on this projection.  No acute osseous abnormalities.    IMPRESSION:  Lines in good position and described above. Small left pleural effusion   and/or left basilar atelectasis. No pneumothorax.    --- End of Report ---          FÁTIMA SAPP MD; Resident Radiologist  This document has been electronically signed.  GIACOMO WEN MD; Attending Radiologist  This document has been electronically signed. Mar [id number]  9:01AM    < end of copied text >   Patient is a 89y old  Female who presents with a chief complaint of LE pain (19 Mar 2025 12:03)      HPI:  89F PMHx HTN, DM on insulin, CAD s/p PCI, HFrEF. P/w acute onset of R shin pain w/o trauma 3hr PTA. Of note, pt has chronic b/l wounds, seen by wound care clinic, but never saw vascular surg before. Denies f/c, cp, sob, abd pain, n/v/d.     In the ED, afebrile, HR 50-70, BP stable, saturates well on RA while awake, desats to high 80s while asleep. Pt is not on O2 at baseline.   CBC w/ wbc 5.8, hgb 9.3 (was 10.1 in '19), plt 104 (was wnl in '19).   Coag w/ INR 1.42  CMP w/ SCr 3.99 (was 1.16 in '19), alk phos 184.   VBG w/ lactate 1.6.     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 Partially imagined heart enlarged and abnormal.     Vasc surg c/s in the ED: no acute intervention hep gtt, SAPPHIRE/PVRs.     *[facility], [facility] in [facility]'s chart and i was unable to review.  (11 Mar 2025 00:50)    Above reviewed.  Per MICU downgrade note:  89F PMHx HTN, DM on insulin, CAD s/p PCI, HFrEF. P/w acute onset of R shin pain w/o trauma, found to have b/l popliteal artery occlusion. Course c/b large RP bleed after being started on heparin gtt iso acute renal failure and uremia induced platelet dysfunction requiring emergent HD. On arrival to MICU, Georgi placed, and patient received dialysis with improvement in uremia. Patient also completed course of abx for E. coli and E. faecalis UTI with ceftriaxone and vancomycin. Received total 4u pRBC, 1u FFP, 1u PLT. IR consulted, recommending no further intervention as RP hematoma would most likely self tamponade, but if clinically worsens, would consider CTA to identify culprit vessel for embolization. This would need to be shared decision making with family at CT contrast load would worsen ROB and likely increase risk of longer term HD.     UA on 3/14 w/ >998 WBC, - nitrite, +LE, and +bacteria. UCx w/ >100k CFU/mL E faecalis and E coli. S/p ceftriaxone (3/14-16) and vancomycin (3/16 x2; 3/17 skipped but level therapeutic; 3/18 x1).  CTAP 3/15 w/ new development of two large R RP hematoma. R renal cyst. US abd w/ dilated CBD (s/p cholecystectomy). R kidney w/o hydronephrosis.    ID was consulted for UCx finding of E faecalis and E coli from 3/14.        prior hospital charts reviewed [  ] - no recent hospitalizations  primary team notes reviewed [X]  other consultant notes reviewed [X]    PAST MEDICAL & SURGICAL HISTORY:  HTN (hypertension)      Dyslipidemia      Diabetes mellitus      Palpitations      STEMI (ST elevation myocardial infarction)      CHF (congestive heart failure)      S/P cholecystectomy      S/P appendectomy      S/P lumpectomy, left breast  premalignant      S/P tonsillectomy      S/P cardiac cath          Allergies  Kiwi (Anaphylaxis)  codeine (Unknown)  penicillins (Anaphylaxis)        ANTIMICROBIALS:      OTHER MEDS: MEDICATIONS  (STANDING):  acetaminophen     Tablet .. 650 every 6 hours PRN  aluminum hydroxide/magnesium hydroxide/simethicone Suspension 30 every 4 hours PRN  dextrose 50% Injectable 25 once  dextrose 50% Injectable 12.5 once  dextrose 50% Injectable 25 once  dextrose Oral Gel 15 once PRN  gabapentin 100 three times a day  heparin   Injectable 5000 every 12 hours  HYDROmorphone  Injectable 0.2 every 4 hours PRN  influenza  Vaccine (HIGH DOSE) 0.5 once  insulin lispro (ADMELOG) corrective regimen sliding scale  Before meals and at bedtime  levothyroxine 50 daily  melatonin 3 at bedtime PRN  midodrine 10 every 8 hours  oxyCODONE    IR 2.5 every 6 hours      SOCIAL HISTORY:     No tobacco or illicit drugs    FAMILY HISTORY:  No known family history of RP bleed      REVIEW OF SYSTEMS  [  ] ROS unobtainable because:    [X] All other systems negative except as noted below:	    Constitutional:  [ ] fever [ ] chills  [ ] weight loss  [ ] weakness  Skin:  [ ] rash [ ] phlebitis	  Eyes: [ ] icterus [ ] pain  [ ] discharge	  ENMT: [ ] sore throat  [ ] thrush [ ] ulcers [ ] exudates  Respiratory: [ ] dyspnea [ ] hemoptysis [ ] cough [ ] sputum	  Cardiovascular:  [ ] chest pain [ ] palpitations [ ] edema	  Gastrointestinal:  [ ] nausea [ ] vomiting [ ] diarrhea [ ] constipation [ ] pain	  Genitourinary:  [ ] dysuria [ ] frequency [ ] hematuria [ ] discharge [ ] flank pain  [ ] incontinence  Musculoskeletal:  [ ] myalgias [X] arthralgias [ ] arthritis  [ ] back pain  Neurological:  [ ] headache [ ] seizures  [ ] confusion/altered mental status  Psychiatric:  [ ] anxiety [ ] depression	  Hematology/Lymphatics:  [ ] lymphadenopathy  Endocrine:  [ ] adrenal [ ] thyroid  Allergic/Immunologic:	 [ ] transplant [ ] seasonal    Vital Signs Last 24 Hrs  T(F): 97.6 (03-19-25 @ 12:41), Max: 99 (03-14-25 @ 06:41)    Vital Signs Last 24 Hrs  HR: 83 (03-19-25 @ 12:41) (75 - 87)  BP: 124/67 (03-19-25 @ 12:41) (94/54 - 124/67)  RR: 18 (03-19-25 @ 12:41)  SpO2: 99% (03-19-25 @ 12:41) (92% - 100%)  Wt(kg): --    PHYSICAL EXAM:  Constitutional: fatigued  HEAD/EYES: anicteric  ENT:  supple  Cardiovascular:   normal S1, S2, no murmur, RRR  Respiratory:  clear BS bilaterally, no wheezes, no rales  GI:  soft, non-tender  :  no infante; no suprapubic tenderness  Musculoskeletal:  no BUE/BLE edema  Neurologic: awake and alert  Skin:  L medial ankle w/ escharous ulcer w/o peripheral edema or erythema, nontender at site; small bullae medial and lateral R ankle  Heme/Onc: no lymphadenopathy   Psychiatric:  awake, alert                                8.4    19.07 )-----------( 64       ( 19 Mar 2025 07:08 )             25.8       03-19    138  |  96  |  63[H]  ----------------------------<  148[H]  4.7   |  20[L]  |  3.53[H]    Ca    9.0      19 Mar 2025 07:13  Phos  5.7     03-19  Mg     1.8     03-19    TPro  5.1[L]  /  Alb  3.0[L]  /  TBili  3.2[H]  /  DBili  x   /  AST  149[H]  /  ALT  95[H]  /  AlkPhos  153[H]  03-19      Urinalysis Basic - ( 19 Mar 2025 07:13 )    Color: x / Appearance: x / SG: x / pH: x  Gluc: 148 mg/dL / Ketone: x  / Bili: x / Urobili: x   Blood: x / Protein: x / Nitrite: x   Leuk Esterase: x / RBC: x / WBC x   Sq Epi: x / Non Sq Epi: x / Bacteria: x        MICROBIOLOGY:  Vancomycin Level, Trough: 15.1 (03-17 @ 22:39)  Vancomycin Level, Random: 12.5 (03-16 @ 17:31)                      RADIOLOGY:  imaging below personally reviewed  < from: US Abdomen Upper Quadrant Right (03.17.25 @ 15:55) >    IMPRESSION:    1. Status post cholecystectomy.  2. The common bile duct is mildly dilated measuring up to approximately   9.4 mm. The distal duct is obscured by bowel gas and is not visualized on   this exam. No significant intrahepatic biliary dilatation is seen. This   may reflect postcholecystectomy change. Suggest correlation with liver   function tests andfurther evaluation as clinically warranted.  3. Redemonstrated large right infrarenal retroperitoneal hematoma, better   assessed on the prior CT.      < end of copied text >      < from: CT Abdomen and Pelvis No Cont (03.15.25 @ 17:56) >    ACC: [id number] EXAM:  CT ABDOMEN AND PELVIS   ORDERED BY:  TOBIN RIVERA     PROCEDURE DATE:  03/15/2025          INTERPRETATION:  CLINICAL INFORMATION: Rule out retroperitoneal bleed    COMPARISON: CT abdomen pelvis 3/10/2025.    CONTRAST/COMPLICATIONS:  IV Contrast: NONE  Oral Contrast: NONE  .    PROCEDURE:  CT of the Abdomen and Pelvis was performed.  Sagittal and coronal reformats were performed.    FINDINGS:  LOWER CHEST: Small right and trace left pleural effusions with adjacent   atelectasis.    LIVER: Within normal limits.  BILE DUCTS: Normal caliber.  GALLBLADDER: Cholecystectomy.  SPLEEN: Within normal limits.  PANCREAS: Within normal limits.  ADRENALS: Within normal limits.  KIDNEYS/URETERS: Right renal cyst measuring 2.5 cm. Stable 3.5 cm   heterogenous left renal cyst.    BLADDER: Within normal limits.  REPRODUCTIVE ORGANS: Fibroid uterus    BOWEL: No bowel obstruction. Appendix is not visualized.  PERITONEUM/RETROPERITONEUM: Large right retroperitoneal hematoma with   layering internal hematocrit anterior to the psoas major measuring 12.8 x   6.1 x 7.8 cm (5-42). Additional retroperitoneal hematoma with layering   internal hematocrit anterior to the right iliacus measuring 12.8 x 5.5 x   5.2 cm area  VESSELS: Atherosclerotic changes.  LYMPH NODES: No lymphadenopathy.  ABDOMINAL WALL: Within normal limits.  BONES: Degenerative changes.    IMPRESSION:  Interval development of two large right retroperitoneal hematomas.    Findings discussed with MICU resident  at 6:25pm 3/15/25 by Dr. Zee    --- End of Report ---          THOMAS ZEE MD; Resident Radiologist  This document has been electronically signed.  LINDY BUTTS MD; Attending Radiologist  This document has been electronically signed. Mar 15 2025  9:24PM    < end of copied text >    < from: Xray Chest 1 View- PORTABLE-Urgent (Xray Chest 1 View- PORTABLE-Urgent .) (03.15.25 @ 20:54) >    ACC: [id number] EXAM:  XR CHEST PORTABLE URGENT 1V   ORDERED BY: SON THAPA     PROCEDURE DATE:  03/15/2025          INTERPRETATION:  EXAMINATION: XR CHEST URGENT    CLINICAL INDICATION: Line placement    TECHNIQUE: Single frontal view of the chest was obtained.    COMPARISON: Chest x-ray 3/14/2025.    FINDINGS:    Interval placement of left IJ Shiley catheter with tip overlying the   superior cavoatrial junction. Left upper extremity PICC in similar   position.  There is a small left pleural effusion and/or left basilar atelectasis.   The heart size cannot be accurately assessed on this projection.  No acute osseous abnormalities.    IMPRESSION:  Lines in good position and described above. Small left pleural effusion   and/or left basilar atelectasis. No pneumothorax.    --- End of Report ---          FÁTIMA SAPP MD; Resident Radiologist  This document has been electronically signed.  GIACOMO WEN MD; Attending Radiologist  This document has been electronically signed. Mar [id number]  9:01AM    < end of copied text >

## 2025-03-19 NOTE — CONSULT NOTE ADULT - PROVIDER SPECIALTY LIST ADULT
Infectious Disease
MICU
Vascular Surgery
Vascular Cardiology
Vascular Surgery
Intervent Radiology
Intervent Radiology
Nephrology
Palliative Care

## 2025-03-19 NOTE — PROGRESS NOTE ADULT - SUBJECTIVE AND OBJECTIVE BOX
[facility] Division of Kidney Diseases & Hypertension  FOLLOW UP NOTE  [phone]--------------------------------------------------------------------------------  Chief Complaint: ROB on CKD V    24 hour events/subjective: Pt. seen and examined at bedside earlier today with son and daughter present. Pt. with pain overnight so did not sleep and received pain meds in AM. Pt. slept most of day yesterday not awake and interactive.  No documented UOP.     PAST HISTORY  --------------------------------------------------------------------------------  No significant changes to PMH, PSH, FHx, SHx from H&P unless otherwise noted.    ALLERGIES & MEDICATIONS  --------------------------------------------------------------------------------  Allergies    Kiwi (Anaphylaxis)  codeine (Unknown)  penicillins (Anaphylaxis)    Intolerances      Standing Inpatient Medications  dextrose 5%. 1000 milliLiter(s) IV Continuous <Continuous>  dextrose 5%. 1000 milliLiter(s) IV Continuous <Continuous>  dextrose 50% Injectable 25 Gram(s) IV Push once  dextrose 50% Injectable 12.5 Gram(s) IV Push once  dextrose 50% Injectable 25 Gram(s) IV Push once  gabapentin 100 milliGRAM(s) Oral three times a day  heparin   Injectable 5000 Unit(s) SubCutaneous every 12 hours  influenza  Vaccine (HIGH DOSE) 0.5 milliLiter(s) IntraMuscular once  insulin lispro (ADMELOG) corrective regimen sliding scale   SubCutaneous Before meals and at bedtime  levothyroxine 50 MICROGram(s) Oral daily  lidocaine   4% Patch 1 Patch Transdermal daily  midodrine 10 milliGRAM(s) Oral every 8 hours  oxyCODONE    IR 2.5 milliGRAM(s) Oral every 6 hours  sodium bicarbonate 650 milliGRAM(s) Oral three times a day    PRN Inpatient Medications  acetaminophen     Tablet .. 650 milliGRAM(s) Oral every 6 hours PRN  aluminum hydroxide/magnesium hydroxide/simethicone Suspension 30 milliLiter(s) Oral every 4 hours PRN  dextrose Oral Gel 15 Gram(s) Oral once PRN  HYDROmorphone  Injectable 0.2 milliGRAM(s) IV Push every 4 hours PRN  melatonin 3 milliGRAM(s) Oral at bedtime PRN  sodium chloride 0.9% lock flush 10 milliLiter(s) IV Push every 1 hour PRN      REVIEW OF SYSTEMS  --------------------------------------------------------------------------------  Unable to obtain ROS due to current clinical status.     VITALS/PHYSICAL EXAM  --------------------------------------------------------------------------------  T(C): 36.4 (03-19-25 @ 04:34), Max: 36.7 (03-18-25 @ 16:10)  HR: 76 (03-19-25 @ 04:34) (69 - 87)  BP: 109/73 (03-19-25 @ 04:34) (94/54 - 116/63)  RR: 18 (03-19-25 @ 04:34) (13 - 21)  SpO2: 92% (03-19-25 @ 04:34) (92% - 100%)  Wt(kg): --        03-18-25 @ 07:01  -  03-19-25 @ 07:00  --------------------------------------------------------  IN: 100 mL / OUT: 0 mL / NET: 100 mL      Physical Exam:  Gen: elderly, frail, chronically ill appearing.  Pulm: CTA B/L  CV: RRR, S1S2;  Abd: +BS, soft  : No suprapubic tenderness  Extremities: +R>L  Neuro: sleeping.  Access: LIJ non-tunneled HD catheter.     LABS/STUDIES  --------------------------------------------------------------------------------              8.4    19.07 >-----------<  64       [03-19-25 @ 07:08]              25.8     138  |  96  |  63  ----------------------------<  148      [03-19-25 @ 07:13]  4.7   |  20  |  3.53        Ca     9.0     [03-19-25 @ 07:13]      Mg     1.8     [03-19-25 @ 07:13]      Phos  5.7     [03-19-25 @ 07:13]    TPro  5.1  /  Alb  3.0  /  TBili  3.2  /  DBili  x   /  AST  149  /  ALT  95  /  AlkPhos  153  [03-19-25 @ 07:13]    PT/INR: PT 20.6 , INR 1.82       [03-18-25 @ 21:05]  PTT: 34.3       [03-18-25 @ 21:05]      Creatinine Trend:  SCr 3.53 [03-19 @ 07:13]  SCr 2.73 [03-17 @ 22:39]  SCr 3.81 [03-17 @ 00:37]  SCr 5.07 [03-16 @ 01:46]  SCr 7.14 [03-15 @ 06:59]    Urinalysis - [03-19-25 @ 07:13]      Color  / Appearance  / SG  / pH       Gluc 148 / Ketone   / Bili  / Urobili        Blood  / Protein  / Leuk Est  / Nitrite       RBC  / WBC  / Hyaline  / Gran  / Sq Epi  / Non Sq Epi  / Bacteria     Urine Sodium 61      [03-14-25 @ 17:00]  Urine Osmolality 377      [03-14-25 @ 17:00]      HBsAb <3.3      [03-15-25 @ 15:59]  HBsAb Nonreact      [03-15-25 @ 15:59]  HBsAg Nonreact      [03-15-25 @ 15:59]  HCV 0.06, Nonreact      [03-15-25 @ 15:59]

## 2025-03-19 NOTE — CONSULT NOTE ADULT - ASSESSMENT
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 C/b UCx finding w/ E coli and E faecalis growth. S/p 3-d ceftriaxone and vancomycin.     #Leukocytosis  #Bacteriuria  #Debility  #At risk for aspiration  #Popliteal artery occlusion    Recommendations:   89-yo F w/ PMH of CAD s/p PCI, HFrEF, and DM, admitted w/ R shin pain, found to have b/l popliteal artery occlusion, c/p RP bleeding, c/b ARF and platelet dysfunction 2/2 uremia, requiring emergent HD in MICU by Georgi. C/b UCx finding w/ E coli and E faecalis growth. S/p 3-d ceftriaxone and vancomycin.     No signs or symptoms of systemic infection. No suprapubic tenderness. CT and ultrasound results not suggestive of pyelonephritis. Leg wound not acutely infected. Leukocytosis possibly from multifactorial issues including RP bleeding and acute renal failure.   RN wound assessment noted as well on flowsheet.    #Leukocytosis  #Bacteriuria  #Debility  #At risk for aspiration  #Popliteal artery occlusion    Recommendations:  - No further antibiotics and monitor  - Monitor WBC and VS  - Bed head elevation. Aspiration precautions  - GOC  - Optimize nutrition    Topics relating to disease transmission risk assessment and mitigation, complex antimicrobial therapy counseling and treatment, and/or public health investigation, analysis, and testing were addressed.    Thank you for this consult. Inpatient ID consult team will discontinue active follow-up for this patient.  Further changes in lab values, imaging studies, or clinical status will not be known to ID inpatient consultants unless specifically communicated by primary team.  Plan discussed with primary team attending Dr. Orellana.      Romario Aguilera MD, PhD  Attending Physician  Division of Infectious Diseases  Department of Medicine    Please contact through MS Teams message.  Office: [phone] (after 5 PM or weekend)

## 2025-03-19 NOTE — PROGRESS NOTE ADULT - ASSESSMENT
89-year-old female with PMHx HTN, DM on insulin, CAD s/p PCI, HFrEF, CKD stage V (baseline SCr 3.4-3.6) presenting with acute onset of R shin pain in setting of severe PVD. Nephrology consulted for ROB on CKD, hyperkalemia.     #ROB on CKD V  ROB on stage V CKD in setting of contrast, entresto and torsemide use, hypotension, poor po intake, r/o retention. [facility] pt. last outpatient SCr was 3.66 (1/2025). Baseline SCr appears to be 3.4-3.6. Outpatient nephrologist is Dr. Parth Barrientos. SCr on admission was elevated at 3.99. SCr has worsened to ~7s with evidence of uremia so patient transferred to MICU 3/15/25 and initiated on iHD. Last HD 3/17/25. SCr worsening to 3.53 with BUN >60 without HD.  PLAN:   - Plan for HD treatment today 3/19/25.   - Recommend additional 10mg midodrine prior to HD and continue standing midodrine 10mg TID.   - Needs further GOC to determine if long term HD is appropriate for patient discussed with son, daughter who are still hopeful for renal recovery at this point  - Continue to hold entresto and diuretics.   - Monitor labs and I/Os. Avoid nephrotoxins including, ACE/ARB, NSAIDs, contrast, etc. Dose medications as per iHD.    If you have any questions, please feel free to contact me:  Radha Hilliard MD PGY-4  Nephrology Fellow  [employer] Teams (Preferred)/ Pager [phone]   (After 5pm or on weekends please page the on-call fellow). 89-year-old female with PMHx HTN, DM on insulin, CAD s/p PCI, HFrEF, CKD stage V (baseline SCr 3.4-3.6) presenting with acute onset of R shin pain in setting of severe PVD. Nephrology consulted for ROB on CKD, hyperkalemia.     #ROB on CKD V  ROB on stage V CKD in setting of contrast, entresto and torsemide use, hypotension, poor po intake, r/o retention. [facility] pt. last outpatient SCr was 3.66 (1/2025). Baseline SCr appears to be 3.4-3.6. Outpatient nephrologist is Dr. Parth Barrientos. SCr on admission was elevated at 3.99. SCr has worsened to ~7s with evidence of uremia so patient transferred to MICU 3/15/25 and initiated on iHD. Last HD 3/17/25. SCr worsening to 3.53 with BUN >60 without HD.    PLAN:   - Plan for HD treatment today 3/19/25.   - Recommend additional 10mg midodrine prior to HD and continue standing midodrine 10mg TID.   - Needs further GOC to determine if long term HD is appropriate for patient discussed with son, daughter who are still hopeful for renal recovery at this point  - Continue to hold entresto and diuretics.   - Monitor labs and I/Os. Avoid nephrotoxins including, ACE/ARB, NSAIDs, contrast, etc. Dose medications as per iHD.    If you have any questions, please feel free to contact me:  Radha Hilliard MD PGY-4  Nephrology Fellow  [employer] Teams (Preferred)/ Pager [phone]   (After 5pm or on weekends please page the on-call fellow).

## 2025-03-19 NOTE — PROGRESS NOTE ADULT - PROBLEM SELECTOR PLAN 4
Patient's hospital course was complicated by a large RP bleed after being started on heparin drip in the setting of acute renal failure and uremia-induced platelet dysfunction, requiring emergent HD. Received total 4u pRBC, 1u FFP, 1u PLT. IR consulted, recommending no further intervention as RP hematoma would most likely self tamponade, but if clinically worsens, would consider CTA to identify culprit vessel for embolization. This would need to be shared decision making with family at CT contrast load would worsen ROB and likely increase risk of longer term HD.  - Monitor hemoglobin twice daily to monitor for stability  - Hold DAPT and heparin drip for now; discuss with vascular surgery in AM when OK to resume - baby ASA (once stable from a bleeding perspective can likely also put on Xarelto 2.5mg bid)

## 2025-03-19 NOTE — PROGRESS NOTE ADULT - PROBLEM SELECTOR PLAN 1
c/w oxy IR q6 ATC (can hold if SBP<100, RR<10)  Dilaudid IVP q4 hours prn for severe pain (can hold if SBP<100, RR<10)  c/w 100mg Gabapentin TID   Tylenol for mild/moderate pain   can consider IV Tylenol if BP too low for IV Dilaudid

## 2025-03-19 NOTE — CHART NOTE - NSCHARTNOTEFT_GEN_A_CORE
Notified by RN pt in worsening pain. Pt seen and examined at bedside. Son and daughter available at bedside.  Pt appears to be moaning and groaning in pain, last dose of pain medication was 4pm (IV dilaudid 0.2, 5 hours prior to exam). Pt unable to verbalize where pain is.   On review, prn doses of pain control medication were help due to low BP per EMAR review and IV tylenol was ordered  Blood pressure 96/60, afternoon (2pm) dose of midodrine q8 dosing was held per parameter    Vital Signs Last 24 Hrs  T(C): 36.7 (18 Mar 2025 20:19), Max: 36.7 (18 Mar 2025 16:10)  T(F): 98.1 (18 Mar 2025 20:19), Max: 98.1 (18 Mar 2025 20:19)  HR: 87 (18 Mar 2025 20:19) (69 - 87)  BP: 96/60 (18 Mar 2025 20:19) (94/54 - 116/63)  BP(mean): 74 (18 Mar 2025 15:00) (70 - 83)  RR: 18 (18 Mar 2025 20:19) (13 - 21)  SpO2: 95% (18 Mar 2025 20:19) (93% - 100%)    Parameters below as of 18 Mar 2025 20:19  Patient On (Oxygen Delivery Method): room air    Labs:                        8.7    23.90 )-----------( 84       ( 18 Mar 2025 21:05 )             26.4     Physical Exam:  General: groaning in bed, AxO to self only  Respiratory: CTA B/L  CV: RRR, S1S2  Abdominal: Soft, NT, ND  Back: +flank ecchymosis      Assessment and Plan:  Stat CBC, coags ordered to assess hgb --> resulted stable  BP 96/60, Pt due for q8 midodrine dosing--> given to pt   continue with pain control regime as ordered, pt received gabapentin and IV tylenol with improvement in pain, pt reassessed and pt sleeping comfortably  Discussed plan with son and daughter at bedside    Betty Monk PA-C  MS Teams  Department of Medicine

## 2025-03-19 NOTE — PROGRESS NOTE ADULT - SUBJECTIVE AND OBJECTIVE BOX
SUBJECTIVE AND OBJECTIVE: pt seen and examined at bedside. pt lethargic, intermittently able to participate  Indication for Geriatrics and Palliative Care Services/INTERVAL HPI: GOC/symptoms    OVERNIGHT EVENTS: No acute events o/n     DNR on chart:DNI: Trial NIV  DNI: Trial NIV      Allergies    Kiwi (Anaphylaxis)  codeine (Unknown)  penicillins (Anaphylaxis)    Intolerances    MEDICATIONS  (STANDING):  dextrose 5%. 1000 milliLiter(s) (100 mL/Hr) IV Continuous <Continuous>  dextrose 5%. 1000 milliLiter(s) (50 mL/Hr) IV Continuous <Continuous>  dextrose 50% Injectable 25 Gram(s) IV Push once  dextrose 50% Injectable 12.5 Gram(s) IV Push once  dextrose 50% Injectable 25 Gram(s) IV Push once  gabapentin 100 milliGRAM(s) Oral three times a day  heparin   Injectable 5000 Unit(s) SubCutaneous every 12 hours  influenza  Vaccine (HIGH DOSE) 0.5 milliLiter(s) IntraMuscular once  insulin lispro (ADMELOG) corrective regimen sliding scale   SubCutaneous Before meals and at bedtime  levothyroxine 50 MICROGram(s) Oral daily  lidocaine   4% Patch 1 Patch Transdermal daily  midodrine 10 milliGRAM(s) Oral every 8 hours  oxyCODONE    IR 2.5 milliGRAM(s) Oral every 6 hours  sodium bicarbonate 650 milliGRAM(s) Oral three times a day    MEDICATIONS  (PRN):  acetaminophen     Tablet .. 650 milliGRAM(s) Oral every 6 hours PRN Temp greater or equal to 38C (100.4F), Mild Pain (1 - 3)  aluminum hydroxide/magnesium hydroxide/simethicone Suspension 30 milliLiter(s) Oral every 4 hours PRN Dyspepsia  dextrose Oral Gel 15 Gram(s) Oral once PRN Blood Glucose LESS THAN 70 milliGRAM(s)/deciliter  HYDROmorphone  Injectable 0.2 milliGRAM(s) IV Push every 4 hours PRN Severe Pain (7 - 10)  melatonin 3 milliGRAM(s) Oral at bedtime PRN Insomnia  sodium chloride 0.9% lock flush 10 milliLiter(s) IV Push every 1 hour PRN Pre/post blood products, medications, blood draw, and to maintain line patency      ITEMS UNCHECKED ARE NOT PRESENT    PRESENT SYMPTOMS: [x ]Unable to self-report - see [ ] CPOT [x ] PAINADS [x ] RDOS  Source if other than patient:  [ ]Family   [ x]Team     Pain:  [ x]yes [ ]no-> pain b/l LE with touch/care   QOL impact -   Location -                    Aggravating factors -  Quality -  Radiation -  Timing-  Severity (0-10 scale):  Minimal acceptable level (0-10 scale):     CPOT:    https://www.[facility].org/getattachment/[email or web address]-0h0s-[email or web address]/Critical-Care-Pain-Observation-Tool-(CPOT)    PAINAD Score: See PAINAD tool and score below       Dyspnea:                           [ ]Mild [ ]Moderate [ ]Severe None     RDOS: See RDOS tool and score below   0 to 2  minimal or no respiratory distress   3  mild distress  4 to 6 moderate distress  >7 severe distress      Anxiety:                             [ ]Mild [ ]Moderate [ ]Severe  Fatigue:                             [ ]Mild [ ]Moderate [ ]Severe  Nausea:                             [ ]Mild [ ]Moderate [ ]Severe  Loss of appetite:              [ ]Mild [ ]Moderate [ ]Severe  Constipation:                    [ ]Mild [ ]Moderate [ ]Severe    PCSSQ[Palliative Care Spiritual Screening Question]   Severity (0-10):  Score of 4 or > indicate consideration of Chaplaincy referral.  Chaplaincy Referral: [ ] yes [ ] refused [ ] following [ ] Deferred     Caregiver [place]? : [ ] yes [ ] no [ ] Deferred [ ] Declined             Social work referral [ ] Patient & Family Centered Care Referral [ ]     Anticipatory Grief present?:  [ ] yes [ ] no  [ ] Deferred                  Social work referral [ ] Chaplaincy Referral [ ]    		  Other Symptoms:  [ x]All other review of systems negative     Palliative Performance Status Version 2:   See PPSv2 tool and score below         PHYSICAL EXAM:  Vital Signs Last 24 Hrs  T(C): 36.4 (19 Mar 2025 04:34), Max: 36.7 (18 Mar 2025 16:10)  T(F): 97.5 (19 Mar 2025 04:34), Max: 98.1 (18 Mar 2025 20:19)  HR: 76 (19 Mar 2025 04:34) (75 - 87)  BP: 109/73 (19 Mar 2025 04:34) (94/54 - 116/63)  BP(mean): 74 (18 Mar 2025 15:00) (70 - 83)  RR: 18 (19 Mar 2025 04:34) (13 - 21)  SpO2: 92% (19 Mar 2025 04:34) (92% - 98%)    Parameters below as of 19 Mar 2025 04:34  Patient On (Oxygen Delivery Method): room air     I&O's Summary    18 Mar 2025 07:01  -  19 Mar 2025 07:00  --------------------------------------------------------  IN: 100 mL / OUT: 0 mL / NET: 100 mL       GENERAL: [ ]Cachexia    [ Alert  [  ]Oriented x   [x ]Lethargic  [ ]Unarousable  [ ]Verbal  [ ]Non-Verbal  Behavioral:   [ ]Anxiety  [ ]Delirium [ ]Agitation [ ]Other  HEENT:  [ ]Normal   [ x]Dry mouth   [ ]ET Tube/Trach  [ ]Oral lesions  PULMONARY:   [ ]Clear [ ]Tachypnea  [ ]Audible excessive secretions   [ ]Rhonchi        [ ]Right [ ]Left [ ]Bilateral  [ ]Crackles        [ ]Right [ ]Left [ ]Bilateral  [ ]Wheezing     [ ]Right [ ]Left [ ]Bilateral  [x ]Diminished BS [ ] Right [ ]Left [x ]Bilateral  CARDIOVASCULAR:    [ x]Regular [ ]Irregular [ ]Tachy  [ ]Cam [ ]Murmur [ ]Other  GASTROINTESTINAL:  [x ]Soft  [ ]Distended   [x+BS  [ x]Non tender [ ]Tender  [ ]Other [ ]PEG [ ]OGT/ NGT   Last BM:   GENITOURINARY:  [ ]Normal [ x]Incontinent   [ ]Oliguria/Anuria   [ ]Lieberman  MUSCULOSKELETAL:   [ ]Normal   [ x]Weakness  [ ]xBed/Wheelchair bound [ ]Edema  NEUROLOGIC:   [ ]No focal deficits  [x ] Cognitive impairment  [ ] Dysphagia [ ]Dysarthria [ ] Paresis [ ]Other   SKIN:   [ ]Normal  [ ]Rash  [ ]Other  [ ]Pressure ulcer(s) [ ]y [ ]n present on admission    CRITICAL CARE:  [ ]Shock Present  [ ]Septic [ ]Cardiogenic [ ]Neurologic [ ]Hypovolemic  [ ]Vasopressors [ ]Inotropes  [ ]Respiratory failure present [ ]Mechanical Ventilation [ ]Non-invasive ventilatory support [ ]High-Flow   [ ]Acute  [ ]Chronic [ ]Hypoxic  [ ]Hypercarbic [ ]Other  [ ]Other organ failure     LABS:                        8.4    19.07 )-----------( 64       ( 19 Mar 2025 07:08 )             25.8   03-19    138  |  96  |  63[H]  ----------------------------<  148[H]  4.7   |  20[L]  |  3.53[H]    Ca    9.0      19 Mar 2025 07:13  Phos  5.7     03-19  Mg     1.8     03-19    TPro  5.1[L]  /  Alb  3.0[L]  /  TBili  3.2[H]  /  DBili  x   /  AST  149[H]  /  ALT  95[H]  /  AlkPhos  153[H]  03-19  PT/INR - ( 18 Mar 2025 21:05 )   PT: 20.6 sec;   INR: 1.82 ratio         PTT - ( 18 Mar 2025 21:05 )  PTT:34.3 sec    Urinalysis Basic - ( 19 Mar 2025 07:13 )    Color: x / Appearance: x / SG: x / pH: x  Gluc: 148 mg/dL / Ketone: x  / Bili: x / Urobili: x   Blood: x / Protein: x / Nitrite: x   Leuk Esterase: x / RBC: x / WBC x   Sq Epi: x / Non Sq Epi: x / Bacteria: x      RADIOLOGY & ADDITIONAL STUDIES:  < from: US Abdomen Upper Quadrant Right (03.17.25 @ 15:55) >  ACC: [id number] EXAM:  US ABDOMEN RT UPR QUADRANT   ORDERED BY:  JANINE GAMBOA     PROCEDURE DATE:  03/17/2025          INTERPRETATION:  CLINICAL INFORMATION: 89 year old female with elevated   liver function tests.    COMPARISON: Renal ultrasound 3/13/2025. CT abdomen pelvis 2/15/2025.    TECHNIQUE: Portable sonography of the right upper quadrant.    FINDINGS:  Liver: No focal abnormality is identified.  Bile ducts: Common bile duct is mildly dilated and measures 9 mm. The   distal segment of the duct is obscured by bowel gas and is not   visualized. No intrahepatic biliary dilatation is seen.  Gallbladder: Cholecystectomy.  Pancreas: Visualized portions are within normal limits.  Right kidney: 9.0 cm. No hydronephrosis. Similar-appearing septated   interpolar cyst measuring 2.5 x 2.3 x 2.6 cm.  Ascites: None.  IVC: Visualized portions are within normal limits.    Other: Large complex heterogeneous collection inferior to the right   kidney which likely represents the retroperitoneal hematoma better   appreciated on the recent  CT. The visualized portion on this examination   measures approximately 14.3 x 8.7 x 13.2 cm.    IMPRESSION:    1. Status post cholecystectomy.  2. The common bile duct is mildly dilated measuring up to approximately   9.4 mm. The distal duct is obscured by bowel gas and is not visualized on   this exam. No significant intrahepatic biliary dilatation is seen. This   may reflect postcholecystectomy change. Suggest correlation with liver   function tests andfurther evaluation as clinically warranted.  3. Redemonstrated large right infrarenal retroperitoneal hematoma, better   assessed on the prior CT.            --- End of Report ---          JEN HI MD; Resident Radiologist  This document has been electronically signed.  LATONYA ROMAN MD; Attending Radiologist  This document has been electronically signed. Mar 17 2025  7:41PM    < end of copied text >    Protein Calorie Malnutrition Present: [ ]mild [ ]moderate [ ]severe [ ]underweight [ ]morbid obesity  https://www.andeal.org/vault/2440/web/files/ONC/Table_Clinical%20Characteristics%20to%20Document%20Malnutrition-White%20JV%20et%20al%202012.pdf    Height (cm): 152.4 (03-15-25 @ 18:37)  Weight (kg): 68.8 (03-15-25 @ 18:37)  BMI (kg/m2): 29.6 (03-15-25 @ 18:37)    [ x]PPSV2 < or = 30%  [ ]significant weight loss [ ]poor nutritional intake [ ]anasarca[ ]Artificial Nutrition    Other REFERRALS:  [ ]Hospice  [ ]Child Life  [ ]Social Work  [ ]Case management [ ]Holistic Therapy     Goals of Care Document: 3/19  Goals of Care:   GOALS OF CARE:  · Participants	Family  · Child(lev Peralta    Advance Directives:  · Does patient have Advance Directive	Yes  · Indicate Type	Medical Orders for Life-Sustaining Treatment (MOLST)  · Are any of the items on the chart	Yes  · Specify which ones are on chart	Medical Orders for Life-Sustaining Treatment (MOLST)  · Does Patient Have a Surrogate	Yes  · Surrogate's Name	Neville and Chasity    Conversation Discussion:  · ADVANCED CARE PLANNING	Voluntary discussion occurred addressing advanced care planning  · Conversation	Diagnosis; Prognosis; MOLST Discussed; Treatment Options; Palliative Care Referral  · Conversation Details	Met with patients two children/surrogates Neville and Chasity. They both demonstrated excellent understanding of their mothers medical issues.  They are hopeful for some level of recovery , especially Chasity , who states that her mother is not ready to leave yet.  We discussed advance age with chronic medical issues including but not limited to insulin dependent dm,  peripheral arterial disease,  CAD, renal failure and the toll these diseases take on our elderly.   Appropriately tearful, Chasity verbalized understanding of her mothers guarded state and shared she would never want her to suffer unnecessarily .    We discussed advance directives, specifically DNR/DNI.  After extensive explanation of MOLST document, family was in agreement with DNR/DNI/NIV but to continue medical interventions.  MOLST completed , copies provided to family, original placed in chart.  [facility] updated accordingly    Additionally, I discussed the option to consider a symptom directed approach bringing patient home with home hospice services.  Chasity was familiar with hospice and was appreciative of the information provided.  At this time they are not ready to move forward with this plan.   They wish to have more information from vascular , and from the team about possible "non invasive " interventions.   They also wanted to understand if dialysis was now a long term need.    Palliative will continue to follow .

## 2025-03-19 NOTE — PROGRESS NOTE ADULT - PROBLEM SELECTOR PLAN 8
Unclear where pain is localized at this time given somnolence. Likely bilateral legs with ulcer and generalized.   - Follow up palliative care recommendations   - Gabapentin 100 mg TID   - Oxycodone IR 2.5 mg q6h PRN for moderate pain  - Dilaudid IV 0.2 mg q4h PRN for severe pain   - Also with nausea; use alcohol pads to help as we are avoiding QTc prolonging agents, as QTc 520-540 range  - Lidocaine patch daily  - palliative care following

## 2025-03-19 NOTE — PROGRESS NOTE ADULT - SUBJECTIVE AND OBJECTIVE BOX
Nato Orellana MD  [facility] Division of Hospital Medicine    SUBJECTIVE / OVERNIGHT EVENTS:  - no events overnight, no n/v/abd pain/cough/chest pain. following very basic commands, not really communicative.     MEDICATIONS  (STANDING):  dextrose 5%. 1000 milliLiter(s) (100 mL/Hr) IV Continuous <Continuous>  dextrose 5%. 1000 milliLiter(s) (50 mL/Hr) IV Continuous <Continuous>  dextrose 50% Injectable 25 Gram(s) IV Push once  dextrose 50% Injectable 12.5 Gram(s) IV Push once  dextrose 50% Injectable 25 Gram(s) IV Push once  gabapentin 100 milliGRAM(s) Oral three times a day  heparin   Injectable 5000 Unit(s) SubCutaneous every 12 hours  influenza  Vaccine (HIGH DOSE) 0.5 milliLiter(s) IntraMuscular once  insulin lispro (ADMELOG) corrective regimen sliding scale   SubCutaneous Before meals and at bedtime  levothyroxine 50 MICROGram(s) Oral daily  lidocaine   4% Patch 1 Patch Transdermal daily  midodrine 10 milliGRAM(s) Oral every 8 hours  oxyCODONE    IR 2.5 milliGRAM(s) Oral every 6 hours  sodium bicarbonate 650 milliGRAM(s) Oral three times a day    MEDICATIONS  (PRN):  acetaminophen     Tablet .. 650 milliGRAM(s) Oral every 6 hours PRN Temp greater or equal to 38C (100.4F), Mild Pain (1 - 3)  aluminum hydroxide/magnesium hydroxide/simethicone Suspension 30 milliLiter(s) Oral every 4 hours PRN Dyspepsia  dextrose Oral Gel 15 Gram(s) Oral once PRN Blood Glucose LESS THAN 70 milliGRAM(s)/deciliter  HYDROmorphone  Injectable 0.2 milliGRAM(s) IV Push every 4 hours PRN Severe Pain (7 - 10)  melatonin 3 milliGRAM(s) Oral at bedtime PRN Insomnia  sodium chloride 0.9% lock flush 10 milliLiter(s) IV Push every 1 hour PRN Pre/post blood products, medications, blood draw, and to maintain line patency      I&O's Summary    18 Mar 2025 07:01  -  19 Mar 2025 07:00  --------------------------------------------------------  IN: 100 mL / OUT: 0 mL / NET: 100 mL        PHYSICAL EXAM:  Vital Signs Last 24 Hrs  T(C): 36.4 (19 Mar 2025 12:41), Max: 36.7 (18 Mar 2025 16:10)  T(F): 97.6 (19 Mar 2025 12:41), Max: 98.1 (18 Mar 2025 20:19)  HR: 83 (19 Mar 2025 12:41) (76 - 87)  BP: 124/67 (19 Mar 2025 12:41) (94/54 - 124/67)  BP(mean): 74 (18 Mar 2025 15:00) (74 - 74)  RR: 18 (19 Mar 2025 12:41) (16 - 18)  SpO2: 99% (19 Mar 2025 12:41) (92% - 100%)    Parameters below as of 19 Mar 2025 12:41  Patient On (Oxygen Delivery Method): room air      CONSTITUTIONAL: chronically unwell appearing, breathing comfortably on room air  EYES: anicteric   HENT: oropharynx clear and moist, normocephalic, no palpable masses in the neck  LYMPH NODES: no cervical lymphadenopathy   RESPIRATORY: normal respiratory effort; lungs are clear to auscultation bilaterally (no wheezes/crackles)  CARDIOVASCULAR: regular rate and rhythm, normal S1 and S2, no murmur/rub/gallop  ABDOMEN: positive bowel sounds, non-tender, non-distended, no organomegaly   EXTREMITIES: no lower extremity edema, lower extremities appear cool to the touch   NEUROLOGY: oriented to self only, follow only very minor commands     LABS:                        8.4    19.07 )-----------( 64       ( 19 Mar 2025 07:08 )             25.8     03-19    138  |  96  |  63[H]  ----------------------------<  148[H]  4.7   |  20[L]  |  3.53[H]    Ca    9.0      19 Mar 2025 07:13  Phos  5.7     03-19  Mg     1.8     03-19    TPro  5.1[L]  /  Alb  3.0[L]  /  TBili  3.2[H]  /  DBili  x   /  AST  149[H]  /  ALT  95[H]  /  AlkPhos  153[H]  03-19    PT/INR - ( 18 Mar 2025 21:05 )   PT: 20.6 sec;   INR: 1.82 ratio         PTT - ( 18 Mar 2025 21:05 )  PTT:34.3 sec      Urinalysis Basic - ( 19 Mar 2025 07:13 )    Color: x / Appearance: x / SG: x / pH: x  Gluc: 148 mg/dL / Ketone: x  / Bili: x / Urobili: x   Blood: x / Protein: x / Nitrite: x   Leuk Esterase: x / RBC: x / WBC x   Sq Epi: x / Non Sq Epi: x / Bacteria: x

## 2025-03-19 NOTE — PROGRESS NOTE ADULT - PROBLEM SELECTOR PLAN 6
will continue to follow for goc/ symptoms  case discussed with primary team  Can be reached by TEAMS M-F 9-5 Ashleigh Dozier Any other time please page [phone] if needed

## 2025-03-19 NOTE — PROGRESS NOTE ADULT - PROBLEM SELECTOR PLAN 2
Has extensive PAD in bilateral legs, newly diagnosed. Was placed on DAPT and heparin drip, held due to RP bleed. Has right shin ulcer.   - Follow up with vascular cardiology regarding medical management of BL LE stenotic lesions  - baby ASA (once stable from a bleeding perspective can likely also put on Xarelto 2.5mg bid)

## 2025-03-19 NOTE — PROGRESS NOTE ADULT - PROBLEM SELECTOR PLAN 11
Follow up palliative care recommendations, who are assisting in goals of care.     General  - DVT prophylaxis: heparin 5000 units q12h   - Diet: soft and bite sized   - Medication reconciliation: complete   - Code: DNR/DNI   - Medications: Tylenol 650 mg q6h as needed for pain, Maalox 30 mL q4h as needed for acid reflux, melatonin 3 mg bedtime as needed for insomnia    Disposition  - Needed before discharge: improvement in mental status, monitor off dialysis   - Anticipated discharge date: acute   - Discharge to: pending further goals of care, likely SNF or home hospice   - Appointments after discharge: PCP

## 2025-03-20 NOTE — PROGRESS NOTE ADULT - ASSESSMENT
89-year-old female with PMHx HTN, DM on insulin, CAD s/p PCI, HFrEF, CKD stage V (baseline SCr 3.4-3.6) presenting with acute onset of R shin pain in setting of severe PVD. Nephrology consulted for ROB on CKD, hyperkalemia.     #ROB on CKD V  ROB on stage V CKD in setting of contrast, entresto and torsemide use, hypotension, poor po intake, r/o retention. On review of [facility], pt. last outpatient SCr was 3.66 (1/2025). Baseline SCr appears to be 3.4-3.6. Outpatient nephrologist is Dr. Parth Barrientos. SCr on admission was elevated at 3.99. SCr has worsened to ~7s with evidence of uremia so patient transferred to MICU 3/15/25 and initiated on iHD.    PLAN:   - Last HD on 3/19/25 with midodrine  - Labs reviewed. Pt ill but clinically stable.  - No plans for HD today.   - Needs further GOC to determine if long term HD is appropriate for patient discussed with son, daughter who are still hopeful for renal recovery at this point  - Continue to hold entresto and diuretics.   - Monitor labs and I/Os. Avoid nephrotoxins including, ACE/ARB, NSAIDs, contrast, etc. Dose medications as per iHD.

## 2025-03-20 NOTE — PROGRESS NOTE ADULT - PROBLEM SELECTOR PLAN 11
Follow up palliative care recommendations, who are assisting in goals of care.     General  - DVT prophylaxis: heparin 5000 units q12h   - Diet: soft and bite sized   - Medication reconciliation: complete   - Code: DNR/DNI   - Medications: Tylenol 650 mg q6h as needed for pain, Maalox 30 mL q4h as needed for acid reflux, melatonin 3 mg bedtime as needed for insomnia    Disposition  - Needed before discharge: improvement in mental status, monitor off dialysis   - Anticipated discharge date: acute   - Discharge to: pending further goals of care, likely SNF or home hospice   - Appointments after discharge: PCP Follow up palliative care recommendations, who are assisting in goals of care  GOC: Family not ready for hospice at this time point. Will reassess renal function Mon 3/24 and monitor for clinical improvement    General  - DVT prophylaxis: heparin 5000 units q12h   - Diet: soft and bite sized   - Medication reconciliation: complete   - Code: DNR/DNI   - Medications: Tylenol 650 mg q6h as needed for pain, Maalox 30 mL q4h as needed for acid reflux, melatonin 3 mg bedtime as needed for insomnia    Disposition  - Needed before discharge: improvement in mental status, monitor off dialysis   - Anticipated discharge date: acute   - Discharge to: pending further goals of care, likely SNF or home hospice   - Appointments after discharge: PCP  -Prognosis guarded

## 2025-03-20 NOTE — PROGRESS NOTE ADULT - SUBJECTIVE AND OBJECTIVE BOX
Podiatry pager #: [phone]/ [phone]    Patient is a 89y old  Female who presents with a chief complaint of LE pain (20 Mar 2025 11:38). Podiatry consult for evaluation of bilateral LE      HPI:  89F PMHx HTN, DM on insulin, CAD s/p PCI, HFrEF. P/w acute onset of R shin pain w/o trauma 3hr PTA. Of note, pt has chronic b/l wounds, seen by wound care clinic, but never saw vascular surg before. Denies f/c, cp, sob, abd pain, n/v/d.     In the ED, afebrile, HR 50-70, BP stable, saturates well on RA while awake, desats to high 80s while asleep. Pt is not on O2 at baseline.   CBC w/ wbc 5.8, hgb 9.3 (was 10.1 in '19), plt 104 (was wnl in '19).   Coag w/ INR 1.42  CMP w/ SCr 3.99 (was 1.16 in '19), alk phos 184.   VBG w/ lactate 1.6.     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 Partially imagined heart enlarged and abnormal.     Vasc surg c/s in the ED: no acute intervention hep gtt, SAPPHIRE/PVRs.     *[facility] and i was unable to review.  (11 Mar 2025 00:50)      PAST MEDICAL & SURGICAL HISTORY:  HTN (hypertension)      Dyslipidemia      Diabetes mellitus      Palpitations      STEMI (ST elevation myocardial infarction)      CHF (congestive heart failure)      S/P cholecystectomy      S/P appendectomy      S/P lumpectomy, left breast  premalignant      S/P tonsillectomy      S/P cardiac cath          MEDICATIONS  (STANDING):  chlorhexidine 2% Cloths 1 Application(s) Topical daily  dextrose 5%. 1000 milliLiter(s) (50 mL/Hr) IV Continuous <Continuous>  dextrose 5%. 1000 milliLiter(s) (100 mL/Hr) IV Continuous <Continuous>  dextrose 50% Injectable 25 Gram(s) IV Push once  dextrose 50% Injectable 12.5 Gram(s) IV Push once  dextrose 50% Injectable 25 Gram(s) IV Push once  gabapentin 100 milliGRAM(s) Oral three times a day  heparin   Injectable 5000 Unit(s) SubCutaneous every 12 hours  influenza  Vaccine (HIGH DOSE) 0.5 milliLiter(s) IntraMuscular once  insulin lispro (ADMELOG) corrective regimen sliding scale   SubCutaneous Before meals and at bedtime  levothyroxine 50 MICROGram(s) Oral daily  lidocaine   4% Patch 1 Patch Transdermal daily  midodrine 10 milliGRAM(s) Oral every 8 hours  oxyCODONE    IR 2.5 milliGRAM(s) Oral every 6 hours  sodium bicarbonate 650 milliGRAM(s) Oral three times a day    MEDICATIONS  (PRN):  acetaminophen     Tablet .. 650 milliGRAM(s) Oral every 6 hours PRN Temp greater or equal to 38C (100.4F), Mild Pain (1 - 3)  aluminum hydroxide/magnesium hydroxide/simethicone Suspension 30 milliLiter(s) Oral every 4 hours PRN Dyspepsia  dextrose Oral Gel 15 Gram(s) Oral once PRN Blood Glucose LESS THAN 70 milliGRAM(s)/deciliter  HYDROmorphone  Injectable 0.2 milliGRAM(s) IV Push every 4 hours PRN Severe Pain (7 - 10)  melatonin 3 milliGRAM(s) Oral at bedtime PRN Insomnia  sodium chloride 0.9% lock flush 10 milliLiter(s) IV Push every 1 hour PRN Pre/post blood products, medications, blood draw, and to maintain line patency      Allergies    Kiwi (Anaphylaxis)  codeine (Unknown)  penicillins (Anaphylaxis)    Intolerances        VITALS:    Vital Signs Last 24 Hrs  T(C): 36.3 (20 Mar 2025 11:42), Max: 36.9 (19 Mar 2025 23:52)  T(F): 97.3 (20 Mar 2025 11:42), Max: 98.4 (19 Mar 2025 23:52)  HR: 82 (20 Mar 2025 11:42) (75 - 85)  BP: 100/65 (20 Mar 2025 11:42) (100/65 - 124/67)  BP(mean): --  RR: 17 (20 Mar 2025 11:42) (17 - 18)  SpO2: 90% (20 Mar 2025 11:42) (90% - 99%)    Parameters below as of 20 Mar 2025 11:42  Patient On (Oxygen Delivery Method): room air        LABS:                          9.0    19.86 )-----------( 64       ( 20 Mar 2025 07:07 )             27.1       03-20    137  |  97  |  43[H]  ----------------------------<  216[H]  3.8   |  25  |  2.46[H]    Ca    8.6      20 Mar 2025 07:07  Phos  3.9     03-20  Mg     1.8     03-20    TPro  5.0[L]  /  Alb  2.8[L]  /  TBili  2.7[H]  /  DBili  x   /  AST  114[H]  /  ALT  87[H]  /  AlkPhos  174[H]  03-20      CAPILLARY BLOOD GLUCOSE      POCT Blood Glucose.: 209 mg/dL (20 Mar 2025 12:13)  POCT Blood Glucose.: 249 mg/dL (20 Mar 2025 08:18)  POCT Blood Glucose.: 189 mg/dL (19 Mar 2025 21:21)      PT/INR - ( 18 Mar 2025 21:05 )   PT: 20.6 sec;   INR: 1.82 ratio         PTT - ( 18 Mar 2025 21:05 )  PTT:34.3 sec    LOWER EXTREMITY PHYSICAL EXAM:    Vasular: DP/PT n/p, B/L, CFT delayed to toes B/L, Temperature gradient _,cool B/L LE   Neuro: Epicritic sensation _diminished to the level of _toes, B/L.  Skin: Positive ischemic changes to digits Bilateral LE. Bilateral heel DTI: with no bullae or clinical signs of infection. Right medial ankle/calf ulceration measuring 3cmx 2cm and dry eschar: non-fluctuant or clinical signs of infection      RADIOLOGY & ADDITIONAL STUDIES:

## 2025-03-20 NOTE — PROGRESS NOTE ADULT - PROBLEM SELECTOR PLAN 2
Has extensive PAD in bilateral legs, newly diagnosed. Was placed on DAPT and heparin drip, held due to RP bleed. Has right shin ulcer.   - Follow up with vascular cardiology regarding medical management of BL LE stenotic lesions  - add back baby ASA (can likely also put on Xarelto 2.5mg bid per vascular if hgb remains stable) Has extensive PAD in bilateral legs, newly diagnosed. Was placed on DAPT and heparin drip, held due to RP bleed. Has right shin ulcer.   - Follow up with vascular cardiology regarding medical management of BL LE stenotic lesions  - will hold off adding ASA (platlets in 60s) (can likely also put on Xarelto 2.5mg bid per vascular if hgb remains stable)- vascular cards agrees

## 2025-03-20 NOTE — PROGRESS NOTE ADULT - PROBLEM SELECTOR PLAN 1
c/w oxy 2.5 IR q6 ATC (can hold if SBP<100, RR<10)  Dilaudid IVP 0.2mg q4 hours prn for severe pain (can hold if SBP<100, RR<10)  c/w 100mg Gabapentin TID   Tylenol for mild/moderate pain   can consider IV Tylenol if BP too low for IV Dilaudid

## 2025-03-20 NOTE — PROGRESS NOTE ADULT - PROBLEM SELECTOR PLAN 8
Unclear where pain is localized at this time given somnolence. Likely bilateral legs with ulcer and generalized.   - Follow up palliative care recommendations   - Gabapentin 100 mg TID   - Oxycodone IR 2.5 mg q6h PRN for moderate pain  - Dilaudid IV 0.2 mg q4h PRN for severe pain   - Also with nausea; use alcohol pads to help as we are avoiding QTc prolonging agents, as QTc 520-540 range  - Lidocaine patch daily  - palliative care following Unclear where pain is localized at this time given somnolence. Likely bilateral legs with ulcer and generalized.   - Follow up palliative care recommendations   - Gabapentin 100 mg TID (hold for oversedation)  - Oxycodone IR 2.5 mg q6h PRN for moderate pain  - Dilaudid IV 0.2 mg q4h PRN for severe pain   - Also with nausea; use alcohol pads to help as we are avoiding QTc prolonging agents, as QTc 520-540 range  - Lidocaine patch daily  - palliative care following

## 2025-03-20 NOTE — PROGRESS NOTE ADULT - NSPROGADDITIONALINFOA_GEN_ALL_CORE
D/w daughter at bedside. D/w pallative and vascular cardiology. Hold off asa    60 minutes spent  Yudith Rosario MD  Division of Hospital Medicine  Available on [employer] Teams

## 2025-03-20 NOTE — PROGRESS NOTE ADULT - PROBLEM SELECTOR PLAN 4
Patient's hospital course was complicated by a large RP bleed after being started on heparin drip in the setting of acute renal failure and uremia-induced platelet dysfunction, requiring emergent HD. Received total 4u pRBC, 1u FFP, 1u PLT. IR consulted, recommending no further intervention as RP hematoma would most likely self tamponade, but if clinically worsens, would consider CTA to identify culprit vessel for embolization. This would need to be shared decision making with family at CT contrast load would worsen ROB and likely increase risk of longer term HD.  - Monitor hemoglobin twice daily to monitor for stability  - Hold DAPT and heparin drip for now  -hgb stable, will add back aspirin. Patient's hospital course was complicated by a large RP bleed after being started on heparin drip in the setting of acute renal failure and uremia-induced platelet dysfunction, requiring emergent HD. Received total 4u pRBC, 1u FFP, 1u PLT. IR consulted, recommending no further intervention as RP hematoma would most likely self tamponade, but if clinically worsens, would consider CTA to identify culprit vessel for embolization. This would need to be shared decision making with family at CT contrast load would worsen ROB and likely increase risk of longer term HD.  - Monitor hemoglobin daily to monitor for stability  -hgb stable but remains thrombctyopenia 60s (holding of asa and xarelto)

## 2025-03-20 NOTE — ADVANCED PRACTICE NURSE CONSULT - RECOMMEDATIONS
Impression:    B/L lower extremity intact and unroofed blisters   right lower extremity wound with eschar  cold, mottled feet  fecal incontinence  urinary incontinence       Recommendations:    1) turn and position q2 and PRN utilizing offloading assistive devices  2) routine pericare daily and PRN soiling  3) encourage optimal nutrition  4) waffle cushion when oob to chair  5) B/L LE complete cair air fluidized boots or aj-lock pillow to offload heels/feet  6) triad protective barrier cream to B/L buttocks/sacrum daily and PRN soiling (prophylactic use)  7) incontinence management - consider external urinary catheter to divert urine from skin if incontinent  8) sween 24 moisturizer to dry skin daily   9) consider vascular team regarding lower extremities  10) consider podiatry for treatment recommendations regarding feet/heels/ankles   11) lower extremity blisters - cleanse skin and pat dry then apply cavilon to periwound, cut adaptic to size of wound and cover wound base, then add gauze over adaptic and secure with gauze stretch netting    Plan discussed with ION Shabazz at bedside      For questions/comments regarding the recommendations in this consult, please contact Yoli Luke via [employer] Teams. Wound care will not actively follow. For new concerns, please enter new consult. Thank you!     Impression:    B/L lower extremity intact and unroofed blisters   right lower extremity wound with eschar related to PAD  cold, mottled feet  fecal incontinence  urinary incontinence       Recommendations:    1) turn and position q2 and PRN utilizing offloading assistive devices  2) routine pericare daily and PRN soiling  3) encourage optimal nutrition  4) waffle cushion when oob to chair  5) B/L LE complete cair air fluidized boots or aj-lock pillow to offload heels/feet  6) triad protective barrier cream to B/L buttocks/sacrum daily and PRN soiling (prophylactic use)  7) incontinence management - consider external urinary catheter to divert urine from skin if incontinent  8) sween 24 moisturizer to dry skin daily   9) consider vascular team regarding lower extremities  10) consider podiatry for treatment recommendations regarding feet/heels/ankles   11) lower extremity blisters - cleanse skin and pat dry then apply cavilon to periwound, cut adaptic to size of wound and cover wound base, then add gauze over adaptic and secure with gauze stretch netting    Plan discussed with ION Shabazz at bedside      For questions/comments regarding the recommendations in this consult, please contact Yoli Luke via [employer] Teams. Wound care will not actively follow. For new concerns, please enter new consult. Thank you!     Impression:    B/L lower extremity intact and unroofed blisters   right lower extremity wound with eschar related to PAD  cold, mottled feet  fecal incontinence  urinary incontinence       Recommendations:    1) turn and position q2 and PRN utilizing offloading assistive devices  2) routine pericare daily and PRN soiling  3) encourage optimal nutrition  4) waffle cushion when oob to chair  5) B/L LE complete cair air fluidized boots or aj-lock pillow to offload heels/feet  6) triad protective barrier cream to B/L buttocks/sacrum daily and PRN soiling (prophylactic use)  7) incontinence management - consider external urinary catheter to divert urine from skin if incontinent  8) sween 24 moisturizer to dry skin daily   9) consider vascular team regarding lower extremities  10) consider podiatry for treatment recommendations regarding feet/heels/ankles   11) lower extremity blisters - cleanse skin and pat dry then apply cavilon to periwound, cut adaptic to size of wound and cover wound base, then add gauze over adaptic and secure with gauze stretch netting    Plan discussed with ION Shabazz at bedside      For questions/comments regarding the recommendations in this consult, please contact Yoli Luke via [employer] Teams. Wound care will not actively follow. For new concerns, please enter new consult. Thank you!

## 2025-03-20 NOTE — PROGRESS NOTE ADULT - ASSESSMENT
Assessment/Plan:    Bilateral PVD  bilateral heel DTI: stable, non-infected  Right medial ankle/calf ulcer stable, non infected    reviewed vascular consultation  reviewed infectious disease consultatation  continue with supportive lwc and decubitus precautions with use of z flow boots  recommend right medial ulcer: betadine paint and dsd daily.   reconsult podiatry as needed

## 2025-03-20 NOTE — PROGRESS NOTE ADULT - PROBLEM SELECTOR PLAN 5
see goc note above  goals remain for ongoing medical management at this time, discussed transition to PCU for symptom directed support, family appreciative of information and open to continuing goc discussion  surrogates are pt's 2 children

## 2025-03-20 NOTE — PROGRESS NOTE ADULT - SUBJECTIVE AND OBJECTIVE BOX
SUBJECTIVE AND OBJECTIVE: pt seen and examined at bedside. pt lethargic, in pain when awake  Indication for Geriatrics and Palliative Care Services/INTERVAL HPI: GOC/symptoms     OVERNIGHT EVENTS: In 24 hours pt used 2x 0.2mg IVP Dilaudid (8a-8a)     DNR on chart:DNI: Trial NIV  DNI: Trial NIV      Allergies    Kiwi (Anaphylaxis)  codeine (Unknown)  penicillins (Anaphylaxis)    Intolerances    MEDICATIONS  (STANDING):  chlorhexidine 2% Cloths 1 Application(s) Topical daily  dextrose 5%. 1000 milliLiter(s) (100 mL/Hr) IV Continuous <Continuous>  dextrose 5%. 1000 milliLiter(s) (50 mL/Hr) IV Continuous <Continuous>  dextrose 50% Injectable 25 Gram(s) IV Push once  dextrose 50% Injectable 12.5 Gram(s) IV Push once  dextrose 50% Injectable 25 Gram(s) IV Push once  gabapentin 100 milliGRAM(s) Oral three times a day  heparin   Injectable 5000 Unit(s) SubCutaneous every 12 hours  influenza  Vaccine (HIGH DOSE) 0.5 milliLiter(s) IntraMuscular once  insulin lispro (ADMELOG) corrective regimen sliding scale   SubCutaneous Before meals and at bedtime  levothyroxine 50 MICROGram(s) Oral daily  lidocaine   4% Patch 1 Patch Transdermal daily  midodrine 10 milliGRAM(s) Oral every 8 hours  oxyCODONE    IR 2.5 milliGRAM(s) Oral every 6 hours  sodium bicarbonate 650 milliGRAM(s) Oral three times a day    MEDICATIONS  (PRN):  acetaminophen     Tablet .. 650 milliGRAM(s) Oral every 6 hours PRN Temp greater or equal to 38C (100.4F), Mild Pain (1 - 3)  aluminum hydroxide/magnesium hydroxide/simethicone Suspension 30 milliLiter(s) Oral every 4 hours PRN Dyspepsia  dextrose Oral Gel 15 Gram(s) Oral once PRN Blood Glucose LESS THAN 70 milliGRAM(s)/deciliter  HYDROmorphone  Injectable 0.2 milliGRAM(s) IV Push every 4 hours PRN Severe Pain (7 - 10)  melatonin 3 milliGRAM(s) Oral at bedtime PRN Insomnia  sodium chloride 0.9% lock flush 10 milliLiter(s) IV Push every 1 hour PRN Pre/post blood products, medications, blood draw, and to maintain line patency      ITEMS UNCHECKED ARE NOT PRESENT    PRESENT SYMPTOMS: [x ]Unable to self-report - see [ ] CPOT [x ] PAINADS [ x] RDOS  Source if other than patient:  [ ]Family   [x ]Team     Pain:  [ ]yes [ ]no  QOL impact -   Location -                    Aggravating factors -  Quality -  Radiation -  Timing-  Severity (0-10 scale):  Minimal acceptable level (0-10 scale):     CPOT:    https://www.[facility].org/getattachment/[email or web address]/Critical-Care-Pain-Observation-Tool-(CPOT)    PAINAD Score: See PAINAD tool and score below       Dyspnea:                           [ ]Mild [ ]Moderate [ ]Severe    RDOS: See RDOS tool and score below   0 to 2  minimal or no respiratory distress   3  mild distress  4 to 6 moderate distress  >7 severe distress      Anxiety:                             [ ]Mild [ ]Moderate [ ]Severe  Fatigue:                             [ ]Mild [ ]Moderate [ ]Severe  Nausea:                             [ ]Mild [ ]Moderate [ ]Severe  Loss of appetite:              [ ]Mild [ ]Moderate [ ]Severe  Constipation:                    [ ]Mild [ ]Moderate [ ]Severe    PCSSQ[Palliative Care Spiritual Screening Question]   Severity (0-10):  Score of 4 or > indicate consideration of Chaplaincy referral.  Chaplaincy Referral: [x ] yes [ ] refused [x ] following [ ] Deferred     Caregiver [place]? : [ x] yes [ ] no [ ] Deferred [ ] Declined             Social work referral [ ] Patient & Family Centered Care Referral [x ]     Anticipatory Grief present?:  [x ] yes [ ] no  [ ] Deferred                  Social work referral [ ] Chaplaincy Referral [x ]    		  Other Symptoms:  [x]All other review of systems negative-> pt with limited ability to participate in the setting of AMS     Palliative Performance Status Version 2:   See PPSv2 tool and score below         PHYSICAL EXAM:  Vital Signs Last 24 Hrs  T(C): 36.6 (20 Mar 2025 04:47), Max: 36.9 (19 Mar 2025 23:52)  T(F): 97.9 (20 Mar 2025 04:47), Max: 98.4 (19 Mar 2025 23:52)  HR: 85 (20 Mar 2025 04:47) (75 - 85)  BP: 117/73 (20 Mar 2025 04:47) (109/58 - 124/67)  BP(mean): --  RR: 18 (20 Mar 2025 04:47) (17 - 18)  SpO2: 98% (20 Mar 2025 04:47) (93% - 99%)    Parameters below as of 20 Mar 2025 04:47  Patient On (Oxygen Delivery Method): room air     I&O's Summary    19 Mar 2025 07:01  -  20 Mar 2025 07:00  --------------------------------------------------------  IN: 0 mL / OUT: 0 mL / NET: 0 mL       GENERAL: [ ]Cachexia    [ ]Alert  [ ]Oriented x   [ x]Lethargic  [ ]Unarousable  [ ]Verbal  [ ]Non-Verbal  Behavioral:   [ ]Anxiety  [ ]Delirium [ ]Agitation [ ]Other  HEENT:  [ ]Normal   [x ]Dry mouth   [ ]ET Tube/Trach  [ ]Oral lesions  PULMONARY:   [ ]Clear [ ]Tachypnea  [ ]Audible excessive secretions   [ ]Rhonchi        [ ]Right [ ]Left [ ]Bilateral  [ ]Crackles        [ ]Right [ ]Left [ ]Bilateral  [ ]Wheezing     [ ]Right [ ]Left [ ]Bilateral  [x ]Diminished BS [ ] Right [ ]Left [ x]Bilateral  CARDIOVASCULAR:    [x ]Regular [ ]Irregular [ ]Tachy  [ ]Cam [ ]Murmur [ ]Other  GASTROINTESTINAL:  [ x]Soft  [ ]Distended   [ x]+BS  [ x]Non tender [ ]Tender  [ ]Other [ ]PEG [ ]OGT/ NGT   Last BM: 3/19  GENITOURINARY:  [ ]Normal [x ]Incontinent   [ x]Oliguria/Anuria   [ ]Lieberman  MUSCULOSKELETAL:   [ ]Normal   [x ]Weakness  [x ]Bed/Wheelchair bound [ ]Edema  NEUROLOGIC:   [ ]No focal deficits  [x ] Cognitive impairment  [ ] Dysphagia [ ]Dysarthria [ ] Paresis [ ]Other   SKIN:   [ ]Normal  [ ]Rash  [ x]Other  [ ]Pressure ulcer(s) [ ]y [ ]n present on admission    CRITICAL CARE:  [ ]Shock Present  [ ]Septic [ ]Cardiogenic [ ]Neurologic [ ]Hypovolemic  [ ]Vasopressors [ ]Inotropes  [ ]Respiratory failure present [ ]Mechanical Ventilation [ ]Non-invasive ventilatory support [ ]High-Flow   [ ]Acute  [ ]Chronic [ ]Hypoxic  [ ]Hypercarbic [ ]Other  [ ]Other organ failure     LABS:                        9.0    19.86 )-----------( 64       ( 20 Mar 2025 07:07 )             27.1   03-20    137  |  97  |  43[H]  ----------------------------<  216[H]  3.8   |  25  |  2.46[H]    Ca    8.6      20 Mar 2025 07:07  Phos  3.9     03-20  Mg     1.8     03-20    TPro  5.0[L]  /  Alb  2.8[L]  /  TBili  2.7[H]  /  DBili  x   /  AST  114[H]  /  ALT  87[H]  /  AlkPhos  174[H]  03-20  PT/INR - ( 18 Mar 2025 21:05 )   PT: 20.6 sec;   INR: 1.82 ratio         PTT - ( 18 Mar 2025 21:05 )  PTT:34.3 sec    Urinalysis Basic - ( 20 Mar 2025 07:07 )    Color: x / Appearance: x / SG: x / pH: x  Gluc: 216 mg/dL / Ketone: x  / Bili: x / Urobili: x   Blood: x / Protein: x / Nitrite: x   Leuk Esterase: x / RBC: x / WBC x   Sq Epi: x / Non Sq Epi: x / Bacteria: x      RADIOLOGY & ADDITIONAL STUDIES:  < from: US Abdomen Upper Quadrant Right (03.17.25 @ 15:55) >    ACC: [id number] EXAM:  US ABDOMEN RT UPR QUADRANT   ORDERED BY:  JANINE GAMBOA     PROCEDURE DATE:  03/17/2025          INTERPRETATION:  CLINICAL INFORMATION: 89 year old female with elevated   liver function tests.    COMPARISON: Renal ultrasound 3/13/2025. CT abdomen pelvis 2/15/2025.    TECHNIQUE: Portable sonography of the right upper quadrant.    FINDINGS:  Liver: No focal abnormality is identified.  Bile ducts: Common bile duct is mildly dilated and measures 9 mm. The   distal segment of the duct is obscured by bowel gas and is not   visualized. No intrahepatic biliary dilatation is seen.  Gallbladder: Cholecystectomy.  Pancreas: Visualized portions are within normal limits.  Right kidney: 9.0 cm. No hydronephrosis. Similar-appearing septated   interpolar cyst measuring 2.5 x 2.3 x 2.6 cm.  Ascites: None.  IVC: Visualized portions are within normal limits.    Other: Large complex heterogeneous collection inferior to the right   kidney which likely represents the retroperitoneal hematoma better   appreciated on the recent  CT. The visualized portion on this examination   measures approximately 14.3 x 8.7 x 13.2 cm.    IMPRESSION:    1. Status post cholecystectomy.  2. The common bile duct is mildly dilated measuring up to approximately   9.4 mm. The distal duct is obscured by bowel gas and is not visualized on   this exam. No significant intrahepatic biliary dilatation is seen. This   may reflect postcholecystectomy change. Suggest correlation with liver   function tests andfurther evaluation as clinically warranted.  3. Redemonstrated large right infrarenal retroperitoneal hematoma, better   assessed on the prior CT.            --- End of Report ---          JEN HI MD; Resident Radiologist  This document has been electronically signed.  LATONYA ROMAN MD; Attending Radiologist  This document has been electronically signed. Mar 17 2025  7:41PM    < end of copied text >    Protein Calorie Malnutrition Present: [ ]mild [ ]moderate [ ]severe [ ]underweight [ ]morbid obesity  https://www.andeal.org/vault/2440/web/files/ONC/Table_Clinical%20Characteristics%20to%20Document%20Malnutrition-White%20JV%20et%20al%202012.pdf    Height (cm): 152.4 (03-15-25 @ 18:37)  Weight (kg): 68.8 (03-15-25 @ 18:37)  BMI (kg/m2): 29.6 (03-15-25 @ 18:37)    [x ]PPSV2 < or = 30%  [ ]significant weight loss [ ]poor nutritional intake [ ]anasarca[ ]Artificial Nutrition    Other REFERRALS:  [ ]Hospice  [ ]Child Life  [ ]Social Work  [ ]Case management [ ]Holistic Therapy     Goals of Care Document:

## 2025-03-20 NOTE — ADVANCED PRACTICE NURSE CONSULT - ASSESSMENT
Patient encountered on 5 ACE. When wound care RN arrived on unit, patient was found lying in a low air loss pressure redistribution support surface style bed. Patient does not open eyes, does not speak with staff, only moans when repositioned. Ms. Merlini is incontinent of bladder and bowel, unable to turn independently. The wound care RN was able to visualize purple discoloration to B/L feet, mottled, cool to touch, pulses unable to be palpabled- vascular team is following patient, see notes. B/L lower extremities with intact and unroofed blisters. Right lower extremity wound with firm black eschar over 100% of wound base, area measures approximately 4cm x 2cm x unknown. Education regarding the need for routine turning and positioning to prevent pressure injuries not appropriate at this time. Patient encountered on 5 ACE. When wound care RN arrived on unit, patient was found lying in a low air loss pressure redistribution support surface style bed. Patient does not open eyes, does not speak with staff, only moans when repositioned. Ms. Merlini is incontinent of bladder and bowel, unable to turn independently. The wound care RN was able to visualize purple discoloration to B/L feet, mottled, cool to touch, pulses unable to be palpated- vascular team is following patient, see notes. B/L lower extremities with intact and unroofed blisters. Right lower extremity wound with firm black eschar over 100% of wound base, area measures approximately 4cm x 2cm x unknown. Patient with history of chronic wounds on B/L lower extremities and presents with severe vascular occlusions. Education regarding the need for routine turning and positioning to prevent pressure injuries not appropriate at this time.

## 2025-03-20 NOTE — ADVANCED PRACTICE NURSE CONSULT - REASON FOR CONSULT
Vascular Access Team    Evaluation for: Bedside Midline placement  Requested by name: Ashleigh Alejo  Date/Time: 3/12@15:59    Indication: access  Allergy: codeine, pcn, kiwi     Anticoagulants/ antiplatelets: hep gtt, plavix    Platelets(>20): 98  INR(<3): 1.42  eGFR(>40): 7  Blood cultures sent: n/a    Arm restrictions: left lumpectomy    Consent obtained: n/a    Comments: Bedside midline order evaluated. Please, call [phone] for the VAT RN with any questions.  
Bedside picc order placed.   Indication: DL picc placement for access/ IV meds/ hep gtt  
Wound care consult initiated by RN to assess patient's skin for a possible unstageable pressure injury on heels and lower extremity blisters    History of Present Illness:   89F PMHx HTN, DM on insulin, CAD s/p PCI, HFrEF. P/w acute onset of R shin pain w/o trauma 3hr PTA. Of note, pt has chronic b/l wounds, seen by wound care clinic, but never saw vascular surg before. Denies f/c, cp, sob, abd pain, n/v/d.     In the ED, afebrile, HR 50-70, BP stable, saturates well on RA while awake, desats to high 80s while asleep. Pt is not on O2 at baseline.   CBC w/ wbc 5.8, hgb 9.3 (was 10.1 in '19), plt 104 (was wnl in '19).   Coag w/ INR 1.42  CMP w/ SCr 3.99 (was 1.16 in '19), alk phos 184.   VBG w/ lactate 1.6.     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 Partially imagined heart enlarged and abnormal.     Vasc surg c/s in the ED: no acute intervention hep gtt, SAPPHIRE/PVRs.     *of note, Layne MORA unavailable in pt's chart and i was unable to review.

## 2025-03-20 NOTE — PROGRESS NOTE ADULT - PROBLEM SELECTOR PLAN 5
Hypotensive earlier in hospital course, now on midodrine.   - Midodrine 5 mg q8h; wean off as tolerated Hypotensive earlier in hospital course, now on midodrine.   - Midodrine 10 mg q8h; wean off as tolerated

## 2025-03-20 NOTE — PROGRESS NOTE ADULT - PROBLEM SELECTOR PLAN 1
Unclear etiology, son reports that patient is AAOX4 at baseline with poor functional status. Currently lethargic, May be secondary to hospital-acquired delirium and pain medication.   - Minimal interventions and monitor for mental status improvement  - avoid benzos son reports that patient is AAOX4 at baseline with poor functional status. Currently lethargic, May be secondary to hospital-acquired delirium and pain medication (recieiving IV dilaudid for pain  - Minimal interventions and monitor for mental status improvement  - avoid benzos

## 2025-03-20 NOTE — PROGRESS NOTE ADULT - SUBJECTIVE AND OBJECTIVE BOX
Patient is a 89y old  Female who presents with a chief complaint of LE pain (20 Mar 2025 11:38)        SUBJECTIVE / OVERNIGHT EVENTS: Patient seen and examined at bedside this morning. Recieved IV dialudid. Reponds to tactile stimuli. Lethargic     ROS:     MEDICATIONS  (STANDING):  chlorhexidine 2% Cloths 1 Application(s) Topical daily  dextrose 5%. 1000 milliLiter(s) (100 mL/Hr) IV Continuous <Continuous>  dextrose 5%. 1000 milliLiter(s) (50 mL/Hr) IV Continuous <Continuous>  dextrose 50% Injectable 25 Gram(s) IV Push once  dextrose 50% Injectable 12.5 Gram(s) IV Push once  dextrose 50% Injectable 25 Gram(s) IV Push once  gabapentin 100 milliGRAM(s) Oral three times a day  heparin   Injectable 5000 Unit(s) SubCutaneous every 12 hours  influenza  Vaccine (HIGH DOSE) 0.5 milliLiter(s) IntraMuscular once  insulin lispro (ADMELOG) corrective regimen sliding scale   SubCutaneous Before meals and at bedtime  levothyroxine 50 MICROGram(s) Oral daily  lidocaine   4% Patch 1 Patch Transdermal daily  midodrine 10 milliGRAM(s) Oral every 8 hours  oxyCODONE    IR 2.5 milliGRAM(s) Oral every 6 hours  sodium bicarbonate 650 milliGRAM(s) Oral three times a day    MEDICATIONS  (PRN):  acetaminophen     Tablet .. 650 milliGRAM(s) Oral every 6 hours PRN Temp greater or equal to 38C (100.4F), Mild Pain (1 - 3)  aluminum hydroxide/magnesium hydroxide/simethicone Suspension 30 milliLiter(s) Oral every 4 hours PRN Dyspepsia  dextrose Oral Gel 15 Gram(s) Oral once PRN Blood Glucose LESS THAN 70 milliGRAM(s)/deciliter  HYDROmorphone  Injectable 0.2 milliGRAM(s) IV Push every 4 hours PRN Severe Pain (7 - 10)  melatonin 3 milliGRAM(s) Oral at bedtime PRN Insomnia  sodium chloride 0.9% lock flush 10 milliLiter(s) IV Push every 1 hour PRN Pre/post blood products, medications, blood draw, and to maintain line patency      Vital Signs Last 24 Hrs  T(C): 36.3 (20 Mar 2025 11:42), Max: 36.9 (19 Mar 2025 23:52)  T(F): 97.3 (20 Mar 2025 11:42), Max: 98.4 (19 Mar 2025 23:52)  HR: 82 (20 Mar 2025 11:42) (75 - 85)  BP: 100/65 (20 Mar 2025 11:42) (100/65 - 117/77)  BP(mean): --  RR: 17 (20 Mar 2025 11:42) (17 - 18)  SpO2: 90% (20 Mar 2025 11:42) (90% - 98%)    Parameters below as of 20 Mar 2025 11:42  Patient On (Oxygen Delivery Method): room air      CAPILLARY BLOOD GLUCOSE      POCT Blood Glucose.: 209 mg/dL (20 Mar 2025 12:13)  POCT Blood Glucose.: 249 mg/dL (20 Mar 2025 08:18)  POCT Blood Glucose.: 189 mg/dL (19 Mar 2025 21:21)    I&O's Summary    19 Mar 2025 07:01  -  20 Mar 2025 07:00  --------------------------------------------------------  IN: 0 mL / OUT: 0 mL / NET: 0 mL        PHYSICAL EXAM  GENERAL: NAD, lying comfortably in bed, eyes closed  CHEST/LUNG: Clear to auscultation bilaterally; No wheeze  HEART: RRR, S1 and S2 No murmurs, rubs, or gallops  ABDOMEN: Soft, Nontender, Nondistended; Bowel sounds present  EXTREMITIES: brusing UE from blood draws. Purple discoloration left foot (baseline) and mildly cool.   NEURO: AAOx0, eyes closed, lethargic, unable to take po       LABS:                        9.0    19.86 )-----------( 64       ( 20 Mar 2025 07:07 )             27.1     03-20    137  |  97  |  43[H]  ----------------------------<  216[H]  3.8   |  25  |  2.46[H]    Ca    8.6      20 Mar 2025 07:07  Phos  3.9     03-20  Mg     1.8     03-20    TPro  5.0[L]  /  Alb  2.8[L]  /  TBili  2.7[H]  /  DBili  x   /  AST  114[H]  /  ALT  87[H]  /  AlkPhos  174[H]  03-20    PT/INR - ( 18 Mar 2025 21:05 )   PT: 20.6 sec;   INR: 1.82 ratio         PTT - ( 18 Mar 2025 21:05 )  PTT:34.3 sec      Urinalysis Basic - ( 20 Mar 2025 07:07 )    Color: x / Appearance: x / SG: x / pH: x  Gluc: 216 mg/dL / Ketone: x  / Bili: x / Urobili: x   Blood: x / Protein: x / Nitrite: x   Leuk Esterase: x / RBC: x / WBC x   Sq Epi: x / Non Sq Epi: x / Bacteria: x          RADIOLOGY & ADDITIONAL TESTS:      Labs Personally Reviewed  Imaging Personally Reviewed  Consultant(s) Notes Reviewed

## 2025-03-20 NOTE — PROGRESS NOTE ADULT - PROBLEM SELECTOR PLAN 2
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

## 2025-03-21 NOTE — PROGRESS NOTE ADULT - PROBLEM SELECTOR PLAN 12
- Diet: soft and bite sized   - Medication reconciliation: complete   - Code: DNR/DNI, Comfort care    Disposition  - mental status improved, today. If continues to improve, possible home hospice  - Appointments after discharge: PCP  -Prognosis guarded

## 2025-03-21 NOTE — PROGRESS NOTE ADULT - SUBJECTIVE AND OBJECTIVE BOX
Patient is a 89y old  Female who presents with a chief complaint of LE pain (21 Mar 2025 11:56)        SUBJECTIVE / OVERNIGHT EVENTS: Overnight, received 500cc IVF. Patient seen and examined at bedside this morning. Patient awake and interactive with family today. Denies pain. D/w family okay for pleasure feeds if patient is awake and requesting.     ROS:    MEDICATIONS  (STANDING):  chlorhexidine 2% Cloths 1 Application(s) Topical daily  lidocaine   4% Patch 1 Patch Transdermal daily  oxyCODONE    IR 2.5 milliGRAM(s) Oral every 6 hours  senna 2 Tablet(s) Oral at bedtime    MEDICATIONS  (PRN):  glycopyrrolate Injectable 0.4 milliGRAM(s) IV Push every 6 hours PRN secretions  HYDROmorphone  Injectable 0.2 milliGRAM(s) IV Push every 2 hours PRN Moderate Pain (4 - 6)  HYDROmorphone  Injectable 0.5 milliGRAM(s) IV Push every 2 hours PRN Severe Pain (7 - 10)  HYDROmorphone  Injectable 0.5 milliGRAM(s) IV Push every 2 hours PRN dyspnea  LORazepam   Injectable 0.5 milliGRAM(s) IV Push every 2 hours PRN sob or wob      Vital Signs Last 24 Hrs  T(C): 37.1 (20 Mar 2025 16:03), Max: 37.1 (20 Mar 2025 16:03)  T(F): 98.8 (20 Mar 2025 16:03), Max: 98.8 (20 Mar 2025 16:03)  HR: 82 (20 Mar 2025 17:00) (81 - 82)  BP: 101/62 (20 Mar 2025 17:00) (84/53 - 101/62)  BP(mean): --  RR: 18 (20 Mar 2025 17:00) (18 - 18)  SpO2: 95% (20 Mar 2025 17:00) (95% - 95%)    Parameters below as of 20 Mar 2025 17:00  Patient On (Oxygen Delivery Method): room air      CAPILLARY BLOOD GLUCOSE      POCT Blood Glucose.: 156 mg/dL (20 Mar 2025 16:40)    I&O's Summary    20 Mar 2025 07:01  -  21 Mar 2025 07:00  --------------------------------------------------------  IN: 0 mL / OUT: 508 mL / NET: -508 mL        PHYSICAL EXAM  GENERAL: NAD, lying comfortably in bed   HEENT:  Atraumatic, Normocephalic, EOMI, conjunctiva and sclera clear, IJ shiley left  CHEST/LUNG: no wob  ABDOMEN: Soft, Nontender, Nondistended;  EXTREMITIES: brusing UE from blood draws. Purple discoloration left foot (baseline) and mildly cool.   NEURO: awake, interactive with family, asking for food      LABS:                        9.0    19.86 )-----------( 64       ( 20 Mar 2025 07:07 )             27.1     03-20    137  |  97  |  43[H]  ----------------------------<  216[H]  3.8   |  25  |  2.46[H]    Ca    8.6      20 Mar 2025 07:07  Phos  3.9     03-20  Mg     1.8     03-20    TPro  5.0[L]  /  Alb  2.8[L]  /  TBili  2.7[H]  /  DBili  x   /  AST  114[H]  /  ALT  87[H]  /  AlkPhos  174[H]  03-20          Urinalysis Basic - ( 20 Mar 2025 07:07 )    Color: x / Appearance: x / SG: x / pH: x  Gluc: 216 mg/dL / Ketone: x  / Bili: x / Urobili: x   Blood: x / Protein: x / Nitrite: x   Leuk Esterase: x / RBC: x / WBC x   Sq Epi: x / Non Sq Epi: x / Bacteria: x          RADIOLOGY & ADDITIONAL TESTS:      Labs Personally Reviewed  Imaging Personally Reviewed  Consultant(s) Notes Reviewed

## 2025-03-21 NOTE — PROGRESS NOTE ADULT - ASSESSMENT
89-year-old female with PMHx HTN, DM on insulin, CAD s/p PCI, HFrEF, CKD stage V (baseline SCr 3.4-3.6) presenting with acute onset of R shin pain in setting of severe PVD. Nephrology consulted for ROB on CKD, hyperkalemia.     #ROB on CKD V  ROB on stage V CKD in setting of contrast, entresto and torsemide use, hypotension, poor po intake, r/o retention. On review of Layne MORA/Rosi, pt. last outpatient SCr was 3.66 (1/2025). Baseline SCr appears to be 3.4-3.6. Outpatient nephrologist is Dr. Parth Barrientos. SCr on admission was elevated at 3.99. SCr has worsened to ~7s with evidence of uremia so patient transferred to MICU 3/15/25 and initiated on iHD.    PLAN:   - Last HD on 3/19/25 with midodrine  - As above, per GOC, plan to transition to comfort care, no further HD  - Will discontinue follow up. Reconsult as needed.   89-year-old female with PMHx HTN, DM on insulin, CAD s/p PCI, HFrEF, CKD stage V (baseline SCr 3.4-3.6) presenting with acute onset of R shin pain in setting of severe PVD. Nephrology consulted for ROB on CKD, hyperkalemia.     #ROB on CKD V    ROB on stage V CKD in setting of contrast, entresto and torsemide use, hypotension, poor po intake, r/o retention. On review of Layne MORA/Rosi, pt. last outpatient SCr was 3.66 (1/2025). Baseline SCr appears to be 3.4-3.6. Outpatient nephrologist is Dr. Parth Barrientos. SCr on admission was elevated at 3.99. SCr has worsened to ~7s with evidence of uremia so patient transferred to MICU 3/15/25 and initiated on iHD.    PLAN:   - Last HD on 3/19/25 with midodrine  - As above, per GOC, plan to transition to comfort care, no further HD  - Will discontinue follow up. Reconsult as needed.

## 2025-03-21 NOTE — PROGRESS NOTE ADULT - PROBLEM SELECTOR PLAN 6
will continue to follow for goc/ symptoms  started 0.5mg IVP Ativan q2 hour prn for anxiety, agitation, delirium  started 0.5mg IVP Dilaudid q2 hour prn for dyspnea  started 0.4mg IVP Glycopyrollate q6 hours prn for excessive audible secretions  chaplaincy/ consulted for spiritual support  please page palliative team if symptoms unmanaged   Can be reached by TEAMS M-F 9-5 Ashleigh Dozier Any other time please page [phone] if needed

## 2025-03-21 NOTE — PROGRESS NOTE ADULT - CONVERSATION DETAILS
Spoke with family outside pt room. They shared that based on pt's symptom needs o/n goals transitioned to comfort measures only. They are amenable to deescalation of HD and other non-symptom directed interventions. They shared mom's current mental status is a gift as she is able to communicate with them and speak with family via video phone, shared my agreement. Discussed plan of care moving forward will be to continue to support pt's symptom needs.   Discussed transfer to PCU for symptom directed care. At this time family would prefer to stay on 5ACE for ongoing symptom directed care. Emotional support provided.  and chaplaincy consulted.
Spoke with pt's family at bedside. Reviewed ongoing goc. They shared as per nephro will continue to monitor labs and determine need for further HD. Discussed overall guarded prognosis, patient with ongoing pain when awake, unclear renal status, and chronic and progressive PAD.   Discussed importance of identifying priority: quality vs quantity of time. Discussed PCU as an option if goals are to exclusively to prioritize comfort. Discussed this is a temporary unit that focuses on symptom directed care and would involve the deescalation of HD and other aggressive medical interventions.   At this time goals remain with ongoing medical management, but open to ongoing discussion.   Emotional support provided.

## 2025-03-21 NOTE — PROGRESS NOTE ADULT - ASSESSMENT
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 Course c/b large RP bleed after being started on heparin gtt in the setting of acute renal failure and uremia-induced platelet dysfunction requiring emergent HD. On arrival to MICU, Shiley placed, and patient received dialysis with improvement in uremia. Patient's hemoglobin has now stabilized non-operatively regarding the retroperitoneal hematoma. Although the patient's hemodynamics and labs are improving, her mental status remains poor and her prognosis is guarded. Transitioned to full comfort care 3/20. Mental status improve

## 2025-03-21 NOTE — PROGRESS NOTE ADULT - PROBLEM SELECTOR PLAN 1
Soft BP 84/53 3/20, improved with 250cc /62. Transitoned to full comfort care 3/20. No further dialysis (Offered to remove shiley, but daughter feels its not causing any distress). Vitals q24hr, no further blood draws.  -more awake 3/21, interactive with family. Pleasure feeds  -Pallative recs- glycopyrrolate 0.4mg q6hr prn  -hydromorphone 0.2mg q2hr IV for moderate pain  -hydromoprhone 0.5mg q2hr for severe pain and dyspnea  -ativan 0.5mg q2hr prn  -oxycodone 2.5mg IR q6hrs  -lidocine patch

## 2025-03-21 NOTE — PROGRESS NOTE ADULT - SUBJECTIVE AND OBJECTIVE BOX
*Incomplete Note*    Patient seen and examined at bedside.    Overnight Events: No acute events overnight. Denies chest pain or SOB    Telemetry:    REVIEW OF SYSTEMS:    [x ] All other systems negative  [ ] Unable to assess ROS due to    Current Meds:  acetaminophen     Tablet .. 650 milliGRAM(s) Oral every 6 hours PRN  chlorhexidine 2% Cloths 1 Application(s) Topical daily  gabapentin 100 milliGRAM(s) Oral three times a day  HYDROmorphone  Injectable 0.2 milliGRAM(s) IV Push every 4 hours PRN  levothyroxine 50 MICROGram(s) Oral daily  lidocaine   4% Patch 1 Patch Transdermal daily  oxyCODONE    IR 2.5 milliGRAM(s) Oral every 6 hours  sodium bicarbonate 650 milliGRAM(s) Oral three times a day      PAST MEDICAL & SURGICAL HISTORY:  HTN (hypertension)      Dyslipidemia      Diabetes mellitus      Palpitations      STEMI (ST elevation myocardial infarction)      CHF (congestive heart failure)      S/P cholecystectomy      S/P appendectomy      S/P lumpectomy, left breast  premalignant      S/P tonsillectomy      S/P cardiac cath          Vitals:  T(F): 98.8 (03-20), Max: 98.8 (03-20)  HR: 82 (03-20) (81 - 82)  BP: 101/62 (03-20) (84/53 - 101/62)  RR: 18 (03-20)  SpO2: 95% (03-20)  I&O's Summary    20 Mar 2025 07:01  -  21 Mar 2025 07:00  --------------------------------------------------------  IN: 0 mL / OUT: 508 mL / NET: -508 mL        Physical Exam:  Appearance: No acute distress; well appearing  Eyes: PERRL, EOMI, pink conjunctiva  HENT: Normal oral mucosa  Cardiovascular: RRR, S1, S2, no murmurs, rubs, or gallops; no edema; no JVD  Respiratory: Clear to auscultation bilaterally  Gastrointestinal: soft, non-tender, non-distended with normal bowel sounds  Musculoskeletal: No clubbing; no joint deformity   Neurologic: Non-focal  Lymphatic: No lymphadenopathy  Psychiatry: AAOx3, mood & affect appropriate  Skin: No rashes, ecchymoses, or cyanosis                          9.0    19.86 )-----------( 64       ( 20 Mar 2025 07:07 )             27.1     03-20    137  |  97  |  43[H]  ----------------------------<  216[H]  3.8   |  25  |  2.46[H]    Ca    8.6      20 Mar 2025 07:07  Phos  3.9     03-20  Mg     1.8     03-20    TPro  5.0[L]  /  Alb  2.8[L]  /  TBili  2.7[H]  /  DBili  x   /  AST  114[H]  /  ALT  87[H]  /  AlkPhos  174[H]  03-20

## 2025-03-21 NOTE — PROGRESS NOTE ADULT - ATTENDING COMMENTS
2F Hx HTN, T2DM on Insulin, CAD s/p PCI, HFrEF20% admitted 3/11 for Rt Reyes Pain found Rt Popliteal Artery Occlusion followed by Vascular Surgery and started on IV Heparin Gtt and worsening Renal Failure/CKD5 in last 3 days requiring Urgent HD iso Acute Hemorrhagic Anemia Hb 6.5 unresponsive to 1 PRBC transfusion with repeated Hb 6.7 on borderline low BP with CT Abdomen/Pelvis showed Rt RP Bleed.    #Neuro: currently lethargic likely secondary to uremia  #CV: hx of CAD with stent placed in 2014- hold DAPT for now; HD stable at this time. Concern for popliteal occlusion- duplex study nondiagnostic  #Pulm: no acute issues- on 2LNC  #ID:   #Renal/Fluid: Stage V CKD now ESRD s/p HD for uremia;  #Heme: RP hematoma in the setting of full dose AC- s/p 3 units PRBC, now with worsening hypotension; Hgb dropping with thrombocytopenia; will check hemolysis labs. give additional 1unit PRBC, platelets, and FFP.   #Endo: FSq6   #GI:  worsening transamnitis and elevated bili- will check RUQ US and check hemolysis labs  # Skin/Lines: right IJ shiley; left upper extremity PICC  #DVT ppx: SCDs  # Dispo/Ethics: Full Code fornow
89F PMH HTN, T2DM on Insulin, CAD s/p PCI, HFrEF (LV EF 20%) who presented on 3/11 with right shin pain, found to have bilateral popliteal artery occlusion along with ROB. She was started on IV Heparin gtt with course complicated by ROB on CKD requiring dialysis and hemorrhagic shock due to retroperitoneal bleed requiring multiple units of blood (4 PRBCs, 2 PLT, 1 FFP, DDAVP).    # Neuro: awake and alert, following commands. Significant leg pain due to PAD. Start oxycodone PRN. Continue hydromorphone and acetaminophen PRN. Lidocaine patch. Consider diclofenac gel prn  # CV: HD stable, off pressors now. Hold DAPT for now. If H/H remains stable, can restart DAPT (stent placed in 2014). Follow-up vascular regarding bilateral lower extremity PAD/popliteal artery occlusion. Keep off heparin gtt given RP bleed. Keep off metoprolol and Entresto at this time. Hold statin given transaminitis  # Pulm: doing well on room air  # GI: diet as tolerates, improving transaminitis continue to monitor  # Renal: ROB on CKD requiring dialysis. Follow-up nephrology regarding further HD. Strict I/O's, close monitoring of kidney function and lytes  # ID: E.coli and E. faecalis UTI. S/p ceftriaxone. Complete course of vancomycin for E. faecalis (allergic to PCN)  # Heme: RP hematoma, monitor H/H/PLT. Downtrending HGB but no evidence of active bleeding currently. Hold heparin gtt and DAPT, eventually restart slowly as tolerates. SCD's for DVT ppx  # Endo: DM2, a1c 9.4, continue insulin coverage scale  # Dispo: full code, prognosis guarded    CC time spent: 35 min
I have seen this patient with the fellow and agree with their assessment and plan. I was physically present for significant portions of the evaluation and management (E/M) service provided.  I agree with the above history, physical, and plan which I have reviewed and edited where appropriate.    Pt with improvement significant with HD x 3 days  Hold HD now and monitor for UO  d/w with primary neph at [place] and family ( daughter and son) and they are not sure yet long term HD need or not  Will re assess daily and discuss with family and pt herself as she is more awake now and proceed with long term HD needs    Jason Cosme MD  Office   Contact me directly via [employer] Teams     (After 5 pm or on weekends please page the on-call fellow/attending, can check [email or web address] for schedule. Login is josué cardona, schedule under [facility] medicine, psych, derm)
I have seen this patient with the fellow and agree with their assessment and plan. I was physically present for significant portions of the evaluation and management (E/M) service provided.  I agree with the above history, physical, and plan which I have reviewed and edited where appropriate.    d/w with daughter and son, plan to cont HD for now but they are thinking about goals of care and will decide in next 1 week about long term need for HD  Explained to them that this is not in the best interest for their mom to cont long term HD  for now, needs HD today as above    Jason Cosme MD  Office   Contact me directly via Microsoft Teams     (After 5 pm or on weekends please page the on-call fellow/attending, can check [email or web address] for schedule. Login is josué cardona, schedule under [facility] medicine, psych, derm)
I have seen this patient with the fellow and agree with their assessment and plan. I was physically present for significant portions of the evaluation and management (E/M) service provided.  I agree with the above history, physical, and plan which I have reviewed and edited where appropriate.    d/w with family at bedside  they decided to make comfort care measures as of last night  no more HD planned  d/w with PC team  reconsult as needed    Jason Cosme MD  Office   Contact me directly via Microsoft Teams     (After 5 pm or on weekends please page the on-call fellow/attending, can check [email or web address] for schedule. Login is [employer] ns, schedule under [facility] medicine, psych, derm)    For weekend coverage, please call Ameya Gutierrez( fellow) or Dr Gauri Hines( Attending)
as above  given pt age/comorbodities, long term access via Permcath may be most appropriate
d/w with daughter and son, plan to cont HD for now but they are thinking about goals of care and will decide in next 1 week about long term need for HD  Explained to them that this is not in the best interest for their mom to cont long term HD  for now, needs HD tomorrow none today    Jason Cosme MD  Office   Contact me directly via [employer] Teams     (After 5 pm or on weekends please page the on-call fellow/attending, can check [email or web address] for schedule. Login is josué cardona, schedule under [facility] medicine, psych, derm)
I have seen this patient with the fellow and agree with their assessment and plan. I was physically present for significant portions of the evaluation and management (E/M) service provided.  I agree with the above history, physical, and plan which I have reviewed and edited where appropriate.    ROB on CKD and progressing now and pt is uremic and BUN>150  needs urgent HD, pt daughter has agreed and son coming from [facility] today  Per primary neph in [place], he would want a trial of HD given excellent functional status prior to this  Will attempt 3-4 sessions and re-assess then to see if she is a candidate or not for ongoing dialysis  Please ask IR or vascular for access today and plan for iHD today  Hemodynamically stable    Jason Cosme MD  Office   Contact me directly via Microsoft Teams     (After 5 pm or on weekends please page the on-call fellow/attending, can check [email or web address] for schedule. Login is josué cardona, schedule under [facility] medicine, psych, derm)
harshil on ckd   #harshil likely vol depletion- on diuretic and entresto +contrast  *** r/o retention- check bladder scan  atn likley vs retention  cr continues to increase as does K, and bun, and bicarb is lower  possible uremic- lethargic (however improved today)  cont IVF today  hold diuretic  hold entresto  check ua, u na and u cr  monitor cr trends  #hyperkalemia  was on entresto  add lokelma 10g tid today   monitor k this evening  check EKG  bumex 1mg today IV for k excretion   #met acidosis  monitor bicarb trend  add sodium bicarb tabs 650mg tid  #  d/w daughter and daughter's friend in detail regarding her renal fxn; discussed with her re possible need for dialysis vs conservative management  pt stated her brother is coming from [place]  advised that they need to discuss what their mother would want in this situation  d/w pt daughter  in detail  #labs this evening  d/w med team Dr Alejo
89F PMH HTN, T2DM on Insulin, CAD s/p PCI, HFrEF (LV EF 20%) who presented on 3/11 with right shin pain, found to have bilateral popliteal artery occlusion along with ROB. She was started on IV Heparin gtt with course complicated by ROB on CKD requiring dialysis and hemorrhagic shock due to retroperitoneal bleed requiring multiple units of blood (4 PRBCs, 2 PLT, 1 FFP, DDAVP).    # Neuro: more sleepy today due to oxycodone, but arousable and follows commands. Improved leg pain. Decrease oxycodone to 2.5 mg q8h PRN. Dilaudid PRN severe pain. Continue acetaminophen prn mild pain. Lidocaine patch  # CV: HD stable, off pressors now. If H/H remains stable, will restart DAPT slowly (aspirin then clopidogrel if no bleeding). Follow-up vascular regarding bilateral lower extremity PAD/popliteal artery occlusion. Keep off heparin gtt given RP bleed, no evidence of arterial thrombus to require a/c at this time. Keep off metoprolol and Entresto at this time. Hold statin given transaminitis. Vascular cardiology evaluation  # Pulm: doing well on room air  # GI: diet as tolerates, monitor transaminitis (possibly muscle enzyme release due to arterial occlusion)  # Renal: ROB on CKD requiring dialysis. Hold off on dialysis today. Strict I/O's, close monitoring of kidney function and lytes. Low expectation for recovery of renal function  # ID: E.coli and E. faecalis UTI. S/p ceftriaxone. Completed course of vancomycin for E. faecalis (allergic to PCN)  # Heme: RP hematoma, monitor H/H/PLT. HGB stable. Hold off on further Heparin gtt. Start HSQ for DVT ppx  # Endo: DM2, a1c 9.4, continue insulin coverage scale  # Dispo: full code, prognosis guarded. Palliative care eval to address pain and goals of care given ROB requiring dialysis and lower extremity pain from severe arterial occlusion bilaterally    CC time spent: 35 min
I have seen this patient with the fellow and agree with their assessment and plan. I was physically present for significant portions of the evaluation and management (E/M) service provided.  I agree with the above history, physical, and plan which I have reviewed and edited where appropriate.    Pt more alert now after 2 sessions of HD  can do another session today to get BUN lower to avoid more bleeding in RP  once she is more awake, need more PC discussion re goals of care if they want to continue HD long term  most likely she will need hd long term as she had stage V at baseline but not sure if she is a good long term candidate    d/w with MICU team    Jason Cosme MD  Office   Contact me directly via Microsoft Teams     (After 5 pm or on weekends please page the on-call fellow/attending, can check [email or web address] for schedule. Login is josué cardona, schedule under [facility] medicine, psych, derm)

## 2025-03-21 NOTE — PROGRESS NOTE ADULT - SUBJECTIVE AND OBJECTIVE BOX
[facility] DIVISION OF KIDNEY DISEASES AND HYPERTENSION   FOLLOW UP NOTE  --------------------------------------------------------------------------------  Chief Complaint: ROB on CKD V, initiated on HD    24 hour events/subjective: Pt. seen and examined at bedside, family present. Pt last had HD on 3/19/25. Pt sleeping, did not appear in distress. Extensive discussion with family, they wish to pursue comfort measures.     PAST HISTORY  --------------------------------------------------------------------------------  No significant changes to PMH, PSH, FHx, SHx, unless otherwise noted    ALLERGIES & MEDICATIONS  --------------------------------------------------------------------------------  Allergies  Kiwi (Anaphylaxis)  codeine (Unknown)  penicillins (Anaphylaxis)    Intolerances    Standing Inpatient Medications  chlorhexidine 2% Cloths 1 Application(s) Topical daily  lidocaine   4% Patch 1 Patch Transdermal daily  oxyCODONE    IR 2.5 milliGRAM(s) Oral every 6 hours  senna 2 Tablet(s) Oral at bedtime    PRN Inpatient Medications  glycopyrrolate Injectable 0.4 milliGRAM(s) IV Push every 6 hours PRN  HYDROmorphone  Injectable 0.2 milliGRAM(s) IV Push every 2 hours PRN  HYDROmorphone  Injectable 0.5 milliGRAM(s) IV Push every 2 hours PRN  LORazepam   Injectable 0.5 milliGRAM(s) IV Push every 2 hours PRN    REVIEW OF SYSTEMS  --------------------------------------------------------------------------------  Unable to obtain ROS     VITALS/PHYSICAL EXAM  --------------------------------------------------------------------------------  T(C): 37.1 (03-20-25 @ 16:03), Max: 37.1 (03-20-25 @ 16:03)  HR: 82 (03-20-25 @ 17:00) (81 - 82)  BP: 101/62 (03-20-25 @ 17:00) (84/53 - 101/62)  RR: 18 (03-20-25 @ 17:00) (18 - 18)  SpO2: 95% (03-20-25 @ 17:00) (95% - 95%)  Wt(kg): --    03-20-25 @ 07:01  -  03-21-25 @ 07:00  --------------------------------------------------------  IN: 0 mL / OUT: 508 mL / NET: -508 mL    Physical Exam:  Gen: elderly, frail, chronically ill appearing.  Pulm: CTA B/L  CV: RRR, S1S2  Abd: +BS, soft  : No suprapubic tenderness  Extremities: +R>L  Neuro: sleeping.  Access: LIJ non-tunneled HD catheter    LABS/STUDIES  --------------------------------------------------------------------------------              9.0    19.86 >-----------<  64       [03-20-25 @ 07:07]              27.1     137  |  97  |  43  ----------------------------<  216      [03-20-25 @ 07:07]  3.8   |  25  |  2.46        Ca     8.6     [03-20-25 @ 07:07]      Mg     1.8     [03-20-25 @ 07:07]      Phos  3.9     [03-20-25 @ 07:07]    TPro  5.0  /  Alb  2.8  /  TBili  2.7  /  DBili  x   /  AST  114  /  ALT  87  /  AlkPhos  174  [03-20-25 @ 07:07]          [03-20-25 @ 07:07]    Creatinine Trend:  SCr 2.46 [03-20 @ 07:07]  SCr 3.53 [03-19 @ 07:13]  SCr 2.73 [03-17 @ 22:39]  SCr 3.81 [03-17 @ 00:37]  SCr 5.07 [03-16 @ 01:46]    Urine Sodium 61      [03-14-25 @ 17:00]  Urine Osmolality 377      [03-14-25 @ 17:00]    HBsAb <3.3      [03-15-25 @ 15:59]  HBsAb Nonreact      [03-15-25 @ 15:59]  HBsAg Nonreact      [03-15-25 @ 15:59]  HCV 0.06, Nonreact      [03-15-25 @ 15:59]

## 2025-03-21 NOTE — PROGRESS NOTE ADULT - PROBLEM SELECTOR PLAN 5
see goc note above  goals now comfort measures only  at this time family prefers care continue on 5ACE, discussed PCU as an option

## 2025-03-21 NOTE — PROGRESS NOTE ADULT - PROBLEM SELECTOR PLAN 5
Patient's hospital course was complicated by a large RP bleed after being started on heparin drip in the setting of acute renal failure and uremia-induced platelet dysfunction, requiring emergent HD. Received total 4u pRBC, 1u FFP, 1u PLT. IR consulted, recommending no further intervention as RP hematoma would most likely self tamponade  -hgb stable but remains thrombctyopenia 60s (holding of asa and xarelto)  -COMFORT CARE

## 2025-03-21 NOTE — PROGRESS NOTE ADULT - PROBLEM SELECTOR PLAN 2
son reports that patient is AAOX4 at baseline with poor functional status.May be secondary to hospital-acquired delirium and pain medication (recieiving IV dilaudid for pain). Lethargic 3/20 but more awake 3/21, interactive with family  - Minimal interventions and monitor for mental status

## 2025-03-21 NOTE — PROGRESS NOTE ADULT - SUBJECTIVE AND OBJECTIVE BOX
SUBJECTIVE AND OBJECTIVE: pt seen and examined at bedside. pt in no acute distress on exam.   Indication for Geriatrics and Palliative Care Services/INTERVAL HPI: GOC/symptoms    OVERNIGHT EVENTS: Goals transitioned to comfort measures only 5x 0.2mg IVP Dilaudid     DNR on chart:DNI: Trial NIV  DNI: Trial NIV      Allergies    Kiwi (Anaphylaxis)  codeine (Unknown)  penicillins (Anaphylaxis)    Intolerances    MEDICATIONS  (STANDING):  chlorhexidine 2% Cloths 1 Application(s) Topical daily  lidocaine   4% Patch 1 Patch Transdermal daily  oxyCODONE    IR 2.5 milliGRAM(s) Oral every 6 hours  senna 2 Tablet(s) Oral at bedtime    MEDICATIONS  (PRN):  glycopyrrolate Injectable 0.4 milliGRAM(s) IV Push every 6 hours PRN secretions  HYDROmorphone  Injectable 0.2 milliGRAM(s) IV Push every 2 hours PRN Moderate Pain (4 - 6)  HYDROmorphone  Injectable 0.5 milliGRAM(s) IV Push every 2 hours PRN Severe Pain (7 - 10)  LORazepam   Injectable 0.5 milliGRAM(s) IV Push every 2 hours PRN sob or wob      ITEMS UNCHECKED ARE NOT PRESENT    PRESENT SYMPTOMS: [ x]Unable to self-report - see [ ] CPOT [ x] PAINADS [x] RDOS  Source if other than patient:  [ ]Family   [ x]Team     Pain:  [ x]yes [ ]no- unable to provide further information regarding pain   QOL impact -   Location -                    Aggravating factors -  Quality -  Radiation -  Timing-  Severity (0-10 scale):  Minimal acceptable level (0-10 scale):     CPOT:    https://www.sccm.org/getattachment/sud72w99-9o5o-9p3v-[email or web address]/Critical-Care-Pain-Observation-Tool-(CPOT)    PAINAD Score: See PAINAD tool and score below       Dyspnea:                           [ ]Mild [ ]Moderate [ ]Severe    RDOS: See RDOS tool and score below   0 to 2  minimal or no respiratory distress   3  mild distress  4 to 6 moderate distress  >7 severe distress      Anxiety:                             [ ]Mild [ ]Moderate [ ]Severe  Fatigue:                             [ ]Mild [ ]Moderate [ ]Severe  Nausea:                             [ ]Mild [ ]Moderate [ ]Severe  Loss of appetite:              [ ]Mild [ ]Moderate [ ]Severe  Constipation:                    [ ]Mild [ ]Moderate [ ]Severe    PCSSQ[Palliative Care Spiritual Screening Question]   Severity (0-10):  Score of 4 or > indicate consideration of Chaplaincy referral.  Chaplaincy Referral: [x ] yes [ ] refused [ x] following [ ] Deferred     Caregiver [place]? : [ ] yes [ ] no [ ] Deferred [x ] Declined             Social work referral [ ] Patient & Family Centered Care Referral [ ]     Anticipatory Grief present?:  [x ] yes [ ] no  [ ] Deferred                  Social work referral [ ] Chaplaincy Referral [x ]    		  Other Symptoms:  [x ]All other review of systems negative     Palliative Performance Status Version 2:   See PPSv2 tool and score below         PHYSICAL EXAM:  Vital Signs Last 24 Hrs  T(C): 37.1 (20 Mar 2025 16:03), Max: 37.1 (20 Mar 2025 16:03)  T(F): 98.8 (20 Mar 2025 16:03), Max: 98.8 (20 Mar 2025 16:03)  HR: 82 (20 Mar 2025 17:00) (81 - 82)  BP: 101/62 (20 Mar 2025 17:00) (84/53 - 101/62)  BP(mean): --  RR: 18 (20 Mar 2025 17:00) (18 - 18)  SpO2: 95% (20 Mar 2025 17:00) (95% - 95%)    Parameters below as of 20 Mar 2025 17:00  Patient On (Oxygen Delivery Method): room air     I&O's Summary    20 Mar 2025 07:01  -  21 Mar 2025 07:00  --------------------------------------------------------  IN: 0 mL / OUT: 508 mL / NET: -508 mL       GENERAL: [ ]Cachexia    [x ]Alert  [ x]Oriented x 2  [ ]Lethargic  [ ]Unarousable  [ ]Verbal  [ ]Non-Verbal  Behavioral:   [ ]Anxiety  [ ]Delirium [ ]Agitation [ ]Other  HEENT:  [ ]Normal   [ x]Dry mouth   [ ]ET Tube/Trach  [ ]Oral lesions  PULMONARY:   [ ]Clear [ ]Tachypnea  [ ]Audible excessive secretions   [ ]Rhonchi        [ ]Right [ ]Left [ ]Bilateral  [ ]Crackles        [ ]Right [ ]Left [ ]Bilateral  [ ]Wheezing     [ ]Right [ ]Left [ ]Bilateral  [ x]Diminished BS [ ] Right [ ]Left [ x]Bilateral  CARDIOVASCULAR:    [ x]Regular [ ]Irregular [ ]Tachy  [ ]Cam [ ]Murmur [ ]Other  GASTROINTESTINAL:  [x ]Soft  [ ]Distended   [x ]+BS  [ x]Non tender [ ]Tender  [ ]Other [ ]PEG [ ]OGT/ NGT   Last BM: last  documented BM 3/17  GENITOURINARY:  [ ]Normal [x ]Incontinent   [ ]Oliguria/Anuria   [ ]Lieberman  MUSCULOSKELETAL:   [ ]Normal   [x ]Weakness  [ x]Bed/Wheelchair bound [ ]Edema  NEUROLOGIC:   [ ]No focal deficits  [x ] Cognitive impairment  [ ] Dysphagia [ ]Dysarthria [ ] Paresis [ ]Other   SKIN:   [ ]Normal  [ ]Rash  [ x]Other  [ ]Pressure ulcer(s) [ ]y [ ]n present on admission    CRITICAL CARE:  [ ]Shock Present  [ ]Septic [ ]Cardiogenic [ ]Neurologic [ ]Hypovolemic  [ ]Vasopressors [ ]Inotropes  [ ]Respiratory failure present [ ]Mechanical Ventilation [ ]Non-invasive ventilatory support [ ]High-Flow   [ ]Acute  [ ]Chronic [ ]Hypoxic  [ ]Hypercarbic [ ]Other  [ ]Other organ failure     LABS:                        9.0    19.86 )-----------( 64       ( 20 Mar 2025 07:07 )             27.1   03-20    137  |  97  |  43[H]  ----------------------------<  216[H]  3.8   |  25  |  2.46[H]    Ca    8.6      20 Mar 2025 07:07  Phos  3.9     03-20  Mg     1.8     03-20    TPro  5.0[L]  /  Alb  2.8[L]  /  TBili  2.7[H]  /  DBili  x   /  AST  114[H]  /  ALT  87[H]  /  AlkPhos  174[H]  03-20      Urinalysis Basic - ( 20 Mar 2025 07:07 )    Color: x / Appearance: x / SG: x / pH: x  Gluc: 216 mg/dL / Ketone: x  / Bili: x / Urobili: x   Blood: x / Protein: x / Nitrite: x   Leuk Esterase: x / RBC: x / WBC x   Sq Epi: x / Non Sq Epi: x / Bacteria: x      RADIOLOGY & ADDITIONAL STUDIES:  < from: US Abdomen Upper Quadrant Right (03.17.25 @ 15:55) >  ACC: [id number] EXAM:  US ABDOMEN RT UPR QUADRANT   ORDERED BY:  JANINE GAMBOA     PROCEDURE DATE:  03/17/2025          INTERPRETATION:  CLINICAL INFORMATION: 89 year old female with elevated   liver function tests.    COMPARISON: Renal ultrasound 3/13/2025. CT abdomen pelvis 2/15/2025.    TECHNIQUE: Portable sonography of the right upper quadrant.    FINDINGS:  Liver: No focal abnormality is identified.  Bile ducts: Common bile duct is mildly dilated and measures 9 mm. The   distal segment of the duct is obscured by bowel gas and is not   visualized. No intrahepatic biliary dilatation is seen.  Gallbladder: Cholecystectomy.  Pancreas: Visualized portions are within normal limits.  Right kidney: 9.0 cm. No hydronephrosis. Similar-appearing septated   interpolar cyst measuring 2.5 x 2.3 x 2.6 cm.  Ascites: None.  IVC: Visualized portions are within normal limits.    Other: Large complex heterogeneous collection inferior to the right   kidney which likely represents the retroperitoneal hematoma better   appreciated on the recent  CT. The visualized portion on this examination   measures approximately 14.3 x 8.7 x 13.2 cm.    IMPRESSION:    1. Status post cholecystectomy.  2. The common bile duct is mildly dilated measuring up to approximately   9.4 mm. The distal duct is obscured by bowel gas and is not visualized on   this exam. No significant intrahepatic biliary dilatation is seen. This   may reflect postcholecystectomy change. Suggest correlation with liver   function tests andfurther evaluation as clinically warranted.  3. Redemonstrated large right infrarenal retroperitoneal hematoma, better   assessed on the prior CT.            --- End of Report ---          JEN HI MD; Resident Radiologist  This document has been electronically signed.  LATONYA ROMAN MD; Attending Radiologist  This document has been electronically signed. Mar 17 2025  7:41PM    < end of copied text >    Protein Calorie Malnutrition Present: [ ]mild [ ]moderate [ ]severe [ ]underweight [ ]morbid obesity  https://www.andeal.org/vault/2440/web/files/ONC/Table_Clinical%20Characteristics%20to%20Document%20Malnutrition-White%20JV%20et%20al%202012.pdf    Height (cm): 152.4 (03-15-25 @ 18:37)  Weight (kg): 68.8 (03-15-25 @ 18:37)  BMI (kg/m2): 29.6 (03-15-25 @ 18:37)    [x ]PPSV2 < or = 30%  [ ]significant weight loss [ ]poor nutritional intake [ ]anasarca[ ]Artificial Nutrition    Other REFERRALS:  [ ]Hospice  [ ]Child Life  [ ]Social Work  [ ]Case management [ ]Holistic Therapy     Goals of Care Document:

## 2025-03-21 NOTE — PROGRESS NOTE ADULT - PROBLEM SELECTOR PLAN 6
Hypotensive earlier in hospital course, now on midodrine.   - Midodrine 10 mg q8h dc-ed  -COMFORT CARE

## 2025-03-21 NOTE — PROGRESS NOTE ADULT - PROBLEM SELECTOR PLAN 9
VTE PPx: Heparin drip   GI ppx: PPI     CODE: FULL    3/14- Care plan discussed with daughter at bedside. Attempt to place PICC as patient with poor IV access, needs additional line for fluids/heparin drip, and frequent blood draws.
VTE PPx: Heparin drip   GI ppx: PPI.    CODE: FULL    3/14- Care plan discussed with daughter at bedside. Attempt to place PICC as patient with poor IV access, needs additional line for fluids/heparin drip, and frequent blood draws.
- Levothyroxine 40 mcg IV daily
Unclear where pain is localized at this time given somnolence. Likely bilateral legs with ulcer and generalized.   - Follow up palliative care recommendations   - Gabapentin 100 mg TID (hold for oversedation)  - Oxycodone IR 2.5 mg q6h PRN for moderate pain  - Dilaudid IV 0.2 mg q4h PRN for severe pain   - Also with nausea; use alcohol pads to help as we are avoiding QTc prolonging agents, as QTc 520-540 range  - Lidocaine patch daily  - palliative care following
- Levothyroxine 40 mcg IV daily
- Levothyroxine 40 mcg IV daily

## 2025-03-21 NOTE — PROGRESS NOTE ADULT - ASSESSMENT
89F PMHx HTN, DM on insulin, CAD s/p PCI, HFrEF (15-20%, mild to mod reduced RV function, severe TR from tethering), CKD P/w acute onset of RLE pain, found to have severe PAD (occluded R popliteal and L PT/peroneal/AT/DP), course c/b renal failure and uremia, requiring HD. Also c/b interval development of two large right retroperitoneal hematomas on 3/15/25 with large drop in Hg in the setting of heparin gtt. Vascular cardiology consulted to assist in medical management of PAD.     CTA w runoff showing questionable bilateral popliteal artery occlusion on delayed phase, abnormal runoff. B/l trifurcation calcified, limited evaluation. B/l calf and foot soft tissue edema.   BLE arterial duplex: b/l arterial vascular stenotic and occlusive disease; severe proximal R pop stenosis and occluded distally. Occluded right PT, peroneal, AT, DP arteries. Left segmental occlusions of the SFA, occluded left PT, peroneal, AT, DP arteries.     Recs:  -no endovascular intervention at this time  -antiplatelets on hold in the setting of RP bleed, would start ASA 81 tomorrow if tolerating subQ heparin  -if tolerating ASA from bleeding perspective, can start xarelto 2.5mg BID for PAD   -atorvastatin on hold for rising LFTs, restart when able  -wound care for LE wounds, recommend offloading boot for heel pressure injuries  -recommend pain control management and PT  -guarded prognosis, recommend goals of care  -monitor for signs of gangrene/infection

## 2025-03-21 NOTE — PROGRESS NOTE ADULT - PROBLEM SELECTOR PLAN 11
Patient completed a three day course of antibiotics for E. coli and E. faecalis UTI with ceftriaxone and vancomycin respectively on 3/18/25 per sign out.  - ID confirming - no further abx needed at this time

## 2025-03-21 NOTE — PROGRESS NOTE ADULT - PROBLEM SELECTOR PLAN 10
- Levothyroxine 40 mcg dc, comfort
Patient completed a three day course of antibiotics for E. coli and E. faecalis UTI with ceftriaxone and vancomycin respectively on 3/18/25 per sign out.  - ID confirming - no further abx needed at this time
Patient completed a three day course of antibiotics for E. coli and E. faecalis UTI with ceftriaxone and vancomycin respectively on 3/18/25 per sign out.  - ID confirming - no further abx needed at this time
Patient completed a three day course of antibiotics for E. coli and E. faecalis UTI with ceftriaxone and vancomycin respectively on 3/18/25 per sign out.

## 2025-03-21 NOTE — PROGRESS NOTE ADULT - PROBLEM SELECTOR PLAN 8
Patient developed acute renal failure during hospital course. Has a history of CKD stage V with Cr 3.66 at baseline. Due to uremia-induced platelet dysfunction, patient was started on HD through Shiley placed in the hospital, received a total of three sessions, last on 3/19/25.   - Sodium bicarbonate 650 mg TID dced   - COMFORT CARE  - nephrology following  - will d/w family on removing shiley

## 2025-03-22 NOTE — PROGRESS NOTE ADULT - SUBJECTIVE AND OBJECTIVE BOX
Patient is a 89y old  Female who presents with a chief complaint of LE pain (22 Mar 2025 10:38)        SUBJECTIVE / OVERNIGHT EVENTS: Patient had no acute events overnight. Patient seen and examined at bedside this morning. Easily arousable. Denies pain. Thanking staff for her care. Not taking PO.     ROS:    MEDICATIONS  (STANDING):  chlorhexidine 2% Cloths 1 Application(s) Topical daily  HYDROmorphone  Injectable 0.5 milliGRAM(s) IV Push every 6 hours  lidocaine   4% Patch 1 Patch Transdermal daily  senna 2 Tablet(s) Oral at bedtime    MEDICATIONS  (PRN):  glycopyrrolate Injectable 0.4 milliGRAM(s) IV Push every 6 hours PRN secretions  HYDROmorphone  Injectable 0.5 milliGRAM(s) IV Push every 2 hours PRN Moderate Pain (4 - 6)  HYDROmorphone  Injectable 0.5 milliGRAM(s) IV Push every 2 hours PRN dyspnea  HYDROmorphone  Injectable 0.7 milliGRAM(s) IV Push every 2 hours PRN Severe Pain (7 - 10)  LORazepam   Injectable 0.5 milliGRAM(s) IV Push every 2 hours PRN sob or wob      Vital Signs Last 24 Hrs  T(C): 37.1 (21 Mar 2025 15:49), Max: 37.1 (21 Mar 2025 15:49)  T(F): 98.8 (21 Mar 2025 15:49), Max: 98.8 (21 Mar 2025 15:49)  HR: 83 (21 Mar 2025 15:49) (83 - 83)  BP: 109/72 (21 Mar 2025 15:49) (109/72 - 109/72)  BP(mean): --  RR: 18 (21 Mar 2025 15:49) (18 - 18)  SpO2: 95% (21 Mar 2025 15:49) (95% - 95%)    Parameters below as of 21 Mar 2025 15:49  Patient On (Oxygen Delivery Method): room air      CAPILLARY BLOOD GLUCOSE        I&O's Summary      PHYSICAL EXAM  GENERAL: NAD, lying comfortably in bed   HEENT:  Atraumatic, Normocephalic, EOMI, conjunctiva and sclera clear, LIJ shiley left  CHEST/LUNG: no wob  ABDOMEN: Soft, Nontender, Nondistended;  EXTREMITIES: brusing UE from blood draws. Purple discoloration left foot (baseline) and mildly cool.   NEURO: awake, interactive     LABS:                      RADIOLOGY & ADDITIONAL TESTS:      Labs Personally Reviewed  Imaging Personally Reviewed  Consultant(s) Notes Reviewed

## 2025-03-22 NOTE — PROGRESS NOTE ADULT - SUBJECTIVE AND OBJECTIVE BOX
SUBJECTIVE AND OBJECTIVE: pt seen and examined at bedside. pt calling out, moaning, easily redirected    Indication for Geriatrics and Palliative Care Services/INTERVAL HPI: GOC / symptoms    OVERNIGHT EVENTS:   Pt used Dilaudid 0.5mg IV x3 and 0.2mg IV x2 in the past 24hrs  per nursing staff not tolerating PO meds    DNR on chart: DNI: Trial NIV  DNI: Trial NIV    Allergies    Kiwi (Anaphylaxis)  codeine (Unknown)  penicillins (Anaphylaxis)    Intolerances    MEDICATIONS  (STANDING):  chlorhexidine 2% Cloths 1 Application(s) Topical daily  lidocaine   4% Patch 1 Patch Transdermal daily  oxyCODONE    IR 2.5 milliGRAM(s) Oral every 6 hours  senna 2 Tablet(s) Oral at bedtime    MEDICATIONS  (PRN):  glycopyrrolate Injectable 0.4 milliGRAM(s) IV Push every 6 hours PRN secretions  HYDROmorphone  Injectable 0.2 milliGRAM(s) IV Push every 2 hours PRN Moderate Pain (4 - 6)  HYDROmorphone  Injectable 0.5 milliGRAM(s) IV Push every 2 hours PRN Severe Pain (7 - 10)  HYDROmorphone  Injectable 0.5 milliGRAM(s) IV Push every 2 hours PRN dyspnea  LORazepam   Injectable 0.5 milliGRAM(s) IV Push every 2 hours PRN sob or wob    ITEMS UNCHECKED ARE NOT PRESENT    PRESENT SYMPTOMS: [ x]Unable to self-report - see [ ] CPOT [ x] PAINADS [x] RDOS  Source if other than patient:  [ ]Family   [ x]Team     Pain:  [ x]yes [ ]no- unable to provide further information regarding pain   QOL impact -   Location -                    Aggravating factors -  Quality -  Radiation -  Timing-  Severity (0-10 scale):  Minimal acceptable level (0-10 scale):     CPOT:    https://www.sccm.org/getattachment/xrj21u25-7q5k-7f3c-[email or web address]/Critical-Care-Pain-Observation-Tool-(CPOT)    PAINAD Score: See PAINAD tool and score below     Dyspnea:                           [ ]Mild [ ]Moderate [ ]Severe    RDOS: See RDOS tool and score below   0 to 2  minimal or no respiratory distress   3  mild distress  4 to 6 moderate distress  >7 severe distress    Anxiety:                             [ ]Mild [ ]Moderate [ ]Severe  Fatigue:                             [ ]Mild [ ]Moderate [ ]Severe  Nausea:                             [ ]Mild [ ]Moderate [ ]Severe  Loss of appetite:              [ ]Mild [ ]Moderate [ ]Severe  Constipation:                    [ ]Mild [ ]Moderate [ ]Severe    PCSSQ[Palliative Care Spiritual Screening Question]   Severity (0-10): deferred  Score of 4 or > indicate consideration of Chaplaincy referral.  Chaplaincy Referral: [x ] yes [ ] refused [ x] following [ ] Deferred     Caregiver [place]? : [ ] yes [ ] no [ ] Deferred [x ] Declined             Social work referral [ ] Patient & Family Centered Care Referral [ ]     Anticipatory Grief present?:  [x ] yes [ ] no  [ ] Deferred                  Social work referral [ ] Chaplaincy Referral [x ]  		  Other Symptoms:  [x ]All other review of systems negative     Palliative Performance Status Version 2:   See PPSv2 tool and score below         PHYSICAL EXAM:  Vital Signs Last 24 Hrs  T(C): 37.1 (21 Mar 2025 15:49), Max: 37.1 (21 Mar 2025 15:49)  T(F): 98.8 (21 Mar 2025 15:49), Max: 98.8 (21 Mar 2025 15:49)  HR: 83 (21 Mar 2025 15:49) (83 - 83)  BP: 109/72 (21 Mar 2025 15:49) (109/72 - 109/72)  BP(mean): --  RR: 18 (21 Mar 2025 15:49) (18 - 18)  SpO2: 95% (21 Mar 2025 15:49) (95% - 95%)    Parameters below as of 21 Mar 2025 15:49  Patient On (Oxygen Delivery Method): room air    I&O's Summary     GENERAL: [ ]Cachexia    [x ]Alert  [ x]Oriented x 2  [ ]Lethargic  [ ]Unarousable  [ ]Verbal  [ ]Non-Verbal  Behavioral:   [ ]Anxiety  [ ]Delirium [x ]Agitation [ ]Other  HEENT:  [ ]Normal   [ x]Dry mouth   [ ]ET Tube/Trach  [ ]Oral lesions  PULMONARY:   [ ]Clear [ ]Tachypnea  [ ]Audible excessive secretions   [ ]Rhonchi        [ ]Right [ ]Left [ ]Bilateral  [ ]Crackles        [ ]Right [ ]Left [ ]Bilateral  [ ]Wheezing     [ ]Right [ ]Left [ ]Bilateral  [ x]Diminished BS [ ] Right [ ]Left [ x]Bilateral  CARDIOVASCULAR:    [ x]Regular [ ]Irregular [ ]Tachy  [ ]Cam [ ]Murmur [ ]Other  GASTROINTESTINAL:  [x ]Soft  [ ]Distended   [x ]+BS  [ x]Non tender [ ]Tender  [ ]Other [ ]PEG [ ]OGT/ NGT     GENITOURINARY:  [ ]Normal [x ]Incontinent   [ ]Oliguria/Anuria   [ ]Lieberman  MUSCULOSKELETAL:   [ ]Normal   [x ]Weakness  [ x]Bed/Wheelchair bound [ ]Edema  NEUROLOGIC:   [ ]No focal deficits  [x ] Cognitive impairment  [ ] Dysphagia [ ]Dysarthria [ ] Paresis [ ]Other   SKIN:   [ ]Normal  [ ]Rash  [ x]Other  [ ]Pressure ulcer(s) [ ]y [ ]n present on admission    CRITICAL CARE:  [ ]Shock Present  [ ]Septic [ ]Cardiogenic [ ]Neurologic [ ]Hypovolemic  [ ]Vasopressors [ ]Inotropes  [ ]Respiratory failure present [ ]Mechanical Ventilation [ ]Non-invasive ventilatory support [ ]High-Flow   [ ]Acute  [ ]Chronic [ ]Hypoxic  [ ]Hypercarbic [ ]Other  [ ]Other organ failure     LABS: reviewed    RADIOLOGY & ADDITIONAL STUDIES: reviewed    Protein Calorie Malnutrition Present: [ ]mild [ ]moderate [ ]severe [ ]underweight [ ]morbid obesity  https://www.andeal.org/vault/2440/web/files/ONC/Table_Clinical%20Characteristics%20to%20Document%20Malnutrition-White%20JV%20et%20al%202012.pdf    Height (cm): 152.4 (03-15-25 @ 18:37)  Weight (kg): 68.8 (03-15-25 @ 18:37)  BMI (kg/m2): 29.6 (03-15-25 @ 18:37)    [x ]PPSV2 < or = 30%  [ ]significant weight loss [ ]poor nutritional intake [ ]anasarca[ ]Artificial Nutrition    Other REFERRALS:  [ ]Hospice  [ ]Child Life  [ ]Social Work  [ ]Case management [ ]Holistic Therapy     Goals of Care Document:

## 2025-03-22 NOTE — PROGRESS NOTE ADULT - PROBLEM SELECTOR PLAN 2
Patient developed acute renal failure during hospital course. Has a history of CKD stage V with Cr 3.66 at baseline. Due to uremia-induced platelet dysfunction, patient was started on HD through Shiley placed in the hospital, received a total of three sessions, last on 3/19/25.   - Sodium bicarbonate 650 mg TID dced   - COMFORT CARE  - d/w on removing IJ shiley but daughter request leave it alone as not causing discomfort to patient

## 2025-03-22 NOTE — PROGRESS NOTE ADULT - PROBLEM SELECTOR PLAN 1
D/c oxycodone as pt not tolerating PO per nursing staff  Goal is for comfort  Would recommend:  - Start Dilaudid 0.5mg IV q6hrs atc  - Continue with Dilaudid 0.5mg IV q2hrs PRN for moderate pain  - Start Dilaudid 0.7mg IV q2hrs PRN for severe pain  - c/w 100mg Gabapentin TID as tolerated   - Bowel regimen while on opioids, can use dulcolax

## 2025-03-22 NOTE — PROGRESS NOTE ADULT - PROBLEM SELECTOR PLAN 5
Hemoglobin a1c 9.4%.   - Hold home oral hypoglycemic medication   - Insulin lispro sliding scale with meals and at bedtime  -comfort care, no fingersticks

## 2025-03-22 NOTE — PROGRESS NOTE ADULT - ASSESSMENT
89F PMHx HTN, DM on insulin, CAD s/p PCI, HFrEF. P/w acute onset of R shin pain w/o trauma i/s/o severe PAD. Now in  MICU on HD  iso urgent shiley placement for increasing uremia and uremic encephalopathy. Palliative care consulted for goals of care, advance care planning and pain management .

## 2025-03-22 NOTE — PROGRESS NOTE ADULT - PROBLEM SELECTOR PLAN 8
- Medication reconciliation: complete   - Code: DNR/DNI, Comfort care    Disposition  - mental status improved 3/21. Family requests to remain on 5ACE for comfort care  - Appointments after discharge: PCP  -Prognosis guarded

## 2025-03-22 NOTE — PROGRESS NOTE ADULT - PROBLEM SELECTOR PLAN 5
see goc note  goals now comfort measures only  at this time family prefers care continue on 5ACE, discussed PCU as an option

## 2025-03-22 NOTE — PROGRESS NOTE ADULT - PROBLEM SELECTOR PLAN 1
Soft BP 84/53 3/20, improved with 250cc /62. Transitoned to full comfort care 3/20. No further dialysis (Offered to remove shiley, but daughter feels its not causing any distress). Vitals q24hr, no further blood draws.  -more awake 3/21, interactive with family. Pleasure feeds  -Pallative recs- glycopyrrolate 0.4mg q6hr prn  -hydromorphone 0.5mg q2hr IV for moderate pain  -hydromoprhone 0.7mg q2hr for severe pain  -ativan 0.5mg q2hr prn for sob/wob  -started 0.5mg IV dialudid q6hr standing  -lidocine patch

## 2025-03-23 NOTE — PROGRESS NOTE ADULT - SUBJECTIVE AND OBJECTIVE BOX
SUBJECTIVE AND OBJECTIVE: pt seen and examined at bedside. pt calling out, moaning, easily redirected    Indication for Geriatrics and Palliative Care Services/INTERVAL HPI: GOC / symptoms    OVERNIGHT EVENTS:   Pt used Dilaudid 0.5mg IV x2 for pain, Dilaudid 0.5mg IVx1 for dyspnea and 0.2mg IV x2 in the past 24hrs    DNR on chart: DNI: Trial NIV  DNI: Trial NIV    Allergies    Kiwi (Anaphylaxis)  codeine (Unknown)  penicillins (Anaphylaxis)    Intolerances    MEDICATIONS  (STANDING):  chlorhexidine 2% Cloths 1 Application(s) Topical daily  HYDROmorphone  Injectable 0.5 milliGRAM(s) IV Push every 4 hours  lidocaine   4% Patch 1 Patch Transdermal daily  senna 2 Tablet(s) Oral at bedtime    MEDICATIONS  (PRN):  glycopyrrolate Injectable 0.4 milliGRAM(s) IV Push every 6 hours PRN secretions  HYDROmorphone  Injectable 0.5 milliGRAM(s) IV Push every 2 hours PRN Moderate Pain (4 - 6)  HYDROmorphone  Injectable 0.5 milliGRAM(s) IV Push every 2 hours PRN dyspnea  HYDROmorphone  Injectable 0.7 milliGRAM(s) IV Push every 2 hours PRN Severe Pain (7 - 10)  LORazepam   Injectable 0.5 milliGRAM(s) IV Push every 2 hours PRN sob or wob    ITEMS UNCHECKED ARE NOT PRESENT    PRESENT SYMPTOMS: [ x]Unable to self-report - see [ ] CPOT [ x] PAINADS [x] RDOS  Source if other than patient:  [ ]Family   [ x]Team     Pain:  [ x]yes [ ]no- unable to provide further information regarding pain   QOL impact -   Location -                    Aggravating factors -  Quality -  Radiation -  Timing-  Severity (0-10 scale):  Minimal acceptable level (0-10 scale):     CPOT:    https://www.sccm.org/getattachment/kmn02a71-6s2a-6g6r-5c4o-0410g1683b3q/Critical-Care-Pain-Observation-Tool-(CPOT)    PAINAD Score: See PAINAD tool and score below     Dyspnea:                           [ ]Mild [ ]Moderate [ ]Severe    RDOS: See RDOS tool and score below   0 to 2  minimal or no respiratory distress   3  mild distress  4 to 6 moderate distress  >7 severe distress    Anxiety:                             [ ]Mild [ ]Moderate [ ]Severe  Fatigue:                             [ ]Mild [ ]Moderate [ ]Severe  Nausea:                             [ ]Mild [ ]Moderate [ ]Severe  Loss of appetite:              [ ]Mild [ ]Moderate [ ]Severe  Constipation:                    [ ]Mild [ ]Moderate [ ]Severe    PCSSQ[Palliative Care Spiritual Screening Question]   Severity (0-10): deferred  Score of 4 or > indicate consideration of Chaplaincy referral.  Chaplaincy Referral: [x ] yes [ ] refused [ x] following [ ] Deferred     Caregiver [place]? : [ ] yes [ ] no [ ] Deferred [x ] Declined             Social work referral [ ] Patient & Family Centered Care Referral [ ]     Anticipatory Grief present?:  [x ] yes [ ] no  [ ] Deferred                  Social work referral [ ] Chaplaincy Referral [x ]  		  Other Symptoms:  [x ]All other review of systems negative     Palliative Performance Status Version 2:   See PPSv2 tool and score below         PHYSICAL EXAM:  Vital Signs Last 24 Hrs  T(C): 37.1 (23 Mar 2025 05:12), Max: 37.1 (23 Mar 2025 05:12)  T(F): 98.8 (23 Mar 2025 05:12), Max: 98.8 (23 Mar 2025 05:12)  HR: 87 (23 Mar 2025 05:12) (87 - 87)  BP: 101/59 (23 Mar 2025 05:12) (101/59 - 101/59)  BP(mean): --  RR: 18 (23 Mar 2025 05:12) (18 - 18)  SpO2: 95% (23 Mar 2025 05:12) (95% - 95%)    Parameters below as of 23 Mar 2025 05:12  Patient On (Oxygen Delivery Method): room air     I&O's Summary     GENERAL: [ ]Cachexia    [x ]Alert  [ x]Oriented x 2  [ ]Lethargic  [ ]Unarousable  [ ]Verbal  [ ]Non-Verbal  Behavioral:   [ ]Anxiety  [ ]Delirium [x ]Agitation [ ]Other  HEENT:  [ ]Normal   [ x]Dry mouth   [ ]ET Tube/Trach  [ ]Oral lesions  PULMONARY:   [ ]Clear [ ]Tachypnea  [ ]Audible excessive secretions   [ ]Rhonchi        [ ]Right [ ]Left [ ]Bilateral  [ ]Crackles        [ ]Right [ ]Left [ ]Bilateral  [ ]Wheezing     [ ]Right [ ]Left [ ]Bilateral  [ x]Diminished BS [ ] Right [ ]Left [ x]Bilateral  CARDIOVASCULAR:    [ x]Regular [ ]Irregular [ ]Tachy  [ ]Cam [ ]Murmur [ ]Other  GASTROINTESTINAL:  [x ]Soft  [ ]Distended   [x ]+BS  [ x]Non tender [ ]Tender  [ ]Other [ ]PEG [ ]OGT/ NGT     GENITOURINARY:  [ ]Normal [x ]Incontinent   [ ]Oliguria/Anuria   [ ][place]  MUSCULOSKELETAL:   [ ]Normal   [x ]Weakness  [ x]Bed/Wheelchair bound [ ]Edema  NEUROLOGIC:   [ ]No focal deficits  [x ] Cognitive impairment  [ ] Dysphagia [ ]Dysarthria [ ] Paresis [ ]Other   SKIN:   [ ]Normal  [ ]Rash  [ x]Other  [ ]Pressure ulcer(s) [ ]y [ ]n present on admission    CRITICAL CARE:  [ ]Shock Present  [ ]Septic [ ]Cardiogenic [ ]Neurologic [ ]Hypovolemic  [ ]Vasopressors [ ]Inotropes  [ ]Respiratory failure present [ ]Mechanical Ventilation [ ]Non-invasive ventilatory support [ ]High-Flow   [ ]Acute  [ ]Chronic [ ]Hypoxic  [ ]Hypercarbic [ ]Other  [ ]Other organ failure     LABS: reviewed    RADIOLOGY & ADDITIONAL STUDIES: reviewed    Protein Calorie Malnutrition Present: [ ]mild [ ]moderate [ ]severe [ ]underweight [ ]morbid obesity  https://www.andeal.org/vault/2440/web/files/ONC/Table_Clinical%20Characteristics%20to%20Document%20Malnutrition-White%20JV%20et%20al%202012.pdf    Height (cm): 152.4 (03-15-25 @ 18:37)  Weight (kg): 68.8 (03-15-25 @ 18:37)  BMI (kg/m2): 29.6 (03-15-25 @ 18:37)    [x ]PPSV2 < or = 30%  [ ]significant weight loss [ ]poor nutritional intake [ ]anasarca[ ]Artificial Nutrition    Other REFERRALS:  [ ]Hospice  [ ]Child Life  [ ]Social Work  [ ]Case management [ ]Holistic Therapy     Goals of Care Document:

## 2025-03-23 NOTE — PROGRESS NOTE ADULT - SUBJECTIVE AND OBJECTIVE BOX
Patient is a 89y old  Female who presents with a chief complaint of LE pain (22 Mar 2025 10:38)        SUBJECTIVE / OVERNIGHT EVENTS:  Patient seen and examined at bedside this morning. sleeping, appeared comfortable    ROS:     MEDICATIONS  (STANDING):  chlorhexidine 2% Cloths 1 Application(s) Topical daily  HYDROmorphone  Injectable 0.5 milliGRAM(s) IV Push every 4 hours  lidocaine   4% Patch 1 Patch Transdermal daily  senna 2 Tablet(s) Oral at bedtime    MEDICATIONS  (PRN):  glycopyrrolate Injectable 0.4 milliGRAM(s) IV Push every 6 hours PRN secretions  HYDROmorphone  Injectable 0.5 milliGRAM(s) IV Push every 2 hours PRN Moderate Pain (4 - 6)  HYDROmorphone  Injectable 0.5 milliGRAM(s) IV Push every 2 hours PRN dyspnea  HYDROmorphone  Injectable 0.7 milliGRAM(s) IV Push every 2 hours PRN Severe Pain (7 - 10)  LORazepam   Injectable 0.5 milliGRAM(s) IV Push every 2 hours PRN sob or wob      Vital Signs Last 24 Hrs  T(C): 37.1 (23 Mar 2025 05:12), Max: 37.1 (23 Mar 2025 05:12)  T(F): 98.8 (23 Mar 2025 05:12), Max: 98.8 (23 Mar 2025 05:12)  HR: 87 (23 Mar 2025 05:12) (87 - 87)  BP: 101/59 (23 Mar 2025 05:12) (101/59 - 101/59)  BP(mean): --  RR: 18 (23 Mar 2025 05:12) (18 - 18)  SpO2: 95% (23 Mar 2025 05:12) (95% - 95%)    Parameters below as of 23 Mar 2025 05:12  Patient On (Oxygen Delivery Method): room air      CAPILLARY BLOOD GLUCOSE        I&O's Summary      PHYSICAL EXAM  GENERAL: NAD, lying comfortably in bed, appears comfortable  HEENT:  MARIZOL hooks left  CHEST/LUNG: no wob  ABDOMEN: Soft, Nontender, Nondistended;  EXTREMITIES: brusing UE from blood draws. Purple discoloration left foot (baseline) and mildly cool.   NEURO: sleeping      LABS:                      RADIOLOGY & ADDITIONAL TESTS:      Labs Personally Reviewed  Imaging Personally Reviewed  Consultant(s) Notes Reviewed

## 2025-03-23 NOTE — PROGRESS NOTE ADULT - ASSESSMENT
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 Course c/b large RP bleed after being started on heparin gtt in the setting of acute renal failure and uremia-induced platelet dysfunction requiring emergent HD. On arrival to MICU, Shiley placed, and patient received dialysis with improvement in uremia. Patient's hemoglobin has now stabilized non-operatively regarding the retroperitoneal hematoma. Although the patient's hemodynamics and labs are improving, her mental status remains poor and her prognosis is guarded. Transitioned to full comfort care 3/20.

## 2025-03-23 NOTE — PROGRESS NOTE ADULT - PROBLEM SELECTOR PLAN 4
Patient's hospital course was complicated by a large RP bleed after being started on heparin drip in the setting of acute renal failure and uremia-induced platelet dysfunction, requiring emergent HD. Received total 4u pRBC, 1u FFP, 1u PLT. IR consulted, recommending no further intervention as RP hematoma would most likely self tamponade  -hgb stable but remains thrombocytopenia 60s (holding of asa and Xarelto)  -COMFORT CARE

## 2025-03-23 NOTE — PROGRESS NOTE ADULT - PROBLEM SELECTOR PLAN 1
Soft BP 84/53 3/20, improved with 250cc /62. Transitoned to full comfort care 3/20. No further dialysis (Offered to remove shiley, but daughter feels its not causing any distress). Vitals q24hr, no further blood draws.  -more awake 3/21, interactive with family. Pleasure feeds  -Pallative recs- glycopyrrolate 0.4mg q6hr prn  -hydromorphone 0.5mg q2hr IV for moderate pain  -hydromoprhone 0.7mg q2hr for severe pain  -ativan 0.5mg q2hr prn for sob/wob  -started 0.5mg IV dialudid q4hr standing  -lidocaine patch

## 2025-03-23 NOTE — PROGRESS NOTE ADULT - PROBLEM SELECTOR PLAN 1
D/c oxycodone as pt not tolerating PO per nursing staff  Goal is for comfort  Would recommend:  - INcrease frequency of Dilaudid 0.5mg IV to q4hrs atc  - Continue with Dilaudid 0.5mg IV q2hrs PRN for moderate pain  - Start Dilaudid 0.7mg IV q2hrs PRN for severe pain  - c/w 100mg Gabapentin TID as tolerated   - Bowel regimen while on opioids, can use dulcolax

## 2025-03-24 NOTE — PROGRESS NOTE ADULT - NSPROGADDITIONALINFOA_GEN_ALL_CORE
Time-based billing (NON-critical care).      minutes spent on total encounter. The necessity of the time spent during the encounter on this date of service was due to:     - Reviewing, and interpreting labs, testing, and imaging.  - Independently obtaining a review of systems and performing a physical exam  - Reviewing prior records and where necessary, outpatient records.  - Counselling and educating patient regarding interpretation of aforementioned items and plan of care.      d/w ACP Time-based billing (NON-critical care).     55 minutes spent on total encounter. The necessity of the time spent during the encounter on this date of service was due to:     - Reviewing, and interpreting labs, testing, and imaging.  - Independently obtaining a review of systems and performing a physical exam  - Reviewing prior records and where necessary, outpatient records.  - Counselling and educating patient regarding interpretation of aforementioned items and plan of care.      d/w ACP

## 2025-03-24 NOTE — PROGRESS NOTE ADULT - PROBLEM SELECTOR PLAN 1
D/c oxycodone as pt not tolerating PO per nursing staff  Goal is for comfort  Would recommend:  - INcrease frequency of Dilaudid 0.5mg IV to q4hrs atc  - Continue with Dilaudid 0.5mg IV q2hrs PRN for moderate pain  - Start Dilaudid 0.7mg IV q2hrs PRN for severe pain  - c/w 100mg Gabapentin TID as tolerated   - Bowel regimen while on opioids, can use dulcolax - c/w  Dilaudid 0.5mg IV to q4hrs atc  - Continue with Dilaudid 0.5mg IV q2hrs PRN for moderate pain   - C/w Dilaudid 0.7mg IV q2hrs PRN for severe pain  - c/w 100mg Gabapentin TID as tolerated   - Bowel regimen while on opioids, can use dulcolax  low threshold for starting Dilaudid gtt at this time

## 2025-03-24 NOTE — PROGRESS NOTE ADULT - PROBLEM SELECTOR PLAN 6
Pt is DNR/DNI, goal is comfort  Will continue to follow   Page for uncontrolled symptoms [phone] Pt is DNR/DNI, goal is comfort  Will continue to follow   Page for uncontrolled symptoms [phone]  Can be reached by TEAMS M-F 9-5 Ashleigh Dozier Any other time please page [phone] if needed

## 2025-03-24 NOTE — PROGRESS NOTE ADULT - ASSESSMENT
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 Course c/b large RP bleed after being started on heparin drip in the setting of acute renal failure and uremia-induced platelet dysfunction requiring emergent HD. On arrival to MICU, Shiley placed, and patient received dialysis with improvement in uremia. Patient's hemoglobin has now stabilized non-operatively regarding the retroperitoneal hematoma. Although the patient's hemodynamics and labs are improving, her mental status remains poor and her prognosis is guarded. Transitioned to full comfort care 3/20.

## 2025-03-24 NOTE — PROGRESS NOTE ADULT - PROBLEM SELECTOR PLAN 4
Patient's hospital course was complicated by a large RP bleed after being started on heparin drip in the setting of acute renal failure and uremia-induced platelet dysfunction, requiring emergent HD. Received total 4u pRBC, 1u FFP, 1u PLT. IR consulted, recommending no further intervention as RP hematoma would most likely self tamponade  Transitioned to comfort care 3/20.

## 2025-03-24 NOTE — PROGRESS NOTE ADULT - PROBLEM SELECTOR PLAN 2
Patient developed acute renal failure during hospital course. Has a history of CKD stage V with Cr 3.66 at baseline. Due to uremia-induced platelet dysfunction, patient was started on HD through Shiley placed in the hospital, received a total of three sessions, last on 3/19/25.   Transitioned to comfort care 3/20.

## 2025-03-24 NOTE — PROGRESS NOTE ADULT - SUBJECTIVE AND OBJECTIVE BOX
[facility] Division of Hospital Medicine  Ashleigh Alejo DO  MS Teams PREFERRED      SUBJECTIVE / OVERNIGHT EVENTS:  ADDITIONAL REVIEW OF SYSTEMS:    MEDICATIONS  (STANDING):  chlorhexidine 2% Cloths 1 Application(s) Topical daily  HYDROmorphone  Injectable 0.5 milliGRAM(s) IV Push every 4 hours  lidocaine   4% Patch 1 Patch Transdermal daily  senna 2 Tablet(s) Oral at bedtime    MEDICATIONS  (PRN):  glycopyrrolate Injectable 0.4 milliGRAM(s) IV Push every 6 hours PRN secretions  HYDROmorphone  Injectable 0.5 milliGRAM(s) IV Push every 2 hours PRN Moderate Pain (4 - 6)  HYDROmorphone  Injectable 0.5 milliGRAM(s) IV Push every 2 hours PRN dyspnea  HYDROmorphone  Injectable 0.7 milliGRAM(s) IV Push every 2 hours PRN Severe Pain (7 - 10)  LORazepam   Injectable 0.5 milliGRAM(s) IV Push every 2 hours PRN sob or wob      I&O's Summary    23 Mar 2025 07:01  -  24 Mar 2025 07:00  --------------------------------------------------------  IN: 0 mL / OUT: 700 mL / NET: -700 mL        PHYSICAL EXAM:  Vital Signs Last 24 Hrs  T(C): 36.4 (24 Mar 2025 05:00), Max: 36.8 (24 Mar 2025 00:00)  T(F): 97.6 (24 Mar 2025 05:00), Max: 98.3 (24 Mar 2025 00:00)  HR: 86 (24 Mar 2025 05:00) (78 - 86)  BP: 98/61 (24 Mar 2025 05:00) (98/61 - 99/61)  BP(mean): --  RR: 18 (24 Mar 2025 05:00) (18 - 18)  SpO2: 95% (24 Mar 2025 05:00) (95% - 95%)    Parameters below as of 24 Mar 2025 05:00  Patient On (Oxygen Delivery Method): room air          LABS:                      RADIOLOGY & ADDITIONAL TESTS:  Results Reviewed:   Imaging Personally Reviewed:  Electrocardiogram Personally Reviewed:    COORDINATION OF CARE:  Care Discussed with Consultants/Other Providers [Y/N]: Y  Prior or Outpatient Records Reviewed [Y/N]: Y   [facility] Division of Hospital Medicine  Ashleigh Alejo DO  MS Teams PREFERRED      SUBJECTIVE / OVERNIGHT EVENTS: No acute events overnight. Patient appears comfortable. Daughter at bedside tearful - emotional support provided.  ADDITIONAL REVIEW OF SYSTEMS:    MEDICATIONS  (STANDING):  chlorhexidine 2% Cloths 1 Application(s) Topical daily  HYDROmorphone  Injectable 0.5 milliGRAM(s) IV Push every 4 hours  lidocaine   4% Patch 1 Patch Transdermal daily  senna 2 Tablet(s) Oral at bedtime    MEDICATIONS  (PRN):  glycopyrrolate Injectable 0.4 milliGRAM(s) IV Push every 6 hours PRN secretions  HYDROmorphone  Injectable 0.5 milliGRAM(s) IV Push every 2 hours PRN Moderate Pain (4 - 6)  HYDROmorphone  Injectable 0.5 milliGRAM(s) IV Push every 2 hours PRN dyspnea  HYDROmorphone  Injectable 0.7 milliGRAM(s) IV Push every 2 hours PRN Severe Pain (7 - 10)  LORazepam   Injectable 0.5 milliGRAM(s) IV Push every 2 hours PRN sob or wob      I&O's Summary    23 Mar 2025 07:01  -  24 Mar 2025 07:00  --------------------------------------------------------  IN: 0 mL / OUT: 700 mL / NET: -700 mL        PHYSICAL EXAM:  Vital Signs Last 24 Hrs  T(C): 36.4 (24 Mar 2025 05:00), Max: 36.8 (24 Mar 2025 00:00)  T(F): 97.6 (24 Mar 2025 05:00), Max: 98.3 (24 Mar 2025 00:00)  HR: 86 (24 Mar 2025 05:00) (78 - 86)  BP: 98/61 (24 Mar 2025 05:00) (98/61 - 99/61)  BP(mean): --  RR: 18 (24 Mar 2025 05:00) (18 - 18)  SpO2: 95% (24 Mar 2025 05:00) (95% - 95%)    Parameters below as of 24 Mar 2025 05:00  Patient On (Oxygen Delivery Method): room air    CONSTITUTIONAL: NAD   NECK: Supple, midline  RESPIRATORY: Normal respiratory effort; lungs are clear to auscultation bilaterally  CARDIOVASCULAR: Regular rate and rhythm, normal S1 and S2.   ABDOMEN: Nontender to palpation, no rebound/guarding  PSYCH: Calm        LABS:                      RADIOLOGY & ADDITIONAL TESTS:  Results Reviewed:   Imaging Personally Reviewed:  Electrocardiogram Personally Reviewed:    COORDINATION OF CARE:  Care Discussed with Consultants/Other Providers [Y/N]: Y  Prior or Outpatient Records Reviewed [Y/N]: Y

## 2025-03-24 NOTE — PROGRESS NOTE ADULT - SUBJECTIVE AND OBJECTIVE BOX
SUBJECTIVE AND OBJECTIVE: Pt seen and examined at bedside. Pt lethargic, moaning on exam. Family at bedside.   Indication for Geriatrics and Palliative Care Services/INTERVAL HPI: GOC/symptoms    OVERNIGHT EVENTS: in 24 hours pt with 3 prn IVP Dilaudid     DNR on chart:DNI: Trial NIV  DNI: Trial NIV      Allergies    codeine (Unknown)  penicillins (Anaphylaxis)  Kiwi (Unknown)    Intolerances    MEDICATIONS  (STANDING):  chlorhexidine 2% Cloths 1 Application(s) Topical daily  HYDROmorphone  Injectable 0.5 milliGRAM(s) IV Push every 4 hours  lidocaine   4% Patch 1 Patch Transdermal daily  senna 2 Tablet(s) Oral at bedtime    MEDICATIONS  (PRN):  glycopyrrolate Injectable 0.4 milliGRAM(s) IV Push every 6 hours PRN secretions  HYDROmorphone  Injectable 0.5 milliGRAM(s) IV Push every 2 hours PRN Moderate Pain (4 - 6)  HYDROmorphone  Injectable 0.5 milliGRAM(s) IV Push every 2 hours PRN dyspnea  HYDROmorphone  Injectable 0.7 milliGRAM(s) IV Push every 2 hours PRN Severe Pain (7 - 10)  LORazepam   Injectable 0.5 milliGRAM(s) IV Push every 2 hours PRN sob or wob      ITEMS UNCHECKED ARE NOT PRESENT    PRESENT SYMPTOMS: [ x]Unable to self-report - see [ ] CPOT [x ] PAINADS [x ] RDOS  Source if other than patient:  [ ]Family   [x ]Team     Pain:  [ ]yes [ ]no  QOL impact -   Location -                    Aggravating factors -  Quality -  Radiation -  Timing-  Severity (0-10 scale):  Minimal acceptable level (0-10 scale):     CPOT:    https://www.sccm.org/getattachment/vfy50k35-3f0x-9g0b-8x6f-4359r6919p6y/Critical-Care-Pain-Observation-Tool-(CPOT)    PAINAD Score: See PAINAD tool and score below       Dyspnea:                           [ ]Mild [ ]Moderate [ ]Severe    RDOS: See RDOS tool and score below   0 to 2  minimal or no respiratory distress   3  mild distress  4 to 6 moderate distress  >7 severe distress      Anxiety:                             [ ]Mild [ ]Moderate [ ]Severe  Fatigue:                             [ ]Mild [ ]Moderate [ ]Severe  Nausea:                             [ ]Mild [ ]Moderate [ ]Severe  Loss of appetite:              [ ]Mild [ ]Moderate [ ]Severe  Constipation:                    [ ]Mild [ ]Moderate [ ]Severe    PCSSQ[Palliative Care Spiritual Screening Question]   Severity (0-10):  Score of 4 or > indicate consideration of Chaplaincy referral.  Chaplaincy Referral: [x ] yes [ ] refused [x ] following [ ] Deferred     Caregiver [place]? : [ ] yes [ ] no [ ] Deferred [ ] Declined             Social work referral [ ] Patient & Family Centered Care Referral [ ]     Anticipatory Grief present?:  [x ] yes [ ] no  [ ] Deferred                  Social work referral [ ] Chaplaincy Referral [ x]    		  Other Symptoms:  [x ]All other review of systems negative - unable to participate due to AMS     Palliative Performance Status Version 2:   See PPSv2 tool and score below         PHYSICAL EXAM:  Vital Signs Last 24 Hrs  T(C): 36.4 (24 Mar 2025 05:00), Max: 36.8 (24 Mar 2025 00:00)  T(F): 97.6 (24 Mar 2025 05:00), Max: 98.3 (24 Mar 2025 00:00)  HR: 86 (24 Mar 2025 05:00) (78 - 86)  BP: 98/61 (24 Mar 2025 05:00) (98/61 - 99/61)  BP(mean): --  RR: 18 (24 Mar 2025 05:00) (18 - 18)  SpO2: 95% (24 Mar 2025 05:00) (95% - 95%)    Parameters below as of 24 Mar 2025 05:00  Patient On (Oxygen Delivery Method): room air     I&O's Summary    23 Mar 2025 07:01  -  24 Mar 2025 07:00  --------------------------------------------------------  IN: 0 mL / OUT: 700 mL / NET: -700 mL       GENERAL: [ ]Cachexia    [ ]Alert  [ ]Oriented x   [ x]Lethargic  [ ]Unarousable  [ ]Verbal  [ ]Non-Verbal  Behavioral:   [ ]Anxiety  [ ]Delirium [ ]Agitation [ ]Other  HEENT:  [ ]Normal   [x ]Dry mouth   [ ]ET Tube/Trach  [ ]Oral lesions  PULMONARY:   [ ]Clear [ ]Tachypnea  [ ]Audible excessive secretions   [ ]Rhonchi        [ ]Right [ ]Left [ ]Bilateral  [ ]Crackles        [ ]Right [ ]Left [ ]Bilateral  [ ]Wheezing     [ ]Right [ ]Left [ ]Bilateral  [ ]Diminished BS [ ] Right [ ]Left [ ]Bilateral  CARDIOVASCULAR:    [ ]Regular [ ]Irregular [ ]Tachy  [ ]Cam [ ]Murmur [ ]Other  GASTROINTESTINAL:  [ ]Soft  [ ]Distended   [ ]+BS  [ ]Non tender [ ]Tender  [ ]Other [ ]PEG [ ]OGT/ NGT   Last BM:   GENITOURINARY:  [ ]Normal [ ]Incontinent   [ ]Oliguria/Anuria   [ ]Lieberman  MUSCULOSKELETAL:   [ ]Normal   [ ]Weakness  [ ]Bed/Wheelchair bound [ ]Edema  NEUROLOGIC:   [ ]No focal deficits  [ ] Cognitive impairment  [ ] Dysphagia [ ]Dysarthria [ ] Paresis [ ]Other   SKIN:   [ ]Normal  [ ]Rash  [ ]Other  [ ]Pressure ulcer(s) [ ]y [ ]n present on admission    CRITICAL CARE:  [ ]Shock Present  [ ]Septic [ ]Cardiogenic [ ]Neurologic [ ]Hypovolemic  [ ]Vasopressors [ ]Inotropes  [ ]Respiratory failure present [ ]Mechanical Ventilation [ ]Non-invasive ventilatory support [ ]High-Flow   [ ]Acute  [ ]Chronic [ ]Hypoxic  [ ]Hypercarbic [ ]Other  [ ]Other organ failure     LABS:            RADIOLOGY & ADDITIONAL STUDIES:    Protein Calorie Malnutrition Present: [ ]mild [ ]moderate [ ]severe [ ]underweight [ ]morbid obesity  https://www.andeal.org/vault/9970/web/files/ONC/Table_Clinical%20Characteristics%20to%20Document%20Malnutrition-White%20JV%20et%20al%202012.pdf    Height (cm): 152.4 (03-15-25 @ 18:37)  Weight (kg): 68.8 (03-15-25 @ 18:37)  BMI (kg/m2): 29.6 (03-15-25 @ 18:37)    [ ]PPSV2 < or = 30%  [ ]significant weight loss [ ]poor nutritional intake [ ]anasarca[ ]Artificial Nutrition    Other REFERRALS:  [ ]Hospice  [ ]Child Life  [ ]Social Work  [ ]Case management [ ]Holistic Therapy     Goals of Care Document:JERMAINNitesh Alejo (03-14-25 @ 16:53)  Goals of Care Conversation:   Participants:  · Participants  Family  · Child(ana)  DaughterNancy    Advance Directives:  · Caregiver:  yes    Conversation Discussion:  · ADVANCED CARE PLANNING  Voluntary discussion occurred addressing advanced care planning  · Conversation  Diagnosis; Prognosis; MOLST Discussed; Treatment Options  · Conversation Details  Conversation held with daughterNancy, at bedside. Reviewed events leading to patient's current status, and overall condition at present. Explained role of healthcare proxy and its role in decision making should the patient be unable to express his own wishes or lacks capacity to make medical decisions. Explained Family Health Care Decision Act (FHCDA) Surrogate Decision Maker Hierarchy in the absence of a health care agent. Nancy reports that she is documented healthcare proxy and primary caregiver, though she confers with her brother, who lives in [facility].     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 Further management pending several vascular studies, however, difficulty obtaining due to patient's agitation likely secondary to pain. Also with poor IV access - attempts to place IVs also limited secondary to pain/agitation. Explained that we can premedicate her with pain medication, however, also poses difficulty as she is significantly altered and pain medication may worsen delirium. Course also complicated by worsening renal function, hyperkalemia, poor oral intake. Overall, patient with poor prognosis. Extensive discussion regarding quality vs. quantity of life. Nancy hopeful that if patient's mentation improves, she will be less combative with procedures/testing and she will be able to eat/drink, allowing her kidney function to recover. Explained that given patient's comorbidities, frailty and decreased physiologic reserves, may not recover to her former cognitive and functional baseline. Nancy expressed understanding but remained optimistic. She will consider further interventions, including dialysis and vascular intervention, as necessity arises. Regarding resuscitative measures, including chest compression and intubation, Nancy will discuss with her brother further - patient remains FULL CODE.    Personal Advance Directives Treatment Guidelines:   Treatment Guidelines:  · Decision Maker  Health Care Proxy    Location of Discussion:   Time Spent on Advance Care Planning:  Attending or FREDY Only.     I personally spent 25 minutes on advance care planning services with the patient. This time is separate and distinct from any other care management services provided on this date.    Location of Discussion:  · Location of discussion  Face to face      Electronic Signatures:  Ashleigh Alejo ()  (Signed 14-Mar-2025 16:59)  	Authored: Goals of Care Conversation, Personal Advance Directives Treatment Guidelines, Location of Discussion      Last Updated: 14-Mar-2025 16:59 by Ashleigh Alejo (DO)     SUBJECTIVE AND OBJECTIVE: Pt seen and examined at bedside. Pt lethargic, moaning on exam. Family at bedside.   Indication for Geriatrics and Palliative Care Services/INTERVAL HPI: GOC/symptoms    OVERNIGHT EVENTS: in 24 hours pt with 3 prn IVP Dilaudid     DNR on chart:DNI: Trial NIV  DNI: Trial NIV      Allergies    codeine (Unknown)  penicillins (Anaphylaxis)  Kiwi (Unknown)    Intolerances    MEDICATIONS  (STANDING):  chlorhexidine 2% Cloths 1 Application(s) Topical daily  HYDROmorphone  Injectable 0.5 milliGRAM(s) IV Push every 4 hours  lidocaine   4% Patch 1 Patch Transdermal daily  senna 2 Tablet(s) Oral at bedtime    MEDICATIONS  (PRN):  glycopyrrolate Injectable 0.4 milliGRAM(s) IV Push every 6 hours PRN secretions  HYDROmorphone  Injectable 0.5 milliGRAM(s) IV Push every 2 hours PRN Moderate Pain (4 - 6)  HYDROmorphone  Injectable 0.5 milliGRAM(s) IV Push every 2 hours PRN dyspnea  HYDROmorphone  Injectable 0.7 milliGRAM(s) IV Push every 2 hours PRN Severe Pain (7 - 10)  LORazepam   Injectable 0.5 milliGRAM(s) IV Push every 2 hours PRN sob or wob      ITEMS UNCHECKED ARE NOT PRESENT    PRESENT SYMPTOMS: [ x]Unable to self-report - see [ ] CPOT [x ] PAINADS [x ] RDOS  Source if other than patient:  [ ]Family   [x ]Team     Pain:  [ ]yes [ ]no  QOL impact -   Location -                    Aggravating factors -  Quality -  Radiation -  Timing-  Severity (0-10 scale):  Minimal acceptable level (0-10 scale):     CPOT:    https://www.sccm.org/getattachment/nha19u00-3c6x-8j9b-7b0y-2556u6113u6i/Critical-Care-Pain-Observation-Tool-(CPOT)    PAINAD Score: See PAINAD tool and score below       Dyspnea:                           [ ]Mild [ ]Moderate [ ]Severe    RDOS: See RDOS tool and score below   0 to 2  minimal or no respiratory distress   3  mild distress  4 to 6 moderate distress  >7 severe distress      Anxiety:                             [ ]Mild [ ]Moderate [ ]Severe  Fatigue:                             [ ]Mild [ ]Moderate [ ]Severe  Nausea:                             [ ]Mild [ ]Moderate [ ]Severe  Loss of appetite:              [ ]Mild [ ]Moderate [ ]Severe  Constipation:                    [ ]Mild [ ]Moderate [ ]Severe    PCSSQ[Palliative Care Spiritual Screening Question]   Severity (0-10):  Score of 4 or > indicate consideration of Chaplaincy referral.  Chaplaincy Referral: [x ] yes [ ] refused [x ] following [ ] Deferred     Caregiver [place]? : [ ] yes [ ] no [ ] Deferred [ ] Declined             Social work referral [ ] Patient & Family Centered Care Referral [ ]     Anticipatory Grief present?:  [x ] yes [ ] no  [ ] Deferred                  Social work referral [ ] Chaplaincy Referral [ x]    		  Other Symptoms:  [x ]All other review of systems negative - unable to participate due to AMS     Palliative Performance Status Version 2:   See PPSv2 tool and score below         PHYSICAL EXAM:  Vital Signs Last 24 Hrs  T(C): 36.4 (24 Mar 2025 05:00), Max: 36.8 (24 Mar 2025 00:00)  T(F): 97.6 (24 Mar 2025 05:00), Max: 98.3 (24 Mar 2025 00:00)  HR: 86 (24 Mar 2025 05:00) (78 - 86)  BP: 98/61 (24 Mar 2025 05:00) (98/61 - 99/61)  BP(mean): --  RR: 18 (24 Mar 2025 05:00) (18 - 18)  SpO2: 95% (24 Mar 2025 05:00) (95% - 95%)    Parameters below as of 24 Mar 2025 05:00  Patient On (Oxygen Delivery Method): room air     I&O's Summary    23 Mar 2025 07:01  -  24 Mar 2025 07:00  --------------------------------------------------------  IN: 0 mL / OUT: 700 mL / NET: -700 mL       GENERAL: [ ]Cachexia    [ ]Alert  [ ]Oriented x   [ x]Lethargic  [ ]Unarousable  [ ]Verbal  [ ]Non-Verbal  Behavioral:   [ ]Anxiety  [ ]Delirium [ ]Agitation [ ]Other  HEENT:  [ ]Normal   [x ]Dry mouth   [ ]ET Tube/Trach  [ ]Oral lesions  PULMONARY:   [ ]Clear [ ]Tachypnea  [ ]Audible excessive secretions   [ ]Rhonchi        [ ]Right [ ]Left [ ]Bilateral  [ ]Crackles        [ ]Right [ ]Left [ ]Bilateral  [ ]Wheezing     [ ]Right [ ]Left [ ]Bilateral  [ x]Diminished BS [ ] Right [ ]Left [ ]Bilateral  CARDIOVASCULAR:    [x ]Regular [ ]Irregular [ ]Tachy  [ ]Cam [ ]Murmur [ ]Other  GASTROINTESTINAL:  [x ]Soft  [ ]Distended   [ x]+BS  [x ]Non tender [ ]Tender  [ ]Other [ ]PEG [ ]OGT/ NGT   Last BM:   GENITOURINARY:  [ ]Normal [x ]Incontinent   [ ]Oliguria/Anuria   [ ]Lieberman  MUSCULOSKELETAL:   [ ]Normal   [x ]Weakness  [ ]Bed/Wheelchair bound [ ]Edema  NEUROLOGIC:   [ ]No focal deficits  [x ] Cognitive impairment  [ ] Dysphagia [ ]Dysarthria [ ] Paresis [ ]Other   SKIN:   [ ]Normal  [ ]Rash  [ ]Other  [ ]Pressure ulcer(s) [ ]y [ ]n present on admission    CRITICAL CARE:  [ ]Shock Present  [ ]Septic [ ]Cardiogenic [ ]Neurologic [ ]Hypovolemic  [ ]Vasopressors [ ]Inotropes  [ ]Respiratory failure present [ ]Mechanical Ventilation [ ]Non-invasive ventilatory support [ ]High-Flow   [ ]Acute  [ ]Chronic [ ]Hypoxic  [ ]Hypercarbic [ ]Other  [ ]Other organ failure     LABS:            RADIOLOGY & ADDITIONAL STUDIES:    Protein Calorie Malnutrition Present: [ ]mild [ ]moderate [ ]severe [ ]underweight [ ]morbid obesity  https://www.andeal.org/vault/3910/web/files/ONC/Table_Clinical%20Characteristics%20to%20Document%20Malnutrition-White%20JV%20et%20al%202012.pdf    Height (cm): 152.4 (03-15-25 @ 18:37)  Weight (kg): 68.8 (03-15-25 @ 18:37)  BMI (kg/m2): 29.6 (03-15-25 @ 18:37)    [x ]PPSV2 < or = 30%  [ ]significant weight loss [ ]poor nutritional intake [ ]anasarca[ ]Artificial Nutrition    Other REFERRALS:  [ ]Hospice  [ ]Child Life  [ ]Social Work  [ ]Case management [ ]Holistic Therapy     Goals of Care Document:JUAN Alejo (03-14-25 @ 16:53)  Goals of Care Conversation:   Participants:  · Participants  Family  · Child(ana)  Nancy Alexis    Advance Directives:  · Caregiver:  yes    Conversation Discussion:   Goals of Care:   GOALS OF CARE:  · Participants	Family  · Child(ana)	Chasity and Neville    Advance Directives:  · Does patient have Advance Directive	Yes  · Indicate Type	Medical Orders for Life-Sustaining Treatment (MOLST)    Conversation Discussion:  · ADVANCED CARE PLANNING	Voluntary discussion occurred addressing advanced care planning  · Conversation	Diagnosis; Prognosis; Treatment Options  · Conversation Details	Spoke with family outside pt room. They shared that based on pt's symptom needs o/n goals transitioned to comfort measures only. They are amenable to deescalation of HD and other non-symptom directed interventions. They shared mom's current mental status is a gift as she is able to communicate with them and speak with family via video phone, shared my agreement. Discussed plan of care moving forward will be to continue to support pt's symptom needs.   Discussed transfer to PCU for symptom directed care. At this time family would prefer to stay on [facility] for ongoing symptom directed care. Emotional support provided.  and chaplaincy consulted.    Treatment Guidelines:  · Decision Maker	Surrogate    Location of Discussion:  · Location of discussion	Face to face

## 2025-03-24 NOTE — PROGRESS NOTE ADULT - PROBLEM SELECTOR PLAN 1
Transitioned to comfort care 3/20. No further dialysis (prior hospitalist offered to remove Shiley, but daughter feels its not causing any distress).    -Palliative following for symptom management, recs appreciated Transitioned to comfort care 3/20.   Daughter agreeable for removal of Shiley today - personally removed Shiley; pressure held until achieved hemostasis.   -Palliative following for symptom management, recs appreciated

## 2025-03-25 NOTE — PROGRESS NOTE ADULT - PROBLEM SELECTOR PLAN 6
Pt is DNR/DNI, goal is comfort  Will continue to follow   Page for uncontrolled symptoms [phone]  Can be reached by TEAMS M-F 9-5 Ashleigh Dozier Any other time please page [phone] if needed Pt is DNR/DNI, goal is comfort  Will continue to follow   Page for uncontrolled symptoms [phone]

## 2025-03-25 NOTE — PROGRESS NOTE ADULT - PROBLEM SELECTOR PLAN 5
Transitioned to comfort care 3/20. The patient is a 21y Male complaining of seizures. [Negative] : Endocrine

## 2025-03-25 NOTE — PROGRESS NOTE ADULT - SUBJECTIVE AND OBJECTIVE BOX
[facility] Division of Hospital Medicine  Ashleigh Alejo DO  MS Teams PREFERRED      SUBJECTIVE / OVERNIGHT EVENTS: No acute events overnight. Patient appears comfortable.  ADDITIONAL REVIEW OF SYSTEMS:    MEDICATIONS  (STANDING):  chlorhexidine 2% Cloths 1 Application(s) Topical daily  HYDROmorphone  Injectable 0.5 milliGRAM(s) IV Push every 4 hours  lidocaine   4% Patch 1 Patch Transdermal daily  senna 2 Tablet(s) Oral at bedtime    MEDICATIONS  (PRN):  glycopyrrolate Injectable 0.4 milliGRAM(s) IV Push every 6 hours PRN secretions  HYDROmorphone  Injectable 0.5 milliGRAM(s) IV Push every 2 hours PRN Moderate Pain (4 - 6)  HYDROmorphone  Injectable 0.5 milliGRAM(s) IV Push every 2 hours PRN dyspnea  HYDROmorphone  Injectable 0.7 milliGRAM(s) IV Push every 2 hours PRN Severe Pain (7 - 10)  LORazepam   Injectable 0.5 milliGRAM(s) IV Push every 2 hours PRN Anxiety      I&O's Summary    24 Mar 2025 07:01  -  25 Mar 2025 07:00  --------------------------------------------------------  IN: 0 mL / OUT: 380 mL / NET: -380 mL        PHYSICAL EXAM:  Vital Signs Last 24 Hrs  T(C): 36.6 (25 Mar 2025 05:00), Max: 36.6 (25 Mar 2025 05:00)  T(F): 97.9 (25 Mar 2025 05:00), Max: 97.9 (25 Mar 2025 05:00)  HR: 89 (25 Mar 2025 05:00) (89 - 89)  BP: 91/59 (25 Mar 2025 05:00) (91/59 - 91/59)  BP(mean): --  RR: 18 (25 Mar 2025 05:00) (18 - 18)  SpO2: 93% (25 Mar 2025 05:00) (93% - 93%)    Parameters below as of 25 Mar 2025 05:00  Patient On (Oxygen Delivery Method): room air    CONSTITUTIONAL: NAD   NECK: Supple, midline  RESPIRATORY: Normal respiratory effort; lungs are clear to auscultation bilaterally  CARDIOVASCULAR: Regular rate and rhythm, normal S1 and S2.   ABDOMEN: Nontender to palpation, no rebound/guarding  PSYCH: Calm          LABS:                      RADIOLOGY & ADDITIONAL TESTS:  Results Reviewed:   Imaging Personally Reviewed:  Electrocardiogram Personally Reviewed:    COORDINATION OF CARE:  Care Discussed with Consultants/Other Providers [Y/N]: Y  Prior or Outpatient Records Reviewed [Y/N]: Y

## 2025-03-25 NOTE — PROGRESS NOTE ADULT - SUBJECTIVE AND OBJECTIVE BOX
SUBJECTIVE AND OBJECTIVE: Pt seen and examined at bedside. Pt lethargic, moaning on exam. Family at bedside.   Indication for Geriatrics and Palliative Care Services/INTERVAL HPI: GOC/symptoms    OVERNIGHT EVENTS: On ATC dilaudid in 24 hours pt with 1 prn IVP Dilaudid     DNR on chart:DNI: Trial NIV  DNI: Trial NIV      Allergies    codeine (Unknown)  penicillins (Anaphylaxis)  Kiwi (Unknown)    Intolerances    MEDICATIONS  (STANDING):  chlorhexidine 2% Cloths 1 Application(s) Topical daily  HYDROmorphone  Injectable 0.5 milliGRAM(s) IV Push every 4 hours  lidocaine   4% Patch 1 Patch Transdermal daily  senna 2 Tablet(s) Oral at bedtime    MEDICATIONS  (PRN):  glycopyrrolate Injectable 0.4 milliGRAM(s) IV Push every 6 hours PRN secretions  HYDROmorphone  Injectable 0.5 milliGRAM(s) IV Push every 2 hours PRN Moderate Pain (4 - 6)  HYDROmorphone  Injectable 0.5 milliGRAM(s) IV Push every 2 hours PRN dyspnea  HYDROmorphone  Injectable 0.7 milliGRAM(s) IV Push every 2 hours PRN Severe Pain (7 - 10)  LORazepam   Injectable 0.5 milliGRAM(s) IV Push every 2 hours PRN Anxiety      ITEMS UNCHECKED ARE NOT PRESENT    PRESENT SYMPTOMS: [ x]Unable to self-report - see [ ] CPOT [x ] PAINADS [x ] RDOS  Source if other than patient:  [ ]Family   [x ]Team     Pain:  [ ]yes [x ]no on dilaudid   QOL impact -   Location -                    Aggravating factors -  Quality -  Radiation -  Timing-  Severity (0-10 scale):  Minimal acceptable level (0-10 scale):     CPOT:    https://www.sccm.org/getattachment/iaj87j04-9z4f-8s3l-7b7d-8391f1092p7r/Critical-Care-Pain-Observation-Tool-(CPOT)    PAINAD Score: See PAINAD tool and score below       Dyspnea:                           [ ]Mild [ ]Moderate [ ]Severe    RDOS: See RDOS tool and score below   0 to 2  minimal or no respiratory distress   3  mild distress  4 to 6 moderate distress  >7 severe distress      Anxiety:                             [ ]Mild [ ]Moderate [ ]Severe  Fatigue:                             [ ]Mild [ ]Moderate [ ]Severe  Nausea:                             [ ]Mild [ ]Moderate [ ]Severe  Loss of appetite:              [ ]Mild [ ]Moderate [ ]Severe  Constipation:                    [ ]Mild [ ]Moderate [ ]Severe    PCSSQ[Palliative Care Spiritual Screening Question]   Severity (0-10):  Score of 4 or > indicate consideration of Chaplaincy referral.  Chaplaincy Referral: [x ] yes [ ] refused [x ] following [ ] Deferred     Caregiver [place]? : [ ] yes [ ] no [ ] Deferred [ ] Declined             Social work referral [ ] Patient & Family Centered Care Referral [ ]     Anticipatory Grief present?:  [x ] yes [ ] no  [ ] Deferred                  Social work referral [ ] Chaplaincy Referral [ x]    		  Other Symptoms:  [x ]All other review of systems negative - unable to participate due to AMS     Palliative Performance Status Version 2:   See PPSv2 tool and score below         PHYSICAL EXAM:  Vital Signs Last 24 Hrs  T(C): 36.6 (25 Mar 2025 05:00), Max: 36.6 (25 Mar 2025 05:00)  T(F): 97.9 (25 Mar 2025 05:00), Max: 97.9 (25 Mar 2025 05:00)  HR: 89 (25 Mar 2025 05:00) (89 - 89)  BP: 91/59 (25 Mar 2025 05:00) (91/59 - 91/59)  BP(mean): --  RR: 18 (25 Mar 2025 05:00) (18 - 18)  SpO2: 93% (25 Mar 2025 05:00) (93% - 93%)    Parameters below as of 25 Mar 2025 05:00  Patient On (Oxygen Delivery Method): room air         GENERAL: [x ]Cachexia    [ ]Alert  [ ]Oriented x   [ x]Lethargic  [ ]Unarousable  [ ]Verbal  [ ]Non-Verbal  Behavioral:   [ ]Anxiety  [ ]Delirium [ ]Agitation [ ]Other  HEENT:  [ ]Normal   [x ]Dry mouth   [ ]ET Tube/Trach  [ ]Oral lesions  PULMONARY:   [ ]Clear [ ]Tachypnea  [ ]Audible excessive secretions   [ ]Rhonchi        [ ]Right [ ]Left [ ]Bilateral  [ ]Crackles        [ ]Right [ ]Left [ ]Bilateral  [ ]Wheezing     [ ]Right [ ]Left [ ]Bilateral  [ x]Diminished BS [ ] Right [ ]Left [ ]Bilateral  CARDIOVASCULAR:    [x ]Regular [ ]Irregular [ ]Tachy  [ ]Cam [ ]Murmur [ ]Other  GASTROINTESTINAL:  [x ]Soft  [ ]Distended   [ x]+BS  [x ]Non tender [ ]Tender  [ ]Other [ ]PEG [ ]OGT/ NGT   Last BM:   GENITOURINARY:  [ ]Normal [x ]Incontinent   [ ]Oliguria/Anuria   [ ]Lieberman  MUSCULOSKELETAL:   [ ]Normal   [x ]Weakness  [ ]Bed/Wheelchair bound [ ]Edema  NEUROLOGIC:   [ ]No focal deficits  [x ] Cognitive impairment  [ ] Dysphagia [ ]Dysarthria [ ] Paresis [ ]Other   SKIN:   [ ]Normal  [ ]Rash  [ ]Other  [ ]Pressure ulcer(s) [ ]y [ ]n present on admission    CRITICAL CARE:  [ ]Shock Present  [ ]Septic [ ]Cardiogenic [ ]Neurologic [ ]Hypovolemic  [ ]Vasopressors [ ]Inotropes  [ ]Respiratory failure present [ ]Mechanical Ventilation [ ]Non-invasive ventilatory support [ ]High-Flow   [ ]Acute  [ ]Chronic [ ]Hypoxic  [ ]Hypercarbic [ ]Other  [ ]Other organ failure     LABS:            RADIOLOGY & ADDITIONAL STUDIES:    Protein Calorie Malnutrition Present: [ ]mild [ ]moderate [ ]severe [ ]underweight [ ]morbid obesity  https://www.andeal.org/vault/2440/web/files/ONC/Table_Clinical%20Characteristics%20to%20Document%20Malnutrition-White%20JV%20et%20al%089674.pdf    Height (cm): 152.4 (03-15-25 @ 18:37)  Weight (kg): 68.8 (03-15-25 @ 18:37)  BMI (kg/m2): 29.6 (03-15-25 @ 18:37)    [x ]PPSV2 < or = 30%  [ ]significant weight loss [ ]poor nutritional intake [ ]anasarca[ ]Artificial Nutrition    Other REFERRALS:  [ ]Hospice  [ ]Child Life  [ ]Social Work  [ ]Case management [ ]Holistic Therapy     Goals of Care Document:JUAN Alejo (03-14-25 @ 16:53)  Goals of Care Conversation:   Participants:  · Participants  Family  · Child(ana)  Nancy Alexis    Advance Directives:  · Caregiver:  yes    Conversation Discussion:   Goals of Care:   GOALS OF CARE:  · Participants	Family  · Child(ana)	[place]    Advance Directives:  · Does patient have Advance Directive	Yes  · Indicate Type	Medical Orders for Life-Sustaining Treatment (MOLST)    Conversation Discussion:  · ADVANCED CARE PLANNING	Voluntary discussion occurred addressing advanced care planning  · Conversation	Diagnosis; Prognosis; Treatment Options  · Conversation Details	Spoke with family outside pt room. They shared that based on pt's symptom needs o/n goals transitioned to comfort measures only. They are amenable to deescalation of HD and other non-symptom directed interventions. They shared mom's current mental status is a gift as she is able to communicate with them and speak with family via video phone, shared my agreement. Discussed plan of care moving forward will be to continue to support pt's symptom needs.   Discussed transfer to PCU for symptom directed care. At this time family would prefer to stay on [facility] for ongoing symptom directed care. Emotional support provided.  and chaplaincy consulted.    Treatment Guidelines:  · Decision Maker	Surrogate    Location of Discussion:  · Location of discussion	Face to face       SUBJECTIVE AND OBJECTIVE: Pt seen and examined at bedside. Pt lethargic, moaning on exam. Family at bedside.   Indication for Geriatrics and Palliative Care Services/INTERVAL HPI: GOC/symptoms    OVERNIGHT EVENTS: On ATC dilaudid in 24 hours pt with 1 prn IVP Dilaudid     DNR on chart:DNI: Trial NIV  DNI: Trial NIV      Allergies    codeine (Unknown)  penicillins (Anaphylaxis)  Kiwi (Unknown)    Intolerances    MEDICATIONS  (STANDING):  chlorhexidine 2% Cloths 1 Application(s) Topical daily  HYDROmorphone  Injectable 0.5 milliGRAM(s) IV Push every 4 hours  lidocaine   4% Patch 1 Patch Transdermal daily  senna 2 Tablet(s) Oral at bedtime    MEDICATIONS  (PRN):  glycopyrrolate Injectable 0.4 milliGRAM(s) IV Push every 6 hours PRN secretions  HYDROmorphone  Injectable 0.5 milliGRAM(s) IV Push every 2 hours PRN Moderate Pain (4 - 6)  HYDROmorphone  Injectable 0.5 milliGRAM(s) IV Push every 2 hours PRN dyspnea  HYDROmorphone  Injectable 0.7 milliGRAM(s) IV Push every 2 hours PRN Severe Pain (7 - 10)  LORazepam   Injectable 0.5 milliGRAM(s) IV Push every 2 hours PRN Anxiety      ITEMS UNCHECKED ARE NOT PRESENT    PRESENT SYMPTOMS: [ x]Unable to self-report - see [ ] CPOT [x ] PAINADS [x ] RDOS  Source if other than patient:  [ ]Family   [x ]Team     Pain:  [ ]yes [x ]no on dilaudid   QOL impact -   Location -                    Aggravating factors -  Quality -  Radiation -  Timing-  Severity (0-10 scale):  Minimal acceptable level (0-10 scale):     CPOT:    https://www.sccm.org/getattachment/quw70a02-7m9g-4e3f-4v2a-7479f6543b4z/Critical-Care-Pain-Observation-Tool-(CPOT)    PAINAD Score: See PAINAD tool and score below       Dyspnea:                           [ ]Mild [ ]Moderate [ ]Severe    RDOS: See RDOS tool and score below   0 to 2  minimal or no respiratory distress   3  mild distress  4 to 6 moderate distress  >7 severe distress      Anxiety:                             [ ]Mild [ ]Moderate [ ]Severe  Fatigue:                             [ ]Mild [x ]Moderate [ x]Severe  Nausea:                             [ ]Mild [ ]Moderate [ ]Severe  Loss of appetite:              [ ]Mild [ ]Moderate [ ]Severe  Constipation:                    [ ]Mild [ ]Moderate [ ]Severe    PCSSQ[Palliative Care Spiritual Screening Question]   Severity (0-10):  Score of 4 or > indicate consideration of Chaplaincy referral.  Chaplaincy Referral: [x ] yes [ ] refused [x ] following [ ] Deferred     Caregiver [place]? : [ ] yes [ ] no [x ] Deferred [ ] Declined             Social work referral [ ] Patient & Family Centered Care Referral [ ]     Anticipatory Grief present?:  [x ] yes [ ] no  [x ] Deferred                  Social work referral [ ] Chaplaincy Referral [ x]    		  Other Symptoms:  [x ]All other review of systems negative - unable to participate due to AMS     Palliative Performance Status Version 2:   See PPSv2 tool and score below         PHYSICAL EXAM:  Vital Signs Last 24 Hrs  T(C): 36.6 (25 Mar 2025 05:00), Max: 36.6 (25 Mar 2025 05:00)  T(F): 97.9 (25 Mar 2025 05:00), Max: 97.9 (25 Mar 2025 05:00)  HR: 89 (25 Mar 2025 05:00) (89 - 89)  BP: 91/59 (25 Mar 2025 05:00) (91/59 - 91/59)  BP(mean): --  RR: 18 (25 Mar 2025 05:00) (18 - 18)  SpO2: 93% (25 Mar 2025 05:00) (93% - 93%)    Parameters below as of 25 Mar 2025 05:00  Patient On (Oxygen Delivery Method): room air         GENERAL: [x ]Cachexia    [ ]Alert  [ ]Oriented x   [ x]Lethargic  [ ]Unarousable  [ ]Verbal  [ ]Non-Verbal  Behavioral:   [ ]Anxiety  [ ]Delirium [ ]Agitation [ ]Other  HEENT:  [ ]Normal   [x ]Dry mouth   [ ]ET Tube/Trach  [ ]Oral lesions  PULMONARY:   [ ]Clear [ ]Tachypnea  [ ]Audible excessive secretions   [ ]Rhonchi        [ ]Right [ ]Left [ ]Bilateral  [ ]Crackles        [ ]Right [ ]Left [ ]Bilateral  [ ]Wheezing     [ ]Right [ ]Left [ ]Bilateral  [ x]Diminished BS [ ] Right [ ]Left [ ]Bilateral  CARDIOVASCULAR:    [x ]Regular [ ]Irregular [ ]Tachy  [ ]Cam [ ]Murmur [ ]Other  GASTROINTESTINAL:  [x ]Soft  [ ]Distended   [ x]+BS  [x ]Non tender [ ]Tender  [ ]Other [ ]PEG [ ]OGT/ NGT   Last BM:   GENITOURINARY:  [ ]Normal [x ]Incontinent   [ ]Oliguria/Anuria   [ ]Lieberman  MUSCULOSKELETAL:   [ ]Normal   [x ]Weakness  [ ]Bed/Wheelchair bound [ ]Edema  NEUROLOGIC:   [ ]No focal deficits  [x ] Cognitive impairment  [ ] Dysphagia [ ]Dysarthria [ ] Paresis [ ]Other   SKIN:   [ ]Normal  [ ]Rash  [ ]Other  [ ]Pressure ulcer(s) [ ]y [ ]n present on admission    CRITICAL CARE:  [ ]Shock Present  [ ]Septic [ ]Cardiogenic [ ]Neurologic [ ]Hypovolemic  [ ]Vasopressors [ ]Inotropes  [ ]Respiratory failure present [ ]Mechanical Ventilation [ ]Non-invasive ventilatory support [ ]High-Flow   [ ]Acute  [ ]Chronic [ ]Hypoxic  [ ]Hypercarbic [ ]Other  [ ]Other organ failure     LABS:            RADIOLOGY & ADDITIONAL STUDIES:    Protein Calorie Malnutrition Present: [ ]mild [ ]moderate [ ]severe [ ]underweight [ ]morbid obesity  https://www.andeal.org/vault/2440/web/files/ONC/Table_Clinical%20Characteristics%20to%20Document%20Malnutrition-White%20JV%20et%20al%566307.pdf    Height (cm): 152.4 (03-15-25 @ 18:37)  Weight (kg): 68.8 (03-15-25 @ 18:37)  BMI (kg/m2): 29.6 (03-15-25 @ 18:37)    [x ]PPSV2 < or = 30%  [ ]significant weight loss [ ]poor nutritional intake [ ]anasarca[ ]Artificial Nutrition    Other REFERRALS:  [ ]Hospice  [ ]Child Life  [ ]Social Work  [ ]Case management [ ]Holistic Therapy     Goals of Care Document:JUAN Alejo (03-14-25 @ 16:53)  Goals of Care Conversation:   Participants:  · Participants  Family  · Child(ana)  Nancy Alexis    Advance Directives:  · Caregiver:  yes    Conversation Discussion:   Goals of Care:   GOALS OF CARE:  · Participants	Family  · Child(ana)	Chasity bert Lozada    Advance Directives:  · Does patient have Advance Directive	Yes  · Indicate Type	Medical Orders for Life-Sustaining Treatment (MOLST)    Conversation Discussion:  · ADVANCED CARE PLANNING	Voluntary discussion occurred addressing advanced care planning  · Conversation	Diagnosis; Prognosis; Treatment Options  · Conversation Details	Spoke with family outside pt room. They shared that based on pt's symptom needs o/n goals transitioned to comfort measures only. They are amenable to deescalation of HD and other non-symptom directed interventions. They shared mom's current mental status is a gift as she is able to communicate with them and speak with family via video phone, shared my agreement. Discussed plan of care moving forward will be to continue to support pt's symptom needs.   Discussed transfer to PCU for symptom directed care. At this time family would prefer to stay on [facility] for ongoing symptom directed care. Emotional support provided.  and chaplaincy consulted.    Treatment Guidelines:  · Decision Maker	Surrogate    Location of Discussion:  · Location of discussion	Face to face

## 2025-03-25 NOTE — PROGRESS NOTE ADULT - NS ATTEST RISK PROBLEM GEN_ALL_CORE FT
1. Number and complexity of problems addressed for this patient:    1.1 Moderate (At least 1)  [ x] 1 or more chronic illnesses with exacerbation, progression, or side effects of treatment  [ ] 2 or more stable chronic illnesses  [ ] 1 undiagnosed new problem with uncertain prognosis  [ x] 1 acute illness with systemic symptoms  [ ] 1 acute complicated injury  1.2 High (At least 1)   [ ] 1 or more chronic illnesses with severe exacerbation, progression, or side effects of treatment  [ ] 1 acute or chronic illnesses or injuries that may pose a threat to life or bodily function    2. Amount and/or Complexity of Data that was Reviewed and Analyzed for this case:       Moderate (1 out of 3)       High (2 out of 3)  2.1. (Any combination of 3 of the following)   [ ] Prior External notes were reviewed  [ x] Each test result was reviewed (see "LABS" and "RADIOLOGY & ADDITIONAL STUDIES" above)  [x ] The following tests were ordered and/or reviewed (Only count 1 point for ordering or reviewing a unique test):  	[ ]CBC  	[ ] Chemistry   	[ ] Imaging   	[ ] Other:   [ ] Assessment requiring an independent historian   		Name of historian and relationship:   2.2  [ ] Personally review and interpretation of  image or testing   2.3  [ ] Discussion of management or test interpretation with external physician/other qualified health care professional\appropriate source (not separately reported)    3. Risk of Complications and/or Morbidity or Mortality of for this Patient’s Management:  3.1 Moderate risk of morbidity from additional diagnostic testing or treatment (At least 1):   [x ] Prescription drug management   [ ] Decision regarding minor surgery, treatment, or procedure with identified patient or procedure risk factors  [ ] Decision regarding elective major surgery, treatment, or procedure without identified patient or procedure risk factors   [ ] Diagnosis or treatment significantly limited by social determinants of health   [ ] Other:   3.2 High risk of morbidity from additional diagnostic testing or treatment (At least 1):   [ ] Drug therapy requiring intensive monitoring for toxicity   [ ] Decision regarding elective major surgery, treatment, or procedure with identified patient or procedure risk factors   [ ] Decision regarding emergency major surgery, treatment, or procedure   [ ] Decision regarding hospitalization or escalation of hospital-level of care  [ ] Decision not to resuscitate, not to intubate, or to de-escalate care because of poor prognosis   [ ] Decision to proceed or not with artificial nutrition   [ x] Parenteral controlled substance  [ ] Other:

## 2025-03-25 NOTE — PROGRESS NOTE ADULT - PROBLEM SELECTOR PLAN 1
Transitioned to comfort care 3/20.   Daughter agreeable for removal of Shiley today - personally removed Shiley; pressure held until achieved hemostasis.   -Palliative following for symptom management, recs appreciated

## 2025-03-25 NOTE — PROGRESS NOTE ADULT - TIME BILLING
Symptom assessment and management, supportive counseling, coordination of care Symptom assessment and management, supportive counseling, coordination of care    Flaquita Darby, ANP-BC  Please contact me via Teams  between 6am-2pm. If not answering, please call the palliative care pager [phone]    After 2pm and on weekends, please see the contact information below:    In the event of newly developing, evolving, or worsening symptoms between 2pm and 5pm please contact the Palliative Medicine via extension [phone] for assistance.  After 5pm please contact team via pager (if the patient is at [facility] #[phone] or if the patient is at [facility] #[phone]) The Geriatric and Palliative Medicine service has coverage 24 hours a day/ 7 days a week to provide medical recommendations regarding symptom management needs via telephone

## 2025-03-25 NOTE — PROGRESS NOTE ADULT - PROBLEM SELECTOR PLAN 1
- c/w  Dilaudid 0.5mg IV to q4hrs atc  - Continue with Dilaudid 0.5mg IV q2hrs PRN for moderate pain   - C/w Dilaudid 0.7mg IV q2hrs PRN for severe pain  - c/w 100mg Gabapentin TID as tolerated   - Bowel regimen while on opioids, can use dulcolax  low threshold for starting Dilaudid gtt at this time - c/w  Dilaudid 0.5mg IV to q4hrs atc  - Continue with Dilaudid 0.5mg IV q2hrs PRN for moderate pain  Pt requiring 1 dose in 24 hrs  - C/w Dilaudid 0.7mg IV q2hrs PRN for severe pain  - c/w 100mg Gabapentin TID as tolerated   - Bowel regimen while on opioids, can use dulcolax  low threshold for starting Dilaudid gtt at this time

## 2025-03-26 NOTE — PROGRESS NOTE ADULT - REASON FOR ADMISSION
pain
RP bleed, uremic platelet dysfunction
Acute renal failure
LE pain
Right leg pain
Right leg pain
LE pain
LE pain
Right leg pain
LE pain
pain

## 2025-03-26 NOTE — PROVIDER CONTACT NOTE (OTHER) - ASSESSMENT
Pt laying in bed w/ daughter at bedside. Pt was unresponsive to voice and sternal rub.
PT AOX1. PT with nonverbal indicators of pain. Pt moaning in pain but unable to verbalize at the moment. Son at bedside stating patient endorsing back pain since about 1800.  BP 96/60  +flank ecchymosis
VSS Bp 111/72, HR 82, 98.4, RR 18, 94%. Pt on Room air. N/V
Patient is axox0-1, nonredirectable patient does not communicate, patient moves all extremity , screaming. Skin is ecchymotic on all upper extremity.
BP

## 2025-03-26 NOTE — PROGRESS NOTE ADULT - SUBJECTIVE AND OBJECTIVE BOX
SUBJECTIVE AND OBJECTIVE: Pt seen and examined at bedside. Pt lethargic/comfortable on exam  Indication for Geriatrics and Palliative Care Services/INTERVAL HPI: GOC/symptoms     OVERNIGHT EVENTS: 3 prns for comfort in 24 hours (8a-8a)     DNR on chart:DNI: Trial NIV  DNI: Trial NIV      Allergies    codeine (Unknown)  penicillins (Anaphylaxis)  Kiwi (Unknown)    Intolerances    MEDICATIONS  (STANDING):  chlorhexidine 2% Cloths 1 Application(s) Topical daily  HYDROmorphone  Injectable 0.5 milliGRAM(s) IV Push every 4 hours  lidocaine   4% Patch 1 Patch Transdermal daily  senna 2 Tablet(s) Oral at bedtime    MEDICATIONS  (PRN):  glycopyrrolate Injectable 0.4 milliGRAM(s) IV Push every 6 hours PRN secretions  HYDROmorphone  Injectable 0.5 milliGRAM(s) IV Push every 2 hours PRN Moderate Pain (4 - 6)  HYDROmorphone  Injectable 0.5 milliGRAM(s) IV Push every 2 hours PRN dyspnea  HYDROmorphone  Injectable 0.7 milliGRAM(s) IV Push every 2 hours PRN Severe Pain (7 - 10)  LORazepam   Injectable 0.5 milliGRAM(s) IV Push every 2 hours PRN Anxiety      ITEMS UNCHECKED ARE NOT PRESENT    PRESENT SYMPTOMS: [x ]Unable to self-report - see [ ] CPOT [ x] PAINADS [x ] RDOS  Source if other than patient:  [ ]Family   [x ]Team     Pain:  [ ]yes [ ]no  QOL impact -   Location -                    Aggravating factors -  Quality -  Radiation -  Timing-  Severity (0-10 scale):  Minimal acceptable level (0-10 scale):     CPOT:    https://www.sccm.org/getattachment/gdk53h19-8h6u-3a9d-4b0o-3909f8698h6m/Critical-Care-Pain-Observation-Tool-(CPOT)    PAINAD Score: See PAINAD tool and score below       Dyspnea:                           [ ]Mild [ ]Moderate [ ]Severe    RDOS: See RDOS tool and score below   0 to 2  minimal or no respiratory distress   3  mild distress  4 to 6 moderate distress  >7 severe distress      Anxiety:                             [ ]Mild [ ]Moderate [ ]Severe  Fatigue:                             [ ]Mild [ ]Moderate [ ]Severe  Nausea:                             [ ]Mild [ ]Moderate [ ]Severe  Loss of appetite:              [ ]Mild [ ]Moderate [ ]Severe  Constipation:                    [ ]Mild [ ]Moderate [ ]Severe    PCSSQ[Palliative Care Spiritual Screening Question]   Severity (0-10):  Score of 4 or > indicate consideration of Chaplaincy referral.  Chaplaincy Referral: [x ] yes [ ] refused [x ] following [ ] Deferred     Caregiver Creola? : [ ] yes [ ] no [x ] Deferred [ ] Declined             Social work referral [ ] Patient & Family Centered Care Referral [ ]     Anticipatory Grief present?:  [ x] yes [ ] no  [ ] Deferred                  Social work referral [ ] Chaplaincy Referral [ x]    		  Other Symptoms:  [ x]All other review of systems negative     Palliative Performance Status Version 2:   See PPSv2 tool and score below         PHYSICAL EXAM:  Vital Signs Last 24 Hrs  T(C): 36.7 (26 Mar 2025 05:45), Max: 36.7 (26 Mar 2025 05:45)  T(F): 98 (26 Mar 2025 05:45), Max: 98 (26 Mar 2025 05:45)  HR: 96 (26 Mar 2025 05:45) (96 - 96)  BP: 141/58 (26 Mar 2025 05:45) (141/58 - 141/58)  BP(mean): --  RR: 18 (26 Mar 2025 05:45) (18 - 18)  SpO2: 90% (26 Mar 2025 05:45) (90% - 90%)    Parameters below as of 26 Mar 2025 05:45  Patient On (Oxygen Delivery Method): room air     I&O's Summary     GENERAL: [ ]Cachexia    [x ]Alert  [x ]Oriented x   [ ]Lethargic  [ ]Unarousable  [ ]Verbal  [ ]Non-Verbal  Behavioral:   [ ]Anxiety  [ ]Delirium [ ]Agitation [ ]Other  HEENT:  [ ]Normal   [ x]Dry mouth   [ ]ET Tube/Trach  [ ]Oral lesions  PULMONARY:   [ ]Clear [ ]Tachypnea  [ ]Audible excessive secretions   [ ]Rhonchi        [ ]Right [ ]Left [ ]Bilateral  [ ]Crackles        [ ]Right [ ]Left [ ]Bilateral  [ ]Wheezing     [ ]Right [ ]Left [ ]Bilateral  [ x]Diminished BS [ ] Right [ ]Left [ x]Bilateral  CARDIOVASCULAR:    [ x]Regular [ ]Irregular [ ]Tachy  [ ]Cam [ ]Murmur [ ]Other  GASTROINTESTINAL:  [x ]Soft  [ ]Distended   [x ]+BS  [ x]Non tender [ ]Tender  [ ]Other [ ]PEG [ ]OGT/ NGT   Last BM:   GENITOURINARY:  [ ]Normal [ x]Incontinent   [ ]Oliguria/Anuria   [ ]Lieberman  MUSCULOSKELETAL:   [ ]Normal   [ x]Weakness  [ x]Bed/Wheelchair bound [ ]Edema  NEUROLOGIC:   [ ]No focal deficits  [x ] Cognitive impairment  [ ] Dysphagia [ ]Dysarthria [ ] Paresis [ ]Other   SKIN:   [ ]Normal  [ ]Rash  [ ]Other  [ ]Pressure ulcer(s) [ ]y [ ]n present on admission    CRITICAL CARE:  [ ]Shock Present  [ ]Septic [ ]Cardiogenic [ ]Neurologic [ ]Hypovolemic  [ ]Vasopressors [ ]Inotropes  [ ]Respiratory failure present [ ]Mechanical Ventilation [ ]Non-invasive ventilatory support [ ]High-Flow   [ ]Acute  [ ]Chronic [ ]Hypoxic  [ ]Hypercarbic [ ]Other  [ ]Other organ failure     LABS:            RADIOLOGY & ADDITIONAL STUDIES:    Protein Calorie Malnutrition Present: [ ]mild [ ]moderate [ ]severe [ ]underweight [ ]morbid obesity  https://www.andeal.org/vault/2440/web/files/ONC/Table_Clinical%20Characteristics%20to%20Document%20Malnutrition-White%20JV%20et%20al%187733.pdf    Height (cm): 152.4 (03-15-25 @ 18:37)  Weight (kg): 68.8 (03-15-25 @ 18:37)  BMI (kg/m2): 29.6 (03-15-25 @ 18:37)    [ x]PPSV2 < or = 30%  [ ]significant weight loss [ ]poor nutritional intake [ ]anasarca[ ]Artificial Nutrition    Other REFERRALS:  [ ]Hospice  [ ]Child Life  [ ]Social Work  [ ]Case management [ ]Holistic Therapy     Goals of Care Document:JUAN Alejo (03-14-25 @ 16:53)  Goals of Care Conversation:   Participants:  · Participants  Family  · Child(ana)  Nancy Avery    Advance Directives:  · Caregiver:  yes    Conversation Discussion:  · ADVANCED CARE PLANNING  Voluntary discussion occurred addressing advanced care planning  · Conversation  Diagnosis; Prognosis; MOLST Discussed; Treatment Options  · Conversation Details  Conversation held with Nancy avery, at bedside. Reviewed events leading to patient's current status, and overall condition at present. Explained role of healthcare proxy and its role in decision making should the patient be unable to express his own wishes or lacks capacity to make medical decisions. Explained Family Health Care Decision Act (FHCDA) Surrogate Decision Maker Hierarchy in the absence of a health care agent. Nancy reports that she is documented healthcare proxy and primary caregiver, though she confers with her brother, who lives in [place].     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 Further management pending several vascular studies, however, difficulty obtaining due to patient's agitation likely secondary to pain. Also with poor IV access - attempts to place IVs also limited secondary to pain/agitation. Explained that we can premedicate her with pain medication, however, also poses difficulty as she is significantly altered and pain medication may worsen delirium. Course also complicated by worsening renal function, hyperkalemia, poor oral intake. Overall, patient with poor prognosis. Extensive discussion regarding quality vs. quantity of life. Nancy hopeful that if patient's mentation improves, she will be less combative with procedures/testing and she will be able to eat/drink, allowing her kidney function to recover. Explained that given patient's comorbidities, frailty and decreased physiologic reserves, may not recover to her former cognitive and functional baseline. Nancy expressed understanding but remained optimistic. She will consider further interventions, including dialysis and vascular intervention, as necessity arises. Regarding resuscitative measures, including chest compression and intubation, Nancy will discuss with her brother further - patient remains FULL CODE.    Personal Advance Directives Treatment Guidelines:   Treatment Guidelines:  · Decision Maker  Health Care Proxy    Location of Discussion:   Time Spent on Advance Care Planning:  Attending or FREDY Only.     I personally spent 25 minutes on advance care planning services with the patient. This time is separate and distinct from any other care management services provided on this date.    Location of Discussion:  · Location of discussion  Face to face      Electronic Signatures:  Ashleigh Alejo)  (Signed 14-Mar-2025 16:59)  	Authored: Goals of Care Conversation, Personal Advance Directives Treatment Guidelines, Location of Discussion      Last Updated: 14-Mar-2025 16:59 by Ashleigh Alejo ()

## 2025-03-26 NOTE — DISCHARGE NOTE FOR THE EXPIRED PATIENT - HOSPITAL COURSE
# Goals of care, counseling/discussion.   ·  Plan: Transitioned to comfort care 3/20.   Daughter agreeable for removal of Shiley today - personally removed Shiley; pressure held until achieved hemostasis.   -Palliative following for symptom management, recs appreciated.    # Acute renal failure.   ·  Plan: Patient developed acute renal failure during hospital course. Has a history of CKD stage V with Cr 3.66 at baseline. Due to uremia-induced platelet dysfunction, patient was started on HD through [facility] placed in the hospital, received a total of three sessions, last on 3/19/25.   Transitioned to comfort care 3/20.    # Peripheral artery disease.   ·  Plan: Comfort care  Has extensive PAD in bilateral legs, newly diagnosed. Was placed on DAPT and heparin drip, held due to RP bleed. Has right shin ulcer.   Transitioned to comfort care 3/20.    # Retroperitoneal hematoma.   ·  Plan: Patient's hospital course was complicated by a large RP bleed after being started on heparin drip in the setting of acute renal failure and uremia-induced platelet dysfunction, requiring emergent HD. Received total 4u pRBC, 1u FFP, 1u PLT. IR consulted, recommending no further intervention as RP hematoma would most likely self tamponade  Transitioned to comfort care 3/20.    #Type 2 diabetes mellitus.   ·  Plan: Transitioned to comfort care 3/20.    #: Hypothyroidism.   ·  Plan: Transitioned to comfort care 3/20.    # E-coli UTI.   ·  Plan: Transitioned to comfort care 3/20.

## 2025-03-26 NOTE — PROGRESS NOTE ADULT - PROBLEM SELECTOR PROBLEM 5
Retroperitoneal hematoma
ACP (advance care planning)
Hypotension
ACP (advance care planning)
Type 2 diabetes mellitus
ACP (advance care planning)
HFrEF (heart failure with reduced ejection fraction)
Hypotension
ACP (advance care planning)
Hypotension
Type 2 diabetes mellitus
Hypertension
Type 2 diabetes mellitus
Hypertension
Type 2 diabetes mellitus
ACP (advance care planning)
HFrEF (heart failure with reduced ejection fraction)
Type 2 diabetes mellitus
ACP (advance care planning)

## 2025-03-26 NOTE — PROVIDER CONTACT NOTE (OTHER) - REASON
Patient has no IV access
Pt with nonverbal indicators of pain in the back. Pt with family at bedside stating pain began
Pt c/o back pain
Pt expiration
Pt vomiting

## 2025-03-26 NOTE — PROVIDER CONTACT NOTE (OTHER) - ACTION/TREATMENT ORDERED:
IV tylenol administered
Midodrine administered.  IV tylenol administered as ordered.  Stat cbc and coags ordered   Gabapentin administered   Pt now sleeping comfortably in bed
notified provider, heparin drip discontinued.
Provider aware. Pt DNR/DNI. Pt MALISSA announced @[id number].
Provider notified, will continue to monitor.

## 2025-03-26 NOTE — PROVIDER CONTACT NOTE (OTHER) - SITUATION
Pt moaning c/o pain , already received dilaudid , BP is low
CO vomiting clear green fluid, with mucous
Patient has no IV access
Pt with nonverbal indicators of pain
Pt was unresponsive in bed w/ daughter @bedside. Pt had no respirations and no pulse was palpable.

## 2025-03-26 NOTE — CHART NOTE - NSCHARTNOTEFT_GEN_A_CORE
Medicine NP Death Note    Called to bedside to  pronounce  pt with  cardiopulmonary  arrest secondary  to  RP bleed, ROB, Uremia induced platelet dysfunction , B/L popliteal artery occlusion , acute renal failure      On physical exam, patient did not respond to verbal or noxious stimuli.  No spontaneous respirations.  Absent heart and breath sounds.  Absent radial and carotid pulses.   Pupils are fixed and dilated, no corneal reflex.  EKG rhythm strip shows asystole.   Patient pronounced dead at 1:42pm  Attending notified.   Pt  is  not candidate  for  medical  examiner Family decline autopsy.    Tracy Parks NP-C  Department of Medicine

## 2025-03-26 NOTE — PROGRESS NOTE ADULT - PROBLEM SELECTOR PLAN 3
Required straight catheterization x1 in ED  Monitor urine output  3/14- Place Lieberman catheter for strict I/Os.
Unclear etiology, currently holding statin due to this.  - Trend LFTs daily
ROB on CDK  HD is no longer in line with GOC  symptom directed care
Comfort care  Has extensive PAD in bilateral legs, newly diagnosed. Was placed on DAPT and heparin drip, held due to RP bleed. Has right shin ulcer.   - Follow up with vascular cardiology regarding medical management of BL LE stenotic lesions  - ASA held due to platlets in 60s with recent uremia/platelet dysunfction). Held off xarelto due to recent bleed
Comfort care  Has extensive PAD in bilateral legs, newly diagnosed. Was placed on DAPT and heparin drip, held due to RP bleed. Has right shin ulcer.   Transitioned to comfort care 3/20.
Hgb A1c 9.4  -c/w home glargine 12u, meal time short-acting + sliding scale
Comfort care  Has extensive PAD in bilateral legs, newly diagnosed. Was placed on DAPT and heparin drip, held due to RP bleed. Has right shin ulcer.   - Follow up with vascular cardiology regarding medical management of BL LE stenotic lesions  - ASA held due to platlets in 60s with recent uremia/platelet dysfunction). Held off xarelto due to recent bleed
Hgb A1c 9.4  Holding long-acting and pre-meal insulin as patient with poor oral intake
ROB on CDK now on HD  Renal following
ROB on CDK  HD is no longer in line with GOC  symptom directed care
ROB on CDK  HD is no longer in line with GOC  symptom directed care
Comfort care  Has extensive PAD in bilateral legs, newly diagnosed. Was placed on DAPT and heparin drip, held due to RP bleed. Has right shin ulcer.   - Follow up with vascular cardiology regarding medical management of BL LE stenotic lesions  - ASA held due to platlets in 60s with recent uremia/platelet dysunfction). Held off xarelto due to recent bleed
ROB on CDK  HD is no longer in line with GOC  symptom directed care
Required straight catheterization x1 in ED  Monitor urine output  3/14- Place Lieberman catheter for strict I/Os
Comfort care  Has extensive PAD in bilateral legs, newly diagnosed. Was placed on DAPT and heparin drip, held due to RP bleed. Has right shin ulcer.   Transitioned to comfort care 3/20.
Comfort care  Has extensive PAD in bilateral legs, newly diagnosed. Was placed on DAPT and heparin drip, held due to RP bleed. Has right shin ulcer.   Transitioned to comfort care 3/20.
Unclear etiology, currently holding statin due to this.  - Trend LFTs daily  - Obtain GI consult in AM for evaluation
Unclear etiology, currently holding statin due to this.  - Trend LFTs daily
ROB on CDK  HD is no longer in line with GOC  symptom directed care
ROB on CDK  HD is no longer in line with GOC  symptom directed care
ROB on CDK now on HD  Renal following

## 2025-03-26 NOTE — PROGRESS NOTE ADULT - PROBLEM SELECTOR PROBLEM 8
Polypharmacy
Prophylactic measure
Pain
Prophylactic measure
Prophylactic measure
Polypharmacy
Prophylactic measure
Acute renal failure
Prophylactic measure
Pain
Pain

## 2025-03-26 NOTE — PROGRESS NOTE ADULT - PROBLEM SELECTOR PLAN 7
ROB on CKD stage 5. Possible prerenal, ATN, contrast-induced.   -Outpatient labs reviewed- Cre 3.66 in 01/2025, GFR <15   - Cre worsening   - US Renal:  No hydronephrosis. Increased renal cortical echogenicity compatible with renal parenchymal disease.  -Monitor Cre.   -Avoid nephrotoxic medications  - Check UA, UClx, Linda, UCre.  - Lieberman as above for possible retention  - Holding torsemide, Entresto  - Trial IVF - NS @ 50 cc/hr  - Start sodium bicarbonate per nephrology  Nephrology recs appreciated
Patient completed a three day course of antibiotics for E. coli and E. faecalis UTI with ceftriaxone and vancomycin respectively on 3/18/25 per sign out.  - ID confirming - no further abx needed at this time
-Home medications reconciled with daughter, Nancy, via phone:  -Torsemide 20mg (takes 1-2 tablets depending on weight)  - HOLDING due to ROB   -Protonix 40mg daily  -Metoprolol 12.5mg BID  -Metolazone PRN (last taken a month ago per daughter)  -Levothyroxine recently increased to 50mcg daily  -Lantus 12u QHS - Holding due to poor oral intake  -Gabapentin 100mg BID  -Pepcid 20mg daily (will continue as 20mg every other day for renal dosing)  -Entresto 24/26mg BID - HOLDING due to ROB   -Plavix 75mg daily  -Lipitor 20mg daily  -Allopurinol 100mg every day (holding due to CKD stage 4)
Hemoglobin a1c 9.4%.   - Hold home oral hypoglycemic medication   - Insulin lispro sliding scale with meals and at bedtime  -comfort care, no fingersticks
Patient developed acute renal failure during hospital course. Has a history of CKD stage V with Cr 3.66 at baseline. Due to uremia-induced platelet dysfunction, patient was started on HD through [facility] placed in the hospital, received a total of three sessions, last on 3/17/25.   - Bladder scan q6h to monitor for urinary retention   - Monitor off dialysis for now  - Follow up BMP daily   - Follow up nephrology recommendations - plan for HD 3/19 now   - Avoid nephrotoxins, dose medication to GFR  - Sodium bicarbonate 650 mg TID
Transitioned to comfort care 3/20.
Transitioned to comfort care 3/20.
Patient developed acute renal failure during hospital course. Has a history of CKD stage V with Cr 3.66 at baseline. Due to uremia-induced platelet dysfunction, patient was started on HD through [facility] placed in the hospital, received a total of three sessions, last on 3/19/25.   - Bladder scan q6h to monitor for urinary retention   - Monitor off dialysis for now  - Follow up BMP daily   - Follow up nephrology recommendations  - Avoid nephrotoxins, dose medication to GFR  - Sodium bicarbonate 650 mg TID
ROB on CKD stage 5. Possible prerenal, ATN, contrast-induced.   -Outpatient labs reviewed- Cre 3.66 in 01/2025, GFR <15   - Cre worsening--> today up to 7.14   - US Renal:  No hydronephrosis. Increased renal cortical echogenicity compatible with renal parenchymal disease.  -Monitor Cre.   -Avoid nephrotoxic medications  - Lieberman as above for possible retention  - Holding torsemide, Entresto  - Trial IVF - NS @ 50 cc/hr.  - Start sodium bicarbonate per nephrology  Nephrology recs appreciated
Patient completed a three day course of antibiotics for E. coli and E. faecalis UTI with ceftriaxone and vancomycin respectively on 3/18/25 per sign out.  - ID confirming - no further abx needed at this time
Patient developed acute renal failure during hospital course. Has a history of CKD stage V with Cr 3.66 at baseline. Due to uremia-induced platelet dysfunction, patient was started on HD through [facility] placed in the hospital, received a total of three sessions, last on 3/17/25. Now monitoring off HD.   - Bladder scan q6h to monitor for urinary retention   - Monitor off dialysis for now  - Follow up BMP daily   - Follow up nephrology recommendations  - Avoid nephrotoxins, dose medication to GFR  - Remove Shiley when HD is no longer needed  - Sodium bicarbonate 650 mg TID
-Home medications reconciled with daughter, Nancy, via phone:  -Torsemide 20mg (takes 1-2 tablets depending on weight) - hold currently due to ROB  -Protonix 40mg daily  -Metoprolol 12.5mg BID  -Metolazone PRN (last taken a month ago per daughter)  -Levothyroxine recently increased to 50mcg daily  -Lantus 12u QHS  -Gabapentin 100mg BID  -Pepcid 20mg daily (will continue as 20mg every other day for renal dosing)  -Entresto 24/26mg BID - holding to ROB  -Plavix 75mg daily  -Lipitor 20mg daily  -Allopurinol 100mg every day (hold due to CKD stage 4)
Transitioned to comfort care 3/20.

## 2025-03-26 NOTE — PROGRESS NOTE ADULT - PROBLEM SELECTOR PLAN 1
- pt comfortable on exam   - c/w  Dilaudid 0.5mg IV to q4hrs atc  - Continue with Dilaudid 0.5mg IV q2hrs PRN for moderate pain  Pt requiring 1 dose in 24 hrs  - C/w Dilaudid 0.7mg IV q2hrs PRN for severe pain  - c/w 100mg Gabapentin TID as tolerated   - Bowel regimen while on opioids, can use dulcolax  low threshold for starting Dilaudid gtt at this time

## 2025-03-26 NOTE — PROGRESS NOTE ADULT - PROBLEM SELECTOR PROBLEM 1
Stage 5 chronic kidney disease not on chronic dialysis
Stage 5 chronic kidney disease not on chronic dialysis
Goals of care, counseling/discussion
Goals of care, counseling/discussion
Metabolic encephalopathy
Stage 5 chronic kidney disease not on chronic dialysis
Pain
Acute kidney injury superimposed on CKD
Goals of care, counseling/discussion
Pain
Goals of care, counseling/discussion
Goals of care, counseling/discussion
Metabolic encephalopathy
Pain
Pain
Peripheral artery disease
Pain
Metabolic encephalopathy
Pain
Goals of care, counseling/discussion
Peripheral artery disease

## 2025-03-26 NOTE — PROGRESS NOTE ADULT - PROBLEM SELECTOR PROBLEM 2
Hyperkalemia
Metabolic encephalopathy
Peripheral artery disease
Acute renal failure
Peripheral artery disease
Acute renal failure
Peripheral artery disease
Urinary retention
Acute renal failure
Peripheral artery disease
Acute renal failure
Peripheral artery disease
Peripheral artery disease
Acute renal failure
Hyperkalemia
Urinary retention
Peripheral artery disease
Peripheral artery disease

## 2025-03-26 NOTE — PROGRESS NOTE ADULT - SUBJECTIVE AND OBJECTIVE BOX
[facility] Division of Hospital Medicine  Ashleigh Alejo DO  MS Teams PREFERRED      SUBJECTIVE / OVERNIGHT EVENTS: No acute events overnight. Patient appears comfortable.  ADDITIONAL REVIEW OF SYSTEMS:    MEDICATIONS  (STANDING):  chlorhexidine 2% Cloths 1 Application(s) Topical daily  HYDROmorphone  Injectable 0.5 milliGRAM(s) IV Push every 4 hours  lidocaine   4% Patch 1 Patch Transdermal daily  senna 2 Tablet(s) Oral at bedtime    MEDICATIONS  (PRN):  glycopyrrolate Injectable 0.4 milliGRAM(s) IV Push every 6 hours PRN secretions  HYDROmorphone  Injectable 0.5 milliGRAM(s) IV Push every 2 hours PRN Moderate Pain (4 - 6)  HYDROmorphone  Injectable 0.5 milliGRAM(s) IV Push every 2 hours PRN dyspnea  HYDROmorphone  Injectable 0.7 milliGRAM(s) IV Push every 2 hours PRN Severe Pain (7 - 10)  LORazepam   Injectable 0.5 milliGRAM(s) IV Push every 2 hours PRN Anxiety      PHYSICAL EXAM:  Vital Signs Last 24 Hrs  T(C): 36.7 (26 Mar 2025 05:45), Max: 36.7 (26 Mar 2025 05:45)  T(F): 98 (26 Mar 2025 05:45), Max: 98 (26 Mar 2025 05:45)  HR: 96 (26 Mar 2025 05:45) (96 - 96)  BP: 141/58 (26 Mar 2025 05:45) (141/58 - 141/58)  BP(mean): --  RR: 18 (26 Mar 2025 05:45) (18 - 18)  SpO2: 90% (26 Mar 2025 05:45) (90% - 90%)    Parameters below as of 26 Mar 2025 05:45  Patient On (Oxygen Delivery Method): room air      CONSTITUTIONAL: NAD   NECK: Supple, midline  RESPIRATORY: Normal respiratory effort; lungs are clear to auscultation bilaterally  CARDIOVASCULAR: Regular rate and rhythm, normal S1 and S2.   ABDOMEN: Nontender to palpation, no rebound/guarding  PSYCH: Calm          LABS:                      RADIOLOGY & ADDITIONAL TESTS:  Results Reviewed:   Imaging Personally Reviewed:  Electrocardiogram Personally Reviewed:    COORDINATION OF CARE:  Care Discussed with Consultants/Other Providers [Y/N]: Y  Prior or Outpatient Records Reviewed [Y/N]: Y

## 2025-03-26 NOTE — PROGRESS NOTE ADULT - PAIN ASSESSMENT ADVANCED DEMENTIA: BODY LANGUAGE
Tense. Distressed pacing. Fidgeting.
Relaxed
Tense. Distressed pacing. Fidgeting.
Relaxed
Tense. Distressed pacing. Fidgeting.
Relaxed

## 2025-03-26 NOTE — CHART NOTE - NSCHARTNOTEFT_GEN_A_CORE
NUTRITION FOLLOW UP NOTE    PATIENT SEEN FOR: Follow up.     SOURCE: [] Patient  [x] Current Medical Record  [] RN  [] Family/support person at bedside  [x] Patient unavailable/inappropriate (AMS)  [x] Other: Team    CHART REVIEWED/EVENTS NOTED.  [] No changes to nutrition care plan to note  [x] Nutrition Status:  -     DIET ORDER:   Diet, Soft and Bite Sized (25 @ 20:51) [Active]      CURRENT DIET ORDER IS:  [x] Other: See recommendations detailed below.     NUTRITION INTAKE/PROVISION:  [] PO:  [] Enteral Nutrition:  [] Parenteral Nutrition:    ANTHROPOMETRICS:  Drug Dosing Weight  Height (cm): 152.4 (15 Mar 2025 18:37)  Weight (kg): 68.8 (15 Mar 2025 18:37)  BMI (kg/m2): 29.6 (15 Mar 2025 18:37)  BSA (m2): 1.66 (15 Mar 2025 18:37)  Weights:   Daily Weight in k ()     MEDICATIONS:  MEDICATIONS  (STANDING):  chlorhexidine 2% Cloths 1 Application(s) Topical daily  HYDROmorphone  Injectable 0.5 milliGRAM(s) IV Push every 4 hours  lidocaine   4% Patch 1 Patch Transdermal daily  senna 2 Tablet(s) Oral at bedtime    MEDICATIONS  (PRN):  glycopyrrolate Injectable 0.4 milliGRAM(s) IV Push every 6 hours PRN secretions  HYDROmorphone  Injectable 0.5 milliGRAM(s) IV Push every 2 hours PRN Moderate Pain (4 - 6)  HYDROmorphone  Injectable 0.5 milliGRAM(s) IV Push every 2 hours PRN dyspnea  HYDROmorphone  Injectable 0.7 milliGRAM(s) IV Push every 2 hours PRN Severe Pain (7 - 10)  LORazepam   Injectable 0.5 milliGRAM(s) IV Push every 2 hours PRN Anxiety      NUTRITIONALLY PERTINENT LABS:    Phos 3.9 mg/dL  Alb 2.8 g/dL[L]  Chol 82 mg/dL LDL --    HDL 32 mg/dL[L] Trig 68 mg/dL ALT 87 U/L[H]  U/L[H] Alkaline Phosphatase 174 U/L[H]  25 @ 06:59 a1c 9.4    A1C with Estimated Average Glucose Result: 9.4 % (25 @ 06:59)          Finger Sticks:      NUTRITIONALLY PERTINENT MEDICATIONS/LABS:  [x] Reviewed  [] Relevant notes on medications/labs:    EDEMA:  [x] Reviewed  [] Relevant notes:    GI/ I&O:  [x] Reviewed  [] Relevant notes:  [] Other:    SKIN:   [] No pressure injuries documented, per nursing flowsheet  [] Pressure injury previously noted  [] Change in pressure injury documentation:  [] Other:    ESTIMATED NEEDS:  [] No change:  [] Updated:  Energy:   kcal/day (xx-xx kcal/kg)  Protein:   g/day (xx-xx g/kg)  Fluid:   ml/day or [] defer to team  Based on:    NUTRITION DIAGNOSIS:  [] Prior Dx:  [] New Dx:    EDUCATION:  [] Yes:  [] Not appropriate/warranted    NUTRITION CARE PLAN:  1. Diet:  2. Supplements:  3. Multivitamin/mineral supplementation:  4:     [] Achieved - Continue current nutrition intervention(s)  [] Current medical condition precludes nutrition intervention at this time.    MONITORING AND EVALUATION:   RD remains available upon request and will follow up per protocol.    Name  Available on [facility] TEAMS NUTRITION FOLLOW UP NOTE    PATIENT SEEN FOR: Follow up.     SOURCE: [] Patient  [x] Current Medical Record  [] RN  [] Family/support person at bedside  [x] Patient unavailable/inappropriate (AMS)  [x] Other: Team    CHART REVIEWED/EVENTS NOTED.  [] No changes to nutrition care plan to note  [x] Nutrition Status:  - ROB on CKD stage V. HD started 3/15. Last HD 3/19 - now pt comfort measures per GOC of family; no further HD.   - GOC: transitioned to full comfort care measures 3/20.     DIET ORDER:   Diet, Soft and Bite Sized (25 @ 20:51) [Active]      CURRENT DIET ORDER IS:  [x] Other: See recommendations detailed below.     NUTRITION INTAKE/PROVISION:  [x] PO: Per chart, pt consuming <25% of meals with feeding assistance x admission (~2 weeks). RD observed pt's breakfast tray untouched at bedside during time of visit.     ANTHROPOMETRICS:  Drug Dosing Weight  Height (cm): 152.4 (15 Mar 2025 18:37)  Weight (kg): 68.8 (15 Mar 2025 18:37)  BMI (kg/m2): 29.6 (15 Mar 2025 18:37)  Weights:   Daily Weight in k (); Bed scale wt per RD (3/26): 73.5 kg     MEDICATIONS:  MEDICATIONS  (STANDING):  chlorhexidine 2% Cloths 1 Application(s) Topical daily  HYDROmorphone  Injectable 0.5 milliGRAM(s) IV Push every 4 hours  lidocaine   4% Patch 1 Patch Transdermal daily  senna 2 Tablet(s) Oral at bedtime    MEDICATIONS  (PRN):  glycopyrrolate Injectable 0.4 milliGRAM(s) IV Push every 6 hours PRN secretions  HYDROmorphone  Injectable 0.5 milliGRAM(s) IV Push every 2 hours PRN Moderate Pain (4 - 6)  HYDROmorphone  Injectable 0.5 milliGRAM(s) IV Push every 2 hours PRN dyspnea  HYDROmorphone  Injectable 0.7 milliGRAM(s) IV Push every 2 hours PRN Severe Pain (7 - 10)  LORazepam   Injectable 0.5 milliGRAM(s) IV Push every 2 hours PRN Anxiety      NUTRITIONALLY PERTINENT LABS:    Phos 3.9 mg/dL 03-20 Alb 2.8 g/dL[L]  Chol 82 mg/dL LDL --    HDL 32 mg/dL[L] Trig 68 mg/dL ALT 87 U/L[H]  U/L[H] Alkaline Phosphatase 174 U/L[H]  25 @ 06:59 a1c 9.4    A1C with Estimated Average Glucose Result: 9.4 % (25 @ 06:59)      NUTRITIONALLY PERTINENT MEDICATIONS/LABS:  [x] Reviewed.     EDEMA:  [x] Reviewed  [x] Relevant notes: +1 B/L arm, +2 B/L ankle per chart.     GI/ I&O:  [x] Reviewed  [x] Relevant notes: No N/V reported per chart. Bowel regimen - senna.     SKIN:   [x] Change in pressure injury documentation per Wound Care 3/20: no pressure injuries noted.     ESTIMATED NEEDS:  [x] No change:  Energy: 4370-2393 kcal/day (30-35 kcal/kg)  Protein: 65-78 g/day (1.0-1.2 g/kg)  Fluid: [x] defer to team  Based on: current wt 65 kg     NUTRITION DIAGNOSIS:  [x] Prior Dx: Inadequate Protein Energy Intake; Increased nutrient needs     EDUCATION:  [x] Not feasible.     NUTRITION CARE PLAN:  1. Diet: Continue current soft/bite sized diet free of therapeutic restrictions as tolerated. defer texture/consistency to SLP/team.   2. Supplements: RD to provide Diet Mighty Shakes 2x/day to promote intake.   3. Multivitamin/mineral supplementation: Recommend Nephro-alyssa daily to prevent micronutrient deficiencies pending no medical contraindications.     [] Achieved - Continue current nutrition intervention(s)  [] Current medical condition precludes nutrition intervention at this time.    MONITORING AND EVALUATION:   RD remains available upon request and will follow up per protocol.    Tejal Narvaez, [place], CDN / TEAMS   Available on [facility] TEAMS

## 2025-03-26 NOTE — PROVIDER CONTACT NOTE (OTHER) - BACKGROUND
Dx atherosclerosis. PMHx CHF, STEMI, DM, HTN.
89 Y female presenting for right lower extremity pain. Started on HD sp IJ neva c/f hemolytic shock sp ICU
Dx Atherosclerosis
Admitted for Leg pain and RP bleed
Patient admitted for ARTERY OCCLUSION patient was receiving heparin drip IV. IV site symptomatic and bleeding, unable to insert another IV access, patient not tolerating well.

## 2025-03-26 NOTE — PROGRESS NOTE ADULT - PROBLEM SELECTOR PROBLEM 7
E-coli UTI
Stage 5 chronic kidney disease not on chronic dialysis
Polypharmacy
E-coli UTI
Stage 5 chronic kidney disease not on chronic dialysis
Polypharmacy
Acute renal failure
E-coli UTI
Type 2 diabetes mellitus
Acute renal failure
Acute renal failure
E-coli UTI
E-coli UTI

## 2025-03-26 NOTE — PROGRESS NOTE ADULT - PAIN ASSESSMENT ADVANCED DEMENTIA: CONSOLABILITY
Distracted or reassured by voice or touch
No need to console
Distracted or reassured by voice or touch
No need to console
Distracted or reassured by voice or touch
No need to console
1-2 drinks

## 2025-03-26 NOTE — PROGRESS NOTE ADULT - PROBLEM SELECTOR PROBLEM 3
Type 2 diabetes mellitus
Stage 5 chronic kidney disease not on chronic dialysis
Peripheral artery disease
Peripheral artery disease
Transaminitis
Peripheral artery disease
Stage 5 chronic kidney disease not on chronic dialysis
Urinary retention
Transaminitis
Peripheral artery disease
Stage 5 chronic kidney disease not on chronic dialysis
Transaminitis
Type 2 diabetes mellitus
Urinary retention
Stage 5 chronic kidney disease not on chronic dialysis

## 2025-03-26 NOTE — PROGRESS NOTE ADULT - PROBLEM SELECTOR PLAN 6
Pt is DNR/DNI, goal is comfort  Will continue to follow   Page for uncontrolled symptoms [phone]  Can be reached by TEAMS M-F 9-5 Ashleigh Dozier Any other time please page [phone] if needed

## 2025-03-26 NOTE — PROGRESS NOTE ADULT - PROBLEM SELECTOR PROBLEM 4
Transaminitis
Retroperitoneal hematoma
Debility
Retroperitoneal hematoma
Debility
Debility
Retroperitoneal hematoma
Retroperitoneal hematoma
HFrEF (heart failure with reduced ejection fraction)
Retroperitoneal hematoma
Retroperitoneal hematoma
Debility
Debility
Retroperitoneal hematoma
Debility
Retroperitoneal hematoma
Debility
Type 2 diabetes mellitus
HFrEF (heart failure with reduced ejection fraction)
Type 2 diabetes mellitus
Debility

## 2025-03-26 NOTE — PROGRESS NOTE ADULT - PAIN ASSESSMENT ADVANCED DEMENTIA: NEGATIVE VOCALIZATION
Repeated troubled calling out. Loud moaning or groaning. Crying.
Repeated troubled calling out. Loud moaning or groaning. Crying.
None
Occasional moan or groan. Low-level speech with a negative or disapproving quality
None
Repeated troubled calling out. Loud moaning or groaning. Crying.

## 2025-03-26 NOTE — PROGRESS NOTE ADULT - PROBLEM SELECTOR PROBLEM 6
Hypothyroidism
Hypothyroidism
Encounter for palliative care
Stage 5 chronic kidney disease not on chronic dialysis
Type 2 diabetes mellitus
Encounter for palliative care
Encounter for palliative care
Type 2 diabetes mellitus
Hypothyroidism
Encounter for palliative care
Hypothyroidism
Hypertension
Hypertension
Encounter for palliative care
Hypothyroidism
Hypotension
Type 2 diabetes mellitus
Encounter for palliative care
Stage 5 chronic kidney disease not on chronic dialysis
Encounter for palliative care
Encounter for palliative care

## 2025-03-26 NOTE — PROGRESS NOTE ADULT - PROVIDER SPECIALTY LIST ADULT
Hospitalist
Internal Medicine
[facility]
Nephrology
Podiatry
Vascular Cardiology
Vascular Surgery
Vascular Surgery
Wound Care
[facility]
Nephrology
Nephrology
Vascular Surgery
Nephrology
Nephrology
Vascular Surgery
Vascular Surgery
[facility]
Nephrology
Internal Medicine
Palliative Care
Internal Medicine
Palliative Care
Hospitalist
Palliative Care
Internal Medicine
Hospitalist
Hospitalist
Internal Medicine
Palliative Care
Hospitalist
Internal Medicine
Palliative Care
